# Patient Record
Sex: FEMALE | Race: WHITE | NOT HISPANIC OR LATINO | Employment: UNEMPLOYED | URBAN - METROPOLITAN AREA
[De-identification: names, ages, dates, MRNs, and addresses within clinical notes are randomized per-mention and may not be internally consistent; named-entity substitution may affect disease eponyms.]

---

## 2017-01-09 ENCOUNTER — TELEPHONE (OUTPATIENT)
Dept: SMOKING CESSATION | Facility: CLINIC | Age: 62
End: 2017-01-09

## 2017-01-09 NOTE — TELEPHONE ENCOUNTER
Spoke with patient in regards to her benefits with the Smoking Cessation Program. She states that she wants to really quit smoking but she is having a hard time being able to quit by a certain time frame. She states that she needs additional counseling and that group settings cause her to get confused and want to smoke more. Her benefits  on 2017 and she is not eligible for renewal until 3-2017. She states that she has plenty of nicotine patches that she will use and follow the instructions from her counselor. Will renew her benefits when it becomes available.

## 2017-01-10 ENCOUNTER — TELEPHONE (OUTPATIENT)
Dept: SMOKING CESSATION | Facility: CLINIC | Age: 62
End: 2017-01-10

## 2017-01-11 ENCOUNTER — TELEPHONE (OUTPATIENT)
Dept: SMOKING CESSATION | Facility: CLINIC | Age: 62
End: 2017-01-11

## 2017-01-11 ENCOUNTER — TELEPHONE (OUTPATIENT)
Dept: PULMONOLOGY | Facility: CLINIC | Age: 62
End: 2017-01-11

## 2017-01-11 NOTE — TELEPHONE ENCOUNTER
----- Message from Kellen Sanon sent at 1/11/2017  9:12 AM CST -----  Contact: Pt   Pt called and stated she needed to the nurse. She stated she needed to talk and would not give further details of the reason for her call. She can be reached at [phone] (dksq).    Thanks,  Tf

## 2017-01-13 ENCOUNTER — TELEPHONE (OUTPATIENT)
Dept: PULMONOLOGY | Facility: CLINIC | Age: 62
End: 2017-01-13

## 2017-01-13 NOTE — TELEPHONE ENCOUNTER
----- Message from Kellen Sanon sent at 1/13/2017  1:20 PM CST -----  Contact: Pt   Pt called and stated she needed to speak to the nurse. She stated she a few questions and needs a call back. She can be reached at [phone].    Thanks,  TF

## 2017-01-17 ENCOUNTER — TELEPHONE (OUTPATIENT)
Dept: SMOKING CESSATION | Facility: CLINIC | Age: 62
End: 2017-01-17

## 2017-01-17 NOTE — TELEPHONE ENCOUNTER
Third attempt left message regarding smoking cessation quit 2 episode, will call back at a later time.

## 2017-01-19 ENCOUNTER — TELEPHONE (OUTPATIENT)
Dept: PULMONOLOGY | Facility: HOSPITAL | Age: 62
End: 2017-01-19

## 2017-02-01 ENCOUNTER — TELEPHONE (OUTPATIENT)
Dept: INTERNAL MEDICINE | Facility: CLINIC | Age: 62
End: 2017-02-01

## 2017-02-01 NOTE — TELEPHONE ENCOUNTER
----- Message from Joanna Pendleton sent at 2/1/2017  8:03 AM CST -----  Contact: pt   Pt wants to speak with nurse,,, please call pt back at [phone]

## 2017-02-02 ENCOUNTER — TELEPHONE (OUTPATIENT)
Dept: INTERNAL MEDICINE | Facility: CLINIC | Age: 62
End: 2017-02-02

## 2017-02-02 NOTE — TELEPHONE ENCOUNTER
Called pt several times earlier today. Also left a message stating that she needed to be seen. Pt with coughing and vomiting.     Called pt again, no answer.

## 2017-02-02 NOTE — TELEPHONE ENCOUNTER
----- Message from Mal Lofton sent at 2/2/2017 12:10 PM CST -----  Contact: [phone]   Returning call,please call back @ [phone].  Thanks-[facility]

## 2017-02-06 ENCOUNTER — LAB VISIT (OUTPATIENT)
Dept: LAB | Facility: HOSPITAL | Age: 62
End: 2017-02-06
Attending: FAMILY MEDICINE
Payer: COMMERCIAL

## 2017-02-06 ENCOUNTER — OFFICE VISIT (OUTPATIENT)
Dept: INTERNAL MEDICINE | Facility: CLINIC | Age: 62
End: 2017-02-06
Payer: COMMERCIAL

## 2017-02-06 VITALS
DIASTOLIC BLOOD PRESSURE: 84 MMHG | WEIGHT: 203.5 LBS | TEMPERATURE: 97 F | SYSTOLIC BLOOD PRESSURE: 132 MMHG | OXYGEN SATURATION: 98 % | BODY MASS INDEX: 37.22 KG/M2 | HEART RATE: 103 BPM

## 2017-02-06 DIAGNOSIS — I10 ESSENTIAL HYPERTENSION: Chronic | ICD-10-CM

## 2017-02-06 DIAGNOSIS — J44.9 CHRONIC OBSTRUCTIVE PULMONARY DISEASE, UNSPECIFIED COPD TYPE: ICD-10-CM

## 2017-02-06 DIAGNOSIS — E55.9 VITAMIN D INSUFFICIENCY: ICD-10-CM

## 2017-02-06 DIAGNOSIS — Z79.899 HIGH RISK MEDICATION USE: ICD-10-CM

## 2017-02-06 DIAGNOSIS — H66.001 ACUTE SUPPURATIVE OTITIS MEDIA OF RIGHT EAR WITHOUT SPONTANEOUS RUPTURE OF TYMPANIC MEMBRANE, RECURRENCE NOT SPECIFIED: ICD-10-CM

## 2017-02-06 DIAGNOSIS — J01.10 ACUTE NON-RECURRENT FRONTAL SINUSITIS: Primary | ICD-10-CM

## 2017-02-06 LAB
25(OH)D3+25(OH)D2 SERPL-MCNC: 20 NG/ML
ALBUMIN SERPL BCP-MCNC: 3.6 G/DL
ALP SERPL-CCNC: 88 U/L
ALT SERPL W/O P-5'-P-CCNC: 13 U/L
ANION GAP SERPL CALC-SCNC: 10 MMOL/L
AST SERPL-CCNC: 14 U/L
BASOPHILS # BLD AUTO: 0.04 K/UL
BASOPHILS NFR BLD: 0.3 %
BILIRUB SERPL-MCNC: 0.5 MG/DL
BUN SERPL-MCNC: 7 MG/DL
CALCIUM SERPL-MCNC: 9.1 MG/DL
CHLORIDE SERPL-SCNC: 92 MMOL/L
CO2 SERPL-SCNC: 26 MMOL/L
CREAT SERPL-MCNC: 0.7 MG/DL
DIFFERENTIAL METHOD: ABNORMAL
EOSINOPHIL # BLD AUTO: 0.3 K/UL
EOSINOPHIL NFR BLD: 2.5 %
ERYTHROCYTE [DISTWIDTH] IN BLOOD BY AUTOMATED COUNT: 14.1 %
EST. GFR  (AFRICAN AMERICAN): >60 ML/MIN/1.73 M^2
EST. GFR  (NON AFRICAN AMERICAN): >60 ML/MIN/1.73 M^2
GLUCOSE SERPL-MCNC: 62 MG/DL
HCT VFR BLD AUTO: 44 %
HGB BLD-MCNC: 15.4 G/DL
LYMPHOCYTES # BLD AUTO: 2.5 K/UL
LYMPHOCYTES NFR BLD: 20.9 %
MCH RBC QN AUTO: 31.4 PG
MCHC RBC AUTO-ENTMCNC: 35 %
MCV RBC AUTO: 90 FL
MONOCYTES # BLD AUTO: 1.1 K/UL
MONOCYTES NFR BLD: 8.9 %
NEUTROPHILS # BLD AUTO: 7.9 K/UL
NEUTROPHILS NFR BLD: 67.1 %
PLATELET # BLD AUTO: 283 K/UL
PMV BLD AUTO: 10.5 FL
POTASSIUM SERPL-SCNC: 4.3 MMOL/L
PROT SERPL-MCNC: 6.9 G/DL
RBC # BLD AUTO: 4.9 M/UL
SODIUM SERPL-SCNC: 128 MMOL/L
TSH SERPL DL<=0.005 MIU/L-ACNC: 0.79 UIU/ML
WBC # BLD AUTO: 11.76 K/UL

## 2017-02-06 PROCEDURE — 84443 ASSAY THYROID STIM HORMONE: CPT

## 2017-02-06 PROCEDURE — 82306 VITAMIN D 25 HYDROXY: CPT

## 2017-02-06 PROCEDURE — 99214 OFFICE O/P EST MOD 30 MIN: CPT | Mod: S$GLB,,, | Performed by: FAMILY MEDICINE

## 2017-02-06 PROCEDURE — 80053 COMPREHEN METABOLIC PANEL: CPT

## 2017-02-06 PROCEDURE — 85025 COMPLETE CBC W/AUTO DIFF WBC: CPT

## 2017-02-06 PROCEDURE — 3075F SYST BP GE 130 - 139MM HG: CPT | Mod: S$GLB,,, | Performed by: FAMILY MEDICINE

## 2017-02-06 PROCEDURE — 3079F DIAST BP 80-89 MM HG: CPT | Mod: S$GLB,,, | Performed by: FAMILY MEDICINE

## 2017-02-06 PROCEDURE — 36415 COLL VENOUS BLD VENIPUNCTURE: CPT | Mod: PO

## 2017-02-06 PROCEDURE — 99999 PR PBB SHADOW E&M-EST. PATIENT-LVL III: CPT | Mod: PBBFAC,,, | Performed by: FAMILY MEDICINE

## 2017-02-06 RX ORDER — AMOXICILLIN AND CLAVULANATE POTASSIUM 875; 125 MG/1; MG/1
1 TABLET, FILM COATED ORAL 2 TIMES DAILY
Qty: 10 TABLET | Refills: 0 | Status: SHIPPED | OUTPATIENT
Start: 2017-02-06 | End: 2017-02-11

## 2017-02-06 NOTE — MR AVS SNAPSHOT
[facility]  [address]  [place] [address]  Phone: [phone]  Fax: [phone]                  Merle Caro   2017 2:40 PM   Office Visit    Description:  Female : 1955   Provider:  Marck Major MD   Department:  [facility] - [facility]           Reason for Visit     Cough     Generalized Body Aches     Neck Pain           Diagnoses this Visit        Comments    Acute non-recurrent frontal sinusitis    -  Primary     Acute suppurative otitis media of right ear without spontaneous rupture of tympanic membrane, recurrence not specified         Vitamin D insufficiency         Essential hypertension         Chronic obstructive pulmonary disease, unspecified COPD type         High risk medication use                To Do List           Future Appointments        Provider Department Dept Phone    2017 4:45 PM LAB, SAME DAY [facility] [phone]    2017 9:40 AM PULMONARY LAB, [facility] [phone]    2017 10:20 AM Tab Tracey MD [facility] [phone]    2017 2:00 PM Marck Major MD [facility] [phone]      Goals (5 Years of Data)     None      Follow-Up and Disposition     Return in about 6 months (around 2017).       These Medications        Disp Refills Start End    amoxicillin-clavulanate 875-125mg (AUGMENTIN) 875-125 mg per tablet 10 tablet 0 2017    Take 1 tablet by mouth 2 (two) times daily. - Oral    Pharmacy: [id number] [place], [place] - [address] AT [facility] Ph #: [phone]         [facility]     [facility] -  Assistance  Registered nurses in the [facility] provide clinical advisement, health education, appointment booking, and other advisory services.  Call for this free service at [phone].             Medications           Message  regarding Medications     Verify the changes and/or additions to your medication regime listed below are the same as discussed with your clinician today.  If any of these changes or additions are incorrect, please notify your healthcare provider.        START taking these NEW medications        Refills    amoxicillin-clavulanate 875-125mg (AUGMENTIN) 875-125 mg per tablet 0    Sig: Take 1 tablet by mouth 2 (two) times daily.    Class: Normal    Route: Oral           Verify that the below list of medications is an accurate representation of the medications you are currently taking.  If none reported, the list may be blank. If incorrect, please contact your healthcare provider. Carry this list with you in case of emergency.           Current Medications     aclidinium bromide (TUDORZA PRESSAIR) 400 mcg/actuation AePB Inhale 1 puff into the lungs 2 (two) times daily.    ADVAIR DISKUS 500-50 mcg/dose DsDv diskus inhaler USE 1 PUFF BY MOUTH TWICE DAILY    albuterol (ACCUNEB) 1.25 mg/3 mL Nebu Take 3 mLs (1.25 mg total) by nebulization every 6 (six) hours as needed.    albuterol 90 mcg/actuation inhaler Inhale 2 puffs into the lungs every 4 (four) hours as needed for Wheezing.    haloperidol (HALDOL) 5 MG tablet Take 5 mg by mouth once daily.     paroxetine (PAXIL) 30 MG tablet Take 30 mg by mouth once daily.     amoxicillin-clavulanate 875-125mg (AUGMENTIN) 875-125 mg per tablet Take 1 tablet by mouth 2 (two) times daily.    benztropine (COGENTIN) 1 MG tablet Take 1 mg by mouth 2 (two) times daily.    clonazepam (KLONOPIN) 0.5 MG tablet Take 0.5 mg by mouth 2 (two) times daily as needed.    dextromethorphan-guaifenesin  mg (MUCINEX DM)  mg per 12 hr tablet Take 1 tablet by mouth every 12 (twelve) hours.    ergocalciferol (ERGOCALCIFEROL) 50,000 unit Cap TAKE 1 CAPSULE BY MOUTH WEEKLY    nicotine (NICODERM CQ) 14 mg/24 hr Place 1 patch onto the skin once daily.    nicotine polacrilex 2 MG Lozg Take 1 lozenge  (2 mg total) by mouth as needed.           Clinical Reference Information           Your Vitals Were     BP Pulse Temp Weight SpO2 BMI    132/84 (BP Location: Right arm, Patient Position: Sitting, BP Method: Manual) 103 97 °F (36.1 °C) (Tympanic) 92.3 kg (203 lb 7.8 oz) 98% 37.22 kg/m2      Blood Pressure          Most Recent Value    BP  132/84      Allergies as of 2/6/2017     Codeine    Keflex [Cephalexin]    Shellfish Containing Products    Sucralfate      Immunizations Administered on Date of Encounter - 2/6/2017     None      Orders Placed During Today's Visit     Future Labs/Procedures Expected by Expires    CBC auto differential  2/6/2017 4/7/2018    Comprehensive metabolic panel  2/6/2017 5/7/2017    TSH  2/6/2017 4/7/2018    VITAMIN D  2/6/2017 4/7/2018      Smoking Cessation     If you would like to quit smoking:   You may be eligible for free services if you are a [place] resident and started smoking cigarettes before September 1, 1988.  Call the Smoking Cessation Trust ([facility]) toll free at [phone] or [phone].   Call 1-717-QUIT-NOW if you do not meet the above criteria.            Language Assistance Services     ATTENTION: Language assistance services are available, free of charge. Please call [phone].      ATENCIÓN: Si habla español, tiene a prado disposición servicios gratuitos de asistencia lingüística. Llame al [phone].     CHÚ Ý: N?u b?n nói Ti?ng Vi?t, có các d?ch v? h? tr? ngôn ng? mi?n phí dành cho b?n. G?i s? [phone].         [facility] complies with applicable Federal civil rights laws and does not discriminate on the basis of race, color, national origin, age, disability, or sex.

## 2017-02-06 NOTE — PROGRESS NOTES
Subjective:   Patient ID: Merle Caro is a 61 y.o. female.  Chief Complaint:  Cough; Generalized Body Aches; and Neck Pain    HPI Comments: Patient complains of overall not feeling well for last few weeks.  Headache and sinus pressure.  Cough.  Different than COPD exacerbations.  Last antibiotic and steroid treatment in September.  Up-to-date with pulmonology.  Reports compliance medications.  Last week with diarrhea.  Imodium effective.  Now with constipated of symptoms.  Not taking vitamin D high-dose weekly.  Doing over-the-counter daily supplement.  Needs level rechecked.  Seen by psychiatry last month.  No change in medical regimen.  Labs in June stable except for low sodium, question will significance.  Do repeat.  Lipids April controlled.    Review of Systems   Constitutional: Positive for fatigue. Negative for chills and fever.   HENT: Positive for ear pain and sinus pressure. Negative for congestion, dental problem, postnasal drip and sore throat.    Eyes: Negative for visual disturbance.   Respiratory: Positive for cough. Negative for chest tightness, shortness of breath and wheezing.    Cardiovascular: Negative for chest pain, palpitations and leg swelling.   Gastrointestinal: Positive for constipation. Negative for abdominal pain, blood in stool, diarrhea, nausea and vomiting.   Endocrine: Negative for polydipsia, polyphagia and polyuria.   Genitourinary: Negative for difficulty urinating, dysuria, flank pain, hematuria and pelvic pain.   Musculoskeletal: Negative for myalgias.   Skin: Negative for rash.   Neurological: Negative for dizziness, syncope, weakness, light-headedness and headaches.   Hematological: Negative for adenopathy.   Psychiatric/Behavioral: Negative.      Objective:     Visit Vitals    /84 (BP Location: Right arm, Patient Position: Sitting, BP Method: Manual)    Pulse 103    Temp 97 °F (36.1 °C) (Tympanic)    Wt 92.3 kg (203 lb 7.8 oz)    SpO2 98%    BMI 37.22 kg/m2      Physical Exam   Constitutional: Vital signs are normal. She appears well-developed and well-nourished. She does not appear ill.   HENT:   Right Ear: Hearing, external ear and ear canal normal. Tympanic membrane is injected, erythematous and bulging. Tympanic membrane is not perforated and not retracted.   Left Ear: Hearing, tympanic membrane, external ear and ear canal normal.   Nose: Mucosal edema and rhinorrhea present. Right sinus exhibits frontal sinus tenderness. Right sinus exhibits no maxillary sinus tenderness. Left sinus exhibits frontal sinus tenderness. Left sinus exhibits no maxillary sinus tenderness.   Mouth/Throat: Uvula is midline and mucous membranes are normal. Posterior oropharyngeal erythema present. No oropharyngeal exudate, posterior oropharyngeal edema or tonsillar abscesses. No tonsillar exudate.   Eyes: Conjunctivae are normal. Right conjunctiva is not injected. Left conjunctiva is not injected.   Neck: Normal range of motion. Neck supple.   Cardiovascular: Normal rate, regular rhythm and normal heart sounds.  Exam reveals no gallop and no friction rub.    No murmur heard.  Pulmonary/Chest: Effort normal. She has wheezes. She has no rhonchi. She has no rales.   Abdominal: Soft. She exhibits no distension. There is no tenderness.   Lymphadenopathy:     She has no cervical adenopathy.   Skin: Skin is warm, dry and intact. No rash noted.   Psychiatric: She has a normal mood and affect.   Nursing note and vitals reviewed.    Assessment:     1. Acute non-recurrent frontal sinusitis    2. Acute suppurative otitis media of right ear without spontaneous rupture of tympanic membrane, recurrence not specified    3. Vitamin D insufficiency    4. Essential hypertension    5. Chronic obstructive pulmonary disease, unspecified COPD type    6. High risk medication use      Plan:   Acute non-recurrent frontal sinusitis/Acute suppurative otitis media of right ear without spontaneous rupture of tympanic  membrane, recurrence not specified  -     amoxicillin-clavulanate 875-125mg (AUGMENTIN) 875-125 mg per tablet; Take 1 tablet by mouth 2 (two) times daily.  Dispense: 10 tablet; Refill: 0    Vitamin D insufficiency  -     VITAMIN D; Future; Expected date: 2/6/17  Restart weekly high-dose supplement as indicated    Essential hypertension  BP at goal.  Controlled.  Continue present medications.    Chronic obstructive pulmonary disease, unspecified COPD type  Stable.  Continue present medications.  Follow-up pulmonary as planned.    High risk medication use  -     CBC auto differential; Future; Expected date: 2/6/17  -     Comprehensive metabolic panel; Future; Expected date: 2/6/17  -     TSH; Future; Expected date: 2/6/17  Check labs.  Treat as indicated.    Advised if all labs normal and no improvement with treatment for sinus/ear infection, will need to follow-up with psychiatry regarding her overall mood.  Otherwise return to clinic 6 months or sooner if needed.

## 2017-02-07 ENCOUNTER — TELEPHONE (OUTPATIENT)
Dept: INTERNAL MEDICINE | Facility: CLINIC | Age: 62
End: 2017-02-07

## 2017-02-07 DIAGNOSIS — E87.1 HYPONATREMIA: Primary | ICD-10-CM

## 2017-02-07 NOTE — TELEPHONE ENCOUNTER
----- Message from Marck Major MD sent at 2/7/2017  7:34 AM CST -----  Vit D low, needs to be on the high dose weekly supplement  CBC and TSH normal  CMP OK except Low Sodium  Needs referral and evaluation by Renal for causes other than too much water intake.

## 2017-02-07 NOTE — TELEPHONE ENCOUNTER
Notified pt. Pt verbalized understanding. appt scheduled with Nephro on 2/22/17. appt reminder mailed.

## 2017-02-15 ENCOUNTER — TELEPHONE (OUTPATIENT)
Dept: INTERNAL MEDICINE | Facility: CLINIC | Age: 62
End: 2017-02-15

## 2017-02-15 NOTE — TELEPHONE ENCOUNTER
----- Message from Lamar Dominguez sent at 2/15/2017  7:36 AM CST -----  Contact: Pt  Pt has some questions for the nurse regarding her test results. Pls call pt back at [phone].

## 2017-02-17 ENCOUNTER — TELEPHONE (OUTPATIENT)
Dept: INTERNAL MEDICINE | Facility: CLINIC | Age: 62
End: 2017-02-17

## 2017-02-17 NOTE — TELEPHONE ENCOUNTER
----- Message from Melissa Tadeo sent at 2/17/2017  9:32 AM CST -----  Contact: pt  Pt requests nurse to call her regarding results. Pt can be reached at [phone] (pepd)

## 2017-02-22 ENCOUNTER — OFFICE VISIT (OUTPATIENT)
Dept: NEPHROLOGY | Facility: CLINIC | Age: 62
End: 2017-02-22
Payer: COMMERCIAL

## 2017-02-22 VITALS
HEIGHT: 62 IN | HEART RATE: 70 BPM | BODY MASS INDEX: 36.79 KG/M2 | DIASTOLIC BLOOD PRESSURE: 80 MMHG | WEIGHT: 199.94 LBS | SYSTOLIC BLOOD PRESSURE: 120 MMHG

## 2017-02-22 DIAGNOSIS — E87.1 CHRONIC HYPONATREMIA: ICD-10-CM

## 2017-02-22 PROCEDURE — 99205 OFFICE O/P NEW HI 60 MIN: CPT | Mod: S$GLB,,, | Performed by: INTERNAL MEDICINE

## 2017-02-22 PROCEDURE — 99999 PR PBB SHADOW E&M-EST. PATIENT-LVL III: CPT | Mod: PBBFAC,,, | Performed by: INTERNAL MEDICINE

## 2017-02-22 PROCEDURE — 3074F SYST BP LT 130 MM HG: CPT | Mod: S$GLB,,, | Performed by: INTERNAL MEDICINE

## 2017-02-22 PROCEDURE — 3079F DIAST BP 80-89 MM HG: CPT | Mod: S$GLB,,, | Performed by: INTERNAL MEDICINE
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
## 2017-02-22 NOTE — MR AVS SNAPSHOT
[facility]  [address] [facility] [address]  Phone: [phone]  Fax: [phone]                  Merle Caro   2017 9:00 AM   Office Visit    Description:  Female : 1955   Provider:  Haseeb Reilly MD   Department:  [facility]           Diagnoses this Visit        Comments    Chronic hyponatremia                To Do List           Future Appointments        Provider Department Dept Phone    3/7/2017 2:00 PM SPECIMEN, [facility] [phone]    3/7/2017 2:15 PM LABORATORY, [facility] [phone]    3/14/2017 2:00 PM Haseeb Reilly MD [facility] Nephrology [phone]    2017 9:40 AM [facility] [phone]    2017 10:20 AM Tab Tracey MD [facility] Pulmonary Services [phone]      Goals (5 Years of Data)     None      Follow-Up and Disposition     Return in about 3 weeks (around 3/15/2017).      [facility]     [facility] Nurse [facility] -  Assistance  Registered nurses in the [facility] provide clinical advisement, health education, appointment booking, and other advisory services.  Call for this free service at [phone].             Medications           Message regarding Medications     Verify the changes and/or additions to your medication regime listed below are the same as discussed with your clinician today.  If any of these changes or additions are incorrect, please notify your healthcare provider.             Verify that the below list of medications is an accurate representation of the medications you are currently taking.  If none reported, the list may be blank. If incorrect, please contact your healthcare provider. Carry this list with you in case of emergency.           Current Medications     aclidinium bromide (TUDORZA PRESSAIR) 400 mcg/actuation AePB Inhale 1 puff into the lungs 2 (two) times daily.     "ADVAIR DISKUS 500-50 mcg/dose DsDv diskus inhaler USE 1 PUFF BY MOUTH TWICE DAILY    albuterol (ACCUNEB) 1.25 mg/3 mL Nebu Take 3 mLs (1.25 mg total) by nebulization every 6 (six) hours as needed.    albuterol 90 mcg/actuation inhaler Inhale 2 puffs into the lungs every 4 (four) hours as needed for Wheezing.    benztropine (COGENTIN) 1 MG tablet Take 1 mg by mouth 2 (two) times daily.    clonazepam (KLONOPIN) 0.5 MG tablet Take 0.5 mg by mouth 2 (two) times daily as needed.    dextromethorphan-guaifenesin  mg (MUCINEX DM)  mg per 12 hr tablet Take 1 tablet by mouth every 12 (twelve) hours.    ergocalciferol (ERGOCALCIFEROL) 50,000 unit Cap TAKE 1 CAPSULE BY MOUTH WEEKLY    haloperidol (HALDOL) 5 MG tablet Take 5 mg by mouth once daily.     nicotine (NICODERM CQ) 14 mg/24 hr Place 1 patch onto the skin once daily.    nicotine polacrilex 2 MG Lozg Take 1 lozenge (2 mg total) by mouth as needed.    paroxetine (PAXIL) 30 MG tablet Take 30 mg by mouth once daily.            Clinical Reference Information           Your Vitals Were     BP Pulse Height Weight BMI    120/80 70 5' 2" (1.575 m) 90.7 kg (199 lb 15.3 oz) 36.57 kg/m2      Blood Pressure          Most Recent Value    BP  120/80      Allergies as of 2/22/2017     Codeine    Keflex [Cephalexin]    Shellfish Containing Products    Sucralfate      Immunizations Administered on Date of Encounter - 2/22/2017     None      Orders Placed During Today's Visit      Normal Orders This Visit    Osmolality, urine     Future Labs/Procedures Expected by Expires    Basic metabolic panel  2/22/2017 4/23/2018    CORTISOL, 8AM  2/22/2017 4/23/2018    OSMOLALITY  2/22/2017 4/23/2018      Instructions    Decrease total fluid intake to 70 to 80 ounces per day (drink mostly water).    Try to eliminate intake of fountain drinks. Do this over the next 2-3 weeks.        Smoking Cessation     If you would like to quit smoking:   You may be eligible for free services if you are " a [place] resident and started smoking cigarettes before September 1, 1988.  Call the Smoking Cessation Trust (SCT) toll free at (461) 999-1947 or (060) 655-6306.   Call 1-800-QUIT-NOW if you do not meet the above criteria.            Language Assistance Services     ATTENTION: Language assistance services are available, free of charge. Please call [phone].      ATENCIÓN: Si habla español, tiene a prado disposición servicios gratuitos de asistencia lingüística. Llame al [phone].     CHÚ Ý: N?u b?n nói Ti?ng Vi?t, có các d?ch v? h? tr? ngôn ng? mi?n phí dành cho b?n. G?i s? [phone].         [facility] complies with applicable Federal civil rights laws and does not discriminate on the basis of race, color, national origin, age, disability, or sex.

## 2017-02-22 NOTE — PATIENT INSTRUCTIONS
Decrease total fluid intake to 70 to 80 ounces per day (drink mostly water).    Try to eliminate intake of fountain drinks. Do this over the next 2-3 weeks.

## 2017-02-22 NOTE — LETTER
February 22, 2017      Marck Major MD  [address] [facility] [place]  [facility] [address]           [facility] Nephrology  [address] [place] [address]  Phone: [phone]  Fax: [phone]          Patient: Merle Caro   MR Number: [id number]   YOB: 1955   Date of Visit: 2/22/2017       Dear Dr. Marck Major:    Thank you for referring Merle Caro to me for evaluation. Attached you will find relevant portions of my assessment and plan of care.    If you have questions, please do not hesitate to call me. I look forward to following Merle Caro along with you.    Sincerely,    Haseeb Reilly MD    Enclosure  CC:  No Recipients    If you would like to receive this communication electronically, please contact [email or web address] or [phone] to request more information on [employer] Link access.    For providers and/or their staff who would like to refer a patient to [facility], please contact us through our one-stop-shop provider referral line, [facility], at [phone].    If you feel you have received this communication in error or would no longer like to receive these types of communications, please e-mail [email or web address]

## 2017-02-24 ENCOUNTER — NURSE TRIAGE (OUTPATIENT)
Dept: ADMINISTRATIVE | Facility: CLINIC | Age: 62
End: 2017-02-24

## 2017-02-25 NOTE — TELEPHONE ENCOUNTER
"    Reason for Disposition   [1] Can't control passage of urine (i.e., urinary incontinence, wetting self) AND [2] present > 2 weeks    Answer Assessment - Initial Assessment Questions  1. SYMPTOM: "What's the main symptom you're concerned about?" (e.g., frequency, incontinence)      incontinence  2. ONSET: "When did the  ________  start?"      Today  3. PAIN: "Is there any pain?" If so, ask: "How bad is it?" (Scale: 1-10; mild, moderate, severe)      denies  4. CAUSE: "What do you think is causing the symptoms?"      Low sodium  5. OTHER SYMPTOMS: "Do you have any other symptoms?" (e.g., fever, flank pain, blood in urine, pain with urination)      denies  6. PREGNANCY: "Is there any chance you are pregnant?" "When was your last menstrual period?"      na    Protocols used: ST URINARY SYMPTOMS-A-      "

## 2017-03-01 DIAGNOSIS — E55.9 VITAMIN D INSUFFICIENCY: ICD-10-CM

## 2017-03-02 ENCOUNTER — TELEPHONE (OUTPATIENT)
Dept: PULMONOLOGY | Facility: HOSPITAL | Age: 62
End: 2017-03-02

## 2017-03-02 RX ORDER — ERGOCALCIFEROL 1.25 MG/1
CAPSULE ORAL
Qty: 12 CAPSULE | Refills: 0 | Status: SHIPPED | OUTPATIENT
Start: 2017-03-02 | End: 2017-06-26 | Stop reason: SDUPTHER

## 2017-03-07 ENCOUNTER — LAB VISIT (OUTPATIENT)
Dept: LAB | Facility: HOSPITAL | Age: 62
End: 2017-03-07
Attending: INTERNAL MEDICINE
Payer: COMMERCIAL

## 2017-03-07 DIAGNOSIS — E87.1 CHRONIC HYPONATREMIA: ICD-10-CM

## 2017-03-07 LAB
ANION GAP SERPL CALC-SCNC: 9 MMOL/L
BUN SERPL-MCNC: 5 MG/DL
CALCIUM SERPL-MCNC: 8.7 MG/DL
CHLORIDE SERPL-SCNC: 91 MMOL/L
CO2 SERPL-SCNC: 26 MMOL/L
CORTIS SERPL-MCNC: 6.4 UG/DL
CREAT SERPL-MCNC: 0.6 MG/DL
EST. GFR  (AFRICAN AMERICAN): >60 ML/MIN/1.73 M^2
EST. GFR  (NON AFRICAN AMERICAN): >60 ML/MIN/1.73 M^2
GLUCOSE SERPL-MCNC: 76 MG/DL
OSMOLALITY SERPL: 261 MOSM/KG
POTASSIUM SERPL-SCNC: 4.7 MMOL/L
SODIUM SERPL-SCNC: 126 MMOL/L

## 2017-03-07 PROCEDURE — 80048 BASIC METABOLIC PNL TOTAL CA: CPT

## 2017-03-07 PROCEDURE — 82533 TOTAL CORTISOL: CPT

## 2017-03-07 PROCEDURE — 36415 COLL VENOUS BLD VENIPUNCTURE: CPT | Mod: PO

## 2017-03-07 PROCEDURE — 83930 ASSAY OF BLOOD OSMOLALITY: CPT

## 2017-03-09 ENCOUNTER — TELEPHONE (OUTPATIENT)
Dept: INTERNAL MEDICINE | Facility: CLINIC | Age: 62
End: 2017-03-09

## 2017-03-09 NOTE — TELEPHONE ENCOUNTER
MD Cele Sy LPN        Caller: Unspecified (Today, 10:34 AM)                     Robitussin-DM or equivalent for cough.   Zyrtec or equivalent during the day for congestion.   Benadryl at night for congestion.   Just needs to avoid any meds with decongestants in them.

## 2017-03-09 NOTE — TELEPHONE ENCOUNTER
S/w pt. Pt stated that she is sneezing and has a cough, no fever or wheezing. Pt would like to know if she should be seen or what she can take OTC for her symptoms since she has HTN. Told pt that I would discuss with Dr. Major and return her call. Pt verbalized understanding.

## 2017-03-14 ENCOUNTER — OFFICE VISIT (OUTPATIENT)
Dept: NEPHROLOGY | Facility: CLINIC | Age: 62
End: 2017-03-14
Payer: COMMERCIAL

## 2017-03-14 VITALS
WEIGHT: 195.56 LBS | HEIGHT: 62 IN | DIASTOLIC BLOOD PRESSURE: 70 MMHG | HEART RATE: 84 BPM | SYSTOLIC BLOOD PRESSURE: 130 MMHG | BODY MASS INDEX: 35.99 KG/M2

## 2017-03-14 DIAGNOSIS — E87.1 CHRONIC HYPONATREMIA: Primary | ICD-10-CM

## 2017-03-14 PROCEDURE — 99999 PR PBB SHADOW E&M-EST. PATIENT-LVL III: CPT | Mod: PBBFAC,,, | Performed by: INTERNAL MEDICINE

## 2017-03-14 PROCEDURE — 3078F DIAST BP <80 MM HG: CPT | Mod: S$GLB,,, | Performed by: INTERNAL MEDICINE

## 2017-03-14 PROCEDURE — 3075F SYST BP GE 130 - 139MM HG: CPT | Mod: S$GLB,,, | Performed by: INTERNAL MEDICINE

## 2017-03-14 PROCEDURE — 1160F RVW MEDS BY RX/DR IN RCRD: CPT | Mod: S$GLB,,, | Performed by: INTERNAL MEDICINE

## 2017-03-14 PROCEDURE — 99214 OFFICE O/P EST MOD 30 MIN: CPT | Mod: S$GLB,,, | Performed by: INTERNAL MEDICINE

## 2017-03-14 NOTE — MR AVS SNAPSHOT
[facility]  [address] [facility] [place] [address]  Phone: [phone]  Fax: [phone]                  Merle Caro   3/14/2017 2:00 PM   Office Visit    Description:  Female : 1955   Provider:  Haseeb Reilly MD   Department:  [facility] Nephrology           Reason for Visit     chronic hyponatremia     Follow-up           Diagnoses this Visit        Comments    Chronic hyponatremia    -  Primary            To Do List           Future Appointments        Provider Department Dept Phone    2017 10:35 AM LABORATORY, [facility] [phone]    4/10/2017 2:00 PM MD [facility] [phone]    2017 9:40 AM PULMONARY LAB, [facility] - Pulm Function [facility] [phone]    2017 10:20 AM [facility] [phone]    2017 2:00 PM Marck Major MD [facility] Internal Medicine [phone]      Goals (5 Years of Data)     None      Follow-Up and Disposition     Return in about 3 weeks (around 2017).      [facility]     [facility] -  Assistance  Registered nurses in the [facility] provide clinical advisement, health education, appointment booking, and other advisory services.  Call for this free service at [phone].             Medications           Message regarding Medications     Verify the changes and/or additions to your medication regime listed below are the same as discussed with your clinician today.  If any of these changes or additions are incorrect, please notify your healthcare provider.             Verify that the below list of medications is an accurate representation of the medications you are currently taking.  If none reported, the list may be blank. If incorrect, please contact your healthcare provider. Carry this list with you in case of emergency.           Current Medications     aclidinium bromide (TUDORZA PRESSAIR) 400  "mcg/actuation AePB Inhale 1 puff into the lungs 2 (two) times daily.    ADVAIR DISKUS 500-50 mcg/dose DsDv diskus inhaler USE 1 PUFF BY MOUTH TWICE DAILY    albuterol (ACCUNEB) 1.25 mg/3 mL Nebu Take 3 mLs (1.25 mg total) by nebulization every 6 (six) hours as needed.    albuterol 90 mcg/actuation inhaler Inhale 2 puffs into the lungs every 4 (four) hours as needed for Wheezing.    benztropine (COGENTIN) 1 MG tablet Take 1 mg by mouth 2 (two) times daily.    clonazepam (KLONOPIN) 0.5 MG tablet Take 0.5 mg by mouth 2 (two) times daily as needed.    dextromethorphan-guaifenesin  mg (MUCINEX DM)  mg per 12 hr tablet Take 1 tablet by mouth every 12 (twelve) hours.    ergocalciferol (ERGOCALCIFEROL) 50,000 unit Cap TAKE 1 CAPSULE BY MOUTH WEEKLY    haloperidol (HALDOL) 5 MG tablet Take 5 mg by mouth once daily.     nicotine (NICODERM CQ) 14 mg/24 hr Place 1 patch onto the skin once daily.    nicotine polacrilex 2 MG Lozg Take 1 lozenge (2 mg total) by mouth as needed.    paroxetine (PAXIL) 30 MG tablet Take 30 mg by mouth once daily.            Clinical Reference Information           Your Vitals Were     BP Pulse Height Weight BMI    130/70 84 5' 2" (1.575 m) 88.7 kg (195 lb 8.8 oz) 35.77 kg/m2      Blood Pressure          Most Recent Value    BP  130/70      Allergies as of 3/14/2017     Codeine    Keflex [Cephalexin]    Shellfish Containing Products    Sucralfate      Immunizations Administered on Date of Encounter - 3/14/2017     None      Orders Placed During Today's Visit      Normal Orders This Visit    Osmolality, urine     Recurring Lab Work Interval Expires    Renal function panel  Every Weekday until 3/14/2018 3/14/2018      Smoking Cessation     If you would like to quit smoking:   You may be eligible for free services if you are a [place] resident and started smoking cigarettes before September 1, 1988.  Call the Smoking Cessation Trust (SCT) toll free at (522) 889-9708 or (234) " 129-0229.   Call 1-800-QUIT-NOW if you do not meet the above criteria.            Language Assistance Services     ATTENTION: Language assistance services are available, free of charge. Please call [phone].      ATENCIÓN: Si daviela prisca, tiene a prado disposición servicios gratuitos de asistencia lingüística. Llame al [phone].     CHÚ Ý: N?u b?n nói Ti?ng Vi?t, có các d?ch v? h? tr? ngôn ng? mi?n phí dành cho b?n. G?i s? [phone].         [facility] complies with applicable Federal civil rights laws and does not discriminate on the basis of race, color, national origin, age, disability, or sex.
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
## 2017-03-15 ENCOUNTER — CLINICAL SUPPORT (OUTPATIENT)
Dept: SMOKING CESSATION | Facility: CLINIC | Age: 62
End: 2017-03-15
Payer: COMMERCIAL

## 2017-03-15 DIAGNOSIS — F17.200 NICOTINE DEPENDENCE: Primary | ICD-10-CM

## 2017-03-15 PROCEDURE — 99404 PREV MED CNSL INDIV APPRX 60: CPT | Mod: S$GLB,,, | Performed by: GENERAL PRACTICE

## 2017-03-15 RX ORDER — IBUPROFEN 200 MG
1 TABLET ORAL DAILY
Qty: 28 PATCH | Refills: 0 | Status: SHIPPED | OUTPATIENT
Start: 2017-03-15 | End: 2017-05-02 | Stop reason: ALTCHOICE

## 2017-03-15 RX ORDER — MICONAZOLE NITRATE 2 %
2 CREAM (GRAM) TOPICAL
Qty: 120 EACH | Refills: 0 | Status: SHIPPED | OUTPATIENT
Start: 2017-03-15 | End: 2017-04-13

## 2017-03-28 ENCOUNTER — TELEPHONE (OUTPATIENT)
Dept: INTERNAL MEDICINE | Facility: CLINIC | Age: 62
End: 2017-03-28

## 2017-03-28 NOTE — TELEPHONE ENCOUNTER
Pt stated that she is still coughing stuff up, she has sore throat and sneezing, occasionally has fever. Pt reports slight wheezing. Advised that she can take OTC Allegra or Zyrtec but advised that she be seen due to her wheezing. Pt declined appt tomorrow morning with Dr. Major because she doesn't have transportation. Tried scheduling with another provider, pt declined and only wants to see Dr. Major. Appt scheduled with Dr. Major for 3/30/17 @ 3:00pm. Advised pt that if wheezing or symptoms got worse she would need to be seen sooner and also informed of Urgent care hours. Pt verbalized understanding.

## 2017-03-28 NOTE — TELEPHONE ENCOUNTER
----- Message from Kellen Sanon sent at 3/28/2017  4:34 PM CDT -----  Contact: Pt   Pt called and stated she needed to speak to the nurse. She stated she is still not feeling well and did not give further details. She can be reached at [phone].    Thanks,  TF

## 2017-03-29 ENCOUNTER — CLINICAL SUPPORT (OUTPATIENT)
Dept: SMOKING CESSATION | Facility: CLINIC | Age: 62
End: 2017-03-29
Payer: COMMERCIAL

## 2017-03-29 DIAGNOSIS — F17.200 NICOTINE DEPENDENCE: Primary | ICD-10-CM

## 2017-03-29 PROCEDURE — 99402 PREV MED CNSL INDIV APPRX 30: CPT | Mod: S$GLB,,, | Performed by: GENERAL PRACTICE

## 2017-03-29 NOTE — PROGRESS NOTES
Individual Follow-Up Form    3/29/2017    Clinical Status of Patient: Outpatient    Length of Service: 30 minutes    Continuing Medication: yes  Patches    Other Medications: Nicotine gum     Target Symptoms: Withdrawal and medication side effects. The following were  rated moderate (3) to severe (4) on TCRS:  · Moderate (3): irritable, sad, desire tobacco  · Severe (4): none    Comments: Patient states that she has been able to reduce her smoking to 1 cigarette daily this week. She smoked 5 cigarettes last week each day while wearing the nicotine patch. She states that she tried the nicotine gum but it was strong so she only used it the 1 time. We discussed how to properly use the nicotine gum and coping strategies to eliminate the remaining cigarette. She states that her  continues to buy her cigarettes even when she has told him not to. She states that he gets mad if she doesn't smoke the cigarettes that he buys. I encouraged her to talk to him again or invite him to attend a counseling session with her to see the importance of quitting smoking and her health. She states that her desire to smoke is only when she forgets to change her patch or if the patch falls off. She denies any negative thoughts or behavior at this time. Will continue to encourage and monitor progress.    Diagnosis: F17.200    Next Visit: 1 week

## 2017-04-03 ENCOUNTER — NURSE TRIAGE (OUTPATIENT)
Dept: ADMINISTRATIVE | Facility: CLINIC | Age: 62
End: 2017-04-03

## 2017-04-04 ENCOUNTER — OFFICE VISIT (OUTPATIENT)
Dept: INTERNAL MEDICINE | Facility: CLINIC | Age: 62
End: 2017-04-04
Payer: COMMERCIAL

## 2017-04-04 ENCOUNTER — LAB VISIT (OUTPATIENT)
Dept: LAB | Facility: HOSPITAL | Age: 62
End: 2017-04-04
Attending: INTERNAL MEDICINE
Payer: COMMERCIAL

## 2017-04-04 VITALS
DIASTOLIC BLOOD PRESSURE: 90 MMHG | OXYGEN SATURATION: 94 % | TEMPERATURE: 97 F | BODY MASS INDEX: 35.38 KG/M2 | HEIGHT: 62 IN | SYSTOLIC BLOOD PRESSURE: 132 MMHG | WEIGHT: 192.25 LBS | HEART RATE: 87 BPM

## 2017-04-04 DIAGNOSIS — R23.3 PETECHIAL RASH: ICD-10-CM

## 2017-04-04 DIAGNOSIS — E87.1 CHRONIC HYPONATREMIA: ICD-10-CM

## 2017-04-04 DIAGNOSIS — L02.224 BOIL, GROIN: Primary | ICD-10-CM

## 2017-04-04 PROCEDURE — 99213 OFFICE O/P EST LOW 20 MIN: CPT | Mod: S$GLB,,, | Performed by: PHYSICIAN ASSISTANT

## 2017-04-04 PROCEDURE — 1160F RVW MEDS BY RX/DR IN RCRD: CPT | Mod: S$GLB,,, | Performed by: PHYSICIAN ASSISTANT

## 2017-04-04 PROCEDURE — 80069 RENAL FUNCTION PANEL: CPT

## 2017-04-04 PROCEDURE — 3075F SYST BP GE 130 - 139MM HG: CPT | Mod: S$GLB,,, | Performed by: PHYSICIAN ASSISTANT

## 2017-04-04 PROCEDURE — 99999 PR PBB SHADOW E&M-EST. PATIENT-LVL IV: CPT | Mod: PBBFAC,,, | Performed by: PHYSICIAN ASSISTANT

## 2017-04-04 PROCEDURE — 36415 COLL VENOUS BLD VENIPUNCTURE: CPT | Mod: PO

## 2017-04-04 PROCEDURE — 3080F DIAST BP >= 90 MM HG: CPT | Mod: S$GLB,,, | Performed by: PHYSICIAN ASSISTANT

## 2017-04-04 RX ORDER — MUPIROCIN 20 MG/G
OINTMENT TOPICAL 3 TIMES DAILY
Qty: 15 G | Refills: 2 | Status: SHIPPED | OUTPATIENT
Start: 2017-04-04 | End: 2017-06-12

## 2017-04-04 NOTE — PATIENT INSTRUCTIONS
Petechiae (Child)  Petechiae are very small red spots on the skin. They are flat on the skin, not raised. They often show up very suddenly. Petechiae usually occur on the arms, legs, stomach, and buttocks. They dont itch. The spots may be caused by a viral or bacterial infection. They may also be caused by a reaction to a medicine. Petechiae that continue to grow and merge together may mean that your child has a bleeding disorder.  Petechiae caused by an infection or medicine go away on their own without treatment. They dont leave scars. If a bleeding disorder is causing the spots, the disorder will need to be treated. Your child may need more testing for a diagnosis.  Home care  · Follow any instructions your childs healthcare provider gives you. This may include changing a medicine that your child takes. Dont start or stop any medicines without talking with your childs provider.  · Check your childs spots regularly for changes. The spots may turn purple as they fade and go away.  · Contact the healthcare provider if you have any questions or concerns about your childs health.  Follow-up care  Follow up with your childs health care provider, or as advised.  When to seek medical advice  Unless your child's health care provider advises otherwise, call the provider right away if:  · The child is younger than 2 years of age and a fever of 100.4°F (38°C) continues for more than 1 day.  · The child is 2 years old or older and a fever of 100.4°F (38°C) continues for more than 3 days.  · The child is of any age and has repeated fevers above 104°F (40°C).  · The childs condition gets worse in any way.  · The spots increase or get bigger.  · The spots merge together.  · Long streaks appear under your childs nails.  · The child has bruising that is unexplained or gets worse.  · The child shows irritability, such as crying that cant be soothed.  · The child becomes lethargic or unusually sleepy, or does not acting  like him- or herself.  · The child has breathing problems.   Date Last Reviewed: 5/14/2015  © 1766-2523 [employer]. [address] [place] [address], [place] [address]. All rights reserved. This information is not intended as a substitute for professional medical care. Always follow your healthcare professional's instructions.        Understanding Carbuncles    A carbuncle is a painful boil under the skin. It happens when a group of hair follicles become infected. Follicles are the tiny holes from which hair grows out of your skin.  How to say it  [id number]   What causes carbuncles?  A carbuncle is caused by an infection with the bacteria Staphylococcus aureus. They are common on areas of the body where friction and sweat occur. They usually appear on the back of the neck, back, and thighs. This type of infection can also happen when the skin is injured, such as by a cut or bug bite.  The bacteria that causes carbuncles can spread easily from person to person. People at higher risk for boils are those with diabetes or a weak immune system. Drug users who use needles are also more likely to get them.  Symptoms of carbuncles  A carbuncle starts as a small painful bump. But it can grow quickly. It may become:  · Red  · Swollen  · Tender  Carbuncles may ooze pus. They may also cause fever and a general feeling of illness.  Treatment for carbuncles  A carbuncle may heal on its own in a few weeks. But the pus within it needs to come out first. Treatment options include:  · Warm compress. Putting a warm, wet washcloth on the boil will help the pus drain out. You should never try to pop a boil. That can cause the infection to spread.  · Surgical drainage. If the boil doesnt drain on its own, your healthcare provider may need to cut into it.  · Antibiotics. In some cases, oral antibiotics may be prescribed to fight the infection, especially if the carbuncle returns. You will likely have to take the medicine for 5  to 7 days. You may need to take it longer for a severe case.  · Good hygiene. Proper handwashing can prevent boils from spreading and coming back. Also wash things that have been in contact with the carbuncle in hot water. This includes items such as clothing and towels.  Complications of carbuncles  The main complication of a carbuncle is the spreading of the infection. The bacteria can infect the heart and bone. It can also lead to septic shock, an infection of the entire body.  When to call your healthcare provider  Call your healthcare provider right away if you have any of these:  · Fever of 100.4°F (38°C) or higher, or as directed  · Redness, swelling, or fluid leaking from a carbuncle that gets worse  · Pain that gets worse  · Symptoms that dont get better, or get worse  · New symptoms   Date Last Reviewed: 5/1/2016  © 8214-6153 [employer]. [address] [place] [address], [place] [address]. All rights reserved. This information is not intended as a substitute for professional medical care. Always follow your healthcare professional's instructions.

## 2017-04-04 NOTE — MR AVS SNAPSHOT
[facility]  [address] [facility] [place] [address]  Phone: [phone]  Fax: [phone]                  Merle Caro   2017 4:00 PM   Office Visit    Description:  Female : 1955   Provider:  Juan Laureano PA-C   Department:  [facility]           Reason for Visit     Skin Discoloration     Abscess           Diagnoses this Visit        Comments    Boil, groin    -  Primary     Petechial rash                To Do List           Future Appointments        Provider Department Dept Phone    4/10/2017 2:00 PM MD Lee Conley - Nephrology [phone]    2017 9:40 AM PULMONARY LAB, [facility] Howard - Pulm Function Svcs [phone]    2017 10:20 AM MD Lee Egan - Pulmonary Services [phone]    2017 2:00 PM Marck Major MD [facility] [phone]      Goals (5 Years of Data)     None       These Medications        Disp Refills Start End    mupirocin (BACTROBAN) 2 % ointment 15 g 2 2017     Apply topically 3 (three) times daily. - Topical (Top)    Pharmacy: [facility] [address] [place] [address] [address] AT [facility] #: [phone]         [facility]     [facility] Nurse Care Line -  Assistance  Unless otherwise directed by your provider, please contact [facility], our nurse care line that is available for  assistance.     Registered nurses in the [facility] provide: appointment scheduling, clinical advisement, health education, and other advisory services.  Call: [phone] (toll free)               Medications           Message regarding Medications     Verify the changes and/or additions to your medication regime listed below are the same as discussed with your clinician today.  If any of these changes or additions are incorrect, please notify your healthcare provider.        START taking these NEW medications        Refills     mupirocin (BACTROBAN) 2 % ointment 2    Sig: Apply topically 3 (three) times daily.    Class: Normal    Route: Topical (Top)      STOP taking these medications     albuterol (ACCUNEB) 1.25 mg/3 mL Nebu Take 3 mLs (1.25 mg total) by nebulization every 6 (six) hours as needed.    benztropine (COGENTIN) 1 MG tablet Take 1 mg by mouth 2 (two) times daily.    haloperidol (HALDOL) 5 MG tablet Take 5 mg by mouth once daily.            Verify that the below list of medications is an accurate representation of the medications you are currently taking.  If none reported, the list may be blank. If incorrect, please contact your healthcare provider. Carry this list with you in case of emergency.           Current Medications     aclidinium bromide (TUDORZA PRESSAIR) 400 mcg/actuation AePB Inhale 1 puff into the lungs 2 (two) times daily.    ADVAIR DISKUS 500-50 mcg/dose DsDv diskus inhaler USE 1 PUFF BY MOUTH TWICE DAILY    albuterol 90 mcg/actuation inhaler Inhale 2 puffs into the lungs every 4 (four) hours as needed for Wheezing.    clonazepam (KLONOPIN) 0.5 MG tablet Take 0.5 mg by mouth 2 (two) times daily as needed.    dextromethorphan-guaifenesin  mg (MUCINEX DM)  mg per 12 hr tablet Take 1 tablet by mouth every 12 (twelve) hours.    ergocalciferol (ERGOCALCIFEROL) 50,000 unit Cap TAKE 1 CAPSULE BY MOUTH WEEKLY    mupirocin (BACTROBAN) 2 % ointment Apply topically 3 (three) times daily.    nicotine (NICODERM CQ) 21 mg/24 hr Place 1 patch onto the skin once daily.    nicotine, polacrilex, (NICORETTE) 2 mg Gum Take 1 each (2 mg total) by mouth as needed (use no more than 10 pieces in 24 hours).    OXYGEN-AIR DELIVERY SYSTEMS MISC Inhale 2 L into the lungs as needed (O2 via Nasal Canula.).    paroxetine (PAXIL) 30 MG tablet Take 30 mg by mouth daily as needed.            Clinical Reference Information           Your Vitals Were     BP Pulse Temp Height    132/90 (BP Location: Right arm, Patient Position: Sitting,  "BP Method: Manual) 87 96.5 °F (35.8 °C) (Tympanic) 5' 2" (1.575 m)    Weight SpO2 BMI    87.2 kg (192 lb 3.9 oz) 94% 35.16 kg/m2      Blood Pressure          Most Recent Value    BP  (!)  132/90      Allergies as of 4/4/2017     Codeine    Keflex [Cephalexin]    Shellfish Containing Products    Sucralfate      Immunizations Administered on Date of Encounter - 4/4/2017     None      Instructions      Petechiae (Child)  Petechiae are very small red spots on the skin. They are flat on the skin, not raised. They often show up very suddenly. Petechiae usually occur on the arms, legs, stomach, and buttocks. They dont itch. The spots may be caused by a viral or bacterial infection. They may also be caused by a reaction to a medicine. Petechiae that continue to grow and merge together may mean that your child has a bleeding disorder.  Petechiae caused by an infection or medicine go away on their own without treatment. They dont leave scars. If a bleeding disorder is causing the spots, the disorder will need to be treated. Your child may need more testing for a diagnosis.  Home care  · Follow any instructions your childs healthcare provider gives you. This may include changing a medicine that your child takes. Dont start or stop any medicines without talking with your childs provider.  · Check your childs spots regularly for changes. The spots may turn purple as they fade and go away.  · Contact the healthcare provider if you have any questions or concerns about your childs health.  Follow-up care  Follow up with your childs health care provider, or as advised.  When to seek medical advice  Unless your child's health care provider advises otherwise, call the provider right away if:  · The child is younger than 2 years of age and a fever of 100.4°F (38°C) continues for more than 1 day.  · The child is 2 years old or older and a fever of 100.4°F (38°C) continues for more than 3 days.  · The child is of any age and has " repeated fevers above 104°F (40°C).  · The childs condition gets worse in any way.  · The spots increase or get bigger.  · The spots merge together.  · Long streaks appear under your childs nails.  · The child has bruising that is unexplained or gets worse.  · The child shows irritability, such as crying that cant be soothed.  · The child becomes lethargic or unusually sleepy, or does not acting like him- or herself.  · The child has breathing problems.   Date Last Reviewed: 5/14/2015 © 2000-2016 [employer]. [address] [place] [address]. All rights reserved. This information is not intended as a substitute for professional medical care. Always follow your healthcare professional's instructions.        Understanding Carbuncles    A carbuncle is a painful boil under the skin. It happens when a group of hair follicles become infected. Follicles are the tiny holes from which hair grows out of your skin.  How to say it  Surekha   What causes carbuncles?  A carbuncle is caused by an infection with the bacteria Staphylococcus aureus. They are common on areas of the body where friction and sweat occur. They usually appear on the back of the neck, back, and thighs. This type of infection can also happen when the skin is injured, such as by a cut or bug bite.  The bacteria that causes carbuncles can spread easily from person to person. People at higher risk for boils are those with diabetes or a weak immune system. Drug users who use needles are also more likely to get them.  Symptoms of carbuncles  A carbuncle starts as a small painful bump. But it can grow quickly. It may become:  · Red  · Swollen  · Tender  Carbuncles may ooze pus. They may also cause fever and a general feeling of illness.  Treatment for carbuncles  A carbuncle may heal on its own in a few weeks. But the pus within it needs to come out first. Treatment options include:  · Warm compress. Putting a warm, wet washcloth  on the boil will help the pus drain out. You should never try to pop a boil. That can cause the infection to spread.  · Surgical drainage. If the boil doesnt drain on its own, your healthcare provider may need to cut into it.  · Antibiotics. In some cases, oral antibiotics may be prescribed to fight the infection, especially if the carbuncle returns. You will likely have to take the medicine for 5 to 7 days. You may need to take it longer for a severe case.  · Good hygiene. Proper handwashing can prevent boils from spreading and coming back. Also wash things that have been in contact with the carbuncle in hot water. This includes items such as clothing and towels.  Complications of carbuncles  The main complication of a carbuncle is the spreading of the infection. The bacteria can infect the heart and bone. It can also lead to septic shock, an infection of the entire body.  When to call your healthcare provider  Call your healthcare provider right away if you have any of these:  · Fever of 100.4°F (38°C) or higher, or as directed  · Redness, swelling, or fluid leaking from a carbuncle that gets worse  · Pain that gets worse  · Symptoms that dont get better, or get worse  · New symptoms   Date Last Reviewed: 5/1/2016 © 2000-2016 [employer]. [address] [place] [address], [place] [address]. All rights reserved. This information is not intended as a substitute for professional medical care. Always follow your healthcare professional's instructions.             Smoking Cessation     If you would like to quit smoking:   You may be eligible for free services if you are a [place] resident and started smoking cigarettes before September 1, 1988.  Call the Smoking Cessation Trust (SCT) toll free at (768) 689-0552 or (061) 271-0267.   Call 1-800-QUIT-NOW if you do not meet the above criteria.   Contact us via email: [email or web address]   View our website for more information: [email or web address]         Language Assistance Services     ATTENTION: Language assistance services are available, free of charge. Please call [phone].      ATENCIÓN: Si habla prisca, tiene a prado disposición servicios gratuitos de asistencia lingüística. Llame al [phone].     CHÚ Ý: N?u b?n nói Ti?ng Vi?t, có các d?ch v? h? tr? ngôn ng? mi?n phí dành cho b?n. G?i s? [phone].         [facility] complies with applicable Federal civil rights laws and does not discriminate on the basis of race, color, national origin, age, disability, or sex.

## 2017-04-04 NOTE — PROGRESS NOTES
Subjective:       Patient ID: Merle Caro is a 61 y.o.W/ female.    Chief Complaint: Skin Discoloration and Abscess (Left groin/inner thigh)    HPI         She comes in today by herself and has the above problem.  She noticed a little bump on the medial portion of her left thigh near the groin a few days ago.  She scratched it open and it drained a little bit of blood water.  She said it stunk very badly but there was no pus that came out of it.  She is also developed some purplish spots on the dorsum of her left forearm wrist and hand.  She also has another spot on the ventral aspect of her right forearm.  There are no other lesions like that anywhere on her body.    Review of Systems    Otherwise negative concerning the INTEGUMENT, INFECTIOUS DISEASE, IMMUNE system review.    Objective:      Physical Exam    She has a little cluster of petechiae present on the dorsum of her left wrist and forearm.  There is also a couple small spots on the dorsum of her left hand and also her right forearm.  They do not donya at all with a tissue is compressed with a microscope slide.  They're not scaling at this time and they don't appear to be itching.  GROIN: She has a small pimple present on the medial thigh that has been excoriated open.  It is not inflamed at this time but is slightly is raised about 2 mm in diameter.  There is no lymphangitis flaring from it.  Assessment:       1. Boil, groin    2. Petechial rash        Plan:     1.  Reassured and advised about the rash that it will probably go away on its own.  If it does not, then we need to probably do some bleeding studies on her.  2.  Will put her on Bactrim 400 mg twice a day for the next 7-14 days.  She also needs to soak in hot water with either Epsom salts or an antibacterial soap once a day.  3.  Info sheet concerning both of her problems were given to the patient to take home.  4.  Recheck if no improvement with her boil or her rash in about 14 days.

## 2017-04-05 LAB
ALBUMIN SERPL BCP-MCNC: 3.7 G/DL
ANION GAP SERPL CALC-SCNC: 10 MMOL/L
BUN SERPL-MCNC: 9 MG/DL
CALCIUM SERPL-MCNC: 9 MG/DL
CHLORIDE SERPL-SCNC: 95 MMOL/L
CO2 SERPL-SCNC: 26 MMOL/L
CREAT SERPL-MCNC: 0.6 MG/DL
EST. GFR  (AFRICAN AMERICAN): >60 ML/MIN/1.73 M^2
EST. GFR  (NON AFRICAN AMERICAN): >60 ML/MIN/1.73 M^2
GLUCOSE SERPL-MCNC: 78 MG/DL
PHOSPHATE SERPL-MCNC: 4.2 MG/DL
POTASSIUM SERPL-SCNC: 4.4 MMOL/L
SODIUM SERPL-SCNC: 131 MMOL/L

## 2017-04-10 ENCOUNTER — OFFICE VISIT (OUTPATIENT)
Dept: NEPHROLOGY | Facility: CLINIC | Age: 62
End: 2017-04-10
Payer: COMMERCIAL

## 2017-04-10 VITALS
DIASTOLIC BLOOD PRESSURE: 70 MMHG | SYSTOLIC BLOOD PRESSURE: 110 MMHG | WEIGHT: 190.94 LBS | BODY MASS INDEX: 35.14 KG/M2 | HEART RATE: 94 BPM | HEIGHT: 62 IN

## 2017-04-10 DIAGNOSIS — E87.1 CHRONIC HYPONATREMIA: Primary | ICD-10-CM

## 2017-04-10 PROCEDURE — 99213 OFFICE O/P EST LOW 20 MIN: CPT | Mod: S$GLB,,, | Performed by: INTERNAL MEDICINE

## 2017-04-10 PROCEDURE — 3074F SYST BP LT 130 MM HG: CPT | Mod: S$GLB,,, | Performed by: INTERNAL MEDICINE

## 2017-04-10 PROCEDURE — 99999 PR PBB SHADOW E&M-EST. PATIENT-LVL III: CPT | Mod: PBBFAC,,, | Performed by: INTERNAL MEDICINE

## 2017-04-10 PROCEDURE — 1160F RVW MEDS BY RX/DR IN RCRD: CPT | Mod: S$GLB,,, | Performed by: INTERNAL MEDICINE

## 2017-04-10 PROCEDURE — 3078F DIAST BP <80 MM HG: CPT | Mod: S$GLB,,, | Performed by: INTERNAL MEDICINE

## 2017-04-10 NOTE — PROGRESS NOTES
"Subjective:       Patient ID: Merle Caro is a 61 y.o. White female who presents for follow-up evaluation of hyponatremia    HPI     Patient is a 61-year-old white female with chronic mild hyponatremia.  Patient is usually followed by Dr. Reilly.  Comes back as a new patient to me    Review of Systems   Constitutional: Negative for activity change, appetite change, chills, diaphoresis, fatigue, fever and unexpected weight change.   HENT: Negative for congestion, dental problem, drooling, postnasal drip, rhinorrhea and voice change.    Eyes: Negative for discharge.   Respiratory: Negative for apnea, cough, choking, chest tightness, shortness of breath, wheezing and stridor.    Cardiovascular: Negative for chest pain, palpitations and leg swelling.   Gastrointestinal: Negative for abdominal distention, blood in stool, constipation, diarrhea, nausea, rectal pain and vomiting.   Endocrine: Negative for cold intolerance, heat intolerance, polydipsia and polyuria.   Genitourinary: Negative for decreased urine volume, difficulty urinating, dysuria, enuresis, flank pain, frequency, hematuria and urgency.   Musculoskeletal: Negative for arthralgias, back pain, gait problem and joint swelling.   Skin: Negative for rash.   Allergic/Immunologic: Negative for food allergies and immunocompromised state.   Neurological: Negative for dizziness, tremors, syncope, numbness and headaches.   Hematological: Does not bruise/bleed easily.   Psychiatric/Behavioral: Negative for agitation, behavioral problems and self-injury. The patient is not nervous/anxious and is not hyperactive.    All other systems reviewed and are negative.      Objective:   /70  Pulse 94  Ht 5' 2" (1.575 m)  Wt 86.6 kg (190 lb 14.7 oz)  BMI 34.92 kg/m2     Physical Exam   Constitutional: She is oriented to person, place, and time. No distress.   HENT:   Head: Normocephalic and atraumatic.   Nose: Nose normal.   Eyes: Conjunctivae and EOM are normal. " Pupils are equal, round, and reactive to light.   Neck: Normal range of motion. No JVD present. No tracheal deviation present. No thyromegaly present.   Cardiovascular: Normal rate, regular rhythm, normal heart sounds and intact distal pulses.  Exam reveals no gallop and no friction rub.    No murmur heard.  Pulmonary/Chest: Effort normal and breath sounds normal. No respiratory distress. She has no wheezes. She has no rales. She exhibits no tenderness.   Abdominal: Soft. Bowel sounds are normal. She exhibits no distension and no mass. There is no tenderness. No hernia.   Musculoskeletal: Normal range of motion. She exhibits no edema, tenderness or deformity.   Neurological: She is alert and oriented to person, place, and time. She has normal reflexes. She displays normal reflexes. No cranial nerve deficit. She exhibits normal muscle tone. Coordination normal.   Skin: Skin is warm. She is not diaphoretic. No erythema. No pallor.   Psychiatric: She has a normal mood and affect. Her behavior is normal. Judgment and thought content normal.   Nursing note and vitals reviewed.        Lab Results   Component Value Date    CREATININE 0.6 04/04/2017    BUN 9 04/04/2017     (L) 04/04/2017    K 4.4 04/04/2017    CL 95 04/04/2017    CO2 26 04/04/2017     Lab Results   Component Value Date    WBC 11.76 02/06/2017    HGB 15.4 02/06/2017    HCT 44.0 02/06/2017    MCV 90 02/06/2017     02/06/2017     Lab Results   Component Value Date    CALCIUM 9.0 04/04/2017    PHOS 4.2 04/04/2017     @RESUFAST(URICACID)    Assessment:    )    1. Chronic hyponatremia        Plan:         patient's sodium is 131 which is stable now.  Discussed with the patient in detail to add salt to the food.  Follow-up with Dr. Reilly in 3 months

## 2017-04-10 NOTE — MR AVS SNAPSHOT
[facility] - Nephrology  [address] [address]  Phone: [phone]  Fax: [phone]                  Merle Caro   4/10/2017 2:00 PM   Office Visit    Description:  Female : 1955   Provider:  Eugene Crespo MD   Department:  [facility]rology           Reason for Visit     hyponatremia           Diagnoses this Visit        Comments    Chronic hyponatremia    -  Primary            To Do List           Future Appointments        Provider Department Dept Phone    2017 9:40 AM PULMONARY LAB, [facility] - [facility] [phone]    2017 10:20 AM Tab Tracey MD [facility] - Pulmonary Services [phone]    2017 11:00 AM LABORATORY, [address] [facility] [phone]    7/10/2017 2:00 PM Haseeb Reilly MD [facility]ph[facility] [phone]    2017 2:00 PM Marck Major MD [facility] - Internal Medicine [phone]      Goals (5 Years of Data)     None      Follow-Up and Disposition     Return in about 3 months (around 7/10/2017).      [facility]     [facility] Care [facility] -  Assistance  Unless otherwise directed by your provider, please contact [facility], our nurse care line that is available for  assistance.     Registered nurses in the [facility] provide: appointment scheduling, clinical advisement, health education, and other advisory services.  Call: [phone] (toll free)               Medications           Message regarding Medications     Verify the changes and/or additions to your medication regime listed below are the same as discussed with your clinician today.  If any of these changes or additions are incorrect, please notify your healthcare provider.             Verify that the below list of medications is an accurate representation of the medications you are currently taking.  If none reported, the list may be blank. If incorrect, please contact your healthcare  "provider. Carry this list with you in case of emergency.           Current Medications     aclidinium bromide (TUDORZA PRESSAIR) 400 mcg/actuation AePB Inhale 1 puff into the lungs 2 (two) times daily.    ADVAIR DISKUS 500-50 mcg/dose DsDv diskus inhaler USE 1 PUFF BY MOUTH TWICE DAILY    albuterol 90 mcg/actuation inhaler Inhale 2 puffs into the lungs every 4 (four) hours as needed for Wheezing.    clonazepam (KLONOPIN) 0.5 MG tablet Take 0.5 mg by mouth 2 (two) times daily as needed.    dextromethorphan-guaifenesin  mg (MUCINEX DM)  mg per 12 hr tablet Take 1 tablet by mouth every 12 (twelve) hours.    ergocalciferol (ERGOCALCIFEROL) 50,000 unit Cap TAKE 1 CAPSULE BY MOUTH WEEKLY    mupirocin (BACTROBAN) 2 % ointment Apply topically 3 (three) times daily.    nicotine (NICODERM CQ) 21 mg/24 hr Place 1 patch onto the skin once daily.    nicotine, polacrilex, (NICORETTE) 2 mg Gum Take 1 each (2 mg total) by mouth as needed (use no more than 10 pieces in 24 hours).    OXYGEN-AIR DELIVERY SYSTEMS MISC Inhale 2 L into the lungs as needed (O2 via Nasal Canula.).    paroxetine (PAXIL) 30 MG tablet Take 30 mg by mouth daily as needed.            Clinical Reference Information           Your Vitals Were     BP Pulse Height Weight BMI    110/70 94 5' 2" (1.575 m) 86.6 kg (190 lb 14.7 oz) 34.92 kg/m2      Blood Pressure          Most Recent Value    BP  110/70      Allergies as of 4/10/2017     Codeine    Keflex [Cephalexin]    Shellfish Containing Products    Sucralfate      Immunizations Administered on Date of Encounter - 4/10/2017     None      Smoking Cessation     If you would like to quit smoking:   You may be eligible for free services if you are a [place] resident and started smoking cigarettes before September 1, 1988.  Call the Smoking Cessation Trust (SCT) toll free at (145) 928-7565 or (698) 927-0162.   Call 1-800-QUIT-NOW if you do not meet the above criteria.   Contact us via email: " [email or web address]   View our website for more information: [email or web address]        Language Assistance Services     ATTENTION: Language assistance services are available, free of charge. Please call [phone].      ATENCIÓN: Si efren cope, tiene a prado disposición servicios gratuitos de asistencia lingüística. Llame al [phone].     CHÚ Ý: N?u b?n nói Ti?ng Vi?t, có các d?ch v? h? tr? ngôn ng? mi?n phí dành cho b?n. G?i s? [phone].         [facility] complies with applicable Federal civil rights laws and does not discriminate on the basis of race, color, national origin, age, disability, or sex.

## 2017-04-11 ENCOUNTER — CLINICAL SUPPORT (OUTPATIENT)
Dept: SMOKING CESSATION | Facility: CLINIC | Age: 62
End: 2017-04-11
Payer: COMMERCIAL

## 2017-04-11 DIAGNOSIS — F17.200 NICOTINE DEPENDENCE: Primary | ICD-10-CM

## 2017-04-11 PROCEDURE — 99402 PREV MED CNSL INDIV APPRX 30: CPT | Mod: S$GLB,,, | Performed by: GENERAL PRACTICE

## 2017-04-11 NOTE — PROGRESS NOTES
Individual Follow-Up Form    4/11/2017    Clinical Status of Patient: Outpatient    Length of Service: 30 minutes    Continuing Medication: yes  Patches    Other Medications: nicotine gum     Target Symptoms: Withdrawal and medication side effects. The following were  rated moderate (3) to severe (4) on TCRS:  · Moderate (3): desire tobacco  · Severe (4): none    Comments: Patient states that she has not been wearing her nicotine patch every day. She will put the patch on after her bath but take the patch off several times during the day to smoke then put the patch back on. We reviewed proper use of the patch and its intentions. We discussed coping strategies and using the nicotine gum. We discussed setting a daily goal. She states that her goal for tomorrow is to keep the nicotine patch on for 24 hours before taking it off and putting a fresh patch on. We discussed setting a 24 hour quit challenge. She states that this weekend would not be good because of Easter holiday. She denies any negative thoughts or behavior at this time. Will continue to monitor.    Diagnosis: F17.200    Next Visit: 2 weeks

## 2017-04-13 ENCOUNTER — OFFICE VISIT (OUTPATIENT)
Dept: PULMONOLOGY | Facility: CLINIC | Age: 62
End: 2017-04-13
Payer: COMMERCIAL

## 2017-04-13 ENCOUNTER — PROCEDURE VISIT (OUTPATIENT)
Dept: PULMONOLOGY | Facility: CLINIC | Age: 62
End: 2017-04-13
Payer: COMMERCIAL

## 2017-04-13 VITALS
RESPIRATION RATE: 18 BRPM | HEART RATE: 89 BPM | DIASTOLIC BLOOD PRESSURE: 62 MMHG | OXYGEN SATURATION: 97 % | HEIGHT: 62 IN | WEIGHT: 189 LBS | SYSTOLIC BLOOD PRESSURE: 120 MMHG | BODY MASS INDEX: 34.78 KG/M2

## 2017-04-13 DIAGNOSIS — J44.89 COPD WITH ASTHMA: Chronic | ICD-10-CM

## 2017-04-13 DIAGNOSIS — G47.33 OSA ON CPAP: Chronic | ICD-10-CM

## 2017-04-13 DIAGNOSIS — J44.89 ASTHMA WITH COPD: Primary | Chronic | ICD-10-CM

## 2017-04-13 DIAGNOSIS — F17.210 CIGARETTE SMOKER: Chronic | ICD-10-CM

## 2017-04-13 PROCEDURE — 3074F SYST BP LT 130 MM HG: CPT | Mod: S$GLB,,, | Performed by: INTERNAL MEDICINE

## 2017-04-13 PROCEDURE — 94726 PLETHYSMOGRAPHY LUNG VOLUMES: CPT | Mod: 51,S$GLB,, | Performed by: INTERNAL MEDICINE

## 2017-04-13 PROCEDURE — 99999 PR PBB SHADOW E&M-EST. PATIENT-LVL III: CPT | Mod: PBBFAC,,, | Performed by: INTERNAL MEDICINE

## 2017-04-13 PROCEDURE — 99215 OFFICE O/P EST HI 40 MIN: CPT | Mod: 25,S$GLB,, | Performed by: INTERNAL MEDICINE

## 2017-04-13 PROCEDURE — 94060 EVALUATION OF WHEEZING: CPT | Mod: S$GLB,,, | Performed by: INTERNAL MEDICINE

## 2017-04-13 PROCEDURE — 3078F DIAST BP <80 MM HG: CPT | Mod: S$GLB,,, | Performed by: INTERNAL MEDICINE

## 2017-04-13 PROCEDURE — 1160F RVW MEDS BY RX/DR IN RCRD: CPT | Mod: S$GLB,,, | Performed by: INTERNAL MEDICINE

## 2017-04-13 PROCEDURE — 94729 DIFFUSING CAPACITY: CPT | Mod: S$GLB,,, | Performed by: INTERNAL MEDICINE

## 2017-04-13 RX ORDER — HALOPERIDOL 5 MG/1
5 TABLET ORAL NIGHTLY
COMMUNITY

## 2017-04-13 RX ORDER — FLUTICASONE PROPIONATE AND SALMETEROL 500; 50 UG/1; UG/1
1 POWDER RESPIRATORY (INHALATION) 2 TIMES DAILY
Qty: 60 EACH | Refills: 3 | Status: SHIPPED | OUTPATIENT
Start: 2017-04-13 | End: 2018-12-05 | Stop reason: SDUPTHER

## 2017-04-13 RX ORDER — ALBUTEROL SULFATE 90 UG/1
2 AEROSOL, METERED RESPIRATORY (INHALATION) EVERY 4 HOURS PRN
Qty: 18 G | Refills: 11 | Status: SHIPPED | OUTPATIENT
Start: 2017-04-13 | End: 2018-04-16 | Stop reason: SDUPTHER

## 2017-04-13 NOTE — MR AVS SNAPSHOT
[facility]  [address]  [place] [address]  Phone: [phone]  Fax: [phone]                  Merle Caro   2017 10:20 AM   Office Visit    Description:  Female : 1955   Provider:  Tab Tracey MD   Department:  [facility]           Reason for Visit     Sleep Apnea     COPD           Diagnoses this Visit        Comments    Asthma with COPD    -  Primary     Cigarette smoker         LUCILA on CPAP                To Do List           Future Appointments        Provider Department Dept Phone    2017 11:00 AM LABORATORY, [address] [facility] [phone]    7/10/2017 2:00 PM Haseeb Reilly MD [facility] [phone]    2017 2:00 PM Marck Major MD [facility] - Internal Medicine [phone]    10/12/2017 2:00 PM [facility] XR1-DR [facility] [phone]    10/12/2017 2:20 PM Tab Tracey MD On license of [facility] - Pulmonary Services [phone]      Goals (5 Years of Data)     None      Follow-Up and Disposition     Return in about 6 months (around 10/13/2017) for Asthma with COPD, LUCILA, Tobacco use disorder.    Follow-up and Disposition History       These Medications        Disp Refills Start End    fluticasone-salmeterol 500-50 mcg/dose (ADVAIR DISKUS) 500-50 mcg/dose DsDv diskus inhaler 60 each 3 2017     Inhale 1 puff into the lungs 2 (two) times daily. Controller - Inhalation    Pharmacy: [facility] [id number] [place] [address] [address] [facility] [employer] Ph #: [phone]       albuterol 90 mcg/actuation inhaler 18 g 11 2017    Inhale 2 puffs into the lungs every 4 (four) hours as needed for Wheezing. - Inhalation    Pharmacy: [facility] [id number] [place] [address], [place] - [address] [employer] Ph #: [phone]         [facility]     [facility] -  Assistance  Unless otherwise directed by your provider,  please contact [facility], our nurse care line that is available for 24/7 assistance.     Registered nurses in the [facility] provide: appointment scheduling, clinical advisement, health education, and other advisory services.  Call: [phone] (toll free)               Medications           Message regarding Medications     Verify the changes and/or additions to your medication regime listed below are the same as discussed with your clinician today.  If any of these changes or additions are incorrect, please notify your healthcare provider.        CHANGE how you are taking these medications     Start Taking Instead of    fluticasone-salmeterol 500-50 mcg/dose (ADVAIR DISKUS) 500-50 mcg/dose DsDv diskus inhaler ADVAIR DISKUS 500-50 mcg/dose DsDv diskus inhaler    Dosage:  Inhale 1 puff into the lungs 2 (two) times daily. Controller Dosage:  USE 1 PUFF BY MOUTH TWICE DAILY    Reason for Change:  Reorder       STOP taking these medications     dextromethorphan-guaifenesin  mg (MUCINEX DM)  mg per 12 hr tablet Take 1 tablet by mouth every 12 (twelve) hours.    nicotine, polacrilex, (NICORETTE) 2 mg Gum Take 1 each (2 mg total) by mouth as needed (use no more than 10 pieces in 24 hours).           Verify that the below list of medications is an accurate representation of the medications you are currently taking.  If none reported, the list may be blank. If incorrect, please contact your healthcare provider. Carry this list with you in case of emergency.           Current Medications     aclidinium bromide (TUDORZA PRESSAIR) 400 mcg/actuation AePB Inhale 1 puff into the lungs 2 (two) times daily.    albuterol 90 mcg/actuation inhaler Inhale 2 puffs into the lungs every 4 (four) hours as needed for Wheezing.    clonazepam (KLONOPIN) 0.5 MG tablet Take 0.5 mg by mouth 2 (two) times daily as needed.    ergocalciferol (ERGOCALCIFEROL) 50,000 unit Cap TAKE 1 CAPSULE BY MOUTH WEEKLY     "fluticasone-salmeterol 500-50 mcg/dose (ADVAIR DISKUS) 500-50 mcg/dose DsDv diskus inhaler Inhale 1 puff into the lungs 2 (two) times daily. Controller    haloperidol (HALDOL) 5 MG tablet Take 5 mg by mouth once daily.    mupirocin (BACTROBAN) 2 % ointment Apply topically 3 (three) times daily.    nicotine (NICODERM CQ) 21 mg/24 hr Place 1 patch onto the skin once daily.    OXYGEN-AIR DELIVERY SYSTEMS MISC Inhale 2 L into the lungs as needed (O2 via Nasal Canula.).    paroxetine (PAXIL) 30 MG tablet Take 30 mg by mouth daily as needed.            Clinical Reference Information           Your Vitals Were     BP Pulse Resp Height Weight SpO2    120/62 89 18 5' 2" (1.575 m) 85.7 kg (189 lb) 97%    BMI                34.57 kg/m2          Blood Pressure          Most Recent Value    BP  120/62      Allergies as of 4/13/2017     Codeine    Keflex [Cephalexin]    Shellfish Containing Products    Sucralfate      Immunizations Administered on Date of Encounter - 4/13/2017     None      Orders Placed During Today's Visit     Future Labs/Procedures Expected by Expires    X-Ray Chest PA And Lateral  10/13/2017 4/13/2018      Instructions      Lung Anatomy  Your lungs take air in to give your body oxygen, which the body needs to work. Your lungs, like all the tissues in your body, are made up of billions of tiny specialized cells. Old lung cells die and are replaced by new, identical lung cells. This natural process helps ensure healthy lungs.    Date Last Reviewed: 11/1/2016  © 8648-3527 [employer] [address] [place] [address], [place] [address]. All rights reserved. This information is not intended as a substitute for professional medical care. Always follow your healthcare professional's instructions.             Smoking Cessation     If you would like to quit smoking:   You may be eligible for free services if you are a [place] resident and started smoking cigarettes before September 1, 1988.  Call the Smoking " Cessation Trust ([facility]) toll free at (902) [phone] or (860) 913-4646.   Call 1-800-QUIT-NOW if you do not meet the above criteria.   Contact us via email: [email or web address]   View our website for more information: [email or web address]        Language Assistance Services     ATTENTION: Language assistance services are available, free of charge. Please call [phone].      ATENCIÓN: Si habla español, tiene a prado disposición servicios gratuitos de asistencia lingüística. Llame al [phone].     CHÚ Ý: N?u b?n nói Ti?ng Vi?t, có các d?ch v? h? tr? ngôn ng? mi?n phí dành cho b?n. G?i s? [phone].         [facility] complies with applicable Federal civil rights laws and does not discriminate on the basis of race, color, national origin, age, disability, or sex.

## 2017-04-13 NOTE — ASSESSMENT & PLAN NOTE
She is on CPAP AUTO. She has not used her CPAP in 1 year. She is requesting a new control knob for her CPAP.     Complications of untreated sleep apnea discussed with pateint in detail including but not limited to  high blood pressure, heart attack, stroke, obesity,  diabetes and worsening heart failure. Patient voiced understanding.

## 2017-04-13 NOTE — ASSESSMENT & PLAN NOTE
Assistance with smoking cessation was offered, including:  []  Medications  [x]  Counseling  []  Printed Information on Smoking Cessation  []  Referral to a Smoking Cessation Program    Patient was counseled regarding smoking for 3-10 minutes.

## 2017-04-13 NOTE — PATIENT INSTRUCTIONS
Lung Anatomy  Your lungs take air in to give your body oxygen, which the body needs to work. Your lungs, like all the tissues in your body, are made up of billions of tiny specialized cells. Old lung cells die and are replaced by new, identical lung cells. This natural process helps ensure healthy lungs.    Date Last Reviewed: 11/1/2016  © 8433-8538 [employer]. [address] [place] [address]. All rights reserved. This information is not intended as a substitute for professional medical care. Always follow your healthcare professional's instructions.

## 2017-04-13 NOTE — PROGRESS NOTES
Subjective:      Patient ID: Merle Caro is a 61 y.o. female.    Patient Active Problem List   Diagnosis    LUCILA on CPAP    Hyperlipidemia    Affective bipolar disorder    Asthma with COPD    Depression    Obesity, Class II, BMI 35-39.9, with comorbidity    Diarrhea    Hypertension    Slow transit constipation    Cigarette smoker    Vitamin D insufficiency    Chronic hyponatremia     Problem list has been reviewed.    Chief Complaint: Sleep Apnea and COPD    HPI  She is here today for follow up for COPD. She reports that she is at her baseline. She is at her respiratory baseline . She denies fever, chill or rigors. She reports an intermittent productive cough. She is actively trying to quit smoking. Her current respiratory therapy regimen is TUDORZA, ADVAIR, ACCUNEB  which provides  relief. She is not adherent with her regimen. She is on oxygen 2 L /m in with activity.     Answers for HPI/ROS submitted by the patient on 4/13/2017   In the past 4 weeks, how much of the time did your asthma keep you from getting as much done at work, school, or at home?: none of the time  During the past 4 weeks, how often have you had shortness of breath?: more than once a day  During the past 4 weeks, how often did your asthma symptoms (Wheezing, coughing, shortness of breath, chest tightness or pain) wake you up at night or earlier that usual in the morning?: not at all  During the past 4 weeks, how often have you used your rescue inhaler or nebulizer medication (such as albuterol)?: not at all  How would you rate your asthma control during the past 4 weeks?: well controlled   : 20    She has LUCILA and is on Auto CPAP. She has not used her CPAP for 1 year.      Previous Report Reviewed: office notes     The following portions of the patient's history were reviewed and updated as appropriate: She  has a past medical history of Asthma; COPD (chronic obstructive pulmonary disease); Depression; and Tobacco abuse.  She  has a  "past surgical history that includes Hysterectomy; Knee surgery; Tonsillectomy; and multiple fracture repairs.  Her family history includes COPD in her mother; Cancer in her mother; Hypertension in her mother; Lung cancer in her mother; Ovarian cancer in her sister; Stroke in her mother. There is no history of Breast cancer.  She  reports that she has been smoking Cigarettes.  She has a 33.00 pack-year smoking history. She has never used smokeless tobacco. She reports that she does not drink alcohol or use illicit drugs.  She has a current medication list which includes the following prescription(s): aclidinium bromide, albuterol, clonazepam, ergocalciferol, fluticasone-salmeterol 500-50 mcg/dose, haloperidol, mupirocin, nicotine, oxygen-air delivery systems, and paroxetine.  She is allergic to codeine; keflex [cephalexin]; shellfish containing products; and sucralfate..    Review of Systems   Constitutional: Negative for night sweats.   HENT: Positive for postnasal drip, sinus pressure and voice change.    Eyes: Positive for redness.   Respiratory: Positive for sputum production and wheezing. Negative for cough, hemoptysis and dyspnea on extertion.    Cardiovascular: Negative for chest pain, palpitations and leg swelling.   Genitourinary: Negative for difficulty urinating and hematuria.   Endocrine: Negative for cold intolerance and heat intolerance.    Musculoskeletal: Positive for arthralgias. Negative for back pain and gait problem.   Gastrointestinal: Positive for acid reflux. Negative for nausea, vomiting, abdominal pain and abdominal distention.        Diarrhea   Neurological: Positive for headaches. Negative for syncope and light-headedness.   Psychiatric/Behavioral: The patient is nervous/anxious.       Objective:     /62  Pulse 89  Resp 18  Ht 5' 2" (1.575 m)  Wt 85.7 kg (189 lb)  SpO2 97%  BMI 34.57 kg/m2  Body mass index is 34.57 kg/(m^2).    Physical Exam   Constitutional: She is oriented to " person, place, and time. She appears well-developed and well-nourished.   HENT:   Head: Normocephalic and atraumatic.   Neck: Normal range of motion. Neck supple.   Cardiovascular: Normal rate and regular rhythm.  Exam reveals no gallop.    No murmur heard.  Pulmonary/Chest: Effort normal. She has wheezes.   Abdominal: Soft. Bowel sounds are normal.   Musculoskeletal: Normal range of motion. She exhibits no edema.   Neurological: She is alert and oriented to person, place, and time.   Skin: Skin is warm and dry.   Psychiatric: She has a normal mood and affect.       Personal Diagnostic Review      Pulmonary function tests: FEV1: 1.77  (75 % predicted), FVC:  2.31 (75 % predicted), FEV1/FVC:  77, T.87 (108 % predicted), RV/TLVC: 53 (141 % predicted), DLCO: 14.4 (64 % predicted) Normal airflow. Normal lung volumes. Diffusion capacity is mildly reduced but corrects for alveolar volume.        Assessment:     1. Asthma with COPD Stable   2. Cigarette smoker Stable   3. LUCILA on CPAP Stable     Outpatient Encounter Prescriptions as of 2017   Medication Sig Dispense Refill    aclidinium bromide (TUDORZA PRESSAIR) 400 mcg/actuation AePB Inhale 1 puff into the lungs 2 (two) times daily. 60 each 11    albuterol 90 mcg/actuation inhaler Inhale 2 puffs into the lungs every 4 (four) hours as needed for Wheezing. 18 g 11    clonazepam (KLONOPIN) 0.5 MG tablet Take 0.5 mg by mouth 2 (two) times daily as needed.      ergocalciferol (ERGOCALCIFEROL) 50,000 unit Cap TAKE 1 CAPSULE BY MOUTH WEEKLY 12 capsule 0    fluticasone-salmeterol 500-50 mcg/dose (ADVAIR DISKUS) 500-50 mcg/dose DsDv diskus inhaler Inhale 1 puff into the lungs 2 (two) times daily. Controller 60 each 3    haloperidol (HALDOL) 5 MG tablet Take 5 mg by mouth once daily.      mupirocin (BACTROBAN) 2 % ointment Apply topically 3 (three) times daily. 15 g 2    nicotine (NICODERM CQ) 21 mg/24 hr Place 1 patch onto the skin once daily. 28 patch 0     OXYGEN-AIR DELIVERY SYSTEMS MISC Inhale 2 L into the lungs as needed (O2 via Nasal Canula.).      paroxetine (PAXIL) 30 MG tablet Take 30 mg by mouth daily as needed.   4    [DISCONTINUED] ADVAIR DISKUS 500-50 mcg/dose DsDv diskus inhaler USE 1 PUFF BY MOUTH TWICE DAILY 60 each 3    [DISCONTINUED] albuterol 90 mcg/actuation inhaler Inhale 2 puffs into the lungs every 4 (four) hours as needed for Wheezing. 18 g 11    [DISCONTINUED] dextromethorphan-guaifenesin  mg (MUCINEX DM)  mg per 12 hr tablet Take 1 tablet by mouth every 12 (twelve) hours.      [DISCONTINUED] nicotine, polacrilex, (NICORETTE) 2 mg Gum Take 1 each (2 mg total) by mouth as needed (use no more than 10 pieces in 24 hours). 120 each 0     No facility-administered encounter medications on file as of 4/13/2017.      Orders Placed This Encounter   Procedures    X-Ray Chest PA And Lateral     Standing Status:   Future     Standing Expiration Date:   4/13/2018     Order Specific Question:   May the Radiologist modify the order per protocol to meet the clinical needs of the patient?     Answer:   Yes     Plan:          Discussed diagnosis, its evaluation, treatment and usual course. All questions answered.    Cigarette smoker  Assistance with smoking cessation was offered, including:  []  Medications  [x]  Counseling  []  Printed Information on Smoking Cessation  []  Referral to a Smoking Cessation Program    Patient was counseled regarding smoking for 3-10 minutes.      LUCILA on CPAP  She is on CPAP AUTO. She has not used her CPAP in 1 year. She is requesting a new control knob for her CPAP.     Complications of untreated sleep apnea discussed with pateint in detail including but not limited to  high blood pressure, heart attack, stroke, obesity,  diabetes and worsening heart failure. Patient voiced understanding.    Asthma with COPD  Asthma ROS: taking medications as instructed, no medication side effects noted, no significant ongoing  wheezing or shortness of breath, using bronchodilator MDI less than twice a week.   New concerns: None.   Exam: appears well, vitals normal, no respiratory distress, acyanotic, normal RR, chest clear, no wheezing, crepitations, rhonchi, normal symmetric air entry.   Assessment:  Asthma well controlled.   Plan: ADVAIR, ALBUTEROL. Current treatment plan is effective, no change in therapy, orders as documented in EMR. ADVAIR and ALBUTEROL refilled. CXR in 6 months.    TIME SPENT WITH PATIENT: Time spent: 40 minutes in face to face  discussion concerning diagnosis, prognosis, review of lab and test results, benefits of treatment as well as management of disease, counseling of patient and coordination of care between various health  care providers . Greater than half the time spent was used for coordination of care and counseling of patient.        Return in about 6 months (around 10/13/2017) for Asthma with COPD, LUCILA, Tobacco use disorder.

## 2017-04-13 NOTE — ASSESSMENT & PLAN NOTE
Asthma ROS: taking medications as instructed, no medication side effects noted, no significant ongoing wheezing or shortness of breath, using bronchodilator MDI less than twice a week.   New concerns: None.   Exam: appears well, vitals normal, no respiratory distress, acyanotic, normal RR, chest clear, no wheezing, crepitations, rhonchi, normal symmetric air entry.   Assessment:  Asthma well controlled.   Plan: ADVAIR, ALBUTEROL. Current treatment plan is effective, no change in therapy, orders as documented in EMR. ADVAIR and ALBUTEROL refilled. CXR in 6 months.

## 2017-04-15 LAB
POST FEF 25 75: 1.73 L/S (ref 1.69–2.85)
POST FET 100: 7.35 S
POST FEV1 FVC: 82 %
POST FEV1: 1.94 L (ref 2.11–2.65)
POST FIF 50: 2.36 L/S
POST FVC: 2.36 L (ref 2.75–3.39)
POST PEF: 4.78 L/S (ref 5.22–6.81)
PRE DLCO: 14.36 ML/MMHG/MIN (ref 18.3–26.59)
PRE ERV: 0.87 L
PRE FEF 25 75: 1.54 L/S (ref 1.69–2.85)
PRE FET 100: 8.89 S
PRE FEV1 FVC: 77 %
PRE FEV1: 1.77 L (ref 2.11–2.65)
PRE FIF 50: 1.94 L/S
PRE FRC PL: 3.44 L (ref 1.7–2.65)
PRE FVC: 2.31 L (ref 2.75–3.39)
PRE KROGHS K: 4.05 1/MIN
PRE PEF: 4.31 L/S (ref 5.22–6.81)
PRE RV: 2.56 L (ref 1.32–2.02)
PRE SVC: 2.31 L
PRE TLC: 4.87 L (ref 4.12–4.88)
PREDICTED DLCO: 22.44 ML/MMHG/MIN (ref 18.3–26.59)
PREDICTED FEV1 FVC: 78.27 % (ref 73.37–83.17)
PREDICTED FEV1: 2.38 L (ref 2.11–2.65)
PREDICTED FRC N2: 2.17 L (ref 1.7–2.65)
PREDICTED FRC PL: 2.17 L (ref 1.7–2.65)
PREDICTED FVC: 3.07 L (ref 2.75–3.39)
PREDICTED RV: 1.67 L (ref 1.32–2.02)
PREDICTED SVC: 2.78 L
PREDICTED TLC: 4.5 L (ref 4.12–4.88)
PROVOCATION PROTOCOL: ABNORMAL

## 2017-04-18 ENCOUNTER — CLINICAL SUPPORT (OUTPATIENT)
Dept: SMOKING CESSATION | Facility: CLINIC | Age: 62
End: 2017-04-18
Payer: COMMERCIAL

## 2017-04-18 DIAGNOSIS — F17.200 NICOTINE DEPENDENCE: Primary | ICD-10-CM

## 2017-04-18 PROCEDURE — 99402 PREV MED CNSL INDIV APPRX 30: CPT | Mod: S$GLB,,, | Performed by: GENERAL PRACTICE

## 2017-04-18 NOTE — PROGRESS NOTES
Individual Follow-Up Form    4/18/2017    Clinical Status of Patient: Outpatient    Length of Service: 30 minutes    Continuing Medication: yes  Patches    Other Medications: nicotine lozenges PRN     Target Symptoms: Withdrawal and medication side effects. The following were  rated moderate (3) to severe (4) on TCRS:  · Moderate (3): depressed, angry, frustrated, desire tobacco  · Severe (4): none    Comments: Patient states that her  came home today with a carton of cigarettes and a coke for her. She has told him on several occasions that she is quitting smoking and her Dr has told her to not drink soft drinks. She states that she doesn't want to upset her  so she will drink and smoke. We discussed a quit plan and typed up a daily plan in which she states that she will be able to follow. She is on a Danii Andrade diet that she is having success. We discussed how she feels when she is successful and how she feels when she is smoking. We discussed coping strategies for when she feels that she needs to smoke.She denies any negative thoughts or behavior at this time. Will continue to encourage and monitor progress.    Diagnosis: F17.200    Next Visit: 2 weeks

## 2017-04-24 ENCOUNTER — TELEPHONE (OUTPATIENT)
Dept: SMOKING CESSATION | Facility: CLINIC | Age: 62
End: 2017-04-24

## 2017-04-25 ENCOUNTER — TELEPHONE (OUTPATIENT)
Dept: SMOKING CESSATION | Facility: CLINIC | Age: 62
End: 2017-04-25

## 2017-05-02 ENCOUNTER — CLINICAL SUPPORT (OUTPATIENT)
Dept: SMOKING CESSATION | Facility: CLINIC | Age: 62
End: 2017-05-02
Payer: COMMERCIAL

## 2017-05-02 DIAGNOSIS — F17.200 NICOTINE DEPENDENCE: Primary | ICD-10-CM

## 2017-05-02 PROCEDURE — 99402 PREV MED CNSL INDIV APPRX 30: CPT | Mod: S$GLB,,, | Performed by: GENERAL PRACTICE

## 2017-05-02 RX ORDER — IBUPROFEN 200 MG
1 TABLET ORAL DAILY
Qty: 28 PATCH | Refills: 0 | Status: SHIPPED | OUTPATIENT
Start: 2017-05-02 | End: 2019-02-28

## 2017-05-02 NOTE — PROGRESS NOTES
Individual Follow-Up Form    5/2/2017    Clinical Status of Patient: Outpatient    Length of Service: 30 minutes    Continuing Medication: yes  Patches     Target Symptoms: Withdrawal and medication side effects. The following were  rated moderate (3) to severe (4) on TCRS:  · Moderate (3): depressed, anxious, restless, difficulty concentrating, desire tobacco, frustrated, dry mouth  · Severe (4): none    Comments: Patient states that she has been able to reduce her smoking to 1/2 ppd. She continues to use the 21 mg nicotine patch daily. She states that her  bought her a carton of cigarettes last week and she threw 1/2 the box away. He got upset with her. He bought another carton of cigarettes yesterday for her and she states that she has not opened this new box. She still has remaining cigarettes left in the pack that she opened yesterday. She is smoking in her house. We discussed the dangers of 2nd hand and 3rd hand smoke and encouraged her to smoke outside. We discussed coping strategies and telling her  not to buy anymore cigarettes. We discussed setting daily and weekly goals. Her goals are to smoke outside until she runs out of cigarettes then refrain from smoking, do not open the new carton of cigarettes, continue to color in her adult coloring books and finish making the bows that she has already started to make. She denies any negative thoughts or behavior at this time. Will continue to encourage and monitor progress.    Diagnosis: F17.200    Next Visit: 3 weeks

## 2017-05-18 ENCOUNTER — TELEPHONE (OUTPATIENT)
Dept: SMOKING CESSATION | Facility: CLINIC | Age: 62
End: 2017-05-18

## 2017-05-23 ENCOUNTER — TELEPHONE (OUTPATIENT)
Dept: SMOKING CESSATION | Facility: CLINIC | Age: 62
End: 2017-05-23

## 2017-05-24 ENCOUNTER — TELEPHONE (OUTPATIENT)
Dept: SMOKING CESSATION | Facility: CLINIC | Age: 62
End: 2017-05-24

## 2017-05-24 NOTE — TELEPHONE ENCOUNTER
3rd attempt; telephone follow up regarding smoking cessation quit episode #3. Will resolve this episode, quit #4 in progress as of 3/15/17.

## 2017-05-25 ENCOUNTER — TELEPHONE (OUTPATIENT)
Dept: INTERNAL MEDICINE | Facility: CLINIC | Age: 62
End: 2017-05-25

## 2017-05-25 NOTE — TELEPHONE ENCOUNTER
----- Message from Daniel Andres sent at 5/25/2017  7:19 AM CDT -----  Contact: pt  She's calling in regard to worked into the drs schedule today, please advise, [phone] (cell)

## 2017-05-25 NOTE — TELEPHONE ENCOUNTER
S/w pt. Pt stated that she started with watery diarrhea and she has chapped lips, no other symptoms. Pt would like to know what she should do or if she needed to be seen. Told pt that I would discuss with Dr. Major and return her call. Pt verbalized understanding.

## 2017-05-25 NOTE — TELEPHONE ENCOUNTER
MD Cele Sy LPN   Caller: Unspecified (Today,  7:40 AM)             Immodium 2 initially, 1 each loose stool after that. Do not take more than 8 Imodium in 24 hour period.   Increase fluid intake to stay hydrated.   Moisturizer to lips   If worse tomorrow, get seen in clinic

## 2017-06-05 ENCOUNTER — TELEPHONE (OUTPATIENT)
Dept: NEPHROLOGY | Facility: CLINIC | Age: 62
End: 2017-06-05

## 2017-06-05 NOTE — TELEPHONE ENCOUNTER
Returned call to patient who states that she was told during her visit she would need to start taking hormone pills. Please advise  [facility] [address]

## 2017-06-05 NOTE — TELEPHONE ENCOUNTER
----- Message from Ladonna Berry sent at 6/5/2017  4:24 PM CDT -----  states that hormone pills are too expensive, pls try w/ insurance...[phone]

## 2017-06-06 NOTE — TELEPHONE ENCOUNTER
Called and spoke with patient and informed her that Dr. Crespo did not prescribe or talk to her about getting any hormone pills. She expressed understanding.

## 2017-06-12 ENCOUNTER — LAB VISIT (OUTPATIENT)
Dept: LAB | Facility: HOSPITAL | Age: 62
End: 2017-06-12
Attending: FAMILY MEDICINE
Payer: COMMERCIAL

## 2017-06-12 ENCOUNTER — OFFICE VISIT (OUTPATIENT)
Dept: INTERNAL MEDICINE | Facility: CLINIC | Age: 62
End: 2017-06-12
Payer: COMMERCIAL

## 2017-06-12 VITALS
SYSTOLIC BLOOD PRESSURE: 118 MMHG | HEIGHT: 62 IN | BODY MASS INDEX: 34.74 KG/M2 | OXYGEN SATURATION: 97 % | HEART RATE: 85 BPM | DIASTOLIC BLOOD PRESSURE: 70 MMHG | TEMPERATURE: 97 F | WEIGHT: 188.75 LBS

## 2017-06-12 DIAGNOSIS — E78.5 HYPERLIPIDEMIA, UNSPECIFIED HYPERLIPIDEMIA TYPE: Chronic | ICD-10-CM

## 2017-06-12 DIAGNOSIS — E55.9 VITAMIN D INSUFFICIENCY: Chronic | ICD-10-CM

## 2017-06-12 DIAGNOSIS — K13.70 ORAL LESION: ICD-10-CM

## 2017-06-12 DIAGNOSIS — J02.9 PHARYNGITIS, UNSPECIFIED ETIOLOGY: ICD-10-CM

## 2017-06-12 DIAGNOSIS — J01.10 ACUTE NON-RECURRENT FRONTAL SINUSITIS: Primary | ICD-10-CM

## 2017-06-12 PROCEDURE — 99999 PR PBB SHADOW E&M-EST. PATIENT-LVL III: CPT | Mod: PBBFAC,,, | Performed by: FAMILY MEDICINE

## 2017-06-12 PROCEDURE — 99214 OFFICE O/P EST MOD 30 MIN: CPT | Mod: S$GLB,,, | Performed by: FAMILY MEDICINE

## 2017-06-12 PROCEDURE — 82306 VITAMIN D 25 HYDROXY: CPT

## 2017-06-12 PROCEDURE — 80061 LIPID PANEL: CPT

## 2017-06-12 PROCEDURE — 36415 COLL VENOUS BLD VENIPUNCTURE: CPT | Mod: PO

## 2017-06-12 RX ORDER — AMOXICILLIN AND CLAVULANATE POTASSIUM 875; 125 MG/1; MG/1
1 TABLET, FILM COATED ORAL 2 TIMES DAILY
Qty: 20 TABLET | Refills: 0 | Status: SHIPPED | OUTPATIENT
Start: 2017-06-12 | End: 2017-06-22

## 2017-06-12 RX ORDER — PAROXETINE HYDROCHLORIDE 40 MG/1
1 TABLET, FILM COATED ORAL DAILY
Refills: 5 | COMMUNITY
Start: 2017-05-08 | End: 2018-01-18

## 2017-06-12 NOTE — PROGRESS NOTES
Subjective:   Patient ID: Merle Caro is a 61 y.o. female.  Chief Complaint:  Sore Throat; Cough; Otalgia; and Diarrhea      Presents for evaluation of sore throat, ear pain, sinus congestion symptoms.  Possible sinusitis.  History COPD chronic bronchitis.  Stable.  Cough at baseline.  Reports compliance with inhalers.  Previous gastritis fully resolved.  Some increased diarrhea with this illness.  Concerned about sore developed on top of upper mouth.  Has significantly decreased amount of smoking daily.  On vitamin D high-dose supplementation.  Due repeat vitamin D level.  Needs lipid panel checked.  No additional complaints concerns today.        Sore Throat    This is a new problem. The current episode started in the past 7 days. The problem has been gradually worsening. Neither side of throat is experiencing more pain than the other. There has been no fever. The pain is at a severity of 5/10. The pain is moderate. Associated symptoms include ear pain. Pertinent negatives include no abdominal pain, congestion, coughing, diarrhea, drooling, ear discharge, headaches, hoarse voice, plugged ear sensation, neck pain, shortness of breath, stridor, swollen glands, trouble swallowing or vomiting. She has had no exposure to strep or mono. She has tried nothing for the symptoms.   Cough   This is a chronic problem. The current episode started more than 1 year ago. The problem has been waxing and waning. The cough is non-productive. Associated symptoms include ear pain, myalgias and a sore throat. Pertinent negatives include no chest pain, chills, ear congestion, eye redness, fever, headaches, heartburn, hemoptysis, nasal congestion, postnasal drip, rash, rhinorrhea, shortness of breath or wheezing. Nothing aggravates the symptoms. Risk factors for lung disease include smoking/tobacco exposure. She has tried a beta-agonist inhaler and steroid inhaler for the symptoms. The treatment provided significant relief. Her past  medical history is significant for COPD and emphysema.   Otalgia    There is pain in both ears. This is a new problem. The current episode started in the past 7 days. The problem occurs constantly. The problem has been waxing and waning. There has been no fever. The pain is at a severity of 3/10. The pain is mild. Associated symptoms include a sore throat. Pertinent negatives include no abdominal pain, coughing, diarrhea, ear discharge, headaches, hearing loss, neck pain, rash, rhinorrhea or vomiting. She has tried nothing for the symptoms.     Review of Systems   Constitutional: Negative for chills, diaphoresis, fatigue and fever.   HENT: Positive for ear pain and sore throat. Negative for congestion, dental problem, drooling, ear discharge, hearing loss, hoarse voice, postnasal drip, rhinorrhea, sinus pressure, sneezing, tinnitus, trouble swallowing and voice change.    Eyes: Negative for pain, discharge, redness, itching and visual disturbance.   Respiratory: Negative for cough, hemoptysis, chest tightness, shortness of breath, wheezing and stridor.    Cardiovascular: Negative for chest pain, palpitations and leg swelling.   Gastrointestinal: Negative for abdominal pain, diarrhea, heartburn, nausea and vomiting.   Genitourinary: Negative for difficulty urinating.   Musculoskeletal: Positive for myalgias. Negative for neck pain and neck stiffness.   Skin: Negative for rash.   Neurological: Negative for dizziness, weakness, light-headedness and headaches.   Hematological: Positive for adenopathy.       Current Outpatient Prescriptions:     aclidinium bromide (TUDORZA PRESSAIR) 400 mcg/actuation AePB, Inhale 1 puff into the lungs 2 (two) times daily., Disp: 60 each, Rfl: 11    albuterol 90 mcg/actuation inhaler, Inhale 2 puffs into the lungs every 4 (four) hours as needed for Wheezing., Disp: 18 g, Rfl: 11    clonazepam (KLONOPIN) 0.5 MG tablet, Take 0.5 mg by mouth 2 (two) times daily as needed., Disp: , Rfl:  "    ergocalciferol (ERGOCALCIFEROL) 50,000 unit Cap, TAKE 1 CAPSULE BY MOUTH WEEKLY, Disp: 12 capsule, Rfl: 0    fluticasone-salmeterol 500-50 mcg/dose (ADVAIR DISKUS) 500-50 mcg/dose DsDv diskus inhaler, Inhale 1 puff into the lungs 2 (two) times daily. Controller, Disp: 60 each, Rfl: 3    haloperidol (HALDOL) 5 MG tablet, Take 5 mg by mouth once daily., Disp: , Rfl:     nicotine (NICODERM CQ) 14 mg/24 hr, Place 1 patch onto the skin once daily., Disp: 28 patch, Rfl: 0    OXYGEN-AIR DELIVERY SYSTEMS [facility] [place] Inhale 2 L into the lungs as needed (O2 via Nasal Canula.)., Disp: , Rfl:     (Magic mouthwash) 1:1:1 Benadryl 12.5mg/5ml liq, aluminum & magnesium hydroxide-simehticone (Maalox), lidocaine viscous 2%, Swish and spit 5 mLs every 6 (six) hours as needed., Disp: 90 mL, Rfl: 0    amoxicillin-clavulanate 875-125mg (AUGMENTIN) 875-125 mg per tablet, Take 1 tablet by mouth 2 (two) times daily., Disp: 20 tablet, Rfl: 0    paroxetine (PAXIL) 40 MG tablet, Take 1 tablet by mouth once daily at 6am., Disp: , Rfl: 5    Objective:   /70 (BP Location: Right arm, Patient Position: Sitting, BP Method: Manual)   Pulse 85   Temp 97 °F (36.1 °C) (Tympanic)   Ht 5' 2" (1.575 m)   Wt 85.6 kg (188 lb 12.5 oz)   SpO2 97%   BMI 34.53 kg/m²     Physical Exam   Constitutional: Vital signs are normal. She appears well-developed and well-nourished. She appears ill. No distress.   HENT:   Right Ear: Hearing, tympanic membrane, external ear and ear canal normal.   Left Ear: Hearing, tympanic membrane, external ear and ear canal normal.   Nose: Mucosal edema and rhinorrhea present. Right sinus exhibits frontal sinus tenderness. Right sinus exhibits no maxillary sinus tenderness. Left sinus exhibits frontal sinus tenderness. Left sinus exhibits no maxillary sinus tenderness.   Mouth/Throat: Uvula is midline and mucous membranes are normal. Oral lesions (upper palate with white/healing blister type lesion) present. Posterior " oropharyngeal erythema present. No oropharyngeal exudate, posterior oropharyngeal edema or tonsillar abscesses. Tonsillar exudate.   Eyes: Conjunctivae are normal. Right conjunctiva is not injected. Right conjunctiva has no hemorrhage. Left conjunctiva is not injected. Left conjunctiva has no hemorrhage.   Neck: Normal range of motion. Neck supple.   Cardiovascular: Normal rate, regular rhythm and normal heart sounds.    Pulmonary/Chest: Effort normal. She has rhonchi. She has no rales.   Abdominal: Soft. She exhibits no distension. There is no tenderness. There is no rebound, no guarding and no CVA tenderness.   Lymphadenopathy:     She has cervical adenopathy.   Skin: Skin is warm and dry. No rash noted.   Nursing note and vitals reviewed.    Assessment:     1. Acute non-recurrent frontal sinusitis    2. Pharyngitis, unspecified etiology    3. Vitamin D insufficiency    4. Oral lesion    5. Hyperlipidemia, unspecified hyperlipidemia type      Plan:   Acute non-recurrent frontal sinusitis/Pharyngitis, unspecified etiology  Oral lesion  -     amoxicillin-clavulanate 875-125mg (AUGMENTIN) 875-125 mg per tablet; Take 1 tablet by mouth 2 (two) times daily.  Dispense: 20 tablet; Refill: 0  -     (Magic mouthwash) 1:1:1 Benadryl 12.5mg/5ml liq, aluminum & magnesium hydroxide-simehticone (Maalox), lidocaine viscous 2%; Swish and spit 5 mLs every 6 (six) hours as needed.  Dispense: 90 mL; Refill: 0  Treat for sinusitis and possible strep pharyngitis.  Magic mouthwash for symptoms and to hopefully he'll lesion.  Recheck lesion in 2 weeks.  Referral for biopsy if indicated.    Vitamin D insufficiency  -     Vitamin D; Future; Expected date: 06/12/2017  Continue weekly high-dose supplement her change to daily low-dose supplement based on level.    Hyperlipidemia, unspecified hyperlipidemia type  -     Lipid panel; Future; Expected date: 06/12/2017  Treat as indicated.    Return to clinic 2 weeks.

## 2017-06-13 LAB
25(OH)D3+25(OH)D2 SERPL-MCNC: 26 NG/ML
CHOLEST/HDLC SERPL: 3.6 {RATIO}
HDL/CHOLESTEROL RATIO: 27.8 %
HDLC SERPL-MCNC: 205 MG/DL
HDLC SERPL-MCNC: 57 MG/DL
LDLC SERPL CALC-MCNC: 122.6 MG/DL
NONHDLC SERPL-MCNC: 148 MG/DL
TRIGL SERPL-MCNC: 127 MG/DL

## 2017-06-14 ENCOUNTER — TELEPHONE (OUTPATIENT)
Dept: INTERNAL MEDICINE | Facility: CLINIC | Age: 62
End: 2017-06-14

## 2017-06-14 NOTE — TELEPHONE ENCOUNTER
----- Message from Marck Major MD sent at 6/13/2017  8:18 AM CDT -----  Low vitamin D level.  Needs to continue weekly high-dose supplement for 3 months.  Recheck level in 3 months.  Cholesterol tests are normal. Your 10-year risk of a heart disease or stroke is low.  Aspirin daily is not recommended. A statin cholesterol medication is not recommended.

## 2017-06-25 ENCOUNTER — NURSE TRIAGE (OUTPATIENT)
Dept: ADMINISTRATIVE | Facility: CLINIC | Age: 62
End: 2017-06-25

## 2017-06-25 NOTE — TELEPHONE ENCOUNTER
Reason for Disposition   Question about upcoming scheduled test, no triage required and triager able to answer question    Protocols used: ST INFORMATION ONLY CALL-A-AH

## 2017-06-26 ENCOUNTER — OFFICE VISIT (OUTPATIENT)
Dept: INTERNAL MEDICINE | Facility: CLINIC | Age: 62
End: 2017-06-26
Payer: COMMERCIAL

## 2017-06-26 VITALS
TEMPERATURE: 97 F | WEIGHT: 186.06 LBS | DIASTOLIC BLOOD PRESSURE: 76 MMHG | BODY MASS INDEX: 34.24 KG/M2 | SYSTOLIC BLOOD PRESSURE: 142 MMHG | HEIGHT: 62 IN

## 2017-06-26 DIAGNOSIS — R22.1 MASS OF RIGHT SIDE OF NECK: Primary | ICD-10-CM

## 2017-06-26 DIAGNOSIS — E55.9 VITAMIN D INSUFFICIENCY: ICD-10-CM

## 2017-06-26 DIAGNOSIS — F17.210 CIGARETTE SMOKER: Chronic | ICD-10-CM

## 2017-06-26 PROCEDURE — 99999 PR PBB SHADOW E&M-EST. PATIENT-LVL III: CPT | Mod: PBBFAC,,, | Performed by: FAMILY MEDICINE

## 2017-06-26 PROCEDURE — 99213 OFFICE O/P EST LOW 20 MIN: CPT | Mod: S$GLB,,, | Performed by: FAMILY MEDICINE

## 2017-06-26 RX ORDER — ERGOCALCIFEROL 1.25 MG/1
50000 CAPSULE ORAL
Qty: 12 CAPSULE | Refills: 0 | Status: SHIPPED | OUTPATIENT
Start: 2017-06-26 | End: 2017-10-29 | Stop reason: SDUPTHER

## 2017-06-26 NOTE — PROGRESS NOTES
Subjective:   Patient ID: Merle Caro is a 61 y.o. female.  Chief Complaint:  Follow-up      Follow-up on sore throat and sinusitis and oral lesion.  Treated with Augmentin.  Patient only took 2 days, stopped due to diarrhea.  Previous visit noted ulceration and changes to up her midline and palate. Magic mouthwash helped pain significantly.  Working with smoking cessation.  On Danii Andrade lost 20 pounds.  Still with prominent tender mass last note right submandibular area.  No cough.  Breathing stable.  No additional complaints concerns today.        Review of Systems   Constitutional: Negative for fatigue and fever.   HENT: Negative for congestion, dental problem, drooling, ear discharge, ear pain, facial swelling, hearing loss, mouth sores, nosebleeds, postnasal drip, rhinorrhea, sinus pressure, sneezing, sore throat, tinnitus, trouble swallowing and voice change.    Respiratory: Negative for cough and shortness of breath.    Cardiovascular: Negative for chest pain, palpitations and leg swelling.   Gastrointestinal: Negative for abdominal pain, diarrhea, nausea and vomiting.   Musculoskeletal: Negative for neck pain and neck stiffness.   Skin: Negative for rash.   Hematological: Positive for adenopathy. Does not bruise/bleed easily.       Current Outpatient Prescriptions:     (Magic mouthwash) 1:1:1 Benadryl 12.5mg/5ml liq, aluminum & magnesium hydroxide-simehticone (Maalox), lidocaine viscous 2%, Swish and spit 5 mLs every 6 (six) hours as needed., Disp: 90 mL, Rfl: 0    aclidinium bromide (TUDORZA PRESSAIR) 400 mcg/actuation AePB, Inhale 1 puff into the lungs 2 (two) times daily., Disp: 60 each, Rfl: 11    albuterol 90 mcg/actuation inhaler, Inhale 2 puffs into the lungs every 4 (four) hours as needed for Wheezing., Disp: 18 g, Rfl: 11    clonazepam (KLONOPIN) 0.5 MG tablet, Take 0.5 mg by mouth once daily. , Disp: , Rfl:     ergocalciferol (ERGOCALCIFEROL) 50,000 unit Cap, Take 1 capsule (50,000 Units  "total) by mouth every 7 days., Disp: 12 capsule, Rfl: 0    fluticasone-salmeterol 500-50 mcg/dose (ADVAIR DISKUS) 500-50 mcg/dose DsDv diskus inhaler, Inhale 1 puff into the lungs 2 (two) times daily. Controller, Disp: 60 each, Rfl: 3    haloperidol (HALDOL) 5 MG tablet, Take 5 mg by mouth once daily., Disp: , Rfl:     nicotine (NICODERM CQ) 14 mg/24 hr, Place 1 patch onto the skin once daily., Disp: 28 patch, Rfl: 0    OXYGEN-AIR DELIVERY SYSTEMS [facility]ISC, Inhale 2 L into the lungs as needed (O2 via Nasal Canula.)., Disp: , Rfl:     paroxetine (PAXIL) 40 MG tablet, Take 1 tablet by mouth once daily at 6am., Disp: , Rfl: 5    Objective:   BP (!) 142/76 (BP Location: Right arm, Patient Position: Sitting, BP Method: Manual)   Temp 96.6 °F (35.9 °C) (Tympanic)   Ht 5' 2" (1.575 m)   Wt 84.4 kg (186 lb 1.1 oz)   BMI 34.03 kg/m²     Physical Exam   Constitutional: Vital signs are normal. She appears well-developed and well-nourished. No distress.   HENT:   Right Ear: Hearing, tympanic membrane, external ear and ear canal normal.   Left Ear: Hearing, tympanic membrane, external ear and ear canal normal.   Nose: Nose normal. Right sinus exhibits no maxillary sinus tenderness and no frontal sinus tenderness. Left sinus exhibits no maxillary sinus tenderness and no frontal sinus tenderness.   Neck: No JVD present. Carotid bruit is not present. No thyroid mass and no thyromegaly present.       Pulmonary/Chest: No stridor.   Skin: No rash noted.   Psychiatric: She has a normal mood and affect.     Assessment:     1. Mass of right side of neck    2. Cigarette smoker    3. Vitamin D insufficiency      Plan:   Mass of right side of neck/Cigarette smoker  -     US Soft Tissue Head Neck Thyroid; Future; Expected date: 06/26/2017  -     Ambulatory referral to Smoking Cessation Program  Return to clinic next week for results and additional recommendations for treatment.  Sinusitis resolved.  Oral lesion resolved.  No need to " continue Augmentin for Magic mouthwash.    Vitamin D insufficiency  -     ergocalciferol (ERGOCALCIFEROL) 50,000 unit Cap; Take 1 capsule (50,000 Units total) by mouth every 7 days.  Dispense: 12 capsule; Refill: 0    Return to clinic 1 week

## 2017-06-27 ENCOUNTER — CLINICAL SUPPORT (OUTPATIENT)
Dept: SMOKING CESSATION | Facility: CLINIC | Age: 62
End: 2017-06-27
Payer: COMMERCIAL

## 2017-06-27 ENCOUNTER — TELEPHONE (OUTPATIENT)
Dept: RADIOLOGY | Facility: HOSPITAL | Age: 62
End: 2017-06-27

## 2017-06-27 DIAGNOSIS — F17.200 NICOTINE DEPENDENCE: Primary | ICD-10-CM

## 2017-06-27 PROCEDURE — 99403 PREV MED CNSL INDIV APPRX 45: CPT | Mod: S$GLB,,, | Performed by: GENERAL PRACTICE

## 2017-06-27 NOTE — PROGRESS NOTES
Individual Follow-Up Form    6/27/2017    Clinical Status of Patient: Outpatient    Length of Service: 45 minutes    Continuing Medication: no    Other Medications: nicotine gum     Target Symptoms: Withdrawal and medication side effects. The following were  rated moderate (3) to severe (4) on TCRS:  · Moderate (3): frustrated, restless  · Severe (4): desire tobacco    Comments: Patient states that she has only smoked 10 cigarettes so far today which is decreased from 1 1/2 ppd. She is trying to wean herself without using the nicotine patches. She states that she is interested in using the nicotine gum again. She feels that part of her desire to smoke is for the oral fixation. We discussed using a straw of sucker to help. We discussed nicotine gum use. She states that she has been going to [employer] and has lost 20 pounds. She states that she feels better and is happier. She gets upset with her  because he brings home cigarettes for her. I discussed no more cigarettes with her . We discussed that her benefits for the program will run out on 7-. She denies any negative thoughts or behavior at this time. Will continue to encourage and monitor progress.    Diagnosis: F17.200    Next Visit: 2 weeks

## 2017-06-28 ENCOUNTER — HOSPITAL ENCOUNTER (OUTPATIENT)
Dept: RADIOLOGY | Facility: HOSPITAL | Age: 62
Discharge: HOME | End: 2017-06-28
Attending: FAMILY MEDICINE
Payer: COMMERCIAL

## 2017-06-28 DIAGNOSIS — F17.210 CIGARETTE SMOKER: Chronic | ICD-10-CM

## 2017-06-28 DIAGNOSIS — R22.1 MASS OF RIGHT SIDE OF NECK: ICD-10-CM

## 2017-06-28 PROCEDURE — 76536 US EXAM OF HEAD AND NECK: CPT | Mod: 26,,, | Performed by: RADIOLOGY

## 2017-06-28 PROCEDURE — 76536 US EXAM OF HEAD AND NECK: CPT | Mod: TC,PO

## 2017-06-28 RX ORDER — MICONAZOLE NITRATE 2 %
2 CREAM (GRAM) TOPICAL
Qty: 100 EACH | Refills: 1 | Status: SHIPPED | OUTPATIENT
Start: 2017-06-28 | End: 2019-02-28

## 2017-06-30 ENCOUNTER — OFFICE VISIT (OUTPATIENT)
Dept: INTERNAL MEDICINE | Facility: CLINIC | Age: 62
End: 2017-06-30
Payer: COMMERCIAL

## 2017-06-30 ENCOUNTER — TELEPHONE (OUTPATIENT)
Dept: RADIOLOGY | Facility: HOSPITAL | Age: 62
End: 2017-06-30

## 2017-06-30 VITALS
HEIGHT: 62 IN | DIASTOLIC BLOOD PRESSURE: 70 MMHG | BODY MASS INDEX: 34.12 KG/M2 | WEIGHT: 185.44 LBS | TEMPERATURE: 97 F | SYSTOLIC BLOOD PRESSURE: 132 MMHG

## 2017-06-30 DIAGNOSIS — R22.1 MASS OF RIGHT SIDE OF NECK: Primary | ICD-10-CM

## 2017-06-30 PROCEDURE — 99212 OFFICE O/P EST SF 10 MIN: CPT | Mod: S$GLB,,, | Performed by: FAMILY MEDICINE

## 2017-06-30 PROCEDURE — 99999 PR PBB SHADOW E&M-EST. PATIENT-LVL III: CPT | Mod: PBBFAC,,, | Performed by: FAMILY MEDICINE

## 2017-06-30 NOTE — PROGRESS NOTES
"Subjective:   Patient ID: Merle Caro is a 61 y.o. female.  Chief Complaint:  Follow-up      Presents for results of ultrasound neck for right neck mass.    There is a somewhat ill-defined and incompletely visualized area of slightly increased echogenicity underline the subcutaneous fat at the area palpable concern in the right neck measuring roughly 3.0 x 1.8 cm in cross-section and spanning roughly 4.7 cm in long axis.  Findings are potentially within the musculature.    While indeterminate, intramuscular lipoma could have a similar appearance.    CT neck with contrast advised.    No new issues, concerns, complaints today.  Some pain in area of mass, but thinks decreased in size.      Review of Systems   Constitutional: Negative for fatigue and fever.   HENT: Negative for congestion, dental problem, drooling, ear discharge, ear pain, facial swelling, hearing loss, mouth sores, nosebleeds, postnasal drip, rhinorrhea, sinus pressure, sneezing, sore throat, tinnitus, trouble swallowing and voice change.    Respiratory: Negative for cough and shortness of breath.    Cardiovascular: Negative for chest pain, palpitations and leg swelling.   Gastrointestinal: Negative for abdominal pain, diarrhea, nausea and vomiting.   Musculoskeletal: Negative for neck pain and neck stiffness.   Skin: Negative for rash.   Hematological: Positive for adenopathy. Does not bruise/bleed easily.       Objective:   /70 (BP Location: Right arm, Patient Position: Sitting, BP Method: Manual)   Temp 96.8 °F (36 °C) (Tympanic)   Ht 5' 2" (1.575 m)   Wt 84.1 kg (185 lb 6.5 oz)   BMI 33.91 kg/m²     Physical Exam   Constitutional: She appears well-developed and well-nourished. No distress.   Neck:   Stable and only tender neck mass off strength and mastoid border.  Solid more than cystic.  Lipoma certainly possible.   Skin: Bruising and ecchymosis noted. No rash noted.   Psychiatric: Her mood appears anxious.        Assessment:     1. " Mass of right side of neck      Plan:   Patient agrees to CT of neck with contrast to further elucidate lesion.  -     CT Soft Tissue Neck With Contrast; Future; Expected date: 06/30/2017  Discussed if intramuscular lipoma no other additional treatment indicated.  Return to clinic after CT for results and additional recommendations.

## 2017-07-03 ENCOUNTER — HOSPITAL ENCOUNTER (OUTPATIENT)
Dept: RADIOLOGY | Facility: HOSPITAL | Age: 62
Discharge: HOME | End: 2017-07-03
Attending: FAMILY MEDICINE
Payer: COMMERCIAL

## 2017-07-03 DIAGNOSIS — R22.1 MASS OF RIGHT SIDE OF NECK: ICD-10-CM

## 2017-07-03 PROCEDURE — 25500020 PHARM REV CODE 255: Mod: PO | Performed by: FAMILY MEDICINE

## 2017-07-03 PROCEDURE — 70491 CT SOFT TISSUE NECK W/DYE: CPT | Mod: TC,PO

## 2017-07-03 PROCEDURE — 70491 CT SOFT TISSUE NECK W/DYE: CPT | Mod: 26,,, | Performed by: RADIOLOGY

## 2017-07-03 RX ADMIN — IOHEXOL 75 ML: 350 INJECTION, SOLUTION INTRAVENOUS at 12:07

## 2017-07-04 ENCOUNTER — NURSE TRIAGE (OUTPATIENT)
Dept: ADMINISTRATIVE | Facility: CLINIC | Age: 62
End: 2017-07-04

## 2017-07-04 NOTE — TELEPHONE ENCOUNTER
"    Reason for Disposition   [1] Looks infected (spreading redness, pus) AND [2] no fever    Answer Assessment - Initial Assessment Questions  1. APPEARANCE of RASH: "Describe the rash."      MRI yest. Red spots on left arm since yest after took bandage off where IV was. Some raised - about 7 spots. About size of pencil eraser. ? Poss reaction to tape  2. LOCATION: "Where is the rash located?"      Left arm   3. NUMBER: "How many spots are there?"      As above   4. SIZE: "How big are the spots?" (Inches, centimeters or compare to size of a coin)     As above   5. ONSET: "When did the rash start?"      As above   6. ITCHING: "Does the rash itch?" If so, ask: "How bad is the itch?"  (Scale 1-10; or mild, moderate, severe)     No, itching on hand. 2 spots on hand    7. PAIN: "Does the rash hurt?" If so, ask: "How bad is the pain?"  (Scale 1-10; or mild, moderate, severe)     No   8. OTHER SYMPTOMS: "Do you have any other symptoms?" (e.g., fever)     No    Protocols used: ST RASH OR REDNESS - [id number]  coughed up stuff- hx COPD or bronchitis, no SOB. Phone disconnected during call.  Fast busy when I called back. Office visit made in am at pt request at 2pm. Offered  appt today- location and hours given. call back with questions.     "

## 2017-07-05 ENCOUNTER — LAB VISIT (OUTPATIENT)
Dept: LAB | Facility: HOSPITAL | Age: 62
End: 2017-07-05
Attending: INTERNAL MEDICINE
Payer: COMMERCIAL

## 2017-07-05 DIAGNOSIS — E87.1 CHRONIC HYPONATREMIA: ICD-10-CM

## 2017-07-05 LAB
ALBUMIN SERPL BCP-MCNC: 3.7 G/DL
ANION GAP SERPL CALC-SCNC: 8 MMOL/L
BUN SERPL-MCNC: 5 MG/DL
CALCIUM SERPL-MCNC: 9.3 MG/DL
CHLORIDE SERPL-SCNC: 94 MMOL/L
CO2 SERPL-SCNC: 28 MMOL/L
CREAT SERPL-MCNC: 0.7 MG/DL
EST. GFR  (AFRICAN AMERICAN): >60 ML/MIN/1.73 M^2
EST. GFR  (NON AFRICAN AMERICAN): >60 ML/MIN/1.73 M^2
GLUCOSE SERPL-MCNC: 82 MG/DL
PHOSPHATE SERPL-MCNC: 4.1 MG/DL
POTASSIUM SERPL-SCNC: 4.4 MMOL/L
SODIUM SERPL-SCNC: 130 MMOL/L

## 2017-07-05 PROCEDURE — 80069 RENAL FUNCTION PANEL: CPT

## 2017-07-05 PROCEDURE — 36415 COLL VENOUS BLD VENIPUNCTURE: CPT

## 2017-07-10 ENCOUNTER — OFFICE VISIT (OUTPATIENT)
Dept: NEPHROLOGY | Facility: CLINIC | Age: 62
End: 2017-07-10
Payer: COMMERCIAL

## 2017-07-10 VITALS
SYSTOLIC BLOOD PRESSURE: 116 MMHG | HEIGHT: 62 IN | DIASTOLIC BLOOD PRESSURE: 62 MMHG | BODY MASS INDEX: 33.39 KG/M2 | WEIGHT: 181.44 LBS | HEART RATE: 104 BPM

## 2017-07-10 DIAGNOSIS — E87.1 HYPONATREMIA: Primary | ICD-10-CM

## 2017-07-10 PROCEDURE — 99999 PR PBB SHADOW E&M-EST. PATIENT-LVL III: CPT | Mod: PBBFAC,,, | Performed by: INTERNAL MEDICINE

## 2017-07-10 PROCEDURE — 99213 OFFICE O/P EST LOW 20 MIN: CPT | Mod: S$GLB,,, | Performed by: INTERNAL MEDICINE
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
## 2017-07-11 ENCOUNTER — OFFICE VISIT (OUTPATIENT)
Dept: INTERNAL MEDICINE | Facility: CLINIC | Age: 62
End: 2017-07-11
Payer: COMMERCIAL

## 2017-07-11 VITALS
OXYGEN SATURATION: 96 % | HEART RATE: 103 BPM | DIASTOLIC BLOOD PRESSURE: 72 MMHG | HEIGHT: 62 IN | BODY MASS INDEX: 33.71 KG/M2 | WEIGHT: 183.19 LBS | SYSTOLIC BLOOD PRESSURE: 122 MMHG | TEMPERATURE: 97 F

## 2017-07-11 DIAGNOSIS — D22.9 NEVUS: ICD-10-CM

## 2017-07-11 DIAGNOSIS — L82.1 SEBORRHEIC KERATOSES: Primary | ICD-10-CM

## 2017-07-11 PROBLEM — R22.1 MASS OF RIGHT SIDE OF NECK: Status: RESOLVED | Noted: 2017-06-26 | Resolved: 2017-07-11

## 2017-07-11 PROCEDURE — 99999 PR PBB SHADOW E&M-EST. PATIENT-LVL III: CPT | Mod: PBBFAC,,, | Performed by: FAMILY MEDICINE

## 2017-07-11 PROCEDURE — 99213 OFFICE O/P EST LOW 20 MIN: CPT | Mod: S$GLB,,, | Performed by: FAMILY MEDICINE

## 2017-07-11 NOTE — PROGRESS NOTES
Subjective:   Patient ID: Merle Caro is a 62 y.o. female.  Chief Complaint:  Follow-up; Medication Refill (Vitamin D ); and Mole      Presents for results of recent CT.  Done for suspicious right neck mass.  Ultrasound questioned collection of fluid/hematoma within the muscle.  CT showed no mass.  Nonspecific changes submandibular glands.  Small shotty but nondominant bilateral cervical adenopathy.  Labs with low vitamin D.  Agreeable to starting weekly high-dose vitamin D supplement.  Finally saw a renal.  Has 3 lesions 1 left groin 1 left breast 1 right arm she would like checked.  They're not causing any pain, irritation or significant changes recently.        Review of Systems   HENT: Negative.    Respiratory: Negative for shortness of breath.    Cardiovascular: Negative for chest pain.   Gastrointestinal: Negative for abdominal pain.   Skin: Negative for color change, pallor, rash and wound.       Current Outpatient Prescriptions:     aclidinium bromide (TUDORZA PRESSAIR) 400 mcg/actuation AePB, Inhale 1 puff into the lungs 2 (two) times daily., Disp: 60 each, Rfl: 11    albuterol 90 mcg/actuation inhaler, Inhale 2 puffs into the lungs every 4 (four) hours as needed for Wheezing., Disp: 18 g, Rfl: 11    clonazepam (KLONOPIN) 0.5 MG tablet, Take 0.5 mg by mouth once daily. , Disp: , Rfl:     ergocalciferol (ERGOCALCIFEROL) 50,000 unit Cap, Take 1 capsule (50,000 Units total) by mouth every 7 days., Disp: 12 capsule, Rfl: 0    fluticasone-salmeterol 500-50 mcg/dose (ADVAIR DISKUS) 500-50 mcg/dose DsDv diskus inhaler, Inhale 1 puff into the lungs 2 (two) times daily. Controller, Disp: 60 each, Rfl: 3    haloperidol (HALDOL) 5 MG tablet, Take 5 mg by mouth once daily., Disp: , Rfl:     nicotine (NICODERM CQ) 14 mg/24 hr, Place 1 patch onto the skin once daily., Disp: 28 patch, Rfl: 0    nicotine, polacrilex, (NICORETTE) 2 mg Gum, Take 1 each (2 mg total) by mouth as needed (use no more than 8 pieces per  "day)., Disp: 100 each, Rfl: 1    OXYGEN-AIR DELIVERY SYSTEMS MISC, Inhale 2 L into the lungs as needed (O2 via Nasal Canula.)., Disp: , Rfl:     paroxetine (PAXIL) 40 MG tablet, Take 1 tablet by mouth once daily at 6am., Disp: , Rfl: 5    (Magic mouthwash) 1:1:1 Benadryl 12.5mg/5ml liq, aluminum & magnesium hydroxide-simehticone (Maalox), lidocaine viscous 2%, Swish and spit 5 mLs every 6 (six) hours as needed., Disp: 90 mL, Rfl: 0    Objective:   /72 (BP Location: Right arm, Patient Position: Sitting, BP Method: Manual)   Pulse 103   Temp 97 °F (36.1 °C) (Tympanic)   Ht 5' 2" (1.575 m)   Wt 83.1 kg (183 lb 3.2 oz)   SpO2 96%   BMI 33.51 kg/m²     Physical Exam   Constitutional: She appears well-developed and well-nourished. No distress.   Neck: Normal range of motion. Neck supple. No JVD present. No thyromegaly present.   Lymphadenopathy:     She has no cervical adenopathy.   Skin: Skin is warm and dry. No rash noted. No erythema. No pallor.   Lesion left inguinal area consistent with seborrheic keratosis.    Lesion left breast with increased brown pigment, flat, not palpable, some central depigmentation.  Overall circular but mildly irregular border.  Still consistent with benign nevus.    Lesion left arm.  Circular.  Small.  Consistent brown hyperpigmentation.  Smooth border.  Very consistent with benign nevus.   Nursing note and vitals reviewed.    Assessment:     1. Seborrheic keratoses    2. Nevus      Plan:   Discussed benign appearance to overall lesions.  No intervention at this time.  If any increase in size, pigment, or irritation call or follow-up for biopsy/removal.  Vitamin D weekly high-dose supplement ×3 months.  Recheck vitamin D level.  CT with resolution of previous suspicious neck mass.  No additional treatment needed.  Praise and encouraged to continue smoking cessation.  Return to clinic 3 months.    "

## 2017-07-21 ENCOUNTER — TELEPHONE (OUTPATIENT)
Dept: INTERNAL MEDICINE | Facility: CLINIC | Age: 62
End: 2017-07-21

## 2017-07-21 NOTE — TELEPHONE ENCOUNTER
----- Message from Alicia Mcgregor sent at 7/21/2017  7:48 AM CDT -----  Contact: self [phone]  States that she thinks her heart rate has slowed down and wants to speak to nurse. Please call back at [phone]//thank you acc

## 2017-07-21 NOTE — TELEPHONE ENCOUNTER
S/w pt. Pt stated that she lost her pulse ox but when she checked her pulse ox and pulse yesterday her pulse rate went down. Pt is having wheezing and is concerned. Pt is currently on oxygen 2L. Tried to explain to patient how to take pulse but pt was unable to. Pt will contact  to see if he can bring her in to the doctor.

## 2017-09-01 ENCOUNTER — TELEPHONE (OUTPATIENT)
Dept: SMOKING CESSATION | Facility: CLINIC | Age: 62
End: 2017-09-01

## 2017-09-01 NOTE — TELEPHONE ENCOUNTER
Attempt to contact patient for a smoking cessation progress update. Recorded message left with return contact information.

## 2017-09-25 ENCOUNTER — TELEPHONE (OUTPATIENT)
Dept: SMOKING CESSATION | Facility: CLINIC | Age: 62
End: 2017-09-25

## 2017-09-26 ENCOUNTER — TELEPHONE (OUTPATIENT)
Dept: SMOKING CESSATION | Facility: CLINIC | Age: 62
End: 2017-09-26

## 2017-09-27 ENCOUNTER — TELEPHONE (OUTPATIENT)
Dept: SMOKING CESSATION | Facility: CLINIC | Age: 62
End: 2017-09-27

## 2017-10-03 ENCOUNTER — PATIENT OUTREACH (OUTPATIENT)
Dept: ADMINISTRATIVE | Facility: HOSPITAL | Age: 62
End: 2017-10-03

## 2017-10-12 ENCOUNTER — OFFICE VISIT (OUTPATIENT)
Dept: PULMONOLOGY | Facility: CLINIC | Age: 62
End: 2017-10-12
Payer: COMMERCIAL

## 2017-10-12 ENCOUNTER — HOSPITAL ENCOUNTER (OUTPATIENT)
Dept: RADIOLOGY | Facility: HOSPITAL | Age: 62
Discharge: HOME | End: 2017-10-12
Attending: INTERNAL MEDICINE
Payer: COMMERCIAL

## 2017-10-12 VITALS
SYSTOLIC BLOOD PRESSURE: 123 MMHG | WEIGHT: 170.88 LBS | HEIGHT: 62 IN | OXYGEN SATURATION: 94 % | BODY MASS INDEX: 31.45 KG/M2 | RESPIRATION RATE: 18 BRPM | HEART RATE: 100 BPM | DIASTOLIC BLOOD PRESSURE: 60 MMHG

## 2017-10-12 DIAGNOSIS — J44.89 ASTHMA WITH COPD: Chronic | ICD-10-CM

## 2017-10-12 DIAGNOSIS — J44.89 ASTHMA WITH COPD: Primary | Chronic | ICD-10-CM

## 2017-10-12 DIAGNOSIS — G47.33 OSA ON CPAP: Chronic | ICD-10-CM

## 2017-10-12 DIAGNOSIS — F17.210 CIGARETTE SMOKER: Chronic | ICD-10-CM

## 2017-10-12 DIAGNOSIS — J96.11 CHRONIC RESPIRATORY FAILURE WITH HYPOXIA: Chronic | ICD-10-CM

## 2017-10-12 PROCEDURE — 99999 PR PBB SHADOW E&M-EST. PATIENT-LVL III: CPT | Mod: PBBFAC,,, | Performed by: INTERNAL MEDICINE

## 2017-10-12 PROCEDURE — 71020 XR CHEST PA AND LATERAL: CPT | Mod: TC

## 2017-10-12 PROCEDURE — 99215 OFFICE O/P EST HI 40 MIN: CPT | Mod: S$GLB,,, | Performed by: INTERNAL MEDICINE

## 2017-10-12 PROCEDURE — 71020 XR CHEST PA AND LATERAL: CPT | Mod: 26,,, | Performed by: RADIOLOGY

## 2017-10-12 NOTE — PATIENT INSTRUCTIONS
Lung Anatomy  Your lungs take air in to give your body oxygen, which the body needs to work. Your lungs, like all the tissues in your body, are made up of billions of tiny specialized cells. Old lung cells die and are replaced by new, identical lung cells. This natural process helps ensure healthy lungs.    Date Last Reviewed: 11/1/2016  © 0032-7117 [employer]. [address] [place] [address], [place] [address]. All rights reserved. This information is not intended as a substitute for professional medical care. Always follow your healthcare professional's instructions.

## 2017-10-12 NOTE — ASSESSMENT & PLAN NOTE
She is on CPAP AUTO. She has not used her CPAP in 1 year.    Complications of untreated sleep apnea discussed with pateint in detail including but not limited to  high blood pressure, heart attack, stroke, obesity,  diabetes and worsening heart failure. Patient voiced understanding.

## 2017-10-12 NOTE — ASSESSMENT & PLAN NOTE
Asthma ROS: taking medications as instructed, no medication side effects noted, no significant ongoing wheezing or shortness of breath, using bronchodilator MDI less than twice a week.   New concerns: None.   Exam: appears well, vitals normal, no respiratory distress, acyanotic, normal RR, chest clear, no wheezing, crepitations, rhonchi, normal symmetric air entry.   Assessment:  Asthma well controlled.   Plan: ADVAIR, ALBUTEROL. Current treatment plan is effective, no change in therapy, orders as documented in EMR. ADVAIR and ALBUTEROL refilled. 6MWT and PFT  in 6 months.

## 2017-10-12 NOTE — PROGRESS NOTES
Subjective:      Patient ID: Merle Caro is a 62 y.o. female.    Patient Active Problem List   Diagnosis    LUCILA on CPAP: Not compliant with CPAP    Hyperlipidemia    Affective bipolar disorder    Asthma with COPD    Depression    Obesity, Class II, BMI 35-39.9, with comorbidity    Hypertension    Slow transit constipation    Cigarette smoker    Vitamin D insufficiency    Chronic hyponatremia    Chronic respiratory failure with hypoxia     Problem list has been reviewed.    Chief Complaint: Asthma with COPD and cigarette smoker    HPI  She is here today for follow up for Asthma COPD. She reports that she is at her baseline. She is at her respiratory baseline . She denies fever, chill or rigors. She reports an intermittent productive cough. She reports earle she has not smoked in 24 hours.     Immunization status reviewed and up to date.    Asthma Control Test  In the past 4  weeks, how much of the time did your asthma keep you from getting as much done at work, school or at home?: None of the time  During the past 4 weeks, how often have you had shortness of breath?: Not at all  During the past 4 weeks, how often did your asthma symptoms (wheezing, couging, shortness of breath, chest tightness or pain) wake you up at night or earlier than usual in the morning?: Not at all  During the past 4 weeks, how often have you used your rescue inhaler or nebulizer medication (such as albuterol)?: 3 or more times per day  How would you rate your asthma control during the past 4 weeks?: Somewhat controlled  If your score is 19 or less, your asthma may not be under control: 19       Her current respiratory therapy regimen is TUDORZA, ADVAIR, ACCUNEB which provides  relief. She is not adherent with her regimen.    She is on oxygen 2 L /m in with activity.     She has LUCILA and is on Auto CPAP. She has not used her CPAP for 1 year.      Previous Report Reviewed: office notes     The following portions of the patient's  history were reviewed and updated as appropriate: She  has a past medical history of Asthma; COPD (chronic obstructive pulmonary disease); Depression; and Tobacco abuse.  She  has a past surgical history that includes Hysterectomy; Knee surgery; Tonsillectomy; multiple fracture repairs; and Colonoscopy w/ polypectomy (01/05/2011).  Her family history includes COPD in her mother; Cancer in her mother; Hypertension in her mother; Lung cancer in her mother; Ovarian cancer in her sister; Stroke in her mother.  She  reports that she has been smoking Cigarettes.  She has a 33.00 pack-year smoking history. She has never used smokeless tobacco. She reports that she does not drink alcohol or use drugs.  She has a current medication list which includes the following prescription(s): aclidinium bromide, albuterol, clonazepam, ergocalciferol, fluticasone-salmeterol 500-50 mcg/dose, haloperidol, ipratropium, nicotine, nicotine (polacrilex), oxygen-air delivery systems, and paroxetine.  She is allergic to codeine; keflex [cephalexin]; shellfish containing products; and sucralfate..    Review of Systems   Constitutional: Negative for night sweats.   HENT: Positive for postnasal drip, sinus pressure and voice change.    Eyes: Positive for redness.   Respiratory: Positive for sputum production and wheezing. Negative for cough, hemoptysis and dyspnea on extertion.    Cardiovascular: Negative for chest pain, palpitations and leg swelling.   Genitourinary: Negative for difficulty urinating and hematuria.   Endocrine: Negative for cold intolerance and heat intolerance.    Musculoskeletal: Positive for arthralgias. Negative for back pain and gait problem.   Gastrointestinal: Positive for acid reflux. Negative for nausea, vomiting, abdominal pain and abdominal distention.        Diarrhea   Neurological: Positive for headaches. Negative for syncope and light-headedness.   Psychiatric/Behavioral: The patient is nervous/anxious.      "  Objective:     /60   Pulse 100   Resp 18   Ht 5' 2" (1.575 m)   Wt 77.5 kg (170 lb 13.7 oz)   SpO2 (!) 94% Comment: 2 liters  BMI 31.25 kg/m²   Body mass index is 31.25 kg/m².    Physical Exam   Constitutional: She is oriented to person, place, and time. She appears well-developed and well-nourished.   HENT:   Head: Normocephalic and atraumatic.   Neck: Normal range of motion. Neck supple.   Cardiovascular: Normal rate and regular rhythm.  Exam reveals no gallop.    No murmur heard.  Pulmonary/Chest: Effort normal. She has wheezes.   Abdominal: Soft. Bowel sounds are normal.   Musculoskeletal: Normal range of motion. She exhibits no edema.   Neurological: She is alert and oriented to person, place, and time.   Skin: Skin is warm and dry.   Psychiatric: She has a normal mood and affect.       Personal Diagnostic Review    10/12/17: CXR: Heart size is within normal limits.  Interstitial fibrotic changes present in the lungs bilaterally similar to prior.  No confluent infiltrate or pleural effusion.  Mild thoracic spondylosis with stable compression deformity of a few thoracic vertebrae.  Couple of old left rib fractures.       Assessment:     1. Asthma with COPD Stable   2. LUCILA on CPAP Stable   3. Cigarette smoker Stable   4. Chronic respiratory failure with hypoxia Stable     Outpatient Encounter Prescriptions as of 10/12/2017   Medication Sig Dispense Refill    aclidinium bromide (TUDORZA PRESSAIR) 400 mcg/actuation AePB Inhale 1 puff into the lungs 2 (two) times daily. 60 each 11    albuterol 90 mcg/actuation inhaler Inhale 2 puffs into the lungs every 4 (four) hours as needed for Wheezing. 18 g 11    clonazepam (KLONOPIN) 0.5 MG tablet Take 0.5 mg by mouth once daily.       ergocalciferol (ERGOCALCIFEROL) 50,000 unit Cap Take 1 capsule (50,000 Units total) by mouth every 7 days. 12 capsule 0    fluticasone-salmeterol 500-50 mcg/dose (ADVAIR DISKUS) 500-50 mcg/dose DsDv diskus inhaler Inhale 1 " puff into the lungs 2 (two) times daily. Controller 60 each 3    haloperidol (HALDOL) 5 MG tablet Take 5 mg by mouth once daily.      ipratropium (ATROVENT HFA) 17 mcg/actuation inhaler Inhale 2 puffs into the lungs.      nicotine (NICODERM CQ) 14 mg/24 hr Place 1 patch onto the skin once daily. 28 patch 0    nicotine, polacrilex, (NICORETTE) 2 mg Gum Take 1 each (2 mg total) by mouth as needed (use no more than 8 pieces per day). 100 each 1    OXYGEN-AIR DELIVERY SYSTEMS MISC Inhale 2 L into the lungs as needed (O2 via Nasal Canula.).      paroxetine (PAXIL) 40 MG tablet Take 1 tablet by mouth once daily at 6am.  5    [DISCONTINUED] (Magic mouthwash) 1:1:1 Benadryl 12.5mg/5ml liq, aluminum & magnesium hydroxide-simehticone (Maalox), lidocaine viscous 2% Swish and spit 5 mLs every 6 (six) hours as needed. 90 mL 0     No facility-administered encounter medications on file as of 10/12/2017.      Orders Placed This Encounter   Procedures    Complete PFT with bronchodilator     Standing Status:   Future     Standing Expiration Date:   11/22/2018    Stress test, pulmonary     Standing Status:   Future     Standing Expiration Date:   11/22/2018     Plan:          Discussed diagnosis, its evaluation, treatment and usual course. All questions answered.    Asthma with COPD  Asthma ROS: taking medications as instructed, no medication side effects noted, no significant ongoing wheezing or shortness of breath, using bronchodilator MDI less than twice a week.   New concerns: None.   Exam: appears well, vitals normal, no respiratory distress, acyanotic, normal RR, chest clear, no wheezing, crepitations, rhonchi, normal symmetric air entry.   Assessment:  Asthma well controlled.   Plan: ADVAIR, ALBUTEROL. Current treatment plan is effective, no change in therapy, orders as documented in EMR. ADVAIR and ALBUTEROL refilled. 6MWT and PFT  in 6 months.    LUCILA on CPAP: Not compliant with CPAP  She is on CPAP AUTO. She has not  used her CPAP in 1 year.    Complications of untreated sleep apnea discussed with pateint in detail including but not limited to  high blood pressure, heart attack, stroke, obesity,  diabetes and worsening heart failure. Patient voiced understanding.    Cigarette smoker  Assistance with smoking cessation was offered, including:  []  Medications  [x]  Counseling  []  Printed Information on Smoking Cessation  []  Referral to a Smoking Cessation Program    Patient was counseled regarding smoking for 3-10 minutes.    Chronic respiratory failure with hypoxia  Continue oxygen supplementation at 2 L /min with activity and with sleep.      TIME SPENT WITH PATIENT: Time spent: 40 minutes in face to face  discussion concerning diagnosis, prognosis, review of lab and test results, benefits of treatment as well as management of disease, counseling of patient and coordination of care between various health  care providers . Greater than half the time spent was used for coordination of care and counseling of patient.        Return in about 6 months (around 4/12/2018) for Asthma with COPD, LUCILA, Chronic Respiratory Failure.

## 2017-10-24 ENCOUNTER — LAB VISIT (OUTPATIENT)
Dept: LAB | Facility: HOSPITAL | Age: 62
End: 2017-10-24
Attending: INTERNAL MEDICINE
Payer: COMMERCIAL

## 2017-10-24 DIAGNOSIS — E87.1 HYPONATREMIA: ICD-10-CM

## 2017-10-24 LAB
ANION GAP SERPL CALC-SCNC: 9 MMOL/L
BUN SERPL-MCNC: 4 MG/DL
CALCIUM SERPL-MCNC: 9.2 MG/DL
CHLORIDE SERPL-SCNC: 96 MMOL/L
CO2 SERPL-SCNC: 26 MMOL/L
CREAT SERPL-MCNC: 0.6 MG/DL
EST. GFR  (AFRICAN AMERICAN): >60 ML/MIN/1.73 M^2
EST. GFR  (NON AFRICAN AMERICAN): >60 ML/MIN/1.73 M^2
GLUCOSE SERPL-MCNC: 86 MG/DL
POTASSIUM SERPL-SCNC: 4.3 MMOL/L
SODIUM SERPL-SCNC: 131 MMOL/L

## 2017-10-24 PROCEDURE — 80048 BASIC METABOLIC PNL TOTAL CA: CPT

## 2017-10-24 PROCEDURE — 36415 COLL VENOUS BLD VENIPUNCTURE: CPT | Mod: PO

## 2017-10-29 DIAGNOSIS — E55.9 VITAMIN D INSUFFICIENCY: Primary | ICD-10-CM

## 2017-10-30 RX ORDER — ERGOCALCIFEROL 1.25 MG/1
CAPSULE ORAL
Qty: 12 CAPSULE | Refills: 0 | Status: SHIPPED | OUTPATIENT
Start: 2017-10-30 | End: 2017-11-09

## 2017-10-31 ENCOUNTER — OFFICE VISIT (OUTPATIENT)
Dept: NEPHROLOGY | Facility: CLINIC | Age: 62
End: 2017-10-31
Payer: COMMERCIAL

## 2017-10-31 VITALS
WEIGHT: 170.44 LBS | BODY MASS INDEX: 31.36 KG/M2 | DIASTOLIC BLOOD PRESSURE: 72 MMHG | HEIGHT: 62 IN | HEART RATE: 70 BPM | SYSTOLIC BLOOD PRESSURE: 124 MMHG

## 2017-10-31 DIAGNOSIS — E87.1 HYPONATREMIA: Primary | ICD-10-CM

## 2017-10-31 PROCEDURE — 99213 OFFICE O/P EST LOW 20 MIN: CPT | Mod: S$GLB,,, | Performed by: INTERNAL MEDICINE

## 2017-10-31 PROCEDURE — 99999 PR PBB SHADOW E&M-EST. PATIENT-LVL III: CPT | Mod: PBBFAC,,, | Performed by: INTERNAL MEDICINE
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
## 2017-11-08 ENCOUNTER — TELEPHONE (OUTPATIENT)
Dept: PULMONOLOGY | Facility: CLINIC | Age: 62
End: 2017-11-08

## 2017-11-08 NOTE — TELEPHONE ENCOUNTER
----- Message from Vanesa Saba sent at 11/8/2017 10:09 AM CST -----  Contact: Patient  Patient needs to speak to nurse about her test results, please call her back at [phone]. Thank you

## 2017-11-09 DIAGNOSIS — E55.9 VITAMIN D INSUFFICIENCY: ICD-10-CM

## 2017-11-09 RX ORDER — ERGOCALCIFEROL 1.25 MG/1
50000 CAPSULE ORAL
Qty: 12 CAPSULE | Refills: 0 | Status: SHIPPED | OUTPATIENT
Start: 2017-11-09 | End: 2018-01-18

## 2017-11-15 ENCOUNTER — TELEPHONE (OUTPATIENT)
Dept: PULMONOLOGY | Facility: CLINIC | Age: 62
End: 2017-11-15

## 2017-11-15 NOTE — TELEPHONE ENCOUNTER
----- Message from Brittani Aranda sent at 11/15/2017  7:15 AM CST -----  Contact: pt   Call pt regarding the testes that she had done.     [phone]

## 2017-11-16 ENCOUNTER — TELEPHONE (OUTPATIENT)
Dept: PULMONOLOGY | Facility: CLINIC | Age: 62
End: 2017-11-16

## 2017-11-30 ENCOUNTER — NURSE TRIAGE (OUTPATIENT)
Dept: ADMINISTRATIVE | Facility: CLINIC | Age: 62
End: 2017-11-30

## 2017-12-01 NOTE — TELEPHONE ENCOUNTER
"    Reason for Disposition   Difficulty breathing    Answer Assessment - Initial Assessment Questions  1. LOCATION: "Where does it hurt?"        Right side of chest, under breast  2. RADIATION: "Does the pain go anywhere else?" (e.g., into neck, jaw, arms, back)      back  3. ONSET: "When did the chest pain begin?" (Minutes, hours or days)       30 minutes ago  4. PATTERN "Does the pain come and go, or has it been constant since it started?"  "Does it get worse with exertion?"       Stop and go  5. DURATION: "How long does it last" (e.g., seconds, minutes, hours)      <5 minutes  6. SEVERITY: "How bad is the pain?"  (e.g., Scale 1-10; mild, moderate, or severe)     - MILD (1-3): doesn't interfere with normal activities      - MODERATE (4-7): interferes with normal activities or awakens from sleep     - SEVERE (8-10): excruciating pain, unable to do any normal activities        No pain right now  7. CARDIAC RISK FACTORS: "Do you have any history of heart problems or risk factors for heart disease?" (e.g., prior heart attack, angina; high blood pressure, diabetes, being overweight, high cholesterol, smoking, or strong family history of heart disease)      Obesity, smoking  8. PULMONARY RISK FACTORS: "Do you have any history of lung disease?"  (e.g., blood clots in lung, asthma, emphysema, birth control pills)      copd  9. CAUSE: "What do you think is causing the chest pain?"      unsure  10. OTHER SYMPTOMS: "Do you have any other symptoms?" (e.g., dizziness, nausea, vomiting, sweating, fever, difficulty breathing, cough)        "harder to get air in"  11. PREGNANCY: "Is there any chance you are pregnant?" "When was your last menstrual period?"        n/a    Protocols used: ST CHEST PAIN-A-      "

## 2017-12-13 ENCOUNTER — NURSE TRIAGE (OUTPATIENT)
Dept: ADMINISTRATIVE | Facility: CLINIC | Age: 62
End: 2017-12-13

## 2017-12-14 NOTE — TELEPHONE ENCOUNTER
Reason for Disposition   [1] Chest pain lasting <= 5 minutes AND [2] NO chest pain or cardiac symptoms now(Exceptions: pains lasting a few seconds)    Protocols used:  CHEST PAIN-A-JAVIER    Merle reporting sharp chest pain that started a few minutes before calling and lasted less than five minutes. She states the pain is to the right side of her chest and resolved once she belched. She denies any other symptoms as documented. Advised per protocol to make an appt to see her pcp within 24 hours. Advised if any new or worsening symptoms to call back. Please contact caller with any further care advice.   1115: Merle called back aain to report that she had a pain again on the right side of her chest that lasted a few seconds and stopped. She denies pain at present. She states that the pain was closer to her right shoulder but she is not having any shoulder pain. She again denies any other symptoms. Advised if this happens again tonight she should go to ED for evaluation. Otherwise, she should make appt to see pcp in the AM. She agrees to plan.

## 2018-01-02 ENCOUNTER — PATIENT MESSAGE (OUTPATIENT)
Dept: NEPHROLOGY | Facility: CLINIC | Age: 63
End: 2018-01-02

## 2018-01-11 ENCOUNTER — TELEPHONE (OUTPATIENT)
Dept: INTERNAL MEDICINE | Facility: CLINIC | Age: 63
End: 2018-01-11

## 2018-01-11 NOTE — TELEPHONE ENCOUNTER
----- Message from Vi Stahl sent at 1/11/2018  8:08 AM CST -----  Contact: Patient  Patient called to speak with the nurse; she wants to know if she should come in because she has red splotches on her body.    She can be contacted at [phone].    Thanks,  Vi

## 2018-01-11 NOTE — TELEPHONE ENCOUNTER
Patient called regarding a blue spot on leg similar to bruise but she thinks a vein and left arm has a rash (small red bumps) with no itching or burning.  Offered appointment with someone in clinic, patient refused says she don't have a ride today. Wants to know should she do. Please advise.

## 2018-01-15 ENCOUNTER — OFFICE VISIT (OUTPATIENT)
Dept: INTERNAL MEDICINE | Facility: CLINIC | Age: 63
End: 2018-01-15
Payer: COMMERCIAL

## 2018-01-15 ENCOUNTER — LAB VISIT (OUTPATIENT)
Dept: LAB | Facility: HOSPITAL | Age: 63
End: 2018-01-15
Attending: FAMILY MEDICINE
Payer: COMMERCIAL

## 2018-01-15 VITALS
HEART RATE: 93 BPM | WEIGHT: 159.81 LBS | BODY MASS INDEX: 29.41 KG/M2 | OXYGEN SATURATION: 98 % | DIASTOLIC BLOOD PRESSURE: 82 MMHG | TEMPERATURE: 98 F | SYSTOLIC BLOOD PRESSURE: 126 MMHG | HEIGHT: 62 IN

## 2018-01-15 DIAGNOSIS — I10 ESSENTIAL HYPERTENSION: ICD-10-CM

## 2018-01-15 DIAGNOSIS — E78.00 PURE HYPERCHOLESTEROLEMIA: ICD-10-CM

## 2018-01-15 DIAGNOSIS — E55.9 VITAMIN D INSUFFICIENCY: Primary | Chronic | ICD-10-CM

## 2018-01-15 DIAGNOSIS — E55.9 VITAMIN D INSUFFICIENCY: Chronic | ICD-10-CM

## 2018-01-15 DIAGNOSIS — Z12.31 ENCOUNTER FOR SCREENING MAMMOGRAM FOR BREAST CANCER: ICD-10-CM

## 2018-01-15 DIAGNOSIS — Z23 NEED FOR INFLUENZA VACCINATION: ICD-10-CM

## 2018-01-15 PROCEDURE — 90686 IIV4 VACC NO PRSV 0.5 ML IM: CPT | Mod: S$GLB,,, | Performed by: FAMILY MEDICINE

## 2018-01-15 PROCEDURE — 99214 OFFICE O/P EST MOD 30 MIN: CPT | Mod: 25,S$GLB,, | Performed by: FAMILY MEDICINE

## 2018-01-15 PROCEDURE — 90471 IMMUNIZATION ADMIN: CPT | Mod: S$GLB,,, | Performed by: FAMILY MEDICINE

## 2018-01-15 PROCEDURE — 82306 VITAMIN D 25 HYDROXY: CPT

## 2018-01-15 PROCEDURE — 36415 COLL VENOUS BLD VENIPUNCTURE: CPT | Mod: PO

## 2018-01-15 PROCEDURE — 99999 PR PBB SHADOW E&M-EST. PATIENT-LVL III: CPT | Mod: PBBFAC,,, | Performed by: FAMILY MEDICINE

## 2018-01-15 NOTE — PROGRESS NOTES
"Subjective:   Patient ID: Merle Caro is a 62 y.o. female.  Chief Complaint:  Annual Exam      Patient presents follow-up on vitamin D insufficiency.  Reports somewhat compliant with weekly high-dose supplement.  Forgets to take medication.  Needs level.  Hypertension well controlled.  Hyperlipidemia.  June 2017 stable lipid panel and LFTs.  Needs flu vaccine.  Needs shingles vaccine. Needs mammogram.  Sees Dr. Weiner for psychiatry.  Reports stable.  Unfortunately has started smoking again.  Breathing stable.  No symptoms.  Controlled by present inhalers.  Breathing  No new complaints concerns today.      Review of Systems   Constitutional: Negative for activity change, appetite change, chills, fatigue and fever.   Eyes: Negative for visual disturbance.   Respiratory: Negative for cough, chest tightness, shortness of breath and wheezing.    Cardiovascular: Negative for chest pain, palpitations and leg swelling.   Gastrointestinal: Negative for abdominal distention, abdominal pain, constipation, diarrhea, nausea and vomiting.   Genitourinary: Negative for difficulty urinating and pelvic pain.   Musculoskeletal: Negative for back pain, myalgias and neck pain.   Skin: Negative for rash.   Neurological: Negative for dizziness, tremors, syncope, weakness, light-headedness, numbness and headaches.   Psychiatric/Behavioral: Negative for agitation, behavioral problems, confusion, decreased concentration, dysphoric mood, hallucinations, self-injury, sleep disturbance and suicidal ideas. The patient is not nervous/anxious and is not hyperactive.      Objective:   /82   Pulse 93   Temp 97.5 °F (36.4 °C) (Tympanic)   Ht 5' 2" (1.575 m)   Wt 72.5 kg (159 lb 13.3 oz)   SpO2 98%   BMI 29.23 kg/m²     Physical Exam   Constitutional: She is oriented to person, place, and time. Vital signs are normal. She appears well-developed and well-nourished. She is cooperative. No distress.   Eyes: No scleral icterus.   Neck: No " JVD present. Carotid bruit is not present. No thyromegaly present.   Cardiovascular: Normal rate, regular rhythm and normal heart sounds.  Exam reveals no gallop and no friction rub.    No murmur heard.  Pulmonary/Chest: Effort normal and breath sounds normal. She has no wheezes. She has no rhonchi. She has no rales.   Abdominal: Soft. She exhibits no distension. There is no hepatosplenomegaly. There is no tenderness. There is no rebound, no guarding and no CVA tenderness.   Musculoskeletal: She exhibits no edema.   Neurological: She is alert and oriented to person, place, and time. She displays a negative Romberg sign. Coordination and gait normal.   Skin: Skin is warm and dry. No rash noted. No erythema.   Psychiatric: She has a normal mood and affect. Her speech is normal and behavior is normal. Judgment and thought content normal. Her mood appears not anxious. Her affect is not angry, not blunt, not labile and not inappropriate. She is not agitated, not aggressive, not hyperactive, not slowed, not withdrawn, not actively hallucinating and not combative. Thought content is not paranoid and not delusional. Cognition and memory are normal. She does not exhibit a depressed mood. She expresses no homicidal and no suicidal ideation. She is attentive.   Nursing note and vitals reviewed.    Assessment:     1. Vitamin D insufficiency    2. Essential hypertension    3. Pure hypercholesterolemia    4. Need for influenza vaccination    5. Encounter for screening mammogram for breast cancer      Plan:   Vitamin D insufficiency  -     Vitamin D; Future; Expected date: 01/15/2018  Check level.  Continue weekly high-dose or daily low dose supplement as indicated.    Essential hypertension  Controlled.  BP at goal.  No medication needed.    Pure hypercholesterolemia  Stable.  LDL at goal.  Diet controlled.    RHM  -     Influenza - Quadrivalent (3 years & older) (PF)  -     Mammo Digital Screening Bilat with CAD; Future;  Expected date: 01/15/2018  Defer shingles vaccine until new formulation available.    Follow-up pulmonary as scheduled.  Follow-up psychiatry as scheduled.  Return to clinic 6 months or sooner as needed.    Addendum:  Vitamin D level remains low.  Not compliant/forgetful on weekly high-dose regimen  Change to daily vitamin D 2000 international units regimen with repeat level 3 months.

## 2018-01-16 ENCOUNTER — TELEPHONE (OUTPATIENT)
Dept: INTERNAL MEDICINE | Facility: CLINIC | Age: 63
End: 2018-01-16

## 2018-01-16 LAB — 25(OH)D3+25(OH)D2 SERPL-MCNC: 18 NG/ML

## 2018-01-16 NOTE — TELEPHONE ENCOUNTER
----- Message from Milagro Alvarez sent at 1/16/2018 12:47 PM CST -----  Contact: self   Patient would like to consult wit nurse regarding results. Please call back at [phone].      Thanks,  Milagro Alvarez

## 2018-01-17 ENCOUNTER — TELEPHONE (OUTPATIENT)
Dept: INTERNAL MEDICINE | Facility: CLINIC | Age: 63
End: 2018-01-17

## 2018-01-17 NOTE — TELEPHONE ENCOUNTER
----- Message from Marck Major MD sent at 1/17/2018  3:34 PM CST -----  Level remains low. Continue weekly high dose supplement  If she forgets to take, would she prefer me to prescribe a daily pill?

## 2018-01-18 DIAGNOSIS — E55.9 VITAMIN D INSUFFICIENCY: Primary | Chronic | ICD-10-CM

## 2018-01-18 RX ORDER — PAROXETINE 30 MG/1
1 TABLET, FILM COATED ORAL DAILY
Refills: 2 | COMMUNITY
Start: 2018-01-14 | End: 2019-04-15

## 2018-01-18 RX ORDER — ACETAMINOPHEN 500 MG
1 TABLET ORAL DAILY
Qty: 90 CAPSULE | Refills: 1 | Status: SHIPPED | OUTPATIENT
Start: 2018-01-18 | End: 2018-10-17

## 2018-02-01 ENCOUNTER — TELEPHONE (OUTPATIENT)
Dept: INTERNAL MEDICINE | Facility: CLINIC | Age: 63
End: 2018-02-01

## 2018-02-01 ENCOUNTER — TELEPHONE (OUTPATIENT)
Dept: NEPHROLOGY | Facility: CLINIC | Age: 63
End: 2018-02-01

## 2018-02-01 DIAGNOSIS — E87.1 HYPONATREMIA: Primary | ICD-10-CM

## 2018-02-01 NOTE — TELEPHONE ENCOUNTER
----- Message from Patt Pendleton sent at 2/1/2018  3:29 PM CST -----  Contact: pt  Calling about lab work and please advise [phone]

## 2018-02-01 NOTE — TELEPHONE ENCOUNTER
----- Message from Kely Lawson sent at 2/1/2018  4:12 PM CST -----  Contact: Pt  Please give pt a call at [phone] regarding some complications she's having.

## 2018-02-01 NOTE — TELEPHONE ENCOUNTER
Patient called about vitamin script was not sent to pharmacy.  Patient informed script will be called in to [facility].

## 2018-02-02 ENCOUNTER — OFFICE VISIT (OUTPATIENT)
Dept: NEPHROLOGY | Facility: CLINIC | Age: 63
End: 2018-02-02
Payer: COMMERCIAL

## 2018-02-02 ENCOUNTER — HOSPITAL ENCOUNTER (OUTPATIENT)
Dept: RADIOLOGY | Facility: HOSPITAL | Age: 63
Discharge: HOME | End: 2018-02-02
Attending: FAMILY MEDICINE
Payer: COMMERCIAL

## 2018-02-02 VITALS
HEIGHT: 62 IN | DIASTOLIC BLOOD PRESSURE: 80 MMHG | WEIGHT: 163.56 LBS | SYSTOLIC BLOOD PRESSURE: 122 MMHG | BODY MASS INDEX: 30.1 KG/M2 | HEART RATE: 76 BPM

## 2018-02-02 VITALS — WEIGHT: 159 LBS | HEIGHT: 62 IN | BODY MASS INDEX: 29.26 KG/M2

## 2018-02-02 DIAGNOSIS — Z12.31 ENCOUNTER FOR SCREENING MAMMOGRAM FOR BREAST CANCER: ICD-10-CM

## 2018-02-02 DIAGNOSIS — E87.1 HYPONATREMIA: Primary | ICD-10-CM

## 2018-02-02 PROCEDURE — 77067 SCR MAMMO BI INCL CAD: CPT | Mod: 26,,, | Performed by: RADIOLOGY

## 2018-02-02 PROCEDURE — 77067 SCR MAMMO BI INCL CAD: CPT | Mod: TC,PO

## 2018-02-02 PROCEDURE — 99213 OFFICE O/P EST LOW 20 MIN: CPT | Mod: S$GLB,,, | Performed by: INTERNAL MEDICINE

## 2018-02-02 PROCEDURE — 3008F BODY MASS INDEX DOCD: CPT | Mod: S$GLB,,, | Performed by: INTERNAL MEDICINE

## 2018-02-02 PROCEDURE — 99999 PR PBB SHADOW E&M-EST. PATIENT-LVL III: CPT | Mod: PBBFAC,,, | Performed by: INTERNAL MEDICINE
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
## 2018-02-07 ENCOUNTER — TELEPHONE (OUTPATIENT)
Dept: INTERNAL MEDICINE | Facility: CLINIC | Age: 63
End: 2018-02-07

## 2018-02-07 NOTE — TELEPHONE ENCOUNTER
----- Message from Gunjan Jesus sent at 2/7/2018  7:51 AM CST -----  Please call pt back at [phone]. Pt would like mammo test results.

## 2018-03-15 ENCOUNTER — TELEPHONE (OUTPATIENT)
Dept: SMOKING CESSATION | Facility: CLINIC | Age: 63
End: 2018-03-15

## 2018-03-19 ENCOUNTER — TELEPHONE (OUTPATIENT)
Dept: SMOKING CESSATION | Facility: CLINIC | Age: 63
End: 2018-03-19

## 2018-04-03 ENCOUNTER — TELEPHONE (OUTPATIENT)
Dept: SMOKING CESSATION | Facility: CLINIC | Age: 63
End: 2018-04-03

## 2018-04-03 NOTE — TELEPHONE ENCOUNTER
3rd attempt; telephone follow up regarding smoking cessation quit episode #1, #2, #3,and #4. Will resolve quit #4.

## 2018-04-06 ENCOUNTER — TELEPHONE (OUTPATIENT)
Dept: PULMONOLOGY | Facility: CLINIC | Age: 63
End: 2018-04-06

## 2018-04-06 NOTE — TELEPHONE ENCOUNTER
----- Message from No Yanez sent at 4/6/2018  3:59 PM CDT -----  Patient returning the nurse call. Please adv/call [phone].//thanks. cw

## 2018-04-13 ENCOUNTER — PROCEDURE VISIT (OUTPATIENT)
Dept: PULMONOLOGY | Facility: CLINIC | Age: 63
End: 2018-04-13
Payer: COMMERCIAL

## 2018-04-13 VITALS — BODY MASS INDEX: 30.97 KG/M2 | HEIGHT: 62 IN | WEIGHT: 168.31 LBS

## 2018-04-13 DIAGNOSIS — J96.11 CHRONIC RESPIRATORY FAILURE WITH HYPOXIA: Chronic | ICD-10-CM

## 2018-04-13 DIAGNOSIS — J44.89 ASTHMA WITH COPD: Chronic | ICD-10-CM

## 2018-04-13 PROCEDURE — 94618 PULMONARY STRESS TESTING: CPT | Mod: S$GLB,,, | Performed by: INTERNAL MEDICINE

## 2018-04-13 PROCEDURE — 94726 PLETHYSMOGRAPHY LUNG VOLUMES: CPT | Mod: S$GLB,,, | Performed by: INTERNAL MEDICINE

## 2018-04-13 PROCEDURE — 94060 EVALUATION OF WHEEZING: CPT | Mod: 59,S$GLB,, | Performed by: INTERNAL MEDICINE

## 2018-04-13 PROCEDURE — 94729 DIFFUSING CAPACITY: CPT | Mod: S$GLB,,, | Performed by: INTERNAL MEDICINE

## 2018-04-13 NOTE — PROCEDURES
"[facility]  Six Minute Walk     SUMMARY     Ordering Provider: Dr. Tracey   Interpreting Provider: Dr. Tracey  Performing nurse/tech/RT: MEGAN Bernal RRT  Diagnosis:  (Chronic Respiratory Failure with Hypoxia)  Height: 5' 2" (157.5 cm)  Weight: 76.4 kg (168 lb 5.1 oz)  BMI (Calculated): 30.9   Patient Race:             Phase Oxygen Assessment Supplemental O2 Heart   Rate Blood Pressure Cheryl Dyspnea Scale Rating   Resting 97 % Room Air 83 bpm 165/84 3   Exercise        Minute        1 95 % Room Air 110 bpm     2 95 % Room Air 118 bpm     3 95 % Room Air 119 bpm     4 95 % Room Air 118 bpm     5 96 % Room Air 121 bpm     6  95 % Room Air 121 bpm 132/80 7-8   Recovery        Minute        1 96 % Room Air 107 bpm     2 96 % Room Air 93 bpm     3 96 % Room Air 90 bpm     4 97 % Room Air 85 bpm 124/77 3     Six Minute Walk Summary  6MWT Status: completed without stopping  Patient Reported: Dyspnea     Interpretation:  Did the patient stop or pause?: No              Total Time Walked (Calculated): 360 seconds  Final Partial Lap Distance (feet): 38 feet  Total Distance Meters (Calculated): 316.38 meters  Predicted Distance Meters (Calculated): 477.68 meters  Percentage of Predicted (Calculated): 66.23  Peak VO2 (Calculated): 13.47  Mets: 3.85  Has The Patient Had a Previous Six Minute Walk Test?: Yes       Previous 6MWT Results  Has The Patient Had a Previous Six Minute Walk Test?: Yes  Date of Previous Test: 11/21/16  Total Time Walked: 207.6 seconds  Total Distance (meters): 304.8  Predicted Distance (meters): 451.28 meters  Percentage of Predicted: 67.54  Percent Change (Calculated): -0.04          PHYSICIAN INTERPRETATION:      Six minute walk distance is 316.38 / 477.68 meters  (66.23 % predicted) with very heavy dyspnea. Peak VO2 during walking was 13.47  Ml/min/kg [ 3.85 METS]. During exercise, there was no significant desaturation while breathing room air. Blood pressure increased significantly " and Heart rate increased significantly with walking.Hypertension was present prior to exercise. This may represent an abnormal cardiovascular response to exercise. The patient did not report non-pulmonary symptoms during exercise. Significant exercise impairment is likely due to cardiovascular causes. The patient did complete the study, walking 360 seconds of the 360 second test. Since the previous study in 11/21/16, exercise capacity is unchanged. Based upon age and body mass index, exercise capacity is less than predicted.

## 2018-04-14 LAB
POST FEF 25 75: 1.96 L/S (ref 1.65–2.8)
POST FET 100: 7.9 S
POST FEV1 FVC: 80 %
POST FEV1: 2.04 L (ref 2.08–2.62)
POST FIF 50: 3.05 L/S
POST FVC: 2.56 L (ref 2.72–3.37)
POST PEF: 5.29 L/S (ref 5.16–6.76)
PRE DLCO: 14.96 ML/MMHG/MIN (ref 16.77–25.06)
PRE ERV: 1.14 L
PRE FEF 25 75: 1.55 L/S (ref 1.65–2.8)
PRE FET 100: 8.58 S
PRE FEV1 FVC: 76 %
PRE FEV1: 1.86 L (ref 2.08–2.62)
PRE FIF 50: 1.72 L/S
PRE FRC PL: 3.83 L (ref 1.86–2.81)
PRE FVC: 2.45 L (ref 2.72–3.37)
PRE KROGHS K: 4.28 1/MIN
PRE PEF: 4.94 L/S (ref 5.16–6.76)
PRE RV: 2.59 L (ref 1.33–2.03)
PRE SVC: 2.45 L
PRE TLC: 5.04 L (ref 4.11–4.88)
PREDICTED DLCO: 20.92 ML/MMHG/MIN (ref 16.77–25.06)
PREDICTED FEV1 FVC: 78.06 % (ref 73.16–82.96)
PREDICTED FEV1: 2.35 L (ref 2.08–2.62)
PREDICTED FRC N2: 2.34 L (ref 1.86–2.81)
PREDICTED FRC PL: 2.34 L (ref 1.86–2.81)
PREDICTED FVC: 3.04 L (ref 2.72–3.37)
PREDICTED RV: 1.68 L (ref 1.33–2.03)
PREDICTED SVC: 2.76 L
PREDICTED TLC: 4.49 L (ref 4.11–4.88)
PROVOCATION PROTOCOL: ABNORMAL

## 2018-04-16 ENCOUNTER — OFFICE VISIT (OUTPATIENT)
Dept: PULMONOLOGY | Facility: CLINIC | Age: 63
End: 2018-04-16
Payer: COMMERCIAL

## 2018-04-16 VITALS
DIASTOLIC BLOOD PRESSURE: 70 MMHG | RESPIRATION RATE: 18 BRPM | WEIGHT: 164.38 LBS | HEART RATE: 85 BPM | SYSTOLIC BLOOD PRESSURE: 120 MMHG | BODY MASS INDEX: 30.25 KG/M2 | OXYGEN SATURATION: 97 % | HEIGHT: 62 IN

## 2018-04-16 DIAGNOSIS — J96.11 CHRONIC RESPIRATORY FAILURE WITH HYPOXIA: Chronic | ICD-10-CM

## 2018-04-16 DIAGNOSIS — G47.33 OSA ON CPAP: Chronic | ICD-10-CM

## 2018-04-16 DIAGNOSIS — J44.89 ASTHMA WITH COPD: Primary | Chronic | ICD-10-CM

## 2018-04-16 DIAGNOSIS — F17.210 CIGARETTE SMOKER: Chronic | ICD-10-CM

## 2018-04-16 PROCEDURE — 3078F DIAST BP <80 MM HG: CPT | Mod: CPTII,S$GLB,, | Performed by: INTERNAL MEDICINE

## 2018-04-16 PROCEDURE — 99215 OFFICE O/P EST HI 40 MIN: CPT | Mod: S$GLB,,, | Performed by: INTERNAL MEDICINE

## 2018-04-16 PROCEDURE — 3074F SYST BP LT 130 MM HG: CPT | Mod: CPTII,S$GLB,, | Performed by: INTERNAL MEDICINE

## 2018-04-16 PROCEDURE — 99999 PR PBB SHADOW E&M-EST. PATIENT-LVL III: CPT | Mod: PBBFAC,,, | Performed by: INTERNAL MEDICINE

## 2018-04-16 RX ORDER — ALBUTEROL SULFATE 90 UG/1
2 AEROSOL, METERED RESPIRATORY (INHALATION) EVERY 4 HOURS PRN
Qty: 18 G | Refills: 11 | Status: SHIPPED | OUTPATIENT
Start: 2018-04-16 | End: 2019-04-15

## 2018-04-16 NOTE — PROGRESS NOTES
Subjective:      Patient ID: Merle Caro is a 62 y.o. female.    Patient Active Problem List   Diagnosis    LUCILA on CPAP: Not compliant with CPAP    Pure hypercholesterolemia    Affective bipolar disorder    Asthma with COPD    Depression    Obesity, Class II, BMI 35-39.9, with comorbidity    Essential hypertension    Slow transit constipation    Cigarette smoker    Vitamin D insufficiency    Chronic hyponatremia    Chronic respiratory failure with hypoxia     Problem list has been reviewed.    Chief Complaint: Asthma with copd and cigarette smoker    HPI    Six minute walk test, PFT reviewd with patient who voiced understanding. She reports that she is at her baseline. She is at her respiratory baseline . She denies fever, chill or rigors. She reports an intermittent productive cough.     She reports that she has been cutting back on smoking.      Immunization status reviewed and up to date.    COPD QUESTIONNAIRE  How often do you cough?: A little of the time  How often do you have phlegm (mucus) in your chest?: A little of the time  How often does your chest feel tight?: A little of the time  When you walk up a hill or one flight of stairs, how often are you breathless?: All of the time  How often are you limited doing any activities at home?: A little of the time  How often are you confident leaving the house despite your lung condition?: Some of the time  How often do you sleep soundly?: All of the time  How often do you have energy?: Some of the time  Total score: 17        Her current respiratory therapy regimen is PROVENTIL, TUDORZA, ADVAIR, ACCUNEB which provides  relief. She is not adherent with her regimen.    She is on oxygen 2 L /m in with activity.     She has LUCILA and is on Auto CPAP. She has not used her CPAP for 1 year.      Previous Report Reviewed: office notes     The following portions of the patient's history were reviewed and updated as appropriate: She  has a past medical history  "of Asthma; COPD (chronic obstructive pulmonary disease); Depression; and Tobacco abuse.  She  has a past surgical history that includes Hysterectomy; Knee surgery; Tonsillectomy; multiple fracture repairs; and Colonoscopy w/ polypectomy (01/05/2011).  Her family history includes COPD in her mother; Cancer in her mother; Hypertension in her mother; Lung cancer in her mother; Ovarian cancer in her sister; Stroke in her mother.  She  reports that she has been smoking Cigarettes.  She has a 33.00 pack-year smoking history. She has never used smokeless tobacco. She reports that she does not drink alcohol or use drugs.  She has a current medication list which includes the following prescription(s): aclidinium bromide, albuterol, cholecalciferol (vitamin d3), clonazepam, fluticasone-salmeterol 500-50 mcg/dose, haloperidol, ipratropium, nicotine, nicotine (polacrilex), oxygen-air delivery systems, and paroxetine.  She is allergic to codeine; keflex [cephalexin]; shellfish containing products; and sucralfate..    Review of Systems   Constitutional: Negative for night sweats.   HENT: Positive for postnasal drip, sinus pressure and voice change.    Eyes: Positive for redness.   Respiratory: Positive for sputum production and wheezing. Negative for cough, hemoptysis and dyspnea on extertion.    Cardiovascular: Negative for chest pain, palpitations and leg swelling.   Genitourinary: Negative for difficulty urinating and hematuria.   Endocrine: Negative for cold intolerance and heat intolerance.    Musculoskeletal: Positive for arthralgias. Negative for back pain and gait problem.   Gastrointestinal: Positive for acid reflux. Negative for nausea, vomiting, abdominal pain and abdominal distention.        Diarrhea   Neurological: Positive for headaches. Negative for syncope and light-headedness.   Psychiatric/Behavioral: The patient is nervous/anxious.       Objective:     /70   Pulse 85   Resp 18   Ht 5' 2" (1.575 m)   " Wt 74.6 kg (164 lb 5.7 oz)   SpO2 97% Comment: 2 liters  BMI 30.06 kg/m²   Body mass index is 30.06 kg/m².    Physical Exam   Constitutional: She is oriented to person, place, and time. She appears well-developed and well-nourished.   HENT:   Head: Normocephalic and atraumatic.   Neck: Normal range of motion. Neck supple.   Cardiovascular: Normal rate and regular rhythm.  Exam reveals no gallop.    No murmur heard.  Pulmonary/Chest: Effort normal. She has wheezes.   Abdominal: Soft. Bowel sounds are normal.   Musculoskeletal: Normal range of motion. She exhibits no edema.   Neurological: She is alert and oriented to person, place, and time.   Skin: Skin is warm and dry.   Psychiatric: She has a normal mood and affect.       Personal Diagnostic Review      Diagnostic Review    Pulmonary function tests: FEV1: 1.86  (79 % predicted), FVC:  2.45 (80 % predicted), FEV1/FVC:  76, T.04 (112 % predicted), RV/TLVC: 51 (137 % predicted), DLCO: 15.0 (72 % predicted)   Normal airflow. Lung volumes reveal air trapping but no hyperinflation. Diffusion capacity is mildly reduced      Six minute walk test: Six minute walk distance is 316.38 / 477.68 meters  (66.23 % predicted) with very heavy dyspnea. Peak VO2 during walking was 13.47  Ml/min/kg [ 3.85 METS]. During exercise, there was no significant desaturation while breathing room air. Blood pressure increased significantly and Heart rate increased significantly with walking.Hypertension was present prior to exercise. This may represent an abnormal cardiovascular response to exercise. The patient did not report non-pulmonary symptoms during exercise. Significant exercise impairment is likely due to cardiovascular causes. The patient did complete the study, walking 360 seconds of the 360 second test. Since the previous study in 16, exercise capacity is unchanged. Based upon age and body mass index, exercise capacity is less than predicted.      Assessment / plan:             Discussed diagnosis, its evaluation, treatment and usual course. All questions answered.      Problem List Items Addressed This Visit        Pulmonary    Asthma with COPD - Primary    Current Assessment & Plan     Asthma ROS: taking medications as instructed, no medication side effects noted, no significant ongoing wheezing or shortness of breath, using bronchodilator MDI less than twice a week.   New concerns: None.   Exam: appears well, vitals normal, no respiratory distress, acyanotic, normal RR, chest clear, no wheezing, crepitations, rhonchi, normal symmetric air entry.   Assessment:  Asthma well controlled.   Plan: ADVAIR, ALBUTEROL. Current treatment plan is effective, no change in therapy, orders as documented in EMR. ADVAIR and ALBUTEROL refilled. CXR in 3 months.         Relevant Medications    albuterol 90 mcg/actuation inhaler    Other Relevant Orders    Ambulatory Referral to Pulmonary Disease Management    Chronic respiratory failure with hypoxia    Current Assessment & Plan     Discontinue Oxygen.             Other    LUCILA on CPAP: Not compliant with CPAP (Chronic)    Current Assessment & Plan     She is on CPAP AUTO. She has not used her CPAP in 1 year.    Complications of untreated sleep apnea discussed with pateint in detail including but not limited to  high blood pressure, heart attack, stroke, obesity,  diabetes and worsening heart failure. Patient voiced understanding.         Cigarette smoker (Chronic)    Current Assessment & Plan     Assistance with smoking cessation was offered, including:  []  Medications  [x]  Counseling  []  Printed Information on Smoking Cessation  []  Referral to a Smoking Cessation Program    Patient was counseled regarding smoking for 3-10 minutes.             TIME SPENT WITH PATIENT: Time spent: 30 minutes in face to face  discussion concerning diagnosis, prognosis, review of lab and test results, benefits of treatment as well as management of disease,  counseling of patient and coordination of care between various health  care providers . Greater than half the time spent was used for coordination of care and counseling of patient.        Follow-up in about 3 months (around 7/16/2018) for LUCILA, Obesity, Asthma with COPD.

## 2018-04-16 NOTE — ASSESSMENT & PLAN NOTE
Asthma ROS: taking medications as instructed, no medication side effects noted, no significant ongoing wheezing or shortness of breath, using bronchodilator MDI less than twice a week.   New concerns: None.   Exam: appears well, vitals normal, no respiratory distress, acyanotic, normal RR, chest clear, no wheezing, crepitations, rhonchi, normal symmetric air entry.   Assessment:  Asthma well controlled.   Plan: ADVAIR, ALBUTEROL. Current treatment plan is effective, no change in therapy, orders as documented in EMR. ADVAIR and ALBUTEROL refilled. CXR in 3 months.

## 2018-04-16 NOTE — PATIENT INSTRUCTIONS
Lung Anatomy  Your lungs take air in to give your body oxygen, which the body needs to work. Your lungs, like all the tissues in your body, are made up of billions of tiny specialized cells. Old lung cells die and are replaced by new, identical lung cells. This natural process helps ensure healthy lungs.    Date Last Reviewed: 11/1/2016  © 0668-5093 [employer]. [address] [place] [address], [place] [address]. All rights reserved. This information is not intended as a substitute for professional medical care. Always follow your healthcare professional's instructions.

## 2018-04-20 ENCOUNTER — TELEPHONE (OUTPATIENT)
Dept: PULMONOLOGY | Facility: CLINIC | Age: 63
End: 2018-04-20

## 2018-04-23 ENCOUNTER — OFFICE VISIT (OUTPATIENT)
Dept: INTERNAL MEDICINE | Facility: CLINIC | Age: 63
End: 2018-04-23
Payer: COMMERCIAL

## 2018-04-23 ENCOUNTER — PROCEDURE VISIT (OUTPATIENT)
Dept: PULMONOLOGY | Facility: CLINIC | Age: 63
End: 2018-04-23
Payer: COMMERCIAL

## 2018-04-23 ENCOUNTER — LAB VISIT (OUTPATIENT)
Dept: LAB | Facility: HOSPITAL | Age: 63
End: 2018-04-23
Attending: FAMILY MEDICINE
Payer: COMMERCIAL

## 2018-04-23 VITALS
HEART RATE: 100 BPM | DIASTOLIC BLOOD PRESSURE: 78 MMHG | WEIGHT: 166 LBS | BODY MASS INDEX: 29.34 KG/M2 | HEART RATE: 85 BPM | TEMPERATURE: 97 F | HEIGHT: 63 IN | HEIGHT: 63 IN | OXYGEN SATURATION: 95 % | OXYGEN SATURATION: 97 % | SYSTOLIC BLOOD PRESSURE: 124 MMHG | WEIGHT: 165.56 LBS | RESPIRATION RATE: 16 BRPM | BODY MASS INDEX: 29.41 KG/M2

## 2018-04-23 DIAGNOSIS — E87.1 CHRONIC HYPONATREMIA: ICD-10-CM

## 2018-04-23 DIAGNOSIS — R07.89 ATYPICAL CHEST PAIN: ICD-10-CM

## 2018-04-23 DIAGNOSIS — I10 ESSENTIAL HYPERTENSION: ICD-10-CM

## 2018-04-23 DIAGNOSIS — E55.9 VITAMIN D INSUFFICIENCY: Chronic | ICD-10-CM

## 2018-04-23 DIAGNOSIS — R07.89 ATYPICAL CHEST PAIN: Primary | ICD-10-CM

## 2018-04-23 DIAGNOSIS — J44.89 ASTHMA WITH COPD: Primary | ICD-10-CM

## 2018-04-23 PROCEDURE — 3074F SYST BP LT 130 MM HG: CPT | Mod: CPTII,S$GLB,, | Performed by: FAMILY MEDICINE

## 2018-04-23 PROCEDURE — 99214 OFFICE O/P EST MOD 30 MIN: CPT | Mod: S$GLB,,, | Performed by: FAMILY MEDICINE

## 2018-04-23 PROCEDURE — 36415 COLL VENOUS BLD VENIPUNCTURE: CPT | Mod: PO

## 2018-04-23 PROCEDURE — 84443 ASSAY THYROID STIM HORMONE: CPT

## 2018-04-23 PROCEDURE — 82306 VITAMIN D 25 HYDROXY: CPT

## 2018-04-23 PROCEDURE — 99999 PR PBB SHADOW E&M-EST. PATIENT-LVL III: CPT | Mod: PBBFAC,,, | Performed by: FAMILY MEDICINE

## 2018-04-23 PROCEDURE — 80048 BASIC METABOLIC PNL TOTAL CA: CPT

## 2018-04-23 PROCEDURE — 85025 COMPLETE CBC W/AUTO DIFF WBC: CPT

## 2018-04-23 PROCEDURE — 3078F DIAST BP <80 MM HG: CPT | Mod: CPTII,S$GLB,, | Performed by: FAMILY MEDICINE

## 2018-04-23 NOTE — PATIENT INSTRUCTIONS
Using a Nebulizer (Adult)    A nebulizer turns medicine into a mist. You breathe the mist in through a mask or a mouthpiece. To use your nebulizer, follow the steps below.  With a mask  · Put the correct dose of medicine in the cup. Attach the top as directed.  · Connect one end of the tubing to the cup and the other end to the nebulizer.  · Attach the mask to the cup.  · Place the mask over your nose and mouth. Make sure it fits securely and comfortably.  · Turn on the nebulizer.  · Take slow, deep breaths, keeping the nebulizer upright, until all the medicine is gone. This takes 10-15 minutes.  Be sure to follow directions you are given for cleaning the nebulizer and the mask.   With a mouthpiece    · Put the correct dose of medicine in the cup. Attach the top as directed.  · Connect one end of the tubing to the cup and the other end to the nebulizer.  · Attach the mouthpiece to the cup.  · Put the mouthpiece between your teeth and close your lips around it. Keep your tongue below the mouthpiece.  · Turn on the nebulizer.  · Take slow, deep breaths through the mouthpiece, keeping the nebulizer upright, until all the medicine is gone. This takes 10-15 minutes.  Be sure to follow directions you are given for cleaning the nebulizer and the mouthpiece.   Date Last Reviewed: 10/1/2016  © 8852-1550 [employer]. [address] [place] [address], [place] [address]. All rights reserved. This information is not intended as a substitute for professional medical care. Always follow your healthcare professional's instructions.        Using Dry-Powder Inhalers (DPIs)  Some asthma medications are inhaled using inhalers. Dry-powder inhalers (DPIs) dispense measured doses of powdered medicine into your lungs. DPIs often have counters to track the number of doses used. Keep in mind that DPIs dont all work the same way. So be sure you know how to use yours properly.  Using a DPI  1. Load the prescribed dose of medicine by  following the instructions that came with the inhaler.  2. Breathe out normally, holding the inhaler away from your mouth. Hold your chin up.  3. Put the mouthpiece between your lips. Breathe in deeply through the inhaler--not through your nose. You may not feel or taste the medicine as you breathe in. This is normal.  4. Take the mouthpiece out of your mouth. Hold your breath for a count of 10, or as long as you can comfortably.  5. Breathe out slowly--but do not breathe out through the inhaler. Moisture from your breath can make the powder stick inside the inhaler.  6. Be sure to close the inhaler and store it in a dry place.  7. Rinse your mouth with water and spit it out, don't swallow it.  8. Do not wash your DPI with soap and water. To clean the mouthpiece, wipe with a dry cloth as needed.    Date Last Reviewed: 10/1/2016  © 9750-1840 [employer] [address] [place] [address], [place] [address]. All rights reserved. This information is not intended as a substitute for professional medical care. Always follow your healthcare professional's instructions.

## 2018-04-23 NOTE — PROGRESS NOTES
Subjective:   Patient ID: Merle Caro is a 62 y.o. female.  Chief Complaint:  Chest Pain    Chest Pain    This is a recurrent problem. The current episode started 1 to 4 weeks ago. The onset quality is gradual. The problem occurs intermittently. The problem has been waxing and waning. The pain is present in the epigastric region and lateral region. The pain is at a severity of 3/10. The pain is mild. The quality of the pain is described as tightness. The pain does not radiate. Associated symptoms include abdominal pain. Pertinent negatives include no back pain, claudication, cough, diaphoresis, dizziness, exertional chest pressure, fever, headaches, hemoptysis, irregular heartbeat, leg pain, lower extremity edema, malaise/fatigue, nausea, near-syncope, numbness, orthopnea, palpitations, PND, shortness of breath, sputum production, syncope, vomiting or weakness. The pain is aggravated by nothing. She has tried nothing for the symptoms. Risk factors include being elderly, lack of exercise, obesity, post-menopausal, sedentary lifestyle, smoking/tobacco exposure and stress.   Her past medical history is significant for anxiety/panic attacks, COPD, hyperlipidemia and hypertension.       Current Outpatient Prescriptions:     aclidinium bromide (TUDORZA PRESSAIR) 400 mcg/actuation AePB, Inhale 1 puff into the lungs 2 (two) times daily., Disp: 60 each, Rfl: 11    albuterol 90 mcg/actuation inhaler, Inhale 2 puffs into the lungs every 4 (four) hours as needed for Wheezing., Disp: 18 g, Rfl: 11    cholecalciferol, vitamin D3, (VITAMIN D3) 2,000 unit Cap, Take 1 capsule (2,000 Units total) by mouth once daily., Disp: 90 capsule, Rfl: 1    clonazepam (KLONOPIN) 0.5 MG tablet, Take 0.5 mg by mouth once daily. , Disp: , Rfl:     fluticasone-salmeterol 500-50 mcg/dose (ADVAIR DISKUS) 500-50 mcg/dose DsDv diskus inhaler, Inhale 1 puff into the lungs 2 (two) times daily. Controller, Disp: 60 each, Rfl: 3    haloperidol  "(HALDOL) 5 MG tablet, Take 5 mg by mouth once daily., Disp: , Rfl:     ipratropium (ATROVENT HFA) 17 mcg/actuation inhaler, Inhale 2 puffs into the lungs., Disp: , Rfl:     nicotine (NICODERM CQ) 14 mg/24 hr, Place 1 patch onto the skin once daily., Disp: 28 patch, Rfl: 0    nicotine, polacrilex, (NICORETTE) 2 mg Gum, Take 1 each (2 mg total) by mouth as needed (use no more than 8 pieces per day)., Disp: 100 each, Rfl: 1    OXYGEN-AIR DELIVERY SYSTEMS MISC, Inhale 2 L into the lungs as needed (O2 via Nasal Canula.)., Disp: , Rfl:     paroxetine (PAXIL) 30 MG tablet, Take 1 tablet by mouth once daily., Disp: , Rfl: 2     Review of Systems   Constitutional: Negative for activity change, appetite change, chills, diaphoresis, fatigue, fever and malaise/fatigue.   Eyes: Negative for visual disturbance.   Respiratory: Negative for cough, hemoptysis, sputum production, chest tightness, shortness of breath and wheezing.    Cardiovascular: Positive for chest pain. Negative for palpitations, orthopnea, claudication, leg swelling, syncope, PND and near-syncope.   Gastrointestinal: Positive for abdominal pain. Negative for abdominal distention, constipation, diarrhea, nausea and vomiting.   Genitourinary: Negative for difficulty urinating and pelvic pain.   Musculoskeletal: Negative for back pain, myalgias and neck pain.   Skin: Negative for rash.   Neurological: Negative for dizziness, tremors, syncope, weakness, light-headedness, numbness and headaches.   Psychiatric/Behavioral: Negative for agitation, behavioral problems, confusion, decreased concentration, dysphoric mood, hallucinations, self-injury, sleep disturbance and suicidal ideas. The patient is not nervous/anxious and is not hyperactive.      Objective:   /78 (BP Location: Left arm, Patient Position: Sitting, BP Method: Pediatric (Automatic))   Pulse 100   Temp 97.2 °F (36.2 °C) (Tympanic)   Ht 5' 2.5" (1.588 m)   Wt 75.1 kg (165 lb 9.1 oz)   SpO2 " 95%   BMI 29.80 kg/m²     Physical Exam   Constitutional: She is oriented to person, place, and time. Vital signs are normal. She appears well-developed and well-nourished. She is cooperative. No distress.   Eyes: No scleral icterus.   Neck: No JVD present. Carotid bruit is not present. No thyromegaly present.   Cardiovascular: Normal rate, regular rhythm and normal heart sounds.  Exam reveals no gallop and no friction rub.    No murmur heard.  Pulmonary/Chest: Effort normal and breath sounds normal. She has no wheezes. She has no rhonchi. She has no rales.   Abdominal: Soft. She exhibits no distension. There is no hepatosplenomegaly. There is no tenderness. There is no rebound, no guarding and no CVA tenderness.   Musculoskeletal: She exhibits no edema.   Neurological: She is alert and oriented to person, place, and time. She displays a negative Romberg sign. Coordination and gait normal.   Skin: Skin is warm and dry. No rash noted. No erythema.   Psychiatric: She has a normal mood and affect. Her speech is normal and behavior is normal. Judgment and thought content normal. Her mood appears not anxious. Her affect is not angry, not blunt, not labile and not inappropriate. She is not agitated, not aggressive, not hyperactive, not slowed, not withdrawn, not actively hallucinating and not combative. Thought content is not paranoid and not delusional. Cognition and memory are normal. She does not exhibit a depressed mood. She expresses no homicidal and no suicidal ideation. She is attentive.   Nursing note and vitals reviewed.    Assessment:     1. Atypical chest pain    2. Vitamin D insufficiency    3. Chronic hyponatremia    4. Essential hypertension      Plan:   Atypical chest pain  -     Ambulatory referral to Cardiology  -     CBC auto differential; Future; Expected date: 04/23/2018  -     Basic metabolic panel; Future; Expected date: 04/23/2018  -     TSH; Future; Expected date: 04/23/2018  Description  noncardiac sounding in nature, but multiple risk factors.  Referral to cardiology.  Check labs.  Treat as indicated.    Vitamin D insufficiency  -     Vitamin D; Future; Expected date: 04/23/2018  Adjust daily vitamin D 2000 international unit supplement as needed.    Chronic hyponatremia  -     Basic metabolic panel; Future; Expected date: 04/23/2018  Follow-up nephrology as scheduled.    Essential hypertension  -     Basic metabolic panel; Future; Expected date: 04/23/2018  -     TSH; Future; Expected date: 04/23/2018  Controlled.  BP at goal.  Stable off medication since weight loss.    Keep appointment with cardiology as scheduled.  Return to clinic 6 months or sooner as needed.

## 2018-04-23 NOTE — PROGRESS NOTES
Pulmonary Disease Management  [facility]  Initial Visit    Referring Provider: Dr Tracey  Diagnosis: Asthma with COPD  Last Hospital Admission:1/3/2015  Last seen by Provider:  04/16/2018    Current Respiratory Medications:   Current Outpatient Prescriptions:     aclidinium bromide (TUDORZA PRESSAIR) 400 mcg/actuation AePB, Inhale 1 puff into the lungs 2 (two) times daily., Disp: 60 each, Rfl: 11    albuterol 90 mcg/actuation inhaler, Inhale 2 puffs into the lungs every 4 (four) hours as needed for Wheezing., Disp: 18 g, Rfl: 11    fluticasone-salmeterol 500-50 mcg/dose (ADVAIR DISKUS) 500-50 mcg/dose DsDv diskus inhaler, Inhale 1 puff into the lungs 2 (two) times daily. Controller, Disp: 60 each, Rfl: 3    OXYGEN-AIR DELIVERY SYSTEMS MISC, Inhale 2 L into the lungs as needed (O2 via Nasal Canula.)., Disp: , Rfl:   Accuneb     Allergies:   Review of patient's allergies indicates:   Allergen Reactions    Codeine Shortness Of Breath    Keflex [cephalexin] Shortness Of Breath    Shellfish containing products Nausea And Vomiting    Sucralfate Other (See Comments) and Hives       Smoking history:   History   Smoking Status    Current Every Day Smoker    Packs/day: 0.75    Years: 44.00    Types: Cigarettes   Smokeless Tobacco    Never Used     Heart Rate: 85  Respiratory Rate: 16  SPO2: 97% on room air     BMI (kg/m2): 29.94     All Lung Fields Breath Sounds: equal bilaterally, wheezes, expiratory (faint wheeze Left lower lobe)  Cough Type: good  Cough Frequency: infrequent  Sputum Color: clear, white  Sputum Amount: small  Sputum Consistency: thin    Resting Cheryl Dyspnea Rating : 1     Current Oxygen Use: No (pt. stated that Dr Tracey said she does not need at this time)     [facility], [place]- oxygen      COPD Questionnaire:  How often do you cough?: Some of the time  How often do you have phlegm (mucus) in your chest?: A little of the time  How often does your  chest feel tight?: Some of the time  When you walk up a hill or one flight of stairs, how often are you breathless?: Most of the time  How often are you limited doing any activities at home?: A little of the time  How often are you confident leaving the house despite your lung condition?: Never  How often do you sleep soundly?: Most of the time  How often do you have energy?: Some of the time  Total score: 22    ACT Questionnaire: Total=20         PFT Results:  Pre FVC   Date/Time Value Ref Range Status   04/13/2018 02:24 PM 2.45 (L) 2.72 - 3.37 L Final     Pre FEV1   Date/Time Value Ref Range Status   04/13/2018 02:24 PM 1.86 (L) 2.08 - 2.62 L Final     Pre FEV1 FVC   Date/Time Value Ref Range Status   04/13/2018 02:24 PM 76 % Final     Pre TLC   Date/Time Value Ref Range Status   04/13/2018 02:24 PM 5.04 (H) 4.11 - 4.88 L Final     Pre DLCO   Date/Time Value Ref Range Status   04/13/2018 02:24 PM 14.96 (L) 16.77 - 25.06 ml/mmHg/min Final         Is this patient a candidate for pulmonary rehab?: No  Method Used for Education: Literature, Demonstration, Verbal  Did the patient demonstrate understanding?: Yes     Patient Concerns or Expectations: Knob on CPAP is broken    Therapist Comments:   Patient stated she is not compliant with home CPAP due to knob broken on unit. Contacted Keren, with [facility]. Keren stated patient should still be able to use unit without knob, but ask patient to bring CPAP unit on her next appointment so she can try to fix knob.  Patient will also be eligible for new CPAP unit May 17, 2018. Encouraged and discussed the importance of using home CPAP on a regular schedule.     Reviewed the respiratory medication purpose and proper technique of Advair diskus, Albuterol inhaler with spacer. Patient demonstrated with teach back method.   Patient stated she was only using controller medication when she felt SOB. Reviewed the difference between rescue and controller drugs and reinforced the  proper frequency of her controller medication. Patient instructed to rinse mouth following Advair use. Patient stated understanding.    Reviewed and gave instructions for cleaning spacer and nebulizer.     Patient states she is still smoking and unable to go to smoking cessation classes because she is temporarily living out of town. Patient requested smoking cessation aid to help her quit.     Plan  Reinforced education  DME: eligible for new CPAP in May 2018  Immunization: current  Meds: ICS/LABA and DAVIDA  Complete PFT and Six minute Walk: 4/13/18   Next provider visit: May 16, 2018  Smoking cessation aid, Requested no Chantix

## 2018-04-24 LAB
25(OH)D3+25(OH)D2 SERPL-MCNC: 24 NG/ML
ANION GAP SERPL CALC-SCNC: 11 MMOL/L
BASOPHILS # BLD AUTO: 0.04 K/UL
BASOPHILS NFR BLD: 0.5 %
BUN SERPL-MCNC: 9 MG/DL
CALCIUM SERPL-MCNC: 9.3 MG/DL
CHLORIDE SERPL-SCNC: 100 MMOL/L
CO2 SERPL-SCNC: 25 MMOL/L
CREAT SERPL-MCNC: 0.7 MG/DL
DIFFERENTIAL METHOD: NORMAL
EOSINOPHIL # BLD AUTO: 0.2 K/UL
EOSINOPHIL NFR BLD: 2 %
ERYTHROCYTE [DISTWIDTH] IN BLOOD BY AUTOMATED COUNT: 13.8 %
EST. GFR  (AFRICAN AMERICAN): >60 ML/MIN/1.73 M^2
EST. GFR  (NON AFRICAN AMERICAN): >60 ML/MIN/1.73 M^2
GLUCOSE SERPL-MCNC: 87 MG/DL
HCT VFR BLD AUTO: 43.8 %
HGB BLD-MCNC: 14.4 G/DL
IMM GRANULOCYTES # BLD AUTO: 0.02 K/UL
IMM GRANULOCYTES NFR BLD AUTO: 0.3 %
LYMPHOCYTES # BLD AUTO: 2.6 K/UL
LYMPHOCYTES NFR BLD: 32.4 %
MCH RBC QN AUTO: 30.2 PG
MCHC RBC AUTO-ENTMCNC: 32.9 G/DL
MCV RBC AUTO: 92 FL
MONOCYTES # BLD AUTO: 0.7 K/UL
MONOCYTES NFR BLD: 8.6 %
NEUTROPHILS # BLD AUTO: 4.5 K/UL
NEUTROPHILS NFR BLD: 56.2 %
NRBC BLD-RTO: 0 /100 WBC
PLATELET # BLD AUTO: 323 K/UL
PMV BLD AUTO: 10.8 FL
POTASSIUM SERPL-SCNC: 4.3 MMOL/L
RBC # BLD AUTO: 4.77 M/UL
SODIUM SERPL-SCNC: 136 MMOL/L
TSH SERPL DL<=0.005 MIU/L-ACNC: 0.67 UIU/ML
WBC # BLD AUTO: 7.93 K/UL

## 2018-05-07 ENCOUNTER — OFFICE VISIT (OUTPATIENT)
Dept: CARDIOLOGY | Facility: CLINIC | Age: 63
End: 2018-05-07
Payer: COMMERCIAL

## 2018-05-07 VITALS
SYSTOLIC BLOOD PRESSURE: 150 MMHG | HEART RATE: 73 BPM | BODY MASS INDEX: 29.46 KG/M2 | HEIGHT: 63 IN | DIASTOLIC BLOOD PRESSURE: 84 MMHG | WEIGHT: 166.25 LBS

## 2018-05-07 DIAGNOSIS — I10 ESSENTIAL HYPERTENSION: ICD-10-CM

## 2018-05-07 DIAGNOSIS — F17.210 CIGARETTE SMOKER: Chronic | ICD-10-CM

## 2018-05-07 DIAGNOSIS — J44.89 ASTHMA WITH COPD: ICD-10-CM

## 2018-05-07 DIAGNOSIS — R07.89 CHEST PAIN, ATYPICAL: Primary | ICD-10-CM

## 2018-05-07 DIAGNOSIS — I10 HYPERTENSION, UNSPECIFIED TYPE: ICD-10-CM

## 2018-05-07 PROCEDURE — 3077F SYST BP >= 140 MM HG: CPT | Mod: CPTII,S$GLB,, | Performed by: INTERNAL MEDICINE

## 2018-05-07 PROCEDURE — 3008F BODY MASS INDEX DOCD: CPT | Mod: CPTII,S$GLB,, | Performed by: INTERNAL MEDICINE

## 2018-05-07 PROCEDURE — 93000 ELECTROCARDIOGRAM COMPLETE: CPT | Mod: S$GLB,,, | Performed by: NUCLEAR MEDICINE

## 2018-05-07 PROCEDURE — 3079F DIAST BP 80-89 MM HG: CPT | Mod: CPTII,S$GLB,, | Performed by: INTERNAL MEDICINE

## 2018-05-07 PROCEDURE — 99204 OFFICE O/P NEW MOD 45 MIN: CPT | Mod: S$GLB,,, | Performed by: INTERNAL MEDICINE

## 2018-05-07 PROCEDURE — 99999 PR PBB SHADOW E&M-EST. PATIENT-LVL III: CPT | Mod: PBBFAC,,, | Performed by: INTERNAL MEDICINE

## 2018-05-07 RX ORDER — LISINOPRIL 5 MG/1
5 TABLET ORAL DAILY
Qty: 30 TABLET | Refills: 11 | Status: SHIPPED | OUTPATIENT
Start: 2018-05-07 | End: 2019-07-10 | Stop reason: SDUPTHER
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
## 2018-05-07 NOTE — LETTER
May 7, 2018      Marck Major MD  [address] [facility] [address]           [facility]  [id number] [place] [id number]  Phone: [phone]  Fax: [phone]          Patient: Merle Caro   MR Number: [id number]   YOB: 1955   Date of Visit: 5/7/2018       Dear Dr. Marck Major:    Thank you for referring Merle Caro to me for evaluation. Attached you will find relevant portions of my assessment and plan of care.    If you have questions, please do not hesitate to call me. I look forward to following Merle Caro along with you.    Sincerely,    Magdi Augustin MD    Enclosure  CC:  No Recipients    If you would like to receive this communication electronically, please contact [email or web address] or [phone] to request more information on [employer] access.    For providers and/or their staff who would like to refer a patient to [facility], please contact us through our one-stop-shop provider referral line, [facility] , at [phone].    If you feel you have received this communication in error or would no longer like to receive these types of communications, please e-mail [email or web address]

## 2018-05-24 ENCOUNTER — TELEPHONE (OUTPATIENT)
Dept: PULMONOLOGY | Facility: CLINIC | Age: 63
End: 2018-05-24

## 2018-06-05 ENCOUNTER — CLINICAL SUPPORT (OUTPATIENT)
Dept: CARDIOLOGY | Facility: CLINIC | Age: 63
End: 2018-06-05
Attending: INTERNAL MEDICINE
Payer: COMMERCIAL

## 2018-06-05 DIAGNOSIS — R07.89 CHEST PAIN, ATYPICAL: ICD-10-CM

## 2018-06-05 LAB
AORTIC VALVE REGURGITATION: NORMAL
DIASTOLIC DYSFUNCTION: NO
DIASTOLIC DYSFUNCTION: NO
ESTIMATED PA SYSTOLIC PRESSURE: 17.44
MITRAL VALVE MOBILITY: NORMAL
RETIRED EF AND QEF - SEE NOTES: 60 (ref 55–65)
TRICUSPID VALVE REGURGITATION: NORMAL

## 2018-06-05 PROCEDURE — 93015 CV STRESS TEST SUPVJ I&R: CPT | Mod: S$GLB,,, | Performed by: INTERNAL MEDICINE

## 2018-06-05 PROCEDURE — 93306 TTE W/DOPPLER COMPLETE: CPT | Mod: S$GLB,,, | Performed by: INTERNAL MEDICINE

## 2018-06-06 ENCOUNTER — TELEPHONE (OUTPATIENT)
Dept: CARDIOLOGY | Facility: CLINIC | Age: 63
End: 2018-06-06

## 2018-06-06 NOTE — TELEPHONE ENCOUNTER
Attempted without success to contact pt with stress test and echo results.  Left VM to return call.    ----- Message from Magdi Augustin MD sent at 6/5/2018  6:03 PM CDT -----  Stress test normal

## 2018-06-06 NOTE — TELEPHONE ENCOUNTER
Spoke with pt with echo and stress test results.    ----- Message from Magdi Augustin MD sent at 6/5/2018  6:03 PM CDT -----  Echo showed normal EF. Mild AI

## 2018-07-16 ENCOUNTER — OFFICE VISIT (OUTPATIENT)
Dept: PULMONOLOGY | Facility: CLINIC | Age: 63
End: 2018-07-16
Payer: COMMERCIAL

## 2018-07-16 ENCOUNTER — PROCEDURE VISIT (OUTPATIENT)
Dept: PULMONOLOGY | Facility: CLINIC | Age: 63
End: 2018-07-16
Payer: COMMERCIAL

## 2018-07-16 ENCOUNTER — HOSPITAL ENCOUNTER (OUTPATIENT)
Dept: RADIOLOGY | Facility: HOSPITAL | Age: 63
Discharge: HOME | End: 2018-07-16
Attending: INTERNAL MEDICINE
Payer: COMMERCIAL

## 2018-07-16 VITALS
HEIGHT: 63 IN | SYSTOLIC BLOOD PRESSURE: 126 MMHG | RESPIRATION RATE: 17 BRPM | HEART RATE: 115 BPM | DIASTOLIC BLOOD PRESSURE: 84 MMHG | WEIGHT: 158.5 LBS | BODY MASS INDEX: 28.08 KG/M2 | OXYGEN SATURATION: 96 %

## 2018-07-16 DIAGNOSIS — F17.210 CIGARETTE NICOTINE DEPENDENCE WITHOUT COMPLICATION: Chronic | ICD-10-CM

## 2018-07-16 DIAGNOSIS — J44.89 ASTHMA WITH COPD: Chronic | ICD-10-CM

## 2018-07-16 DIAGNOSIS — G47.33 OSA ON CPAP: Chronic | ICD-10-CM

## 2018-07-16 DIAGNOSIS — J44.89 ASTHMA WITH COPD: Primary | Chronic | ICD-10-CM

## 2018-07-16 PROBLEM — J96.11 CHRONIC RESPIRATORY FAILURE WITH HYPOXIA: Status: RESOLVED | Noted: 2017-10-12 | Resolved: 2018-07-16

## 2018-07-16 PROCEDURE — 3074F SYST BP LT 130 MM HG: CPT | Mod: CPTII,S$GLB,, | Performed by: INTERNAL MEDICINE

## 2018-07-16 PROCEDURE — 94762 N-INVAS EAR/PLS OXIMTRY CONT: CPT | Mod: S$GLB,,, | Performed by: INTERNAL MEDICINE

## 2018-07-16 PROCEDURE — 99999 PR PBB SHADOW E&M-EST. PATIENT-LVL III: CPT | Mod: PBBFAC,,, | Performed by: INTERNAL MEDICINE

## 2018-07-16 PROCEDURE — 99215 OFFICE O/P EST HI 40 MIN: CPT | Mod: S$GLB,,, | Performed by: INTERNAL MEDICINE

## 2018-07-16 PROCEDURE — 3008F BODY MASS INDEX DOCD: CPT | Mod: CPTII,S$GLB,, | Performed by: INTERNAL MEDICINE

## 2018-07-16 PROCEDURE — 71046 X-RAY EXAM CHEST 2 VIEWS: CPT | Mod: TC

## 2018-07-16 PROCEDURE — 3079F DIAST BP 80-89 MM HG: CPT | Mod: CPTII,S$GLB,, | Performed by: INTERNAL MEDICINE

## 2018-07-16 PROCEDURE — 71046 X-RAY EXAM CHEST 2 VIEWS: CPT | Mod: 26,,, | Performed by: RADIOLOGY

## 2018-07-16 RX ORDER — AZITHROMYCIN 500 MG/1
500 TABLET, FILM COATED ORAL DAILY
Qty: 5 TABLET | Refills: 0 | Status: SHIPPED | OUTPATIENT
Start: 2018-07-16 | End: 2018-07-21

## 2018-07-16 RX ORDER — PREDNISONE 10 MG/1
TABLET ORAL
Qty: 60 TABLET | Refills: 0 | Status: SHIPPED | OUTPATIENT
Start: 2018-07-16 | End: 2018-10-16

## 2018-07-16 NOTE — ASSESSMENT & PLAN NOTE
Asthma ROS: taking medications as instructed, no medication side effects noted, no significant ongoing wheezing or shortness of breath, using bronchodilator MDI less than twice a week.   New concerns: None.   Exam: appears well, vitals normal, no respiratory distress, acyanotic, normal RR, chest clear, no wheezing, crepitations, rhonchi, normal symmetric air entry.   Assessment:  Asthma well controlled.   Plan: TUDORZA, ADVAIR, ALBUTEROL. Current treatment plan is effective, no change in therapy. CXR today.  6MWT, PFTin 9  Months. Overnight oximetry on room air.

## 2018-07-16 NOTE — ASSESSMENT & PLAN NOTE
Assistance with smoking cessation was offered, including:  []  Medications  [x]  Counseling  []  Printed Information on Smoking Cessation  [x]  Referral to a Smoking Cessation Program    Patient was counseled regarding smoking for 3-10 minutes.

## 2018-07-16 NOTE — PROGRESS NOTES
Subjective:      Patient ID: Merle Caro is a 63 y.o. female.    Patient Active Problem List   Diagnosis    LUCILA on CPAP: Not compliant with CPAP    Pure hypercholesterolemia    Affective bipolar disorder    Asthma with COPD    Depression    Obesity, Class II, BMI 35-39.9, with comorbidity    Essential hypertension    Slow transit constipation    Cigarette nicotine dependence without complication    Vitamin D insufficiency    Chronic hyponatremia    Chest pain, atypical     Problem list has been reviewed.    Chief Complaint: asthma with copd and Sleep Apnea    HPI    She reports that she is at her baseline. She is at her respiratory baseline . She denies fever, chill or rigors. She reports an intermittent productive cough ( white phlegm).     Patients reports no dyspnea    The patient does not have currently have symptoms / an exacerbation.       No recent change in breathing.     She still smokes.       Immunization status reviewed and up to date.    COPD QUESTIONNAIRE  How often do you cough?: Some of the time  How often do you have phlegm (mucus) in your chest?: Most of the time  How often does your chest feel tight?: Some of the time  When you walk up a hill or one flight of stairs, how often are you breathless?: Most of the time  How often are you limited doing any activities at home?: Almost never  How often are you confident leaving the house despite your lung condition?: Some of the time  How often do you sleep soundly?: Most of the time  How often do you have energy?: Most of the time  Total score: 19      Asthma Control Test  In the past 4  weeks, how much of the time did your asthma keep you from getting as much done at work, school or at home?: None of the time  During the past 4 weeks, how often have you had shortness of breath?: Not at all  During the past 4 weeks, how often did your asthma symptoms (wheezing, couging, shortness of breath, chest tightness or pain) wake you up at night or  earlier than usual in the morning?: Once or twice  During the past 4 weeks, how often have you used your rescue inhaler or nebulizer medication (such as albuterol)?: Not at all  How would you rate your asthma control during the past 4 weeks?: Somewhat controlled  If your score is 19 or less, your asthma may not be under control: 22     Her current respiratory therapy regimen is PROVENTIL, TUDORZA, ADVAIR, ACCUNEB which provides relief. She is not adherent with her regimen.    She is on oxygen 2 L /m in with activity.     She has LUCILA and is on Auto CPAP. She is not complaint with her CPAP She did not bring her SD card for complaince evaluation.     Keyes Sleepiness Scale   EPWORTH SLEEPINESS SCALE 7/16/2018 10/12/2017 10/20/2016 9/19/2016 9/16/2016 3/16/2016 7/15/2015   Sitting and reading 0 1 0 0 1 0 0   Watching TV 0 0 0 0 0 0 0   Sitting, inactive in a public place (e.g. a theatre or a meeting) 0 0 0 0 0 0 2   As a passenger in a car for an hour without a break 2 0 0 0 0 0 0   Lying down to rest in the afternoon when circumstances permit 2 3 2 3 2 3 2   Sitting and talking to someone 0 0 0 1 0 0 0   Sitting quietly after a lunch without alcohol 0 0 0 0 0 2 1   In a car, while stopped for a few minutes in traffic 0 0 0 0 0 0 0   Total score 4 4 2 4 3 5 5      A full  review of systems, past , family  and social histories was performed except as mentioned in the note above, these are non contributory to the main issues discussed today.     Previous Report Reviewed: office notes     The following portions of the patient's history were reviewed and updated as appropriate: She  has a past medical history of Asthma; COPD (chronic obstructive pulmonary disease); Depression; and Tobacco abuse.  She  has a past surgical history that includes Hysterectomy; Knee surgery; Tonsillectomy; multiple fracture repairs; and Colonoscopy w/ polypectomy (01/05/2011).  Her family history includes COPD in her mother; Cancer in her  "mother; Hypertension in her mother; Lung cancer in her mother; Ovarian cancer in her sister; Stroke in her mother.  She  reports that she has been smoking Cigarettes.  She has a 33.00 pack-year smoking history. She has never used smokeless tobacco. She reports that she does not drink alcohol or use drugs.  She has a current medication list which includes the following prescription(s): aclidinium bromide, albuterol, cholecalciferol (vitamin d3), clonazepam, fluticasone-salmeterol 500-50 mcg/dose, haloperidol, ipratropium, lisinopril, oxygen-air delivery systems, paroxetine, azithromycin, nicotine, nicotine (polacrilex), and prednisone.  She is allergic to codeine; keflex [cephalexin]; shellfish containing products; and sucralfate..    Review of Systems   Constitutional: Negative for night sweats.   HENT: Positive for postnasal drip, sinus pressure and voice change.    Eyes: Positive for redness.   Respiratory: Positive for sputum production and wheezing. Negative for cough, hemoptysis and dyspnea on extertion.    Cardiovascular: Negative for chest pain, palpitations and leg swelling.   Genitourinary: Negative for difficulty urinating and hematuria.   Endocrine: Negative for cold intolerance and heat intolerance.    Musculoskeletal: Positive for arthralgias. Negative for back pain and gait problem.   Gastrointestinal: Positive for acid reflux. Negative for nausea, vomiting, abdominal pain and abdominal distention.        Diarrhea   Neurological: Positive for headaches. Negative for syncope and light-headedness.   Psychiatric/Behavioral: The patient is nervous/anxious.       Objective:     /84   Pulse (!) 115   Resp 17   Ht 5' 2.5" (1.588 m)   Wt 71.9 kg (158 lb 8.2 oz)   SpO2 96%   BMI 28.53 kg/m²   Body mass index is 28.53 kg/m².    Physical Exam   Constitutional: She is oriented to person, place, and time. She appears well-developed and well-nourished.   HENT:   Head: Normocephalic and atraumatic. "   Neck: Normal range of motion. Neck supple.   Cardiovascular: Normal rate and regular rhythm.  Exam reveals no gallop.    No murmur heard.  Pulmonary/Chest: Effort normal. She has wheezes.   Abdominal: Soft. Bowel sounds are normal.   Musculoskeletal: Normal range of motion. She exhibits no edema.   Neurological: She is alert and oriented to person, place, and time.   Skin: Skin is warm and dry.   Psychiatric: She has a normal mood and affect.       Personal Diagnostic Review      Assessment / plan:            Discussed diagnosis, its evaluation, treatment and usual course. All questions answered.      Problem List Items Addressed This Visit        Pulmonary    Asthma with COPD - Primary    Current Assessment & Plan     Asthma ROS: taking medications as instructed, no medication side effects noted, no significant ongoing wheezing or shortness of breath, using bronchodilator MDI less than twice a week.   New concerns: None.   Exam: appears well, vitals normal, no respiratory distress, acyanotic, normal RR, chest clear, no wheezing, crepitations, rhonchi, normal symmetric air entry.   Assessment:  Asthma well controlled.   Plan: TUDORZA, ADVAIR, ALBUTEROL. Current treatment plan is effective, no change in therapy. CXR today.  6MWT, PFTin 9  Months. Overnight oximetry on room air.          Relevant Medications    azithromycin (ZITHROMAX) 500 MG tablet    predniSONE (DELTASONE) 10 MG tablet    Other Relevant Orders    Complete PFT with bronchodilator    Stress test, pulmonary    X-Ray Chest PA And Lateral    Pulse oximetry overnight       Other    LUCILA on CPAP: Not compliant with CPAP (Chronic)    Current Assessment & Plan     She is on CPAP AUTO. She has not used her CPAP in 1 year.    Complications of untreated sleep apnea discussed with pateint in detail including but not limited to  high blood pressure, heart attack, stroke, obesity,  diabetes and worsening heart failure. Patient voiced understanding.          Relevant Orders    Pulse oximetry overnight    Cigarette nicotine dependence without complication    Current Assessment & Plan     Assistance with smoking cessation was offered, including:  []  Medications  [x]  Counseling  []  Printed Information on Smoking Cessation  [x]  Referral to a Smoking Cessation Program    Patient was counseled regarding smoking for 3-10 minutes.         Relevant Orders    Ambulatory referral to Smoking Cessation Program        TIME SPENT WITH PATIENT: Time spent: 40 minutes in face to face  discussion concerning diagnosis, prognosis, review of lab and test results, benefits of treatment as well as management of disease, counseling of patient and coordination of care between various health  care providers . Greater than half the time spent was used for coordination of care and counseling of patient.        Follow-up in about 9 months (around 4/16/2019).

## 2018-07-16 NOTE — PATIENT INSTRUCTIONS
Lung Anatomy  Your lungs take air in to give your body oxygen, which the body needs to work. Your lungs, like all the tissues in your body, are made up of billions of tiny specialized cells. Old lung cells die and are replaced by new, identical lung cells. This natural process helps ensure healthy lungs.    Date Last Reviewed: 11/1/2016  © 4370-5407 [employer]. [address] [place] [address], [place] [address]. All rights reserved. This information is not intended as a substitute for professional medical care. Always follow your healthcare professional's instructions.

## 2018-07-16 NOTE — PROGRESS NOTES
Your Chest X ray is STABLE.  Please keep your follow up appointment as scheduled. Do not hesitate to call us if you have any questions.    Thank you.    MEGAN Tracey MD  [facility]

## 2018-07-20 ENCOUNTER — TELEPHONE (OUTPATIENT)
Dept: PULMONOLOGY | Facility: CLINIC | Age: 63
End: 2018-07-20

## 2018-07-20 NOTE — TELEPHONE ENCOUNTER
Patient called to inquiry about overnight monitor that was picked up on Monday. Left message for patient to return monitor to clinic.

## 2018-08-08 ENCOUNTER — TELEPHONE (OUTPATIENT)
Dept: SMOKING CESSATION | Facility: CLINIC | Age: 63
End: 2018-08-08

## 2018-08-08 NOTE — TELEPHONE ENCOUNTER
Left message about missed intake for the tobacco cessation program and asked if the patient would like to reschedule. Left my name Jamie Painting with phone number [phone].

## 2018-10-16 ENCOUNTER — LAB VISIT (OUTPATIENT)
Dept: LAB | Facility: HOSPITAL | Age: 63
End: 2018-10-16
Attending: FAMILY MEDICINE
Payer: COMMERCIAL

## 2018-10-16 ENCOUNTER — OFFICE VISIT (OUTPATIENT)
Dept: INTERNAL MEDICINE | Facility: CLINIC | Age: 63
End: 2018-10-16
Payer: COMMERCIAL

## 2018-10-16 VITALS
HEART RATE: 81 BPM | SYSTOLIC BLOOD PRESSURE: 128 MMHG | WEIGHT: 159.63 LBS | HEIGHT: 63 IN | TEMPERATURE: 97 F | OXYGEN SATURATION: 95 % | BODY MASS INDEX: 28.29 KG/M2 | DIASTOLIC BLOOD PRESSURE: 82 MMHG

## 2018-10-16 DIAGNOSIS — K21.00 GASTROESOPHAGEAL REFLUX DISEASE WITH ESOPHAGITIS: ICD-10-CM

## 2018-10-16 DIAGNOSIS — E55.9 VITAMIN D INSUFFICIENCY: Chronic | ICD-10-CM

## 2018-10-16 DIAGNOSIS — E78.00 PURE HYPERCHOLESTEROLEMIA: Chronic | ICD-10-CM

## 2018-10-16 DIAGNOSIS — F32.A DEPRESSION, UNSPECIFIED DEPRESSION TYPE: Chronic | ICD-10-CM

## 2018-10-16 DIAGNOSIS — G47.33 OSA ON CPAP: Chronic | ICD-10-CM

## 2018-10-16 DIAGNOSIS — F31.9 BIPOLAR AFFECTIVE DISORDER, REMISSION STATUS UNSPECIFIED: Chronic | ICD-10-CM

## 2018-10-16 DIAGNOSIS — J44.89 ASTHMA WITH COPD: Chronic | ICD-10-CM

## 2018-10-16 DIAGNOSIS — Z23 NEED FOR INFLUENZA VACCINATION: ICD-10-CM

## 2018-10-16 DIAGNOSIS — Z23 NEED FOR SHINGLES VACCINE: ICD-10-CM

## 2018-10-16 DIAGNOSIS — I10 ESSENTIAL HYPERTENSION: Primary | Chronic | ICD-10-CM

## 2018-10-16 DIAGNOSIS — F17.210 CIGARETTE NICOTINE DEPENDENCE WITHOUT COMPLICATION: ICD-10-CM

## 2018-10-16 PROBLEM — E87.1 CHRONIC HYPONATREMIA: Status: RESOLVED | Noted: 2017-02-22 | Resolved: 2018-10-16

## 2018-10-16 PROBLEM — R07.89 CHEST PAIN, ATYPICAL: Status: RESOLVED | Noted: 2018-05-07 | Resolved: 2018-10-16

## 2018-10-16 LAB
25(OH)D3+25(OH)D2 SERPL-MCNC: 19 NG/ML
CHOLEST SERPL-MCNC: 266 MG/DL
CHOLEST/HDLC SERPL: 4.2 {RATIO}
HDLC SERPL-MCNC: 64 MG/DL
HDLC SERPL: 24.1 %
LDLC SERPL CALC-MCNC: 179.4 MG/DL
NONHDLC SERPL-MCNC: 202 MG/DL
TRIGL SERPL-MCNC: 113 MG/DL

## 2018-10-16 PROCEDURE — 80061 LIPID PANEL: CPT

## 2018-10-16 PROCEDURE — 99999 PR PBB SHADOW E&M-EST. PATIENT-LVL IV: CPT | Mod: PBBFAC,,, | Performed by: FAMILY MEDICINE

## 2018-10-16 PROCEDURE — 3074F SYST BP LT 130 MM HG: CPT | Mod: CPTII,S$GLB,, | Performed by: FAMILY MEDICINE

## 2018-10-16 PROCEDURE — 36415 COLL VENOUS BLD VENIPUNCTURE: CPT | Mod: PO

## 2018-10-16 PROCEDURE — 3008F BODY MASS INDEX DOCD: CPT | Mod: CPTII,S$GLB,, | Performed by: FAMILY MEDICINE

## 2018-10-16 PROCEDURE — 3079F DIAST BP 80-89 MM HG: CPT | Mod: CPTII,S$GLB,, | Performed by: FAMILY MEDICINE

## 2018-10-16 PROCEDURE — 90686 IIV4 VACC NO PRSV 0.5 ML IM: CPT | Mod: S$GLB,,, | Performed by: FAMILY MEDICINE

## 2018-10-16 PROCEDURE — 90471 IMMUNIZATION ADMIN: CPT | Mod: S$GLB,,, | Performed by: FAMILY MEDICINE

## 2018-10-16 PROCEDURE — 99214 OFFICE O/P EST MOD 30 MIN: CPT | Mod: 25,S$GLB,, | Performed by: FAMILY MEDICINE

## 2018-10-16 PROCEDURE — 82306 VITAMIN D 25 HYDROXY: CPT

## 2018-10-16 RX ORDER — PANTOPRAZOLE SODIUM 40 MG/1
40 TABLET, DELAYED RELEASE ORAL DAILY
Qty: 30 TABLET | Refills: 11 | Status: SHIPPED | OUTPATIENT
Start: 2018-10-16 | End: 2020-02-10 | Stop reason: SDUPTHER

## 2018-10-16 NOTE — PROGRESS NOTES
Subjective:   Patient ID: Merle Caro is a 63 y.o. female.  Chief Complaint:  Follow-up      Presents for follow-up on multiple chronic medical conditions.    Last visit April 2018.  Atypical chest pain.  Referred to Cardiology.  Echo with normal function and valves.  Exercise treadmill test negative.  Told like GERD related and to follow up with me for medication.    Still complains of heartburn and atypical chest pain type symptoms.  Denies recent change in diet.  Has started smoking again.   Trying to quit 3 smoking cessation.  Hypertension well controlled on lisinopril 5 mg daily.    Vitamin-D insufficiency.  Supplement 2000 units daily.  Last level 24.  Needs repeat testing.  Previous chronic hyponatremia.  April 2018 CMP with normal sodium and other testing.  CBC and TSH also stable at that time.    Asthma/COPD / LUCILA.  Overall clinically unchanged.  Sees pulmonology.    Routine health maintenance shows need for lipid panel, shingles vaccine, and flu vaccine.  Sees Psychiatry.  On multiple medications.  Mood stable.  No acute decompensation.  Other than heartburn, no additional complaints concerns today.      Review of Systems   Constitutional: Negative for activity change, appetite change, chills, diaphoresis, fatigue and fever.   HENT: Negative for congestion, ear pain, postnasal drip, rhinorrhea, sinus pressure, sneezing and sore throat.    Eyes: Negative for discharge, redness, itching and visual disturbance.   Respiratory: Negative for cough, chest tightness, shortness of breath and wheezing.    Cardiovascular: Positive for chest pain (esophageal area). Negative for palpitations and leg swelling.   Gastrointestinal: Positive for abdominal pain (epigastric). Negative for abdominal distention, constipation, diarrhea, nausea and vomiting.   Genitourinary: Negative for difficulty urinating and pelvic pain.   Musculoskeletal: Negative for back pain, myalgias and neck pain.   Skin: Negative for rash.  "  Neurological: Negative for dizziness, tremors, syncope, weakness, light-headedness, numbness and headaches.   Hematological: Negative for adenopathy.   Psychiatric/Behavioral: Negative for agitation, behavioral problems, confusion, decreased concentration, dysphoric mood, hallucinations, self-injury, sleep disturbance and suicidal ideas. The patient is not nervous/anxious and is not hyperactive.      Objective:   /82 (BP Location: Right arm, Patient Position: Sitting, BP Method: Medium (Manual))   Pulse 81   Temp 97.2 °F (36.2 °C) (Tympanic)   Ht 5' 2.5" (1.588 m)   Wt 72.4 kg (159 lb 9.8 oz)   SpO2 95%   BMI 28.73 kg/m²     Physical Exam   Constitutional: She is oriented to person, place, and time. Vital signs are normal. She appears well-developed and well-nourished. She is cooperative. No distress.   Eyes: No scleral icterus.   Neck: No JVD present. Carotid bruit is not present. No thyromegaly present.   Cardiovascular: Normal rate, regular rhythm and normal heart sounds. Exam reveals no gallop and no friction rub.   No murmur heard.  Pulmonary/Chest: Effort normal and breath sounds normal. She has no wheezes. She has no rhonchi. She has no rales.   Abdominal: Soft. She exhibits no distension. There is no hepatosplenomegaly. There is no tenderness. There is no rebound, no guarding and no CVA tenderness.   Musculoskeletal: She exhibits no edema.   Neurological: She is alert and oriented to person, place, and time. She displays a negative Romberg sign. Coordination and gait normal.   Skin: Skin is warm and dry. No rash noted. No erythema.   Psychiatric: She has a normal mood and affect. Her speech is normal and behavior is normal. Judgment and thought content normal. Her mood appears not anxious. Her affect is not angry, not blunt, not labile and not inappropriate. She is not agitated, not aggressive, not hyperactive, not slowed, not withdrawn, not actively hallucinating and not combative. Thought " content is not paranoid and not delusional. Cognition and memory are normal. She does not exhibit a depressed mood. She expresses no homicidal and no suicidal ideation. She is attentive.   Nursing note and vitals reviewed.    Assessment:     1. Essential hypertension    2. Vitamin D insufficiency    3. Pure hypercholesterolemia    4. Bipolar affective disorder, remission status unspecified    5. Depression, unspecified depression type    6. Asthma with COPD    7. LUCILA on CPAP: Not compliant with CPAP    8. Cigarette nicotine dependence without complication    9. Gastroesophageal reflux disease with esophagitis    10. Need for influenza vaccination    11. Need for shingles vaccine      Plan:   Essential hypertension  Controlled.  BP at goal.    Continue lisinopril 5 mg daily.      Vitamin D insufficiency  -     Vitamin D; Future; Expected date: 10/16/2018  Adjust vitamin-D 2000 international units daily if indicated.      Pure hypercholesterolemia  -     Lipid panel; Future; Expected date: 10/16/2018  Treat as indicated.      Bipolar affective disorder, remission status unspecified  Depression, unspecified depression type  Clinically stable.    Continue all present medications.    Follow-up Psychiatry as scheduled.      Asthma with COPD  LUCILA on CPAP: Not compliant with CPAP  Cigarette nicotine dependence without complication  Clinically stable.    Continue all present medications.    Follow-up pulmonology as scheduled.      Gastroesophageal reflux disease with esophagitis  -     pantoprazole (PROTONIX) 40 MG tablet; Take 1 tablet (40 mg total) by mouth once daily.  Dispense: 30 tablet; Refill: 11  Start trial of daily PPI.    If no improvement in symptoms, return to clinic for additional testing / evaluation.      RJM  -     Influenza - Quadrivalent (3 years & older) (PF)  -     varicella-zoster [id number], PF, (SHINGRIX, PF,) 50 mcg/0.5 mL injection; Inject 0.5 mLs into the muscle once. for 1 dose  Dispense: 0.5 mL;  Refill: 0    Return to clinic 6 months or sooner as needed.

## 2018-10-17 ENCOUNTER — TELEPHONE (OUTPATIENT)
Dept: INTERNAL MEDICINE | Facility: CLINIC | Age: 63
End: 2018-10-17

## 2018-10-17 DIAGNOSIS — E55.9 VITAMIN D INSUFFICIENCY: Primary | Chronic | ICD-10-CM

## 2018-10-17 RX ORDER — ACETAMINOPHEN 500 MG
5000 TABLET ORAL DAILY
Qty: 90 TABLET | Refills: 3 | Status: SHIPPED | OUTPATIENT
Start: 2018-10-17 | End: 2019-09-16

## 2018-10-17 NOTE — TELEPHONE ENCOUNTER
----- Message from Marck Major MD sent at 10/17/2018  8:44 AM CDT -----  Vitamin-D level remains low.  Increase to vitamin-D 5000 international units daily.  Prescription sent.    Lipid panel with increased cholesterol elevations than previous.  Ten year risk of heart attack or stroke now elevated.    Needs to follow better low-fat low-cholesterol diet.  Needs to stop smoking.    If unable to stop smoking, will need to start cholesterol medication.

## 2018-10-18 ENCOUNTER — TELEPHONE (OUTPATIENT)
Dept: INTERNAL MEDICINE | Facility: CLINIC | Age: 63
End: 2018-10-18

## 2018-10-18 NOTE — TELEPHONE ENCOUNTER
----- Message from Jasmin Leos sent at 10/18/2018  1:28 PM CDT -----  Contact: patient  Returning your call. Please call patient @ [phone]. Thanks, mary

## 2018-10-22 DIAGNOSIS — E87.1 HYPONATREMIA: Primary | ICD-10-CM

## 2018-10-23 ENCOUNTER — TELEPHONE (OUTPATIENT)
Dept: INTERNAL MEDICINE | Facility: CLINIC | Age: 63
End: 2018-10-23

## 2018-10-23 NOTE — TELEPHONE ENCOUNTER
----- Message from Mal Lofton sent at 10/23/2018  2:31 PM CDT -----  Contact: self [phone]  Pt would like to consult with a nurse, personal.  Please call back @ [phone]. [id number]

## 2018-10-26 ENCOUNTER — LAB VISIT (OUTPATIENT)
Dept: LAB | Facility: HOSPITAL | Age: 63
End: 2018-10-26
Attending: INTERNAL MEDICINE
Payer: COMMERCIAL

## 2018-10-26 ENCOUNTER — OFFICE VISIT (OUTPATIENT)
Dept: NEPHROLOGY | Facility: CLINIC | Age: 63
End: 2018-10-26
Payer: COMMERCIAL

## 2018-10-26 VITALS
HEIGHT: 63 IN | HEART RATE: 82 BPM | DIASTOLIC BLOOD PRESSURE: 74 MMHG | WEIGHT: 160.06 LBS | BODY MASS INDEX: 28.36 KG/M2 | SYSTOLIC BLOOD PRESSURE: 138 MMHG

## 2018-10-26 DIAGNOSIS — R10.31 GROIN PAIN, RIGHT: ICD-10-CM

## 2018-10-26 DIAGNOSIS — E87.1 HYPONATREMIA: ICD-10-CM

## 2018-10-26 DIAGNOSIS — E87.1 HYPONATREMIA: Primary | ICD-10-CM

## 2018-10-26 LAB
ALBUMIN SERPL BCP-MCNC: 4 G/DL
ANION GAP SERPL CALC-SCNC: 13 MMOL/L
BASOPHILS # BLD AUTO: 0.05 K/UL
BASOPHILS NFR BLD: 0.7 %
BUN SERPL-MCNC: 9 MG/DL
CALCIUM SERPL-MCNC: 9.4 MG/DL
CHLORIDE SERPL-SCNC: 96 MMOL/L
CO2 SERPL-SCNC: 23 MMOL/L
CREAT SERPL-MCNC: 0.6 MG/DL
DIFFERENTIAL METHOD: NORMAL
EOSINOPHIL # BLD AUTO: 0.2 K/UL
EOSINOPHIL NFR BLD: 2.1 %
ERYTHROCYTE [DISTWIDTH] IN BLOOD BY AUTOMATED COUNT: 14.5 %
EST. GFR  (AFRICAN AMERICAN): >60 ML/MIN/1.73 M^2
EST. GFR  (NON AFRICAN AMERICAN): >60 ML/MIN/1.73 M^2
GLUCOSE SERPL-MCNC: 97 MG/DL
HCT VFR BLD AUTO: 45.9 %
HGB BLD-MCNC: 15.3 G/DL
LYMPHOCYTES # BLD AUTO: 2.4 K/UL
LYMPHOCYTES NFR BLD: 34.1 %
MCH RBC QN AUTO: 29.8 PG
MCHC RBC AUTO-ENTMCNC: 33.3 G/DL
MCV RBC AUTO: 90 FL
MONOCYTES # BLD AUTO: 0.6 K/UL
MONOCYTES NFR BLD: 8.3 %
NEUTROPHILS # BLD AUTO: 3.9 K/UL
NEUTROPHILS NFR BLD: 54.8 %
PHOSPHATE SERPL-MCNC: 3.8 MG/DL
PLATELET # BLD AUTO: 300 K/UL
PMV BLD AUTO: 9.8 FL
POTASSIUM SERPL-SCNC: 4 MMOL/L
RBC # BLD AUTO: 5.13 M/UL
SODIUM SERPL-SCNC: 132 MMOL/L
WBC # BLD AUTO: 7.03 K/UL

## 2018-10-26 PROCEDURE — 36415 COLL VENOUS BLD VENIPUNCTURE: CPT | Mod: PO

## 2018-10-26 PROCEDURE — 3078F DIAST BP <80 MM HG: CPT | Mod: CPTII,S$GLB,, | Performed by: INTERNAL MEDICINE

## 2018-10-26 PROCEDURE — 80069 RENAL FUNCTION PANEL: CPT | Mod: PO

## 2018-10-26 PROCEDURE — 3075F SYST BP GE 130 - 139MM HG: CPT | Mod: CPTII,S$GLB,, | Performed by: INTERNAL MEDICINE

## 2018-10-26 PROCEDURE — 3008F BODY MASS INDEX DOCD: CPT | Mod: CPTII,S$GLB,, | Performed by: INTERNAL MEDICINE

## 2018-10-26 PROCEDURE — 85025 COMPLETE CBC W/AUTO DIFF WBC: CPT | Mod: PO

## 2018-10-26 PROCEDURE — 99214 OFFICE O/P EST MOD 30 MIN: CPT | Mod: S$GLB,,, | Performed by: INTERNAL MEDICINE

## 2018-10-26 PROCEDURE — 99999 PR PBB SHADOW E&M-EST. PATIENT-LVL III: CPT | Mod: PBBFAC,,, | Performed by: INTERNAL MEDICINE
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
## 2018-10-31 ENCOUNTER — TELEPHONE (OUTPATIENT)
Dept: PULMONOLOGY | Facility: CLINIC | Age: 63
End: 2018-10-31

## 2018-12-01 ENCOUNTER — HOSPITAL ENCOUNTER (OUTPATIENT)
Dept: RADIOLOGY | Facility: HOSPITAL | Age: 63
Discharge: HOME | End: 2018-12-01
Attending: NURSE PRACTITIONER
Payer: COMMERCIAL

## 2018-12-01 ENCOUNTER — OFFICE VISIT (OUTPATIENT)
Dept: URGENT CARE | Facility: CLINIC | Age: 63
End: 2018-12-01
Payer: COMMERCIAL

## 2018-12-01 VITALS
HEIGHT: 62 IN | BODY MASS INDEX: 29.82 KG/M2 | WEIGHT: 162.06 LBS | TEMPERATURE: 99 F | OXYGEN SATURATION: 95 % | SYSTOLIC BLOOD PRESSURE: 128 MMHG | HEART RATE: 85 BPM | RESPIRATION RATE: 16 BRPM | DIASTOLIC BLOOD PRESSURE: 82 MMHG

## 2018-12-01 DIAGNOSIS — R05.9 COUGH: ICD-10-CM

## 2018-12-01 DIAGNOSIS — R07.81 RIB PAIN ON RIGHT SIDE: ICD-10-CM
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
## 2018-12-01 PROCEDURE — 71046 X-RAY EXAM CHEST 2 VIEWS: CPT | Mod: TC,FY,PO

## 2018-12-01 PROCEDURE — 71100 X-RAY EXAM RIBS UNI 2 VIEWS: CPT | Mod: TC,FY,PO

## 2018-12-01 PROCEDURE — 71046 X-RAY EXAM CHEST 2 VIEWS: CPT | Mod: 26,,, | Performed by: RADIOLOGY

## 2018-12-01 PROCEDURE — 3079F DIAST BP 80-89 MM HG: CPT | Mod: CPTII,S$GLB,, | Performed by: NURSE PRACTITIONER

## 2018-12-01 PROCEDURE — 71100 X-RAY EXAM RIBS UNI 2 VIEWS: CPT | Mod: 26,,, | Performed by: RADIOLOGY

## 2018-12-01 PROCEDURE — 99999 PR PBB SHADOW E&M-EST. PATIENT-LVL IV: CPT | Mod: PBBFAC,,, | Performed by: NURSE PRACTITIONER

## 2018-12-01 PROCEDURE — 3008F BODY MASS INDEX DOCD: CPT | Mod: CPTII,S$GLB,, | Performed by: NURSE PRACTITIONER

## 2018-12-01 PROCEDURE — 3074F SYST BP LT 130 MM HG: CPT | Mod: CPTII,S$GLB,, | Performed by: NURSE PRACTITIONER

## 2018-12-01 PROCEDURE — 99214 OFFICE O/P EST MOD 30 MIN: CPT | Mod: S$GLB,,, | Performed by: NURSE PRACTITIONER

## 2018-12-01 RX ORDER — IBUPROFEN 800 MG/1
800 TABLET ORAL EVERY 6 HOURS PRN
Qty: 60 TABLET | Refills: 0 | Status: SHIPPED | OUTPATIENT
Start: 2018-12-01 | End: 2019-04-15

## 2018-12-01 RX ORDER — GUAIFENESIN 600 MG/1
600 TABLET, EXTENDED RELEASE ORAL 2 TIMES DAILY PRN
Qty: 40 TABLET | Refills: 0 | Status: SHIPPED | OUTPATIENT
Start: 2018-12-01 | End: 2018-12-08

## 2018-12-01 NOTE — PATIENT INSTRUCTIONS
"  Preventing Falls: Are You At Risk of Falling?     Ask for help to reduce risk of falling in your home.     As you get older, you're not as steady on your feet as you once were. And you may have health problems you didn't have when you were younger. So, it's not surprising that older people are more likely to trip and fall. Falling can be very serious. It can change your overall health and quality of life. That's why it's important to be aware of your own risk of falling.  The dangers of falling  Falls are one of the main causes of injury in people over age 65. An older person who falls may take longer to get better than a younger person. And, after a fall, an older person is more likely to have problems that don't go away. So, preventing falls can help you avoid serious health problems.  Are you at risk of falling?  Answer these questions to rate your level of risk.  · Are you a woman?  · Have you fallen or stumbled in the last year?  · Are you over age 65?  · Are you ever dizzy or lightheaded with standing?  · Do you have a hard time getting in and out of the bathtub or on and off the toilet?  · Do you lean on objects to help you get around? Or do you use a cane or walker?  · Do you have vision or hearing problems? For example, do you need new glasses or hearing aids?  · Do you have 2 or more long-lasting (chronic) medical conditions?  · Do you take 3 or more medicines?  · Have you felt depressed recently?  · Have you had more trouble with your memory in recent months?  · Are there hazards in your home that might cause you to fall, such as loose rugs or poor lighting?  · Do you have a pet that jumps on you or might trip you?  · Have you stopped getting regular exercise?  · Do you have diabetes?   · Do you have a neurologic disease, such as Parkinson or Alzheimer disease?   · Do you drink alcohol?  · Do you wear athletic shoes or slippers, or go barefoot at home?  You can help prevent falls  If you answered "yes" " to any of the above questions, you should take steps to reduce your risk of a fall. Monitoring health conditions and keeping walkways in your home free of clutter are just 2 ways. Changing is sometimes easier said than done. But keep in mind that even small changes can make you less likely to fall.  The fear of falling  It's normal to be scared of falling, especially if you've fallen before. But being afraid can actually make you more likely to fall. This is because:  · Fear might cause you to become less active. Being less active can lead to a loss of strength and balance.  · Fear can lead to isolation from others, depression, or the use of more medicines or alcohol. And all these things make falling even more likely.  To break the cycle, learn more about ways to avoid falling. As you take control, you may find yourself feeling less afraid.   Date Last Reviewed: 6/12/2015  © 1359-9200 [employer]. [address] [place] [address], [place] [address]. All rights reserved. This information is not intended as a substitute for professional medical care. Always follow your healthcare professional's instructions.

## 2018-12-02 ENCOUNTER — TELEPHONE (OUTPATIENT)
Dept: URGENT CARE | Facility: CLINIC | Age: 63
End: 2018-12-02
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
## 2018-12-03 ENCOUNTER — TELEPHONE (OUTPATIENT)
Dept: URGENT CARE | Facility: CLINIC | Age: 63
End: 2018-12-03

## 2018-12-04 ENCOUNTER — TELEPHONE (OUTPATIENT)
Dept: URGENT CARE | Facility: CLINIC | Age: 63
End: 2018-12-04

## 2018-12-05 ENCOUNTER — OFFICE VISIT (OUTPATIENT)
Dept: INTERNAL MEDICINE | Facility: CLINIC | Age: 63
End: 2018-12-05
Payer: COMMERCIAL

## 2018-12-05 VITALS
HEART RATE: 108 BPM | DIASTOLIC BLOOD PRESSURE: 60 MMHG | TEMPERATURE: 98 F | WEIGHT: 162.25 LBS | SYSTOLIC BLOOD PRESSURE: 128 MMHG | HEIGHT: 62 IN | BODY MASS INDEX: 29.86 KG/M2 | OXYGEN SATURATION: 96 %

## 2018-12-05 DIAGNOSIS — J44.89 ASTHMA WITH COPD: Chronic | ICD-10-CM

## 2018-12-05 DIAGNOSIS — S22.31XD CLOSED FRACTURE OF ONE RIB OF RIGHT SIDE WITH ROUTINE HEALING, SUBSEQUENT ENCOUNTER: Primary | ICD-10-CM

## 2018-12-05 PROCEDURE — 3078F DIAST BP <80 MM HG: CPT | Mod: CPTII,S$GLB,, | Performed by: FAMILY MEDICINE

## 2018-12-05 PROCEDURE — 99999 PR PBB SHADOW E&M-EST. PATIENT-LVL IV: CPT | Mod: PBBFAC,,, | Performed by: FAMILY MEDICINE

## 2018-12-05 PROCEDURE — 3008F BODY MASS INDEX DOCD: CPT | Mod: CPTII,S$GLB,, | Performed by: FAMILY MEDICINE

## 2018-12-05 PROCEDURE — 94640 AIRWAY INHALATION TREATMENT: CPT | Mod: S$GLB,,, | Performed by: FAMILY MEDICINE

## 2018-12-05 PROCEDURE — 3074F SYST BP LT 130 MM HG: CPT | Mod: CPTII,S$GLB,, | Performed by: FAMILY MEDICINE

## 2018-12-05 PROCEDURE — 99214 OFFICE O/P EST MOD 30 MIN: CPT | Mod: 25,S$GLB,, | Performed by: FAMILY MEDICINE

## 2018-12-05 RX ORDER — IPRATROPIUM BROMIDE AND ALBUTEROL SULFATE 2.5; .5 MG/3ML; MG/3ML
3 SOLUTION RESPIRATORY (INHALATION)
Status: COMPLETED | OUTPATIENT
Start: 2018-12-05 | End: 2018-12-05

## 2018-12-05 RX ORDER — FLUTICASONE PROPIONATE AND SALMETEROL 500; 50 UG/1; UG/1
1 POWDER RESPIRATORY (INHALATION) 2 TIMES DAILY
Qty: 60 EACH | Refills: 11 | Status: SHIPPED | OUTPATIENT
Start: 2018-12-05 | End: 2019-04-15 | Stop reason: SDUPTHER

## 2018-12-05 RX ORDER — LIDOCAINE 50 MG/G
1 PATCH TOPICAL DAILY
Qty: 30 PATCH | Refills: 0 | Status: SHIPPED | OUTPATIENT
Start: 2018-12-05 | End: 2019-01-04

## 2018-12-05 RX ADMIN — IPRATROPIUM BROMIDE AND ALBUTEROL SULFATE 3 ML: 2.5; .5 SOLUTION RESPIRATORY (INHALATION) at 11:12
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
## 2019-01-04 ENCOUNTER — TELEPHONE (OUTPATIENT)
Dept: INTERNAL MEDICINE | Facility: CLINIC | Age: 64
End: 2019-01-04

## 2019-01-04 ENCOUNTER — OFFICE VISIT (OUTPATIENT)
Dept: INTERNAL MEDICINE | Facility: CLINIC | Age: 64
End: 2019-01-04
Payer: COMMERCIAL

## 2019-01-04 VITALS
SYSTOLIC BLOOD PRESSURE: 138 MMHG | BODY MASS INDEX: 30.31 KG/M2 | WEIGHT: 164.69 LBS | DIASTOLIC BLOOD PRESSURE: 88 MMHG | HEIGHT: 62 IN | OXYGEN SATURATION: 94 % | TEMPERATURE: 98 F | HEART RATE: 89 BPM

## 2019-01-04 DIAGNOSIS — Z12.39 SCREENING FOR BREAST CANCER: ICD-10-CM

## 2019-01-04 DIAGNOSIS — E55.9 VITAMIN D INSUFFICIENCY: Chronic | ICD-10-CM

## 2019-01-04 DIAGNOSIS — S22.31XD CLOSED FRACTURE OF ONE RIB OF RIGHT SIDE WITH ROUTINE HEALING, SUBSEQUENT ENCOUNTER: Primary | ICD-10-CM

## 2019-01-04 DIAGNOSIS — J44.89 ASTHMA WITH COPD: Chronic | ICD-10-CM

## 2019-01-04 PROCEDURE — 3008F BODY MASS INDEX DOCD: CPT | Mod: CPTII,S$GLB,, | Performed by: FAMILY MEDICINE

## 2019-01-04 PROCEDURE — 99214 PR OFFICE/OUTPT VISIT, EST, LEVL IV, 30-39 MIN: ICD-10-PCS | Mod: S$GLB,,, | Performed by: FAMILY MEDICINE

## 2019-01-04 PROCEDURE — 3008F PR BODY MASS INDEX (BMI) DOCUMENTED: ICD-10-PCS | Mod: CPTII,S$GLB,, | Performed by: FAMILY MEDICINE

## 2019-01-04 PROCEDURE — 3075F SYST BP GE 130 - 139MM HG: CPT | Mod: CPTII,S$GLB,, | Performed by: FAMILY MEDICINE

## 2019-01-04 PROCEDURE — 3079F PR MOST RECENT DIASTOLIC BLOOD PRESSURE 80-89 MM HG: ICD-10-PCS | Mod: CPTII,S$GLB,, | Performed by: FAMILY MEDICINE

## 2019-01-04 PROCEDURE — 3079F DIAST BP 80-89 MM HG: CPT | Mod: CPTII,S$GLB,, | Performed by: FAMILY MEDICINE

## 2019-01-04 PROCEDURE — 99999 PR PBB SHADOW E&M-EST. PATIENT-LVL III: CPT | Mod: PBBFAC,,, | Performed by: FAMILY MEDICINE

## 2019-01-04 PROCEDURE — 99999 PR PBB SHADOW E&M-EST. PATIENT-LVL III: ICD-10-PCS | Mod: PBBFAC,,, | Performed by: FAMILY MEDICINE

## 2019-01-04 PROCEDURE — 99214 OFFICE O/P EST MOD 30 MIN: CPT | Mod: S$GLB,,, | Performed by: FAMILY MEDICINE

## 2019-01-04 PROCEDURE — 3075F PR MOST RECENT SYSTOLIC BLOOD PRESS GE 130-139MM HG: ICD-10-PCS | Mod: CPTII,S$GLB,, | Performed by: FAMILY MEDICINE

## 2019-01-04 NOTE — PROGRESS NOTES
"Subjective:   Patient ID: Merle Caro is a 63 y.o. female.  Chief Complaint:  Follow-up      Patient presents follow-up on right rib fracture.  Last visit pain with coughing, movement, and palpation.   Prescribed Lidoderm patch for pain.    No longer with pain on breathing, coughing, or movement.  Area still somewhat mildly tender to touch.  COPD with asthma.  On Advair and Tudorza maintenance inhalers.  Out of  Tudorza.  Off medication cough increased.  Needs refill.  Vitamin-D insufficiency.  On 5000 international units daily.  Needs repeat level.  Routine health with mammogram due in February.  No new complaints concerns today.      Review of Systems   Constitutional: Negative for chills, fatigue and fever.   HENT: Negative for congestion, ear pain, postnasal drip, rhinorrhea, sinus pressure, sneezing and sore throat.    Eyes: Negative for discharge, redness and itching.   Respiratory: Positive for cough. Negative for chest tightness, shortness of breath and wheezing.    Cardiovascular: Negative for chest pain, palpitations and leg swelling.   Gastrointestinal: Negative for abdominal pain, diarrhea, nausea and vomiting.   Musculoskeletal: Negative for back pain and neck pain.   Skin: Negative for rash.   Neurological: Negative for headaches.   Hematological: Negative for adenopathy.     Objective:   /88 (BP Location: Right arm, Patient Position: Sitting, BP Method: Medium (Manual))   Pulse 89   Temp 97.6 °F (36.4 °C) (Oral)   Ht 5' 2" (1.575 m)   Wt 74.7 kg (164 lb 10.9 oz)   SpO2 (!) 94%   BMI 30.12 kg/m²     Physical Exam   Constitutional: She is oriented to person, place, and time. Vital signs are normal. She appears well-developed and well-nourished.   Neck: No JVD present. No thyroid mass and no thyromegaly present.   Cardiovascular: Normal rate, regular rhythm and normal heart sounds. Exam reveals no gallop and no friction rub.   No murmur heard.  Pulses:       Radial pulses are 2+ on the " right side, and 2+ on the left side.   Pulmonary/Chest: Effort normal. She has wheezes in the right lower field and the left lower field. She has rhonchi in the right middle field, the right lower field, the left middle field and the left lower field. She has no rales.   Abdominal: Soft. She exhibits no distension. There is no tenderness. There is no rebound, no guarding and no CVA tenderness.   Musculoskeletal: Normal range of motion. She exhibits no edema.   Neurological: She is oriented to person, place, and time. She displays a negative Romberg sign. Coordination and gait normal.   Skin: Skin is warm and dry. No rash noted.   Psychiatric: She has a normal mood and affect. Her behavior is normal. Judgment and thought content normal.   Nursing note and vitals reviewed.    Assessment:       ICD-10-CM ICD-9-CM   1. Closed fracture of one rib of right side with routine healing, subsequent encounter S22.31XD V54.19   2. Asthma with COPD J44.9 493.20   3. Screening for breast cancer Z12.31 V76.10   4. Vitamin D insufficiency E55.9 268.9     Plan:   Closed fracture of one rib of right side with routine healing, subsequent encounter  Healing well.  No additional treatment or evaluation needed.      Asthma with COPD  -     aclidinium bromide (TUDORZA PRESSAIR) 400 mcg/actuation AePB; Inhale 1 puff into the lungs 2 (two) times daily.  Dispense: 60 each; Refill: 11  Continue Advair inhalation twice a day  Restart Tudorza inhalation twice a day  Albuterol as needed.    Follow-up pulmonology as scheduled.    Vitamin D insufficiency  -     Vitamin D; Future; Expected date: 02/04/2019  Recheck level with next scheduled blood draw.    Adjust 5000 international units daily supplement if needed.    RHM  -     Mammo Digital Screening Bilat; Future; Expected date: 01/04/2019   due for mammogram in February 2019.    Follow-up all specialist as scheduled.    Follow up with me in April as scheduled or sooner as needed.

## 2019-01-04 NOTE — TELEPHONE ENCOUNTER
Patient was in today, she said that you mntioned something to her about a change in her lungs.  Patient wants to know what did you mean.

## 2019-01-04 NOTE — TELEPHONE ENCOUNTER
----- Message from Giovanna Ortega sent at 1/4/2019  4:48 PM CST -----  Contact: self  Pt is calling to speak with nurse in reference to pt's lungs. Please call pt back at [phone].      Thanks,   Giovanna Ortega

## 2019-01-07 ENCOUNTER — TELEPHONE (OUTPATIENT)
Dept: INTERNAL MEDICINE | Facility: CLINIC | Age: 64
End: 2019-01-07

## 2019-01-07 NOTE — TELEPHONE ENCOUNTER
S/w associate at pharmacy, Krystal, she stated that patient received 10,000 iu Vitamin D but patient is on 5,000 and wanted to know what you wanted her to do.

## 2019-01-07 NOTE — TELEPHONE ENCOUNTER
The prescription was ordered for 5000 international units 1 a day.    I do not know why she received 10,000 international units.    If prescription can be changed then she can do 80241 units 3 times a week and get correct 5000 dose at next visit

## 2019-01-07 NOTE — TELEPHONE ENCOUNTER
----- Message from Mandi Tsang sent at 1/7/2019  3:23 PM CST -----  Krystal [place] at [phone]//states is calling regarding a script//Vitamin D 3//has question//please call//[email or web address]

## 2019-01-08 ENCOUNTER — TELEPHONE (OUTPATIENT)
Dept: INTERNAL MEDICINE | Facility: CLINIC | Age: 64
End: 2019-01-08

## 2019-01-08 NOTE — TELEPHONE ENCOUNTER
----- Message from Gracei Aranda sent at 1/8/2019 12:51 PM CST -----  Contact: Krystal/[facility]  Krystal is calling to do a follow up on the Vitamin D, Please call her at [phone] regards to Dr. Major decision.    Thanks  Td

## 2019-01-08 NOTE — TELEPHONE ENCOUNTER
S/w Krystal at [facility] advised her to close out old Rx and create new for VBit D 5000 daily.

## 2019-02-14 ENCOUNTER — OFFICE VISIT (OUTPATIENT)
Dept: UROLOGY | Facility: CLINIC | Age: 64
End: 2019-02-14
Payer: COMMERCIAL

## 2019-02-14 ENCOUNTER — HOSPITAL ENCOUNTER (OUTPATIENT)
Dept: RADIOLOGY | Facility: HOSPITAL | Age: 64
Discharge: HOME | End: 2019-02-14
Attending: FAMILY MEDICINE
Payer: COMMERCIAL

## 2019-02-14 VITALS — BODY MASS INDEX: 31.34 KG/M2 | HEIGHT: 62 IN | WEIGHT: 170.31 LBS

## 2019-02-14 VITALS — HEIGHT: 62 IN | BODY MASS INDEX: 30.18 KG/M2 | WEIGHT: 164 LBS

## 2019-02-14 DIAGNOSIS — N89.8 VAGINAL LESION: Primary | ICD-10-CM

## 2019-02-14 DIAGNOSIS — Z12.39 SCREENING FOR BREAST CANCER: ICD-10-CM

## 2019-02-14 DIAGNOSIS — N32.81 OAB (OVERACTIVE BLADDER): ICD-10-CM

## 2019-02-14 LAB
BILIRUB SERPL-MCNC: NORMAL MG/DL
BLOOD URINE, POC: NORMAL
COLOR, POC UA: YELLOW
GLUCOSE UR QL STRIP: NORMAL
KETONES UR QL STRIP: NORMAL
LEUKOCYTE ESTERASE URINE, POC: NORMAL
MICROSCOPIC COMMENT: NORMAL
NITRITE, POC UA: NORMAL
PH, POC UA: 6
POC RESIDUAL URINE VOLUME: 28 ML (ref 0–100)
PROTEIN, POC: NORMAL
SPECIFIC GRAVITY, POC UA: 1.01
SQUAMOUS #/AREA URNS AUTO: 1 /HPF
UROBILINOGEN, POC UA: NORMAL

## 2019-02-14 PROCEDURE — 77067 SCR MAMMO BI INCL CAD: CPT | Mod: TC

## 2019-02-14 PROCEDURE — 99244 OFF/OP CNSLTJ NEW/EST MOD 40: CPT | Mod: 25,S$GLB,, | Performed by: UROLOGY

## 2019-02-14 PROCEDURE — 77067 MAMMO DIGITAL SCREENING BILAT WITH TOMOSYNTHESIS_CAD: ICD-10-PCS | Mod: 26,,, | Performed by: RADIOLOGY

## 2019-02-14 PROCEDURE — 51798 US URINE CAPACITY MEASURE: CPT | Mod: S$GLB,,, | Performed by: UROLOGY

## 2019-02-14 PROCEDURE — 81002 POCT URINE DIPSTICK WITHOUT MICROSCOPE: ICD-10-PCS | Mod: S$GLB,,, | Performed by: UROLOGY

## 2019-02-14 PROCEDURE — 87086 URINE CULTURE/COLONY COUNT: CPT

## 2019-02-14 PROCEDURE — 51798 POCT BLADDER SCAN: ICD-10-PCS | Mod: S$GLB,,, | Performed by: UROLOGY

## 2019-02-14 PROCEDURE — 99999 PR PBB SHADOW E&M-EST. PATIENT-LVL III: CPT | Mod: PBBFAC,,, | Performed by: UROLOGY

## 2019-02-14 PROCEDURE — 81002 URINALYSIS NONAUTO W/O SCOPE: CPT | Mod: S$GLB,,, | Performed by: UROLOGY

## 2019-02-14 PROCEDURE — 99244 PR OFFICE CONSULTATION,LEVEL IV: ICD-10-PCS | Mod: 25,S$GLB,, | Performed by: UROLOGY

## 2019-02-14 PROCEDURE — 77067 SCR MAMMO BI INCL CAD: CPT | Mod: 26,,, | Performed by: RADIOLOGY

## 2019-02-14 PROCEDURE — 77063 MAMMO DIGITAL SCREENING BILAT WITH TOMOSYNTHESIS_CAD: ICD-10-PCS | Mod: 26,,, | Performed by: RADIOLOGY

## 2019-02-14 PROCEDURE — 99999 PR PBB SHADOW E&M-EST. PATIENT-LVL III: ICD-10-PCS | Mod: PBBFAC,,, | Performed by: UROLOGY

## 2019-02-14 PROCEDURE — 77063 BREAST TOMOSYNTHESIS BI: CPT | Mod: 26,,, | Performed by: RADIOLOGY

## 2019-02-14 PROCEDURE — 81001 URINALYSIS AUTO W/SCOPE: CPT

## 2019-02-14 NOTE — LETTER
February 14, 2019      Haseeb Reilly MD  [address]  [place] [address]           [facility]  [address] [place] [id number]  Phone: [phone]  Fax: [phone]          Patient: Merle Caro   MR Number: [id number]   YOB: 1955   Date of Visit: 2/14/2019       Dear Dr. Haeseb Reilly:    Thank you for referring Merle Caro to me for evaluation. Attached you will find relevant portions of my assessment and plan of care.    If you have questions, please do not hesitate to call me. I look forward to following Merle Caro along with you.    Sincerely,    Juan Feliz IV, MD    Enclosure  CC:  No Recipients    If you would like to receive this communication electronically, please contact [email or web address] or [phone] to request more information on [facility] access.    For providers and/or their staff who would like to refer a patient to [facility], please contact us through our one-stop-shop provider referral line, [facility], at [phone].    If you feel you have received this communication in error or would no longer like to receive these types of communications, please e-mail [email or web address]

## 2019-02-14 NOTE — PROGRESS NOTES
Chief Complaint: SP Pain    HPI:   2/14/19: 62 yo woman having SP pain and pressure with need to void.  Urine is very yellow.  Some left flank pain.  No abd/pelvic pain and no exac/rel factors.  No hematuria.  No urolithiasis.  No urinary bother.  No  history.  Asymptomatic today except for when she has to go to the bathroom.  Been a problem for years.  Doesn't feel she empties all the way.  Leaks some after just voided.  No recent UTI.  Drinks cokes about 4 a day.  Referred by Dr. Reilly.    Allergies:  Codeine; Keflex [cephalexin]; Shellfish containing products; and Sucralfate    Medications: has a current medication list which includes the following prescription(s): aclidinium bromide, albuterol, cholecalciferol (vitamin d3), clonazepam, fluticasone-salmeterol 500-50 mcg/dose, haloperidol, ibuprofen, lisinopril, nicotine, nicotine (polacrilex), oxygen-air delivery systems, pantoprazole, and paroxetine.    Review of Systems:  General: No fever, chills, fatigability, or weight loss.  Skin: No rashes, itching, or changes in color or texture of skin.  Chest: Denies KENT, cyanosis, wheezing, cough, and sputum production.  Abdomen: Appetite fine. No weight loss. Denies diarrhea, abdominal pain, hematemesis, or blood in stool.  Musculoskeletal: No joint stiffness or swelling. Denies back pain.  : As above.  All other review of systems negative.    PMH:   has a past medical history of Asthma, COPD (chronic obstructive pulmonary disease), Depression, and Tobacco abuse.    PSH:   has a past surgical history that includes Hysterectomy; Knee surgery; Tonsillectomy; multiple fracture repairs; and Colonoscopy w/ polypectomy (01/05/2011).    FamHx: family history includes COPD in her mother; Cancer in her mother; Hypertension in her mother; Lung cancer in her mother; Ovarian cancer in her sister; Stroke in her mother.    SocHx:  reports that she has been smoking cigarettes.  She has a 33.00 pack-year smoking history. she  has never used smokeless tobacco. She reports that she does not drink alcohol or use drugs.     Physical Exam:  Vitals: There were no vitals filed for this visit.  General: A&Ox3. No apparent distress. No deformities.  Neck: No masses. Normal thyroid.  Lungs: normal inspiration. No use of accessory muscles.  Heart: normal pulse. No arrhythmias.  Abdomen: Soft. NT. ND. No masses. No hernias. No hepatosplenomegaly.  Lymphatic: Neck and groin nodes negative.  Skin: The skin is warm and dry. No jaundice.  Ext: No c/c/e.  : External genitalia normal except a number of lesions possibly condyloma    Labs/Studies:   Urinalysis performed in clinic, summary: UA normal  Bladder Scan performed in office: PVR 28 ml.    Impression/Plan:   1. Cut out caffeine.  2. To OB for assessment of what looks like condyloma.

## 2019-02-16 LAB — BACTERIA UR CULT: NO GROWTH

## 2019-02-28 ENCOUNTER — OFFICE VISIT (OUTPATIENT)
Dept: OBSTETRICS AND GYNECOLOGY | Facility: CLINIC | Age: 64
End: 2019-02-28
Payer: COMMERCIAL

## 2019-02-28 VITALS
SYSTOLIC BLOOD PRESSURE: 130 MMHG | WEIGHT: 172.81 LBS | DIASTOLIC BLOOD PRESSURE: 76 MMHG | BODY MASS INDEX: 31.8 KG/M2 | HEIGHT: 62 IN

## 2019-02-28 DIAGNOSIS — N95.2 VAGINAL ATROPHY: ICD-10-CM

## 2019-02-28 DIAGNOSIS — N90.7 SEBACEOUS CYST OF LABIA: Primary | ICD-10-CM

## 2019-02-28 PROCEDURE — 3078F PR MOST RECENT DIASTOLIC BLOOD PRESSURE < 80 MM HG: ICD-10-PCS | Mod: CPTII,S$GLB,, | Performed by: OBSTETRICS & GYNECOLOGY

## 2019-02-28 PROCEDURE — 99999 PR PBB SHADOW E&M-EST. PATIENT-LVL III: CPT | Mod: PBBFAC,,, | Performed by: OBSTETRICS & GYNECOLOGY

## 2019-02-28 PROCEDURE — 99204 OFFICE O/P NEW MOD 45 MIN: CPT | Mod: S$GLB,,, | Performed by: OBSTETRICS & GYNECOLOGY

## 2019-02-28 PROCEDURE — 99999 PR PBB SHADOW E&M-EST. PATIENT-LVL III: ICD-10-PCS | Mod: PBBFAC,,, | Performed by: OBSTETRICS & GYNECOLOGY

## 2019-02-28 PROCEDURE — 3078F DIAST BP <80 MM HG: CPT | Mod: CPTII,S$GLB,, | Performed by: OBSTETRICS & GYNECOLOGY

## 2019-02-28 PROCEDURE — 3075F PR MOST RECENT SYSTOLIC BLOOD PRESS GE 130-139MM HG: ICD-10-PCS | Mod: CPTII,S$GLB,, | Performed by: OBSTETRICS & GYNECOLOGY

## 2019-02-28 PROCEDURE — 3075F SYST BP GE 130 - 139MM HG: CPT | Mod: CPTII,S$GLB,, | Performed by: OBSTETRICS & GYNECOLOGY

## 2019-02-28 PROCEDURE — 3008F PR BODY MASS INDEX (BMI) DOCUMENTED: ICD-10-PCS | Mod: CPTII,S$GLB,, | Performed by: OBSTETRICS & GYNECOLOGY

## 2019-02-28 PROCEDURE — 3008F BODY MASS INDEX DOCD: CPT | Mod: CPTII,S$GLB,, | Performed by: OBSTETRICS & GYNECOLOGY

## 2019-02-28 PROCEDURE — 99204 PR OFFICE/OUTPT VISIT, NEW, LEVL IV, 45-59 MIN: ICD-10-PCS | Mod: S$GLB,,, | Performed by: OBSTETRICS & GYNECOLOGY

## 2019-02-28 RX ORDER — ESTRADIOL 10 UG/1
1 INSERT VAGINAL
Qty: 8 TABLET | Refills: 11 | Status: SHIPPED | OUTPATIENT
Start: 2019-02-28 | End: 2021-06-20

## 2019-02-28 NOTE — PROGRESS NOTES
Subjective:       Patient ID: Merle Caro is a 63 y.o. female.    Chief Complaint:  Vaginal Itching      History of Present Illness  HPI  Presents for possible vulvar condyloma and vaginal dryness.  The patient has some bumps on the vulva that have been present x many years.  They tend to dry out and itch.  Seems like some might be getting larger.  She also reports significant vaginal dryness and pain during intercourse.  She is , and they are unable to have sex because of the vaginal pain.  Has a hx of hysterectomy for prolapse.  Still has both ovaries.   MM2019: normal    GYN & OB History  No LMP recorded. Patient has had a hysterectomy.   Date of Last Pap: No result found    OB History    Para Term  AB Living   3 2 2   1 2   SAB TAB Ectopic Multiple Live Births   1              # Outcome Date GA Lbr Thomas/2nd Weight Sex Delivery Anes PTL Lv   3 Term            2 Term            1 SAB                   Review of Systems  Review of Systems   Constitutional: Negative for fatigue, fever and unexpected weight change.   Gastrointestinal: Negative for abdominal pain, constipation, diarrhea, nausea and vomiting.   Genitourinary: Positive for dyspareunia, genital sores, vaginal pain and vaginal dryness. Negative for dysuria, frequency, urgency, vaginal bleeding, vaginal discharge, postcoital bleeding and vaginal odor.           Objective:    Physical Exam:   Constitutional: She is oriented to person, place, and time. She appears well-developed and well-nourished. No distress.             Abdominal: Soft. She exhibits no distension and no mass. There is no tenderness. There is no rebound and no guarding. Hernia confirmed negative in the right inguinal area and confirmed negative in the left inguinal area.     Genitourinary: Vagina normal.       Pelvic exam was performed with patient supine. There is no rash, tenderness, lesion or injury on the right labia. There is no rash, tenderness, lesion or  injury on the left labia. Uterus is absent. Right adnexum displays no mass, no tenderness and no fullness. Left adnexum displays no mass, no tenderness and no fullness. No erythema (mucosa pale, thin, and atrophic), tenderness, bleeding, rectocele, cystocele or unspecified prolapse of vaginal walls in the vagina. No foreign body in the vagina. No signs of injury around the vagina. No vaginal discharge found. Cervix exhibits absence.               Neurological: She is alert and oriented to person, place, and time.     Psychiatric: She has a normal mood and affect.          Assessment:        1. Sebaceous cyst of labia    2. Vaginal atrophy                Plan:      Merle was seen today for vaginal itching.    Diagnoses and all orders for this visit:    Sebaceous cyst of labia    Vaginal atrophy  -     estradiol (VAGIFEM) 10 mcg Tab; Place 1 tablet (10 mcg total) vaginally twice a week.    Discussed vulvar findings.  Reviewed proper vulvar hygiene.  Discussed warm compresses, and to exfoliate the area daily with a warm washcloth.  Pt wants to try vaginal estrogen for atrophy.  Rx sent.  RTC 1 year or sooner prn.

## 2019-02-28 NOTE — LETTER
February 28, 2019      Juan Feliz IV, MD  [address] [place] [address] [address] [place] [id number]           [facility]  [address] [place] [address] [address] [place] [address]  Phone: [phone]  Fax: [phone]          Patient: Merle Caro   MR Number: [id number]   YOB: 1955   Date of Visit: 2/28/2019       Dear Dr. Juan Feliz IV:    Thank you for referring Merle Caro to me for evaluation. Attached you will find relevant portions of my assessment and plan of care.    If you have questions, please do not hesitate to call me. I look forward to following Merle Caro along with you.    Sincerely,    Ly Garrett MD    Enclosure  CC:  No Recipients    If you would like to receive this communication electronically, please contact [email or web address] or [phone] to request more information on [facility] access.    For providers and/or their staff who would like to refer a patient to [facility], please contact us through our one-stop-shop provider referral line, [facility], at [phone].    If you feel you have received this communication in error or would no longer like to receive these types of communications, please e-mail [email or web address]

## 2019-03-07 ENCOUNTER — PATIENT MESSAGE (OUTPATIENT)
Dept: NEPHROLOGY | Facility: CLINIC | Age: 64
End: 2019-03-07

## 2019-03-22 ENCOUNTER — TELEPHONE (OUTPATIENT)
Dept: INTERNAL MEDICINE | Facility: CLINIC | Age: 64
End: 2019-03-22

## 2019-03-22 NOTE — TELEPHONE ENCOUNTER
Patient advised Dr. Major gone for today, no longer have availability.  Patient states she would come in to urgent care.

## 2019-03-22 NOTE — TELEPHONE ENCOUNTER
----- Message from Milagro Alvarez sent at 3/22/2019  1:01 PM CDT -----  Contact: self   Type:  Sooner Apoointment Request    Caller is requesting a sooner appointment.  Caller declined first available appointment listed below.  Caller will not accept being placed on the waitlist and is requesting a message be sent to doctor.  Name of Caller:-Patient   When is the first available appointment?3/25/19  Symptoms:yellow mucus-cough-ear pain-ear popping  Would the patient rather a call back or a response via [facility]? call  Best Call Back Number:[phone]  Additional Information: I offered an appointment Monday but she is requesting a sooner time        Thanks,  Milagro Alvarez

## 2019-03-23 ENCOUNTER — OFFICE VISIT (OUTPATIENT)
Dept: URGENT CARE | Facility: CLINIC | Age: 64
End: 2019-03-23
Payer: COMMERCIAL

## 2019-03-23 VITALS
RESPIRATION RATE: 20 BRPM | BODY MASS INDEX: 32.14 KG/M2 | TEMPERATURE: 98 F | OXYGEN SATURATION: 93 % | HEIGHT: 62 IN | HEART RATE: 78 BPM | WEIGHT: 174.63 LBS

## 2019-03-23 DIAGNOSIS — J98.01 BRONCHOSPASM: Primary | ICD-10-CM

## 2019-03-23 PROCEDURE — 96372 THER/PROPH/DIAG INJ SC/IM: CPT | Mod: S$GLB,,, | Performed by: FAMILY MEDICINE

## 2019-03-23 PROCEDURE — 99999 PR PBB SHADOW E&M-EST. PATIENT-LVL V: CPT | Mod: PBBFAC,,, | Performed by: FAMILY MEDICINE

## 2019-03-23 PROCEDURE — 96372 PR INJECTION,THERAP/PROPH/DIAG2ST, IM OR SUBCUT: ICD-10-PCS | Mod: S$GLB,,, | Performed by: FAMILY MEDICINE

## 2019-03-23 PROCEDURE — 99999 PR PBB SHADOW E&M-EST. PATIENT-LVL V: ICD-10-PCS | Mod: PBBFAC,,, | Performed by: FAMILY MEDICINE

## 2019-03-23 PROCEDURE — 94640 PR INHAL RX, AIRWAY OBST/DX SPUTUM INDUCT: ICD-10-PCS | Mod: 59,S$GLB,, | Performed by: FAMILY MEDICINE

## 2019-03-23 PROCEDURE — 3008F BODY MASS INDEX DOCD: CPT | Mod: CPTII,S$GLB,, | Performed by: FAMILY MEDICINE

## 2019-03-23 PROCEDURE — 94640 AIRWAY INHALATION TREATMENT: CPT | Mod: 59,S$GLB,, | Performed by: FAMILY MEDICINE

## 2019-03-23 PROCEDURE — 3008F PR BODY MASS INDEX (BMI) DOCUMENTED: ICD-10-PCS | Mod: CPTII,S$GLB,, | Performed by: FAMILY MEDICINE

## 2019-03-23 PROCEDURE — 99214 OFFICE O/P EST MOD 30 MIN: CPT | Mod: 25,S$GLB,, | Performed by: FAMILY MEDICINE

## 2019-03-23 PROCEDURE — 99214 PR OFFICE/OUTPT VISIT, EST, LEVL IV, 30-39 MIN: ICD-10-PCS | Mod: 25,S$GLB,, | Performed by: FAMILY MEDICINE

## 2019-03-23 RX ORDER — METHYLPREDNISOLONE ACETATE 40 MG/ML
40 INJECTION, SUSPENSION INTRA-ARTICULAR; INTRALESIONAL; INTRAMUSCULAR; SOFT TISSUE
Status: COMPLETED | OUTPATIENT
Start: 2019-03-23 | End: 2019-03-23

## 2019-03-23 RX ORDER — ALBUTEROL SULFATE 90 UG/1
AEROSOL, METERED RESPIRATORY (INHALATION)
Qty: 1 INHALER | Refills: 0 | Status: SHIPPED | OUTPATIENT
Start: 2019-03-23 | End: 2019-04-15

## 2019-03-23 RX ORDER — METHYLPREDNISOLONE 4 MG/1
TABLET ORAL
Qty: 1 PACKAGE | Refills: 0 | Status: SHIPPED | OUTPATIENT
Start: 2019-03-23 | End: 2019-04-15

## 2019-03-23 RX ORDER — ALBUTEROL SULFATE 0.83 MG/ML
2.5 SOLUTION RESPIRATORY (INHALATION)
Status: COMPLETED | OUTPATIENT
Start: 2019-03-23 | End: 2019-03-23

## 2019-03-23 RX ORDER — BENZONATATE 100 MG/1
100 CAPSULE ORAL 3 TIMES DAILY PRN
Qty: 30 CAPSULE | Refills: 0 | Status: SHIPPED | OUTPATIENT
Start: 2019-03-23 | End: 2019-04-02

## 2019-03-23 RX ADMIN — ALBUTEROL SULFATE 2.5 MG: 0.83 SOLUTION RESPIRATORY (INHALATION) at 11:03

## 2019-03-23 RX ADMIN — METHYLPREDNISOLONE ACETATE 40 MG: 40 INJECTION, SUSPENSION INTRA-ARTICULAR; INTRALESIONAL; INTRAMUSCULAR; SOFT TISSUE at 11:03

## 2019-03-23 NOTE — PROGRESS NOTES
"Subjective:       Patient ID: Merle Caro is a 63 y.o. female.    Chief Complaint: Cough    Pulse 78   Temp 97.6 °F (36.4 °C) (Tympanic)   Resp 20   Ht 5' 2" (1.575 m)   Wt 79.2 kg (174 lb 9.7 oz)   BMI 31.94 kg/m²     HPI  Coughing for a week. Bring up yellow sputum.  also coughing    Review of Systems   Respiratory: Positive for cough, shortness of breath and wheezing.        Objective:      Physical Exam   Constitutional: She is oriented to person, place, and time. She appears well-developed and well-nourished. No distress.   HENT:   Head: Normocephalic and atraumatic.   TM- no erythema.    Eyes: EOM are normal. Pupils are equal, round, and reactive to light.   Cardiovascular: Normal rate.   No murmur heard.  Pulmonary/Chest: No respiratory distress. She has wheezes (diffuse loud wheeze insp and exp).   Neurological: She is alert and oriented to person, place, and time. No cranial nerve deficit.   Skin: Skin is warm and dry. She is not diaphoretic.   Nursing note and vitals reviewed.      Assessment:       1. Bronchospasm        Plan:     Merle was seen today for cough.    Diagnoses and all orders for this visit:    Bronchospasm  -     albuterol nebulizer solution 2.5 mg - post tx - patient states feeling better feeling easier breathing  -     methylPREDNISolone acetate injection 40 mg  -     methylPREDNISolone (MEDROL DOSEPACK) 4 mg tablet; use as directed  -     albuterol (PROVENTIL/VENTOLIN HFA) 90 mcg/actuation inhaler; 2 puffs every 4-6 hours as needed for wheezing, shortness of breath or cough    Other orders  -     benzonatate (TESSALON) 100 MG capsule; Take 1 capsule (100 mg total) by mouth 3 (three) times daily as needed for Cough.      Fluids, rest  Start medrol dose pack tomorrow  Use inhaler every 406 hours for shortness of breath/wheezing/cough  Rx cough pills as needed  Follow up if not better in a few days    "

## 2019-03-23 NOTE — PATIENT INSTRUCTIONS
Fluids, rest  Start medrol dose pack tomorrow  Use inhaler every 406 hours for shortness of breath/wheezing/cough  Rx cough pills as needed  Follow up if not better in a few days

## 2019-04-01 ENCOUNTER — OFFICE VISIT (OUTPATIENT)
Dept: INTERNAL MEDICINE | Facility: CLINIC | Age: 64
End: 2019-04-01
Payer: COMMERCIAL

## 2019-04-01 ENCOUNTER — HOSPITAL ENCOUNTER (OUTPATIENT)
Dept: RADIOLOGY | Facility: HOSPITAL | Age: 64
Discharge: HOME | End: 2019-04-01
Attending: FAMILY MEDICINE
Payer: COMMERCIAL

## 2019-04-01 VITALS
HEIGHT: 62 IN | BODY MASS INDEX: 32.01 KG/M2 | SYSTOLIC BLOOD PRESSURE: 130 MMHG | HEART RATE: 98 BPM | WEIGHT: 173.94 LBS | OXYGEN SATURATION: 95 % | DIASTOLIC BLOOD PRESSURE: 70 MMHG | TEMPERATURE: 98 F

## 2019-04-01 DIAGNOSIS — M25.561 POSTERIOR KNEE PAIN, RIGHT: ICD-10-CM

## 2019-04-01 DIAGNOSIS — R05.9 COUGH: ICD-10-CM

## 2019-04-01 DIAGNOSIS — E55.9 VITAMIN D INSUFFICIENCY: Chronic | ICD-10-CM

## 2019-04-01 DIAGNOSIS — F17.210 CIGARETTE NICOTINE DEPENDENCE WITHOUT COMPLICATION: ICD-10-CM

## 2019-04-01 DIAGNOSIS — M25.561 POSTERIOR KNEE PAIN, RIGHT: Primary | ICD-10-CM

## 2019-04-01 PROCEDURE — 99999 PR PBB SHADOW E&M-EST. PATIENT-LVL IV: ICD-10-PCS | Mod: PBBFAC,,, | Performed by: FAMILY MEDICINE

## 2019-04-01 PROCEDURE — 3008F BODY MASS INDEX DOCD: CPT | Mod: CPTII,S$GLB,, | Performed by: FAMILY MEDICINE

## 2019-04-01 PROCEDURE — 3008F PR BODY MASS INDEX (BMI) DOCUMENTED: ICD-10-PCS | Mod: CPTII,S$GLB,, | Performed by: FAMILY MEDICINE

## 2019-04-01 PROCEDURE — 99999 PR PBB SHADOW E&M-EST. PATIENT-LVL IV: CPT | Mod: PBBFAC,,, | Performed by: FAMILY MEDICINE

## 2019-04-01 PROCEDURE — 3075F SYST BP GE 130 - 139MM HG: CPT | Mod: CPTII,S$GLB,, | Performed by: FAMILY MEDICINE

## 2019-04-01 PROCEDURE — 3078F PR MOST RECENT DIASTOLIC BLOOD PRESSURE < 80 MM HG: ICD-10-PCS | Mod: CPTII,S$GLB,, | Performed by: FAMILY MEDICINE

## 2019-04-01 PROCEDURE — 76882 US LMTD JT/FCL EVL NVASC XTR: CPT | Mod: 26,RT,, | Performed by: RADIOLOGY

## 2019-04-01 PROCEDURE — 99215 PR OFFICE/OUTPT VISIT, EST, LEVL V, 40-54 MIN: ICD-10-PCS | Mod: S$GLB,,, | Performed by: FAMILY MEDICINE

## 2019-04-01 PROCEDURE — 3078F DIAST BP <80 MM HG: CPT | Mod: CPTII,S$GLB,, | Performed by: FAMILY MEDICINE

## 2019-04-01 PROCEDURE — 99215 OFFICE O/P EST HI 40 MIN: CPT | Mod: S$GLB,,, | Performed by: FAMILY MEDICINE

## 2019-04-01 PROCEDURE — 76882 US LMTD JT/FCL EVL NVASC XTR: CPT | Mod: TC,RT

## 2019-04-01 PROCEDURE — 3075F PR MOST RECENT SYSTOLIC BLOOD PRESS GE 130-139MM HG: ICD-10-PCS | Mod: CPTII,S$GLB,, | Performed by: FAMILY MEDICINE

## 2019-04-01 PROCEDURE — 76882 US EXTREMITY NON VASCULAR LIMITED RIGHT: ICD-10-PCS | Mod: 26,RT,, | Performed by: RADIOLOGY

## 2019-04-01 RX ORDER — DICLOFENAC SODIUM 10 MG/G
2 GEL TOPICAL 4 TIMES DAILY
Qty: 100 G | Refills: 0 | Status: SHIPPED | OUTPATIENT
Start: 2019-04-01 | End: 2020-02-10

## 2019-04-01 NOTE — PROGRESS NOTES
Subjective:   Patient ID: Merle Caro is a 63 y.o. female.  Chief Complaint:  Knee Pain and Chest Pain      Presents for evaluation of acute right knee pain, persistent cough, and chest pain.    Knee pain without trauma.  Posterior location.  No redness, warmth, or swelling.  Denies known popliteal cyst.  Pain also radiates in to lower right calf.    Cough.  Recurrent.  Seen urgent care.  No improvement despite steroids and Tessalon.  Not presently taking Protonix.  Does report compliance with Tudorza and Advair.  Chest pain.  Worse with movement.  Only occurred after fall at home.  Cardiac evaluation April 2018 with normal echo and treadmill testing.  This pain feels different.  Vitamin-D insufficiency.  Increased doses supplement.  Needs repeat testing.    No additional complaints concerns today.    Knee Pain    There was no injury mechanism. The pain is present in the right knee. The pain is at a severity of 5/10. The pain is moderate. The pain has been improving since onset. Pertinent negatives include no inability to bear weight, loss of motion, loss of sensation, muscle weakness, numbness or tingling. The symptoms are aggravated by movement, weight bearing and palpation. She has tried NSAIDs for the symptoms. The treatment provided moderate relief.     Review of Systems   Constitutional: Negative for chills, diaphoresis, fatigue and fever.   HENT: Negative for congestion, dental problem, ear discharge, ear pain, postnasal drip, rhinorrhea, sinus pressure, sinus pain, sneezing, sore throat and trouble swallowing.    Eyes: Negative.  Negative for visual disturbance.   Respiratory: Positive for cough. Negative for apnea, choking, chest tightness, shortness of breath, wheezing and stridor.    Cardiovascular: Negative for chest pain, palpitations and leg swelling.   Gastrointestinal: Negative for abdominal pain, blood in stool, constipation, diarrhea, nausea and vomiting.   Endocrine: Negative for polydipsia,  "polyphagia and polyuria.   Genitourinary: Negative for difficulty urinating, dysuria, flank pain, hematuria and pelvic pain.   Musculoskeletal: Positive for arthralgias, gait problem and myalgias. Negative for back pain, joint swelling and neck pain.   Skin: Negative for rash.   Neurological: Negative for dizziness, tingling, syncope, weakness, light-headedness, numbness and headaches.   Hematological: Negative for adenopathy.   Psychiatric/Behavioral: Negative for sleep disturbance. The patient is not nervous/anxious.      Objective:   /70 (BP Location: Left arm, Patient Position: Sitting, BP Method: Medium (Manual))   Pulse 98   Temp 98 °F (36.7 °C) (Tympanic)   Ht 5' 2" (1.575 m)   Wt 78.9 kg (173 lb 15.1 oz)   SpO2 95%   BMI 31.81 kg/m²     Physical Exam   Constitutional: Vital signs are normal. She appears well-developed and well-nourished.  Non-toxic appearance. She does not have a sickly appearance. She does not appear ill. No distress.   HENT:   Right Ear: Hearing, tympanic membrane, external ear and ear canal normal.   Left Ear: Hearing, tympanic membrane, external ear and ear canal normal.   Nose: Nose normal. Right sinus exhibits no maxillary sinus tenderness and no frontal sinus tenderness. Left sinus exhibits no maxillary sinus tenderness and no frontal sinus tenderness.   Mouth/Throat: Uvula is midline, oropharynx is clear and moist and mucous membranes are normal.   Eyes: Conjunctivae are normal. Right conjunctiva is not injected. Left conjunctiva is not injected.   Neck: No JVD present.   Cardiovascular: Normal rate, regular rhythm and normal heart sounds.   Pulmonary/Chest: Effort normal. No accessory muscle usage. No tachypnea. No respiratory distress. She has no decreased breath sounds. She has no wheezes. She has no rhonchi. She has no rales. She exhibits no tenderness.   Abdominal: Soft. She exhibits no distension. There is no tenderness. There is no rebound and no guarding. "   Musculoskeletal: She exhibits no edema.        Right knee: She exhibits decreased range of motion. She exhibits no swelling, no effusion, no ecchymosis, no deformity, no laceration, no erythema, normal alignment, no LCL laxity, normal patellar mobility, no bony tenderness, normal meniscus and no MCL laxity. Tenderness found. No medial joint line, no lateral joint line, no MCL, no LCL and no patellar tendon tenderness noted.        Right lower leg: She exhibits tenderness. She exhibits no bony tenderness, no swelling, no edema, no deformity and no laceration.   Right knee with tenderness to palpation popliteal fossa.    No mass, no swelling, no defect.    No redness or warmth.    Pain extends into the gastroc muscle area.   Lymphadenopathy:     She has no cervical adenopathy.   Neurological: Gait abnormal. Coordination normal.   Skin: Skin is warm, dry and intact. Bruising and ecchymosis noted. No rash noted.   Psychiatric: She has a normal mood and affect.   Nursing note and vitals reviewed.    Assessment:       ICD-10-CM ICD-9-CM   1. Posterior knee pain, right M25.561 719.46   2. Cough R05 786.2   3. Vitamin D insufficiency E55.9 268.9   4. Cigarette nicotine dependence without complication F17.210 305.1     Plan:   Posterior knee pain, right  -     US Soft Tissue Misc; Future; Expected date: 04/01/2019  -     diclofenac sodium (VOLTAREN) 1 % Gel; Apply 2 g topically 4 (four) times daily.  Dispense: 100 g; Refill: 0  Probable popliteal cyst.    Check ultrasound.    Topical Voltaren.    Rest and elevation.    Reassess 1 week.      Cough  Questionable etiology.  Doubt infectious.    Reports compliance with asthma/ COPD medication.    Take Protonix 40 mg daily for potential silent reflux.      Vitamin D insufficiency  -     Vitamin D; Future; Expected date: 04/01/2019  On daily 5000 international supplement.  Adjust as indicated.      Cigarette nicotine dependence without complication  -     Ambulatory referral to  Smoking Cessation Program  Patient interested/ motivating in attempting quitting again.      Return to clinic 1 week.

## 2019-04-10 ENCOUNTER — OFFICE VISIT (OUTPATIENT)
Dept: INTERNAL MEDICINE | Facility: CLINIC | Age: 64
End: 2019-04-10
Payer: COMMERCIAL

## 2019-04-10 ENCOUNTER — HOSPITAL ENCOUNTER (OUTPATIENT)
Dept: RADIOLOGY | Facility: HOSPITAL | Age: 64
Discharge: HOME | End: 2019-04-10
Attending: FAMILY MEDICINE
Payer: COMMERCIAL

## 2019-04-10 VITALS
WEIGHT: 171.31 LBS | OXYGEN SATURATION: 94 % | TEMPERATURE: 98 F | BODY MASS INDEX: 31.33 KG/M2 | HEART RATE: 95 BPM | SYSTOLIC BLOOD PRESSURE: 130 MMHG | DIASTOLIC BLOOD PRESSURE: 80 MMHG

## 2019-04-10 DIAGNOSIS — Z87.81 HISTORY OF RIB FRACTURE: ICD-10-CM

## 2019-04-10 DIAGNOSIS — M25.561 POSTERIOR KNEE PAIN, RIGHT: ICD-10-CM

## 2019-04-10 DIAGNOSIS — R05.9 COUGH: Primary | ICD-10-CM

## 2019-04-10 DIAGNOSIS — R07.81 RIB PAIN ON RIGHT SIDE: ICD-10-CM

## 2019-04-10 DIAGNOSIS — J44.89 ASTHMA WITH COPD: Chronic | ICD-10-CM

## 2019-04-10 DIAGNOSIS — F17.210 CIGARETTE NICOTINE DEPENDENCE WITHOUT COMPLICATION: ICD-10-CM

## 2019-04-10 DIAGNOSIS — E55.9 VITAMIN D INSUFFICIENCY: Chronic | ICD-10-CM

## 2019-04-10 DIAGNOSIS — Z12.9 SCREENING FOR CANCER: ICD-10-CM

## 2019-04-10 DIAGNOSIS — R05.9 COUGH: ICD-10-CM

## 2019-04-10 DIAGNOSIS — E78.00 PURE HYPERCHOLESTEROLEMIA: Chronic | ICD-10-CM

## 2019-04-10 PROCEDURE — 3075F SYST BP GE 130 - 139MM HG: CPT | Mod: CPTII,S$GLB,, | Performed by: FAMILY MEDICINE

## 2019-04-10 PROCEDURE — 99214 PR OFFICE/OUTPT VISIT, EST, LEVL IV, 30-39 MIN: ICD-10-PCS | Mod: S$GLB,,, | Performed by: FAMILY MEDICINE

## 2019-04-10 PROCEDURE — 99999 PR PBB SHADOW E&M-EST. PATIENT-LVL III: CPT | Mod: PBBFAC,,, | Performed by: FAMILY MEDICINE

## 2019-04-10 PROCEDURE — 99214 OFFICE O/P EST MOD 30 MIN: CPT | Mod: S$GLB,,, | Performed by: FAMILY MEDICINE

## 2019-04-10 PROCEDURE — 71046 X-RAY EXAM CHEST 2 VIEWS: CPT | Mod: 26,,, | Performed by: RADIOLOGY

## 2019-04-10 PROCEDURE — 3008F BODY MASS INDEX DOCD: CPT | Mod: CPTII,S$GLB,, | Performed by: FAMILY MEDICINE

## 2019-04-10 PROCEDURE — 3075F PR MOST RECENT SYSTOLIC BLOOD PRESS GE 130-139MM HG: ICD-10-PCS | Mod: CPTII,S$GLB,, | Performed by: FAMILY MEDICINE

## 2019-04-10 PROCEDURE — 99999 PR PBB SHADOW E&M-EST. PATIENT-LVL III: ICD-10-PCS | Mod: PBBFAC,,, | Performed by: FAMILY MEDICINE

## 2019-04-10 PROCEDURE — 3079F PR MOST RECENT DIASTOLIC BLOOD PRESSURE 80-89 MM HG: ICD-10-PCS | Mod: CPTII,S$GLB,, | Performed by: FAMILY MEDICINE

## 2019-04-10 PROCEDURE — 71046 XR CHEST PA AND LATERAL: ICD-10-PCS | Mod: 26,,, | Performed by: RADIOLOGY

## 2019-04-10 PROCEDURE — 71046 X-RAY EXAM CHEST 2 VIEWS: CPT | Mod: TC

## 2019-04-10 PROCEDURE — 3079F DIAST BP 80-89 MM HG: CPT | Mod: CPTII,S$GLB,, | Performed by: FAMILY MEDICINE

## 2019-04-10 PROCEDURE — 3008F PR BODY MASS INDEX (BMI) DOCUMENTED: ICD-10-PCS | Mod: CPTII,S$GLB,, | Performed by: FAMILY MEDICINE

## 2019-04-10 RX ORDER — ATORVASTATIN CALCIUM 10 MG/1
10 TABLET, FILM COATED ORAL DAILY
Qty: 90 TABLET | Refills: 3 | Status: SHIPPED | OUTPATIENT
Start: 2019-04-10 | End: 2019-07-10 | Stop reason: SDUPTHER

## 2019-04-10 NOTE — PROGRESS NOTES
Subjective:   Patient ID: Merle Caro is a 63 y.o. female.  Chief Complaint:  Follow-up      Patient presents to week follow-up.    Knee pain significantly improved with topical Voltaren.  Ultrasound without popliteal cyst.    Still with significant cough and right-sided rib pain despite compliance with asthma/ COPD medications and restarting PPI.  X-ray on file without acute infiltrate more chronic interstitial changes from December.  Right rib series with fractured rib December.  Healed, but increased pain with recent fall/trauma 2 weeks ago.    Denies shortness of breath.  Has portable oxygen today.  Pulse oximetry lower than normal range.  However overall comfortable.    Vitamin-D level normal.  Continued on 5000 international units daily.    Hyperlipidemia.  Previously not prescribe statin since 10 year risk was decreased when not smoking.  Since started smoking, needs to start statin.    Last CT for lung cancer screening 2016, due repeat.  No new complaints concerns today.    Review of Systems   Constitutional: Negative for chills, diaphoresis, fatigue and fever.   HENT: Negative for congestion, dental problem, ear discharge, ear pain, postnasal drip, rhinorrhea, sinus pressure, sinus pain, sneezing, sore throat and trouble swallowing.    Eyes: Negative.  Negative for visual disturbance.   Respiratory: Positive for cough. Negative for apnea, choking, chest tightness, shortness of breath, wheezing and stridor.    Cardiovascular: Negative for chest pain (Right-sided rib pain), palpitations and leg swelling.   Gastrointestinal: Negative for abdominal pain, blood in stool, constipation, diarrhea, nausea and vomiting.   Endocrine: Negative for polydipsia, polyphagia and polyuria.   Genitourinary: Negative for difficulty urinating, dysuria, flank pain, hematuria and pelvic pain.   Musculoskeletal: Negative for arthralgias, back pain, gait problem, joint swelling, myalgias and neck pain.   Skin: Negative for rash.    Neurological: Negative for dizziness, syncope, weakness, light-headedness, numbness and headaches.   Hematological: Negative for adenopathy.   Psychiatric/Behavioral: Negative for sleep disturbance. The patient is not nervous/anxious.      Objective:   /80 (BP Location: Left arm, Patient Position: Sitting, BP Method: Medium (Manual))   Pulse 95   Temp 97.7 °F (36.5 °C) (Tympanic)   Wt 77.7 kg (171 lb 4.8 oz)   SpO2 (!) 91%   BMI 31.33 kg/m²     Physical Exam   Constitutional: Vital signs are normal. She appears well-developed and well-nourished.   Neck: No JVD present. No thyroid mass and no thyromegaly present.   Cardiovascular: Normal rate, regular rhythm and normal heart sounds. Exam reveals no gallop and no friction rub.   No murmur heard.  Pulses:       Radial pulses are 2+ on the right side, and 2+ on the left side.   Pulmonary/Chest: Effort normal. No accessory muscle usage. No tachypnea. No respiratory distress. She has decreased breath sounds in the right middle field and the right lower field. She has no wheezes. She has no rhonchi. She has no rales.   Abdominal: Soft. She exhibits no distension. There is no tenderness. There is no rebound, no guarding and no CVA tenderness.   Musculoskeletal: Normal range of motion. She exhibits no edema.   Skin: Skin is warm and dry. No rash noted.   Psychiatric: She has a normal mood and affect. Her behavior is normal. Judgment and thought content normal.   Nursing note and vitals reviewed.    Assessment:       ICD-10-CM ICD-9-CM   1. Cough R05 786.2   2. Asthma with COPD J44.9 493.20   3. Rib pain on right side R07.81 786.50   4. History of rib fracture Z87.81 V15.51   5. Vitamin D insufficiency E55.9 268.9   6. Posterior knee pain, right M25.561 719.46   7. Pure hypercholesterolemia E78.00 272.0   8. Cigarette nicotine dependence without complication F17.210 305.1   9. Screening for cancer Z12.9 V76.9     Plan:   Cough  Asthma with COPD  Rib pain on right  side  History of rib fracture  -     X-Ray Chest PA And Lateral; Future; Expected date: 04/10/2019  With recurrent/persistent pain and increased cough following recent trauma, check chest x-ray rule out underlying pneumonia, consolidation, or new rib fracture.    Otherwise continue all present medications.    Follow up pulmonology for testing and visit as scheduled next week.      Vitamin D insufficiency  Resolved.  Present level normal.    Continue vitamin-D 5000 international units supplement.      Posterior knee pain, right  Continue topical Voltaren as needed.    No additional treatment or evaluation at this time.      Pure hypercholesterolemia  -     atorvastatin (LIPITOR) 10 MG tablet; Take 1 tablet (10 mg total) by mouth once daily.  Dispense: 90 tablet; Refill: 3  Agrees to start statin.    Take Lipitor 10 mg daily.    Recheck lipids and CMP 3 months.      Cigarette nicotine dependence without complication  -     CT Chest Lung Screening Low Dose; Future; Expected date: 04/10/2019  Last testing 2 years ago.    Reorder CT chest lung screening.    Follow up pulmonology as scheduled   Follow up Nephrology as scheduled   Follow up with me 3 months or sooner as needed.

## 2019-04-15 ENCOUNTER — CLINICAL SUPPORT (OUTPATIENT)
Dept: PULMONOLOGY | Facility: CLINIC | Age: 64
End: 2019-04-15
Payer: COMMERCIAL

## 2019-04-15 ENCOUNTER — OFFICE VISIT (OUTPATIENT)
Dept: PULMONOLOGY | Facility: CLINIC | Age: 64
End: 2019-04-15
Payer: COMMERCIAL

## 2019-04-15 ENCOUNTER — LAB VISIT (OUTPATIENT)
Dept: LAB | Facility: HOSPITAL | Age: 64
End: 2019-04-15
Attending: INTERNAL MEDICINE
Payer: COMMERCIAL

## 2019-04-15 VITALS
SYSTOLIC BLOOD PRESSURE: 134 MMHG | HEIGHT: 61 IN | OXYGEN SATURATION: 94 % | WEIGHT: 170.5 LBS | DIASTOLIC BLOOD PRESSURE: 83 MMHG | WEIGHT: 170 LBS | HEART RATE: 89 BPM | BODY MASS INDEX: 32.19 KG/M2 | RESPIRATION RATE: 18 BRPM | HEIGHT: 61 IN | BODY MASS INDEX: 32.1 KG/M2

## 2019-04-15 DIAGNOSIS — E87.1 HYPONATREMIA: ICD-10-CM

## 2019-04-15 DIAGNOSIS — J44.89 ASTHMA WITH COPD: Chronic | ICD-10-CM

## 2019-04-15 DIAGNOSIS — J44.89 ASTHMA WITH COPD: Primary | Chronic | ICD-10-CM

## 2019-04-15 DIAGNOSIS — G47.33 OSA ON CPAP: Chronic | ICD-10-CM

## 2019-04-15 DIAGNOSIS — F17.210 CIGARETTE NICOTINE DEPENDENCE WITHOUT COMPLICATION: ICD-10-CM

## 2019-04-15 LAB
ANION GAP SERPL CALC-SCNC: 8 MMOL/L (ref 8–16)
BUN SERPL-MCNC: 9 MG/DL (ref 8–23)
CALCIUM SERPL-MCNC: 9.4 MG/DL (ref 8.7–10.5)
CHLORIDE SERPL-SCNC: 97 MMOL/L (ref 95–110)
CO2 SERPL-SCNC: 28 MMOL/L (ref 23–29)
CREAT SERPL-MCNC: 0.7 MG/DL (ref 0.5–1.4)
EST. GFR  (AFRICAN AMERICAN): >60 ML/MIN/1.73 M^2
EST. GFR  (NON AFRICAN AMERICAN): >60 ML/MIN/1.73 M^2
GLUCOSE SERPL-MCNC: 76 MG/DL (ref 70–110)
POTASSIUM SERPL-SCNC: 4.3 MMOL/L (ref 3.5–5.1)
SODIUM SERPL-SCNC: 133 MMOL/L (ref 136–145)

## 2019-04-15 PROCEDURE — 94618 PULMONARY STRESS TESTING: CPT | Mod: S$GLB,,, | Performed by: INTERNAL MEDICINE

## 2019-04-15 PROCEDURE — 94726 PULM FUNCT TST PLETHYSMOGRAP: ICD-10-PCS | Mod: S$GLB,,, | Performed by: INTERNAL MEDICINE

## 2019-04-15 PROCEDURE — 99999 PR PBB SHADOW E&M-EST. PATIENT-LVL III: ICD-10-PCS | Mod: PBBFAC,,, | Performed by: INTERNAL MEDICINE

## 2019-04-15 PROCEDURE — 99999 PR PBB SHADOW E&M-EST. PATIENT-LVL III: CPT | Mod: PBBFAC,,, | Performed by: INTERNAL MEDICINE

## 2019-04-15 PROCEDURE — 3008F PR BODY MASS INDEX (BMI) DOCUMENTED: ICD-10-PCS | Mod: CPTII,S$GLB,, | Performed by: INTERNAL MEDICINE

## 2019-04-15 PROCEDURE — 80048 BASIC METABOLIC PNL TOTAL CA: CPT

## 2019-04-15 PROCEDURE — 94729 PR C02/MEMBANE DIFFUSE CAPACITY: ICD-10-PCS | Mod: S$GLB,,, | Performed by: INTERNAL MEDICINE

## 2019-04-15 PROCEDURE — 99215 OFFICE O/P EST HI 40 MIN: CPT | Mod: 25,S$GLB,, | Performed by: INTERNAL MEDICINE

## 2019-04-15 PROCEDURE — 36415 COLL VENOUS BLD VENIPUNCTURE: CPT

## 2019-04-15 PROCEDURE — 94060 EVALUATION OF WHEEZING: CPT | Mod: 59,S$GLB,, | Performed by: INTERNAL MEDICINE

## 2019-04-15 PROCEDURE — 3008F BODY MASS INDEX DOCD: CPT | Mod: CPTII,S$GLB,, | Performed by: INTERNAL MEDICINE

## 2019-04-15 PROCEDURE — 94729 DIFFUSING CAPACITY: CPT | Mod: S$GLB,,, | Performed by: INTERNAL MEDICINE

## 2019-04-15 PROCEDURE — 94060 PR EVAL OF BRONCHOSPASM: ICD-10-PCS | Mod: 59,S$GLB,, | Performed by: INTERNAL MEDICINE

## 2019-04-15 PROCEDURE — 94618 PULMONARY STRESS TESTING: ICD-10-PCS | Mod: S$GLB,,, | Performed by: INTERNAL MEDICINE

## 2019-04-15 PROCEDURE — 94726 PLETHYSMOGRAPHY LUNG VOLUMES: CPT | Mod: S$GLB,,, | Performed by: INTERNAL MEDICINE

## 2019-04-15 PROCEDURE — 3079F PR MOST RECENT DIASTOLIC BLOOD PRESSURE 80-89 MM HG: ICD-10-PCS | Mod: CPTII,S$GLB,, | Performed by: INTERNAL MEDICINE

## 2019-04-15 PROCEDURE — 3075F SYST BP GE 130 - 139MM HG: CPT | Mod: CPTII,S$GLB,, | Performed by: INTERNAL MEDICINE

## 2019-04-15 PROCEDURE — 3075F PR MOST RECENT SYSTOLIC BLOOD PRESS GE 130-139MM HG: ICD-10-PCS | Mod: CPTII,S$GLB,, | Performed by: INTERNAL MEDICINE

## 2019-04-15 PROCEDURE — 3079F DIAST BP 80-89 MM HG: CPT | Mod: CPTII,S$GLB,, | Performed by: INTERNAL MEDICINE

## 2019-04-15 PROCEDURE — 99215 PR OFFICE/OUTPT VISIT, EST, LEVL V, 40-54 MIN: ICD-10-PCS | Mod: 25,S$GLB,, | Performed by: INTERNAL MEDICINE

## 2019-04-15 RX ORDER — ALBUTEROL SULFATE 90 UG/1
2 AEROSOL, METERED RESPIRATORY (INHALATION) EVERY 4 HOURS PRN
Qty: 18 G | Refills: 11 | Status: SHIPPED | OUTPATIENT
Start: 2019-04-15 | End: 2020-07-24 | Stop reason: SDUPTHER

## 2019-04-15 RX ORDER — FLUTICASONE PROPIONATE AND SALMETEROL 500; 50 UG/1; UG/1
1 POWDER RESPIRATORY (INHALATION) 2 TIMES DAILY
Qty: 60 EACH | Refills: 11 | Status: SHIPPED | OUTPATIENT
Start: 2019-04-15 | End: 2019-09-16 | Stop reason: SDUPTHER

## 2019-04-15 NOTE — PATIENT INSTRUCTIONS
Lung Anatomy  Your lungs take air in to give your body oxygen, which the body needs to work. Your lungs, like all the tissues in your body, are made up of billions of tiny specialized cells. Old lung cells die and are replaced by new, identical lung cells. This natural process helps ensure healthy lungs.    Date Last Reviewed: 11/1/2016  © 4087-0371 [employer]. [address] [place] [address], [place] [address]. All rights reserved. This information is not intended as a substitute for professional medical care. Always follow your healthcare professional's instructions.

## 2019-04-15 NOTE — ASSESSMENT & PLAN NOTE
Asthma with COPD ROS: taking medications as instructed, no medication side effects noted, no significant ongoing wheezing or shortness of breath, using bronchodilator MDI less than twice a week.   New concerns: None.   Exam: appears well, vitals normal, no respiratory distress, acyanotic, normal RR, chest clear, no wheezing, crepitations, rhonchi, normal symmetric air entry.   Assessment:  Asthma with COPD:  well controlled.   Plan: TUDORZA, ADVAIR, ALBUTEROL. Current treatment plan is effective, no change in therapy. CXR in 6 months.

## 2019-04-15 NOTE — PROCEDURES
"[facility]  Six Minute Walk     SUMMARY     Ordering Provider: Dr. Tracey   Interpreting Provider: Dr. Tracey  Performing nurse/tech/RT: NHUNG Dockery  Diagnosis: (Asthma with COPD)  Height: 5' 1" (154.9 cm)  Weight: 77.3 kg (170 lb 8.4 oz)  BMI (Calculated): 32.3   Patient Race:             Phase Oxygen Assessment Supplemental O2 Heart   Rate Blood Pressure Cheryl Dyspnea Scale Rating   Resting 96 % Room Air 95 bpm 142/73 3   Exercise        Minute        1 94 % Room Air 121 bpm     2 95 % Room Air 131 bpm     3 95 % Room Air 137 bpm     4 95 % Room Air 136 bpm     5 95 % Room Air 137 bpm     6  95 % Room Air 133 bpm 156/76 7-8   Recovery        Minute        1 97 % Room Air 116 bpm     2 97 % Room Air 113 bpm     3 97 % Room Air 102 bpm     4 96 % Room Air 96 bpm 134/83 3     Six Minute Walk Summary  6MWT Status: completed without stopping  Patient Reported: Dyspnea, Other (Comment)(Knee Pain )     Interpretation:  Did the patient stop or pause?: No                                         Total Time Walked (Calculated): 360 seconds  Final Partial Lap Distance (feet): 100 feet  Total Distance Meters (Calculated): 274.32 meters  Predicted Distance Meters (Calculated): 464.13 meters  Percentage of Predicted (Calculated): 59.1  Peak VO2 (Calculated): 12.21  Mets: 3.49  Has The Patient Had a Previous Six Minute Walk Test?: Yes       Previous 6MWT Results  Has The Patient Had a Previous Six Minute Walk Test?: Yes  Date of Previous Test: 11/21/16  Total Time Walked: 207.6 seconds  Total Distance (meters): 304.8  Predicted Distance (meters): 451.28 meters  Percentage of Predicted: 67.54  Percent Change (Calculated): 0.1        PHYSICIAN INTERPRETATION:    Six minute walk distance is 274.32 / 464.13 meters (59.1 % predicted) with very heavy dyspnea. Peak VO2 during walking was 12.21 Ml/min/kg [ 3.49 METS]. During exercise, there was no significant desaturation while breathing room air.  Blood " pressure remained stable and Heart rate increased significantly with walking.  Hypertension was present prior to exercise.  This may represent a normal cardiovascular response to exercise.  The patient reported non-pulmonary symptoms during exercise - knee pain.  Significant exercise impairment is likely due to subjective symptoms.  The patient did complete the study, walking 360 seconds of the 360 second test.  Since the previous study in 11/21/16, exercise capacity may be somewhat worse.  Based upon age and body mass index, exercise capacity is less than predicted.

## 2019-04-15 NOTE — PROGRESS NOTES
Subjective:      Patient ID: Merle Caro is a 63 y.o. female.    Patient Active Problem List   Diagnosis    LUCILA on CPAP: Not compliant with CPAP    Pure hypercholesterolemia    Affective bipolar disorder    Asthma with COPD    Depression    Essential hypertension    Slow transit constipation    Cigarette nicotine dependence without complication    Vitamin D insufficiency    Sebaceous cyst of labia    Screening for cancer     Problem list has been reviewed.    Chief Complaint: Asthma; COPD; and Sleep Apnea    HPI    PFT, 6MWT reviewed with patient who voiced understanding.      Patient reports no dyspnea    The patient does not have currently have symptoms / an exacerbation.       No recent change in breathing.     She still smokes approximately 1 PPD. .       Immunization status reviewed and up to date.    COPD QUESTIONNAIRE  How often do you cough?: A little of the time  How often do you have phlegm (mucus) in your chest?: A little of the time  How often does your chest feel tight?: A little of the time  When you walk up a hill or one flight of stairs, how often are you breathless?: All of the time  How often are you limited doing any activities at home?: Some of the time  How often are you confident leaving the house despite your lung condition?: All of the time  How often do you sleep soundly?: Most of the time  How often do you have energy?: A little of the time  Total score: 18      Asthma Control Test  In the past 4  weeks, how much of the time did your asthma keep you from getting as much done at work, school or at home?: None of the time  During the past 4 weeks, how often have you had shortness of breath?: More than once a day  During the past 4 weeks, how often did your asthma symptoms (wheezing, couging, shortness of breath, chest tightness or pain) wake you up at night or earlier than usual in the morning?: Once a week  During the past 4 weeks, how often have you used your rescue inhaler or  nebulizer medication (such as albuterol)?: 1 or 2 times per day  How would you rate your asthma control during the past 4 weeks?: Somewhat controlled  If your score is 19 or less, your asthma may not be under control: 14     Her current respiratory therapy regimen is PROVENTIL, TUDORZA, ADVAIR, ACCUNEB which provides relief. She is not adherent with her regimen.    She is on oxygen 2 L /m in with activity.     She has LUCILA and is on Auto CPAP. She reports that her CPAP machine is broken. She is requesting a new CPAP machine. She is not complaint with her CPAP She did not bring her SD card for complaince evaluation.     [place] Sleepiness Scale   EPWORTH SLEEPINESS SCALE 4/15/2019 7/16/2018 10/12/2017 10/20/2016 9/19/2016 9/16/2016 3/16/2016   Sitting and reading 1 0 1 0 0 1 0   Watching TV 0 0 0 0 0 0 0   Sitting, inactive in a public place (e.g. a theatre or a meeting) 0 0 0 0 0 0 0   As a passenger in a car for an hour without a break 1 2 0 0 0 0 0   Lying down to rest in the afternoon when circumstances permit 3 2 3 2 3 2 3   Sitting and talking to someone 0 0 0 0 1 0 0   Sitting quietly after a lunch without alcohol 1 0 0 0 0 0 2   In a car, while stopped for a few minutes in traffic 0 0 0 0 0 0 0   Total score 6 4 4 2 4 3 5      A full  review of systems, past , family  and social histories was performed except as mentioned in the note above, these are non contributory to the main issues discussed today.     Previous Report Reviewed: office notes     The following portions of the patient's history were reviewed and updated as appropriate: She  has a past medical history of Asthma, COPD (chronic obstructive pulmonary disease), Depression, and Tobacco abuse.  She  has a past surgical history that includes Hysterectomy; Knee surgery; Tonsillectomy; multiple fracture repairs; and Colonoscopy w/ polypectomy (01/05/2011).  Her family history includes COPD in her mother; Cancer in her mother; Hypertension in her  "mother; Lung cancer in her mother; Ovarian cancer in her sister; Stroke in her mother.  She  reports that she has been smoking.  She has a 33.00 pack-year smoking history. She has never used smokeless tobacco. She reports that she does not drink alcohol or use drugs.  She has a current medication list which includes the following prescription(s): aclidinium bromide, albuterol, atorvastatin, cholecalciferol (vitamin d3), clonazepam, diclofenac sodium, estradiol, fluticasone-salmeterol 500-50 mcg/dose, haloperidol, lisinopril, oxygen-air delivery systems, and pantoprazole.  She is allergic to codeine; keflex [cephalexin]; shellfish containing products; and sucralfate..    Review of Systems   Constitutional: Negative for night sweats.   HENT: Positive for postnasal drip, sinus pressure and voice change.    Eyes: Positive for redness.   Respiratory: Positive for sputum production and wheezing. Negative for cough, hemoptysis and dyspnea on extertion.    Cardiovascular: Negative for chest pain, palpitations and leg swelling.   Genitourinary: Negative for difficulty urinating and hematuria.   Endocrine: Negative for cold intolerance and heat intolerance.    Musculoskeletal: Positive for arthralgias. Negative for back pain and gait problem.   Gastrointestinal: Positive for acid reflux. Negative for nausea, vomiting, abdominal pain and abdominal distention.        Diarrhea   Neurological: Positive for headaches. Negative for syncope and light-headedness.   Psychiatric/Behavioral: The patient is nervous/anxious.       Objective:     /83   Pulse 89   Resp 18   Ht 5' 1" (1.549 m)   Wt 77.1 kg (170 lb)   SpO2 (!) 94% Comment: 2 liters  BMI 32.12 kg/m²   Body mass index is 32.12 kg/m².    Physical Exam   Constitutional: She is oriented to person, place, and time. She appears well-developed and well-nourished.   HENT:   Head: Normocephalic and atraumatic.   Neck: Normal range of motion. Neck supple.   Cardiovascular: " Normal rate and regular rhythm. Exam reveals no gallop.   No murmur heard.  Pulmonary/Chest: Effort normal. She has wheezes.   Abdominal: Soft. Bowel sounds are normal.   Musculoskeletal: Normal range of motion. She exhibits no edema.   Neurological: She is alert and oriented to person, place, and time.   Skin: Skin is warm and dry.   Psychiatric: She has a normal mood and affect.       Personal Diagnostic Review    Six minute walk distance is 274.32 / 464.13 meters (59.1 % predicted) with very heavy dyspnea. Peak VO2 during walking was 12.21 Ml/min/kg [ 3.49 METS]. During exercise, there was no significant desaturation while breathing room air.  Blood pressure remained stable and Heart rate increased significantly with walking.  Hypertension was present prior to exercise.  This may represent a normal cardiovascular response to exercise.  The patient reported non-pulmonary symptoms during exercise - knee pain.  Significant exercise impairment is likely due to subjective symptoms.  The patient did complete the study, walking 360 seconds of the 360 second test.  Since the previous study in 11/21/16, exercise capacity may be somewhat worse.  Based upon age and body mass index, exercise capacity is less than predicted.        Pulmonary function tests: FEV1: 1.89  (85.3 % predicted), FVC:  2.51 (89.9 % predicted), FEV1/FVC:  75.05, TLC: 3.83 (86.3 % predicted), RV/TLVC: 34.42 (86.7 % predicted), DLCO: 4.81 (69.2 % predicted)  Normla spirometry, normal lung volumes, Diffusion capacity is mildly reduced but corrects for alveolar volume.      Assessment / plan:            Discussed diagnosis, its evaluation, treatment and usual course. All questions answered.      Problem List Items Addressed This Visit        Pulmonary    Asthma with COPD - Primary (Chronic)    Current Assessment & Plan     Asthma with COPD ROS: taking medications as instructed, no medication side effects noted, no significant ongoing wheezing or shortness  of breath, using bronchodilator MDI less than twice a week.   New concerns: None.   Exam: appears well, vitals normal, no respiratory distress, acyanotic, normal RR, chest clear, no wheezing, crepitations, rhonchi, normal symmetric air entry.   Assessment:  Asthma with COPD:  well controlled.   Plan: TUDORZA, ADVAIR, ALBUTEROL. Current treatment plan is effective, no change in therapy. CXR in 6 months.         Relevant Medications    albuterol (PROVENTIL/VENTOLIN HFA) 90 mcg/actuation inhaler    fluticasone-salmeterol diskus inhaler 500-50 mcg    Other Relevant Orders    X-Ray Chest PA And Lateral       Other    LUCILA on CPAP: Not compliant with CPAP (Chronic)    Current Assessment & Plan     She is on CPAP AUTO. She has not used her CPAP in 1 year.    Complications of untreated sleep apnea discussed with pateint in detail including but not limited to  high blood pressure, heart attack, stroke, obesity,  diabetes and worsening heart failure. Patient voiced understanding.         Cigarette nicotine dependence without complication    Current Assessment & Plan     Assistance with smoking cessation was offered, including:  []  Medications  [x]  Counseling  []  Printed Information on Smoking Cessation  []  Referral to a Smoking Cessation Program    Patient was counseled regarding smoking for 3-10 minutes.             TIME SPENT WITH PATIENT: Time spent: 40 minutes in face to face  discussion concerning diagnosis, prognosis, review of lab and test results, benefits of treatment as well as management of disease, counseling of patient and coordination of care between various health  care providers . Greater than half the time spent was used for coordination of care and counseling of patient.        Follow up in about 6 months (around 10/15/2019) for Asthma with COPD, LUCILA, Tobacco use disorder.

## 2019-04-17 LAB
BRPFT: ABNORMAL
DLCO ADJ PRE: 14.36 ML/(MIN*MMHG) (ref 15.02–26.49)
DLCO SINGLE BREATH LLN: 15.02
DLCO SINGLE BREATH PRE REF: 69.2 %
DLCO SINGLE BREATH REF: 20.75
DLCOC SBVA LLN: 3.01
DLCOC SBVA PRE REF: 104.5 %
DLCOC SBVA REF: 4.67
DLCOC SINGLE BREATH LLN: 15.02
DLCOC SINGLE BREATH PRE REF: 69.2 %
DLCOC SINGLE BREATH REF: 20.75
DLCOVA LLN: 3.01
DLCOVA PRE REF: 104.5 %
DLCOVA PRE: 4.88 ML/(MIN*MMHG*L) (ref 3.01–6.33)
DLCOVA REF: 4.67
DLVAADJ PRE: 4.88 ML/(MIN*MMHG*L) (ref 3.01–6.33)
ERV LLN: 0.75
ERV PRE REF: 139.1 %
ERV REF: 0.75
FEF 25 75 CHG: 15.1 %
FEF 25 75 LLN: 1.03
FEF 25 75 POST REF: 83.5 %
FEF 25 75 PRE REF: 72.6 %
FEF 25 75 REF: 2.05
FET100 CHG: -9.9 %
FEV1 CHG: -0.7 %
FEV1 FVC CHG: 4.9 %
FEV1 FVC LLN: 67
FEV1 FVC POST REF: 99 %
FEV1 FVC PRE REF: 94.4 %
FEV1 FVC REF: 80
FEV1 LLN: 1.66
FEV1 POST REF: 84.7 %
FEV1 PRE REF: 85.3 %
FEV1 REF: 2.21
FEV6 CHG: -3.3 %
FEV6 LLN: 2.2
FEV6 POST REF: 84.1 %
FEV6 POST: 2.37 L (ref 2.2–3.43)
FEV6 PRE REF: 87 %
FEV6 PRE: 2.45 L (ref 2.2–3.43)
FEV6 REF: 2.81
FRCPLETH LLN: 1.71
FRCPLETH PREREF: 105.9 %
FRCPLETH REF: 2.53
FVC CHG: -5.4 %
FVC LLN: 2.1
FVC POST REF: 85.1 %
FVC PRE REF: 89.9 %
FVC REF: 2.79
IVC PRE: 1.92 L (ref 2.1–3.48)
IVC SINGLE BREATH LLN: 2.1
IVC SINGLE BREATH PRE REF: 68.8 %
IVC SINGLE BREATH REF: 2.79
MVV LLN: 68
MVV PRE REF: 46.3 %
MVV REF: 80
PEF CHG: -12.3 %
PEF LLN: 4.24
PEF POST REF: 85 %
PEF PRE REF: 96.8 %
PEF REF: 5.79
POST FEF 25 75: 1.72 L/S (ref 1.03–3.07)
POST FET 100: 7.66 SEC
POST FEV1 FVC: 78.76 % (ref 67.26–91.83)
POST FEV1: 1.87 L (ref 1.66–2.76)
POST FVC: 2.38 L (ref 2.1–3.48)
POST PEF: 4.92 L/S (ref 4.24–7.35)
PRE DLCO: 14.36 ML/(MIN*MMHG) (ref 15.02–26.49)
PRE ERV: 1.04 L (ref 0.75–0.75)
PRE FEF 25 75: 1.49 L/S (ref 1.03–3.07)
PRE FET 100: 8.5 SEC
PRE FEV1 FVC: 75.05 % (ref 67.26–91.83)
PRE FEV1: 1.89 L (ref 1.66–2.76)
PRE FRC PL: 2.68 L
PRE FVC: 2.51 L (ref 2.1–3.48)
PRE MVV: 37 L/MIN (ref 67.92–91.89)
PRE PEF: 5.61 L/S (ref 4.24–7.35)
PRE RV: 1.32 L (ref 1.21–2.36)
PRE TLC: 3.83 L (ref 3.45–5.43)
RAW LLN: 3.06
RAW PRE REF: 130.4 %
RAW PRE: 3.99 CMH2O*S/L (ref 3.06–3.06)
RAW REF: 3.06
RV LLN: 1.21
RV PRE REF: 74 %
RV REF: 1.78
RVTLC LLN: 30
RVTLC PRE REF: 86.7 %
RVTLC PRE: 34.42 % (ref 30.11–49.29)
RVTLC REF: 40
TLC LLN: 3.45
TLC PRE REF: 86.3 %
TLC REF: 4.44
VA PRE: 2.95 L (ref 4.29–4.29)
VA SINGLE BREATH LLN: 4.29
VA SINGLE BREATH PRE REF: 68.7 %
VA SINGLE BREATH REF: 4.29
VC LLN: 2.1
VC PRE REF: 89.9 %
VC PRE: 2.51 L (ref 2.1–3.48)
VC REF: 2.79
VTGRAWPRE: 2.99 L

## 2019-04-30 ENCOUNTER — HOSPITAL ENCOUNTER (OUTPATIENT)
Dept: RADIOLOGY | Facility: HOSPITAL | Age: 64
Discharge: HOME | End: 2019-04-30
Attending: FAMILY MEDICINE
Payer: COMMERCIAL

## 2019-04-30 ENCOUNTER — OFFICE VISIT (OUTPATIENT)
Dept: NEPHROLOGY | Facility: CLINIC | Age: 64
End: 2019-04-30
Payer: COMMERCIAL

## 2019-04-30 VITALS
WEIGHT: 169.06 LBS | HEART RATE: 84 BPM | BODY MASS INDEX: 31.92 KG/M2 | SYSTOLIC BLOOD PRESSURE: 160 MMHG | DIASTOLIC BLOOD PRESSURE: 100 MMHG | HEIGHT: 61 IN

## 2019-04-30 DIAGNOSIS — Z12.9 SCREENING FOR CANCER: ICD-10-CM

## 2019-04-30 DIAGNOSIS — F17.210 CIGARETTE NICOTINE DEPENDENCE WITHOUT COMPLICATION: ICD-10-CM

## 2019-04-30 DIAGNOSIS — E87.1 HYPONATREMIA: Primary | ICD-10-CM

## 2019-04-30 PROCEDURE — G0297 LDCT FOR LUNG CA SCREEN: HCPCS | Mod: TC

## 2019-04-30 PROCEDURE — 99999 PR PBB SHADOW E&M-EST. PATIENT-LVL III: ICD-10-PCS | Mod: PBBFAC,,, | Performed by: INTERNAL MEDICINE

## 2019-04-30 PROCEDURE — 3080F DIAST BP >= 90 MM HG: CPT | Mod: CPTII,S$GLB,, | Performed by: INTERNAL MEDICINE

## 2019-04-30 PROCEDURE — 99999 PR PBB SHADOW E&M-EST. PATIENT-LVL III: CPT | Mod: PBBFAC,,, | Performed by: INTERNAL MEDICINE

## 2019-04-30 PROCEDURE — 3080F PR MOST RECENT DIASTOLIC BLOOD PRESSURE >= 90 MM HG: ICD-10-PCS | Mod: CPTII,S$GLB,, | Performed by: INTERNAL MEDICINE

## 2019-04-30 PROCEDURE — G0297 LDCT FOR LUNG CA SCREEN: HCPCS | Mod: 26,,, | Performed by: RADIOLOGY

## 2019-04-30 PROCEDURE — 3008F BODY MASS INDEX DOCD: CPT | Mod: CPTII,S$GLB,, | Performed by: INTERNAL MEDICINE

## 2019-04-30 PROCEDURE — 3077F PR MOST RECENT SYSTOLIC BLOOD PRESSURE >= 140 MM HG: ICD-10-PCS | Mod: CPTII,S$GLB,, | Performed by: INTERNAL MEDICINE

## 2019-04-30 PROCEDURE — G0297 CT CHEST LUNG SCREENING LOW DOSE: ICD-10-PCS | Mod: 26,,, | Performed by: RADIOLOGY

## 2019-04-30 PROCEDURE — 99213 OFFICE O/P EST LOW 20 MIN: CPT | Mod: S$GLB,,, | Performed by: INTERNAL MEDICINE

## 2019-04-30 PROCEDURE — 3008F PR BODY MASS INDEX (BMI) DOCUMENTED: ICD-10-PCS | Mod: CPTII,S$GLB,, | Performed by: INTERNAL MEDICINE

## 2019-04-30 PROCEDURE — 3077F SYST BP >= 140 MM HG: CPT | Mod: CPTII,S$GLB,, | Performed by: INTERNAL MEDICINE

## 2019-04-30 PROCEDURE — 99213 PR OFFICE/OUTPT VISIT, EST, LEVL III, 20-29 MIN: ICD-10-PCS | Mod: S$GLB,,, | Performed by: INTERNAL MEDICINE

## 2019-04-30 RX ORDER — PAROXETINE HYDROCHLORIDE 40 MG/1
TABLET, FILM COATED ORAL
Refills: 2 | COMMUNITY
Start: 2019-04-11
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
## 2019-05-09 ENCOUNTER — TELEPHONE (OUTPATIENT)
Dept: PULMONOLOGY | Facility: CLINIC | Age: 64
End: 2019-05-09

## 2019-05-09 DIAGNOSIS — R91.1 LUNG NODULE: Primary | ICD-10-CM

## 2019-05-09 NOTE — TELEPHONE ENCOUNTER
----- Message from Tab Tracey MD sent at 5/9/2019  1:17 PM CDT -----  Your CT scan reveals: Lung-RADS Category:  4B - Suspicious - consultation advised - possible next steps  Chest CT, tissue sampling and-or PET/CT. We will monitor this finding with a follow up  PET CT  ASAP.   Do not hesitate to call us if you have any questions.    Thank you.    MEGAN Tracey MD  [facility]

## 2019-05-13 ENCOUNTER — TELEPHONE (OUTPATIENT)
Dept: INTERNAL MEDICINE | Facility: CLINIC | Age: 64
End: 2019-05-13

## 2019-05-13 ENCOUNTER — TELEPHONE (OUTPATIENT)
Dept: PULMONOLOGY | Facility: CLINIC | Age: 64
End: 2019-05-13

## 2019-05-13 NOTE — TELEPHONE ENCOUNTER
----- Message from Ruddy Martinez sent at 5/13/2019  3:35 PM CDT -----  ...Type:  Sooner Apoointment Request    Caller is requesting a sooner appointment.  Caller declined first available appointment listed below.  Caller will not accept being placed on the waitlist and is requesting a message be sent to doctor.  Name of Caller:pt   When is the first available appointment?6/20  Symptoms:none // f/u   Would the patient rather a call back or a response via [facility]? Call back   Best Call Back Number: [phone]  Additional Information: pt is requesting a call from nurse to discuss a sooner apt to f/u

## 2019-05-13 NOTE — TELEPHONE ENCOUNTER
----- Message from Arabella Vences sent at 5/13/2019  7:53 AM CDT -----  Contact: pt  States she had a test ran and she got a letter in the mail. She would like to speak to the nurse about it. Please call pt at [phone]. Thank you

## 2019-05-13 NOTE — TELEPHONE ENCOUNTER
"Notes recorded by Tab Tracey MD on 5/9/2019 at 1:17 PM CDT    "We will monitor this finding with a follow up  PET CT  ASAP"  Order in chart.  Has not been scheduled.    Okay to schedule and informed patient to undergo test.    Pulmonology will follow up on results."

## 2019-05-20 ENCOUNTER — TELEPHONE (OUTPATIENT)
Dept: RADIOLOGY | Facility: HOSPITAL | Age: 64
End: 2019-05-20

## 2019-05-21 ENCOUNTER — TELEPHONE (OUTPATIENT)
Dept: RADIOLOGY | Facility: HOSPITAL | Age: 64
End: 2019-05-21

## 2019-05-22 ENCOUNTER — HOSPITAL ENCOUNTER (OUTPATIENT)
Dept: RADIOLOGY | Facility: HOSPITAL | Age: 64
Discharge: HOME | End: 2019-05-22
Attending: INTERNAL MEDICINE
Payer: COMMERCIAL

## 2019-05-22 DIAGNOSIS — R91.1 LUNG NODULE: ICD-10-CM

## 2019-05-22 PROCEDURE — 78815 PET IMAGE W/CT SKULL-THIGH: CPT | Mod: TC

## 2019-05-22 PROCEDURE — 78815 NM PET CT ROUTINE: ICD-10-PCS | Mod: 26,PI,, | Performed by: RADIOLOGY

## 2019-05-22 PROCEDURE — A9552 F18 FDG: HCPCS

## 2019-05-22 PROCEDURE — 78815 PET IMAGE W/CT SKULL-THIGH: CPT | Mod: 26,PI,, | Performed by: RADIOLOGY

## 2019-05-24 ENCOUNTER — TELEPHONE (OUTPATIENT)
Dept: PULMONOLOGY | Facility: CLINIC | Age: 64
End: 2019-05-24

## 2019-05-24 DIAGNOSIS — R93.89 ABNORMAL CT OF THE CHEST: Primary | ICD-10-CM

## 2019-05-24 NOTE — TELEPHONE ENCOUNTER
PET scan results: within normal limits except for ss     1. A previously described 9 mm right lower lobe pulmonary nodule is not significantly changed since the recent CT allowing for differences in exam technique.  The nodule is not hypermetabolic.  Short-term follow-up chest CT is recommended to document stability.    2. Other small scattered pulmonary nodules are also stable and also not FDG avid.    3. There are hypermetabolic slightly enlarged mediastinal lymph nodes as described above.  These are indeterminate but possibly infectious or inflammatory in etiology.  However, malignant etiology cannot be completely excluded and would also recommend attention to this area on follow-up.    4. Anterior right 3rd rib fracture and other findings as above.    Plan:  1. Repeat CT chest with contrast in 6 months.

## 2019-05-27 ENCOUNTER — OFFICE VISIT (OUTPATIENT)
Dept: PULMONOLOGY | Facility: CLINIC | Age: 64
End: 2019-05-27
Payer: COMMERCIAL

## 2019-05-27 VITALS
HEIGHT: 61 IN | WEIGHT: 174.38 LBS | RESPIRATION RATE: 18 BRPM | DIASTOLIC BLOOD PRESSURE: 63 MMHG | BODY MASS INDEX: 32.92 KG/M2 | OXYGEN SATURATION: 95 % | SYSTOLIC BLOOD PRESSURE: 110 MMHG

## 2019-05-27 DIAGNOSIS — F17.210 CIGARETTE NICOTINE DEPENDENCE WITHOUT COMPLICATION: Chronic | ICD-10-CM

## 2019-05-27 DIAGNOSIS — E66.09 CLASS 1 OBESITY DUE TO EXCESS CALORIES WITH SERIOUS COMORBIDITY AND BODY MASS INDEX (BMI) OF 32.0 TO 32.9 IN ADULT: Chronic | ICD-10-CM

## 2019-05-27 DIAGNOSIS — J44.89 ASTHMA WITH COPD: Primary | Chronic | ICD-10-CM

## 2019-05-27 DIAGNOSIS — G47.33 OSA ON CPAP: Chronic | ICD-10-CM

## 2019-05-27 DIAGNOSIS — R94.2 ABNORMAL PET SCAN OF LUNG: Chronic | ICD-10-CM

## 2019-05-27 PROCEDURE — 99215 OFFICE O/P EST HI 40 MIN: CPT | Mod: S$GLB,,, | Performed by: INTERNAL MEDICINE

## 2019-05-27 PROCEDURE — 99215 PR OFFICE/OUTPT VISIT, EST, LEVL V, 40-54 MIN: ICD-10-PCS | Mod: S$GLB,,, | Performed by: INTERNAL MEDICINE

## 2019-05-27 PROCEDURE — 3008F BODY MASS INDEX DOCD: CPT | Mod: CPTII,S$GLB,, | Performed by: INTERNAL MEDICINE

## 2019-05-27 PROCEDURE — 3074F PR MOST RECENT SYSTOLIC BLOOD PRESSURE < 130 MM HG: ICD-10-PCS | Mod: CPTII,S$GLB,, | Performed by: INTERNAL MEDICINE

## 2019-05-27 PROCEDURE — 99999 PR PBB SHADOW E&M-EST. PATIENT-LVL III: CPT | Mod: PBBFAC,,, | Performed by: INTERNAL MEDICINE

## 2019-05-27 PROCEDURE — 3078F PR MOST RECENT DIASTOLIC BLOOD PRESSURE < 80 MM HG: ICD-10-PCS | Mod: CPTII,S$GLB,, | Performed by: INTERNAL MEDICINE

## 2019-05-27 PROCEDURE — 3008F PR BODY MASS INDEX (BMI) DOCUMENTED: ICD-10-PCS | Mod: CPTII,S$GLB,, | Performed by: INTERNAL MEDICINE

## 2019-05-27 PROCEDURE — 99999 PR PBB SHADOW E&M-EST. PATIENT-LVL III: ICD-10-PCS | Mod: PBBFAC,,, | Performed by: INTERNAL MEDICINE

## 2019-05-27 PROCEDURE — 3078F DIAST BP <80 MM HG: CPT | Mod: CPTII,S$GLB,, | Performed by: INTERNAL MEDICINE

## 2019-05-27 PROCEDURE — 3074F SYST BP LT 130 MM HG: CPT | Mod: CPTII,S$GLB,, | Performed by: INTERNAL MEDICINE

## 2019-05-27 NOTE — ASSESSMENT & PLAN NOTE
There are hypermetabolic slightly enlarged mediastinal lymph nodes on PET scan 05/22/19: Repeat PET scan in 6 monthshs.

## 2019-05-27 NOTE — PROGRESS NOTES
Subjective:      Patient ID: Merle Caro is a 63 y.o. female.    Patient Active Problem List   Diagnosis    LUCILA on CPAP: Not compliant with CPAP    Pure hypercholesterolemia    Affective bipolar disorder    Asthma with COPD    Depression    Class 1 obesity due to excess calories with serious comorbidity and body mass index (BMI) of 32.0 to 32.9 in adult    Essential hypertension    Slow transit constipation    Cigarette nicotine dependence without complication    Vitamin D insufficiency    Sebaceous cyst of labia    Screening for cancer    Abnormal PET scan of lung     Problem list has been reviewed.    Chief Complaint: Asthma with copd    HPI    PET reviewed with patient who voiced understanding.      Patient reports no dyspnea    The patient does not have currently have symptoms / an exacerbation.       No recent change in breathing.     She still smokes approximately 1 PPD.        Immunization status reviewed and up to date.    COPD QUESTIONNAIRE  How often do you cough?: A little of the time  How often do you have phlegm (mucus) in your chest?: A little of the time  How often does your chest feel tight?: Never  When you walk up a hill or one flight of stairs, how often are you breathless?: All of the time  How often are you limited doing any activities at home?: Some of the time  How often are you confident leaving the house despite your lung condition?: All of the time  How often do you sleep soundly?: All of the time  How often do you have energy?: Almost never  Total score: 16      Asthma Control Test  In the past 4  weeks, how much of the time did your asthma keep you from getting as much done at work, school or at home?: None of the time  During the past 4 weeks, how often have you had shortness of breath?: Once or twice a week  During the past 4 weeks, how often did your asthma symptoms (wheezing, couging, shortness of breath, chest tightness or pain) wake you up at night or earlier than  usual in the morning?: Not at all  During the past 4 weeks, how often have you used your rescue inhaler or nebulizer medication (such as albuterol)?: Once a week or less  How would you rate your asthma control during the past 4 weeks?: Well controlled  If your score is 19 or less, your asthma may not be under control: 22     Her current respiratory therapy regimen is PROVENTIL, TUDORZA, ADVAIR, ACCUNEB which provides relief. She is not adherent with her regimen.    She is on oxygen 2 L /m in with activity.     She has LUCILA and is on Auto CPAP. She reports that her CPAP machine is broken. She is requesting a new CPAP machine. She is not complaint with her CPAP She did not bring her SD card for complaince evaluation.     [place] Sleepiness Scale   EPWORTH SLEEPINESS SCALE 4/15/2019 7/16/2018 10/12/2017 10/20/2016 9/19/2016 9/16/2016 3/16/2016   Sitting and reading 1 0 1 0 0 1 0   Watching TV 0 0 0 0 0 0 0   Sitting, inactive in a public place (e.g. a theatre or a meeting) 0 0 0 0 0 0 0   As a passenger in a car for an hour without a break 1 2 0 0 0 0 0   Lying down to rest in the afternoon when circumstances permit 3 2 3 2 3 2 3   Sitting and talking to someone 0 0 0 0 1 0 0   Sitting quietly after a lunch without alcohol 1 0 0 0 0 0 2   In a car, while stopped for a few minutes in traffic 0 0 0 0 0 0 0   Total score 6 4 4 2 4 3 5      A full  review of systems, past , family  and social histories was performed except as mentioned in the note above, these are non contributory to the main issues discussed today.     Previous Report Reviewed: office notes     The following portions of the patient's history were reviewed and updated as appropriate: She  has a past medical history of Asthma, COPD (chronic obstructive pulmonary disease), Depression, and Tobacco abuse.  She  has a past surgical history that includes Hysterectomy; Knee surgery; Tonsillectomy; multiple fracture repairs; and Colonoscopy w/ polypectomy  "(01/05/2011).  Her family history includes COPD in her mother; Cancer in her mother; Hypertension in her mother; Lung cancer in her mother; Ovarian cancer in her sister; Stroke in her mother.  She  reports that she has been smoking.  She has a 33.00 pack-year smoking history. She has never used smokeless tobacco. She reports that she does not drink alcohol or use drugs.  She has a current medication list which includes the following prescription(s): aclidinium bromide, albuterol, atorvastatin, cholecalciferol (vitamin d3), clonazepam, diclofenac sodium, estradiol, fluticasone-salmeterol 500-50 mcg/dose, haloperidol, oxygen-air delivery systems, pantoprazole, paroxetine, and lisinopril.  She is allergic to codeine; keflex [cephalexin]; shellfish containing products; and sucralfate..    Review of Systems   Constitutional: Negative for night sweats.   HENT: Positive for postnasal drip, sinus pressure and voice change.    Eyes: Positive for redness.   Respiratory: Positive for sputum production and wheezing. Negative for cough, hemoptysis and dyspnea on extertion.    Cardiovascular: Negative for chest pain, palpitations and leg swelling.   Genitourinary: Negative for difficulty urinating and hematuria.   Endocrine: Negative for cold intolerance and heat intolerance.    Musculoskeletal: Positive for arthralgias. Negative for back pain and gait problem.   Gastrointestinal: Positive for acid reflux. Negative for nausea, vomiting, abdominal pain and abdominal distention.        Diarrhea   Neurological: Positive for headaches. Negative for syncope and light-headedness.   Psychiatric/Behavioral: The patient is nervous/anxious.       Objective:     /63   Resp 18   Ht 5' 1" (1.549 m)   Wt 79.1 kg (174 lb 6.1 oz)   SpO2 95% Comment: 2 liters  BMI 32.95 kg/m²   Body mass index is 32.95 kg/m².    Physical Exam   Constitutional: She is oriented to person, place, and time. She appears well-developed and well-nourished. "   HENT:   Head: Normocephalic and atraumatic.   Neck: Normal range of motion. Neck supple.   Cardiovascular: Normal rate and regular rhythm. Exam reveals no gallop.   No murmur heard.  Pulmonary/Chest: Effort normal. She has wheezes.   Abdominal: Soft. Bowel sounds are normal.   Musculoskeletal: Normal range of motion. She exhibits no edema.   Neurological: She is alert and oriented to person, place, and time.   Skin: Skin is warm and dry.   Psychiatric: She has a normal mood and affect.       Personal Diagnostic Review     NM PET CT Routine Skull to Mid Thigh: 05/22/19: A previously described 9 mm right lower lobe pulmonary nodule is not significantly changed since the recent CT allowing for differences in exam technique.  The nodule is not hypermetabolic.  Short-term follow-up chest CT is recommended to document stability.  Other small scattered pulmonary nodules are also stable and also not FDG avid.  There are hypermetabolic slightly enlarged mediastinal lymph nodes as described above.  These are indeterminate but possibly infectious or inflammatory in etiology.  However, malignant etiology cannot be completely excluded and would also recommend attention to this area on follow-up.  Anterior right 3rd rib fracture and other findings as above.    Assessment / plan:       Discussed diagnosis, its evaluation, treatment and usual course. All questions answered.      Problem List Items Addressed This Visit        Pulmonary    Asthma with COPD - Primary (Chronic)    Current Assessment & Plan     Asthma with COPD ROS: taking medications as instructed, no medication side effects noted, no significant ongoing wheezing or shortness of breath, using bronchodilator MDI less than twice a week.   New concerns: None.   Exam: appears well, vitals normal, no respiratory distress, acyanotic, normal RR, chest clear, no wheezing, crepitations, rhonchi, normal symmetric air entry.   Assessment:  Asthma with COPD:  well controlled.    Plan: TUDORZA, ADVAIR, ALBUTEROL. Current treatment plan is effective, no change in therapy.          Abnormal PET scan of lung (Chronic)    Current Assessment & Plan     There are hypermetabolic slightly enlarged mediastinal lymph nodes on PET scan 05/22/19: Repeat PET scan in 6 monthshs.          Relevant Orders    NM PET CT Routine Skull to Mid Thigh       Endocrine    Class 1 obesity due to excess calories with serious comorbidity and body mass index (BMI) of 32.0 to 32.9 in adult (Chronic)    Current Assessment & Plan     General weight loss/lifestyle modification strategies discussed (elicit support from others; identify saboteurs; non-food rewards, etc).  Behavioral treatment: Slim Fast.  Diet interventions: low calorie (1000 kCal/d) deficit diet.            Other    LUCILA on CPAP: Not compliant with CPAP (Chronic)    Current Assessment & Plan     She refuses to use CPAP.     Complications of untreated sleep apnea discussed with pateint in detail including but not limited to  high blood pressure, heart attack, stroke, obesity,  diabetes and worsening heart failure. Patient voiced understanding.         Cigarette nicotine dependence without complication    Current Assessment & Plan     Assistance with smoking cessation was offered, including:  []  Medications  [x]  Counseling  []  Printed Information on Smoking Cessation  []  Referral to a Smoking Cessation Program    Patient was counseled regarding smoking for 3-10 minutes.             TIME SPENT WITH PATIENT: Time spent: 40 minutes in face to face  discussion concerning diagnosis, prognosis, review of lab and test results, benefits of treatment as well as management of disease, counseling of patient and coordination of care between various health  care providers . Greater than half the time spent was used for coordination of care and counseling of patient.        Follow up in about 5 months (around 10/21/2019) for Asthma with COPD, LUCILA, Obesity, Abnormal  CXR.

## 2019-05-27 NOTE — ASSESSMENT & PLAN NOTE
She refuses to use CPAP.     Complications of untreated sleep apnea discussed with pateint in detail including but not limited to  high blood pressure, heart attack, stroke, obesity,  diabetes and worsening heart failure. Patient voiced understanding.

## 2019-05-27 NOTE — ASSESSMENT & PLAN NOTE
General weight loss/lifestyle modification strategies discussed (elicit support from others; identify saboteurs; non-food rewards, etc).  Behavioral treatment: Slim Fast.  Diet interventions: low calorie (1000 kCal/d) deficit diet.

## 2019-05-27 NOTE — ASSESSMENT & PLAN NOTE
Asthma with COPD ROS: taking medications as instructed, no medication side effects noted, no significant ongoing wheezing or shortness of breath, using bronchodilator MDI less than twice a week.   New concerns: None.   Exam: appears well, vitals normal, no respiratory distress, acyanotic, normal RR, chest clear, no wheezing, crepitations, rhonchi, normal symmetric air entry.   Assessment:  Asthma with COPD:  well controlled.   Plan: TUDORZA, ADVAIR, ALBUTEROL. Current treatment plan is effective, no change in therapy.

## 2019-05-30 ENCOUNTER — PATIENT OUTREACH (OUTPATIENT)
Dept: ADMINISTRATIVE | Facility: HOSPITAL | Age: 64
End: 2019-05-30

## 2019-06-13 ENCOUNTER — TELEPHONE (OUTPATIENT)
Dept: PULMONOLOGY | Facility: CLINIC | Age: 64
End: 2019-06-13

## 2019-06-13 DIAGNOSIS — F17.210 CIGARETTE NICOTINE DEPENDENCE WITHOUT COMPLICATION: Primary | ICD-10-CM

## 2019-06-13 NOTE — TELEPHONE ENCOUNTER
----- Message from Gina Okeefe sent at 6/13/2019  4:32 PM CDT -----  Contact: pt   Pt stated she has question to ask the doctor about her equipment, she can be reached at  [phone] Thanks

## 2019-06-25 ENCOUNTER — TELEPHONE (OUTPATIENT)
Dept: INTERNAL MEDICINE | Facility: CLINIC | Age: 64
End: 2019-06-25

## 2019-06-25 ENCOUNTER — TELEPHONE (OUTPATIENT)
Dept: PULMONOLOGY | Facility: CLINIC | Age: 64
End: 2019-06-25

## 2019-06-25 NOTE — TELEPHONE ENCOUNTER
----- Message from Sedrick Lucia sent at 6/25/2019  8:16 AM CDT -----  Contact: Pt   Type:  Needs Medical Advice    Who Called: pt   Symptoms (please be specific): vomiting yellow, sore throat/ back hurts   How long has patient had these symptoms:  Since last night   Pharm[facility] and phone #:  [facility]  Would the patient rather a call back or a response via [facility]? Phone   Best Call Back Number: [phone]  Additional Information: pls call to discuss       [id number] - [place] - [address]  [address] [address]  Phone: [phone] Fax: [phone]

## 2019-06-25 NOTE — TELEPHONE ENCOUNTER
----- Message from Ruddy Martinez sent at 6/25/2019  9:04 AM CDT -----  ..Type:  Patient Returning Call    Who Called: pt   Who Left Message for Patient:  Does the patient know what this is regarding?: health concern   Would the patient rather a call back or a response via [facility]? Rodo back   Best Call Back Number:[phone]  Additional Information: pt is requesting a call from nurse to discuss some health concerns

## 2019-07-10 ENCOUNTER — OFFICE VISIT (OUTPATIENT)
Dept: INTERNAL MEDICINE | Facility: CLINIC | Age: 64
End: 2019-07-10
Payer: COMMERCIAL

## 2019-07-10 ENCOUNTER — LAB VISIT (OUTPATIENT)
Dept: LAB | Facility: HOSPITAL | Age: 64
End: 2019-07-10
Attending: FAMILY MEDICINE
Payer: COMMERCIAL

## 2019-07-10 VITALS
WEIGHT: 173.94 LBS | TEMPERATURE: 97 F | BODY MASS INDEX: 32.84 KG/M2 | HEIGHT: 61 IN | HEART RATE: 98 BPM | DIASTOLIC BLOOD PRESSURE: 92 MMHG | SYSTOLIC BLOOD PRESSURE: 154 MMHG | OXYGEN SATURATION: 95 %

## 2019-07-10 DIAGNOSIS — F17.210 CIGARETTE NICOTINE DEPENDENCE WITHOUT COMPLICATION: ICD-10-CM

## 2019-07-10 DIAGNOSIS — E55.9 VITAMIN D INSUFFICIENCY: Chronic | ICD-10-CM

## 2019-07-10 DIAGNOSIS — I10 ESSENTIAL HYPERTENSION: ICD-10-CM

## 2019-07-10 DIAGNOSIS — E78.00 PURE HYPERCHOLESTEROLEMIA: Primary | Chronic | ICD-10-CM

## 2019-07-10 PROBLEM — Z12.9 SCREENING FOR CANCER: Status: RESOLVED | Noted: 2019-04-10 | Resolved: 2019-07-10

## 2019-07-10 LAB — 25(OH)D3+25(OH)D2 SERPL-MCNC: 69 NG/ML (ref 30–96)

## 2019-07-10 PROCEDURE — 3080F PR MOST RECENT DIASTOLIC BLOOD PRESSURE >= 90 MM HG: ICD-10-PCS | Mod: CPTII,S$GLB,, | Performed by: FAMILY MEDICINE

## 2019-07-10 PROCEDURE — 3008F PR BODY MASS INDEX (BMI) DOCUMENTED: ICD-10-PCS | Mod: CPTII,S$GLB,, | Performed by: FAMILY MEDICINE

## 2019-07-10 PROCEDURE — 99214 PR OFFICE/OUTPT VISIT, EST, LEVL IV, 30-39 MIN: ICD-10-PCS | Mod: S$GLB,,, | Performed by: FAMILY MEDICINE

## 2019-07-10 PROCEDURE — 99214 OFFICE O/P EST MOD 30 MIN: CPT | Mod: S$GLB,,, | Performed by: FAMILY MEDICINE

## 2019-07-10 PROCEDURE — 36415 COLL VENOUS BLD VENIPUNCTURE: CPT

## 2019-07-10 PROCEDURE — 99999 PR PBB SHADOW E&M-EST. PATIENT-LVL III: ICD-10-PCS | Mod: PBBFAC,,, | Performed by: FAMILY MEDICINE

## 2019-07-10 PROCEDURE — 3008F BODY MASS INDEX DOCD: CPT | Mod: CPTII,S$GLB,, | Performed by: FAMILY MEDICINE

## 2019-07-10 PROCEDURE — 3077F PR MOST RECENT SYSTOLIC BLOOD PRESSURE >= 140 MM HG: ICD-10-PCS | Mod: CPTII,S$GLB,, | Performed by: FAMILY MEDICINE

## 2019-07-10 PROCEDURE — 3080F DIAST BP >= 90 MM HG: CPT | Mod: CPTII,S$GLB,, | Performed by: FAMILY MEDICINE

## 2019-07-10 PROCEDURE — 3077F SYST BP >= 140 MM HG: CPT | Mod: CPTII,S$GLB,, | Performed by: FAMILY MEDICINE

## 2019-07-10 PROCEDURE — 99999 PR PBB SHADOW E&M-EST. PATIENT-LVL III: CPT | Mod: PBBFAC,,, | Performed by: FAMILY MEDICINE

## 2019-07-10 PROCEDURE — 82306 VITAMIN D 25 HYDROXY: CPT

## 2019-07-10 RX ORDER — LISINOPRIL 10 MG/1
10 TABLET ORAL DAILY
Qty: 90 TABLET | Refills: 3 | Status: SHIPPED | OUTPATIENT
Start: 2019-07-10 | End: 2020-07-10

## 2019-07-10 RX ORDER — ATORVASTATIN CALCIUM 10 MG/1
10 TABLET, FILM COATED ORAL DAILY
Qty: 90 TABLET | Refills: 3 | Status: SHIPPED | OUTPATIENT
Start: 2019-07-10 | End: 2019-09-16

## 2019-07-10 NOTE — PROGRESS NOTES
"Subjective:   Patient ID: Merle Caro is a 64 y.o. female.  Chief Complaint:  Follow-up      Patient presents follow-up on hyperlipidemia.    Last visit based on 10 year risk elevated at 13% compared to 5% for age advised to start atorvastatin 10 mg daily.    Patient did not  prescription.    Vitamin-D level done 60. Improved on daily 5000 international units supplement.  Needs repeat level to make sure not over supplemented.    Hypertension previously controlled lisinopril 5 mg daily.  Elevated today.  Some fluctuations last doctor's visits.  Reports compliance.  Denies side effects.  No symptoms.    Low-dose CT lung showed questionable more nodule.  Underwent PET scanning.  Not suspicious for malignancy.  Advised follow-up imaging.  Scheduled.    Still smoking, but reports down to 2-3 cigarettes per day.    No new complaints today.    Review of Systems   Constitutional: Negative for chills, fatigue and fever.   Eyes: Negative for visual disturbance.   Respiratory: Negative for cough, chest tightness, shortness of breath and wheezing.    Cardiovascular: Negative for chest pain, palpitations and leg swelling.   Gastrointestinal: Negative for abdominal distention, abdominal pain, constipation, diarrhea, nausea and vomiting.   Genitourinary: Negative for difficulty urinating.   Musculoskeletal: Negative for myalgias.   Skin: Negative for rash.   Neurological: Negative for dizziness, syncope, weakness, light-headedness and headaches.   Psychiatric/Behavioral: Negative for sleep disturbance. The patient is not nervous/anxious.      Objective:   BP (!) 154/92   Pulse 98   Temp 97 °F (36.1 °C) (Tympanic)   Ht 5' 1" (1.549 m)   Wt 78.9 kg (173 lb 15.1 oz)   SpO2 95%   BMI 32.87 kg/m²     Physical Exam   Constitutional: She appears well-developed and well-nourished. No distress.   Blood pressure elevated.  Obesity.   HENT:   Mouth/Throat: Oral lesions (Cold sore/fever blister upper right lip) present.   Eyes: " No scleral icterus.   Neck: No JVD present. Carotid bruit is not present. No thyroid mass and no thyromegaly present.   Cardiovascular: Normal rate, regular rhythm and normal heart sounds. Exam reveals no gallop and no friction rub.   No murmur heard.  Pulmonary/Chest: Effort normal and breath sounds normal. She has no wheezes. She has no rhonchi. She has no rales.   Abdominal: Soft. She exhibits no distension. There is no hepatosplenomegaly. There is no tenderness. There is no rebound and no guarding.   Musculoskeletal: She exhibits no edema.   Skin: Skin is warm, dry and intact. No rash noted.   Psychiatric: She has a normal mood and affect.   Nursing note and vitals reviewed.    Assessment:       ICD-10-CM ICD-9-CM   1. Pure hypercholesterolemia E78.00 272.0   2. Vitamin D insufficiency E55.9 268.9   3. Essential hypertension I10 401.9   4. Cigarette nicotine dependence without complication F17.210 305.1     Plan:   Pure hypercholesterolemia  -     atorvastatin (LIPITOR) 10 MG tablet; Take 1 tablet (10 mg total) by mouth once daily.  Dispense: 90 tablet; Refill: 3  Rediscussed overall increased to 10 year cardiovascular risk an indication for statin.    Patient agrees to start Lipitor 10 mg daily.    Recheck LFT 1 month.    If remains compliant, recheck lipid panel and CMP in 3 months.    Vitamin D insufficiency  -     Vitamin D; Future; Expected date: 07/10/2019  Adjust vitamin-D 5000 international units daily as needed.      Essential hypertension  -     lisinopril 10 MG tablet; Take 1 tablet (10 mg total) by mouth once daily.  Dispense: 90 tablet; Refill: 3  Uncontrolled.  BP not at goal.    Increase lisinopril 5 mg daily   Recheck blood pressure and BMP in 1 month.      Cigarette nicotine dependence without complication  Patient previously quit.  Appears motivated to quit again.  Has presently decreased to 2-3 cigarettes per day.    Discussed based on use more social/ behavior related and encouraged to quit  completely.    Will readdress at follow-up visit.      Return to clinic 1 month

## 2019-07-22 ENCOUNTER — TELEPHONE (OUTPATIENT)
Dept: INTERNAL MEDICINE | Facility: CLINIC | Age: 64
End: 2019-07-22

## 2019-07-22 NOTE — TELEPHONE ENCOUNTER
----- Message from Phani Pruitt sent at 7/22/2019  1:02 PM CDT -----  Contact: self [phone]  Pt would like return call from nurse regarding question about blood pressure medication.  Please call back at [phone].   Md Michelle

## 2019-08-05 ENCOUNTER — PATIENT OUTREACH (OUTPATIENT)
Dept: ADMINISTRATIVE | Facility: HOSPITAL | Age: 64
End: 2019-08-05

## 2019-08-24 ENCOUNTER — HOSPITAL ENCOUNTER (OUTPATIENT)
Dept: RADIOLOGY | Facility: HOSPITAL | Age: 64
Discharge: HOME | End: 2019-08-24
Attending: FAMILY MEDICINE
Payer: COMMERCIAL

## 2019-08-24 ENCOUNTER — OFFICE VISIT (OUTPATIENT)
Dept: INTERNAL MEDICINE | Facility: CLINIC | Age: 64
End: 2019-08-24
Payer: COMMERCIAL

## 2019-08-24 ENCOUNTER — LAB VISIT (OUTPATIENT)
Dept: LAB | Facility: HOSPITAL | Age: 64
End: 2019-08-24
Attending: FAMILY MEDICINE
Payer: COMMERCIAL

## 2019-08-24 VITALS
OXYGEN SATURATION: 94 % | DIASTOLIC BLOOD PRESSURE: 70 MMHG | BODY MASS INDEX: 33.66 KG/M2 | TEMPERATURE: 98 F | HEART RATE: 99 BPM | SYSTOLIC BLOOD PRESSURE: 130 MMHG | WEIGHT: 178.13 LBS

## 2019-08-24 DIAGNOSIS — J44.89 ASTHMA WITH COPD: ICD-10-CM

## 2019-08-24 DIAGNOSIS — M79.675 PAIN OF TOE OF LEFT FOOT: ICD-10-CM

## 2019-08-24 DIAGNOSIS — E78.00 PURE HYPERCHOLESTEROLEMIA: ICD-10-CM

## 2019-08-24 DIAGNOSIS — I10 ESSENTIAL HYPERTENSION: ICD-10-CM

## 2019-08-24 DIAGNOSIS — I10 ESSENTIAL HYPERTENSION: Primary | ICD-10-CM

## 2019-08-24 DIAGNOSIS — F17.210 CIGARETTE NICOTINE DEPENDENCE WITHOUT COMPLICATION: ICD-10-CM

## 2019-08-24 LAB
ALBUMIN SERPL BCP-MCNC: 3.5 G/DL (ref 3.5–5.2)
ALP SERPL-CCNC: 88 U/L (ref 55–135)
ALT SERPL W/O P-5'-P-CCNC: 9 U/L (ref 10–44)
ANION GAP SERPL CALC-SCNC: 8 MMOL/L (ref 8–16)
AST SERPL-CCNC: 12 U/L (ref 10–40)
BILIRUB SERPL-MCNC: 0.4 MG/DL (ref 0.1–1)
BUN SERPL-MCNC: 6 MG/DL (ref 8–23)
CALCIUM SERPL-MCNC: 8.9 MG/DL (ref 8.7–10.5)
CHLORIDE SERPL-SCNC: 97 MMOL/L (ref 95–110)
CHOLEST SERPL-MCNC: 238 MG/DL (ref 120–199)
CHOLEST/HDLC SERPL: 4 {RATIO} (ref 2–5)
CO2 SERPL-SCNC: 28 MMOL/L (ref 23–29)
CREAT SERPL-MCNC: 0.7 MG/DL (ref 0.5–1.4)
EST. GFR  (AFRICAN AMERICAN): >60 ML/MIN/1.73 M^2
EST. GFR  (NON AFRICAN AMERICAN): >60 ML/MIN/1.73 M^2
GLUCOSE SERPL-MCNC: 108 MG/DL (ref 70–110)
HDLC SERPL-MCNC: 59 MG/DL (ref 40–75)
HDLC SERPL: 24.8 % (ref 20–50)
LDLC SERPL CALC-MCNC: 142.4 MG/DL (ref 63–159)
NONHDLC SERPL-MCNC: 179 MG/DL
POTASSIUM SERPL-SCNC: 3.7 MMOL/L (ref 3.5–5.1)
PROT SERPL-MCNC: 6.8 G/DL (ref 6–8.4)
SODIUM SERPL-SCNC: 133 MMOL/L (ref 136–145)
TRIGL SERPL-MCNC: 183 MG/DL (ref 30–150)

## 2019-08-24 PROCEDURE — 73630 X-RAY EXAM OF FOOT: CPT | Mod: 26,LT,, | Performed by: RADIOLOGY

## 2019-08-24 PROCEDURE — 80061 LIPID PANEL: CPT

## 2019-08-24 PROCEDURE — 3075F SYST BP GE 130 - 139MM HG: CPT | Mod: CPTII,S$GLB,, | Performed by: FAMILY MEDICINE

## 2019-08-24 PROCEDURE — 36415 COLL VENOUS BLD VENIPUNCTURE: CPT

## 2019-08-24 PROCEDURE — 99214 PR OFFICE/OUTPT VISIT, EST, LEVL IV, 30-39 MIN: ICD-10-PCS | Mod: S$GLB,,, | Performed by: FAMILY MEDICINE

## 2019-08-24 PROCEDURE — 3008F PR BODY MASS INDEX (BMI) DOCUMENTED: ICD-10-PCS | Mod: CPTII,S$GLB,, | Performed by: FAMILY MEDICINE

## 2019-08-24 PROCEDURE — 3078F DIAST BP <80 MM HG: CPT | Mod: CPTII,S$GLB,, | Performed by: FAMILY MEDICINE

## 2019-08-24 PROCEDURE — 99214 OFFICE O/P EST MOD 30 MIN: CPT | Mod: S$GLB,,, | Performed by: FAMILY MEDICINE

## 2019-08-24 PROCEDURE — 3075F PR MOST RECENT SYSTOLIC BLOOD PRESS GE 130-139MM HG: ICD-10-PCS | Mod: CPTII,S$GLB,, | Performed by: FAMILY MEDICINE

## 2019-08-24 PROCEDURE — 73630 X-RAY EXAM OF FOOT: CPT | Mod: TC,LT

## 2019-08-24 PROCEDURE — 99999 PR PBB SHADOW E&M-EST. PATIENT-LVL III: CPT | Mod: PBBFAC,,, | Performed by: FAMILY MEDICINE

## 2019-08-24 PROCEDURE — 99999 PR PBB SHADOW E&M-EST. PATIENT-LVL III: ICD-10-PCS | Mod: PBBFAC,,, | Performed by: FAMILY MEDICINE

## 2019-08-24 PROCEDURE — 3078F PR MOST RECENT DIASTOLIC BLOOD PRESSURE < 80 MM HG: ICD-10-PCS | Mod: CPTII,S$GLB,, | Performed by: FAMILY MEDICINE

## 2019-08-24 PROCEDURE — 73630 XR FOOT COMPLETE 3 VIEW LEFT: ICD-10-PCS | Mod: 26,LT,, | Performed by: RADIOLOGY

## 2019-08-24 PROCEDURE — 3008F BODY MASS INDEX DOCD: CPT | Mod: CPTII,S$GLB,, | Performed by: FAMILY MEDICINE

## 2019-08-24 PROCEDURE — 80053 COMPREHEN METABOLIC PANEL: CPT

## 2019-08-24 PROCEDURE — 73660 X-RAY EXAM OF TOE(S): CPT | Mod: 26,59,LT, | Performed by: RADIOLOGY

## 2019-08-24 PROCEDURE — 73660 XR TOE 2 VIEW: ICD-10-PCS | Mod: 26,59,LT, | Performed by: RADIOLOGY

## 2019-08-24 PROCEDURE — 73660 X-RAY EXAM OF TOE(S): CPT | Mod: TC

## 2019-08-24 RX ORDER — PREDNISONE 20 MG/1
TABLET ORAL
Qty: 10 TABLET | Refills: 0 | Status: SHIPPED | OUTPATIENT
Start: 2019-08-24 | End: 2019-09-16

## 2019-08-24 RX ORDER — DOXYCYCLINE 100 MG/1
100 CAPSULE ORAL 2 TIMES DAILY
Qty: 20 CAPSULE | Refills: 0 | Status: SHIPPED | OUTPATIENT
Start: 2019-08-24 | End: 2019-09-16

## 2019-08-24 NOTE — PROGRESS NOTES
Subjective:      Patient ID: Merle Caro is a 64 y.o. female.    Chief Complaint: Follow-up (blood pressure, cholesterol, toe pain, coughing )    Disclaimer:  This note is prepared using voice recognition software and as such is likely to have errors and has not been proof read. Please contact me for questions.     Merle Caro is a 64 y.o. female who presents today for Sat primary care visit for blood pressure. The patient's PCP is Marck Major MD.     Patient presents follow-up on hyperlipidemia.  Still did not get started on the lipitor as advised by her PCP Dr Major before.  Was uncertain if her  ever picked it up.  Will check labs today for her.  Last visit based on 10 year risk elevated at 13% compared to 5% for age advised to start atorvastatin 10 mg daily.  Was sent in meds. Needing labs today.     Hypertension previously uncontrolled with lisinopril 5 mg daily.  Last visit with PCP was increased to 10mg of lisinopril. Needing repeat today.  It is better controlled 130/70 but at times feels tired.  Does not have a weight check her blood pressure at home.  Would be a candidate for the digital hypertension program.  Some fluctuations last doctor's visits.  Reports compliance.        Still smoking, upon further questioning she smokes a full pack per day.  We did discuss that she sees Pulmonary.  Lately having more of a cough [place]  More productive with change in coloration and mucus production.  Is on inhalers and uses oxygen at night.  Could use oxygen during the daytime if needed.  States she has to get more for inhalers at this time.  Has lung nodule that is being followed by Pulm and Dr Major. Has repeat testing for PET scan ordered by pulm/pcp Dr Major.     She also reports that 1 week ago she hit her left little toe on a bedpost.  Actually hurt the foot to is more swollen.  She was concerned about the potential that it might be fractured.   Wanted to get this x-ray today.  Still not healing.      Lab Results   Component Value Date    WBC 7.03 10/26/2018    HGB 15.3 10/26/2018    HCT 45.9 10/26/2018     10/26/2018    CHOL 266 (H) 10/16/2018    TRIG 113 10/16/2018    HDL 64 10/16/2018    ALT 13 02/06/2017    AST 14 02/06/2017     (L) 04/15/2019    K 4.3 04/15/2019    CL 97 04/15/2019    CREATININE 0.7 04/15/2019    BUN 9 04/15/2019    CO2 28 04/15/2019    TSH 0.668 04/23/2018    INR 1.0 06/11/2016       NM PET CT Routine Skull to Mid Thigh  Narrative: EXAMINATION:  NM PET CT ROUTINE    CLINICAL HISTORY:  lung nodule; Solitary pulmonary nodule    TECHNIQUE:  14.42 mCi of F18-FDG was administered intravenously in the left antecubital fossa.  After an approximately 60 min distribution time, PET/CT images were acquired from the skull base to the mid thigh. Transmission images were acquired to correct for attenuation using a whole body low-dose CT scan without contrast with the arms positioned above the head.    COMPARISON:  Recent chest CT performed on 04/30/2019 as well as a prior chest CT from February 2016.    FINDINGS:  Quality of the study: Adequate.    Patient's head is rotated.  Mild misregistration artifact is noted.  Physiologic uptake within the visualized brain parenchyma.  Streak artifact from dental hardware is noted. There are no hypermetabolic cervical lymph nodes.    There is a fracture involving the right anterior 3rd rib near the costochondral junction with max SUV of 5.7..  Nonspecific uptake about the lower thoracic paraspinal musculature on the left is seen which is favored physiologic in etiology.    Trachea is patent. Physiologic uptake in the myocardium. Coronary and aortic atherosclerosis. There are multiple slightly enlarged mediastinal lymph nodes similar to the previous exam. For example, there is a 1.3 cm short axis lymph node in the AP window region. This is hypermetabolic with max SUV of 4.3. Hypermetabolic right  lower paratracheal lymph node is seen with max SUV of 4.1.  There is uptake in the subcarinal station with max SUV of 3.5.    Again seen is a 9 mm nodule in the right lower lobe.  On the prior chest CT from 02/17/2016, a 3 mm nodule was seen in this region.  This is not sufficiently FDG avid with max SUV of 2.0.  There is a 6 mm nodule in the peripheral right upper lung.  This is not hypermetabolic.  Other previously described tiny right upper lobe nodules and small left pulmonary nodules are stable in appearance.  These fall below the resolution for PET-CT.  Chronic interstitial changes in the lungs are again seen.  There is uptake in the distal esophagus which is nonspecific but could be secondary to reflux and/or esophagitis among other possibilities.    Heterogeneous uptake in the liver.  No radiopaque gallstones.  The spleen and pancreas and adrenal glands are within normal limits.  Aortic atherosclerosis is seen.  There are no hypermetabolic lymph nodes in the abdomen or pelvis.  No ascites.  Uterus is absent.    Uptake in the bowel is presumed physiologic.  Sigmoid diverticulosis is seen without evidence to suggest diverticulitis.  Small fat containing umbilical hernia is noted.  Impression: A previously described 9 mm right lower lobe pulmonary nodule is not significantly changed since the recent CT allowing for differences in exam technique.  The nodule is not hypermetabolic.  Short-term follow-up chest CT is recommended to document stability.  Other small scattered pulmonary nodules are also stable and also not FDG avid.  There are hypermetabolic slightly enlarged mediastinal lymph nodes as described above.  These are indeterminate but possibly infectious or inflammatory in etiology.  However, malignant etiology cannot be completely excluded and would also recommend attention to this area on follow-up.  Anterior right 3rd rib fracture and other findings as above.    Electronically signed by: Kirt Dewey  MD  Date:    05/22/2019  Time:    21:44        Review of Systems   Constitutional: Positive for activity change and fatigue. Negative for appetite change and unexpected weight change.   Respiratory: Positive for cough, shortness of breath and wheezing. Negative for apnea and chest tightness.    Cardiovascular: Negative for chest pain and palpitations.   Musculoskeletal: Positive for arthralgias, gait problem and joint swelling.   Skin: Negative for color change, rash and wound.   Psychiatric/Behavioral: Negative for sleep disturbance.     Objective:     Vitals:    08/24/19 1224   BP: 130/70   BP Location: Right arm   Patient Position: Sitting   BP Method: Large (Manual)   Pulse: 99   Temp: 98.3 °F (36.8 °C)   TempSrc: Tympanic   SpO2: (!) 94%   Weight: 80.8 kg (178 lb 2.1 oz)     Physical Exam   Constitutional: She appears well-developed and well-nourished.   HENT:   Head: Normocephalic and atraumatic.   Right Ear: Tympanic membrane normal.   Left Ear: Tympanic membrane normal.   Mouth/Throat: Oropharynx is clear and moist.   Eyes: Conjunctivae and EOM are normal.   Neck: Normal range of motion. Neck supple.   Cardiovascular: Normal rate and regular rhythm.   Pulmonary/Chest: Effort normal. She has wheezes in the right upper field, the right middle field, the right lower field, the left upper field, the left middle field and the left lower field. She has rhonchi in the right middle field and the left middle field.   Musculoskeletal:        Left foot: There is tenderness, bony tenderness and swelling. There is normal range of motion.        Feet:    Skin: Skin is warm and dry.   Psychiatric: She has a normal mood and affect. Her behavior is normal.   Nursing note and vitals reviewed.    Assessment:     1. Essential hypertension    2. Pain of toe of left foot    3. Pure hypercholesterolemia    4. Asthma with COPD    5. Cigarette nicotine dependence without complication      Plan:   Merle was seen today for  follow-up.    Diagnoses and all orders for this visit:    Essential hypertension can continue with lisinopril 10.  Blood pressure better controlled but would encourage her doing the digital hypertension program.  Lab work today will forward results to her PCP.  -     Lipid panel; Future  -     Comprehensive metabolic panel; Future  -     Hypertension Digital Medicine ([facility]) Enrollment Order  -     Hypertension Digital Medicine ([facility]): Assign Onboarding Questionnaires    Pain of toe of left foot-new perform x-rays today advised patient that we would follow up with her in 12:48 p.m. based on x-ray results at that time she can continue in the interim to elevated I sit and rest the foot possibly do a hard base you or more supportive shoe she can follow up also with PCP in 2 weeks if not improving  -     X-Ray Toe 2 View; Future  -     X-Ray Foot Complete Left; Future    Pure hypercholesterolemia-patient is uncertain if she has yet to start her cholesterol medication.  Checking lab work today.  Will forward results to her PCP  -     Lipid panel; Future  -     Comprehensive metabolic panel; Future    Asthma with COPD-new with change in productive sputum advised to start doxycycline and prednisone no listed allergies.  Did advise also that she could use her oxygen during the daytime as well.  Follow up with PCP in 2 weeks for resolution encouraged compliance with her inhalers as well as smoking cessation  -     doxycycline (MONODOX) 100 MG capsule; Take 1 capsule (100 mg total) by mouth 2 (two) times daily.  -     predniSONE (DELTASONE) 20 MG tablet; 2 tablet po daily x 5 days    Cigarette nicotine dependence without complication encourage smoking sensations still smoking 1 pack per day.            Follow up in about 2 weeks (around 9/7/2019) for Dr Major- f/u labs, and copd exab, toe pain.    There are no Patient Instructions on file for this visit.

## 2019-08-26 ENCOUNTER — PATIENT MESSAGE (OUTPATIENT)
Dept: INTERNAL MEDICINE | Facility: CLINIC | Age: 64
End: 2019-08-26

## 2019-08-26 ENCOUNTER — TELEPHONE (OUTPATIENT)
Dept: INTERNAL MEDICINE | Facility: CLINIC | Age: 64
End: 2019-08-26

## 2019-08-26 DIAGNOSIS — S92.536A: Primary | ICD-10-CM

## 2019-08-26 NOTE — TELEPHONE ENCOUNTER
----- Message from Marck Major MD sent at 8/26/2019  3:21 PM CDT -----  Podiatry would be great. Thanks

## 2019-08-26 NOTE — PROGRESS NOTES
Marck,   Labs on your patient from Sat clinic. She had started the lisinopril 10mg. I suggested the digital HTN program for her. She wasn't certain if she started on the lipitor or not so I figured to do labs to see if any improvement. For your review and to notify patient of changes. She is seeing you in 2 weeks for followup of these, bronchitis, and toe pain  Monica Henriquez MD

## 2019-08-26 NOTE — PROGRESS NOTES
Sanjeev. Here is the xray report. If you want podiatry to manage then my staff can refer and let her know. Just wanted you to have the option since you are sports med too :)  Aleksandra

## 2019-08-26 NOTE — TELEPHONE ENCOUNTER
Pt was seen on sat clinic and had toe pain. nondisplaced transversely oriented fracture involving the head of the 5th proximal phalanx.   Marck do you want to manage it or have me refer her to ortho or podiatry? Thanks. We haven't contacted her yet. She wanted the results to be called to her personal cell number not the portal.

## 2019-08-26 NOTE — TELEPHONE ENCOUNTER
Dr Major wants her to see podiatry for the toe fracture. I'll place referral. Can you inform the patient and help to setup. Can be at the [facility] location. Thanks. Monica Henriquez MD

## 2019-08-27 NOTE — TELEPHONE ENCOUNTER
Tried calling pt, no answer. Sent [facility] message to her as well to see if we could reach her through there.

## 2019-08-27 NOTE — TELEPHONE ENCOUNTER
My staff reached out to her via phone and mychart. No answer. Referral in system. Can try again tomorrow. Just wanted you to be aware. See prior telephone notes.

## 2019-08-30 ENCOUNTER — TELEPHONE (OUTPATIENT)
Dept: INTERNAL MEDICINE | Facility: CLINIC | Age: 64
End: 2019-08-30

## 2019-08-30 NOTE — TELEPHONE ENCOUNTER
Called pt, she is stating that she feels as if the steroids are helping her and she would like a refill. She stated she had not finished the current script. I explained to her if she felt as if she needed additional medicines then she would need to follow up with her PCP.

## 2019-08-30 NOTE — TELEPHONE ENCOUNTER
----- Message from Brooklyn Bobby sent at 8/30/2019 10:14 AM CDT -----  Contact: Pt   Pt is calling regarding  requesting to have  nurse call pt back. Pt states that all is regarding pt requesting to speak with nurse concerning needing to speak with nurse regarding getting more steroids.  [phone] (work)        .Thank You  Brooklyn Bobby

## 2019-09-16 ENCOUNTER — OFFICE VISIT (OUTPATIENT)
Dept: PODIATRY | Facility: CLINIC | Age: 64
End: 2019-09-16
Payer: COMMERCIAL

## 2019-09-16 ENCOUNTER — OFFICE VISIT (OUTPATIENT)
Dept: INTERNAL MEDICINE | Facility: CLINIC | Age: 64
End: 2019-09-16
Payer: COMMERCIAL

## 2019-09-16 VITALS
DIASTOLIC BLOOD PRESSURE: 86 MMHG | SYSTOLIC BLOOD PRESSURE: 128 MMHG | BODY MASS INDEX: 34.5 KG/M2 | OXYGEN SATURATION: 97 % | TEMPERATURE: 99 F | HEIGHT: 61 IN | WEIGHT: 182.75 LBS | HEART RATE: 88 BPM

## 2019-09-16 VITALS
SYSTOLIC BLOOD PRESSURE: 163 MMHG | DIASTOLIC BLOOD PRESSURE: 88 MMHG | HEART RATE: 99 BPM | BODY MASS INDEX: 33.95 KG/M2 | WEIGHT: 179.81 LBS | HEIGHT: 61 IN

## 2019-09-16 DIAGNOSIS — E78.00 PURE HYPERCHOLESTEROLEMIA: ICD-10-CM

## 2019-09-16 DIAGNOSIS — E66.9 OBESITY (BMI 30.0-34.9): ICD-10-CM

## 2019-09-16 DIAGNOSIS — S92.515D CLOSED NONDISPLACED FRACTURE OF PROXIMAL PHALANX OF LESSER TOE OF LEFT FOOT WITH ROUTINE HEALING, SUBSEQUENT ENCOUNTER: Primary | ICD-10-CM

## 2019-09-16 DIAGNOSIS — E55.9 VITAMIN D INSUFFICIENCY: ICD-10-CM

## 2019-09-16 DIAGNOSIS — Z23 NEED FOR SHINGLES VACCINE: ICD-10-CM

## 2019-09-16 DIAGNOSIS — S92.536A: ICD-10-CM

## 2019-09-16 DIAGNOSIS — Z23 NEED FOR INFLUENZA VACCINATION: ICD-10-CM

## 2019-09-16 DIAGNOSIS — M79.675 PAIN IN LEFT TOE(S): ICD-10-CM

## 2019-09-16 DIAGNOSIS — F17.210 CIGARETTE NICOTINE DEPENDENCE WITHOUT COMPLICATION: ICD-10-CM

## 2019-09-16 DIAGNOSIS — J44.89 ASTHMA WITH COPD: Chronic | ICD-10-CM

## 2019-09-16 DIAGNOSIS — I10 ESSENTIAL HYPERTENSION: Primary | ICD-10-CM

## 2019-09-16 PROCEDURE — 90686 FLU VACCINE (QUAD) GREATER THAN OR EQUAL TO 3YO PRESERVATIVE FREE IM: ICD-10-PCS | Mod: S$GLB,,, | Performed by: FAMILY MEDICINE

## 2019-09-16 PROCEDURE — 3008F PR BODY MASS INDEX (BMI) DOCUMENTED: ICD-10-PCS | Mod: CPTII,S$GLB,, | Performed by: FAMILY MEDICINE

## 2019-09-16 PROCEDURE — 90471 IMMUNIZATION ADMIN: CPT | Mod: S$GLB,,, | Performed by: FAMILY MEDICINE

## 2019-09-16 PROCEDURE — 90686 IIV4 VACC NO PRSV 0.5 ML IM: CPT | Mod: S$GLB,,, | Performed by: FAMILY MEDICINE

## 2019-09-16 PROCEDURE — 99999 PR PBB SHADOW E&M-EST. PATIENT-LVL III: CPT | Mod: PBBFAC,,, | Performed by: PODIATRIST

## 2019-09-16 PROCEDURE — 99999 PR PBB SHADOW E&M-EST. PATIENT-LVL IV: CPT | Mod: PBBFAC,,, | Performed by: FAMILY MEDICINE

## 2019-09-16 PROCEDURE — 99999 PR PBB SHADOW E&M-EST. PATIENT-LVL IV: ICD-10-PCS | Mod: PBBFAC,,, | Performed by: FAMILY MEDICINE

## 2019-09-16 PROCEDURE — 99243 PR OFFICE CONSULTATION,LEVEL III: ICD-10-PCS | Mod: S$GLB,,, | Performed by: PODIATRIST

## 2019-09-16 PROCEDURE — 90471 FLU VACCINE (QUAD) GREATER THAN OR EQUAL TO 3YO PRESERVATIVE FREE IM: ICD-10-PCS | Mod: S$GLB,,, | Performed by: FAMILY MEDICINE

## 2019-09-16 PROCEDURE — 99215 PR OFFICE/OUTPT VISIT, EST, LEVL V, 40-54 MIN: ICD-10-PCS | Mod: 25,S$GLB,, | Performed by: FAMILY MEDICINE

## 2019-09-16 PROCEDURE — 99215 OFFICE O/P EST HI 40 MIN: CPT | Mod: 25,S$GLB,, | Performed by: FAMILY MEDICINE

## 2019-09-16 PROCEDURE — 99243 OFF/OP CNSLTJ NEW/EST LOW 30: CPT | Mod: S$GLB,,, | Performed by: PODIATRIST

## 2019-09-16 PROCEDURE — 3074F PR MOST RECENT SYSTOLIC BLOOD PRESSURE < 130 MM HG: ICD-10-PCS | Mod: CPTII,S$GLB,, | Performed by: FAMILY MEDICINE

## 2019-09-16 PROCEDURE — 99999 PR PBB SHADOW E&M-EST. PATIENT-LVL III: ICD-10-PCS | Mod: PBBFAC,,, | Performed by: PODIATRIST

## 2019-09-16 PROCEDURE — 3074F SYST BP LT 130 MM HG: CPT | Mod: CPTII,S$GLB,, | Performed by: FAMILY MEDICINE

## 2019-09-16 PROCEDURE — 3079F DIAST BP 80-89 MM HG: CPT | Mod: CPTII,S$GLB,, | Performed by: FAMILY MEDICINE

## 2019-09-16 PROCEDURE — 3079F PR MOST RECENT DIASTOLIC BLOOD PRESSURE 80-89 MM HG: ICD-10-PCS | Mod: CPTII,S$GLB,, | Performed by: FAMILY MEDICINE

## 2019-09-16 PROCEDURE — 3008F BODY MASS INDEX DOCD: CPT | Mod: CPTII,S$GLB,, | Performed by: FAMILY MEDICINE

## 2019-09-16 RX ORDER — SIMVASTATIN 40 MG/1
40 TABLET, FILM COATED ORAL NIGHTLY
Qty: 90 TABLET | Refills: 0 | Status: SHIPPED | OUTPATIENT
Start: 2019-09-16 | End: 2019-10-21 | Stop reason: SDUPTHER

## 2019-09-16 RX ORDER — ACETAMINOPHEN 500 MG
1 TABLET ORAL NIGHTLY
COMMUNITY

## 2019-09-16 RX ORDER — FLUTICASONE PROPIONATE AND SALMETEROL 500; 50 UG/1; UG/1
1 POWDER RESPIRATORY (INHALATION) 2 TIMES DAILY
Qty: 60 EACH | Refills: 11 | Status: SHIPPED | OUTPATIENT
Start: 2019-09-16 | End: 2019-10-21 | Stop reason: SDUPTHER

## 2019-09-16 NOTE — PROGRESS NOTES
Subjective:   Patient ID: Merle Caro is a 64 y.o. female.  Chief Complaint:  Follow-up      One-month follow-up.      Last visit blood pressure elevated.  Increase lisinopril 10 mg daily.  Reports compliance.  Denies side effects.  Blood pressure improved.  Repeat BMP done through Urgent Care visit normal.  Hyperlipidemia .  Advised to restart Lipitor 10 mg daily.  Reports caused cramps and discontinued medication.  Willing to try a different medication.  Vitamin-D level 69.  Recommended decrease daily supplement to 2000  IU daily.  Reports compliance.  Denies side effects.    Treated for acute bronchitis with doxycycline and prednisone.  Significantly improved.  Still with some shortness of breath.    Known asthma/ COPD.  Compliant with Tudorza.  Out of Advair.  Toe fracture.  Saw Podiatry.  Recommend manav taping but no other significant intervention.  Patient reports pain significantly improved.    Complains of intermittent right-sided tension type headache.  No additional complaints concerns today.    Doing well.      Routine health maintenance shows need for shingles and flu vaccine.    Review of Systems   Constitutional: Negative for activity change, appetite change, chills, fatigue, fever and unexpected weight change.   Eyes: Negative for visual disturbance.   Respiratory: Positive for shortness of breath. Negative for apnea, cough, chest tightness and wheezing.    Cardiovascular: Negative for chest pain, palpitations and leg swelling.   Gastrointestinal: Negative for abdominal distention, abdominal pain, constipation, diarrhea, nausea and vomiting.   Genitourinary: Negative for difficulty urinating.   Musculoskeletal: Positive for arthralgias, joint swelling and myalgias. Negative for gait problem.   Skin: Negative for color change, rash and wound.   Neurological: Positive for headaches. Negative for dizziness, syncope, weakness and light-headedness.   Psychiatric/Behavioral: Negative for sleep  "disturbance. The patient is not nervous/anxious.      Objective:   /86 (BP Location: Left arm, Patient Position: Sitting, BP Method: Large (Manual))   Pulse 88   Temp 99 °F (37.2 °C) (Tympanic)   Ht 5' 1" (1.549 m)   Wt 82.9 kg (182 lb 12.2 oz)   SpO2 97%   BMI 34.53 kg/m²     Physical Exam   Constitutional: Vital signs are normal. She appears well-developed and well-nourished. No distress.   Eyes: No scleral icterus.   Neck: No JVD present. Carotid bruit is not present. No thyroid mass and no thyromegaly present.   Cardiovascular: Normal rate, regular rhythm and normal heart sounds. Exam reveals no gallop and no friction rub.   No murmur heard.  Pulmonary/Chest: Effort normal. No accessory muscle usage. No tachypnea. No respiratory distress. She has no decreased breath sounds. She has wheezes in the right lower field. She has rhonchi in the right middle field, the right lower field, the left middle field and the left lower field. She has no rales.   Abdominal: Soft. She exhibits no distension. There is no hepatosplenomegaly. There is no tenderness. There is no rebound and no guarding.   Musculoskeletal: She exhibits no edema.   Skin: Skin is warm, dry and intact. No rash noted.   Psychiatric: She has a normal mood and affect.   Nursing note and vitals reviewed.    Assessment:       ICD-10-CM ICD-9-CM   1. Essential hypertension I10 401.9   2. Pure hypercholesterolemia E78.00 272.0   3. Closed nondisplaced fracture of distal phalanx of lesser toe, unspecified laterality, initial encounter S92.536A 826.0   4. Vitamin D insufficiency E55.9 268.9   5. Asthma with COPDStable J44.9 493.20   6. Need for shingles vaccine Z23 V04.89   7. Need for influenza vaccination Z23 V04.81     Plan:   Essential hypertension  Controlled/improved.    Continue lisinopril 10 mg daily.      Pure hypercholesterolemia  -     simvastatin (ZOCOR) 40 MG tablet; Take 1 tablet (40 mg total) by mouth every evening.  Dispense: 90 " tablet; Refill: 0  Discontinue Lipitor.    Simvastatin 40 mg daily.    LFTs next visit if patient compliant.      Closed nondisplaced fracture of distal phalanx of lesser toe, unspecified laterality, initial encounter  Continue per Podiatry.      Vitamin D insufficiency  Changed to vitamin-D 2000 IU supplement daily.      Asthma with COPD  -     fluticasone-salmeterol diskus inhaler 500-50 mcg; Inhale 1 puff into the lungs 2 (two) times daily. Controller  Dispense: 60 each; Refill: 11  Bronchitis resolved/ improved.    No additional oral antibiotics or steroids needed.  Continue Tudorza  Restart Advair  Continue albuterol as needed     RHM  -     varicella-zoster [id number], PF, (SHINGRIX, PF,) 50 mcg/0.5 mL injection; Inject 0.5 mLs into the muscle once. for 1 dose  Dispense: 0.5 mL; Refill: 0  -     Influenza - Quadrivalent (PF)    Return to clinic 1 month    Addendum   While at checkout, patient hit hand on wall with resultant small skin laceration to medal right hand 3rd digit MCP area.    Minimal bleeding.  More skin flap type.    Dried.  Cleanse. Steri-Strips x2 applied area.    Routine care.    Return if any signs of infection.
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
## 2019-10-07 ENCOUNTER — PATIENT OUTREACH (OUTPATIENT)
Dept: ADMINISTRATIVE | Facility: HOSPITAL | Age: 64
End: 2019-10-07

## 2019-10-21 ENCOUNTER — HOSPITAL ENCOUNTER (OUTPATIENT)
Dept: RADIOLOGY | Facility: HOSPITAL | Age: 64
Discharge: HOME | End: 2019-10-21
Attending: INTERNAL MEDICINE
Payer: COMMERCIAL

## 2019-10-21 ENCOUNTER — TELEPHONE (OUTPATIENT)
Dept: PULMONOLOGY | Facility: CLINIC | Age: 64
End: 2019-10-21

## 2019-10-21 ENCOUNTER — OFFICE VISIT (OUTPATIENT)
Dept: PULMONOLOGY | Facility: CLINIC | Age: 64
End: 2019-10-21
Payer: COMMERCIAL

## 2019-10-21 ENCOUNTER — OFFICE VISIT (OUTPATIENT)
Dept: INTERNAL MEDICINE | Facility: CLINIC | Age: 64
End: 2019-10-21
Payer: COMMERCIAL

## 2019-10-21 VITALS
WEIGHT: 184.31 LBS | TEMPERATURE: 98 F | OXYGEN SATURATION: 94 % | HEIGHT: 61 IN | BODY MASS INDEX: 34.8 KG/M2 | DIASTOLIC BLOOD PRESSURE: 82 MMHG | SYSTOLIC BLOOD PRESSURE: 136 MMHG | HEART RATE: 93 BPM

## 2019-10-21 VITALS
RESPIRATION RATE: 20 BRPM | DIASTOLIC BLOOD PRESSURE: 80 MMHG | SYSTOLIC BLOOD PRESSURE: 120 MMHG | HEART RATE: 103 BPM | HEIGHT: 61 IN | BODY MASS INDEX: 34.91 KG/M2 | OXYGEN SATURATION: 96 % | WEIGHT: 184.88 LBS

## 2019-10-21 DIAGNOSIS — J44.89 ASTHMA WITH COPD: Chronic | ICD-10-CM

## 2019-10-21 DIAGNOSIS — R07.81 RIB PAIN ON RIGHT SIDE: Chronic | ICD-10-CM

## 2019-10-21 DIAGNOSIS — G47.33 OSA ON CPAP: Chronic | ICD-10-CM

## 2019-10-21 DIAGNOSIS — E78.00 PURE HYPERCHOLESTEROLEMIA: Primary | ICD-10-CM
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
## 2019-10-21 DIAGNOSIS — J44.89 ASTHMA WITH COPD: Primary | Chronic | ICD-10-CM

## 2019-10-21 DIAGNOSIS — R94.2 ABNORMAL PET SCAN OF LUNG: Chronic | ICD-10-CM

## 2019-10-21 DIAGNOSIS — J44.89 ASTHMA WITH COPD: ICD-10-CM

## 2019-10-21 DIAGNOSIS — R07.81 RIB PAIN ON RIGHT SIDE: ICD-10-CM

## 2019-10-21 DIAGNOSIS — E66.9 OBESITY (BMI 30.0-34.9): Chronic | ICD-10-CM
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
## 2019-10-21 DIAGNOSIS — F17.210 CIGARETTE NICOTINE DEPENDENCE WITHOUT COMPLICATION: Chronic | ICD-10-CM

## 2019-10-21 PROCEDURE — 3008F BODY MASS INDEX DOCD: CPT | Mod: CPTII,S$GLB,, | Performed by: FAMILY MEDICINE

## 2019-10-21 PROCEDURE — 3075F PR MOST RECENT SYSTOLIC BLOOD PRESS GE 130-139MM HG: ICD-10-PCS | Mod: CPTII,S$GLB,, | Performed by: FAMILY MEDICINE

## 2019-10-21 PROCEDURE — 3074F SYST BP LT 130 MM HG: CPT | Mod: CPTII,S$GLB,, | Performed by: INTERNAL MEDICINE

## 2019-10-21 PROCEDURE — 71046 X-RAY EXAM CHEST 2 VIEWS: CPT | Mod: TC

## 2019-10-21 PROCEDURE — 99214 OFFICE O/P EST MOD 30 MIN: CPT | Mod: S$GLB,,, | Performed by: FAMILY MEDICINE

## 2019-10-21 PROCEDURE — 99999 PR PBB SHADOW E&M-EST. PATIENT-LVL III: CPT | Mod: PBBFAC,,, | Performed by: INTERNAL MEDICINE

## 2019-10-21 PROCEDURE — 3079F PR MOST RECENT DIASTOLIC BLOOD PRESSURE 80-89 MM HG: ICD-10-PCS | Mod: CPTII,S$GLB,, | Performed by: INTERNAL MEDICINE

## 2019-10-21 PROCEDURE — 99999 PR PBB SHADOW E&M-EST. PATIENT-LVL III: ICD-10-PCS | Mod: PBBFAC,,, | Performed by: INTERNAL MEDICINE

## 2019-10-21 PROCEDURE — 71110 XR RIBS 3 VIEWS BILATERAL: ICD-10-PCS | Mod: 26,,, | Performed by: RADIOLOGY

## 2019-10-21 PROCEDURE — 3079F PR MOST RECENT DIASTOLIC BLOOD PRESSURE 80-89 MM HG: ICD-10-PCS | Mod: CPTII,S$GLB,, | Performed by: FAMILY MEDICINE

## 2019-10-21 PROCEDURE — 3075F SYST BP GE 130 - 139MM HG: CPT | Mod: CPTII,S$GLB,, | Performed by: FAMILY MEDICINE

## 2019-10-21 PROCEDURE — 99999 PR PBB SHADOW E&M-EST. PATIENT-LVL III: ICD-10-PCS | Mod: PBBFAC,,, | Performed by: FAMILY MEDICINE

## 2019-10-21 PROCEDURE — 3074F PR MOST RECENT SYSTOLIC BLOOD PRESSURE < 130 MM HG: ICD-10-PCS | Mod: CPTII,S$GLB,, | Performed by: INTERNAL MEDICINE

## 2019-10-21 PROCEDURE — 3079F DIAST BP 80-89 MM HG: CPT | Mod: CPTII,S$GLB,, | Performed by: INTERNAL MEDICINE

## 2019-10-21 PROCEDURE — 3008F BODY MASS INDEX DOCD: CPT | Mod: CPTII,S$GLB,, | Performed by: INTERNAL MEDICINE

## 2019-10-21 PROCEDURE — 3008F PR BODY MASS INDEX (BMI) DOCUMENTED: ICD-10-PCS | Mod: CPTII,S$GLB,, | Performed by: FAMILY MEDICINE

## 2019-10-21 PROCEDURE — 71046 XR CHEST PA AND LATERAL: ICD-10-PCS | Mod: 26,,, | Performed by: RADIOLOGY

## 2019-10-21 PROCEDURE — 99214 PR OFFICE/OUTPT VISIT, EST, LEVL IV, 30-39 MIN: ICD-10-PCS | Mod: S$GLB,,, | Performed by: FAMILY MEDICINE

## 2019-10-21 PROCEDURE — 71110 X-RAY EXAM RIBS BIL 3 VIEWS: CPT | Mod: 26,,, | Performed by: RADIOLOGY

## 2019-10-21 PROCEDURE — 3008F PR BODY MASS INDEX (BMI) DOCUMENTED: ICD-10-PCS | Mod: CPTII,S$GLB,, | Performed by: INTERNAL MEDICINE

## 2019-10-21 PROCEDURE — 99999 PR PBB SHADOW E&M-EST. PATIENT-LVL III: CPT | Mod: PBBFAC,,, | Performed by: FAMILY MEDICINE

## 2019-10-21 PROCEDURE — 99215 PR OFFICE/OUTPT VISIT, EST, LEVL V, 40-54 MIN: ICD-10-PCS | Mod: S$GLB,,, | Performed by: INTERNAL MEDICINE

## 2019-10-21 PROCEDURE — 71110 X-RAY EXAM RIBS BIL 3 VIEWS: CPT | Mod: TC

## 2019-10-21 PROCEDURE — 71046 X-RAY EXAM CHEST 2 VIEWS: CPT | Mod: 26,,, | Performed by: RADIOLOGY

## 2019-10-21 PROCEDURE — 99215 OFFICE O/P EST HI 40 MIN: CPT | Mod: S$GLB,,, | Performed by: INTERNAL MEDICINE

## 2019-10-21 PROCEDURE — 3079F DIAST BP 80-89 MM HG: CPT | Mod: CPTII,S$GLB,, | Performed by: FAMILY MEDICINE

## 2019-10-21 RX ORDER — SIMVASTATIN 40 MG/1
40 TABLET, FILM COATED ORAL NIGHTLY
Qty: 90 TABLET | Refills: 0 | Status: SHIPPED | OUTPATIENT
Start: 2019-10-21 | End: 2020-02-11 | Stop reason: SDUPTHER

## 2019-10-21 RX ORDER — FLUTICASONE PROPIONATE AND SALMETEROL 500; 50 UG/1; UG/1
1 POWDER RESPIRATORY (INHALATION) 2 TIMES DAILY
Qty: 60 EACH | Refills: 11 | Status: SHIPPED | OUTPATIENT
Start: 2019-10-21 | End: 2020-08-10 | Stop reason: SDUPTHER

## 2019-10-21 NOTE — PATIENT INSTRUCTIONS
Chronic Lung Disease: If Oxygen Is Prescribed   Supplemental oxygen is prescribed if tests show that the level of oxygen in your blood is too low. If the level stays too low for too long, serious problems can develop in many parts of the body. Supplemental oxygen helps to relieve your symptoms and prevent future problems by getting more oxygen to the blood. Depending on your test results, you may need oxygen all the time. Or it may only be needed during certain activities, such as exercise or sleep. When oxygen is prescribed, youll be referred to a medical equipment company. They will set up the oxygen unit and teach you how to use it.  Nasal cannula     A nasal cannula should be placed in the nose with the prongs arching toward you.     Oxygen is most often inhaled through a nasal cannula (lightweight tube with two hollow prongs that fit just inside the nose).  Types of supplemental oxygen    Compressed oxygen   Oxygen concentrator   Liquid oxygen   Prescribed oxygen comes in several forms. You may use more than one type, depending on when you need oxygen:  · Compressed oxygen is oxygen gas stored in a tank. Because the oxygen is stored under pressure, these tanks must be handled carefully. Gauges on the tank can be used to adjust the oxygen flow rate. Your healthcare provider will determine what this should be. Small tanks can be carried. Larger tanks are on wheels and can be pulled around the house.  · An oxygen concentrator is a machine about the size of a large suitcase. It plugs into an electrical outlet. (A back-up oxygen supply is recommended in case of a power outage.) The machine takes oxygen from the air and concentrates it. Its then delivered to you through plastic tubing. The tubing is long enough so that you can move around the house. When youre using the concentrator, it must be kept somewhere that has a good supply of fresh air. (Dont keep it in a confined space, like a closet.) You may be set  up on a concentrator if you need oxygen all the time or while youre sleeping.  · Liquid oxygen results when oxygen gas is cooled to a very low temperature. Its kept in special containers that maintain this low temperature. When you use liquid oxygen, its warmed and becomes gas before reaching the cannula. Most tanks come with a portable unit that you can carry or pull on a cart. Some of these weigh only a few pounds. Liquid oxygen units are easy to carry around. If you need oxygen all the time or during activity, this kind of unit can help you stay active.  Oxygen is prescribed just for you  Your healthcare provider will prescribe oxygen based on your needs. Here are a few things you should know:  · A therapist from the medical equipment company will explain when to use oxygen and what type to use. Youll be taught how to use and maintain your oxygen equipment.  · You must use the exact rate of oxygen prescribed for each activity. Dont increase or decrease the amount without asking your healthcare provider first.  · Supplemental oxygen is a medicine. Its not addictive and causes no side effects when used as directed.   Date Last Reviewed: 5/1/2016  © 2824-8883 [employer] [address] [place] [address], [place] [address]. All rights reserved. This information is not intended as a substitute for professional medical care. Always follow your healthcare professional's instructions.

## 2019-10-21 NOTE — ASSESSMENT & PLAN NOTE
She reports a slip and fall a week ago. She reports pain on her right lower chest. She has not sought any medical attention. Will obtain CXR to r/o rib fractures.

## 2019-10-21 NOTE — PROGRESS NOTES
Subjective:      Patient ID: Merle Caro is a 64 y.o. female.    Patient Active Problem List   Diagnosis    LUCILA on CPAP: Not compliant with CPAP    Pure hypercholesterolemia    Affective bipolar disorder    Asthma with COPD    Depression    Obesity (BMI 30.0-34.9)    Essential hypertension    Slow transit constipation    Cigarette nicotine dependence without complication    Vitamin D insufficiency    Sebaceous cyst of labia    Abnormal PET scan of lung    History of colon polyps    Rib pain on right side     Problem list has been reviewed.    Chief Complaint: Asthma and COPD    HPI    PET not performed as requested. Will be rescheduled.    Patient reports no dyspnea. She reports a slip and fall a week ago. She reports pain on her right lower chest. She has not sought any medical attention. Will obtain CXR to r/o rib fractures.    The patient does not have currently have symptoms / an exacerbation.       No recent change in breathing.     She still smokes approximately 1 PPD.        Immunization status reviewed and up to date.    COPD Questionnaire  How often do you cough?: Most of the time  How often do you have phlegm (mucus) in your chest?: Some of the time  How often does your chest feel tight?: Never  When you walk up a hill or one flight of stairs, how often are you breathless?: All of the time  How often are you limited doing any activities at home?: A little of the time  How often are you confident leaving the house despite your lung condition?: Some of the time  How often do you sleep soundly?: Most of the time  How often do you have energy?: A little of the time  Total score: 20      Asthma is well controlled.     Answers for HPI/ROS submitted by the patient on 10/21/2019   Asthma  In the past 4 weeks, how much of the time did your asthma keep you from getting as much done at work, school, or at home?: some of the time  During the past 4 weeks, how often have you had shortness of breath?:  once a day  During the past 4 weeks, how often did your asthma symptoms (Wheezing, coughing, shortness of breath, chest tightness or pain) wake you up at night or earlier that usual in the morning?: once or twice  During the past 4 weeks, how often have you used your rescue inhaler or nebulizer medication (such as albuterol)?: not at all  How would you rate your asthma control during the past 4 weeks?: well controlled   : 18       Her current respiratory therapy regimen is PROVENTIL, TUDORZA, ADVAIR, ACCUNEB which provides relief. She is not adherent with her regimen.    She is on oxygen 2 L /m in with activity.       Current Disease Severity  frequent daytime asthma symptoms.   weekly nighttime asthma symptoms.   Frequency B-agonist use: several times per day.   Number of urgent/emergent visit in last year: 0  Current limitations in activity from asthma: none.   Number of days of school or work missed in the last month: not applicable.     Asthma Classification (General Symptom Frequency):  Mild Intermittent (< 2 x wk)  Mild Persistent (> 2 x wk, < daily)  Moderate Persistent (daily; almost daily inhaler)  Severe Persistent (continual; limited activities)  She has LUCILA and is on Auto CPAP. She reports that her CPAP machine is broken. She is requesting a new CPAP machine. She is not complaint with her CPAP She did not bring her SD card for complaince evaluation.     [place] Sleepiness Scale   EPWORTH SLEEPINESS SCALE 4/15/2019 7/16/2018 10/12/2017 10/20/2016 9/19/2016 9/16/2016 3/16/2016   Sitting and reading 1 0 1 0 0 1 0   Watching TV 0 0 0 0 0 0 0   Sitting, inactive in a public place (e.g. a theatre or a meeting) 0 0 0 0 0 0 0   As a passenger in a car for an hour without a break 1 2 0 0 0 0 0   Lying down to rest in the afternoon when circumstances permit 3 2 3 2 3 2 3   Sitting and talking to someone 0 0 0 0 1 0 0   Sitting quietly after a lunch without alcohol 1 0 0 0 0 0 2   In a car, while stopped for a few  minutes in traffic 0 0 0 0 0 0 0   Total score 6 4 4 2 4 3 5      A full  review of systems, past , family  and social histories was performed except as mentioned in the note above, these are non contributory to the main issues discussed today.     Previous Report Reviewed: office notes     The following portions of the patient's history were reviewed and updated as appropriate: She  has a past medical history of Asthma, COPD (chronic obstructive pulmonary disease), Depression, and Tobacco abuse.  She  has a past surgical history that includes Hysterectomy; Knee surgery; Tonsillectomy; multiple fracture repairs; and Colonoscopy w/ polypectomy (01/05/2011).  Her family history includes COPD in her mother; Cancer in her mother; Hypertension in her mother; Lung cancer in her mother; Ovarian cancer in her sister; Stroke in her mother.  She  reports that she has been smoking cigarettes. She has a 33.00 pack-year smoking history. She has never used smokeless tobacco. She reports that she does not drink alcohol or use drugs.  She has a current medication list which includes the following prescription(s): aclidinium bromide, albuterol, cholecalciferol (vitamin d3), clonazepam, estradiol, fluticasone-salmeterol 500-50 mcg/dose, haloperidol, lisinopril, oxygen-air delivery systems, pantoprazole, paroxetine, diclofenac sodium, and simvastatin.  She is allergic to codeine; keflex [cephalexin]; shellfish containing products; and sucralfate..    Review of Systems   Constitutional: Negative for night sweats.   HENT: Positive for postnasal drip, sinus pressure and voice change.    Eyes: Positive for redness.   Respiratory: Positive for sputum production and wheezing. Negative for cough, hemoptysis and dyspnea on extertion.    Cardiovascular: Negative for chest pain, palpitations and leg swelling.   Genitourinary: Negative for difficulty urinating and hematuria.   Endocrine: Negative for cold intolerance and heat intolerance.   "  Musculoskeletal: Positive for arthralgias. Negative for back pain and gait problem.   Gastrointestinal: Positive for acid reflux. Negative for nausea, vomiting, abdominal pain and abdominal distention.        Diarrhea   Neurological: Positive for headaches. Negative for syncope and light-headedness.   Psychiatric/Behavioral: The patient is nervous/anxious.       Objective:     /80   Pulse 103   Resp 20   Ht 5' 1" (1.549 m)   Wt 83.8 kg (184 lb 13.7 oz)   SpO2 96%   BMI 34.93 kg/m²   Body mass index is 34.93 kg/m².    Physical Exam   Constitutional: She is oriented to person, place, and time. She appears well-developed and well-nourished.   HENT:   Head: Normocephalic and atraumatic.   Neck: Normal range of motion. Neck supple.   Cardiovascular: Normal rate and regular rhythm. Exam reveals no gallop.   No murmur heard.  Pulmonary/Chest: Effort normal. She has wheezes.   Abdominal: Soft. Bowel sounds are normal.   Musculoskeletal: Normal range of motion. She exhibits no edema.   Neurological: She is alert and oriented to person, place, and time.   Skin: Skin is warm and dry.   Psychiatric: She has a normal mood and affect.       Personal Diagnostic Review     NM PET CT Routine Skull to Mid Thigh: 05/22/19: A previously described 9 mm right lower lobe pulmonary nodule is not significantly changed since the recent CT allowing for differences in exam technique.  The nodule is not hypermetabolic.  Short-term follow-up chest CT is recommended to document stability.  Other small scattered pulmonary nodules are also stable and also not FDG avid.  There are hypermetabolic slightly enlarged mediastinal lymph nodes as described above.  These are indeterminate but possibly infectious or inflammatory in etiology.  However, malignant etiology cannot be completely excluded and would also recommend attention to this area on follow-up.  Anterior right 3rd rib fracture and other findings as above.    Assessment / plan: "       Discussed diagnosis, its evaluation, treatment and usual course. All questions answered.      Problem List Items Addressed This Visit        Pulmonary    Asthma with COPD - Primary (Chronic)    Current Assessment & Plan     Asthma with COPD ROS: taking medications as instructed, no medication side effects noted, no significant ongoing wheezing or shortness of breath, using bronchodilator MDI less than twice a week.   New concerns: None.   Exam: appears well, vitals normal, no respiratory distress, acyanotic, normal RR, chest clear, no wheezing, crepitations, rhonchi, normal symmetric air entry.   Assessment:  Asthma with COPD:  well controlled.   Plan: TUDORZA, ADVAIR, ALBUTEROL. ADVAIR refilled. Current treatment plan is effective, no change in therapy. PFT in 6 months.          Relevant Medications    fluticasone-salmeterol diskus inhaler 500-50 mcg    Other Relevant Orders    Complete PFT without bronchodilator - Clinic    Abnormal PET scan of lung (Chronic)    Current Assessment & Plan     There are hypermetabolic slightly enlarged mediastinal lymph nodes on PET scan 05/22/19. PET scan not done today as requested.Surveillance with repeat PET scan in 6 monthshs.          Relevant Orders    NM PET CT Routine Skull to Mid Thigh    Rib pain on right side    Current Assessment & Plan     She reports a slip and fall a week ago. She reports pain on her right lower chest. She has not sought any medical attention. Will obtain CXR to r/o rib fractures.           Relevant Orders    X-Ray Ribs 3 Views Bilateral       Endocrine    Obesity (BMI 30.0-34.9)    Current Assessment & Plan     General weight loss/lifestyle modification strategies discussed (elicit support from others; identify saboteurs; non-food rewards, etc).  Behavioral treatment: Slim Fast.  Diet interventions: low calorie (1000 kCal/d) deficit diet.            Other    LUCILA on CPAP: Not compliant with CPAP (Chronic)    Current Assessment & Plan     She  refuses to use CPAP.     Complications of untreated sleep apnea discussed with pateint in detail including but not limited to  high blood pressure, heart attack, stroke, obesity,  diabetes and worsening heart failure. Patient voiced understanding.         Cigarette nicotine dependence without complication    Current Assessment & Plan     Assistance with smoking cessation was offered, including:  []  Medications  [x]  Counseling  []  Printed Information on Smoking Cessation  []  Referral to a Smoking Cessation Program    Patient was counseled regarding smoking for 3-10 minutes.             TIME SPENT WITH PATIENT: Time spent: 40 minutes in face to face  discussion concerning diagnosis, prognosis, review of lab and test results, benefits of treatment as well as management of disease, counseling of patient and coordination of care between various health  care providers . Greater than half the time spent was used for coordination of care and counseling of patient.        Follow up in about 6 months (around 4/21/2020) for Asthma with COPD, Tobacco use disorder, LUCILA, Obesity, Abnormal CXR.

## 2019-10-21 NOTE — ASSESSMENT & PLAN NOTE
There are hypermetabolic slightly enlarged mediastinal lymph nodes on PET scan 05/22/19. PET scan not done today as requested.Surveillance with repeat PET scan in 6 monthshs.

## 2019-10-21 NOTE — TELEPHONE ENCOUNTER
----- Message from Yane King sent at 10/21/2019 11:18 AM CDT -----  Patient returning call. [phone] (work)

## 2019-10-21 NOTE — ASSESSMENT & PLAN NOTE
Asthma with COPD ROS: taking medications as instructed, no medication side effects noted, no significant ongoing wheezing or shortness of breath, using bronchodilator MDI less than twice a week.   New concerns: None.   Exam: appears well, vitals normal, no respiratory distress, acyanotic, normal RR, chest clear, no wheezing, crepitations, rhonchi, normal symmetric air entry.   Assessment:  Asthma with COPD:  well controlled.   Plan: TUDORZA, ADVAIR, ALBUTEROL. ADVAIR refilled. Current treatment plan is effective, no change in therapy. PFT in 6 months.

## 2020-02-10 ENCOUNTER — OFFICE VISIT (OUTPATIENT)
Dept: INTERNAL MEDICINE | Facility: CLINIC | Age: 65
End: 2020-02-10
Payer: COMMERCIAL

## 2020-02-10 ENCOUNTER — LAB VISIT (OUTPATIENT)
Dept: LAB | Facility: HOSPITAL | Age: 65
End: 2020-02-10
Attending: FAMILY MEDICINE
Payer: COMMERCIAL

## 2020-02-10 VITALS
WEIGHT: 182.56 LBS | OXYGEN SATURATION: 92 % | RESPIRATION RATE: 20 BRPM | TEMPERATURE: 99 F | BODY MASS INDEX: 33.6 KG/M2 | HEART RATE: 87 BPM | DIASTOLIC BLOOD PRESSURE: 78 MMHG | SYSTOLIC BLOOD PRESSURE: 132 MMHG | HEIGHT: 62 IN

## 2020-02-10 DIAGNOSIS — Z12.31 ENCOUNTER FOR SCREENING MAMMOGRAM FOR MALIGNANT NEOPLASM OF BREAST: ICD-10-CM

## 2020-02-10 DIAGNOSIS — E78.00 PURE HYPERCHOLESTEROLEMIA: Primary | ICD-10-CM

## 2020-02-10 DIAGNOSIS — E66.9 OBESITY (BMI 30.0-34.9): ICD-10-CM

## 2020-02-10 DIAGNOSIS — K21.00 GASTROESOPHAGEAL REFLUX DISEASE WITH ESOPHAGITIS: ICD-10-CM

## 2020-02-10 DIAGNOSIS — F31.9 BIPOLAR AFFECTIVE DISORDER, REMISSION STATUS UNSPECIFIED: Chronic | ICD-10-CM

## 2020-02-10 DIAGNOSIS — G47.33 OSA ON CPAP: Chronic | ICD-10-CM

## 2020-02-10 DIAGNOSIS — I10 ESSENTIAL HYPERTENSION: ICD-10-CM

## 2020-02-10 DIAGNOSIS — J44.89 ASTHMA WITH COPD: ICD-10-CM

## 2020-02-10 DIAGNOSIS — E78.00 PURE HYPERCHOLESTEROLEMIA: ICD-10-CM

## 2020-02-10 DIAGNOSIS — F17.219 CIGARETTE NICOTINE DEPENDENCE WITH NICOTINE-INDUCED DISORDER: ICD-10-CM

## 2020-02-10 DIAGNOSIS — F32.A DEPRESSION, UNSPECIFIED DEPRESSION TYPE: Chronic | ICD-10-CM

## 2020-02-10 PROBLEM — E55.9 VITAMIN D INSUFFICIENCY: Chronic | Status: RESOLVED | Noted: 2017-02-06 | Resolved: 2020-02-10

## 2020-02-10 PROBLEM — R07.81 RIB PAIN ON RIGHT SIDE: Status: RESOLVED | Noted: 2019-10-21 | Resolved: 2020-02-10

## 2020-02-10 PROCEDURE — 3075F PR MOST RECENT SYSTOLIC BLOOD PRESS GE 130-139MM HG: ICD-10-PCS | Mod: CPTII,S$GLB,, | Performed by: FAMILY MEDICINE

## 2020-02-10 PROCEDURE — 3075F SYST BP GE 130 - 139MM HG: CPT | Mod: CPTII,S$GLB,, | Performed by: FAMILY MEDICINE

## 2020-02-10 PROCEDURE — 80061 LIPID PANEL: CPT

## 2020-02-10 PROCEDURE — 99999 PR PBB SHADOW E&M-EST. PATIENT-LVL IV: ICD-10-PCS | Mod: PBBFAC,,, | Performed by: FAMILY MEDICINE

## 2020-02-10 PROCEDURE — 3078F DIAST BP <80 MM HG: CPT | Mod: CPTII,S$GLB,, | Performed by: FAMILY MEDICINE

## 2020-02-10 PROCEDURE — 3008F BODY MASS INDEX DOCD: CPT | Mod: CPTII,S$GLB,, | Performed by: FAMILY MEDICINE

## 2020-02-10 PROCEDURE — 36415 COLL VENOUS BLD VENIPUNCTURE: CPT

## 2020-02-10 PROCEDURE — 99214 OFFICE O/P EST MOD 30 MIN: CPT | Mod: S$GLB,,, | Performed by: FAMILY MEDICINE

## 2020-02-10 PROCEDURE — 3008F PR BODY MASS INDEX (BMI) DOCUMENTED: ICD-10-PCS | Mod: CPTII,S$GLB,, | Performed by: FAMILY MEDICINE

## 2020-02-10 PROCEDURE — 80053 COMPREHEN METABOLIC PANEL: CPT

## 2020-02-10 PROCEDURE — 99214 PR OFFICE/OUTPT VISIT, EST, LEVL IV, 30-39 MIN: ICD-10-PCS | Mod: S$GLB,,, | Performed by: FAMILY MEDICINE

## 2020-02-10 PROCEDURE — 99999 PR PBB SHADOW E&M-EST. PATIENT-LVL IV: CPT | Mod: PBBFAC,,, | Performed by: FAMILY MEDICINE

## 2020-02-10 PROCEDURE — 3078F PR MOST RECENT DIASTOLIC BLOOD PRESSURE < 80 MM HG: ICD-10-PCS | Mod: CPTII,S$GLB,, | Performed by: FAMILY MEDICINE

## 2020-02-10 RX ORDER — PANTOPRAZOLE SODIUM 40 MG/1
40 TABLET, DELAYED RELEASE ORAL DAILY
Qty: 30 TABLET | Refills: 11 | Status: SHIPPED | OUTPATIENT
Start: 2020-02-10 | End: 2020-08-10 | Stop reason: SDUPTHER

## 2020-02-10 NOTE — PROGRESS NOTES
Dictation #1  MRN:[id number]  [facility]:[id number]  Subjective:   Patient ID: Merle Caro is a 64 y.o. female.  Chief Complaint:  Follow-up      Six month follow-up chronic medical conditions.    Last visit started simvastatin 40 mg daily for hyperlipidemia.  Reports compliance.  Denies side effects.  Needs repeat CMP and lipid panel.    Also with rib fracture following fall.  No significant fall since then.  Still with some intermittent rib pain, but significantly improved.    Additional medical history for:  Hypertension.  Controlled lisinopril 10 mg daily.  Reports compliance.  Denies side effects.    Asthma/ COPD.  Needs refill of Tudorza inhaler.  Reports compliance with Advair inhaler.  Albuterol as needed.  On oxygen as needed.  Has follow-up with pulmonology scheduled.  Unfortunately started smoking again.  Is interested in cessation.    GERD.  Stable on daily PPI.  Vitamin-D level normal on 2000 units supplement daily.  Needs refill.    Psych.  Sees outside provider.  State stable on regimen of Klonopin, Paxil, and Haldol.    Routine health maintenance needs include HIV testing and shingles vaccine.    No new complaints concerns today.        Current Outpatient Medications:     albuterol (PROVENTIL/VENTOLIN HFA) 90 mcg/actuation inhaler, Inhale 2 puffs into the lungs every 4 (four) hours as needed for Wheezing., Disp: 18 g, Rfl: 11    cholecalciferol, vitamin D3, (VITAMIN D3) 2,000 unit Cap, Take 1 capsule by mouth once daily., Disp: , Rfl:     clonazepam (KLONOPIN) 0.5 MG tablet, Take 0.5 mg by mouth once daily. , Disp: , Rfl:     estradiol (VAGIFEM) 10 mcg Tab, Place 1 tablet (10 mcg total) vaginally twice a week., Disp: 8 tablet, Rfl: 11    fluticasone-salmeterol diskus inhaler 500-50 mcg, Inhale 1 puff into the lungs 2 (two) times daily. Controller, Disp: 60 each, Rfl: 11    haloperidol (HALDOL) 5 MG tablet, Take 5 mg by mouth once daily., Disp: , Rfl:     lisinopril 10 MG tablet, Take 1 tablet (10 mg  "total) by mouth once daily., Disp: 90 tablet, Rfl: 3    OXYGEN-AIR DELIVERY SYSTEMS MISC, Inhale 2 L into the lungs as needed (O2 via Nasal Canula.)., Disp: , Rfl:     paroxetine (PAXIL) 40 MG tablet, TK 1 T PO QHS, Disp: , Rfl: 2    simvastatin (ZOCOR) 40 MG tablet, Take 1 tablet (40 mg total) by mouth every evening., Disp: 90 tablet, Rfl: 0    aclidinium bromide (TUDORZA PRESSAIR) 400 mcg/actuation AePB, Inhale 1 puff into the lungs 2 (two) times daily., Disp: 60 each, Rfl: 11    pantoprazole (PROTONIX) 40 MG tablet, Take 1 tablet (40 mg total) by mouth once daily., Disp: 30 tablet, Rfl: 11    Review of Systems   Constitutional: Negative for activity change, appetite change, chills, fatigue, fever and unexpected weight change.   Eyes: Negative for visual disturbance.   Respiratory: Positive for cough and shortness of breath. Negative for apnea, chest tightness and wheezing.    Cardiovascular: Negative for chest pain, palpitations and leg swelling.   Gastrointestinal: Negative for abdominal distention, abdominal pain, constipation, diarrhea, nausea and vomiting.   Genitourinary: Negative for difficulty urinating and pelvic pain.   Musculoskeletal: Positive for arthralgias, joint swelling and myalgias. Negative for back pain, gait problem and neck pain.   Skin: Negative for color change, rash and wound.   Neurological: Negative for dizziness, tremors, syncope, weakness, light-headedness, numbness and headaches.   Psychiatric/Behavioral: Negative for agitation, behavioral problems, confusion, decreased concentration, dysphoric mood, hallucinations, self-injury, sleep disturbance and suicidal ideas. The patient is not nervous/anxious and is not hyperactive.      Objective:   /78 (BP Location: Left arm, Patient Position: Sitting)   Pulse 87   Temp 98.9 °F (37.2 °C) (Oral)   Resp 20   Ht 5' 2" (1.575 m)   Wt 82.8 kg (182 lb 8.7 oz)   SpO2 (!) 92%   BMI 33.39 kg/m²     Physical Exam   Constitutional: " Vital signs are normal. She appears well-developed and well-nourished. No distress.   Eyes: No scleral icterus.   Neck: No JVD present. Carotid bruit is not present. No thyroid mass and no thyromegaly present.   Cardiovascular: Normal rate, regular rhythm and normal heart sounds. Exam reveals no gallop and no friction rub.   No murmur heard.  Pulmonary/Chest: Effort normal. No accessory muscle usage. No tachypnea. No respiratory distress. She has no decreased breath sounds. She has wheezes in the right lower field. She has rhonchi in the right middle field, the right lower field, the left middle field and the left lower field. She has no rales.   Abdominal: Soft. She exhibits no distension. There is no hepatosplenomegaly. There is no tenderness. There is no rebound and no guarding.   Musculoskeletal: She exhibits no edema.   Skin: Skin is warm, dry and intact. No rash noted.   Psychiatric: She has a normal mood and affect.   Nursing note and vitals reviewed.    Assessment:       ICD-10-CM ICD-9-CM   1. Pure hypercholesterolemia E78.00 272.0   2. Asthma with COPD J44.9 493.20   3. Cigarette nicotine dependence with nicotine-induced disorder F17.219 292.9   4. Essential hypertension I10 401.9   5. Gastroesophageal reflux disease with esophagitis K21.0 530.11   6. Bipolar affective disorder, remission status unspecified F31.9 296.80   7. Depression, unspecified depression type F32.9 311   8. LUCILA on CPAP: Not compliant with CPAP G47.33 327.23    Z99.89 V46.8   9. Obesity (BMI 30.0-34.9) E66.9 278.00   10. Encounter for screening mammogram for malignant neoplasm of breast Z12.31 V76.12     Plan:   Pure hypercholesterolemia  -     Lipid panel; Future; Expected date: 02/10/2020  -     Comprehensive metabolic panel; Future; Expected date: 02/10/2020  Check labs.    Adjust simvastatin 40 mg daily if needed.      Asthma with COPD  Cigarette nicotine dependence with nicotine-induced disorder  LUCILA on CPAP: Not compliant with  CPAP  -     Ambulatory referral/consult to Smoking Cessation Program; Future; Expected date: 02/17/2020  -     aclidinium bromide (TUDORZA PRESSAIR) 400 mcg/actuation AePB; Inhale 1 puff into the lungs 2 (two) times daily.  Dispense: 60 each; Refill: 11  Continue all maintenance inhalers and albuterol as needed.    Agrees to referral to smoking cessation  Follow-up pulmonology in April as scheduled.      Essential hypertension  Controlled.  BP goal.    Continue lisinopril 10 mg daily.      Gastroesophageal reflux disease with esophagitis  -     pantoprazole (PROTONIX) 40 MG tablet; Take 1 tablet (40 mg total) by mouth once daily.  Dispense: 30 tablet; Refill: 11  Stable.  Asymptomatic.    Vitamin-D level and kidney function previous stable.    Continue Protonix 40 mg daily.  Bipolar affective disorder, remission status unspecified  Depression, unspecified depression type  Continue per outside psychiatrist.    [facility]  -     Mammo Digital Screening Bilat; Future; Expected date: 02/15/2020  Reports previous shingles vaccine.    Declines HIV testing  Mammogram ordered    Follow-up all specialists as scheduled.  Return to clinic 6 months or sooner as needed.

## 2020-02-11 DIAGNOSIS — E78.00 PURE HYPERCHOLESTEROLEMIA: ICD-10-CM

## 2020-02-11 LAB
ALBUMIN SERPL BCP-MCNC: 3.6 G/DL (ref 3.5–5.2)
ALP SERPL-CCNC: 100 U/L (ref 55–135)
ALT SERPL W/O P-5'-P-CCNC: 8 U/L (ref 10–44)
ANION GAP SERPL CALC-SCNC: 13 MMOL/L (ref 8–16)
AST SERPL-CCNC: 13 U/L (ref 10–40)
BILIRUB SERPL-MCNC: 0.3 MG/DL (ref 0.1–1)
BUN SERPL-MCNC: 7 MG/DL (ref 8–23)
CALCIUM SERPL-MCNC: 9 MG/DL (ref 8.7–10.5)
CHLORIDE SERPL-SCNC: 93 MMOL/L (ref 95–110)
CHOLEST SERPL-MCNC: 138 MG/DL (ref 120–199)
CHOLEST/HDLC SERPL: 2.7 {RATIO} (ref 2–5)
CO2 SERPL-SCNC: 23 MMOL/L (ref 23–29)
CREAT SERPL-MCNC: 0.6 MG/DL (ref 0.5–1.4)
EST. GFR  (AFRICAN AMERICAN): >60 ML/MIN/1.73 M^2
EST. GFR  (NON AFRICAN AMERICAN): >60 ML/MIN/1.73 M^2
GLUCOSE SERPL-MCNC: 66 MG/DL (ref 70–110)
HDLC SERPL-MCNC: 52 MG/DL (ref 40–75)
HDLC SERPL: 37.7 % (ref 20–50)
LDLC SERPL CALC-MCNC: 69 MG/DL (ref 63–159)
NONHDLC SERPL-MCNC: 86 MG/DL
POTASSIUM SERPL-SCNC: 4.3 MMOL/L (ref 3.5–5.1)
PROT SERPL-MCNC: 7.3 G/DL (ref 6–8.4)
SODIUM SERPL-SCNC: 129 MMOL/L (ref 136–145)
TRIGL SERPL-MCNC: 85 MG/DL (ref 30–150)

## 2020-02-11 RX ORDER — SIMVASTATIN 40 MG/1
40 TABLET, FILM COATED ORAL NIGHTLY
Qty: 90 TABLET | Refills: 3 | Status: SHIPPED | OUTPATIENT
Start: 2020-02-11 | End: 2020-08-10 | Stop reason: SDUPTHER

## 2020-02-11 RX ORDER — SIMVASTATIN 40 MG/1
40 TABLET, FILM COATED ORAL NIGHTLY
Qty: 90 TABLET | Refills: 3 | Status: SHIPPED | OUTPATIENT
Start: 2020-02-11 | End: 2020-02-11 | Stop reason: SDUPTHER

## 2020-03-04 NOTE — TELEPHONE ENCOUNTER
"Patient called stating that she's been vomiting "yellow stuff" since yesterday now she feels as though her throat is swollen.  Patient advised to be seen for evaluation, patient states unable to come in until Thursday.  Patient scheduled appointment for Thursday with Dr. Major.  " Patient is requesting her refill on Paxil to [facility] on [address]. Patient states she would like a one month supply while waiting on her mail order. Patient states she has 4 tablets left.

## 2020-03-09 ENCOUNTER — HOSPITAL ENCOUNTER (OUTPATIENT)
Dept: RADIOLOGY | Facility: HOSPITAL | Age: 65
Discharge: HOME | End: 2020-03-09
Attending: FAMILY MEDICINE
Payer: COMMERCIAL

## 2020-03-09 VITALS — HEIGHT: 62 IN | BODY MASS INDEX: 33.6 KG/M2 | WEIGHT: 182.56 LBS

## 2020-03-09 DIAGNOSIS — Z12.31 ENCOUNTER FOR SCREENING MAMMOGRAM FOR MALIGNANT NEOPLASM OF BREAST: ICD-10-CM

## 2020-03-09 PROCEDURE — 77067 MAMMO DIGITAL SCREENING BILAT WITH TOMOSYNTHESIS_CAD: ICD-10-PCS | Mod: 26,,, | Performed by: RADIOLOGY

## 2020-03-09 PROCEDURE — 77067 SCR MAMMO BI INCL CAD: CPT | Mod: TC

## 2020-03-09 PROCEDURE — 77063 MAMMO DIGITAL SCREENING BILAT WITH TOMOSYNTHESIS_CAD: ICD-10-PCS | Mod: 26,,, | Performed by: RADIOLOGY

## 2020-03-09 PROCEDURE — 77067 SCR MAMMO BI INCL CAD: CPT | Mod: 26,,, | Performed by: RADIOLOGY

## 2020-03-09 PROCEDURE — 77063 BREAST TOMOSYNTHESIS BI: CPT | Mod: 26,,, | Performed by: RADIOLOGY

## 2020-03-24 ENCOUNTER — TELEPHONE (OUTPATIENT)
Dept: INTERNAL MEDICINE | Facility: CLINIC | Age: 65
End: 2020-03-24

## 2020-03-24 NOTE — TELEPHONE ENCOUNTER
Runny nose, cough and feels tired . She been taking OTC allergy medications . She still feels yucky . Advise !

## 2020-03-24 NOTE — TELEPHONE ENCOUNTER
"----- Message from Vanesa Swartz sent at 3/24/2020  2:09 PM CDT -----  Contact: Patient  patient states that she has a runny nose and " all kinds of stuff", would like to speak to nurse, please call her back at [phone]. Thank you  "

## 2020-04-22 ENCOUNTER — TELEPHONE (OUTPATIENT)
Dept: PULMONOLOGY | Facility: CLINIC | Age: 65
End: 2020-04-22

## 2020-07-10 DIAGNOSIS — J44.89 ASTHMA WITH COPD: Primary | ICD-10-CM

## 2020-07-15 ENCOUNTER — TELEPHONE (OUTPATIENT)
Dept: PULMONOLOGY | Facility: CLINIC | Age: 65
End: 2020-07-15

## 2020-07-24 ENCOUNTER — OFFICE VISIT (OUTPATIENT)
Dept: PULMONOLOGY | Facility: CLINIC | Age: 65
End: 2020-07-24
Payer: COMMERCIAL

## 2020-07-24 VITALS
BODY MASS INDEX: 34.85 KG/M2 | SYSTOLIC BLOOD PRESSURE: 132 MMHG | RESPIRATION RATE: 16 BRPM | HEIGHT: 62 IN | HEART RATE: 98 BPM | WEIGHT: 189.38 LBS | DIASTOLIC BLOOD PRESSURE: 70 MMHG | OXYGEN SATURATION: 90 %

## 2020-07-24 DIAGNOSIS — J45.901 ASTHMA WITH COPD WITH EXACERBATION: Primary | ICD-10-CM

## 2020-07-24 DIAGNOSIS — J44.89 ASTHMA WITH COPD: ICD-10-CM

## 2020-07-24 DIAGNOSIS — R91.1 LUNG NODULE: ICD-10-CM

## 2020-07-24 DIAGNOSIS — J44.1 ASTHMA WITH COPD WITH EXACERBATION: Primary | ICD-10-CM

## 2020-07-24 PROCEDURE — 3008F PR BODY MASS INDEX (BMI) DOCUMENTED: ICD-10-PCS | Mod: CPTII,S$GLB,, | Performed by: NURSE PRACTITIONER

## 2020-07-24 PROCEDURE — 3075F SYST BP GE 130 - 139MM HG: CPT | Mod: CPTII,S$GLB,, | Performed by: NURSE PRACTITIONER

## 2020-07-24 PROCEDURE — 99214 PR OFFICE/OUTPT VISIT, EST, LEVL IV, 30-39 MIN: ICD-10-PCS | Mod: 25,S$GLB,, | Performed by: NURSE PRACTITIONER

## 2020-07-24 PROCEDURE — 99999 PR PBB SHADOW E&M-EST. PATIENT-LVL IV: ICD-10-PCS | Mod: PBBFAC,,, | Performed by: NURSE PRACTITIONER

## 2020-07-24 PROCEDURE — 94640 AIRWAY INHALATION TREATMENT: CPT | Mod: S$GLB,,, | Performed by: NURSE PRACTITIONER

## 2020-07-24 PROCEDURE — 3075F PR MOST RECENT SYSTOLIC BLOOD PRESS GE 130-139MM HG: ICD-10-PCS | Mod: CPTII,S$GLB,, | Performed by: NURSE PRACTITIONER

## 2020-07-24 PROCEDURE — 3078F PR MOST RECENT DIASTOLIC BLOOD PRESSURE < 80 MM HG: ICD-10-PCS | Mod: CPTII,S$GLB,, | Performed by: NURSE PRACTITIONER

## 2020-07-24 PROCEDURE — 3008F BODY MASS INDEX DOCD: CPT | Mod: CPTII,S$GLB,, | Performed by: NURSE PRACTITIONER

## 2020-07-24 PROCEDURE — 99214 OFFICE O/P EST MOD 30 MIN: CPT | Mod: 25,S$GLB,, | Performed by: NURSE PRACTITIONER

## 2020-07-24 PROCEDURE — 99999 PR PBB SHADOW E&M-EST. PATIENT-LVL IV: CPT | Mod: PBBFAC,,, | Performed by: NURSE PRACTITIONER

## 2020-07-24 PROCEDURE — 3078F DIAST BP <80 MM HG: CPT | Mod: CPTII,S$GLB,, | Performed by: NURSE PRACTITIONER

## 2020-07-24 PROCEDURE — 94640 PR INHAL RX, AIRWAY OBST/DX SPUTUM INDUCT: ICD-10-PCS | Mod: S$GLB,,, | Performed by: NURSE PRACTITIONER

## 2020-07-24 RX ORDER — ALBUTEROL SULFATE 90 UG/1
2 AEROSOL, METERED RESPIRATORY (INHALATION) EVERY 4 HOURS PRN
Qty: 18 G | Refills: 11 | Status: SHIPPED | OUTPATIENT
Start: 2020-07-24 | End: 2021-07-24

## 2020-07-24 RX ORDER — ACLIDINIUM BROMIDE 400 UG/1
1 POWDER, METERED RESPIRATORY (INHALATION) 2 TIMES DAILY
Qty: 60 EACH | Refills: 11 | Status: SHIPPED | OUTPATIENT
Start: 2020-07-24 | End: 2020-07-27 | Stop reason: CLARIF

## 2020-07-24 RX ORDER — ALBUTEROL SULFATE 0.83 MG/ML
2.5 SOLUTION RESPIRATORY (INHALATION)
Status: COMPLETED | OUTPATIENT
Start: 2020-07-24 | End: 2020-07-24

## 2020-07-24 RX ORDER — AZITHROMYCIN 250 MG/1
TABLET, FILM COATED ORAL
Qty: 6 TABLET | Refills: 0 | Status: SHIPPED | OUTPATIENT
Start: 2020-07-24 | End: 2020-08-10

## 2020-07-24 RX ORDER — PREDNISONE 10 MG/1
TABLET ORAL
Qty: 15 TABLET | Refills: 0 | Status: SHIPPED | OUTPATIENT
Start: 2020-07-24 | End: 2020-08-10

## 2020-07-24 RX ADMIN — ALBUTEROL SULFATE 2.5 MG: 0.83 SOLUTION RESPIRATORY (INHALATION) at 02:07

## 2020-07-24 NOTE — PROGRESS NOTES
Subjective:      Patient ID: Merle Caro is a 65 y.o. female.    Patient Active Problem List   Diagnosis    LUCILA on CPAP: Not compliant with CPAP    Pure hypercholesterolemia    Affective bipolar disorder    Asthma with COPD    Depression    Obesity (BMI 30.0-34.9)    Essential hypertension    Slow transit constipation    Cigarette nicotine dependence with nicotine-induced disorder    Sebaceous cyst of labia    Abnormal PET scan of lung    History of colon polyps    Gastroesophageal reflux disease with esophagitis     she has been referred by No ref. provider found for evaluation and management for   Chief Complaint   Patient presents with    Asthma    COPD     Chief Complaint: Asthma and COPD    HPI:  Merle Caro is a 65 y.o. female presents to pulmonary clinic follow up evaluation related to asthma with COPD  Patient is followed by Dr. Tracey, last seen October 2019.     Patient reports she has been out of her all inhalers over the past 1 month so has developed productive cough thick white and yellow mucous with wheezing. She was residing in her camper in [place] while her  was working. She has recently returned to  for her doctors appointments.      Her current respiratory therapy regimen is PROVENTIL, TUDORZA, ADVAIR 500 mcg, ACCUNEB which provides relief. She is NOT adherent with her regimen.     5/15/2019 CPFT  Normal spirometry. Airflow is not clearly improved after bronchodilator. Normal lung volumes. Diffusion capacity is mildly reduced but corrects for alveolar volume.     Remains every day smoker cut back from 3/4 pack to 2-4 cigarettes a day. She is hoping to quit completely in future, has not had will power to stop completely. 33 pack year smoker.    On home oxygen NC 2lm exertion and sleep. Report typically adherent to oxygen use with exertion. Always adherent to  NC 2lm for sleep.  Presents off home oxygen for exertion, states she could not figure out how to wear both oxygen  and face mask to come in to clinic today, instructions provided.       Previous Report Reviewed: lab reports and office notes     Past Medical History: The following portions of the patient's history were reviewed and updated as appropriate:   She  has a past surgical history that includes Hysterectomy; Knee surgery; Tonsillectomy; multiple fracture repairs; and Colonoscopy w/ polypectomy (01/05/2011).  Her family history includes COPD in her mother; Cancer in her mother; Hypertension in her mother; Lung cancer in her mother; Ovarian cancer in her sister; Stroke in her mother.  She  reports that she has been smoking cigarettes. She has a 33.00 pack-year smoking history. She has never used smokeless tobacco. She reports that she does not drink alcohol or use drugs.  She has a current medication list which includes the following prescription(s): tudorza pressair, albuterol, azithromycin, cholecalciferol (vitamin d3), clonazepam, estradiol, fluticasone-salmeterol 500-50 mcg/dose, haloperidol, lisinopril, oxygen-air delivery systems, pantoprazole, paroxetine, prednisone, and simvastatin.  She is allergic to codeine; keflex [cephalexin]; shellfish containing products; and sucralfate..    Review of Systems   Constitutional: Negative for fever, chills, weight loss, weight gain, activity change, appetite change, fatigue and night sweats.   HENT: Negative for postnasal drip, rhinorrhea, sinus pressure, voice change and congestion.    Eyes: Negative for redness and itching.   Respiratory: Positive for cough, sputum production, chest tightness, wheezing and dyspnea on extertion (Improved with use of nasal cannula 2 L with exertion). Negative for snoring, shortness of breath, orthopnea, asthma nighttime symptoms, use of rescue inhaler ( did not have on hand out of town in [place]) and somnolence.    Cardiovascular: Negative.  Negative for chest pain, palpitations and leg swelling.   Genitourinary: Negative for difficulty  "urinating and hematuria.   Endocrine: Negative for cold intolerance and heat intolerance.    Musculoskeletal: Negative for arthralgias, gait problem, joint swelling and myalgias.   Skin: Negative.    Gastrointestinal: Negative for nausea, vomiting, abdominal pain and acid reflux.   Neurological: Negative for dizziness, weakness, light-headedness and headaches.   Hematological: Negative for adenopathy. No excessive bruising.   All other systems reviewed and are negative.       Objective:   /70   Pulse 98   Resp 16   Ht 5' 2" (1.575 m)   Wt 85.9 kg (189 lb 6 oz)   SpO2 (!) 90% Comment: 2liters  BMI 34.64 kg/m²   Physical Exam  Vitals signs reviewed.   Constitutional:       General: She is awake. She is not in acute distress.     Appearance: Normal appearance. She is well-developed and well-groomed. She is obese. She is not ill-appearing or toxic-appearing.   HENT:      Head: Normocephalic.      Right Ear: External ear normal.      Left Ear: External ear normal.      Nose: Nose normal.      Mouth/Throat:      Pharynx: No oropharyngeal exudate.   Eyes:      Conjunctiva/sclera: Conjunctivae normal.   Neck:      Musculoskeletal: Normal range of motion and neck supple.   Cardiovascular:      Rate and Rhythm: Normal rate and regular rhythm.      Heart sounds: Normal heart sounds.   Pulmonary:      Effort: Pulmonary effort is normal.      Breath sounds: No stridor. Wheezing (Resolved post albuterol neb in clinic) and rales (Resolved post albuterol neb in clinic) present.   Abdominal:      Palpations: Abdomen is soft.   Musculoskeletal: Normal range of motion.   Lymphadenopathy:      Cervical: No cervical adenopathy.   Skin:     General: Skin is warm and dry.   Neurological:      Mental Status: She is alert and oriented to person, place, and time.   Psychiatric:         Behavior: Behavior normal. Behavior is cooperative.         Thought Content: Thought content normal.         Judgment: Judgment normal. "       Personal Diagnostic Review  Results for orders placed during the hospital encounter of 10/21/19   X-Ray Chest PA And Lateral    Narrative EXAMINATION:  XR CHEST PA AND LATERAL    CLINICAL HISTORY:  Chronic obstructive pulmonary disease, unspecified    TECHNIQUE:  PA and lateral views of the chest were performed.    COMPARISON:  Chest x-ray from 04/10/2019    FINDINGS:  Chronic interstitial lung markings are again seen bilaterally similar to the previous study.  No focal pulmonary consolidation.  No effusion.  Cardiomediastinal silhouette and osseous structures are stable in appearance with minimal compression deformities of lower thoracic vertebral bodies again seen.      Impression Stable chest.  No active disease.      Electronically signed by: Kirt Dewey MD  Date:    10/21/2019  Time:    14:57       Results for orders placed during the hospital encounter of 05/22/19   NM PET CT Routine Skull to Mid Thigh    Narrative EXAMINATION:  NM PET CT ROUTINE    CLINICAL HISTORY:  lung nodule; Solitary pulmonary nodule    TECHNIQUE:  14.42 mCi of F18-FDG was administered intravenously in the left antecubital fossa.  After an approximately 60 min distribution time, PET/CT images were acquired from the skull base to the mid thigh. Transmission images were acquired to correct for attenuation using a whole body low-dose CT scan without contrast with the arms positioned above the head.    COMPARISON:  Recent chest CT performed on 04/30/2019 as well as a prior chest CT from February 2016.    FINDINGS:  Quality of the study: Adequate.    Patient's head is rotated.  Mild misregistration artifact is noted.  Physiologic uptake within the visualized brain parenchyma.  Streak artifact from dental hardware is noted. There are no hypermetabolic cervical lymph nodes.    There is a fracture involving the right anterior 3rd rib near the costochondral junction with max SUV of 5.7..  Nonspecific uptake about the lower thoracic  paraspinal musculature on the left is seen which is favored physiologic in etiology.    Trachea is patent. Physiologic uptake in the myocardium. Coronary and aortic atherosclerosis. There are multiple slightly enlarged mediastinal lymph nodes similar to the previous exam. For example, there is a 1.3 cm short axis lymph node in the AP window region. This is hypermetabolic with max SUV of 4.3. Hypermetabolic right lower paratracheal lymph node is seen with max SUV of 4.1.  There is uptake in the subcarinal station with max SUV of 3.5.    Again seen is a 9 mm nodule in the right lower lobe.  On the prior chest CT from 02/17/2016, a 3 mm nodule was seen in this region.  This is not sufficiently FDG avid with max SUV of 2.0.  There is a 6 mm nodule in the peripheral right upper lung.  This is not hypermetabolic.  Other previously described tiny right upper lobe nodules and small left pulmonary nodules are stable in appearance.  These fall below the resolution for PET-CT.  Chronic interstitial changes in the lungs are again seen.  There is uptake in the distal esophagus which is nonspecific but could be secondary to reflux and/or esophagitis among other possibilities.    Heterogeneous uptake in the liver.  No radiopaque gallstones.  The spleen and pancreas and adrenal glands are within normal limits.  Aortic atherosclerosis is seen.  There are no hypermetabolic lymph nodes in the abdomen or pelvis.  No ascites.  Uterus is absent.    Uptake in the bowel is presumed physiologic.  Sigmoid diverticulosis is seen without evidence to suggest diverticulitis.  Small fat containing umbilical hernia is noted.      Impression A previously described 9 mm right lower lobe pulmonary nodule is not significantly changed since the recent CT allowing for differences in exam technique.  The nodule is not hypermetabolic.  Short-term follow-up chest CT is recommended to document stability.  Other small scattered pulmonary nodules are also  stable and also not FDG avid.  There are hypermetabolic slightly enlarged mediastinal lymph nodes as described above.  These are indeterminate but possibly infectious or inflammatory in etiology.  However, malignant etiology cannot be completely excluded and would also recommend attention to this area on follow-up.  Anterior right 3rd rib fracture and other findings as above.      Electronically signed by: Kirt Dewey MD  Date:    05/22/2019  Time:    21:44     5/15/2019 cpft    Normal spirometry. Airflow is not clearly improved after bronchodilator. Clinical  improvement following bronchodilator therapy may occur in the absence of spirometric  improvement. Normal lung volumes. Diffusion capacity is mildly reduced but corrects  for alveolar volume. Compared to previous test of 04/13.18: There has been a  significant decline in lung volumes    Assessment:     1. Asthma with COPD with exacerbation    2. Asthma with COPD    3. Lung nodule      Orders Placed This Encounter   Procedures    CT Chest Without Contrast     Standing Status:   Future     Standing Expiration Date:   7/24/2021     Order Specific Question:   May the Radiologist modify the order per protocol to meet the clinical needs of the patient?     Answer:   Yes     Medication List with Changes/Refills   New Medications    AZITHROMYCIN (Z-OSMAR) 250 MG TABLET    Take 2 tablets by mouth on day 1; Take 1 tablet by mouth on days 2-5    PREDNISONE (DELTASONE) 10 MG TABLET    2 daily x 5 days, then 1 daily x 5 days   Current Medications    CHOLECALCIFEROL, VITAMIN D3, (VITAMIN D3) 2,000 UNIT CAP    Take 1 capsule by mouth once daily.    CLONAZEPAM (KLONOPIN) 0.5 MG TABLET    Take 0.5 mg by mouth once daily.     ESTRADIOL (VAGIFEM) 10 MCG TAB    Place 1 tablet (10 mcg total) vaginally twice a week.    FLUTICASONE-SALMETEROL DISKUS INHALER 500-50 MCG    Inhale 1 puff into the lungs 2 (two) times daily. Controller    HALOPERIDOL (HALDOL) 5 MG TABLET    Take 5 mg by  mouth once daily.    LISINOPRIL 10 MG TABLET    Take 1 tablet (10 mg total) by mouth once daily.    OXYGEN-AIR DELIVERY SYSTEMS MISC    Inhale 2 L into the lungs as needed (O2 via Nasal Canula.).    PANTOPRAZOLE (PROTONIX) 40 MG TABLET    Take 1 tablet (40 mg total) by mouth once daily.    PAROXETINE (PAXIL) 40 MG TABLET    TK 1 T PO QHS    SIMVASTATIN (ZOCOR) 40 MG TABLET    Take 1 tablet (40 mg total) by mouth every evening.   Changed and/or Refilled Medications    Modified Medication Previous Medication    ACLIDINIUM BROMIDE (TUDORZA PRESSAIR) 400 MCG/ACTUATION AEPB aclidinium bromide (TUDORZA PRESSAIR) 400 mcg/actuation AePB       Inhale 1 puff into the lungs 2 (two) times daily.    Inhale 1 puff into the lungs 2 (two) times daily.    ALBUTEROL (PROVENTIL/VENTOLIN HFA) 90 MCG/ACTUATION INHALER albuterol (PROVENTIL/VENTOLIN HFA) 90 mcg/actuation inhaler       Inhale 2 puffs into the lungs every 4 (four) hours as needed for Wheezing.    Inhale 2 puffs into the lungs every 4 (four) hours as needed for Wheezing.     Plan:   Discussed diagnosis, its evaluation, treatment and usual course. All questions answered.  Problem List Items Addressed This Visit     Asthma with COPD (Chronic)    Relevant Medications    aclidinium bromide (TUDORZA PRESSAIR) 400 mcg/actuation AePB    albuterol (PROVENTIL/VENTOLIN HFA) 90 mcg/actuation inhaler      Other Visit Diagnoses     Asthma with COPD with exacerbation    -  Primary    Relevant Medications    azithromycin (Z-OSMAR) 250 MG tablet    predniSONE (DELTASONE) 10 MG tablet    albuterol nebulizer solution 2.5 mg (Completed)    Lung nodule        Relevant Orders    CT Chest Without Contrast       Plan summary  Patient did not have her controller or rescue medicine with her in [place] over the past month  Has developed asthma COPD flare  Resume all controller medicine Advair 500 inhaler, Tudorza Pressair (aclidinium Bromide), albuterol nebs.  Albuterol inhaler  Z-Osmar, prednisone  taper  Patient reports her insurance did not approve the PET scan ordered by Dr. Tracey in October 2019.  Orders placed for CT chest follow-up on 9 mm pulmonary nodule right lower lobe.   Continues to be 2-4 cigarettes daily smoker.  Has weaned down from 3/4 pack daily.  Has some interest in complete cessation like to continue to work on her own on smoking cessation.  Continue nasal cannula 2 L with exertion and sleep nightly, patient has stated benefit from home oxygen.  Complete PFT planned for this visit was not done related to needed COVID testing that was not accomplished plan reschedule complete PFT at her follow-up in October 2020.  Keep follow-up planned with Dr. Tracey in October 2020    Total time spent in face to face counseling and coordination of care 25 in face to face  discussion concerning diagnosis, prognosis, review of lab and test results, benefits of treatment as well as management of disease, counseling of patient and coordination of care between various health  care providers . Greater than half the time spent was used for coordination of care and counseling of patient. Discussion with other physicians or health care providers occurred.    Follow up in about 3 months (around 10/24/2020) for COPD, Asthma, w/review cpft, CT chest review w/Dr. Tracey.

## 2020-07-27 ENCOUNTER — TELEPHONE (OUTPATIENT)
Dept: PULMONOLOGY | Facility: CLINIC | Age: 65
End: 2020-07-27

## 2020-07-27 DIAGNOSIS — J44.89 ASTHMA WITH COPD: Primary | Chronic | ICD-10-CM

## 2020-07-27 RX ORDER — TIOTROPIUM BROMIDE 18 UG/1
18 CAPSULE ORAL; RESPIRATORY (INHALATION) DAILY
Qty: 30 CAPSULE | Refills: 11 | Status: SHIPPED | OUTPATIENT
Start: 2020-07-27 | End: 2020-08-10 | Stop reason: SDUPTHER

## 2020-07-27 NOTE — TELEPHONE ENCOUNTER
Per pharmacist with payer, Tudorza is not covered. Spiriva is covered.    Notified prescriber, BONG Hogan NP, via [employer] message.

## 2020-08-10 ENCOUNTER — OFFICE VISIT (OUTPATIENT)
Dept: INTERNAL MEDICINE | Facility: CLINIC | Age: 65
End: 2020-08-10
Payer: COMMERCIAL

## 2020-08-10 VITALS
DIASTOLIC BLOOD PRESSURE: 68 MMHG | BODY MASS INDEX: 34.72 KG/M2 | WEIGHT: 188.69 LBS | SYSTOLIC BLOOD PRESSURE: 136 MMHG | OXYGEN SATURATION: 96 % | HEIGHT: 62 IN | HEART RATE: 108 BPM | TEMPERATURE: 99 F

## 2020-08-10 DIAGNOSIS — J44.89 ASTHMA WITH COPD: Chronic | ICD-10-CM

## 2020-08-10 DIAGNOSIS — I10 ESSENTIAL HYPERTENSION: ICD-10-CM

## 2020-08-10 DIAGNOSIS — E78.00 PURE HYPERCHOLESTEROLEMIA: ICD-10-CM

## 2020-08-10 DIAGNOSIS — J44.89 ASTHMA WITH COPD: Primary | ICD-10-CM

## 2020-08-10 DIAGNOSIS — F17.219 CIGARETTE NICOTINE DEPENDENCE WITH NICOTINE-INDUCED DISORDER: ICD-10-CM

## 2020-08-10 DIAGNOSIS — F32.A DEPRESSION, UNSPECIFIED DEPRESSION TYPE: ICD-10-CM

## 2020-08-10 DIAGNOSIS — K21.00 GASTROESOPHAGEAL REFLUX DISEASE WITH ESOPHAGITIS: ICD-10-CM

## 2020-08-10 DIAGNOSIS — F31.9 BIPOLAR AFFECTIVE DISORDER, REMISSION STATUS UNSPECIFIED: ICD-10-CM

## 2020-08-10 DIAGNOSIS — G47.33 OSA ON CPAP: ICD-10-CM

## 2020-08-10 PROCEDURE — 99999 PR PBB SHADOW E&M-EST. PATIENT-LVL III: ICD-10-PCS | Mod: PBBFAC,,, | Performed by: FAMILY MEDICINE

## 2020-08-10 PROCEDURE — 3078F DIAST BP <80 MM HG: CPT | Mod: CPTII,S$GLB,, | Performed by: FAMILY MEDICINE

## 2020-08-10 PROCEDURE — 3008F PR BODY MASS INDEX (BMI) DOCUMENTED: ICD-10-PCS | Mod: CPTII,S$GLB,, | Performed by: FAMILY MEDICINE

## 2020-08-10 PROCEDURE — 3008F BODY MASS INDEX DOCD: CPT | Mod: CPTII,S$GLB,, | Performed by: FAMILY MEDICINE

## 2020-08-10 PROCEDURE — 99215 PR OFFICE/OUTPT VISIT, EST, LEVL V, 40-54 MIN: ICD-10-PCS | Mod: S$GLB,,, | Performed by: FAMILY MEDICINE

## 2020-08-10 PROCEDURE — 99215 OFFICE O/P EST HI 40 MIN: CPT | Mod: S$GLB,,, | Performed by: FAMILY MEDICINE

## 2020-08-10 PROCEDURE — 3078F PR MOST RECENT DIASTOLIC BLOOD PRESSURE < 80 MM HG: ICD-10-PCS | Mod: CPTII,S$GLB,, | Performed by: FAMILY MEDICINE

## 2020-08-10 PROCEDURE — 3075F SYST BP GE 130 - 139MM HG: CPT | Mod: CPTII,S$GLB,, | Performed by: FAMILY MEDICINE

## 2020-08-10 PROCEDURE — 3075F PR MOST RECENT SYSTOLIC BLOOD PRESS GE 130-139MM HG: ICD-10-PCS | Mod: CPTII,S$GLB,, | Performed by: FAMILY MEDICINE

## 2020-08-10 PROCEDURE — 99999 PR PBB SHADOW E&M-EST. PATIENT-LVL III: CPT | Mod: PBBFAC,,, | Performed by: FAMILY MEDICINE

## 2020-08-10 RX ORDER — PANTOPRAZOLE SODIUM 40 MG/1
40 TABLET, DELAYED RELEASE ORAL DAILY
Qty: 90 TABLET | Refills: 3 | Status: SHIPPED | OUTPATIENT
Start: 2020-08-10 | End: 2021-11-03 | Stop reason: SDUPTHER

## 2020-08-10 RX ORDER — FLUTICASONE PROPIONATE AND SALMETEROL 500; 50 UG/1; UG/1
1 POWDER RESPIRATORY (INHALATION) 2 TIMES DAILY
Qty: 60 EACH | Refills: 11 | Status: SHIPPED | OUTPATIENT
Start: 2020-08-10 | End: 2021-03-18 | Stop reason: SDUPTHER

## 2020-08-10 RX ORDER — TIOTROPIUM BROMIDE 18 UG/1
18 CAPSULE ORAL; RESPIRATORY (INHALATION) DAILY
Qty: 30 CAPSULE | Refills: 11 | Status: SHIPPED | OUTPATIENT
Start: 2020-08-10 | End: 2021-03-18 | Stop reason: SDUPTHER

## 2020-08-10 RX ORDER — LISINOPRIL 10 MG/1
10 TABLET ORAL DAILY
Qty: 90 TABLET | Refills: 3 | Status: SHIPPED | OUTPATIENT
Start: 2020-08-10 | End: 2021-11-10

## 2020-08-10 RX ORDER — SIMVASTATIN 40 MG/1
40 TABLET, FILM COATED ORAL NIGHTLY
Qty: 90 TABLET | Refills: 3 | Status: SHIPPED | OUTPATIENT
Start: 2020-08-10 | End: 2021-03-18 | Stop reason: SDUPTHER

## 2020-08-10 NOTE — PROGRESS NOTES
Subjective:   Patient ID: Merle Caro is a 65 y.o. female.  Chief Complaint:  Follow-up (6 mon)      Six-month follow-up chronic medical conditions.    Last visit February 2020.      Chronic medical history for:   - Hypertension.  Well controlled lisinopril 10 mg daily.  Reports compliance.  Denies side effects.  No symptoms shortness of breath.  Does have some increased bilateral extremity edema but feels related to increased walking and/or prolonged sitting in a chair with legs not elevated.    - Hyperlipidemia.  Last lipid panel LDL 70.  At goal for treatment.  Simvastatin 40 mg daily.  Reports compliance.  Denies side effects.  No chest pain or claudication.    - GERD.  On Protonix 40 mg daily.  Symptoms controlled.  - Asthma with COPD.  Complains of increased cough for 3 months.  Review of chart shows visit last month with pulmonology period was out of/ not compliant with any of her inhalers.  They recommend restart Advair 500/50 inhalation twice a day, Tudorza, and albuterol as needed.  Tudorza was not on formulary.  Was changed to Spiriva, but patient has not picked up/started the medication to date.  Notice some initial improvement with the Zithromax and prednisone prescribed.  However once completed medication, cough has returned.  - Pulmonary nodules.  Smoker.  Has follow-up CT for annual screening arranged in October.  - Mental health.  Followed by psychiatry.  Medication list appears up-to-date.  Clinically stable.  -  - Chronic hyponatremia.  Probably related to psychiatric medications.  Stable and 130 range last BMP.    No new complaints concerns today      Current Outpatient Medications:     albuterol (PROVENTIL/VENTOLIN HFA) 90 mcg/actuation inhaler, Inhale 2 puffs into the lungs every 4 (four) hours as needed for Wheezing., Disp: 18 g, Rfl: 11    cholecalciferol, vitamin D3, (VITAMIN D3) 2,000 unit Cap, Take 1 capsule by mouth once daily., Disp: , Rfl:     clonazepam (KLONOPIN) 0.5 MG tablet,  Take 0.5 mg by mouth once daily. , Disp: , Rfl:     estradiol (VAGIFEM) 10 mcg Tab, Place 1 tablet (10 mcg total) vaginally twice a week., Disp: 8 tablet, Rfl: 11    fluticasone-salmeterol diskus inhaler 500-50 mcg, Inhale 1 puff into the lungs 2 (two) times daily. Controller, Disp: 60 each, Rfl: 11    haloperidol (HALDOL) 5 MG tablet, Take 5 mg by mouth once daily., Disp: , Rfl:     lisinopriL 10 MG tablet, Take 1 tablet (10 mg total) by mouth once daily., Disp: 90 tablet, Rfl: 3    OXYGEN-AIR DELIVERY SYSTEMS MISC, Inhale 2 L into the lungs as needed (O2 via Nasal Canula.)., Disp: , Rfl:     pantoprazole (PROTONIX) 40 MG tablet, Take 1 tablet (40 mg total) by mouth once daily., Disp: 90 tablet, Rfl: 3    paroxetine (PAXIL) 40 MG tablet, TK 1 T PO QHS, Disp: , Rfl: 2    simvastatin (ZOCOR) 40 MG tablet, Take 1 tablet (40 mg total) by mouth every evening., Disp: 90 tablet, Rfl: 3    tiotropium (SPIRIVA WITH HANDIHALER) 18 mcg inhalation capsule, Inhale 1 capsule (18 mcg total) into the lungs once daily. Controller, Disp: 30 capsule, Rfl: 11    Review of Systems   Constitutional: Negative for activity change, appetite change, chills, fatigue, fever and unexpected weight change.   Eyes: Negative for visual disturbance.   Respiratory: Positive for cough. Negative for apnea, chest tightness, shortness of breath and wheezing.    Cardiovascular: Negative for chest pain, palpitations and leg swelling.   Gastrointestinal: Negative for abdominal distention, abdominal pain, constipation, diarrhea, nausea and vomiting.   Genitourinary: Negative for difficulty urinating and pelvic pain.   Musculoskeletal: Positive for arthralgias, joint swelling and myalgias. Negative for back pain, gait problem and neck pain.   Skin: Negative for color change, rash and wound.   Neurological: Negative for dizziness, tremors, syncope, weakness, light-headedness, numbness and headaches.   Psychiatric/Behavioral: Negative for agitation,  "behavioral problems, confusion, decreased concentration, dysphoric mood, hallucinations, self-injury, sleep disturbance and suicidal ideas. The patient is not nervous/anxious and is not hyperactive.      Objective:   /68 (BP Location: Right arm, Patient Position: Sitting, BP Method: Large (Manual))   Pulse 108   Temp 98.6 °F (37 °C) (Tympanic)   Ht 5' 2" (1.575 m)   Wt 85.6 kg (188 lb 11.4 oz)   SpO2 96%   BMI 34.52 kg/m²     Physical Exam  Vitals signs and nursing note reviewed.   Constitutional:       General: She is not in acute distress.     Appearance: Normal appearance. She is well-developed. She is obese.   Eyes:      General: No scleral icterus.     Conjunctiva/sclera: Conjunctivae normal.   Neck:      Thyroid: No thyroid mass or thyromegaly.      Vascular: No carotid bruit or JVD.   Cardiovascular:      Rate and Rhythm: Normal rate and regular rhythm.      Pulses:           Radial pulses are 2+ on the right side and 2+ on the left side.      Heart sounds: Normal heart sounds. No murmur. No friction rub. No gallop.    Pulmonary:      Effort: Pulmonary effort is normal. No tachypnea, accessory muscle usage or respiratory distress.      Breath sounds: Examination of the right-middle field reveals rhonchi. Examination of the left-middle field reveals rhonchi. Examination of the right-lower field reveals wheezing and rhonchi. Examination of the left-lower field reveals rhonchi. Wheezing and rhonchi present. No decreased breath sounds or rales.   Abdominal:      General: There is no distension.      Palpations: Abdomen is soft. There is no hepatomegaly.      Tenderness: There is no abdominal tenderness. There is no guarding or rebound.   Musculoskeletal:      Right lower leg: No edema.      Left lower leg: No edema.   Skin:     General: Skin is warm and dry.      Capillary Refill: Capillary refill takes less than 2 seconds.      Findings: No rash.   Neurological:      Mental Status: She is alert.      " Coordination: Coordination is intact.      Gait: Gait is intact.   Psychiatric:         Attention and Perception: Attention normal.         Mood and Affect: Mood normal.         Speech: Speech normal.         Behavior: Behavior normal.         Thought Content: Thought content normal.         Cognition and Memory: Cognition normal.         Judgment: Judgment normal.       Assessment:       ICD-10-CM ICD-9-CM   1. Asthma with COPD  J44.9 493.20   2. LUCILA on CPAP: Not compliant with CPAP  G47.33 327.23    Z99.89 V46.8   3. Cigarette nicotine dependence with nicotine-induced disorder  F17.219 292.9   4. Pure hypercholesterolemia  E78.00 272.0   5. Essential hypertension  I10 401.9   6. Gastroesophageal reflux disease with esophagitis  K21.0 530.11   7. Bipolar affective disorder, remission status unspecified  F31.9 296.80   8. Depression, unspecified depression type  F32.9 311   9. Asthma with COPD Stable J44.9 493.20     Plan:   Asthma with COPD  -     tiotropium (SPIRIVA WITH HANDIHALER) 18 mcg inhalation capsule; Inhale 1 capsule (18 mcg total) into the lungs once daily. Controller  Dispense: 30 capsule; Refill: 11  -     fluticasone-salmeterol diskus inhaler 500-50 mcg; Inhale 1 puff into the lungs 2 (two) times daily. Controller  Dispense: 60 each; Refill: 11   persisting cough due to noncompliant with medication treatment.    Reviewed both inhalers with patient and need for controller medication.    Advair 50/501 puff twice a day  Spiriva Handy haler 1 inhalation daily  Albuterol to be used as needed     LUCILA on CPAP: Not compliant with CPAP  Continue home oxygen.    Stress need for long-term compliance with CPAP     Cigarette nicotine dependence with nicotine-induced disorder  Not interested in smoking cessation at this time.    Is scheduled for LD CT screening in October to follow-up on pulmonary nodules.      Pure hypercholesterolemia  -     simvastatin (ZOCOR) 40 MG tablet; Take 1 tablet (40 mg total) by mouth  every evening.  Dispense: 90 tablet; Refill: 3  Controlled.  LDL at goal.  Asymptomatic.    Continue simvastatin 40 mg daily.      Essential hypertension  -     lisinopriL 10 MG tablet; Take 1 tablet (10 mg total) by mouth once daily.  Dispense: 90 tablet; Refill: 3  Controlled.  BP at goal.    Continue lisinopril 10 mg daily.      Gastroesophageal reflux disease with esophagitis  -     pantoprazole (PROTONIX) 40 MG tablet; Take 1 tablet (40 mg total) by mouth once daily.  Dispense: 90 tablet; Refill: 3  Controlled.  Asymptomatic.    Continue Protonix 40 mg daily.      Bipolar affective disorder, remission status unspecified  Depression, unspecified depression type  Continue per Psychiatry.    Follow-up all specialist as scheduled.    Return to clinic 6 months sooner as needed.

## 2020-08-13 ENCOUNTER — TELEPHONE (OUTPATIENT)
Dept: NEPHROLOGY | Facility: CLINIC | Age: 65
End: 2020-08-13

## 2020-08-13 DIAGNOSIS — I10 ESSENTIAL HYPERTENSION: Primary | Chronic | ICD-10-CM

## 2020-08-13 DIAGNOSIS — N39.0 UTI (URINARY TRACT INFECTION), UNCOMPLICATED: Primary | ICD-10-CM

## 2020-08-13 NOTE — TELEPHONE ENCOUNTER
----- Message from Leslee Pittman sent at 8/13/2020 10:48 AM CDT -----  Regarding: Missed call  Contact: Patient  .Type:  Patient Returning Call    Who Called: Patient  Who Left Message for Patient:, CLAUDIA  Does the patient know what this is regarding?: missed call  Would the patient rather a call back or a response via [employer]? Call  Best Call Back Number:  [phone] (work)    Additional Information:

## 2020-08-13 NOTE — TELEPHONE ENCOUNTER
L/m that she sees dr vasquez and he wants labs before visit and to let us know when and whee she wants to do before appt.8/13/2020/1047/sf

## 2020-08-13 NOTE — TELEPHONE ENCOUNTER
----- Message from Vanesa Swartz sent at 8/13/2020  9:52 AM CDT -----  Regarding: meds  Contact: patient  Patient has blood in her urine, has an appt tomorrow but would still like to have something called in today, please taina her back at [phone]

## 2020-08-14 ENCOUNTER — OFFICE VISIT (OUTPATIENT)
Dept: NEPHROLOGY | Facility: CLINIC | Age: 65
End: 2020-08-14
Payer: COMMERCIAL

## 2020-08-14 ENCOUNTER — LAB VISIT (OUTPATIENT)
Dept: LAB | Facility: HOSPITAL | Age: 65
End: 2020-08-14
Attending: INTERNAL MEDICINE
Payer: COMMERCIAL

## 2020-08-14 VITALS
BODY MASS INDEX: 34.61 KG/M2 | HEART RATE: 98 BPM | HEIGHT: 62 IN | SYSTOLIC BLOOD PRESSURE: 118 MMHG | WEIGHT: 188.06 LBS | DIASTOLIC BLOOD PRESSURE: 78 MMHG | RESPIRATION RATE: 22 BRPM

## 2020-08-14 DIAGNOSIS — N39.0 UTI (URINARY TRACT INFECTION), UNCOMPLICATED: ICD-10-CM

## 2020-08-14 DIAGNOSIS — I10 ESSENTIAL HYPERTENSION: Chronic | ICD-10-CM

## 2020-08-14 DIAGNOSIS — E87.1 HYPONATREMIA: Primary | ICD-10-CM

## 2020-08-14 LAB
ALBUMIN SERPL BCP-MCNC: 3.7 G/DL (ref 3.5–5.2)
ANION GAP SERPL CALC-SCNC: 12 MMOL/L (ref 8–16)
BASOPHILS # BLD AUTO: 0.04 K/UL (ref 0–0.2)
BASOPHILS NFR BLD: 0.6 % (ref 0–1.9)
BILIRUB UR QL STRIP: NEGATIVE
BUN SERPL-MCNC: 5 MG/DL (ref 8–23)
CALCIUM SERPL-MCNC: 9.1 MG/DL (ref 8.7–10.5)
CHLORIDE SERPL-SCNC: 95 MMOL/L (ref 95–110)
CLARITY UR: CLEAR
CO2 SERPL-SCNC: 26 MMOL/L (ref 23–29)
COLOR UR: NORMAL
CREAT SERPL-MCNC: 0.7 MG/DL (ref 0.5–1.4)
DIFFERENTIAL METHOD: ABNORMAL
EOSINOPHIL # BLD AUTO: 0.1 K/UL (ref 0–0.5)
EOSINOPHIL NFR BLD: 1.6 % (ref 0–8)
ERYTHROCYTE [DISTWIDTH] IN BLOOD BY AUTOMATED COUNT: 14.8 % (ref 11.5–14.5)
EST. GFR  (AFRICAN AMERICAN): >60 ML/MIN/1.73 M^2
EST. GFR  (NON AFRICAN AMERICAN): >60 ML/MIN/1.73 M^2
GLUCOSE SERPL-MCNC: 100 MG/DL (ref 70–110)
GLUCOSE UR QL STRIP: NEGATIVE
HCT VFR BLD AUTO: 42.6 % (ref 37–48.5)
HGB BLD-MCNC: 14 G/DL (ref 12–16)
HGB UR QL STRIP: NEGATIVE
IMM GRANULOCYTES # BLD AUTO: 0.01 K/UL (ref 0–0.04)
IMM GRANULOCYTES NFR BLD AUTO: 0.1 % (ref 0–0.5)
KETONES UR QL STRIP: NEGATIVE
LEUKOCYTE ESTERASE UR QL STRIP: NEGATIVE
LYMPHOCYTES # BLD AUTO: 1.4 K/UL (ref 1–4.8)
LYMPHOCYTES NFR BLD: 20.4 % (ref 18–48)
MCH RBC QN AUTO: 28.1 PG (ref 27–31)
MCHC RBC AUTO-ENTMCNC: 32.9 G/DL (ref 32–36)
MCV RBC AUTO: 85 FL (ref 82–98)
MONOCYTES # BLD AUTO: 0.5 K/UL (ref 0.3–1)
MONOCYTES NFR BLD: 7.4 % (ref 4–15)
NEUTROPHILS # BLD AUTO: 4.7 K/UL (ref 1.8–7.7)
NEUTROPHILS NFR BLD: 70 % (ref 38–73)
NITRITE UR QL STRIP: NEGATIVE
NRBC BLD-RTO: 0 /100 WBC
PH UR STRIP: 7 [PH] (ref 5–8)
PHOSPHATE SERPL-MCNC: 3.5 MG/DL (ref 2.7–4.5)
PLATELET # BLD AUTO: 281 K/UL (ref 150–350)
PMV BLD AUTO: 9.1 FL (ref 9.2–12.9)
POTASSIUM SERPL-SCNC: 3.8 MMOL/L (ref 3.5–5.1)
PROT UR QL STRIP: NEGATIVE
PTH-INTACT SERPL-MCNC: 141 PG/ML (ref 9–77)
RBC # BLD AUTO: 4.99 M/UL (ref 4–5.4)
SODIUM SERPL-SCNC: 133 MMOL/L (ref 136–145)
SP GR UR STRIP: <=1.005 (ref 1–1.03)
URN SPEC COLLECT METH UR: NORMAL
WBC # BLD AUTO: 6.76 K/UL (ref 3.9–12.7)

## 2020-08-14 PROCEDURE — 3074F PR MOST RECENT SYSTOLIC BLOOD PRESSURE < 130 MM HG: ICD-10-PCS | Mod: CPTII,S$GLB,, | Performed by: INTERNAL MEDICINE

## 2020-08-14 PROCEDURE — 3008F BODY MASS INDEX DOCD: CPT | Mod: CPTII,S$GLB,, | Performed by: INTERNAL MEDICINE

## 2020-08-14 PROCEDURE — 99999 PR PBB SHADOW E&M-EST. PATIENT-LVL IV: ICD-10-PCS | Mod: PBBFAC,,, | Performed by: INTERNAL MEDICINE

## 2020-08-14 PROCEDURE — 81003 URINALYSIS AUTO W/O SCOPE: CPT

## 2020-08-14 PROCEDURE — 99999 PR PBB SHADOW E&M-EST. PATIENT-LVL IV: CPT | Mod: PBBFAC,,, | Performed by: INTERNAL MEDICINE

## 2020-08-14 PROCEDURE — 3008F PR BODY MASS INDEX (BMI) DOCUMENTED: ICD-10-PCS | Mod: CPTII,S$GLB,, | Performed by: INTERNAL MEDICINE

## 2020-08-14 PROCEDURE — 99214 PR OFFICE/OUTPT VISIT, EST, LEVL IV, 30-39 MIN: ICD-10-PCS | Mod: S$GLB,,, | Performed by: INTERNAL MEDICINE

## 2020-08-14 PROCEDURE — 99214 OFFICE O/P EST MOD 30 MIN: CPT | Mod: S$GLB,,, | Performed by: INTERNAL MEDICINE

## 2020-08-14 PROCEDURE — 87086 URINE CULTURE/COLONY COUNT: CPT

## 2020-08-14 PROCEDURE — 3078F DIAST BP <80 MM HG: CPT | Mod: CPTII,S$GLB,, | Performed by: INTERNAL MEDICINE

## 2020-08-14 PROCEDURE — 36415 COLL VENOUS BLD VENIPUNCTURE: CPT

## 2020-08-14 PROCEDURE — 83970 ASSAY OF PARATHORMONE: CPT

## 2020-08-14 PROCEDURE — 3078F PR MOST RECENT DIASTOLIC BLOOD PRESSURE < 80 MM HG: ICD-10-PCS | Mod: CPTII,S$GLB,, | Performed by: INTERNAL MEDICINE

## 2020-08-14 PROCEDURE — 3074F SYST BP LT 130 MM HG: CPT | Mod: CPTII,S$GLB,, | Performed by: INTERNAL MEDICINE

## 2020-08-14 PROCEDURE — 85025 COMPLETE CBC W/AUTO DIFF WBC: CPT

## 2020-08-14 PROCEDURE — 80069 RENAL FUNCTION PANEL: CPT
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
## 2020-08-14 NOTE — PATIENT INSTRUCTIONS
Avoid NSAID pain medications such as advil, aleve, motrin, ibuprofen, naprosyn, meloxicam, diclofenac, mobic.       Fluid restriction about 60-70 oz a day

## 2020-08-15 LAB — BACTERIA UR CULT: NORMAL

## 2020-10-20 ENCOUNTER — HOSPITAL ENCOUNTER (OUTPATIENT)
Dept: RADIOLOGY | Facility: HOSPITAL | Age: 65
Discharge: HOME | End: 2020-10-20
Attending: NURSE PRACTITIONER
Payer: COMMERCIAL

## 2020-10-20 ENCOUNTER — LAB VISIT (OUTPATIENT)
Dept: OTOLARYNGOLOGY | Facility: CLINIC | Age: 65
End: 2020-10-20
Payer: COMMERCIAL

## 2020-10-20 DIAGNOSIS — R91.1 LUNG NODULE: ICD-10-CM

## 2020-10-20 DIAGNOSIS — J44.89 ASTHMA WITH COPD: ICD-10-CM

## 2020-10-20 LAB — SARS-COV-2 RNA RESP QL NAA+PROBE: NOT DETECTED

## 2020-10-20 PROCEDURE — U0003 INFECTIOUS AGENT DETECTION BY NUCLEIC ACID (DNA OR RNA); SEVERE ACUTE RESPIRATORY SYNDROME CORONAVIRUS 2 (SARS-COV-2) (CORONAVIRUS DISEASE [COVID-19]), AMPLIFIED PROBE TECHNIQUE, MAKING USE OF HIGH THROUGHPUT TECHNOLOGIES AS DESCRIBED BY CMS-2020-01-R: HCPCS

## 2020-10-20 PROCEDURE — 71250 CT THORAX DX C-: CPT | Mod: TC

## 2020-10-21 NOTE — PROGRESS NOTES
I have reviewed your CT scan and it is stable. Please keep your scheduled follow up appointments. If you have any questions do not hesitate to call our office.    MEGAN Tracey MD  Pulmonary / Critical care.

## 2020-10-23 ENCOUNTER — CLINICAL SUPPORT (OUTPATIENT)
Dept: PULMONOLOGY | Facility: CLINIC | Age: 65
End: 2020-10-23
Payer: COMMERCIAL

## 2020-10-23 ENCOUNTER — OFFICE VISIT (OUTPATIENT)
Dept: PULMONOLOGY | Facility: CLINIC | Age: 65
End: 2020-10-23
Payer: COMMERCIAL

## 2020-10-23 VITALS
RESPIRATION RATE: 16 BRPM | HEART RATE: 98 BPM | WEIGHT: 185.19 LBS | BODY MASS INDEX: 34.08 KG/M2 | HEIGHT: 62 IN | DIASTOLIC BLOOD PRESSURE: 86 MMHG | SYSTOLIC BLOOD PRESSURE: 116 MMHG | OXYGEN SATURATION: 96 %

## 2020-10-23 DIAGNOSIS — J44.89 ASTHMA WITH COPD: Chronic | ICD-10-CM

## 2020-10-23 DIAGNOSIS — J44.89 ASTHMA WITH COPD: Primary | Chronic | ICD-10-CM

## 2020-10-23 DIAGNOSIS — E66.9 OBESITY (BMI 30.0-34.9): Chronic | ICD-10-CM

## 2020-10-23 DIAGNOSIS — R91.1 LUNG NODULE SEEN ON IMAGING STUDY: Chronic | ICD-10-CM

## 2020-10-23 DIAGNOSIS — J96.11 CHRONIC RESPIRATORY FAILURE WITH HYPOXIA: Chronic | ICD-10-CM

## 2020-10-23 DIAGNOSIS — F17.219 CIGARETTE NICOTINE DEPENDENCE WITH NICOTINE-INDUCED DISORDER: Chronic | ICD-10-CM

## 2020-10-23 DIAGNOSIS — G47.33 OSA ON CPAP: Chronic | ICD-10-CM

## 2020-10-23 PROCEDURE — 90471 IMMUNIZATION ADMIN: CPT | Mod: S$GLB,,, | Performed by: INTERNAL MEDICINE

## 2020-10-23 PROCEDURE — 90686 IIV4 VACC NO PRSV 0.5 ML IM: CPT | Mod: S$GLB,,, | Performed by: INTERNAL MEDICINE

## 2020-10-23 PROCEDURE — 3074F SYST BP LT 130 MM HG: CPT | Mod: CPTII,S$GLB,, | Performed by: INTERNAL MEDICINE

## 2020-10-23 PROCEDURE — 90686 FLU VACCINE (QUAD) GREATER THAN OR EQUAL TO 3YO PRESERVATIVE FREE IM: ICD-10-PCS | Mod: S$GLB,,, | Performed by: INTERNAL MEDICINE

## 2020-10-23 PROCEDURE — 99214 OFFICE O/P EST MOD 30 MIN: CPT | Mod: 25,S$GLB,, | Performed by: INTERNAL MEDICINE

## 2020-10-23 PROCEDURE — 3079F DIAST BP 80-89 MM HG: CPT | Mod: CPTII,S$GLB,, | Performed by: INTERNAL MEDICINE

## 2020-10-23 PROCEDURE — 90471 FLU VACCINE (QUAD) GREATER THAN OR EQUAL TO 3YO PRESERVATIVE FREE IM: ICD-10-PCS | Mod: S$GLB,,, | Performed by: INTERNAL MEDICINE

## 2020-10-23 PROCEDURE — 99999 PR PBB SHADOW E&M-EST. PATIENT-LVL III: CPT | Mod: PBBFAC,,, | Performed by: INTERNAL MEDICINE

## 2020-10-23 PROCEDURE — 99999 PR PBB SHADOW E&M-EST. PATIENT-LVL III: ICD-10-PCS | Mod: PBBFAC,,, | Performed by: INTERNAL MEDICINE

## 2020-10-23 PROCEDURE — 3008F PR BODY MASS INDEX (BMI) DOCUMENTED: ICD-10-PCS | Mod: CPTII,S$GLB,, | Performed by: INTERNAL MEDICINE

## 2020-10-23 PROCEDURE — 94010 BREATHING CAPACITY TEST: CPT | Mod: S$GLB,,, | Performed by: INTERNAL MEDICINE

## 2020-10-23 PROCEDURE — 94726 PLETHYSMOGRAPHY LUNG VOLUMES: CPT | Mod: S$GLB,,, | Performed by: INTERNAL MEDICINE

## 2020-10-23 PROCEDURE — 94729 PR C02/MEMBANE DIFFUSE CAPACITY: ICD-10-PCS | Mod: S$GLB,,, | Performed by: INTERNAL MEDICINE

## 2020-10-23 PROCEDURE — 94726 PULM FUNCT TST PLETHYSMOGRAP: ICD-10-PCS | Mod: S$GLB,,, | Performed by: INTERNAL MEDICINE

## 2020-10-23 PROCEDURE — 94729 DIFFUSING CAPACITY: CPT | Mod: S$GLB,,, | Performed by: INTERNAL MEDICINE

## 2020-10-23 PROCEDURE — 3079F PR MOST RECENT DIASTOLIC BLOOD PRESSURE 80-89 MM HG: ICD-10-PCS | Mod: CPTII,S$GLB,, | Performed by: INTERNAL MEDICINE

## 2020-10-23 PROCEDURE — 3008F BODY MASS INDEX DOCD: CPT | Mod: CPTII,S$GLB,, | Performed by: INTERNAL MEDICINE

## 2020-10-23 PROCEDURE — 3074F PR MOST RECENT SYSTOLIC BLOOD PRESSURE < 130 MM HG: ICD-10-PCS | Mod: CPTII,S$GLB,, | Performed by: INTERNAL MEDICINE

## 2020-10-23 PROCEDURE — 94010 BREATHING CAPACITY TEST: ICD-10-PCS | Mod: S$GLB,,, | Performed by: INTERNAL MEDICINE

## 2020-10-23 PROCEDURE — 99214 PR OFFICE/OUTPT VISIT, EST, LEVL IV, 30-39 MIN: ICD-10-PCS | Mod: 25,S$GLB,, | Performed by: INTERNAL MEDICINE

## 2020-10-23 NOTE — PROGRESS NOTES
Subjective:      Patient ID: Merle Caro is a 65 y.o. female.    Patient Active Problem List   Diagnosis    LUCILA on CPAP: Not compliant with CPAP    Pure hypercholesterolemia    Affective bipolar disorder    Asthma with COPD    Depression    Obesity (BMI 30.0-34.9)    Essential hypertension    Slow transit constipation    Cigarette nicotine dependence with nicotine-induced disorder    Chronic respiratory failure with hypoxia    Sebaceous cyst of labia    Lung nodule seen on imaging study    History of colon polyps    Gastroesophageal reflux disease with esophagitis     Problem list has been reviewed.    Chief Complaint: Sleep Apnea, Asthma, and COPD    HPI    PFT, CT chest reviewed with patient who voiced understanding. Lung nodule is unchanged. PFT is relatively stable.     The patient does not have currently have symptoms / an exacerbation.       No recent change in breathing.     She still smokes approximately 1 PPD.        Immunization status reviewed and up to date.    COPD Questionnaire  How often do you cough?: Some of the time  How often do you have phlegm (mucus) in your chest?: Some of the time  How often does your chest feel tight?: All of the time  When you walk up a hill or one flight of stairs, how often are you breathless?: All of the time  How often are you limited doing any activities at home?: Some of the time  How often are you confident leaving the house despite your lung condition?: Some of the time  How often do you sleep soundly?: All of the time  How often do you have energy?: Some of the time  Total score: 23      Asthma is well controlled.     Asthma Control Test  In the past 4  weeks, how much of the time did your asthma keep you from getting as much done at work, school or at home?: Some of the time  During the past 4 weeks, how often have you had shortness of breath?: Once or twice a week  During the past 4 weeks, how often did your asthma symptoms (wheezing, couging,  shortness of breath, chest tightness or pain) wake you up at night or earlier than usual in the morning?: Not at all  During the past 4 weeks, how often have you used your rescue inhaler or nebulizer medication (such as albuterol)?: 1 or 2 times per day  How would you rate your asthma control during the past 4 weeks?: Poorly controlled  If your score is 19 or less, your asthma may not be under control: 16      Her current respiratory therapy regimen is PROVENTIL, TUDORZA, ADVAIR, ACCUNEB which provides relief. She is not adherent with her regimen.    She is on oxygen 2 L /m in with activity and with sleep.       Current Disease Severity  frequent daytime asthma symptoms.   weekly nighttime asthma symptoms.   Frequency B-agonist use: several times per day.   Number of urgent/emergent visit in last year: 0  Current limitations in activity from asthma: none.   Number of days of school or work missed in the last month: not applicable.     Asthma Classification (General Symptom Frequency):  Mild Intermittent (< 2 x wk)  Mild Persistent (> 2 x wk, < daily)  Moderate Persistent (daily; almost daily inhaler)  Severe Persistent (continual; limited activities)      She has LUCILA and is on Auto CPAP. She is not complaint with her CPAP. She did not bring her SD card for complaince evaluation.  DME is [facility].     [place] Sleepiness Scale   EPWORTH SLEEPINESS SCALE 10/23/2020 7/24/2020 4/15/2019 7/16/2018 10/12/2017 10/20/2016 9/19/2016   Sitting and reading 0 0 1 0 1 0 0   Watching TV 0 0 0 0 0 0 0   Sitting, inactive in a public place (e.g. a theatre or a meeting) 0 0 0 0 0 0 0   As a passenger in a car for an hour without a break 0 0 1 2 0 0 0   Lying down to rest in the afternoon when circumstances permit 0 3 3 2 3 2 3   Sitting and talking to someone 0 0 0 0 0 0 1   Sitting quietly after a lunch without alcohol 0 0 1 0 0 0 0   In a car, while stopped for a few minutes in traffic 0 0 0 0 0 0 0   Total score 0 3 6 4 4 2 4       A full  review of systems, past , family  and social histories was performed except as mentioned in the note above, these are non contributory to the main issues discussed today.     Previous Report Reviewed: office notes     The following portions of the patient's history were reviewed and updated as appropriate: She  has a past medical history of Asthma, COPD (chronic obstructive pulmonary disease), Depression, and Tobacco abuse.  She  has a past surgical history that includes Hysterectomy; Knee surgery; Tonsillectomy; multiple fracture repairs; and Colonoscopy w/ polypectomy (01/05/2011).  Her family history includes COPD in her mother; Cancer in her mother; Hypertension in her mother; Lung cancer in her mother; Ovarian cancer in her sister; Stroke in her mother.  She  reports that she has been smoking cigarettes. She has a 33.00 pack-year smoking history. She has never used smokeless tobacco. She reports that she does not drink alcohol or use drugs.  She has a current medication list which includes the following prescription(s): albuterol, cholecalciferol (vitamin d3), clonazepam, fluticasone-salmeterol 500-50 mcg/dose, haloperidol, lisinopril, oxygen-air delivery systems, pantoprazole, paroxetine, simvastatin, spiriva with handihaler, and estradiol.  She is allergic to codeine; keflex [cephalexin]; shellfish containing products; and sucralfate..    Review of Systems   Constitutional: Negative for night sweats.   HENT: Positive for postnasal drip, sinus pressure and voice change.    Eyes: Positive for redness.   Respiratory: Positive for sputum production and wheezing. Negative for cough, hemoptysis and dyspnea on extertion.    Cardiovascular: Negative for chest pain, palpitations and leg swelling.   Genitourinary: Negative for difficulty urinating and hematuria.   Endocrine: Negative for cold intolerance and heat intolerance.    Musculoskeletal: Positive for arthralgias. Negative for back pain and gait problem.  "  Gastrointestinal: Positive for acid reflux. Negative for nausea, vomiting, abdominal pain and abdominal distention.        Diarrhea   Neurological: Positive for headaches. Negative for syncope and light-headedness.   Psychiatric/Behavioral: The patient is nervous/anxious.       Objective:     /86   Pulse 98   Resp 16   Ht 5' 2" (1.575 m)   Wt 84 kg (185 lb 3 oz)   SpO2 96% Comment: 2 liters  BMI 33.87 kg/m²   Body mass index is 33.87 kg/m².    Physical Exam  Constitutional:       Appearance: She is well-developed.   HENT:      Head: Normocephalic and atraumatic.   Neck:      Musculoskeletal: Normal range of motion and neck supple.   Cardiovascular:      Rate and Rhythm: Normal rate and regular rhythm.      Heart sounds: No murmur. No gallop.    Pulmonary:      Effort: Pulmonary effort is normal.      Breath sounds: Wheezing present.   Abdominal:      General: Bowel sounds are normal.      Palpations: Abdomen is soft.   Musculoskeletal: Normal range of motion.   Skin:     General: Skin is warm and dry.   Neurological:      Mental Status: She is alert and oriented to person, place, and time.         Personal Diagnostic Review    Pulmonary function tests:10/23/20: FEV1: 1.64  (73.3 % predicted), FVC:  2.16 (76.2 % predicted), FEV1/FVC:  76, TLC: 3.86 (84.4 % predicted), RV/TLVC: 44 (109.2 % predicted), DLCO: 12.79 (61.1 % predicted)   Mild restriction. Lung volumes are normal. Diffusion capacity is mildly reduced.      CT CHEST WITHOUT CONTRAST: 10/20/20:  Multiplanar imaging obtained through the chest without contrast.  Scattered small mediastinal nodes are unchanged.  Heart size remains normal.  No hilar mass or enlargement and no axillary adenopathy is seen.     Within the upper portion of the abdomen, no acute abnormality is noted.  Bony windows again show osteopenia with multilevel compression fractures similar to the prior study.  Difficult to exclude very slight worsening at T12.     The lung " fields again show chronic changes.  There is rather extensive peripheral honeycombing similar to the prior study.  The right lower lung field nodule is again seen posteriorly, previously measuring 1 cm in diameter, unchanged.  The smaller upper lobe regions of nodularity are unchanged as well.  Overall there are no new areas of abnormality.  No detrimental changes       NM PET CT Routine Skull to Mid Thigh: 05/22/19: A previously described 9 mm right lower lobe pulmonary nodule is not significantly changed since the recent CT allowing for differences in exam technique.  The nodule is not hypermetabolic.  Short-term follow-up chest CT is recommended to document stability.  Other small scattered pulmonary nodules are also stable and also not FDG avid.  There are hypermetabolic slightly enlarged mediastinal lymph nodes as described above.  These are indeterminate but possibly infectious or inflammatory in etiology.  However, malignant etiology cannot be completely excluded and would also recommend attention to this area on follow-up.  Anterior right 3rd rib fracture and other findings as above.    Assessment / plan:       Discussed diagnosis, its evaluation, treatment and usual course. All questions answered.      Problem List Items Addressed This Visit        Pulmonary    Asthma with COPD - Primary (Chronic)    Current Assessment & Plan     Asthma with COPD ROS: taking medications as instructed, no medication side effects noted, no significant ongoing wheezing or shortness of breath, using bronchodilator MDI less than twice a week.   New concerns: None.   Exam: appears well, vitals normal, no respiratory distress, acyanotic, normal RR, chest clear, no wheezing, crepitations, rhonchi, normal symmetric air entry.   Assessment:  Asthma with COPD:  well controlled.   Plan: TUDORZA, ADVAIR, ALBUTEROL. ADVAIR refilled. Current treatment plan is effective, no change in therapy.     Immunization:    []        Pneumococcal  Polysaccharide  Vaccine (23 Valent) (IM)  []        Pneumococcal Conjugate Vaccine (13 Valent) (IM)  [x]        Influenza - High Dose (65+) (PF) (IM)           Lung nodule seen on imaging study (Chronic)    Current Assessment & Plan     Stable on CT.  Imaging surveillance.     Fleischner Society Guidelines:    High risk patient:   Smoking history, history of malignancy or risk factors for malignancy.( non-solid ground glass opacities and partially solid nodules may require longer follow up to exclude indolent adenocarcinoma)      Nodule size Low risk patient High risk patient   4 mm  No follow up Follow up CT in 12 months. If unchanged, no further follow up.   4 - 6 mm Follow up CT in 12 months; if unchanged, no further follow up.  Initial follow up CT in 6 - 12 months, then at 18 - 24 months if no change.      6 - 8 mm  Initial follow up CT at 6 - 12 months and at 18 months if no change.  Initial follow up CT at 3 - 6 months. Then at 9-12 months and 24 months if no change.      > 8 mm Initial follow up CT at 3, 6, 9 and 24 months, dynamic contrast enhanced CT, PET-CT and/or biopsy. Initial follow up CT at 3, 6, 9 and 24 months, dynamic contrast enhanced CT, PET-CT and/or biopsy.            Chronic respiratory failure with hypoxia    Current Assessment & Plan     Continue oxygen supplementation at 2 L /min   6MWT in 2 months.         Relevant Orders    Pulmonary stress test       Endocrine    Obesity (BMI 30.0-34.9)    Current Assessment & Plan     General weight loss/lifestyle modification strategies discussed (elicit support from others; identify saboteurs; non-food rewards, etc).  Behavioral treatment: Slim Fast.  Diet interventions: low calorie (1000 kCal/d) deficit diet.            Other    LUCILA on CPAP: Not compliant with CPAP (Chronic)    Current Assessment & Plan     She refuses to use CPAP.     Complications of untreated sleep apnea discussed with pateint in detail including but not limited to  high blood  pressure, heart attack, stroke, obesity,  diabetes and worsening heart failure. Patient voiced understanding.         Cigarette nicotine dependence with nicotine-induced disorder    Current Assessment & Plan     Assistance with smoking cessation was offered, including:  []  Medications  [x]  Counseling  []  Printed Information on Smoking Cessation  []  Referral to a Smoking Cessation Program    Patient was counseled regarding smoking for 3-10 minutes.             TIME SPENT WITH PATIENT: Time spent: 40 minutes in face to face  discussion concerning diagnosis, prognosis, review of lab and test results, benefits of treatment as well as management of disease, counseling of patient and coordination of care between various health  care providers . Greater than half the time spent was used for coordination of care and counseling of patient.        Follow up in about 2 months (around 12/23/2020) for LUCILA, Obesity, Asthma with COPD, Lung Nodule, Chronic Respiratory Failure.

## 2020-10-23 NOTE — ASSESSMENT & PLAN NOTE
Stable on CT.  Imaging surveillance.     Fleischner Society Guidelines:    High risk patient:   Smoking history, history of malignancy or risk factors for malignancy.( non-solid ground glass opacities and partially solid nodules may require longer follow up to exclude indolent adenocarcinoma)      Nodule size Low risk patient High risk patient   4 mm  No follow up Follow up CT in 12 months. If unchanged, no further follow up.   4 - 6 mm Follow up CT in 12 months; if unchanged, no further follow up.  Initial follow up CT in 6 - 12 months, then at 18 - 24 months if no change.      6 - 8 mm  Initial follow up CT at 6 - 12 months and at 18 months if no change.  Initial follow up CT at 3 - 6 months. Then at 9-12 months and 24 months if no change.      > 8 mm Initial follow up CT at 3, 6, 9 and 24 months, dynamic contrast enhanced CT, PET-CT and/or biopsy. Initial follow up CT at 3, 6, 9 and 24 months, dynamic contrast enhanced CT, PET-CT and/or biopsy.

## 2020-10-23 NOTE — ASSESSMENT & PLAN NOTE
Asthma with COPD ROS: taking medications as instructed, no medication side effects noted, no significant ongoing wheezing or shortness of breath, using bronchodilator MDI less than twice a week.   New concerns: None.   Exam: appears well, vitals normal, no respiratory distress, acyanotic, normal RR, chest clear, no wheezing, crepitations, rhonchi, normal symmetric air entry.   Assessment:  Asthma with COPD:  well controlled.   Plan: TUDORZA, ADVAIR, ALBUTEROL. ADVAIR refilled. Current treatment plan is effective, no change in therapy.     Immunization:    []        Pneumococcal Polysaccharide  Vaccine (23 Valent) (IM)  []        Pneumococcal Conjugate Vaccine (13 Valent) (IM)  [x]        Influenza - High Dose (65+) (PF) (IM)

## 2020-10-23 NOTE — PATIENT INSTRUCTIONS
Chronic Lung Disease: If Oxygen Is Prescribed   Supplemental oxygen is prescribed if tests show that the level of oxygen in your blood is too low. If the level stays too low for too long, serious problems can develop in many parts of the body. Supplemental oxygen helps to relieve your symptoms and prevent future problems by getting more oxygen to the blood. Depending on your test results, you may need oxygen all the time. Or it may only be needed during certain activities, such as exercise or sleep. When oxygen is prescribed, youll be referred to a medical equipment company. They will set up the oxygen unit and teach you how to use it.  Nasal cannula     A nasal cannula should be placed in the nose with the prongs arching toward you.     Oxygen is most often inhaled through a nasal cannula (lightweight tube with two hollow prongs that fit just inside the nose).  Types of supplemental oxygen    Compressed oxygen   Oxygen concentrator   Liquid oxygen   Prescribed oxygen comes in several forms. You may use more than one type, depending on when you need oxygen:  · Compressed oxygen is oxygen gas stored in a tank. Because the oxygen is stored under pressure, these tanks must be handled carefully. Gauges on the tank can be used to adjust the oxygen flow rate. Your healthcare provider will determine what this should be. Small tanks can be carried. Larger tanks are on wheels and can be pulled around the house.  · An oxygen concentrator is a machine about the size of a large suitcase. It plugs into an electrical outlet. (A back-up oxygen supply is recommended in case of a power outage.) The machine takes oxygen from the air and concentrates it. Its then delivered to you through plastic tubing. The tubing is long enough so that you can move around the house. When youre using the concentrator, it must be kept somewhere that has a good supply of fresh air. (Dont keep it in a confined space, like a closet.) You may be set  up on a concentrator if you need oxygen all the time or while youre sleeping.  · Liquid oxygen results when oxygen gas is cooled to a very low temperature. Its kept in special containers that maintain this low temperature. When you use liquid oxygen, its warmed and becomes gas before reaching the cannula. Most tanks come with a portable unit that you can carry or pull on a cart. Some of these weigh only a few pounds. Liquid oxygen units are easy to carry around. If you need oxygen all the time or during activity, this kind of unit can help you stay active.  Oxygen is prescribed just for you  Your healthcare provider will prescribe oxygen based on your needs. Here are a few things you should know:  · A therapist from the medical equipment company will explain when to use oxygen and what type to use. Youll be taught how to use and maintain your oxygen equipment.  · You must use the exact rate of oxygen prescribed for each activity. Dont increase or decrease the amount without asking your healthcare provider first.  · Supplemental oxygen is a medicine. Its not addictive and causes no side effects when used as directed.   Date Last Reviewed: 5/1/2016  © 0508-9566 [employer] [place]. [address] [place] [address], [place] [address]. All rights reserved. This information is not intended as a substitute for professional medical care. Always follow your healthcare professional's instructions.

## 2020-11-03 LAB
BRPFT: NORMAL
DLCO ADJ PRE: 12.79 ML/(MIN*MMHG)
DLCO SINGLE BREATH LLN: 15.22
DLCO SINGLE BREATH PRE REF: 61.1 %
DLCO SINGLE BREATH REF: 20.95
DLCOC SBVA LLN: 2.98
DLCOC SBVA PRE REF: 93.3 %
DLCOC SBVA REF: 4.58
DLCOC SINGLE BREATH LLN: 15.22
DLCOC SINGLE BREATH PRE REF: 61.1 %
DLCOC SINGLE BREATH REF: 20.95
DLCOVA LLN: 2.98
DLCOVA PRE REF: 93.3 %
DLCOVA PRE: 4.27 ML/(MIN*MMHG*L)
DLCOVA REF: 4.58
DLVAADJ PRE: 4.27 ML/(MIN*MMHG*L)
ERV LLN: -16449.27
ERV PRE REF: 94.1 %
ERV REF: 0.73
FEF 25 75 LLN: 1.01
FEF 25 75 PRE REF: 64.5 %
FEF 25 75 REF: 2.03
FEV1 FVC LLN: 67
FEV1 FVC PRE REF: 95.7 %
FEV1 FVC REF: 79
FEV1 LLN: 1.67
FEV1 PRE REF: 73.3 %
FEV1 REF: 2.23
FRCPLETH LLN: 1.76
FRCPLETH PREREF: 96.8 %
FRCPLETH REF: 2.58
FVC LLN: 2.12
FVC PRE REF: 76.2 %
FVC REF: 2.83
IVC PRE: 1.89 L
IVC SINGLE BREATH LLN: 2.12
IVC SINGLE BREATH PRE REF: 66.6 %
IVC SINGLE BREATH REF: 2.83
MVV LLN: 69
MVV PRE REF: 37 %
MVV REF: 81
PEF LLN: 4.19
PEF PRE REF: 81.5 %
PEF REF: 5.79
PRE DLCO: 12.79 ML/(MIN*MMHG)
PRE ERV: 0.69 L
PRE FEF 25 75: 1.31 L/S
PRE FET 100: 7.24 SEC
PRE FEV1 FVC: 75.85 %
PRE FEV1: 1.64 L
PRE FRC PL: 2.5 L
PRE FVC: 2.16 L
PRE MVV: 30 L/MIN
PRE PEF: 4.72 L/S
PRE RV: 1.7 L
PRE TLC: 3.86 L
RAW LLN: 3.06
RAW PRE REF: 189.6 %
RAW PRE: 5.8 CMH2O*S/L
RAW REF: 3.06
RV LLN: 1.27
RV PRE REF: 92 %
RV REF: 1.85
RVTLC LLN: 31
RVTLC PRE REF: 109.2 %
RVTLC PRE: 44.1 %
RVTLC REF: 40
TLC LLN: 3.58
TLC PRE REF: 84.4 %
TLC REF: 4.57
VA PRE: 3 L
VA SINGLE BREATH LLN: 4.42
VA SINGLE BREATH PRE REF: 67.8 %
VA SINGLE BREATH REF: 4.42
VC LLN: 2.12
VC PRE REF: 76.2 %
VC PRE: 2.16 L
VC REF: 2.83
VTGRAWPRE: 2.62 L

## 2020-11-18 ENCOUNTER — OFFICE VISIT (OUTPATIENT)
Dept: INTERNAL MEDICINE | Facility: CLINIC | Age: 65
End: 2020-11-18
Payer: COMMERCIAL

## 2020-11-18 VITALS
HEART RATE: 91 BPM | WEIGHT: 185.63 LBS | DIASTOLIC BLOOD PRESSURE: 82 MMHG | TEMPERATURE: 98 F | OXYGEN SATURATION: 97 % | BODY MASS INDEX: 34.16 KG/M2 | SYSTOLIC BLOOD PRESSURE: 130 MMHG | HEIGHT: 62 IN

## 2020-11-18 DIAGNOSIS — R42 VERTIGO: Primary | ICD-10-CM

## 2020-11-18 PROCEDURE — 99214 OFFICE O/P EST MOD 30 MIN: CPT | Mod: S$GLB,,, | Performed by: FAMILY MEDICINE

## 2020-11-18 PROCEDURE — 1126F PR PAIN SEVERITY QUANTIFIED, NO PAIN PRESENT: ICD-10-PCS | Mod: S$GLB,,, | Performed by: FAMILY MEDICINE

## 2020-11-18 PROCEDURE — 99214 PR OFFICE/OUTPT VISIT, EST, LEVL IV, 30-39 MIN: ICD-10-PCS | Mod: S$GLB,,, | Performed by: FAMILY MEDICINE

## 2020-11-18 PROCEDURE — 3075F PR MOST RECENT SYSTOLIC BLOOD PRESS GE 130-139MM HG: ICD-10-PCS | Mod: CPTII,S$GLB,, | Performed by: FAMILY MEDICINE

## 2020-11-18 PROCEDURE — 3079F PR MOST RECENT DIASTOLIC BLOOD PRESSURE 80-89 MM HG: ICD-10-PCS | Mod: CPTII,S$GLB,, | Performed by: FAMILY MEDICINE

## 2020-11-18 PROCEDURE — 99999 PR PBB SHADOW E&M-EST. PATIENT-LVL IV: CPT | Mod: PBBFAC,,, | Performed by: FAMILY MEDICINE

## 2020-11-18 PROCEDURE — 99999 PR PBB SHADOW E&M-EST. PATIENT-LVL IV: ICD-10-PCS | Mod: PBBFAC,,, | Performed by: FAMILY MEDICINE

## 2020-11-18 PROCEDURE — 3079F DIAST BP 80-89 MM HG: CPT | Mod: CPTII,S$GLB,, | Performed by: FAMILY MEDICINE

## 2020-11-18 PROCEDURE — 3008F BODY MASS INDEX DOCD: CPT | Mod: CPTII,S$GLB,, | Performed by: FAMILY MEDICINE

## 2020-11-18 PROCEDURE — 1126F AMNT PAIN NOTED NONE PRSNT: CPT | Mod: S$GLB,,, | Performed by: FAMILY MEDICINE

## 2020-11-18 PROCEDURE — 3075F SYST BP GE 130 - 139MM HG: CPT | Mod: CPTII,S$GLB,, | Performed by: FAMILY MEDICINE

## 2020-11-18 PROCEDURE — 3008F PR BODY MASS INDEX (BMI) DOCUMENTED: ICD-10-PCS | Mod: CPTII,S$GLB,, | Performed by: FAMILY MEDICINE

## 2020-11-18 RX ORDER — MECLIZINE HYDROCHLORIDE 25 MG/1
25 TABLET ORAL EVERY 6 HOURS PRN
Qty: 60 TABLET | Refills: 0 | Status: SHIPPED | OUTPATIENT
Start: 2020-11-18 | End: 2021-03-18

## 2020-11-18 NOTE — PROGRESS NOTES
Subjective:   Patient ID: Merle Caro is a 65 y.o. female.  Chief Complaint:  Follow-up    Patient presents evaluation of dizziness  1-2 week duration   Gradually getting better  True vertigo sensation, not lightheadedness or syncopal in nature.    Denies previous episode.    Denies significant change in her hearing.    May always have some chronic ringing in her ears.  Dizziness:   Chronicity:  New  Onset:  1 to 4 weeks ago  Progression since onset:  Gradually improving  Frequency:  Every few hours  Pain Scale:  0/10  Severity:  Mild  Duration:  Off/on all day  Dizziness characteristics:  Sensation of movement and motion-sickness   Associated symptoms: tinnitus.no hearing loss, no ear congestion, no ear pain, no fever, no headaches, no nausea, no vomiting, no diaphoresis, no aural fullness, no weakness, no visual disturbances, no light-headedness, no syncope, no palpitations, no panic, no facial weakness, no slurred speech, no numbness in extremities and no chest pain.  Aggravated by:  Position changes  Treatments tried:  Nothing    Review of Systems   Constitutional: Negative for diaphoresis and fever.   HENT: Positive for tinnitus. Negative for ear discharge, ear pain, hearing loss, postnasal drip, rhinorrhea, sinus pressure and sore throat.    Eyes: Negative for photophobia, pain, discharge, redness, itching and visual disturbance.   Respiratory: Negative for shortness of breath.    Cardiovascular: Negative for chest pain, palpitations and syncope.   Gastrointestinal: Negative for nausea and vomiting.   Musculoskeletal: Negative for myalgias, neck pain and neck stiffness.   Skin: Negative for rash.   Neurological: Positive for dizziness. Negative for syncope, weakness, light-headedness, numbness and headaches.   Psychiatric/Behavioral: The patient is not nervous/anxious.      Objective:   /82 (BP Location: Left arm, Patient Position: Sitting, BP Method: Large (Manual))   Pulse 91   Temp 97.8 °F (36.6  "°C) (Tympanic)   Ht 5' 2" (1.575 m)   Wt 84.2 kg (185 lb 10 oz)   SpO2 97%   BMI 33.95 kg/m²     Physical Exam  Vitals signs and nursing note reviewed.   Constitutional:       General: She is not in acute distress.     Appearance: Normal appearance. She is well-developed. She is obese. She is not ill-appearing, toxic-appearing or diaphoretic.   HENT:      Head: Normocephalic and atraumatic.      Right Ear: Hearing, tympanic membrane, ear canal and external ear normal.      Left Ear: Hearing, tympanic membrane, ear canal and external ear normal.      Nose: Nose normal. No mucosal edema, congestion or rhinorrhea.      Right Turbinates: Not enlarged, swollen or pale.      Left Turbinates: Not enlarged, swollen or pale.      Right Sinus: No maxillary sinus tenderness or frontal sinus tenderness.      Left Sinus: No maxillary sinus tenderness or frontal sinus tenderness.      Mouth/Throat:      Pharynx: Oropharynx is clear. Uvula midline. No pharyngeal swelling, oropharyngeal exudate or posterior oropharyngeal erythema.   Eyes:      Extraocular Movements: Extraocular movements intact.      Right eye: Normal extraocular motion and no nystagmus.      Left eye: Normal extraocular motion and no nystagmus.      Pupils: Pupils are equal, round, and reactive to light.   Neck:      Thyroid: No thyroid mass, thyromegaly or thyroid tenderness.      Vascular: Normal carotid pulses. No carotid bruit or JVD.   Cardiovascular:      Rate and Rhythm: Normal rate and regular rhythm.      Pulses:           Radial pulses are 2+ on the right side and 2+ on the left side.      Heart sounds: Normal heart sounds. No murmur. No friction rub. No gallop.    Pulmonary:      Effort: Pulmonary effort is normal.      Breath sounds: Normal breath sounds. No decreased breath sounds, wheezing, rhonchi or rales.   Musculoskeletal: Normal range of motion.      Right lower leg: No edema.      Left lower leg: No edema.   Lymphadenopathy:      Cervical: " No cervical adenopathy.   Skin:     General: Skin is warm and dry.      Findings: No rash.   Neurological:      General: No focal deficit present.      Mental Status: She is alert and oriented to person, place, and time.      Coordination: Coordination is intact. Romberg sign negative.      Comments: Dizziness is reproducible/ increased with rapid side to side head movements and flexion /extension of head neck.   Psychiatric:         Attention and Perception: Attention and perception normal.         Mood and Affect: Mood is anxious.         Speech: Speech normal.         Behavior: Behavior normal. Behavior is cooperative.         Thought Content: Thought content normal.         Judgment: Judgment normal.       Assessment:       ICD-10-CM ICD-9-CM   1. Vertigo  R42 780.4     Plan:   Vertigo  -     meclizine (ANTIVERT) 25 mg tablet; Take 1 tablet (25 mg total) by mouth every 6 (six) hours as needed for Dizziness.  Dispense: 60 tablet; Refill: 0    Patient education/handout on vertigo.    Take Antivert breakfast and supper with as needed dose for lunch or dinner for 2 weeks  After 2 weeks, try as needed doses only  Discussed potential sedative side effects from medication.  Patient education/information on BPV  If no improvement with medication, any worsening despite medication, or persistence more than 4 weeks please return to clinic.  Otherwise return to clinic 3 months for regular follow-up as scheduled.

## 2020-11-18 NOTE — PATIENT INSTRUCTIONS
Benign Paroxysmal Positional Vertigo    Benign paroxysmal positional vertigo is a common condition. You feel as if the room is spinning after changing position, moving your head quickly, or even just rolling over in bed.  Vertigo is a false feeling of motion plus disorientation that makes it seem as though the room is spinning. A vertigo attack may cause sudden nausea, vomiting, and heavy sweating. Severe vertigo causes a loss of balance. You may even fall down.  Vertigo is caused by a problem with the inner ear. The inner ear is located behind the middle ear. It is a part of the balance center of the body. It contains small calcium particles within fluid-filled canals (semi-circular canals). These particles can move out of position as a result of aging, head trauma, or disease of the inner ear. Once that happens, moving your head in certain ways may cause the particles to stimulate the inner ear. This creates the feeling of vertigo.  An episode of vertigo may last seconds, minutes, or hours. Once you are over the first episode of vertigo, it may never return. Sometimes symptoms return off and on over several weeks or longer.  Home care  Follow these guidelines when caring for yourself at home:  · Rest quietly in bed if your symptoms are severe. Change position slowly. There is usually one position that will feel best. This might be lying on one side or lying on your back with your head slightly raised on pillows.  · Do not drive or work with dangerous machinery for one week after symptoms disappear. This is in case symptoms return suddenly.  · Take medicine as prescribed to relieve your symptoms. Unless another medicine was prescribed for nausea, vomiting, and vertigo, you may use over-the-counter motion sickness medicine, such as meclizine or dimenhydrinate.  Follow-up care  Follow up with your healthcare provider, or as directed. Tell your provider about any ringing in the ear or hearing loss.  If you had a CT  or MRI scan, a specialist will review it. You will be told of any new findings that may affect your care.  When to seek medical advice  Call your healthcare provider right away if any of these occur:  · Vertigo gets worse even after taking prescribed medicine  · Repeated vomiting even after taking prescribed medicine  · Increased weakness or fainting  · Severe headache or unusual drowsiness or confusion  · Weakness of an arm or leg or one side of the face  · Difficulty walking  · Difficulty with speech or vision  · Seizure  · Trouble hearing  · Fever of 100.4ºF (38ºC) or higher, or as directed by your healthcare provider  · Fast heart rate  · Chest pain   Date Last Reviewed: 7/10/2015  © 6787-5175 [employer]. [address] [place] [address], [place] [address]. All rights reserved. This information is not intended as a substitute for professional medical care. Always follow your healthcare professional's instructions.    Take Antivert breakfast and supper with as needed dose for lunch or dinner for 2 weeks  After 2 weeks, try as needed doses only  If no improvement with medication, any worsening despite medication, or persistence more than 4 weeks please return to clinic.

## 2020-12-14 ENCOUNTER — TELEPHONE (OUTPATIENT)
Dept: INTERNAL MEDICINE | Facility: CLINIC | Age: 65
End: 2020-12-14

## 2020-12-14 NOTE — TELEPHONE ENCOUNTER
----- Message from Lydia Menchaca sent at 12/14/2020 12:29 PM CST -----  Contact: SHANNON OLIVO [[id number]]  .Type:  Needs Medical Advice    Who Called:   SHANNON OLIVO [[id number]]  Symptoms (please be specific):   pt has whelps on her chest legs and arms   How long has patient had these symptoms:   1 week   Pharmacy name and phone #:      [id number] - [place], [place] - [address]  [address]  [address] [facility] [address]  Phone: [phone] Fax: [phone]     Would the patient rather a call back or a response via [facility]?   Call    Best Call Back Number:  [phone]  Additional Information:  pt is requesting a call back from the nurse in regards to the pt braking out in whelps please

## 2020-12-14 NOTE — TELEPHONE ENCOUNTER
Needs to take Benadryl over-the-counter or she has any Antivert left from her dizzy spells she can take that to help with the itching.    At this time too far out to be related to flu vaccine.    If it persist, she needs to be seen.

## 2020-12-14 NOTE — TELEPHONE ENCOUNTER
Patient c/o itchy welts on body for a couple of weeks.  Patient been taking benadryl which she says it help for a couple of hours and return.  Patient wants to know what else could she do.

## 2020-12-14 NOTE — TELEPHONE ENCOUNTER
"----- Message from Precious Tucker sent at 12/14/2020  1:53 PM CST -----  Regarding: pt advice  Contact: pt  Pt states that she's been "breaking out in webs" ever since she had the flu vaccine. They itch. She can be reached at [phone]    [id number] [address] [facility] AT [address] [place] [address] [address]  Telephone Fax  [phone]      "

## 2020-12-15 ENCOUNTER — TELEPHONE (OUTPATIENT)
Dept: INTERNAL MEDICINE | Facility: CLINIC | Age: 65
End: 2020-12-15

## 2020-12-15 NOTE — TELEPHONE ENCOUNTER
----- Message from Ruddy Martinez sent at 12/15/2020 10:01 AM CST -----  ..Type:  Patient Returning Call    Who Called: pt   Who Left Message for Patient   Does the patient know what this is regarding?:  unknown   Would the patient rather a call back or a response via [facility]?  Call back   Best Call Back Number: [phone]  Additional Information: Pt is requesting a call form nurse .

## 2020-12-15 NOTE — TELEPHONE ENCOUNTER
----- Message from Josefa Ortiz sent at 12/15/2020 10:14 AM CST -----  Type:  Patient Returning Call    Who Called: SHANNON OLIVO [[id number]]    Who Left Message for Patient: Julia     Does the patient know what this is regarding?:    Best Call Back Number: [phone] (work)     Additional Information:

## 2020-12-18 ENCOUNTER — OFFICE VISIT (OUTPATIENT)
Dept: PULMONOLOGY | Facility: CLINIC | Age: 65
End: 2020-12-18
Payer: COMMERCIAL

## 2020-12-18 ENCOUNTER — CLINICAL SUPPORT (OUTPATIENT)
Dept: PULMONOLOGY | Facility: CLINIC | Age: 65
End: 2020-12-18
Payer: COMMERCIAL

## 2020-12-18 VITALS
BODY MASS INDEX: 34.41 KG/M2 | HEART RATE: 99 BPM | WEIGHT: 187.81 LBS | OXYGEN SATURATION: 96 % | DIASTOLIC BLOOD PRESSURE: 80 MMHG | WEIGHT: 187 LBS | HEIGHT: 62 IN | SYSTOLIC BLOOD PRESSURE: 132 MMHG | BODY MASS INDEX: 34.56 KG/M2 | HEIGHT: 62 IN | RESPIRATION RATE: 16 BRPM

## 2020-12-18 DIAGNOSIS — E66.9 OBESITY (BMI 30.0-34.9): Chronic | ICD-10-CM

## 2020-12-18 DIAGNOSIS — R91.1 LUNG NODULE SEEN ON IMAGING STUDY: Chronic | ICD-10-CM

## 2020-12-18 DIAGNOSIS — J44.89 ASTHMA WITH COPD: Chronic | ICD-10-CM

## 2020-12-18 DIAGNOSIS — J96.11 CHRONIC RESPIRATORY FAILURE WITH HYPOXIA: Chronic | ICD-10-CM

## 2020-12-18 DIAGNOSIS — F17.219 CIGARETTE NICOTINE DEPENDENCE WITH NICOTINE-INDUCED DISORDER: Chronic | ICD-10-CM

## 2020-12-18 DIAGNOSIS — G47.33 OSA ON CPAP: Chronic | ICD-10-CM

## 2020-12-18 PROCEDURE — 99999 PR PBB SHADOW E&M-EST. PATIENT-LVL IV: CPT | Mod: PBBFAC,,, | Performed by: INTERNAL MEDICINE

## 2020-12-18 PROCEDURE — 3079F DIAST BP 80-89 MM HG: CPT | Mod: CPTII,S$GLB,, | Performed by: INTERNAL MEDICINE

## 2020-12-18 PROCEDURE — 94618 PULMONARY STRESS TESTING: ICD-10-PCS | Mod: S$GLB,,, | Performed by: INTERNAL MEDICINE

## 2020-12-18 PROCEDURE — 3008F PR BODY MASS INDEX (BMI) DOCUMENTED: ICD-10-PCS | Mod: CPTII,S$GLB,, | Performed by: INTERNAL MEDICINE

## 2020-12-18 PROCEDURE — 3079F PR MOST RECENT DIASTOLIC BLOOD PRESSURE 80-89 MM HG: ICD-10-PCS | Mod: CPTII,S$GLB,, | Performed by: INTERNAL MEDICINE

## 2020-12-18 PROCEDURE — 99213 OFFICE O/P EST LOW 20 MIN: CPT | Mod: 25,S$GLB,, | Performed by: INTERNAL MEDICINE

## 2020-12-18 PROCEDURE — 3075F PR MOST RECENT SYSTOLIC BLOOD PRESS GE 130-139MM HG: ICD-10-PCS | Mod: CPTII,S$GLB,, | Performed by: INTERNAL MEDICINE

## 2020-12-18 PROCEDURE — 94618 PULMONARY STRESS TESTING: CPT | Mod: S$GLB,,, | Performed by: INTERNAL MEDICINE

## 2020-12-18 PROCEDURE — 3075F SYST BP GE 130 - 139MM HG: CPT | Mod: CPTII,S$GLB,, | Performed by: INTERNAL MEDICINE

## 2020-12-18 PROCEDURE — 3008F BODY MASS INDEX DOCD: CPT | Mod: CPTII,S$GLB,, | Performed by: INTERNAL MEDICINE

## 2020-12-18 PROCEDURE — 99213 PR OFFICE/OUTPT VISIT, EST, LEVL III, 20-29 MIN: ICD-10-PCS | Mod: 25,S$GLB,, | Performed by: INTERNAL MEDICINE

## 2020-12-18 PROCEDURE — 99999 PR PBB SHADOW E&M-EST. PATIENT-LVL IV: ICD-10-PCS | Mod: PBBFAC,,, | Performed by: INTERNAL MEDICINE

## 2020-12-18 NOTE — ASSESSMENT & PLAN NOTE
Asthma with COPD ROS: taking medications as instructed, no medication side effects noted, no significant ongoing wheezing or shortness of breath, using bronchodilator MDI less than twice a week.   New concerns: None.   Exam: appears well, vitals normal, no respiratory distress, acyanotic, normal RR, chest clear, no wheezing, crepitations, rhonchi, normal symmetric air entry.   Assessment:  Asthma with COPD:  well controlled.   Plan: SPIRIVA, ADVAIR, ALBUTEROL. ADVAIR refilled. Current treatment plan is effective, no change in therapy. PFT in  6 months.

## 2020-12-18 NOTE — PROCEDURES
"[facility]  Six Minute Walk     SUMMARY     Ordering Provider: Dr. Tracey   Interpreting Provider: Dr. Tracey  Performing nurse/tech/RT: MEGAN Vasquez crt  Diagnosis: (chronic respiratory failure with hypoxia)  Height: 5' 2" (157.5 cm)  Weight: 85.2 kg (187 lb 13.3 oz)  BMI (Calculated): 34.3   Patient Race:             Phase Oxygen Assessment Supplemental O2 Heart   Rate Blood Pressure Cheryl Dyspnea Scale Rating   Resting 95 % Room Air 100 bpm 170/81 4   Exercise        Minute        1 92 % Room Air 117 bpm     2 92 % Room Air 132 bpm     3 94 % Room Air 119 bpm     4 96 % Room Air 107 bpm     5 94 % Room Air 117 bpm     6  94 % Room Air 125 bpm 148/75 5-6   Recovery        Minute        1 96 % Room Air 105 bpm     2 96 % Room Air 103 bpm     3 95 % Room Air 98 bpm     4 96 % Room Air 99 bpm 132/80 2     Six Minute Walk Summary  6MWT Status: completed without stopping  Patient Reported: Dyspnea, Lightheadedness(back pain)     Interpretation:  Did the patient stop or pause?: Yes  How many times did the patient stop or pause?: 1  Stop Time 1: 121  Restart Time 1: 257  Pause Time 1: 136 seconds                             Total Time Walked (Calculated): 224 seconds  Final Partial Lap Distance (feet): 125 feet  Total Distance Meters (Calculated): 220.98 meters  Predicted Distance Meters (Calculated): 440.08 meters  Percentage of Predicted (Calculated): 50.21  Peak VO2 (Calculated): 10.61  Mets: 3.03  Has The Patient Had a Previous Six Minute Walk Test?: Yes       Previous 6MWT Results  Has The Patient Had a Previous Six Minute Walk Test?: Yes  Date of Previous Test: 04/15/19  Total Time Walked: 360 seconds  Total Distance (meters): 274.32  Predicted Distance (meters): 464.13 meters  Percentage of Predicted: 59.1  Percent Change (Calculated): 0.19      PHYSICIAN INTERPRETATION AND COMMENTS:    Six minute walk distance is 220.98 / 440.08 meters (50.21 % predicted) with somewhat heavy dyspnea. "      Peak VO2 during walking was 10.61  Ml/min/kg [ 3.03 METS].      Baseline oxygen saturation (at rest) was 95 %.  Lowest oxygen saturation during walking was 92 %.      During exercise, there was no significant desaturation while breathing room air.     A desaturation event was defined as a decrease of saturation by 4 or more.     Blood pressure decreased significantly and Heart rate increased significantly with walking.  Hypertension was present prior to exercise.  This may represent  an abnormal cardiovascular response to exercise  The patient reported non-pulmonary symptoms during exercise  - lightheadedness   There was Significant exercise impairment during walking.  Significant exercise impairment is likely due to cardiovascular causes and subjective symptoms.  The patient did complete the study, walking 224 seconds of the 360 second test.  The patient did not require oxygen supplementation during walking.  The patient may not benefit from using supplemental oxygen during exertion.  Since the previous study in 04/15/19, exercise capacity is significantly worse.  Based upon age and body mass index, exercise capacity is less than predicted.

## 2020-12-18 NOTE — PROGRESS NOTES
Subjective:      Patient ID: Merle Caro is a 65 y.o. female.    Patient Active Problem List   Diagnosis    LUCILA on CPAP: Not compliant with CPAP    Pure hypercholesterolemia    Affective bipolar disorder    Asthma with COPD    Depression    Obesity (BMI 30.0-34.9)    Essential hypertension    Slow transit constipation    Cigarette nicotine dependence with nicotine-induced disorder    Chronic respiratory failure with hypoxia    Sebaceous cyst of labia    Lung nodule seen on imaging study    History of colon polyps    Gastroesophageal reflux disease with esophagitis     Problem list has been reviewed.    Chief Complaint: Sleep Apnea, Asthma, and COPD    HPI    6MWT chest reviewed with patient who voiced understanding. Lung nodule is unchanged. PFT is relatively stable.     The patient does not have currently have symptoms / an exacerbation.       No recent change in breathing.     She still smokes approximately 1 PPD.        Immunization status reviewed and up to date.    COPD Questionnaire  How often do you cough?: Some of the time  How often do you have phlegm (mucus) in your chest?: Never  How often does your chest feel tight?: All of the time  When you walk up a hill or one flight of stairs, how often are you breathless?: All of the time  How often are you limited doing any activities at home?: Never  How often are you confident leaving the house despite your lung condition?: All of the time  How often do you sleep soundly?: All of the time  How often do you have energy?: A little of the time  Total score: 16      Asthma is well controlled.     Asthma Control Test  In the past 4  weeks, how much of the time did your asthma keep you from getting as much done at work, school or at home?: A little of the time  During the past 4 weeks, how often have you had shortness of breath?: Not at all  During the past 4 weeks, how often did your asthma symptoms (wheezing, couging, shortness of breath, chest  tightness or pain) wake you up at night or earlier than usual in the morning?: Not at all  During the past 4 weeks, how often have you used your rescue inhaler or nebulizer medication (such as albuterol)?: Not at all  How would you rate your asthma control during the past 4 weeks?: Poorly controlled  If your score is 19 or less, your asthma may not be under control: 21      Her current respiratory therapy regimen is PROVENTIL, SPIRIVA, ADVAIR, ACCUNEB which provides relief. She is not adherent with her regimen.    She is on oxygen 2 L /m in with activity and with sleep.       Current Disease Severity  frequent daytime asthma symptoms.   weekly nighttime asthma symptoms.   Frequency B-agonist use: several times per day.   Number of urgent/emergent visit in last year: 0  Current limitations in activity from asthma: none.   Number of days of school or work missed in the last month: not applicable.     Asthma Classification (General Symptom Frequency):  Mild Intermittent (< 2 x wk)  Mild Persistent (> 2 x wk, < daily)  Moderate Persistent (daily; almost daily inhaler)  Severe Persistent (continual; limited activities)      She has LUCILA and is on Auto CPAP. She is not complaint with her CPAP. She did not bring her SD card for complaince evaluation.  DME is [facility].     [place] Sleepiness Scale   EPWORTH SLEEPINESS SCALE 12/18/2020 10/23/2020 7/24/2020 4/15/2019 7/16/2018 10/12/2017 10/20/2016   Sitting and reading 0 0 0 1 0 1 0   Watching TV 0 0 0 0 0 0 0   Sitting, inactive in a public place (e.g. a theatre or a meeting) 0 0 0 0 0 0 0   As a passenger in a car for an hour without a break 0 0 0 1 2 0 0   Lying down to rest in the afternoon when circumstances permit 3 0 3 3 2 3 2   Sitting and talking to someone 0 0 0 0 0 0 0   Sitting quietly after a lunch without alcohol 0 0 0 1 0 0 0   In a car, while stopped for a few minutes in traffic 0 0 0 0 0 0 0   Total score 3 0 3 6 4 4 2       She smokes approximately 0.5 PPD  .    Immunization status has been reviewed and is up to date.      A full  review of systems, past , family  and social histories was performed except as mentioned in the note above, these are non contributory to the main issues discussed today.     Previous Report Reviewed: office notes     The following portions of the patient's history were reviewed and updated as appropriate: She  has a past medical history of Asthma, COPD (chronic obstructive pulmonary disease), Depression, and Tobacco abuse.  She  has a past surgical history that includes Hysterectomy; Knee surgery; Tonsillectomy; multiple fracture repairs; and Colonoscopy w/ polypectomy (01/05/2011).  Her family history includes COPD in her mother; Cancer in her mother; Hypertension in her mother; Lung cancer in her mother; Ovarian cancer in her sister; Stroke in her mother.  She  reports that she has been smoking cigarettes. She has a 33.00 pack-year smoking history. She has never used smokeless tobacco. She reports that she does not drink alcohol or use drugs.  She has a current medication list which includes the following prescription(s): albuterol, cholecalciferol (vitamin d3), clonazepam, fluticasone-salmeterol 500-50 mcg/dose, haloperidol, lisinopril, meclizine, oxygen-air delivery systems, pantoprazole, paroxetine, simvastatin, spiriva with handihaler, and estradiol.  She is allergic to codeine; keflex [cephalexin]; shellfish containing products; flu vaccine ts 2011-12(18 yr+); and sucralfate..    Review of Systems   Constitutional: Negative for night sweats.   HENT: Positive for postnasal drip, sinus pressure and voice change.    Eyes: Positive for redness.   Respiratory: Positive for sputum production and wheezing. Negative for cough, hemoptysis and dyspnea on extertion.    Cardiovascular: Negative for chest pain, palpitations and leg swelling.   Genitourinary: Negative for difficulty urinating and hematuria.   Endocrine: Negative for cold  "intolerance and heat intolerance.    Musculoskeletal: Positive for arthralgias. Negative for back pain and gait problem.   Gastrointestinal: Positive for acid reflux. Negative for nausea, vomiting, abdominal pain and abdominal distention.        Diarrhea   Neurological: Positive for headaches. Negative for syncope and light-headedness.   Psychiatric/Behavioral: The patient is nervous/anxious.       Objective:     /80   Pulse 99   Resp 16   Ht 5' 2" (1.575 m)   Wt 84.8 kg (187 lb)   SpO2 96%   BMI 34.20 kg/m²   Body mass index is 34.2 kg/m².    Physical Exam  Constitutional:       Appearance: She is well-developed.   HENT:      Head: Normocephalic and atraumatic.   Neck:      Musculoskeletal: Normal range of motion and neck supple.   Cardiovascular:      Rate and Rhythm: Normal rate and regular rhythm.      Heart sounds: No murmur. No gallop.    Pulmonary:      Effort: Pulmonary effort is normal.      Breath sounds: Rhonchi and rales present. No wheezing.   Abdominal:      General: Bowel sounds are normal.      Palpations: Abdomen is soft.   Musculoskeletal: Normal range of motion.   Skin:     General: Skin is warm and dry.   Neurological:      Mental Status: She is alert and oriented to person, place, and time.         Personal Diagnostic Review    Six minute walk 12/18/20:   Six minute walk distance is 220.98 / 440.08 meters (50.21 % predicted) with somewhat heavy dyspnea.     Peak VO2 during walking was 10.61  Ml/min/kg [ 3.03 METS].     Baseline oxygen saturation (at rest) was 95 %.  Lowest oxygen saturation during walking was 92 %.     During exercise, there was no significant desaturation while breathing room air.    A desaturation event was defined as a decrease of saturation by 4 or more.    Blood pressure decreased significantly and Heart rate increased significantly with walking.  Hypertension was present prior to exercise.  This may represent  an abnormal cardiovascular response to " exercise  The patient reported non-pulmonary symptoms during exercise  - lightheadedness   There was Significant exercise impairment during walking.  Significant exercise impairment is likely due to cardiovascular causes and subjective symptoms.  The patient did complete the study, walking 224 seconds of the 360 second test.  The patient did not require oxygen supplementation during walking.  The patient may not benefit from using supplemental oxygen during exertion.  Since the previous study in 04/15/19, exercise capacity is significantly worse.  Based upon age and body mass index, exercise capacity is less than predicted.        Pulmonary function tests:10/23/20: FEV1: 1.64  (73.3 % predicted), FVC:  2.16 (76.2 % predicted), FEV1/FVC:  76, TLC: 3.86 (84.4 % predicted), RV/TLVC: 44 (109.2 % predicted), DLCO: 12.79 (61.1 % predicted)   Mild restriction. Lung volumes are normal. Diffusion capacity is mildly reduced.      CT CHEST WITHOUT CONTRAST: 10/20/20:  Multiplanar imaging obtained through the chest without contrast.  Scattered small mediastinal nodes are unchanged.  Heart size remains normal.  No hilar mass or enlargement and no axillary adenopathy is seen.     Within the upper portion of the abdomen, no acute abnormality is noted.  Bony windows again show osteopenia with multilevel compression fractures similar to the prior study.  Difficult to exclude very slight worsening at T12.     The lung fields again show chronic changes.  There is rather extensive peripheral honeycombing similar to the prior study.  The right lower lung field nodule is again seen posteriorly, previously measuring 1 cm in diameter, unchanged.  The smaller upper lobe regions of nodularity are unchanged as well.  Overall there are no new areas of abnormality.  No detrimental changes       NM PET CT Routine Skull to Mid Thigh: 05/22/19: A previously described 9 mm right lower lobe pulmonary nodule is not significantly changed since the  recent CT allowing for differences in exam technique.  The nodule is not hypermetabolic.  Short-term follow-up chest CT is recommended to document stability.  Other small scattered pulmonary nodules are also stable and also not FDG avid.  There are hypermetabolic slightly enlarged mediastinal lymph nodes as described above.  These are indeterminate but possibly infectious or inflammatory in etiology.  However, malignant etiology cannot be completely excluded and would also recommend attention to this area on follow-up.  Anterior right 3rd rib fracture and other findings as above.    2D echo with color flow doppler: 06/05/18:    1 - Normal left ventricular systolic function (EF 60-65%).     2 - Normal left ventricular diastolic function.     3 - Normal right ventricular systolic function .     4 - No wall motion abnormalities.     5 - The estimated PA systolic pressure is 17 mmHg.     6 - Mild aortic regurgitation.     7 - Trivial tricuspid regurgitation.     8 - Mild pulmonic regurgitation.     Assessment / plan:       Discussed diagnosis, its evaluation, treatment and usual course. All questions answered.      Problem List Items Addressed This Visit        Pulmonary    Asthma with COPD (Chronic)    Current Assessment & Plan     Asthma with COPD ROS: taking medications as instructed, no medication side effects noted, no significant ongoing wheezing or shortness of breath, using bronchodilator MDI less than twice a week.   New concerns: None.   Exam: appears well, vitals normal, no respiratory distress, acyanotic, normal RR, chest clear, no wheezing, crepitations, rhonchi, normal symmetric air entry.   Assessment:  Asthma with COPD:  well controlled.   Plan: SPIRIVA, ADVAIR, ALBUTEROL. ADVAIR refilled. Current treatment plan is effective, no change in therapy. PFT in  6 months.          Relevant Orders    Complete PFT without bronchodilator    Lung nodule seen on imaging study (Chronic)    Current Assessment & Plan      Stable on CT.  CT chest in 6 months.     Fleischner Society Guidelines:    High risk patient:   Smoking history, history of malignancy or risk factors for malignancy.( non-solid ground glass opacities and partially solid nodules may require longer follow up to exclude indolent adenocarcinoma)      Nodule size Low risk patient High risk patient   4 mm  No follow up Follow up CT in 12 months. If unchanged, no further follow up.   4 - 6 mm Follow up CT in 12 months; if unchanged, no further follow up.  Initial follow up CT in 6 - 12 months, then at 18 - 24 months if no change.      6 - 8 mm  Initial follow up CT at 6 - 12 months and at 18 months if no change.  Initial follow up CT at 3 - 6 months. Then at 9-12 months and 24 months if no change.      > 8 mm Initial follow up CT at 3, 6, 9 and 24 months, dynamic contrast enhanced CT, PET-CT and/or biopsy. Initial follow up CT at 3, 6, 9 and 24 months, dynamic contrast enhanced CT, PET-CT and/or biopsy.            Relevant Orders    CT Chest Without Contrast    Chronic respiratory failure with hypoxia    Current Assessment & Plan     Continue oxygen supplementation at 2 L /min ( with sleep)                Endocrine    Obesity (BMI 30.0-34.9)    Current Assessment & Plan     General weight loss/lifestyle modification strategies discussed (elicit support from others; identify saboteurs; non-food rewards, etc).  Behavioral treatment: Slim Fast.  Diet interventions: low calorie (1000 kCal/d) deficit diet.            Other    LUCILA on CPAP: Not compliant with CPAP (Chronic)    Current Assessment & Plan     She refuses to use CPAP.     Complications of untreated sleep apnea discussed with pateint in detail including but not limited to  high blood pressure, heart attack, stroke, obesity,  diabetes and worsening heart failure. Patient voiced understanding.         Cigarette nicotine dependence with nicotine-induced disorder    Current Assessment & Plan     Assistance with  smoking cessation was offered, including:  []  Medications  [x]  Counseling  []  Printed Information on Smoking Cessation  []  Referral to a Smoking Cessation Program    Patient was counseled regarding smoking for 3-10 minutes.             TIME SPENT WITH PATIENT: Time spent: 40 minutes in face to face  discussion concerning diagnosis, prognosis, review of lab and test results, benefits of treatment as well as management of disease, counseling of patient and coordination of care between various health  care providers . Greater than half the time spent was used for coordination of care and counseling of patient.        Follow up in about 6 months (around 6/18/2021) for LUCILA, Obesity, Tobacco use disorder, Asthma with COPD, Chronic Respiratory Failure.

## 2020-12-18 NOTE — PATIENT INSTRUCTIONS
Chronic Lung Disease: If Oxygen Is Prescribed   Supplemental oxygen is prescribed if tests show that the level of oxygen in your blood is too low. If the level stays too low for too long, serious problems can develop in many parts of the body. Supplemental oxygen helps to relieve your symptoms and prevent future problems by getting more oxygen to the blood. Depending on your test results, you may need oxygen all the time. Or it may only be needed during certain activities, such as exercise or sleep. When oxygen is prescribed, youll be referred to a medical equipment company. They will set up the oxygen unit and teach you how to use it.  Nasal cannula     A nasal cannula should be placed in the nose with the prongs arching toward you.     Oxygen is most often inhaled through a nasal cannula (lightweight tube with two hollow prongs that fit just inside the nose).  Types of supplemental oxygen    Compressed oxygen   Oxygen concentrator   Liquid oxygen   Prescribed oxygen comes in several forms. You may use more than one type, depending on when you need oxygen:  · Compressed oxygen is oxygen gas stored in a tank. Because the oxygen is stored under pressure, these tanks must be handled carefully. Gauges on the tank can be used to adjust the oxygen flow rate. Your healthcare provider will determine what this should be. Small tanks can be carried. Larger tanks are on wheels and can be pulled around the house.  · An oxygen concentrator is a machine about the size of a large suitcase. It plugs into an electrical outlet. (A back-up oxygen supply is recommended in case of a power outage.) The machine takes oxygen from the air and concentrates it. Its then delivered to you through plastic tubing. The tubing is long enough so that you can move around the house. When youre using the concentrator, it must be kept somewhere that has a good supply of fresh air. (Dont keep it in a confined space, like a closet.) You may be set  up on a concentrator if you need oxygen all the time or while youre sleeping.  · Liquid oxygen results when oxygen gas is cooled to a very low temperature. Its kept in special containers that maintain this low temperature. When you use liquid oxygen, its warmed and becomes gas before reaching the cannula. Most tanks come with a portable unit that you can carry or pull on a cart. Some of these weigh only a few pounds. Liquid oxygen units are easy to carry around. If you need oxygen all the time or during activity, this kind of unit can help you stay active.  Oxygen is prescribed just for you  Your healthcare provider will prescribe oxygen based on your needs. Here are a few things you should know:  · A therapist from the medical equipment company will explain when to use oxygen and what type to use. Youll be taught how to use and maintain your oxygen equipment.  · You must use the exact rate of oxygen prescribed for each activity. Dont increase or decrease the amount without asking your healthcare provider first.  · Supplemental oxygen is a medicine. Its not addictive and causes no side effects when used as directed.   Date Last Reviewed: 5/1/2016  © 1562-3107 [employer]. [address] [place] [address], [place] [address]. All rights reserved. This information is not intended as a substitute for professional medical care. Always follow your healthcare professional's instructions.

## 2020-12-18 NOTE — ASSESSMENT & PLAN NOTE
Stable on CT.  CT chest in 6 months.     Fleischner Society Guidelines:    High risk patient:   Smoking history, history of malignancy or risk factors for malignancy.( non-solid ground glass opacities and partially solid nodules may require longer follow up to exclude indolent adenocarcinoma)      Nodule size Low risk patient High risk patient   4 mm  No follow up Follow up CT in 12 months. If unchanged, no further follow up.   4 - 6 mm Follow up CT in 12 months; if unchanged, no further follow up.  Initial follow up CT in 6 - 12 months, then at 18 - 24 months if no change.      6 - 8 mm  Initial follow up CT at 6 - 12 months and at 18 months if no change.  Initial follow up CT at 3 - 6 months. Then at 9-12 months and 24 months if no change.      > 8 mm Initial follow up CT at 3, 6, 9 and 24 months, dynamic contrast enhanced CT, PET-CT and/or biopsy. Initial follow up CT at 3, 6, 9 and 24 months, dynamic contrast enhanced CT, PET-CT and/or biopsy.

## 2021-01-04 ENCOUNTER — TELEPHONE (OUTPATIENT)
Dept: INTERNAL MEDICINE | Facility: CLINIC | Age: 66
End: 2021-01-04

## 2021-01-04 RX ORDER — DICLOFENAC SODIUM 10 MG/G
2 GEL TOPICAL 4 TIMES DAILY
Qty: 100 G | Refills: 11 | Status: SHIPPED | OUTPATIENT
Start: 2021-01-04 | End: 2024-01-30

## 2021-01-06 ENCOUNTER — TELEPHONE (OUTPATIENT)
Dept: INTERNAL MEDICINE | Facility: CLINIC | Age: 66
End: 2021-01-06

## 2021-01-13 ENCOUNTER — TELEPHONE (OUTPATIENT)
Dept: INTERNAL MEDICINE | Facility: CLINIC | Age: 66
End: 2021-01-13

## 2021-01-19 ENCOUNTER — TELEPHONE (OUTPATIENT)
Dept: INTERNAL MEDICINE | Facility: CLINIC | Age: 66
End: 2021-01-19

## 2021-02-09 ENCOUNTER — HOSPITAL ENCOUNTER (OUTPATIENT)
Dept: RADIOLOGY | Facility: HOSPITAL | Age: 66
Discharge: HOME | End: 2021-02-09
Attending: FAMILY MEDICINE
Payer: COMMERCIAL

## 2021-02-09 ENCOUNTER — OFFICE VISIT (OUTPATIENT)
Dept: INTERNAL MEDICINE | Facility: CLINIC | Age: 66
End: 2021-02-09
Payer: COMMERCIAL

## 2021-02-09 VITALS
WEIGHT: 177.69 LBS | HEIGHT: 62 IN | BODY MASS INDEX: 32.7 KG/M2 | HEART RATE: 102 BPM | SYSTOLIC BLOOD PRESSURE: 124 MMHG | TEMPERATURE: 98 F | DIASTOLIC BLOOD PRESSURE: 80 MMHG | OXYGEN SATURATION: 96 %

## 2021-02-09 DIAGNOSIS — G89.29 CHRONIC PAIN OF RIGHT KNEE: ICD-10-CM

## 2021-02-09 DIAGNOSIS — M25.561 CHRONIC PAIN OF RIGHT KNEE: ICD-10-CM

## 2021-02-09 DIAGNOSIS — K13.70 ORAL LESION: Primary | ICD-10-CM

## 2021-02-09 PROCEDURE — 99214 OFFICE O/P EST MOD 30 MIN: CPT | Mod: S$GLB,,, | Performed by: FAMILY MEDICINE

## 2021-02-09 PROCEDURE — 3008F BODY MASS INDEX DOCD: CPT | Mod: CPTII,S$GLB,, | Performed by: FAMILY MEDICINE

## 2021-02-09 PROCEDURE — 1125F PR PAIN SEVERITY QUANTIFIED, PAIN PRESENT: ICD-10-PCS | Mod: S$GLB,,, | Performed by: FAMILY MEDICINE

## 2021-02-09 PROCEDURE — 99999 PR PBB SHADOW E&M-EST. PATIENT-LVL V: CPT | Mod: PBBFAC,,, | Performed by: FAMILY MEDICINE

## 2021-02-09 PROCEDURE — 3079F DIAST BP 80-89 MM HG: CPT | Mod: CPTII,S$GLB,, | Performed by: FAMILY MEDICINE

## 2021-02-09 PROCEDURE — 99999 PR PBB SHADOW E&M-EST. PATIENT-LVL V: ICD-10-PCS | Mod: PBBFAC,,, | Performed by: FAMILY MEDICINE

## 2021-02-09 PROCEDURE — 3074F PR MOST RECENT SYSTOLIC BLOOD PRESSURE < 130 MM HG: ICD-10-PCS | Mod: CPTII,S$GLB,, | Performed by: FAMILY MEDICINE

## 2021-02-09 PROCEDURE — 3079F PR MOST RECENT DIASTOLIC BLOOD PRESSURE 80-89 MM HG: ICD-10-PCS | Mod: CPTII,S$GLB,, | Performed by: FAMILY MEDICINE

## 2021-02-09 PROCEDURE — 73562 X-RAY EXAM OF KNEE 3: CPT | Mod: TC,50

## 2021-02-09 PROCEDURE — 73562 X-RAY EXAM OF KNEE 3: CPT | Mod: 26,,, | Performed by: RADIOLOGY

## 2021-02-09 PROCEDURE — 99214 PR OFFICE/OUTPT VISIT, EST, LEVL IV, 30-39 MIN: ICD-10-PCS | Mod: S$GLB,,, | Performed by: FAMILY MEDICINE

## 2021-02-09 PROCEDURE — 73562 XR KNEE ORTHO BILAT: ICD-10-PCS | Mod: 26,,, | Performed by: RADIOLOGY

## 2021-02-09 PROCEDURE — 1125F AMNT PAIN NOTED PAIN PRSNT: CPT | Mod: S$GLB,,, | Performed by: FAMILY MEDICINE

## 2021-02-09 PROCEDURE — 3074F SYST BP LT 130 MM HG: CPT | Mod: CPTII,S$GLB,, | Performed by: FAMILY MEDICINE

## 2021-02-09 PROCEDURE — 3008F PR BODY MASS INDEX (BMI) DOCUMENTED: ICD-10-PCS | Mod: CPTII,S$GLB,, | Performed by: FAMILY MEDICINE

## 2021-02-09 RX ORDER — CELECOXIB 200 MG/1
200 CAPSULE ORAL DAILY
Qty: 30 CAPSULE | Refills: 0 | Status: SHIPPED | OUTPATIENT
Start: 2021-02-09 | End: 2021-02-09

## 2021-02-26 ENCOUNTER — OFFICE VISIT (OUTPATIENT)
Dept: INTERNAL MEDICINE | Facility: CLINIC | Age: 66
End: 2021-02-26
Payer: COMMERCIAL

## 2021-02-26 VITALS
DIASTOLIC BLOOD PRESSURE: 88 MMHG | WEIGHT: 175.25 LBS | HEIGHT: 62 IN | BODY MASS INDEX: 32.25 KG/M2 | SYSTOLIC BLOOD PRESSURE: 130 MMHG | TEMPERATURE: 98 F

## 2021-02-26 DIAGNOSIS — M17.0 PRIMARY OSTEOARTHRITIS OF BOTH KNEES: Primary | ICD-10-CM

## 2021-02-26 DIAGNOSIS — K13.70 ORAL LESION: ICD-10-CM

## 2021-02-26 PROCEDURE — 3079F PR MOST RECENT DIASTOLIC BLOOD PRESSURE 80-89 MM HG: ICD-10-PCS | Mod: CPTII,S$GLB,, | Performed by: FAMILY MEDICINE

## 2021-02-26 PROCEDURE — 99214 PR OFFICE/OUTPT VISIT, EST, LEVL IV, 30-39 MIN: ICD-10-PCS | Mod: S$GLB,,, | Performed by: FAMILY MEDICINE

## 2021-02-26 PROCEDURE — 1125F PR PAIN SEVERITY QUANTIFIED, PAIN PRESENT: ICD-10-PCS | Mod: S$GLB,,, | Performed by: FAMILY MEDICINE

## 2021-02-26 PROCEDURE — 3075F PR MOST RECENT SYSTOLIC BLOOD PRESS GE 130-139MM HG: ICD-10-PCS | Mod: CPTII,S$GLB,, | Performed by: FAMILY MEDICINE

## 2021-02-26 PROCEDURE — 99999 PR PBB SHADOW E&M-EST. PATIENT-LVL IV: CPT | Mod: PBBFAC,,, | Performed by: FAMILY MEDICINE

## 2021-02-26 PROCEDURE — 3079F DIAST BP 80-89 MM HG: CPT | Mod: CPTII,S$GLB,, | Performed by: FAMILY MEDICINE

## 2021-02-26 PROCEDURE — 3075F SYST BP GE 130 - 139MM HG: CPT | Mod: CPTII,S$GLB,, | Performed by: FAMILY MEDICINE

## 2021-02-26 PROCEDURE — 99999 PR PBB SHADOW E&M-EST. PATIENT-LVL IV: ICD-10-PCS | Mod: PBBFAC,,, | Performed by: FAMILY MEDICINE

## 2021-02-26 PROCEDURE — 1125F AMNT PAIN NOTED PAIN PRSNT: CPT | Mod: S$GLB,,, | Performed by: FAMILY MEDICINE

## 2021-02-26 PROCEDURE — 99214 OFFICE O/P EST MOD 30 MIN: CPT | Mod: S$GLB,,, | Performed by: FAMILY MEDICINE

## 2021-02-26 PROCEDURE — 3008F BODY MASS INDEX DOCD: CPT | Mod: CPTII,S$GLB,, | Performed by: FAMILY MEDICINE

## 2021-02-26 PROCEDURE — 3008F PR BODY MASS INDEX (BMI) DOCUMENTED: ICD-10-PCS | Mod: CPTII,S$GLB,, | Performed by: FAMILY MEDICINE

## 2021-03-15 ENCOUNTER — TELEPHONE (OUTPATIENT)
Dept: INTERNAL MEDICINE | Facility: CLINIC | Age: 66
End: 2021-03-15

## 2021-03-18 ENCOUNTER — OFFICE VISIT (OUTPATIENT)
Dept: INTERNAL MEDICINE | Facility: CLINIC | Age: 66
End: 2021-03-18
Payer: COMMERCIAL

## 2021-03-18 VITALS
TEMPERATURE: 97 F | OXYGEN SATURATION: 89 % | WEIGHT: 175.06 LBS | DIASTOLIC BLOOD PRESSURE: 74 MMHG | BODY MASS INDEX: 32.22 KG/M2 | HEIGHT: 62 IN | SYSTOLIC BLOOD PRESSURE: 124 MMHG | HEART RATE: 96 BPM

## 2021-03-18 DIAGNOSIS — J44.89 ASTHMA WITH COPD: ICD-10-CM

## 2021-03-18 DIAGNOSIS — M17.0 PRIMARY OSTEOARTHRITIS OF BOTH KNEES: Primary | ICD-10-CM

## 2021-03-18 DIAGNOSIS — E78.00 PURE HYPERCHOLESTEROLEMIA: ICD-10-CM

## 2021-03-18 PROCEDURE — 99213 OFFICE O/P EST LOW 20 MIN: CPT | Mod: 25,S$GLB,, | Performed by: FAMILY MEDICINE

## 2021-03-18 PROCEDURE — 3008F PR BODY MASS INDEX (BMI) DOCUMENTED: ICD-10-PCS | Mod: CPTII,S$GLB,, | Performed by: FAMILY MEDICINE

## 2021-03-18 PROCEDURE — 99999 PR PBB SHADOW E&M-EST. PATIENT-LVL III: ICD-10-PCS | Mod: PBBFAC,,, | Performed by: FAMILY MEDICINE

## 2021-03-18 PROCEDURE — 20610 DRAIN/INJ JOINT/BURSA W/O US: CPT | Mod: S$GLB,,, | Performed by: FAMILY MEDICINE

## 2021-03-18 PROCEDURE — 99213 PR OFFICE/OUTPT VISIT, EST, LEVL III, 20-29 MIN: ICD-10-PCS | Mod: 25,S$GLB,, | Performed by: FAMILY MEDICINE

## 2021-03-18 PROCEDURE — 1126F AMNT PAIN NOTED NONE PRSNT: CPT | Mod: S$GLB,,, | Performed by: FAMILY MEDICINE

## 2021-03-18 PROCEDURE — 99999 PR PBB SHADOW E&M-EST. PATIENT-LVL III: CPT | Mod: PBBFAC,,, | Performed by: FAMILY MEDICINE

## 2021-03-18 PROCEDURE — 1126F PR PAIN SEVERITY QUANTIFIED, NO PAIN PRESENT: ICD-10-PCS | Mod: S$GLB,,, | Performed by: FAMILY MEDICINE

## 2021-03-18 PROCEDURE — 3008F BODY MASS INDEX DOCD: CPT | Mod: CPTII,S$GLB,, | Performed by: FAMILY MEDICINE

## 2021-03-18 PROCEDURE — 20610 PR DRAIN/INJECT LARGE JOINT/BURSA: ICD-10-PCS | Mod: S$GLB,,, | Performed by: FAMILY MEDICINE

## 2021-03-18 RX ORDER — SIMVASTATIN 40 MG/1
40 TABLET, FILM COATED ORAL NIGHTLY
Qty: 30 TABLET | Refills: 11 | Status: SHIPPED | OUTPATIENT
Start: 2021-03-18 | End: 2022-03-23

## 2021-03-18 RX ORDER — TIOTROPIUM BROMIDE 18 UG/1
18 CAPSULE ORAL; RESPIRATORY (INHALATION) DAILY
Qty: 30 CAPSULE | Refills: 11 | Status: SHIPPED | OUTPATIENT
Start: 2021-03-18 | End: 2021-11-03 | Stop reason: SDUPTHER

## 2021-03-18 RX ORDER — FLUTICASONE PROPIONATE AND SALMETEROL 500; 50 UG/1; UG/1
1 POWDER RESPIRATORY (INHALATION) 2 TIMES DAILY
Qty: 60 EACH | Refills: 11 | Status: SHIPPED | OUTPATIENT
Start: 2021-03-18 | End: 2021-11-03 | Stop reason: SDUPTHER

## 2021-03-19 ENCOUNTER — IMMUNIZATION (OUTPATIENT)
Dept: INTERNAL MEDICINE | Facility: CLINIC | Age: 66
End: 2021-03-19
Payer: COMMERCIAL

## 2021-03-19 DIAGNOSIS — Z23 NEED FOR VACCINATION: Primary | ICD-10-CM

## 2021-03-19 PROCEDURE — 91300 COVID-19, MRNA, LNP-S, PF, 30 MCG/0.3 ML DOSE VACCINE: CPT | Mod: PBBFAC | Performed by: FAMILY MEDICINE

## 2021-03-29 ENCOUNTER — TELEPHONE (OUTPATIENT)
Dept: INTERNAL MEDICINE | Facility: CLINIC | Age: 66
End: 2021-03-29

## 2021-04-09 ENCOUNTER — IMMUNIZATION (OUTPATIENT)
Dept: INTERNAL MEDICINE | Facility: CLINIC | Age: 66
End: 2021-04-09

## 2021-04-09 DIAGNOSIS — Z23 NEED FOR VACCINATION: Primary | ICD-10-CM

## 2021-04-09 PROCEDURE — 91300 COVID-19, MRNA, LNP-S, PF, 30 MCG/0.3 ML DOSE VACCINE: ICD-10-PCS | Mod: S$GLB,,, | Performed by: FAMILY MEDICINE

## 2021-04-09 PROCEDURE — 0002A COVID-19, MRNA, LNP-S, PF, 30 MCG/0.3 ML DOSE VACCINE: CPT | Mod: CV19,S$GLB,, | Performed by: FAMILY MEDICINE

## 2021-04-09 PROCEDURE — 0002A COVID-19, MRNA, LNP-S, PF, 30 MCG/0.3 ML DOSE VACCINE: ICD-10-PCS | Mod: CV19,S$GLB,, | Performed by: FAMILY MEDICINE

## 2021-04-09 PROCEDURE — 91300 COVID-19, MRNA, LNP-S, PF, 30 MCG/0.3 ML DOSE VACCINE: CPT | Mod: S$GLB,,, | Performed by: FAMILY MEDICINE

## 2021-05-03 ENCOUNTER — TELEPHONE (OUTPATIENT)
Dept: ORTHOPEDICS | Facility: CLINIC | Age: 66
End: 2021-05-03

## 2021-05-03 ENCOUNTER — OFFICE VISIT (OUTPATIENT)
Dept: INTERNAL MEDICINE | Facility: CLINIC | Age: 66
End: 2021-05-03
Payer: COMMERCIAL

## 2021-05-03 ENCOUNTER — LAB VISIT (OUTPATIENT)
Dept: LAB | Facility: HOSPITAL | Age: 66
End: 2021-05-03
Attending: FAMILY MEDICINE
Payer: COMMERCIAL

## 2021-05-03 VITALS
BODY MASS INDEX: 31.6 KG/M2 | TEMPERATURE: 99 F | WEIGHT: 171.75 LBS | OXYGEN SATURATION: 95 % | HEART RATE: 103 BPM | HEIGHT: 62 IN | SYSTOLIC BLOOD PRESSURE: 122 MMHG | DIASTOLIC BLOOD PRESSURE: 70 MMHG

## 2021-05-03 DIAGNOSIS — Z11.4 SCREENING FOR HIV (HUMAN IMMUNODEFICIENCY VIRUS): ICD-10-CM

## 2021-05-03 DIAGNOSIS — Z12.11 SCREENING FOR COLON CANCER: ICD-10-CM

## 2021-05-03 DIAGNOSIS — M17.0 PRIMARY OSTEOARTHRITIS OF BOTH KNEES: ICD-10-CM

## 2021-05-03 DIAGNOSIS — R79.89 INCREASED PTH LEVEL: ICD-10-CM

## 2021-05-03 DIAGNOSIS — Z12.31 ENCOUNTER FOR SCREENING MAMMOGRAM FOR MALIGNANT NEOPLASM OF BREAST: ICD-10-CM

## 2021-05-03 DIAGNOSIS — J96.11 CHRONIC RESPIRATORY FAILURE WITH HYPOXIA: ICD-10-CM

## 2021-05-03 DIAGNOSIS — F31.9 BIPOLAR AFFECTIVE DISORDER, REMISSION STATUS UNSPECIFIED: ICD-10-CM

## 2021-05-03 DIAGNOSIS — Z00.00 ANNUAL PHYSICAL EXAM: ICD-10-CM

## 2021-05-03 DIAGNOSIS — F17.219 CIGARETTE NICOTINE DEPENDENCE WITH NICOTINE-INDUCED DISORDER: ICD-10-CM

## 2021-05-03 DIAGNOSIS — I10 ESSENTIAL HYPERTENSION: ICD-10-CM

## 2021-05-03 DIAGNOSIS — K21.00 GASTROESOPHAGEAL REFLUX DISEASE WITH ESOPHAGITIS WITHOUT HEMORRHAGE: ICD-10-CM

## 2021-05-03 DIAGNOSIS — E78.00 PURE HYPERCHOLESTEROLEMIA: ICD-10-CM

## 2021-05-03 DIAGNOSIS — J44.89 ASTHMA WITH COPD: ICD-10-CM

## 2021-05-03 DIAGNOSIS — Z00.00 ANNUAL PHYSICAL EXAM: Primary | ICD-10-CM

## 2021-05-03 LAB
ALBUMIN SERPL BCP-MCNC: 3.7 G/DL (ref 3.5–5.2)
ALP SERPL-CCNC: 84 U/L (ref 55–135)
ALT SERPL W/O P-5'-P-CCNC: 7 U/L (ref 10–44)
ANION GAP SERPL CALC-SCNC: 10 MMOL/L (ref 8–16)
AST SERPL-CCNC: 15 U/L (ref 10–40)
BILIRUB SERPL-MCNC: 0.7 MG/DL (ref 0.1–1)
BUN SERPL-MCNC: 9 MG/DL (ref 8–23)
CALCIUM SERPL-MCNC: 9.4 MG/DL (ref 8.7–10.5)
CHLORIDE SERPL-SCNC: 94 MMOL/L (ref 95–110)
CHOLEST SERPL-MCNC: 154 MG/DL (ref 120–199)
CHOLEST/HDLC SERPL: 2.7 {RATIO} (ref 2–5)
CO2 SERPL-SCNC: 24 MMOL/L (ref 23–29)
CREAT SERPL-MCNC: 0.7 MG/DL (ref 0.5–1.4)
EST. GFR  (AFRICAN AMERICAN): >60 ML/MIN/1.73 M^2
EST. GFR  (NON AFRICAN AMERICAN): >60 ML/MIN/1.73 M^2
ESTIMATED AVG GLUCOSE: 97 MG/DL (ref 68–131)
GLUCOSE SERPL-MCNC: 54 MG/DL (ref 70–110)
HBA1C MFR BLD: 5 % (ref 4–5.6)
HDLC SERPL-MCNC: 57 MG/DL (ref 40–75)
HDLC SERPL: 37 % (ref 20–50)
LDLC SERPL CALC-MCNC: 79.4 MG/DL (ref 63–159)
NONHDLC SERPL-MCNC: 97 MG/DL
POTASSIUM SERPL-SCNC: 4.3 MMOL/L (ref 3.5–5.1)
PROT SERPL-MCNC: 7.3 G/DL (ref 6–8.4)
SODIUM SERPL-SCNC: 128 MMOL/L (ref 136–145)
TRIGL SERPL-MCNC: 88 MG/DL (ref 30–150)
TSH SERPL DL<=0.005 MIU/L-ACNC: 0.83 UIU/ML (ref 0.4–4)

## 2021-05-03 PROCEDURE — 3078F DIAST BP <80 MM HG: CPT | Mod: CPTII,S$GLB,, | Performed by: FAMILY MEDICINE

## 2021-05-03 PROCEDURE — 99214 OFFICE O/P EST MOD 30 MIN: CPT | Mod: 25,S$GLB,, | Performed by: FAMILY MEDICINE

## 2021-05-03 PROCEDURE — 3008F BODY MASS INDEX DOCD: CPT | Mod: CPTII,S$GLB,, | Performed by: FAMILY MEDICINE

## 2021-05-03 PROCEDURE — 83036 HEMOGLOBIN GLYCOSYLATED A1C: CPT | Performed by: FAMILY MEDICINE

## 2021-05-03 PROCEDURE — 3008F PR BODY MASS INDEX (BMI) DOCUMENTED: ICD-10-PCS | Mod: CPTII,S$GLB,, | Performed by: FAMILY MEDICINE

## 2021-05-03 PROCEDURE — 80061 LIPID PANEL: CPT | Performed by: FAMILY MEDICINE

## 2021-05-03 PROCEDURE — 99999 PR PBB SHADOW E&M-EST. PATIENT-LVL V: CPT | Mod: PBBFAC,,, | Performed by: FAMILY MEDICINE

## 2021-05-03 PROCEDURE — 86703 HIV-1/HIV-2 1 RESULT ANTBDY: CPT | Performed by: FAMILY MEDICINE

## 2021-05-03 PROCEDURE — 3074F SYST BP LT 130 MM HG: CPT | Mod: CPTII,S$GLB,, | Performed by: FAMILY MEDICINE

## 2021-05-03 PROCEDURE — 80053 COMPREHEN METABOLIC PANEL: CPT | Performed by: FAMILY MEDICINE

## 2021-05-03 PROCEDURE — 3074F PR MOST RECENT SYSTOLIC BLOOD PRESSURE < 130 MM HG: ICD-10-PCS | Mod: CPTII,S$GLB,, | Performed by: FAMILY MEDICINE

## 2021-05-03 PROCEDURE — 83970 ASSAY OF PARATHORMONE: CPT | Performed by: FAMILY MEDICINE

## 2021-05-03 PROCEDURE — 1126F PR PAIN SEVERITY QUANTIFIED, NO PAIN PRESENT: ICD-10-PCS | Mod: S$GLB,,, | Performed by: FAMILY MEDICINE

## 2021-05-03 PROCEDURE — 84443 ASSAY THYROID STIM HORMONE: CPT | Performed by: FAMILY MEDICINE

## 2021-05-03 PROCEDURE — 99396 PREV VISIT EST AGE 40-64: CPT | Mod: 25,S$GLB,, | Performed by: FAMILY MEDICINE

## 2021-05-03 PROCEDURE — 36415 COLL VENOUS BLD VENIPUNCTURE: CPT | Performed by: FAMILY MEDICINE

## 2021-05-03 PROCEDURE — 1126F AMNT PAIN NOTED NONE PRSNT: CPT | Mod: S$GLB,,, | Performed by: FAMILY MEDICINE

## 2021-05-03 PROCEDURE — 3078F PR MOST RECENT DIASTOLIC BLOOD PRESSURE < 80 MM HG: ICD-10-PCS | Mod: CPTII,S$GLB,, | Performed by: FAMILY MEDICINE

## 2021-05-03 PROCEDURE — 99214 PR OFFICE/OUTPT VISIT, EST, LEVL IV, 30-39 MIN: ICD-10-PCS | Mod: 25,S$GLB,, | Performed by: FAMILY MEDICINE

## 2021-05-03 PROCEDURE — 99396 PR PREVENTIVE VISIT,EST,40-64: ICD-10-PCS | Mod: 25,S$GLB,, | Performed by: FAMILY MEDICINE

## 2021-05-03 PROCEDURE — 99999 PR PBB SHADOW E&M-EST. PATIENT-LVL V: ICD-10-PCS | Mod: PBBFAC,,, | Performed by: FAMILY MEDICINE

## 2021-05-04 LAB
HIV 1+2 AB+HIV1 P24 AG SERPL QL IA: NEGATIVE
PTH-INTACT SERPL-MCNC: 99 PG/ML (ref 9–77)

## 2021-05-05 ENCOUNTER — OFFICE VISIT (OUTPATIENT)
Dept: SPORTS MEDICINE | Facility: CLINIC | Age: 66
End: 2021-05-05
Payer: COMMERCIAL

## 2021-05-05 VITALS
SYSTOLIC BLOOD PRESSURE: 138 MMHG | DIASTOLIC BLOOD PRESSURE: 80 MMHG | BODY MASS INDEX: 31.6 KG/M2 | HEIGHT: 62 IN | HEART RATE: 94 BPM | WEIGHT: 171.75 LBS

## 2021-05-05 DIAGNOSIS — M17.0 PRIMARY OSTEOARTHRITIS OF BOTH KNEES: ICD-10-CM

## 2021-05-05 DIAGNOSIS — M17.0 PRIMARY OSTEOARTHRITIS OF BOTH KNEES: Primary | ICD-10-CM

## 2021-05-05 DIAGNOSIS — M25.561 RIGHT KNEE PAIN, UNSPECIFIED CHRONICITY: Primary | ICD-10-CM

## 2021-05-05 PROCEDURE — 99999 PR PBB SHADOW E&M-EST. PATIENT-LVL IV: ICD-10-PCS | Mod: PBBFAC,,, | Performed by: FAMILY MEDICINE

## 2021-05-05 PROCEDURE — 3008F PR BODY MASS INDEX (BMI) DOCUMENTED: ICD-10-PCS | Mod: CPTII,S$GLB,, | Performed by: FAMILY MEDICINE

## 2021-05-05 PROCEDURE — 99203 PR OFFICE/OUTPT VISIT, NEW, LEVL III, 30-44 MIN: ICD-10-PCS | Mod: S$GLB,,, | Performed by: FAMILY MEDICINE

## 2021-05-05 PROCEDURE — 1125F PR PAIN SEVERITY QUANTIFIED, PAIN PRESENT: ICD-10-PCS | Mod: S$GLB,,, | Performed by: FAMILY MEDICINE

## 2021-05-05 PROCEDURE — 1125F AMNT PAIN NOTED PAIN PRSNT: CPT | Mod: S$GLB,,, | Performed by: FAMILY MEDICINE

## 2021-05-05 PROCEDURE — 99203 OFFICE O/P NEW LOW 30 MIN: CPT | Mod: S$GLB,,, | Performed by: FAMILY MEDICINE

## 2021-05-05 PROCEDURE — 99999 PR PBB SHADOW E&M-EST. PATIENT-LVL IV: CPT | Mod: PBBFAC,,, | Performed by: FAMILY MEDICINE

## 2021-05-05 PROCEDURE — 3008F BODY MASS INDEX DOCD: CPT | Mod: CPTII,S$GLB,, | Performed by: FAMILY MEDICINE

## 2021-05-10 ENCOUNTER — HOSPITAL ENCOUNTER (OUTPATIENT)
Dept: RADIOLOGY | Facility: HOSPITAL | Age: 66
Discharge: HOME | End: 2021-05-10
Attending: FAMILY MEDICINE
Payer: COMMERCIAL

## 2021-05-10 VITALS — BODY MASS INDEX: 31.24 KG/M2 | WEIGHT: 169.75 LBS | HEIGHT: 62 IN

## 2021-05-10 DIAGNOSIS — Z12.31 ENCOUNTER FOR SCREENING MAMMOGRAM FOR MALIGNANT NEOPLASM OF BREAST: ICD-10-CM

## 2021-05-10 DIAGNOSIS — Z00.00 ANNUAL PHYSICAL EXAM: ICD-10-CM

## 2021-05-10 PROCEDURE — 77067 SCR MAMMO BI INCL CAD: CPT | Mod: TC

## 2021-05-10 PROCEDURE — 77063 BREAST TOMOSYNTHESIS BI: CPT | Mod: 26,,, | Performed by: RADIOLOGY

## 2021-05-10 PROCEDURE — 77067 SCR MAMMO BI INCL CAD: CPT | Mod: 26,,, | Performed by: RADIOLOGY

## 2021-05-10 PROCEDURE — 77063 MAMMO DIGITAL SCREENING BILAT WITH TOMO: ICD-10-PCS | Mod: 26,,, | Performed by: RADIOLOGY

## 2021-05-10 PROCEDURE — 77067 MAMMO DIGITAL SCREENING BILAT WITH TOMO: ICD-10-PCS | Mod: 26,,, | Performed by: RADIOLOGY

## 2021-05-11 ENCOUNTER — CLINICAL SUPPORT (OUTPATIENT)
Dept: REHABILITATION | Facility: HOSPITAL | Age: 66
End: 2021-05-11
Payer: COMMERCIAL

## 2021-05-11 DIAGNOSIS — M25.561 CHRONIC PAIN OF BOTH KNEES: ICD-10-CM

## 2021-05-11 DIAGNOSIS — G89.29 CHRONIC PAIN OF BOTH KNEES: ICD-10-CM

## 2021-05-11 DIAGNOSIS — M17.0 PRIMARY OSTEOARTHRITIS OF BOTH KNEES: ICD-10-CM

## 2021-05-11 DIAGNOSIS — M25.562 CHRONIC PAIN OF BOTH KNEES: ICD-10-CM

## 2021-05-11 PROCEDURE — 97110 THERAPEUTIC EXERCISES: CPT

## 2021-05-11 PROCEDURE — 97161 PT EVAL LOW COMPLEX 20 MIN: CPT

## 2021-05-11 PROCEDURE — 97014 ELECTRIC STIMULATION THERAPY: CPT

## 2021-05-12 PROBLEM — M25.561 CHRONIC PAIN OF BOTH KNEES: Status: ACTIVE | Noted: 2021-05-12

## 2021-05-12 PROBLEM — G89.29 CHRONIC PAIN OF BOTH KNEES: Status: ACTIVE | Noted: 2021-05-12

## 2021-05-12 PROBLEM — M25.562 CHRONIC PAIN OF BOTH KNEES: Status: ACTIVE | Noted: 2021-05-12

## 2021-05-14 ENCOUNTER — CLINICAL SUPPORT (OUTPATIENT)
Dept: REHABILITATION | Facility: HOSPITAL | Age: 66
End: 2021-05-14
Payer: COMMERCIAL

## 2021-05-14 DIAGNOSIS — M25.561 CHRONIC PAIN OF BOTH KNEES: ICD-10-CM

## 2021-05-14 DIAGNOSIS — G89.29 CHRONIC PAIN OF BOTH KNEES: ICD-10-CM

## 2021-05-14 DIAGNOSIS — M25.562 CHRONIC PAIN OF BOTH KNEES: ICD-10-CM

## 2021-05-14 PROCEDURE — 97110 THERAPEUTIC EXERCISES: CPT | Mod: CQ

## 2021-05-14 PROCEDURE — 97014 ELECTRIC STIMULATION THERAPY: CPT | Mod: CQ

## 2021-05-18 ENCOUNTER — CLINICAL SUPPORT (OUTPATIENT)
Dept: REHABILITATION | Facility: HOSPITAL | Age: 66
End: 2021-05-18
Payer: COMMERCIAL

## 2021-05-18 DIAGNOSIS — M17.0 PRIMARY OSTEOARTHRITIS OF BOTH KNEES: ICD-10-CM

## 2021-05-18 DIAGNOSIS — G89.29 CHRONIC PAIN OF BOTH KNEES: Primary | ICD-10-CM

## 2021-05-18 DIAGNOSIS — M25.562 CHRONIC PAIN OF BOTH KNEES: Primary | ICD-10-CM

## 2021-05-18 DIAGNOSIS — M25.561 CHRONIC PAIN OF BOTH KNEES: Primary | ICD-10-CM

## 2021-05-18 PROCEDURE — 97110 THERAPEUTIC EXERCISES: CPT

## 2021-05-18 PROCEDURE — 97140 MANUAL THERAPY 1/> REGIONS: CPT

## 2021-05-20 ENCOUNTER — DOCUMENTATION ONLY (OUTPATIENT)
Dept: REHABILITATION | Facility: HOSPITAL | Age: 66
End: 2021-05-20

## 2021-05-20 ENCOUNTER — CLINICAL SUPPORT (OUTPATIENT)
Dept: REHABILITATION | Facility: HOSPITAL | Age: 66
End: 2021-05-20
Payer: COMMERCIAL

## 2021-05-20 DIAGNOSIS — G89.29 CHRONIC PAIN OF BOTH KNEES: ICD-10-CM

## 2021-05-20 DIAGNOSIS — M25.561 CHRONIC PAIN OF BOTH KNEES: ICD-10-CM

## 2021-05-20 DIAGNOSIS — M25.562 CHRONIC PAIN OF BOTH KNEES: ICD-10-CM

## 2021-05-20 PROCEDURE — 97110 THERAPEUTIC EXERCISES: CPT | Mod: CQ

## 2021-05-20 PROCEDURE — 97014 ELECTRIC STIMULATION THERAPY: CPT | Mod: CQ

## 2021-05-25 ENCOUNTER — CLINICAL SUPPORT (OUTPATIENT)
Dept: REHABILITATION | Facility: HOSPITAL | Age: 66
End: 2021-05-25
Payer: COMMERCIAL

## 2021-05-25 DIAGNOSIS — G89.29 CHRONIC PAIN OF BOTH KNEES: ICD-10-CM

## 2021-05-25 DIAGNOSIS — M25.562 CHRONIC PAIN OF BOTH KNEES: ICD-10-CM

## 2021-05-25 DIAGNOSIS — M25.561 CHRONIC PAIN OF BOTH KNEES: ICD-10-CM

## 2021-05-25 PROCEDURE — 97110 THERAPEUTIC EXERCISES: CPT | Mod: CQ

## 2021-05-28 ENCOUNTER — CLINICAL SUPPORT (OUTPATIENT)
Dept: REHABILITATION | Facility: HOSPITAL | Age: 66
End: 2021-05-28
Payer: COMMERCIAL

## 2021-05-28 DIAGNOSIS — M25.562 CHRONIC PAIN OF BOTH KNEES: Primary | ICD-10-CM

## 2021-05-28 DIAGNOSIS — R53.1 WEAKNESS: ICD-10-CM

## 2021-05-28 DIAGNOSIS — M25.561 CHRONIC PAIN OF BOTH KNEES: Primary | ICD-10-CM

## 2021-05-28 DIAGNOSIS — G89.29 CHRONIC PAIN OF BOTH KNEES: Primary | ICD-10-CM

## 2021-05-28 PROCEDURE — 97110 THERAPEUTIC EXERCISES: CPT | Mod: CQ

## 2021-05-28 PROCEDURE — 97014 ELECTRIC STIMULATION THERAPY: CPT | Mod: CQ

## 2021-06-01 ENCOUNTER — CLINICAL SUPPORT (OUTPATIENT)
Dept: REHABILITATION | Facility: HOSPITAL | Age: 66
End: 2021-06-01
Payer: COMMERCIAL

## 2021-06-01 DIAGNOSIS — M25.561 CHRONIC PAIN OF BOTH KNEES: ICD-10-CM

## 2021-06-01 DIAGNOSIS — G89.29 CHRONIC PAIN OF BOTH KNEES: ICD-10-CM

## 2021-06-01 DIAGNOSIS — M25.562 CHRONIC PAIN OF BOTH KNEES: ICD-10-CM

## 2021-06-01 PROCEDURE — 97014 ELECTRIC STIMULATION THERAPY: CPT

## 2021-06-01 PROCEDURE — 97110 THERAPEUTIC EXERCISES: CPT

## 2021-06-01 PROCEDURE — 97140 MANUAL THERAPY 1/> REGIONS: CPT

## 2021-06-03 ENCOUNTER — CLINICAL SUPPORT (OUTPATIENT)
Dept: REHABILITATION | Facility: HOSPITAL | Age: 66
End: 2021-06-03
Payer: COMMERCIAL

## 2021-06-03 DIAGNOSIS — G89.29 CHRONIC PAIN OF BOTH KNEES: ICD-10-CM

## 2021-06-03 DIAGNOSIS — M25.562 CHRONIC PAIN OF BOTH KNEES: ICD-10-CM

## 2021-06-03 DIAGNOSIS — M25.561 CHRONIC PAIN OF BOTH KNEES: ICD-10-CM

## 2021-06-03 PROCEDURE — 97110 THERAPEUTIC EXERCISES: CPT

## 2021-06-03 PROCEDURE — 97140 MANUAL THERAPY 1/> REGIONS: CPT

## 2021-06-08 ENCOUNTER — CLINICAL SUPPORT (OUTPATIENT)
Dept: REHABILITATION | Facility: HOSPITAL | Age: 66
End: 2021-06-08
Payer: COMMERCIAL

## 2021-06-08 DIAGNOSIS — M25.562 CHRONIC PAIN OF BOTH KNEES: ICD-10-CM

## 2021-06-08 DIAGNOSIS — M25.561 CHRONIC PAIN OF BOTH KNEES: ICD-10-CM

## 2021-06-08 DIAGNOSIS — G89.29 CHRONIC PAIN OF BOTH KNEES: ICD-10-CM

## 2021-06-08 PROCEDURE — 97014 ELECTRIC STIMULATION THERAPY: CPT | Mod: CQ

## 2021-06-08 PROCEDURE — 97110 THERAPEUTIC EXERCISES: CPT | Mod: CQ

## 2021-06-10 ENCOUNTER — CLINICAL SUPPORT (OUTPATIENT)
Dept: REHABILITATION | Facility: HOSPITAL | Age: 66
End: 2021-06-10
Payer: COMMERCIAL

## 2021-06-10 DIAGNOSIS — M17.0 PRIMARY OSTEOARTHRITIS OF BOTH KNEES: ICD-10-CM

## 2021-06-10 DIAGNOSIS — M25.561 CHRONIC PAIN OF BOTH KNEES: ICD-10-CM

## 2021-06-10 DIAGNOSIS — G89.29 CHRONIC PAIN OF BOTH KNEES: ICD-10-CM

## 2021-06-10 DIAGNOSIS — M25.562 CHRONIC PAIN OF BOTH KNEES: ICD-10-CM

## 2021-06-10 DIAGNOSIS — R53.1 WEAKNESS: Primary | ICD-10-CM

## 2021-06-10 PROCEDURE — 97110 THERAPEUTIC EXERCISES: CPT

## 2021-06-14 ENCOUNTER — PATIENT OUTREACH (OUTPATIENT)
Dept: ADMINISTRATIVE | Facility: OTHER | Age: 66
End: 2021-06-14

## 2021-06-15 ENCOUNTER — OFFICE VISIT (OUTPATIENT)
Dept: ORTHOPEDICS | Facility: CLINIC | Age: 66
End: 2021-06-15
Payer: COMMERCIAL

## 2021-06-15 ENCOUNTER — TELEPHONE (OUTPATIENT)
Dept: PAIN MEDICINE | Facility: CLINIC | Age: 66
End: 2021-06-15

## 2021-06-15 VITALS
SYSTOLIC BLOOD PRESSURE: 150 MMHG | HEART RATE: 103 BPM | DIASTOLIC BLOOD PRESSURE: 88 MMHG | BODY MASS INDEX: 31.24 KG/M2 | WEIGHT: 169.75 LBS | HEIGHT: 62 IN

## 2021-06-15 DIAGNOSIS — M17.0 PRIMARY OSTEOARTHRITIS OF BOTH KNEES: Primary | ICD-10-CM

## 2021-06-15 DIAGNOSIS — E66.9 OBESITY (BMI 30.0-34.9): ICD-10-CM

## 2021-06-15 DIAGNOSIS — M54.41 LOW BACK PAIN WITH BILATERAL SCIATICA, UNSPECIFIED BACK PAIN LATERALITY, UNSPECIFIED CHRONICITY: Primary | ICD-10-CM

## 2021-06-15 DIAGNOSIS — M54.42 LOW BACK PAIN WITH BILATERAL SCIATICA, UNSPECIFIED BACK PAIN LATERALITY, UNSPECIFIED CHRONICITY: Primary | ICD-10-CM

## 2021-06-15 PROCEDURE — 99999 PR PBB SHADOW E&M-EST. PATIENT-LVL III: CPT | Mod: PBBFAC,,, | Performed by: FAMILY MEDICINE

## 2021-06-15 PROCEDURE — 99999 PR PBB SHADOW E&M-EST. PATIENT-LVL III: ICD-10-PCS | Mod: PBBFAC,,, | Performed by: FAMILY MEDICINE

## 2021-06-15 PROCEDURE — 99214 PR OFFICE/OUTPT VISIT, EST, LEVL IV, 30-39 MIN: ICD-10-PCS | Mod: S$GLB,,, | Performed by: FAMILY MEDICINE

## 2021-06-15 PROCEDURE — 1125F AMNT PAIN NOTED PAIN PRSNT: CPT | Mod: S$GLB,,, | Performed by: FAMILY MEDICINE

## 2021-06-15 PROCEDURE — 1125F PR PAIN SEVERITY QUANTIFIED, PAIN PRESENT: ICD-10-PCS | Mod: S$GLB,,, | Performed by: FAMILY MEDICINE

## 2021-06-15 PROCEDURE — 3008F BODY MASS INDEX DOCD: CPT | Mod: CPTII,S$GLB,, | Performed by: FAMILY MEDICINE

## 2021-06-15 PROCEDURE — 99214 OFFICE O/P EST MOD 30 MIN: CPT | Mod: S$GLB,,, | Performed by: FAMILY MEDICINE

## 2021-06-15 PROCEDURE — 3008F PR BODY MASS INDEX (BMI) DOCUMENTED: ICD-10-PCS | Mod: CPTII,S$GLB,, | Performed by: FAMILY MEDICINE

## 2021-06-17 ENCOUNTER — CLINICAL SUPPORT (OUTPATIENT)
Dept: REHABILITATION | Facility: HOSPITAL | Age: 66
End: 2021-06-17
Payer: COMMERCIAL

## 2021-06-17 DIAGNOSIS — R53.1 WEAKNESS: ICD-10-CM

## 2021-06-17 DIAGNOSIS — G89.29 CHRONIC PAIN OF BOTH KNEES: ICD-10-CM

## 2021-06-17 DIAGNOSIS — M25.561 CHRONIC PAIN OF BOTH KNEES: ICD-10-CM

## 2021-06-17 DIAGNOSIS — M17.0 PRIMARY OSTEOARTHRITIS OF BOTH KNEES: Primary | ICD-10-CM

## 2021-06-17 DIAGNOSIS — M25.562 CHRONIC PAIN OF BOTH KNEES: ICD-10-CM

## 2021-06-17 PROCEDURE — 97110 THERAPEUTIC EXERCISES: CPT

## 2021-06-17 NOTE — TELEPHONE ENCOUNTER
----- Message from Lanny Ashford sent at 3/9/2017  9:14 AM CST -----  Contact: pt  Call pt at [phone]//pt is all stop up this morning and want to know if the dr can call something for her allergy call to [facility] [address]/please call pt/virgilio ht   Pounds Preamble Statement (Weight Entered In Details Tab): Reported Weight in pounds:

## 2021-06-20 ENCOUNTER — HOSPITAL ENCOUNTER (EMERGENCY)
Facility: HOSPITAL | Age: 66
Discharge: HOME | End: 2021-06-20
Attending: EMERGENCY MEDICINE
Payer: COMMERCIAL

## 2021-06-20 VITALS
DIASTOLIC BLOOD PRESSURE: 87 MMHG | OXYGEN SATURATION: 98 % | HEART RATE: 87 BPM | HEIGHT: 62 IN | WEIGHT: 180 LBS | TEMPERATURE: 98 F | BODY MASS INDEX: 33.13 KG/M2 | SYSTOLIC BLOOD PRESSURE: 136 MMHG | RESPIRATION RATE: 20 BRPM

## 2021-06-20 DIAGNOSIS — J18.9 PNEUMONIA OF RIGHT UPPER LOBE DUE TO INFECTIOUS ORGANISM: Primary | ICD-10-CM

## 2021-06-20 DIAGNOSIS — R07.9 CHEST PAIN: ICD-10-CM

## 2021-06-20 LAB
ALBUMIN SERPL BCP-MCNC: 3.3 G/DL (ref 3.5–5.2)
ALP SERPL-CCNC: 90 U/L (ref 55–135)
ALT SERPL W/O P-5'-P-CCNC: 12 U/L (ref 10–44)
ANION GAP SERPL CALC-SCNC: 11 MMOL/L (ref 8–16)
AST SERPL-CCNC: 11 U/L (ref 10–40)
BASOPHILS # BLD AUTO: 0.07 K/UL (ref 0–0.2)
BASOPHILS NFR BLD: 0.6 % (ref 0–1.9)
BILIRUB SERPL-MCNC: 0.5 MG/DL (ref 0.1–1)
BUN SERPL-MCNC: 8 MG/DL (ref 8–23)
CALCIUM SERPL-MCNC: 8.9 MG/DL (ref 8.7–10.5)
CHLORIDE SERPL-SCNC: 98 MMOL/L (ref 95–110)
CO2 SERPL-SCNC: 23 MMOL/L (ref 23–29)
CREAT SERPL-MCNC: 0.6 MG/DL (ref 0.5–1.4)
CTP QC/QA: YES
DIFFERENTIAL METHOD: ABNORMAL
EOSINOPHIL # BLD AUTO: 0.3 K/UL (ref 0–0.5)
EOSINOPHIL NFR BLD: 2.6 % (ref 0–8)
ERYTHROCYTE [DISTWIDTH] IN BLOOD BY AUTOMATED COUNT: 14.7 % (ref 11.5–14.5)
EST. GFR  (AFRICAN AMERICAN): >60 ML/MIN/1.73 M^2
EST. GFR  (NON AFRICAN AMERICAN): >60 ML/MIN/1.73 M^2
GLUCOSE SERPL-MCNC: 98 MG/DL (ref 70–110)
HCT VFR BLD AUTO: 40 % (ref 37–48.5)
HGB BLD-MCNC: 12.8 G/DL (ref 12–16)
IMM GRANULOCYTES # BLD AUTO: 0.05 K/UL (ref 0–0.04)
IMM GRANULOCYTES NFR BLD AUTO: 0.4 % (ref 0–0.5)
LIPASE SERPL-CCNC: 14 U/L (ref 4–60)
LYMPHOCYTES # BLD AUTO: 1.7 K/UL (ref 1–4.8)
LYMPHOCYTES NFR BLD: 14.2 % (ref 18–48)
MCH RBC QN AUTO: 26.7 PG (ref 27–31)
MCHC RBC AUTO-ENTMCNC: 32 G/DL (ref 32–36)
MCV RBC AUTO: 83 FL (ref 82–98)
MONOCYTES # BLD AUTO: 1 K/UL (ref 0.3–1)
MONOCYTES NFR BLD: 8 % (ref 4–15)
NEUTROPHILS # BLD AUTO: 8.8 K/UL (ref 1.8–7.7)
NEUTROPHILS NFR BLD: 74.2 % (ref 38–73)
NRBC BLD-RTO: 0 /100 WBC
PLATELET # BLD AUTO: 401 K/UL (ref 150–450)
PMV BLD AUTO: 8.8 FL (ref 9.2–12.9)
POTASSIUM SERPL-SCNC: 4 MMOL/L (ref 3.5–5.1)
PROT SERPL-MCNC: 6.9 G/DL (ref 6–8.4)
RBC # BLD AUTO: 4.8 M/UL (ref 4–5.4)
SARS-COV-2 RDRP RESP QL NAA+PROBE: NEGATIVE
SODIUM SERPL-SCNC: 132 MMOL/L (ref 136–145)
TROPONIN I SERPL DL<=0.01 NG/ML-MCNC: 0.01 NG/ML (ref 0–0.03)
WBC # BLD AUTO: 11.89 K/UL (ref 3.9–12.7)

## 2021-06-20 PROCEDURE — 93010 EKG 12-LEAD: ICD-10-PCS | Mod: ,,, | Performed by: INTERNAL MEDICINE

## 2021-06-20 PROCEDURE — U0002 COVID-19 LAB TEST NON-CDC: HCPCS | Performed by: EMERGENCY MEDICINE

## 2021-06-20 PROCEDURE — 93010 ELECTROCARDIOGRAM REPORT: CPT | Mod: ,,, | Performed by: INTERNAL MEDICINE

## 2021-06-20 PROCEDURE — 93005 ELECTROCARDIOGRAM TRACING: CPT

## 2021-06-20 PROCEDURE — 25000242 PHARM REV CODE 250 ALT 637 W/ HCPCS: Performed by: EMERGENCY MEDICINE

## 2021-06-20 PROCEDURE — 99285 EMERGENCY DEPT VISIT HI MDM: CPT | Mod: 25

## 2021-06-20 PROCEDURE — 83690 ASSAY OF LIPASE: CPT | Performed by: EMERGENCY MEDICINE

## 2021-06-20 PROCEDURE — 80053 COMPREHEN METABOLIC PANEL: CPT | Performed by: EMERGENCY MEDICINE

## 2021-06-20 PROCEDURE — 85025 COMPLETE CBC W/AUTO DIFF WBC: CPT | Performed by: EMERGENCY MEDICINE

## 2021-06-20 PROCEDURE — 84484 ASSAY OF TROPONIN QUANT: CPT | Performed by: EMERGENCY MEDICINE

## 2021-06-20 PROCEDURE — 94640 AIRWAY INHALATION TREATMENT: CPT

## 2021-06-20 PROCEDURE — 27000221 HC OXYGEN, UP TO 24 HOURS

## 2021-06-20 RX ORDER — IPRATROPIUM BROMIDE AND ALBUTEROL SULFATE 2.5; .5 MG/3ML; MG/3ML
3 SOLUTION RESPIRATORY (INHALATION)
Status: COMPLETED | OUTPATIENT
Start: 2021-06-20 | End: 2021-06-20

## 2021-06-20 RX ORDER — ALBUTEROL SULFATE 90 UG/1
1-2 AEROSOL, METERED RESPIRATORY (INHALATION) EVERY 6 HOURS PRN
Qty: 8 G | Refills: 0 | Status: SHIPPED | OUTPATIENT
Start: 2021-06-20 | End: 2021-12-14 | Stop reason: SDUPTHER

## 2021-06-20 RX ORDER — AZITHROMYCIN 250 MG/1
250 TABLET, FILM COATED ORAL DAILY
Qty: 6 TABLET | Refills: 0 | Status: SHIPPED | OUTPATIENT
Start: 2021-06-20 | End: 2021-11-03

## 2021-06-20 RX ORDER — ALBUTEROL SULFATE 1.25 MG/3ML
1.25 SOLUTION RESPIRATORY (INHALATION) EVERY 6 HOURS PRN
Qty: 1 BOX | Refills: 0 | Status: SHIPPED | OUTPATIENT
Start: 2021-06-20 | End: 2021-12-14 | Stop reason: SDUPTHER

## 2021-06-20 RX ORDER — METHYLPREDNISOLONE 4 MG/1
TABLET ORAL
Qty: 1 PACKAGE | Refills: 0 | Status: SHIPPED | OUTPATIENT
Start: 2021-06-20 | End: 2021-11-03

## 2021-06-20 RX ADMIN — IPRATROPIUM BROMIDE AND ALBUTEROL SULFATE 3 ML: .5; 3 SOLUTION RESPIRATORY (INHALATION) at 06:06

## 2021-06-20 RX ADMIN — IPRATROPIUM BROMIDE AND ALBUTEROL SULFATE 3 ML: .5; 3 SOLUTION RESPIRATORY (INHALATION) at 07:06

## 2021-06-21 ENCOUNTER — OFFICE VISIT (OUTPATIENT)
Dept: PHYSICAL MEDICINE AND REHAB | Facility: CLINIC | Age: 66
End: 2021-06-21
Payer: COMMERCIAL

## 2021-06-21 ENCOUNTER — TELEPHONE (OUTPATIENT)
Dept: PAIN MEDICINE | Facility: CLINIC | Age: 66
End: 2021-06-21

## 2021-06-21 VITALS
SYSTOLIC BLOOD PRESSURE: 136 MMHG | WEIGHT: 180 LBS | RESPIRATION RATE: 16 BRPM | BODY MASS INDEX: 33.13 KG/M2 | HEIGHT: 62 IN | HEART RATE: 117 BPM | DIASTOLIC BLOOD PRESSURE: 80 MMHG

## 2021-06-21 DIAGNOSIS — M51.36 DDD (DEGENERATIVE DISC DISEASE), LUMBAR: Primary | ICD-10-CM

## 2021-06-21 DIAGNOSIS — M47.816 LUMBAR FACET ARTHROPATHY: ICD-10-CM

## 2021-06-21 PROCEDURE — 1125F AMNT PAIN NOTED PAIN PRSNT: CPT | Mod: S$GLB,,, | Performed by: PHYSICAL MEDICINE & REHABILITATION

## 2021-06-21 PROCEDURE — 99999 PR PBB SHADOW E&M-EST. PATIENT-LVL V: ICD-10-PCS | Mod: PBBFAC,,, | Performed by: PHYSICAL MEDICINE & REHABILITATION

## 2021-06-21 PROCEDURE — 3008F BODY MASS INDEX DOCD: CPT | Mod: CPTII,S$GLB,, | Performed by: PHYSICAL MEDICINE & REHABILITATION

## 2021-06-21 PROCEDURE — 1125F PR PAIN SEVERITY QUANTIFIED, PAIN PRESENT: ICD-10-PCS | Mod: S$GLB,,, | Performed by: PHYSICAL MEDICINE & REHABILITATION

## 2021-06-21 PROCEDURE — 3008F PR BODY MASS INDEX (BMI) DOCUMENTED: ICD-10-PCS | Mod: CPTII,S$GLB,, | Performed by: PHYSICAL MEDICINE & REHABILITATION

## 2021-06-21 PROCEDURE — 99203 OFFICE O/P NEW LOW 30 MIN: CPT | Mod: S$GLB,,, | Performed by: PHYSICAL MEDICINE & REHABILITATION

## 2021-06-21 PROCEDURE — 99203 PR OFFICE/OUTPT VISIT, NEW, LEVL III, 30-44 MIN: ICD-10-PCS | Mod: S$GLB,,, | Performed by: PHYSICAL MEDICINE & REHABILITATION

## 2021-06-21 PROCEDURE — 99999 PR PBB SHADOW E&M-EST. PATIENT-LVL V: CPT | Mod: PBBFAC,,, | Performed by: PHYSICAL MEDICINE & REHABILITATION

## 2021-06-22 ENCOUNTER — TELEPHONE (OUTPATIENT)
Dept: REHABILITATION | Facility: HOSPITAL | Age: 66
End: 2021-06-22

## 2021-06-22 ENCOUNTER — TELEPHONE (OUTPATIENT)
Dept: PAIN MEDICINE | Facility: CLINIC | Age: 66
End: 2021-06-22

## 2021-06-22 ENCOUNTER — HOSPITAL ENCOUNTER (OUTPATIENT)
Dept: RADIOLOGY | Facility: HOSPITAL | Age: 66
Discharge: HOME | End: 2021-06-22
Attending: PHYSICAL MEDICINE & REHABILITATION
Payer: COMMERCIAL

## 2021-06-22 DIAGNOSIS — M47.816 LUMBAR FACET ARTHROPATHY: ICD-10-CM

## 2021-06-22 DIAGNOSIS — M51.36 DDD (DEGENERATIVE DISC DISEASE), LUMBAR: ICD-10-CM

## 2021-06-22 PROCEDURE — 72110 XR LUMBAR SPINE 5 VIEW WITH FLEX AND EXT: ICD-10-PCS | Mod: 26,,, | Performed by: RADIOLOGY

## 2021-06-22 PROCEDURE — 72110 X-RAY EXAM L-2 SPINE 4/>VWS: CPT | Mod: 26,,, | Performed by: RADIOLOGY

## 2021-06-22 PROCEDURE — 72110 X-RAY EXAM L-2 SPINE 4/>VWS: CPT | Mod: TC

## 2021-06-23 ENCOUNTER — PATIENT MESSAGE (OUTPATIENT)
Dept: PHYSICAL MEDICINE AND REHAB | Facility: CLINIC | Age: 66
End: 2021-06-23

## 2021-06-24 ENCOUNTER — CLINICAL SUPPORT (OUTPATIENT)
Dept: REHABILITATION | Facility: HOSPITAL | Age: 66
End: 2021-06-24
Payer: COMMERCIAL

## 2021-06-24 DIAGNOSIS — M25.562 CHRONIC PAIN OF BOTH KNEES: ICD-10-CM

## 2021-06-24 DIAGNOSIS — G89.29 CHRONIC PAIN OF BOTH KNEES: ICD-10-CM

## 2021-06-24 DIAGNOSIS — M25.561 CHRONIC PAIN OF BOTH KNEES: ICD-10-CM

## 2021-06-24 PROCEDURE — 97110 THERAPEUTIC EXERCISES: CPT | Mod: CQ

## 2021-06-25 ENCOUNTER — TELEPHONE (OUTPATIENT)
Dept: PAIN MEDICINE | Facility: CLINIC | Age: 66
End: 2021-06-25

## 2021-06-29 ENCOUNTER — CLINICAL SUPPORT (OUTPATIENT)
Dept: REHABILITATION | Facility: HOSPITAL | Age: 66
End: 2021-06-29
Payer: COMMERCIAL

## 2021-06-29 DIAGNOSIS — G89.29 CHRONIC PAIN OF BOTH KNEES: ICD-10-CM

## 2021-06-29 DIAGNOSIS — M25.562 CHRONIC PAIN OF BOTH KNEES: ICD-10-CM

## 2021-06-29 DIAGNOSIS — M25.561 CHRONIC PAIN OF BOTH KNEES: ICD-10-CM

## 2021-06-29 PROCEDURE — 97110 THERAPEUTIC EXERCISES: CPT

## 2021-07-01 ENCOUNTER — CLINICAL SUPPORT (OUTPATIENT)
Dept: REHABILITATION | Facility: HOSPITAL | Age: 66
End: 2021-07-01
Payer: COMMERCIAL

## 2021-07-01 DIAGNOSIS — G89.29 CHRONIC PAIN OF BOTH KNEES: ICD-10-CM

## 2021-07-01 DIAGNOSIS — M25.561 CHRONIC PAIN OF BOTH KNEES: ICD-10-CM

## 2021-07-01 DIAGNOSIS — M25.562 CHRONIC PAIN OF BOTH KNEES: ICD-10-CM

## 2021-07-01 PROCEDURE — 97110 THERAPEUTIC EXERCISES: CPT | Mod: CQ

## 2021-07-06 ENCOUNTER — CLINICAL SUPPORT (OUTPATIENT)
Dept: REHABILITATION | Facility: HOSPITAL | Age: 66
End: 2021-07-06
Payer: COMMERCIAL

## 2021-07-06 DIAGNOSIS — G89.29 CHRONIC PAIN OF BOTH KNEES: ICD-10-CM

## 2021-07-06 DIAGNOSIS — M25.562 CHRONIC PAIN OF BOTH KNEES: ICD-10-CM

## 2021-07-06 DIAGNOSIS — M25.561 CHRONIC PAIN OF BOTH KNEES: ICD-10-CM

## 2021-07-06 PROCEDURE — 97110 THERAPEUTIC EXERCISES: CPT | Mod: CQ

## 2021-07-08 ENCOUNTER — TELEPHONE (OUTPATIENT)
Dept: REHABILITATION | Facility: HOSPITAL | Age: 66
End: 2021-07-08

## 2021-07-13 ENCOUNTER — CLINICAL SUPPORT (OUTPATIENT)
Dept: REHABILITATION | Facility: HOSPITAL | Age: 66
End: 2021-07-13
Payer: COMMERCIAL

## 2021-07-13 ENCOUNTER — DOCUMENTATION ONLY (OUTPATIENT)
Dept: REHABILITATION | Facility: HOSPITAL | Age: 66
End: 2021-07-13

## 2021-07-13 DIAGNOSIS — M25.561 CHRONIC PAIN OF BOTH KNEES: ICD-10-CM

## 2021-07-13 DIAGNOSIS — G89.29 CHRONIC PAIN OF BOTH KNEES: ICD-10-CM

## 2021-07-13 DIAGNOSIS — M25.562 CHRONIC PAIN OF BOTH KNEES: ICD-10-CM

## 2021-07-13 PROCEDURE — 97110 THERAPEUTIC EXERCISES: CPT | Mod: CQ

## 2021-07-15 ENCOUNTER — CLINICAL SUPPORT (OUTPATIENT)
Dept: REHABILITATION | Facility: HOSPITAL | Age: 66
End: 2021-07-15
Payer: COMMERCIAL

## 2021-07-15 DIAGNOSIS — M25.561 CHRONIC PAIN OF BOTH KNEES: ICD-10-CM

## 2021-07-15 DIAGNOSIS — G89.29 CHRONIC PAIN OF BOTH KNEES: ICD-10-CM

## 2021-07-15 DIAGNOSIS — M25.562 CHRONIC PAIN OF BOTH KNEES: ICD-10-CM

## 2021-07-15 PROCEDURE — 97110 THERAPEUTIC EXERCISES: CPT

## 2021-07-19 ENCOUNTER — PATIENT OUTREACH (OUTPATIENT)
Dept: ADMINISTRATIVE | Facility: OTHER | Age: 66
End: 2021-07-19

## 2021-07-20 ENCOUNTER — CLINICAL SUPPORT (OUTPATIENT)
Dept: REHABILITATION | Facility: HOSPITAL | Age: 66
End: 2021-07-20
Payer: COMMERCIAL

## 2021-07-20 ENCOUNTER — OFFICE VISIT (OUTPATIENT)
Dept: PAIN MEDICINE | Facility: CLINIC | Age: 66
End: 2021-07-20
Payer: COMMERCIAL

## 2021-07-20 VITALS
BODY MASS INDEX: 30.93 KG/M2 | WEIGHT: 169.06 LBS | SYSTOLIC BLOOD PRESSURE: 149 MMHG | HEART RATE: 111 BPM | DIASTOLIC BLOOD PRESSURE: 86 MMHG

## 2021-07-20 DIAGNOSIS — M25.562 CHRONIC PAIN OF BOTH KNEES: ICD-10-CM

## 2021-07-20 DIAGNOSIS — M51.36 DDD (DEGENERATIVE DISC DISEASE), LUMBAR: ICD-10-CM

## 2021-07-20 DIAGNOSIS — M25.561 CHRONIC PAIN OF BOTH KNEES: ICD-10-CM

## 2021-07-20 DIAGNOSIS — M25.561 CHRONIC PAIN OF RIGHT KNEE: ICD-10-CM

## 2021-07-20 DIAGNOSIS — G89.29 CHRONIC PAIN OF RIGHT KNEE: ICD-10-CM

## 2021-07-20 DIAGNOSIS — G89.29 CHRONIC PAIN OF BOTH KNEES: ICD-10-CM

## 2021-07-20 DIAGNOSIS — M47.816 LUMBAR FACET ARTHROPATHY: Primary | ICD-10-CM

## 2021-07-20 PROCEDURE — 3077F SYST BP >= 140 MM HG: CPT | Mod: CPTII,S$GLB,, | Performed by: ANESTHESIOLOGY

## 2021-07-20 PROCEDURE — 3079F DIAST BP 80-89 MM HG: CPT | Mod: CPTII,S$GLB,, | Performed by: ANESTHESIOLOGY

## 2021-07-20 PROCEDURE — 3077F PR MOST RECENT SYSTOLIC BLOOD PRESSURE >= 140 MM HG: ICD-10-PCS | Mod: CPTII,S$GLB,, | Performed by: ANESTHESIOLOGY

## 2021-07-20 PROCEDURE — 99204 OFFICE O/P NEW MOD 45 MIN: CPT | Mod: S$GLB,,, | Performed by: ANESTHESIOLOGY

## 2021-07-20 PROCEDURE — 3079F PR MOST RECENT DIASTOLIC BLOOD PRESSURE 80-89 MM HG: ICD-10-PCS | Mod: CPTII,S$GLB,, | Performed by: ANESTHESIOLOGY

## 2021-07-20 PROCEDURE — 97110 THERAPEUTIC EXERCISES: CPT

## 2021-07-20 PROCEDURE — 3008F PR BODY MASS INDEX (BMI) DOCUMENTED: ICD-10-PCS | Mod: CPTII,S$GLB,, | Performed by: ANESTHESIOLOGY

## 2021-07-20 PROCEDURE — 3008F BODY MASS INDEX DOCD: CPT | Mod: CPTII,S$GLB,, | Performed by: ANESTHESIOLOGY

## 2021-07-20 PROCEDURE — 99999 PR PBB SHADOW E&M-EST. PATIENT-LVL V: ICD-10-PCS | Mod: PBBFAC,,, | Performed by: ANESTHESIOLOGY

## 2021-07-20 PROCEDURE — 99204 PR OFFICE/OUTPT VISIT, NEW, LEVL IV, 45-59 MIN: ICD-10-PCS | Mod: S$GLB,,, | Performed by: ANESTHESIOLOGY

## 2021-07-20 PROCEDURE — 99999 PR PBB SHADOW E&M-EST. PATIENT-LVL V: CPT | Mod: PBBFAC,,, | Performed by: ANESTHESIOLOGY

## 2021-07-20 PROCEDURE — 1126F PR PAIN SEVERITY QUANTIFIED, NO PAIN PRESENT: ICD-10-PCS | Mod: CPTII,S$GLB,, | Performed by: ANESTHESIOLOGY

## 2021-07-20 PROCEDURE — 1126F AMNT PAIN NOTED NONE PRSNT: CPT | Mod: CPTII,S$GLB,, | Performed by: ANESTHESIOLOGY

## 2021-07-20 RX ORDER — CELECOXIB 200 MG/1
200 CAPSULE ORAL DAILY
Qty: 90 CAPSULE | Refills: 0 | Status: SHIPPED | OUTPATIENT
Start: 2021-07-20 | End: 2021-11-03 | Stop reason: SDUPTHER

## 2021-07-22 ENCOUNTER — CLINICAL SUPPORT (OUTPATIENT)
Dept: REHABILITATION | Facility: HOSPITAL | Age: 66
End: 2021-07-22
Payer: COMMERCIAL

## 2021-07-22 DIAGNOSIS — G89.29 CHRONIC PAIN OF BOTH KNEES: ICD-10-CM

## 2021-07-22 DIAGNOSIS — M25.561 CHRONIC PAIN OF BOTH KNEES: ICD-10-CM

## 2021-07-22 DIAGNOSIS — M25.562 CHRONIC PAIN OF BOTH KNEES: ICD-10-CM

## 2021-07-22 PROCEDURE — 97110 THERAPEUTIC EXERCISES: CPT

## 2021-08-06 ENCOUNTER — TELEPHONE (OUTPATIENT)
Dept: ORTHOPEDICS | Facility: CLINIC | Age: 66
End: 2021-08-06

## 2021-08-24 ENCOUNTER — TELEPHONE (OUTPATIENT)
Dept: PAIN MEDICINE | Facility: CLINIC | Age: 66
End: 2021-08-24

## 2021-09-20 ENCOUNTER — TELEPHONE (OUTPATIENT)
Dept: INTERNAL MEDICINE | Facility: CLINIC | Age: 66
End: 2021-09-20

## 2021-09-21 ENCOUNTER — TELEPHONE (OUTPATIENT)
Dept: INTERNAL MEDICINE | Facility: CLINIC | Age: 66
End: 2021-09-21

## 2021-09-21 ENCOUNTER — HOSPITAL ENCOUNTER (OUTPATIENT)
Dept: RADIOLOGY | Facility: HOSPITAL | Age: 66
Discharge: HOME | End: 2021-09-21
Attending: INTERNAL MEDICINE
Payer: COMMERCIAL

## 2021-09-21 ENCOUNTER — OFFICE VISIT (OUTPATIENT)
Dept: INTERNAL MEDICINE | Facility: CLINIC | Age: 66
End: 2021-09-21
Payer: COMMERCIAL

## 2021-09-21 VITALS
BODY MASS INDEX: 30.31 KG/M2 | SYSTOLIC BLOOD PRESSURE: 132 MMHG | WEIGHT: 164.69 LBS | DIASTOLIC BLOOD PRESSURE: 76 MMHG | HEART RATE: 115 BPM | TEMPERATURE: 98 F | OXYGEN SATURATION: 95 % | HEIGHT: 62 IN

## 2021-09-21 DIAGNOSIS — M54.50 ACUTE RIGHT-SIDED LOW BACK PAIN WITHOUT SCIATICA: Primary | ICD-10-CM

## 2021-09-21 DIAGNOSIS — M54.50 ACUTE RIGHT-SIDED LOW BACK PAIN WITHOUT SCIATICA: ICD-10-CM

## 2021-09-21 LAB
BILIRUB UR QL STRIP: NEGATIVE
CLARITY UR: ABNORMAL
COLOR UR: YELLOW
GLUCOSE UR QL STRIP: NEGATIVE
HGB UR QL STRIP: ABNORMAL
KETONES UR QL STRIP: NEGATIVE
LEUKOCYTE ESTERASE UR QL STRIP: NEGATIVE
NITRITE UR QL STRIP: NEGATIVE
PH UR STRIP: 6.5 [PH] (ref 5–8)
PROT UR QL STRIP: NEGATIVE
SP GR UR STRIP: <1.005 (ref 1–1.03)
URN SPEC COLLECT METH UR: ABNORMAL
UROBILINOGEN UR STRIP-ACNC: NEGATIVE EU/DL

## 2021-09-21 PROCEDURE — 3075F PR MOST RECENT SYSTOLIC BLOOD PRESS GE 130-139MM HG: ICD-10-PCS | Mod: CPTII,S$GLB,, | Performed by: INTERNAL MEDICINE

## 2021-09-21 PROCEDURE — 99214 PR OFFICE/OUTPT VISIT, EST, LEVL IV, 30-39 MIN: ICD-10-PCS | Mod: S$GLB,,, | Performed by: INTERNAL MEDICINE

## 2021-09-21 PROCEDURE — 1159F MED LIST DOCD IN RCRD: CPT | Mod: CPTII,S$GLB,, | Performed by: INTERNAL MEDICINE

## 2021-09-21 PROCEDURE — 3078F PR MOST RECENT DIASTOLIC BLOOD PRESSURE < 80 MM HG: ICD-10-PCS | Mod: CPTII,S$GLB,, | Performed by: INTERNAL MEDICINE

## 2021-09-21 PROCEDURE — 3044F HG A1C LEVEL LT 7.0%: CPT | Mod: CPTII,S$GLB,, | Performed by: INTERNAL MEDICINE

## 2021-09-21 PROCEDURE — 1159F PR MEDICATION LIST DOCUMENTED IN MEDICAL RECORD: ICD-10-PCS | Mod: CPTII,S$GLB,, | Performed by: INTERNAL MEDICINE

## 2021-09-21 PROCEDURE — 3008F BODY MASS INDEX DOCD: CPT | Mod: CPTII,S$GLB,, | Performed by: INTERNAL MEDICINE

## 2021-09-21 PROCEDURE — 74018 RADEX ABDOMEN 1 VIEW: CPT | Mod: 26,,, | Performed by: RADIOLOGY

## 2021-09-21 PROCEDURE — 74018 RADEX ABDOMEN 1 VIEW: CPT | Mod: TC

## 2021-09-21 PROCEDURE — 1160F PR REVIEW ALL MEDS BY PRESCRIBER/CLIN PHARMACIST DOCUMENTED: ICD-10-PCS | Mod: CPTII,S$GLB,, | Performed by: INTERNAL MEDICINE

## 2021-09-21 PROCEDURE — 1160F RVW MEDS BY RX/DR IN RCRD: CPT | Mod: CPTII,S$GLB,, | Performed by: INTERNAL MEDICINE

## 2021-09-21 PROCEDURE — 4010F ACE/ARB THERAPY RXD/TAKEN: CPT | Mod: CPTII,S$GLB,, | Performed by: INTERNAL MEDICINE

## 2021-09-21 PROCEDURE — 3078F DIAST BP <80 MM HG: CPT | Mod: CPTII,S$GLB,, | Performed by: INTERNAL MEDICINE

## 2021-09-21 PROCEDURE — 99999 PR PBB SHADOW E&M-EST. PATIENT-LVL V: ICD-10-PCS | Mod: PBBFAC,,, | Performed by: INTERNAL MEDICINE

## 2021-09-21 PROCEDURE — 3075F SYST BP GE 130 - 139MM HG: CPT | Mod: CPTII,S$GLB,, | Performed by: INTERNAL MEDICINE

## 2021-09-21 PROCEDURE — 99999 PR PBB SHADOW E&M-EST. PATIENT-LVL V: CPT | Mod: PBBFAC,,, | Performed by: INTERNAL MEDICINE

## 2021-09-21 PROCEDURE — 3008F PR BODY MASS INDEX (BMI) DOCUMENTED: ICD-10-PCS | Mod: CPTII,S$GLB,, | Performed by: INTERNAL MEDICINE

## 2021-09-21 PROCEDURE — 74018 XR ABDOMEN AP 1 VIEW: ICD-10-PCS | Mod: 26,,, | Performed by: RADIOLOGY

## 2021-09-21 PROCEDURE — 4010F PR ACE/ARB THEARPY RXD/TAKEN: ICD-10-PCS | Mod: CPTII,S$GLB,, | Performed by: INTERNAL MEDICINE

## 2021-09-21 PROCEDURE — 3044F PR MOST RECENT HEMOGLOBIN A1C LEVEL <7.0%: ICD-10-PCS | Mod: CPTII,S$GLB,, | Performed by: INTERNAL MEDICINE

## 2021-09-21 PROCEDURE — 99214 OFFICE O/P EST MOD 30 MIN: CPT | Mod: S$GLB,,, | Performed by: INTERNAL MEDICINE

## 2021-09-21 PROCEDURE — 81003 URINALYSIS AUTO W/O SCOPE: CPT | Performed by: INTERNAL MEDICINE

## 2021-09-22 ENCOUNTER — TELEPHONE (OUTPATIENT)
Dept: INTERNAL MEDICINE | Facility: CLINIC | Age: 66
End: 2021-09-22

## 2021-11-03 ENCOUNTER — OFFICE VISIT (OUTPATIENT)
Dept: INTERNAL MEDICINE | Facility: CLINIC | Age: 66
End: 2021-11-03
Payer: COMMERCIAL

## 2021-11-03 DIAGNOSIS — M25.561 CHRONIC PAIN OF RIGHT KNEE: ICD-10-CM

## 2021-11-03 DIAGNOSIS — Z12.2 SCREENING FOR LUNG CANCER: ICD-10-CM

## 2021-11-03 DIAGNOSIS — G89.29 CHRONIC PAIN OF RIGHT KNEE: ICD-10-CM

## 2021-11-03 DIAGNOSIS — J44.89 ASTHMA WITH COPD: Primary | ICD-10-CM

## 2021-11-03 DIAGNOSIS — K21.00 GASTROESOPHAGEAL REFLUX DISEASE WITH ESOPHAGITIS WITHOUT HEMORRHAGE: ICD-10-CM

## 2021-11-03 DIAGNOSIS — Z12.11 SCREENING FOR COLON CANCER: ICD-10-CM

## 2021-11-03 DIAGNOSIS — Z23 NEED FOR INFLUENZA VACCINATION: ICD-10-CM

## 2021-11-03 DIAGNOSIS — F17.210 NICOTINE DEPENDENCE, CIGARETTES, UNCOMPLICATED: ICD-10-CM

## 2021-11-03 PROCEDURE — 4010F PR ACE/ARB THEARPY RXD/TAKEN: ICD-10-PCS | Mod: CPTII,S$GLB,, | Performed by: FAMILY MEDICINE

## 2021-11-03 PROCEDURE — 90686 FLU VACCINE (QUAD) GREATER THAN OR EQUAL TO 3YO PRESERVATIVE FREE IM: ICD-10-PCS | Mod: S$GLB,,, | Performed by: FAMILY MEDICINE

## 2021-11-03 PROCEDURE — 90686 IIV4 VACC NO PRSV 0.5 ML IM: CPT | Mod: S$GLB,,, | Performed by: FAMILY MEDICINE

## 2021-11-03 PROCEDURE — 3008F BODY MASS INDEX DOCD: CPT | Mod: CPTII,S$GLB,, | Performed by: FAMILY MEDICINE

## 2021-11-03 PROCEDURE — 3075F SYST BP GE 130 - 139MM HG: CPT | Mod: CPTII,S$GLB,, | Performed by: FAMILY MEDICINE

## 2021-11-03 PROCEDURE — 3008F PR BODY MASS INDEX (BMI) DOCUMENTED: ICD-10-PCS | Mod: CPTII,S$GLB,, | Performed by: FAMILY MEDICINE

## 2021-11-03 PROCEDURE — 3044F HG A1C LEVEL LT 7.0%: CPT | Mod: CPTII,S$GLB,, | Performed by: FAMILY MEDICINE

## 2021-11-03 PROCEDURE — 99215 OFFICE O/P EST HI 40 MIN: CPT | Mod: 25,S$GLB,, | Performed by: FAMILY MEDICINE

## 2021-11-03 PROCEDURE — 99215 PR OFFICE/OUTPT VISIT, EST, LEVL V, 40-54 MIN: ICD-10-PCS | Mod: 25,S$GLB,, | Performed by: FAMILY MEDICINE

## 2021-11-03 PROCEDURE — 3078F DIAST BP <80 MM HG: CPT | Mod: CPTII,S$GLB,, | Performed by: FAMILY MEDICINE

## 2021-11-03 PROCEDURE — 3044F PR MOST RECENT HEMOGLOBIN A1C LEVEL <7.0%: ICD-10-PCS | Mod: CPTII,S$GLB,, | Performed by: FAMILY MEDICINE

## 2021-11-03 PROCEDURE — 4010F ACE/ARB THERAPY RXD/TAKEN: CPT | Mod: CPTII,S$GLB,, | Performed by: FAMILY MEDICINE

## 2021-11-03 PROCEDURE — 3075F PR MOST RECENT SYSTOLIC BLOOD PRESS GE 130-139MM HG: ICD-10-PCS | Mod: CPTII,S$GLB,, | Performed by: FAMILY MEDICINE

## 2021-11-03 PROCEDURE — 3078F PR MOST RECENT DIASTOLIC BLOOD PRESSURE < 80 MM HG: ICD-10-PCS | Mod: CPTII,S$GLB,, | Performed by: FAMILY MEDICINE

## 2021-11-03 PROCEDURE — 90471 IMMUNIZATION ADMIN: CPT | Mod: S$GLB,,, | Performed by: FAMILY MEDICINE

## 2021-11-03 PROCEDURE — 90471 FLU VACCINE (QUAD) GREATER THAN OR EQUAL TO 3YO PRESERVATIVE FREE IM: ICD-10-PCS | Mod: S$GLB,,, | Performed by: FAMILY MEDICINE

## 2021-11-03 PROCEDURE — 99999 PR PBB SHADOW E&M-EST. PATIENT-LVL V: ICD-10-PCS | Mod: PBBFAC,,, | Performed by: FAMILY MEDICINE

## 2021-11-03 PROCEDURE — 99999 PR PBB SHADOW E&M-EST. PATIENT-LVL V: CPT | Mod: PBBFAC,,, | Performed by: FAMILY MEDICINE

## 2021-11-03 RX ORDER — FLUTICASONE PROPIONATE AND SALMETEROL 500; 50 UG/1; UG/1
1 POWDER RESPIRATORY (INHALATION) 2 TIMES DAILY
Qty: 60 EACH | Refills: 11 | Status: SHIPPED | OUTPATIENT
Start: 2021-11-03 | End: 2021-12-14 | Stop reason: SDUPTHER

## 2021-11-03 RX ORDER — PANTOPRAZOLE SODIUM 40 MG/1
40 TABLET, DELAYED RELEASE ORAL DAILY
Qty: 90 TABLET | Refills: 3 | Status: SHIPPED | OUTPATIENT
Start: 2021-11-03 | End: 2022-11-07

## 2021-11-03 RX ORDER — CELECOXIB 200 MG/1
200 CAPSULE ORAL DAILY
Qty: 90 CAPSULE | Refills: 0 | Status: SHIPPED | OUTPATIENT
Start: 2021-11-03 | End: 2022-02-03 | Stop reason: SDUPTHER

## 2021-11-03 RX ORDER — TIOTROPIUM BROMIDE 18 UG/1
18 CAPSULE ORAL; RESPIRATORY (INHALATION) DAILY
Qty: 30 CAPSULE | Refills: 11 | Status: SHIPPED | OUTPATIENT
Start: 2021-11-03 | End: 2021-12-14 | Stop reason: SDUPTHER

## 2021-11-05 ENCOUNTER — TELEPHONE (OUTPATIENT)
Dept: PAIN MEDICINE | Facility: CLINIC | Age: 66
End: 2021-11-05
Payer: COMMERCIAL

## 2021-11-06 ENCOUNTER — PATIENT OUTREACH (OUTPATIENT)
Dept: ADMINISTRATIVE | Facility: OTHER | Age: 66
End: 2021-11-06
Payer: COMMERCIAL

## 2021-11-08 ENCOUNTER — OFFICE VISIT (OUTPATIENT)
Dept: PAIN MEDICINE | Facility: CLINIC | Age: 66
End: 2021-11-08
Payer: COMMERCIAL

## 2021-11-08 ENCOUNTER — HOSPITAL ENCOUNTER (OUTPATIENT)
Dept: RADIOLOGY | Facility: HOSPITAL | Age: 66
Discharge: HOME | End: 2021-11-08
Attending: PHYSICIAN ASSISTANT
Payer: COMMERCIAL

## 2021-11-08 ENCOUNTER — HOSPITAL ENCOUNTER (OUTPATIENT)
Dept: RADIOLOGY | Facility: HOSPITAL | Age: 66
Discharge: HOME | End: 2021-11-08
Attending: FAMILY MEDICINE
Payer: COMMERCIAL

## 2021-11-08 ENCOUNTER — TELEPHONE (OUTPATIENT)
Dept: SPORTS MEDICINE | Facility: CLINIC | Age: 66
End: 2021-11-08
Payer: COMMERCIAL

## 2021-11-08 VITALS
HEIGHT: 62 IN | WEIGHT: 163.38 LBS | SYSTOLIC BLOOD PRESSURE: 134 MMHG | DIASTOLIC BLOOD PRESSURE: 88 MMHG | BODY MASS INDEX: 30.07 KG/M2 | HEART RATE: 111 BPM

## 2021-11-08 DIAGNOSIS — F17.210 NICOTINE DEPENDENCE, CIGARETTES, UNCOMPLICATED: ICD-10-CM

## 2021-11-08 DIAGNOSIS — M25.561 CHRONIC PAIN OF BOTH KNEES: Primary | ICD-10-CM

## 2021-11-08 DIAGNOSIS — M25.561 CHRONIC PAIN OF BOTH KNEES: ICD-10-CM

## 2021-11-08 DIAGNOSIS — G89.29 CHRONIC PAIN OF BOTH KNEES: Primary | ICD-10-CM

## 2021-11-08 DIAGNOSIS — G89.29 CHRONIC PAIN OF BOTH KNEES: ICD-10-CM

## 2021-11-08 DIAGNOSIS — M25.562 CHRONIC PAIN OF BOTH KNEES: ICD-10-CM

## 2021-11-08 DIAGNOSIS — M25.562 CHRONIC PAIN OF BOTH KNEES: Primary | ICD-10-CM

## 2021-11-08 PROCEDURE — 71271 CT THORAX LUNG CANCER SCR C-: CPT | Mod: TC

## 2021-11-08 PROCEDURE — 73564 X-RAY EXAM KNEE 4 OR MORE: CPT | Mod: 26,,, | Performed by: RADIOLOGY

## 2021-11-08 PROCEDURE — 3044F HG A1C LEVEL LT 7.0%: CPT | Mod: CPTII,S$GLB,, | Performed by: PHYSICIAN ASSISTANT

## 2021-11-08 PROCEDURE — 4010F PR ACE/ARB THEARPY RXD/TAKEN: ICD-10-PCS | Mod: CPTII,S$GLB,, | Performed by: PHYSICIAN ASSISTANT

## 2021-11-08 PROCEDURE — 4010F ACE/ARB THERAPY RXD/TAKEN: CPT | Mod: CPTII,S$GLB,, | Performed by: PHYSICIAN ASSISTANT

## 2021-11-08 PROCEDURE — 3075F PR MOST RECENT SYSTOLIC BLOOD PRESS GE 130-139MM HG: ICD-10-PCS | Mod: CPTII,S$GLB,, | Performed by: PHYSICIAN ASSISTANT

## 2021-11-08 PROCEDURE — 73564 XR KNEE ORTHO BILAT WITH FLEXION: ICD-10-PCS | Mod: 26,,, | Performed by: RADIOLOGY

## 2021-11-08 PROCEDURE — 3008F PR BODY MASS INDEX (BMI) DOCUMENTED: ICD-10-PCS | Mod: CPTII,S$GLB,, | Performed by: PHYSICIAN ASSISTANT

## 2021-11-08 PROCEDURE — 1160F RVW MEDS BY RX/DR IN RCRD: CPT | Mod: CPTII,S$GLB,, | Performed by: PHYSICIAN ASSISTANT

## 2021-11-08 PROCEDURE — 99214 PR OFFICE/OUTPT VISIT, EST, LEVL IV, 30-39 MIN: ICD-10-PCS | Mod: S$GLB,,, | Performed by: PHYSICIAN ASSISTANT

## 2021-11-08 PROCEDURE — 1159F MED LIST DOCD IN RCRD: CPT | Mod: CPTII,S$GLB,, | Performed by: PHYSICIAN ASSISTANT

## 2021-11-08 PROCEDURE — 1159F PR MEDICATION LIST DOCUMENTED IN MEDICAL RECORD: ICD-10-PCS | Mod: CPTII,S$GLB,, | Performed by: PHYSICIAN ASSISTANT

## 2021-11-08 PROCEDURE — 3008F BODY MASS INDEX DOCD: CPT | Mod: CPTII,S$GLB,, | Performed by: PHYSICIAN ASSISTANT

## 2021-11-08 PROCEDURE — 3079F PR MOST RECENT DIASTOLIC BLOOD PRESSURE 80-89 MM HG: ICD-10-PCS | Mod: CPTII,S$GLB,, | Performed by: PHYSICIAN ASSISTANT

## 2021-11-08 PROCEDURE — 3075F SYST BP GE 130 - 139MM HG: CPT | Mod: CPTII,S$GLB,, | Performed by: PHYSICIAN ASSISTANT

## 2021-11-08 PROCEDURE — 71271 CT CHEST LUNG SCREENING LOW DOSE: ICD-10-PCS | Mod: 26,,, | Performed by: RADIOLOGY

## 2021-11-08 PROCEDURE — 99214 OFFICE O/P EST MOD 30 MIN: CPT | Mod: S$GLB,,, | Performed by: PHYSICIAN ASSISTANT

## 2021-11-08 PROCEDURE — 99999 PR PBB SHADOW E&M-EST. PATIENT-LVL IV: CPT | Mod: PBBFAC,,, | Performed by: PHYSICIAN ASSISTANT

## 2021-11-08 PROCEDURE — 73564 X-RAY EXAM KNEE 4 OR MORE: CPT | Mod: TC,50

## 2021-11-08 PROCEDURE — 3079F DIAST BP 80-89 MM HG: CPT | Mod: CPTII,S$GLB,, | Performed by: PHYSICIAN ASSISTANT

## 2021-11-08 PROCEDURE — 99999 PR PBB SHADOW E&M-EST. PATIENT-LVL IV: ICD-10-PCS | Mod: PBBFAC,,, | Performed by: PHYSICIAN ASSISTANT

## 2021-11-08 PROCEDURE — 71271 CT THORAX LUNG CANCER SCR C-: CPT | Mod: 26,,, | Performed by: RADIOLOGY

## 2021-11-08 PROCEDURE — 3044F PR MOST RECENT HEMOGLOBIN A1C LEVEL <7.0%: ICD-10-PCS | Mod: CPTII,S$GLB,, | Performed by: PHYSICIAN ASSISTANT

## 2021-11-08 PROCEDURE — 1160F PR REVIEW ALL MEDS BY PRESCRIBER/CLIN PHARMACIST DOCUMENTED: ICD-10-PCS | Mod: CPTII,S$GLB,, | Performed by: PHYSICIAN ASSISTANT

## 2021-11-09 ENCOUNTER — TELEPHONE (OUTPATIENT)
Dept: INTERNAL MEDICINE | Facility: CLINIC | Age: 66
End: 2021-11-09
Payer: COMMERCIAL

## 2021-11-16 VITALS
DIASTOLIC BLOOD PRESSURE: 78 MMHG | HEIGHT: 62 IN | BODY MASS INDEX: 30.31 KG/M2 | TEMPERATURE: 98 F | SYSTOLIC BLOOD PRESSURE: 130 MMHG | OXYGEN SATURATION: 98 % | WEIGHT: 164.69 LBS | HEART RATE: 116 BPM

## 2021-11-17 ENCOUNTER — OFFICE VISIT (OUTPATIENT)
Dept: SPORTS MEDICINE | Facility: CLINIC | Age: 66
End: 2021-11-17
Payer: COMMERCIAL

## 2021-11-17 VITALS — WEIGHT: 163 LBS | BODY MASS INDEX: 30 KG/M2 | HEIGHT: 62 IN

## 2021-11-17 DIAGNOSIS — M17.0 PRIMARY OSTEOARTHRITIS OF BOTH KNEES: Primary | ICD-10-CM

## 2021-11-17 DIAGNOSIS — M17.0 PRIMARY OSTEOARTHRITIS OF BOTH KNEES: ICD-10-CM

## 2021-11-17 DIAGNOSIS — M25.562 LEFT KNEE PAIN, UNSPECIFIED CHRONICITY: Primary | ICD-10-CM

## 2021-11-17 DIAGNOSIS — J96.11 CHRONIC RESPIRATORY FAILURE WITH HYPOXIA: ICD-10-CM

## 2021-11-17 PROCEDURE — 3044F HG A1C LEVEL LT 7.0%: CPT | Mod: CPTII,S$GLB,, | Performed by: FAMILY MEDICINE

## 2021-11-17 PROCEDURE — 3008F PR BODY MASS INDEX (BMI) DOCUMENTED: ICD-10-PCS | Mod: CPTII,S$GLB,, | Performed by: FAMILY MEDICINE

## 2021-11-17 PROCEDURE — 20610 LARGE JOINT ASPIRATION/INJECTION: L KNEE: ICD-10-PCS | Mod: LT,S$GLB,, | Performed by: FAMILY MEDICINE

## 2021-11-17 PROCEDURE — 99214 OFFICE O/P EST MOD 30 MIN: CPT | Mod: 25,S$GLB,, | Performed by: FAMILY MEDICINE

## 2021-11-17 PROCEDURE — 20610 DRAIN/INJ JOINT/BURSA W/O US: CPT | Mod: LT,S$GLB,, | Performed by: FAMILY MEDICINE

## 2021-11-17 PROCEDURE — 4010F PR ACE/ARB THEARPY RXD/TAKEN: ICD-10-PCS | Mod: CPTII,S$GLB,, | Performed by: FAMILY MEDICINE

## 2021-11-17 PROCEDURE — 1159F MED LIST DOCD IN RCRD: CPT | Mod: CPTII,S$GLB,, | Performed by: FAMILY MEDICINE

## 2021-11-17 PROCEDURE — 3008F BODY MASS INDEX DOCD: CPT | Mod: CPTII,S$GLB,, | Performed by: FAMILY MEDICINE

## 2021-11-17 PROCEDURE — 99999 PR PBB SHADOW E&M-EST. PATIENT-LVL III: ICD-10-PCS | Mod: PBBFAC,,, | Performed by: FAMILY MEDICINE

## 2021-11-17 PROCEDURE — 1159F PR MEDICATION LIST DOCUMENTED IN MEDICAL RECORD: ICD-10-PCS | Mod: CPTII,S$GLB,, | Performed by: FAMILY MEDICINE

## 2021-11-17 PROCEDURE — 4010F ACE/ARB THERAPY RXD/TAKEN: CPT | Mod: CPTII,S$GLB,, | Performed by: FAMILY MEDICINE

## 2021-11-17 PROCEDURE — 3044F PR MOST RECENT HEMOGLOBIN A1C LEVEL <7.0%: ICD-10-PCS | Mod: CPTII,S$GLB,, | Performed by: FAMILY MEDICINE

## 2021-11-17 PROCEDURE — 99999 PR PBB SHADOW E&M-EST. PATIENT-LVL III: CPT | Mod: PBBFAC,,, | Performed by: FAMILY MEDICINE

## 2021-11-17 PROCEDURE — 99214 PR OFFICE/OUTPT VISIT, EST, LEVL IV, 30-39 MIN: ICD-10-PCS | Mod: 25,S$GLB,, | Performed by: FAMILY MEDICINE

## 2021-11-17 RX ORDER — BETAMETHASONE SODIUM PHOSPHATE AND BETAMETHASONE ACETATE 3; 3 MG/ML; MG/ML
6 INJECTION, SUSPENSION INTRA-ARTICULAR; INTRALESIONAL; INTRAMUSCULAR; SOFT TISSUE
Status: DISCONTINUED | OUTPATIENT
Start: 2021-11-17 | End: 2021-11-17 | Stop reason: HOSPADM

## 2021-11-17 RX ADMIN — BETAMETHASONE SODIUM PHOSPHATE AND BETAMETHASONE ACETATE 6 MG: 3; 3 INJECTION, SUSPENSION INTRA-ARTICULAR; INTRALESIONAL; INTRAMUSCULAR; SOFT TISSUE at 09:11

## 2021-11-23 LAB — NONINV COLON CA DNA+OCC BLD SCRN STL QL: NORMAL

## 2021-12-08 ENCOUNTER — TELEPHONE (OUTPATIENT)
Dept: ORTHOPEDICS | Facility: CLINIC | Age: 66
End: 2021-12-08
Payer: COMMERCIAL

## 2021-12-14 ENCOUNTER — OFFICE VISIT (OUTPATIENT)
Dept: PULMONOLOGY | Facility: CLINIC | Age: 66
End: 2021-12-14
Payer: COMMERCIAL

## 2021-12-14 ENCOUNTER — LAB VISIT (OUTPATIENT)
Dept: LAB | Facility: HOSPITAL | Age: 66
End: 2021-12-14
Attending: INTERNAL MEDICINE
Payer: COMMERCIAL

## 2021-12-14 VITALS
HEIGHT: 62 IN | HEART RATE: 113 BPM | DIASTOLIC BLOOD PRESSURE: 72 MMHG | SYSTOLIC BLOOD PRESSURE: 122 MMHG | RESPIRATION RATE: 20 BRPM | WEIGHT: 158.88 LBS | OXYGEN SATURATION: 97 % | BODY MASS INDEX: 29.24 KG/M2

## 2021-12-14 DIAGNOSIS — J44.89 ASTHMA WITH COPD: ICD-10-CM

## 2021-12-14 DIAGNOSIS — G47.33 OSA ON CPAP: ICD-10-CM

## 2021-12-14 DIAGNOSIS — R91.8 MULTIPLE LUNG NODULES ON CT: ICD-10-CM

## 2021-12-14 DIAGNOSIS — I70.0 AORTIC ATHEROSCLEROSIS: ICD-10-CM

## 2021-12-14 DIAGNOSIS — J44.89 ASTHMA WITH COPD: Primary | ICD-10-CM

## 2021-12-14 DIAGNOSIS — F17.210 SMOKES WITH GREATER THAN 30 PACK YEAR HISTORY: ICD-10-CM

## 2021-12-14 LAB — IGE SERPL-ACNC: 83 IU/ML (ref 0–100)

## 2021-12-14 PROCEDURE — 4010F ACE/ARB THERAPY RXD/TAKEN: CPT | Mod: CPTII,S$GLB,, | Performed by: INTERNAL MEDICINE

## 2021-12-14 PROCEDURE — 99215 OFFICE O/P EST HI 40 MIN: CPT | Mod: S$GLB,,, | Performed by: INTERNAL MEDICINE

## 2021-12-14 PROCEDURE — 99999 PR PBB SHADOW E&M-EST. PATIENT-LVL IV: CPT | Mod: PBBFAC,,, | Performed by: INTERNAL MEDICINE

## 2021-12-14 PROCEDURE — 4010F PR ACE/ARB THEARPY RXD/TAKEN: ICD-10-PCS | Mod: CPTII,S$GLB,, | Performed by: INTERNAL MEDICINE

## 2021-12-14 PROCEDURE — 99215 PR OFFICE/OUTPT VISIT, EST, LEVL V, 40-54 MIN: ICD-10-PCS | Mod: S$GLB,,, | Performed by: INTERNAL MEDICINE

## 2021-12-14 PROCEDURE — 99999 PR PBB SHADOW E&M-EST. PATIENT-LVL IV: ICD-10-PCS | Mod: PBBFAC,,, | Performed by: INTERNAL MEDICINE

## 2021-12-14 PROCEDURE — 36415 COLL VENOUS BLD VENIPUNCTURE: CPT | Performed by: INTERNAL MEDICINE

## 2021-12-14 PROCEDURE — 82785 ASSAY OF IGE: CPT | Performed by: INTERNAL MEDICINE

## 2021-12-14 RX ORDER — TIOTROPIUM BROMIDE 18 UG/1
18 CAPSULE ORAL; RESPIRATORY (INHALATION) DAILY
Qty: 30 CAPSULE | Refills: 11 | Status: SHIPPED | OUTPATIENT
Start: 2021-12-14 | End: 2023-01-17

## 2021-12-14 RX ORDER — ALBUTEROL SULFATE 1.25 MG/3ML
1.25 SOLUTION RESPIRATORY (INHALATION) EVERY 6 HOURS PRN
Qty: 1 EACH | Refills: 0 | Status: SHIPPED | OUTPATIENT
Start: 2021-12-14 | End: 2022-05-03

## 2021-12-14 RX ORDER — ALBUTEROL SULFATE 90 UG/1
1-2 AEROSOL, METERED RESPIRATORY (INHALATION) EVERY 6 HOURS PRN
Qty: 8 G | Refills: 11 | Status: SHIPPED | OUTPATIENT
Start: 2021-12-14 | End: 2022-05-03

## 2021-12-14 RX ORDER — FLUTICASONE PROPIONATE AND SALMETEROL 500; 50 UG/1; UG/1
1 POWDER RESPIRATORY (INHALATION) 2 TIMES DAILY
Qty: 60 EACH | Refills: 11 | Status: SHIPPED | OUTPATIENT
Start: 2021-12-14 | End: 2023-01-17

## 2022-01-03 ENCOUNTER — TELEPHONE (OUTPATIENT)
Dept: PULMONOLOGY | Facility: CLINIC | Age: 67
End: 2022-01-03
Payer: COMMERCIAL

## 2022-01-05 ENCOUNTER — OFFICE VISIT (OUTPATIENT)
Dept: SPORTS MEDICINE | Facility: CLINIC | Age: 67
End: 2022-01-05
Payer: COMMERCIAL

## 2022-01-05 VITALS — BODY MASS INDEX: 29.08 KG/M2 | WEIGHT: 158 LBS | HEIGHT: 62 IN

## 2022-01-05 DIAGNOSIS — M17.0 PRIMARY OSTEOARTHRITIS OF BOTH KNEES: Primary | ICD-10-CM

## 2022-01-05 PROCEDURE — 1159F MED LIST DOCD IN RCRD: CPT | Mod: CPTII,S$GLB,, | Performed by: FAMILY MEDICINE

## 2022-01-05 PROCEDURE — 3008F BODY MASS INDEX DOCD: CPT | Mod: CPTII,S$GLB,, | Performed by: FAMILY MEDICINE

## 2022-01-05 PROCEDURE — 3008F PR BODY MASS INDEX (BMI) DOCUMENTED: ICD-10-PCS | Mod: CPTII,S$GLB,, | Performed by: FAMILY MEDICINE

## 2022-01-05 PROCEDURE — 99499 NO LOS: ICD-10-PCS | Mod: S$GLB,,, | Performed by: FAMILY MEDICINE

## 2022-01-05 PROCEDURE — 20610 DRAIN/INJ JOINT/BURSA W/O US: CPT | Mod: 50,S$GLB,, | Performed by: FAMILY MEDICINE

## 2022-01-05 PROCEDURE — 99999 PR PBB SHADOW E&M-EST. PATIENT-LVL III: ICD-10-PCS | Mod: PBBFAC,,, | Performed by: FAMILY MEDICINE

## 2022-01-05 PROCEDURE — 1159F PR MEDICATION LIST DOCUMENTED IN MEDICAL RECORD: ICD-10-PCS | Mod: CPTII,S$GLB,, | Performed by: FAMILY MEDICINE

## 2022-01-05 PROCEDURE — 99999 PR PBB SHADOW E&M-EST. PATIENT-LVL III: CPT | Mod: PBBFAC,,, | Performed by: FAMILY MEDICINE

## 2022-01-05 PROCEDURE — 99499 UNLISTED E&M SERVICE: CPT | Mod: S$GLB,,, | Performed by: FAMILY MEDICINE

## 2022-01-05 PROCEDURE — 20610 LARGE JOINT ASPIRATION/INJECTION: BILATERAL KNEE: ICD-10-PCS | Mod: 50,S$GLB,, | Performed by: FAMILY MEDICINE

## 2022-01-05 NOTE — PROCEDURES
Large Joint Aspiration/Injection: bilateral knee    Date/Time: 1/5/2022 1:00 PM  Performed by: Luis Galeano MD  Authorized by: Luis Galeano MD     Consent Done?:  Yes (Verbal)  Indications:  Arthritis and pain  Site marked: the procedure site was marked    Timeout: prior to procedure the correct patient, procedure, and site was verified    Prep: patient was prepped and draped in usual sterile fashion    Approach:  Anterolateral  Location:  Knee  Laterality:  Bilateral  Site:  Bilateral knee  Medications (Right):  20 mg sodium hyaluronate (EUFLEXXA) 10 mg/mL(mw 2.4 -3.6 million)  Medications (Left):  20 mg sodium hyaluronate (EUFLEXXA) 10 mg/mL(mw 2.4 -3.6 million)  Patient tolerance:  Patient tolerated the procedure well with no immediate complications     One of 3 given today

## 2022-01-07 ENCOUNTER — CLINICAL SUPPORT (OUTPATIENT)
Dept: PULMONOLOGY | Facility: CLINIC | Age: 67
End: 2022-01-07
Payer: COMMERCIAL

## 2022-01-07 VITALS — BODY MASS INDEX: 30.91 KG/M2 | HEIGHT: 60 IN | WEIGHT: 157.44 LBS

## 2022-01-07 DIAGNOSIS — Z01.818 PRE-PROCEDURAL EXAMINATION: Primary | ICD-10-CM

## 2022-01-07 DIAGNOSIS — J44.89 ASTHMA WITH COPD: ICD-10-CM

## 2022-01-07 LAB
CTP QC/QA: YES
SARS-COV-2 AG RESP QL IA.RAPID: NEGATIVE

## 2022-01-07 PROCEDURE — 94729 DIFFUSING CAPACITY: CPT | Mod: S$GLB,,, | Performed by: INTERNAL MEDICINE

## 2022-01-07 PROCEDURE — 94729 PR C02/MEMBANE DIFFUSE CAPACITY: ICD-10-PCS | Mod: S$GLB,,, | Performed by: INTERNAL MEDICINE

## 2022-01-07 PROCEDURE — 94010 BREATHING CAPACITY TEST: ICD-10-PCS | Mod: S$GLB,,, | Performed by: INTERNAL MEDICINE

## 2022-01-07 PROCEDURE — 94726 PULM FUNCT TST PLETHYSMOGRAP: ICD-10-PCS | Mod: S$GLB,,, | Performed by: INTERNAL MEDICINE

## 2022-01-07 PROCEDURE — 87811 SARS CORONAVIRUS 2 ANTIGEN POCT, MANUAL READ: ICD-10-PCS | Mod: S$GLB,,, | Performed by: INTERNAL MEDICINE

## 2022-01-07 PROCEDURE — 94618 PULMONARY STRESS TESTING: ICD-10-PCS | Mod: S$GLB,,, | Performed by: INTERNAL MEDICINE

## 2022-01-07 PROCEDURE — 94010 BREATHING CAPACITY TEST: CPT | Mod: S$GLB,,, | Performed by: INTERNAL MEDICINE

## 2022-01-07 PROCEDURE — 94618 PULMONARY STRESS TESTING: CPT | Mod: S$GLB,,, | Performed by: INTERNAL MEDICINE

## 2022-01-07 PROCEDURE — 94726 PLETHYSMOGRAPHY LUNG VOLUMES: CPT | Mod: S$GLB,,, | Performed by: INTERNAL MEDICINE

## 2022-01-07 PROCEDURE — 87811 SARS-COV-2 COVID19 W/OPTIC: CPT | Mod: S$GLB,,, | Performed by: INTERNAL MEDICINE

## 2022-01-07 NOTE — PROCEDURES
[facility]rd[facility]  Six Minute Walk   SUMMARY   Ordering Provider: Adam Mclean MD   Interpreting Provider: Adam Mclean MD  Performing nurse/tech/RT: RT ANA  Diagnosis:  (Asthma with COPD)  Height: 5' (152.4 cm)  Weight: 71.4 kg (157 lb 6.5 oz)  BMI (Calculated): 30.7   Patient Race:     Phase Oxygen Assessment Supplemental O2 Heart   Rate Blood Pressure Cheryl Dyspnea Scale Rating   Resting 94 % Room Air 114 bpm 100/62 1   Exercise        Minute        1 93 % Room Air 131 bpm     2 91 % Room Air 135 bpm     3 94 % Room Air 130 bpm     4 93 % Room Air 132 bpm     5 94 % Room Air 132 bpm     6  94 % Room Air 120 bpm 127/77 4   Recovery        Minute        1 95 % Room Air 118 bpm     2 94 % Room Air 112 bpm     3 94 % Room Air 112 bpm     4 93 % Room Air 111 bpm 110/67 1     Six Minute Walk Summary  6MWT Status: completed with stops  Patient Reported: Dyspnea     Interpretation:  Did the patient stop or pause?: Yes  How many times did the patient stop or pause?: 2  Stop Time 1: 120  Restart Time 1: 165  Pause Time 1: 45 seconds  Stop Time 2: 280  Restart Time 2: 360  Pause Time 2: 80 seconds         Total Time Walked (Calculated): 235 seconds  Final Partial Lap Distance (feet): 0 feet  Total Distance Meters (Calculated): 121.92 meters  Predicted Distance Meters (Calculated): 455.14 meters  Percentage of Predicted (Calculated): 26.79  Peak VO2 (Calculated): 7.64  Mets: 2.18  Has The Patient Had a Previous Six Minute Walk Test?: Yes       Previous 6MWT Results  Has The Patient Had a Previous Six Minute Walk Test?: Yes  Date of Previous Test: 12/18/20  Total Time Walked: 224 seconds  Total Distance (meters): 220.98  Predicted Distance (meters): 440.08 meters  Percentage of Predicted: 50.21  Percent Change (Calculated): 0.45

## 2022-01-08 LAB
BRPFT: NORMAL
DLCO ADJ PRE: 12.67 ML/(MIN*MMHG)
DLCO SINGLE BREATH LLN: 13.85
DLCO SINGLE BREATH PRE REF: 64.7 %
DLCO SINGLE BREATH REF: 19.58
DLCOC SBVA LLN: 2.89
DLCOC SBVA PRE REF: 90.7 %
DLCOC SBVA REF: 4.62
DLCOC SINGLE BREATH LLN: 13.85
DLCOC SINGLE BREATH PRE REF: 64.7 %
DLCOC SINGLE BREATH REF: 19.58
DLCOVA LLN: 2.89
DLCOVA PRE REF: 90.7 %
DLCOVA PRE: 4.19 ML/(MIN*MMHG*L)
DLCOVA REF: 4.62
DLVAADJ PRE: 4.19 ML/(MIN*MMHG*L)
ERV LLN: -16449.31
ERV PRE REF: 94 %
ERV REF: 0.69
FEF 25 75 LLN: 0.93
FEF 25 75 PRE REF: 77.2 %
FEF 25 75 REF: 1.89
FEV1 FVC LLN: 67
FEV1 FVC PRE REF: 97.7 %
FEV1 FVC REF: 79
FEV1 LLN: 1.53
FEV1 PRE REF: 79.8 %
FEV1 REF: 2.05
FRCPLETH LLN: 1.65
FRCPLETH PREREF: 111.1 %
FRCPLETH REF: 2.47
FVC LLN: 1.94
FVC PRE REF: 81.3 %
FVC REF: 2.6
IVC PRE: 1.88 L
IVC SINGLE BREATH LLN: 1.94
IVC SINGLE BREATH PRE REF: 72.3 %
IVC SINGLE BREATH REF: 2.6
MVV LLN: 61
MVV PRE REF: 62.4 %
MVV REF: 72
PEF LLN: 3.94
PEF PRE REF: 85.7 %
PEF REF: 5.44
PRE DLCO: 12.67 ML/(MIN*MMHG)
PRE ERV: 0.65 L
PRE FEF 25 75: 1.46 L/S
PRE FET 100: 6.5 SEC
PRE FEV1 FVC: 77.58 %
PRE FEV1: 1.64 L
PRE FRC PL: 2.74 L
PRE FVC: 2.11 L
PRE MVV: 45 L/MIN
PRE PEF: 4.66 L/S
PRE RV: 1.93 L
PRE TLC: 4.04 L
RAW LLN: 3.06
RAW PRE REF: 204.3 %
RAW PRE: 6.25 CMH2O*S/L
RAW REF: 3.06
RV LLN: 1.2
RV PRE REF: 108.6 %
RV REF: 1.78
RVTLC LLN: 31
RVTLC PRE REF: 117.1 %
RVTLC PRE: 47.7 %
RVTLC REF: 41
TLC LLN: 3.25
TLC PRE REF: 95.3 %
TLC REF: 4.24
VA PRE: 3.03 L
VA SINGLE BREATH LLN: 4.09
VA SINGLE BREATH PRE REF: 74 %
VA SINGLE BREATH REF: 4.09
VC LLN: 1.94
VC PRE REF: 81.3 %
VC PRE: 2.11 L
VC REF: 2.6
VTGRAWPRE: 2.67 L

## 2022-01-12 ENCOUNTER — OFFICE VISIT (OUTPATIENT)
Dept: SPORTS MEDICINE | Facility: CLINIC | Age: 67
End: 2022-01-12
Payer: COMMERCIAL

## 2022-01-12 VITALS — HEIGHT: 60 IN | BODY MASS INDEX: 30.82 KG/M2 | WEIGHT: 157 LBS

## 2022-01-12 DIAGNOSIS — M17.0 PRIMARY OSTEOARTHRITIS OF BOTH KNEES: Primary | ICD-10-CM

## 2022-01-12 PROCEDURE — 1159F PR MEDICATION LIST DOCUMENTED IN MEDICAL RECORD: ICD-10-PCS | Mod: CPTII,S$GLB,, | Performed by: FAMILY MEDICINE

## 2022-01-12 PROCEDURE — 1159F MED LIST DOCD IN RCRD: CPT | Mod: CPTII,S$GLB,, | Performed by: FAMILY MEDICINE

## 2022-01-12 PROCEDURE — 99999 PR PBB SHADOW E&M-EST. PATIENT-LVL III: ICD-10-PCS | Mod: PBBFAC,,, | Performed by: FAMILY MEDICINE

## 2022-01-12 PROCEDURE — 20610 DRAIN/INJ JOINT/BURSA W/O US: CPT | Mod: 50,S$GLB,, | Performed by: FAMILY MEDICINE

## 2022-01-12 PROCEDURE — 99499 UNLISTED E&M SERVICE: CPT | Mod: S$GLB,,, | Performed by: FAMILY MEDICINE

## 2022-01-12 PROCEDURE — 99499 NO LOS: ICD-10-PCS | Mod: S$GLB,,, | Performed by: FAMILY MEDICINE

## 2022-01-12 PROCEDURE — 3008F PR BODY MASS INDEX (BMI) DOCUMENTED: ICD-10-PCS | Mod: CPTII,S$GLB,, | Performed by: FAMILY MEDICINE

## 2022-01-12 PROCEDURE — 99999 PR PBB SHADOW E&M-EST. PATIENT-LVL III: CPT | Mod: PBBFAC,,, | Performed by: FAMILY MEDICINE

## 2022-01-12 PROCEDURE — 20610 LARGE JOINT ASPIRATION/INJECTION: BILATERAL KNEE: ICD-10-PCS | Mod: 50,S$GLB,, | Performed by: FAMILY MEDICINE

## 2022-01-12 PROCEDURE — 3008F BODY MASS INDEX DOCD: CPT | Mod: CPTII,S$GLB,, | Performed by: FAMILY MEDICINE

## 2022-01-12 NOTE — PROCEDURES
Large Joint Aspiration/Injection: bilateral knee    Date/Time: 1/12/2022 3:30 PM  Performed by: Luis Galeano MD  Authorized by: Luis Galeano MD     Consent Done?:  Yes (Verbal)  Indications:  Arthritis and pain  Site marked: the procedure site was marked    Timeout: prior to procedure the correct patient, procedure, and site was verified    Prep: patient was prepped and draped in usual sterile fashion    Approach:  Anterolateral  Location:  Knee  Laterality:  Bilateral  Site:  Bilateral knee  Medications (Right):  20 mg sodium hyaluronate (EUFLEXXA) 10 mg/mL(mw 2.4 -3.6 million)  Medications (Left):  20 mg sodium hyaluronate (EUFLEXXA) 10 mg/mL(mw 2.4 -3.6 million)  Patient tolerance:  Patient tolerated the procedure well with no immediate complications     Injection 2 or 3 given bilaterally today with mild shortness of the joints.

## 2022-01-19 ENCOUNTER — OFFICE VISIT (OUTPATIENT)
Dept: SPORTS MEDICINE | Facility: CLINIC | Age: 67
End: 2022-01-19
Payer: COMMERCIAL

## 2022-01-19 DIAGNOSIS — M25.521 RIGHT ELBOW PAIN: Primary | ICD-10-CM

## 2022-01-19 DIAGNOSIS — M17.0 PRIMARY OSTEOARTHRITIS OF BOTH KNEES: Primary | ICD-10-CM

## 2022-01-19 PROCEDURE — 20610 LARGE JOINT ASPIRATION/INJECTION: BILATERAL KNEE: ICD-10-PCS | Mod: 50,S$GLB,, | Performed by: FAMILY MEDICINE

## 2022-01-19 PROCEDURE — 20610 DRAIN/INJ JOINT/BURSA W/O US: CPT | Mod: 50,S$GLB,, | Performed by: FAMILY MEDICINE

## 2022-01-19 PROCEDURE — 99999 PR PBB SHADOW E&M-EST. PATIENT-LVL III: CPT | Mod: PBBFAC,,, | Performed by: FAMILY MEDICINE

## 2022-01-19 PROCEDURE — 1159F PR MEDICATION LIST DOCUMENTED IN MEDICAL RECORD: ICD-10-PCS | Mod: CPTII,S$GLB,, | Performed by: FAMILY MEDICINE

## 2022-01-19 PROCEDURE — 99499 UNLISTED E&M SERVICE: CPT | Mod: S$GLB,,, | Performed by: FAMILY MEDICINE

## 2022-01-19 PROCEDURE — 1159F MED LIST DOCD IN RCRD: CPT | Mod: CPTII,S$GLB,, | Performed by: FAMILY MEDICINE

## 2022-01-19 PROCEDURE — 99999 PR PBB SHADOW E&M-EST. PATIENT-LVL III: ICD-10-PCS | Mod: PBBFAC,,, | Performed by: FAMILY MEDICINE

## 2022-01-19 PROCEDURE — 99499 NO LOS: ICD-10-PCS | Mod: S$GLB,,, | Performed by: FAMILY MEDICINE

## 2022-01-19 NOTE — PROCEDURES
Large Joint Aspiration/Injection: bilateral knee    Date/Time: 1/19/2022 1:00 PM  Performed by: Luis Galeano MD  Authorized by: Luis Galeano MD     Consent Done?:  Yes (Verbal)  Indications:  Arthritis and pain  Site marked: the procedure site was marked    Timeout: prior to procedure the correct patient, procedure, and site was verified    Prep: patient was prepped and draped in usual sterile fashion    Approach:  Anterolateral  Location:  Knee  Laterality:  Bilateral  Site:  Bilateral knee  Medications (Right):  20 mg sodium hyaluronate (EUFLEXXA) 10 mg/mL(mw 2.4 -3.6 million)  Medications (Left):  20 mg sodium hyaluronate (EUFLEXXA) 10 mg/mL(mw 2.4 -3.6 million)  Patient tolerance:  Patient tolerated the procedure well with no immediate complications

## 2022-01-19 NOTE — PATIENT INSTRUCTIONS
Ice to 3 times a day and tomorrow for 15 minutes after Euflexxa injections today bilateral    Recheck here in 6 weeks

## 2022-02-03 ENCOUNTER — OFFICE VISIT (OUTPATIENT)
Dept: INTERNAL MEDICINE | Facility: CLINIC | Age: 67
End: 2022-02-03
Payer: COMMERCIAL

## 2022-02-03 ENCOUNTER — LAB VISIT (OUTPATIENT)
Dept: LAB | Facility: HOSPITAL | Age: 67
End: 2022-02-03
Attending: FAMILY MEDICINE
Payer: COMMERCIAL

## 2022-02-03 VITALS
SYSTOLIC BLOOD PRESSURE: 132 MMHG | OXYGEN SATURATION: 94 % | HEART RATE: 104 BPM | DIASTOLIC BLOOD PRESSURE: 72 MMHG | HEIGHT: 60 IN | TEMPERATURE: 97 F | WEIGHT: 153 LBS | BODY MASS INDEX: 30.04 KG/M2

## 2022-02-03 DIAGNOSIS — R63.4 WEIGHT LOSS: ICD-10-CM

## 2022-02-03 DIAGNOSIS — M25.561 CHRONIC PAIN OF BOTH KNEES: ICD-10-CM

## 2022-02-03 DIAGNOSIS — R79.89 INCREASED PTH LEVEL: ICD-10-CM

## 2022-02-03 DIAGNOSIS — J44.89 ASTHMA WITH COPD: Primary | ICD-10-CM

## 2022-02-03 DIAGNOSIS — G89.29 CHRONIC PAIN OF BOTH KNEES: ICD-10-CM

## 2022-02-03 DIAGNOSIS — F33.42 RECURRENT MAJOR DEPRESSIVE DISORDER, IN FULL REMISSION: ICD-10-CM

## 2022-02-03 DIAGNOSIS — F31.76 BIPOLAR DISORDER, IN FULL REMISSION, MOST RECENT EPISODE DEPRESSED: Chronic | ICD-10-CM

## 2022-02-03 DIAGNOSIS — Z12.11 SCREENING FOR COLON CANCER: ICD-10-CM

## 2022-02-03 DIAGNOSIS — M25.562 CHRONIC PAIN OF BOTH KNEES: ICD-10-CM

## 2022-02-03 DIAGNOSIS — I10 ESSENTIAL HYPERTENSION: ICD-10-CM

## 2022-02-03 DIAGNOSIS — I70.0 AORTIC ATHEROSCLEROSIS: ICD-10-CM

## 2022-02-03 DIAGNOSIS — E78.00 PURE HYPERCHOLESTEROLEMIA: ICD-10-CM

## 2022-02-03 LAB
BASOPHILS # BLD AUTO: 0.06 K/UL (ref 0–0.2)
BASOPHILS NFR BLD: 0.8 % (ref 0–1.9)
DIFFERENTIAL METHOD: ABNORMAL
EOSINOPHIL # BLD AUTO: 0.1 K/UL (ref 0–0.5)
EOSINOPHIL NFR BLD: 1.5 % (ref 0–8)
ERYTHROCYTE [DISTWIDTH] IN BLOOD BY AUTOMATED COUNT: 14.2 % (ref 11.5–14.5)
HCT VFR BLD AUTO: 39.4 % (ref 37–48.5)
HGB BLD-MCNC: 12.4 G/DL (ref 12–16)
IMM GRANULOCYTES # BLD AUTO: 0.02 K/UL (ref 0–0.04)
IMM GRANULOCYTES NFR BLD AUTO: 0.3 % (ref 0–0.5)
LYMPHOCYTES # BLD AUTO: 1.2 K/UL (ref 1–4.8)
LYMPHOCYTES NFR BLD: 15.3 % (ref 18–48)
MCH RBC QN AUTO: 27.3 PG (ref 27–31)
MCHC RBC AUTO-ENTMCNC: 31.5 G/DL (ref 32–36)
MCV RBC AUTO: 87 FL (ref 82–98)
MONOCYTES # BLD AUTO: 0.7 K/UL (ref 0.3–1)
MONOCYTES NFR BLD: 8.9 % (ref 4–15)
NEUTROPHILS # BLD AUTO: 5.6 K/UL (ref 1.8–7.7)
NEUTROPHILS NFR BLD: 73.2 % (ref 38–73)
NRBC BLD-RTO: 0 /100 WBC
PLATELET # BLD AUTO: 528 K/UL (ref 150–450)
PMV BLD AUTO: 9.3 FL (ref 9.2–12.9)
RBC # BLD AUTO: 4.55 M/UL (ref 4–5.4)
WBC # BLD AUTO: 7.57 K/UL (ref 3.9–12.7)

## 2022-02-03 PROCEDURE — 3008F BODY MASS INDEX DOCD: CPT | Mod: CPTII,S$GLB,, | Performed by: FAMILY MEDICINE

## 2022-02-03 PROCEDURE — 83970 ASSAY OF PARATHORMONE: CPT | Performed by: FAMILY MEDICINE

## 2022-02-03 PROCEDURE — 3008F PR BODY MASS INDEX (BMI) DOCUMENTED: ICD-10-PCS | Mod: CPTII,S$GLB,, | Performed by: FAMILY MEDICINE

## 2022-02-03 PROCEDURE — 36415 COLL VENOUS BLD VENIPUNCTURE: CPT | Performed by: FAMILY MEDICINE

## 2022-02-03 PROCEDURE — 1160F RVW MEDS BY RX/DR IN RCRD: CPT | Mod: CPTII,S$GLB,, | Performed by: FAMILY MEDICINE

## 2022-02-03 PROCEDURE — 1159F MED LIST DOCD IN RCRD: CPT | Mod: CPTII,S$GLB,, | Performed by: FAMILY MEDICINE

## 2022-02-03 PROCEDURE — 84443 ASSAY THYROID STIM HORMONE: CPT | Performed by: FAMILY MEDICINE

## 2022-02-03 PROCEDURE — 3078F PR MOST RECENT DIASTOLIC BLOOD PRESSURE < 80 MM HG: ICD-10-PCS | Mod: CPTII,S$GLB,, | Performed by: FAMILY MEDICINE

## 2022-02-03 PROCEDURE — 99215 PR OFFICE/OUTPT VISIT, EST, LEVL V, 40-54 MIN: ICD-10-PCS | Mod: S$GLB,,, | Performed by: FAMILY MEDICINE

## 2022-02-03 PROCEDURE — 3075F SYST BP GE 130 - 139MM HG: CPT | Mod: CPTII,S$GLB,, | Performed by: FAMILY MEDICINE

## 2022-02-03 PROCEDURE — 99999 PR PBB SHADOW E&M-EST. PATIENT-LVL IV: ICD-10-PCS | Mod: PBBFAC,,, | Performed by: FAMILY MEDICINE

## 2022-02-03 PROCEDURE — 83036 HEMOGLOBIN GLYCOSYLATED A1C: CPT | Performed by: FAMILY MEDICINE

## 2022-02-03 PROCEDURE — 99215 OFFICE O/P EST HI 40 MIN: CPT | Mod: S$GLB,,, | Performed by: FAMILY MEDICINE

## 2022-02-03 PROCEDURE — 3075F PR MOST RECENT SYSTOLIC BLOOD PRESS GE 130-139MM HG: ICD-10-PCS | Mod: CPTII,S$GLB,, | Performed by: FAMILY MEDICINE

## 2022-02-03 PROCEDURE — 85025 COMPLETE CBC W/AUTO DIFF WBC: CPT | Performed by: FAMILY MEDICINE

## 2022-02-03 PROCEDURE — 1159F PR MEDICATION LIST DOCUMENTED IN MEDICAL RECORD: ICD-10-PCS | Mod: CPTII,S$GLB,, | Performed by: FAMILY MEDICINE

## 2022-02-03 PROCEDURE — 1160F PR REVIEW ALL MEDS BY PRESCRIBER/CLIN PHARMACIST DOCUMENTED: ICD-10-PCS | Mod: CPTII,S$GLB,, | Performed by: FAMILY MEDICINE

## 2022-02-03 PROCEDURE — 99999 PR PBB SHADOW E&M-EST. PATIENT-LVL IV: CPT | Mod: PBBFAC,,, | Performed by: FAMILY MEDICINE

## 2022-02-03 PROCEDURE — 3078F DIAST BP <80 MM HG: CPT | Mod: CPTII,S$GLB,, | Performed by: FAMILY MEDICINE

## 2022-02-03 RX ORDER — LISINOPRIL 10 MG/1
10 TABLET ORAL DAILY
Qty: 90 TABLET | Refills: 3 | Status: CANCELLED | OUTPATIENT
Start: 2022-02-03

## 2022-02-03 RX ORDER — CELECOXIB 200 MG/1
200 CAPSULE ORAL DAILY
Qty: 90 CAPSULE | Refills: 0 | Status: SHIPPED | OUTPATIENT
Start: 2022-02-03 | End: 2023-02-27 | Stop reason: SDUPTHER

## 2022-02-03 NOTE — PROGRESS NOTES
Subjective:   Patient ID: Merle Caro is a 66 y.o. female.  Chief Complaint:  3 month follow up    Presents for follow-up on chronic medical conditions  Last visit November 2021    Chronic medical history for:   - Hypertension.  Well controlled lisinopril 10 mg daily.  Reports compliance.  Denies side effects. Stable shortness of breath but primarily related to her asthma/COPD.  Also has stable mild chronic bilateral lower extremity edema.  - Hyperlipidemia.  Last lipid panel LDL 70.  At goal for treatment.  Simvastatin 40 mg daily.  Reports compliance.  Denies side effects.  No chest pain or claudication.  Needs repeat lipid panel   - GERD.  On Protonix 40 mg daily.  Symptoms controlled.  - Asthma with COPD.  Stable.  Last visit with increased cough due to noncompliance with medications.  Now reports compliant with Advair 500/50 inhalation twice a day, Spiriva inhalation daily, and resulted infrequent need for albuterol rescue inhaler use.  Also currently is not smoking.  - Pulmonary nodules.  Smoker.  Annual CT done since last visit and stable with no new changes.  His also been reviewed by pulmonology did not recommend any additional evaluation.  - Mental health.  Followed by psychiatry.  Medication list appears up-to-date.  Clinically she is stable and at her baseline today.  - Chronic hyponatremia.  Probably related to psychiatric medications.  Stable and 130 range last BMP.  - Elevated PTH.  Questionable significance.  Saw renal and no specific treatment recommended.  Repeat testing done last visit showed some persistent elevations.  Still needs to follow-up with Nephrology.     Health maintenance needs include:  Colon cancer screening  Shingles vaccine  COVID booster    Main concern today is weight loss  Flow sheet shows 10 lb weight loss over last 3 months  Patient denies any significant change in overall daily caloric intake to explain weight loss  Also denies any significant increase in her overall  physical activity level to explain weight loss  May 2021 CBC, TSH, A1c, all normal      Current Outpatient Medications:     albuterol (ACCUNEB) 1.25 mg/3 mL Nebu, Take 3 mLs (1.25 mg total) by nebulization every 6 (six) hours as needed (Wheezing). Rescue, Disp: 1 each, Rfl: 0    albuterol (PROVENTIL/VENTOLIN HFA) 90 mcg/actuation inhaler, Inhale 1-2 puffs into the lungs every 6 (six) hours as needed for Wheezing. Rescue, Disp: 8 g, Rfl: 11    cholecalciferol, vitamin D3, (VITAMIN D3) 2,000 unit Cap, Take 1 capsule by mouth once daily., Disp: , Rfl:     clonazepam (KLONOPIN) 0.5 MG tablet, Take 0.5 mg by mouth once daily. , Disp: , Rfl:     diclofenac sodium (VOLTAREN ARTHRITIS PAIN) 1 % Gel, Apply 2 g topically 4 (four) times daily., Disp: 100 g, Rfl: 11    fluticasone-salmeterol diskus inhaler 500-50 mcg, Inhale 1 puff into the lungs 2 (two) times daily. Controller, Disp: 60 each, Rfl: 11    haloperidol (HALDOL) 5 MG tablet, Take 5 mg by mouth once daily., Disp: , Rfl:     lisinopriL 10 MG tablet, TAKE 1 TABLET(10 MG) BY MOUTH EVERY DAY, Disp: 90 tablet, Rfl: 3    OXYGEN-AIR DELIVERY SYSTEMS MISC, Inhale 2 L into the lungs as needed (O2 via Nasal Canula.)., Disp: , Rfl:     pantoprazole (PROTONIX) 40 MG tablet, Take 1 tablet (40 mg total) by mouth once daily., Disp: 90 tablet, Rfl: 3    paroxetine (PAXIL) 40 MG tablet, TK 1 T PO QHS, Disp: , Rfl: 2    simvastatin (ZOCOR) 40 MG tablet, Take 1 tablet (40 mg total) by mouth every evening., Disp: 30 tablet, Rfl: 11    tiotropium (SPIRIVA WITH HANDIHALER) 18 mcg inhalation capsule, Inhale 1 capsule (18 mcg total) into the lungs once daily. Controller, Disp: 30 capsule, Rfl: 11    celecoxib (CELEBREX) 200 MG capsule, Take 1 capsule (200 mg total) by mouth once daily., Disp: 90 capsule, Rfl: 0    Review of Systems   Constitutional: Positive for unexpected weight change. Negative for activity change, appetite change, chills, fatigue and fever.   HENT:  Negative for congestion, dental problem, ear discharge, ear pain, postnasal drip, rhinorrhea, sinus pressure, sneezing, sore throat and trouble swallowing.    Eyes: Negative for visual disturbance.   Respiratory: Positive for shortness of breath. Negative for apnea, cough, choking, chest tightness, wheezing and stridor.    Cardiovascular: Positive for leg swelling. Negative for chest pain and palpitations.   Gastrointestinal: Negative for abdominal distention, abdominal pain, constipation, diarrhea, nausea and vomiting.   Genitourinary: Negative for difficulty urinating and pelvic pain.   Musculoskeletal: Negative for back pain, myalgias and neck pain.   Skin: Negative for rash.   Neurological: Negative for dizziness, tremors, syncope, weakness, light-headedness, numbness and headaches.   Psychiatric/Behavioral: Negative for agitation, behavioral problems, confusion, decreased concentration, dysphoric mood, hallucinations, self-injury, sleep disturbance and suicidal ideas. The patient is not nervous/anxious and is not hyperactive.      Objective:   /72 (BP Location: Left arm, Patient Position: Sitting, BP Method: Large (Manual))   Pulse 104   Temp 97.3 °F (36.3 °C) (Temporal)   Ht 5' (1.524 m)   Wt 69.4 kg (153 lb)   SpO2 (!) 94%   BMI 29.88 kg/m²     Physical Exam  Vitals and nursing note reviewed.   Constitutional:       Appearance: Normal appearance. She is well-developed and overweight.   Eyes:      General: No scleral icterus.     Conjunctiva/sclera: Conjunctivae normal.   Neck:      Thyroid: No thyroid mass, thyromegaly or thyroid tenderness.      Vascular: Normal carotid pulses. No carotid bruit or JVD.   Cardiovascular:      Rate and Rhythm: Regular rhythm. Tachycardia present.      Pulses:           Radial pulses are 2+ on the right side and 2+ on the left side.      Heart sounds: Normal heart sounds. No murmur heard.  No friction rub. No gallop.    Pulmonary:      Effort: Pulmonary effort is  normal. No tachypnea, accessory muscle usage or respiratory distress.      Breath sounds: Rhonchi present. No wheezing or rales.   Abdominal:      General: There is no distension.      Palpations: Abdomen is soft. There is no hepatomegaly.      Tenderness: There is no abdominal tenderness. There is no guarding or rebound.   Musculoskeletal:      Right lower leg: No edema.      Left lower leg: No edema.   Skin:     General: Skin is warm and dry.      Capillary Refill: Capillary refill takes less than 2 seconds.      Findings: No rash.   Neurological:      Mental Status: She is alert.      Coordination: Coordination is intact. Coordination normal.      Gait: Gait is intact. Gait normal.   Psychiatric:         Attention and Perception: Attention normal.         Mood and Affect: Mood and affect normal.         Speech: Speech normal.         Behavior: Behavior normal.         Thought Content: Thought content normal.         Cognition and Memory: Cognition normal.         Judgment: Judgment normal.       Assessment:       ICD-10-CM ICD-9-CM   1. Asthma with COPD  J44.9 493.20   2. Weight loss  R63.4 783.21   3. Essential hypertension  I10 401.9   4. Pure hypercholesterolemia  E78.00 272.0   5. Aortic atherosclerosis  I70.0 440.0   6. Increased PTH level  E34.9 259.9   7. Bipolar disorder, in full remission, most recent episode depressed  F31.76 296.56   8. Recurrent major depressive disorder, in full remission  F33.42 296.36   9. Chronic pain of both knees  M25.561 719.46    M25.562 338.29    G89.29    10. Screening for colon cancer  Z12.11 V76.51     Plan:   Asthma with COPD  Improved with medication compliance and smoking cessation  Continue Advair 500/50 inhalation twice a day  Continue Spiriva inhalation daily  Continue albuterol as needed    Weight loss  -     Hemoglobin A1C; Future; Expected date: 02/03/2022  -     TSH; Future; Expected date: 02/03/2022  -     CBC Auto Differential; Future; Expected date:  02/03/2022  Documented 10 lb weight loss three-month.  Patient denies any change in diet or lifestyle  No increased GI symptoms the explain weight loss  Check labs to rule out any hyperthyroidism or diabetes    Essential hypertension  Controlled.  Stable.  Asymptomatic.  BP at goal.  Continue lisinopril 10 mg daily    Pure hypercholesterolemia  Aortic atherosclerosis  Controlled.  Stable.  Asymptomatic.  LDL at goal.  Continue simvastatin 40 mg daily    Increased PTH level  -     PTH, intact; Future; Expected date: 02/03/2022    Bipolar disorder, in full remission, most recent episode depressed  Recurrent major depressive disorder, in full remission  Continue medications per Psychiatry  Follow-up Psychiatry as scheduled    Chronic pain of both knees  -     celecoxib (CELEBREX) 200 MG capsule; Take 1 capsule (200 mg total) by mouth once daily.  Dispense: 90 capsule; Refill: 0  Continue supper 10 mg daily  Follow-up orthopedist as scheduled    Screening for colon cancer  -     Cologuard Screening (Multitarget Stool DNA); Future; Expected date: 02/03/2022    Return to clinic 3 months or sooner if needed     40+ minutes of total time spent on the encounter, which includes face to face time and non-face to face time preparing to see the patient (eg, review of tests), Obtaining and/or reviewing separately obtained history, documenting clinical information in the electronic or other health record, independently interpreting results (not separately reported) and communicating results to the patient/family/caregiver, or Care coordination (not separately reported).

## 2022-02-04 LAB
ESTIMATED AVG GLUCOSE: 103 MG/DL (ref 68–131)
HBA1C MFR BLD: 5.2 % (ref 4–5.6)
PTH-INTACT SERPL-MCNC: 62.7 PG/ML (ref 9–77)
TSH SERPL DL<=0.005 MIU/L-ACNC: 1.07 UIU/ML (ref 0.4–4)

## 2022-03-02 ENCOUNTER — OFFICE VISIT (OUTPATIENT)
Dept: SPORTS MEDICINE | Facility: CLINIC | Age: 67
End: 2022-03-02
Payer: COMMERCIAL

## 2022-03-02 VITALS — WEIGHT: 153 LBS | BODY MASS INDEX: 30.04 KG/M2 | HEIGHT: 60 IN

## 2022-03-02 DIAGNOSIS — M17.0 BILATERAL PRIMARY OSTEOARTHRITIS OF KNEE: Primary | ICD-10-CM

## 2022-03-02 DIAGNOSIS — M17.0 PRIMARY OSTEOARTHRITIS OF BOTH KNEES: Primary | ICD-10-CM

## 2022-03-02 PROCEDURE — 3008F PR BODY MASS INDEX (BMI) DOCUMENTED: ICD-10-PCS | Mod: CPTII,S$GLB,, | Performed by: FAMILY MEDICINE

## 2022-03-02 PROCEDURE — 99214 PR OFFICE/OUTPT VISIT, EST, LEVL IV, 30-39 MIN: ICD-10-PCS | Mod: S$GLB,,, | Performed by: FAMILY MEDICINE

## 2022-03-02 PROCEDURE — 99999 PR PBB SHADOW E&M-EST. PATIENT-LVL III: ICD-10-PCS | Mod: PBBFAC,,, | Performed by: FAMILY MEDICINE

## 2022-03-02 PROCEDURE — 4010F ACE/ARB THERAPY RXD/TAKEN: CPT | Mod: CPTII,S$GLB,, | Performed by: FAMILY MEDICINE

## 2022-03-02 PROCEDURE — 1159F PR MEDICATION LIST DOCUMENTED IN MEDICAL RECORD: ICD-10-PCS | Mod: CPTII,S$GLB,, | Performed by: FAMILY MEDICINE

## 2022-03-02 PROCEDURE — 3008F BODY MASS INDEX DOCD: CPT | Mod: CPTII,S$GLB,, | Performed by: FAMILY MEDICINE

## 2022-03-02 PROCEDURE — 99999 PR PBB SHADOW E&M-EST. PATIENT-LVL III: CPT | Mod: PBBFAC,,, | Performed by: FAMILY MEDICINE

## 2022-03-02 PROCEDURE — 99214 OFFICE O/P EST MOD 30 MIN: CPT | Mod: S$GLB,,, | Performed by: FAMILY MEDICINE

## 2022-03-02 PROCEDURE — 1159F MED LIST DOCD IN RCRD: CPT | Mod: CPTII,S$GLB,, | Performed by: FAMILY MEDICINE

## 2022-03-02 PROCEDURE — 3044F PR MOST RECENT HEMOGLOBIN A1C LEVEL <7.0%: ICD-10-PCS | Mod: CPTII,S$GLB,, | Performed by: FAMILY MEDICINE

## 2022-03-02 PROCEDURE — 4010F PR ACE/ARB THEARPY RXD/TAKEN: ICD-10-PCS | Mod: CPTII,S$GLB,, | Performed by: FAMILY MEDICINE

## 2022-03-02 PROCEDURE — 3044F HG A1C LEVEL LT 7.0%: CPT | Mod: CPTII,S$GLB,, | Performed by: FAMILY MEDICINE
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
## 2022-03-02 NOTE — PATIENT INSTRUCTIONS
Order Visco for both knees probably in about 4 months  Start PT at [place] soon-fall precautions

## 2022-03-07 ENCOUNTER — CLINICAL SUPPORT (OUTPATIENT)
Dept: REHABILITATION | Facility: HOSPITAL | Age: 67
End: 2022-03-07
Payer: COMMERCIAL

## 2022-03-07 DIAGNOSIS — R29.898 WEAKNESS OF BOTH HIPS: ICD-10-CM

## 2022-03-07 DIAGNOSIS — G89.29 CHRONIC PAIN OF BOTH KNEES: Primary | ICD-10-CM

## 2022-03-07 DIAGNOSIS — M25.562 CHRONIC PAIN OF BOTH KNEES: Primary | ICD-10-CM

## 2022-03-07 DIAGNOSIS — M25.561 CHRONIC PAIN OF BOTH KNEES: Primary | ICD-10-CM

## 2022-03-07 DIAGNOSIS — M17.0 BILATERAL PRIMARY OSTEOARTHRITIS OF KNEE: ICD-10-CM

## 2022-03-07 DIAGNOSIS — R26.9 GAIT DIFFICULTY: ICD-10-CM

## 2022-03-07 PROCEDURE — 97163 PT EVAL HIGH COMPLEX 45 MIN: CPT

## 2022-03-07 PROCEDURE — 97110 THERAPEUTIC EXERCISES: CPT

## 2022-03-07 NOTE — PLAN OF CARE
[facility]  Physical Therapy Initial Evaluation    Name: Merle BUSH [facility] Number: [id number]    Therapy Diagnosis:   Encounter Diagnoses   Name Primary?    Bilateral primary osteoarthritis of knee     Chronic pain of both knees Yes    Gait difficulty     Weakness of both hips      Physician: Luis Galeano MD    Physician Orders: PT Eval and Treat   Medical Diagnosis from Referral: M17.0 (ICD-10-CM) - Bilateral primary osteoarthritis of knee  Evaluation Date: 3/7/2022  Authorization Period Expiration: 3/2/23  Plan of Care Expiration: 5/16/22  Visit # / Visits authorized: 1/ 1  Foto: 1/3    Time In: 11:15 am  Time Out: 12:00 pm  Total Billable Time: 45 minutes    Precautions: standard, O2 at 2 L, HTN, hx bipolar, slow processing    Subjective   Date of onset: pt seems unsure but states she has had B knee pain for along time and attended PT in the past.  History of current condition - Merle reports: she has had PT in the past that seemed to help but when she went for her check up with the Dr he found she was having trouble walking and would benefit from PT again.     Pain:  Current 2/10, worst 9/10, best 2/10   Location: B knees   Description: throbbing and occasions of sharp pain  Aggravating Factors: walking, getting out of a chair, getting in the bed our in and out of the car, standing prolonged  Easing Factors: sitting or lay down    Prior Therapy: yes  Social History: lives home with her spouse  Occupation: was a   Prior Level of Function: pt states she has been using a RW an O2 for some time  Current Level of Function: walking home distances at best with a RW    Imaging, on chart showing some changes but no fractures noted    Medical History:   Past Medical History:   Diagnosis Date    Arthritis     Asthma     COPD (chronic obstructive pulmonary disease)     Depression     GERD (gastroesophageal reflux disease)     Hypertension     Tobacco abuse         Surgical History:   Merle Caro  has a past surgical history that includes Hysterectomy; Knee surgery; Tonsillectomy; multiple fracture repairs; and Colonoscopy w/ polypectomy (01/05/2011).    Medications:   Merle has a current medication list which includes the following prescription(s): albuterol, albuterol, celecoxib, cholecalciferol (vitamin d3), clonazepam, diclofenac sodium, fluticasone-salmeterol 500-50 mcg/dose, haloperidol, lisinopril, oxygen-air delivery systems, pantoprazole, paroxetine, simvastatin, and spiriva with handihaler.    Allergies:   Review of patient's allergies indicates:   Allergen Reactions    Codeine Shortness Of Breath    Keflex [cephalexin] Shortness Of Breath    Shellfish containing products Nausea And Vomiting    Flu vaccine ts 2011-12(18 yr+) Hives    Sucralfate Other (See Comments) and Hives        Pts goals: wants to walk better and have stronger UE strength    Objective       CMS Impairment/Limitation/Restriction for FOTO knees Survey    Therapist reviewed FOTO scores for Merle Caro on 3/7/2022.   FOTO documents entered into [employer] - see Media section.    Limitation Score: 96%       Gait: PT noted pt ambulating with a flexed posture at the trunk with both knees flexed. Pt uses a short step length and loads her weight through the back and arms into her RW. Pt fatigued after simply walking 110 ft. Gait was improved by the end of the session as she had more upright posture when cues were given by PT and stride length was less shuffled.    Balance: R LE single leg stand= unable, L LE single leg stand = unable; sit to stand only tolerated with B UE support   30 second sit to stand test: 2 reps (well below norms for her age. Pt is O2 dependent)     Sensation: Sensation intact to light touch.    Girth measurements: both knees have popliteal edema    Knee AROM:     (R) (L)     Flexion   110 115     Extension  30 degrees lagging from straight 10     Extension (sitting) 20  degrees lagging  5  Pt tends to lay with her hips Externally rotated limiting knee extension passively when at rest. PT used a towel to help use passive ROM to slowly stretch her towards knee extension but she often moved away from neutral and returned to external rotation.     Hip AROM:  Flexion   90 90     ER   40 40     IR   20 20     ABD   30 30      Ankle AROM:  Ankle DF(tosin) WFL seated but tight in standing due to gastroc tension from poor knee extension B for what appears to be some time     1/2 kneel Df(sole) Lags neutral by 5 degrees B2    Strength:     R L  Hip flexors L2   3/5 3/5  Quadriceps L3   3/5 3/5     Hamstrings S1  3/5 3/5    Dorsiflexion L4  5/5 2/5      Plantar flexion S1  2/5 2/5    Hip Internal Rotation  2/5 2/5    Gluteus Medius L45S1 2+/5 2+/5    Gluteus Vj L5S12 2/5 2/5      Transfers were with CGA A due to weakness in hips and core at 2/5 at best with rectus and 1+ with internal/external obliques    Joint Mobility: very limited patellar mobility on the R knee as well as stiff in the tib/fem joint on the R and L mildly.     Muscle Length: Pt presents with limited calf and quad and hip flexors    Palpation: very tight soleus and     TREATMENT   Treatment Time In: 11:35 am  Treatment Time Out: 12:00 pm  Total Treatment time separate from Evaluation: 25 minutes    Merle received therapeutic exercises to develop strength, endurance, ROM, flexibility and posture for 23 minutes including:    Seated LAQ 2x 10 per leg  Supine heel slides 1x 10 AAROM B  Bridges 2x 10  Supine single leg march 1x10 per leg  Sit to stand with cues to encourage reaching back to sit to reduce plopping into the chair to reduce injury.  Seated tail gators 3 min with 2# ankle weight     Pt tends to lay with her hips Externally rotated limiting knee extension passively when at rest. PT used a towel to help use passive ROM to slowly stretch her towards knee extension but she often moved away from neutral and returned  to external rotation.       Merle received the following manual therapy techniques: Joint mobilizations, Myofacial release and Soft tissue Mobilization were applied to the: R patella as well as grade II mobs to increase flexion/extension ROM on the R knee for 2 minutes    Home Exercises and Patient Education Provided    Education provided:   -Education on condition, HEP, and PT educated pt in the need to increase knee ROM and strength in her hips and legs to help her return to walking and moving with less difficulty. Consistent work on HEP will be needed as well as good attendance as her joints need help moving better.    Written Home Exercises Provided: Patient instructed to cont prior HEP.  Exercises were reviewed and Merle was able to demonstrate them prior to the end of the session.  Merle demonstrated fair  understanding of the education provided.     See EMR under Patient Instructions for exercises provided 3/7/2022.    Assessment   Merle is a 66 y.o. female referred to outpatient Physical Therapy with a medical diagnosis of M17.0 (ICD-10-CM) - Bilateral primary osteoarthritis of knee. The patient presents with signs and symptoms consistent with diagnosis along with hip weakness, knee pain, gait difficulty and impairments which include decreased ROM, decreased strength, decreased joint mobility, decreased muscle length, impaired coordination, impaired balance, postural abnormalities, gait abnormalities and decreased overall function.  These impairments are limiting patient's ability to walk, stand, perform simple home tasks such as cooking or cleaning or transferring in and out of bed.     Pt prognosis is Good.   Pt will benefit from skilled outpatient Physical Therapy to address the deficits stated above and in the chart below, provide pt/family education, and to maximize pt's level of independence.     Plan of care discussed with patient: Yes  Pt's spiritual, cultural and educational needs considered and  patient is agreeable to the plan of care and goals as stated below:     Anticipated Barriers for therapy: pt on O2, joint mobility is very limited, pt has limited support    Medical Necessity is demonstrated by the following  History  Co-morbidities and personal factors that may impact the plan of care Co-morbidities:   See medical history CHF, COPD, Depression, HTN, depends on others for transportation, prior surgery    Personal Factors:   coping style  social background  lifestyle   slowl processing   high   Examination  Body Structures and Functions, activity limitations and participation restrictions that may impact the plan of care Body Regions:   neck  back  lower extremities  upper extremities  trunk    Body Systems:    ROM  strength  balance  gait  transfers  transitions  motor control  edema    Participation Restrictions:   See current restrictions    Activity limitations:   Learning and applying knowledge  watching  listening    General Tasks and Commands  undertaking a single task  undertaking multiple tasks    Communication  communicating with/receiving spoken language  communicating with/receiving non-verbal language    Mobility  lifting and carrying objects  walking  using transportation (bus, train, plane, car)    Self care  washing oneself (bathing, drying, washing hands)  caring for body parts (brushing teeth, shaving, grooming)  toileting  dressing  eating  drinking  looking after one's health    Domestic Life  shopping  cooking  doing house work (cleaning house, washing dishes, laundry)  assisting others    Interactions/Relationships  basic interpersonal interactions  complex interpersonal interactions  relating with strangers  family relationships    Life Areas  employment  basic economic transactions    Community and Social Life  community life  recreation and leisure         high   Clinical Presentation unstable clinical presentation with unpredictable characteristics high   Decision Making/  Complexity Score: high     Goals:  Short Term Goals: In 4 weeks:  1.Pt to be educated on HEP.  2.Patient to demo increased knee extension to 10 degrees shy of neutral and L to 0  3.Patient to increase strength 3+/5 hip strength with flexion and 3/5 with abduction.  4.Patient to have decreased pain to 7/10 or better.  5.Patient to increase LE balance to tolerate standing without RW support for 30 seconds as at eval requiried RW support to stand.  6.Patient to improve score on the FOTO by 10%.      Long Term Goals: In 10 weeks 5/16/22  1. Patient to perform daily activities including walking up to 200 ft without stopping using a RW without limitation.  2. Patient to demonstrate increased knee AROM to 5 degrees or better with L knee extension.  3. Patient to demonstrate increased LE strength with knee extension to 3/5 or better  4. Patient to have demonstrate increased strength functionally as noted by a score of 5 reps or better on the 30 sec sit to stand test.  5. Patient to improve score on the FOTO to 80% limitation or better.      Plan   Plan of care Certification: 3/7/2022 to 5/16/22    Outpatient Physical Therapy 3 times weekly for 3 weeks then 2x 7 weeks to include the following interventions: Electrical Stimulation IFC, NMES, Gait Training, Manual Therapy, Moist Heat/ Ice, Neuromuscular Re-ed, Patient Education, Self Care, Therapeutic Activities, Therapeutic Exercise and DN.     Shanna Corbin, PT, CIDN, SFMA    Thank you for this referral.    These services are reasonable and necessary for the conditions set forth above while under my care.

## 2022-03-07 NOTE — PATIENT INSTRUCTIONS
HEP program was down so PT provided her with a handout on LAQ for now. Trained her in quad sets and heel slides as well.

## 2022-03-11 NOTE — PROGRESS NOTES
Physical Therapy Daily Treatment Note     Name: Merle Caro  Clinic Number: [id number]    Therapy Diagnosis:   Encounter Diagnoses   Name Primary?    Gait difficulty Yes    Weakness of both hips      Physician: Luis Galeano MD    Visit Date: 3/15/2022    Physician Orders: PT Eval and Treat   Medical Diagnosis from Referral: M17.0 (ICD-10-CM) - Bilateral primary osteoarthritis of knee  Evaluation Date: 3/7/2022  Authorization Period Expiration: 3/2/23  Plan of Care Expiration: 5/16/22  Visit # / Visits authorized: 1/ 1  Foto: 1/3   Progress Note Due Date: 10 visit or 30 days from the eval    Time In: 12:03 pm  Time Out: 12:45 pm  Total Billable Time: 42 minutes     Precautions: standard, O2 at 2 L, HTN, hx bipolar, slow processing        SUBJECTIVE     Today, pt reports: she is going to be out of town to visit family but believes she will be back in time for her next appt. Pt states she is not working on her HEP but walking more.  She was not compliant with home exercise program.  Response to previous treatment: no complaints  Functional change: none yet    Pre-Treatment Pain: 4/10  Post-Treatment Pain: 2/10  Location:knees  TREATMENT     Merle received therapeutic exercises to develop strength, endurance, ROM, flexibility and posture for 42 minutes including:    Shuttle 2 banded squats 2x 2 minutes  nustep 3 min x 2  Seated LAQ 2x 10 per leg  Standing heel raises 2x 10  Seated heel raises 1x 10  Prone hamstring curls 3x 10 with PT having pt press into my hand to increase knee extension with manual feed back on last set of 1x10  Quad sets 1x10  Seated tail gators 3 min with 2# per leg        Merle received the following manual therapy techniques: Joint mobilizations and Soft tissue Mobilization were applied to the: - for - minutes      Merle participated in gait training to improve functional mobility and safety for -  minutes, including:      Merle received the following direct contact modalities after  being cleared for contraindications:     Merle received the following supervised modalities after being cleared for contradictions:     Merle received hot pack for - minutes to -.    Merle received cold pack for - minutes to -.  Merle received electrical stimulation for - minutes to -      Home Exercises Provided and Patient Education Provided     Education/Self-Care provided: (during minutes)   PT educated pt on the need to work on her HEP more than walking for exercise as her home program works on getting her mobility back as her knee ROM and hip strength are weak and need isolated work.    Patient educated on the importance of improved core and upper and lower extremity strength in order to improve alignment of the spine and upper and lower extremities with static positions and dynamic movement.    Patient educated on the importance of strong core and lower extremity musculature in order to improve both static and dynamic balance, improve gait mechanics, reduce fall risk and improve household and community mobility.       Written Home Exercises Provided: Patient instructed to cont prior HEP.  Exercises were reviewed and Merle was able to demonstrate them prior to the end of the session.  Merle demonstrated fair  understanding of the education provided.     See EMR under Patient Instructions for exercises provided prior visit.    ASSESSMENT   Pt tolerated therex fairly well with mild increase in R knee extension by 5 degrees by the end of the session. PT worked with pt in prone at times to have gravity assist in aiding knee extension. Pt push off on the shuttle although B LE performed was very weak at only 2 banded tolerance. Pt needed a lot of cues to encourage knee extension. Pt reports pain in the patella when trying to extend so PT to add more mobs at tib fib and patella as tolerated. Pt tolerated exercise well with reports of increased fatigue but no increased pain. Pt demonstrated good understanding  of exercises and required moderate cueing to maintain proper form.      Merle Is progressing well towards her goals.   Pt prognosis is Good.     Pt will continue to benefit from skilled outpatient physical therapy to address the deficits listed in the problem list box on initial evaluation, provide pt/family education and to maximize pt's level of independence in the home and community environment.     Pt's spiritual, cultural and educational needs considered and pt agreeable to plan of care and goals.     Anticipated barriers to physical therapy: relies on O2 and transportation; very deconditioned    Goals:   Short Term Goals: In 4 weeks:  1.Pt to be educated on HEP.  2.Patient to demo increased knee extension to 10 degrees shy of neutral and L to 0  3.Patient to increase strength 3+/5 hip strength with flexion and 3/5 with abduction.  4.Patient to have decreased pain to 7/10 or better.  5.Patient to increase LE balance to tolerate standing without RW support for 30 seconds as at eval requiried RW support to stand.  6.Patient to improve score on the FOTO by 10%.      Long Term Goals: In 10 weeks 5/16/22  1. Patient to perform daily activities including walking up to 200 ft without stopping using a RW without limitation.  2. Patient to demonstrate increased knee AROM to 5 degrees or better with L knee extension.  3. Patient to demonstrate increased LE strength with knee extension to 3/5 or better  4. Patient to have demonstrate increased strength functionally as noted by a score of 5 reps or better on the 30 sec sit to stand test.  5. Patient to improve score on the FOTO to 80% limitation or better.       PLAN   Continue Plan of Care (POC) and progress per patient tolerance.    Shanna Corbin, PT, CIDN, SFMA

## 2022-03-15 ENCOUNTER — CLINICAL SUPPORT (OUTPATIENT)
Dept: REHABILITATION | Facility: HOSPITAL | Age: 67
End: 2022-03-15
Payer: COMMERCIAL

## 2022-03-15 DIAGNOSIS — R26.9 GAIT DIFFICULTY: Primary | ICD-10-CM

## 2022-03-15 DIAGNOSIS — R29.898 WEAKNESS OF BOTH HIPS: ICD-10-CM

## 2022-03-15 PROCEDURE — 97110 THERAPEUTIC EXERCISES: CPT

## 2022-03-17 ENCOUNTER — CLINICAL SUPPORT (OUTPATIENT)
Dept: REHABILITATION | Facility: HOSPITAL | Age: 67
End: 2022-03-17
Payer: COMMERCIAL

## 2022-03-17 DIAGNOSIS — R29.898 WEAKNESS OF BOTH HIPS: ICD-10-CM

## 2022-03-17 DIAGNOSIS — R26.9 GAIT DIFFICULTY: Primary | ICD-10-CM

## 2022-03-17 PROCEDURE — 97140 MANUAL THERAPY 1/> REGIONS: CPT

## 2022-03-17 PROCEDURE — 97110 THERAPEUTIC EXERCISES: CPT

## 2022-03-17 NOTE — PROGRESS NOTES
Physical Therapy Daily Treatment Note     Name: Merle Caro  Clinic Number: [id number]    Therapy Diagnosis:   Encounter Diagnoses   Name Primary?    Gait difficulty Yes    Weakness of both hips      Physician: Luis Galeano MD    Visit Date: 3/17/2022    Physician Orders: PT Eval and Treat   Medical Diagnosis from Referral: M17.0 (ICD-10-CM) - Bilateral primary osteoarthritis of knee  Evaluation Date: 3/7/2022  Authorization Period Expiration: 12/31/23  Plan of Care Expiration: 5/16/22  Visit # / Visits authorized: 3/20  Foto: 1/3   Progress Note Due Date: 10 visit or 30 days from the eval    Time In: 1:45 pm  Time Out: 2:30 pm  Total Billable Time: 45 minutes     Precautions: standard, O2 at 2 L, HTN, hx bipolar, slow processing    SUBJECTIVE     Today, pt reports: she worked on her HEP  She was compliant with home exercise program.  Response to previous treatment: no complaints  Functional change: none yet    Pre-Treatment Pain: 4/10  Post-Treatment Pain: 2/10  Location: knees  TREATMENT   R knee extension 25 degree lag improved from 30 degrees    Merle received therapeutic exercises to develop strength, endurance, ROM, flexibility and posture for 30 minutes including:    nustep 5 min   Manual therapy followed by:  Prone hamstring curls 3x 10  Hip internal external rotation in prone 2x 10  Quad sets 2x 10 R  LAQ 2x 10 2# 2x 10 per leg  Posterior pelvic tilts 2x 10  Bridges 3x10  Seated hip flexion 2# per leg 1x 15  Seated tail gators 4 min with 2# per leg      Deferred:    Prone hamstring curls 3x 10 with PT having pt press into my hand to increase knee extension with manual feed back on last set of 1x10          Merle received the following manual therapy techniques: patellar Joint mobilizations, tibial/femoral glides in extension near her most extended tolerance and mobs for internal/external rotation and fibular a/p mobs. PT continued with soft tissue mobs to the medial and lateral quad near the  patella and lateral hamstrings for 15 minutes      Merle participated in gait training to improve functional mobility and safety for -  minutes, including:      Merle received the following direct contact modalities after being cleared for contraindications:     Merle received the following supervised modalities after being cleared for contradictions:     Merle received hot pack for - minutes to -.    Merle received cold pack for - minutes to -.  Merle received electrical stimulation for - minutes to -      Home Exercises Provided and Patient Education Provided     Education/Self-Care provided: (during minutes)   PT educated pt on the need to work on her HEP more than walking for exercise as her home program works on getting her mobility back as her knee ROM and hip strength are weak and need isolated work.    Patient educated on the importance of improved core and upper and lower extremity strength in order to improve alignment of the spine and upper and lower extremities with static positions and dynamic movement.    Patient educated on the importance of strong core and lower extremity musculature in order to improve both static and dynamic balance, improve gait mechanics, reduce fall risk and improve household and community mobility.       Written Home Exercises Provided: Patient instructed to cont prior HEP.  Exercises were reviewed and Merle was able to demonstrate them prior to the end of the session.  Merle demonstrated fair  understanding of the education provided.     See EMR under Patient Instructions for exercises provided prior visit.    ASSESSMENT   Pt tolerated therex fairly well with mild increase in R knee extension by 5 degrees by the end of the session. Pt tolerated manual PT better as she allowed PT to mob the patella much longer. PT attempted long leg axial distraction but she had a poor tolerance so PT stayed closer to the joint. Pt tolerated exercise well with reports of increased  fatigue but no increased pain. Pt demonstrated good understanding of exercises and required moderate cueing to maintain proper form.      Merle Is progressing well towards her goals.   Pt prognosis is Good.     Pt will continue to benefit from skilled outpatient physical therapy to address the deficits listed in the problem list box on initial evaluation, provide pt/family education and to maximize pt's level of independence in the home and community environment.     Pt's spiritual, cultural and educational needs considered and pt agreeable to plan of care and goals.     Anticipated barriers to physical therapy: relies on O2 and transportation; very deconditioned    Goals:   Short Term Goals: In 4 weeks:  1.Pt to be educated on HEP. met  2.Patient to demo increased knee extension to 10 degrees shy of neutral and L to 0. progressing  3.Patient to increase strength 3+/5 hip strength with flexion and 3/5 with abduction.  4.Patient to have decreased pain to 7/10 or better.  5.Patient to increase LE balance to tolerate standing without RW support for 30 seconds as at eval requiried RW support to stand.  6.Patient to improve score on the FOTO by 10%.      Long Term Goals: In 10 weeks 5/16/22  1. Patient to perform daily activities including walking up to 200 ft without stopping using a RW without limitation.  2. Patient to demonstrate increased knee AROM to 5 degrees or better with L knee extension.  3. Patient to demonstrate increased LE strength with knee extension to 3/5 or better  4. Patient to have demonstrate increased strength functionally as noted by a score of 5 reps or better on the 30 sec sit to stand test.  5. Patient to improve score on the FOTO to 80% limitation or better.       PLAN   Continue Plan of Care (POC) and progress per patient tolerance.    Shanna Corbin, PT, CIDN, SFMA

## 2022-03-18 ENCOUNTER — CLINICAL SUPPORT (OUTPATIENT)
Dept: REHABILITATION | Facility: HOSPITAL | Age: 67
End: 2022-03-18
Payer: COMMERCIAL

## 2022-03-18 DIAGNOSIS — R29.898 WEAKNESS OF BOTH HIPS: ICD-10-CM

## 2022-03-18 DIAGNOSIS — R26.9 GAIT DIFFICULTY: Primary | ICD-10-CM

## 2022-03-18 PROCEDURE — 97110 THERAPEUTIC EXERCISES: CPT

## 2022-03-18 NOTE — PROGRESS NOTES
Physical Therapy Daily Treatment Note     Name: Merle [facility]  Clinic Number: [id number]    Therapy Diagnosis:   Encounter Diagnoses   Name Primary?    Gait difficulty Yes    Weakness of both hips      Physician: Luis Galeano MD    Visit Date: 3/18/2022    Physician Orders: PT Eval and Treat   Medical Diagnosis from Referral: M17.0 (ICD-10-CM) - Bilateral primary osteoarthritis of knee  Evaluation Date: 3/7/2022  Authorization Period Expiration: 12/31/23  Plan of Care Expiration: 5/16/22  Visit # / Visits authorized: 4/20  Foto: 1/3   Progress Note Due Date: 10 visit or 30 days from the eval    Time In: 1:05 pm  Time Out: 1:45 pm  Total Billable Time: 40 minutes     Precautions: standard, O2 at 2 L, HTN, hx bipolar, slow processing    SUBJECTIVE     Today, pt reports: she slept better. Pt states she isn't feeling her best as she forgot to take her respriratory meds. PT noted rattling at the beginning of the session but it improved by the end. PT encouraged medication compliance to increase endurance.   She was compliant with home exercise program.  Response to previous treatment: no complaints  Functional change: none yet    Pre-Treatment Pain: 4/10  Post-Treatment Pain: 2/10  Location: knees  TREATMENT       Merle received therapeutic exercises to develop strength, endurance, ROM, flexibility and posture for 42 minutes including:    nustep 5 min   Manual therapy followed by:  Tail gator 3 # 3 min  3x 5 AAROM SLR per leg  Bridges 2x 10  Supine march 3x 10  Standing hip flexion 2x 10 with cues on heel strike to increase knee extension  Standing heel raises 2x 10    Deferred:  Prone hamstring curls 3x 10  Hip internal external rotation in prone 2x 10  Quad sets 2x 10 R  LAQ 2x 10 2# 2x 10 per leg  Seated hip flexion 2# per leg 1x 15  Prone hamstring curls 3x 10 with PT having pt press into my hand to increase knee extension with manual feed back on last set of 1x10          Merle received the following  manual therapy techniques: patellar Joint mobilizations lateral to medial and moving superior to increase knee extension, tibial/femoral glides in extension near her most extended tolerance and mobs for internal/external rotation and fibular A/P mobs. PT continued with soft tissue mobs to the medial and lateral quad near the patella for 12 minutes      Merle participated in gait training to improve functional mobility and safety for -  minutes, including:      Merle received the following direct contact modalities after being cleared for contraindications:     Merle received the following supervised modalities after being cleared for contradictions:     Merle received hot pack for - minutes to -.    Merle received cold pack for - minutes to -.  Merle received electrical stimulation for - minutes to -      Home Exercises Provided and Patient Education Provided     Education/Self-Care provided: (during minutes)   PT educated pt on the need to work on her HEP more than walking for exercise as her home program works on getting her mobility back as her knee ROM and hip strength are weak and need isolated work.    Patient educated on the importance of improved core and upper and lower extremity strength in order to improve alignment of the spine and upper and lower extremities with static positions and dynamic movement.    Patient educated on the importance of strong core and lower extremity musculature in order to improve both static and dynamic balance, improve gait mechanics, reduce fall risk and improve household and community mobility.       Written Home Exercises Provided: Patient instructed to cont prior HEP.  Exercises were reviewed and Merle was able to demonstrate them prior to the end of the session.  Merle demonstrated fair  understanding of the education provided.     See EMR under Patient Instructions for exercises provided prior visit.    ASSESSMENT   Pt tolerated therex fairly well but asked to  end the session a little early as she was fatigued and not feeling her best respiratory wise, today. PT noted rattling at the beginning of the session but it improved by the end. PT encouraged medication compliance to increase endurance. PT assisted pt to the restroom at the end of the session and she had mildly cloudy and mildly concentrated urine color. PT encourage pt to watch for signs of a UTI and drink lots of water to reduce opportunity for dehydration. Pt ensured she will drink and take her medication when she returns home. Pt still has a limited tolerance to R knee mobs. Pt has a hx of patellar fx by pt report (old hx). Pt tolerated exercise well with reports of increased fatigue but no increased pain. Pt demonstrated good understanding of exercises and required moderate cueing to maintain proper form.       Merle Is progressing well towards her goals.   Pt prognosis is Good.     Pt will continue to benefit from skilled outpatient physical therapy to address the deficits listed in the problem list box on initial evaluation, provide pt/family education and to maximize pt's level of independence in the home and community environment.     Pt's spiritual, cultural and educational needs considered and pt agreeable to plan of care and goals.     Anticipated barriers to physical therapy: relies on O2 and transportation; very deconditioned    Goals:   Short Term Goals: In 4 weeks:  1.Pt to be educated on HEP. met  2.Patient to demo increased knee extension to 10 degrees shy of neutral and L to 0. progressing  3.Patient to increase strength 3+/5 hip strength with flexion and 3/5 with abduction.  4.Patient to have decreased pain to 7/10 or better. progressing  5.Patient to increase LE balance to tolerate standing without RW support for 30 seconds as at eval requiried RW support to stand.  6.Patient to improve score on the FOTO by 10%.      Long Term Goals: In 10 weeks 5/16/22  1. Patient to perform daily activities  including walking up to 200 ft without stopping using a RW without limitation.  2. Patient to demonstrate increased knee AROM to 5 degrees or better with L knee extension.  3. Patient to demonstrate increased LE strength with knee extension to 3/5 or better  4. Patient to have demonstrate increased strength functionally as noted by a score of 5 reps or better on the 30 sec sit to stand test.  5. Patient to improve score on the FOTO to 80% limitation or better.       PLAN   Continue Plan of Care (POC) and progress per patient tolerance.    Shanna Corbin, PT, RENEEN, SFMA

## 2022-03-22 ENCOUNTER — CLINICAL SUPPORT (OUTPATIENT)
Dept: REHABILITATION | Facility: HOSPITAL | Age: 67
End: 2022-03-22
Payer: COMMERCIAL

## 2022-03-22 DIAGNOSIS — R29.898 WEAKNESS OF BOTH HIPS: ICD-10-CM

## 2022-03-22 DIAGNOSIS — R26.9 GAIT DIFFICULTY: Primary | ICD-10-CM

## 2022-03-22 PROCEDURE — 97140 MANUAL THERAPY 1/> REGIONS: CPT

## 2022-03-22 PROCEDURE — 97110 THERAPEUTIC EXERCISES: CPT

## 2022-03-22 NOTE — PROGRESS NOTES
Physical Therapy Daily Treatment Note     Name: Merle BUSH [facility]  Clinic Number: [id number]    Therapy Diagnosis:   Encounter Diagnoses   Name Primary?    Gait difficulty Yes    Weakness of both hips      Physician: Luis Galeano MD    Visit Date: 3/22/2022    Physician Orders: PT Eval and Treat   Medical Diagnosis from Referral: M17.0 (ICD-10-CM) - Bilateral primary osteoarthritis of knee  Evaluation Date: 3/7/2022  Authorization Period Expiration: 12/31/23  Plan of Care Expiration: 5/16/22  Visit # / Visits authorized: 5/20  Foto: 1/3   Progress Note Due Date: 10 visit or 30 days from the eval    Time In: 10:35 am  Time Out: 11:22 am  Total Billable Time: 47 minutes     Precautions: standard, O2 at 2 L, HTN, hx bipolar, slow processing    SUBJECTIVE     Today, pt reports: her R ankle and knee are sore posteriorly from the stretching the other day.   She was compliant with home exercise program.  Response to previous treatment: no complaints  Functional change: none yet    Pre-Treatment Pain: 6/10  Post-Treatment Pain:4/10  Location: knees  TREATMENT   B knee flexion 130 degrees today  R knee extension 25 degrees lagging full extension  Trigger points in the quad and posterior lateral calf noted.      Merle received therapeutic exercises to develop strength, endurance, ROM, flexibility and posture for 33 minutes including:    nustep 5 min   Manual therapy followed by:  Ankle plantarflexion with green theraband 2x 15  Quad sets with green theraband at ankle for her to push into 2x 10  Seated Tail gator 2 # 3 min  Standing hip abduction on saucer med/lateral glides 1x 8 then forward/back 1x 10 each side  Standing heel raises 2x 10  PT assisted pt with gait to the car with VC on upright posture to reduce shuffling with gait and reduce UE strain to increase LE endurance/strength demands (125 ft x 1 SBA with VC; one curb x CGA for safety)    Deferred:  3x 5 AAROM SLR per leg  Bridges 2x 10  Supine march 3x  10  Standing hip flexion 2x 10 with cues on heel strike to increase knee extension  Prone hamstring curls 3x 10  Hip internal external rotation in prone 2x 10  LAQ 2x 10 2# 2x 10 per leg  Seated hip flexion 2# per leg 1x 15  Prone hamstring curls 3x 10 with PT having pt press into my hand to increase knee extension with manual feed back on last set of 1x10    Merle received the following manual therapy techniques: R patellar Joint mobilizations, R quad muscle soft tissue mobilization, lateral/posterior calf soft tissue mobilization and calcaneal mobs for ankle dorsiflexion/PF grade 2 and pumping. Manual therapy for 15 minutes in total.      Merle participated in gait training to improve functional mobility and safety for -  minutes, including:      Merle received the following direct contact modalities after being cleared for contraindications:     Merle received the following supervised modalities after being cleared for contradictions:     Merle received hot pack for - minutes to -.    Merle received cold pack for - minutes to -.  Merle received electrical stimulation for - minutes to -      Home Exercises Provided and Patient Education Provided     Education/Self-Care provided: (during minutes)   PT educated pt on the need to work on her HEP more than walking for exercise as her home program works on getting her mobility back as her knee ROM and hip strength are weak and need isolated work.    Patient educated on the importance of improved core and upper and lower extremity strength in order to improve alignment of the spine and upper and lower extremities with static positions and dynamic movement.    Patient educated on the importance of strong core and lower extremity musculature in order to improve both static and dynamic balance, improve gait mechanics, reduce fall risk and improve household and community mobility.       Written Home Exercises Provided: Patient instructed to cont prior  HEP.  Exercises were reviewed and Merle was able to demonstrate them prior to the end of the session.  Merle demonstrated fair  understanding of the education provided.     See EMR under Patient Instructions for exercises provided prior visit.    ASSESSMENT   Pt tolerated therex well as pain reduced from her reporting limited tolerance to standing on the R ankle (although standing as her usual objectively) to her reporting a considerable reduction and pain and reporting that the ankle was not hurting to put weight into the foot. Pt reported some back and shoulder pain but she tends to hunch over her walker so PT cued her when walking to and from the car to stand upright to challenge trunk and leg strength and reduce weight through the arms. Pt has a hx of patellar fx and ankle fx repair (old hx). Pt knee ROM is more and less painful even with flexed postures. PT began advancing into a little more weight bearing tasks. PT to add some trunk strength work to help her stand more upright for gait tasks. Although pt doesn't like the manual therapy while receiving it, she reports it provided good relief and she wants to try it more often including addressing the L knee with similar techniques. Pt tolerated exercise well with reports of increased fatigue but no increased pain. Pt demonstrated good understanding of exercises and required moderate cueing to maintain proper form.       Merle Is progressing well towards her goals.   Pt prognosis is Good.     Pt will continue to benefit from skilled outpatient physical therapy to address the deficits listed in the problem list box on initial evaluation, provide pt/family education and to maximize pt's level of independence in the home and community environment.     Pt's spiritual, cultural and educational needs considered and pt agreeable to plan of care and goals.     Anticipated barriers to physical therapy: relies on O2 and transportation; very deconditioned    Goals:    Short Term Goals: In 4 weeks:  1.Pt to be educated on HEP. met  2.Patient to demo increased knee extension to 10 degrees shy of neutral and L to 0. progressing  3.Patient to increase strength 3+/5 hip strength with flexion and 3/5 with abduction.  4.Patient to have decreased pain to 7/10 or better. progressing  5.Patient to increase LE balance to tolerate standing without RW support for 30 seconds as at eval requiried RW support to stand. progressing  6.Patient to improve score on the FOTO by 10%.      Long Term Goals: In 10 weeks 5/16/22  1. Patient to perform daily activities including walking up to 200 ft without stopping using a RW without limitation.  2. Patient to demonstrate increased knee AROM to 5 degrees or better with L knee extension.  3. Patient to demonstrate increased LE strength with knee extension to 3/5 or better  4. Patient to have demonstrate increased strength functionally as noted by a score of 5 reps or better on the 30 sec sit to stand test.  5. Patient to improve score on the FOTO to 80% limitation or better.       PLAN   Continue Plan of Care (POC) and progress per patient tolerance.    Shanna Corbin, PT, CIDN, SFMA

## 2022-03-24 ENCOUNTER — CLINICAL SUPPORT (OUTPATIENT)
Dept: REHABILITATION | Facility: HOSPITAL | Age: 67
End: 2022-03-24
Payer: COMMERCIAL

## 2022-03-24 DIAGNOSIS — R29.898 WEAKNESS OF BOTH HIPS: ICD-10-CM

## 2022-03-24 DIAGNOSIS — R26.9 GAIT DIFFICULTY: Primary | ICD-10-CM

## 2022-03-24 PROCEDURE — 97110 THERAPEUTIC EXERCISES: CPT

## 2022-03-24 NOTE — PROGRESS NOTES
Physical Therapy Daily Treatment Note     Name: Merle BUSH [facility]  Clinic Number: [id number]    Therapy Diagnosis:   Encounter Diagnoses   Name Primary?    Gait difficulty Yes    Weakness of both hips      Physician: Luis Galeano MD    Visit Date: 3/24/2022    Physician Orders: PT Eval and Treat   Medical Diagnosis from Referral: M17.0 (ICD-10-CM) - Bilateral primary osteoarthritis of knee  Evaluation Date: 3/7/2022  Authorization Period Expiration: 12/31/23  Plan of Care Expiration: 5/16/22  Visit # / Visits authorized: 6/20  Foto: 2/3   Progress Note Due Date: 10 visit or 30 days from the eval    Time In: 10:00 am  Time Out: 10:50 am  Total Billable Time: 50 minutes 5 min supervised with tech for      Precautions: standard, O2 at 2 L, HTN, hx bipolar, slow processing    SUBJECTIVE     Today, pt reports: her R and L knee are better at 3/10 but the ankle is  but better than overall compared to the last visit.   She was compliant with home exercise program.  Response to previous treatment: no complaints  Functional change: none yet    Pre-Treatment Pain: 6/10 R ankle knees at 3/10  Post-Treatment Pain:4/10  Location: knees  TREATMENT   Hip flexion R 3+/5    Merle received therapeutic exercises to develop strength, endurance, ROM, flexibility and posture for 40 minutes including:    nustep 6 min for endurance and joint nutrition  Manual therapy followed by:  Ankle plantarflexion with green theraband 2x 30   Quad sets with green theraband at knee for her to push into 1x 10  Hook lying clams green theraband 3x15  Bridges with green theraband with external rotation 2x 15  SLR 2x 10 with AAROM on the left on set one (10%)  Side lying clam 1x 10  Side lying reverse clams with ball squeeze 2x 10    Deferred:  Seated Tail gator 2 # 3 min  Standing hip abduction on saucer med/lateral glides 1x 8 then forward/back 1x 10 each side  Standing heel raises 2x 10  PT assisted pt with gait to the car with VC on  upright posture to reduce shuffling with gait and reduce UE strain to increase LE endurance/strength demands (125 ft x 1 SBA with VC; one curb x CGA for safety)  Supine march 3x 10  Standing hip flexion 2x 10 with cues on heel strike to increase knee extension  Prone hamstring curls 3x 10  Hip internal external rotation in prone 2x 10  LAQ 2x 10 2# 2x 10 per leg  Seated hip flexion 2# per leg 1x 15  Prone hamstring curls 3x 10 with PT having pt press into my hand to increase knee extension with manual feed back on last set of 1x10    Merle received the following manual therapy techniques: calcaneal mobs for ankle dorsiflexion/PF and eversion/inversion. Manual therapy for 5 minutes in total.      Merle participated in gait training to improve functional mobility and safety for -  minutes, including:      Merle received the following direct contact modalities after being cleared for contraindications:     Merle received the following supervised modalities after being cleared for contradictions:     Merle received hot pack for knees minutes to 5 min.    Merle received cold pack for - minutes to -.  Merle received electrical stimulation for - minutes to -      Home Exercises Provided and Patient Education Provided     Education/Self-Care provided: (during minutes)   PT educated pt on the need to work on her HEP more than walking for exercise as her home program works on getting her mobility back as her knee ROM and hip strength are weak and need isolated work.    Patient educated on the importance of improved core and upper and lower extremity strength in order to improve alignment of the spine and upper and lower extremities with static positions and dynamic movement.    Patient educated on the importance of strong core and lower extremity musculature in order to improve both static and dynamic balance, improve gait mechanics, reduce fall risk and improve household and community mobility.       Written Home  Exercises Provided: Patient instructed to cont prior HEP.  Exercises were reviewed and Merle was able to demonstrate them prior to the end of the session.  Merle demonstrated fair  understanding of the education provided.     See EMR under Patient Instructions for exercises provided prior visit.    ASSESSMENT   Pt tolerated therex with reports of good heel pain relief on the last session that she carried over to today. Pt states pain is a lot better but still present in the R heel, R knee and L knee. Pt still rates pain in the ankle high but states it is so much better than the last session. PT continued with therex to increase hip strength and LE knee ROM/ankle strength for gait but she needs more trunk strength and increased knee ROM on the R to advance better. PT to progress to more stnaidng tasks. Pt tolerated exercise well with reports of increased fatigue but no increased pain. Pt demonstrated good understanding of exercises and required moderate cueing to maintain proper form.       Merle Is progressing well towards her goals.   Pt prognosis is Good.     Pt will continue to benefit from skilled outpatient physical therapy to address the deficits listed in the problem list box on initial evaluation, provide pt/family education and to maximize pt's level of independence in the home and community environment.     Pt's spiritual, cultural and educational needs considered and pt agreeable to plan of care and goals.     Anticipated barriers to physical therapy: relies on O2 and transportation; very deconditioned    Goals:   Short Term Goals: In 4 weeks:  1.Pt to be educated on HEP. met  2.Patient to demo increased knee extension to 10 degrees shy of neutral and L to 0. progressing  3.Patient to increase strength 3+/5 hip strength with flexion and 3/5 with abduction. Met partially with hip flexion R  4.Patient to have decreased pain to 7/10 or better. progressing  5.Patient to increase LE balance to tolerate  standing without RW support for 30 seconds as at eval requiried RW support to stand. progressing  6.Patient to improve score on the FOTO by 10%.      Long Term Goals: In 10 weeks 5/16/22  1. Patient to perform daily activities including walking up to 200 ft without stopping using a RW without limitation.  2. Patient to demonstrate increased knee AROM to 5 degrees or better with L knee extension.  3. Patient to demonstrate increased LE strength with knee extension to 3/5 or better  4. Patient to have demonstrate increased strength functionally as noted by a score of 5 reps or better on the 30 sec sit to stand test.  5. Patient to improve score on the FOTO to 80% limitation or better.       PLAN   Continue Plan of Care (POC) and progress per patient tolerance.    Shanna Corbin, PT, CIDN, SFMA

## 2022-03-25 ENCOUNTER — CLINICAL SUPPORT (OUTPATIENT)
Dept: REHABILITATION | Facility: HOSPITAL | Age: 67
End: 2022-03-25
Payer: COMMERCIAL

## 2022-03-25 DIAGNOSIS — R26.9 GAIT DIFFICULTY: Primary | ICD-10-CM

## 2022-03-25 DIAGNOSIS — R29.898 WEAKNESS OF BOTH HIPS: ICD-10-CM

## 2022-03-25 PROCEDURE — 97110 THERAPEUTIC EXERCISES: CPT

## 2022-03-25 NOTE — PROGRESS NOTES
Physical Therapy Daily Treatment Note     [facility]  Clinic Number: [id number]    Therapy Diagnosis:   Encounter Diagnoses   Name Primary?    Gait difficulty Yes    Weakness of both hips      Physician: Luis Galeano MD    Visit Date: 3/25/2022    Physician Orders: PT Eval and Treat   Medical Diagnosis from Referral: M17.0 (ICD-10-CM) - Bilateral primary osteoarthritis of knee  Evaluation Date: 3/7/2022  Authorization Period Expiration: 12/31/23  Plan of Care Expiration: 5/16/22  Visit # / Visits authorized: 6/20  Foto: 2/3   Progress Note Due Date: 10 visit or 30 days from the eval    Time In: 10:00 am  Time Out: 10:50 am  Total Billable Time: 50 minutes 5 min supervised with tech for      Precautions: standard, O2 at 2 L, HTN, hx bipolar, slow processing    SUBJECTIVE     Today, pt reports: continued pain in bilateral lower extremities . States she wishes she could walk without a walker.   She was compliant with home exercise program.  Response to previous treatment: no complaints  Functional change: none yet    Pre-Treatment Pain: 6/10 R ankle knees at 3/10  Post-Treatment Pain:4/10  Location: knees  TREATMENT   Hip flexion R 3+/5    Merle received therapeutic exercises to develop strength, endurance, ROM, flexibility and posture for 40 minutes including:    nustep 6 min for endurance and joint nutrition  Manual therapy followed by:  Ankle plantarflexion with green theraband 2x 30 bilaterally   LAQ 3 x 10  SAQ 3 x 10   Knee flex to green band 3 x 10  Hook lying clams green theraband 3x15  Bridges with green theraband with external rotation 2x 15  SLR 2x 10 with AAROM   Side lying clam 1x 10  Side lying reverse clams with ball squeeze 2x 10    Deferred:  Seated Tail gator 2 # 3 min  Standing hip abduction on saucer med/lateral glides 1x 8 then forward/back 1x 10 each side  Standing heel raises 2x 10  PT assisted pt with gait to the car with VC on upright posture to reduce shuffling with gait  and reduce UE strain to increase LE endurance/strength demands (125 ft x 1 SBA with VC; one curb x CGA for safety)  Supine march 3x 10  Standing hip flexion 2x 10 with cues on heel strike to increase knee extension  Prone hamstring curls 3x 10  Hip internal external rotation in prone 2x 10  LAQ 2x 10 2# 2x 10 per leg  Seated hip flexion 2# per leg 1x 15  Prone hamstring curls 3x 10 with PT having pt press into my hand to increase knee extension with manual feed back on last set of 1x10    Merle received the following manual therapy techniques: calcaneal mobs for ankle dorsiflexion/PF and eversion/inversion. Manual therapy for 5 minutes in total.      Merle participated in gait training to improve functional mobility and safety for -  minutes, including:      Merle received the following direct contact modalities after being cleared for contraindications:     Merle received the following supervised modalities after being cleared for contradictions:     Merle received hot pack for knees minutes to 5 min.    Merle received cold pack for - minutes to -.  Merle received electrical stimulation for - minutes to -      Home Exercises Provided and Patient Education Provided     Education/Self-Care provided: (during minutes)   PT educated pt on the need to work on her HEP more than walking for exercise as her home program works on getting her mobility back as her knee ROM and hip strength are weak and need isolated work.    Patient educated on the importance of improved core and upper and lower extremity strength in order to improve alignment of the spine and upper and lower extremities with static positions and dynamic movement.    Patient educated on the importance of strong core and lower extremity musculature in order to improve both static and dynamic balance, improve gait mechanics, reduce fall risk and improve household and community mobility.       Written Home Exercises Provided: Patient instructed to cont  prior HEP.  Exercises were reviewed and Merle was able to demonstrate them prior to the end of the session.  Merle demonstrated fair  understanding of the education provided.     See EMR under Patient Instructions for exercises provided prior visit.    ASSESSMENT   Pt required moderate cueing to maintain proper form with most exercises . Tolerated all well with some reports of increased pain - particularly in the anterior hip with movements involving hip flexion. Overall god tolerance to all exercise today. Will benefit from continued strengthening .       Merle Is progressing well towards her goals.   Pt prognosis is Good.     Pt will continue to benefit from skilled outpatient physical therapy to address the deficits listed in the problem list box on initial evaluation, provide pt/family education and to maximize pt's level of independence in the home and community environment.     Pt's spiritual, cultural and educational needs considered and pt agreeable to plan of care and goals.     Anticipated barriers to physical therapy: relies on O2 and transportation; very deconditioned    Goals:   Short Term Goals: In 4 weeks:  1.Pt to be educated on HEP. met  2.Patient to demo increased knee extension to 10 degrees shy of neutral and L to 0. progressing  3.Patient to increase strength 3+/5 hip strength with flexion and 3/5 with abduction. Met partially with hip flexion R  4.Patient to have decreased pain to 7/10 or better. progressing  5.Patient to increase LE balance to tolerate standing without RW support for 30 seconds as at eval requiried RW support to stand. progressing  6.Patient to improve score on the FOTO by 10%.      Long Term Goals: In 10 weeks 5/16/22  1. Patient to perform daily activities including walking up to 200 ft without stopping using a RW without limitation.  2. Patient to demonstrate increased knee AROM to 5 degrees or better with L knee extension.  3. Patient to demonstrate increased LE  strength with knee extension to 3/5 or better  4. Patient to have demonstrate increased strength functionally as noted by a score of 5 reps or better on the 30 sec sit to stand test.  5. Patient to improve score on the FOTO to 80% limitation or better.       PLAN   Continue Plan of Care (POC) and progress per patient tolerance.    Tab Ames, PT, CIDN, SFMA

## 2022-03-29 ENCOUNTER — CLINICAL SUPPORT (OUTPATIENT)
Dept: REHABILITATION | Facility: HOSPITAL | Age: 67
End: 2022-03-29
Payer: COMMERCIAL

## 2022-03-29 DIAGNOSIS — R26.9 GAIT DIFFICULTY: Primary | ICD-10-CM

## 2022-03-29 DIAGNOSIS — R29.898 WEAKNESS OF BOTH HIPS: ICD-10-CM

## 2022-03-29 PROCEDURE — 97110 THERAPEUTIC EXERCISES: CPT

## 2022-03-31 ENCOUNTER — CLINICAL SUPPORT (OUTPATIENT)
Dept: REHABILITATION | Facility: HOSPITAL | Age: 67
End: 2022-03-31
Payer: COMMERCIAL

## 2022-03-31 DIAGNOSIS — R29.898 WEAKNESS OF BOTH HIPS: ICD-10-CM

## 2022-03-31 DIAGNOSIS — R26.9 GAIT DIFFICULTY: Primary | ICD-10-CM

## 2022-03-31 PROCEDURE — 97110 THERAPEUTIC EXERCISES: CPT

## 2022-03-31 NOTE — PROGRESS NOTES
Physical Therapy Daily Treatment Note and Progress Note     Name: Merle Caro  Clinic Number: [id number]    Therapy Diagnosis:   Encounter Diagnoses   Name Primary?    Gait difficulty Yes    Weakness of both hips      Physician: Luis Galeano MD    Visit Date: 3/31/2022    Physician Orders: PT Eval and Treat   Medical Diagnosis from Referral: M17.0 (ICD-10-CM) - Bilateral primary osteoarthritis of knee  Evaluation Date: 3/7/2022  Authorization Period Expiration: 12/31/23  Plan of Care Expiration: 5/16/22  Visit # / Visits authorized: 9/20  Foto: 2/3   Progress Note Due Date: 19 visit or 4/6/2022    Time In: 10:45 am  Time Out: 11:38 am  Total Billable Time: 53 minutes     Precautions: standard, O2 at 2 L, HTN, hx bipolar, slow processing    SUBJECTIVE     Today, pt reports: weaks still feel weak at times.   She was compliant with home exercise program.  Response to previous treatment: no complaints  Functional change: none yet    Pre-Treatment Pain: 5/10 R ankle knees at 3/10  Post-Treatment Pain: 2/10  Location: knees      OBJECTIVE     30 second sit to stand test: 2 reps (well below norms for her age. Pt is O2 dependent)                Sensation: Sensation intact to light touch.     Girth measurements: both knees have popliteal edema           Today  Knee AROM:                                                  (R)       (L) R L                                      Flexion                         110      115 130 125                           Extension                    30 degrees lagging from straight        10; today          22 lags from neutra     5 L                                       Pt tends to lay with her hips Externally rotated still limiting knee extension stretching at rest         At eval  Today R L  Hip AROM:                 Flexion                         90   90  110 110                                      ER                               40        40                                         IR                                 20        20                                      ABD                             30        30        Strength:                                                        R          L R L  Hip flexors L2                          3/5       3/5 4 4  Quadriceps L3                        3/5       3/5       3+ in ROM available         3+                           Hamstrings S1                        3/5       3/5 3+ 3+                          Dorsiflexion L4                        5/5       2/5    5 4                                          Plantar flexion S1                    2/5       2/5 2 2                          Hip Internal Rotation               2/5       2/5 2+ 2+                          Gluteus Medius [id number]           2+/5     2+/5 3 3                          Gluteus Vj [id number]        2/5       2/5 2+ 2+                          rectus abdominus 1+ at eval 2/5 today     TREATMENT   R knee extension 22 degree lag from neutral    Merle received therapeutic exercises to develop strength, endurance, ROM, flexibility and posture for 40 minutes including:    Shuttle 3 bands 3 min squats  Tail gators 2# 4 min  LAQ 3 x 10 2# B 3 sec hold at EROM  Seated hip flexion 2# per leg 2x 12  R knee SAQ 2x 10 then 3 x10 L (mildly A  SLR 2x10 B  hooklying clams 1x 10 then add red theraband 2x 10 per side    Deferred:  Side lying hip abduction   R ankle plantarflexion with knee extension in long sitting with blue theraband 3x 8  Bridges 2x 10  Knee flex to green band 2x 10  Sit to stand from 20 inch seat 2x 7  Standing hip abduction on saucer med/lateral glides 1x 8 then forward/back 1x 10 each side  Standing hip flexion 2x 10 with cues on heel strike to increase knee extension  Hip internal external rotation in prone 2x 10  Prone hamstring curls 3x 10 with PT having pt press into my hand to increase knee extension with manual feed back on last set of 1x10    Merle received the  following manual therapy techniques: patellar mobs for upward glide and proximal fibular mobs A/P. Manual therapy for 0 minutes in total.      Merle participated in gait training to improve functional mobility and safety for -  minutes, including:      Merle received the following direct contact modalities after being cleared for contraindications:     Merle received the following supervised modalities after being cleared for contradictions:     Merle received hot pack for knees minutes to 5 min B at knees    Merle received cold pack for - minutes to -.  Merle received electrical stimulation for - minutes to -      Home Exercises Provided and Patient Education Provided     Education/Self-Care provided: (during minutes)   PT educated pt on the need to work on her HEP more than walking for exercise as her home program works on getting her mobility back as her knee ROM and hip strength are weak and need isolated work.    Patient educated on the importance of improved core and upper and lower extremity strength in order to improve alignment of the spine and upper and lower extremities with static positions and dynamic movement.    Patient educated on the importance of strong core and lower extremity musculature in order to improve both static and dynamic balance, improve gait mechanics, reduce fall risk and improve household and community mobility.       Written Home Exercises Provided: Patient instructed to cont prior HEP.  Exercises were reviewed and Merle was able to demonstrate them prior to the end of the session.  Merle demonstrated fair  understanding of the education provided.     See EMR under Patient Instructions for exercises provided prior visit.    ASSESSMENT   Pt required minimal cueing as she is getting stronger and better ROM. PT noted changes above in strength section. Pt was able to stand without UE support on her walker for 2 minutes. Will benefit from continued strengthening to begin  advancing to picking up things from low surfaces to begin working on balance more as she is still struggling with this balance. Pt was able to walk up to 250 ft without sitting to rest as her endurance is improving as well as standing balance. PT to continue working on hip and knee strength to increase safety managing steps as she is still SBA to CGA for safety on unlevel saira challenges.      Merle Is progressing well towards her goals.   Pt prognosis is Good.     Pt will continue to benefit from skilled outpatient physical therapy to address the deficits listed in the problem list box on initial evaluation, provide pt/family education and to maximize pt's level of independence in the home and community environment.     Pt's spiritual, cultural and educational needs considered and pt agreeable to plan of care and goals.     Anticipated barriers to physical therapy: relies on O2 and transportation; very deconditioned    Goals:   Short Term Goals: In 4 weeks:  1.Pt to be educated on HEP. met  2.Patient to demo increased knee extension to 10 degrees shy of neutral and L to 0. progressing  3.Patient to increase strength 3+/5 hip strength with flexion and 3/5 with abduction. Met partially with hip flexion R  4.Patient to have decreased pain to 7/10 or better. met  5.Patient to increase LE balance to tolerate standing without RW support for 30 seconds as at eval requiried RW support to stand. progressing  6.Patient to improve score on the FOTO by 10%. met      Long Term Goals: In 10 weeks 5/16/22  1. Patient to perform daily activities including walking up to 200 ft without stopping using a RW without limitation. met  2. Patient to demonstrate increased knee AROM to 5 degrees or better with L knee extension. met  3. Patient to demonstrate increased LE strength with knee extension to 3/5 or better. progressing  4. Patient to have demonstrate increased strength functionally as noted by a score of 5 reps or better on  the 30 sec sit to stand test.  5. Patient to improve score on the FOTO to 80% limitation or better. met       PLAN   Continue Plan of Care (POC) and progress per patient tolerance.    Shanna Corbin PT, RENEEN, SFMA

## 2022-04-05 ENCOUNTER — CLINICAL SUPPORT (OUTPATIENT)
Dept: REHABILITATION | Facility: HOSPITAL | Age: 67
End: 2022-04-05
Payer: COMMERCIAL

## 2022-04-05 DIAGNOSIS — R29.898 WEAKNESS OF BOTH HIPS: ICD-10-CM

## 2022-04-05 DIAGNOSIS — R26.9 GAIT DIFFICULTY: Primary | ICD-10-CM

## 2022-04-05 PROCEDURE — 97110 THERAPEUTIC EXERCISES: CPT

## 2022-04-05 NOTE — PROGRESS NOTES
Physical Therapy Daily Treatment Note     Name: Merle BUSH [facility]  Clinic Number: [id number]    Therapy Diagnosis:   Encounter Diagnoses   Name Primary?    Gait difficulty Yes    Weakness of both hips      Physician: Luis Galeano MD    Visit Date: 4/5/2022    Physician Orders: PT Eval and Treat   Medical Diagnosis from Referral: M17.0 (ICD-10-CM) - Bilateral primary osteoarthritis of knee  Evaluation Date: 3/7/2022  Authorization Period Expiration: 12/31/23  Plan of Care Expiration: 5/16/22  Visit # / Visits authorized: 10/20  Foto: 2/3   Progress Note Due Date: 19 visit or 4/6/2022    Time In: 10:40 am  Time Out: 11: 25 am  Total Billable Time: 45 minutes     Precautions: standard, O2 at 2 L, HTN, hx bipolar, slow processing    SUBJECTIVE     Today, pt reports: L knee pain is irritated today. Pt states it began last night with no known cause.    She was compliant with home exercise program.  Response to previous treatment: no complaints  Functional change: R knee has less pain walking    Pre-Treatment Pain: 5/10 R ankle knees at 3/10  Post-Treatment Pain: 2/10  Location: knees    TREATMENT   PT noted some edema in the L posterior pop fossa. Both knees felt the same temperature. No redness noted on L LEBaldemar Bloom received therapeutic exercises to develop strength, endurance, ROM, flexibility and posture for 45 minutes including:    Shuttle 3 bands 3 min squats  Seated trunk flexion 1x 10  Standing trunk flexion 2x10  Standing rows with red tehraband for resistance 2x 10  Standing chest press with red theraband for resistance 2x 8  Standing paloff press with red theraband 2x10 per side  Standing aline curl 10 # 3x 5      Deferred:  Tail gators 2# 4 min  LAQ 3 x 10 2# B 3 sec hold at EROM  Seated hip flexion 2# per leg 2x 12  R knee SAQ 2x 10 then 3 x10 L (mildly A  SLR 2x10 B  hooklying clams 1x 10 then add red theraband 2x 10 per side  Side lying hip abduction   R ankle plantarflexion with knee extension in  long sitting with blue theraband 3x 8  Bridges 2x 10  Knee flex to green band 2x 10  Sit to stand from 20 inch seat 2x 7  Standing hip abduction on saucer med/lateral glides 1x 8 then forward/back 1x 10 each side  Standing hip flexion 2x 10 with cues on heel strike to increase knee extension  Hip internal external rotation in prone 2x 10  Prone hamstring curls 3x 10 with PT having pt press into my hand to increase knee extension with manual feed back on last set of 1x10    Merle received the following manual therapy techniques: patellar mobs for upward glide and proximal fibular mobs A/P. Manual therapy for 0 minutes in total.      Merle participated in gait training to improve functional mobility and safety for -  minutes, including:      Merle received the following direct contact modalities after being cleared for contraindications:     Merle received the following supervised modalities after being cleared for contradictions:     Merle received hot pack for knees minutes to 0 min B at knees    Merle received cold pack for - minutes to -.  Merle received electrical stimulation for - minutes to -      Home Exercises Provided and Patient Education Provided     Education/Self-Care provided: (during minutes)   PT educated pt on the need to work on her HEP more than walking for exercise as her home program works on getting her mobility back as her knee ROM and hip strength are weak and need isolated work.    Patient educated on the importance of improved core and upper and lower extremity strength in order to improve alignment of the spine and upper and lower extremities with static positions and dynamic movement.    Patient educated on the importance of strong core and lower extremity musculature in order to improve both static and dynamic balance, improve gait mechanics, reduce fall risk and improve household and community mobility.       Written Home Exercises Provided: Patient instructed to cont prior  HEP.  Exercises were reviewed and Merle was able to demonstrate them prior to the end of the session.  Merle demonstrated fair  understanding of the education provided.     See EMR under Patient Instructions for exercises provided prior visit.    ASSESSMENT   PT worked more on standing endurance and balance. Pt was much more challenged with today's work out and fearful of falling as she is not use to balance tasks but she moved well and slow with careful attention. Pt had no signs of falling but shuffles a lot still but at the end of the tasks today, her stride length had increased as well as step height and posture was more upright. PT explained to pt that working those muscles in the legs standing really increased her ability to stand stronger upright. Pt agreed to try more of this with us next time.     PT to continue working on hip and knee strength to increase safety managing steps as she is still SBA to CGA for safety on unlevel saira challenges.      Merle Is progressing well towards her goals.   Pt prognosis is Good.     Pt will continue to benefit from skilled outpatient physical therapy to address the deficits listed in the problem list box on initial evaluation, provide pt/family education and to maximize pt's level of independence in the home and community environment.     Pt's spiritual, cultural and educational needs considered and pt agreeable to plan of care and goals.     Anticipated barriers to physical therapy: relies on O2 and transportation; very deconditioned    Goals:   Short Term Goals: In 4 weeks:  1.Pt to be educated on HEP. met  2.Patient to demo increased knee extension to 10 degrees shy of neutral and L to 0. progressing  3.Patient to increase strength 3+/5 hip strength with flexion and 3/5 with abduction. Met partially with hip flexion R  4.Patient to have decreased pain to 7/10 or better. met  5.Patient to increase LE balance to tolerate standing without RW support for 30 seconds  as at eval requiried RW support to stand. progressing  6.Patient to improve score on the FOTO by 10%. met      Long Term Goals: In 10 weeks 5/16/22  1. Patient to perform daily activities including walking up to 200 ft without stopping using a RW without limitation. met  2. Patient to demonstrate increased knee AROM to 5 degrees or better with L knee extension. met  3. Patient to demonstrate increased LE strength with knee extension to 3/5 or better. progressing  4. Patient to have demonstrate increased strength functionally as noted by a score of 5 reps or better on the 30 sec sit to stand test.  5. Patient to improve score on the FOTO to 80% limitation or better. met       PLAN   Continue Plan of Care (POC) and progress per patient tolerance.    Shanna Corbin, PT, CIDN, SFMA

## 2022-04-07 ENCOUNTER — CLINICAL SUPPORT (OUTPATIENT)
Dept: REHABILITATION | Facility: HOSPITAL | Age: 67
End: 2022-04-07
Payer: COMMERCIAL

## 2022-04-07 DIAGNOSIS — R29.898 WEAKNESS OF BOTH HIPS: ICD-10-CM

## 2022-04-07 DIAGNOSIS — R26.9 GAIT DIFFICULTY: Primary | ICD-10-CM

## 2022-04-07 PROCEDURE — 97110 THERAPEUTIC EXERCISES: CPT

## 2022-04-07 NOTE — PROGRESS NOTES
Physical Therapy Daily Treatment Note     Name: Merle Caro  Clinic Number: [id number]    Therapy Diagnosis:   Encounter Diagnoses   Name Primary?    Gait difficulty Yes    Weakness of both hips      Physician: Luis Galeano MD    Visit Date: 4/7/2022    Physician Orders: PT Eval and Treat   Medical Diagnosis from Referral: M17.0 (ICD-10-CM) - Bilateral primary osteoarthritis of knee  Evaluation Date: 3/7/2022  Authorization Period Expiration: 12/31/23  Plan of Care Expiration: 5/16/22  Visit # / Visits authorized: 11/20  Foto: 2/3   Progress Note Due Date: 19 visit or 4/6/2022    Time In: 10:45 am  Time Out: 11: 25 am  Total Billable Time: 40 minutes     Precautions: standard, O2 at 2 L, HTN, hx bipolar, slow processing    SUBJECTIVE     Today, pt reports: mildly sore after her last work out but better today.  She was compliant with home exercise program.  Response to previous treatment: some soreness in the buttock and thighs  Functional change: R knee has less pain walking    Pre-Treatment Pain: 3/10 R ankle knees at 3/10  Post-Treatment Pain: 2/10  Location: knees    TREATMENT   L knee extension lag 3 degrees    Merle received therapeutic exercises to develop strength, endurance, ROM, flexibility and posture for 40 minutes including:    Bike 5 min   Standing rows with red theraband for resistance 1x10 then 2x 15  Standing shoulder extension with red theraband 3x 10  Standing trunk flexion with 10 # KB 3x 5  Standing paloff press with red theraband 2x10 per side  Tail gators 2 # per leg 3 min  LAQ 1 x 10 2# B 3 sec hold at EROM  SLR 2x 10 per leg  Standing chest press with red theraband for resistance 2x 8      Deferred:    Shuttle 3 bands 3 min squats  hooklying clams 1x 10 then add red theraband 2x 10 per side  Side lying hip abduction   R ankle plantarflexion with knee extension in long sitting with blue theraband 3x 8  Bridges 2x 10  Knee flex to green band 2x 10  Sit to stand from 20 inch box  2x7  Standing hip abduction on saucer med/lateral glides 1x 8 then forward/back 1x 10 each side  Standing hip flexion 2x 10 with cues on heel strike to increase knee extension  Hip internal external rotation in prone 2x 10      Merle received the following manual therapy techniques: patellar mobs for upward glide and proximal fibular mobs A/P. Manual therapy for 0 minutes in total.      Merle participated in gait training to improve functional mobility and safety for -  minutes, including:      Merle received the following direct contact modalities after being cleared for contraindications:     Merle received the following supervised modalities after being cleared for contradictions:     Merle received hot pack for knees minutes to 0 min B at knees    Merle received cold pack for - minutes to -.  Merle received electrical stimulation for - minutes to -      Home Exercises Provided and Patient Education Provided     Education/Self-Care provided: (during minutes)   PT educated pt on the need to work on her HEP more than walking for exercise as her home program works on getting her mobility back as her knee ROM and hip strength are weak and need isolated work.    Patient educated on the importance of improved core and upper and lower extremity strength in order to improve alignment of the spine and upper and lower extremities with static positions and dynamic movement.    Patient educated on the importance of strong core and lower extremity musculature in order to improve both static and dynamic balance, improve gait mechanics, reduce fall risk and improve household and community mobility.       Written Home Exercises Provided: Patient instructed to cont prior HEP.  Exercises were reviewed and Merle was able to demonstrate them prior to the end of the session.  Merle demonstrated fair  understanding of the education provided.     See EMR under Patient Instructions for exercises provided prior  visit.    ASSESSMENT   PT worked more on standing endurance and balance again. PT continues to use a gait belt for safety and on one occasion had to use MIN A otherwise we use CGA to SBA for safety. Pt is fearful of falling but was excited to know we have ways to help support her and reduce those fears. PT continued with therex to increase posterior chain and lateral work to increase balance challenges. Sit to stand rep tolerance was 5 reps in 30 seconds.  PT to continue working on hip and knee strength to increase safety managing steps as she is still SBA to CGA for safety on unlevel saira challenges.      Merle Is progressing well towards her goals.   Pt prognosis is Good.     Pt will continue to benefit from skilled outpatient physical therapy to address the deficits listed in the problem list box on initial evaluation, provide pt/family education and to maximize pt's level of independence in the home and community environment.     Pt's spiritual, cultural and educational needs considered and pt agreeable to plan of care and goals.     Anticipated barriers to physical therapy: relies on O2 and transportation; very deconditioned    Goals:   Short Term Goals: In 4 weeks:  1.Pt to be educated on HEP. met  2.Patient to demo increased knee extension to 10 degrees shy of neutral and L to 0. progressing  3.Patient to increase strength 3+/5 hip strength with flexion and 3/5 with abduction. Met partially with hip flexion R  4.Patient to have decreased pain to 7/10 or better. met  5.Patient to increase LE balance to tolerate standing without RW support for 30 seconds as at eval requiried RW support to stand. progressing  6.Patient to improve score on the FOTO by 10%. met      Long Term Goals: In 10 weeks 5/16/22  1. Patient to perform daily activities including walking up to 200 ft without stopping using a RW without limitation. met  2. Patient to demonstrate increased knee AROM to 5 degrees or better with L knee  extension. met  3. Patient to demonstrate increased LE strength with knee extension to 3/5 or better. progressing  4. Patient to have demonstrate increased strength functionally as noted by a score of 5 reps or better on the 30 sec sit to stand test.  5. Patient to improve score on the FOTO to 80% limitation or better. met       PLAN   Continue Plan of Care (POC) and progress per patient tolerance.    Shanna Corbin, PT, CIDN, SFMA

## 2022-04-12 ENCOUNTER — CLINICAL SUPPORT (OUTPATIENT)
Dept: REHABILITATION | Facility: HOSPITAL | Age: 67
End: 2022-04-12
Payer: COMMERCIAL

## 2022-04-12 DIAGNOSIS — R26.9 GAIT DIFFICULTY: Primary | ICD-10-CM

## 2022-04-12 DIAGNOSIS — R29.898 WEAKNESS OF BOTH HIPS: ICD-10-CM

## 2022-04-12 PROCEDURE — 97110 THERAPEUTIC EXERCISES: CPT

## 2022-04-12 NOTE — PROGRESS NOTES
Physical Therapy Daily Treatment Note     Name: Merle Caro  Clinic Number: [id number]    Therapy Diagnosis:   Encounter Diagnoses   Name Primary?    Gait difficulty Yes    Weakness of both hips      Physician: Luis Galeano MD    Visit Date: 4/12/2022    Physician Orders: PT Eval and Treat   Medical Diagnosis from Referral: M17.0 (ICD-10-CM) - Bilateral primary osteoarthritis of knee  Evaluation Date: 3/7/2022  Authorization Period Expiration: 12/31/23  Plan of Care Expiration: 5/16/22  Visit # / Visits authorized: 12/20  Foto: 2/3   Progress Note Due Date: 19 visit or 4/29/2022    Time In: 10:45 am  Time Out: 11: 30 am  Total Billable Time: 45 minutes     Precautions: standard, O2 at 2 L, HTN, hx bipolar, slow processing    SUBJECTIVE     Today, pt reports: her Left knee is hurting to bend it. She is going out of town for ten days  She was compliant with home exercise program. Pt states her breathing was more challenged with her cold today so she was more fatigued than usual.  Response to previous treatment: some soreness in the buttock and thighs  Functional change: R knee has less pain walking    Pre-Treatment Pain: 2/10 R 4/10 L knee; ankle at 0/10  Post-Treatment Pain: 2/10  Location: knees    TREATMENT   L knee extension lag 3 degrees  Standing tolerance without UE support 60 sec eyes open; eyes closed 20 sec    Merle received therapeutic exercises to develop strength, endurance, ROM, flexibility and posture for 45 minutes including:    Shuttle 4 bands squats 3 min  Shuttle heel raises 2 min  Standing aline curls 15# 4x 5  Bridges 2x 10  Tail gators 2 # per leg 3 min  LAQ 1 x 10 2# B 3 sec hold at EROM  SLR 2x 10 per leg    Deferred:  Bike 5 min   Standing rows with red theraband for resistance 1x10 then 2x 15  Standing shoulder extension with red theraband 3x 10  Standing paloff press with red theraband 2x10 per side  Standing chest press with red theraband for resistance 2x 8  Shuttle 3 bands  3 min squats  hooklying clams 1x 10 then add red theraband 2x 10 per side  Side lying hip abduction   R ankle plantarflexion with knee extension in long sitting with blue theraband 3x 8  Bridges 2x 10  Knee flex to green band 2x 10  Sit to stand from 20 inch box 2x7  Standing hip abduction on saucer med/lateral glides 1x 8 then forward/back 1x 10 each side  Standing hip flexion 2x 10 with cues on heel strike to increase knee extension  Hip internal external rotation in prone 2x 10      Merle received the following manual therapy techniques: patellar mobs for upward glide and proximal fibular mobs A/P. Manual therapy for 0 minutes in total.      Merle participated in gait training to improve functional mobility and safety for -  minutes, including:      Merle received the following direct contact modalities after being cleared for contraindications:     Merle received the following supervised modalities after being cleared for contradictions:     Merle received hot pack for knees minutes to 0 min B at knees    Merle received cold pack for - minutes to -.  Merle received electrical stimulation for - minutes to -      Home Exercises Provided and Patient Education Provided     Education/Self-Care provided: (during minutes)   PT educated pt on the need to work on her HEP more than walking for exercise as her home program works on getting her mobility back as her knee ROM and hip strength are weak and need isolated work.    Patient educated on the importance of improved core and upper and lower extremity strength in order to improve alignment of the spine and upper and lower extremities with static positions and dynamic movement.    Patient educated on the importance of strong core and lower extremity musculature in order to improve both static and dynamic balance, improve gait mechanics, reduce fall risk and improve household and community mobility.       Written Home Exercises Provided: Patient instructed to  cont prior HEP.  Exercises were reviewed and Merle was able to demonstrate them prior to the end of the session.  Merle demonstrated fair  understanding of the education provided.     See EMR under Patient Instructions for exercises provided prior visit.    ASSESSMENT   PT worked with pt on more semi reclined work as she said on arrival she was feeling more fatigued as if she needed a nap but states she is getting good 97% O2 readings today. PT noted an intermittent productive cough. PT continued with therex as pt tolerated as she was fatigues a bit more but after increasing her weight on the Christopher curls, she immediately expressed that walking was less painful. PT reminded pt that she always dreads the strength work but alsway has less pain after as it helps stretch the tight tissues and strengthens her postural muscles to improve her ability to stand upright and sustain standing.  PT to continue working on hip and knee strength to increase safety managing steps as she is still SBA to CGA for safety on unlevel saira challenges.      Merle Is progressing well towards her goals.   Pt prognosis is Good.     Pt will continue to benefit from skilled outpatient physical therapy to address the deficits listed in the problem list box on initial evaluation, provide pt/family education and to maximize pt's level of independence in the home and community environment.     Pt's spiritual, cultural and educational needs considered and pt agreeable to plan of care and goals.     Anticipated barriers to physical therapy: relies on O2 and transportation; very deconditioned    Goals:   Short Term Goals: In 4 weeks:  1.Pt to be educated on HEP. met  2.Patient to demo increased knee extension to 10 degrees shy of neutral and L to 0. progressing  3.Patient to increase strength 3+/5 hip strength with flexion and 3/5 with abduction. Met with flexion  4.Patient to have decreased pain to 7/10 or better. met  5.Patient to increase  LE balance to tolerate standing without RW support for 30 seconds as at eval requiried RW support to stand. met  6.Patient to improve score on the FOTO by 10%. met      Long Term Goals: In 10 weeks 5/16/22  1. Patient to perform daily activities including walking up to 200 ft without stopping using a RW without limitation. met  2. Patient to demonstrate increased knee AROM to 5 degrees or better with L knee extension. met  3. Patient to demonstrate increased LE strength with knee extension to 3/5 or better. progressing  4. Patient to have demonstrate increased strength functionally as noted by a score of 5 reps or better on the 30 sec sit to stand test.  5. Patient to improve score on the FOTO to 80% limitation or better. met       PLAN   Continue Plan of Care (POC) and progress per patient tolerance.    Shanna Corbin, PT, CIDN, SFMA

## 2022-04-21 ENCOUNTER — PATIENT OUTREACH (OUTPATIENT)
Dept: ADMINISTRATIVE | Facility: OTHER | Age: 67
End: 2022-04-21
Payer: COMMERCIAL

## 2022-04-21 DIAGNOSIS — Z12.31 ENCOUNTER FOR SCREENING MAMMOGRAM FOR BREAST CANCER: Primary | ICD-10-CM

## 2022-04-22 NOTE — PROGRESS NOTES
Health Maintenance Due   Topic Date Due    Shingles Vaccine (1 of 2) Never done    COVID-19 Vaccine (3 - Booster for Pfizer series) 09/09/2021    Colorectal Cancer Screening  11/18/2021    Mammogram  05/10/2022     Updates were requested from care everywhere.  Chart was reviewed for overdue Proactive [facility] Encounters (IRON) topics (CRS, Breast Cancer Screening, Eye exam)  Health Maintenance has been updated.  LINKS immunization registry triggered.  Immunizations were reconciled.

## 2022-04-25 ENCOUNTER — OFFICE VISIT (OUTPATIENT)
Dept: ORTHOPEDICS | Facility: CLINIC | Age: 67
End: 2022-04-25
Payer: COMMERCIAL

## 2022-04-25 VITALS — WEIGHT: 153 LBS | BODY MASS INDEX: 30.04 KG/M2 | HEIGHT: 60 IN

## 2022-04-25 DIAGNOSIS — M25.561 CHRONIC PAIN OF BOTH KNEES: ICD-10-CM

## 2022-04-25 DIAGNOSIS — G89.29 CHRONIC PAIN OF BOTH KNEES: ICD-10-CM

## 2022-04-25 DIAGNOSIS — M25.562 CHRONIC PAIN OF BOTH KNEES: ICD-10-CM

## 2022-04-25 DIAGNOSIS — M17.0 PRIMARY OSTEOARTHRITIS OF BOTH KNEES: Primary | ICD-10-CM

## 2022-04-25 PROCEDURE — 1159F PR MEDICATION LIST DOCUMENTED IN MEDICAL RECORD: ICD-10-PCS | Mod: CPTII,S$GLB,, | Performed by: FAMILY MEDICINE

## 2022-04-25 PROCEDURE — 3008F BODY MASS INDEX DOCD: CPT | Mod: CPTII,S$GLB,, | Performed by: FAMILY MEDICINE

## 2022-04-25 PROCEDURE — 3008F PR BODY MASS INDEX (BMI) DOCUMENTED: ICD-10-PCS | Mod: CPTII,S$GLB,, | Performed by: FAMILY MEDICINE

## 2022-04-25 PROCEDURE — 99213 PR OFFICE/OUTPT VISIT, EST, LEVL III, 20-29 MIN: ICD-10-PCS | Mod: S$GLB,,, | Performed by: FAMILY MEDICINE

## 2022-04-25 PROCEDURE — 1159F MED LIST DOCD IN RCRD: CPT | Mod: CPTII,S$GLB,, | Performed by: FAMILY MEDICINE

## 2022-04-25 PROCEDURE — 99213 OFFICE O/P EST LOW 20 MIN: CPT | Mod: S$GLB,,, | Performed by: FAMILY MEDICINE

## 2022-04-25 PROCEDURE — 4010F ACE/ARB THERAPY RXD/TAKEN: CPT | Mod: CPTII,S$GLB,, | Performed by: FAMILY MEDICINE

## 2022-04-25 PROCEDURE — 3044F HG A1C LEVEL LT 7.0%: CPT | Mod: CPTII,S$GLB,, | Performed by: FAMILY MEDICINE

## 2022-04-25 PROCEDURE — 3044F PR MOST RECENT HEMOGLOBIN A1C LEVEL <7.0%: ICD-10-PCS | Mod: CPTII,S$GLB,, | Performed by: FAMILY MEDICINE

## 2022-04-25 PROCEDURE — 99999 PR PBB SHADOW E&M-EST. PATIENT-LVL III: CPT | Mod: PBBFAC,,, | Performed by: FAMILY MEDICINE

## 2022-04-25 PROCEDURE — 99999 PR PBB SHADOW E&M-EST. PATIENT-LVL III: ICD-10-PCS | Mod: PBBFAC,,, | Performed by: FAMILY MEDICINE

## 2022-04-25 PROCEDURE — 4010F PR ACE/ARB THEARPY RXD/TAKEN: ICD-10-PCS | Mod: CPTII,S$GLB,, | Performed by: FAMILY MEDICINE
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
## 2022-04-26 ENCOUNTER — CLINICAL SUPPORT (OUTPATIENT)
Dept: REHABILITATION | Facility: HOSPITAL | Age: 67
End: 2022-04-26
Payer: COMMERCIAL

## 2022-04-26 DIAGNOSIS — R29.898 WEAKNESS OF BOTH HIPS: ICD-10-CM

## 2022-04-26 DIAGNOSIS — R26.9 GAIT DIFFICULTY: Primary | ICD-10-CM

## 2022-04-26 PROCEDURE — 97140 MANUAL THERAPY 1/> REGIONS: CPT

## 2022-04-26 PROCEDURE — 97110 THERAPEUTIC EXERCISES: CPT

## 2022-04-26 NOTE — PROGRESS NOTES
Physical Therapy Daily Treatment Note and Progress Note     Name: Merle Caro  Clinic Number: [id number]    Therapy Diagnosis:   Encounter Diagnoses   Name Primary?    Gait difficulty Yes    Weakness of both hips      Physician: Luis Galeano MD    Visit Date: 4/26/2022    Physician Orders: PT Eval and Treat   Medical Diagnosis from Referral: M17.0 (ICD-10-CM) - Bilateral primary osteoarthritis of knee  Evaluation Date: 3/7/2022  Authorization Period Expiration: 12/31/23  Plan of Care Expiration: 5/16/22  Visit # / Visits authorized: 14/20  Foto: 2/3   Progress Note Due Date: 19 visit or 4/29/2022    Time In: 10:42 am  Time Out: 11: 30 am  Total Billable Time: 45 minutes     Precautions: standard, O2 at 2 L, HTN, hx bipolar, slow processing    SUBJECTIVE     Today, pt reports: she was unable to exercise on her trip so her L lateral calf is hurting. Pt states she saw the Dr and he felt it was muscle related.   She was not compliant with home exercise program. Pt states her breathing was more challenged with her cold today so she was more fatigued than usual.  Response to previous treatment: some soreness in the buttock and thighs  Functional change: R knee has less pain walking    Pre-Treatment Pain: 2/10 R 4/10 L knee; ankle at 0/10  Post-Treatment Pain: 2/10  Location: knees    TREATMENT     Standing tolerance without UE support 60 sec eyes open; eyes closed 20 sec    Balance: R LE single leg stand= unable, L LE single leg stand = unable; sit to stand only tolerated with B UE support   30 second sit to stand test: 2 reps (well below norms for her age. Pt is O2 dependent) improved sit to stand 4 reps                Sensation: Sensation intact to light touch.     Girth measurements: both knees have popliteal edema         At evak   Today  Knee AROM:                                                  (R)       (L)  R L                                      Flexion                         110      115  130 120                                       Extension                    30 degrees lagging from straight        10; 20 5                                      Pt tends to lay with her hips Externally rotated limiting knee extension passively when at rest. PT used a towel to help use passive ROM to slowly stretch her towards knee extension but she often moved away from neutral and returned to external rotation.          At eval  Today R L  Hip AROM:                 Flexion                         90        90  100 100                                      ER                               40        40                                      IR                                 20        20                                      ABD                             30        30        Ankle AROM:             Ankle DF(tosin)            WFL seated but tight in standing due to gastroc tension from poor knee extension B for what appears to be some time                                      1/2 kneel Df(sole)        Lags neutral by 5 degrees B2    At eval       Today  Strength:                                                        R          L  R L  Hip flexors L2                          3/5       3/5  4 4  Quadriceps L3                        3/5       3/5      4 4                             Hamstrings S1                        3/5       3/5  4 4                          Dorsiflexion L4                        5/5       2/5                   2+ 2+                          Plantar flexion S1                    2/5       2/5  2 2                          Hip Internal Rotation               2/5       2/5  2+ 2+                          Gluteus Medius L45S1           2+/5     2+/5  3 3                          Gluteus Vj L5S12        2/5       2/5  2+ 2+                                [place] received therapeutic exercises to develop strength, endurance, ROM, flexibility and posture for 30 minutes including:    Shuttle 4 bands squats 3  min  Standing heel raises 2x 10  Tail gators 2 # per leg 3 min  Standing trunk flexion 2x 5 mid ROM then her best ROM 75% 2x 8  Seated hip flexion 2# 2x 10  SLR 2# 3x 8 each  LAQ 2 x 10 2# B 3 sec hold at EROM  Ankle PF with green theraband and with Quad set 3x 10    Deferred:  Standing aline curls 15# 4x 5  Bridges 2x 10  Standing rows with red theraband for resistance 1x10 then 2x 15  Standing shoulder extension with red theraband 3x 10  Standing paloff press with red theraband 2x10 per side  Standing chest press with red theraband for resistance 2x 8  hooklying clams 1x 10 then add red theraband 2x 10 per side  Side lying hip abduction   R ankle plantarflexion with knee extension in long sitting with blue theraband 3x 8  Bridges 2x 10  Knee flex to green band 2x 10  Sit to stand from 20 inch box 2x7  Standing hip abduction on saucer med/lateral glides 1x 8 then forward/back 1x 10 each side  Standing hip flexion 2x 10 with cues on heel strike to increase knee extension  Hip internal external rotation in prone 2x 10      Merle received the following manual therapy techniques: patellar mobs for upward glide and latearl to medial glides on the R and L knee as well as soft tissue to the quad and calves B to reduce trigger points and tension. PT used AROM to stretch calf and hamstring above in therex as limited tolerance to manual PT. Manual therapy for 15 minutes in total.      Merle participated in gait training to improve functional mobility and safety for -  minutes, including:      Merle received the following direct contact modalities after being cleared for contraindications:     Merle received the following supervised modalities after being cleared for contradictions:     Merle received hot pack for knees minutes to 0 min B at knees    Merle received cold pack for - minutes to -.  Merle received electrical stimulation for - minutes to -      Home Exercises Provided and Patient Education Provided      Education/Self-Care provided: (during minutes)   PT educated pt on the need to work on her HEP more than walking for exercise as her home program works on getting her mobility back as her knee ROM and hip strength are weak and need isolated work.    Patient educated on the importance of improved core and upper and lower extremity strength in order to improve alignment of the spine and upper and lower extremities with static positions and dynamic movement.    Patient educated on the importance of strong core and lower extremity musculature in order to improve both static and dynamic balance, improve gait mechanics, reduce fall risk and improve household and community mobility.       Written Home Exercises Provided: Patient instructed to cont prior HEP.  Exercises were reviewed and Merle was able to demonstrate them prior to the end of the session.  Merle demonstrated fair  understanding of the education provided.     See EMR under Patient Instructions for exercises provided prior visit.    ASSESSMENT   Pt has been out of town for a few weeks so PT held her weights some on a few tasks today as she was a bit unsteady but she made gains in ROM since there last session and after manual therapy mobs to the patella. Pt has poor tolerance to soft tissue work on the L calf but less pain after AROM therex. Pt responds best to therex as low tolerance for manual PT in general. Pt continues to have fall hazards and ROM loss but she is making gains so PT to continue to advance.  PT to continue working on hip and knee strength to increase safety managing steps as she is still SBA to [facility] for safety on unlevel saira challenges.      Merle Is progressing well towards her goals.   Pt prognosis is Good.     Pt will continue to benefit from skilled outpatient physical therapy to address the deficits listed in the problem list box on initial evaluation, provide pt/family education and to maximize pt's level of independence  in the home and community environment.     Pt's spiritual, cultural and educational needs considered and pt agreeable to plan of care and goals.     Anticipated barriers to physical therapy: relies on O2 and transportation; very deconditioned    Goals:   Short Term Goals: In 4 weeks:  1.Pt to be educated on HEP. met  2.Patient to demo increased knee extension to 10 degrees shy of neutral and L to 0. progressing  3.Patient to increase strength 3+/5 hip strength with flexion and 3/5 with abduction. Met with flexion  4.Patient to have decreased pain to 7/10 or better. met  5.Patient to increase LE balance to tolerate standing without RW support for 30 seconds as at eval requiried RW support to stand. met  6.Patient to improve score on the FOTO by 10%. met      Long Term Goals: In 10 weeks 5/16/22  1. Patient to perform daily activities including walking up to 200 ft without stopping using a RW without limitation. met  2. Patient to demonstrate increased knee AROM to 5 degrees or better with L knee extension. met  3. Patient to demonstrate increased LE strength with knee extension to 3/5 or better.met  4. Patient to have demonstrate increased strength functionally as noted by a score of 5 reps or better on the 30 sec sit to stand test. progressing  5. Patient to improve score on the FOTO to 80% limitation or better. met       PLAN   Continue Plan of Care (POC) and progress per patient tolerance.    Shanna Corbin, PT, CIDN, MA

## 2022-04-26 NOTE — PLAN OF CARE
Physical Therapy Daily Treatment Note and Progress Note     Name: Merle Caro  Clinic Number: [id number]    Therapy Diagnosis:   Encounter Diagnoses   Name Primary?    Gait difficulty Yes    Weakness of both hips      Physician: Luis Galeano MD    Visit Date: 4/26/2022    Physician Orders: PT Eval and Treat   Medical Diagnosis from Referral: M17.0 (ICD-10-CM) - Bilateral primary osteoarthritis of knee  Evaluation Date: 3/7/2022  Authorization Period Expiration: 12/31/23  Plan of Care Expiration: 5/16/22  Visit # / Visits authorized: 14/20  Foto: 2/3   Progress Note Due Date: 19 visit or 4/29/2022    Time In: 10:42 am  Time Out: 11: 30 am  Total Billable Time: 45 minutes     Precautions: standard, O2 at 2 L, HTN, hx bipolar, slow processing    SUBJECTIVE     Today, pt reports: she was unable to exercise on her trip so her L lateral calf is hurting. Pt states she saw the Dr and he felt it was muscle related.   She was not compliant with home exercise program. Pt states her breathing was more challenged with her cold today so she was more fatigued than usual.  Response to previous treatment: some soreness in the buttock and thighs  Functional change: R knee has less pain walking    Pre-Treatment Pain: 2/10 R 4/10 L knee; ankle at 0/10  Post-Treatment Pain: 2/10  Location: knees    TREATMENT     Standing tolerance without UE support 60 sec eyes open; eyes closed 20 sec    Balance: R LE single leg stand= unable, L LE single leg stand = unable; sit to stand only tolerated with B UE support   30 second sit to stand test: 2 reps (well below norms for her age. Pt is O2 dependent) improved sit to stand 4 reps                Sensation: Sensation intact to light touch.     Girth measurements: both knees have popliteal edema         At evak   Today  Knee AROM:                                                  (R)       (L)  R L                                      Flexion                         110      115  130 120                                       Extension                    30 degrees lagging from straight        10; 20 5                                      Pt tends to lay with her hips Externally rotated limiting knee extension passively when at rest. PT used a towel to help use passive ROM to slowly stretch her towards knee extension but she often moved away from neutral and returned to external rotation.          At eval  Today R L  Hip AROM:                 Flexion                         90        90  100 100                                      ER                               40        40                                      IR                                 20        20                                      ABD                             30        30        Ankle AROM:             Ankle DF(tosin)            WFL seated but tight in standing due to gastroc tension from poor knee extension B for what appears to be some time                                      1/2 kneel Df(sole)        Lags neutral by 5 degrees B2    At eval       Today  Strength:                                                        R          L  R L  Hip flexors L2                          3/5       3/5  4 4  Quadriceps L3                        3/5       3/5      4 4                             Hamstrings S1                        3/5       3/5  4 4                          Dorsiflexion L4                        5/5       2/5                   2+ 2+                          Plantar flexion S1                    2/5       2/5  2 2                          Hip Internal Rotation               2/5       2/5  2+ 2+                          Gluteus Medius L45S1           2+/5     2+/5  3 3                          Gluteus Vj L5S12        2/5       2/5  2+ 2+                                [place] received therapeutic exercises to develop strength, endurance, ROM, flexibility and posture for 30 minutes including:    Shuttle 4 bands squats 3  min  Standing heel raises 2x 10  Tail gators 2 # per leg 3 min  Standing trunk flexion 2x 5 mid ROM then her best ROM 75% 2x 8  Seated hip flexion 2# 2x 10  SLR 2# 3x 8 each  LAQ 2 x 10 2# B 3 sec hold at EROM  Ankle PF with green theraband and with Quad set 3x 10    Deferred:  Standing aline curls 15# 4x 5  Bridges 2x 10  Standing rows with red theraband for resistance 1x10 then 2x 15  Standing shoulder extension with red theraband 3x 10  Standing paloff press with red theraband 2x10 per side  Standing chest press with red theraband for resistance 2x 8  hooklying clams 1x 10 then add red theraband 2x 10 per side  Side lying hip abduction   R ankle plantarflexion with knee extension in long sitting with blue theraband 3x 8  Bridges 2x 10  Knee flex to green band 2x 10  Sit to stand from 20 inch box 2x7  Standing hip abduction on saucer med/lateral glides 1x 8 then forward/back 1x 10 each side  Standing hip flexion 2x 10 with cues on heel strike to increase knee extension  Hip internal external rotation in prone 2x 10      Merle received the following manual therapy techniques: patellar mobs for upward glide and latearl to medial glides on the R and L knee as well as soft tissue to the quad and calves B to reduce trigger points and tension. PT used AROM to stretch calf and hamstring above in therex as limited tolerance to manual PT. Manual therapy for 15 minutes in total.      Merle participated in gait training to improve functional mobility and safety for -  minutes, including:      Merle received the following direct contact modalities after being cleared for contraindications:     Merle received the following supervised modalities after being cleared for contradictions:     Merle received hot pack for knees minutes to 0 min B at knees    Merle received cold pack for - minutes to -.  Merle received electrical stimulation for - minutes to -      Home Exercises Provided and Patient Education Provided      Education/Self-Care provided: (during minutes)   PT educated pt on the need to work on her HEP more than walking for exercise as her home program works on getting her mobility back as her knee ROM and hip strength are weak and need isolated work.    Patient educated on the importance of improved core and upper and lower extremity strength in order to improve alignment of the spine and upper and lower extremities with static positions and dynamic movement.    Patient educated on the importance of strong core and lower extremity musculature in order to improve both static and dynamic balance, improve gait mechanics, reduce fall risk and improve household and community mobility.       Written Home Exercises Provided: Patient instructed to cont prior HEP.  Exercises were reviewed and Merle was able to demonstrate them prior to the end of the session.  Merle demonstrated fair  understanding of the education provided.     See EMR under Patient Instructions for exercises provided prior visit.    ASSESSMENT   Pt has been out of town for a few weeks so PT held her weights some on a few tasks today as she was a bit unsteady but she made gains in ROM since there last session and after manual therapy mobs to the patella. Pt has poor tolerance to soft tissue work on the L calf but less pain after AROM therex. Pt responds best to therex as low tolerance for manual PT in general. Pt continues to have fall hazards and ROM loss but she is making gains so PT to continue to advance.  PT to continue working on hip and knee strength to increase safety managing steps as she is still SBA to [facility] for safety on unlevel saira challenges.      Merle Is progressing well towards her goals.   Pt prognosis is Good.     Pt will continue to benefit from skilled outpatient physical therapy to address the deficits listed in the problem list box on initial evaluation, provide pt/family education and to maximize pt's level of independence  in the home and community environment.     Pt's spiritual, cultural and educational needs considered and pt agreeable to plan of care and goals.     Anticipated barriers to physical therapy: relies on O2 and transportation; very deconditioned    Goals:   Short Term Goals: In 4 weeks:  1.Pt to be educated on HEP. met  2.Patient to demo increased knee extension to 10 degrees shy of neutral and L to 0. progressing  3.Patient to increase strength 3+/5 hip strength with flexion and 3/5 with abduction. Met with flexion  4.Patient to have decreased pain to 7/10 or better. met  5.Patient to increase LE balance to tolerate standing without RW support for 30 seconds as at eval requiried RW support to stand. met  6.Patient to improve score on the FOTO by 10%. met      Long Term Goals: In 10 weeks 5/16/22  1. Patient to perform daily activities including walking up to 200 ft without stopping using a RW without limitation. met  2. Patient to demonstrate increased knee AROM to 5 degrees or better with L knee extension. met  3. Patient to demonstrate increased LE strength with knee extension to 3/5 or better.met  4. Patient to have demonstrate increased strength functionally as noted by a score of 5 reps or better on the 30 sec sit to stand test. progressing  5. Patient to improve score on the FOTO to 80% limitation or better. met       PLAN   Continue Plan of Care (POC) and progress per patient tolerance.    Shanna Corbin, PT, CIDN, MA

## 2022-04-27 ENCOUNTER — PATIENT MESSAGE (OUTPATIENT)
Dept: SMOKING CESSATION | Facility: CLINIC | Age: 67
End: 2022-04-27
Payer: COMMERCIAL

## 2022-04-28 ENCOUNTER — CLINICAL SUPPORT (OUTPATIENT)
Dept: REHABILITATION | Facility: HOSPITAL | Age: 67
End: 2022-04-28
Payer: COMMERCIAL

## 2022-04-28 DIAGNOSIS — R29.898 WEAKNESS OF BOTH HIPS: ICD-10-CM

## 2022-04-28 DIAGNOSIS — R26.9 GAIT DIFFICULTY: Primary | ICD-10-CM

## 2022-04-28 PROCEDURE — 97110 THERAPEUTIC EXERCISES: CPT

## 2022-04-28 NOTE — PROGRESS NOTES
Physical Therapy Daily Treatment Note     Name: Merle Caro  Clinic Number: [id number]    Therapy Diagnosis:   Encounter Diagnoses   Name Primary?    Gait difficulty Yes    Weakness of both hips      Physician: Luis Galeano MD    Visit Date: 4/28/2022    Physician Orders: PT Eval and Treat   Medical Diagnosis from Referral: M17.0 (ICD-10-CM) - Bilateral primary osteoarthritis of knee  Evaluation Date: 3/7/2022  Authorization Period Expiration: 12/31/23  Plan of Care Expiration: 5/16/22  Visit # / Visits authorized: 15/20  Foto: 2/3   Progress Note Due Date: 19 visit or 4/29/2022    Time In: 10:45 am  Time Out: 11: 30 am  Total Billable Time: 45 minutes     Precautions: standard, O2 at 2 L, HTN, hx bipolar, slow processing    SUBJECTIVE     Today, pt reports: she is a little sore today on the L more than the R.  She was compliant with home exercise program.   Response to previous treatment: some soreness in the buttock and thighs  Functional change: R knee has less pain walking    Pre-Treatment Pain: 2/10 R 4/10 L knee; ankle at 0/10  Post-Treatment Pain: 2/10  Location: knees    TREATMENT       Merle received therapeutic exercises to develop strength, endurance, ROM, flexibility and posture for 45 minutes including:    Shuttle 3 bands squats 3 min  Standing heel raises 2x 10  Tail gators 2 # per leg 3 min  Standing aline curls 15# 4x 5  Sit to stand from elevated seat  3x 7  Seated hip flexion 2# 2x 10  Ankle PF with green theraband and with Quad set 3x 15  Standing rows with green theraband for resistance 2x 15  Standing chest press single arm green theraband 2x 15      Deferred:  SLR 2# 3x 8 each  LAQ 2 x 10 2# B 3 sec hold at EROM  Bridges 2x 10  Standing shoulder extension with red theraband 3x 10  Standing paloff press with red theraband 2x10 per side  Standing chest press with red theraband for resistance 2x 8  hooklying clams 1x 10 then add red theraband 2x 10 per side  Side lying hip abduction    R ankle plantarflexion with knee extension in long sitting with blue theraband 3x 8  Bridges 2x 10  Knee flex to green band 2x 10  Sit to stand from 20 inch box 2x7  Standing hip abduction on saucer med/lateral glides 1x 8 then forward/back 1x 10 each side  Standing hip flexion 2x 10 with cues on heel strike to increase knee extension  Hip internal external rotation in prone 2x 10      Merle received the following manual therapy techniques: patellar mobs for upward glide and latearl to medial glides on the R and L knee as well as soft tissue to the quad and calves B to reduce trigger points and tension. PT used AROM to stretch calf and hamstring above in therex as limited tolerance to manual PT. Manual therapy for 0 minutes in total.      Merle participated in gait training to improve functional mobility and safety for -  minutes, including:      Merle received the following direct contact modalities after being cleared for contraindications:     Merle received the following supervised modalities after being cleared for contradictions:     Merle received hot pack for knees minutes to 0 min B at knees    Merle received cold pack for - minutes to -.  Merle received electrical stimulation for - minutes to -      Home Exercises Provided and Patient Education Provided     Education/Self-Care provided: (during minutes)   PT educated pt on the need to work on her HEP more than walking for exercise as her home program works on getting her mobility back as her knee ROM and hip strength are weak and need isolated work.    Patient educated on the importance of improved core and upper and lower extremity strength in order to improve alignment of the spine and upper and lower extremities with static positions and dynamic movement.    Patient educated on the importance of strong core and lower extremity musculature in order to improve both static and dynamic balance, improve gait mechanics, reduce fall risk and  improve household and community mobility.       Written Home Exercises Provided: Patient instructed to cont prior HEP.  Exercises were reviewed and Merle was able to demonstrate them prior to the end of the session.  Merle demonstrated fair  understanding of the education provided.     See EMR under Patient Instructions for exercises provided prior visit.    ASSESSMENT   Pt has been out for a bit so we slowly began progressing her back to more standing and lifting tasks today to advance upright posture and balance. Pt is fearful of loss of balance but even when she expressed she felt she was losing her balance she had full balance and good protective strategies. PT encourage pt to keep working with us on balance tasks as on eval, just simply standing was a challenge, now she can stand with perturbations from the theraband.  PT to continue working on hip and knee strength to increase safety managing steps as she is still SBA on most tasks but PT still uses CGA for curb management and on unlevel saira challenges.      Merle Is progressing well towards her goals.   Pt prognosis is Good.     Pt will continue to benefit from skilled outpatient physical therapy to address the deficits listed in the problem list box on initial evaluation, provide pt/family education and to maximize pt's level of independence in the home and community environment.     Pt's spiritual, cultural and educational needs considered and pt agreeable to plan of care and goals.     Anticipated barriers to physical therapy: relies on O2 and transportation; very deconditioned    Goals:   Short Term Goals: In 4 weeks:  1.Pt to be educated on HEP. met  2.Patient to demo increased knee extension to 10 degrees shy of neutral and L to 0. progressing  3.Patient to increase strength 3+/5 hip strength with flexion and 3/5 with abduction. Met with flexion  4.Patient to have decreased pain to 7/10 or better. met  5.Patient to increase LE balance to  tolerate standing without RW support for 30 seconds as at eval requiried RW support to stand. met  6.Patient to improve score on the FOTO by 10%. met      Long Term Goals: In 10 weeks 5/16/22  1. Patient to perform daily activities including walking up to 200 ft without stopping using a RW without limitation. met  2. Patient to demonstrate increased knee AROM to 5 degrees or better with L knee extension. met  3. Patient to demonstrate increased LE strength with knee extension to 3/5 or better.met  4. Patient to have demonstrate increased strength functionally as noted by a score of 5 reps or better on the 30 sec sit to stand test. Progressing (today performed from an elevated seat at 7 reps so modified)  5. Patient to improve score on the FOTO to 80% limitation or better. met       PLAN   Continue Plan of Care (POC) and progress per patient tolerance.    Shanna Corbin, PT, CIDN, SFMA

## 2022-05-02 ENCOUNTER — CLINICAL SUPPORT (OUTPATIENT)
Dept: REHABILITATION | Facility: HOSPITAL | Age: 67
End: 2022-05-02
Payer: COMMERCIAL

## 2022-05-02 DIAGNOSIS — R29.898 WEAKNESS OF BOTH HIPS: ICD-10-CM

## 2022-05-02 DIAGNOSIS — R26.9 GAIT DIFFICULTY: Primary | ICD-10-CM

## 2022-05-02 PROCEDURE — 97110 THERAPEUTIC EXERCISES: CPT

## 2022-05-02 NOTE — PROGRESS NOTES
Physical Therapy Daily Treatment Note     Name: Merle [facility]  Clinic Number: [id number]    Therapy Diagnosis:   Encounter Diagnoses   Name Primary?    Gait difficulty Yes    Weakness of both hips      Physician: Luis Galeano MD    Visit Date: 5/2/2022    Physician Orders: PT Eval and Treat   Medical Diagnosis from Referral: M17.0 (ICD-10-CM) - Bilateral primary osteoarthritis of knee  Evaluation Date: 3/7/2022  Authorization Period Expiration: 12/31/23  Plan of Care Expiration: 5/16/22  Visit # / Visits authorized: 16/20  Foto: 2/3   Progress Note Due Date: 19 visit or 4/29/2022    Time In: 9:04 am  Time Out: 9:45 am  Total Billable Time: 41 minutes     Precautions: standard, O2 at 2 L, HTN, hx bipolar, slow processing    SUBJECTIVE     Today, pt reports: she had no significant pain over the weekend until this am.  She was compliant with home exercise program.   Response to previous treatment: some soreness in the buttock and thighs  Functional change: R knee has less pain walking    Pre-Treatment Pain: 2/10 R 4/10 L knee; ankle at 0/10  Post-Treatment Pain: 2/10  Location: knees    TREATMENT       Merle received therapeutic exercises to develop strength, endurance, ROM, flexibility and posture for 45 minutes including:    Shuttle 3 bands squats 3 min  SLR 2x 8 2# per leg  Bridges 2x 10  Seated hip flexion 2# 2x 10  LAQ in supine 2# 3x8  Ankle PF with green theraband and with Quad set 3x 15  Standing aline curls 15# 2x8  Seated LAQ 2# 3 sec hold at EROM 2x12    Deferred:  Standing heel raises 2x 10  Tail gators 2 # per leg 3 min  Sit to stand from elevated seat  3x 7  Standing rows with green theraband for resistance 2x 15  Standing chest press single arm green theraband 2x 15  Standing shoulder extension with red theraband 3x 10  Standing paloff press with red theraband 2x10 per side  Standing chest press with red theraband for resistance 2x 8  hooklying clams 1x 10 then add red theraband 2x 10 per  side  Side lying hip abduction   Sit to stand from 20 inch box 2x7  Standing hip abduction on saucer med/lateral glides 1x 8 then forward/back 1x 10 each side  Standing hip flexion 2x 10 with cues on heel strike to increase knee extension  Hip internal external rotation in prone 2x 10      Merle received the following manual therapy techniques: patellar mobs for upward glide and latearl to medial glides on the R and L knee as well as soft tissue to the quad and calves B to reduce trigger points and tension. PT used AROM to stretch calf and hamstring above in therex as limited tolerance to manual PT. Manual therapy for 0 minutes in total.      Merle participated in gait training to improve functional mobility and safety for -  minutes, including:      Merle received the following direct contact modalities after being cleared for contraindications:     Merle received the following supervised modalities after being cleared for contradictions:     Merle received hot pack for knees minutes to 0 min B at knees    Merle received cold pack for - minutes to -.  Merle received electrical stimulation for - minutes to -      Home Exercises Provided and Patient Education Provided     Education/Self-Care provided: (during minutes)   PT educated pt on the need to work on her HEP more than walking for exercise as her home program works on getting her mobility back as her knee ROM and hip strength are weak and need isolated work.    Patient educated on the importance of improved core and upper and lower extremity strength in order to improve alignment of the spine and upper and lower extremities with static positions and dynamic movement.    Patient educated on the importance of strong core and lower extremity musculature in order to improve both static and dynamic balance, improve gait mechanics, reduce fall risk and improve household and community mobility.       Written Home Exercises Provided: Patient instructed to  cont prior HEP.  Exercises were reviewed and Merle was able to demonstrate them prior to the end of the session.  Merle demonstrated fair  understanding of the education provided.     See EMR under Patient Instructions for exercises provided prior visit.    ASSESSMENT   PT continued with therex to work on posterior chain strength today. Pt was more short of breath at times needing intermittent rest breaks. Pt was able to increase reps on the Christopher curls but needs more endurance to improve standing and walking tolerances. Pt gait at the end of PT was more upright, longer stride and less weight bearing into her arms. PT to continue working on hip and knee strength to increase safety managing steps as she is still SBA on most tasks but PT still uses CGA for curb management and on unlevel saira challenges.      Merle Is progressing well towards her goals.   Pt prognosis is Good.     Pt will continue to benefit from skilled outpatient physical therapy to address the deficits listed in the problem list box on initial evaluation, provide pt/family education and to maximize pt's level of independence in the home and community environment.     Pt's spiritual, cultural and educational needs considered and pt agreeable to plan of care and goals.     Anticipated barriers to physical therapy: relies on O2 and transportation; very deconditioned    Goals:   Short Term Goals: In 4 weeks:  1.Pt to be educated on HEP. met  2.Patient to demo increased knee extension to 10 degrees shy of neutral and L to 0. progressing  3.Patient to increase strength 3+/5 hip strength with flexion and 3/5 with abduction. Met with flexion  4.Patient to have decreased pain to 7/10 or better. met  5.Patient to increase LE balance to tolerate standing without RW support for 30 seconds as at eval requiried RW support to stand. met  6.Patient to improve score on the FOTO by 10%. met      Long Term Goals: In 10 weeks 5/16/22  1. Patient to perform  daily activities including walking up to 200 ft without stopping using a RW without limitation. met  2. Patient to demonstrate increased knee AROM to 5 degrees or better with L knee extension. met  3. Patient to demonstrate increased LE strength with knee extension to 3/5 or better.met  4. Patient to have demonstrate increased strength functionally as noted by a score of 5 reps or better on the 30 sec sit to stand test. Progressing (today performed from an elevated seat at 7 reps so modified)  5. Patient to improve score on the FOTO to 80% limitation or better. met       PLAN   Continue Plan of Care (POC) and progress per patient tolerance.    Shanna Corbin, PT, CIDN, SFMA

## 2022-05-03 ENCOUNTER — OFFICE VISIT (OUTPATIENT)
Dept: INTERNAL MEDICINE | Facility: CLINIC | Age: 67
End: 2022-05-03
Payer: COMMERCIAL

## 2022-05-03 VITALS
TEMPERATURE: 99 F | SYSTOLIC BLOOD PRESSURE: 118 MMHG | OXYGEN SATURATION: 97 % | DIASTOLIC BLOOD PRESSURE: 72 MMHG | WEIGHT: 144.38 LBS | HEART RATE: 114 BPM | HEIGHT: 60 IN | BODY MASS INDEX: 28.35 KG/M2

## 2022-05-03 DIAGNOSIS — F33.42 RECURRENT MAJOR DEPRESSIVE DISORDER, IN FULL REMISSION: ICD-10-CM

## 2022-05-03 DIAGNOSIS — K21.00 GASTROESOPHAGEAL REFLUX DISEASE WITH ESOPHAGITIS WITHOUT HEMORRHAGE: ICD-10-CM

## 2022-05-03 DIAGNOSIS — M17.0 PRIMARY OSTEOARTHRITIS OF BOTH KNEES: ICD-10-CM

## 2022-05-03 DIAGNOSIS — F31.76 BIPOLAR DISORDER, IN FULL REMISSION, MOST RECENT EPISODE DEPRESSED: ICD-10-CM

## 2022-05-03 DIAGNOSIS — G47.33 OSA ON CPAP: Chronic | ICD-10-CM

## 2022-05-03 DIAGNOSIS — Z12.31 ENCOUNTER FOR SCREENING MAMMOGRAM FOR MALIGNANT NEOPLASM OF BREAST: ICD-10-CM

## 2022-05-03 DIAGNOSIS — R63.4 WEIGHT LOSS: ICD-10-CM

## 2022-05-03 DIAGNOSIS — E78.00 PURE HYPERCHOLESTEROLEMIA: Chronic | ICD-10-CM

## 2022-05-03 DIAGNOSIS — R91.1 LUNG NODULE SEEN ON IMAGING STUDY: ICD-10-CM

## 2022-05-03 DIAGNOSIS — J44.89 ASTHMA WITH COPD: ICD-10-CM

## 2022-05-03 DIAGNOSIS — I70.0 AORTIC ATHEROSCLEROSIS: Chronic | ICD-10-CM

## 2022-05-03 DIAGNOSIS — F17.219 CIGARETTE NICOTINE DEPENDENCE WITH NICOTINE-INDUCED DISORDER: ICD-10-CM

## 2022-05-03 DIAGNOSIS — J96.11 CHRONIC RESPIRATORY FAILURE WITH HYPOXIA: ICD-10-CM

## 2022-05-03 DIAGNOSIS — I10 ESSENTIAL HYPERTENSION: Chronic | ICD-10-CM

## 2022-05-03 DIAGNOSIS — Z00.00 ANNUAL PHYSICAL EXAM: Primary | ICD-10-CM

## 2022-05-03 DIAGNOSIS — Z86.010 HISTORY OF COLON POLYPS: ICD-10-CM

## 2022-05-03 PROCEDURE — 99396 PREV VISIT EST AGE 40-64: CPT | Mod: S$GLB,,, | Performed by: FAMILY MEDICINE

## 2022-05-03 PROCEDURE — 4010F ACE/ARB THERAPY RXD/TAKEN: CPT | Mod: CPTII,S$GLB,, | Performed by: FAMILY MEDICINE

## 2022-05-03 PROCEDURE — 3008F PR BODY MASS INDEX (BMI) DOCUMENTED: ICD-10-PCS | Mod: CPTII,S$GLB,, | Performed by: FAMILY MEDICINE

## 2022-05-03 PROCEDURE — 3074F SYST BP LT 130 MM HG: CPT | Mod: CPTII,S$GLB,, | Performed by: FAMILY MEDICINE

## 2022-05-03 PROCEDURE — 99396 PR PREVENTIVE VISIT,EST,40-64: ICD-10-PCS | Mod: S$GLB,,, | Performed by: FAMILY MEDICINE

## 2022-05-03 PROCEDURE — 3008F BODY MASS INDEX DOCD: CPT | Mod: CPTII,S$GLB,, | Performed by: FAMILY MEDICINE

## 2022-05-03 PROCEDURE — 1159F PR MEDICATION LIST DOCUMENTED IN MEDICAL RECORD: ICD-10-PCS | Mod: CPTII,S$GLB,, | Performed by: FAMILY MEDICINE

## 2022-05-03 PROCEDURE — 3078F PR MOST RECENT DIASTOLIC BLOOD PRESSURE < 80 MM HG: ICD-10-PCS | Mod: CPTII,S$GLB,, | Performed by: FAMILY MEDICINE

## 2022-05-03 PROCEDURE — 1159F MED LIST DOCD IN RCRD: CPT | Mod: CPTII,S$GLB,, | Performed by: FAMILY MEDICINE

## 2022-05-03 PROCEDURE — 99999 PR PBB SHADOW E&M-EST. PATIENT-LVL IV: CPT | Mod: PBBFAC,,, | Performed by: FAMILY MEDICINE

## 2022-05-03 PROCEDURE — 3044F PR MOST RECENT HEMOGLOBIN A1C LEVEL <7.0%: ICD-10-PCS | Mod: CPTII,S$GLB,, | Performed by: FAMILY MEDICINE

## 2022-05-03 PROCEDURE — 3074F PR MOST RECENT SYSTOLIC BLOOD PRESSURE < 130 MM HG: ICD-10-PCS | Mod: CPTII,S$GLB,, | Performed by: FAMILY MEDICINE

## 2022-05-03 PROCEDURE — 3078F DIAST BP <80 MM HG: CPT | Mod: CPTII,S$GLB,, | Performed by: FAMILY MEDICINE

## 2022-05-03 PROCEDURE — 99999 PR PBB SHADOW E&M-EST. PATIENT-LVL IV: ICD-10-PCS | Mod: PBBFAC,,, | Performed by: FAMILY MEDICINE

## 2022-05-03 PROCEDURE — 4010F PR ACE/ARB THEARPY RXD/TAKEN: ICD-10-PCS | Mod: CPTII,S$GLB,, | Performed by: FAMILY MEDICINE

## 2022-05-03 PROCEDURE — 3044F HG A1C LEVEL LT 7.0%: CPT | Mod: CPTII,S$GLB,, | Performed by: FAMILY MEDICINE

## 2022-05-03 NOTE — PROGRESS NOTES
Subjective:   Patient ID: Merle Caro is a 66 y.o. female.  Chief Complaint:  No chief complaint on file.    Presents for annual exam    Last visit in February 2022 for follow up on chronic conditions  10 pound weight loss over a period of 3 months noted at the time  A1c, TSH, PTH normal  CBC without anemia  Recommended colonoscopy due to no identifiable cause for weight loss   Has continued with additional H8-10 lb weight loss since that visit.    Medical History  - Asthma with COPD: Stable. On Spiriva 18 mcg daily and Advair 500/50 inhaler twice day.   - Hypertension: Stable. Controlled. Asymptomatic. BP at goal. On Lisinopril 10 mg daily. Reports compliance. Denies side effects.  - Hyperlipidemia: Stable. Controlled. Asymptomatic. Previous LDL at goal. On Zocor 40 mg daily. Reports compliance. Denies side effects.  - Bipolar disorder, Major Depressive Disorder: Following up with psychiatry. Stable. At Baseline.   - Pulmonary nodules. No new changes noted on previous CT lung screening.  - Chronic hyponatremia: Stable. May be related to psychiatric medications.  - GERD: Stable. Symptoms controlled. On Protonix 40 mg daily.    Concern today is ongoing weight loss.  Reports associated fatigue  Denies recent dietary changes    No additional concerns noted today.    Current Outpatient Medications:     celecoxib (CELEBREX) 200 MG capsule, Take 1 capsule (200 mg total) by mouth once daily., Disp: 90 capsule, Rfl: 0    cholecalciferol, vitamin D3, (VITAMIN D3) 2,000 unit Cap, Take 1 capsule by mouth once daily., Disp: , Rfl:     clonazepam (KLONOPIN) 0.5 MG tablet, Take 0.5 mg by mouth once daily. , Disp: , Rfl:     diclofenac sodium (VOLTAREN ARTHRITIS PAIN) 1 % Gel, Apply 2 g topically 4 (four) times daily., Disp: 100 g, Rfl: 11    fluticasone-salmeterol diskus inhaler 500-50 mcg, Inhale 1 puff into the lungs 2 (two) times daily. Controller, Disp: 60 each, Rfl: 11    haloperidol (HALDOL) 5 MG tablet, Take 5 mg  by mouth once daily., Disp: , Rfl:     lisinopriL 10 MG tablet, TAKE 1 TABLET(10 MG) BY MOUTH EVERY DAY, Disp: 90 tablet, Rfl: 3    pantoprazole (PROTONIX) 40 MG tablet, Take 1 tablet (40 mg total) by mouth once daily., Disp: 90 tablet, Rfl: 3    paroxetine (PAXIL) 40 MG tablet, TK 1 T PO QHS, Disp: , Rfl: 2    simvastatin (ZOCOR) 40 MG tablet, TAKE 1 TABLET(40 MG) BY MOUTH EVERY EVENING, Disp: 90 tablet, Rfl: 0    tiotropium (SPIRIVA WITH HANDIHALER) 18 mcg inhalation capsule, Inhale 1 capsule (18 mcg total) into the lungs once daily. Controller, Disp: 30 capsule, Rfl: 11    Review of Systems   Constitutional: Positive for fatigue and unexpected weight change. Negative for activity change, appetite change, chills and fever.   HENT: Negative for congestion, ear discharge, ear pain, sinus pressure, sinus pain and sore throat.    Respiratory: Positive for shortness of breath (Chronic and stable). Negative for cough and chest tightness.    Cardiovascular: Negative for chest pain.   Gastrointestinal: Negative for abdominal pain, blood in stool, diarrhea, nausea and vomiting.   Endocrine: Negative for polydipsia, polyphagia and polyuria.   Genitourinary: Negative for dysuria, frequency, hematuria and urgency.   Musculoskeletal: Negative for arthralgias, back pain, gait problem and myalgias.   Skin: Negative for color change and rash.   Neurological: Negative for dizziness, tremors, light-headedness, numbness and headaches.   Psychiatric/Behavioral: Negative for dysphoric mood. The patient is not nervous/anxious.      Objective:   /72   Pulse (!) 114   Temp 98.7 °F (37.1 °C)   Ht 5' (1.524 m)   Wt 65.5 kg (144 lb 6.4 oz)   SpO2 97%   BMI 28.20 kg/m²     Physical Exam  Vitals and nursing note reviewed.   Constitutional:       Appearance: Normal appearance. She is well-developed and overweight.   Eyes:      General: No scleral icterus.     Conjunctiva/sclera: Conjunctivae normal.   Neck:      Thyroid: No  thyroid mass, thyromegaly or thyroid tenderness.      Vascular: Normal carotid pulses. No carotid bruit or JVD.   Cardiovascular:      Rate and Rhythm: Regular rhythm. Tachycardia present.      Pulses:           Radial pulses are 2+ on the right side and 2+ on the left side.      Heart sounds: Normal heart sounds. No murmur heard.    No friction rub. No gallop.   Pulmonary:      Effort: Pulmonary effort is normal. No tachypnea, accessory muscle usage or respiratory distress.      Breath sounds: Rhonchi present. No wheezing or rales.   Abdominal:      General: There is no distension.      Palpations: Abdomen is soft. There is no hepatomegaly.      Tenderness: There is no abdominal tenderness. There is no guarding or rebound.   Musculoskeletal:      Right lower leg: No edema.      Left lower leg: No edema.   Skin:     General: Skin is warm and dry.      Capillary Refill: Capillary refill takes less than 2 seconds.      Findings: No rash.   Neurological:      Mental Status: She is alert.      Coordination: Coordination is intact. Coordination normal.      Gait: Gait is intact. Gait normal.   Psychiatric:         Attention and Perception: Attention normal.         Mood and Affect: Mood and affect normal.         Speech: Speech normal.         Behavior: Behavior normal.         Thought Content: Thought content normal.         Cognition and Memory: Cognition normal.         Judgment: Judgment normal.       Assessment:       ICD-10-CM ICD-9-CM   1. Annual physical exam  Z00.00 V70.0   2. Encounter for screening mammogram for malignant neoplasm of breast  Z12.31 V76.12   3. Weight loss  R63.4 783.21   4. Gastroesophageal reflux disease with esophagitis without hemorrhage  K21.00 530.81     530.10   5. History of colon polyps  Z86.010 V12.72   6. Essential hypertension  I10 401.9   7. Pure hypercholesterolemia  E78.00 272.0   8. Aortic atherosclerosis  I70.0 440.0   9. Asthma with COPD  J44.9 493.20   10. Chronic respiratory  failure with hypoxia  J96.11 518.83     799.02   11. LUCILA on CPAP: Not compliant with CPAP  G47.33 327.23    Z99.89 V46.8   12. Lung nodule seen on imaging study  R91.1 793.11   13. Cigarette nicotine dependence with nicotine-induced disorder  F17.219 292.9   14. Recurrent major depressive disorder, in full remission  F33.42 296.36   15. Bipolar disorder, in full remission, most recent episode depressed  F31.76 296.56   16. Primary osteoarthritis of both knees  M17.0 715.16     Plan:   Annual physical exam  Encounter for screening mammogram for malignant neoplasm of breast  -     Cancel: Lipid Panel; Future; Expected date: 05/03/2022  -     Mammo Digital Screening Bilat w/ [facility]; Future; Expected date: 05/03/2022  Blood pressure controlled. BMI 28. Other than weight loss, exam unremarkable and at baseline  Recommend lipid panel today, patient decline  Scheduled mammogram  Recommend shingles vaccine COVID booster through pharmacy    Weight loss  Gastroesophageal reflux disease with esophagitis without hemorrhage  History of colon polyps  -     Ambulatory referral/consult to Gastroenterology; Future; Expected date: 05/10/2022  Refer to GI for EGD / colonoscopy  Continue Protonix 40 mg daily    Essential hypertension  Controlled. Stable. Asymptomatic. BP at goal.  Continue Lisinopril 10 mg daily.    Pure hypercholesterolemia  Aortic atherosclerosis  Controlled. Stable. Asymptomatic. Previous LDL at goal.  Continue Simvastatin 40 mg daily.    Asthma with COPD  Chronic respiratory failure with hypoxia  LUCILA on CPAP: Not compliant with CPAP  Lung nodule seen on imaging study  Cigarette nicotine dependence with nicotine-induced disorder  Currently not smoking  Continue Spiriva 18 mcg daily  Continue Advair 500/50 mcg twice daily.  Follow-up pulmonology as scheduled  Repeat low-dose CT scheduled for November 2022    Recurrent major depressive disorder, in full remission  Bipolar disorder, in full remission, most recent episode  depressed  Stable. At baseline.  Follow up with psychiatry as scheduled.  Continue current medications.    Primary osteoarthritis of both knees  Follow up with orthopedics as scheduled.    Return to clinic in 6 weeks    Angelito Rutherford, MS4    I hereby acknowledge that I am relying upon documentation authored by a medical student working under my supervision and further I hereby attest that I have verified the student documentation or findings by personally re-performing the physical exam and medical decision making activities of the Evaluation and Management service to be billed.  Marck Major

## 2022-05-04 ENCOUNTER — CLINICAL SUPPORT (OUTPATIENT)
Dept: REHABILITATION | Facility: HOSPITAL | Age: 67
End: 2022-05-04
Payer: COMMERCIAL

## 2022-05-04 DIAGNOSIS — R29.898 WEAKNESS OF BOTH HIPS: ICD-10-CM

## 2022-05-04 DIAGNOSIS — R26.9 GAIT DIFFICULTY: Primary | ICD-10-CM

## 2022-05-04 PROCEDURE — 97110 THERAPEUTIC EXERCISES: CPT | Mod: CQ

## 2022-05-04 NOTE — PROGRESS NOTES
Physical Therapy Assistant Daily Treatment Note     Name: Merle BUSH [facility]  Clinic Number: [id number]    Therapy Diagnosis:   Encounter Diagnoses   Name Primary?    Gait difficulty Yes    Weakness of both hips      Physician: Luis Galeano MD    Visit Date: 5/4/2022    Physician Orders: PT Eval and Treat   Medical Diagnosis from Referral: M17.0 (ICD-10-CM) - Bilateral primary osteoarthritis of knee  Evaluation Date: 3/7/2022  Authorization Period Expiration: 12/31/23  Plan of Care Expiration: 5/16/22  Visit # / Visits authorized: 17/20  Foto: 2/3   Progress Note Due Date: 19 visit or 4/29/2022    Time In: 12:00 pm  Time Out: 12:30 pm  Total Billable Time: 30 minutes     Precautions: standard, O2 at 2 L, HTN, hx bipolar, slow processing    SUBJECTIVE     Today, pt reports: her left knee hurts worse. She reports she has been having diarrhea and she just wants to rest.  She was compliant with home exercise program.   Response to previous treatment: some soreness in the buttock and thighs  Functional change: R knee has less pain walking    Pre-Treatment Pain: 0/10 R 5/10 L knee; ankle at 0/10  Post-Treatment Pain: 2/10  Location: knees    TREATMENT     Blood pressure 95/64 mmHg    bpm  O2 94-96%    Merle received therapeutic exercises to develop strength, endurance, ROM, flexibility and posture for 30 minutes including:    Shuttle 3 bands squats 2 min  Tail gators 2 # per leg 3 min  SLR 2x 8 2# per leg  Bridges 2x 10  Seated LAQ 2# 3 sec hold at EROM 2x12    Deferred:  Seated hip flexion 2# 2x 10  LAQ in supine 2# 3x8  Ankle PF with green theraband and with Quad set 3x 15  Standing aline curls 15# 2x8  Standing heel raises 2x 10  Sit to stand from elevated seat  3x 7  Standing rows with green theraband for resistance 2x 15  Standing chest press single arm green theraband 2x 15  Standing shoulder extension with red theraband 3x 10  Standing paloff press with red theraband 2x10 per side  Standing chest  press with red theraband for resistance 2x 8  hooklying clams 1x 10 then add red theraband 2x 10 per side  Side lying hip abduction   Sit to stand from 20 inch box 2x7  Standing hip abduction on saucer med/lateral glides 1x 8 then forward/back 1x 10 each side  Standing hip flexion 2x 10 with cues on heel strike to increase knee extension  Hip internal external rotation in prone 2x 10      Merle received the following manual therapy techniques: patellar mobs for upward glide and latearl to medial glides on the R and L knee as well as soft tissue to the quad and calves B to reduce trigger points and tension. PT used AROM to stretch calf and hamstring above in therex as limited tolerance to manual PT. Manual therapy for 0 minutes in total.      Merle participated in gait training to improve functional mobility and safety for -  minutes, including:      Merle received the following direct contact modalities after being cleared for contraindications:     Merle received the following supervised modalities after being cleared for contradictions:     Merle received hot pack for knees minutes to 0 min B at knees    Merle received cold pack for - minutes to -.  Merle received electrical stimulation for - minutes to -      Home Exercises Provided and Patient Education Provided     Education/Self-Care provided: (during minutes)   PT educated pt on the need to work on her HEP more than walking for exercise as her home program works on getting her mobility back as her knee ROM and hip strength are weak and need isolated work.    Patient educated on the importance of improved core and upper and lower extremity strength in order to improve alignment of the spine and upper and lower extremities with static positions and dynamic movement.    Patient educated on the importance of strong core and lower extremity musculature in order to improve both static and dynamic balance, improve gait mechanics, reduce fall risk and  improve household and community mobility.       Written Home Exercises Provided: Patient instructed to cont prior HEP.  Exercises were reviewed and Merle was able to demonstrate them prior to the end of the session.  Merle demonstrated fair  understanding of the education provided.     See EMR under Patient Instructions for exercises provided prior visit.    ASSESSMENT   Patient presents to therapy ambulating at slow pace demonstrating increased muscle fatigue due to increased upper extremity weight bearing on rollator. Patient reports increased left knee pain and began knee distraction and quad strengthening exercises to improve extension range of motion. Patient reports increased fatigue and she wants to rest and discontinue therapy today. Checked vitals although patient wasn't symptomatic other than fatigue. Her blood pressure was lower than average and she was educated to contact doctor about blood pressure and symptoms including diarrhea and fatigue. Discussed patient's decision with  to discontinue treatment today.    Merle Is progressing well towards her goals.   Pt prognosis is Good.     Pt will continue to benefit from skilled outpatient physical therapy to address the deficits listed in the problem list box on initial evaluation, provide pt/family education and to maximize pt's level of independence in the home and community environment.     Pt's spiritual, cultural and educational needs considered and pt agreeable to plan of care and goals.     Anticipated barriers to physical therapy: relies on O2 and transportation; very deconditioned    Goals:   Short Term Goals: In 4 weeks:  1.Pt to be educated on HEP. met  2.Patient to demo increased knee extension to 10 degrees shy of neutral and L to 0. progressing  3.Patient to increase strength 3+/5 hip strength with flexion and 3/5 with abduction. Met with flexion  4.Patient to have decreased pain to 7/10 or better. met  5.Patient to increase LE  balance to tolerate standing without RW support for 30 seconds as at eval requiried RW support to stand. met  6.Patient to improve score on the FOTO by 10%. met      Long Term Goals: In 10 weeks 5/16/22  1. Patient to perform daily activities including walking up to 200 ft without stopping using a RW without limitation. met  2. Patient to demonstrate increased knee AROM to 5 degrees or better with L knee extension. met  3. Patient to demonstrate increased LE strength with knee extension to 3/5 or better.met  4. Patient to have demonstrate increased strength functionally as noted by a score of 5 reps or better on the 30 sec sit to stand test. Progressing (today performed from an elevated seat at 7 reps so modified)  5. Patient to improve score on the FOTO to 80% limitation or better. met       PLAN   Continue Plan of Care (POC) and progress per patient tolerance.    Britni Easton PTA,

## 2022-05-09 ENCOUNTER — PATIENT MESSAGE (OUTPATIENT)
Dept: SMOKING CESSATION | Facility: CLINIC | Age: 67
End: 2022-05-09
Payer: COMMERCIAL

## 2022-05-11 ENCOUNTER — CLINICAL SUPPORT (OUTPATIENT)
Dept: REHABILITATION | Facility: HOSPITAL | Age: 67
End: 2022-05-11
Payer: COMMERCIAL

## 2022-05-11 DIAGNOSIS — R29.898 WEAKNESS OF BOTH HIPS: ICD-10-CM

## 2022-05-11 DIAGNOSIS — R26.9 GAIT DIFFICULTY: Primary | ICD-10-CM

## 2022-05-11 PROCEDURE — 97110 THERAPEUTIC EXERCISES: CPT

## 2022-05-11 NOTE — PROGRESS NOTES
Physical Therapy Daily Treatment Note     Name: Merle Caro  Clinic Number: [id number]    Therapy Diagnosis:   Encounter Diagnoses   Name Primary?    Gait difficulty Yes    Weakness of both hips      Physician: Luis Galeano MD    Visit Date: 5/11/2022    Physician Orders: PT Eval and Treat   Medical Diagnosis from Referral: M17.0 (ICD-10-CM) - Bilateral primary osteoarthritis of knee  Evaluation Date: 3/7/2022  Authorization Period Expiration: 12/31/23  Plan of Care Expiration: 5/16/22  Visit # / Visits authorized: 17/20  Foto: 2/3   Progress Note Due Date: 19 visit or 4/29/2022    Time In: 11:00 am  Time Out: 11:45 am  Total Billable Time: 45 minutes     Precautions: standard, O2 at 2 L, HTN, hx bipolar, slow processing    SUBJECTIVE     Today, pt reports: pt states diarrhea is doing better and she is feeling a little stronger.   She was compliant with home exercise program.   Response to previous treatment: some soreness in the buttock and thighs  Functional change: R knee has less pain walking    Pre-Treatment Pain: R 5/10 L knee; ankle at 0/10  Post-Treatment Pain: 5/10  Location: knees    TREATMENT     Blood pressure 128/68 mmHg   HR 75 bpm    Merle received therapeutic exercises to develop strength, endurance, ROM, flexibility and posture for 40 minutes including:    Christopher curls 15 # 2x 10 ; 20 # 2x5  Standing shoulder extension with green theraband (GTB) 3x 10  Standing shoulder rows with green theraband (GTB) 3x 10  Standing waist to overhead 5# lift 2x6  Standing 5# kettlebell pass around the waist 2x10  Stepping forward without RW for support 2 steps with SBA     Deferred:  Shuttle 3 bands squats 2 min  Tail gators 2 # per leg 3 min  SLR 2x 8 2# per leg  Bridges 2x 10  Seated LAQ 2# 3 sec hold at EROM 2x12  Seated hip flexion 2# 2x 10  LAQ in supine 2# 3x8  Ankle PF with green theraband and with Quad set 3x 15  Standing christopher curls 15# 2x8  Standing heel raises 2x 10  Sit to stand from  elevated seat  3x 7  Standing rows with green theraband for resistance 2x 15  Standing chest press single arm green theraband 2x 15  Standing shoulder extension with red theraband 3x 10  Standing paloff press with red theraband 2x10 per side  Standing chest press with red theraband for resistance 2x 8  hooklying clams 1x 10 then add red theraband 2x 10 per side  Side lying hip abduction   Sit to stand from 20 inch box 2x7  Standing hip abduction on saucer med/lateral glides 1x 8 then forward/back 1x 10 each side  Standing hip flexion 2x 10 with cues on heel strike to increase knee extension  Hip internal external rotation in prone 2x 10      Merle received the following manual therapy techniques: patellar mobs for upward glide and latearl to medial glides on the R and L knee as well as soft tissue to the quad and calves B to reduce trigger points and tension. PT used AROM to stretch calf and hamstring above in therex as limited tolerance to manual PT. Manual therapy for 0 minutes in total.      Merle participated in gait training to improve functional mobility and safety for -  minutes, including:      Merle received the following direct contact modalities after being cleared for contraindications:     Merle received the following supervised modalities after being cleared for contradictions:     Merle received hot pack for knees minutes to 0 min B at knees    Merle received cold pack for - minutes to -.  Merle received electrical stimulation for - minutes to -      Home Exercises Provided and Patient Education Provided     Education/Self-Care provided: (during minutes)   PT educated pt on the need to work on her HEP more than walking for exercise as her home program works on getting her mobility back as her knee ROM and hip strength are weak and need isolated work.    Patient educated on the importance of improved core and upper and lower extremity strength in order to improve alignment of the spine and  upper and lower extremities with static positions and dynamic movement.    Patient educated on the importance of strong core and lower extremity musculature in order to improve both static and dynamic balance, improve gait mechanics, reduce fall risk and improve household and community mobility.       Written Home Exercises Provided: Patient instructed to cont prior HEP.  Exercises were reviewed and Merle was able to demonstrate them prior to the end of the session.  Merle demonstrated fair  understanding of the education provided.     See EMR under Patient Instructions for exercises provided prior visit.    ASSESSMENT   Patient presents to therapy focusing on standing tolerances to actively work the eccentric hamstrings to increase upright posture and to activate the knee extensors. Pt knee extension continues to lag but she was able to take 2 steps without UE support and was able to sustain standing without UE for up to 3 min with mild perturbations showing balance gains.  Discussed patient's decision with  to discontinue treatment today.    Merle Is progressing well towards her goals.   Pt prognosis is Good.     Pt will continue to benefit from skilled outpatient physical therapy to address the deficits listed in the problem list box on initial evaluation, provide pt/family education and to maximize pt's level of independence in the home and community environment.     Pt's spiritual, cultural and educational needs considered and pt agreeable to plan of care and goals.     Anticipated barriers to physical therapy: relies on O2 and transportation; very deconditioned    Goals:   Short Term Goals: In 4 weeks:  1.Pt to be educated on HEP. met  2.Patient to demo increased knee extension to 10 degrees shy of neutral and L to 0. progressing  3.Patient to increase strength 3+/5 hip strength with flexion and 3/5 with abduction. Met with flexion  4.Patient to have decreased pain to 7/10 or better. met  5.Patient  to increase LE balance to tolerate standing without RW support for 30 seconds as at eval requiried RW support to stand. met  6.Patient to improve score on the FOTO by 10%. met      Long Term Goals: In 10 weeks 5/16/22  1. Patient to perform daily activities including walking up to 200 ft without stopping using a RW without limitation. met  2. Patient to demonstrate increased knee AROM to 5 degrees or better with L knee extension. met  3. Patient to demonstrate increased LE strength with knee extension to 3/5 or better.met  4. Patient to have demonstrate increased strength functionally as noted by a score of 5 reps or better on the 30 sec sit to stand test. Progressing (today performed from an elevated seat at 7 reps so modified)  5. Patient to improve score on the FOTO to 80% limitation or better. met       PLAN   Continue Plan of Care (POC) and progress per patient tolerance.    Shanna Corbin, PT,

## 2022-05-13 ENCOUNTER — CLINICAL SUPPORT (OUTPATIENT)
Dept: REHABILITATION | Facility: HOSPITAL | Age: 67
End: 2022-05-13
Payer: COMMERCIAL

## 2022-05-13 DIAGNOSIS — R29.898 WEAKNESS OF BOTH HIPS: ICD-10-CM

## 2022-05-13 DIAGNOSIS — R26.9 GAIT DIFFICULTY: Primary | ICD-10-CM

## 2022-05-13 PROCEDURE — 97140 MANUAL THERAPY 1/> REGIONS: CPT

## 2022-05-13 PROCEDURE — 97110 THERAPEUTIC EXERCISES: CPT

## 2022-05-13 NOTE — PROGRESS NOTES
Physical Therapy Daily Treatment Note     Name: Merle BUSH [facility]  Clinic Number: [id number]    Therapy Diagnosis:   Encounter Diagnoses   Name Primary?    Gait difficulty Yes    Weakness of both hips      Physician: Luis Galeano MD    Visit Date: 5/13/2022    Physician Orders: PT Eval and Treat   Medical Diagnosis from Referral: M17.0 (ICD-10-CM) - Bilateral primary osteoarthritis of knee  Evaluation Date: 3/7/2022  Authorization Period Expiration: 12/31/23  Plan of Care Expiration: 5/16/22  Visit # / Visits authorized: 18/20  Foto: 2/3   Progress Note Due Date: 19 visit or 5/22/2022    Time In: 11:00 am  Time Out: 11:45 am  Total Billable Time: 45 minutes     Precautions: standard, O2 at 2 L, HTN, hx bipolar, slow processing    SUBJECTIVE     Today, pt reports: she is having some L knee pain still but walking more upright     She was compliant with home exercise program.   Response to previous treatment: some soreness in the buttock and thighs  Functional change: R knee has less pain walking    Pain: R 3/10 4/10 L knee; ankle at 0/10    Location: knees    TREATMENT     Blood pressure 130/72 mmHg    bpm  Knee ROM R L  Flexion 22 125   Extension 11 130      Merle received therapeutic exercises to develop strength, endurance, ROM, flexibility and posture for 30 minutes including:    Shuttle 3 bands squats 2 min  Tail gators 2 # per leg 3 min  After manual therapy:  Quad sets 1x 10  Heel slides 1x 10  SLR 2x 10 then 2x 6 2# per leg      Deferred:  Christopher curls 15 # 2x 10 ; 20 # 2x5  Standing shoulder extension with green theraband (GTB) 3x 10  Standing shoulder rows with green theraband (GTB) 3x 10  Standing waist to overhead 5# lift 2x6  Standing 5# kettlebell pass around the waist 2x10  Stepping forward without RW for support 2 steps with SBA  Bridges 2x 10  Seated LAQ 2# 3 sec hold at EROM 2x12  LAQ in supine 2# 3x8  Ankle PF with green theraband and with Quad set 3x 15  Standing christopher curls 15#  2x8  Standing heel raises 2x 10  Sit to stand from elevated seat  3x 7  Standing rows with green theraband for resistance 2x 15  Standing chest press single arm green theraband 2x 15  Standing shoulder extension with red theraband 3x 10  Standing paloff press with red theraband 2x10 per side  Standing chest press with red theraband for resistance 2x 8  hooklying clams 1x 10 then add red theraband 2x 10 per side  Side lying hip abduction   Sit to stand from 20 inch box 2x7  Standing hip abduction on saucer med/lateral glides 1x 8 then forward/back 1x 10 each side  Standing hip flexion 2x 10 with cues on heel strike to increase knee extension  Hip internal external rotation in prone 2x 10      Merle received the following manual therapy techniques: PT performed patellar mobilizations to Bilateral knees to increase knee ROM and promote patellar mobility.   Manual therapy for 15 minutes in total.      Merle participated in gait training to improve functional mobility and safety for -  minutes, including:      Merle received the following direct contact modalities after being cleared for contraindications:     Merle received the following supervised modalities after being cleared for contradictions:     Merle received hot pack for knees minutes to 0 min B at knees    Merel received cold pack for - minutes to -.  Merle received electrical stimulation for - minutes to -      Home Exercises Provided and Patient Education Provided     Education/Self-Care provided: (during minutes)   PT educated pt on the need to work on her HEP more than walking for exercise as her home program works on getting her mobility back as her knee ROM and hip strength are weak and need isolated work.    Patient educated on the importance of improved core and upper and lower extremity strength in order to improve alignment of the spine and upper and lower extremities with static positions and dynamic movement.    Patient educated on the  importance of strong core and lower extremity musculature in order to improve both static and dynamic balance, improve gait mechanics, reduce fall risk and improve household and community mobility.       Written Home Exercises Provided: Patient instructed to cont prior HEP.  Exercises were reviewed and Merle was able to demonstrate them prior to the end of the session.  Merle demonstrated fair  understanding of the education provided.     See EMR under Patient Instructions for exercises provided prior visit.    ASSESSMENT   Patient presents to therapy focusing on improving knee ROM via improving patellar motion. PT noted ROM continues to increase in ROM each session but is still lagging in ROM and strength. Pt surprisingly continues to increase in knee ROM each session with PT slowly adding more load and continuing with knee ROM and is tolerating it well so PT to advance as tolerated.      Merle Is progressing well towards her goals.   Pt prognosis is Good.     Pt will continue to benefit from skilled outpatient physical therapy to address the deficits listed in the problem list box on initial evaluation, provide pt/family education and to maximize pt's level of independence in the home and community environment.     Pt's spiritual, cultural and educational needs considered and pt agreeable to plan of care and goals.     Anticipated barriers to physical therapy: relies on O2 and transportation; very deconditioned    Goals:   Short Term Goals: In 4 weeks:  1.Pt to be educated on HEP. met  2.Patient to demo increased knee extension to 10 degrees shy of neutral and L to 0. progressing  3.Patient to increase strength 3+/5 hip strength with flexion and 3/5 with abduction. Met with flexion  4.Patient to have decreased pain to 7/10 or better. met  5.Patient to increase LE balance to tolerate standing without RW support for 30 seconds as at eval requiried RW support to stand. met  6.Patient to improve score on the FOTO  by 10%. met      Long Term Goals: In 10 weeks 5/16/22  1. Patient to perform daily activities including walking up to 200 ft without stopping using a RW without limitation. met  2. Patient to demonstrate increased knee AROM to 5 degrees or better with L knee extension. met  3. Patient to demonstrate increased LE strength with knee extension to 3/5 or better.met  4. Patient to have demonstrate increased strength functionally as noted by a score of 5 reps or better on the 30 sec sit to stand test. Progressing (today performed from an elevated seat at 7 reps so modified)  5. Patient to improve score on the FOTO to 80% limitation or better. met       PLAN   Continue Plan of Care (POC) and progress per patient tolerance.    Shanna Corbin, PT

## 2022-05-17 ENCOUNTER — CLINICAL SUPPORT (OUTPATIENT)
Dept: REHABILITATION | Facility: HOSPITAL | Age: 67
End: 2022-05-17
Payer: COMMERCIAL

## 2022-05-17 DIAGNOSIS — R26.9 GAIT DIFFICULTY: Primary | ICD-10-CM

## 2022-05-17 DIAGNOSIS — R29.898 WEAKNESS OF BOTH HIPS: ICD-10-CM

## 2022-05-17 PROCEDURE — 97110 THERAPEUTIC EXERCISES: CPT

## 2022-05-17 NOTE — PROGRESS NOTES
Physical Therapy Daily Treatment Note / Progress note/ Updated POC     Name: Merle Caro  Clinic Number: [id number]    Therapy Diagnosis:   Encounter Diagnoses   Name Primary?    Gait difficulty Yes    Weakness of both hips      Physician: Luis Galeano MD    Visit Date: 5/17/2022    Physician Orders: PT Eval and Treat   Medical Diagnosis from Referral: M17.0 (ICD-10-CM) - Bilateral primary osteoarthritis of knee  Evaluation Date: 3/7/2022  Authorization Period Expiration: 12/31/23  Plan of Care Expiration: 6/17/22  Visit # / Visits authorized: 18/20 + eval  Foto: 2/3       Time In: 10:42  Time Out: 11:35  Total Billable Time: 53 minutes     Precautions: standard, O2 at 2 L, HTN, hx bipolar, slow processing    SUBJECTIVE     Today, pt reports: she is having a rough day. She reports she is feeling better than the last time she was in PT.      She was compliant with home exercise program.   Response to previous treatment: some soreness in the buttock and thighs  Functional change: R knee has less pain walking    Pain: 3/10 legs, buttocks, and arms per patient    Location: knees    TREATMENT   5/17/22  Blood pressure 117/ 82 mmHg   O2 SAT 97%    5/13/22   Knee ROM      R          L  Flexion           22        125        Extension       11        130       Merle received therapeutic exercises to develop strength, endurance, ROM, flexibility and posture for 53 minutes including:  Nustep for reciprocal motion, strength, and endurance x 5 minutes  Shuttle 3 bands squats 3 min  Sit to stand from high low mat 1 x 10 reps with cuing for upright posture and to reach back   Sitting knee extension 2 x 10 reps B popping in L knee with no pain complaints  Tail gators 2 # per leg 3 min  Calf/ hamstring stretch with strap over the stool 3 x 20 secs B  Bridges with head elevated 2 x 10 reps  Ball in and outs 1 x 10 reps with assistance  Bed mobility  Gait to each activity with cuing for upright  posture      Deferred:  Christopher curls 15 # 2x 10 ; 20 # 2x5  Standing shoulder extension with green theraband (GTB) 3x 10  Standing shoulder rows with green theraband (GTB) 3x 10  Standing waist to overhead 5# lift 2x6  Standing 5# kettlebell pass around the waist 2x10  Stepping forward without RW for support 2 steps with SBA  Bridges 2x 10  Seated LAQ 2# 3 sec hold at EROM 2x12  LAQ in supine 2# 3x8  Ankle PF with green theraband and with Quad set 3x 15  Standing christopher curls 15# 2x8  Standing heel raises 2x 10  Sit to stand from elevated seat  3x 7  Standing rows with green theraband for resistance 2x 15  Standing chest press single arm green theraband 2x 15  Standing shoulder extension with red theraband 3x 10  Standing paloff press with red theraband 2x10 per side  Standing chest press with red theraband for resistance 2x 8  hooklying clams 1x 10 then add red theraband 2x 10 per side  Side lying hip abduction   Sit to stand from 20 inch box 2x7  Standing hip abduction on saucer med/lateral glides 1x 8 then forward/back 1x 10 each side  Standing hip flexion 2x 10 with cues on heel strike to increase knee extension  Hip internal external rotation in prone 2x 10      [place] received the following manual therapy techniques: PT performed patellar mobilizations to Bilateral knees to increase knee ROM and promote patellar mobility.   Manual therapy for 0 minutes in total.      [place]enn[place] participated in gait training to improve functional mobility and safety for -  minutes, including:      Merle received the following direct contact modalities after being cleared for contraindications:     [place] received the following supervised modalities after being cleared for contradictions:     Merle received hot pack for knees minutes to 0 min B at knees    Merle received cold pack for - minutes to -.  [place]enn[place] received electrical stimulation for - minutes to -      Home Exercises Provided and Patient Education Provided      Education/Self-Care provided: (during minutes)   PT educated pt on the need to work on her HEP more than walking for exercise as her home program works on getting her mobility back as her knee ROM and hip strength are weak and need isolated work.    Patient educated on the importance of improved core and upper and lower extremity strength in order to improve alignment of the spine and upper and lower extremities with static positions and dynamic movement.    Patient educated on the importance of strong core and lower extremity musculature in order to improve both static and dynamic balance, improve gait mechanics, reduce fall risk and improve household and community mobility.       Written Home Exercises Provided: Patient instructed to cont prior HEP.  Exercises were reviewed and Merle was able to demonstrate them prior to the end of the session.  Merle demonstrated fair  understanding of the education provided.     See EMR under Patient Instructions for exercises provided prior visit.    ASSESSMENT   Patient tolerated treatment well. She was fatigued after the session. Patient was not able to progress to 4 bands of resistance on shuttle due to weakness. She complained of pain in knees with stretching. O2 SAT stayed at 97% during treatment session. Patient required constant cuing for upright posture with sit to stands and walking.  Patient is progressing towards her goals.  Patient would continue to benefit from skilled PT intervention for strengthening, ROM, and flexibility to improve functional mobility.       Merle Is progressing well towards her goals.   Pt prognosis is Good.     Pt will continue to benefit from skilled outpatient physical therapy to address the deficits listed in the problem list box on initial evaluation, provide pt/family education and to maximize pt's level of independence in the home and community environment.     Pt's spiritual, cultural and educational needs considered and pt  agreeable to plan of care and goals.     Anticipated barriers to physical therapy: relies on O2 and transportation; very deconditioned    Goals:   Short Term Goals: In 4 weeks:  1.Pt to be educated on HEP. met  2.Patient to demo increased knee extension to 10 degrees shy of neutral and L to 0. progressing  3.Patient to increase strength 3+/5 hip strength with flexion and 3/5 with abduction. Met with flexion  4.Patient to have decreased pain to 7/10 or better. met  5.Patient to increase LE balance to tolerate standing without RW support for 30 seconds as at eval requiried RW support to stand. met  6.Patient to improve score on the FOTO by 10%. met      Long Term Goals: In 10 weeks 6/17/22  1. Patient to perform daily activities including walking up to 200 ft without stopping using a RW without limitation. met  2. Patient to demonstrate increased knee AROM to 5 degrees or better with L knee extension. met  3. Patient to demonstrate increased LE strength with knee extension to 3/5 or better.met  4. Patient to have demonstrate increased strength functionally as noted by a score of 5 reps or better on the 30 sec sit to stand test. Progressing (today performed from an elevated seat at 7 reps so modified)  5. Patient to improve score on the FOTO to 80% limitation or better. met  6. Patient will be independent with HEP and pain managment     PLAN   Continue Plan of Care (POC) and progress per patient tolerance.    Janae Jimenez, PT

## 2022-05-19 ENCOUNTER — OFFICE VISIT (OUTPATIENT)
Dept: GASTROENTEROLOGY | Facility: CLINIC | Age: 67
End: 2022-05-19
Payer: COMMERCIAL

## 2022-05-19 ENCOUNTER — HOSPITAL ENCOUNTER (OUTPATIENT)
Dept: RADIOLOGY | Facility: HOSPITAL | Age: 67
Discharge: HOME | End: 2022-05-19
Attending: FAMILY MEDICINE
Payer: COMMERCIAL

## 2022-05-19 VITALS
BODY MASS INDEX: 28.05 KG/M2 | HEIGHT: 60 IN | HEART RATE: 119 BPM | SYSTOLIC BLOOD PRESSURE: 118 MMHG | WEIGHT: 142.88 LBS | DIASTOLIC BLOOD PRESSURE: 60 MMHG | OXYGEN SATURATION: 97 %

## 2022-05-19 VITALS — HEIGHT: 60 IN | WEIGHT: 141.13 LBS | BODY MASS INDEX: 27.71 KG/M2

## 2022-05-19 DIAGNOSIS — R63.4 WEIGHT LOSS: ICD-10-CM

## 2022-05-19 DIAGNOSIS — Z00.00 ANNUAL PHYSICAL EXAM: ICD-10-CM

## 2022-05-19 DIAGNOSIS — R63.0 DECREASED APPETITE: Primary | ICD-10-CM

## 2022-05-19 DIAGNOSIS — Z12.31 ENCOUNTER FOR SCREENING MAMMOGRAM FOR MALIGNANT NEOPLASM OF BREAST: ICD-10-CM

## 2022-05-19 DIAGNOSIS — J44.9 CHRONIC OBSTRUCTIVE PULMONARY DISEASE, UNSPECIFIED COPD TYPE: ICD-10-CM

## 2022-05-19 DIAGNOSIS — Z86.010 HISTORY OF COLON POLYPS: ICD-10-CM

## 2022-05-19 DIAGNOSIS — Z99.81 DEPENDENCE ON CONTINUOUS SUPPLEMENTAL OXYGEN: ICD-10-CM

## 2022-05-19 DIAGNOSIS — K21.00 GASTROESOPHAGEAL REFLUX DISEASE WITH ESOPHAGITIS WITHOUT HEMORRHAGE: ICD-10-CM

## 2022-05-19 PROCEDURE — 3044F HG A1C LEVEL LT 7.0%: CPT | Mod: CPTII,S$GLB,, | Performed by: PHYSICIAN ASSISTANT

## 2022-05-19 PROCEDURE — 77063 BREAST TOMOSYNTHESIS BI: CPT | Mod: TC

## 2022-05-19 PROCEDURE — 3078F DIAST BP <80 MM HG: CPT | Mod: CPTII,S$GLB,, | Performed by: PHYSICIAN ASSISTANT

## 2022-05-19 PROCEDURE — 3074F PR MOST RECENT SYSTOLIC BLOOD PRESSURE < 130 MM HG: ICD-10-PCS | Mod: CPTII,S$GLB,, | Performed by: PHYSICIAN ASSISTANT

## 2022-05-19 PROCEDURE — 4010F PR ACE/ARB THEARPY RXD/TAKEN: ICD-10-PCS | Mod: CPTII,S$GLB,, | Performed by: PHYSICIAN ASSISTANT

## 2022-05-19 PROCEDURE — 77063 MAMMO DIGITAL SCREENING BILAT WITH TOMO: ICD-10-PCS | Mod: 26,,, | Performed by: RADIOLOGY

## 2022-05-19 PROCEDURE — 3008F PR BODY MASS INDEX (BMI) DOCUMENTED: ICD-10-PCS | Mod: CPTII,S$GLB,, | Performed by: PHYSICIAN ASSISTANT

## 2022-05-19 PROCEDURE — 77067 SCR MAMMO BI INCL CAD: CPT | Mod: 26,,, | Performed by: RADIOLOGY

## 2022-05-19 PROCEDURE — 4010F ACE/ARB THERAPY RXD/TAKEN: CPT | Mod: CPTII,S$GLB,, | Performed by: PHYSICIAN ASSISTANT

## 2022-05-19 PROCEDURE — 99204 PR OFFICE/OUTPT VISIT, NEW, LEVL IV, 45-59 MIN: ICD-10-PCS | Mod: S$GLB,,, | Performed by: PHYSICIAN ASSISTANT

## 2022-05-19 PROCEDURE — 99204 OFFICE O/P NEW MOD 45 MIN: CPT | Mod: S$GLB,,, | Performed by: PHYSICIAN ASSISTANT

## 2022-05-19 PROCEDURE — 99999 PR PBB SHADOW E&M-EST. PATIENT-LVL IV: ICD-10-PCS | Mod: PBBFAC,,, | Performed by: PHYSICIAN ASSISTANT

## 2022-05-19 PROCEDURE — 1159F MED LIST DOCD IN RCRD: CPT | Mod: CPTII,S$GLB,, | Performed by: PHYSICIAN ASSISTANT

## 2022-05-19 PROCEDURE — 3074F SYST BP LT 130 MM HG: CPT | Mod: CPTII,S$GLB,, | Performed by: PHYSICIAN ASSISTANT

## 2022-05-19 PROCEDURE — 77063 BREAST TOMOSYNTHESIS BI: CPT | Mod: 26,,, | Performed by: RADIOLOGY

## 2022-05-19 PROCEDURE — 3044F PR MOST RECENT HEMOGLOBIN A1C LEVEL <7.0%: ICD-10-PCS | Mod: CPTII,S$GLB,, | Performed by: PHYSICIAN ASSISTANT

## 2022-05-19 PROCEDURE — 1159F PR MEDICATION LIST DOCUMENTED IN MEDICAL RECORD: ICD-10-PCS | Mod: CPTII,S$GLB,, | Performed by: PHYSICIAN ASSISTANT

## 2022-05-19 PROCEDURE — 3078F PR MOST RECENT DIASTOLIC BLOOD PRESSURE < 80 MM HG: ICD-10-PCS | Mod: CPTII,S$GLB,, | Performed by: PHYSICIAN ASSISTANT

## 2022-05-19 PROCEDURE — 3008F BODY MASS INDEX DOCD: CPT | Mod: CPTII,S$GLB,, | Performed by: PHYSICIAN ASSISTANT

## 2022-05-19 PROCEDURE — 99999 PR PBB SHADOW E&M-EST. PATIENT-LVL IV: CPT | Mod: PBBFAC,,, | Performed by: PHYSICIAN ASSISTANT

## 2022-05-19 PROCEDURE — 77067 MAMMO DIGITAL SCREENING BILAT WITH TOMO: ICD-10-PCS | Mod: 26,,, | Performed by: RADIOLOGY
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
## 2022-05-20 ENCOUNTER — TELEPHONE (OUTPATIENT)
Dept: GASTROENTEROLOGY | Facility: CLINIC | Age: 67
End: 2022-05-20
Payer: COMMERCIAL

## 2022-05-20 ENCOUNTER — CLINICAL SUPPORT (OUTPATIENT)
Dept: REHABILITATION | Facility: HOSPITAL | Age: 67
End: 2022-05-20
Payer: COMMERCIAL

## 2022-05-20 DIAGNOSIS — R26.9 GAIT DIFFICULTY: Primary | ICD-10-CM

## 2022-05-20 DIAGNOSIS — R29.898 WEAKNESS OF BOTH HIPS: ICD-10-CM

## 2022-05-20 PROCEDURE — 97110 THERAPEUTIC EXERCISES: CPT

## 2022-05-20 PROCEDURE — 97116 GAIT TRAINING THERAPY: CPT

## 2022-05-20 NOTE — TELEPHONE ENCOUNTER
Requesting Pulmonary clearance due to needed EGD/Colonoscopy due to patient history of COPD with continuous O2 use.    From a pulmonary standpoint is patient okay with moving forward with scopes?
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
## 2022-05-23 ENCOUNTER — TELEPHONE (OUTPATIENT)
Dept: PULMONOLOGY | Facility: CLINIC | Age: 67
End: 2022-05-23
Payer: COMMERCIAL

## 2022-05-23 DIAGNOSIS — J44.89 ASTHMA WITH COPD: Primary | ICD-10-CM

## 2022-05-24 ENCOUNTER — CLINICAL SUPPORT (OUTPATIENT)
Dept: REHABILITATION | Facility: HOSPITAL | Age: 67
End: 2022-05-24
Payer: COMMERCIAL

## 2022-05-24 DIAGNOSIS — R26.9 GAIT DIFFICULTY: Primary | ICD-10-CM

## 2022-05-24 DIAGNOSIS — R29.898 WEAKNESS OF BOTH HIPS: ICD-10-CM

## 2022-05-24 PROCEDURE — 97140 MANUAL THERAPY 1/> REGIONS: CPT

## 2022-05-24 PROCEDURE — 97110 THERAPEUTIC EXERCISES: CPT

## 2022-05-24 NOTE — PROGRESS NOTES
Physical Therapy Daily Treatment Note      Name: Merle BUSH [facility]  Clinic Number: [id number]    Therapy Diagnosis:   Encounter Diagnoses   Name Primary?    Gait difficulty Yes    Weakness of both hips      Physician: Luis Galeano MD    Visit Date: 5/24/2022    Physician Orders: PT Eval and Treat   Medical Diagnosis from Referral: M17.0 (ICD-10-CM) - Bilateral primary osteoarthritis of knee  Evaluation Date: 3/7/2022  Authorization Period Expiration: 12/31/23  Plan of Care Expiration: 6/17/22  Visit # / Visits authorized: 21/20  Foto: 2/3       Time In: 10:40 am  Time Out: 11:28 am  Total Billable Time: 48 minutes     Precautions: standard, O2 at 2 L, HTN, hx bipolar, slow processing    SUBJECTIVE     Today, pt reports:she attempted to work on her HEP but L SLR were hard so she states she was not able to perform them. PT educated her in performing it in hook lying then she was able to advance to SLR without difficulty after priming the hip flexors.     She was compliant with home exercise program.   Response to previous treatment: some soreness in the buttock and thighs  Functional change: R knee has less pain walking    Pain: 3/10 legs, buttocks, and arms per patient    Location: knees    TREATMENT     Merle received therapeutic exercises to develop strength, endurance, ROM, flexibility and posture for 30 minutes including:    Shuttle 4 banded squats at 2 min  Hook lying march 1x 5  SLR 2x 10 B  Standing shoulder rows with green theraband (GTB) 3x 10  Standing shoulder extension with GTB 3x 10  Christopher curls 20 # 2x 8  Sit to stand 3x 3 with gait belt for safety    Deferred:    Standing shoulder extension with green theraband (GTB) 3x 10  Standing shoulder rows with green theraband (GTB) 3x 10  Standing waist to overhead 5# lift 2x6  Standing 5# kettlebell pass around the waist 2x10  Stepping forward without RW for support 2 steps with SBA  Bridges 2x 10  Seated LAQ 2# 3 sec hold at EROM 2x12  LAQ in supine  2# 3x8  Ankle PF with green theraband and with Quad set 3x 15  Standing aline curls 15# 2x8  Standing heel raises 2x 10Tail gators 2 # per leg 3 min  Calf/ hamstring stretch with strap over the stool 3 x 20 secs B  Bridges with head elevated 2 x 10 reps  Ball in and outs 1 x 10 reps with assistance  Bed mobility  Gait to each activity with cuing for upright posture    Merle received gait training education for 3 minutes including: cues on upright posture, step length and RW management to reduce leaning into the UE's.      Merle received the following manual therapy techniques: PT performed patellar mobilizations to the R knee to increase medial and superior glide grade I-III to increase knee extension ROM. PT used  Manual therapy for 15 minutes in total.      Merle participated in gait training to improve functional mobility and safety for -  minutes, including:      Merle received the following direct contact modalities after being cleared for contraindications:     Merle received the following supervised modalities after being cleared for contradictions:     Merle received hot pack for knees minutes to 0 min B at knees    Merle received cold pack for - minutes to -.  Merle received electrical stimulation for - minutes to -      Home Exercises Provided and Patient Education Provided     Education/Self-Care provided: (during minutes)   PT educated pt on the need to work on her HEP more than walking for exercise as her home program works on getting her mobility back as her knee ROM and hip strength are weak and need isolated work.    Patient educated on the importance of improved core and upper and lower extremity strength in order to improve alignment of the spine and upper and lower extremities with static positions and dynamic movement.    Patient educated on the importance of strong core and lower extremity musculature in order to improve both static and dynamic balance, improve gait mechanics,  reduce fall risk and improve household and community mobility.       Written Home Exercises Provided: Patient instructed to cont prior HEP.  Exercises were reviewed and Merle was able to demonstrate them prior to the end of the session.  Merle demonstrated fair  understanding of the education provided.     See EMR under Patient Instructions for exercises provided prior visit.    ASSESSMENT   Patient tolerated treatment fair but is very fearful of standing therex still as balance in standing is challenging so PT focused on working on balance in standing to advance pt with strength and to increase ankle strategy to help with recovery of balance. PT continued with cues to facilitate gait upright posture to reduce her tendency to lean anterior weight bearing into her hands and shoulders as she complaints of shoulder pain at time and PT wants her to keep using her glutes and hamstrings for gait support. Patient would continue to benefit from skilled PT intervention for strengthening, ROM, and flexibility to improve functional mobility but discussed with her spouse that we are moving towards discharge a few visits ago.    Merle Is progressing well towards her goals.   Pt prognosis is Good.     Pt will continue to benefit from skilled outpatient physical therapy to address the deficits listed in the problem list box on initial evaluation, provide pt/family education and to maximize pt's level of independence in the home and community environment.     Pt's spiritual, cultural and educational needs considered and pt agreeable to plan of care and goals.     Anticipated barriers to physical therapy: relies on O2 and transportation; very deconditioned    Goals:   Short Term Goals: In 4 weeks:  1.Pt to be educated on HEP. met  2.Patient to demo increased knee extension to 10 degrees shy of neutral and L to 0. progressing  3.Patient to increase strength 3+/5 hip strength with flexion and 3/5 with abduction. Met with  flexion  4.Patient to have decreased pain to 7/10 or better. met  5.Patient to increase LE balance to tolerate standing without RW support for 30 seconds as at eval requiried RW support to stand. met  6.Patient to improve score on the FOTO by 10%. met      Long Term Goals: In 10 weeks 6/17/22  1. Patient to perform daily activities including walking up to 200 ft without stopping using a RW without limitation. met  2. Patient to demonstrate increased knee AROM to 5 degrees or better with L knee extension. met  3. Patient to demonstrate increased LE strength with knee extension to 3/5 or better.met  4. Patient to have demonstrate increased strength functionally as noted by a score of 5 reps or better on the 30 sec sit to stand test. Progressing (today performed from an elevated seat at 7 reps so modified)  5. Patient to improve score on the FOTO to 80% limitation or better. met  6. Patient will be independent with HEP and pain managment     PLAN   Continue Plan of Care (POC) and progress per patient tolerance.    Shanna Corbin, PT

## 2022-05-26 ENCOUNTER — CLINICAL SUPPORT (OUTPATIENT)
Dept: REHABILITATION | Facility: HOSPITAL | Age: 67
End: 2022-05-26
Payer: COMMERCIAL

## 2022-05-26 DIAGNOSIS — R29.898 WEAKNESS OF BOTH HIPS: ICD-10-CM

## 2022-05-26 DIAGNOSIS — R26.9 GAIT DIFFICULTY: Primary | ICD-10-CM

## 2022-05-26 PROCEDURE — 97110 THERAPEUTIC EXERCISES: CPT

## 2022-05-26 NOTE — PROGRESS NOTES
Physical Therapy Daily Treatment Note     Name: Merle Caro  Clinic Number: [id number]    Therapy Diagnosis:   Encounter Diagnoses   Name Primary?    Gait difficulty Yes    Weakness of both hips      Physician: Luis Galeano MD    Visit Date: 5/26/2022    Physician Orders: PT Eval and Treat   Medical Diagnosis from Referral: M17.0 (ICD-10-CM) - Bilateral primary osteoarthritis of knee  Evaluation Date: 3/7/2022  Authorization Period Expiration: 12/31/23  Plan of Care Expiration: 6/17/22  Visit # / Visits authorized: 22/20  Foto: 2/3       Time In: 10:45 am  Time Out: 11:28 am  Total Billable Time: 43 minutes     Precautions: standard, O2 at 2 L, HTN, hx bipolar, slow processing    SUBJECTIVE     Today, pt reports:she is still working on her HEP but going out of town for a few weeks so won't be back until mid June.   She was compliant with home exercise program.   Response to previous treatment: some soreness in the buttock and thighs  Functional change: R knee has less pain walking    Pain: 4/10 legs, buttocks, and arms per patient    Location: knees    TREATMENT     Merle received therapeutic exercises to develop strength, endurance, ROM, flexibility and posture for 40 minutes including:    Standing shoulder rows with green theraband (GTB) 3x 10  Standing shoulder extension with GTB 3x 10  Christopher curls 20 # 3x 5  Step ups 1x 10 2 inch step with rails for support  Sit to stand 3x 5 with gait belt for safety from 20 inch seat without UE support  Standing waist to overhead 5# lift 2x6  Standing 5# kettlebell pass around the waist 2x10  Seated tail gators 2# per leg 3 min      Deferred:  Hook lying march 1x 5  SLR 2x 10 B  Stepping forward without RW for support 2 steps with SBA  Bridges 2x 10  Seated LAQ 2# 3 sec hold at EROM 2x12  LAQ in supine 2# 3x8  Ankle PF with green theraband and with Quad set 3x 15  Standing christopher curls 15# 2x8  Standing heel raises 2x 10Tail gators 2 # per leg 3 min  Calf/  hamstring stretch with strap over the stool 3 x 20 secs B  Bridges with head elevated 2 x 10 reps  Ball in and outs 1 x 10 reps with assistance  Bed mobility  Gait to each activity with cuing for upright posture    Merle received gait training education for 3 minutes including: cues on upright posture, step length and RW management to reduce leaning into the UE's.      Merle received the following manual therapy techniques: PT performed patellar mobilizations to the R knee to increase medial and superior glide grade I-III to increase knee extension ROM. PT used  Manual therapy for 0 minutes in total.      Merle participated in gait training to improve functional mobility and safety for -  minutes, including:      Merle received the following direct contact modalities after being cleared for contraindications:     Merle received the following supervised modalities after being cleared for contradictions:     Merle received hot pack for knees minutes to 0 min B at knees    Merle received cold pack for - minutes to -.  Merle received electrical stimulation for - minutes to -      Home Exercises Provided and Patient Education Provided     Education/Self-Care provided: (during minutes)   PT educated pt on the need to work on her HEP more than walking for exercise as her home program works on getting her mobility back as her knee ROM and hip strength are weak and need isolated work.    Patient educated on the importance of improved core and upper and lower extremity strength in order to improve alignment of the spine and upper and lower extremities with static positions and dynamic movement.    Patient educated on the importance of strong core and lower extremity musculature in order to improve both static and dynamic balance, improve gait mechanics, reduce fall risk and improve household and community mobility.       Written Home Exercises Provided: Patient instructed to cont prior HEP.  Exercises were reviewed  and Merle was able to demonstrate them prior to the end of the session.  Merle demonstrated fair  understanding of the education provided.     See EMR under Patient Instructions for exercises provided prior visit.    ASSESSMENT   Patient tolerated treatment with PT advancing her to more upright challenges with balance today. Pt tolerated increased reps and less UE support in standing today and felt her abdominals pulling when upright supporting the findings that she is starting to stand taller with gait. PT progressing her towards discharge and discussed with her family to advance her to her HEP if all goes well when she returns from her trip in June. PT encouraged HEP while out of town to help and keep advancing. Patient would continue to benefit from skilled PT intervention for strengthening, ROM, and flexibility to improve functional mobility but discussed with her spouse that we are moving towards discharge a few visits ago.    Merle Is progressing well towards her goals.   Pt prognosis is Good.     Pt will continue to benefit from skilled outpatient physical therapy to address the deficits listed in the problem list box on initial evaluation, provide pt/family education and to maximize pt's level of independence in the home and community environment.     Pt's spiritual, cultural and educational needs considered and pt agreeable to plan of care and goals.     Anticipated barriers to physical therapy: relies on O2 and transportation; very deconditioned    Goals:   Short Term Goals: In 4 weeks:  1.Pt to be educated on HEP. met  2.Patient to demo increased knee extension to 10 degrees shy of neutral and L to 0. progressing  3.Patient to increase strength 3+/5 hip strength with flexion and 3/5 with abduction. Met with flexion  4.Patient to have decreased pain to 7/10 or better. met  5.Patient to increase LE balance to tolerate standing without RW support for 30 seconds as at eval requiried RW support to stand.  met  6.Patient to improve score on the FOTO by 10%. met      Long Term Goals: In 10 weeks 6/17/22  1. Patient to perform daily activities including walking up to 200 ft without stopping using a RW without limitation. met  2. Patient to demonstrate increased knee AROM to 5 degrees or better with L knee extension. met  3. Patient to demonstrate increased LE strength with knee extension to 3/5 or better.met  4. Patient to have demonstrate increased strength functionally as noted by a score of 5 reps or better on the 30 sec sit to stand test. Progressing (today performed from an elevated seat at 7 reps so modified)  5. Patient to improve score on the FOTO to 80% limitation or better. met  6. Patient will be independent with HEP and pain managment     PLAN   Continue Plan of Care (POC) and progress per patient tolerance.    Shanna Corbin, PT

## 2022-05-30 DIAGNOSIS — I10 ESSENTIAL HYPERTENSION: ICD-10-CM

## 2022-05-30 DIAGNOSIS — J96.11 CHRONIC RESPIRATORY FAILURE WITH HYPOXIA: ICD-10-CM

## 2022-05-30 DIAGNOSIS — J44.9 CHRONIC OBSTRUCTIVE PULMONARY DISEASE, UNSPECIFIED COPD TYPE: ICD-10-CM

## 2022-05-30 DIAGNOSIS — R63.4 WEIGHT LOSS: Primary | ICD-10-CM

## 2022-05-30 NOTE — PROGRESS NOTES
Patient being evaluated for scopes due to abnormal weight loss and ongoing GERD symptoms. Spoke with anesthesia who recommends cardiology consultation due to numerous co morbidities prior to scheduling for scopes.

## 2022-06-21 ENCOUNTER — HOSPITAL ENCOUNTER (EMERGENCY)
Facility: HOSPITAL | Age: 67
Discharge: HOME | End: 2022-06-21
Attending: EMERGENCY MEDICINE
Payer: COMMERCIAL

## 2022-06-21 ENCOUNTER — OFFICE VISIT (OUTPATIENT)
Dept: INTERNAL MEDICINE | Facility: CLINIC | Age: 67
End: 2022-06-21
Payer: COMMERCIAL

## 2022-06-21 VITALS
TEMPERATURE: 98 F | DIASTOLIC BLOOD PRESSURE: 64 MMHG | RESPIRATION RATE: 17 BRPM | WEIGHT: 136 LBS | OXYGEN SATURATION: 100 % | HEART RATE: 109 BPM | BODY MASS INDEX: 26.57 KG/M2 | SYSTOLIC BLOOD PRESSURE: 126 MMHG

## 2022-06-21 VITALS — OXYGEN SATURATION: 98 % | HEART RATE: 153 BPM | TEMPERATURE: 96 F

## 2022-06-21 DIAGNOSIS — R52 PAIN: Primary | ICD-10-CM

## 2022-06-21 DIAGNOSIS — K64.5 EXTERNAL THROMBOSED HEMORRHOIDS: ICD-10-CM

## 2022-06-21 DIAGNOSIS — K62.89 ANAL PAIN: ICD-10-CM

## 2022-06-21 DIAGNOSIS — J44.1 COPD EXACERBATION: Primary | ICD-10-CM

## 2022-06-21 DIAGNOSIS — R06.02 SHORTNESS OF BREATH: ICD-10-CM

## 2022-06-21 LAB
ALBUMIN SERPL BCP-MCNC: 3.1 G/DL (ref 3.5–5.2)
ALP SERPL-CCNC: 393 U/L (ref 55–135)
ALT SERPL W/O P-5'-P-CCNC: 8 U/L (ref 10–44)
ANION GAP SERPL CALC-SCNC: 16 MMOL/L (ref 8–16)
AST SERPL-CCNC: 11 U/L (ref 10–40)
BASOPHILS # BLD AUTO: 0.06 K/UL (ref 0–0.2)
BASOPHILS NFR BLD: 0.4 % (ref 0–1.9)
BILIRUB SERPL-MCNC: 0.9 MG/DL (ref 0.1–1)
BNP SERPL-MCNC: 20 PG/ML (ref 0–99)
BUN SERPL-MCNC: 8 MG/DL (ref 8–23)
CALCIUM SERPL-MCNC: 9.1 MG/DL (ref 8.7–10.5)
CHLORIDE SERPL-SCNC: 100 MMOL/L (ref 95–110)
CO2 SERPL-SCNC: 23 MMOL/L (ref 23–29)
CREAT SERPL-MCNC: 0.6 MG/DL (ref 0.5–1.4)
CTP QC/QA: YES
DIFFERENTIAL METHOD: ABNORMAL
EOSINOPHIL # BLD AUTO: 0.1 K/UL (ref 0–0.5)
EOSINOPHIL NFR BLD: 0.4 % (ref 0–8)
ERYTHROCYTE [DISTWIDTH] IN BLOOD BY AUTOMATED COUNT: 17.2 % (ref 11.5–14.5)
EST. GFR  (AFRICAN AMERICAN): >60 ML/MIN/1.73 M^2
EST. GFR  (NON AFRICAN AMERICAN): >60 ML/MIN/1.73 M^2
GLUCOSE SERPL-MCNC: 125 MG/DL (ref 70–110)
HCT VFR BLD AUTO: 38.9 % (ref 37–48.5)
HGB BLD-MCNC: 12.8 G/DL (ref 12–16)
IMM GRANULOCYTES # BLD AUTO: 0.09 K/UL (ref 0–0.04)
IMM GRANULOCYTES NFR BLD AUTO: 0.5 % (ref 0–0.5)
LACTATE SERPL-SCNC: 1 MMOL/L (ref 0.5–2.2)
LACTATE SERPL-SCNC: 3 MMOL/L (ref 0.5–2.2)
LYMPHOCYTES # BLD AUTO: 1.4 K/UL (ref 1–4.8)
LYMPHOCYTES NFR BLD: 8.2 % (ref 18–48)
MCH RBC QN AUTO: 26.5 PG (ref 27–31)
MCHC RBC AUTO-ENTMCNC: 32.9 G/DL (ref 32–36)
MCV RBC AUTO: 81 FL (ref 82–98)
MONOCYTES # BLD AUTO: 1.1 K/UL (ref 0.3–1)
MONOCYTES NFR BLD: 6.3 % (ref 4–15)
NEUTROPHILS # BLD AUTO: 14.2 K/UL (ref 1.8–7.7)
NEUTROPHILS NFR BLD: 84.2 % (ref 38–73)
NRBC BLD-RTO: 0 /100 WBC
PLATELET # BLD AUTO: 675 K/UL (ref 150–450)
PMV BLD AUTO: 8.3 FL (ref 9.2–12.9)
POTASSIUM SERPL-SCNC: 3.7 MMOL/L (ref 3.5–5.1)
PROT SERPL-MCNC: 6.8 G/DL (ref 6–8.4)
RBC # BLD AUTO: 4.83 M/UL (ref 4–5.4)
SARS-COV-2 RDRP RESP QL NAA+PROBE: NEGATIVE
SODIUM SERPL-SCNC: 139 MMOL/L (ref 136–145)
TROPONIN I SERPL DL<=0.01 NG/ML-MCNC: <0.006 NG/ML (ref 0–0.03)
TROPONIN I SERPL DL<=0.01 NG/ML-MCNC: <0.006 NG/ML (ref 0–0.03)
WBC # BLD AUTO: 16.86 K/UL (ref 3.9–12.7)

## 2022-06-21 PROCEDURE — 80053 COMPREHEN METABOLIC PANEL: CPT | Performed by: REGISTERED NURSE

## 2022-06-21 PROCEDURE — 3044F PR MOST RECENT HEMOGLOBIN A1C LEVEL <7.0%: ICD-10-PCS | Mod: CPTII,S$GLB,, | Performed by: FAMILY MEDICINE

## 2022-06-21 PROCEDURE — 27000221 HC OXYGEN, UP TO 24 HOURS

## 2022-06-21 PROCEDURE — 99999 PR PBB SHADOW E&M-EST. PATIENT-LVL III: ICD-10-PCS | Mod: PBBFAC,,, | Performed by: FAMILY MEDICINE

## 2022-06-21 PROCEDURE — 25000003 PHARM REV CODE 250: Performed by: EMERGENCY MEDICINE

## 2022-06-21 PROCEDURE — 4010F ACE/ARB THERAPY RXD/TAKEN: CPT | Mod: CPTII,S$GLB,, | Performed by: FAMILY MEDICINE

## 2022-06-21 PROCEDURE — 99214 OFFICE O/P EST MOD 30 MIN: CPT | Mod: S$GLB,,, | Performed by: FAMILY MEDICINE

## 2022-06-21 PROCEDURE — 83605 ASSAY OF LACTIC ACID: CPT | Mod: 91 | Performed by: EMERGENCY MEDICINE

## 2022-06-21 PROCEDURE — 99999 PR PBB SHADOW E&M-EST. PATIENT-LVL III: CPT | Mod: PBBFAC,,, | Performed by: FAMILY MEDICINE

## 2022-06-21 PROCEDURE — 83880 ASSAY OF NATRIURETIC PEPTIDE: CPT | Performed by: REGISTERED NURSE

## 2022-06-21 PROCEDURE — 3044F HG A1C LEVEL LT 7.0%: CPT | Mod: CPTII,S$GLB,, | Performed by: FAMILY MEDICINE

## 2022-06-21 PROCEDURE — 84484 ASSAY OF TROPONIN QUANT: CPT | Mod: 91 | Performed by: EMERGENCY MEDICINE

## 2022-06-21 PROCEDURE — 4010F PR ACE/ARB THEARPY RXD/TAKEN: ICD-10-PCS | Mod: CPTII,S$GLB,, | Performed by: FAMILY MEDICINE

## 2022-06-21 PROCEDURE — 85025 COMPLETE CBC W/AUTO DIFF WBC: CPT | Performed by: REGISTERED NURSE

## 2022-06-21 PROCEDURE — 94640 AIRWAY INHALATION TREATMENT: CPT | Mod: XB

## 2022-06-21 PROCEDURE — U0002 COVID-19 LAB TEST NON-CDC: HCPCS | Performed by: EMERGENCY MEDICINE

## 2022-06-21 PROCEDURE — 96375 TX/PRO/DX INJ NEW DRUG ADDON: CPT | Mod: 59

## 2022-06-21 PROCEDURE — 93005 ELECTROCARDIOGRAM TRACING: CPT

## 2022-06-21 PROCEDURE — 25000242 PHARM REV CODE 250 ALT 637 W/ HCPCS: Performed by: EMERGENCY MEDICINE

## 2022-06-21 PROCEDURE — 1159F MED LIST DOCD IN RCRD: CPT | Mod: CPTII,S$GLB,, | Performed by: FAMILY MEDICINE

## 2022-06-21 PROCEDURE — 99291 CRITICAL CARE FIRST HOUR: CPT | Mod: 25

## 2022-06-21 PROCEDURE — 96365 THER/PROPH/DIAG IV INF INIT: CPT

## 2022-06-21 PROCEDURE — 1160F RVW MEDS BY RX/DR IN RCRD: CPT | Mod: CPTII,S$GLB,, | Performed by: FAMILY MEDICINE

## 2022-06-21 PROCEDURE — 84484 ASSAY OF TROPONIN QUANT: CPT | Performed by: REGISTERED NURSE

## 2022-06-21 PROCEDURE — 87040 BLOOD CULTURE FOR BACTERIA: CPT | Mod: 59 | Performed by: EMERGENCY MEDICINE

## 2022-06-21 PROCEDURE — 93010 EKG 12-LEAD: ICD-10-PCS | Mod: ,,, | Performed by: INTERNAL MEDICINE

## 2022-06-21 PROCEDURE — 63600175 PHARM REV CODE 636 W HCPCS: Performed by: EMERGENCY MEDICINE

## 2022-06-21 PROCEDURE — 1160F PR REVIEW ALL MEDS BY PRESCRIBER/CLIN PHARMACIST DOCUMENTED: ICD-10-PCS | Mod: CPTII,S$GLB,, | Performed by: FAMILY MEDICINE

## 2022-06-21 PROCEDURE — 1159F PR MEDICATION LIST DOCUMENTED IN MEDICAL RECORD: ICD-10-PCS | Mod: CPTII,S$GLB,, | Performed by: FAMILY MEDICINE

## 2022-06-21 PROCEDURE — 99214 PR OFFICE/OUTPT VISIT, EST, LEVL IV, 30-39 MIN: ICD-10-PCS | Mod: S$GLB,,, | Performed by: FAMILY MEDICINE

## 2022-06-21 PROCEDURE — 93010 ELECTROCARDIOGRAM REPORT: CPT | Mod: ,,, | Performed by: INTERNAL MEDICINE

## 2022-06-21 RX ORDER — HYDROCORTISONE 25 MG/G
CREAM TOPICAL 2 TIMES DAILY
Qty: 20 G | Refills: 0 | Status: SHIPPED | OUTPATIENT
Start: 2022-06-21 | End: 2022-09-21

## 2022-06-21 RX ORDER — MORPHINE SULFATE 4 MG/ML
4 INJECTION, SOLUTION INTRAMUSCULAR; INTRAVENOUS
Status: COMPLETED | OUTPATIENT
Start: 2022-06-21 | End: 2022-06-21

## 2022-06-21 RX ORDER — IPRATROPIUM BROMIDE AND ALBUTEROL SULFATE 2.5; .5 MG/3ML; MG/3ML
3 SOLUTION RESPIRATORY (INHALATION)
Status: COMPLETED | OUTPATIENT
Start: 2022-06-21 | End: 2022-06-21

## 2022-06-21 RX ORDER — ALBUTEROL SULFATE 90 UG/1
4 AEROSOL, METERED RESPIRATORY (INHALATION) EVERY 4 HOURS PRN
Qty: 18 G | Refills: 0 | Status: SHIPPED | OUTPATIENT
Start: 2022-06-21 | End: 2022-06-26

## 2022-06-21 RX ORDER — METHYLPREDNISOLONE SOD SUCC 125 MG
125 VIAL (EA) INJECTION
Status: COMPLETED | OUTPATIENT
Start: 2022-06-21 | End: 2022-06-21

## 2022-06-21 RX ORDER — PREDNISONE 10 MG/1
40 TABLET ORAL DAILY
Qty: 20 TABLET | Refills: 0 | Status: SHIPPED | OUTPATIENT
Start: 2022-06-21 | End: 2022-06-26

## 2022-06-21 RX ADMIN — DOXYCYCLINE 100 MG: 100 INJECTION, POWDER, LYOPHILIZED, FOR SOLUTION INTRAVENOUS at 04:06

## 2022-06-21 RX ADMIN — IPRATROPIUM BROMIDE AND ALBUTEROL SULFATE 3 ML: 2.5; .5 SOLUTION RESPIRATORY (INHALATION) at 03:06

## 2022-06-21 RX ADMIN — MORPHINE SULFATE 4 MG: 4 INJECTION INTRAVENOUS at 05:06

## 2022-06-21 RX ADMIN — METHYLPREDNISOLONE SODIUM SUCCINATE 125 MG: 125 INJECTION, POWDER, FOR SOLUTION INTRAMUSCULAR; INTRAVENOUS at 03:06
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
## 2022-06-21 NOTE — FIRST PROVIDER EVALUATION
Medical screening exam completed.  I have conducted a focused provider triage encounter, findings are as follows:    Brief history of present illness:  Patient sent by Dr. Major for further evaluation, patient is tachycardic and states she does not feel well    Vitals:    06/21/22 1439   BP: (!) 195/101   BP Location: Right arm   Patient Position: Sitting   Pulse: (!) 156   Resp: (!) 22   Temp: 98.2 °F (36.8 °C)   TempSrc: Oral   SpO2: (!) 94%   Weight: (S)   Comment: bed weight needed       Pertinent physical exam:  Patient tachypneic and tachycardic in triage    Brief workup plan:  Labs and further evaluation by physician    Preliminary workup initiated; this workup will be continued and followed by the physician or advanced practice provider that is assigned to the patient when roomed.

## 2022-06-22 NOTE — PROGRESS NOTES
Subjective:   Patient ID: Merle Caro is a 66 y.o. female.  Chief Complaint:  Leg Pain and Rectal Pain    Patient was scheduled for annual physical exam  Presented with son writhing in pain intermittently  Thought it was related to a hemorrhoid, since pain was primarily in her rectal area and denies any bleeding  But also more diaphoretic and reported fever at home  Chronic shortness of breath, but did not report any change in her breathing  Lower abdominal location for pain also present  Feels like it may start in her lower back and radiate down  Seems to be more colicky in nature  Received by standing up or lying flat and made worse with prolonged sitting  Also has had increased shakes  Son denies any increased confusion or side ptosis type symptoms      Current Outpatient Medications:     albuterol (PROVENTIL/VENTOLIN HFA) 90 mcg/actuation inhaler, Inhale 4 puffs into the lungs every 4 (four) hours as needed for Wheezing. Rescue, Disp: 18 g, Rfl: 0    celecoxib (CELEBREX) 200 MG capsule, Take 1 capsule (200 mg total) by mouth once daily., Disp: 90 capsule, Rfl: 0    cholecalciferol, vitamin D3, (VITAMIN D3) 2,000 unit Cap, Take 1 capsule by mouth once daily., Disp: , Rfl:     clonazepam (KLONOPIN) 0.5 MG tablet, Take 0.5 mg by mouth once daily. , Disp: , Rfl:     diclofenac sodium (VOLTAREN ARTHRITIS PAIN) 1 % Gel, Apply 2 g topically 4 (four) times daily. (Patient not taking: Reported on 5/19/2022), Disp: 100 g, Rfl: 11    fluticasone-salmeterol diskus inhaler 500-50 mcg, Inhale 1 puff into the lungs 2 (two) times daily. Controller (Patient not taking: Reported on 5/19/2022), Disp: 60 each, Rfl: 11    haloperidol (HALDOL) 5 MG tablet, Take 5 mg by mouth once daily., Disp: , Rfl:     hydrocortisone 2.5 % cream, Apply topically 2 (two) times daily. for 7 days, Disp: 20 g, Rfl: 0    lisinopriL 10 MG tablet, TAKE 1 TABLET(10 MG) BY MOUTH EVERY DAY, Disp: 90 tablet, Rfl: 3    OXYGEN-AIR DELIVERY SYSTEMS  MISC, Inhale 2 L into the lungs as needed (O2 via Nasal Canula.)., Disp: , Rfl:     pantoprazole (PROTONIX) 40 MG tablet, Take 1 tablet (40 mg total) by mouth once daily., Disp: 90 tablet, Rfl: 3    paroxetine (PAXIL) 40 MG tablet, TK 1 T PO QHS, Disp: , Rfl: 2    predniSONE (DELTASONE) 10 MG tablet, Take 4 tablets (40 mg total) by mouth once daily. for 5 days, Disp: 20 tablet, Rfl: 0    simvastatin (ZOCOR) 40 MG tablet, Take 1 tablet (40 mg total) by mouth every evening., Disp: 90 tablet, Rfl: 3    tiotropium (SPIRIVA WITH HANDIHALER) 18 mcg inhalation capsule, Inhale 1 capsule (18 mcg total) into the lungs once daily. Controller (Patient not taking: Reported on 5/19/2022), Disp: 30 capsule, Rfl: 11  No current facility-administered medications for this visit.    Review of Systems   Constitutional: Positive for activity change, appetite change, chills, diaphoresis, fatigue and fever.   HENT: Negative for congestion, dental problem, ear pain, postnasal drip, rhinorrhea, sinus pressure and sore throat.    Eyes: Negative for visual disturbance.   Respiratory: Negative for cough, chest tightness, shortness of breath and wheezing.    Cardiovascular: Negative for chest pain, palpitations and leg swelling.   Gastrointestinal: Positive for rectal pain. Negative for abdominal pain, blood in stool, constipation, diarrhea, nausea and vomiting.   Endocrine: Negative for polydipsia, polyphagia and polyuria.   Genitourinary: Negative for difficulty urinating, dysuria, flank pain, hematuria and pelvic pain.   Musculoskeletal: Positive for back pain. Negative for myalgias.   Skin: Negative for rash and wound.   Neurological: Positive for tremors and weakness. Negative for dizziness, syncope, light-headedness and headaches.   Hematological: Negative for adenopathy.   Psychiatric/Behavioral: Negative for decreased concentration, dysphoric mood and sleep disturbance. The patient is not nervous/anxious.      Objective:   Pulse  (!) 153   Temp 96.1 °F (35.6 °C) (Tympanic)   SpO2 98%     Physical Exam  Vitals and nursing note reviewed.   Constitutional:       General: She is not in acute distress.     Appearance: Normal appearance. She is well-developed and overweight. She is ill-appearing and diaphoretic.      Comments:   Patient with intermittent episodes of pain which causes significant discomfort   Cardiovascular:      Rate and Rhythm: Regular rhythm. Tachycardia present.   Pulmonary:      Effort: Pulmonary effort is normal.   Musculoskeletal:      Right lower leg: No edema.      Left lower leg: No edema.   Skin:     General: Skin is warm.      Findings: No rash.   Neurological:      Mental Status: She is alert.   Psychiatric:         Attention and Perception: Attention normal.         Mood and Affect: Mood and affect normal.       Assessment:       ICD-10-CM ICD-9-CM   1. Pain  R52 780.96     Plan:   Pain    Patient is significantly different from baseline  Unable to do adequate assessment in office  Recommend additional evaluation in the ER  Patient and son agreeable to going to the ER  Patient safe for escorted via family and does not need ambulance transportation  Will reschedule annual physical exam till next month

## 2022-06-23 ENCOUNTER — PATIENT MESSAGE (OUTPATIENT)
Dept: PULMONOLOGY | Facility: CLINIC | Age: 67
End: 2022-06-23
Payer: COMMERCIAL

## 2022-06-25 LAB
BACTERIA BLD CULT: ABNORMAL

## 2022-06-26 LAB — BACTERIA BLD CULT: NORMAL

## 2022-08-22 ENCOUNTER — OFFICE VISIT (OUTPATIENT)
Dept: SURGERY | Facility: CLINIC | Age: 67
End: 2022-08-22
Payer: MEDICARE

## 2022-08-22 VITALS
SYSTOLIC BLOOD PRESSURE: 166 MMHG | TEMPERATURE: 97 F | BODY MASS INDEX: 26.17 KG/M2 | WEIGHT: 134 LBS | HEART RATE: 116 BPM | DIASTOLIC BLOOD PRESSURE: 99 MMHG

## 2022-08-22 DIAGNOSIS — K62.89 RECTAL PAIN: Primary | ICD-10-CM

## 2022-08-22 DIAGNOSIS — K92.1 MELENA: ICD-10-CM

## 2022-08-22 DIAGNOSIS — R29.898 BILATERAL LEG WEAKNESS: ICD-10-CM

## 2022-08-22 PROCEDURE — 99999 PR PBB SHADOW E&M-EST. PATIENT-LVL III: ICD-10-PCS | Mod: PBBFAC,,, | Performed by: COLON & RECTAL SURGERY

## 2022-08-22 PROCEDURE — 99999 PR PBB SHADOW E&M-EST. PATIENT-LVL III: CPT | Mod: PBBFAC,,, | Performed by: COLON & RECTAL SURGERY

## 2022-08-22 PROCEDURE — 99204 PR OFFICE/OUTPT VISIT, NEW, LEVL IV, 45-59 MIN: ICD-10-PCS | Mod: S$PBB,,, | Performed by: COLON & RECTAL SURGERY

## 2022-08-22 PROCEDURE — 99213 OFFICE O/P EST LOW 20 MIN: CPT | Mod: PBBFAC | Performed by: COLON & RECTAL SURGERY

## 2022-08-22 PROCEDURE — 99204 OFFICE O/P NEW MOD 45 MIN: CPT | Mod: S$PBB,,, | Performed by: COLON & RECTAL SURGERY

## 2022-08-22 RX ORDER — SODIUM, POTASSIUM,MAG SULFATES 17.5-3.13G
1 SOLUTION, RECONSTITUTED, ORAL ORAL DAILY
Qty: 1 KIT | Refills: 0 | Status: SHIPPED | OUTPATIENT
Start: 2022-08-22 | End: 2022-08-24
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
## 2022-08-24 DIAGNOSIS — Z78.0 MENOPAUSE: ICD-10-CM

## 2022-09-01 ENCOUNTER — APPOINTMENT (OUTPATIENT)
Dept: RADIOLOGY | Facility: HOSPITAL | Age: 67
End: 2022-09-01
Attending: FAMILY MEDICINE
Payer: MEDICARE

## 2022-09-01 DIAGNOSIS — Z78.0 MENOPAUSE: ICD-10-CM

## 2022-09-01 PROCEDURE — 77080 DXA BONE DENSITY AXIAL: CPT | Mod: TC

## 2022-09-01 PROCEDURE — 77080 DEXA BONE DENSITY SPINE HIP: ICD-10-PCS | Mod: 26,,, | Performed by: RADIOLOGY

## 2022-09-01 PROCEDURE — 77080 DXA BONE DENSITY AXIAL: CPT | Mod: 26,,, | Performed by: RADIOLOGY

## 2022-09-21 ENCOUNTER — OFFICE VISIT (OUTPATIENT)
Dept: INTERNAL MEDICINE | Facility: CLINIC | Age: 67
End: 2022-09-21
Payer: MEDICARE

## 2022-09-21 ENCOUNTER — HOSPITAL ENCOUNTER (OUTPATIENT)
Dept: RADIOLOGY | Facility: HOSPITAL | Age: 67
Discharge: HOME | End: 2022-09-21
Attending: FAMILY MEDICINE
Payer: MEDICARE

## 2022-09-21 VITALS
BODY MASS INDEX: 26.17 KG/M2 | HEART RATE: 115 BPM | DIASTOLIC BLOOD PRESSURE: 68 MMHG | OXYGEN SATURATION: 91 % | TEMPERATURE: 98 F | SYSTOLIC BLOOD PRESSURE: 112 MMHG | HEIGHT: 60 IN

## 2022-09-21 DIAGNOSIS — Z23 NEED FOR PNEUMOCOCCAL VACCINE: ICD-10-CM

## 2022-09-21 DIAGNOSIS — K62.89 RECTAL PAIN: ICD-10-CM

## 2022-09-21 DIAGNOSIS — I10 ESSENTIAL HYPERTENSION: Primary | ICD-10-CM

## 2022-09-21 DIAGNOSIS — M81.0 AGE-RELATED OSTEOPOROSIS WITHOUT CURRENT PATHOLOGICAL FRACTURE: ICD-10-CM

## 2022-09-21 DIAGNOSIS — R29.898 BILATERAL LEG WEAKNESS: ICD-10-CM

## 2022-09-21 DIAGNOSIS — Z87.81 HISTORY OF COMPRESSION FRACTURE OF SPINE: ICD-10-CM

## 2022-09-21 DIAGNOSIS — M54.50 ACUTE MIDLINE LOW BACK PAIN WITHOUT SCIATICA: ICD-10-CM

## 2022-09-21 DIAGNOSIS — E78.00 PURE HYPERCHOLESTEROLEMIA: ICD-10-CM

## 2022-09-21 DIAGNOSIS — I70.0 AORTIC ATHEROSCLEROSIS: ICD-10-CM

## 2022-09-21 DIAGNOSIS — K64.5 THROMBOSED EXTERNAL HEMORRHOID: ICD-10-CM

## 2022-09-21 DIAGNOSIS — Z23 NEED FOR INFLUENZA VACCINATION: ICD-10-CM

## 2022-09-21 PROCEDURE — 72110 X-RAY EXAM L-2 SPINE 4/>VWS: CPT | Mod: TC

## 2022-09-21 PROCEDURE — 99215 OFFICE O/P EST HI 40 MIN: CPT | Mod: S$PBB,,, | Performed by: FAMILY MEDICINE

## 2022-09-21 PROCEDURE — 99999 PR PBB SHADOW E&M-EST. PATIENT-LVL V: ICD-10-PCS | Mod: PBBFAC,,, | Performed by: FAMILY MEDICINE

## 2022-09-21 PROCEDURE — 99215 PR OFFICE/OUTPT VISIT, EST, LEVL V, 40-54 MIN: ICD-10-PCS | Mod: S$PBB,,, | Performed by: FAMILY MEDICINE

## 2022-09-21 PROCEDURE — G0008 ADMIN INFLUENZA VIRUS VAC: HCPCS | Mod: PBBFAC

## 2022-09-21 PROCEDURE — 72110 X-RAY EXAM L-2 SPINE 4/>VWS: CPT | Mod: 26,,, | Performed by: RADIOLOGY

## 2022-09-21 PROCEDURE — 72110 XR LUMBAR SPINE COMPLETE 5 VIEW: ICD-10-PCS | Mod: 26,,, | Performed by: RADIOLOGY

## 2022-09-21 PROCEDURE — 99999 PR PBB SHADOW E&M-EST. PATIENT-LVL V: CPT | Mod: PBBFAC,,, | Performed by: FAMILY MEDICINE

## 2022-09-21 PROCEDURE — 99215 OFFICE O/P EST HI 40 MIN: CPT | Mod: PBBFAC,25 | Performed by: FAMILY MEDICINE

## 2022-09-21 PROCEDURE — 90677 PCV20 VACCINE IM: CPT | Mod: PBBFAC

## 2022-09-21 RX ORDER — HYDROCORTISONE 25 MG/G
CREAM TOPICAL 2 TIMES DAILY
Qty: 30 G | Refills: 3 | Status: SHIPPED | OUTPATIENT
Start: 2022-09-21 | End: 2024-01-30

## 2022-09-21 RX ORDER — LIDOCAINE AND PRILOCAINE 25; 25 MG/G; MG/G
CREAM TOPICAL DAILY
Qty: 30 G | Refills: 3 | Status: SHIPPED | OUTPATIENT
Start: 2022-09-21 | End: 2024-01-30

## 2022-09-21 NOTE — PROGRESS NOTES
Subjective:   Patient ID: Merle Caro is a 67 y.o. female.  Chief Complaint:  Follow-up    Presents with family member for follow-up     Last visit June 2022   Was sent to ER for significant acute rectal pain.    Evaluated diagnosed with thrombosed hemorrhoid   Referred to Colorectal surgery   Evaluated by Colorectal surgery who prescribed topical hydrocortisone, did not recommend any definitive surgical intervention, did recommend due to blood in stool additional colonoscopy with EGD but has not been scheduled a completed to date  Unfortunately pain persist and reported significant   Continues to remain uncomfortable with sitting or standing 6   Also has developed more increased lower extremity weakness     Additional medical history:  - H need ypertension.  Controlled lisinopril 10 mg daily   - Hyperlipidemia and aortic atherosclerosis.  Last LDL less than 100.  On simvastatin 40 mg daily.  Needs repeat lipid panel.    - Asthma with COPD, LUCILA, pulmonary nodule, and smoker.  Currently not compliant with Spiriva and Advair inhalers.  Denies any shortness of breath or cough.  Is scheduled for repeat CT scan in November 2022 with pulmonology follow-up.  - Mental health.  On Paxil 40 mg daily and Xanax 0.5 mg twice a day.  C stable.    Health maintenance needs include:  Shingles vaccine, COVID booster, Prevnar 20 vaccine, and flu vaccine  Colorectal screening      Current Outpatient Medications:     celecoxib (CELEBREX) 200 MG capsule, Take 1 capsule (200 mg total) by mouth once daily., Disp: 90 capsule, Rfl: 0    cholecalciferol, vitamin D3, (VITAMIN D3) 2,000 unit Cap, Take 1 capsule by mouth once daily., Disp: , Rfl:     clonazepam (KLONOPIN) 0.5 MG tablet, Take 0.5 mg by mouth once daily. , Disp: , Rfl:     diclofenac sodium (VOLTAREN ARTHRITIS PAIN) 1 % Gel, Apply 2 g topically 4 (four) times daily., Disp: 100 g, Rfl: 11    fluticasone-salmeterol diskus inhaler 500-50 mcg, Inhale 1 puff into the lungs 2 (two)  times daily. Controller, Disp: 60 each, Rfl: 11    haloperidol (HALDOL) 5 MG tablet, Take 5 mg by mouth once daily., Disp: , Rfl:     lisinopriL 10 MG tablet, TAKE 1 TABLET(10 MG) BY MOUTH EVERY DAY, Disp: 90 tablet, Rfl: 3    OXYGEN-AIR DELIVERY SYSTEMS MISC, Inhale 2 L into the lungs as needed (O2 via Nasal Canula.)., Disp: , Rfl:     pantoprazole (PROTONIX) 40 MG tablet, Take 1 tablet (40 mg total) by mouth once daily., Disp: 90 tablet, Rfl: 3    paroxetine (PAXIL) 40 MG tablet, TK 1 T PO QHS, Disp: , Rfl: 2    simvastatin (ZOCOR) 40 MG tablet, Take 1 tablet (40 mg total) by mouth every evening., Disp: 90 tablet, Rfl: 3    tiotropium (SPIRIVA WITH HANDIHALER) 18 mcg inhalation capsule, Inhale 1 capsule (18 mcg total) into the lungs once daily. Controller, Disp: 30 capsule, Rfl: 11    albuterol (PROVENTIL/VENTOLIN HFA) 90 mcg/actuation inhaler, Inhale 4 puffs into the lungs every 4 (four) hours as needed for Wheezing. Rescue, Disp: 18 g, Rfl: 0    hydrocortisone 2.5 % cream, Apply topically 2 (two) times daily for 7 days, Disp: 30 g, Rfl: 3    LIDOcaine-prilocaine (EMLA) cream, Apply topically once daily., Disp: 30 g, Rfl: 3    Review of Systems   Constitutional:  Positive for activity change and appetite change. Negative for chills, diaphoresis, fatigue and fever.   HENT:  Negative for congestion, dental problem, ear pain, postnasal drip, rhinorrhea, sinus pressure and sore throat.    Eyes:  Negative for visual disturbance.   Respiratory:  Negative for cough, chest tightness, shortness of breath and wheezing.    Cardiovascular:  Negative for chest pain, palpitations and leg swelling.   Gastrointestinal:  Positive for rectal pain. Negative for abdominal pain, blood in stool, constipation, diarrhea, nausea and vomiting.   Endocrine: Negative for polydipsia, polyphagia and polyuria.   Genitourinary:  Negative for difficulty urinating, dysuria, flank pain, hematuria and pelvic pain.   Musculoskeletal:  Positive for  back pain. Negative for myalgias.   Skin:  Negative for rash and wound.   Neurological:  Positive for tremors and weakness. Negative for dizziness, syncope, light-headedness and headaches.   Hematological:  Negative for adenopathy.   Psychiatric/Behavioral:  Negative for decreased concentration, dysphoric mood and sleep disturbance. The patient is not nervous/anxious.      Objective:   /68 (BP Location: Left arm, Patient Position: Sitting, BP Method: Small (Manual))   Pulse (!) 115   Temp 98.1 °F (36.7 °C) (Tympanic)   Ht 5' (1.524 m)   SpO2 (!) 91%   BMI 26.17 kg/m²     Physical Exam  Vitals and nursing note reviewed.   Constitutional:       General: She is not in acute distress.     Appearance: Normal appearance. She is well-developed and normal weight. She is not ill-appearing or toxic-appearing.   Cardiovascular:      Rate and Rhythm: Regular rhythm. Tachycardia present.   Pulmonary:      Effort: Pulmonary effort is normal.   Musculoskeletal:      Right lower leg: No edema.      Left lower leg: No edema.   Skin:     General: Skin is warm and dry.      Findings: No rash.   Neurological:      Mental Status: She is alert.      Motor: Weakness present. No tremor.   Psychiatric:         Attention and Perception: Attention normal.         Mood and Affect: Mood and affect normal.       Assessment:       ICD-10-CM ICD-9-CM   1. Essential hypertension  I10 401.9   2. Pure hypercholesterolemia  E78.00 272.0   3. Aortic atherosclerosis  I70.0 440.0   4. Acute midline low back pain without sciatica  M54.50 724.2   5. Bilateral leg weakness  R29.898 729.89   6. Age-related osteoporosis without current pathological fracture  M81.0 733.01   7. History of compression fracture of spine  Z87.81 V15.51   8. Need for influenza vaccination  Z23 V04.81   9. Need for pneumococcal vaccine  Z23 V03.82   10. Rectal pain  K62.89 569.42   11. Thrombosed external hemorrhoid  K64.5 455.4     Plan:   Essential hypertension  -      CBC Auto Differential; Future; Expected date: 09/21/2022  -     Comprehensive Metabolic Panel; Future; Expected date: 09/21/2022  Controlled.  Stable.  Asymptomatic.  BP at goal.    Continue lisinopril 10 mg daily     Pure hypercholesterolemia  Aortic atherosclerosis  -     Lipid Panel; Future; Expected date: 09/21/2022  Stable.  Asymptomatic.  Adjust simvastatin 40 mg daily if needed     Acute midline low back pain without sciatica  Bilateral leg weakness  Age-related osteoporosis without current pathological fracture  History of compression fracture of spine  -     X-Ray Lumbar Spine 5 View; Future; Expected date: 09/21/2022  -     Ambulatory referral/consult to Neurosurgery; Future; Expected date: 09/28/2022  Based on persistence of pain, increase leg weakness, and lack of response to previous treatment concern not related to the thrombosed hemorrhoid   With her history, needs to rule out new compression fracture   X-rays were done which showed:  - Osteopenia with stable mild compression deformities of T11 and T12.    There are, however, compression deformities of L3 and L4 which are age indeterminate but have occurred since the prior study.    Also, there are suspected sacral fractures where there is heterogeneous bone density.    These findings are new since the prior study.    Lumbar spine and pelvis could be further evaluated with CT and/or MRI.  Referral to neurosurgery ordered      RHM  -     Influenza - Quadrivalent (Adjuvanted)  -     (In Office Administered) Pneumococcal Conjugate Vaccine (20 Valent) (IM)    Rectal pain  Thrombosed external hemorrhoid        -     LIDOcaine-prilocaine (EMLA) cream; Apply topically once daily.         Dispense: 30 g; Refill: 3  -     hydrocortisone 2.5 % cream; Apply topically 2 (two) times daily for 7 days  Dispense: 30 g; Refill: 3    Return to clinic 1 month

## 2022-09-22 ENCOUNTER — TELEPHONE (OUTPATIENT)
Dept: HEMATOLOGY/ONCOLOGY | Facility: CLINIC | Age: 67
End: 2022-09-22
Payer: MEDICARE

## 2022-09-22 ENCOUNTER — PATIENT MESSAGE (OUTPATIENT)
Dept: HEMATOLOGY/ONCOLOGY | Facility: CLINIC | Age: 67
End: 2022-09-22
Payer: MEDICARE

## 2022-09-22 ENCOUNTER — PATIENT MESSAGE (OUTPATIENT)
Dept: PAIN MEDICINE | Facility: CLINIC | Age: 67
End: 2022-09-22
Payer: MEDICARE

## 2022-09-22 ENCOUNTER — PATIENT MESSAGE (OUTPATIENT)
Dept: NEUROSURGERY | Facility: CLINIC | Age: 67
End: 2022-09-22
Payer: MEDICARE

## 2022-09-22 ENCOUNTER — TELEPHONE (OUTPATIENT)
Dept: NEUROSURGERY | Facility: CLINIC | Age: 67
End: 2022-09-22
Payer: MEDICARE

## 2022-09-22 DIAGNOSIS — M48.062 SPINAL STENOSIS OF LUMBAR REGION WITH NEUROGENIC CLAUDICATION: ICD-10-CM

## 2022-09-22 DIAGNOSIS — R79.89 ELEVATED PLATELET COUNT: ICD-10-CM

## 2022-09-22 DIAGNOSIS — D72.829 LEUKOCYTOSIS, UNSPECIFIED TYPE: ICD-10-CM

## 2022-09-22 DIAGNOSIS — S32.030A COMPRESSION FRACTURE OF L3 VERTEBRA, INITIAL ENCOUNTER: ICD-10-CM

## 2022-09-22 DIAGNOSIS — M54.9 DORSALGIA, UNSPECIFIED: Primary | ICD-10-CM

## 2022-09-22 DIAGNOSIS — R79.89 ABNORMAL CBC: Primary | ICD-10-CM

## 2022-09-22 NOTE — TELEPHONE ENCOUNTER
Called patient from Neurosurgery [facility]. Scheduled appointment and patient v/u.    Elissa Ballesteros RN

## 2022-09-22 NOTE — TELEPHONE ENCOUNTER
Left message patient in reference to Hematology referral from Dr. Major.  [facility] message sent.

## 2022-09-23 ENCOUNTER — TELEPHONE (OUTPATIENT)
Dept: HEMATOLOGY/ONCOLOGY | Facility: CLINIC | Age: 67
End: 2022-09-23
Payer: MEDICARE

## 2022-09-23 NOTE — TELEPHONE ENCOUNTER
Spoke to pt's spouse Hematology appt scheduled per request next first available on [place].  Appt notice via [facility].

## 2022-09-26 DIAGNOSIS — K62.89 RECTAL PAIN: ICD-10-CM

## 2022-09-26 DIAGNOSIS — K92.1 MELENA: Primary | ICD-10-CM

## 2022-09-27 ENCOUNTER — OFFICE VISIT (OUTPATIENT)
Dept: NEUROSURGERY | Facility: CLINIC | Age: 67
End: 2022-09-27
Payer: MEDICARE

## 2022-09-27 ENCOUNTER — HOSPITAL ENCOUNTER (OUTPATIENT)
Dept: RADIOLOGY | Facility: HOSPITAL | Age: 67
Discharge: HOME | End: 2022-09-27
Attending: ANESTHESIOLOGY
Payer: MEDICARE

## 2022-09-27 VITALS
WEIGHT: 134 LBS | HEIGHT: 60 IN | HEART RATE: 101 BPM | DIASTOLIC BLOOD PRESSURE: 73 MMHG | SYSTOLIC BLOOD PRESSURE: 116 MMHG | BODY MASS INDEX: 26.31 KG/M2 | RESPIRATION RATE: 18 BRPM

## 2022-09-27 DIAGNOSIS — M48.062 SPINAL STENOSIS OF LUMBAR REGION WITH NEUROGENIC CLAUDICATION: ICD-10-CM

## 2022-09-27 DIAGNOSIS — S32.030A COMPRESSION FRACTURE OF L3 VERTEBRA, INITIAL ENCOUNTER: ICD-10-CM

## 2022-09-27 DIAGNOSIS — M54.9 DORSALGIA, UNSPECIFIED: ICD-10-CM

## 2022-09-27 DIAGNOSIS — Z87.81 HISTORY OF COMPRESSION FRACTURE OF SPINE: ICD-10-CM

## 2022-09-27 DIAGNOSIS — R29.898 BILATERAL LEG WEAKNESS: ICD-10-CM

## 2022-09-27 DIAGNOSIS — M99.14: Primary | ICD-10-CM

## 2022-09-27 PROCEDURE — 72131 CT LUMBAR SPINE W/O DYE: CPT | Mod: TC

## 2022-09-27 PROCEDURE — 99999 PR PBB SHADOW E&M-EST. PATIENT-LVL V: CPT | Mod: PBBFAC,,, | Performed by: PHYSICIAN ASSISTANT

## 2022-09-27 PROCEDURE — 99215 OFFICE O/P EST HI 40 MIN: CPT | Mod: PBBFAC,25 | Performed by: PHYSICIAN ASSISTANT

## 2022-09-27 PROCEDURE — 99999 PR PBB SHADOW E&M-EST. PATIENT-LVL V: ICD-10-PCS | Mod: PBBFAC,,, | Performed by: PHYSICIAN ASSISTANT

## 2022-09-27 PROCEDURE — 72131 CT LUMBAR SPINE WITHOUT CONTRAST: ICD-10-PCS | Mod: 26,,, | Performed by: RADIOLOGY

## 2022-09-27 PROCEDURE — 99204 OFFICE O/P NEW MOD 45 MIN: CPT | Mod: S$PBB,,, | Performed by: PHYSICIAN ASSISTANT

## 2022-09-27 PROCEDURE — 99204 PR OFFICE/OUTPT VISIT, NEW, LEVL IV, 45-59 MIN: ICD-10-PCS | Mod: S$PBB,,, | Performed by: PHYSICIAN ASSISTANT

## 2022-09-27 PROCEDURE — 72131 CT LUMBAR SPINE W/O DYE: CPT | Mod: 26,,, | Performed by: RADIOLOGY

## 2022-09-27 RX ORDER — TRAZODONE HYDROCHLORIDE 100 MG/1
100 TABLET ORAL NIGHTLY
COMMUNITY
Start: 2022-09-20

## 2022-09-27 NOTE — PROGRESS NOTES
Subjective:      Patient ID: Merle Caro is a 67 y.o. female.    HPI  The patient is here today for evaluation of back pain.  She is referred to our clinic by PCP Marck Major.  Patient is accompanied by her .    They report taking a recent road trip for two weeks. It was during this road trip that patient started having pain and could not get up.   It was around the first week of June.   Patient saw her pcp and went to the ER afterwards.   Unfortunately no imaging of her spine was ordered at that time.   She was diagnosed with shortness of breath, ex thrombosed hemorrhoids and recommended to follow-up with Colorectal Surgery.    She denies any falls or other injuries.  No known h/o cancer.    Patient localizes her pain to buttocks and tailbone.   If she sits for too long her back pain worsens.  She cannot walk w/out support. Has a Rolator which she uses to sit.   Her  also has to assist her at all times.    Reports weakness in both legs.   Also has pain if she crosses her legs.   RLE=LLE  Numbness/tingling in legs and buttocks.  Patient has an ongoing h/o bladder incontinence but has recently developed bowel incontinence (onset mid June).  Currently in adult diapers.    Prior to June patient was in PT for knee pain.   Reports a 60-70 lb (unintentional) weight loss over the past 9-12 months.       Objective:     Body mass index is 26.17 kg/m².  Vitals:    09/27/22 1102   BP: 116/73   Pulse: 101   Resp: 18        Please note: Physical exam was difficult and limited due to patient's pain and position in wheelchair.    Back: very painful to touch- juan carlos buttocks, sacrum  None  Paraspinal muscle spasms   None Not tested Pain with flexion and extention   WNL Not tested Range of motion    Neg  Straight leg raise     Motor   Right Right Left Left  Level Group   5 4 5 4 L2 Hip flexor (Psoas)   5 4 5 4 L3 Leg extension (Quads)   5 4 5 4 L4 Dorsiflexion & foot inversion (Tibialis Anterior)   5 4 5 4 L5 Great  toe extension ( EHL)   5 4 5 4 S1 Foot eversion (Gastroc, PL & PB)     Sensation  NL Decreased (R/L/BL) Level Sensation    X  L2 Anterio-medial thigh   X  L3 Medial thigh around knee   X  L4 Medial foot   X  L5 Dorsum foot   X  S1 Lateral foot        Results for orders placed during the hospital encounter of 06/22/21    X-Ray Lumbar Complete Including Flex And Ext    Narrative  EXAMINATION:  XR LUMBAR SPINE 5 VIEW WITH FLEX AND EXT    FINDINGS:  Five lumbar vertebrae.    Well-visualized on the lateral view are compression deformities involving T10 through T12.  Age indeterminate and correlation for point tenderness at these levels recommended.    Lumbar vertebrae demonstrate normal height and alignment.  Disc spaces appear adequate all levels.  Facet arthropathy from L3-4 through L5-S1 levels.  Vascular calcifications of the aorta and its branches without aneurysmal dilatation.  No pars defects.  Pedicles and SI joints intact.  No subluxation or instability noted in the lumbar spine on flexion and extension views.    Impression  Compression deformities of uncertain age noted at the T10 through T12 levels.  Correlate with exam and history.  Lumbar spondylosis without acute fracture or subluxation.  Additional findings as above.    This report was flagged in Epic as abnormal.       Lab Results   Component Value Date    WBC 12.72 (H) 09/21/2022    HCT 42.3 09/21/2022           INDEPENDENT INTERPRETATION OF TEST:  Lumbar 9/21/22  FINDINGS:  The bones appear osteopenic.  Since the prior plain films, there is mild-to-moderate superior endplate depression of L3 as well as mild loss of height of L4.  These are age indeterminate compression deformities but have occurred since the prior study.  There is a stable mild superior endplate compression deformity of T12 as well as stable mild anterior wedging of T11.  There is multilevel facet joint arthropathy.  There is stable mild anterolisthesis of L4 on L5.  There is moderate  to marked atherosclerosis.  There is apparent irregularity of the sacrum bilaterally as well which could represent (insufficiency) fractures.  The study is somewhat motion degraded     Impression:     1. Osteopenia with stable mild compression deformities of T11 and T12.  There are, however, compression deformities of L3 and L4 which are age indeterminate but have occurred since the prior study.  Also, there are suspected sacral fractures where there is heterogeneous bone density.  These findings are new since the prior study.  Lumbar spine and pelvis could be further evaluated with CT and/or MRI.  This report was flagged in Epic as abnormal.       Relevant imaging results reviewed and interpreted by me, discussed with the patient and / or family today.  Assessment:     1. Subluxation complex (vertebral) of sacral region    2. Bilateral leg weakness    3. History of compression fracture of spine    4. Dorsalgia, unspecified      Plan:     Subluxation complex (vertebral) of sacral region  -     MRI Sacrum/Coccyx (Bony) W W/O Contrast; Future; Expected date: 09/27/2022  -     Ambulatory referral/consult to Orthopedics; Future; Expected date: 09/27/2022    Bilateral leg weakness  -     Ambulatory referral/consult to Neurosurgery    History of compression fracture of spine  -     Ambulatory referral/consult to Neurosurgery  -     MRI Sacrum/Coccyx (Bony) W W/O Contrast; Future; Expected date: 09/27/2022    Dorsalgia, unspecified  -     Cancel: MRI Lumbar Spine W WO Contrast; Future; Expected date: 09/27/2022  -     MRI Sacrum/Coccyx (Bony) W W/O Contrast; Future; Expected date: 09/27/2022    Upon entering the room, patient and her  had been waiting awhile due to our overbooked schedule and wanted to leave.   I informed them that this visit was very important and urged them to wait.   Patient briefly provided me with information and was very hesitant for me to examine her due to pain.    I discussed recent imaging  "studies and stressed the importance of obtaining a MRI of sacrum, coccyx and lumbar spine for further evaluation of fractures and subluxation of sacrum.  I wanted them to get these studies ASAP but they wished to return home.   Dr. Encarnacion also viewed recent imaging and advised that patient complete MRI and see Dr. Meza afterwards for surgical evaluation.    Patient was scheduled the following day for the MRI but could not complete it. MRI was only completed for the "without contrast" study of LUmbar spine due to pain with lying on table. See note in Epic.  I called her  afterwards and he requested a more sedated study. My MA worked on this and was able to schedule her in [place].       Rocky Monroy PA-C  [facility]         "

## 2022-09-29 ENCOUNTER — TELEPHONE (OUTPATIENT)
Dept: NEUROSURGERY | Facility: CLINIC | Age: 67
End: 2022-09-29
Payer: MEDICARE

## 2022-09-29 ENCOUNTER — HOSPITAL ENCOUNTER (OUTPATIENT)
Dept: RADIOLOGY | Facility: HOSPITAL | Age: 67
Discharge: HOME | End: 2022-09-29
Attending: PHYSICIAN ASSISTANT
Payer: MEDICARE

## 2022-09-29 ENCOUNTER — TELEPHONE (OUTPATIENT)
Dept: NEUROSURGERY | Facility: HOSPITAL | Age: 67
End: 2022-09-29
Payer: MEDICARE

## 2022-09-29 DIAGNOSIS — M99.14: Primary | ICD-10-CM

## 2022-09-29 DIAGNOSIS — M99.14: ICD-10-CM

## 2022-09-29 DIAGNOSIS — Z87.81 HISTORY OF COMPRESSION FRACTURE OF SPINE: ICD-10-CM

## 2022-09-29 DIAGNOSIS — M54.9 DORSALGIA, UNSPECIFIED: ICD-10-CM

## 2022-09-29 DIAGNOSIS — M54.9 DORSALGIA, UNSPECIFIED: Primary | ICD-10-CM

## 2022-09-29 PROCEDURE — 72148 MRI LUMBAR SPINE WITHOUT CONTRAST: ICD-10-PCS | Mod: 26,,, | Performed by: RADIOLOGY

## 2022-09-29 PROCEDURE — 72148 MRI LUMBAR SPINE W/O DYE: CPT | Mod: TC,PO

## 2022-09-29 PROCEDURE — 72148 MRI LUMBAR SPINE W/O DYE: CPT | Mod: 26,,, | Performed by: RADIOLOGY

## 2022-09-29 RX ORDER — HYDROCODONE BITARTRATE AND ACETAMINOPHEN 7.5; 325 MG/1; MG/1
1 TABLET ORAL EVERY 8 HOURS PRN
Qty: 21 TABLET | Refills: 0 | Status: ON HOLD | OUTPATIENT
Start: 2022-09-29 | End: 2022-10-18 | Stop reason: HOSPADM

## 2022-09-29 NOTE — TELEPHONE ENCOUNTER
"----- Message from RT Cornelius sent at 9/29/2022 11:20 AM CDT -----  Regarding: Cancellation of Order # [id number]  Order number [id number] for the procedure MRI LUMBAR SPINE W WO   CONTRAST [[id number]] has been canceled by Hermelinda Rob RT   [[id number]]. This procedure was ordered by Rocky Monroy PA-C   [[id number]] on Sep 27, 2022 for the patient Merle Caro [[id number]].  The reason for cancellation was "Patient Refused".    This was a future order.  "

## 2022-09-29 NOTE — TELEPHONE ENCOUNTER
Spoke to the pt in regards to her MRI being moved to [place] with Anesthesia. I informed the pt that the soonest she can have It done was on 10/14 therefore her 10/7 was switched to help with the travel. I explained to the pt she will do her MRI starting at 12:30pm and have her appt with the ortho spine provider at 3pm. The pt verbalized understanding and stated she will inform her . I informed the pt that the changes can be viewed via her Jawbone genna.

## 2022-10-11 ENCOUNTER — LAB VISIT (OUTPATIENT)
Dept: LAB | Facility: HOSPITAL | Age: 67
End: 2022-10-11
Attending: INTERNAL MEDICINE
Payer: MEDICARE

## 2022-10-11 ENCOUNTER — OFFICE VISIT (OUTPATIENT)
Dept: HEMATOLOGY/ONCOLOGY | Facility: CLINIC | Age: 67
End: 2022-10-11
Payer: MEDICARE

## 2022-10-11 VITALS
OXYGEN SATURATION: 98 % | HEIGHT: 60 IN | BODY MASS INDEX: 26.17 KG/M2 | DIASTOLIC BLOOD PRESSURE: 77 MMHG | SYSTOLIC BLOOD PRESSURE: 121 MMHG | HEART RATE: 104 BPM | TEMPERATURE: 97 F

## 2022-10-11 DIAGNOSIS — F17.219 CIGARETTE NICOTINE DEPENDENCE WITH NICOTINE-INDUCED DISORDER: Primary | ICD-10-CM

## 2022-10-11 DIAGNOSIS — R79.89 ABNORMAL CBC: ICD-10-CM

## 2022-10-11 DIAGNOSIS — D72.829 LEUKOCYTOSIS, UNSPECIFIED TYPE: ICD-10-CM

## 2022-10-11 DIAGNOSIS — D47.1 CHRONIC MYELOPROLIFERATIVE DISEASE: ICD-10-CM

## 2022-10-11 DIAGNOSIS — D75.839 THROMBOCYTOSIS: ICD-10-CM

## 2022-10-11 DIAGNOSIS — I10 ESSENTIAL HYPERTENSION: Chronic | ICD-10-CM

## 2022-10-11 DIAGNOSIS — J96.11 CHRONIC RESPIRATORY FAILURE WITH HYPOXIA: ICD-10-CM

## 2022-10-11 DIAGNOSIS — R79.89 ELEVATED PLATELET COUNT: ICD-10-CM

## 2022-10-11 DIAGNOSIS — R91.1 LUNG NODULE SEEN ON IMAGING STUDY: Chronic | ICD-10-CM

## 2022-10-11 LAB
ALBUMIN SERPL BCP-MCNC: 2.8 G/DL (ref 3.5–5.2)
ALP SERPL-CCNC: 94 U/L (ref 55–135)
ALT SERPL W/O P-5'-P-CCNC: 8 U/L (ref 10–44)
ANION GAP SERPL CALC-SCNC: 12 MMOL/L (ref 8–16)
AST SERPL-CCNC: 9 U/L (ref 10–40)
BASOPHILS # BLD AUTO: 0.04 K/UL (ref 0–0.2)
BASOPHILS NFR BLD: 0.5 % (ref 0–1.9)
BILIRUB SERPL-MCNC: 0.4 MG/DL (ref 0.1–1)
BUN SERPL-MCNC: 8 MG/DL (ref 8–23)
CALCIUM SERPL-MCNC: 9.4 MG/DL (ref 8.7–10.5)
CHLORIDE SERPL-SCNC: 100 MMOL/L (ref 95–110)
CO2 SERPL-SCNC: 28 MMOL/L (ref 23–29)
CREAT SERPL-MCNC: 0.6 MG/DL (ref 0.5–1.4)
DIFFERENTIAL METHOD: ABNORMAL
EOSINOPHIL # BLD AUTO: 0.2 K/UL (ref 0–0.5)
EOSINOPHIL NFR BLD: 2.1 % (ref 0–8)
ERYTHROCYTE [DISTWIDTH] IN BLOOD BY AUTOMATED COUNT: 14.6 % (ref 11.5–14.5)
EST. GFR  (NO RACE VARIABLE): >60 ML/MIN/1.73 M^2
GLUCOSE SERPL-MCNC: 114 MG/DL (ref 70–110)
HCT VFR BLD AUTO: 37.4 % (ref 37–48.5)
HGB BLD-MCNC: 11.9 G/DL (ref 12–16)
IMM GRANULOCYTES # BLD AUTO: 0.03 K/UL (ref 0–0.04)
IMM GRANULOCYTES NFR BLD AUTO: 0.3 % (ref 0–0.5)
LYMPHOCYTES # BLD AUTO: 1.1 K/UL (ref 1–4.8)
LYMPHOCYTES NFR BLD: 12.6 % (ref 18–48)
MCH RBC QN AUTO: 28.8 PG (ref 27–31)
MCHC RBC AUTO-ENTMCNC: 31.8 G/DL (ref 32–36)
MCV RBC AUTO: 91 FL (ref 82–98)
MONOCYTES # BLD AUTO: 0.8 K/UL (ref 0.3–1)
MONOCYTES NFR BLD: 8.6 % (ref 4–15)
NEUTROPHILS # BLD AUTO: 6.6 K/UL (ref 1.8–7.7)
NEUTROPHILS NFR BLD: 75.9 % (ref 38–73)
NRBC BLD-RTO: 0 /100 WBC
PLATELET # BLD AUTO: 517 K/UL (ref 150–450)
PMV BLD AUTO: 9.1 FL (ref 9.2–12.9)
POTASSIUM SERPL-SCNC: 3.2 MMOL/L (ref 3.5–5.1)
PROT SERPL-MCNC: 6.7 G/DL (ref 6–8.4)
RBC # BLD AUTO: 4.13 M/UL (ref 4–5.4)
SODIUM SERPL-SCNC: 140 MMOL/L (ref 136–145)
WBC # BLD AUTO: 8.72 K/UL (ref 3.9–12.7)

## 2022-10-11 PROCEDURE — 80053 COMPREHEN METABOLIC PANEL: CPT | Performed by: INTERNAL MEDICINE

## 2022-10-11 PROCEDURE — 36415 COLL VENOUS BLD VENIPUNCTURE: CPT | Performed by: INTERNAL MEDICINE

## 2022-10-11 PROCEDURE — 99999 PR PBB SHADOW E&M-EST. PATIENT-LVL IV: CPT | Mod: PBBFAC,,, | Performed by: INTERNAL MEDICINE

## 2022-10-11 PROCEDURE — 99203 PR OFFICE/OUTPT VISIT, NEW, LEVL III, 30-44 MIN: ICD-10-PCS | Mod: S$PBB,,, | Performed by: INTERNAL MEDICINE

## 2022-10-11 PROCEDURE — 99999 PR PBB SHADOW E&M-EST. PATIENT-LVL IV: ICD-10-PCS | Mod: PBBFAC,,, | Performed by: INTERNAL MEDICINE

## 2022-10-11 PROCEDURE — 85025 COMPLETE CBC W/AUTO DIFF WBC: CPT | Performed by: INTERNAL MEDICINE

## 2022-10-11 PROCEDURE — 82728 ASSAY OF FERRITIN: CPT | Performed by: INTERNAL MEDICINE

## 2022-10-11 PROCEDURE — 99203 OFFICE O/P NEW LOW 30 MIN: CPT | Mod: S$PBB,,, | Performed by: INTERNAL MEDICINE

## 2022-10-11 PROCEDURE — 99214 OFFICE O/P EST MOD 30 MIN: CPT | Mod: PBBFAC | Performed by: INTERNAL MEDICINE

## 2022-10-11 NOTE — ASSESSMENT & PLAN NOTE
Most likely reactive. It is not high enough to warrant any treatment.   Will add CBC and MARK-2 to RO MPN.
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
## 2022-10-12 LAB — FERRITIN SERPL-MCNC: 212 NG/ML (ref 20–300)

## 2022-10-14 ENCOUNTER — HOSPITAL ENCOUNTER (INPATIENT)
Facility: HOSPITAL | Age: 67
LOS: 11 days | Discharge: HOME | DRG: 456 | End: 2022-10-25
Attending: ORTHOPAEDIC SURGERY | Admitting: ORTHOPAEDIC SURGERY
Payer: MEDICARE

## 2022-10-14 ENCOUNTER — ANESTHESIA EVENT (OUTPATIENT)
Dept: ENDOSCOPY | Facility: HOSPITAL | Age: 67
DRG: 456 | End: 2022-10-14
Payer: MEDICARE

## 2022-10-14 ENCOUNTER — HOSPITAL ENCOUNTER (OUTPATIENT)
Dept: RADIOLOGY | Facility: HOSPITAL | Age: 67
Discharge: HOME | DRG: 456 | End: 2022-10-14
Attending: PHYSICIAN ASSISTANT | Admitting: NEUROLOGICAL SURGERY
Payer: MEDICARE

## 2022-10-14 ENCOUNTER — HOSPITAL ENCOUNTER (OUTPATIENT)
Dept: RADIOLOGY | Facility: HOSPITAL | Age: 67
Discharge: HOME | DRG: 456 | End: 2022-10-14
Attending: PHYSICIAN ASSISTANT
Payer: MEDICARE

## 2022-10-14 ENCOUNTER — ANESTHESIA (OUTPATIENT)
Dept: ENDOSCOPY | Facility: HOSPITAL | Age: 67
DRG: 456 | End: 2022-10-14
Payer: MEDICARE

## 2022-10-14 DIAGNOSIS — M99.14: ICD-10-CM

## 2022-10-14 DIAGNOSIS — E43 SEVERE MALNUTRITION: ICD-10-CM

## 2022-10-14 DIAGNOSIS — S32.10XA CLOSED FRACTURE OF SACRUM, UNSPECIFIED PORTION OF SACRUM, INITIAL ENCOUNTER: Primary | ICD-10-CM

## 2022-10-14 DIAGNOSIS — Z87.81 HISTORY OF COMPRESSION FRACTURE OF SPINE: ICD-10-CM

## 2022-10-14 DIAGNOSIS — S32.10XA: Chronic | ICD-10-CM

## 2022-10-14 DIAGNOSIS — S32.10XA CLOSED FRACTURE OF SACRUM, UNSPECIFIED FRACTURE MORPHOLOGY, INITIAL ENCOUNTER: ICD-10-CM

## 2022-10-14 DIAGNOSIS — Z01.811 PRE-OP CHEST EXAM: ICD-10-CM

## 2022-10-14 DIAGNOSIS — M54.9 DORSALGIA, UNSPECIFIED: ICD-10-CM

## 2022-10-14 DIAGNOSIS — R94.31 QT PROLONGATION: ICD-10-CM

## 2022-10-14 DIAGNOSIS — Z82.49 FAMILY HISTORY OF CARDIAC ARREST: ICD-10-CM

## 2022-10-14 DIAGNOSIS — D64.9 ANEMIA, UNSPECIFIED TYPE: ICD-10-CM

## 2022-10-14 DIAGNOSIS — R68.81 EARLY SATIETY: ICD-10-CM

## 2022-10-14 DIAGNOSIS — Z74.09 IMPAIRED MOBILITY: ICD-10-CM

## 2022-10-14 DIAGNOSIS — R63.4 UNEXPLAINED WEIGHT LOSS: ICD-10-CM

## 2022-10-14 DIAGNOSIS — G83.4 CAUDA EQUINA SYNDROME: ICD-10-CM

## 2022-10-14 LAB
ABO + RH BLD: NORMAL
ALBUMIN SERPL BCP-MCNC: 2.8 G/DL (ref 3.5–5.2)
ALP SERPL-CCNC: 91 U/L (ref 55–135)
ALT SERPL W/O P-5'-P-CCNC: <5 U/L (ref 10–44)
ANION GAP SERPL CALC-SCNC: 14 MMOL/L (ref 8–16)
AST SERPL-CCNC: 7 U/L (ref 10–40)
BASOPHILS # BLD AUTO: 0.05 K/UL (ref 0–0.2)
BASOPHILS NFR BLD: 0.4 % (ref 0–1.9)
BILIRUB SERPL-MCNC: 0.5 MG/DL (ref 0.1–1)
BLD GP AB SCN CELLS X3 SERPL QL: NORMAL
BUN SERPL-MCNC: 5 MG/DL (ref 8–23)
CALCIUM SERPL-MCNC: 8.6 MG/DL (ref 8.7–10.5)
CHLORIDE SERPL-SCNC: 97 MMOL/L (ref 95–110)
CO2 SERPL-SCNC: 24 MMOL/L (ref 23–29)
CREAT SERPL-MCNC: 0.6 MG/DL (ref 0.5–1.4)
DIFFERENTIAL METHOD: ABNORMAL
EOSINOPHIL # BLD AUTO: 0 K/UL (ref 0–0.5)
EOSINOPHIL NFR BLD: 0.2 % (ref 0–8)
ERYTHROCYTE [DISTWIDTH] IN BLOOD BY AUTOMATED COUNT: 14.8 % (ref 11.5–14.5)
EST. GFR  (NO RACE VARIABLE): >60 ML/MIN/1.73 M^2
ESTIMATED AVG GLUCOSE: 94 MG/DL (ref 68–131)
GLUCOSE SERPL-MCNC: 189 MG/DL (ref 70–110)
HBA1C MFR BLD: 4.9 % (ref 4–5.6)
HCT VFR BLD AUTO: 36.1 % (ref 37–48.5)
HGB BLD-MCNC: 11.7 G/DL (ref 12–16)
IMM GRANULOCYTES # BLD AUTO: 0.04 K/UL (ref 0–0.04)
IMM GRANULOCYTES NFR BLD AUTO: 0.3 % (ref 0–0.5)
INR PPP: 1.1 (ref 0.8–1.2)
JAK2 P.V617F BLD/T QL: NORMAL
JAK2 V617F MUTATION DETECTION BLD RESULT: NORMAL
LYMPHOCYTES # BLD AUTO: 0.8 K/UL (ref 1–4.8)
LYMPHOCYTES NFR BLD: 5.9 % (ref 18–48)
MAGNESIUM SERPL-MCNC: 1.6 MG/DL (ref 1.6–2.6)
MCH RBC QN AUTO: 29 PG (ref 27–31)
MCHC RBC AUTO-ENTMCNC: 32.4 G/DL (ref 32–36)
MCV RBC AUTO: 89 FL (ref 82–98)
MONOCYTES # BLD AUTO: 0.7 K/UL (ref 0.3–1)
MONOCYTES NFR BLD: 5.4 % (ref 4–15)
NEUTROPHILS # BLD AUTO: 12 K/UL (ref 1.8–7.7)
NEUTROPHILS NFR BLD: 87.8 % (ref 38–73)
NRBC BLD-RTO: 0 /100 WBC
PHOSPHATE SERPL-MCNC: 3.2 MG/DL (ref 2.7–4.5)
PLATELET # BLD AUTO: 441 K/UL (ref 150–450)
PMV BLD AUTO: 9 FL (ref 9.2–12.9)
POTASSIUM SERPL-SCNC: 2.4 MMOL/L (ref 3.5–5.1)
PREALB SERPL-MCNC: 9 MG/DL (ref 20–43)
PROT SERPL-MCNC: 6.3 G/DL (ref 6–8.4)
PROTHROMBIN TIME: 11.7 SEC (ref 9–12.5)
RBC # BLD AUTO: 4.04 M/UL (ref 4–5.4)
SODIUM SERPL-SCNC: 135 MMOL/L (ref 136–145)
WBC # BLD AUTO: 13.63 K/UL (ref 3.9–12.7)

## 2022-10-14 PROCEDURE — 80053 COMPREHEN METABOLIC PANEL: CPT

## 2022-10-14 PROCEDURE — 72148 MRI LUMBAR SPINE W/O DYE: CPT | Mod: TC

## 2022-10-14 PROCEDURE — 63600175 PHARM REV CODE 636 W HCPCS: Performed by: NURSE ANESTHETIST, CERTIFIED REGISTERED

## 2022-10-14 PROCEDURE — 72195 MRI SACRUM/COCCYX (BONY) W/O CONTRAST: ICD-10-PCS | Mod: 26,,, | Performed by: INTERNAL MEDICINE

## 2022-10-14 PROCEDURE — 72195 MRI PELVIS W/O DYE: CPT | Mod: TC

## 2022-10-14 PROCEDURE — D9220A PRA ANESTHESIA: ICD-10-PCS | Mod: ANES,,, | Performed by: ANESTHESIOLOGY

## 2022-10-14 PROCEDURE — 93010 EKG 12-LEAD: ICD-10-PCS | Mod: ,,, | Performed by: INTERNAL MEDICINE

## 2022-10-14 PROCEDURE — 86850 RBC ANTIBODY SCREEN: CPT

## 2022-10-14 PROCEDURE — 84100 ASSAY OF PHOSPHORUS: CPT

## 2022-10-14 PROCEDURE — 63600175 PHARM REV CODE 636 W HCPCS

## 2022-10-14 PROCEDURE — 83735 ASSAY OF MAGNESIUM: CPT

## 2022-10-14 PROCEDURE — 72195 MRI PELVIS W/O DYE: CPT | Mod: 26,,, | Performed by: INTERNAL MEDICINE

## 2022-10-14 PROCEDURE — 94761 N-INVAS EAR/PLS OXIMETRY MLT: CPT

## 2022-10-14 PROCEDURE — 51798 US URINE CAPACITY MEASURE: CPT

## 2022-10-14 PROCEDURE — 93005 ELECTROCARDIOGRAM TRACING: CPT

## 2022-10-14 PROCEDURE — D9220A PRA ANESTHESIA: Mod: ANES,,, | Performed by: ANESTHESIOLOGY

## 2022-10-14 PROCEDURE — D9220A PRA ANESTHESIA: Mod: CRNA,,, | Performed by: NURSE ANESTHETIST, CERTIFIED REGISTERED

## 2022-10-14 PROCEDURE — 27000221 HC OXYGEN, UP TO 24 HOURS

## 2022-10-14 PROCEDURE — 72148 MRI LUMBAR SPINE W/O DYE: CPT | Mod: 26,,, | Performed by: INTERNAL MEDICINE

## 2022-10-14 PROCEDURE — 25000003 PHARM REV CODE 250: Performed by: NURSE ANESTHETIST, CERTIFIED REGISTERED

## 2022-10-14 PROCEDURE — 72148 MRI LUMBAR SPINE WITHOUT CONTRAST: ICD-10-PCS | Mod: 26,,, | Performed by: INTERNAL MEDICINE

## 2022-10-14 PROCEDURE — D9220A PRA ANESTHESIA: ICD-10-PCS | Mod: CRNA,,, | Performed by: NURSE ANESTHETIST, CERTIFIED REGISTERED

## 2022-10-14 PROCEDURE — 25000003 PHARM REV CODE 250

## 2022-10-14 PROCEDURE — 99900035 HC TECH TIME PER 15 MIN (STAT)

## 2022-10-14 PROCEDURE — 84134 ASSAY OF PREALBUMIN: CPT

## 2022-10-14 PROCEDURE — 93010 ELECTROCARDIOGRAM REPORT: CPT | Mod: ,,, | Performed by: INTERNAL MEDICINE

## 2022-10-14 PROCEDURE — 85025 COMPLETE CBC W/AUTO DIFF WBC: CPT

## 2022-10-14 PROCEDURE — 83036 HEMOGLOBIN GLYCOSYLATED A1C: CPT

## 2022-10-14 PROCEDURE — 11000001 HC ACUTE MED/SURG PRIVATE ROOM

## 2022-10-14 PROCEDURE — 86920 COMPATIBILITY TEST SPIN: CPT | Performed by: STUDENT IN AN ORGANIZED HEALTH CARE EDUCATION/TRAINING PROGRAM

## 2022-10-14 PROCEDURE — 85610 PROTHROMBIN TIME: CPT

## 2022-10-14 RX ORDER — TRAZODONE HYDROCHLORIDE 100 MG/1
100 TABLET ORAL NIGHTLY
Status: DISCONTINUED | OUTPATIENT
Start: 2022-10-14 | End: 2022-10-20

## 2022-10-14 RX ORDER — CALCIUM CARBONATE 200(500)MG
500 TABLET,CHEWABLE ORAL 3 TIMES DAILY PRN
Status: DISCONTINUED | OUTPATIENT
Start: 2022-10-14 | End: 2022-10-25 | Stop reason: HOSPADM

## 2022-10-14 RX ORDER — PANTOPRAZOLE SODIUM 40 MG/1
40 TABLET, DELAYED RELEASE ORAL DAILY
Status: DISCONTINUED | OUTPATIENT
Start: 2022-10-15 | End: 2022-10-25 | Stop reason: HOSPADM

## 2022-10-14 RX ORDER — ATORVASTATIN CALCIUM 20 MG/1
20 TABLET, FILM COATED ORAL DAILY
Status: DISCONTINUED | OUTPATIENT
Start: 2022-10-15 | End: 2022-10-25 | Stop reason: HOSPADM

## 2022-10-14 RX ORDER — POTASSIUM CHLORIDE 7.45 MG/ML
10 INJECTION INTRAVENOUS
Status: COMPLETED | OUTPATIENT
Start: 2022-10-14 | End: 2022-10-15

## 2022-10-14 RX ORDER — MIDAZOLAM HYDROCHLORIDE 1 MG/ML
INJECTION, SOLUTION INTRAMUSCULAR; INTRAVENOUS
Status: DISCONTINUED | OUTPATIENT
Start: 2022-10-14 | End: 2022-10-14

## 2022-10-14 RX ORDER — CELECOXIB 100 MG/1
100 CAPSULE ORAL 2 TIMES DAILY PRN
Status: DISCONTINUED | OUTPATIENT
Start: 2022-10-14 | End: 2022-10-18

## 2022-10-14 RX ORDER — CLONAZEPAM 0.5 MG/1
0.5 TABLET ORAL NIGHTLY
Status: DISCONTINUED | OUTPATIENT
Start: 2022-10-14 | End: 2022-10-25 | Stop reason: HOSPADM

## 2022-10-14 RX ORDER — ACETAMINOPHEN 325 MG/1
650 TABLET ORAL EVERY 8 HOURS
Status: DISCONTINUED | OUTPATIENT
Start: 2022-10-14 | End: 2022-10-18

## 2022-10-14 RX ORDER — PAROXETINE 10 MG/1
40 TABLET, FILM COATED ORAL NIGHTLY
Status: DISCONTINUED | OUTPATIENT
Start: 2022-10-14 | End: 2022-10-25 | Stop reason: HOSPADM

## 2022-10-14 RX ORDER — POTASSIUM CHLORIDE 20 MEQ/1
40 TABLET, EXTENDED RELEASE ORAL ONCE
Status: COMPLETED | OUTPATIENT
Start: 2022-10-14 | End: 2022-10-14

## 2022-10-14 RX ORDER — LISINOPRIL 10 MG/1
10 TABLET ORAL DAILY
Status: DISCONTINUED | OUTPATIENT
Start: 2022-10-15 | End: 2022-10-18

## 2022-10-14 RX ORDER — CHOLECALCIFEROL (VITAMIN D3) 25 MCG
2000 TABLET ORAL NIGHTLY
Status: DISCONTINUED | OUTPATIENT
Start: 2022-10-14 | End: 2022-10-25 | Stop reason: HOSPADM

## 2022-10-14 RX ORDER — FLUTICASONE FUROATE AND VILANTEROL 200; 25 UG/1; UG/1
1 POWDER RESPIRATORY (INHALATION) DAILY
Status: DISCONTINUED | OUTPATIENT
Start: 2022-10-15 | End: 2022-10-25 | Stop reason: HOSPADM

## 2022-10-14 RX ORDER — HALOPERIDOL 5 MG/1
5 TABLET ORAL NIGHTLY
Status: DISCONTINUED | OUTPATIENT
Start: 2022-10-14 | End: 2022-10-25 | Stop reason: HOSPADM

## 2022-10-14 RX ADMIN — SODIUM CHLORIDE: 9 INJECTION, SOLUTION INTRAVENOUS at 01:10

## 2022-10-14 RX ADMIN — CELECOXIB 100 MG: 100 CAPSULE ORAL at 05:10

## 2022-10-14 RX ADMIN — PAROXETINE HYDROCHLORIDE 40 MG: 10 TABLET, FILM COATED ORAL at 08:10

## 2022-10-14 RX ADMIN — POTASSIUM CHLORIDE 10 MEQ: 7.46 INJECTION, SOLUTION INTRAVENOUS at 10:10

## 2022-10-14 RX ADMIN — CHOLECALCIFEROL TAB 25 MCG (1000 UNIT) 2000 UNITS: 25 TAB at 08:10

## 2022-10-14 RX ADMIN — POTASSIUM CHLORIDE 40 MEQ: 1500 TABLET, EXTENDED RELEASE ORAL at 09:10

## 2022-10-14 RX ADMIN — MIDAZOLAM HYDROCHLORIDE 2 MG: 1 INJECTION, SOLUTION INTRAMUSCULAR; INTRAVENOUS at 01:10

## 2022-10-14 RX ADMIN — ACETAMINOPHEN 650 MG: 325 TABLET ORAL at 11:10

## 2022-10-14 RX ADMIN — HALOPERIDOL 5 MG: 5 TABLET ORAL at 08:10

## 2022-10-14 RX ADMIN — POTASSIUM CHLORIDE 10 MEQ: 7.46 INJECTION, SOLUTION INTRAVENOUS at 09:10

## 2022-10-14 RX ADMIN — CLONAZEPAM 0.5 MG: 0.5 TABLET ORAL at 08:10

## 2022-10-14 RX ADMIN — TRAZODONE HYDROCHLORIDE 100 MG: 100 TABLET ORAL at 08:10

## 2022-10-14 RX ADMIN — CALCIUM CARBONATE (ANTACID) CHEW TAB 500 MG 500 MG: 500 CHEW TAB at 08:10
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
## 2022-10-14 NOTE — ANESTHESIA POSTPROCEDURE EVALUATION
Anesthesia Post Evaluation    Patient: Merle Caro    Procedure(s) Performed: Procedure(s) (LRB):  MRI (Magnetic Resonance Imagine) (N/A)    Final Anesthesia Type: MAC      Patient location during evaluation: [facility]  Patient participation: Yes- Able to Participate  Level of consciousness: awake and alert and oriented  Post-procedure vital signs: reviewed and stable  Pain management: adequate  Airway patency: patent    PONV status at discharge: No PONV  Anesthetic complications: no      Cardiovascular status: stable  Respiratory status: unassisted and spontaneous ventilation  Hydration status: euvolemic  Follow-up not needed.          Vitals Value Taken Time   /69 10/14/22 1446   Temp 36.8 °C (98.2 °F) 10/14/22 1205   Pulse 113 10/14/22 1500   Resp 16 10/14/22 1445   SpO2 98 % 10/14/22 1500   Vitals shown include unvalidated device data.      No case tracking events are documented in the log.      Pain/Fawad Score: Fawad Score: 9 (10/14/2022  2:45 PM)
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
## 2022-10-14 NOTE — ASSESSMENT & PLAN NOTE
Merle Caro is a 67 y.o. female with past medical history of hypertension, bipolar disorder, and urinary incontinence (1 year duration) admitted to Orthopedics with what appears to be an approximally 4-month-old sacral fracture dislocation of S1 on S2 with severe canal stenosis at that level resulting in chronic cauda equina symptoms.  She has not been able ambulate since June due to pain.  Initial images reviewed by Dr. Meza who plans to take the patient for surgery on Monday October 17th.  Final operative plan pending additional imaging.    Plan:   Imaging: CT Pelvis ordered for further fracture characterization and surgical planning; DVT ultrasound ordered  Pain control: multimodal; will adjust after surgery  Diet: regular diet now; NPO at midnight on Monday in preparation for surgery  DVT PPx:  Pending ultrasound  PT/OT: KEREN BLE    Dispo:  Pending final imaging and discussion with staff
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
## 2022-10-14 NOTE — HPI
Merle Caro is a 67 y.o. female with past medical history of hypertension, bipolar disorder, and urinary incontinence (1 year duration) admitted after undergoing a sedated MRI of her lumbar spine and sacrum due to findings of a sacral fracture dislocation pro CT scan obtained at an outside facility.  Upon discussion with the patient, she reports no riana injury or trauma, but states that her buttocks began hurting in June of this year after a cross country road trip.  Since that time, she has had constipation problems and inability to ambulate due to pain.  She had this worked up by numerous physicians who attributed these problems to constipation and possible hemorrhoids.  After further evaluation, no significant hemorrhoids were found and different etiologies were explored which led to her diagnosis of a sacral fracture.    The patient reports pain in her buttocks, but states that the pain does not significantly radiate to her bilateral lower extremities.  She reports intermittent numbness and tingling of her lower extremities and is mostly limited in terms of movement due to pain.  She also endorses some bilateral upper extremity weakness as well.  She has been unable to ambulate since the road trip.  She is incontinent to urine.  She has been constipated since the road trip in June and states she has some relief in her pain after a bowel movement.  She is able to have a voluntary bowel movement.  She denies perineal paresthesias and loss of sensation.  She and her  both report she has lost approximately 60 lb over the last 8 months (weight loss began prior to the road trip in June).    From social standpoint, the patient smokes half a pack of cigarettes per day.  She lives with her .

## 2022-10-14 NOTE — ANESTHESIA RELEASE NOTE
Anesthesia Discharge Summary    Admit Date: 10/14/2022    Discharge Date and Time: 10/14/2022  3:17 PM    Attending Physician:  No att. providers found    Discharge Provider:  Ean Encarnacion MD    Active Problems:   Patient Active Problem List   Diagnosis    LUCILA on CPAP: Not compliant with CPAP    Pure hypercholesterolemia    Affective bipolar disorder    Asthma with COPD    Recurrent major depressive disorder, in full remission    Essential hypertension    Cigarette nicotine dependence with nicotine-induced disorder    Chronic respiratory failure with hypoxia    Sebaceous cyst of labia    Lung nodule seen on imaging study    History of colon polyps    Gastroesophageal reflux disease with esophagitis    Primary osteoarthritis of both knees    Chronic pain of both knees    Aortic atherosclerosis    Increased PTH level    Gait difficulty    Weakness of both hips    Thrombocytosis        Discharged Condition: good    Reason for Admission: <principal problem not specified>    Hospital Course: Patient tolerate procedure and anesthesia well. Test performed without complication.    Consults: none    Significant Diagnostic Studies: None    Treatments/Procedures: Procedure(s) (LRB): anesthesia for exam    Disposition: Home or Self Care    Patient Instructions:   Discharge Medication List as of 10/14/2022  2:53 PM      CONTINUE these medications which have NOT CHANGED    Details   albuterol (PROVENTIL/VENTOLIN HFA) 90 mcg/actuation inhaler Inhale 4 puffs into the lungs every 4 (four) hours as needed for Wheezing. Rescue, Starting Tue 6/21/2022, Until Sun 6/26/2022 at 2359, Print      celecoxib (CELEBREX) 200 MG capsule Take 1 capsule (200 mg total) by mouth once daily., Starting Thu 2/3/2022, Normal      cholecalciferol, vitamin D3, (VITAMIN D3) 2,000 unit Cap Take 1 capsule by mouth every evening., Historical Med      clonazepam (KLONOPIN) 0.5 MG tablet Take 0.5 mg by mouth every evening., Historical  "Med      diclofenac sodium (VOLTAREN ARTHRITIS PAIN) 1 % Gel Apply 2 g topically 4 (four) times daily., Starting Mon 1/4/2021, Normal      fluticasone-salmeterol diskus inhaler 500-50 mcg Inhale 1 puff into the lungs 2 (two) times daily. Controller, Starting Tue 12/14/2021, Normal      haloperidol (HALDOL) 5 MG tablet Take 5 mg by mouth every evening., Historical Med      HYDROcodone-acetaminophen (NORCO) 7.5-325 mg per tablet Take 1 tablet by mouth every 8 (eight) hours as needed for Pain., Starting Thu 9/29/2022, Normal      hydrocortisone 2.5 % cream Apply topically 2 (two) times daily for 7 days, Starting Wed 9/21/2022, Until Wed 10/5/2022, Normal      LIDOcaine-prilocaine (EMLA) cream Apply topically once daily., Starting Wed 9/21/2022, Normal      lisinopriL 10 MG tablet TAKE 1 TABLET(10 MG) BY MOUTH EVERY DAY, Normal      OXYGEN-AIR DELIVERY SYSTEMS MISC Inhale 2 L into the lungs as needed (O2 via Nasal Canula.)., Until Discontinued, Historical Med      pantoprazole (PROTONIX) 40 MG tablet Take 1 tablet (40 mg total) by mouth once daily., Starting Wed 11/3/2021, Until Thu 11/3/2022, Normal      paroxetine (PAXIL) 40 MG tablet TK 1 T PO QHS, Historical Med      simvastatin (ZOCOR) 40 MG tablet Take 1 tablet (40 mg total) by mouth every evening., Starting Wed 6/22/2022, Until Thu 6/22/2023, Normal      tiotropium (SPIRIVA WITH HANDIHALER) 18 mcg inhalation capsule Inhale 1 capsule (18 mcg total) into the lungs once daily. Controller, Starting Tue 12/14/2021, Until Wed 12/14/2022, Normal      traZODone (DESYREL) 100 MG tablet Take 100 mg by mouth every evening., Starting Tue 9/20/2022, Historical Med               Discharge Procedure Orders (must include Diet, Follow-up, Activity)  No discharge procedures on file.     Discharge instructions - Please return to clinic (contact pediatrician etc..) if:  1) Persistent cough.  2) Respiratory difficulty (including: noisy breathing, nasal flaring, "barky" cough or " wheezing).  3) Persistent pain not responsive to prescribed medications (if any).  4) Change in current mental status (age appropriate).  5) Repeating or recurrent episodes of vomiting.  6) Inability to tolerate oral fluids.

## 2022-10-14 NOTE — TRANSFER OF CARE
Anesthesia Transfer of Care Note    Patient: Merle Caro    Procedure(s) Performed: Procedure(s) (LRB):  MRI (Magnetic Resonance Imagine) (N/A)    Patient location: PACU    Anesthesia Type: MAC    Transport from OR: Transported from OR on 2-3 L/min O2 by NC with adequate spontaneous ventilation    Post pain: adequate analgesia    Post assessment: no apparent anesthetic complications and tolerated procedure well    Post vital signs: stable    Level of consciousness: awake, alert and oriented    Nausea/Vomiting: no nausea/vomiting    Complications: none    Transfer of care protocol was followed      Last vitals:   Visit Vitals  /65 (BP Location: Left arm, Patient Position: Lying)   Pulse 102   Temp 36.8 °C (98.2 °F) (Skin)   Resp 16   Ht 5' (1.524 m)   Wt 54.4 kg (120 lb)   SpO2 (!) 93%   Breastfeeding No   BMI 23.44 kg/m²

## 2022-10-14 NOTE — PLAN OF CARE
Problem: Adult Inpatient Plan of Care  Goal: Plan of Care Review  Outcome: Ongoing, Progressing  Goal: Patient-Specific Goal (Individualized)  Outcome: Ongoing, Progressing  Goal: Absence of Hospital-Acquired Illness or Injury  Outcome: Ongoing, Progressing  Goal: Optimal Comfort and Wellbeing  Outcome: Ongoing, Not Progressing     Problem: Skin Injury Risk Increased  Goal: Skin Health and Integrity  Outcome: Ongoing, Progressing     Problem: Pain Chronic (Persistent)  Goal: Acceptable Pain Control and Functional Ability  Outcome: Ongoing, Not Progressing

## 2022-10-14 NOTE — NURSING
Nurses Note -- 4 Eyes      10/14/2022   4:37 PM      Skin assessed during: Admit      [x] No Pressure Injuries Present    []Prevention Measures Documented      [] Yes- Altered Skin Integrity Present or Discovered   [] LDA Added if Not in Epic (Describe Wound)   [] New Altered Skin Integrity was Present on Admit and Documented in LDA   [] Wound Image Taken    Wound Care Consulted? No    Attending Nurse:  Rachel Cam LPN     Second RN/Staff Member:  pati conway rn/oc

## 2022-10-15 PROBLEM — M80.00XA OSTEOPOROSIS WITH CURRENT PATHOLOGICAL FRACTURE: Status: ACTIVE | Noted: 2022-10-15

## 2022-10-15 PROBLEM — R68.81 EARLY SATIETY: Status: ACTIVE | Noted: 2022-10-15

## 2022-10-15 PROBLEM — R63.4 UNEXPLAINED WEIGHT LOSS: Status: ACTIVE | Noted: 2022-10-15

## 2022-10-15 PROBLEM — D64.9 ANEMIA: Status: ACTIVE | Noted: 2022-10-15

## 2022-10-15 PROBLEM — F13.20 BENZODIAZEPINE DEPENDENCE: Status: ACTIVE | Noted: 2022-10-15

## 2022-10-15 LAB
ALBUMIN SERPL BCP-MCNC: 2.4 G/DL (ref 3.5–5.2)
ALP SERPL-CCNC: 81 U/L (ref 55–135)
ALT SERPL W/O P-5'-P-CCNC: <5 U/L (ref 10–44)
ANION GAP SERPL CALC-SCNC: 7 MMOL/L (ref 8–16)
AST SERPL-CCNC: 5 U/L (ref 10–40)
BACTERIA #/AREA URNS AUTO: ABNORMAL /HPF
BASOPHILS # BLD AUTO: 0.04 K/UL (ref 0–0.2)
BASOPHILS # BLD AUTO: 0.04 K/UL (ref 0–0.2)
BASOPHILS NFR BLD: 0.4 % (ref 0–1.9)
BASOPHILS NFR BLD: 0.4 % (ref 0–1.9)
BILIRUB SERPL-MCNC: 0.5 MG/DL (ref 0.1–1)
BILIRUB UR QL STRIP: NEGATIVE
BNP SERPL-MCNC: 15 PG/ML (ref 0–99)
BUN SERPL-MCNC: 5 MG/DL (ref 8–23)
CALCIUM SERPL-MCNC: 8.6 MG/DL (ref 8.7–10.5)
CANCER AG125 SERPL-ACNC: 30 U/ML (ref 0–30)
CANCER AG19-9 SERPL-ACNC: <2.1 U/ML (ref 0–40)
CEA SERPL-MCNC: <1.7 NG/ML (ref 0–5)
CHLORIDE SERPL-SCNC: 102 MMOL/L (ref 95–110)
CLARITY UR REFRACT.AUTO: ABNORMAL
CO2 SERPL-SCNC: 28 MMOL/L (ref 23–29)
COLOR UR AUTO: YELLOW
CREAT SERPL-MCNC: 0.5 MG/DL (ref 0.5–1.4)
CRP SERPL-MCNC: 107.1 MG/L (ref 0–8.2)
DIFFERENTIAL METHOD: ABNORMAL
DIFFERENTIAL METHOD: ABNORMAL
EOSINOPHIL # BLD AUTO: 0.1 K/UL (ref 0–0.5)
EOSINOPHIL # BLD AUTO: 0.1 K/UL (ref 0–0.5)
EOSINOPHIL NFR BLD: 1.5 % (ref 0–8)
EOSINOPHIL NFR BLD: 1.5 % (ref 0–8)
ERYTHROCYTE [DISTWIDTH] IN BLOOD BY AUTOMATED COUNT: 14.7 % (ref 11.5–14.5)
ERYTHROCYTE [DISTWIDTH] IN BLOOD BY AUTOMATED COUNT: 14.7 % (ref 11.5–14.5)
ERYTHROCYTE [SEDIMENTATION RATE] IN BLOOD BY PHOTOMETRIC METHOD: 69 MM/HR (ref 0–36)
EST. GFR  (NO RACE VARIABLE): >60 ML/MIN/1.73 M^2
GLUCOSE SERPL-MCNC: 90 MG/DL (ref 70–110)
GLUCOSE UR QL STRIP: NEGATIVE
HCT VFR BLD AUTO: 32.9 % (ref 37–48.5)
HCT VFR BLD AUTO: 34.1 % (ref 37–48.5)
HCV AB SERPL QL IA: NORMAL
HGB BLD-MCNC: 10.7 G/DL (ref 12–16)
HGB BLD-MCNC: 10.9 G/DL (ref 12–16)
HGB UR QL STRIP: NEGATIVE
HIV 1+2 AB+HIV1 P24 AG SERPL QL IA: NORMAL
IMM GRANULOCYTES # BLD AUTO: 0.02 K/UL (ref 0–0.04)
IMM GRANULOCYTES # BLD AUTO: 0.03 K/UL (ref 0–0.04)
IMM GRANULOCYTES NFR BLD AUTO: 0.2 % (ref 0–0.5)
IMM GRANULOCYTES NFR BLD AUTO: 0.3 % (ref 0–0.5)
KETONES UR QL STRIP: NEGATIVE
LEUKOCYTE ESTERASE UR QL STRIP: ABNORMAL
LYMPHOCYTES # BLD AUTO: 1.4 K/UL (ref 1–4.8)
LYMPHOCYTES # BLD AUTO: 1.5 K/UL (ref 1–4.8)
LYMPHOCYTES NFR BLD: 15.6 % (ref 18–48)
LYMPHOCYTES NFR BLD: 15.9 % (ref 18–48)
MCH RBC QN AUTO: 28.9 PG (ref 27–31)
MCH RBC QN AUTO: 29.2 PG (ref 27–31)
MCHC RBC AUTO-ENTMCNC: 32 G/DL (ref 32–36)
MCHC RBC AUTO-ENTMCNC: 32.5 G/DL (ref 32–36)
MCV RBC AUTO: 90 FL (ref 82–98)
MCV RBC AUTO: 91 FL (ref 82–98)
MICROSCOPIC COMMENT: ABNORMAL
MONOCYTES # BLD AUTO: 0.8 K/UL (ref 0.3–1)
MONOCYTES # BLD AUTO: 1 K/UL (ref 0.3–1)
MONOCYTES NFR BLD: 10 % (ref 4–15)
MONOCYTES NFR BLD: 9.4 % (ref 4–15)
NEUTROPHILS # BLD AUTO: 6.5 K/UL (ref 1.8–7.7)
NEUTROPHILS # BLD AUTO: 6.9 K/UL (ref 1.8–7.7)
NEUTROPHILS NFR BLD: 72.2 % (ref 38–73)
NEUTROPHILS NFR BLD: 72.6 % (ref 38–73)
NITRITE UR QL STRIP: NEGATIVE
NRBC BLD-RTO: 0 /100 WBC
NRBC BLD-RTO: 0 /100 WBC
PATH REV BLD -IMP: NORMAL
PH UR STRIP: 8 [PH] (ref 5–8)
PLATELET # BLD AUTO: 405 K/UL (ref 150–450)
PLATELET # BLD AUTO: 448 K/UL (ref 150–450)
PMV BLD AUTO: 9.7 FL (ref 9.2–12.9)
PMV BLD AUTO: 9.8 FL (ref 9.2–12.9)
POTASSIUM SERPL-SCNC: 3.2 MMOL/L (ref 3.5–5.1)
PROT SERPL-MCNC: 5.6 G/DL (ref 6–8.4)
PROT UR QL STRIP: NEGATIVE
RBC # BLD AUTO: 3.66 M/UL (ref 4–5.4)
RBC # BLD AUTO: 3.77 M/UL (ref 4–5.4)
RBC #/AREA URNS AUTO: 2 /HPF (ref 0–4)
SODIUM SERPL-SCNC: 137 MMOL/L (ref 136–145)
SP GR UR STRIP: 1 (ref 1–1.03)
TSH SERPL DL<=0.005 MIU/L-ACNC: 1.02 UIU/ML (ref 0.4–4)
URN SPEC COLLECT METH UR: ABNORMAL
WBC # BLD AUTO: 8.89 K/UL (ref 3.9–12.7)
WBC # BLD AUTO: 9.57 K/UL (ref 3.9–12.7)
WBC #/AREA URNS AUTO: 6 /HPF (ref 0–5)

## 2022-10-15 PROCEDURE — 82378 CARCINOEMBRYONIC ANTIGEN: CPT | Performed by: STUDENT IN AN ORGANIZED HEALTH CARE EDUCATION/TRAINING PROGRAM

## 2022-10-15 PROCEDURE — 11000001 HC ACUTE MED/SURG PRIVATE ROOM

## 2022-10-15 PROCEDURE — 84165 PROTEIN E-PHORESIS SERUM: CPT | Performed by: STUDENT IN AN ORGANIZED HEALTH CARE EDUCATION/TRAINING PROGRAM

## 2022-10-15 PROCEDURE — 86592 SYPHILIS TEST NON-TREP QUAL: CPT | Performed by: STUDENT IN AN ORGANIZED HEALTH CARE EDUCATION/TRAINING PROGRAM

## 2022-10-15 PROCEDURE — 84165 PATHOLOGIST INTERPRETATION SPE: ICD-10-PCS | Mod: 26,,, | Performed by: PATHOLOGY

## 2022-10-15 PROCEDURE — 63600175 PHARM REV CODE 636 W HCPCS: Performed by: STUDENT IN AN ORGANIZED HEALTH CARE EDUCATION/TRAINING PROGRAM

## 2022-10-15 PROCEDURE — 86334 IMMUNOFIX E-PHORESIS SERUM: CPT | Performed by: STUDENT IN AN ORGANIZED HEALTH CARE EDUCATION/TRAINING PROGRAM

## 2022-10-15 PROCEDURE — 83520 IMMUNOASSAY QUANT NOS NONAB: CPT | Mod: 59 | Performed by: STUDENT IN AN ORGANIZED HEALTH CARE EDUCATION/TRAINING PROGRAM

## 2022-10-15 PROCEDURE — 85060 BLOOD SMEAR INTERPRETATION: CPT | Mod: ,,, | Performed by: PATHOLOGY

## 2022-10-15 PROCEDURE — 87040 BLOOD CULTURE FOR BACTERIA: CPT | Mod: 59 | Performed by: STUDENT IN AN ORGANIZED HEALTH CARE EDUCATION/TRAINING PROGRAM

## 2022-10-15 PROCEDURE — 85025 COMPLETE CBC W/AUTO DIFF WBC: CPT | Mod: 91

## 2022-10-15 PROCEDURE — 85025 COMPLETE CBC W/AUTO DIFF WBC: CPT | Performed by: STUDENT IN AN ORGANIZED HEALTH CARE EDUCATION/TRAINING PROGRAM

## 2022-10-15 PROCEDURE — 86304 IMMUNOASSAY TUMOR CA 125: CPT | Performed by: STUDENT IN AN ORGANIZED HEALTH CARE EDUCATION/TRAINING PROGRAM

## 2022-10-15 PROCEDURE — 84165 PROTEIN E-PHORESIS SERUM: CPT | Mod: 26,,, | Performed by: PATHOLOGY

## 2022-10-15 PROCEDURE — 99223 PR INITIAL HOSPITAL CARE,LEVL III: ICD-10-PCS | Mod: ,,, | Performed by: HOSPITALIST

## 2022-10-15 PROCEDURE — 86803 HEPATITIS C AB TEST: CPT | Performed by: STUDENT IN AN ORGANIZED HEALTH CARE EDUCATION/TRAINING PROGRAM

## 2022-10-15 PROCEDURE — 36415 COLL VENOUS BLD VENIPUNCTURE: CPT

## 2022-10-15 PROCEDURE — 85060 PATHOLOGIST REVIEW: ICD-10-PCS | Mod: ,,, | Performed by: PATHOLOGY

## 2022-10-15 PROCEDURE — 36415 COLL VENOUS BLD VENIPUNCTURE: CPT | Performed by: STUDENT IN AN ORGANIZED HEALTH CARE EDUCATION/TRAINING PROGRAM

## 2022-10-15 PROCEDURE — 87389 HIV-1 AG W/HIV-1&-2 AB AG IA: CPT | Performed by: STUDENT IN AN ORGANIZED HEALTH CARE EDUCATION/TRAINING PROGRAM

## 2022-10-15 PROCEDURE — 25500020 PHARM REV CODE 255: Performed by: ORTHOPAEDIC SURGERY

## 2022-10-15 PROCEDURE — 85652 RBC SED RATE AUTOMATED: CPT | Performed by: STUDENT IN AN ORGANIZED HEALTH CARE EDUCATION/TRAINING PROGRAM

## 2022-10-15 PROCEDURE — 83880 ASSAY OF NATRIURETIC PEPTIDE: CPT

## 2022-10-15 PROCEDURE — 86140 C-REACTIVE PROTEIN: CPT | Performed by: STUDENT IN AN ORGANIZED HEALTH CARE EDUCATION/TRAINING PROGRAM

## 2022-10-15 PROCEDURE — 86334 IMMUNOFIX E-PHORESIS SERUM: CPT | Mod: 26,,, | Performed by: PATHOLOGY

## 2022-10-15 PROCEDURE — 99900035 HC TECH TIME PER 15 MIN (STAT)

## 2022-10-15 PROCEDURE — 86334 PATHOLOGIST INTERPRETATION IFE: ICD-10-PCS | Mod: 26,,, | Performed by: PATHOLOGY

## 2022-10-15 PROCEDURE — 86301 IMMUNOASSAY TUMOR CA 19-9: CPT | Performed by: STUDENT IN AN ORGANIZED HEALTH CARE EDUCATION/TRAINING PROGRAM

## 2022-10-15 PROCEDURE — 63600175 PHARM REV CODE 636 W HCPCS

## 2022-10-15 PROCEDURE — 99223 1ST HOSP IP/OBS HIGH 75: CPT | Mod: ,,, | Performed by: HOSPITALIST

## 2022-10-15 PROCEDURE — 80053 COMPREHEN METABOLIC PANEL: CPT

## 2022-10-15 PROCEDURE — 81001 URINALYSIS AUTO W/SCOPE: CPT

## 2022-10-15 PROCEDURE — 25000003 PHARM REV CODE 250

## 2022-10-15 PROCEDURE — 25000242 PHARM REV CODE 250 ALT 637 W/ HCPCS

## 2022-10-15 PROCEDURE — 84443 ASSAY THYROID STIM HORMONE: CPT | Performed by: STUDENT IN AN ORGANIZED HEALTH CARE EDUCATION/TRAINING PROGRAM

## 2022-10-15 RX ORDER — VANCOMYCIN HCL IN 5 % DEXTROSE 1G/250ML
1000 PLASTIC BAG, INJECTION (ML) INTRAVENOUS
Status: CANCELLED | OUTPATIENT
Start: 2022-10-15

## 2022-10-15 RX ORDER — SODIUM CHLORIDE 0.9 % (FLUSH) 0.9 %
10 SYRINGE (ML) INJECTION
Status: DISCONTINUED | OUTPATIENT
Start: 2022-10-15 | End: 2022-10-25 | Stop reason: HOSPADM

## 2022-10-15 RX ORDER — SULFAMETHOXAZOLE AND TRIMETHOPRIM 800; 160 MG/1; MG/1
1 TABLET ORAL 2 TIMES DAILY
Status: DISCONTINUED | OUTPATIENT
Start: 2022-10-15 | End: 2022-10-16

## 2022-10-15 RX ORDER — MUPIROCIN 20 MG/G
OINTMENT TOPICAL
Status: CANCELLED | OUTPATIENT
Start: 2022-10-15

## 2022-10-15 RX ORDER — HEPARIN SODIUM 5000 [USP'U]/ML
5000 INJECTION, SOLUTION INTRAVENOUS; SUBCUTANEOUS EVERY 8 HOURS
Status: DISPENSED | OUTPATIENT
Start: 2022-10-15 | End: 2022-10-17

## 2022-10-15 RX ORDER — ACETAMINOPHEN 325 MG/1
650 TABLET ORAL ONCE
Status: COMPLETED | OUTPATIENT
Start: 2022-10-15 | End: 2022-10-15

## 2022-10-15 RX ORDER — POTASSIUM CHLORIDE 20 MEQ/1
40 TABLET, EXTENDED RELEASE ORAL ONCE
Status: COMPLETED | OUTPATIENT
Start: 2022-10-15 | End: 2022-10-15

## 2022-10-15 RX ADMIN — HEPARIN SODIUM 5000 UNITS: 5000 INJECTION INTRAVENOUS; SUBCUTANEOUS at 02:10

## 2022-10-15 RX ADMIN — CLONAZEPAM 0.5 MG: 0.5 TABLET ORAL at 09:10

## 2022-10-15 RX ADMIN — PANTOPRAZOLE SODIUM 40 MG: 40 TABLET, DELAYED RELEASE ORAL at 09:10

## 2022-10-15 RX ADMIN — ATORVASTATIN CALCIUM 20 MG: 20 TABLET, FILM COATED ORAL at 09:10

## 2022-10-15 RX ADMIN — TRAZODONE HYDROCHLORIDE 100 MG: 100 TABLET ORAL at 09:10

## 2022-10-15 RX ADMIN — POTASSIUM CHLORIDE 10 MEQ: 7.46 INJECTION, SOLUTION INTRAVENOUS at 02:10

## 2022-10-15 RX ADMIN — ACETAMINOPHEN 650 MG: 325 TABLET ORAL at 03:10

## 2022-10-15 RX ADMIN — ACETAMINOPHEN 650 MG: 325 TABLET ORAL at 09:10

## 2022-10-15 RX ADMIN — CHOLECALCIFEROL TAB 25 MCG (1000 UNIT) 2000 UNITS: 25 TAB at 09:10

## 2022-10-15 RX ADMIN — SULFAMETHOXAZOLE AND TRIMETHOPRIM 1 TABLET: 800; 160 TABLET ORAL at 09:10

## 2022-10-15 RX ADMIN — FLUTICASONE FUROATE AND VILANTEROL TRIFENATATE 1 PUFF: 200; 25 POWDER RESPIRATORY (INHALATION) at 09:10

## 2022-10-15 RX ADMIN — POTASSIUM CHLORIDE 40 MEQ: 1500 TABLET, EXTENDED RELEASE ORAL at 05:10

## 2022-10-15 RX ADMIN — HALOPERIDOL 5 MG: 5 TABLET ORAL at 09:10

## 2022-10-15 RX ADMIN — HEPARIN SODIUM 5000 UNITS: 5000 INJECTION INTRAVENOUS; SUBCUTANEOUS at 10:10

## 2022-10-15 RX ADMIN — LISINOPRIL 10 MG: 10 TABLET ORAL at 09:10

## 2022-10-15 RX ADMIN — IOHEXOL 100 ML: 350 INJECTION, SOLUTION INTRAVENOUS at 04:10

## 2022-10-15 RX ADMIN — CALCIUM CARBONATE (ANTACID) CHEW TAB 500 MG 500 MG: 500 CHEW TAB at 10:10

## 2022-10-15 RX ADMIN — HEPARIN SODIUM 5000 UNITS: 5000 INJECTION INTRAVENOUS; SUBCUTANEOUS at 09:10

## 2022-10-15 RX ADMIN — POTASSIUM CHLORIDE 10 MEQ: 7.46 INJECTION, SOLUTION INTRAVENOUS at 12:10

## 2022-10-15 RX ADMIN — TIOTROPIUM BROMIDE INHALATION SPRAY 2 PUFF: 3.12 SPRAY, METERED RESPIRATORY (INHALATION) at 09:10

## 2022-10-15 RX ADMIN — PAROXETINE HYDROCHLORIDE 40 MG: 10 TABLET, FILM COATED ORAL at 09:10

## 2022-10-15 RX ADMIN — ACETAMINOPHEN 650 MG: 325 TABLET ORAL at 01:10

## 2022-10-15 NOTE — CARE UPDATE
RAPID RESPONSE NURSE CHART REVIEW        Chart Reviewed: 10/15/2022, 12:03 PM    MRN: [id number]  Bed: 501/501 A    Dx: Closed fracture of sacrum    Merle Caro has a past medical history of Arthritis, Asthma, COPD (chronic obstructive pulmonary disease), Depression, GERD (gastroesophageal reflux disease), Hypertension, and Tobacco abuse.    Last VS: /75 (BP Location: Left arm, Patient Position: Lying)   Pulse 108   Temp 96.8 °F (36 °C)   Resp 18   Wt 50.8 kg (111 lb 15.9 oz)   SpO2 (!) 93%   BMI 21.87 kg/m²     24H Vital Sign Range:  Temp:  [96.8 °F (36 °C)-98.8 °F (37.1 °C)]   Pulse:  []   Resp:  [16-18]   BP: ()/(55-78)   SpO2:  [89 %-100 %]     Level of Consciousness (AVPU): responds to voice (Asleep)    Recent Labs     10/14/22  1732 10/15/22  0609   WBC 13.63* 9.57   HGB 11.7* 10.9*   HCT 36.1* 34.1*    448       Recent Labs     10/14/22  1732 10/15/22  0609   * 137   K 2.4* 3.2*   CL 97 102   CO2 24 28   CREATININE 0.6 0.5   * 90   PHOS 3.2  --    MG 1.6  --          O2 Device (Oxygen Therapy): room air    MEWS score: 4    Bedside Rossy GRIFFIN  contacted for SpO2 93%. No additional concerns verbalized at this time. Patient has COPD so SpO2 goal 88-92% sufficient. Instructed to call [phone] for further concerns or assistance.    Sarah Kapoor RN

## 2022-10-15 NOTE — ASSESSMENT & PLAN NOTE
Recently diagnosed on DEXA with T -3.9 at L spine and T -4.1 at L femoral area.  Has not yet started treatment.  Could be contributing to extensive pelvic fractures, but location seems atypical and other causes need to be evaluated  Has history of increased PTH of unknown cause  - f/u with PCP for osteoporosis tx

## 2022-10-15 NOTE — ASSESSMENT & PLAN NOTE
Wt Readings from Last 25 Encounters:   10/15/22 50.8 kg (111 lb 15.9 oz)   10/14/22 54.4 kg (120 lb)   09/27/22 60.8 kg (134 lb)   08/22/22 60.8 kg (134 lb)   06/21/22 61.7 kg (136 lb 0.4 oz)   05/19/22 64 kg (141 lb 1.5 oz)   05/19/22 64.8 kg (142 lb 13.7 oz)   05/03/22 65.5 kg (144 lb 6.4 oz)   04/25/22 69.4 kg (153 lb)   03/02/22 69.4 kg (153 lb)   02/03/22 69.4 kg (153 lb)   01/12/22 71.2 kg (157 lb)   01/07/22 71.4 kg (157 lb 6.5 oz)   01/05/22 71.7 kg (158 lb)   12/14/21 72.1 kg (158 lb 13.8 oz)   11/17/21 73.9 kg (163 lb)   11/08/21 74.1 kg (163 lb 5.8 oz)   11/03/21 74.7 kg (164 lb 10.9 oz)   09/21/21 74.7 kg (164 lb 10.9 oz)   07/20/21 76.7 kg (169 lb 1.5 oz)   06/21/21 81.6 kg (180 lb)   06/20/21 81.6 kg (180 lb)   06/15/21 77 kg (169 lb 12.1 oz)   05/10/21 77 kg (169 lb 12.1 oz)   05/05/21 77.9 kg (171 lb 11.8 oz)

## 2022-10-15 NOTE — ASSESSMENT & PLAN NOTE
Concerning unexplained weightloss of >60lbs in last year, in combination with severe functional decline, pathologic fractures, and B symptoms (see HPI for full details).  PCP was working this up, but will pursue inpatient workup given severity of symptoms and upcoming inpatient surgery.  Has history of pulmonary nodule due for 12 month followup+mediastinal lymphadenopathy.  Due for colonoscopy (last 2011 with 10 year f/u recommended); having early satiety and bloating symptoms    - obtaining CT C/A/P with contrast and CT head without contrast given strong concern for systemic process  - SPEP, UPEP, YIN, FLC to evaluate for possible myeloma given fractures and B symptoms  - peripheral smear given recent history of thrombocytosis  - HIV, HCV, RPR, TSH, quant gold, ESR, CRP, blood cultures pending  - CA 19-9, CA-125, CEA given family history of ovarian cancer and current GI symptoms  - given abdominal bloating symptoms, will consider asking GI to evaluate for need for upper/lower endoscopy (inpatient vs outpatient)  - later will consider MICHAEL, RF, CCP given her hand and knee arthritis if other studies unrevealing  - will consider TTE down the line as well

## 2022-10-15 NOTE — ASSESSMENT & PLAN NOTE
MRI and CT spine showing extensive displaced fractures of the sacrum causing central canal stenosis, pubic rami fractures,   Has significant osteoporosis recently diagnosed, had not yet begun treatment.    - ortho spine team planning sacral surgery on Tuesday 10/18  - will continue to evaluate underlying causes of extensive fractures

## 2022-10-15 NOTE — PROGRESS NOTES
[facility]  Orthopedics  Progress Note    Patient Name: Merle Caro  MRN: [id number]  Admission Date: 10/14/2022  Hospital Length of Stay: 1 days  Attending Provider: Juni Meza MD  Primary Care Provider: Marck Major MD    Subjective:     Principal Problem:Closed fracture of sacrum    Principal Orthopedic Problem:  Same    Interval History:  No acute events overnight.  Patient tolerated imaging studies well.  Pain well controlled.  No acute complaints.    Review of patient's allergies indicates:   Allergen Reactions    Codeine Shortness Of Breath    Keflex [cephalexin] Shortness Of Breath    Shellfish containing products Nausea And Vomiting    Flu vaccine ts 2011-12(18 yr+) Hives    Sucralfate Other (See Comments) and Hives       Current Facility-Administered Medications   Medication    acetaminophen tablet 650 mg    atorvastatin tablet 20 mg    calcium carbonate 200 mg calcium (500 mg) chewable tablet 500 mg    celecoxib capsule 100 mg    clonazePAM tablet 0.5 mg    fluticasone furoate-vilanteroL 200-25 mcg/dose diskus inhaler 1 puff    haloperidoL tablet 5 mg    lisinopriL tablet 10 mg    pantoprazole EC tablet 40 mg    paroxetine tablet 40 mg    tiotropium bromide 2.5 mcg/actuation inhaler 2 puff    traZODone tablet 100 mg    vitamin D 1000 units tablet 2,000 Units     Objective:     Vital Signs (Most Recent):  Temp: 96.8 °F (36 °C) (10/15/22 0423)  Pulse: 101 (10/15/22 0423)  Resp: 16 (10/15/22 0423)  BP: 109/63 (10/15/22 0423)  SpO2: (!) 91 % (10/15/22 0423)   Vital Signs (24h Range):  Temp:  [96.8 °F (36 °C)-98.8 °F (37.1 °C)] 96.8 °F (36 °C)  Pulse:  [] 101  Resp:  [16] 16  SpO2:  [89 %-100 %] 91 %  BP: ()/(55-78) 109/63           There is no height or weight on file to calculate BMI.      Intake/Output Summary (Last 24 hours) at 10/15/2022 0626  Last data filed at 10/15/2022 0553  Gross per 24 hour   Intake 620 ml   Output 1150 ml   Net -530 ml       Ortho/SPM Exam  Gen: NAD,  frail, ill-appearing  CV: regular rate  Resp: non-labored respirations    Spine:   Motor intact bilateral upper extremities with 4/5 strength throughout the right upper extremity, otherwise normal  Motor intact of the bilateral lower extremities with 4/5 strength left EHL, otherwise normal  Sensation intact light touch throughout  Brisk capillary refill distally    Significant Labs: All pertinent labs within the past 24 hours have been reviewed.    Significant Imaging: I have reviewed and interpreted all pertinent imaging results/findings.  DVT ultrasound with no DVTs    Assessment/Plan:     * Closed fracture dislocation of sacrum  Merle Caro is a 67 y.o. female with past medical history of hypertension, bipolar disorder, and urinary incontinence (1 year duration) admitted to Orthopedics with what appears to be an approximally 4-month-old sacral fracture dislocation of S1 on S2 with severe canal stenosis at that level resulting in chronic cauda equina symptoms.  She has not been able ambulate since June due to pain.  Initial images reviewed by Dr. Meza who plans to take the patient for surgery on Monday October 17th.  CT pelvis completed.  DVT ultrasound with no DVTs.    Plan:   Pain control: multimodal; will adjust after surgery  Diet: regular diet now; NPO at midnight on Monday in preparation for surgery  PT/OT: NWB BLE  Abx:  Bactrim for UTI (cephalosporin allergy)  Labs:  Hypokalemic to 2.4 and given 80 mg of KCl for repletion; AM labs pending; EKG with no T-wave changes    Dispo:  Pending discussion with staff; likely to OR 10/17/2022      Essential hypertension  Home medications restarted    Asthma with COPD  Home medications restarted  SpO2 goal > 88%; do not titrate supplemental oxygen above this level    Affective bipolar disorder  Home medications restarted    Pure hypercholesterolemia  Home medications restarted    LUCILA on CPAP: Not compliant with CPAP  CPAP          Ankit New  MD  Orthopedics  [facility]

## 2022-10-15 NOTE — HPI
Ms. Caro is a 67F with hx of HTN, bipolar disorder, tobacco use, COPD on 2L home O2 who is admitted to the hospital to ortho service for upcoming operative repair of displaced sacral fracture. Hospital medicine is consulted for medical optimization.  She and  at bedside report a concerning history. Around a year ago, she began losing weight unexpectedly (has lost 60lbs in 1 year). Her PCP was working her up for this but no cause has yet been found. Then, around June of this year, she and her  were on a [place] trip when she began to experience severe buttocks pain. It became so severe that she could no longer walk, and has been non-ambulatory since June 2022. Initially the pain was thought 2/2 thrombosed hemorrhoids, but after CRS evaluation, this was NOT thought to be the cause of her pain. Eventually lower back imaging was ordered and showed extensive and pathologic fractures of the sacrum with grade 5 spondylolisthesis causing severe central canal stenosis on S1-S2, bilateral sacral ala fractures, displaced pubic rami fractures which closely abut the urethra/vaginal wall. She was then sent here for Ortho evaluation. She denies any known injuries, falls, or physical abuse that could have caused these fractures. She was recently found to have osteoporosis but had not yet started treatment.   In addition to the concerning weightloss and being nonambulatory, she reports are early satiety/bloating, decreased appetite, drenching night sweats several times/week, urinary incontinence, inability to sense having a bowel movement, chronic dry cough. She reports a family history of ovarian cancer (sister in her 20s), lung cancer in mother (in 40s, was a smoker). The patient herself has a history of pulmonary nodules and has not had a colonoscopy in 11 years (10y f/u recommended).   She is currently afebrile, hemodynamically stable.  Wt Readings from Last 20 Encounters:   10/15/22 50.8 kg (111 lb 15.9  oz)   10/14/22 54.4 kg (120 lb)   09/27/22 60.8 kg (134 lb)   08/22/22 60.8 kg (134 lb)   06/21/22 61.7 kg (136 lb 0.4 oz)   05/19/22 64 kg (141 lb 1.5 oz)   05/19/22 64.8 kg (142 lb 13.7 oz)   05/03/22 65.5 kg (144 lb 6.4 oz)   04/25/22 69.4 kg (153 lb)   03/02/22 69.4 kg (153 lb)   02/03/22 69.4 kg (153 lb)   01/12/22 71.2 kg (157 lb)   01/07/22 71.4 kg (157 lb 6.5 oz)   01/05/22 71.7 kg (158 lb)   12/14/21 72.1 kg (158 lb 13.8 oz)   11/17/21 73.9 kg (163 lb)   11/08/21 74.1 kg (163 lb 5.8 oz)   11/03/21 74.7 kg (164 lb 10.9 oz)   09/21/21 74.7 kg (164 lb 10.9 oz)   07/20/21 76.7 kg (169 lb 1.5 oz)
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
## 2022-10-15 NOTE — ASSESSMENT & PLAN NOTE
On 2L home oxygen  Currently at baseline O2 requirements; no dyspnea, not in acute exacerbation    - not compliant with home breo and spiriva  - loud ronchi and wheezing on exam  - WILL administer home inhalers in the hospital in perioperative period to avoid pulmonary complications of anesthesia

## 2022-10-15 NOTE — PLAN OF CARE
Problem: Adult Inpatient Plan of Care  Goal: Plan of Care Review  Outcome: Ongoing, Progressing     Problem: Skin Injury Risk Increased  Goal: Skin Health and Integrity  Outcome: Ongoing, Progressing     Problem: Pain Chronic (Persistent)  Goal: Acceptable Pain Control and Functional Ability  Outcome: Ongoing, Progressing     Problem: Fall Injury Risk  Goal: Absence of Fall and Fall-Related Injury  Outcome: Ongoing, Progressing

## 2022-10-15 NOTE — ASSESSMENT & PLAN NOTE
Sees psychiatrist for medications    - continue home klonopin, haldol, paroxetine, trazodone  - low threshold to temporarily hold these meds if overly sedated

## 2022-10-15 NOTE — ASSESSMENT & PLAN NOTE
- CPAP qhs ordered; patient may no longer need since has lost nearly 100 lbs; has not been using at home
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
## 2022-10-15 NOTE — SUBJECTIVE & OBJECTIVE
Principal Problem:Closed fracture of sacrum    Principal Orthopedic Problem:  Same    Interval History:  No acute events overnight.  Patient tolerated imaging studies well.  Pain well controlled.  No acute complaints.    Review of patient's allergies indicates:   Allergen Reactions    Codeine Shortness Of Breath    Keflex [cephalexin] Shortness Of Breath    Shellfish containing products Nausea And Vomiting    Flu vaccine ts 2011-12(18 yr+) Hives    Sucralfate Other (See Comments) and Hives       Current Facility-Administered Medications   Medication    acetaminophen tablet 650 mg    atorvastatin tablet 20 mg    calcium carbonate 200 mg calcium (500 mg) chewable tablet 500 mg    celecoxib capsule 100 mg    clonazePAM tablet 0.5 mg    fluticasone furoate-vilanteroL 200-25 mcg/dose diskus inhaler 1 puff    haloperidoL tablet 5 mg    lisinopriL tablet 10 mg    pantoprazole EC tablet 40 mg    paroxetine tablet 40 mg    tiotropium bromide 2.5 mcg/actuation inhaler 2 puff    traZODone tablet 100 mg    vitamin D 1000 units tablet 2,000 Units     Objective:     Vital Signs (Most Recent):  Temp: 96.8 °F (36 °C) (10/15/22 0423)  Pulse: 101 (10/15/22 0423)  Resp: 16 (10/15/22 0423)  BP: 109/63 (10/15/22 0423)  SpO2: (!) 91 % (10/15/22 0423)   Vital Signs (24h Range):  Temp:  [96.8 °F (36 °C)-98.8 °F (37.1 °C)] 96.8 °F (36 °C)  Pulse:  [] 101  Resp:  [16] 16  SpO2:  [89 %-100 %] 91 %  BP: ()/(55-78) 109/63           There is no height or weight on file to calculate BMI.      Intake/Output Summary (Last 24 hours) at 10/15/2022 0677  Last data filed at 10/15/2022 0553  Gross per 24 hour   Intake 620 ml   Output 1150 ml   Net -530 ml       Ortho/SPM Exam  Gen: NAD, frail, ill-appearing  CV: regular rate  Resp: non-labored respirations    Spine:   Motor intact bilateral upper extremities with 4/5 strength throughout the right upper extremity, otherwise normal  Motor intact of the bilateral lower extremities with 4/5  strength left EHL, otherwise normal  Sensation intact light touch throughout  Brisk capillary refill distally    Significant Labs: All pertinent labs within the past 24 hours have been reviewed.    Significant Imaging: I have reviewed and interpreted all pertinent imaging results/findings.  DVT ultrasound with no DVTs

## 2022-10-15 NOTE — ASSESSMENT & PLAN NOTE
Merle Caro is a 67 y.o. female with past medical history of hypertension, bipolar disorder, and urinary incontinence (1 year duration) admitted to Orthopedics with what appears to be an approximally 4-month-old sacral fracture dislocation of S1 on S2 with severe canal stenosis at that level resulting in chronic cauda equina symptoms.  She has not been able ambulate since June due to pain.  Initial images reviewed by Dr. Meza who plans to take the patient for surgery on Monday October 17th.  CT pelvis completed.  DVT ultrasound with no DVTs.    Plan:   Pain control: multimodal; will adjust after surgery  Diet: regular diet now; NPO at midnight on Monday in preparation for surgery  PT/OT: NWB BLE  Abx:  Ceftriaxone for UTI   Labs:  Hypokalemic to 2.4 and given 80 mg of KCl for repletion; AM labs pending; EKG with no T-wave changes    Dispo:  Pending discussion with staff; likely to OR 10/17/2022

## 2022-10-16 PROBLEM — Z01.818 PRE-OP EVALUATION: Status: ACTIVE | Noted: 2022-10-16

## 2022-10-16 PROBLEM — N39.0 UTI (URINARY TRACT INFECTION): Status: ACTIVE | Noted: 2022-10-16

## 2022-10-16 LAB
ALBUMIN SERPL BCP-MCNC: 2.4 G/DL (ref 3.5–5.2)
ALP SERPL-CCNC: 85 U/L (ref 55–135)
ALT SERPL W/O P-5'-P-CCNC: 6 U/L (ref 10–44)
ANION GAP SERPL CALC-SCNC: 9 MMOL/L (ref 8–16)
AST SERPL-CCNC: 9 U/L (ref 10–40)
BASOPHILS # BLD AUTO: 0.15 K/UL (ref 0–0.2)
BASOPHILS NFR BLD: 1.6 % (ref 0–1.9)
BILIRUB SERPL-MCNC: 0.3 MG/DL (ref 0.1–1)
BUN SERPL-MCNC: 4 MG/DL (ref 8–23)
CALCIUM SERPL-MCNC: 8.6 MG/DL (ref 8.7–10.5)
CHLORIDE SERPL-SCNC: 104 MMOL/L (ref 95–110)
CO2 SERPL-SCNC: 21 MMOL/L (ref 23–29)
CREAT SERPL-MCNC: 0.6 MG/DL (ref 0.5–1.4)
DIFFERENTIAL METHOD: ABNORMAL
EOSINOPHIL # BLD AUTO: 0.2 K/UL (ref 0–0.5)
EOSINOPHIL NFR BLD: 2.4 % (ref 0–8)
ERYTHROCYTE [DISTWIDTH] IN BLOOD BY AUTOMATED COUNT: 14.7 % (ref 11.5–14.5)
EST. GFR  (NO RACE VARIABLE): >60 ML/MIN/1.73 M^2
GLUCOSE SERPL-MCNC: 95 MG/DL (ref 70–110)
HCT VFR BLD AUTO: 37.8 % (ref 37–48.5)
HGB BLD-MCNC: 11.9 G/DL (ref 12–16)
IMM GRANULOCYTES # BLD AUTO: 0.35 K/UL (ref 0–0.04)
IMM GRANULOCYTES NFR BLD AUTO: 3.8 % (ref 0–0.5)
LYMPHOCYTES # BLD AUTO: 2.4 K/UL (ref 1–4.8)
LYMPHOCYTES NFR BLD: 25.9 % (ref 18–48)
MCH RBC QN AUTO: 28.6 PG (ref 27–31)
MCHC RBC AUTO-ENTMCNC: 31.5 G/DL (ref 32–36)
MCV RBC AUTO: 91 FL (ref 82–98)
MONOCYTES # BLD AUTO: 1 K/UL (ref 0.3–1)
MONOCYTES NFR BLD: 10.7 % (ref 4–15)
NEUTROPHILS # BLD AUTO: 5.1 K/UL (ref 1.8–7.7)
NEUTROPHILS NFR BLD: 55.6 % (ref 38–73)
NRBC BLD-RTO: 0 /100 WBC
PLATELET # BLD AUTO: 420 K/UL (ref 150–450)
PMV BLD AUTO: 10.2 FL (ref 9.2–12.9)
POTASSIUM SERPL-SCNC: 3.9 MMOL/L (ref 3.5–5.1)
PROT SERPL-MCNC: 5.8 G/DL (ref 6–8.4)
RBC # BLD AUTO: 4.16 M/UL (ref 4–5.4)
SODIUM SERPL-SCNC: 134 MMOL/L (ref 136–145)
WBC # BLD AUTO: 9.24 K/UL (ref 3.9–12.7)

## 2022-10-16 PROCEDURE — 99900035 HC TECH TIME PER 15 MIN (STAT)

## 2022-10-16 PROCEDURE — 36415 COLL VENOUS BLD VENIPUNCTURE: CPT | Performed by: ORTHOPAEDIC SURGERY

## 2022-10-16 PROCEDURE — 85025 COMPLETE CBC W/AUTO DIFF WBC: CPT

## 2022-10-16 PROCEDURE — 27000221 HC OXYGEN, UP TO 24 HOURS

## 2022-10-16 PROCEDURE — 94799 UNLISTED PULMONARY SVC/PX: CPT

## 2022-10-16 PROCEDURE — 99233 SBSQ HOSP IP/OBS HIGH 50: CPT | Mod: GC,,, | Performed by: HOSPITALIST

## 2022-10-16 PROCEDURE — 63600175 PHARM REV CODE 636 W HCPCS: Performed by: STUDENT IN AN ORGANIZED HEALTH CARE EDUCATION/TRAINING PROGRAM

## 2022-10-16 PROCEDURE — 80053 COMPREHEN METABOLIC PANEL: CPT | Performed by: ORTHOPAEDIC SURGERY

## 2022-10-16 PROCEDURE — 94761 N-INVAS EAR/PLS OXIMETRY MLT: CPT

## 2022-10-16 PROCEDURE — 94640 AIRWAY INHALATION TREATMENT: CPT

## 2022-10-16 PROCEDURE — 25000003 PHARM REV CODE 250

## 2022-10-16 PROCEDURE — 11000001 HC ACUTE MED/SURG PRIVATE ROOM

## 2022-10-16 PROCEDURE — 99233 PR SUBSEQUENT HOSPITAL CARE,LEVL III: ICD-10-PCS | Mod: GC,,, | Performed by: HOSPITALIST

## 2022-10-16 PROCEDURE — 36415 COLL VENOUS BLD VENIPUNCTURE: CPT

## 2022-10-16 RX ADMIN — ACETAMINOPHEN 650 MG: 325 TABLET ORAL at 02:10

## 2022-10-16 RX ADMIN — FLUTICASONE FUROATE AND VILANTEROL TRIFENATATE 1 PUFF: 200; 25 POWDER RESPIRATORY (INHALATION) at 11:10

## 2022-10-16 RX ADMIN — THERA TABS 1 TABLET: TAB at 02:10

## 2022-10-16 RX ADMIN — ACETAMINOPHEN 650 MG: 325 TABLET ORAL at 05:10

## 2022-10-16 RX ADMIN — CHOLECALCIFEROL TAB 25 MCG (1000 UNIT) 2000 UNITS: 25 TAB at 08:10

## 2022-10-16 RX ADMIN — SULFAMETHOXAZOLE AND TRIMETHOPRIM 1 TABLET: 800; 160 TABLET ORAL at 10:10

## 2022-10-16 RX ADMIN — HEPARIN SODIUM 5000 UNITS: 5000 INJECTION INTRAVENOUS; SUBCUTANEOUS at 11:10

## 2022-10-16 RX ADMIN — HALOPERIDOL 5 MG: 5 TABLET ORAL at 09:10

## 2022-10-16 RX ADMIN — PANTOPRAZOLE SODIUM 40 MG: 40 TABLET, DELAYED RELEASE ORAL at 10:10

## 2022-10-16 RX ADMIN — ATORVASTATIN CALCIUM 20 MG: 20 TABLET, FILM COATED ORAL at 10:10

## 2022-10-16 RX ADMIN — HEPARIN SODIUM 5000 UNITS: 5000 INJECTION INTRAVENOUS; SUBCUTANEOUS at 05:10

## 2022-10-16 RX ADMIN — PAROXETINE HYDROCHLORIDE 40 MG: 10 TABLET, FILM COATED ORAL at 08:10

## 2022-10-16 RX ADMIN — HEPARIN SODIUM 5000 UNITS: 5000 INJECTION INTRAVENOUS; SUBCUTANEOUS at 03:10

## 2022-10-16 RX ADMIN — TRAZODONE HYDROCHLORIDE 100 MG: 100 TABLET ORAL at 08:10

## 2022-10-16 RX ADMIN — CLONAZEPAM 0.5 MG: 0.5 TABLET ORAL at 08:10

## 2022-10-16 RX ADMIN — ACETAMINOPHEN 650 MG: 325 TABLET ORAL at 09:10

## 2022-10-16 RX ADMIN — LISINOPRIL 10 MG: 10 TABLET ORAL at 10:10

## 2022-10-16 RX ADMIN — TIOTROPIUM BROMIDE INHALATION SPRAY 2 PUFF: 3.12 SPRAY, METERED RESPIRATORY (INHALATION) at 11:10

## 2022-10-16 NOTE — PROGRESS NOTES
[facility]  Orthopedics  Progress Note    Patient Name: Merle Caro  MRN: [id number]  Admission Date: 10/14/2022  Hospital Length of Stay: 2 days  Attending Provider: Juni Meza MD  Primary Care Provider: Marck Major MD  Follow-up For: Procedure(s) (LRB):  FUSION,SPINE,POSTERIOR APPROACH, L3-Pelvis, Loop-X, Marilee, SNS: SSEP/EMG (N/A)    Post-Operative Day:    Subjective:     Principal Problem:Closed fracture of sacrum    Principal Orthopedic Problem:  Same    Interval History:  No acute events overnight.  Patient tolerated imaging studies well.  Pain well controlled.  No acute complaints.  Hospital Medicine consulted and began metastatic workup.    Review of patient's allergies indicates:   Allergen Reactions    Codeine Shortness Of Breath    Keflex [cephalexin] Shortness Of Breath    Shellfish containing products Nausea And Vomiting    Flu vaccine ts 2011-12(18 yr+) Hives    Sucralfate Other (See Comments) and Hives       Current Facility-Administered Medications   Medication    acetaminophen tablet 650 mg    atorvastatin tablet 20 mg    calcium carbonate 200 mg calcium (500 mg) chewable tablet 500 mg    celecoxib capsule 100 mg    clonazePAM tablet 0.5 mg    fluticasone furoate-vilanteroL 200-25 mcg/dose diskus inhaler 1 puff    haloperidoL tablet 5 mg    heparin (porcine) injection 5,000 Units    lisinopriL tablet 10 mg    pantoprazole EC tablet 40 mg    paroxetine tablet 40 mg    sodium chloride 0.9% flush 10 mL    sulfamethoxazole-trimethoprim 800-160mg per tablet 1 tablet    tiotropium bromide 2.5 mcg/actuation inhaler 2 puff    traZODone tablet 100 mg    vitamin D 1000 units tablet 2,000 Units     Objective:     Vital Signs (Most Recent):  Temp: 97.8 °F (36.6 °C) (10/16/22 0503)  Pulse: 99 (10/16/22 0503)  Resp: 16 (10/16/22 0503)  BP: 130/76 (10/16/22 0503)  SpO2: 98 % (10/16/22 0503)   Vital Signs (24h Range):  Temp:  [97.1 °F (36.2 °C)-98 °F (36.7 °C)] 97.8 °F (36.6  °C)  Pulse:  [] 99  Resp:  [16-20] 16  SpO2:  [92 %-98 %] 98 %  BP: (110-168)/(65-85) 130/76     Weight: 50.8 kg (111 lb 15.9 oz)     Body mass index is 21.87 kg/m².      Intake/Output Summary (Last 24 hours) at 10/16/2022 0647  Last data filed at 10/16/2022 0539  Gross per 24 hour   Intake 1460 ml   Output 625 ml   Net 835 ml         Ortho/SPM Exam  Gen: NAD, frail, ill-appearing  CV: regular rate  Resp: non-labored respirations    Spine:   Motor intact bilateral upper extremities with 4/5 strength throughout the right upper extremity, otherwise normal  Motor intact of the bilateral lower extremities with 4/5 strength left EHL, otherwise normal  Sensation intact light touch throughout  Brisk capillary refill distally  Pure Wick in place    Significant Labs: All pertinent labs within the past 24 hours have been reviewed.    Significant Imaging: I have reviewed and interpreted all pertinent imaging results/findings.  DVT ultrasound with no DVTs    Assessment/Plan:     * Closed fracture dislocation of sacrum  Merle Caro is a 67 y.o. female with past medical history of hypertension, bipolar disorder, and urinary incontinence (1 year duration) admitted to Orthopedics with what appears to be an approximally 4-month-old sacral fracture dislocation of S1 on S2 with severe canal stenosis at that level resulting in chronic cauda equina symptoms.  She has not been able ambulate since June due to pain.  Initial images reviewed by Dr. Meza who plans to take the patient for surgery on Tuesday October 18th.  CT pelvis completed.  DVT ultrasound with no DVTs.  Hospital Medicine consulted began a metastatic workup.  Studies so far unrevealing.  Appreciate hospital medicine recommendations.    Plan:   Pain control: multimodal; will adjust after surgery  Diet: regular diet now; NPO at midnight on Tuesday in preparation for surgery  PT/OT: NWB BLE  DVT prophylaxis:  SubQ heparin; no need for IVC filter at this time  Abx:   Bactrim for UTI  Labs:  Normal potassium, continue to monitor    Dispo:  To OR 10/18/2022      Essential hypertension  Home medications restarted    Asthma with COPD  Home medications restarted  SpO2 goal > 88%; do not titrate supplemental oxygen above this level    Affective bipolar disorder  Home medications restarted    Pure hypercholesterolemia  Home medications restarted    LUCILA on CPAP: Not compliant with CPAP  CPAP          Ankit New MD  Orthopedics  [facility] - Surgery

## 2022-10-16 NOTE — ASSESSMENT & PLAN NOTE
Concerning unexplained weightloss of >60lbs in last year, in combination with severe functional decline, pathologic fractures, and B symptoms (see HPI for full details).  Has history of pulmonary nodule due for 12 month followup+mediastinal lymphadenopathy.  Due for colonoscopy (last 2011 with 10 year f/u recommended); having early satiety and bloating symptoms  Discovered patient has history of two brothers dying early from cardiac issues that were said to be genetic, possibly arrhythmia va MI based on description    Workup:  - CT C/A/P with contrast: no lung changes or lymphadenopathy  - CT head given some concern for slowed mentation: no acute changes or intracranial lesions  - HIV, HCV,TSH, blood cultures wnl  - CA 19-9, CA-125, CEA wnl  - TTE pending (given reported FHx of early cardiac deaths)  - SPEP, UPEP, YIN, FLC: pending  - peripheral smear: pending  - RPR, quant gold pending    Plan:  - f/u remainder of studies as above  - given abdominal bloating symptoms, reported recent melena, GI consulted to evaluate for need for upper/lower endoscopy (inpatient vs outpatient).  - only minimal confusion and slight nystagmus, but will trial 1x dose high-dose thiamine given risk for nutritional deficiencies and Wernicke's

## 2022-10-16 NOTE — PLAN OF CARE
Problem: Adult Inpatient Plan of Care  Goal: Plan of Care Review  Outcome: Ongoing, Progressing  Goal: Patient-Specific Goal (Individualized)  Outcome: Ongoing, Progressing  Goal: Absence of Hospital-Acquired Illness or Injury  Outcome: Ongoing, Progressing  Intervention: Identify and Manage Fall Risk  Flowsheets (Taken 10/16/2022 0252)  Safety Promotion/Fall Prevention:   assistive device/personal item within reach   Fall Risk reviewed with patient/family   high risk medications identified   medications reviewed   side rails raised x 2  Intervention: Prevent Skin Injury  Flowsheets (Taken 10/16/2022 0252)  Body Position: position changed independently  Skin Protection: adhesive use limited  Intervention: Prevent and Manage VTE (Venous Thromboembolism) Risk  Flowsheets (Taken 10/16/2022 0252)  Activity Management: Rolling - L1  VTE Prevention/Management: remove, assess skin, and reapply sequential compression device  Range of Motion: active ROM (range of motion) encouraged  Intervention: Prevent Infection  Flowsheets (Taken 10/16/2022 0252)  Infection Prevention:   personal protective equipment utilized   environmental surveillance performed   rest/sleep promoted   equipment surfaces disinfected   single patient room provided   hand hygiene promoted  Goal: Optimal Comfort and Wellbeing  Outcome: Ongoing, Progressing  Intervention: Monitor Pain and Promote Comfort  Flowsheets (Taken 10/16/2022 0252)  Pain Management Interventions:   medication offered   quiet environment facilitated  Intervention: Provide Person-Centered Care  Flowsheets (Taken 10/16/2022 0252)  Trust Relationship/Rapport:   care explained   questions encouraged   choices provided   reassurance provided   emotional support provided   thoughts/feelings acknowledged   empathic listening provided   questions answered

## 2022-10-16 NOTE — PROGRESS NOTES
[facility] Medicine  Progress Note    Patient Name: Merle Caro  MRN: [id number]  Patient Class: IP- Inpatient   Admission Date: 10/14/2022  Length of Stay: 2 days  Attending Physician: Juni Meza MD  Primary Care Provider: Marck Major MD        Subjective:     Principal Problem:Closed fracture of sacrum        HPI:  Ms. Caro is a 67F with hx of HTN, bipolar disorder, tobacco use, COPD on 2L home O2 who is admitted to the hospital to ortho service for upcoming operative repair of displaced sacral fracture. Hospital medicine is consulted for medical optimization.  She and  at bedside report a concerning history. Around a year ago, she began losing weight unexpectedly (has lost 60lbs in 1 year). Her PCP was working her up for this but no cause has yet been found. Then, around June of this year, she and her  were on a [place] trip when she began to experience severe buttocks pain. It became so severe that she could no longer walk, and has been non-ambulatory since June 2022. Initially the pain was thought 2/2 thrombosed hemorrhoids, but after CRS evaluation, this was NOT thought to be the cause of her pain. Eventually lower back imaging was ordered and showed extensive and pathologic fractures of the sacrum with grade 5 spondylolisthesis causing severe central canal stenosis on S1-S2, bilateral sacral ala fractures, displaced pubic rami fractures which closely abut the urethra/vaginal wall. She was then sent here for Ortho evaluation. She denies any known injuries, falls, or physical abuse that could have caused these fractures. She was recently found to have osteoporosis but had not yet started treatment.   In addition to the concerning weightloss and being nonambulatory, she reports are early satiety/bloating, decreased appetite, drenching night sweats several times/week, urinary incontinence, inability to sense having a bowel movement, chronic dry cough. She  reports a family history of ovarian cancer (sister in her 20s), lung cancer in mother (in 40s, was a smoker). The patient herself has a history of pulmonary nodules and has not had a colonoscopy in 11 years (10y f/u recommended).   She is currently afebrile, hemodynamically stable.  Wt Readings from Last 20 Encounters:   10/15/22 50.8 kg (111 lb 15.9 oz)   10/14/22 54.4 kg (120 lb)   09/27/22 60.8 kg (134 lb)   08/22/22 60.8 kg (134 lb)   06/21/22 61.7 kg (136 lb 0.4 oz)   05/19/22 64 kg (141 lb 1.5 oz)   05/19/22 64.8 kg (142 lb 13.7 oz)   05/03/22 65.5 kg (144 lb 6.4 oz)   04/25/22 69.4 kg (153 lb)   03/02/22 69.4 kg (153 lb)   02/03/22 69.4 kg (153 lb)   01/12/22 71.2 kg (157 lb)   01/07/22 71.4 kg (157 lb 6.5 oz)   01/05/22 71.7 kg (158 lb)   12/14/21 72.1 kg (158 lb 13.8 oz)   11/17/21 73.9 kg (163 lb)   11/08/21 74.1 kg (163 lb 5.8 oz)   11/03/21 74.7 kg (164 lb 10.9 oz)   09/21/21 74.7 kg (164 lb 10.9 oz)   07/20/21 76.7 kg (169 lb 1.5 oz)           Overview/Hospital Course:  No notes on file    Interval History: Pt states she continues to have pain in her butt, but otherwise is feeling ok. Pt states she has not had a BM since Thursday, but she is unable to feel when she needs to go and has some incontinence. She is not having any nausea or vomiting. Continuing to wait on some cancer labs, but ones that have come back have been unremarkable. Pt's  states pt has a family history of multiple cardiac issues that have a genetic cause. He did not know specifically what it was, but description sounded like aortic aneurysm and MI/arrhythmia at early age.    Preoperative Cardiovascular Risk Assessment:  Non-emergent surgery  No active cardiac problems: none (such as unstable angina, decompensated heart failure, significant uncontrolled arrhythmias or severe valvular disease).  Functional Status: unable to climb a flight of stairs (<4 METS), wears 2L O2 at rest as well  Revised cardiac risk index (RCRI) score  is 0 (3.9%).     1 pt Each: Elevated Risk Surgery (Intraperitoneal, Intrathoracic, Suprainguinal Vascular), Hx of Ischemic Heart Disease, Hx of CHF, Hx of Cerebrovascular Disease,                      DM on insulin, Creatinine > 2     Revised cardiac risk index is low, but due to other co-morbidities still think pt is a high risk surgical candidate             Other Issues: COPD, GERD controlled with meds      Anticoagulation: None out of hospital      Coronary Stenting: None    Preoperative evaluation:  - Ok to proceed to surgery  - RCRI score of 0 thus 3.9% rate of major cardiac event.  Acceptable cardiac risk going for ** risk surgery.  No absolute contraindications to the proposed surgery at this time   - continue atorvastatin perioperatively  - hold lisinopril on day of surgery  - for high risk of post-op pulmonary complications, scheduled duonebs and incentive spirometry to start after surgery.Do calculator: ARISCAT ([employer]) preoperative pulmonary risk index in adults   - for high risk of post-op delirium, minimize CNS-active meds (opiates, benzos, anticholinergics) and sleep hygiene with windowshades open during day, no daytime naps, frequent re-orientation, private room if feasible.      Review of Systems   Constitutional:  Positive for appetite change and unexpected weight change. Negative for chills and fever. Diaphoresis: night sweats.  Respiratory:  Negative for shortness of breath. Cough: chronic dry.   Gastrointestinal:  Positive for constipation. Negative for abdominal distention, abdominal pain, nausea and vomiting.        Subjective bloating   Genitourinary:  Negative for dysuria.        Urinary incontinence, does not feel urge to defecate or urinate   Musculoskeletal:  Positive for back pain. Negative for arthralgias and myalgias.   Skin:  Negative for rash and wound.   Neurological:  Positive for weakness. Negative for dizziness, syncope and headaches.   Psychiatric/Behavioral:  Negative for  confusion and decreased concentration.    Objective:     Vital Signs (Most Recent):  Temp: 98.9 °F (37.2 °C) (10/16/22 1136)  Pulse: 93 (10/16/22 1136)  Resp: 18 (10/16/22 1136)  BP: 112/64 (10/16/22 1136)  SpO2: 97 % (10/16/22 1136)   Vital Signs (24h Range):  Temp:  [97.1 °F (36.2 °C)-98.9 °F (37.2 °C)] 98.9 °F (37.2 °C)  Pulse:  [] 93  Resp:  [16-20] 18  SpO2:  [86 %-98 %] 97 %  BP: (112-168)/(64-85) 112/64     Weight: 50.8 kg (111 lb 15.9 oz)  Body mass index is 21.87 kg/m².    Physical Exam  Vitals and nursing note reviewed.   Constitutional:       General: She is not in acute distress.     Appearance: She is ill-appearing (chronically). She is not toxic-appearing or diaphoretic.      Comments: Excess skin present diffusely consistent with recent rapid weight loss   HENT:      Head: Normocephalic and atraumatic.   Eyes:      General: No scleral icterus.  Cardiovascular:      Rate and Rhythm: Normal rate and regular rhythm.      Comments: Unable to auscultate heart sounds given ronchorous breath noises  Pulmonary:      Effort: Pulmonary effort is normal. No respiratory distress.      Breath sounds: Wheezing and rhonchi present.   Abdominal:      General: Bowel sounds are normal. There is no distension.      Palpations: Abdomen is soft.      Tenderness: There is no abdominal tenderness. There is no guarding.   Musculoskeletal:      Cervical back: Neck supple.      Right lower leg: No edema.      Left lower leg: No edema.   Lymphadenopathy:      Cervical: No cervical adenopathy.      Right cervical: No posterior cervical adenopathy.     Left cervical: No posterior cervical adenopathy.      Upper Body:      Right upper body: No supraclavicular or axillary adenopathy.      Left upper body: No supraclavicular or axillary adenopathy.   Skin:     General: Skin is warm and dry.   Neurological:      Mental Status: She is alert. Mental status is at baseline.      Cranial Nerves: No dysarthria.      Comments:  Sensation intact to BLE and BUE  Significant weakness of BLE, barely able to lift legs off bed.  Weak hand  bilaterally   Psychiatric:         Mood and Affect: Mood normal. Affect is blunt.         Speech: Speech is delayed.         Behavior: Behavior normal. Behavior is cooperative.       Significant Labs: All pertinent labs within the past 24 hours have been reviewed.    Significant Imaging: I have reviewed all pertinent imaging results/findings within the past 24 hours.      Assessment/Plan:      * Closed fracture dislocation of sacrum  MRI and CT spine showing extensive displaced fractures of the sacrum causing central canal stenosis, pubic rami fractures,   Has significant osteoporosis recently diagnosed, had not yet begun treatment.    - ortho spine team planning sacral surgery on Tuesday 10/18  - will continue to evaluate underlying causes of extensive fractures  -Plan to add scheduled duonebs and delirium precautions after surgery. Surgical risk assessment performed on 10/16 to determine surgical risk. Pt is high risk due to COPD, osteoporosis and functional status, but surgical procedure benefit is felt to outweigh her risk.    UTI (urinary tract infection)  It was thought when patient arrived she had a UTI on top of her other issues and with patient's incontinence could also have been a concern for UTI.     -Pt was started on bactrim for suspected UTI  -Pt's UA did not meet criteria for UTI and bactrim was D/C      Early satiety  Pt has not been eating very much recently. We are working up if a cancer is what is contributing. Pt's  states she has not been eating many fruits and vegetables and mainly eats carbs when she does eat.    -Added boost shakes to her meals and a daily multivitamin      Osteoporosis with current pathological fracture  Recently diagnosed on DEXA with T -3.9 at L spine and T -4.1 at L femoral area.  Has not yet started treatment.  Could be contributing to extensive pelvic  "fractures, but location seems atypical and other causes need to be evaluated  Has history of increased PTH of unknown cause  - f/u with PCP for osteoporosis tx      Benzodiazepine dependence  See bipolar disorder      Unexplained weight loss  Concerning unexplained weightloss of >60lbs in last year, in combination with severe functional decline, pathologic fractures, and B symptoms (see HPI for full details).  PCP was working this up, but will pursue inpatient workup given severity of symptoms and upcoming inpatient surgery.  Has history of pulmonary nodule due for 12 month followup+mediastinal lymphadenopathy.  Due for colonoscopy (last 2011 with 10 year f/u recommended); having early satiety and bloating symptoms  Discovered patient has history of two brothers dying early from cardiac issues that were said to be genetic, the family did not know what it specifically was. One sounded like aortic aneurysm and other like MI or arrhythmia.     - obtained CT C/A/P with contrast and CT head without contrast given strong concern for systemic process. Did not show any lung changes or lymphadenopathy   - SPEP, UPEP, YIN, FLC to evaluate for possible myeloma given fractures and B symptoms  - peripheral smear given recent history of thrombocytosis  - HIV, HCV, RPR, TSH, quant gold, ESR, CRP, blood cultures pending. HIV, HCV, TSH were negative, but ESR and CRP were both elevated  - CA 19-9, CA-125, CEA given family history of ovarian cancer and current GI symptoms (all 3 came back negative)  - given abdominal bloating symptoms, early satiety, and risk for lost to follow-up GI consulted to evaluate for need for upper/lower endoscopy (inpatient vs outpatient).  - later will consider MICHAEL, RF, CCP given her hand and knee arthritis if other studies unrevealing  -TTE ordered due to recently discovered family history        Chronic respiratory failure with hypoxia  2/2 asthma and COPD  See "asthma"    Essential hypertension  - " continue home lisinopril 10mg  -hold Lisinopril day of surgery (10/18)      Asthma with COPD  On 2L home oxygen  Currently at baseline O2 requirements; no dyspnea, not in acute exacerbation    - not compliant with home breo and spiriva  - loud ronchi and wheezing on exam  - WILL administer home inhalers in the hospital in perioperative period to avoid pulmonary complications of anesthesia  -Plan to add scheduled duonebs after surgery    Affective bipolar disorder  Sees psychiatrist for medications    - continue home klonopin, haldol, paroxetine, trazodone  - low threshold to temporarily hold these meds if overly sedated  -Will add delirium precautions after surgery due to sedation    Pure hypercholesterolemia  - continue atorvastatin      LUCILA on CPAP: Not compliant with CPAP  - CPAP qhs ordered; patient may no longer need since has lost nearly 100 lbs; has not been using at home        VTE Risk Mitigation (From admission, onward)           Ordered     heparin (porcine) injection 5,000 Units  Every 8 hours         10/15/22 0912     Place sequential compression device  Until discontinued         10/14/22 1637                    Discharge Planning   YELENA:      Code Status: Full Code   Is the patient medically ready for discharge?:     Reason for patient still in hospital (select all that apply): Patient trending condition                     Juan Galaviz DO  Department of Hospital Medicine   [facility]

## 2022-10-16 NOTE — ASSESSMENT & PLAN NOTE
Sees psychiatrist for medications    - continue home klonopin, haldol, paroxetine, trazodone  - has prolonged QT; minimize other QT prolonging meds such as zofran, etc.  - low threshold to temporarily hold these meds if overly sedated   - delirium precautions

## 2022-10-16 NOTE — ASSESSMENT & PLAN NOTE
Preoperative Cardiovascular Risk Assessment: Intermediate risk  Non-emergent surgery  No active cardiac problems: none (such as unstable angina, decompensated heart failure, significant uncontrolled arrhythmias or severe valvular disease).  Functional Status: unable to climb a flight of stairs (<4 METS), wears 2L O2 at rest as well  Revised cardiac risk index (RCRI) score is 0 (3.9%).     1 pt Each: Elevated Risk Surgery (Intraperitoneal, Intrathoracic, Suprainguinal Vascular), Hx of Ischemic Heart Disease, Hx of CHF, Hx of Cerebrovascular Disease,                      DM on insulin, Creatinine > 2     Revised cardiac risk index is low, but due to other co-morbidities still think pt is an intermediate risk surgical candidate             Other Issues: COPD, GERD controlled with meds      Anticoagulation: None out of hospital      Coronary Stenting: None    Preoperative evaluation:  - Ok to proceed to surgery  - RCRI score of 0 thus 3.9% rate of major cardiac event.  Acceptable cardiac risk going for intermediate risk surgery.  No absolute contraindications to the proposed surgery at this time   - continue atorvastatin perioperatively  - hold lisinopril on day of surgery  - for high risk of post-op pulmonary complications, scheduled duonebs and incentive spirometry to start after surgery.Do calculator: ARISCAT ([employer]) preoperative pulmonary risk index in adults   - for high risk of post-op delirium, minimize CNS-active meds (opiates, benzos, anticholinergics) and sleep hygiene with windowshades open during day, no daytime naps, frequent re-orientation, private room if feasible.

## 2022-10-16 NOTE — ASSESSMENT & PLAN NOTE
MRI and CT spine showing extensive displaced fractures of the sacrum causing central canal stenosis, pubic rami fractures,   Has significant osteoporosis recently diagnosed, had not yet begun treatment.    - ortho spine team planning sacral surgery on Tuesday 10/18  - will continue to evaluate underlying causes of extensive fractures  - surgical risk assessment performed on 10/16 to determine surgical risk. Pt is high risk due to COPD, osteoporosis and functional status, but surgical procedure benefit is felt to outweigh her risk.

## 2022-10-16 NOTE — SUBJECTIVE & OBJECTIVE
Principal Problem:Closed fracture of sacrum    Principal Orthopedic Problem:  Same    Interval History:  No acute events overnight.  Patient tolerated imaging studies well.  Pain well controlled.  No acute complaints.  Hospital Medicine consulted and began metastatic workup.    Review of patient's allergies indicates:   Allergen Reactions    Codeine Shortness Of Breath    Keflex [cephalexin] Shortness Of Breath    Shellfish containing products Nausea And Vomiting    Flu vaccine ts 2011-12(18 yr+) Hives    Sucralfate Other (See Comments) and Hives       Current Facility-Administered Medications   Medication    acetaminophen tablet 650 mg    atorvastatin tablet 20 mg    calcium carbonate 200 mg calcium (500 mg) chewable tablet 500 mg    celecoxib capsule 100 mg    clonazePAM tablet 0.5 mg    fluticasone furoate-vilanteroL 200-25 mcg/dose diskus inhaler 1 puff    haloperidoL tablet 5 mg    heparin (porcine) injection 5,000 Units    lisinopriL tablet 10 mg    pantoprazole EC tablet 40 mg    paroxetine tablet 40 mg    sodium chloride 0.9% flush 10 mL    sulfamethoxazole-trimethoprim 800-160mg per tablet 1 tablet    tiotropium bromide 2.5 mcg/actuation inhaler 2 puff    traZODone tablet 100 mg    vitamin D 1000 units tablet 2,000 Units     Objective:     Vital Signs (Most Recent):  Temp: 97.8 °F (36.6 °C) (10/16/22 0503)  Pulse: 99 (10/16/22 0503)  Resp: 16 (10/16/22 0503)  BP: 130/76 (10/16/22 0503)  SpO2: 98 % (10/16/22 0503)   Vital Signs (24h Range):  Temp:  [97.1 °F (36.2 °C)-98 °F (36.7 °C)] 97.8 °F (36.6 °C)  Pulse:  [] 99  Resp:  [16-20] 16  SpO2:  [92 %-98 %] 98 %  BP: (110-168)/(65-85) 130/76     Weight: 50.8 kg (111 lb 15.9 oz)     Body mass index is 21.87 kg/m².      Intake/Output Summary (Last 24 hours) at 10/16/2022 0675  Last data filed at 10/16/2022 0539  Gross per 24 hour   Intake 1460 ml   Output 625 ml   Net 835 ml         Ortho/SPM Exam  Gen: NAD, frail, ill-appearing  CV: regular rate  Resp:  non-labored respirations    Spine:   Motor intact bilateral upper extremities with 4/5 strength throughout the right upper extremity, otherwise normal  Motor intact of the bilateral lower extremities with 4/5 strength left EHL, otherwise normal  Sensation intact light touch throughout  Brisk capillary refill distally  Pure Wick in place    Significant Labs: All pertinent labs within the past 24 hours have been reviewed.    Significant Imaging: I have reviewed and interpreted all pertinent imaging results/findings.  DVT ultrasound with no DVTs

## 2022-10-16 NOTE — PROGRESS NOTES
[facility] Medicine  Progress Note    Patient Name: Merle Caro  MRN: [id number]  Patient Class: IP- Inpatient   Admission Date: 10/14/2022  Length of Stay: 2 days  Attending Physician: Juni Meza MD  Primary Care Provider: Marck Major MD        Subjective:     Principal Problem:Closed fracture of sacrum        HPI:  Ms. Caro is a 67F with hx of HTN, bipolar disorder, tobacco use, COPD on 2L home O2 who is admitted to the hospital to ortho service for upcoming operative repair of displaced sacral fracture. Hospital medicine is consulted for medical optimization.  She and  at bedside report a concerning history. Around a year ago, she began losing weight unexpectedly (has lost 60lbs in 1 year). Her PCP was working her up for this but no cause has yet been found. Then, around June of this year, she and her  were on a [place] road trip when she began to experience severe buttocks pain. It became so severe that she could no longer walk, and has been non-ambulatory since June 2022. Initially the pain was thought 2/2 thrombosed hemorrhoids, but after CRS evaluation, this was NOT thought to be the cause of her pain. Eventually lower back imaging was ordered and showed extensive and pathologic fractures of the sacrum with grade 5 spondylolisthesis causing severe central canal stenosis on S1-S2, bilateral sacral ala fractures, displaced pubic rami fractures which closely abut the urethra/vaginal wall. She was then sent here for Ortho evaluation. She denies any known injuries, falls, or physical abuse that could have caused these fractures. She was recently found to have osteoporosis but had not yet started treatment.   In addition to the concerning weightloss and being nonambulatory, she reports are early satiety/bloating, decreased appetite, drenching night sweats several times/week, urinary incontinence, inability to sense having a bowel movement, chronic dry cough. She  reports a family history of ovarian cancer (sister in her 20s), lung cancer in mother (in 40s, was a smoker). The patient herself has a history of pulmonary nodules and has not had a colonoscopy in 11 years (10y f/u recommended).   She is currently afebrile, hemodynamically stable.  Wt Readings from Last 20 Encounters:   10/15/22 50.8 kg (111 lb 15.9 oz)   10/14/22 54.4 kg (120 lb)   09/27/22 60.8 kg (134 lb)   08/22/22 60.8 kg (134 lb)   06/21/22 61.7 kg (136 lb 0.4 oz)   05/19/22 64 kg (141 lb 1.5 oz)   05/19/22 64.8 kg (142 lb 13.7 oz)   05/03/22 65.5 kg (144 lb 6.4 oz)   04/25/22 69.4 kg (153 lb)   03/02/22 69.4 kg (153 lb)   02/03/22 69.4 kg (153 lb)   01/12/22 71.2 kg (157 lb)   01/07/22 71.4 kg (157 lb 6.5 oz)   01/05/22 71.7 kg (158 lb)   12/14/21 72.1 kg (158 lb 13.8 oz)   11/17/21 73.9 kg (163 lb)   11/08/21 74.1 kg (163 lb 5.8 oz)   11/03/21 74.7 kg (164 lb 10.9 oz)   09/21/21 74.7 kg (164 lb 10.9 oz)   07/20/21 76.7 kg (169 lb 1.5 oz)           Overview/Hospital Course:  No notes on file    Interval History: Pt states she continues to have pain in her butt, but otherwise is feeling ok. Pt states she has not had a BM since Thursday, but she is unable to feel when she needs to go and has some incontinence. She is not having any nausea or vomiting. Continuing to wait on some cancer labs, but ones that have come back have been unremarkable. Pt's  states pt has a family history of multiple cardiac issues that have a genetic cause. He did not know specifically what it was, but description sounded like aortic aneurysm and MI/arrhythmia at early age.    Review of Systems   Constitutional:  Positive for appetite change and unexpected weight change. Negative for chills and fever. Diaphoresis: night sweats.  Respiratory:  Negative for shortness of breath. Cough: chronic dry.   Gastrointestinal:  Positive for constipation. Negative for abdominal distention, abdominal pain, nausea and vomiting.         Subjective bloating   Genitourinary:  Negative for dysuria.        Urinary incontinence, does not feel urge to defecate or urinate   Musculoskeletal:  Positive for back pain. Negative for arthralgias and myalgias.   Skin:  Negative for rash and wound.   Neurological:  Positive for weakness. Negative for dizziness, syncope and headaches.   Psychiatric/Behavioral:  Negative for confusion and decreased concentration.    Objective:     Vital Signs (Most Recent):  Temp: 98.9 °F (37.2 °C) (10/16/22 1136)  Pulse: 100 (10/16/22 1320)  Resp: 18 (10/16/22 1136)  BP: 112/64 (10/16/22 1136)  SpO2: 97 % (10/16/22 1431)   Vital Signs (24h Range):  Temp:  [97.1 °F (36.2 °C)-98.9 °F (37.2 °C)] 98.9 °F (37.2 °C)  Pulse:  [] 100  Resp:  [16-20] 18  SpO2:  [86 %-98 %] 97 %  BP: (112-168)/(64-85) 112/64     Weight: 50.8 kg (111 lb 15.9 oz)  Body mass index is 21.87 kg/m².    Physical Exam  Vitals and nursing note reviewed.   Constitutional:       General: She is not in acute distress.     Appearance: She is ill-appearing (chronically). She is not toxic-appearing or diaphoretic.      Comments: Excess skin present diffusely consistent with recent rapid weight loss   HENT:      Head: Normocephalic and atraumatic.   Eyes:      General: No scleral icterus.  Cardiovascular:      Rate and Rhythm: Normal rate and regular rhythm.      Comments: Unable to auscultate heart sounds given ronchorous breath noises  Pulmonary:      Effort: Pulmonary effort is normal. No respiratory distress.      Breath sounds: Wheezing and rhonchi present.   Abdominal:      General: Bowel sounds are normal. There is no distension.      Palpations: Abdomen is soft.      Tenderness: There is no abdominal tenderness. There is no guarding.   Musculoskeletal:      Cervical back: Neck supple.      Right lower leg: No edema.      Left lower leg: No edema.   Lymphadenopathy:      Cervical: No cervical adenopathy.      Right cervical: No posterior cervical adenopathy.      Left cervical: No posterior cervical adenopathy.      Upper Body:      Right upper body: No supraclavicular or axillary adenopathy.      Left upper body: No supraclavicular or axillary adenopathy.   Skin:     General: Skin is warm and dry.   Neurological:      Mental Status: She is alert. Mental status is at baseline.      Cranial Nerves: No dysarthria.      Comments: Sensation intact to BLE and BUE  Significant weakness of BLE, barely able to lift legs off bed.  Weak hand  bilaterally   Psychiatric:         Mood and Affect: Mood normal. Affect is blunt.         Speech: Speech is delayed.         Behavior: Behavior normal. Behavior is cooperative.       Significant Labs: All pertinent labs within the past 24 hours have been reviewed.    Significant Imaging: I have reviewed all pertinent imaging results/findings within the past 24 hours.      Assessment/Plan:      * Closed fracture dislocation of sacrum  MRI and CT spine showing extensive displaced fractures of the sacrum causing central canal stenosis, pubic rami fractures,   Has significant osteoporosis recently diagnosed, had not yet begun treatment.    - ortho spine team planning sacral surgery on Tuesday 10/18  - will continue to evaluate underlying causes of extensive fractures  -Plan to add scheduled duonebs and delirium precautions after surgery. Surgical risk assessment performed on 10/16 to determine surgical risk. Pt is high risk due to COPD, osteoporosis and functional status, but surgical procedure benefit is felt to outweigh her risk.    Pre-op evaluation  Preoperative Cardiovascular Risk Assessment: Intermediate risk  Non-emergent surgery  No active cardiac problems: none (such as unstable angina, decompensated heart failure, significant uncontrolled arrhythmias or severe valvular disease).  Functional Status: unable to climb a flight of stairs (<4 METS), wears 2L O2 at rest as well  Revised cardiac risk index (RCRI) score is 0 (3.9%).     1 pt  Each: Elevated Risk Surgery (Intraperitoneal, Intrathoracic, Suprainguinal Vascular), Hx of Ischemic Heart Disease, Hx of CHF, Hx of Cerebrovascular Disease,                      DM on insulin, Creatinine > 2     Revised cardiac risk index is low, but due to other co-morbidities still think pt is an intermediate risk surgical candidate             Other Issues: COPD, GERD controlled with meds      Anticoagulation: None out of hospital      Coronary Stenting: None    Preoperative evaluation:  - Ok to proceed to surgery  - RCRI score of 0 thus 3.9% rate of major cardiac event.  Acceptable cardiac risk going for intermediate risk surgery.  No absolute contraindications to the proposed surgery at this time   - continue atorvastatin perioperatively  - hold lisinopril on day of surgery  - for high risk of post-op pulmonary complications, scheduled duonebs and incentive spirometry to start after surgery.Do calculator: ARISCAT ([employer]) preoperative pulmonary risk index in adults   - for high risk of post-op delirium, minimize CNS-active meds (opiates, benzos, anticholinergics) and sleep hygiene with windowshades open during day, no daytime naps, frequent re-orientation, private room if feasible.      UTI (urinary tract infection)  It was thought when patient arrived she had a UTI on top of her other issues and with patient's incontinence could also have been a concern for UTI.     -Pt was started on bactrim for suspected UTI  -Pt's UA did not meet criteria for UTI and bactrim was D/C      Early satiety  Pt has not been eating very much recently. We are working up if a cancer is what is contributing. Pt's  states she has not been eating many fruits and vegetables and mainly eats carbs when she does eat.    -Added boost shakes to her meals and a daily multivitamin      Osteoporosis with current pathological fracture  Recently diagnosed on DEXA with T -3.9 at L spine and T -4.1 at L femoral area.  Has not yet started  treatment.  Could be contributing to extensive pelvic fractures, but location seems atypical and other causes need to be evaluated  Has history of increased PTH of unknown cause  - f/u with PCP for osteoporosis tx      Benzodiazepine dependence  See bipolar disorder      Unexplained weight loss  Concerning unexplained weightloss of >60lbs in last year, in combination with severe functional decline, pathologic fractures, and B symptoms (see HPI for full details).  PCP was working this up, but will pursue inpatient workup given severity of symptoms and upcoming inpatient surgery.  Has history of pulmonary nodule due for 12 month followup+mediastinal lymphadenopathy.  Due for colonoscopy (last 2011 with 10 year f/u recommended); having early satiety and bloating symptoms  Discovered patient has history of two brothers dying early from cardiac issues that were said to be genetic, the family did not know what it specifically was. One sounded like aortic aneurysm and other like MI or arrhythmia.     - obtained CT C/A/P with contrast and CT head without contrast given strong concern for systemic process. Did not show any lung changes or lymphadenopathy   - SPEP, UPEP, YIN, FLC to evaluate for possible myeloma given fractures and B symptoms  - peripheral smear given recent history of thrombocytosis  - HIV, HCV, RPR, TSH, quant gold, ESR, CRP, blood cultures pending. HIV, HCV, TSH were negative, but ESR and CRP were both elevated  - CA 19-9, CA-125, CEA given family history of ovarian cancer and current GI symptoms (all 3 came back negative)  - given abdominal bloating symptoms, early satiety, and risk for lost to follow-up GI consulted to evaluate for need for upper/lower endoscopy (inpatient vs outpatient).  - later will consider MICHAEL, RF, CCP given her hand and knee arthritis if other studies unrevealing  -TTE ordered due to recently discovered family history        Chronic respiratory failure with hypoxia  2/2 asthma and  "COPD  See "asthma"    Essential hypertension  - continue home lisinopril 10mg  -hold Lisinopril day of surgery (10/18)      Asthma with COPD  On 2L home oxygen  Currently at baseline O2 requirements; no dyspnea, not in acute exacerbation    - not compliant with home breo and spiriva  - loud ronchi and wheezing on exam  - WILL administer home inhalers in the hospital in perioperative period to avoid pulmonary complications of anesthesia  -Plan to add scheduled duonebs after surgery    Affective bipolar disorder  Sees psychiatrist for medications    - continue home klonopin, haldol, paroxetine, trazodone  - low threshold to temporarily hold these meds if overly sedated  -Will add delirium precautions after surgery due to sedation    Pure hypercholesterolemia  - continue atorvastatin      LUCILA on CPAP: Not compliant with CPAP  - CPAP qhs ordered; patient may no longer need since has lost nearly 100 lbs; has not been using at home        VTE Risk Mitigation (From admission, onward)         Ordered     heparin (porcine) injection 5,000 Units  Every 8 hours         10/15/22 0912     Place sequential compression device  Until discontinued         10/14/22 1637                Discharge Planning   YELENA:      Code Status: Full Code   Is the patient medically ready for discharge?:     Reason for patient still in hospital (select all that apply): Patient trending condition             Juan Galaviz,   Department of Hospital Medicine   [facility] - [facility]    "

## 2022-10-16 NOTE — SUBJECTIVE & OBJECTIVE
Interval History: Pt states she continues to have pain in her butt, but otherwise is feeling ok. Pt states she has not had a BM since Thursday, but she is unable to feel when she needs to go and has some incontinence. She is not having any nausea or vomiting. Continuing to wait on some cancer labs, but ones that have come back have been unremarkable. Pt's  states pt has a family history of multiple cardiac issues that have a genetic cause. He did not know specifically what it was, but description sounded like aortic aneurysm and MI/arrhythmia at early age.    Review of Systems   Constitutional:  Positive for appetite change and unexpected weight change. Negative for chills and fever. Diaphoresis: night sweats.  Respiratory:  Negative for shortness of breath. Cough: chronic dry.   Gastrointestinal:  Positive for constipation. Negative for abdominal distention, abdominal pain, nausea and vomiting.        Subjective bloating   Genitourinary:  Negative for dysuria.        Urinary incontinence, does not feel urge to defecate or urinate   Musculoskeletal:  Positive for back pain. Negative for arthralgias and myalgias.   Skin:  Negative for rash and wound.   Neurological:  Positive for weakness. Negative for dizziness, syncope and headaches.   Psychiatric/Behavioral:  Negative for confusion and decreased concentration.    Objective:     Vital Signs (Most Recent):  Temp: 98.9 °F (37.2 °C) (10/16/22 1136)  Pulse: 100 (10/16/22 1320)  Resp: 18 (10/16/22 1136)  BP: 112/64 (10/16/22 1136)  SpO2: 97 % (10/16/22 1431)   Vital Signs (24h Range):  Temp:  [97.1 °F (36.2 °C)-98.9 °F (37.2 °C)] 98.9 °F (37.2 °C)  Pulse:  [] 100  Resp:  [16-20] 18  SpO2:  [86 %-98 %] 97 %  BP: (112-168)/(64-85) 112/64     Weight: 50.8 kg (111 lb 15.9 oz)  Body mass index is 21.87 kg/m².    Physical Exam  Vitals and nursing note reviewed.   Constitutional:       General: She is not in acute distress.     Appearance: She is ill-appearing  (chronically). She is not toxic-appearing or diaphoretic.      Comments: Excess skin present diffusely consistent with recent rapid weight loss   HENT:      Head: Normocephalic and atraumatic.   Eyes:      General: No scleral icterus.  Cardiovascular:      Rate and Rhythm: Normal rate and regular rhythm.      Comments: Unable to auscultate heart sounds given ronchorous breath noises  Pulmonary:      Effort: Pulmonary effort is normal. No respiratory distress.      Breath sounds: Wheezing and rhonchi present.   Abdominal:      General: Bowel sounds are normal. There is no distension.      Palpations: Abdomen is soft.      Tenderness: There is no abdominal tenderness. There is no guarding.   Musculoskeletal:      Cervical back: Neck supple.      Right lower leg: No edema.      Left lower leg: No edema.   Lymphadenopathy:      Cervical: No cervical adenopathy.      Right cervical: No posterior cervical adenopathy.     Left cervical: No posterior cervical adenopathy.      Upper Body:      Right upper body: No supraclavicular or axillary adenopathy.      Left upper body: No supraclavicular or axillary adenopathy.   Skin:     General: Skin is warm and dry.   Neurological:      Mental Status: She is alert. Mental status is at baseline.      Cranial Nerves: No dysarthria.      Comments: Sensation intact to BLE and BUE  Significant weakness of BLE, barely able to lift legs off bed.  Weak hand  bilaterally   Psychiatric:         Mood and Affect: Mood normal. Affect is blunt.         Speech: Speech is delayed.         Behavior: Behavior normal. Behavior is cooperative.       Significant Labs: All pertinent labs within the past 24 hours have been reviewed.    Significant Imaging: I have reviewed all pertinent imaging results/findings within the past 24 hours.

## 2022-10-16 NOTE — PROGRESS NOTES
"Pt refusing 1400 heparin dose. Educated pt on the importance of taking heparin. Pt still refused stating "I will take it later." Will notify MD.  "

## 2022-10-16 NOTE — ASSESSMENT & PLAN NOTE
It was thought when patient arrived she had a UTI on top of her other issues and with patient's incontinence could also have been a concern for UTI.     -Pt was started on bactrim for suspected UTI  -Pt's UA did not meet criteria for UTI and bactrim was D/C

## 2022-10-16 NOTE — ASSESSMENT & PLAN NOTE
On 2L home oxygen  Currently at baseline O2 requirements; no dyspnea, not in acute exacerbation    - not compliant with home breo and spiriva  - loud ronchi and wheezing on exam  - ADMINISTER home inhalers in the hospital in perioperative period to avoid pulmonary complications of anesthesia  - plan to add scheduled duonebs after surgery

## 2022-10-16 NOTE — SUBJECTIVE & OBJECTIVE
Interval History: Pt states she continues to have pain in her butt, but otherwise is feeling ok. Pt states she has not had a BM since Thursday, but she is unable to feel when she needs to go and has some incontinence. She is not having any nausea or vomiting. Continuing to wait on some cancer labs, but ones that have come back have been unremarkable. Pt's  states pt has a family history of multiple cardiac issues that have a genetic cause. He did not know specifically what it was, but description sounded like aortic aneurysm and MI/arrhythmia at early age.    Preoperative Cardiovascular Risk Assessment:  Non-emergent surgery  No active cardiac problems: none (such as unstable angina, decompensated heart failure, significant uncontrolled arrhythmias or severe valvular disease).  Functional Status: unable to climb a flight of stairs (<4 METS), wears 2L O2 at rest as well  Revised cardiac risk index (RCRI) score is 0 (3.9%).     1 pt Each: Elevated Risk Surgery (Intraperitoneal, Intrathoracic, Suprainguinal Vascular), Hx of Ischemic Heart Disease, Hx of CHF, Hx of Cerebrovascular Disease,                      DM on insulin, Creatinine > 2     Revised cardiac risk index is low, but due to other co-morbidities still think pt is a high risk surgical candidate             Other Issues: COPD, GERD controlled with meds      Anticoagulation: None out of hospital      Coronary Stenting: None    Preoperative evaluation:  - Ok to proceed to surgery  - RCRI score of 0 thus 3.9% rate of major cardiac event.  Acceptable cardiac risk going for ** risk surgery.  No absolute contraindications to the proposed surgery at this time   - continue atorvastatin perioperatively  - hold lisinopril on day of surgery  - for high risk of post-op pulmonary complications, scheduled duonebs and incentive spirometry to start after surgery.Do calculator: ARISCAT ([employer]) preoperative pulmonary risk index in adults   - for high risk of post-op  delirium, minimize CNS-active meds (opiates, benzos, anticholinergics) and sleep hygiene with windowshades open during day, no daytime naps, frequent re-orientation, private room if feasible.      Review of Systems   Constitutional:  Positive for appetite change and unexpected weight change. Negative for chills and fever. Diaphoresis: night sweats.  Respiratory:  Negative for shortness of breath. Cough: chronic dry.   Gastrointestinal:  Positive for constipation. Negative for abdominal distention, abdominal pain, nausea and vomiting.        Subjective bloating   Genitourinary:  Negative for dysuria.        Urinary incontinence, does not feel urge to defecate or urinate   Musculoskeletal:  Positive for back pain. Negative for arthralgias and myalgias.   Skin:  Negative for rash and wound.   Neurological:  Positive for weakness. Negative for dizziness, syncope and headaches.   Psychiatric/Behavioral:  Negative for confusion and decreased concentration.    Objective:     Vital Signs (Most Recent):  Temp: 98.9 °F (37.2 °C) (10/16/22 1136)  Pulse: 93 (10/16/22 1136)  Resp: 18 (10/16/22 1136)  BP: 112/64 (10/16/22 1136)  SpO2: 97 % (10/16/22 1136)   Vital Signs (24h Range):  Temp:  [97.1 °F (36.2 °C)-98.9 °F (37.2 °C)] 98.9 °F (37.2 °C)  Pulse:  [] 93  Resp:  [16-20] 18  SpO2:  [86 %-98 %] 97 %  BP: (112-168)/(64-85) 112/64     Weight: 50.8 kg (111 lb 15.9 oz)  Body mass index is 21.87 kg/m².    Physical Exam  Vitals and nursing note reviewed.   Constitutional:       General: She is not in acute distress.     Appearance: She is ill-appearing (chronically). She is not toxic-appearing or diaphoretic.      Comments: Excess skin present diffusely consistent with recent rapid weight loss   HENT:      Head: Normocephalic and atraumatic.   Eyes:      General: No scleral icterus.  Cardiovascular:      Rate and Rhythm: Normal rate and regular rhythm.      Comments: Unable to auscultate heart sounds given ronchorous breath  noises  Pulmonary:      Effort: Pulmonary effort is normal. No respiratory distress.      Breath sounds: Wheezing and rhonchi present.   Abdominal:      General: Bowel sounds are normal. There is no distension.      Palpations: Abdomen is soft.      Tenderness: There is no abdominal tenderness. There is no guarding.   Musculoskeletal:      Cervical back: Neck supple.      Right lower leg: No edema.      Left lower leg: No edema.   Lymphadenopathy:      Cervical: No cervical adenopathy.      Right cervical: No posterior cervical adenopathy.     Left cervical: No posterior cervical adenopathy.      Upper Body:      Right upper body: No supraclavicular or axillary adenopathy.      Left upper body: No supraclavicular or axillary adenopathy.   Skin:     General: Skin is warm and dry.   Neurological:      Mental Status: She is alert. Mental status is at baseline.      Cranial Nerves: No dysarthria.      Comments: Sensation intact to BLE and BUE  Significant weakness of BLE, barely able to lift legs off bed.  Weak hand  bilaterally   Psychiatric:         Mood and Affect: Mood normal. Affect is blunt.         Speech: Speech is delayed.         Behavior: Behavior normal. Behavior is cooperative.       Significant Labs: All pertinent labs within the past 24 hours have been reviewed.    Significant Imaging: I have reviewed all pertinent imaging results/findings within the past 24 hours.

## 2022-10-17 ENCOUNTER — ANESTHESIA EVENT (OUTPATIENT)
Dept: SURGERY | Facility: HOSPITAL | Age: 67
DRG: 456 | End: 2022-10-17
Payer: MEDICARE

## 2022-10-17 PROBLEM — R94.31 PROLONGED Q-T INTERVAL ON ECG: Status: ACTIVE | Noted: 2022-10-17

## 2022-10-17 PROBLEM — I25.10 CORONARY ARTERY DISEASE: Status: ACTIVE | Noted: 2022-10-17

## 2022-10-17 LAB
ALBUMIN SERPL ELPH-MCNC: 2.49 G/DL (ref 3.35–5.55)
ALPHA1 GLOB SERPL ELPH-MCNC: 0.48 G/DL (ref 0.17–0.41)
ALPHA2 GLOB SERPL ELPH-MCNC: 1.01 G/DL (ref 0.43–0.99)
ASCENDING AORTA: 2.79 CM
AV INDEX (PROSTH): 1.17
AV MEAN GRADIENT: 3 MMHG
AV PEAK GRADIENT: 7 MMHG
AV VALVE AREA: 3.39 CM2
AV VELOCITY RATIO: 0.95
B-GLOBULIN SERPL ELPH-MCNC: 0.57 G/DL (ref 0.5–1.1)
BASOPHILS # BLD AUTO: 0.05 K/UL (ref 0–0.2)
BASOPHILS NFR BLD: 0.7 % (ref 0–1.9)
BSA FOR ECHO PROCEDURE: 1.47 M2
CV ECHO LV RWT: 0.46 CM
DIFFERENTIAL METHOD: ABNORMAL
DOP CALC AO PEAK VEL: 1.33 M/S
DOP CALC AO VTI: 19.38 CM
DOP CALC LVOT AREA: 2.9 CM2
DOP CALC LVOT DIAMETER: 1.92 CM
DOP CALC LVOT PEAK VEL: 1.27 M/S
DOP CALC LVOT STROKE VOLUME: 65.78 CM3
DOP CALCLVOT PEAK VEL VTI: 22.73 CM
E WAVE DECELERATION TIME: 131.02 MSEC
E/A RATIO: 0.81
E/E' RATIO: 6.9 M/S
ECHO LV POSTERIOR WALL: 0.97 CM (ref 0.6–1.1)
EJECTION FRACTION: 60 %
EOSINOPHIL # BLD AUTO: 0.2 K/UL (ref 0–0.5)
EOSINOPHIL NFR BLD: 2.7 % (ref 0–8)
ERYTHROCYTE [DISTWIDTH] IN BLOOD BY AUTOMATED COUNT: 14.6 % (ref 11.5–14.5)
FRACTIONAL SHORTENING: 32 % (ref 28–44)
GAMMA GLOB SERPL ELPH-MCNC: 0.75 G/DL (ref 0.67–1.58)
HCT VFR BLD AUTO: 36.4 % (ref 37–48.5)
HGB BLD-MCNC: 11.4 G/DL (ref 12–16)
IMM GRANULOCYTES # BLD AUTO: 0.02 K/UL (ref 0–0.04)
IMM GRANULOCYTES NFR BLD AUTO: 0.3 % (ref 0–0.5)
INTERPRETATION SERPL IFE-IMP: NORMAL
INTERVENTRICULAR SEPTUM: 0.91 CM (ref 0.6–1.1)
IVC DIAMETER: 15.6 CM
KAPPA LC SER QL IA: 2.99 MG/DL (ref 0.33–1.94)
KAPPA LC/LAMBDA SER IA: 1.38 (ref 0.26–1.65)
LA MAJOR: 4.46 CM
LA MINOR: 4.61 CM
LA WIDTH: 3.6 CM
LAMBDA LC SER QL IA: 2.16 MG/DL (ref 0.57–2.63)
LEFT ATRIUM SIZE: 3.19 CM
LEFT ATRIUM VOLUME INDEX MOD: 30.7 ML/M2
LEFT ATRIUM VOLUME INDEX: 30.3 ML/M2
LEFT ATRIUM VOLUME MOD: 44.84 CM3
LEFT ATRIUM VOLUME: 44.26 CM3
LEFT INTERNAL DIMENSION IN SYSTOLE: 2.89 CM (ref 2.1–4)
LEFT VENTRICLE DIASTOLIC VOLUME INDEX: 54.5 ML/M2
LEFT VENTRICLE DIASTOLIC VOLUME: 79.57 ML
LEFT VENTRICLE MASS INDEX: 87 G/M2
LEFT VENTRICLE SYSTOLIC VOLUME INDEX: 21.9 ML/M2
LEFT VENTRICLE SYSTOLIC VOLUME: 31.93 ML
LEFT VENTRICULAR INTERNAL DIMENSION IN DIASTOLE: 4.22 CM (ref 3.5–6)
LEFT VENTRICULAR MASS: 126.92 G
LV LATERAL E/E' RATIO: 6.9 M/S
LV SEPTAL E/E' RATIO: 6.9 M/S
LYMPHOCYTES # BLD AUTO: 1.2 K/UL (ref 1–4.8)
LYMPHOCYTES NFR BLD: 16.3 % (ref 18–48)
MCH RBC QN AUTO: 28.7 PG (ref 27–31)
MCHC RBC AUTO-ENTMCNC: 31.3 G/DL (ref 32–36)
MCV RBC AUTO: 92 FL (ref 82–98)
MONOCYTES # BLD AUTO: 0.5 K/UL (ref 0.3–1)
MONOCYTES NFR BLD: 6.8 % (ref 4–15)
MV PEAK A VEL: 0.85 M/S
MV PEAK E VEL: 0.69 M/S
MV STENOSIS PRESSURE HALF TIME: 38 MS
MV VALVE AREA P 1/2 METHOD: 5.79 CM2
NEUTROPHILS # BLD AUTO: 5.5 K/UL (ref 1.8–7.7)
NEUTROPHILS NFR BLD: 73.2 % (ref 38–73)
NRBC BLD-RTO: 0 /100 WBC
PATH REV BLD -IMP: NORMAL
PLATELET # BLD AUTO: 443 K/UL (ref 150–450)
PMV BLD AUTO: 9.6 FL (ref 9.2–12.9)
PROT SERPL-MCNC: 5.3 G/DL (ref 6–8.4)
RA MAJOR: 3.61 CM
RA PRESSURE: 3 MMHG
RA WIDTH: 3.4 CM
RBC # BLD AUTO: 3.97 M/UL (ref 4–5.4)
RIGHT VENTRICULAR END-DIASTOLIC DIMENSION: 3.98 CM
RPR SER QL: NORMAL
RV TISSUE DOPPLER FREE WALL SYSTOLIC VELOCITY 1 (APICAL 4 CHAMBER VIEW): 13.71 CM/S
SINUS: 2.86 CM
STJ: 2.7 CM
TDI LATERAL: 0.1 M/S
TDI SEPTAL: 0.1 M/S
TDI: 0.1 M/S
TRICUSPID ANNULAR PLANE SYSTOLIC EXCURSION: 1.85 CM
WBC # BLD AUTO: 7.49 K/UL (ref 3.9–12.7)

## 2022-10-17 PROCEDURE — 99222 1ST HOSP IP/OBS MODERATE 55: CPT | Mod: ,,, | Performed by: INTERNAL MEDICINE

## 2022-10-17 PROCEDURE — 25000003 PHARM REV CODE 250

## 2022-10-17 PROCEDURE — 94799 UNLISTED PULMONARY SVC/PX: CPT

## 2022-10-17 PROCEDURE — 36415 COLL VENOUS BLD VENIPUNCTURE: CPT

## 2022-10-17 PROCEDURE — 93010 ELECTROCARDIOGRAM REPORT: CPT | Mod: ,,, | Performed by: INTERNAL MEDICINE

## 2022-10-17 PROCEDURE — 93005 ELECTROCARDIOGRAM TRACING: CPT

## 2022-10-17 PROCEDURE — 94761 N-INVAS EAR/PLS OXIMETRY MLT: CPT

## 2022-10-17 PROCEDURE — 94640 AIRWAY INHALATION TREATMENT: CPT

## 2022-10-17 PROCEDURE — 63600175 PHARM REV CODE 636 W HCPCS: Performed by: STUDENT IN AN ORGANIZED HEALTH CARE EDUCATION/TRAINING PROGRAM

## 2022-10-17 PROCEDURE — 99222 PR INITIAL HOSPITAL CARE,LEVL II: ICD-10-PCS | Mod: ,,, | Performed by: INTERNAL MEDICINE

## 2022-10-17 PROCEDURE — 99233 PR SUBSEQUENT HOSPITAL CARE,LEVL III: ICD-10-PCS | Mod: GC,,, | Performed by: HOSPITALIST

## 2022-10-17 PROCEDURE — 86480 TB TEST CELL IMMUN MEASURE: CPT | Performed by: STUDENT IN AN ORGANIZED HEALTH CARE EDUCATION/TRAINING PROGRAM

## 2022-10-17 PROCEDURE — 25000003 PHARM REV CODE 250: Performed by: STUDENT IN AN ORGANIZED HEALTH CARE EDUCATION/TRAINING PROGRAM

## 2022-10-17 PROCEDURE — 11000001 HC ACUTE MED/SURG PRIVATE ROOM

## 2022-10-17 PROCEDURE — 36415 COLL VENOUS BLD VENIPUNCTURE: CPT | Performed by: ORTHOPAEDIC SURGERY

## 2022-10-17 PROCEDURE — 93010 EKG 12-LEAD: ICD-10-PCS | Mod: ,,, | Performed by: INTERNAL MEDICINE

## 2022-10-17 PROCEDURE — 99233 SBSQ HOSP IP/OBS HIGH 50: CPT | Mod: GC,,, | Performed by: HOSPITALIST

## 2022-10-17 PROCEDURE — 85025 COMPLETE CBC W/AUTO DIFF WBC: CPT

## 2022-10-17 RX ORDER — HYDROCODONE BITARTRATE AND ACETAMINOPHEN 500; 5 MG/1; MG/1
TABLET ORAL
Status: DISCONTINUED | OUTPATIENT
Start: 2022-10-17 | End: 2022-10-25 | Stop reason: HOSPADM

## 2022-10-17 RX ORDER — POLYETHYLENE GLYCOL 3350 17 G/17G
17 POWDER, FOR SOLUTION ORAL DAILY
Status: DISCONTINUED | OUTPATIENT
Start: 2022-10-17 | End: 2022-10-20

## 2022-10-17 RX ORDER — SENNOSIDES 8.6 MG/1
8.6 TABLET ORAL NIGHTLY
Status: DISCONTINUED | OUTPATIENT
Start: 2022-10-17 | End: 2022-10-18

## 2022-10-17 RX ADMIN — ACETAMINOPHEN 650 MG: 325 TABLET ORAL at 02:10

## 2022-10-17 RX ADMIN — PANTOPRAZOLE SODIUM 40 MG: 40 TABLET, DELAYED RELEASE ORAL at 08:10

## 2022-10-17 RX ADMIN — CLONAZEPAM 0.5 MG: 0.5 TABLET ORAL at 08:10

## 2022-10-17 RX ADMIN — ACETAMINOPHEN 650 MG: 325 TABLET ORAL at 06:10

## 2022-10-17 RX ADMIN — HALOPERIDOL 5 MG: 5 TABLET ORAL at 09:10

## 2022-10-17 RX ADMIN — PAROXETINE HYDROCHLORIDE 40 MG: 10 TABLET, FILM COATED ORAL at 08:10

## 2022-10-17 RX ADMIN — HEPARIN SODIUM 5000 UNITS: 5000 INJECTION INTRAVENOUS; SUBCUTANEOUS at 02:10

## 2022-10-17 RX ADMIN — FLUTICASONE FUROATE AND VILANTEROL TRIFENATATE 1 PUFF: 200; 25 POWDER RESPIRATORY (INHALATION) at 12:10

## 2022-10-17 RX ADMIN — TIOTROPIUM BROMIDE INHALATION SPRAY 2 PUFF: 3.12 SPRAY, METERED RESPIRATORY (INHALATION) at 12:10

## 2022-10-17 RX ADMIN — SENNOSIDES 8.6 MG: 8.6 TABLET, FILM COATED ORAL at 08:10

## 2022-10-17 RX ADMIN — CHOLECALCIFEROL TAB 25 MCG (1000 UNIT) 2000 UNITS: 25 TAB at 08:10

## 2022-10-17 RX ADMIN — ATORVASTATIN CALCIUM 20 MG: 20 TABLET, FILM COATED ORAL at 08:10

## 2022-10-17 RX ADMIN — POLYETHYLENE GLYCOL 3350 17 G: 17 POWDER, FOR SOLUTION ORAL at 03:10

## 2022-10-17 RX ADMIN — HEPARIN SODIUM 5000 UNITS: 5000 INJECTION INTRAVENOUS; SUBCUTANEOUS at 06:10

## 2022-10-17 RX ADMIN — ACETAMINOPHEN 650 MG: 325 TABLET ORAL at 09:10

## 2022-10-17 RX ADMIN — THIAMINE HYDROCHLORIDE 500 MG: 100 INJECTION, SOLUTION INTRAMUSCULAR; INTRAVENOUS at 02:10

## 2022-10-17 RX ADMIN — TRAZODONE HYDROCHLORIDE 100 MG: 100 TABLET ORAL at 08:10

## 2022-10-17 RX ADMIN — THERA TABS 1 TABLET: TAB at 08:10

## 2022-10-17 NOTE — PLAN OF CARE
SSC met with patient/family at bedside. Patient experience rounding completed and reviewed the following.     Do you know your discharge plan? No, patient waiting until after surgery.       If yes, what is the plan? (Home, Home Health, Rehab, SNF, LTAC, or Other)    If you are discharging home, do you have help at home?   Yes or No, (undecided until after surgery)    Do you think you will need help at home at discharge?   Yes or No (undecided until after surgery)    Have you discussed your needs and preferences with your SW/CM? Yes    Assigned SW/CM notified of any patient/family needs or concerns.

## 2022-10-17 NOTE — ASSESSMENT & PLAN NOTE
- CPAP qhs ordered  - patient may no longer need since has lost nearly 100 lbs  - does not use home

## 2022-10-17 NOTE — PROGRESS NOTES
[facility] Medicine  Progress Note    Patient Name: Merle Caro  MRN: [id number]  Patient Class: IP- Inpatient   Admission Date: 10/14/2022  Length of Stay: 3 days  Attending Physician: Juni Meza MD  Primary Care Provider: Marck Major MD        Subjective:     Principal Problem:Closed fracture of sacrum        HPI:  Ms. Caro is a 67F with hx of HTN, bipolar disorder, tobacco use, COPD on 2L home O2 who is admitted to the hospital to ortho service for upcoming operative repair of displaced sacral fracture. Hospital medicine is consulted for medical optimization.  She and  at bedside report a concerning history. Around a year ago, she began losing weight unexpectedly (has lost 60lbs in 1 year). Her PCP was working her up for this but no cause has yet been found. Then, around June of this year, she and her  were on a [place] trip when she began to experience severe buttocks pain. It became so severe that she could no longer walk, and has been non-ambulatory since June 2022. Initially the pain was thought 2/2 thrombosed hemorrhoids, but after CRS evaluation, this was NOT thought to be the cause of her pain. Eventually lower back imaging was ordered and showed extensive and pathologic fractures of the sacrum with grade 5 spondylolisthesis causing severe central canal stenosis on S1-S2, bilateral sacral ala fractures, displaced pubic rami fractures which closely abut the urethra/vaginal wall. She was then sent here for Ortho evaluation. She denies any known injuries, falls, or physical abuse that could have caused these fractures. She was recently found to have osteoporosis but had not yet started treatment.   In addition to the concerning weightloss and being nonambulatory, she reports are early satiety/bloating, decreased appetite, drenching night sweats several times/week, urinary incontinence, inability to sense having a bowel movement, chronic dry cough. She  reports a family history of ovarian cancer (sister in her 20s), lung cancer in mother (in 40s, was a smoker). The patient herself has a history of pulmonary nodules and has not had a colonoscopy in 11 years (10y f/u recommended).   She is currently afebrile, hemodynamically stable.  Wt Readings from Last 20 Encounters:   10/15/22 50.8 kg (111 lb 15.9 oz)   10/14/22 54.4 kg (120 lb)   09/27/22 60.8 kg (134 lb)   08/22/22 60.8 kg (134 lb)   06/21/22 61.7 kg (136 lb 0.4 oz)   05/19/22 64 kg (141 lb 1.5 oz)   05/19/22 64.8 kg (142 lb 13.7 oz)   05/03/22 65.5 kg (144 lb 6.4 oz)   04/25/22 69.4 kg (153 lb)   03/02/22 69.4 kg (153 lb)   02/03/22 69.4 kg (153 lb)   01/12/22 71.2 kg (157 lb)   01/07/22 71.4 kg (157 lb 6.5 oz)   01/05/22 71.7 kg (158 lb)   12/14/21 72.1 kg (158 lb 13.8 oz)   11/17/21 73.9 kg (163 lb)   11/08/21 74.1 kg (163 lb 5.8 oz)   11/03/21 74.7 kg (164 lb 10.9 oz)   09/21/21 74.7 kg (164 lb 10.9 oz)   07/20/21 76.7 kg (169 lb 1.5 oz)       Overview/Hospital Course:  No notes on file    Interval History: NAEON. GI consulted yesterday for reported melena recently and concerning weightloss. Monitoring EKG for QT prolongation    Review of Systems  Objective:     Vital Signs (Most Recent):  Temp: 98.2 °F (36.8 °C) (10/16/22 2056)  Pulse: 103 (10/17/22 0909)  Resp: 16 (10/17/22 0707)  BP: (!) 105/56 (10/17/22 0707)  SpO2: (!) 82 % (10/17/22 0909)   Vital Signs (24h Range):  Temp:  [98.2 °F (36.8 °C)-98.9 °F (37.2 °C)] 98.2 °F (36.8 °C)  Pulse:  [] 103  Resp:  [12-18] 16  SpO2:  [82 %-98 %] 82 %  BP: (105-126)/(55-71) 105/56     Weight: 50.8 kg (111 lb 15.9 oz)  Body mass index is 21.87 kg/m².    Intake/Output Summary (Last 24 hours) at 10/17/2022 1119  Last data filed at 10/16/2022 1741  Gross per 24 hour   Intake 720 ml   Output 650 ml   Net 70 ml      Physical Exam  Vitals and nursing note reviewed.   Constitutional:       General: She is not in acute distress.     Appearance: She is not  toxic-appearing or diaphoretic.   HENT:      Head: Normocephalic.   Cardiovascular:      Rate and Rhythm: Regular rhythm. Tachycardia present.   Pulmonary:      Effort: Pulmonary effort is normal. No respiratory distress.   Abdominal:      Palpations: Abdomen is soft.      Tenderness: There is no guarding.   Skin:     General: Skin is warm and dry.   Neurological:      Mental Status: She is alert. Mental status is at baseline.      Cranial Nerves: No dysarthria.      Coordination: Finger-Nose-Finger Test normal.      Comments: Slight nystagmus observed on rightward gaze   Psychiatric:         Mood and Affect: Mood normal.         Behavior: Behavior normal.       Significant Labs: All pertinent labs within the past 24 hours have been reviewed.    Significant Imaging: I have reviewed all pertinent imaging results/findings within the past 24 hours.    Assessment/Plan:      * Closed fracture dislocation of sacrum  MRI and CT spine showing extensive displaced fractures of the sacrum causing central canal stenosis, pubic rami fractures,   Has significant osteoporosis recently diagnosed, had not yet begun treatment.    - ortho spine team planning sacral surgery on Tuesday 10/18  - will continue to evaluate underlying causes of extensive fractures  - surgical risk assessment performed on 10/16 to determine surgical risk. Pt is high risk due to COPD, osteoporosis and functional status, but surgical procedure benefit is felt to outweigh her risk.    Unexplained weight loss  Concerning unexplained weightloss of >60lbs in last year, in combination with severe functional decline, pathologic fractures, and B symptoms (see HPI for full details).  Has history of pulmonary nodule due for 12 month followup+mediastinal lymphadenopathy.  Due for colonoscopy (last 2011 with 10 year f/u recommended); having early satiety and bloating symptoms  Discovered patient has history of two brothers dying early from cardiac issues that were said  to be genetic, possibly arrhythmia va MI based on description    Workup:  - CT C/A/P with contrast: no lung changes or lymphadenopathy  - CT head given some concern for slowed mentation: no acute changes or intracranial lesions  - HIV, HCV,TSH, blood cultures wnl  - CA 19-9, CA-125, CEA wnl  - TTE pending (given reported FHx of early cardiac deaths)  - SPEP, UPEP, YIN, FLC: pending  - peripheral smear: pending  - RPR, quant gold pending    Plan:  - f/u remainder of studies as above  - given abdominal bloating symptoms, reported recent melena, GI consulted to evaluate for need for upper/lower endoscopy (inpatient vs outpatient).  - only minimal confusion and slight nystagmus, but will trial 1x dose high-dose thiamine given risk for nutritional deficiencies and Wernicke's        Prolonged Q-T interval on ECG  QTc 534 on admission.   Reported family history of sudden cardiac deaths.  On multiple QT prolonging home psychiatric meds: klonopin, trazodone, haldol, paroxetine    - daily EKG to monitor  - continue home meds as these are long term  - keep K>4, Mg>2, Phos >3 - will add to daily labs  - AVOID any anti-emetics or other QT-prolonging agents in perioperative setting as she is high risk for arrhythmias      Pre-op evaluation  Preoperative Cardiovascular Risk Assessment: Intermediate risk  Non-emergent surgery  No active cardiac problems: none (such as unstable angina, decompensated heart failure, significant uncontrolled arrhythmias or severe valvular disease).  Functional Status: unable to climb a flight of stairs (<4 METS), wears 2L O2 at rest as well  Revised cardiac risk index (RCRI) score is 0 (3.9%).     1 pt Each: Elevated Risk Surgery (Intraperitoneal, Intrathoracic, Suprainguinal Vascular), Hx of Ischemic Heart Disease, Hx of CHF, Hx of Cerebrovascular Disease,                      DM on insulin, Creatinine > 2     Revised cardiac risk index is low, but due to other co-morbidities still think pt is an  "intermediate risk surgical candidate             Other Issues: COPD, GERD controlled with meds      Anticoagulation: None out of hospital      Coronary Stenting: None    Preoperative evaluation:  - Ok to proceed to surgery  - RCRI score of 0 thus 3.9% rate of major cardiac event.  Acceptable cardiac risk going for intermediate risk surgery.  No absolute contraindications to the proposed surgery at this time   - continue atorvastatin perioperatively  - hold lisinopril on day of surgery  - for high risk of post-op pulmonary complications, scheduled duonebs and incentive spirometry to start after surgery.Do calculator: ARISCAT ([employer]) preoperative pulmonary risk index in adults   - for high risk of post-op delirium, minimize CNS-active meds (opiates, benzos, anticholinergics) and sleep hygiene with windowshades open during day, no daytime naps, frequent re-orientation, private room if feasible.      Coronary artery disease  Triple-vessel CAD seen on recent CT C/A/P from 10/15.  No history of coronary events. No current chest pain or anginal symptoms.  ASCVD 10-year risk of cardiovascular event 13.1%, mod-high intensity statin indicated    - Low theshold for repeat EKG and troponins if becomes symptomatic  - continue atorvastatin 20mg daily; current dose is appropriate      Early satiety  See "unexplained weightloss"      Osteoporosis with current pathological fracture  Recently diagnosed on DEXA with T -3.9 at L spine and T -4.1 at L femoral area.  Has not yet started treatment.  Could be contributing to extensive pelvic fractures, but location seems atypical and other causes need to be evaluated  Has history of increased PTH of unknown cause; self-resolved    - f/u with PCP for osteoporosis tx      Benzodiazepine dependence  See bipolar disorder      History of colon polyps  See "unexplained weight loss"      Chronic respiratory failure with hypoxia  2/2 asthma and COPD  See "asthma"    Essential hypertension  - " continue home lisinopril 10mg  - hold Lisinopril day of surgery (10/18)      Asthma with COPD  On 2L home oxygen  Currently at baseline O2 requirements; no dyspnea, not in acute exacerbation    - not compliant with home breo and spiriva  - loud ronchi and wheezing on exam  - ADMINISTER home inhalers in the hospital in perioperative period to avoid pulmonary complications of anesthesia  - plan to add scheduled duonebs after surgery    Affective bipolar disorder  Sees psychiatrist for medications    - continue home klonopin, haldol, paroxetine, trazodone  - has prolonged QT; minimize other QT prolonging meds such as zofran, etc.  - low threshold to temporarily hold these meds if overly sedated   - delirium precautions    Pure hypercholesterolemia  - continue home statin; substituting atorvastatin 20mg daily due to hospital formulary  - ASCVD 10-year risk of cardiac event 13%, moderate-high intensity statin recommended; current dose appropriate      LUCILA on CPAP: Not compliant with CPAP  - CPAP qhs ordered  - patient may no longer need since has lost nearly 100 lbs  - does not use home      VTE Risk Mitigation (From admission, onward)           Ordered     heparin (porcine) injection 5,000 Units  Every 8 hours         10/15/22 0912     Place sequential compression device  Until discontinued         10/14/22 1637                    Discharge Planning   YELENA: 10/19/2022     Code Status: Full Code   Is the patient medically ready for discharge?:     Reason for patient still in hospital (select all that apply): Other (specify) procedure, test results                     Brittni Best MD  Department of Hospital Medicine   [facility] - Surgery
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
## 2022-10-17 NOTE — SUBJECTIVE & OBJECTIVE
Principal Problem:Closed fracture of sacrum    Principal Orthopedic Problem:  Same    Interval History:  No acute events overnight.  Patient tolerated imaging studies well.  Pain well controlled.  No acute complaints.  Hospital Medicine consulted and began metastatic workup. Delirium precautions to be added by medicine postop. Indicated as intermediate risk for surgery.     Review of patient's allergies indicates:   Allergen Reactions    Codeine Shortness Of Breath    Keflex [cephalexin] Shortness Of Breath    Shellfish containing products Nausea And Vomiting    Flu vaccine ts 2011-12(18 yr+) Hives    Sucralfate Other (See Comments) and Hives       Current Facility-Administered Medications   Medication    acetaminophen tablet 650 mg    atorvastatin tablet 20 mg    calcium carbonate 200 mg calcium (500 mg) chewable tablet 500 mg    celecoxib capsule 100 mg    clonazePAM tablet 0.5 mg    fluticasone furoate-vilanteroL 200-25 mcg/dose diskus inhaler 1 puff    haloperidoL tablet 5 mg    heparin (porcine) injection 5,000 Units    lisinopriL tablet 10 mg    multivitamin tablet    pantoprazole EC tablet 40 mg    paroxetine tablet 40 mg    sodium chloride 0.9% flush 10 mL    tiotropium bromide 2.5 mcg/actuation inhaler 2 puff    traZODone tablet 100 mg    vitamin D 1000 units tablet 2,000 Units     Objective:     Vital Signs (Most Recent):  Temp: 98.2 °F (36.8 °C) (10/16/22 2056)  Pulse: 82 (10/17/22 0304)  Resp: 12 (10/16/22 2056)  BP: 126/71 (10/16/22 2056)  SpO2: (!) 93 % (10/17/22 0304)   Vital Signs (24h Range):  Temp:  [98.2 °F (36.8 °C)-98.9 °F (37.2 °C)] 98.2 °F (36.8 °C)  Pulse:  [] 82  Resp:  [12-18] 12  SpO2:  [86 %-98 %] 93 %  BP: (112-126)/(55-74) 126/71     Weight: 50.8 kg (111 lb 15.9 oz)     Body mass index is 21.87 kg/m².      Intake/Output Summary (Last 24 hours) at 10/17/2022 0616  Last data filed at 10/16/2022 1741  Gross per 24 hour   Intake 1080 ml   Output 1300 ml   Net -220 ml         Ortho/SPM  Exam  Gen: NAD, frail, ill-appearing  CV: regular rate  Resp: non-labored respirations    Spine:   Motor intact bilateral upper extremities with 4/5 strength throughout the right upper extremity, otherwise normal  Motor intact of the bilateral lower extremities with 4/5 strength left EHL, otherwise normal  Sensation intact light touch throughout  Brisk capillary refill distally  Pure Wick in place    Significant Labs: All pertinent labs within the past 24 hours have been reviewed.    Significant Imaging: I have reviewed and interpreted all pertinent imaging results/findings.  DVT ultrasound with no DVTs

## 2022-10-17 NOTE — PROGRESS NOTES
[facility]  Orthopedics  Progress Note    Patient Name: Merle Caro  MRN: [id number]  Admission Date: 10/14/2022  Hospital Length of Stay: 3 days  Attending Provider: Juni Meza MD  Primary Care Provider: Marck Major MD  Follow-up For: Procedure(s) (LRB):  FUSION,SPINE,POSTERIOR APPROACH, L3-Pelvis, Loop-X, Marilee, SNS: SSEP/EMG (N/A)    Post-Operative Day:    Subjective:     Principal Problem:Closed fracture of sacrum    Principal Orthopedic Problem:  Same    Interval History:  No acute events overnight.  Patient tolerated imaging studies well.  Pain well controlled.  No acute complaints.  Hospital Medicine consulted and began metastatic workup. Delirium precautions to be added by medicine postop. Indicated as intermediate risk for surgery.     Review of patient's allergies indicates:   Allergen Reactions    Codeine Shortness Of Breath    Keflex [cephalexin] Shortness Of Breath    Shellfish containing products Nausea And Vomiting    Flu vaccine ts 2011-12(18 yr+) Hives    Sucralfate Other (See Comments) and Hives       Current Facility-Administered Medications   Medication    acetaminophen tablet 650 mg    atorvastatin tablet 20 mg    calcium carbonate 200 mg calcium (500 mg) chewable tablet 500 mg    celecoxib capsule 100 mg    clonazePAM tablet 0.5 mg    fluticasone furoate-vilanteroL 200-25 mcg/dose diskus inhaler 1 puff    haloperidoL tablet 5 mg    heparin (porcine) injection 5,000 Units    lisinopriL tablet 10 mg    multivitamin tablet    pantoprazole EC tablet 40 mg    paroxetine tablet 40 mg    sodium chloride 0.9% flush 10 mL    tiotropium bromide 2.5 mcg/actuation inhaler 2 puff    traZODone tablet 100 mg    vitamin D 1000 units tablet 2,000 Units     Objective:     Vital Signs (Most Recent):  Temp: 98.2 °F (36.8 °C) (10/16/22 2056)  Pulse: 82 (10/17/22 0304)  Resp: 12 (10/16/22 2056)  BP: 126/71 (10/16/22 2056)  SpO2: (!) 93 % (10/17/22 0304)   Vital Signs (24h  Range):  Temp:  [98.2 °F (36.8 °C)-98.9 °F (37.2 °C)] 98.2 °F (36.8 °C)  Pulse:  [] 82  Resp:  [12-18] 12  SpO2:  [86 %-98 %] 93 %  BP: (112-126)/(55-74) 126/71     Weight: 50.8 kg (111 lb 15.9 oz)     Body mass index is 21.87 kg/m².      Intake/Output Summary (Last 24 hours) at 10/17/2022 0616  Last data filed at 10/16/2022 1741  Gross per 24 hour   Intake 1080 ml   Output 1300 ml   Net -220 ml         Ortho/SPM Exam  Gen: NAD, frail, ill-appearing  CV: regular rate  Resp: non-labored respirations    Spine:   Motor intact bilateral upper extremities with 4/5 strength throughout the right upper extremity, otherwise normal  Motor intact of the bilateral lower extremities with 4/5 strength left EHL, otherwise normal  Sensation intact light touch throughout  Brisk capillary refill distally  Pure Wick in place    Significant Labs: All pertinent labs within the past 24 hours have been reviewed.    Significant Imaging: I have reviewed and interpreted all pertinent imaging results/findings.  DVT ultrasound with no DVTs    Assessment/Plan:     * Closed fracture dislocation of sacrum  Merle Caro is a 67 y.o. female with past medical history of hypertension, bipolar disorder, and urinary incontinence (1 year duration) admitted to Orthopedics with what appears to be an approximally 4-month-old sacral fracture dislocation of S1 on S2 with severe canal stenosis at that level resulting in chronic cauda equina symptoms.  She has not been able ambulate since June due to pain.  Initial images reviewed by Dr. Mzea who plans to take the patient for surgery on Tuesday October 18th.  CT pelvis completed.  DVT ultrasound with no DVTs.  Hospital Medicine consulted began a metastatic workup.  Studies so far unrevealing.  Appreciate hospital medicine recommendations.    Plan:   Pain control: multimodal; will adjust after surgery  Diet: regular diet now; NPO at midnight on Tuesday in preparation for surgery  PT/OT: NWB BLE  DVT  prophylaxis:  SubQ heparin; no need for IVC filter at this time  Abx:  preop ancef ordered  Labs:  Pending this AM, continue to monitor    Dispo:  To OR 10/18/2022      Essential hypertension  Home medications restarted    Asthma with COPD  Home medications restarted  SpO2 goal > 88%; do not titrate supplemental oxygen above this level    Affective bipolar disorder  Home medications restarted    Pure hypercholesterolemia  Home medications restarted    LUCILA on CPAP: Not compliant with CPAP  CPAP          Jerry Lemus MD  Orthopedics  [facility] - Surgery

## 2022-10-17 NOTE — PLAN OF CARE
[facility] - Surgery  Initial Discharge Assessment       Primary Care Provider: Marck Major MD    Admission Diagnosis: Subluxation complex (vertebral) of sacral region [M99.14]  Subluxation complex (vertebral) of sacral region [M99.14]    Admission Date: 10/14/2022  Expected Discharge Date: 10/19/2022    Discharge Barriers Identified: None    Payor: MEDICARE / Plan: MEDICARE PART A & B / Product Type: Government /     Extended Emergency Contact Information  Primary Emergency Contact: Irving Caro  Address: [address] [address] [facility]  Home Phone: [phone]  Relation: Spouse    Discharge Plan A: Home with family  Discharge Plan B: Home Health, Home with family      [id number] - [place], [place] - [address] [facility] [address] [facility] [address]  [address]  [place] [address]  Phone: [phone] Fax: [phone]      Initial Assessment (most recent)       Adult Discharge Assessment - 10/17/22 1044          Discharge Assessment    Assessment Type Discharge Planning Assessment     Confirmed/corrected address, phone number and insurance Yes     Confirmed Demographics Correct on Facesheet     Source of Information patient;family   spouse    Communicated YELENA with patient/caregiver Yes     Lives With spouse     Do you expect to return to your current living situation? Yes     Do you have help at home or someone to help you manage your care at home? Yes     Who are your caregiver(s) and their phone number(s)? spouse     Prior to hospitilization cognitive status: Alert/Oriented     Current cognitive status: Alert/Oriented     Home Layout Able to live on 1st floor     Equipment Currently Used at Home walker, rolling;oxygen   O2 at 2L per [place] continuous, she does not remember name of supplying DME co.    Do you currently have service(s) that help you manage your care at home? No     Do you take prescription medications? Yes     Do you have prescription  coverage? Yes     Do you have any problems affording any of your prescribed medications? No     Is the patient taking medications as prescribed? yes     How do you get to doctors appointments? family or friend will provide     Are you on dialysis? No     Do you take coumadin? No     Discharge Plan A Home with family     Discharge Plan B Home Health;Home with family     Discharge Plan discussed with: Patient;Spouse/sig other     Discharge Barriers Identified None                   Patient lives with spouse in a single story home with no entry steps. Patient states she  is amenable with therapy recommendations post planned surgery tomorrow. Patient spouse is primary CG and helps/assist with patient care at home. He will also provide transportation from hospital.

## 2022-10-17 NOTE — ASSESSMENT & PLAN NOTE
Recently diagnosed on DEXA with T -3.9 at L spine and T -4.1 at L femoral area.  Has not yet started treatment.  Could be contributing to extensive pelvic fractures, but location seems atypical and other causes need to be evaluated  Has history of increased PTH of unknown cause; self-resolved    - f/u with PCP for osteoporosis tx

## 2022-10-17 NOTE — ASSESSMENT & PLAN NOTE
QTc 534 on admission.   Reported family history of sudden cardiac deaths.  On multiple QT prolonging home psychiatric meds: klonopin, trazodone, haldol, paroxetine    - daily EKG to monitor  - continue home meds as these are long term  - keep K>4, Mg>2, Phos >3 - will add to daily labs  - AVOID any anti-emetics or other QT-prolonging agents in perioperative setting as she is high risk for arrhythmias

## 2022-10-17 NOTE — ASSESSMENT & PLAN NOTE
Merle Caro is a 67 y.o. female with past medical history of hypertension, bipolar disorder, and urinary incontinence (1 year duration) admitted to Orthopedics with what appears to be an approximally 4-month-old sacral fracture dislocation of S1 on S2 with severe canal stenosis at that level resulting in chronic cauda equina symptoms.  She has not been able ambulate since June due to pain.  Initial images reviewed by Dr. Meza who plans to take the patient for surgery on Tuesday October 18th.  CT pelvis completed.  DVT ultrasound with no DVTs.  Hospital Medicine consulted began a metastatic workup.  Studies so far unrevealing.  Appreciate hospital medicine recommendations.    Plan:   Pain control: multimodal; will adjust after surgery  Diet: regular diet now; NPO at midnight on Tuesday in preparation for surgery  PT/OT: NWB BLE  DVT prophylaxis:  SubQ heparin; no need for IVC filter at this time  Abx:  preop ancef ordered  Labs:  Pending this AM, continue to monitor    Dispo:  To OR 10/18/2022
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
## 2022-10-17 NOTE — ASSESSMENT & PLAN NOTE
- continue home statin; substituting atorvastatin 20mg daily due to hospital formulary  - ASCVD 10-year risk of cardiac event 13%, moderate-high intensity statin recommended; current dose appropriate

## 2022-10-17 NOTE — ASSESSMENT & PLAN NOTE
Triple-vessel CAD seen on recent CT C/A/P from 10/15.  No history of coronary events. No current chest pain or anginal symptoms.  ASCVD 10-year risk of cardiovascular event 13.1%, mod-high intensity statin indicated    - Low theshold for repeat EKG and troponins if becomes symptomatic  - continue atorvastatin 20mg daily; current dose is appropriate

## 2022-10-17 NOTE — SUBJECTIVE & OBJECTIVE
Interval History: NAEON. GI consulted yesterday for reported melena recently and concerning weightloss. Monitoring EKG for QT prolongation    Review of Systems  Objective:     Vital Signs (Most Recent):  Temp: 98.2 °F (36.8 °C) (10/16/22 2056)  Pulse: 103 (10/17/22 0909)  Resp: 16 (10/17/22 0707)  BP: (!) 105/56 (10/17/22 0707)  SpO2: (!) 82 % (10/17/22 0909)   Vital Signs (24h Range):  Temp:  [98.2 °F (36.8 °C)-98.9 °F (37.2 °C)] 98.2 °F (36.8 °C)  Pulse:  [] 103  Resp:  [12-18] 16  SpO2:  [82 %-98 %] 82 %  BP: (105-126)/(55-71) 105/56     Weight: 50.8 kg (111 lb 15.9 oz)  Body mass index is 21.87 kg/m².    Intake/Output Summary (Last 24 hours) at 10/17/2022 1119  Last data filed at 10/16/2022 1741  Gross per 24 hour   Intake 720 ml   Output 650 ml   Net 70 ml      Physical Exam  Vitals and nursing note reviewed.   Constitutional:       General: She is not in acute distress.     Appearance: She is not toxic-appearing or diaphoretic.   HENT:      Head: Normocephalic.   Cardiovascular:      Rate and Rhythm: Regular rhythm. Tachycardia present.   Pulmonary:      Effort: Pulmonary effort is normal. No respiratory distress.   Abdominal:      Palpations: Abdomen is soft.      Tenderness: There is no guarding.   Skin:     General: Skin is warm and dry.   Neurological:      Mental Status: She is alert. Mental status is at baseline.      Cranial Nerves: No dysarthria.      Coordination: Finger-Nose-Finger Test normal.      Comments: Slight nystagmus observed on rightward gaze   Psychiatric:         Mood and Affect: Mood normal.         Behavior: Behavior normal.       Significant Labs: All pertinent labs within the past 24 hours have been reviewed.    Significant Imaging: I have reviewed all pertinent imaging results/findings within the past 24 hours.

## 2022-10-17 NOTE — PLAN OF CARE
Pt is Aox4, VSS. Pain assessed and managed with PRN medication. Pt is progressing towards goals. SCD's in place with frequent checks for skin breakdown. Fall precautions in place, no reported falls. IV site CDI. Elimination schedule in place. Ambulates with 1x assist. Safety precautions in place bed in lowest position,wheels locked, call light within reach, bed alarm on, id band and allergy band on, and clutter free environment.

## 2022-10-17 NOTE — PROGRESS NOTES
I have reviewed the notes, assessments, and/or procedures performed this visit, and I concur with the documentation.    Agree that EGD is appropriate for her early satiety, weight loss etc. and that can be done after her surgery but possibly before transfer to any facility for full recovery.  Although there are reasons to also do a colonoscopy, I do not anticipate her being able to tolerate the prep any time soon.

## 2022-10-18 ENCOUNTER — ANESTHESIA (OUTPATIENT)
Dept: SURGERY | Facility: HOSPITAL | Age: 67
DRG: 456 | End: 2022-10-18
Payer: MEDICARE

## 2022-10-18 PROBLEM — G83.4 CAUDA EQUINA SYNDROME: Status: ACTIVE | Noted: 2022-10-18

## 2022-10-18 LAB
ABO + RH BLD: NORMAL
ALBUMIN SERPL BCP-MCNC: 2.5 G/DL (ref 3.5–5.2)
ALBUMIN SERPL BCP-MCNC: 2.5 G/DL (ref 3.5–5.2)
ALP SERPL-CCNC: 105 U/L (ref 55–135)
ALP SERPL-CCNC: 99 U/L (ref 55–135)
ALT SERPL W/O P-5'-P-CCNC: 16 U/L (ref 10–44)
ALT SERPL W/O P-5'-P-CCNC: 20 U/L (ref 10–44)
ANION GAP SERPL CALC-SCNC: 13 MMOL/L (ref 8–16)
ANION GAP SERPL CALC-SCNC: 7 MMOL/L (ref 8–16)
AST SERPL-CCNC: 25 U/L (ref 10–40)
AST SERPL-CCNC: 27 U/L (ref 10–40)
BASOPHILS # BLD AUTO: 0.02 K/UL (ref 0–0.2)
BASOPHILS # BLD AUTO: 0.05 K/UL (ref 0–0.2)
BASOPHILS NFR BLD: 0.1 % (ref 0–1.9)
BASOPHILS NFR BLD: 0.3 % (ref 0–1.9)
BILIRUB SERPL-MCNC: 0.2 MG/DL (ref 0.1–1)
BILIRUB SERPL-MCNC: 0.2 MG/DL (ref 0.1–1)
BLD GP AB SCN CELLS X3 SERPL QL: NORMAL
BLD PROD TYP BPU: NORMAL
BLD PROD TYP BPU: NORMAL
BLOOD UNIT EXPIRATION DATE: NORMAL
BLOOD UNIT EXPIRATION DATE: NORMAL
BLOOD UNIT TYPE CODE: 6200
BLOOD UNIT TYPE CODE: 6200
BLOOD UNIT TYPE: NORMAL
BLOOD UNIT TYPE: NORMAL
BUN SERPL-MCNC: 10 MG/DL (ref 8–23)
BUN SERPL-MCNC: 9 MG/DL (ref 8–23)
CALCIUM SERPL-MCNC: 8.4 MG/DL (ref 8.7–10.5)
CALCIUM SERPL-MCNC: 8.9 MG/DL (ref 8.7–10.5)
CHLORIDE SERPL-SCNC: 105 MMOL/L (ref 95–110)
CHLORIDE SERPL-SCNC: 106 MMOL/L (ref 95–110)
CO2 SERPL-SCNC: 21 MMOL/L (ref 23–29)
CO2 SERPL-SCNC: 27 MMOL/L (ref 23–29)
CODING SYSTEM: NORMAL
CODING SYSTEM: NORMAL
CREAT SERPL-MCNC: 0.5 MG/DL (ref 0.5–1.4)
CREAT SERPL-MCNC: 0.6 MG/DL (ref 0.5–1.4)
DIFFERENTIAL METHOD: ABNORMAL
DIFFERENTIAL METHOD: ABNORMAL
DISPENSE STATUS: NORMAL
DISPENSE STATUS: NORMAL
EOSINOPHIL # BLD AUTO: 0 K/UL (ref 0–0.5)
EOSINOPHIL # BLD AUTO: 0 K/UL (ref 0–0.5)
EOSINOPHIL NFR BLD: 0 % (ref 0–8)
EOSINOPHIL NFR BLD: 0.2 % (ref 0–8)
ERYTHROCYTE [DISTWIDTH] IN BLOOD BY AUTOMATED COUNT: 14.5 % (ref 11.5–14.5)
ERYTHROCYTE [DISTWIDTH] IN BLOOD BY AUTOMATED COUNT: 14.6 % (ref 11.5–14.5)
EST. GFR  (NO RACE VARIABLE): >60 ML/MIN/1.73 M^2
EST. GFR  (NO RACE VARIABLE): >60 ML/MIN/1.73 M^2
FOLATE SERPL-MCNC: 6.2 NG/ML (ref 4–24)
GAMMA INTERFERON BACKGROUND BLD IA-ACNC: 0.02 IU/ML
GLUCOSE SERPL-MCNC: 104 MG/DL (ref 70–110)
GLUCOSE SERPL-MCNC: 127 MG/DL (ref 70–110)
GLUCOSE SERPL-MCNC: 157 MG/DL (ref 70–110)
GLUCOSE SERPL-MCNC: 184 MG/DL (ref 70–110)
HCO3 UR-SCNC: 26.3 MMOL/L (ref 24–28)
HCO3 UR-SCNC: 30.6 MMOL/L (ref 24–28)
HCT VFR BLD AUTO: 32 % (ref 37–48.5)
HCT VFR BLD AUTO: 35.3 % (ref 37–48.5)
HCT VFR BLD CALC: 29 %PCV (ref 36–54)
HCT VFR BLD CALC: 31 %PCV (ref 36–54)
HGB BLD-MCNC: 10 G/DL (ref 12–16)
HGB BLD-MCNC: 11 G/DL (ref 12–16)
IMM GRANULOCYTES # BLD AUTO: 0.12 K/UL (ref 0–0.04)
IMM GRANULOCYTES # BLD AUTO: 0.31 K/UL (ref 0–0.04)
IMM GRANULOCYTES NFR BLD AUTO: 0.7 % (ref 0–0.5)
IMM GRANULOCYTES NFR BLD AUTO: 1.8 % (ref 0–0.5)
LYMPHOCYTES # BLD AUTO: 0.5 K/UL (ref 1–4.8)
LYMPHOCYTES # BLD AUTO: 0.7 K/UL (ref 1–4.8)
LYMPHOCYTES NFR BLD: 2.8 % (ref 18–48)
LYMPHOCYTES NFR BLD: 4.1 % (ref 18–48)
M TB IFN-G CD4+ BCKGRND COR BLD-ACNC: -0.01 IU/ML
MAGNESIUM SERPL-MCNC: 1.7 MG/DL (ref 1.6–2.6)
MCH RBC QN AUTO: 28.6 PG (ref 27–31)
MCH RBC QN AUTO: 28.8 PG (ref 27–31)
MCHC RBC AUTO-ENTMCNC: 31.2 G/DL (ref 32–36)
MCHC RBC AUTO-ENTMCNC: 31.3 G/DL (ref 32–36)
MCV RBC AUTO: 91 FL (ref 82–98)
MCV RBC AUTO: 92 FL (ref 82–98)
MITOGEN IGNF BCKGRD COR BLD-ACNC: 9.69 IU/ML
MONOCYTES # BLD AUTO: 0.4 K/UL (ref 0.3–1)
MONOCYTES # BLD AUTO: 0.9 K/UL (ref 0.3–1)
MONOCYTES NFR BLD: 2 % (ref 4–15)
MONOCYTES NFR BLD: 4.9 % (ref 4–15)
NEUTROPHILS # BLD AUTO: 15.8 K/UL (ref 1.8–7.7)
NEUTROPHILS # BLD AUTO: 16.1 K/UL (ref 1.8–7.7)
NEUTROPHILS NFR BLD: 91.5 % (ref 38–73)
NEUTROPHILS NFR BLD: 91.6 % (ref 38–73)
NRBC BLD-RTO: 0 /100 WBC
NRBC BLD-RTO: 0 /100 WBC
NUM UNITS TRANS PACKED RBC: NORMAL
NUM UNITS TRANS PACKED RBC: NORMAL
PCO2 BLDA: 37 MMHG (ref 35–45)
PCO2 BLDA: 40.8 MMHG (ref 35–45)
PH SMN: 7.46 [PH] (ref 7.35–7.45)
PH SMN: 7.48 [PH] (ref 7.35–7.45)
PHOSPHATE SERPL-MCNC: 3.8 MG/DL (ref 2.7–4.5)
PLATELET # BLD AUTO: 435 K/UL (ref 150–450)
PLATELET # BLD AUTO: 497 K/UL (ref 150–450)
PMV BLD AUTO: 9.2 FL (ref 9.2–12.9)
PMV BLD AUTO: 9.2 FL (ref 9.2–12.9)
PO2 BLDA: 172 MMHG (ref 80–100)
PO2 BLDA: 295 MMHG (ref 80–100)
POC BE: 2 MMOL/L
POC BE: 7 MMOL/L
POC IONIZED CALCIUM: 1.1 MMOL/L (ref 1.06–1.42)
POC IONIZED CALCIUM: 1.18 MMOL/L (ref 1.06–1.42)
POC SATURATED O2: 100 % (ref 95–100)
POC SATURATED O2: 100 % (ref 95–100)
POC TCO2: 27 MMOL/L (ref 23–27)
POC TCO2: 32 MMOL/L (ref 23–27)
POTASSIUM BLD-SCNC: 3.4 MMOL/L (ref 3.5–5.1)
POTASSIUM BLD-SCNC: 3.7 MMOL/L (ref 3.5–5.1)
POTASSIUM SERPL-SCNC: 4 MMOL/L (ref 3.5–5.1)
POTASSIUM SERPL-SCNC: 4.4 MMOL/L (ref 3.5–5.1)
PROT SERPL-MCNC: 6 G/DL (ref 6–8.4)
PROT SERPL-MCNC: 6 G/DL (ref 6–8.4)
RBC # BLD AUTO: 3.5 M/UL (ref 4–5.4)
RBC # BLD AUTO: 3.82 M/UL (ref 4–5.4)
SAMPLE: ABNORMAL
SAMPLE: ABNORMAL
SODIUM BLD-SCNC: 140 MMOL/L (ref 136–145)
SODIUM BLD-SCNC: 141 MMOL/L (ref 136–145)
SODIUM SERPL-SCNC: 139 MMOL/L (ref 136–145)
SODIUM SERPL-SCNC: 140 MMOL/L (ref 136–145)
TB GOLD PLUS: NEGATIVE
TB2 - NIL: 0.01 IU/ML
VIT B12 SERPL-MCNC: 313 PG/ML (ref 210–950)
WBC # BLD AUTO: 17.26 K/UL (ref 3.9–12.7)
WBC # BLD AUTO: 17.56 K/UL (ref 3.9–12.7)

## 2022-10-18 PROCEDURE — C1713 ANCHOR/SCREW BN/BN,TIS/BN: HCPCS | Performed by: ORTHOPAEDIC SURGERY

## 2022-10-18 PROCEDURE — 27800903 OPTIME MED/SURG SUP & DEVICES OTHER IMPLANTS: Performed by: ORTHOPAEDIC SURGERY

## 2022-10-18 PROCEDURE — 27201037 HC PRESSURE MONITORING SET UP

## 2022-10-18 PROCEDURE — 63600175 PHARM REV CODE 636 W HCPCS: Performed by: STUDENT IN AN ORGANIZED HEALTH CARE EDUCATION/TRAINING PROGRAM

## 2022-10-18 PROCEDURE — 22842 INSERT SPINE FIXATION DEVICE: CPT | Mod: 62,,, | Performed by: ORTHOPAEDIC SURGERY

## 2022-10-18 PROCEDURE — 99223 1ST HOSP IP/OBS HIGH 75: CPT | Mod: 57,AI,, | Performed by: ORTHOPAEDIC SURGERY

## 2022-10-18 PROCEDURE — 11000001 HC ACUTE MED/SURG PRIVATE ROOM

## 2022-10-18 PROCEDURE — 80053 COMPREHEN METABOLIC PANEL: CPT | Performed by: STUDENT IN AN ORGANIZED HEALTH CARE EDUCATION/TRAINING PROGRAM

## 2022-10-18 PROCEDURE — 63011 REMOVE SPINE LAMINA 1/2 SCRL: CPT | Mod: 62,51,, | Performed by: NEUROLOGICAL SURGERY

## 2022-10-18 PROCEDURE — 82607 VITAMIN B-12: CPT | Performed by: STUDENT IN AN ORGANIZED HEALTH CARE EDUCATION/TRAINING PROGRAM

## 2022-10-18 PROCEDURE — 94640 AIRWAY INHALATION TREATMENT: CPT

## 2022-10-18 PROCEDURE — D9220A PRA ANESTHESIA: Mod: ANES,,, | Performed by: ANESTHESIOLOGY

## 2022-10-18 PROCEDURE — 22614 PR ARTHRODESIS, POST/POSTLAT, SNGL INTERSPACE, EA ADDTL: ICD-10-PCS | Mod: 62,,, | Performed by: ORTHOPAEDIC SURGERY

## 2022-10-18 PROCEDURE — 36620 INSERTION CATHETER ARTERY: CPT | Mod: 59,,, | Performed by: ANESTHESIOLOGY

## 2022-10-18 PROCEDURE — 63011: ICD-10-PCS | Mod: 62,51,, | Performed by: NEUROLOGICAL SURGERY

## 2022-10-18 PROCEDURE — 61783 PR STEREOTACTIC COMP ASSIST PROC,SPINAL: ICD-10-PCS | Mod: 59,,, | Performed by: ORTHOPAEDIC SURGERY

## 2022-10-18 PROCEDURE — 27201423 OPTIME MED/SURG SUP & DEVICES STERILE SUPPLY: Performed by: ORTHOPAEDIC SURGERY

## 2022-10-18 PROCEDURE — 22614 ARTHRD PST TQ 1NTRSPC EA ADD: CPT | Mod: 62,,, | Performed by: NEUROLOGICAL SURGERY

## 2022-10-18 PROCEDURE — 25000003 PHARM REV CODE 250: Performed by: STUDENT IN AN ORGANIZED HEALTH CARE EDUCATION/TRAINING PROGRAM

## 2022-10-18 PROCEDURE — 94799 UNLISTED PULMONARY SVC/PX: CPT

## 2022-10-18 PROCEDURE — 63011: ICD-10-PCS | Mod: 62,51,, | Performed by: ORTHOPAEDIC SURGERY

## 2022-10-18 PROCEDURE — 83735 ASSAY OF MAGNESIUM: CPT | Performed by: STUDENT IN AN ORGANIZED HEALTH CARE EDUCATION/TRAINING PROGRAM

## 2022-10-18 PROCEDURE — 85025 COMPLETE CBC W/AUTO DIFF WBC: CPT | Performed by: STUDENT IN AN ORGANIZED HEALTH CARE EDUCATION/TRAINING PROGRAM

## 2022-10-18 PROCEDURE — 94761 N-INVAS EAR/PLS OXIMETRY MLT: CPT

## 2022-10-18 PROCEDURE — 22842 INSERT SPINE FIXATION DEVICE: CPT | Mod: 62,,, | Performed by: NEUROLOGICAL SURGERY

## 2022-10-18 PROCEDURE — 22614 PR ARTHRODESIS, POST/POSTLAT, SNGL INTERSPACE, EA ADDTL: ICD-10-PCS | Mod: 62,,, | Performed by: NEUROLOGICAL SURGERY

## 2022-10-18 PROCEDURE — 20936 PR AUTOGRAFT SPINE SURGERY LOCAL FROM SAME INCISION: ICD-10-PCS | Mod: ,,, | Performed by: ORTHOPAEDIC SURGERY

## 2022-10-18 PROCEDURE — 22612 ARTHRD PST TQ 1NTRSPC LUMBAR: CPT | Mod: 22,62,, | Performed by: ORTHOPAEDIC SURGERY

## 2022-10-18 PROCEDURE — 63011 REMOVE SPINE LAMINA 1/2 SCRL: CPT | Mod: 62,51,, | Performed by: ORTHOPAEDIC SURGERY

## 2022-10-18 PROCEDURE — 71000033 HC RECOVERY, INTIAL HOUR: Performed by: ORTHOPAEDIC SURGERY

## 2022-10-18 PROCEDURE — 36415 COLL VENOUS BLD VENIPUNCTURE: CPT | Performed by: STUDENT IN AN ORGANIZED HEALTH CARE EDUCATION/TRAINING PROGRAM

## 2022-10-18 PROCEDURE — 36000711: Performed by: ORTHOPAEDIC SURGERY

## 2022-10-18 PROCEDURE — 85025 COMPLETE CBC W/AUTO DIFF WBC: CPT | Mod: 91

## 2022-10-18 PROCEDURE — D9220A PRA ANESTHESIA: Mod: CRNA,,, | Performed by: NURSE ANESTHETIST, CERTIFIED REGISTERED

## 2022-10-18 PROCEDURE — 22612 PR ARTHRODESIS, POST/POSTLAT, SNGL INTERSPACE, LUMBAR: ICD-10-PCS | Mod: 22,62,, | Performed by: NEUROLOGICAL SURGERY

## 2022-10-18 PROCEDURE — 36000710: Performed by: ORTHOPAEDIC SURGERY

## 2022-10-18 PROCEDURE — 22842 PR POSTERIOR SEGMENTAL INSTRUMENTATION 3-6 VRT SEG: ICD-10-PCS | Mod: 62,,, | Performed by: ORTHOPAEDIC SURGERY

## 2022-10-18 PROCEDURE — 27000221 HC OXYGEN, UP TO 24 HOURS

## 2022-10-18 PROCEDURE — 20936 SP BONE AGRFT LOCAL ADD-ON: CPT | Mod: ,,, | Performed by: ORTHOPAEDIC SURGERY

## 2022-10-18 PROCEDURE — 82746 ASSAY OF FOLIC ACID SERUM: CPT | Performed by: STUDENT IN AN ORGANIZED HEALTH CARE EDUCATION/TRAINING PROGRAM

## 2022-10-18 PROCEDURE — 61783 SCAN PROC SPINAL: CPT | Mod: 59,,, | Performed by: ORTHOPAEDIC SURGERY

## 2022-10-18 PROCEDURE — 63600175 PHARM REV CODE 636 W HCPCS: Performed by: NURSE ANESTHETIST, CERTIFIED REGISTERED

## 2022-10-18 PROCEDURE — 93010 ELECTROCARDIOGRAM REPORT: CPT | Mod: ,,, | Performed by: INTERNAL MEDICINE

## 2022-10-18 PROCEDURE — 71000015 HC POSTOP RECOV 1ST HR: Performed by: ORTHOPAEDIC SURGERY

## 2022-10-18 PROCEDURE — 36620 PR INSERT CATH,ART,PERCUT,SHORTTERM: ICD-10-PCS | Mod: 59,,, | Performed by: ANESTHESIOLOGY

## 2022-10-18 PROCEDURE — 99223 PR INITIAL HOSPITAL CARE,LEVL III: ICD-10-PCS | Mod: 57,AI,, | Performed by: ORTHOPAEDIC SURGERY

## 2022-10-18 PROCEDURE — 86850 RBC ANTIBODY SCREEN: CPT | Performed by: ORTHOPAEDIC SURGERY

## 2022-10-18 PROCEDURE — 22612 PR ARTHRODESIS, POST/POSTLAT, SNGL INTERSPACE, LUMBAR: ICD-10-PCS | Mod: 22,62,, | Performed by: ORTHOPAEDIC SURGERY

## 2022-10-18 PROCEDURE — 25000003 PHARM REV CODE 250: Performed by: ORTHOPAEDIC SURGERY

## 2022-10-18 PROCEDURE — 25000003 PHARM REV CODE 250

## 2022-10-18 PROCEDURE — 25000242 PHARM REV CODE 250 ALT 637 W/ HCPCS: Performed by: STUDENT IN AN ORGANIZED HEALTH CARE EDUCATION/TRAINING PROGRAM

## 2022-10-18 PROCEDURE — 63600175 PHARM REV CODE 636 W HCPCS: Performed by: ORTHOPAEDIC SURGERY

## 2022-10-18 PROCEDURE — 37000008 HC ANESTHESIA 1ST 15 MINUTES: Performed by: ORTHOPAEDIC SURGERY

## 2022-10-18 PROCEDURE — 71000016 HC POSTOP RECOV ADDL HR: Performed by: ORTHOPAEDIC SURGERY

## 2022-10-18 PROCEDURE — 20930 SP BONE ALGRFT MORSEL ADD-ON: CPT | Mod: ,,, | Performed by: ORTHOPAEDIC SURGERY

## 2022-10-18 PROCEDURE — 84100 ASSAY OF PHOSPHORUS: CPT | Performed by: STUDENT IN AN ORGANIZED HEALTH CARE EDUCATION/TRAINING PROGRAM

## 2022-10-18 PROCEDURE — 22612 ARTHRD PST TQ 1NTRSPC LUMBAR: CPT | Mod: 22,62,, | Performed by: NEUROLOGICAL SURGERY

## 2022-10-18 PROCEDURE — 93010 EKG 12-LEAD: ICD-10-PCS | Mod: ,,, | Performed by: INTERNAL MEDICINE

## 2022-10-18 PROCEDURE — 80053 COMPREHEN METABOLIC PANEL: CPT | Mod: 91 | Performed by: STUDENT IN AN ORGANIZED HEALTH CARE EDUCATION/TRAINING PROGRAM

## 2022-10-18 PROCEDURE — 93005 ELECTROCARDIOGRAM TRACING: CPT

## 2022-10-18 PROCEDURE — D9220A PRA ANESTHESIA: ICD-10-PCS | Mod: CRNA,,, | Performed by: NURSE ANESTHETIST, CERTIFIED REGISTERED

## 2022-10-18 PROCEDURE — 25000003 PHARM REV CODE 250: Performed by: NURSE ANESTHETIST, CERTIFIED REGISTERED

## 2022-10-18 PROCEDURE — 86920 COMPATIBILITY TEST SPIN: CPT | Performed by: ORTHOPAEDIC SURGERY

## 2022-10-18 PROCEDURE — D9220A PRA ANESTHESIA: ICD-10-PCS | Mod: ANES,,, | Performed by: ANESTHESIOLOGY

## 2022-10-18 PROCEDURE — 22614 ARTHRD PST TQ 1NTRSPC EA ADD: CPT | Mod: 62,,, | Performed by: ORTHOPAEDIC SURGERY

## 2022-10-18 PROCEDURE — 37000009 HC ANESTHESIA EA ADD 15 MINS: Performed by: ORTHOPAEDIC SURGERY

## 2022-10-18 PROCEDURE — 20930 PR ALLOGRAFT FOR SPINE SURGERY ONLY MORSELIZED: ICD-10-PCS | Mod: ,,, | Performed by: ORTHOPAEDIC SURGERY

## 2022-10-18 PROCEDURE — 22842 PR POSTERIOR SEGMENTAL INSTRUMENTATION 3-6 VRT SEG: ICD-10-PCS | Mod: 62,,, | Performed by: NEUROLOGICAL SURGERY

## 2022-10-18 PROCEDURE — 99900035 HC TECH TIME PER 15 MIN (STAT)

## 2022-10-18 DEVICE — SCREW EXPEDIUM FEN STRL 5X45MM: Type: IMPLANTABLE DEVICE | Site: BACK | Status: FUNCTIONAL

## 2022-10-18 DEVICE — IMPLANTABLE DEVICE: Type: IMPLANTABLE DEVICE | Site: BACK | Status: FUNCTIONAL

## 2022-10-18 DEVICE — CHIPS CANCELLOUS 30CC: Type: IMPLANTABLE DEVICE | Site: BACK | Status: FUNCTIONAL

## 2022-10-18 DEVICE — CONNECTOR EXPEDIUM 5.5 30MM: Type: IMPLANTABLE DEVICE | Site: BACK | Status: FUNCTIONAL

## 2022-10-18 DEVICE — KIT MED BONE INFUSE: Type: IMPLANTABLE DEVICE | Site: BACK | Status: FUNCTIONAL

## 2022-10-18 DEVICE — SCREW INNER SINGLE SET TITANIU: Type: IMPLANTABLE DEVICE | Site: BACK | Status: FUNCTIONAL

## 2022-10-18 DEVICE — SCREW EXPEDIUM VERSE PA 8X80MM: Type: IMPLANTABLE DEVICE | Site: BACK | Status: FUNCTIONAL

## 2022-10-18 DEVICE — CONNECTOR SPINAL SFX A5 5.5MM: Type: IMPLANTABLE DEVICE | Site: BACK | Status: FUNCTIONAL

## 2022-10-18 DEVICE — SET SCREW EXPEDIUM VERSE UNITZ: Type: IMPLANTABLE DEVICE | Site: BACK | Status: FUNCTIONAL

## 2022-10-18 RX ORDER — HALOPERIDOL 5 MG/ML
0.5 INJECTION INTRAMUSCULAR EVERY 10 MIN PRN
Status: DISCONTINUED | OUTPATIENT
Start: 2022-10-18 | End: 2022-10-18 | Stop reason: HOSPADM

## 2022-10-18 RX ORDER — OXYCODONE HYDROCHLORIDE 5 MG/1
5 TABLET ORAL EVERY 4 HOURS PRN
Status: DISCONTINUED | OUTPATIENT
Start: 2022-10-18 | End: 2022-10-25 | Stop reason: HOSPADM

## 2022-10-18 RX ORDER — METHOCARBAMOL 500 MG/1
1000 TABLET, FILM COATED ORAL 3 TIMES DAILY
Status: DISCONTINUED | OUTPATIENT
Start: 2022-10-18 | End: 2022-10-25 | Stop reason: HOSPADM

## 2022-10-18 RX ORDER — DEXAMETHASONE SODIUM PHOSPHATE 4 MG/ML
INJECTION, SOLUTION INTRA-ARTICULAR; INTRALESIONAL; INTRAMUSCULAR; INTRAVENOUS; SOFT TISSUE
Status: DISCONTINUED | OUTPATIENT
Start: 2022-10-18 | End: 2022-10-18

## 2022-10-18 RX ORDER — OXYCODONE HYDROCHLORIDE 5 MG/1
5 TABLET ORAL EVERY 6 HOURS PRN
Qty: 42 TABLET | Refills: 0 | Status: SHIPPED | OUTPATIENT
Start: 2022-10-18 | End: 2024-01-30

## 2022-10-18 RX ORDER — FENTANYL CITRATE 50 UG/ML
25 INJECTION, SOLUTION INTRAMUSCULAR; INTRAVENOUS EVERY 5 MIN PRN
Status: DISCONTINUED | OUTPATIENT
Start: 2022-10-18 | End: 2022-10-18 | Stop reason: HOSPADM

## 2022-10-18 RX ORDER — PROPOFOL 10 MG/ML
VIAL (ML) INTRAVENOUS CONTINUOUS PRN
Status: DISCONTINUED | OUTPATIENT
Start: 2022-10-18 | End: 2022-10-18

## 2022-10-18 RX ORDER — ACETAMINOPHEN 500 MG
1000 TABLET ORAL EVERY 8 HOURS
Qty: 120 TABLET | Refills: 0 | Status: SHIPPED | OUTPATIENT
Start: 2022-10-18 | End: 2024-01-30

## 2022-10-18 RX ORDER — HEPARIN SODIUM 5000 [USP'U]/ML
5000 INJECTION, SOLUTION INTRAVENOUS; SUBCUTANEOUS EVERY 8 HOURS
Status: DISCONTINUED | OUTPATIENT
Start: 2022-10-19 | End: 2022-10-25 | Stop reason: HOSPADM

## 2022-10-18 RX ORDER — PREGABALIN 50 MG/1
50 CAPSULE ORAL 2 TIMES DAILY
Status: DISCONTINUED | OUTPATIENT
Start: 2022-10-18 | End: 2022-10-25 | Stop reason: HOSPADM

## 2022-10-18 RX ORDER — METOCLOPRAMIDE HYDROCHLORIDE 5 MG/ML
5 INJECTION INTRAMUSCULAR; INTRAVENOUS EVERY 6 HOURS PRN
Status: DISCONTINUED | OUTPATIENT
Start: 2022-10-18 | End: 2022-10-25 | Stop reason: HOSPADM

## 2022-10-18 RX ORDER — HYDROMORPHONE HYDROCHLORIDE 1 MG/ML
0.2 INJECTION, SOLUTION INTRAMUSCULAR; INTRAVENOUS; SUBCUTANEOUS EVERY 5 MIN PRN
Status: DISCONTINUED | OUTPATIENT
Start: 2022-10-18 | End: 2022-10-18

## 2022-10-18 RX ORDER — LEVOFLOXACIN 5 MG/ML
750 INJECTION, SOLUTION INTRAVENOUS ONCE
Status: DISCONTINUED | OUTPATIENT
Start: 2022-10-18 | End: 2022-10-19

## 2022-10-18 RX ORDER — ACETAMINOPHEN 500 MG
1000 TABLET ORAL EVERY 8 HOURS
Status: DISCONTINUED | OUTPATIENT
Start: 2022-10-18 | End: 2022-10-25 | Stop reason: HOSPADM

## 2022-10-18 RX ORDER — DOCUSATE SODIUM 100 MG/1
100 CAPSULE, LIQUID FILLED ORAL 2 TIMES DAILY
Status: DISCONTINUED | OUTPATIENT
Start: 2022-10-18 | End: 2022-10-25 | Stop reason: HOSPADM

## 2022-10-18 RX ORDER — DIPHENHYDRAMINE HYDROCHLORIDE 50 MG/ML
25 INJECTION INTRAMUSCULAR; INTRAVENOUS EVERY 6 HOURS PRN
Status: DISCONTINUED | OUTPATIENT
Start: 2022-10-18 | End: 2022-10-18 | Stop reason: HOSPADM

## 2022-10-18 RX ORDER — CELECOXIB 200 MG/1
200 CAPSULE ORAL DAILY
Status: DISCONTINUED | OUTPATIENT
Start: 2022-10-18 | End: 2022-10-21

## 2022-10-18 RX ORDER — PREGABALIN 50 MG/1
50 CAPSULE ORAL 2 TIMES DAILY
Qty: 28 CAPSULE | Refills: 0 | Status: SHIPPED | OUTPATIENT
Start: 2022-10-18 | End: 2022-11-17

## 2022-10-18 RX ORDER — ONDANSETRON 2 MG/ML
4 INJECTION INTRAMUSCULAR; INTRAVENOUS EVERY 12 HOURS PRN
Status: DISCONTINUED | OUTPATIENT
Start: 2022-10-18 | End: 2022-10-25 | Stop reason: HOSPADM

## 2022-10-18 RX ORDER — HYDROMORPHONE HYDROCHLORIDE 1 MG/ML
0.2 INJECTION, SOLUTION INTRAMUSCULAR; INTRAVENOUS; SUBCUTANEOUS EVERY 5 MIN PRN
Status: DISCONTINUED | OUTPATIENT
Start: 2022-10-18 | End: 2022-10-18 | Stop reason: HOSPADM

## 2022-10-18 RX ORDER — IPRATROPIUM BROMIDE AND ALBUTEROL SULFATE 2.5; .5 MG/3ML; MG/3ML
3 SOLUTION RESPIRATORY (INHALATION) EVERY 4 HOURS
Status: DISCONTINUED | OUTPATIENT
Start: 2022-10-18 | End: 2022-10-19

## 2022-10-18 RX ORDER — LABETALOL HYDROCHLORIDE 5 MG/ML
INJECTION, SOLUTION INTRAVENOUS
Status: DISCONTINUED | OUTPATIENT
Start: 2022-10-18 | End: 2022-10-18

## 2022-10-18 RX ORDER — BUPIVACAINE HYDROCHLORIDE AND EPINEPHRINE 5; 5 MG/ML; UG/ML
INJECTION, SOLUTION EPIDURAL; INTRACAUDAL; PERINEURAL
Status: DISCONTINUED | OUTPATIENT
Start: 2022-10-18 | End: 2022-10-18 | Stop reason: HOSPADM

## 2022-10-18 RX ORDER — OXYCODONE HYDROCHLORIDE 10 MG/1
10 TABLET ORAL EVERY 4 HOURS PRN
Status: DISCONTINUED | OUTPATIENT
Start: 2022-10-18 | End: 2022-10-25 | Stop reason: HOSPADM

## 2022-10-18 RX ORDER — ROCURONIUM BROMIDE 10 MG/ML
INJECTION, SOLUTION INTRAVENOUS
Status: DISCONTINUED | OUTPATIENT
Start: 2022-10-18 | End: 2022-10-18

## 2022-10-18 RX ORDER — PROCHLORPERAZINE EDISYLATE 5 MG/ML
5 INJECTION INTRAMUSCULAR; INTRAVENOUS EVERY 30 MIN PRN
Status: DISCONTINUED | OUTPATIENT
Start: 2022-10-18 | End: 2022-10-18 | Stop reason: HOSPADM

## 2022-10-18 RX ORDER — ONDANSETRON 2 MG/ML
INJECTION INTRAMUSCULAR; INTRAVENOUS
Status: DISCONTINUED | OUTPATIENT
Start: 2022-10-18 | End: 2022-10-18

## 2022-10-18 RX ORDER — LIDOCAINE HCL/PF 100 MG/5ML
SYRINGE (ML) INTRAVENOUS
Status: DISCONTINUED | OUTPATIENT
Start: 2022-10-18 | End: 2022-10-18

## 2022-10-18 RX ORDER — FENTANYL CITRATE 50 UG/ML
INJECTION, SOLUTION INTRAMUSCULAR; INTRAVENOUS
Status: DISCONTINUED | OUTPATIENT
Start: 2022-10-18 | End: 2022-10-18

## 2022-10-18 RX ORDER — VANCOMYCIN HYDROCHLORIDE 1 G/20ML
INJECTION, POWDER, LYOPHILIZED, FOR SOLUTION INTRAVENOUS
Status: DISCONTINUED | OUTPATIENT
Start: 2022-10-18 | End: 2022-10-18 | Stop reason: HOSPADM

## 2022-10-18 RX ORDER — KETAMINE HCL IN 0.9 % NACL 50 MG/5 ML
SYRINGE (ML) INTRAVENOUS
Status: DISCONTINUED | OUTPATIENT
Start: 2022-10-18 | End: 2022-10-18

## 2022-10-18 RX ORDER — LIDOCAINE HYDROCHLORIDE AND EPINEPHRINE 10; 10 MG/ML; UG/ML
INJECTION, SOLUTION INFILTRATION; PERINEURAL
Status: DISCONTINUED | OUTPATIENT
Start: 2022-10-18 | End: 2022-10-18 | Stop reason: HOSPADM

## 2022-10-18 RX ORDER — SODIUM CHLORIDE 9 MG/ML
INJECTION, SOLUTION INTRAVENOUS CONTINUOUS PRN
Status: DISCONTINUED | OUTPATIENT
Start: 2022-10-18 | End: 2022-10-18

## 2022-10-18 RX ORDER — METHOCARBAMOL 750 MG/1
750 TABLET, FILM COATED ORAL 3 TIMES DAILY PRN
Qty: 42 TABLET | Refills: 0 | Status: SHIPPED | OUTPATIENT
Start: 2022-10-18 | End: 2022-11-02

## 2022-10-18 RX ORDER — SODIUM CHLORIDE 0.9 % (FLUSH) 0.9 %
10 SYRINGE (ML) INJECTION
Status: DISCONTINUED | OUTPATIENT
Start: 2022-10-18 | End: 2022-10-25 | Stop reason: HOSPADM

## 2022-10-18 RX ORDER — CEFAZOLIN SODIUM/D5W 2 G/100 ML
2 PLASTIC BAG, INJECTION (ML) INTRAVENOUS
Status: COMPLETED | OUTPATIENT
Start: 2022-10-18 | End: 2022-10-18

## 2022-10-18 RX ORDER — PROPOFOL 10 MG/ML
VIAL (ML) INTRAVENOUS
Status: DISCONTINUED | OUTPATIENT
Start: 2022-10-18 | End: 2022-10-18

## 2022-10-18 RX ORDER — MORPHINE SULFATE 2 MG/ML
2 INJECTION, SOLUTION INTRAMUSCULAR; INTRAVENOUS
Status: DISCONTINUED | OUTPATIENT
Start: 2022-10-18 | End: 2022-10-25 | Stop reason: HOSPADM

## 2022-10-18 RX ORDER — CEFAZOLIN SODIUM 1 G/3ML
INJECTION, POWDER, FOR SOLUTION INTRAMUSCULAR; INTRAVENOUS
Status: DISCONTINUED | OUTPATIENT
Start: 2022-10-18 | End: 2022-10-18

## 2022-10-18 RX ORDER — PHENYLEPHRINE HYDROCHLORIDE 10 MG/ML
INJECTION INTRAVENOUS
Status: DISCONTINUED | OUTPATIENT
Start: 2022-10-18 | End: 2022-10-18

## 2022-10-18 RX ORDER — BISACODYL 10 MG
10 SUPPOSITORY, RECTAL RECTAL DAILY PRN
Status: DISCONTINUED | OUTPATIENT
Start: 2022-10-18 | End: 2022-10-23

## 2022-10-18 RX ORDER — SODIUM CHLORIDE 0.9 % (FLUSH) 0.9 %
3 SYRINGE (ML) INJECTION
Status: DISCONTINUED | OUTPATIENT
Start: 2022-10-18 | End: 2022-10-18 | Stop reason: HOSPADM

## 2022-10-18 RX ORDER — TALC
6 POWDER (GRAM) TOPICAL NIGHTLY PRN
Status: DISCONTINUED | OUTPATIENT
Start: 2022-10-18 | End: 2022-10-25 | Stop reason: HOSPADM

## 2022-10-18 RX ADMIN — PHENYLEPHRINE HYDROCHLORIDE 50 MCG: 10 INJECTION INTRAVENOUS at 09:10

## 2022-10-18 RX ADMIN — DOCUSATE SODIUM 100 MG: 100 CAPSULE ORAL at 12:10

## 2022-10-18 RX ADMIN — CELECOXIB 200 MG: 200 CAPSULE ORAL at 12:10

## 2022-10-18 RX ADMIN — ACETAMINOPHEN 650 MG: 325 TABLET ORAL at 05:10

## 2022-10-18 RX ADMIN — ACETAMINOPHEN 1000 MG: 325 TABLET ORAL at 02:10

## 2022-10-18 RX ADMIN — IPRATROPIUM BROMIDE AND ALBUTEROL SULFATE 3 ML: 2.5; .5 SOLUTION RESPIRATORY (INHALATION) at 07:10

## 2022-10-18 RX ADMIN — GLYCOPYRROLATE 0.2 MG: 0.2 INJECTION INTRAMUSCULAR; INTRAVENOUS at 08:10

## 2022-10-18 RX ADMIN — PHENYLEPHRINE HYDROCHLORIDE 20 MCG: 10 INJECTION INTRAVENOUS at 10:10

## 2022-10-18 RX ADMIN — IPRATROPIUM BROMIDE AND ALBUTEROL SULFATE 3 ML: 2.5; .5 SOLUTION RESPIRATORY (INHALATION) at 11:10

## 2022-10-18 RX ADMIN — PHENYLEPHRINE HYDROCHLORIDE 50 MCG: 10 INJECTION INTRAVENOUS at 10:10

## 2022-10-18 RX ADMIN — CEFAZOLIN 2 G: 330 INJECTION, POWDER, FOR SOLUTION INTRAMUSCULAR; INTRAVENOUS at 08:10

## 2022-10-18 RX ADMIN — LIDOCAINE HYDROCHLORIDE 80 MG: 20 INJECTION, SOLUTION INTRAVENOUS at 07:10

## 2022-10-18 RX ADMIN — CLONAZEPAM 0.5 MG: 0.5 TABLET ORAL at 09:10

## 2022-10-18 RX ADMIN — Medication 2 G: at 04:10

## 2022-10-18 RX ADMIN — FENTANYL CITRATE 50 MCG: 50 INJECTION, SOLUTION INTRAMUSCULAR; INTRAVENOUS at 08:10

## 2022-10-18 RX ADMIN — Medication 15 MG: at 08:10

## 2022-10-18 RX ADMIN — Medication 5 MG: at 08:10

## 2022-10-18 RX ADMIN — PANTOPRAZOLE SODIUM 40 MG: 40 TABLET, DELAYED RELEASE ORAL at 12:10

## 2022-10-18 RX ADMIN — PAROXETINE HYDROCHLORIDE 40 MG: 10 TABLET, FILM COATED ORAL at 09:10

## 2022-10-18 RX ADMIN — PHENYLEPHRINE HYDROCHLORIDE 50 MCG: 10 INJECTION INTRAVENOUS at 08:10

## 2022-10-18 RX ADMIN — PROPOFOL 25 MG: 10 INJECTION, EMULSION INTRAVENOUS at 11:10

## 2022-10-18 RX ADMIN — ONDANSETRON 4 MG: 2 INJECTION INTRAMUSCULAR; INTRAVENOUS at 10:10

## 2022-10-18 RX ADMIN — SODIUM CHLORIDE: 0.9 INJECTION, SOLUTION INTRAVENOUS at 10:10

## 2022-10-18 RX ADMIN — ROCURONIUM BROMIDE 20 MG: 10 INJECTION INTRAVENOUS at 08:10

## 2022-10-18 RX ADMIN — ONDANSETRON 4 MG: 2 INJECTION INTRAMUSCULAR; INTRAVENOUS at 09:10

## 2022-10-18 RX ADMIN — HALOPERIDOL 5 MG: 5 TABLET ORAL at 09:10

## 2022-10-18 RX ADMIN — ATORVASTATIN CALCIUM 20 MG: 20 TABLET, FILM COATED ORAL at 12:10

## 2022-10-18 RX ADMIN — PROPOFOL 20 MG: 10 INJECTION, EMULSION INTRAVENOUS at 11:10

## 2022-10-18 RX ADMIN — ACETAMINOPHEN 1000 MG: 325 TABLET ORAL at 09:10

## 2022-10-18 RX ADMIN — PROPOFOL 50 MG: 10 INJECTION, EMULSION INTRAVENOUS at 08:10

## 2022-10-18 RX ADMIN — SODIUM CHLORIDE: 0.9 INJECTION, SOLUTION INTRAVENOUS at 07:10

## 2022-10-18 RX ADMIN — PHENYLEPHRINE HYDROCHLORIDE 100 MCG: 10 INJECTION INTRAVENOUS at 07:10

## 2022-10-18 RX ADMIN — FENTANYL CITRATE 50 MCG: 50 INJECTION, SOLUTION INTRAMUSCULAR; INTRAVENOUS at 10:10

## 2022-10-18 RX ADMIN — LABETALOL HYDROCHLORIDE 5 MG: 5 INJECTION, SOLUTION INTRAVENOUS at 08:10

## 2022-10-18 RX ADMIN — CHOLECALCIFEROL TAB 25 MCG (1000 UNIT) 2000 UNITS: 25 TAB at 09:10

## 2022-10-18 RX ADMIN — SODIUM CHLORIDE, SODIUM GLUCONATE, SODIUM ACETATE, POTASSIUM CHLORIDE, MAGNESIUM CHLORIDE, SODIUM PHOSPHATE, DIBASIC, AND POTASSIUM PHOSPHATE: .53; .5; .37; .037; .03; .012; .00082 INJECTION, SOLUTION INTRAVENOUS at 08:10

## 2022-10-18 RX ADMIN — FENTANYL CITRATE 50 MCG: 50 INJECTION, SOLUTION INTRAMUSCULAR; INTRAVENOUS at 07:10

## 2022-10-18 RX ADMIN — DOCUSATE SODIUM 100 MG: 100 CAPSULE ORAL at 09:10

## 2022-10-18 RX ADMIN — PROPOFOL 100 MCG/KG/MIN: 10 INJECTION, EMULSION INTRAVENOUS at 07:10

## 2022-10-18 RX ADMIN — DEXAMETHASONE SODIUM PHOSPHATE 4 MG: 4 INJECTION, SOLUTION INTRAMUSCULAR; INTRAVENOUS at 08:10

## 2022-10-18 RX ADMIN — TRAZODONE HYDROCHLORIDE 100 MG: 100 TABLET ORAL at 09:10

## 2022-10-18 RX ADMIN — REMIFENTANIL HYDROCHLORIDE 0.1 MCG/KG/MIN: 1 INJECTION, POWDER, LYOPHILIZED, FOR SOLUTION INTRAVENOUS at 07:10

## 2022-10-18 RX ADMIN — SUGAMMADEX 200 MG: 100 INJECTION, SOLUTION INTRAVENOUS at 09:10

## 2022-10-18 RX ADMIN — PROPOFOL 40 MG: 10 INJECTION, EMULSION INTRAVENOUS at 08:10

## 2022-10-18 RX ADMIN — PREGABALIN 50 MG: 50 CAPSULE ORAL at 09:10

## 2022-10-18 RX ADMIN — ROCURONIUM BROMIDE 10 MG: 10 INJECTION INTRAVENOUS at 08:10

## 2022-10-18 RX ADMIN — PROPOFOL 50 MG: 10 INJECTION, EMULSION INTRAVENOUS at 10:10

## 2022-10-18 RX ADMIN — NICARDIPINE HYDROCHLORIDE 5 MCG/KG/MIN: 0.2 INJECTION, SOLUTION INTRAVENOUS at 08:10

## 2022-10-18 RX ADMIN — PROPOFOL 160 MG: 10 INJECTION, EMULSION INTRAVENOUS at 07:10

## 2022-10-18 RX ADMIN — OXYCODONE HYDROCHLORIDE 10 MG: 10 TABLET ORAL at 12:10

## 2022-10-18 RX ADMIN — SODIUM CHLORIDE 0.1 MCG/KG/MIN: 9 INJECTION, SOLUTION INTRAVENOUS at 07:10

## 2022-10-18 RX ADMIN — Medication 2 G: at 11:10

## 2022-10-18 RX ADMIN — ROCURONIUM BROMIDE 40 MG: 10 INJECTION INTRAVENOUS at 07:10

## 2022-10-18 NOTE — PLAN OF CARE
Pt is Aox4, VSS. Pain assessed and managed with PRN medication. Pt is progressing towards goals. SCD's in place with frequent checks for skin breakdown. Fall precautions in place, no reported falls. IV site CDI. Elimination schedule in place. Ambulates with 1x assist. Safety precautions in place bed in lowest position,wheels locked, call light within reach,  remains at bedside, id band and allergy band on, and clutter free environment.

## 2022-10-18 NOTE — SUBJECTIVE & OBJECTIVE
Principal Problem:Closed fracture of sacrum    Principal Orthopedic Problem:  Same    Interval History:  No acute events overnight.  Patient tolerated imaging studies well.  Pain well controlled.  No acute complaints.  Hospital Medicine consulted and began metastatic workup which has been uneventful. Delirium precautions to be added by medicine postop. Indicated as intermediate risk for surgery.     Review of patient's allergies indicates:   Allergen Reactions    Codeine Shortness Of Breath    Keflex [cephalexin] Shortness Of Breath    Shellfish containing products Nausea And Vomiting    Flu vaccine ts 2011-12(18 yr+) Hives    Sucralfate Other (See Comments) and Hives       Current Facility-Administered Medications   Medication    0.9%  NaCl infusion (for blood administration)    acetaminophen tablet 650 mg    atorvastatin tablet 20 mg    calcium carbonate 200 mg calcium (500 mg) chewable tablet 500 mg    celecoxib capsule 100 mg    clonazePAM tablet 0.5 mg    fluticasone furoate-vilanteroL 200-25 mcg/dose diskus inhaler 1 puff    haloperidoL tablet 5 mg    HYDROmorphone injection 0.2 mg    lisinopriL tablet 10 mg    multivitamin tablet    pantoprazole EC tablet 40 mg    paroxetine tablet 40 mg    polyethylene glycol packet 17 g    senna tablet 8.6 mg    sodium chloride 0.9% flush 10 mL    tiotropium bromide 2.5 mcg/actuation inhaler 2 puff    traZODone tablet 100 mg    vitamin D 1000 units tablet 2,000 Units     Objective:     Vital Signs (Most Recent):  Temp: 97.7 °F (36.5 °C) (10/18/22 0337)  Pulse: 92 (10/18/22 0430)  Resp: 18 (10/18/22 0337)  BP: 121/68 (10/18/22 0337)  SpO2: 98 % (10/18/22 0337)   Vital Signs (24h Range):  Temp:  [97.7 °F (36.5 °C)-98.7 °F (37.1 °C)] 97.7 °F (36.5 °C)  Pulse:  [] 92  Resp:  [16-18] 18  SpO2:  [82 %-99 %] 98 %  BP: (105-138)/(56-68) 121/68     Weight: 50.8 kg (111 lb 15.9 oz)  Height: 5' (152.4 cm)  Body mass index is 21.87 kg/m².      Intake/Output Summary (Last 24  hours) at 10/18/2022 0620  Last data filed at 10/18/2022 0400  Gross per 24 hour   Intake --   Output 2750 ml   Net -2750 ml         Ortho/SPM Exam  Gen: NAD, frail, ill-appearing  CV: regular rate  Resp: non-labored respirations    Spine:   Motor intact bilateral upper extremities with 4/5 strength throughout the right upper extremity, otherwise normal  Motor intact of the bilateral lower extremities with 4/5 strength left EHL, otherwise normal  Sensation intact light touch throughout  Brisk capillary refill distally  Pure Wick in place    Significant Labs: All pertinent labs within the past 24 hours have been reviewed.    Significant Imaging: I have reviewed and interpreted all pertinent imaging results/findings.  DVT ultrasound with no DVTs

## 2022-10-18 NOTE — PROGRESS NOTES
[facility] - [facility] ([facility])  Orthopedics  Progress Note    Patient Name: Merle Caro  MRN: [id number]  Admission Date: 10/14/2022  Hospital Length of Stay: 4 days  Attending Provider: Juni Meza MD  Primary Care Provider: Marck Major MD  Follow-up For: Procedure(s) (LRB):  FUSION,SPINE,POSTERIOR APPROACH, L3-Pelvis, Loop-X, Marilee, SNS: SSEP/EMG (N/A)    Post-Operative Day: Day of Surgery  Subjective:     Principal Problem:Closed fracture of sacrum    Principal Orthopedic Problem:  Same    Interval History:  No acute events overnight.  Patient tolerated imaging studies well.  Pain well controlled.  No acute complaints.  Hospital Medicine consulted and began metastatic workup which has been uneventful. Delirium precautions to be added by medicine postop. Indicated as intermediate risk for surgery.     Review of patient's allergies indicates:   Allergen Reactions    Codeine Shortness Of Breath    Keflex [cephalexin] Shortness Of Breath    Shellfish containing products Nausea And Vomiting    Flu vaccine ts 2011-12(18 yr+) Hives    Sucralfate Other (See Comments) and Hives       Current Facility-Administered Medications   Medication    0.9%  NaCl infusion (for blood administration)    acetaminophen tablet 650 mg    atorvastatin tablet 20 mg    calcium carbonate 200 mg calcium (500 mg) chewable tablet 500 mg    celecoxib capsule 100 mg    clonazePAM tablet 0.5 mg    fluticasone furoate-vilanteroL 200-25 mcg/dose diskus inhaler 1 puff    haloperidoL tablet 5 mg    HYDROmorphone injection 0.2 mg    lisinopriL tablet 10 mg    multivitamin tablet    pantoprazole EC tablet 40 mg    paroxetine tablet 40 mg    polyethylene glycol packet 17 g    senna tablet 8.6 mg    sodium chloride 0.9% flush 10 mL    tiotropium bromide 2.5 mcg/actuation inhaler 2 puff    traZODone tablet 100 mg    vitamin D 1000 units tablet 2,000 Units     Objective:     Vital Signs (Most Recent):  Temp: 97.7 °F (36.5 °C)  (10/18/22 0337)  Pulse: 92 (10/18/22 0430)  Resp: 18 (10/18/22 0337)  BP: 121/68 (10/18/22 0337)  SpO2: 98 % (10/18/22 0337)   Vital Signs (24h Range):  Temp:  [97.7 °F (36.5 °C)-98.7 °F (37.1 °C)] 97.7 °F (36.5 °C)  Pulse:  [] 92  Resp:  [16-18] 18  SpO2:  [82 %-99 %] 98 %  BP: (105-138)/(56-68) 121/68     Weight: 50.8 kg (111 lb 15.9 oz)  Height: 5' (152.4 cm)  Body mass index is 21.87 kg/m².      Intake/Output Summary (Last 24 hours) at 10/18/2022 0620  Last data filed at 10/18/2022 0400  Gross per 24 hour   Intake --   Output 2750 ml   Net -2750 ml         Ortho/SPM Exam  Gen: NAD, frail, ill-appearing  CV: regular rate  Resp: non-labored respirations    Spine:   Motor intact bilateral upper extremities with 4/5 strength throughout the right upper extremity, otherwise normal  Motor intact of the bilateral lower extremities with 4/5 strength left EHL, otherwise normal  Sensation intact light touch throughout  Brisk capillary refill distally  Pure Wick in place    Significant Labs: All pertinent labs within the past 24 hours have been reviewed.    Significant Imaging: I have reviewed and interpreted all pertinent imaging results/findings.  DVT ultrasound with no DVTs    Assessment/Plan:     * Closed fracture dislocation of sacrum  Merle Caro is a 67 y.o. female with past medical history of hypertension, bipolar disorder, and urinary incontinence (1 year duration) admitted to Orthopedics with what appears to be an approximally 4-month-old sacral fracture dislocation of S1 on S2 with severe canal stenosis at that level resulting in chronic cauda equina symptoms.  She has not been able ambulate since June due to pain.  Initial images reviewed by Dr. Meza who plans to take the patient for surgery on Tuesday October 18th.  CT pelvis completed.  DVT ultrasound with no DVTs.  Hospital Medicine consulted began a metastatic workup.  Studies so far unrevealing.  Appreciate hospital medicine  recommendations.    Plan:   Pain control: multimodal; will adjust after surgery  Diet: NPO  PT/OT: NWB BLE  DVT prophylaxis:  SubQ heparin; no need for IVC filter at this time  Abx:  preop ancef ordered  Labs:  Pending this AM, continue to monitor    Dispo:  To OR today      Essential hypertension  Home medications restarted    Asthma with COPD  Home medications restarted  SpO2 goal > 88%; do not titrate supplemental oxygen above this level    Affective bipolar disorder  Home medications restarted    Pure hypercholesterolemia  Home medications restarted    LUCILA on CPAP: Not compliant with CPAP  CPAP          Jerry Lemus MD  Orthopedics  [facility] - Surgery (2nd Fl)

## 2022-10-18 NOTE — OP NOTE
DATE OF PROCEDURE: 10/18/2022    SURGEON: Juni Meza M.D.     CO-SURGEON: Juan Tate M.D., of the neurosurgery service, please note that no qualified resident available to assist with hardware placement.    PREOPERATIVE DIAGNOSIS:  1.  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  Chronic cauda equina syndrome    POSTOPERATIVE DIAGNOSIS:   1.  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  Chronic cauda equina syndrome    PROCEDURES PERFORMED:   Posterior spinal fusion L2 to pelvis  2.   Open reduction internal fixation of transsacral fracture dislocation  3.   Posterior segmental instrumentation L2-L5  4.   Bilateral pelvic fixation with iliac bolts  5.   L5, S1, and S2 laminectomy for decompression of cauda equina  6.   Use of intraoperative neuro navigation  7.   Local bone graft, medium infuse bone graft.    ANESTHESIA:  General endotracheal anesthesia.     SPECIMEN:  None    FINDINGS:  None    BLOOD LOSS:  300 cc    BLOOD PRODUCTS TRANSFUSED:  None    COMPLICATIONS: None.     COUNTS: Correct x 2.     DISPOSITION: Recovery Room, stable.     IMPLANTS: Ringostat    NEUROLOGICAL MONITORING NOTES:  Somatosensory potentials, free run EMG, and conus medullaris monitoring.  Please note that there are changes to stable unreliable bilateral upper lower extremity somatosensory evoked potentials throughout the entire procedure, and no spontaneous EMG activity.  All screws stimulated at greater than 20 milliamp..    INDICATIONS FOR PROCEDURE:  The patient is a 67-year-old female with a 4 month history of atraumatic back pain, lower extremity weakness, and bowel and bladder dysfunction.  Imaging demonstrates a transsacral S1-2 fracture dislocation/spondyloptosis.  Given the patient's resultant immobility, and neurological dysfunction, she is here for surgery today.    DESCRIPTION OF INFORMED CONSENT: I had a sit down discussion with the patient and her  regarding planned  procedure. We specifically discussed the risks, benefits, and alternatives to surgery. We discussed the surgical procedure including the skin incision, possible spinal osteotomy, bone fusion, allograft, iliac crest bone graft, and surgical implants including pedicle screws and interbody devices as indicated: they understand the risks include but are not limited to death, paralysis, blindness, bleeding, infection, damage to arteries, veins and nerves, spinal fluid leak, continued or worsening pain, no improvement in symptoms, non-union, and the possible need for more surgery in the future, as well as the possibility other unforseen and unknown complications. We talked about expected hospital stay and recovery period. All questions were answered; they understand and wish to proceed.     PROCEDURE IN DETAIL: The patient was met in the preoperative area where all final questions were answered. Their lumbar spine was marked as the operative site. The patient was then brought to the operating room where anesthesia was induced. A nicholson catheter was inserted in a standard sterile fashion. The patient was then flipped prone onto the Scott spine table. All bony prominences were padded, paying special attention to the eyes, nose and mouth, axillae, ulnar nerve and hands, genitalia, knees and feet, this was confirmed to be adequate with the anesthesia team. Sequential compression devices were run throughout the case on the bilateral calves.  The surgical field was prepped and draped in normal sterile manner after using fluoroscopy to localize our incisoin.  A full time-out was then called, verifying patient's identity, surgery, site, and that they had been administered a weight appropriate dose of Ancef, no specific nursing, anesthetic or surgical concerns were identified and it was decided that it was safe to proceed with surgery. I informed all members of the operative team that they were empowered to speak up regarding  concerns at any time during the procedure.    We then made a midline incision and performed a preliminary dissection exposing from L2 to the sacrum including relevant facet joints transverse processes and sacral ala. At the conclusion of my preliminary exposure we exposed over to the right-sided PSIS and placed 2 freehand iliac bolt here.  We then Dr. Valeriano harkins into the right-sided PSIS through a small incision and performed our navigation spin.  After confirming navigation accuracy we used CT-guided navigation to place screws bilaterally from L2-L5.  We then placed a left-sided iliac bolt in a standard fashion using CT-guided navigation.  AP lateral as well as pelvic inlet and outlet radiographs were taken demonstrating correct level as well as adequate placement of all screws.  Then under intermittent fluoroscopy were performed cement augmentation of the screws from L2-L5 bilaterally.    Next began our neurological decompression.  We performed a L5 laminectomy in a standard fashion.  We then performed a trough style S1 and S2 laminectomy caring his decompression out each side to ensure no residual stenosis.  Once we were satisfied with our neurological decompression wound was copiously irrigated.  Rods were measured, and contoured, and reduced down into the pedicle screw/iliac bolts.  Gentle distraction on the screws at the L5 level provided acceptable reduction of the fracture as visualized with lateral fluoroscopy.  All implants were then final tightened.  All bony surfaces from L2 to the pelvis were decorticate with high-speed drill and a medium infuse bone graft as well as the patient's morselized local bone graft was laid here.    500mg of vancomycin powder was placed in the deep space.  The deep fascia was then closed with #1 Vicryl sutures in an interrupted, figure-of-eight fashion.  The superficial layer was then irrigated and closed over  an additional 500 mg of vancomycin powder with 2-0 Vicryl,  3-0 Monocryl, Dermabond and Steri-Strips.  A soft dressing was then placed.  The drains were secured in place with silk sutures.  The patient was then flipped supine, extubated, and transferred to the recovery room in stable condition.    Justification of co-surgeon and a 22 modifier:  This is a highly complex, unusual fracture.  All aspects of this patient's care were more difficult from preoperative decision-making to intraoperative dissection, instrumentation, decompression, fracture reduction, and fusion.  The combined efforts of 2 attending surgeons is indicated to expedite surgery, decreased blood loss, and improve outcomes.

## 2022-10-18 NOTE — TRANSFER OF CARE
Anesthesia Transfer of Care Note    Patient: Merle Caro    Procedure(s) Performed: Procedure(s) (LRB):  FUSION,SPINE,POSTERIOR APPROACH (N/A)    Patient location: PACU    Anesthesia Type: general    Transport from OR: Transported from OR on 6-10 L/min O2 by face mask with adequate spontaneous ventilation    Post pain: adequate analgesia    Post assessment: no apparent anesthetic complications and tolerated procedure well    Post vital signs: stable    Level of consciousness: sedated    Nausea/Vomiting: no nausea/vomiting    Complications: none    Transfer of care protocol was followed      Last vitals:   Visit Vitals  /65 (BP Location: Left arm, Patient Position: Lying)   Pulse 100   Temp 36.5 °C (97.7 °F) (Oral)   Resp 18   Ht 5' (1.524 m)   Wt 50.8 kg (111 lb 15.9 oz)   SpO2 96%   Breastfeeding No   BMI 21.87 kg/m²

## 2022-10-18 NOTE — PATIENT INSTRUCTIONS
DR. MARIAM FERNANDEZ'S POSTOPERATIVE INSTRUCTIONS -   LUMBAR FUSION       Antibiotics: You do not need additional antibiotics at home.    NSAIDs: Please refrain from taking ibuprofen (Advil), naproxen (Aleve), and other non steroidal anti-inflammatory medications other than the Celebrex that will be prescribed to you after surgery.    Wound Care: You may remove your dressing and shower 7 days after surgery. Until then please keep your wound clean and dry. Sponge baths are acceptable. Do not go in a pool or hot tub until seen in clinic. Please leave the small steri-strips covering your wound in place until they fall off naturally (2 weeks). You may notice clear suture ends hanging from the sides of your incision after the steri-strips are removed, it is ok to clip these with scissors.    Brace: You may be prescribed a brace, please wear this when up and walking, it is not necessary to wear at night when sleeping.    Pain: We will use a multimodal approach for pain management after your surgery.  You will be given a prescription for pain medicine when you are discharged from the hospital.  You will also be given prescriptions for Robaxin (a muscle relaxer), Gabapentin, Celebrex and Tylenol.  Please note: you will only be given ONE prescription for narcotics when you are discharged from the hospital.  This medication is for breakthrough pain only. This medication will not be refilled.  The other medications given to you may be refilled if needed.      Infection: Signs of infection include increasing wound drainage and redness around the wound, as well as a temperature over 101.5 degrees. It is unnecessary to take your temperature on a routine basis. Please call the below number if you are concerned about an infection.    Driving and Work: It is ok to return to driving and work as long as you are not taking narcotic pain medications and can walk greater than 100 feet. Please do not lift over 10 pounds or participate in  exercise or sports until cleared by Dr. Mzea.    Deep Venous Thrombosis (Blood Clots): Symptoms include swelling in the legs and shortness of breath. Please call the office or proceed to the nearest emergency room if you have any of these symptoms.    Physical Therapy: The best physical therapy immediately after surgery is walking. Please try to walk as much as possible.    Follow-up: You will be scheduled for a follow-up appointment in 4 weeks with either Dr. Meza or his physician assistant, Ivy Lorenz PA-C.    Questions: During business hours please call [phone] for routine questions. For after hours questions please call [phone] and ask to speak with the Orthopaedic resident on call.    Disability: If you submitted short term disability paperwork for us to complete and would like to check the status, please call the Disability Department at [phone].  You may fax any necessary paperwork to [phone].

## 2022-10-18 NOTE — NURSING TRANSFER
Nursing Transfer Note      10/18/2022         Transfer To: 501    Transfer via bed    Transfer with cardiac monitoring    Transported by pct    Medicines sent: n/a    Any special needs or follow-up needed: pt to remain flat for 24 hrs    Chart send with patient: Yes    Notified: spouse    Patient reassessed at: 10/18/2022 @ 3135

## 2022-10-18 NOTE — ANESTHESIA POSTPROCEDURE EVALUATION
Anesthesia Post Evaluation    Patient: Merle Caro    Procedure(s) Performed: Procedure(s) (LRB):  FUSION,SPINE,POSTERIOR APPROACH (N/A)    Final Anesthesia Type: general      Patient location during evaluation: PACU  Patient participation: Yes- Able to Participate  Level of consciousness: awake and alert  Post-procedure vital signs: reviewed and stable  Pain management: adequate  Airway patency: patent    PONV status at discharge: No PONV  Anesthetic complications: no      Cardiovascular status: blood pressure returned to baseline  Respiratory status: unassisted  Follow-up not needed.          Vitals Value Taken Time   /73 10/18/22 1402   Temp 36.3 °C (97.3 °F) 10/18/22 1230   Pulse 98 10/18/22 1410   Resp 13 10/18/22 1410   SpO2 95 % 10/18/22 1410   Vitals shown include unvalidated device data.      Event Time   Out of Recovery 10/18/2022 13:00:00         Pain/Fawad Score: Pain Rating Prior to Med Admin: 7 (10/18/2022 12:52 PM)  Pain Rating Post Med Admin: 5 (10/18/2022  1:00 PM)  Fawad Score: 10 (10/18/2022  1:00 PM)

## 2022-10-18 NOTE — CARE UPDATE
"Patient not seen today secondary to being in the OR for planned orthopedic and neuro surgery.    Medicine consults team remains available for concerns. Please secure chat "Medicine Consults Only" between 7A-5P with questions. After 5PM please page on-call hospitalist for urgent medicine-related questions.   "

## 2022-10-18 NOTE — ASSESSMENT & PLAN NOTE
Merle Caro is a 67 y.o. female with past medical history of hypertension, bipolar disorder, and urinary incontinence (1 year duration) admitted to Orthopedics with what appears to be an approximally 4-month-old sacral fracture dislocation of S1 on S2 with severe canal stenosis at that level resulting in chronic cauda equina symptoms.  She has not been able ambulate since June due to pain.  Initial images reviewed by Dr. Meza who plans to take the patient for surgery on Tuesday October 18th.  CT pelvis completed.  DVT ultrasound with no DVTs.  Hospital Medicine consulted began a metastatic workup.  Studies so far unrevealing.  Appreciate hospital medicine recommendations.    Plan:   Pain control: multimodal; will adjust after surgery  Diet: NPO  PT/OT: NWB BLE  DVT prophylaxis:  SubQ heparin; no need for IVC filter at this time  Abx:  preop ancef ordered  Labs:  Pending this AM, continue to monitor    Dispo:  To OR today     Msg to RP.  Marii Lucia, RN 04/30/19 10:49 AM

## 2022-10-19 ENCOUNTER — TELEPHONE (OUTPATIENT)
Dept: HEMATOLOGY/ONCOLOGY | Facility: CLINIC | Age: 67
End: 2022-10-19
Payer: MEDICARE

## 2022-10-19 DIAGNOSIS — M51.36 DDD (DEGENERATIVE DISC DISEASE), LUMBAR: Primary | ICD-10-CM

## 2022-10-19 PROBLEM — R32 URINARY INCONTINENCE: Status: ACTIVE | Noted: 2022-10-19

## 2022-10-19 PROBLEM — E43 SEVERE MALNUTRITION: Status: ACTIVE | Noted: 2022-10-19

## 2022-10-19 PROBLEM — D72.829 LEUKOCYTOSIS: Status: ACTIVE | Noted: 2022-10-19

## 2022-10-19 LAB
ALBUMIN SERPL BCP-MCNC: 2.2 G/DL (ref 3.5–5.2)
ALP SERPL-CCNC: 95 U/L (ref 55–135)
ALT SERPL W/O P-5'-P-CCNC: 11 U/L (ref 10–44)
ANION GAP SERPL CALC-SCNC: 7 MMOL/L (ref 8–16)
AST SERPL-CCNC: 23 U/L (ref 10–40)
BASOPHILS # BLD AUTO: 0.03 K/UL (ref 0–0.2)
BASOPHILS NFR BLD: 0.2 % (ref 0–1.9)
BILIRUB SERPL-MCNC: 0.3 MG/DL (ref 0.1–1)
BUN SERPL-MCNC: 8 MG/DL (ref 8–23)
CALCIUM SERPL-MCNC: 8.5 MG/DL (ref 8.7–10.5)
CHLORIDE SERPL-SCNC: 97 MMOL/L (ref 95–110)
CO2 SERPL-SCNC: 28 MMOL/L (ref 23–29)
CREAT SERPL-MCNC: 0.5 MG/DL (ref 0.5–1.4)
DIFFERENTIAL METHOD: ABNORMAL
EOSINOPHIL # BLD AUTO: 0 K/UL (ref 0–0.5)
EOSINOPHIL NFR BLD: 0.2 % (ref 0–8)
ERYTHROCYTE [DISTWIDTH] IN BLOOD BY AUTOMATED COUNT: 14.6 % (ref 11.5–14.5)
EST. GFR  (NO RACE VARIABLE): >60 ML/MIN/1.73 M^2
GLUCOSE SERPL-MCNC: 109 MG/DL (ref 70–110)
HCT VFR BLD AUTO: 28.5 % (ref 37–48.5)
HGB BLD-MCNC: 9 G/DL (ref 12–16)
IMM GRANULOCYTES # BLD AUTO: 0.09 K/UL (ref 0–0.04)
IMM GRANULOCYTES NFR BLD AUTO: 0.7 % (ref 0–0.5)
LYMPHOCYTES # BLD AUTO: 1.1 K/UL (ref 1–4.8)
LYMPHOCYTES NFR BLD: 8.1 % (ref 18–48)
MAGNESIUM SERPL-MCNC: 1.7 MG/DL (ref 1.6–2.6)
MCH RBC QN AUTO: 29.4 PG (ref 27–31)
MCHC RBC AUTO-ENTMCNC: 31.6 G/DL (ref 32–36)
MCV RBC AUTO: 93 FL (ref 82–98)
MONOCYTES # BLD AUTO: 1 K/UL (ref 0.3–1)
MONOCYTES NFR BLD: 7.3 % (ref 4–15)
NEUTROPHILS # BLD AUTO: 11.1 K/UL (ref 1.8–7.7)
NEUTROPHILS NFR BLD: 83.5 % (ref 38–73)
NRBC BLD-RTO: 0 /100 WBC
PATHOLOGIST INTERPRETATION IFE: NORMAL
PATHOLOGIST INTERPRETATION SPE: NORMAL
PHOSPHATE SERPL-MCNC: 3.5 MG/DL (ref 2.7–4.5)
PLATELET # BLD AUTO: 387 K/UL (ref 150–450)
PMV BLD AUTO: 9.6 FL (ref 9.2–12.9)
POTASSIUM SERPL-SCNC: 3.5 MMOL/L (ref 3.5–5.1)
PROT SERPL-MCNC: 5.3 G/DL (ref 6–8.4)
RBC # BLD AUTO: 3.06 M/UL (ref 4–5.4)
SODIUM SERPL-SCNC: 132 MMOL/L (ref 136–145)
WBC # BLD AUTO: 13.27 K/UL (ref 3.9–12.7)

## 2022-10-19 PROCEDURE — 25000003 PHARM REV CODE 250: Performed by: STUDENT IN AN ORGANIZED HEALTH CARE EDUCATION/TRAINING PROGRAM

## 2022-10-19 PROCEDURE — 11000001 HC ACUTE MED/SURG PRIVATE ROOM

## 2022-10-19 PROCEDURE — 94761 N-INVAS EAR/PLS OXIMETRY MLT: CPT

## 2022-10-19 PROCEDURE — 25000003 PHARM REV CODE 250

## 2022-10-19 PROCEDURE — 63600175 PHARM REV CODE 636 W HCPCS: Performed by: STUDENT IN AN ORGANIZED HEALTH CARE EDUCATION/TRAINING PROGRAM

## 2022-10-19 PROCEDURE — 85025 COMPLETE CBC W/AUTO DIFF WBC: CPT

## 2022-10-19 PROCEDURE — 84100 ASSAY OF PHOSPHORUS: CPT | Performed by: STUDENT IN AN ORGANIZED HEALTH CARE EDUCATION/TRAINING PROGRAM

## 2022-10-19 PROCEDURE — 93005 ELECTROCARDIOGRAM TRACING: CPT

## 2022-10-19 PROCEDURE — 80053 COMPREHEN METABOLIC PANEL: CPT | Performed by: STUDENT IN AN ORGANIZED HEALTH CARE EDUCATION/TRAINING PROGRAM

## 2022-10-19 PROCEDURE — 94640 AIRWAY INHALATION TREATMENT: CPT

## 2022-10-19 PROCEDURE — 99233 SBSQ HOSP IP/OBS HIGH 50: CPT | Mod: GC,,, | Performed by: HOSPITALIST

## 2022-10-19 PROCEDURE — 83735 ASSAY OF MAGNESIUM: CPT | Performed by: STUDENT IN AN ORGANIZED HEALTH CARE EDUCATION/TRAINING PROGRAM

## 2022-10-19 PROCEDURE — 25000242 PHARM REV CODE 250 ALT 637 W/ HCPCS: Performed by: STUDENT IN AN ORGANIZED HEALTH CARE EDUCATION/TRAINING PROGRAM

## 2022-10-19 PROCEDURE — 93010 EKG 12-LEAD: ICD-10-PCS | Mod: ,,, | Performed by: INTERNAL MEDICINE

## 2022-10-19 PROCEDURE — 27000221 HC OXYGEN, UP TO 24 HOURS

## 2022-10-19 PROCEDURE — 93010 ELECTROCARDIOGRAM REPORT: CPT | Mod: ,,, | Performed by: INTERNAL MEDICINE

## 2022-10-19 PROCEDURE — 99233 PR SUBSEQUENT HOSPITAL CARE,LEVL III: ICD-10-PCS | Mod: GC,,, | Performed by: HOSPITALIST

## 2022-10-19 PROCEDURE — 99900035 HC TECH TIME PER 15 MIN (STAT)

## 2022-10-19 PROCEDURE — 36415 COLL VENOUS BLD VENIPUNCTURE: CPT

## 2022-10-19 RX ORDER — MAGNESIUM SULFATE HEPTAHYDRATE 40 MG/ML
2 INJECTION, SOLUTION INTRAVENOUS ONCE
Status: COMPLETED | OUTPATIENT
Start: 2022-10-19 | End: 2022-10-19

## 2022-10-19 RX ORDER — LISINOPRIL 10 MG/1
10 TABLET ORAL DAILY
Status: DISCONTINUED | OUTPATIENT
Start: 2022-10-20 | End: 2022-10-20

## 2022-10-19 RX ADMIN — PANTOPRAZOLE SODIUM 40 MG: 40 TABLET, DELAYED RELEASE ORAL at 11:10

## 2022-10-19 RX ADMIN — HALOPERIDOL 5 MG: 5 TABLET ORAL at 10:10

## 2022-10-19 RX ADMIN — TRAZODONE HYDROCHLORIDE 100 MG: 100 TABLET ORAL at 10:10

## 2022-10-19 RX ADMIN — HEPARIN SODIUM 5000 UNITS: 5000 INJECTION INTRAVENOUS; SUBCUTANEOUS at 10:10

## 2022-10-19 RX ADMIN — OXYCODONE HYDROCHLORIDE 10 MG: 10 TABLET ORAL at 11:10

## 2022-10-19 RX ADMIN — METOCLOPRAMIDE 5 MG: 5 INJECTION, SOLUTION INTRAMUSCULAR; INTRAVENOUS at 01:10

## 2022-10-19 RX ADMIN — POTASSIUM BICARBONATE 40 MEQ: 391 TABLET, EFFERVESCENT ORAL at 11:10

## 2022-10-19 RX ADMIN — CELECOXIB 200 MG: 200 CAPSULE ORAL at 09:10

## 2022-10-19 RX ADMIN — CLONAZEPAM 0.5 MG: 0.5 TABLET ORAL at 10:10

## 2022-10-19 RX ADMIN — CHOLECALCIFEROL TAB 25 MCG (1000 UNIT) 2000 UNITS: 25 TAB at 10:10

## 2022-10-19 RX ADMIN — HEPARIN SODIUM 5000 UNITS: 5000 INJECTION INTRAVENOUS; SUBCUTANEOUS at 05:10

## 2022-10-19 RX ADMIN — DOCUSATE SODIUM 100 MG: 100 CAPSULE ORAL at 10:10

## 2022-10-19 RX ADMIN — TIOTROPIUM BROMIDE INHALATION SPRAY 2 PUFF: 3.12 SPRAY, METERED RESPIRATORY (INHALATION) at 10:10

## 2022-10-19 RX ADMIN — PREGABALIN 50 MG: 50 CAPSULE ORAL at 11:10

## 2022-10-19 RX ADMIN — PAROXETINE HYDROCHLORIDE 40 MG: 10 TABLET, FILM COATED ORAL at 10:10

## 2022-10-19 RX ADMIN — METHOCARBAMOL 1000 MG: 500 TABLET ORAL at 10:10

## 2022-10-19 RX ADMIN — DOCUSATE SODIUM 100 MG: 100 CAPSULE ORAL at 11:10

## 2022-10-19 RX ADMIN — POLYETHYLENE GLYCOL 3350 17 G: 17 POWDER, FOR SOLUTION ORAL at 11:10

## 2022-10-19 RX ADMIN — FLUTICASONE FUROATE AND VILANTEROL TRIFENATATE 1 PUFF: 200; 25 POWDER RESPIRATORY (INHALATION) at 10:10

## 2022-10-19 RX ADMIN — ACETAMINOPHEN 1000 MG: 325 TABLET ORAL at 10:10

## 2022-10-19 RX ADMIN — PREGABALIN 50 MG: 50 CAPSULE ORAL at 10:10

## 2022-10-19 RX ADMIN — THIAMINE HYDROCHLORIDE 500 MG: 100 INJECTION, SOLUTION INTRAMUSCULAR; INTRAVENOUS at 10:10

## 2022-10-19 RX ADMIN — ATORVASTATIN CALCIUM 20 MG: 20 TABLET, FILM COATED ORAL at 11:10

## 2022-10-19 RX ADMIN — THERA TABS 1 TABLET: TAB at 11:10

## 2022-10-19 RX ADMIN — THIAMINE HYDROCHLORIDE 500 MG: 100 INJECTION, SOLUTION INTRAMUSCULAR; INTRAVENOUS at 03:10

## 2022-10-19 RX ADMIN — ACETAMINOPHEN 1000 MG: 325 TABLET ORAL at 05:10

## 2022-10-19 RX ADMIN — OXYCODONE HYDROCHLORIDE 10 MG: 10 TABLET ORAL at 01:10

## 2022-10-19 RX ADMIN — HEPARIN SODIUM 5000 UNITS: 5000 INJECTION INTRAVENOUS; SUBCUTANEOUS at 03:10

## 2022-10-19 RX ADMIN — MAGNESIUM SULFATE 2 G: 2 INJECTION INTRAVENOUS at 12:10

## 2022-10-19 NOTE — ASSESSMENT & PLAN NOTE
Postoperative, downtrending, likely reactive to surgery  - continue to monitor for signs of infection

## 2022-10-19 NOTE — SUBJECTIVE & OBJECTIVE
Interval History: Underwent lumbar spinal/sacral surgery yesterday; tolerated well. Lying flat until noon today per ortho recs; then they will consider taking out nicholson. Had some nausea she attributed to duo-nebs, so switched back to her home inhalers. Complaining of some suprapubic pain after catheter placement. Mental status is stable from time of admission.     Review of Systems  Objective:     Vital Signs (Most Recent):  Temp: 97.5 °F (36.4 °C) (10/19/22 0742)  Pulse: (!) 111 (10/19/22 1009)  Resp: 20 (10/19/22 1009)  BP: 116/63 (10/19/22 0742)  SpO2: 98 % (10/19/22 1009)   Vital Signs (24h Range):  Temp:  [96.6 °F (35.9 °C)-97.9 °F (36.6 °C)] 97.5 °F (36.4 °C)  Pulse:  [] 111  Resp:  [15-23] 20  SpO2:  [93 %-100 %] 98 %  BP: (113-170)/() 116/63     Weight: 50.8 kg (111 lb 15.9 oz)  Body mass index is 21.87 kg/m².    Intake/Output Summary (Last 24 hours) at 10/19/2022 1021  Last data filed at 10/19/2022 0515  Gross per 24 hour   Intake 969.17 ml   Output 2252 ml   Net -1282.83 ml      Physical Exam  Vitals and nursing note reviewed.   Constitutional:       General: She is not in acute distress.     Appearance: She is ill-appearing (chronically). She is not toxic-appearing.   Cardiovascular:      Rate and Rhythm: Normal rate and regular rhythm.      Heart sounds: No murmur heard.  Pulmonary:      Effort: Pulmonary effort is normal. No respiratory distress.   Abdominal:      Palpations: Abdomen is soft.      Tenderness: There is no guarding.   Musculoskeletal:      Right lower leg: No edema.      Left lower leg: No edema.   Skin:     General: Skin is warm and dry.   Neurological:      Mental Status: She is alert. Mental status is at baseline.      Cranial Nerves: No dysarthria.      Comments: Mild-moderate horizontal nystagmus appreciated again today   Psychiatric:         Mood and Affect: Mood normal.         Behavior: Behavior normal.       Significant Labs: All pertinent labs within the past 24  hours have been reviewed.    Significant Imaging: I have reviewed all pertinent imaging results/findings within the past 24 hours.

## 2022-10-19 NOTE — TELEPHONE ENCOUNTER
SW intern called Pt regarding distress score 4. SW intern left voicemail. SW intern will remain available.
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
## 2022-10-19 NOTE — HPI
Merle Caro is a 67F with hx of HTN, bipolar disorder, tobacco use, COPD on 2L home O2 who is admitted to the hospital to ortho service for upcoming operative repair of displaced sacral fracture. Urology is consulted for hx of possible neurogenic bladder in the setting of chronic urinary incontinence over the past year with gradual onset. She denies prior urologic hx. She has also had issues with chronic bladder pain since 2019. She denies ever experiencing urinary retention, urgency or dysuria. At bedside she is AFVSS, Cr at baseline (0.5). CT scan without obvious urinary obstruction or hydronephrosis.
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
## 2022-10-19 NOTE — ASSESSMENT & PLAN NOTE
MRI and CT spine showing extensive displaced fractures of the sacrum causing central canal stenosis, pubic rami fractures,   Has significant osteoporosis recently diagnosed, had not yet begun treatment.    - now s/p L2-pelvis spinal fusion, lumbosacral laminectomies/cauda equina decompression, and pelvic fixation surgery on 10/18  - needs osteoporosis treatment outpatient

## 2022-10-19 NOTE — PROGRESS NOTES
[facility] Medicine  Progress Note    Patient Name: Merle Caro  MRN: [id number]  Patient Class: IP- Inpatient   Admission Date: 10/14/2022  Length of Stay: 5 days  Attending Physician: Juni Meza MD  Primary Care Provider: Marck Major MD        Subjective:     Principal Problem:Closed fracture of sacrum        HPI:  Ms. Caro is a 67F with hx of HTN, bipolar disorder, tobacco use, COPD on 2L home O2 who is admitted to the hospital to ortho service for upcoming operative repair of displaced sacral fracture. Hospital medicine is consulted for medical optimization.  She and  at bedside report a concerning history. Around a year ago, she began losing weight unexpectedly (has lost 60lbs in 1 year). Her PCP was working her up for this but no cause has yet been found. Then, around June of this year, she and her  were on a [place] road trip when she began to experience severe buttocks pain. It became so severe that she could no longer walk, and has been non-ambulatory since June 2022. Initially the pain was thought 2/2 thrombosed hemorrhoids, but after CRS evaluation, this was NOT thought to be the cause of her pain. Eventually lower back imaging was ordered and showed extensive and pathologic fractures of the sacrum with grade 5 spondylolisthesis causing severe central canal stenosis on S1-S2, bilateral sacral ala fractures, displaced pubic rami fractures which closely abut the urethra/vaginal wall. She was then sent here for Ortho evaluation. She denies any known injuries, falls, or physical abuse that could have caused these fractures. She was recently found to have osteoporosis but had not yet started treatment.   In addition to the concerning weightloss and being nonambulatory, she reports are early satiety/bloating, decreased appetite, drenching night sweats several times/week, urinary incontinence, inability to sense having a bowel movement, chronic dry cough. She  reports a family history of ovarian cancer (sister in her 20s), lung cancer in mother (in 40s, was a smoker). The patient herself has a history of pulmonary nodules and has not had a colonoscopy in 11 years (10y f/u recommended).   She is currently afebrile, hemodynamically stable.  Wt Readings from Last 20 Encounters:   10/15/22 50.8 kg (111 lb 15.9 oz)   10/14/22 54.4 kg (120 lb)   09/27/22 60.8 kg (134 lb)   08/22/22 60.8 kg (134 lb)   06/21/22 61.7 kg (136 lb 0.4 oz)   05/19/22 64 kg (141 lb 1.5 oz)   05/19/22 64.8 kg (142 lb 13.7 oz)   05/03/22 65.5 kg (144 lb 6.4 oz)   04/25/22 69.4 kg (153 lb)   03/02/22 69.4 kg (153 lb)   02/03/22 69.4 kg (153 lb)   01/12/22 71.2 kg (157 lb)   01/07/22 71.4 kg (157 lb 6.5 oz)   01/05/22 71.7 kg (158 lb)   12/14/21 72.1 kg (158 lb 13.8 oz)   11/17/21 73.9 kg (163 lb)   11/08/21 74.1 kg (163 lb 5.8 oz)   11/03/21 74.7 kg (164 lb 10.9 oz)   09/21/21 74.7 kg (164 lb 10.9 oz)   07/20/21 76.7 kg (169 lb 1.5 oz)           Overview/Hospital Course:  No notes on file    Interval History: Underwent lumbar spinal/sacral surgery yesterday; tolerated well. Lying flat until noon today per ortho recs; then they will consider taking out nicholson. Had some nausea she attributed to duo-nebs, so switched back to her home inhalers. Complaining of some suprapubic pain after catheter placement. Mental status is stable from time of admission.     Review of Systems  Objective:     Vital Signs (Most Recent):  Temp: 97.5 °F (36.4 °C) (10/19/22 0742)  Pulse: (!) 111 (10/19/22 1009)  Resp: 20 (10/19/22 1009)  BP: 116/63 (10/19/22 0742)  SpO2: 98 % (10/19/22 1009)   Vital Signs (24h Range):  Temp:  [96.6 °F (35.9 °C)-97.9 °F (36.6 °C)] 97.5 °F (36.4 °C)  Pulse:  [] 111  Resp:  [15-23] 20  SpO2:  [93 %-100 %] 98 %  BP: (113-170)/() 116/63     Weight: 50.8 kg (111 lb 15.9 oz)  Body mass index is 21.87 kg/m².    Intake/Output Summary (Last 24 hours) at 10/19/2022 1021  Last data filed at  10/19/2022 0515  Gross per 24 hour   Intake 969.17 ml   Output 2252 ml   Net -1282.83 ml      Physical Exam  Vitals and nursing note reviewed.   Constitutional:       General: She is not in acute distress.     Appearance: She is ill-appearing (chronically). She is not toxic-appearing.   Cardiovascular:      Rate and Rhythm: Normal rate and regular rhythm.      Heart sounds: No murmur heard.  Pulmonary:      Effort: Pulmonary effort is normal. No respiratory distress.   Abdominal:      Palpations: Abdomen is soft.      Tenderness: There is no guarding.   Musculoskeletal:      Right lower leg: No edema.      Left lower leg: No edema.   Skin:     General: Skin is warm and dry.   Neurological:      Mental Status: She is alert. Mental status is at baseline.      Cranial Nerves: No dysarthria.      Comments: Mild-moderate horizontal nystagmus appreciated again today   Psychiatric:         Mood and Affect: Mood normal.         Behavior: Behavior normal.       Significant Labs: All pertinent labs within the past 24 hours have been reviewed.    Significant Imaging: I have reviewed all pertinent imaging results/findings within the past 24 hours.      Assessment/Plan:      * Closed fracture dislocation of sacrum  MRI and CT spine showing extensive displaced fractures of the sacrum causing central canal stenosis, pubic rami fractures,   Has significant osteoporosis recently diagnosed, had not yet begun treatment.    - now s/p L2-pelvis spinal fusion, lumbosacral laminectomies/cauda equina decompression, and pelvic fixation surgery on 10/18  - needs osteoporosis treatment outpatient    Unexplained weight loss  Concerning unexplained weightloss of >60lbs in last year, in combination with severe functional decline, pathologic fractures, and B symptoms (see HPI for full details).  Has history of pulmonary nodule due for 12 month followup+mediastinal lymphadenopathy.  Due for colonoscopy (last 2011 with 10 year f/u recommended);  having early satiety and bloating symptoms  Discovered patient has history of two brothers dying early from cardiac issues that were said to be genetic, possibly arrhythmia va MI based on description    Workup:  - CT C/A/P with contrast: no lung changes or lymphadenopathy  - CT head given some concern for slowed mentation: no acute changes or intracranial lesions  - HIV, HCV,TSH, RPR, quant gold, blood cultures wnl  - CA 19-9, CA-125, CEA wnl  - TTE without concerning findings  - SPEP, YIN normal and without monoclonal peaks. K/L ratio normal  - Peripheral smear: possible early neutrophil hypersegmentation    Plan:  - given abdominal bloating symptoms, reported recent melena, GI planning for inpatient EGD this admission, and outpatient colonoscopy in the future (concerned she cannot tolerate bowel prep at this time)  - only minimal confusion and slight nystagmus; will trial empiric high-dose thiamine taper since she's at risk for nutritional deficiencies/Wernicke's  - discussed with oncology service via phone about early hypersegmented neutrophils: will also check copper levels. With normal CBC differential and negative rest of workup, stated there was low suspicion for myeloproliferative process at this time, but may benefit from an outpatient hematology f/u.         Prolonged Q-T interval on ECG  QTc 534 on admission.   Reported family history of sudden cardiac deaths.  On multiple QT prolonging home psychiatric meds: klonopin, trazodone, haldol, paroxetine    - several repeat EKGs with improved QT to 470; will discontinue daily EKGs  - continue home meds as these are long term  - keep K>4, Mg>2, Phos >3 - will add to daily labs  - caution with any anti-emetics or other QT-prolonging agents in perioperative setting as she is high risk for arrhythmias      Pre-op evaluation  Preoperative Cardiovascular Risk Assessment: Intermediate risk  Non-emergent surgery  No active cardiac problems: none (such as unstable  angina, decompensated heart failure, significant uncontrolled arrhythmias or severe valvular disease).  Functional Status: unable to climb a flight of stairs (<4 METS), wears 2L O2 at rest as well  Revised cardiac risk index (RCRI) score is 0 (3.9%).     1 pt Each: Elevated Risk Surgery (Intraperitoneal, Intrathoracic, Suprainguinal Vascular), Hx of Ischemic Heart Disease, Hx of CHF, Hx of Cerebrovascular Disease,                      DM on insulin, Creatinine > 2     Revised cardiac risk index is low, but due to other co-morbidities still think pt is an intermediate risk surgical candidate             Other Issues: COPD, GERD controlled with meds      Anticoagulation: None out of hospital      Coronary Stenting: None    Preoperative evaluation:  - Ok to proceed to surgery  - RCRI score of 0 thus 3.9% rate of major cardiac event.  Acceptable cardiac risk going for intermediate risk surgery.  No absolute contraindications to the proposed surgery at this time   - continue atorvastatin perioperatively  - hold lisinopril on day of surgery  - for high risk of post-op pulmonary complications, scheduled duonebs and incentive spirometry to start after surgery.Do calculator: ARISCAT ([employer]) preoperative pulmonary risk index in adults   - for high risk of post-op delirium, minimize CNS-active meds (opiates, benzos, anticholinergics) and sleep hygiene with windowshades open during day, no daytime naps, frequent re-orientation, private room if feasible.      Severe malnutrition  See unexplained weightloss    Leukocytosis  Postoperative, downtrending, likely reactive to surgery  - continue to monitor for signs of infection      Coronary artery disease  Triple-vessel CAD seen on recent CT C/A/P from 10/15.  No history of coronary events. No current chest pain or anginal symptoms.  ASCVD 10-year risk of cardiovascular event 13.1%, mod-high intensity statin indicated    - Low theshold for repeat EKG and troponins if becomes  "symptomatic  - continue atorvastatin 20mg daily; current dose is appropriate      Early satiety  See "unexplained weightloss"      Osteoporosis with current pathological fracture  Recently diagnosed on DEXA with T -3.9 at L spine and T -4.1 at L femoral area.  Has not yet started treatment.  Could be contributing to extensive pelvic fractures, but location seems atypical and other causes need to be evaluated  Has history of increased PTH of unknown cause; self-resolved    - f/u with PCP for osteoporosis tx      Benzodiazepine dependence  See bipolar disorder      History of colon polyps  See "unexplained weight loss"      Chronic respiratory failure with hypoxia  2/2 asthma and COPD  See "asthma"    Essential hypertension  - resume home lisinopril 10mg tomorrow 10/20      Asthma with COPD  On 2L home oxygen  Currently at baseline O2 requirements; no dyspnea, not in acute exacerbation    - not compliant with home breo and spiriva  - home inhalers ordered in perioperative period to avoid pulmonary complications of anesthesia    Affective bipolar disorder  Sees psychiatrist for medications    - continue home klonopin, haldol, paroxetine, trazodone  - initially prolonged QT has improved; minimize other QT prolonging meds such as zofran, etc.  - low threshold to temporarily hold these meds if overly sedated   - delirium precautions    Pure hypercholesterolemia  - continue home statin; substituting atorvastatin 20mg daily due to hospital formulary  - ASCVD 10-year risk of cardiac event 13%, moderate-high intensity statin recommended; current dose appropriate      LUCILA on CPAP: Not compliant with CPAP  - CPAP qhs ordered  - patient may no longer need since has lost nearly 100 lbs  - does not use home        VTE Risk Mitigation (From admission, onward)           Ordered     heparin (porcine) injection 5,000 Units  Every 8 hours         10/18/22 1233     heparin (porcine) injection 5,000 Units  Every 8 hours         10/15/22 " 0912     Place sequential compression device  Until discontinued         10/14/22 1637                    Discharge Planning   YELENA: 10/21/2022     Code Status: Full Code   Is the patient medically ready for discharge?:     Reason for patient still in hospital (select all that apply): Patient trending condition  Discharge Plan A: Home with family                  Brittni Best MD  Department of Hospital Medicine   [facility] - Surgery

## 2022-10-19 NOTE — PLAN OF CARE
Problem: Adult Inpatient Plan of Care  Goal: Plan of Care Review  Outcome: Ongoing, Progressing     Problem: Skin Injury Risk Increased  Goal: Skin Health and Integrity  Outcome: Ongoing, Progressing     Problem: Pain Chronic (Persistent)  Goal: Acceptable Pain Control and Functional Ability  Outcome: Ongoing, Progressing     Problem: Fall Injury Risk  Goal: Absence of Fall and Fall-Related Injury  Outcome: Ongoing, Progressing     Problem: Infection  Goal: Absence of Infection Signs and Symptoms  Outcome: Ongoing, Progressing

## 2022-10-19 NOTE — OP NOTE
DATE OF PROCEDURE: 10/18/2022     PREOPERATIVE DIAGNOSIS:  1.  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  Chronic cauda equina syndrome     POSTOPERATIVE DIAGNOSIS:   Same.     PROCEDURES PERFORMED:   Posterior spinal fusion L2 to pelvis  2.   Open reduction internal fixation of transsacral fracture dislocation  3.   Posterior segmental instrumentation L2-L5  4.   Bilateral pelvic fixation with iliac bolts  5.   L5, S1, and S2 laminectomy for decompression of cauda equina  6.   Use of intraoperative neuro navigation  7.   Local bone graft, medium infuse bone graft.    SURGEON: Juni Meza M.D.      CO-SURGEON: Juan Tate M.D., of the neurosurgery service, please note that no qualified resident available to assist with hardware placement.     ANESTHESIA:  General endotracheal anesthesia.      SPECIMEN:  None     FINDINGS:  None     BLOOD LOSS:  300 cc     BLOOD PRODUCTS TRANSFUSED:  None     COMPLICATIONS: None.     INDICATIONS FOR PROCEDURE:  See Dr. Meza's note.     PROCEDURE IN DETAIL:     For details about patient intubation, anesthesia induction, positioning, and localization please see Dr. Meza's separate operative note.  My involvement in this case began at the time of the incision.     We then made a midline incision and performed a preliminary dissection exposing from L2 to the sacrum including relevant facet joints transverse processes and sacral ala. At the conclusion of my preliminary exposure we exposed over to the right-sided PSIS and placed 2 freehand iliac bolt here.  We then Dr. Valeriano harkins into the right-sided PSIS through a small incision and performed our navigation spin.  After confirming navigation accuracy we used CT-guided navigation to place screws bilaterally from L2-L5.  We then placed a left-sided iliac bolt in a standard fashion using CT-guided navigation.  AP lateral as well as pelvic inlet and outlet radiographs were taken demonstrating  correct level as well as adequate placement of all screws.  Then under intermittent fluoroscopy were performed cement augmentation of the screws from L2-L5 bilaterally.    Next began our neurological decompression.  We performed a L5 laminectomy in a standard fashion.  We then performed a trough style S1 and S2 laminectomy caring his decompression out each side to ensure no residual stenosis.  Once we were satisfied with our neurological decompression wound was copiously irrigated.  Rods were measured, and contoured, and reduced down into the pedicle screw/iliac bolts.  Gentle distraction on the screws at the L5 level provided acceptable reduction of the fracture as visualized with lateral fluoroscopy.  All implants were then final tightened.  All bony surfaces from L2 to the pelvis were decorticate with high-speed drill and a medium infuse bone graft as well as the patient's morselized local bone graft was laid here.     This concludes my involvement in this case.  For details about wound closure, drain placement, patient extubation and OR disposition please see Dr. Meza's separate operative note.  During my involvement the patient appeared to tolerate the procedure well from a hemodynamic and neuro monitoring standpoint, and all counts were correct.      Justification of co-surgeon and a 22 modifier:  This is a highly complex, unusual fracture.  All aspects of this patient's care were more difficult from preoperative decision-making to intraoperative dissection, instrumentation, decompression, fracture reduction, and fusion.  The combined efforts of 2 attending surgeons is indicated to expedite surgery, decreased blood loss, and improve outcomes.

## 2022-10-19 NOTE — ASSESSMENT & PLAN NOTE
Merle Caro is a 67 y.o. female with past medical history of hypertension, bipolar disorder, and urinary incontinence (1 year duration) admitted to Orthopedics with what appears to be an approximally 4-month-old sacral fracture dislocation of S1 on S2 with severe canal stenosis at that level resulting in chronic cauda equina symptoms.  She has not been able ambulate since June due to pain.  Initial images reviewed by Dr. Meza who plans to take the patient for surgery on Tuesday October 18th.  CT pelvis completed.  DVT ultrasound with no DVTs.  Hospital Medicine consulted began a metastatic workup.  Studies so far unrevealing.  Appreciate hospital medicine recommendations. Now s/p L2-Pelvis PSF w L5-S1 Laminectomy and dural injury intraop repaired w dural glue on 10/18.    Plan:   Lay flat for 24hrs postop - will plan to elevate HOB at noon today and if no headache after 1 hr will plan to consult PT/OT (to be WBAT in LSO and spine precautions) and dc nicholson.  Pain control: multimodal  Diet: regular diet  PT/OT: on hold until ensuring no headache after laying flat for 24hrs  DVT prophylaxis:  SubQ heparin; no need for IVC filter at this time  Abx:  preop ancef ordered  Labs:  Stable  Nicholson  No drains    Dispo:  Pending elevating HOB at noon today.

## 2022-10-19 NOTE — ASSESSMENT & PLAN NOTE
QTc 534 on admission.   Reported family history of sudden cardiac deaths.  On multiple QT prolonging home psychiatric meds: klonopin, trazodone, haldol, paroxetine    - several repeat EKGs with improved QT to 470; will discontinue daily EKGs  - continue home meds as these are long term  - keep K>4, Mg>2, Phos >3 - will add to daily labs  - caution with any anti-emetics or other QT-prolonging agents in perioperative setting as she is high risk for arrhythmias

## 2022-10-19 NOTE — SUBJECTIVE & OBJECTIVE
Principal Problem:Closed fracture of sacrum    Principal Orthopedic Problem:  Same    Interval History:  No acute events overnight.  Tolerated OR well. Pain well controlled. Having episodes of N/V since surgery that she endorses is from albuterol treatments (has been getting them since admission). Endorses suprapubic tenderness and urine has become more clear from yesterday. Endorses she isn't burping or passing flatus. Tolerating lying flat w/out headache.     Review of patient's allergies indicates:   Allergen Reactions    Codeine Shortness Of Breath    Keflex [cephalexin] Shortness Of Breath    Shellfish containing products Nausea And Vomiting    Flu vaccine ts 2011-12(18 yr+) Hives    Sucralfate Other (See Comments) and Hives       Current Facility-Administered Medications   Medication    0.9%  NaCl infusion (for blood administration)    acetaminophen tablet 1,000 mg    atorvastatin tablet 20 mg    bisacodyL suppository 10 mg    calcium carbonate 200 mg calcium (500 mg) chewable tablet 500 mg    celecoxib capsule 200 mg    clonazePAM tablet 0.5 mg    docusate sodium capsule 100 mg    fluticasone furoate-vilanteroL 200-25 mcg/dose diskus inhaler 1 puff    haloperidoL tablet 5 mg    heparin (porcine) injection 5,000 Units    levoFLOXacin 750 mg/150 mL IVPB 750 mg    melatonin tablet 6 mg    methocarbamoL tablet 1,000 mg    metoclopramide HCl injection 5 mg    morphine injection 2 mg    multivitamin tablet    ondansetron injection 4 mg    oxyCODONE immediate release tablet 5 mg    oxyCODONE immediate release tablet Tab 10 mg    pantoprazole EC tablet 40 mg    paroxetine tablet 40 mg    polyethylene glycol packet 17 g    pregabalin capsule 50 mg    sodium chloride 0.9% flush 10 mL    sodium chloride 0.9% flush 10 mL    traZODone tablet 100 mg    vitamin D 1000 units tablet 2,000 Units     Objective:     Vital Signs (Most Recent):  Temp: 96.6 °F (35.9 °C) (10/19/22 0410)  Pulse: 103 (10/19/22 0714)  Resp: 16  (10/19/22 0410)  BP: 117/68 (10/19/22 0410)  SpO2: 95 % (10/19/22 0410)   Vital Signs (24h Range):  Temp:  [96.6 °F (35.9 °C)-97.9 °F (36.6 °C)] 96.6 °F (35.9 °C)  Pulse:  [] 103  Resp:  [15-23] 16  SpO2:  [93 %-100 %] 95 %  BP: (113-170)/() 117/68     Weight: 50.8 kg (111 lb 15.9 oz)  Height: 5' (152.4 cm)  Body mass index is 21.87 kg/m².      Intake/Output Summary (Last 24 hours) at 10/19/2022 0738  Last data filed at 10/19/2022 0515  Gross per 24 hour   Intake 2969.17 ml   Output 2687 ml   Net 282.17 ml         Ortho/SPM Exam  Gen: NAD, frail, ill-appearing  CV: regular rate  Resp: non-labored respirations    Spine:   NAD, A/O x 3.  Wound c/d/i with clean dressing.  4/5 strength RUE and 4/5 L EHL, otherwise NVI per baseline    Significant Labs: All pertinent labs within the past 24 hours have been reviewed.    Significant Imaging: I have reviewed and interpreted all pertinent imaging results/findings.  DVT ultrasound with no DVTs

## 2022-10-19 NOTE — PROGRESS NOTES
[facility] - [facility]  Orthopedics  Progress Note    Patient Name: Merle Caro  MRN: [id number]  Admission Date: 10/14/2022  Hospital Length of Stay: 5 days  Attending Provider: Juni Meza MD  Primary Care Provider: Marck Major MD  Follow-up For: Procedure(s) (LRB):  FUSION,SPINE,POSTERIOR APPROACH (N/A)    Post-Operative Day: 1 Day Post-Op  Subjective:     Principal Problem:Closed fracture of sacrum    Principal Orthopedic Problem:  Same    Interval History:  No acute events overnight.  Tolerated OR well. Pain well controlled. Having episodes of N/V since surgery that she endorses is from albuterol treatments (has been getting them since admission). Endorses suprapubic tenderness and urine has become more clear from yesterday. Endorses she isn't burping or passing flatus. Tolerating lying flat w/out headache.     Review of patient's allergies indicates:   Allergen Reactions    Codeine Shortness Of Breath    Keflex [cephalexin] Shortness Of Breath    Shellfish containing products Nausea And Vomiting    Flu vaccine ts 2011-12(18 yr+) Hives    Sucralfate Other (See Comments) and Hives       Current Facility-Administered Medications   Medication    0.9%  NaCl infusion (for blood administration)    acetaminophen tablet 1,000 mg    atorvastatin tablet 20 mg    bisacodyL suppository 10 mg    calcium carbonate 200 mg calcium (500 mg) chewable tablet 500 mg    celecoxib capsule 200 mg    clonazePAM tablet 0.5 mg    docusate sodium capsule 100 mg    fluticasone furoate-vilanteroL 200-25 mcg/dose diskus inhaler 1 puff    haloperidoL tablet 5 mg    heparin (porcine) injection 5,000 Units    levoFLOXacin 750 mg/150 mL IVPB 750 mg    melatonin tablet 6 mg    methocarbamoL tablet 1,000 mg    metoclopramide HCl injection 5 mg    morphine injection 2 mg    multivitamin tablet    ondansetron injection 4 mg    oxyCODONE immediate release tablet 5 mg    oxyCODONE immediate release tablet Tab 10 mg     pantoprazole EC tablet 40 mg    paroxetine tablet 40 mg    polyethylene glycol packet 17 g    pregabalin capsule 50 mg    sodium chloride 0.9% flush 10 mL    sodium chloride 0.9% flush 10 mL    traZODone tablet 100 mg    vitamin D 1000 units tablet 2,000 Units     Objective:     Vital Signs (Most Recent):  Temp: 96.6 °F (35.9 °C) (10/19/22 0410)  Pulse: 103 (10/19/22 0714)  Resp: 16 (10/19/22 0410)  BP: 117/68 (10/19/22 0410)  SpO2: 95 % (10/19/22 0410)   Vital Signs (24h Range):  Temp:  [96.6 °F (35.9 °C)-97.9 °F (36.6 °C)] 96.6 °F (35.9 °C)  Pulse:  [] 103  Resp:  [15-23] 16  SpO2:  [93 %-100 %] 95 %  BP: (113-170)/() 117/68     Weight: 50.8 kg (111 lb 15.9 oz)  Height: 5' (152.4 cm)  Body mass index is 21.87 kg/m².      Intake/Output Summary (Last 24 hours) at 10/19/2022 0738  Last data filed at 10/19/2022 0515  Gross per 24 hour   Intake 2969.17 ml   Output 2687 ml   Net 282.17 ml         Ortho/SPM Exam  Gen: NAD, frail, ill-appearing  CV: regular rate  Resp: non-labored respirations    Spine:   NAD, A/O x 3.  Wound c/d/i with clean dressing.  4/5 strength RUE and 4/5 L EHL, otherwise NVI per baseline    Significant Labs: All pertinent labs within the past 24 hours have been reviewed.    Significant Imaging: I have reviewed and interpreted all pertinent imaging results/findings.  DVT ultrasound with no DVTs    Assessment/Plan:     * Closed fracture dislocation of sacrum  Merle Caro is a 67 y.o. female with past medical history of hypertension, bipolar disorder, and urinary incontinence (1 year duration) admitted to Orthopedics with what appears to be an approximally 4-month-old sacral fracture dislocation of S1 on S2 with severe canal stenosis at that level resulting in chronic cauda equina symptoms.  She has not been able ambulate since June due to pain.  Initial images reviewed by Dr. Meza who plans to take the patient for surgery on Tuesday October 18th.  CT pelvis completed.  DVT  ultrasound with no DVTs.  Hospital Medicine consulted began a metastatic workup.  Studies so far unrevealing.  Appreciate hospital medicine recommendations. Now s/p L2-Pelvis PSF w L5-S1 Laminectomy and dural injury intraop repaired w dural glue on 10/18.    Plan:   Lay flat for 24hrs postop - will plan to elevate HOB at noon today and if no headache after 1 hr will plan to consult PT/OT (to be WBAT in LSO and spine precautions) and dc nicholson.  Pain control: multimodal  Diet: regular diet  PT/OT: on hold until ensuring no headache after laying flat for 24hrs  DVT prophylaxis:  SubQ heparin; no need for IVC filter at this time  Abx:  preop ancef ordered  Labs:  Stable  Nicholson  No drains    Dispo:  Pending elevating HOB at noon today.       Essential hypertension  Home medications restarted    Asthma with COPD  Home medications restarted  SpO2 goal > 88%; do not titrate supplemental oxygen above this level    Affective bipolar disorder  Home medications restarted    Pure hypercholesterolemia  Home medications restarted    LUCILA on CPAP: Not compliant with CPAP  CPAP          Jerry Lemus MD  Orthopedics  [facility] - Surgery

## 2022-10-19 NOTE — ASSESSMENT & PLAN NOTE
Sees psychiatrist for medications    - continue home klonopin, haldol, paroxetine, trazodone  - initially prolonged QT has improved; minimize other QT prolonging meds such as zofran, etc.  - low threshold to temporarily hold these meds if overly sedated   - delirium precautions

## 2022-10-19 NOTE — ASSESSMENT & PLAN NOTE
Merle Caro is a 67 y.o. female with a 1 year hx of worsening urinary incontinence in the presence of sacral fracture and recent ortho/neurosurgery procedures.     - Consult d/w Staff Urologist  - Strict I's/O's  - Trend Cr, avoid nephrotoxic agents, and dose medications to patient's current GFR  - Nicholson per primary, though we recommend to d/c nicholson as soon as possible due to no prior hx of urinary retention  - No indications for urologic intervention as her CT scan shows no hydro and Cr trend is at baseline  - For chronic bladder pain, recommend tylenol and NSAIDS prn  - Will send a message to [facility] (patients preferred f/u site) to get her scheduled for outpt urology w/u of possible neurogenic bladder and bladder pain syndrome   - All remaining care per primary team

## 2022-10-19 NOTE — ASSESSMENT & PLAN NOTE
On 2L home oxygen  Currently at baseline O2 requirements; no dyspnea, not in acute exacerbation    - not compliant with home breo and spiriva  - home inhalers ordered in perioperative period to avoid pulmonary complications of anesthesia

## 2022-10-20 PROBLEM — D72.829 LEUKOCYTOSIS: Status: RESOLVED | Noted: 2022-10-19 | Resolved: 2022-10-20

## 2022-10-20 PROBLEM — R65.10 SIRS (SYSTEMIC INFLAMMATORY RESPONSE SYNDROME): Status: ACTIVE | Noted: 2022-10-20

## 2022-10-20 PROBLEM — K59.00 CONSTIPATION: Status: ACTIVE | Noted: 2022-10-20

## 2022-10-20 PROBLEM — Z01.818 PRE-OP EVALUATION: Status: RESOLVED | Noted: 2022-10-16 | Resolved: 2022-10-20

## 2022-10-20 LAB
ALBUMIN SERPL BCP-MCNC: 2.1 G/DL (ref 3.5–5.2)
ALBUMIN SERPL BCP-MCNC: 2.1 G/DL (ref 3.5–5.2)
ALP SERPL-CCNC: 120 U/L (ref 55–135)
ALP SERPL-CCNC: 99 U/L (ref 55–135)
ALT SERPL W/O P-5'-P-CCNC: 12 U/L (ref 10–44)
ALT SERPL W/O P-5'-P-CCNC: 14 U/L (ref 10–44)
ANION GAP SERPL CALC-SCNC: 6 MMOL/L (ref 8–16)
ANION GAP SERPL CALC-SCNC: 9 MMOL/L (ref 8–16)
AST SERPL-CCNC: 32 U/L (ref 10–40)
AST SERPL-CCNC: 39 U/L (ref 10–40)
BACTERIA BLD CULT: NORMAL
BACTERIA BLD CULT: NORMAL
BASOPHILS # BLD AUTO: 0.04 K/UL (ref 0–0.2)
BASOPHILS # BLD AUTO: 0.05 K/UL (ref 0–0.2)
BASOPHILS NFR BLD: 0.4 % (ref 0–1.9)
BASOPHILS NFR BLD: 0.4 % (ref 0–1.9)
BILIRUB SERPL-MCNC: 0.4 MG/DL (ref 0.1–1)
BILIRUB SERPL-MCNC: 0.5 MG/DL (ref 0.1–1)
BILIRUB UR QL STRIP: NEGATIVE
BNP SERPL-MCNC: 11 PG/ML (ref 0–99)
BUN SERPL-MCNC: 12 MG/DL (ref 8–23)
BUN SERPL-MCNC: 9 MG/DL (ref 8–23)
CALCIUM SERPL-MCNC: 8.5 MG/DL (ref 8.7–10.5)
CALCIUM SERPL-MCNC: 8.7 MG/DL (ref 8.7–10.5)
CHLORIDE SERPL-SCNC: 94 MMOL/L (ref 95–110)
CHLORIDE SERPL-SCNC: 96 MMOL/L (ref 95–110)
CLARITY UR REFRACT.AUTO: CLEAR
CO2 SERPL-SCNC: 27 MMOL/L (ref 23–29)
CO2 SERPL-SCNC: 31 MMOL/L (ref 23–29)
COLOR UR AUTO: YELLOW
CREAT SERPL-MCNC: 0.5 MG/DL (ref 0.5–1.4)
CREAT SERPL-MCNC: 0.5 MG/DL (ref 0.5–1.4)
CREAT UR-MCNC: 20 MG/DL (ref 15–325)
DIFFERENTIAL METHOD: ABNORMAL
DIFFERENTIAL METHOD: ABNORMAL
EOSINOPHIL # BLD AUTO: 0.2 K/UL (ref 0–0.5)
EOSINOPHIL # BLD AUTO: 0.3 K/UL (ref 0–0.5)
EOSINOPHIL NFR BLD: 2.1 % (ref 0–8)
EOSINOPHIL NFR BLD: 2.2 % (ref 0–8)
ERYTHROCYTE [DISTWIDTH] IN BLOOD BY AUTOMATED COUNT: 14.6 % (ref 11.5–14.5)
ERYTHROCYTE [DISTWIDTH] IN BLOOD BY AUTOMATED COUNT: 14.7 % (ref 11.5–14.5)
EST. GFR  (NO RACE VARIABLE): >60 ML/MIN/1.73 M^2
EST. GFR  (NO RACE VARIABLE): >60 ML/MIN/1.73 M^2
GLUCOSE SERPL-MCNC: 110 MG/DL (ref 70–110)
GLUCOSE SERPL-MCNC: 121 MG/DL (ref 70–110)
GLUCOSE UR QL STRIP: NEGATIVE
HCT VFR BLD AUTO: 27.5 % (ref 37–48.5)
HCT VFR BLD AUTO: 27.7 % (ref 37–48.5)
HGB BLD-MCNC: 8.6 G/DL (ref 12–16)
HGB BLD-MCNC: 8.7 G/DL (ref 12–16)
HGB UR QL STRIP: NEGATIVE
IMM GRANULOCYTES # BLD AUTO: 0.05 K/UL (ref 0–0.04)
IMM GRANULOCYTES # BLD AUTO: 0.08 K/UL (ref 0–0.04)
IMM GRANULOCYTES NFR BLD AUTO: 0.5 % (ref 0–0.5)
IMM GRANULOCYTES NFR BLD AUTO: 0.7 % (ref 0–0.5)
KETONES UR QL STRIP: NEGATIVE
LACTATE SERPL-SCNC: 1.1 MMOL/L (ref 0.5–2.2)
LEUKOCYTE ESTERASE UR QL STRIP: NEGATIVE
LYMPHOCYTES # BLD AUTO: 1 K/UL (ref 1–4.8)
LYMPHOCYTES # BLD AUTO: 1.5 K/UL (ref 1–4.8)
LYMPHOCYTES NFR BLD: 14 % (ref 18–48)
LYMPHOCYTES NFR BLD: 8.1 % (ref 18–48)
MAGNESIUM SERPL-MCNC: 2 MG/DL (ref 1.6–2.6)
MCH RBC QN AUTO: 28.5 PG (ref 27–31)
MCH RBC QN AUTO: 28.6 PG (ref 27–31)
MCHC RBC AUTO-ENTMCNC: 31.3 G/DL (ref 32–36)
MCHC RBC AUTO-ENTMCNC: 31.4 G/DL (ref 32–36)
MCV RBC AUTO: 91 FL (ref 82–98)
MCV RBC AUTO: 91 FL (ref 82–98)
MONOCYTES # BLD AUTO: 0.7 K/UL (ref 0.3–1)
MONOCYTES # BLD AUTO: 0.9 K/UL (ref 0.3–1)
MONOCYTES NFR BLD: 5.9 % (ref 4–15)
MONOCYTES NFR BLD: 8.8 % (ref 4–15)
NEUTROPHILS # BLD AUTO: 7.9 K/UL (ref 1.8–7.7)
NEUTROPHILS # BLD AUTO: 9.9 K/UL (ref 1.8–7.7)
NEUTROPHILS NFR BLD: 74.1 % (ref 38–73)
NEUTROPHILS NFR BLD: 82.8 % (ref 38–73)
NITRITE UR QL STRIP: NEGATIVE
NRBC BLD-RTO: 0 /100 WBC
NRBC BLD-RTO: 0 /100 WBC
OSMOLALITY SERPL: 279 MOSM/KG (ref 275–295)
OSMOLALITY UR: 182 MOSM/KG (ref 50–1200)
PH UR STRIP: 6 [PH] (ref 5–8)
PHOSPHATE SERPL-MCNC: 3.4 MG/DL (ref 2.7–4.5)
PLATELET # BLD AUTO: 355 K/UL (ref 150–450)
PLATELET # BLD AUTO: 361 K/UL (ref 150–450)
PMV BLD AUTO: 10 FL (ref 9.2–12.9)
PMV BLD AUTO: 9.5 FL (ref 9.2–12.9)
POTASSIUM SERPL-SCNC: 3.4 MMOL/L (ref 3.5–5.1)
POTASSIUM SERPL-SCNC: 3.9 MMOL/L (ref 3.5–5.1)
PROT SERPL-MCNC: 5.2 G/DL (ref 6–8.4)
PROT SERPL-MCNC: 5.5 G/DL (ref 6–8.4)
PROT UR QL STRIP: NEGATIVE
RBC # BLD AUTO: 3.02 M/UL (ref 4–5.4)
RBC # BLD AUTO: 3.04 M/UL (ref 4–5.4)
SODIUM SERPL-SCNC: 131 MMOL/L (ref 136–145)
SODIUM SERPL-SCNC: 132 MMOL/L (ref 136–145)
SODIUM UR-SCNC: 11 MMOL/L (ref 20–250)
SP GR UR STRIP: 1.01 (ref 1–1.03)
TROPONIN I SERPL DL<=0.01 NG/ML-MCNC: 0.01 NG/ML (ref 0–0.03)
URN SPEC COLLECT METH UR: NORMAL
WBC # BLD AUTO: 10.69 K/UL (ref 3.9–12.7)
WBC # BLD AUTO: 11.96 K/UL (ref 3.9–12.7)

## 2022-10-20 PROCEDURE — 27000221 HC OXYGEN, UP TO 24 HOURS

## 2022-10-20 PROCEDURE — 83880 ASSAY OF NATRIURETIC PEPTIDE: CPT | Performed by: STUDENT IN AN ORGANIZED HEALTH CARE EDUCATION/TRAINING PROGRAM

## 2022-10-20 PROCEDURE — 80053 COMPREHEN METABOLIC PANEL: CPT | Mod: 91 | Performed by: STUDENT IN AN ORGANIZED HEALTH CARE EDUCATION/TRAINING PROGRAM

## 2022-10-20 PROCEDURE — 25000003 PHARM REV CODE 250

## 2022-10-20 PROCEDURE — 11000001 HC ACUTE MED/SURG PRIVATE ROOM

## 2022-10-20 PROCEDURE — 86580 TB INTRADERMAL TEST: CPT | Performed by: ORTHOPAEDIC SURGERY

## 2022-10-20 PROCEDURE — 83605 ASSAY OF LACTIC ACID: CPT | Performed by: STUDENT IN AN ORGANIZED HEALTH CARE EDUCATION/TRAINING PROGRAM

## 2022-10-20 PROCEDURE — 36415 COLL VENOUS BLD VENIPUNCTURE: CPT | Performed by: STUDENT IN AN ORGANIZED HEALTH CARE EDUCATION/TRAINING PROGRAM

## 2022-10-20 PROCEDURE — 83935 ASSAY OF URINE OSMOLALITY: CPT | Performed by: STUDENT IN AN ORGANIZED HEALTH CARE EDUCATION/TRAINING PROGRAM

## 2022-10-20 PROCEDURE — 30200315 PPD INTRADERMAL TEST REV CODE 302: Performed by: ORTHOPAEDIC SURGERY

## 2022-10-20 PROCEDURE — 84484 ASSAY OF TROPONIN QUANT: CPT | Performed by: STUDENT IN AN ORGANIZED HEALTH CARE EDUCATION/TRAINING PROGRAM

## 2022-10-20 PROCEDURE — 82525 ASSAY OF COPPER: CPT | Performed by: STUDENT IN AN ORGANIZED HEALTH CARE EDUCATION/TRAINING PROGRAM

## 2022-10-20 PROCEDURE — 84300 ASSAY OF URINE SODIUM: CPT | Performed by: STUDENT IN AN ORGANIZED HEALTH CARE EDUCATION/TRAINING PROGRAM

## 2022-10-20 PROCEDURE — 94640 AIRWAY INHALATION TREATMENT: CPT

## 2022-10-20 PROCEDURE — 83735 ASSAY OF MAGNESIUM: CPT | Performed by: STUDENT IN AN ORGANIZED HEALTH CARE EDUCATION/TRAINING PROGRAM

## 2022-10-20 PROCEDURE — 99233 PR SUBSEQUENT HOSPITAL CARE,LEVL III: ICD-10-PCS | Mod: GC,,, | Performed by: HOSPITALIST

## 2022-10-20 PROCEDURE — 83930 ASSAY OF BLOOD OSMOLALITY: CPT | Performed by: STUDENT IN AN ORGANIZED HEALTH CARE EDUCATION/TRAINING PROGRAM

## 2022-10-20 PROCEDURE — 99900031 HC PATIENT EDUCATION (STAT)

## 2022-10-20 PROCEDURE — 82570 ASSAY OF URINE CREATININE: CPT | Performed by: STUDENT IN AN ORGANIZED HEALTH CARE EDUCATION/TRAINING PROGRAM

## 2022-10-20 PROCEDURE — 84100 ASSAY OF PHOSPHORUS: CPT | Performed by: STUDENT IN AN ORGANIZED HEALTH CARE EDUCATION/TRAINING PROGRAM

## 2022-10-20 PROCEDURE — 51701 INSERT BLADDER CATHETER: CPT

## 2022-10-20 PROCEDURE — 97530 THERAPEUTIC ACTIVITIES: CPT

## 2022-10-20 PROCEDURE — 87040 BLOOD CULTURE FOR BACTERIA: CPT | Performed by: STUDENT IN AN ORGANIZED HEALTH CARE EDUCATION/TRAINING PROGRAM

## 2022-10-20 PROCEDURE — 25000003 PHARM REV CODE 250: Performed by: STUDENT IN AN ORGANIZED HEALTH CARE EDUCATION/TRAINING PROGRAM

## 2022-10-20 PROCEDURE — 97112 NEUROMUSCULAR REEDUCATION: CPT

## 2022-10-20 PROCEDURE — 81003 URINALYSIS AUTO W/O SCOPE: CPT | Performed by: STUDENT IN AN ORGANIZED HEALTH CARE EDUCATION/TRAINING PROGRAM

## 2022-10-20 PROCEDURE — 97161 PT EVAL LOW COMPLEX 20 MIN: CPT

## 2022-10-20 PROCEDURE — 85025 COMPLETE CBC W/AUTO DIFF WBC: CPT | Mod: 91 | Performed by: STUDENT IN AN ORGANIZED HEALTH CARE EDUCATION/TRAINING PROGRAM

## 2022-10-20 PROCEDURE — 97165 OT EVAL LOW COMPLEX 30 MIN: CPT

## 2022-10-20 PROCEDURE — 63600175 PHARM REV CODE 636 W HCPCS: Performed by: STUDENT IN AN ORGANIZED HEALTH CARE EDUCATION/TRAINING PROGRAM

## 2022-10-20 PROCEDURE — 94761 N-INVAS EAR/PLS OXIMETRY MLT: CPT

## 2022-10-20 PROCEDURE — 99233 SBSQ HOSP IP/OBS HIGH 50: CPT | Mod: GC,,, | Performed by: HOSPITALIST

## 2022-10-20 PROCEDURE — 97535 SELF CARE MNGMENT TRAINING: CPT

## 2022-10-20 PROCEDURE — 85025 COMPLETE CBC W/AUTO DIFF WBC: CPT

## 2022-10-20 PROCEDURE — 99900035 HC TECH TIME PER 15 MIN (STAT)

## 2022-10-20 PROCEDURE — 25000242 PHARM REV CODE 250 ALT 637 W/ HCPCS: Performed by: STUDENT IN AN ORGANIZED HEALTH CARE EDUCATION/TRAINING PROGRAM

## 2022-10-20 RX ORDER — POLYETHYLENE GLYCOL 3350 17 G/17G
17 POWDER, FOR SOLUTION ORAL 2 TIMES DAILY
Status: DISCONTINUED | OUTPATIENT
Start: 2022-10-20 | End: 2022-10-22

## 2022-10-20 RX ORDER — TRAZODONE HYDROCHLORIDE 50 MG/1
50 TABLET ORAL NIGHTLY
Status: DISCONTINUED | OUTPATIENT
Start: 2022-10-20 | End: 2022-10-23

## 2022-10-20 RX ORDER — VANCOMYCIN HCL IN 5 % DEXTROSE 1G/250ML
20 PLASTIC BAG, INJECTION (ML) INTRAVENOUS ONCE
Status: COMPLETED | OUTPATIENT
Start: 2022-10-20 | End: 2022-10-21

## 2022-10-20 RX ORDER — FLUTICASONE PROPIONATE 50 MCG
2 SPRAY, SUSPENSION (ML) NASAL DAILY
Status: DISCONTINUED | OUTPATIENT
Start: 2022-10-20 | End: 2022-10-22

## 2022-10-20 RX ADMIN — ACETAMINOPHEN 1000 MG: 325 TABLET ORAL at 02:10

## 2022-10-20 RX ADMIN — FLUTICASONE FUROATE AND VILANTEROL TRIFENATATE 1 PUFF: 200; 25 POWDER RESPIRATORY (INHALATION) at 10:10

## 2022-10-20 RX ADMIN — CLONAZEPAM 0.5 MG: 0.5 TABLET ORAL at 09:10

## 2022-10-20 RX ADMIN — ATORVASTATIN CALCIUM 20 MG: 20 TABLET, FILM COATED ORAL at 10:10

## 2022-10-20 RX ADMIN — PAROXETINE HYDROCHLORIDE 40 MG: 10 TABLET, FILM COATED ORAL at 09:10

## 2022-10-20 RX ADMIN — THIAMINE HYDROCHLORIDE 500 MG: 100 INJECTION, SOLUTION INTRAMUSCULAR; INTRAVENOUS at 10:10

## 2022-10-20 RX ADMIN — TUBERCULIN PURIFIED PROTEIN DERIVATIVE 5 UNITS: 5 INJECTION, SOLUTION INTRADERMAL at 01:10

## 2022-10-20 RX ADMIN — HEPARIN SODIUM 5000 UNITS: 5000 INJECTION INTRAVENOUS; SUBCUTANEOUS at 02:10

## 2022-10-20 RX ADMIN — ACETAMINOPHEN 1000 MG: 325 TABLET ORAL at 06:10

## 2022-10-20 RX ADMIN — HALOPERIDOL 5 MG: 5 TABLET ORAL at 09:10

## 2022-10-20 RX ADMIN — POTASSIUM BICARBONATE 40 MEQ: 391 TABLET, EFFERVESCENT ORAL at 10:10

## 2022-10-20 RX ADMIN — ENEMA 1 ENEMA: 19; 7 ENEMA RECTAL at 03:10

## 2022-10-20 RX ADMIN — SODIUM CHLORIDE, SODIUM LACTATE, POTASSIUM CHLORIDE, AND CALCIUM CHLORIDE 500 ML: .6; .31; .03; .02 INJECTION, SOLUTION INTRAVENOUS at 06:10

## 2022-10-20 RX ADMIN — THIAMINE HYDROCHLORIDE 500 MG: 100 INJECTION, SOLUTION INTRAMUSCULAR; INTRAVENOUS at 02:10

## 2022-10-20 RX ADMIN — ACETAMINOPHEN 1000 MG: 325 TABLET ORAL at 09:10

## 2022-10-20 RX ADMIN — CELECOXIB 200 MG: 200 CAPSULE ORAL at 10:10

## 2022-10-20 RX ADMIN — HEPARIN SODIUM 5000 UNITS: 5000 INJECTION INTRAVENOUS; SUBCUTANEOUS at 06:10

## 2022-10-20 RX ADMIN — TRAZODONE HYDROCHLORIDE 50 MG: 50 TABLET ORAL at 09:10

## 2022-10-20 RX ADMIN — DOCUSATE SODIUM 100 MG: 100 CAPSULE ORAL at 10:10

## 2022-10-20 RX ADMIN — HEPARIN SODIUM 5000 UNITS: 5000 INJECTION INTRAVENOUS; SUBCUTANEOUS at 09:10

## 2022-10-20 RX ADMIN — FLUTICASONE PROPIONATE 100 MCG: 50 SPRAY, METERED NASAL at 01:10

## 2022-10-20 RX ADMIN — METHOCARBAMOL 1000 MG: 500 TABLET ORAL at 02:10

## 2022-10-20 RX ADMIN — PANTOPRAZOLE SODIUM 40 MG: 40 TABLET, DELAYED RELEASE ORAL at 10:10

## 2022-10-20 RX ADMIN — PREGABALIN 50 MG: 50 CAPSULE ORAL at 10:10

## 2022-10-20 RX ADMIN — TIOTROPIUM BROMIDE INHALATION SPRAY 2 PUFF: 3.12 SPRAY, METERED RESPIRATORY (INHALATION) at 10:10

## 2022-10-20 RX ADMIN — METHOCARBAMOL 1000 MG: 500 TABLET ORAL at 09:10

## 2022-10-20 RX ADMIN — PREGABALIN 50 MG: 50 CAPSULE ORAL at 09:10

## 2022-10-20 RX ADMIN — SODIUM CHLORIDE, SODIUM LACTATE, POTASSIUM CHLORIDE, AND CALCIUM CHLORIDE 500 ML: .6; .31; .03; .02 INJECTION, SOLUTION INTRAVENOUS at 04:10

## 2022-10-20 RX ADMIN — THERA TABS 1 TABLET: TAB at 10:10

## 2022-10-20 RX ADMIN — CHOLECALCIFEROL TAB 25 MCG (1000 UNIT) 2000 UNITS: 25 TAB at 09:10

## 2022-10-20 RX ADMIN — METHOCARBAMOL 1000 MG: 500 TABLET ORAL at 10:10

## 2022-10-20 NOTE — PLAN OF CARE
SSC met with patient/family at bedside. Patient experience rounding completed and reviewed the following.     Do you know your discharge plan? Yes     If yes, what is the plan? Rehab      Have you discussed your needs and preferences with your SW/CM? Yes or No     Assigned SW/CM notified of any patient/family needs or concerns.

## 2022-10-20 NOTE — ASSESSMENT & PLAN NOTE
Concerning unexplained weightloss of >60lbs in last year, in combination with severe functional decline, pathologic fractures, and B symptoms (see HPI for full details).  Has history of pulmonary nodule due for 12 month followup+mediastinal lymphadenopathy.  Due for colonoscopy (last 2011 with 10 year f/u recommended); having early satiety and bloating symptoms  Discovered patient has history of two brothers dying early from cardiac issues that were said to be genetic, possibly arrhythmia va MI based on description    Workup:  - CT C/A/P with contrast: no lung changes or lymphadenopathy  - CT head given some concern for slowed mentation: no acute changes or intracranial lesions  - HIV, HCV,TSH, RPR, quant gold, blood cultures wnl  - CA 19-9, CA-125, CEA wnl  - TTE without concerning findings  - SPEP, YIN normal and without monoclonal peaks. K/L ratio normal  - Peripheral smear: possible early neutrophil hypersegmentation    Plan:  - given abdominal bloating symptoms, reported recent melena, GI planning for inpatient EGD 10/21, and outpatient colonoscopy in the future (concerned she cannot tolerate bowel prep at this time)  - only minimal confusion and slight nystagmus; continue empiric high-dose thiamine taper since she's at risk for nutritional deficiencies/Wernicke's  - discussed with oncology service via phone about early hypersegmented neutrophils: will also check copper levels. With normal CBC differential and negative rest of workup, stated there was low suspicion for myeloproliferative process at this time, but may benefit from an outpatient hematology f/u.

## 2022-10-20 NOTE — PLAN OF CARE
10/20/22 1239   Post-Acute Status   Post-Acute Authorization Placement   Post-Acute Placement Status Referrals Sent     SW faxed referral to [place], [place], [place], [place], [facility] [place] , [facility] via [facility] for review. SW will follow up as needed.    3:11 PM  SW completed LOCET and faxed PASRR to state. Waiting on 142.    Kemi Martinez LMSW  Case Management   [facility]   Ext. [phone]

## 2022-10-20 NOTE — PLAN OF CARE
Spoke with PT/OT following eval. Therapy recommending Skilled nursing care at discharge. Patient and spouse agreeable to placement. Discussed NH SNF in their area. Sw to place referrals to the following in order of preference. [facility], [facility], [facility], [facility], [facility], [facility], [facility] and [facility]. Will continue to follow for needs.

## 2022-10-20 NOTE — ASSESSMENT & PLAN NOTE
Reported by patient and . Urology evaluated inpatient and state her history is not consistent with retaining urine.  - continue to monitor  - nicholson removed

## 2022-10-20 NOTE — CARE UPDATE
Patient becoming more hypotensive, tachycardic, temp 96.3F today with mildly increased lethargy.   Septic workup performed this afternoon: CXR unchanged, UA without infection, blood cultures pending, lactate normal.  Considered PE given recent surgery, but BNP and troponin obtained for risk stratification are negative and she is at baseline O2 requirements.     - will give total of 1L bolus (appears dry and urine Na very low at 11)  - will initiate broad spectrum antibiotics while awaiting blood cultures to result (vanc/zosyn as she has reported dyspnea allergy to cephlex so will avoid cefepime)  - if patient decompensates overnight, may page overnight hospitalist or consider calling rapid response team

## 2022-10-20 NOTE — ASSESSMENT & PLAN NOTE
Patient with severe constipation since admission.  Trialed enema, but unable to stay in rectum due to lack of rectal tone; leaked out and not effective.    - trialed bisacodyl suppository today; f/u results  - KUB obtained to eval for developing obstruction given vomiting overnight and hyperactive bowel sounds; no clear obstruction seen but large volume stool in R colon  - continue daily miralax and senna  - will consider oral lactulose vs disimpaction tomorrow
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
## 2022-10-20 NOTE — PROGRESS NOTES
[facility] Medicine  Progress Note    Patient Name: Merle Caro  MRN: [id number]  Patient Class: IP- Inpatient   Admission Date: 10/14/2022  Length of Stay: 6 days  Attending Physician: Juni Meza MD  Primary Care Provider: Marck Major MD        Subjective:     Principal Problem:Closed fracture of sacrum        HPI:  Ms. Caro is a 67F with hx of HTN, bipolar disorder, tobacco use, COPD on 2L home O2 who is admitted to the hospital to ortho service for upcoming operative repair of displaced sacral fracture. Hospital medicine is consulted for medical optimization.  She and  at bedside report a concerning history. Around a year ago, she began losing weight unexpectedly (has lost 60lbs in 1 year). Her PCP was working her up for this but no cause has yet been found. Then, around June of this year, she and her  were on a [place] road trip when she began to experience severe buttocks pain. It became so severe that she could no longer walk, and has been non-ambulatory since June 2022. Initially the pain was thought 2/2 thrombosed hemorrhoids, but after CRS evaluation, this was NOT thought to be the cause of her pain. Eventually lower back imaging was ordered and showed extensive and pathologic fractures of the sacrum with grade 5 spondylolisthesis causing severe central canal stenosis on S1-S2, bilateral sacral ala fractures, displaced pubic rami fractures which closely abut the urethra/vaginal wall. She was then sent here for Ortho evaluation. She denies any known injuries, falls, or physical abuse that could have caused these fractures. She was recently found to have osteoporosis but had not yet started treatment.   In addition to the concerning weightloss and being nonambulatory, she reports are early satiety/bloating, decreased appetite, drenching night sweats several times/week, urinary incontinence, inability to sense having a bowel movement, chronic dry cough. She  reports a family history of ovarian cancer (sister in her 20s), lung cancer in mother (in 40s, was a smoker). The patient herself has a history of pulmonary nodules and has not had a colonoscopy in 11 years (10y f/u recommended).   She is currently afebrile, hemodynamically stable.  Wt Readings from Last 20 Encounters:   10/15/22 50.8 kg (111 lb 15.9 oz)   10/14/22 54.4 kg (120 lb)   09/27/22 60.8 kg (134 lb)   08/22/22 60.8 kg (134 lb)   06/21/22 61.7 kg (136 lb 0.4 oz)   05/19/22 64 kg (141 lb 1.5 oz)   05/19/22 64.8 kg (142 lb 13.7 oz)   05/03/22 65.5 kg (144 lb 6.4 oz)   04/25/22 69.4 kg (153 lb)   03/02/22 69.4 kg (153 lb)   02/03/22 69.4 kg (153 lb)   01/12/22 71.2 kg (157 lb)   01/07/22 71.4 kg (157 lb 6.5 oz)   01/05/22 71.7 kg (158 lb)   12/14/21 72.1 kg (158 lb 13.8 oz)   11/17/21 73.9 kg (163 lb)   11/08/21 74.1 kg (163 lb 5.8 oz)   11/03/21 74.7 kg (164 lb 10.9 oz)   09/21/21 74.7 kg (164 lb 10.9 oz)   07/20/21 76.7 kg (169 lb 1.5 oz)           Overview/Hospital Course:  No notes on file    Interval History: Patient with several episode of borderline hypotension and worsening tachycardia to 115 today, Temp 96F.  at bedside feels she is more lethargic despite not really taking the opiate pain meds, and only having robaxin 1x. She states she has a cough but this is somewhat chronic. Her nicholson was removed yesterday.     Review of Systems  Objective:     Vital Signs (Most Recent):  Temp: 96.6 °F (35.9 °C) (10/20/22 1220)  Pulse: (!) 115 (10/20/22 1220)  Resp: 18 (10/20/22 1040)  BP: (!) 97/55 (10/20/22 1220)  SpO2: (!) 93 % (10/20/22 1220)   Vital Signs (24h Range):  Temp:  [96.3 °F (35.7 °C)-99.3 °F (37.4 °C)] 96.6 °F (35.9 °C)  Pulse:  [101-115] 115  Resp:  [18-20] 18  SpO2:  [89 %-98 %] 93 %  BP: ()/(52-62) 97/55     Weight: 50.8 kg (111 lb 15.9 oz)  Body mass index is 21.87 kg/m².    Intake/Output Summary (Last 24 hours) at 10/20/2022 1302  Last data filed at 10/20/2022 0621  Gross per 24  hour   Intake 400 ml   Output 2500 ml   Net -2100 ml      Physical Exam  Vitals and nursing note reviewed.   Constitutional:       General: She is not in acute distress.     Appearance: She is ill-appearing (chronically). She is not toxic-appearing or diaphoretic.   Cardiovascular:      Rate and Rhythm: Regular rhythm. Tachycardia present.   Pulmonary:      Effort: Pulmonary effort is normal. No respiratory distress.      Breath sounds: Rhonchi present.   Abdominal:      Palpations: Abdomen is soft.      Tenderness: There is no guarding.   Skin:     Coloration: Skin is pale.   Neurological:      Mental Status: Mental status is at baseline. She is lethargic.      Cranial Nerves: No dysarthria.      Comments: Lethargic but arouses with repeated verbal stimuli and can answer questions appropriately.   Psychiatric:         Mood and Affect: Mood normal.         Behavior: Behavior normal.       Significant Labs: All pertinent labs within the past 24 hours have been reviewed.    Significant Imaging: I have reviewed all pertinent imaging results/findings within the past 24 hours.      Assessment/Plan:      * Closed fracture dislocation of sacrum  MRI and CT spine showing extensive displaced fractures of the sacrum causing central canal stenosis, pubic rami fractures,   Has significant osteoporosis recently diagnosed, had not yet begun treatment.    - s/p L2-pelvis spinal fusion, lumbosacral laminectomies/cauda equina decompression, and pelvic fixation surgery on 10/18  - needs osteoporosis treatment outpatient  - PT recommended SNF on discharge    SIRS (systemic inflammatory response syndrome)  On 10/19-10/20, patient developed worsening hypotension, tachycardia to 115, hypothermia to 96F.  Appears more lethargic today, though receiving minimal opioids or muscle relaxers.    - f/u septic workup with lactate, blood cultures, UA, CXR  - low threshold to start broad spectrum antibiotics  - will also include BNP/Troponin with  lab draw in case hypoxia worsens and PE is suspected (though suspicion for this is lower at this time)      Unexplained weight loss  Concerning unexplained weightloss of >60lbs in last year, in combination with severe functional decline, pathologic fractures, and B symptoms (see HPI for full details).  Has history of pulmonary nodule due for 12 month followup+mediastinal lymphadenopathy.  Due for colonoscopy (last 2011 with 10 year f/u recommended); having early satiety and bloating symptoms  Discovered patient has history of two brothers dying early from cardiac issues that were said to be genetic, possibly arrhythmia va MI based on description    Workup:  - CT C/A/P with contrast: no lung changes or lymphadenopathy  - CT head given some concern for slowed mentation: no acute changes or intracranial lesions  - HIV, HCV,TSH, RPR, quant gold, blood cultures wnl  - CA 19-9, CA-125, CEA wnl  - TTE without concerning findings  - SPEP, YIN normal and without monoclonal peaks. K/L ratio normal  - Peripheral smear: possible early neutrophil hypersegmentation    Plan:  - given abdominal bloating symptoms, reported recent melena, GI planning for inpatient EGD 10/21, and outpatient colonoscopy in the future (concerned she cannot tolerate bowel prep at this time)  - only minimal confusion and slight nystagmus; continue empiric high-dose thiamine taper since she's at risk for nutritional deficiencies/Wernicke's  - discussed with oncology service via phone about early hypersegmented neutrophils: will also check copper levels. With normal CBC differential and negative rest of workup, stated there was low suspicion for myeloproliferative process at this time, but may benefit from an outpatient hematology f/u.         Urinary incontinence  Reported by patient and . Urology evaluated inpatient and state her history is not consistent with retaining urine.  - continue to monitor  - nicholson removed      Severe malnutrition  See  "unexplained weightloss    Coronary artery disease  Triple-vessel CAD seen on recent CT C/A/P from 10/15.  No history of coronary events. No current chest pain or anginal symptoms.  ASCVD 10-year risk of cardiovascular event 13.1%, mod-high intensity statin indicated    - Low theshold for repeat EKG and troponins if becomes symptomatic  - continue atorvastatin 20mg daily; current dose is appropriate      Prolonged Q-T interval on ECG  QTc 534 on admission.   Reported family history of sudden cardiac deaths.  On multiple QT prolonging home psychiatric meds: klonopin, trazodone, haldol, paroxetine    - several repeat EKGs with improved QT to 470; will discontinue daily EKGs  - continue home meds as these are long term  - keep K>4, Mg>2, Phos >3 - will add to daily labs  - caution with any anti-emetics or other QT-prolonging agents in perioperative setting as she is high risk for arrhythmias      Early satiety  See "unexplained weightloss"      Osteoporosis with current pathological fracture  Recently diagnosed on DEXA with T -3.9 at L spine and T -4.1 at L femoral area.  Has not yet started treatment.  Could be contributing to extensive pelvic fractures, but location seems atypical and other causes need to be evaluated  Has history of increased PTH of unknown cause; self-resolved    - f/u with PCP for osteoporosis tx      Benzodiazepine dependence  See bipolar disorder      History of colon polyps  See "unexplained weight loss"      Chronic respiratory failure with hypoxia  2/2 asthma and COPD  See "asthma"    Essential hypertension  - hold home lisinopril 10mg while with borderline blood pressures      Asthma with COPD  On 2L home oxygen  Currently at baseline O2 requirements; no dyspnea, not in acute exacerbation    - not compliant with home breo and spiriva  - home inhalers ordered in perioperative period to avoid pulmonary complications of anesthesia    Affective bipolar disorder  Sees psychiatrist for " medications    - continue home klonopin, haldol, paroxetine, trazodone (half trazodone to 50mg as patient is lethargic this morning)  - initially prolonged QT has improved; minimize other QT prolonging meds such as zofran, etc.  - delirium precautions    Pure hypercholesterolemia  - continue home statin; substituting atorvastatin 20mg daily due to hospital formulary  - ASCVD 10-year risk of cardiac event 13%, moderate-high intensity statin recommended; current dose appropriate      LUCILA on CPAP: Not compliant with CPAP  - CPAP qhs ordered  - patient may no longer need since has lost nearly 100 lbs  - does not use home      VTE Risk Mitigation (From admission, onward)         Ordered     heparin (porcine) injection 5,000 Units  Every 8 hours         10/18/22 1233     heparin (porcine) injection 5,000 Units  Every 8 hours         10/15/22 0912     Place sequential compression device  Until discontinued         10/14/22 1637                Discharge Planning   YELENA: 10/24/2022     Code Status: Full Code   Is the patient medically ready for discharge?:     Reason for patient still in hospital (select all that apply): Patient trending condition  Discharge Plan A: Home with family                  Brittni Best MD  Department of Hospital Medicine   [facility]

## 2022-10-20 NOTE — SUBJECTIVE & OBJECTIVE
Interval History: Patient with several episode of borderline hypotension and worsening tachycardia to 115 today, Temp 96F.  at bedside feels she is more lethargic despite not really taking the opiate pain meds, and only having robaxin 1x. She states she has a cough but this is somewhat chronic. Her nicholson was removed yesterday.     Review of Systems  Objective:     Vital Signs (Most Recent):  Temp: 96.6 °F (35.9 °C) (10/20/22 1220)  Pulse: (!) 115 (10/20/22 1220)  Resp: 18 (10/20/22 1040)  BP: (!) 97/55 (10/20/22 1220)  SpO2: (!) 93 % (10/20/22 1220)   Vital Signs (24h Range):  Temp:  [96.3 °F (35.7 °C)-99.3 °F (37.4 °C)] 96.6 °F (35.9 °C)  Pulse:  [101-115] 115  Resp:  [18-20] 18  SpO2:  [89 %-98 %] 93 %  BP: ()/(52-62) 97/55     Weight: 50.8 kg (111 lb 15.9 oz)  Body mass index is 21.87 kg/m².    Intake/Output Summary (Last 24 hours) at 10/20/2022 1302  Last data filed at 10/20/2022 0621  Gross per 24 hour   Intake 400 ml   Output 2500 ml   Net -2100 ml      Physical Exam  Vitals and nursing note reviewed.   Constitutional:       General: She is not in acute distress.     Appearance: She is ill-appearing (chronically). She is not toxic-appearing or diaphoretic.   Cardiovascular:      Rate and Rhythm: Regular rhythm. Tachycardia present.   Pulmonary:      Effort: Pulmonary effort is normal. No respiratory distress.      Breath sounds: Rhonchi present.   Abdominal:      Palpations: Abdomen is soft.      Tenderness: There is no guarding.   Skin:     Coloration: Skin is pale.   Neurological:      Mental Status: Mental status is at baseline. She is lethargic.      Cranial Nerves: No dysarthria.      Comments: Lethargic but arouses with repeated verbal stimuli and can answer questions appropriately.   Psychiatric:         Mood and Affect: Mood normal.         Behavior: Behavior normal.       Significant Labs: All pertinent labs within the past 24 hours have been reviewed.    Significant Imaging: I have  reviewed all pertinent imaging results/findings within the past 24 hours.

## 2022-10-20 NOTE — PROGRESS NOTES
[facility]  Orthopedics  Progress Note    Patient Name: Merle Caro  MRN: [id number]  Admission Date: 10/14/2022  Hospital Length of Stay: 6 days  Attending Provider: Juni Meza MD  Primary Care Provider: Marck Major MD  Follow-up For: Procedure(s) (LRB):  FUSION,SPINE,POSTERIOR APPROACH (N/A)    Post-Operative Day: 2 Days Post-Op  Subjective:     Principal Problem:Closed fracture of sacrum    Principal Orthopedic Problem:  Same    Interval History:  No acute events overnight. Suprapubic tenderness and N/V has improved. Slept well overnight. No headaches and tolerated HOB elevated yesterday. Leija dc'd and urology just recc outpatient f/u (they will schedule). Hasn't worked w PT yet.     Review of patient's allergies indicates:   Allergen Reactions    Codeine Shortness Of Breath    Keflex [cephalexin] Shortness Of Breath    Shellfish containing products Nausea And Vomiting    Flu vaccine ts 2011-12(18 yr+) Hives    Sucralfate Other (See Comments) and Hives       Current Facility-Administered Medications   Medication    0.9%  NaCl infusion (for blood administration)    acetaminophen tablet 1,000 mg    atorvastatin tablet 20 mg    bisacodyL suppository 10 mg    calcium carbonate 200 mg calcium (500 mg) chewable tablet 500 mg    celecoxib capsule 200 mg    clonazePAM tablet 0.5 mg    docusate sodium capsule 100 mg    fluticasone furoate-vilanteroL 200-25 mcg/dose diskus inhaler 1 puff    haloperidoL tablet 5 mg    heparin (porcine) injection 5,000 Units    lisinopriL tablet 10 mg    melatonin tablet 6 mg    methocarbamoL tablet 1,000 mg    metoclopramide HCl injection 5 mg    morphine injection 2 mg    multivitamin tablet    ondansetron injection 4 mg    oxyCODONE immediate release tablet 5 mg    oxyCODONE immediate release tablet Tab 10 mg    pantoprazole EC tablet 40 mg    paroxetine tablet 40 mg    polyethylene glycol packet 17 g    potassium bicarbonate  disintegrating tablet 40 mEq    pregabalin capsule 50 mg    sodium chloride 0.9% flush 10 mL    sodium chloride 0.9% flush 10 mL    [START ON 10/22/2022] thiamine (B-1) 250 mg in dextrose 5 % 100 mL IVPB    thiamine (B-1) 500 mg in dextrose 5 % 100 mL IVPB    tiotropium bromide 2.5 mcg/actuation inhaler 2 puff    traZODone tablet 100 mg    vitamin D 1000 units tablet 2,000 Units     Objective:     Vital Signs (Most Recent):  Temp: 96.3 °F (35.7 °C) (10/20/22 0815)  Pulse: 104 (10/20/22 0815)  Resp: 20 (10/20/22 0533)  BP: (!) 94/56 (10/20/22 0815)  SpO2: (!) 89 % (10/20/22 0815)   Vital Signs (24h Range):  Temp:  [96.3 °F (35.7 °C)-99.3 °F (37.4 °C)] 96.3 °F (35.7 °C)  Pulse:  [101-111] 104  Resp:  [18-20] 20  SpO2:  [89 %-98 %] 89 %  BP: ()/(52-75) 94/56     Weight: 50.8 kg (111 lb 15.9 oz)  Height: 5' (152.4 cm)  Body mass index is 21.87 kg/m².      Intake/Output Summary (Last 24 hours) at 10/20/2022 0815  Last data filed at 10/20/2022 0621  Gross per 24 hour   Intake 400 ml   Output 2500 ml   Net -2100 ml         Ortho/SPM Exam  Gen: NAD, frail, ill-appearing  CV: regular rate  Resp: non-labored respirations    Spine:   NAD, A/O x 3.  Wound c/d/i with clean dressing.  4/5 strength RUE and 4/5 L EHL, otherwise NVI per baseline    Significant Labs: All pertinent labs within the past 24 hours have been reviewed.    Significant Imaging: I have reviewed and interpreted all pertinent imaging results/findings.  DVT ultrasound with no DVTs    Assessment/Plan:     * Closed fracture dislocation of sacrum  Merle Caro is a 67 y.o. female with past medical history of hypertension, bipolar disorder, and urinary incontinence (1 year duration) admitted to Orthopedics with what appears to be an approximally 4-month-old sacral fracture dislocation of S1 on S2 with severe canal stenosis at that level resulting in chronic cauda equina symptoms.  She has not been able ambulate since June due to pain.  Initial images  reviewed by Dr. Meza who plans to take the patient for surgery on Tuesday October 18th.  CT pelvis completed.  DVT ultrasound with no DVTs.  Hospital Medicine consulted began a metastatic workup.  Studies so far unrevealing.  Appreciate hospital medicine recommendations. Now s/p L2-Pelvis PSF w L5-S1 Laminectomy and dural injury intraop repaired w dural glue on 10/18.    Plan:   Tolerated elevated HOB w/out spinal headache  Pain control: multimodal  Diet: regular diet  PT/OT: WBAT in [facility]. Spine precautions.  DVT prophylaxis:  SubQ heparin; no need for IVC filter at this time  Abx: postop ancef and levofloxacin completed  Labs:  Stable  Leija dc'd 10/19  No drains    Dispo: pending PT/OT       Essential hypertension  Home medications restarted    Asthma with COPD  Home medications restarted  SpO2 goal > 88%; do not titrate supplemental oxygen above this level    Affective bipolar disorder  Home medications restarted    Pure hypercholesterolemia  Home medications restarted    LUCILA on CPAP: Not compliant with CPAP  CPAP          Jerry Lemus MD  Orthopedics  [facility] - Surgery
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
## 2022-10-20 NOTE — ASSESSMENT & PLAN NOTE
MRI and CT spine showing extensive displaced fractures of the sacrum causing central canal stenosis, pubic rami fractures,   Has significant osteoporosis recently diagnosed, had not yet begun treatment.    - s/p L2-pelvis spinal fusion, lumbosacral laminectomies/cauda equina decompression, and pelvic fixation surgery on 10/18  - needs osteoporosis treatment outpatient  - PT recommended SNF on discharge

## 2022-10-20 NOTE — PLAN OF CARE
Problem: Physical Therapy  Goal: Physical Therapy Goal  Description: Goals to be met by: 22     Patient will increase functional independence with mobility by performin. Supine to sit with Moderate Assistance  2. Sit to supine with Moderate Assistance  3. Sit to stand transfer with Maximum Assistance  4. Lower extremity exercise program x 10 reps per handout, with assistance as needed  5. Bed to chair transfer with Maximum Assistance using Rolling Walker    Outcome: Ongoing, Progressing   Pt progressing, appropriate goals established

## 2022-10-20 NOTE — ASSESSMENT & PLAN NOTE
Sees psychiatrist for medications    - continue home klonopin, haldol, paroxetine, trazodone (half trazodone to 50mg as patient is lethargic this morning)  - initially prolonged QT has improved; minimize other QT prolonging meds such as zofran, etc.  - delirium precautions

## 2022-10-20 NOTE — ASSESSMENT & PLAN NOTE
Merle Caro is a 67 y.o. female with past medical history of hypertension, bipolar disorder, and urinary incontinence (1 year duration) admitted to Orthopedics with what appears to be an approximally 4-month-old sacral fracture dislocation of S1 on S2 with severe canal stenosis at that level resulting in chronic cauda equina symptoms.  She has not been able ambulate since June due to pain.  Initial images reviewed by Dr. Meza who plans to take the patient for surgery on Tuesday October 18th.  CT pelvis completed.  DVT ultrasound with no DVTs.  Hospital Medicine consulted began a metastatic workup.  Studies so far unrevealing.  Appreciate hospital medicine recommendations. Now s/p L2-Pelvis PSF w L5-S1 Laminectomy and dural injury intraop repaired w dural glue on 10/18.    Plan:   Tolerated elevated HOB w/out spinal headache  Pain control: multimodal  Diet: regular diet  PT/OT: WBAT in LSO. Spine precautions.  DVT prophylaxis:  SubQ heparin; no need for IVC filter at this time  Abx: postop ancef and levofloxacin completed  Labs:  Stable  Leija dc'd 10/19  No drains    Dispo: pending PT/OT

## 2022-10-20 NOTE — NURSING
-assumed care of patient, patient resting comfortably in bed  -was given in report MD aware of hypotension  -will continue to monitor

## 2022-10-20 NOTE — PLAN OF CARE
[facility]    FACILITY TRANSFER ORDERS      Patient Name: Merle Caro  YOB: 1955    PCP: Marck Major MD   PCP Address: [address] [place] [address] / [place] [address]  PCP Phone Number: [phone]  PCP Fax: [phone]    Encounter Date: 10/25/2022    Admit to: Skilled Nursing Facility    Vital Signs:  Routine    Diagnoses:   Active Hospital Problems    Diagnosis  POA    *Closed fracture dislocation of sacrum [S32.10XA]  Yes     Chronic    SIRS (systemic inflammatory response syndrome) [R65.10]  Unknown    Constipation [K59.00]  Unknown    Severe malnutrition [E43]  Unknown    Urinary incontinence [R32]  Yes    Cauda equina syndrome [G83.4]  Yes    Prolonged Q-T interval on ECG [R94.31]  Yes    Coronary artery disease [I25.10]  Yes    Unexplained weight loss [R63.4]  Yes    Benzodiazepine dependence [F13.20]  Yes    Osteoporosis with current pathological fracture [M80.00XA]  Yes    Anemia [D64.9]  Yes    Early satiety [R68.81]  Yes    Chronic respiratory failure with hypoxia [J96.11]  Yes    Cigarette nicotine dependence with nicotine-induced disorder [F17.219]  Yes    Asthma with COPD [J44.9]  Yes     Chronic    LUCILA on CPAP: Not compliant with CPAP [G47.33, Z99.89]  Not Applicable     Chronic    Essential hypertension [I10]  Yes     Chronic    Affective bipolar disorder [F31.9]  Yes     Chronic    Pure hypercholesterolemia [E78.00]  Yes     Chronic    History of colon polyps [Z86.010]  Not Applicable      Resolved Hospital Problems    Diagnosis Date Resolved POA    Leukocytosis [D72.829] 10/20/2022 Unknown    Pre-op evaluation [Z01.818] 10/20/2022 Not Applicable       Allergies:  Review of patient's allergies indicates:   Allergen Reactions    Codeine Shortness Of Breath    Keflex [cephalexin] Shortness Of Breath    Shellfish containing products Nausea And Vomiting    Flu vaccine ts 2011-12(18 yr+) Hives    Sucralfate Other (See Comments) and Hives       Diet: regular  diet    Activities: WBAT in LSO. Spine precautions.    Goals of Care Treatment Preferences:  Code Status: Full Code      Nursing: None     Labs: none    CONSULTS:    Physical Therapy to evaluate and treat. , Occupational Therapy to evaluate and treat., and  to evaluate for community resources/long-range planning.    MISCELLANEOUS CARE:  Routine Skin for Bedridden Patients: Apply moisture barrier cream to all skin folds and wet areas in perineal area daily and after baths and all bowel movements.    WOUND CARE ORDERS  You may remove your dressing and shower 7 days after surgery. Until then please keep your wound clean and dry. Sponge baths are acceptable. Do not go in a pool or hot tub until seen in clinic. Please leave the small steri-strips covering your wound in place until they fall off naturally (2 weeks). You may notice clear suture ends hanging from the sides of your incision after the steri-strips are removed, it is ok to clip these with scissors.    Medications: Review discharge medications with patient and family and provide education.      Current Discharge Medication List        START taking these medications    Details   acetaminophen (TYLENOL) 500 MG tablet Take 2 tablets (1,000 mg total) by mouth every 8 (eight) hours. Bedside delivery  Qty: 120 tablet, Refills: 0    Comments: Bedside delivery      methocarbamoL (ROBAXIN) 750 MG Tab Take 1 tablet (750 mg total) by mouth 3 (three) times daily as needed.  Qty: 42 tablet, Refills: 0    Comments: Bedside delivery      oxyCODONE (ROXICODONE) 5 MG immediate release tablet Take 1 tablet (5 mg total) by mouth every 6 (six) hours as needed for Pain. May take 1-2 tablets every 6 hours as needed for pain  Qty: 42 tablet, Refills: 0    Comments: Bedside delivery.  Quantity prescribed more than 7 day supply? No      pregabalin (LYRICA) 50 MG capsule Take 1 capsule (50 mg total) by mouth 2 (two) times daily. Bedside delivery for 14 days  Qty: 28  capsule, Refills: 0    Comments: Bedside delivery           CONTINUE these medications which have NOT CHANGED    Details   celecoxib (CELEBREX) 200 MG capsule Take 1 capsule (200 mg total) by mouth once daily.  Qty: 90 capsule, Refills: 0    Associated Diagnoses: Chronic pain of both knees      cholecalciferol, vitamin D3, (VITAMIN D3) 2,000 unit Cap Take 1 capsule by mouth every evening.      clonazepam (KLONOPIN) 0.5 MG tablet Take 0.5 mg by mouth every evening.      haloperidol (HALDOL) 5 MG tablet Take 5 mg by mouth every evening.      LIDOcaine-prilocaine (EMLA) cream Apply topically once daily.  Qty: 30 g, Refills: 3    Associated Diagnoses: Rectal pain; Thrombosed external hemorrhoid      lisinopriL 10 MG tablet TAKE 1 TABLET(10 MG) BY MOUTH EVERY DAY  Qty: 90 tablet, Refills: 3    Associated Diagnoses: Essential hypertension      OXYGEN-AIR DELIVERY SYSTEMS MISC Inhale 2 L into the lungs as needed (O2 via Nasal Canula.).      pantoprazole (PROTONIX) 40 MG tablet Take 1 tablet (40 mg total) by mouth once daily.  Qty: 90 tablet, Refills: 3    Associated Diagnoses: Gastroesophageal reflux disease with esophagitis without hemorrhage      paroxetine (PAXIL) 40 MG tablet TK 1 T PO QHS  Refills: 2      simvastatin (ZOCOR) 40 MG tablet Take 1 tablet (40 mg total) by mouth every evening.  Qty: 90 tablet, Refills: 3    Associated Diagnoses: Pure hypercholesterolemia      traZODone (DESYREL) 100 MG tablet Take 100 mg by mouth every evening.      albuterol (PROVENTIL/VENTOLIN HFA) 90 mcg/actuation inhaler Inhale 4 puffs into the lungs every 4 (four) hours as needed for Wheezing. Rescue  Qty: 18 g, Refills: 0      diclofenac sodium (VOLTAREN ARTHRITIS PAIN) 1 % Gel Apply 2 g topically 4 (four) times daily.  Qty: 100 g, Refills: 11      fluticasone-salmeterol diskus inhaler 500-50 mcg Inhale 1 puff into the lungs 2 (two) times daily. Controller  Qty: 60 each, Refills: 11    Associated Diagnoses: Asthma with COPD       hydrocortisone 2.5 % cream Apply topically 2 (two) times daily for 7 days  Qty: 30 g, Refills: 3      tiotropium (SPIRIVA WITH HANDIHALER) 18 mcg inhalation capsule Inhale 1 capsule (18 mcg total) into the lungs once daily. Controller  Qty: 30 capsule, Refills: 11    Associated Diagnoses: Asthma with COPD           STOP taking these medications       HYDROcodone-acetaminophen (NORCO) 7.5-325 mg per tablet Comments:   Reason for Stopping:                  Immunizations Administered as of 10/20/2022       Name Date Dose VIS Date Route Exp Date    COVID-19, MRNA, LN-S, PF (Pfizer) (Purple Cap) 4/9/2021 12:45 PM 0.3 mL 12/12/2020 Intramuscular 7/31/2021    Site: Left deltoid     Given By: Yisel Powell LPN     : [employer]     Lot: [id number]     COVID-19, MRNA, LN-S, PF (Pfizer) (Purple Cap) 3/19/2021 12:53 PM 0.3 mL 12/12/2020 Intramuscular 7/31/2021    Site: Left deltoid     Given By: No Cochran LPN     : [employer]     Lot: [id number]             This patient has had both covid vaccinations    Some patients may experience side effects after vaccination.  These may include fever, headache, muscle or joint aches.  Most symptoms resolve with 24-48 hours and do not require urgent medical evaluation unless they persist for more than 72 hours or symptoms are concerning for an unrelated medical condition.          _________________________________  Jerry Lemus MD  10/20/2022

## 2022-10-20 NOTE — PLAN OF CARE
Problem: Adult Inpatient Plan of Care  Goal: Plan of Care Review  Outcome: Ongoing, Progressing  Goal: Patient-Specific Goal (Individualized)  Outcome: Ongoing, Progressing  Goal: Absence of Hospital-Acquired Illness or Injury  Outcome: Ongoing, Progressing  Goal: Optimal Comfort and Wellbeing  Outcome: Ongoing, Progressing     Problem: Skin Injury Risk Increased  Goal: Skin Health and Integrity  Outcome: Ongoing, Progressing     Problem: Pain Chronic (Persistent)  Goal: Acceptable Pain Control and Functional Ability  Outcome: Ongoing, Progressing     Problem: Fall Injury Risk  Goal: Absence of Fall and Fall-Related Injury  Outcome: Ongoing, Progressing     Problem: Infection  Goal: Absence of Infection Signs and Symptoms  Outcome: Ongoing, Progressing     Problem: Bowel Motility Impaired (Spinal Surgery)  Goal: Effective Bowel Elimination  Outcome: Ongoing, Not Progressing

## 2022-10-20 NOTE — SUBJECTIVE & OBJECTIVE
Principal Problem:Closed fracture of sacrum    Principal Orthopedic Problem:  Same    Interval History:  No acute events overnight. Suprapubic tenderness and N/V has improved. Slept well overnight. No headaches and tolerated HOB elevated yesterday. Leija dc'd and urology just rec outpatient f/u (they will schedule). Hasn't worked w PT yet.     Review of patient's allergies indicates:   Allergen Reactions    Codeine Shortness Of Breath    Keflex [cephalexin] Shortness Of Breath    Shellfish containing products Nausea And Vomiting    Flu vaccine ts 2011-12(18 yr+) Hives    Sucralfate Other (See Comments) and Hives       Current Facility-Administered Medications   Medication    0.9%  NaCl infusion (for blood administration)    acetaminophen tablet 1,000 mg    atorvastatin tablet 20 mg    bisacodyL suppository 10 mg    calcium carbonate 200 mg calcium (500 mg) chewable tablet 500 mg    celecoxib capsule 200 mg    clonazePAM tablet 0.5 mg    docusate sodium capsule 100 mg    fluticasone furoate-vilanteroL 200-25 mcg/dose diskus inhaler 1 puff    haloperidoL tablet 5 mg    heparin (porcine) injection 5,000 Units    lisinopriL tablet 10 mg    melatonin tablet 6 mg    methocarbamoL tablet 1,000 mg    metoclopramide HCl injection 5 mg    morphine injection 2 mg    multivitamin tablet    ondansetron injection 4 mg    oxyCODONE immediate release tablet 5 mg    oxyCODONE immediate release tablet Tab 10 mg    pantoprazole EC tablet 40 mg    paroxetine tablet 40 mg    polyethylene glycol packet 17 g    potassium bicarbonate disintegrating tablet 40 mEq    pregabalin capsule 50 mg    sodium chloride 0.9% flush 10 mL    sodium chloride 0.9% flush 10 mL    [START ON 10/22/2022] thiamine (B-1) 250 mg in dextrose 5 % 100 mL IVPB    thiamine (B-1) 500 mg in dextrose 5 % 100 mL IVPB    tiotropium bromide 2.5 mcg/actuation inhaler 2 puff    traZODone tablet 100 mg    vitamin D 1000 units tablet 2,000 Units     Objective:     Vital Signs  (Most Recent):  Temp: 96.3 °F (35.7 °C) (10/20/22 0815)  Pulse: 104 (10/20/22 0815)  Resp: 20 (10/20/22 0533)  BP: (!) 94/56 (10/20/22 0815)  SpO2: (!) 89 % (10/20/22 0815)   Vital Signs (24h Range):  Temp:  [96.3 °F (35.7 °C)-99.3 °F (37.4 °C)] 96.3 °F (35.7 °C)  Pulse:  [101-111] 104  Resp:  [18-20] 20  SpO2:  [89 %-98 %] 89 %  BP: ()/(52-75) 94/56     Weight: 50.8 kg (111 lb 15.9 oz)  Height: 5' (152.4 cm)  Body mass index is 21.87 kg/m².      Intake/Output Summary (Last 24 hours) at 10/20/2022 0815  Last data filed at 10/20/2022 0621  Gross per 24 hour   Intake 400 ml   Output 2500 ml   Net -2100 ml         Ortho/SPM Exam  Gen: NAD, frail, ill-appearing  CV: regular rate  Resp: non-labored respirations    Spine:   NAD, A/O x 3.  Wound c/d/i with clean dressing.  4/5 strength RUE and 4/5 L EHL, otherwise NVI per baseline    Significant Labs: All pertinent labs within the past 24 hours have been reviewed.    Significant Imaging: I have reviewed and interpreted all pertinent imaging results/findings.  DVT ultrasound with no DVTs

## 2022-10-20 NOTE — CARE UPDATE
RAPID RESPONSE NURSE ROUND       Rounding completed with charge RN, Jeana for SpO2 89% reports currently SpO2 100% on continuous pulse ox. No additional concerns verbalized at this time. Instructed to call [phone] for further concerns or assistance.

## 2022-10-20 NOTE — PLAN OF CARE
Problem: Occupational Therapy  Goal: Occupational Therapy Goal  Description: Goals to be met by: 11/20/22     Patient will increase functional independence with ADLs by performing:    UE Dressing with Minimal Assistance.  LE Dressing with Minimal Assistance.  Grooming while seated with Set-up Assistance.  Toileting from bedside commode with Minimal Assistance for hygiene and clothing management.   Bathing from  shower chair/bench with Minimal Assistance.  Toilet transfer to bedside commode with Minimal Assistance.    Outcome: Ongoing, Progressing

## 2022-10-21 DIAGNOSIS — R63.4 UNEXPLAINED WEIGHT LOSS: Primary | ICD-10-CM

## 2022-10-21 LAB
ALBUMIN SERPL BCP-MCNC: 2 G/DL (ref 3.5–5.2)
ALP SERPL-CCNC: 138 U/L (ref 55–135)
ALT SERPL W/O P-5'-P-CCNC: 14 U/L (ref 10–44)
ANION GAP SERPL CALC-SCNC: 8 MMOL/L (ref 8–16)
AST SERPL-CCNC: 38 U/L (ref 10–40)
BASOPHILS # BLD AUTO: 0.05 K/UL (ref 0–0.2)
BASOPHILS NFR BLD: 0.5 % (ref 0–1.9)
BILIRUB SERPL-MCNC: 0.5 MG/DL (ref 0.1–1)
BUN SERPL-MCNC: 11 MG/DL (ref 8–23)
CALCIUM SERPL-MCNC: 8.7 MG/DL (ref 8.7–10.5)
CHLORIDE SERPL-SCNC: 94 MMOL/L (ref 95–110)
CO2 SERPL-SCNC: 29 MMOL/L (ref 23–29)
CREAT SERPL-MCNC: 0.6 MG/DL (ref 0.5–1.4)
DIFFERENTIAL METHOD: ABNORMAL
EOSINOPHIL # BLD AUTO: 0.3 K/UL (ref 0–0.5)
EOSINOPHIL NFR BLD: 2.7 % (ref 0–8)
ERYTHROCYTE [DISTWIDTH] IN BLOOD BY AUTOMATED COUNT: 14.5 % (ref 11.5–14.5)
EST. GFR  (NO RACE VARIABLE): >60 ML/MIN/1.73 M^2
GLUCOSE SERPL-MCNC: 116 MG/DL (ref 70–110)
HCT VFR BLD AUTO: 28.1 % (ref 37–48.5)
HGB BLD-MCNC: 9 G/DL (ref 12–16)
IMM GRANULOCYTES # BLD AUTO: 0.06 K/UL (ref 0–0.04)
IMM GRANULOCYTES NFR BLD AUTO: 0.6 % (ref 0–0.5)
LYMPHOCYTES # BLD AUTO: 0.9 K/UL (ref 1–4.8)
LYMPHOCYTES NFR BLD: 8.7 % (ref 18–48)
MAGNESIUM SERPL-MCNC: 1.8 MG/DL (ref 1.6–2.6)
MCH RBC QN AUTO: 28.7 PG (ref 27–31)
MCHC RBC AUTO-ENTMCNC: 32 G/DL (ref 32–36)
MCV RBC AUTO: 90 FL (ref 82–98)
MONOCYTES # BLD AUTO: 0.9 K/UL (ref 0.3–1)
MONOCYTES NFR BLD: 8.2 % (ref 4–15)
NEUTROPHILS # BLD AUTO: 8.6 K/UL (ref 1.8–7.7)
NEUTROPHILS NFR BLD: 79.3 % (ref 38–73)
NRBC BLD-RTO: 0 /100 WBC
PHOSPHATE SERPL-MCNC: 4.3 MG/DL (ref 2.7–4.5)
PLATELET # BLD AUTO: 334 K/UL (ref 150–450)
PMV BLD AUTO: 9.7 FL (ref 9.2–12.9)
POTASSIUM SERPL-SCNC: 3.8 MMOL/L (ref 3.5–5.1)
PROT SERPL-MCNC: 5.7 G/DL (ref 6–8.4)
RBC # BLD AUTO: 3.14 M/UL (ref 4–5.4)
SODIUM SERPL-SCNC: 131 MMOL/L (ref 136–145)
WBC # BLD AUTO: 10.85 K/UL (ref 3.9–12.7)

## 2022-10-21 PROCEDURE — 97530 THERAPEUTIC ACTIVITIES: CPT

## 2022-10-21 PROCEDURE — 80053 COMPREHEN METABOLIC PANEL: CPT | Performed by: STUDENT IN AN ORGANIZED HEALTH CARE EDUCATION/TRAINING PROGRAM

## 2022-10-21 PROCEDURE — 25000003 PHARM REV CODE 250: Performed by: STUDENT IN AN ORGANIZED HEALTH CARE EDUCATION/TRAINING PROGRAM

## 2022-10-21 PROCEDURE — 99900035 HC TECH TIME PER 15 MIN (STAT)

## 2022-10-21 PROCEDURE — 84100 ASSAY OF PHOSPHORUS: CPT | Performed by: STUDENT IN AN ORGANIZED HEALTH CARE EDUCATION/TRAINING PROGRAM

## 2022-10-21 PROCEDURE — 99233 PR SUBSEQUENT HOSPITAL CARE,LEVL III: ICD-10-PCS | Mod: GC,,, | Performed by: HOSPITALIST

## 2022-10-21 PROCEDURE — 94761 N-INVAS EAR/PLS OXIMETRY MLT: CPT

## 2022-10-21 PROCEDURE — 25000003 PHARM REV CODE 250

## 2022-10-21 PROCEDURE — 63600175 PHARM REV CODE 636 W HCPCS: Performed by: STUDENT IN AN ORGANIZED HEALTH CARE EDUCATION/TRAINING PROGRAM

## 2022-10-21 PROCEDURE — 25000003 PHARM REV CODE 250: Performed by: ORTHOPAEDIC SURGERY

## 2022-10-21 PROCEDURE — 36410 VNPNXR 3YR/> PHY/QHP DX/THER: CPT

## 2022-10-21 PROCEDURE — C1751 CATH, INF, PER/CENT/MIDLINE: HCPCS

## 2022-10-21 PROCEDURE — 36415 COLL VENOUS BLD VENIPUNCTURE: CPT | Performed by: STUDENT IN AN ORGANIZED HEALTH CARE EDUCATION/TRAINING PROGRAM

## 2022-10-21 PROCEDURE — 99233 SBSQ HOSP IP/OBS HIGH 50: CPT | Mod: GC,,, | Performed by: HOSPITALIST

## 2022-10-21 PROCEDURE — 11000001 HC ACUTE MED/SURG PRIVATE ROOM

## 2022-10-21 PROCEDURE — 97112 NEUROMUSCULAR REEDUCATION: CPT

## 2022-10-21 PROCEDURE — 97535 SELF CARE MNGMENT TRAINING: CPT

## 2022-10-21 PROCEDURE — 83735 ASSAY OF MAGNESIUM: CPT | Performed by: STUDENT IN AN ORGANIZED HEALTH CARE EDUCATION/TRAINING PROGRAM

## 2022-10-21 PROCEDURE — 63600175 PHARM REV CODE 636 W HCPCS: Performed by: ORTHOPAEDIC SURGERY

## 2022-10-21 PROCEDURE — 85025 COMPLETE CBC W/AUTO DIFF WBC: CPT

## 2022-10-21 RX ORDER — SODIUM CHLORIDE 9 MG/ML
INJECTION, SOLUTION INTRAVENOUS CONTINUOUS
Status: ACTIVE | OUTPATIENT
Start: 2022-10-21 | End: 2022-10-22

## 2022-10-21 RX ORDER — BISACODYL 10 MG
10 SUPPOSITORY, RECTAL RECTAL ONCE
Status: COMPLETED | OUTPATIENT
Start: 2022-10-21 | End: 2022-10-21

## 2022-10-21 RX ORDER — SENNOSIDES 8.6 MG/1
8.6 TABLET ORAL NIGHTLY
Status: DISCONTINUED | OUTPATIENT
Start: 2022-10-21 | End: 2022-10-25 | Stop reason: HOSPADM

## 2022-10-21 RX ADMIN — THERA TABS 1 TABLET: TAB at 09:10

## 2022-10-21 RX ADMIN — TRAZODONE HYDROCHLORIDE 50 MG: 50 TABLET ORAL at 09:10

## 2022-10-21 RX ADMIN — ACETAMINOPHEN 1000 MG: 325 TABLET ORAL at 05:10

## 2022-10-21 RX ADMIN — THIAMINE HYDROCHLORIDE 500 MG: 100 INJECTION, SOLUTION INTRAMUSCULAR; INTRAVENOUS at 05:10

## 2022-10-21 RX ADMIN — PIPERACILLIN SODIUM AND TAZOBACTAM SODIUM 4.5 G: 4; .5 INJECTION, POWDER, LYOPHILIZED, FOR SOLUTION INTRAVENOUS at 09:10

## 2022-10-21 RX ADMIN — SODIUM CHLORIDE: 0.9 INJECTION, SOLUTION INTRAVENOUS at 11:10

## 2022-10-21 RX ADMIN — PREGABALIN 50 MG: 50 CAPSULE ORAL at 09:10

## 2022-10-21 RX ADMIN — VANCOMYCIN HYDROCHLORIDE 1000 MG: 1 INJECTION, POWDER, LYOPHILIZED, FOR SOLUTION INTRAVENOUS at 01:10

## 2022-10-21 RX ADMIN — CELECOXIB 200 MG: 200 CAPSULE ORAL at 09:10

## 2022-10-21 RX ADMIN — METOCLOPRAMIDE 5 MG: 5 INJECTION, SOLUTION INTRAMUSCULAR; INTRAVENOUS at 04:10

## 2022-10-21 RX ADMIN — PANTOPRAZOLE SODIUM 40 MG: 40 TABLET, DELAYED RELEASE ORAL at 09:10

## 2022-10-21 RX ADMIN — PAROXETINE HYDROCHLORIDE 40 MG: 10 TABLET, FILM COATED ORAL at 09:10

## 2022-10-21 RX ADMIN — METHOCARBAMOL 1000 MG: 500 TABLET ORAL at 09:10

## 2022-10-21 RX ADMIN — HEPARIN SODIUM 5000 UNITS: 5000 INJECTION INTRAVENOUS; SUBCUTANEOUS at 09:10

## 2022-10-21 RX ADMIN — VANCOMYCIN HYDROCHLORIDE 750 MG: 750 INJECTION, POWDER, LYOPHILIZED, FOR SOLUTION INTRAVENOUS at 06:10

## 2022-10-21 RX ADMIN — PIPERACILLIN SODIUM AND TAZOBACTAM SODIUM 4.5 G: 4; .5 INJECTION, POWDER, LYOPHILIZED, FOR SOLUTION INTRAVENOUS at 03:10

## 2022-10-21 RX ADMIN — ACETAMINOPHEN 1000 MG: 325 TABLET ORAL at 09:10

## 2022-10-21 RX ADMIN — CLONAZEPAM 0.5 MG: 0.5 TABLET ORAL at 09:10

## 2022-10-21 RX ADMIN — HEPARIN SODIUM 5000 UNITS: 5000 INJECTION INTRAVENOUS; SUBCUTANEOUS at 05:10

## 2022-10-21 RX ADMIN — SENNOSIDES 8.6 MG: 8.6 TABLET, FILM COATED ORAL at 09:10

## 2022-10-21 RX ADMIN — SODIUM CHLORIDE: 0.9 INJECTION, SOLUTION INTRAVENOUS at 09:10

## 2022-10-21 RX ADMIN — ONDANSETRON 4 MG: 2 INJECTION INTRAMUSCULAR; INTRAVENOUS at 03:10

## 2022-10-21 RX ADMIN — HALOPERIDOL 5 MG: 5 TABLET ORAL at 09:10

## 2022-10-21 RX ADMIN — THIAMINE HYDROCHLORIDE 500 MG: 100 INJECTION, SOLUTION INTRAMUSCULAR; INTRAVENOUS at 09:10

## 2022-10-21 RX ADMIN — DOCUSATE SODIUM 100 MG: 100 CAPSULE ORAL at 09:10

## 2022-10-21 RX ADMIN — CHOLECALCIFEROL TAB 25 MCG (1000 UNIT) 2000 UNITS: 25 TAB at 09:10

## 2022-10-21 RX ADMIN — BISACODYL 10 MG: 10 SUPPOSITORY RECTAL at 11:10

## 2022-10-21 RX ADMIN — SODIUM CHLORIDE 500 ML: 0.9 INJECTION, SOLUTION INTRAVENOUS at 03:10

## 2022-10-21 RX ADMIN — PIPERACILLIN SODIUM AND TAZOBACTAM SODIUM 4.5 G: 4; .5 INJECTION, POWDER, LYOPHILIZED, FOR SOLUTION INTRAVENOUS at 12:10

## 2022-10-21 RX ADMIN — OXYCODONE 5 MG: 5 TABLET ORAL at 05:10

## 2022-10-21 RX ADMIN — ATORVASTATIN CALCIUM 20 MG: 20 TABLET, FILM COATED ORAL at 09:10

## 2022-10-21 RX ADMIN — HEPARIN SODIUM 5000 UNITS: 5000 INJECTION INTRAVENOUS; SUBCUTANEOUS at 03:10

## 2022-10-21 RX ADMIN — ACETAMINOPHEN 1000 MG: 325 TABLET ORAL at 03:10

## 2022-10-21 NOTE — ASSESSMENT & PLAN NOTE
On 10/19-10/20, patient developed worsening hypotension, tachycardia to 115, hypothermia to 96F.  Appears more lethargic today, though receiving minimal opioids or muscle relaxers.    Septic workup largely negative.     - continue abx until BCx negative x48h  - low threshold to obtain CTA if worsening hypoxia to eval for PE   - continuous IV fluids for 2L to ensure adequate hydration

## 2022-10-21 NOTE — ASSESSMENT & PLAN NOTE
Concerning unexplained weightloss of >60lbs in last year, in combination with severe functional decline, pathologic fractures, and B symptoms (see HPI for full details).  Has history of pulmonary nodule due for 12 month followup+mediastinal lymphadenopathy.  Due for colonoscopy (last 2011 with 10 year f/u recommended); having early satiety and bloating symptoms  Discovered patient has history of two brothers dying early from cardiac issues that were said to be genetic, possibly arrhythmia va MI based on description    Workup:  - CT C/A/P with contrast: no lung changes or lymphadenopathy  - CT head given some concern for slowed mentation: no acute changes or intracranial lesions  - HIV, HCV,TSH, RPR, quant gold, blood cultures wnl  - CA 19-9, CA-125, CEA wnl  - TTE without concerning findings  - SPEP, YIN normal and without monoclonal peaks. K/L ratio normal  - Peripheral smear: possible early neutrophil hypersegmentation    Plan:  - given abdominal bloating symptoms, reported recent melena, GI planned for inpatient EGD 10/21, but patient refused EGD due to feeling she needed to further recover from surgery. Will further discuss if it will be rescheduled during admission vs happen outpatient along with colonoscopy  - only minimal confusion and slight nystagmus; continue empiric high-dose thiamine taper since she's at risk for nutritional deficiencies/Wernicke's  - discussed with oncology service via phone about early hypersegmented neutrophils: will also check copper levels. With normal CBC differential and negative rest of workup, stated there was low suspicion for myeloproliferative process at this time, but may benefit from an outpatient hematology f/u.

## 2022-10-21 NOTE — PROGRESS NOTES
10/21/22 0025   Provider Notification   Reason for Communication Review case  (elevated HR (120bpm))   Provider Name Ortho Resident   Provider Role Resident   Method of Communication Call   Response See orders   Notification Time 0025     On-call for ortho contacted regarded pts elevated HR (120bpm per tele monitor). Provider updated on remaining VSS and patient condition. See new orders. Will continue to monitor.

## 2022-10-21 NOTE — PHYSICIAN QUERY
PT Name: Merle Caro  MR #: [id number]    DOCUMENTATION CLARIFICATION      CDS/: AMISHA Awan, RN               Contact information:[email or web address]    This form is a permanent document in the medical record.      Query Date: October 21, 2022    By submitting this query, we are merely seeking further clarification of documentation. Please utilize your independent clinical judgment when addressing the question(s) below.    The Medical Record contains the following:   Indicators  Supporting Clinical Findings Location in Medical Record    x Surgical Procedure Performed  L5, S1, and S2 laminectomy for decompression of cauda equina   Orthopedic Surgery Op Note 10/18/2022    x Preoperative/Postoperative Diagnosis 1.  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  Chronic cauda equina syndrome   Orthopedic Surgery Op Note 10/18/2022    x Procedure Detail Next began our neurological decompression.  We performed a L5 laminectomy in a standard fashion.  We then performed a trough style S1 and S2 laminectomy caring his decompression out each side to ensure no residual stenosis.  Once we were satisfied with our neurological decompression wound was copiously irrigated.  Rods were measured, and contoured, and reduced down into the pedicle screw/iliac bolts.  Gentle distraction on the screws at the L5 level provided acceptable reduction of the fracture as visualized with lateral fluoroscopy.  All implants were then final tightened.  All bony surfaces from L2 to the pelvis were decorticate with high-speed drill and a medium infuse bone graft as well as the patient's morselized local bone graft was laid here. Orthopedic Surgery Op Note 10/18/2022    Other       Provider, please specify the neurological decompression portion of the procedure:  [   ] Decompression of the nerve root only   [  x ] Other decompression (please specify): __cauda equina_________   [   ] Clinically Undetermined      Please  document in your progress notes daily for the duration of treatment, until resolved, and include in your discharge summary.    Form No.

## 2022-10-21 NOTE — NURSING
10-20-22: Pt lost IV access at 1945. Two RN's attempted to start new IV without success. PICC team on-call contacted for assistance with midline placement. Unable to infuse evening antibiotics at this time.     10-21-22  0130: 3rd RN able to obtain peripheral IV, antibiotics started at this time.     0230: Midline IV placed.     Will continue to monitor.

## 2022-10-21 NOTE — PHYSICIAN QUERY
PT Name: Merle Caro  MR #: [id number]    DOCUMENTATION CLARIFICATION     CDS/: AMISHA Awan, RN               Contact information:[email or web address]     This form is a permanent document in the medical record.     Query Date: October 21, 2022    Dear Provider,  By submitting this query, we are merely seeking further clarification of documentation. Please utilize your independent clinical judgment when addressing the question(s) below.    The medical record contains the following:  Supporting Clinical Findings Location in Medical Record    Now s/p L2-Pelvis PSF w L5-S1 Laminectomy and dural injury intraop repaired w dural glue on 10/18.    Plan:   Lay flat for 24hrs postop - will plan to elevate HOB at noon today and if no headache after 1 hr will plan to consult PT/OT (to be WBAT in LSO and spine precautions) and mindy nicholson.    PT/OT: on hold until ensuring no headache after laying flat for 24hrs    Dispo:  Pending elevating HOB at noon today.  Orthopedic Surgery Progress Notes 10/19/2022       Please clarify if Intra op dural injury (as it relates to (L2-Pelvis PSF w L5-S1 Laminectomy) is:      [ x ] Inherent/Integral to the procedure   [  ] Complication of the procedure   [  ] Present, but not a complication of the procedure   [  ] Incidental finding, not clinically significant   [  ] Intended/required to complete procedure   [  ] Other (please specify): __________________   [  ] Clinically Undetermined       Please document in your progress notes daily for the duration of treatment until resolved and include in your discharge summary.

## 2022-10-21 NOTE — SUBJECTIVE & OBJECTIVE
Interval History: Started on broad spectrum abx yesterday evening for ongoing tachycardia and hypotension. Received 3x 500cc boluses through yesterday evening/night. BP improved well but tachycardia persists.   Had a night full of vomiting preventing her from resting. Never had true BM; per  at bedside it was mostly brown liquid leaking out because the enema did not stay in due to her lack of rectal tone.     Review of Systems  Objective:     Vital Signs (Most Recent):  Temp: 98.4 °F (36.9 °C) (10/21/22 1709)  Pulse: 103 (10/21/22 1709)  Resp: 18 (10/21/22 1709)  BP: 103/64 (10/21/22 1709)  SpO2: 97 % (10/21/22 1709)   Vital Signs (24h Range):  Temp:  [97.7 °F (36.5 °C)-98.4 °F (36.9 °C)] 98.4 °F (36.9 °C)  Pulse:  [] 103  Resp:  [18-22] 18  SpO2:  [90 %-100 %] 97 %  BP: (103-171)/(64-83) 103/64     Weight: 50.8 kg (111 lb 15.9 oz)  Body mass index is 21.87 kg/m².    Intake/Output Summary (Last 24 hours) at 10/21/2022 1744  Last data filed at 10/21/2022 0600  Gross per 24 hour   Intake 500 ml   Output 854 ml   Net -354 ml        Physical Exam  Vitals and nursing note reviewed.   Constitutional:       General: She is not in acute distress.     Appearance: She is ill-appearing (chronically). She is not toxic-appearing or diaphoretic.   Cardiovascular:      Rate and Rhythm: Regular rhythm. Tachycardia present.   Pulmonary:      Effort: Pulmonary effort is normal. No respiratory distress.   Abdominal:      General: Bowel sounds are increased. There is no distension.      Palpations: Abdomen is soft.      Tenderness: There is abdominal tenderness (reports TTP in all 4 quadrants). There is no guarding.   Skin:     Coloration: Skin is pale.   Neurological:      Mental Status: Mental status is at baseline. She is lethargic.      Cranial Nerves: No dysarthria.      Comments: Lethargic but arouses with repeated verbal stimuli and can answer questions appropriately.   Psychiatric:         Mood and Affect: Mood  normal.         Behavior: Behavior normal.       Significant Labs: All pertinent labs within the past 24 hours have been reviewed.    Significant Imaging: I have reviewed all pertinent imaging results/findings within the past 24 hours.

## 2022-10-21 NOTE — PROGRESS NOTES
[facility] - [facility]  Orthopedics  Progress Note    Patient Name: Merle Caro  MRN: [id number]  Admission Date: 10/14/2022  Hospital Length of Stay: 7 days  Attending Provider: Juni Meza MD  Primary Care Provider: Marck Major MD  Follow-up For: Procedure(s) (LRB):  FUSION,SPINE,POSTERIOR APPROACH (N/A)    Post-Operative Day: 3 Days Post-Op  Subjective:     Principal Problem:Closed fracture of sacrum    Principal Orthopedic Problem:  Same    Interval History:  No acute events overnight. Suprapubic tenderness and N/V has improved. Slept well overnight. No headaches and tolerated getting to EOB w PT yesterday and recc SNF. Patients vitals have become slightly more erratic from hypo to hypertension with stable tachycardia in 110s-120s (baseline 100s) not responsive to IVF per HM. HM concerned for infectious etiology that so far has been unrevealing and placed on ppx Vanc/zosyn til Bcxs are NGTD.     Review of patient's allergies indicates:   Allergen Reactions    Codeine Shortness Of Breath    Keflex [cephalexin] Shortness Of Breath    Shellfish containing products Nausea And Vomiting    Flu vaccine ts 2011-12(18 yr+) Hives    Sucralfate Other (See Comments) and Hives       Current Facility-Administered Medications   Medication    0.9%  NaCl infusion (for blood administration)    acetaminophen tablet 1,000 mg    atorvastatin tablet 20 mg    bisacodyL suppository 10 mg    calcium carbonate 200 mg calcium (500 mg) chewable tablet 500 mg    celecoxib capsule 200 mg    clonazePAM tablet 0.5 mg    docusate sodium capsule 100 mg    fluticasone furoate-vilanteroL 200-25 mcg/dose diskus inhaler 1 puff    fluticasone propionate 50 mcg/actuation nasal spray 100 mcg    haloperidoL tablet 5 mg    heparin (porcine) injection 5,000 Units    melatonin tablet 6 mg    methocarbamoL tablet 1,000 mg    metoclopramide HCl injection 5 mg    morphine injection 2 mg    multivitamin tablet    ondansetron  injection 4 mg    oxyCODONE immediate release tablet 5 mg    oxyCODONE immediate release tablet Tab 10 mg    pantoprazole EC tablet 40 mg    paroxetine tablet 40 mg    piperacillin-tazobactam 4.5 g in sodium chloride 0.9% 100 mL IVPB (ready to mix system)    polyethylene glycol packet 17 g    pregabalin capsule 50 mg    sodium chloride 0.9% flush 10 mL    sodium chloride 0.9% flush 10 mL    [START ON 10/22/2022] thiamine (B-1) 250 mg in dextrose 5 % 100 mL IVPB    thiamine (B-1) 500 mg in dextrose 5 % 100 mL IVPB    tiotropium bromide 2.5 mcg/actuation inhaler 2 puff    traZODone tablet 50 mg    vancomycin - pharmacy to dose    vancomycin 750 mg in dextrose 5 % 250 mL IVPB (ready to mix system)    vitamin D 1000 units tablet 2,000 Units     Objective:     Vital Signs (Most Recent):  Temp: 97.7 °F (36.5 °C) (10/21/22 0347)  Pulse: (!) 119 (10/21/22 0347)  Resp: 18 (10/21/22 0522)  BP: (!) 168/79 (10/21/22 0347)  SpO2: (!) 91 % (10/21/22 0347)   Vital Signs (24h Range):  Temp:  [96.3 °F (35.7 °C)-98.1 °F (36.7 °C)] 97.7 °F (36.5 °C)  Pulse:  [101-124] 119  Resp:  [16-22] 18  SpO2:  [89 %-99 %] 91 %  BP: ()/(55-79) 168/79     Weight: 50.8 kg (111 lb 15.9 oz)  Height: 5' (152.4 cm)  Body mass index is 21.87 kg/m².      Intake/Output Summary (Last 24 hours) at 10/21/2022 0707  Last data filed at 10/21/2022 0600  Gross per 24 hour   Intake 900 ml   Output 1754 ml   Net -854 ml         Ortho/SPM Exam  Gen: NAD, frail, ill-appearing  CV: regular rate  Resp: non-labored respirations    Spine:   NAD, A/O x 3.  Wound c/d/i with clean dressing.  4/5 strength RUE and 4/5 L EHL, otherwise NVI per baseline    Significant Labs: All pertinent labs within the past 24 hours have been reviewed.    Significant Imaging: I have reviewed and interpreted all pertinent imaging results/findings.  DVT ultrasound with no DVTs    Assessment/Plan:     * Closed fracture dislocation of sacrum  Merle Caro is a 67 y.o. female  with past medical history of hypertension, bipolar disorder, and urinary incontinence (1 year duration) admitted to Orthopedics with what appears to be an approximally 4-month-old sacral fracture dislocation of S1 on S2 with severe canal stenosis at that level resulting in chronic cauda equina symptoms.  She has not been able ambulate since June due to pain.  Initial images reviewed by Dr. Meza who plans to take the patient for surgery on Tuesday October 18th.  CT pelvis completed.  DVT ultrasound with no DVTs.  Hospital Medicine consulted began a metastatic workup.  Studies so far unrevealing.  Appreciate hospital medicine recommendations. Now s/p L2-Pelvis PSF w L5-S1 Laminectomy and dural injury intraop repaired w dural glue on 10/18.    Plan:   Tolerated elevated HOB w/out spinal headache  Pain control: multimodal  Diet: regular diet  PT/OT: WBAT in LSO. Spine precautions.  DVT prophylaxis:  SubQ heparin; no need for IVC filter at this time  Abx: Vanc/Zosyn per HM  Labs:  Stable  Leija dc'd 10/19  No drains    Dispo: pending PT/OT and HM reccs      Essential hypertension  Home medications restarted    Asthma with COPD  Home medications restarted  SpO2 goal > 88%; do not titrate supplemental oxygen above this level    Affective bipolar disorder  Home medications restarted    Pure hypercholesterolemia  Home medications restarted    LUCILA on CPAP: Not compliant with CPAP  CPAP          Jerry Lemus MD  Orthopedics  [facility] - Surgery

## 2022-10-21 NOTE — SUBJECTIVE & OBJECTIVE
Principal Problem:Closed fracture of sacrum    Principal Orthopedic Problem:  Same    Interval History:  No acute events overnight. Suprapubic tenderness and N/V has improved. Slept well overnight. No headaches and tolerated getting to EOB w PT yesterday and recc SNF. Patients vitals have become slightly more erratic from hypo to hypertension with stable tachycardia in 110s-120s (baseline 100s) not responsive to IVF per HM. HM concerned for infectious etiology that so far has been unrevealing and placed on ppx Vanc/zosyn til Bcxs are NGTD.     Review of patient's allergies indicates:   Allergen Reactions    Codeine Shortness Of Breath    Keflex [cephalexin] Shortness Of Breath    Shellfish containing products Nausea And Vomiting    Flu vaccine ts 2011-12(18 yr+) Hives    Sucralfate Other (See Comments) and Hives       Current Facility-Administered Medications   Medication    0.9%  NaCl infusion (for blood administration)    acetaminophen tablet 1,000 mg    atorvastatin tablet 20 mg    bisacodyL suppository 10 mg    calcium carbonate 200 mg calcium (500 mg) chewable tablet 500 mg    celecoxib capsule 200 mg    clonazePAM tablet 0.5 mg    docusate sodium capsule 100 mg    fluticasone furoate-vilanteroL 200-25 mcg/dose diskus inhaler 1 puff    fluticasone propionate 50 mcg/actuation nasal spray 100 mcg    haloperidoL tablet 5 mg    heparin (porcine) injection 5,000 Units    melatonin tablet 6 mg    methocarbamoL tablet 1,000 mg    metoclopramide HCl injection 5 mg    morphine injection 2 mg    multivitamin tablet    ondansetron injection 4 mg    oxyCODONE immediate release tablet 5 mg    oxyCODONE immediate release tablet Tab 10 mg    pantoprazole EC tablet 40 mg    paroxetine tablet 40 mg    piperacillin-tazobactam 4.5 g in sodium chloride 0.9% 100 mL IVPB (ready to mix system)    polyethylene glycol packet 17 g    pregabalin capsule 50 mg    sodium chloride 0.9% flush 10 mL    sodium chloride 0.9% flush 10 mL     [START ON 10/22/2022] thiamine (B-1) 250 mg in dextrose 5 % 100 mL IVPB    thiamine (B-1) 500 mg in dextrose 5 % 100 mL IVPB    tiotropium bromide 2.5 mcg/actuation inhaler 2 puff    traZODone tablet 50 mg    vancomycin - pharmacy to dose    vancomycin 750 mg in dextrose 5 % 250 mL IVPB (ready to mix system)    vitamin D 1000 units tablet 2,000 Units     Objective:     Vital Signs (Most Recent):  Temp: 97.7 °F (36.5 °C) (10/21/22 0347)  Pulse: (!) 119 (10/21/22 0347)  Resp: 18 (10/21/22 0522)  BP: (!) 168/79 (10/21/22 0347)  SpO2: (!) 91 % (10/21/22 0347)   Vital Signs (24h Range):  Temp:  [96.3 °F (35.7 °C)-98.1 °F (36.7 °C)] 97.7 °F (36.5 °C)  Pulse:  [101-124] 119  Resp:  [16-22] 18  SpO2:  [89 %-99 %] 91 %  BP: ()/(55-79) 168/79     Weight: 50.8 kg (111 lb 15.9 oz)  Height: 5' (152.4 cm)  Body mass index is 21.87 kg/m².      Intake/Output Summary (Last 24 hours) at 10/21/2022 0707  Last data filed at 10/21/2022 0600  Gross per 24 hour   Intake 900 ml   Output 1754 ml   Net -854 ml         Ortho/SPM Exam  Gen: NAD, frail, ill-appearing  CV: regular rate  Resp: non-labored respirations    Spine:   NAD, A/O x 3.  Wound c/d/i with clean dressing.  4/5 strength RUE and 4/5 L EHL, otherwise NVI per baseline    Significant Labs: All pertinent labs within the past 24 hours have been reviewed.    Significant Imaging: I have reviewed and interpreted all pertinent imaging results/findings.  DVT ultrasound with no DVTs

## 2022-10-21 NOTE — PT/OT/SLP PROGRESS
Physical Therapy Treatment  Co-Treatment with OT     Patient Name:  Merle Caro   MRN:  [id number]    Co-treatment performed due to patient's multiple deficits requiring two skilled therapists to appropriately and safely assess patient's strength and endurance while facilitating functional tasks in addition to accommodating for patient's activity tolerance.     Recommendations:   Discharge Recommendations:  rehabilitation facility   Discharge Equipment Recommendations:  (TBD pending progress)   Barriers to discharge: Increased skilled assistance needed; fall risk   Assessment:   Merle Caro is a 67 y.o. female admitted with a medical diagnosis of Closed fracture of sacrum. She presents with the following impairments/functional limitations:  impaired endurance, weakness, impaired self care skills, impaired functional mobility, gait instability, impaired balance, decreased coordination, decreased upper extremity function, decreased lower extremity function, pain, decreased ROM, abnormal tone, orthopedic precautions. Patient tolerated session fairly well. Patient needing some motivation to participate, but agreeable. Continues to demonstrate significant BLE weakness - RLE knee flexion contracture present. Patient is currently functioning below PLOF Patient would require increased assistance should they discharge home . Patient would continue to benefit from skilled PT services while in the hospital. At this time, upon d/c recommendation of inpatient rehab  in order for patient to progress towards an improved level of functional mobility independence.     Rehab Prognosis: Fair; patient would benefit from acute skilled PT services to address these deficits and reach maximum level of function.    Recent Surgery: Procedure(s) (LRB):  FUSION,SPINE,POSTERIOR APPROACH (N/A) 3 Days Post-Op    Plan:     During this hospitalization, patient to be seen 3 x/week to address the identified rehab impairments via gait training,  "therapeutic activities, therapeutic exercises and progress toward the following goals:    Plan of Care Expires:  11/17/22    Subjective     Chief Complaint: weakness   Patient/Family Comments/goals: "I am weak."  Pain/Comfort:  Pain Rating 1: 0/10  Pain Rating Post-Intervention 1: 0/10    Objective:   Communicated with RN prior to session.  Merle Caro found HOB elevated with SCD, telemetry, pulse ox (continuous), peripheral IV, oxygen, PureWick upon PT entry to room.     General Precautions: Standard, fall   Orthopedic Precautions:spinal precautions   Braces: LSO    Vitals:    10/21/22 1220   BP: (!) 171/83   Pulse: (!) 113   Resp:    Temp: 98 °F (36.7 °C)       Functional Mobility:  Bed Mobility:    Rolling Right: maximal assistance  Scooting: maximal assistance  Supine to Sit: total assistance of 2 persons for LE management, trunk management, and log rolling technique  Once sitting EOB - LSO donned  Sit to Supine: maximal assistance of 2 persons for LE management and trunk management  Transfers:     Sit to Stand:  total assistance and of 2 persons with hand-held assist  2 trials from EOB - hip clearance but not able to achieve full upright posture, FFP, narrow JAKE, BLE knee buckling - blocking needed   Gait: not able to take steps this date    Balance:    Level of assist Total Time    Static Sitting  SBA 10min   Dynamic Sitting  SBA-CGA    Static Standing  TotalAx2    Dynamic Standing  NT      AM-PAC 6 CLICK MOBILITY  Turning over in bed (including adjusting bedclothes, sheets and blankets)?: 2  Sitting down on and standing up from a chair with arms (e.g., wheelchair, bedside commode, etc.): 2  Moving from lying on back to sitting on the side of the bed?: 2  Moving to and from a bed to a chair (including a wheelchair)?: 2  Need to walk in hospital room?: 1  Climbing 3-5 steps with a railing?: 1  Basic Mobility Total Score: 10     Therapeutic Activities and Education:  -Patient educated on the continued role and " goal of PT  -Questions and concerns answered within the the PT scope of practice.        - Patient encouraged to continue to complete mobility in safe range to prevent adverse risks associated with prolonged bed rest   -White board updated in patient room to reflect level of assistance needed with nursing.   -Patient is not clear to transfer with RN/PCT for mobility with RN  -Educated patient and spouse on supine TE in order to improve muscle strength and endurance - glut sets, hip abd/add, ankle pumps     [place] left HOB elevated with all lines intact, call button in reach, RN notified, and spouse present..    GOALS:   Multidisciplinary Problems       Physical Therapy Goals          Problem: Physical Therapy    Goal Priority Disciplines Outcome Goal Variances Interventions   Physical Therapy Goal     PT, PT/OT Ongoing, Progressing     Description: Goals to be met by: 22     Patient will increase functional independence with mobility by performin. Supine to sit with Moderate Assistance  2. Sit to supine with Moderate Assistance  3. Sit to stand transfer with Maximum Assistance  4. Lower extremity exercise program x 10 reps per handout, with assistance as needed  5. Bed to chair transfer with Maximum Assistance using Rolling Walker                         Time Tracking:     PT Received On: 10/21/22  PT Start Time: 1355     PT Stop Time: 1419  PT Total Time (min): 24 min     Billable Minutes: Therapeutic Activity 12 and Neuromuscular Re-education 12    Treatment Type: Treatment  PT/PTA: PT     PTA Visit Number: 0     Vaishnavi Kathleen, PT  10/21/2022

## 2022-10-21 NOTE — PROGRESS NOTES
[facility] Medicine  Progress Note    Patient Name: Merle Caro  MRN: [id number]  Patient Class: IP- Inpatient   Admission Date: 10/14/2022  Length of Stay: 7 days  Attending Physician: Juni Meza MD  Primary Care Provider: Marck Major MD        Subjective:     Principal Problem:Closed fracture of sacrum        HPI:  Ms. Caro is a 67F with hx of HTN, bipolar disorder, tobacco use, COPD on 2L home O2 who is admitted to the hospital to ortho service for upcoming operative repair of displaced sacral fracture. Hospital medicine is consulted for medical optimization.  She and  at bedside report a concerning history. Around a year ago, she began losing weight unexpectedly (has lost 60lbs in 1 year). Her PCP was working her up for this but no cause has yet been found. Then, around June of this year, she and her  were on a cross [place] road trip when she began to experience severe buttocks pain. It became so severe that she could no longer walk, and has been non-ambulatory since June 2022. Initially the pain was thought 2/2 thrombosed hemorrhoids, but after CRS evaluation, this was NOT thought to be the cause of her pain. Eventually lower back imaging was ordered and showed extensive and pathologic fractures of the sacrum with grade 5 spondylolisthesis causing severe central canal stenosis on S1-S2, bilateral sacral ala fractures, displaced pubic rami fractures which closely abut the urethra/vaginal wall. She was then sent here for Ortho evaluation. She denies any known injuries, falls, or physical abuse that could have caused these fractures. She was recently found to have osteoporosis but had not yet started treatment.   In addition to the concerning weightloss and being nonambulatory, she reports are early satiety/bloating, decreased appetite, drenching night sweats several times/week, urinary incontinence, inability to sense having a bowel movement, chronic dry cough. She  reports a family history of ovarian cancer (sister in her 20s), lung cancer in mother (in 40s, was a smoker). The patient herself has a history of pulmonary nodules and has not had a colonoscopy in 11 years (10y f/u recommended).   She is currently afebrile, hemodynamically stable.  Wt Readings from Last 20 Encounters:   10/15/22 50.8 kg (111 lb 15.9 oz)   10/14/22 54.4 kg (120 lb)   09/27/22 60.8 kg (134 lb)   08/22/22 60.8 kg (134 lb)   06/21/22 61.7 kg (136 lb 0.4 oz)   05/19/22 64 kg (141 lb 1.5 oz)   05/19/22 64.8 kg (142 lb 13.7 oz)   05/03/22 65.5 kg (144 lb 6.4 oz)   04/25/22 69.4 kg (153 lb)   03/02/22 69.4 kg (153 lb)   02/03/22 69.4 kg (153 lb)   01/12/22 71.2 kg (157 lb)   01/07/22 71.4 kg (157 lb 6.5 oz)   01/05/22 71.7 kg (158 lb)   12/14/21 72.1 kg (158 lb 13.8 oz)   11/17/21 73.9 kg (163 lb)   11/08/21 74.1 kg (163 lb 5.8 oz)   11/03/21 74.7 kg (164 lb 10.9 oz)   09/21/21 74.7 kg (164 lb 10.9 oz)   07/20/21 76.7 kg (169 lb 1.5 oz)           Overview/Hospital Course:  No notes on file    Interval History: Started on broad spectrum abx yesterday evening for ongoing tachycardia and hypotension. Received 3x 500cc boluses through yesterday evening/night. BP improved well but tachycardia persists.   Had a night full of vomiting preventing her from resting. Never had true BM; per  at bedside it was mostly brown liquid leaking out because the enema did not stay in due to her lack of rectal tone.     Review of Systems  Objective:     Vital Signs (Most Recent):  Temp: 98.4 °F (36.9 °C) (10/21/22 1709)  Pulse: 103 (10/21/22 1709)  Resp: 18 (10/21/22 1709)  BP: 103/64 (10/21/22 1709)  SpO2: 97 % (10/21/22 1709)   Vital Signs (24h Range):  Temp:  [97.7 °F (36.5 °C)-98.4 °F (36.9 °C)] 98.4 °F (36.9 °C)  Pulse:  [] 103  Resp:  [18-22] 18  SpO2:  [90 %-100 %] 97 %  BP: (103-171)/(64-83) 103/64     Weight: 50.8 kg (111 lb 15.9 oz)  Body mass index is 21.87 kg/m².    Intake/Output Summary (Last 24 hours)  at 10/21/2022 1744  Last data filed at 10/21/2022 0600  Gross per 24 hour   Intake 500 ml   Output 854 ml   Net -354 ml        Physical Exam  Vitals and nursing note reviewed.   Constitutional:       General: She is not in acute distress.     Appearance: She is ill-appearing (chronically). She is not toxic-appearing or diaphoretic.   Cardiovascular:      Rate and Rhythm: Regular rhythm. Tachycardia present.   Pulmonary:      Effort: Pulmonary effort is normal. No respiratory distress.   Abdominal:      General: Bowel sounds are increased. There is no distension.      Palpations: Abdomen is soft.      Tenderness: There is abdominal tenderness (reports TTP in all 4 quadrants). There is no guarding.   Skin:     Coloration: Skin is pale.   Neurological:      Mental Status: Mental status is at baseline. She is lethargic.      Cranial Nerves: No dysarthria.      Comments: Lethargic but arouses with repeated verbal stimuli and can answer questions appropriately.   Psychiatric:         Mood and Affect: Mood normal.         Behavior: Behavior normal.       Significant Labs: All pertinent labs within the past 24 hours have been reviewed.    Significant Imaging: I have reviewed all pertinent imaging results/findings within the past 24 hours.      Assessment/Plan:      * Closed fracture dislocation of sacrum  MRI and CT spine showing extensive displaced fractures of the sacrum causing central canal stenosis, pubic rami fractures,   Has significant osteoporosis recently diagnosed, had not yet begun treatment.    - s/p L2-pelvis spinal fusion, lumbosacral laminectomies/cauda equina decompression, and pelvic fixation surgery on 10/18  - needs osteoporosis treatment outpatient  - PT recommended SNF on discharge    SIRS (systemic inflammatory response syndrome)  On 10/19-10/20, patient developed worsening hypotension, tachycardia to 115, hypothermia to 96F.  Appears more lethargic today, though receiving minimal opioids or muscle  relaxers.    Septic workup largely negative.     - continue abx until BCx negative x48h  - low threshold to obtain CTA if worsening hypoxia to eval for PE   - continuous IV fluids for 2L to ensure adequate hydration      Constipation  Patient with severe constipation since admission.  Trialed enema, but unable to stay in rectum due to lack of rectal tone; leaked out and not effective.    - trialed bisacodyl suppository today; f/u results  - KUB obtained to eval for developing obstruction given vomiting overnight and hyperactive bowel sounds; no clear obstruction seen but large volume stool in R colon  - continue daily miralax and senna  - will consider oral lactulose vs disimpaction tomorrow      Unexplained weight loss  Concerning unexplained weightloss of >60lbs in last year, in combination with severe functional decline, pathologic fractures, and B symptoms (see HPI for full details).  Has history of pulmonary nodule due for 12 month followup+mediastinal lymphadenopathy.  Due for colonoscopy (last 2011 with 10 year f/u recommended); having early satiety and bloating symptoms  Discovered patient has history of two brothers dying early from cardiac issues that were said to be genetic, possibly arrhythmia va MI based on description    Workup:  - CT C/A/P with contrast: no lung changes or lymphadenopathy  - CT head given some concern for slowed mentation: no acute changes or intracranial lesions  - HIV, HCV,TSH, RPR, quant gold, blood cultures wnl  - CA 19-9, CA-125, CEA wnl  - TTE without concerning findings  - SPEP, YIN normal and without monoclonal peaks. K/L ratio normal  - Peripheral smear: possible early neutrophil hypersegmentation    Plan:  - given abdominal bloating symptoms, reported recent melena, GI planned for inpatient EGD 10/21, but patient refused EGD due to feeling she needed to further recover from surgery. Will further discuss if it will be rescheduled during admission vs happen outpatient along  "with colonoscopy  - only minimal confusion and slight nystagmus; continue empiric high-dose thiamine taper since she's at risk for nutritional deficiencies/Wernicke's  - discussed with oncology service via phone about early hypersegmented neutrophils: will also check copper levels. With normal CBC differential and negative rest of workup, stated there was low suspicion for myeloproliferative process at this time, but may benefit from an outpatient hematology f/u.         Urinary incontinence  Reported by patient and . Urology evaluated inpatient and state her history is not consistent with retaining urine.  - continue to monitor  - nicholson removed      Severe malnutrition  See unexplained weightloss    Coronary artery disease  Triple-vessel CAD seen on recent CT C/A/P from 10/15.  No history of coronary events. No current chest pain or anginal symptoms.  ASCVD 10-year risk of cardiovascular event 13.1%, mod-high intensity statin indicated    - Low theshold for repeat EKG and troponins if becomes symptomatic  - continue atorvastatin 20mg daily; current dose is appropriate      Prolonged Q-T interval on ECG  QTc 534 on admission.   Reported family history of sudden cardiac deaths.  On multiple QT prolonging home psychiatric meds: klonopin, trazodone, haldol, paroxetine    - several repeat EKGs with improved QT to 470; will discontinue daily EKGs  - continue home meds as these are long term  - keep K>4, Mg>2, Phos >3 - will add to daily labs  - caution with any anti-emetics or other QT-prolonging agents in perioperative setting as she is high risk for arrhythmias      Early satiety  See "unexplained weightloss"      Osteoporosis with current pathological fracture  Recently diagnosed on DEXA with T -3.9 at L spine and T -4.1 at L femoral area.  Has not yet started treatment.  Could be contributing to extensive pelvic fractures, but location seems atypical and other causes need to be evaluated  Has history of " "increased PTH of unknown cause; self-resolved    - f/u with PCP for osteoporosis tx      Benzodiazepine dependence  See bipolar disorder      History of colon polyps  See "unexplained weight loss"      Chronic respiratory failure with hypoxia  2/2 asthma and COPD  See "asthma"    Essential hypertension  - hold home lisinopril 10mg while with borderline blood pressures      Asthma with COPD  On 2L home oxygen  Currently at baseline O2 requirements; no dyspnea, not in acute exacerbation    - not compliant with home breo and spiriva  - home inhalers ordered in perioperative period to avoid pulmonary complications of anesthesia    Affective bipolar disorder  Sees psychiatrist for medications    - continue home klonopin, haldol, paroxetine, trazodone (half trazodone to 50mg as patient is lethargic)  - initially prolonged QT has improved; minimize other QT prolonging meds such as zofran, etc.  - delirium precautions    Pure hypercholesterolemia  - continue home statin; substituting atorvastatin 20mg daily due to hospital formulary  - ASCVD 10-year risk of cardiac event 13%, moderate-high intensity statin recommended; current dose appropriate      LUCILA on CPAP: Not compliant with CPAP  - CPAP qhs ordered  - patient may no longer need since has lost nearly 100 lbs  - does not use home      VTE Risk Mitigation (From admission, onward)         Ordered     heparin (porcine) injection 5,000 Units  Every 8 hours         10/18/22 1233     heparin (porcine) injection 5,000 Units  Every 8 hours         10/15/22 0912     Place sequential compression device  Until discontinued         10/14/22 1637                Discharge Planning   YELENA: 10/24/2022     Code Status: Full Code   Is the patient medically ready for discharge?:     Reason for patient still in hospital (select all that apply): Patient trending condition  Discharge Plan A: Skilled Nursing Facility                  Brittni Best MD  Department of Hospital Medicine "   [facility]

## 2022-10-21 NOTE — PT/OT/SLP PROGRESS
Occupational Therapy   Co-Treatment    Name: Merle Caro  MRN: [id number]  Admitting Diagnosis:  Closed fracture of sacrum  3 Days Post-Op    Recommendations:     Discharge Recommendations: rehabilitation facility  Discharge Equipment Recommendations:   (TBD)  Barriers to discharge:  None    Assessment:     Merle Caro is a 67 y.o. female with a medical diagnosis of Closed fracture of sacrum.  She presents with limited mobility. Pt continues to remain hesitant to participate. Pt was able to complete supine to sit with total A x2 and sit to supine with max A x 2. Pt stood 2x with total A x 2 and knees blocked. Pt continued to buckle knees while standing. Pt attempted to take side steps towards HOB, however required total A x 2. Performance deficits affecting function are weakness, impaired endurance, impaired sensation, impaired self care skills, impaired functional mobility, gait instability, decreased lower extremity function, decreased upper extremity function, decreased coordination, impaired cognition, impaired balance, decreased safety awareness, pain, orthopedic precautions, impaired joint extensibility, decreased ROM.     Rehab Prognosis:  Good; patient would benefit from acute skilled OT services to address these deficits and reach maximum level of function.       Plan:     Patient to be seen 4 x/week to address the above listed problems via self-care/home management, therapeutic activities, therapeutic exercises  Plan of Care Expires: 11/20/22  Plan of Care Reviewed with: patient, spouse  Co-tx due to increased pt complexity.    Subjective     Pain/Comfort:  Pain Rating 1: 0/10  Location - Orientation 1: lower  Location 1: back  Pain Addressed 1: Reposition, Distraction, Cessation of Activity    Objective:     Communicated with: RN prior to session.  Patient found supine with SCD, telemetry, pulse ox (continuous), PureWick, oxygen upon OT entry to room.    General Precautions: Standard, fall   Orthopedic  Precautions:spinal precautions   Braces: LSO  Respiratory Status: Nasal cannula, flow 1.5 L/min     Occupational Performance:     Bed Mobility:    Patient completed Scooting/Bridging with total assistance  Patient completed Supine to Sit with total assistance and 2 persons  Patient completed Sit to Supine with maximal assistance and 2 persons     Functional Mobility/Transfers:  Patient completed Sit <> Stand Transfer with total assistance and of 2 persons  with  hand-held assist   Functional Mobility: Pt functional mobility was limited due to c/o pain and increased fatigue. Pt stood 2x with total x 2.    Activities of Daily Living:  Upper Body Dressing: maximal assistance to don hospital gown while sitting EOB.      [facility] 6 Click ADL: 12    Treatment & Education:  -Pt educated on role of OT and POC.   -Pt educated on importance of mobility.   -Pt educated on spinal precautions.   -Pt educated on positioning.     Patient left HOB elevated with all lines intact, call button in reach, RN notified, and  present    GOALS:   Multidisciplinary Problems       Occupational Therapy Goals          Problem: Occupational Therapy    Goal Priority Disciplines Outcome Interventions   Occupational Therapy Goal     OT, PT/OT Ongoing, Progressing    Description: Goals to be met by: 11/20/22     Patient will increase functional independence with ADLs by performing:    UE Dressing with Minimal Assistance.  LE Dressing with Minimal Assistance.  Grooming while seated with Set-up Assistance.  Toileting from bedside commode with Minimal Assistance for hygiene and clothing management.   Bathing from  shower chair/bench with Minimal Assistance.  Toilet transfer to bedside commode with Minimal Assistance.                         Time Tracking:     OT Date of Treatment: 10/21/22  OT Start Time: 1355  OT Stop Time: 1419  OT Total Time (min): 24 min    Billable Minutes:Self Care/Home Management 12  Therapeutic Activity 12    OT/TSERING: OT           10/21/2022

## 2022-10-21 NOTE — ASSESSMENT & PLAN NOTE
Sees psychiatrist for medications    - continue home klonopin, haldol, paroxetine, trazodone (half trazodone to 50mg as patient is lethargic)  - initially prolonged QT has improved; minimize other QT prolonging meds such as zofran, etc.  - delirium precautions

## 2022-10-21 NOTE — CARE UPDATE
RAPID RESPONSE NURSE ROUND       Rounding completed with charge RNJanae for tachycardia reports patient received fluid bolus, HR decreased. Otherwise hemodynamically stable. No additional concerns verbalized at this time. Instructed to call [phone] for further concerns or assistance.

## 2022-10-21 NOTE — PLAN OF CARE
[facility]  Discharge Reassessment    Primary Care Provider: Marck Major MD    Expected Discharge Date: 10/24/2022    Reassessment (most recent)       Discharge Reassessment - 10/21/22 1024          Discharge Reassessment    Assessment Type Discharge Planning Brief Assessment     Did the patient's condition or plan change since previous assessment? Yes     Discharge Plan discussed with: Patient;Spouse/sig other     Communicated YELENA with patient/caregiver Yes     Discharge Plan A Skilled Nursing Facility     Discharge Plan B Skilled Nursing Facility                   Patient to d/c to a skilled Nursing Facility pending medical stability. YELENA 10/24/22 @ this time. Will continue to follow.

## 2022-10-21 NOTE — TREATMENT PLAN
Brief GI Treatment Plan    Discussed possible EGD with patient. She declines and says she is not up for it as she is still recovering from her surgery. Discussed that we could potentially do EGD on Monday if she is still here and amenable. Otherwise, would defer both EGD and colonoscopy to outpatient setting. Will place scheduling requests today. Discussed with primary team. If still here early next week and amenable to EGD, please re-place GI consult order.    Fabian Best  Gastroenterology Fellow, PGY-IV

## 2022-10-21 NOTE — PLAN OF CARE
SW informed by Krystal with [facility], pt's Medicare isn't active and pt's BCBS is primary with no SNF benefits.   SAUD sent referral for Rehab placement,    Kemi Martinez LMSW  Case Management   [facility]   Ext. [phone]

## 2022-10-21 NOTE — PROGRESS NOTES
Pharmacokinetic Initial Assessment: IV Vancomycin    Assessment/Plan:    Initiate intravenous vancomycin with loading dose of 1000 mg once followed by a maintenance dose of vancomycin 750mg IV every 12 hours  Desired empiric serum trough concentration is 15 to 20 mcg/mL  Draw vancomycin trough level 60 min prior to fourth dose on 10/22 at approximately 1230  Pharmacy will continue to follow and monitor vancomycin.      Please contact pharmacy at extension [phone] with any questions regarding this assessment.     Thank you for the consult,   Nuvia Huang       Patient brief summary:  Merle Caor is a 67 y.o. female initiated on antimicrobial therapy with IV Vancomycin for treatment of suspected sepsis    Drug Allergies:   Review of patient's allergies indicates:   Allergen Reactions    Codeine Shortness Of Breath    Keflex [cephalexin] Shortness Of Breath    Shellfish containing products Nausea And Vomiting    Flu vaccine ts 2011-12(18 yr+) Hives    Sucralfate Other (See Comments) and Hives       Actual Body Weight:   50.8 kg    Renal Function:   Estimated Creatinine Clearance: 80.6 mL/min (based on SCr of 0.5 mg/dL).,     Dialysis Method (if applicable):  N/A    CBC (last 72 hours):  Recent Labs   Lab Result Units 10/18/22  1238 10/18/22  1851 10/19/22  0546 10/20/22  0345 10/20/22  1325   WBC K/uL 17.56* 17.26* 13.27* 10.69 11.96   Hemoglobin g/dL 11.0* 10.0* 9.0* 8.6* 8.7*   Hematocrit % 35.3* 32.0* 28.5* 27.5* 27.7*   Platelets K/uL 497* 435 387 355 361   Gran % % 91.6* 91.5* 83.5* 74.1* 82.8*   Lymph % % 4.1* 2.8* 8.1* 14.0* 8.1*   Mono % % 2.0* 4.9 7.3 8.8 5.9   Eosinophil % % 0.2 0.0 0.2 2.2 2.1   Basophil % % 0.3 0.1 0.2 0.4 0.4   Differential Method  Automated Automated Automated Automated Automated       Metabolic Panel (last 72 hours):  Recent Labs   Lab Result Units 10/18/22  1238 10/18/22  1851 10/19/22  0545 10/20/22  0345 10/20/22  1326 10/20/22  1435   Sodium mmol/L 140 139 132* 132* 131*  --     Sodium, Urine mmol/L  --   --   --   --   --  11*   Potassium mmol/L 4.0 4.4 3.5 3.4* 3.9  --    Chloride mmol/L 106 105 97 96 94*  --    CO2 mmol/L 21* 27 28 27 31*  --    Glucose mg/dL 184* 157* 109 110 121*  --    Glucose, UA   --   --   --   --   --  Negative   BUN mg/dL 9 10 8 9 12  --    Creatinine mg/dL 0.5 0.6 0.5 0.5 0.5  --    Creatinine, Urine mg/dL  --   --   --   --   --  20.0   Albumin g/dL 2.5* 2.5* 2.2* 2.1* 2.1*  --    Total Bilirubin mg/dL 0.2 0.2 0.3 0.5 0.4  --    Alkaline Phosphatase U/L 105 99 95 99 120  --    AST U/L 27 25 23 32 39  --    ALT U/L 20 16 11 12 14  --    Magnesium mg/dL  --  1.7 1.7 2.0  --   --    Phosphorus mg/dL  --  3.8 3.5 3.4  --   --        Drug levels (last 3 results):  No results for input(s): VANCOMYCINRA, VANCORANDOM, VANCOMYCINPE, VANCOPEAK, VANCOMYCINTR, VANCOTROUGH in the last 72 hours.    Microbiologic Results:  Microbiology Results (last 7 days)       Procedure Component Value Units Date/Time    Blood culture [[id number]] Collected: 10/20/22 1325    Order Status: Completed Specimen: Blood from Antecubital, Left Updated: 10/20/22 2115     Blood Culture, Routine No Growth to date    Blood culture [[id number]] Collected: 10/20/22 1339    Order Status: Completed Specimen: Blood from Antecubital, Right Updated: 10/20/22 2115     Blood Culture, Routine No Growth to date    Blood culture [[id number]] Collected: 10/15/22 1349    Order Status: Completed Specimen: Blood Updated: 10/20/22 1612     Blood Culture, Routine No growth after 5 days.    Blood culture [[id number]] Collected: 10/15/22 1350    Order Status: Completed Specimen: Blood Updated: 10/20/22 1612     Blood Culture, Routine No growth after 5 days.

## 2022-10-21 NOTE — ASSESSMENT & PLAN NOTE
Merle Caro is a 67 y.o. female with past medical history of hypertension, bipolar disorder, and urinary incontinence (1 year duration) admitted to Orthopedics with what appears to be an approximally 4-month-old sacral fracture dislocation of S1 on S2 with severe canal stenosis at that level resulting in chronic cauda equina symptoms.  She has not been able ambulate since June due to pain.  Initial images reviewed by Dr. Meza who plans to take the patient for surgery on Tuesday October 18th.  CT pelvis completed.  DVT ultrasound with no DVTs.  Hospital Medicine consulted began a metastatic workup.  Studies so far unrevealing.  Appreciate hospital medicine recommendations. Now s/p L2-Pelvis PSF w L5-S1 Laminectomy and dural injury intraop repaired w dural glue on 10/18.    Plan:   Tolerated elevated HOB w/out spinal headache  Pain control: multimodal  Diet: regular diet  PT/OT: WBAT in LSO. Spine precautions.  DVT prophylaxis:  SubQ heparin; no need for IVC filter at this time  Abx: Vanc/Zosyn per   Labs:  Stable  Leija dc'd 10/19  No drains    Dispo: pending PT/OT and  reccs

## 2022-10-21 NOTE — PLAN OF CARE
Problem: Adult Inpatient Plan of Care  Goal: Plan of Care Review  Outcome: Ongoing, Progressing     Problem: Skin Injury Risk Increased  Goal: Skin Health and Integrity  Outcome: Ongoing, Progressing     Problem: Fall Injury Risk  Goal: Absence of Fall and Fall-Related Injury  Outcome: Ongoing, Progressing     Problem: Infection  Goal: Absence of Infection Signs and Symptoms  Outcome: Ongoing, Progressing     Problem: Bowel Motility Impaired (Spinal Surgery)  Goal: Effective Bowel Elimination  Outcome: Ongoing, Progressing

## 2022-10-22 LAB
ALBUMIN SERPL BCP-MCNC: 1.8 G/DL (ref 3.5–5.2)
ALP SERPL-CCNC: 114 U/L (ref 55–135)
ALT SERPL W/O P-5'-P-CCNC: 9 U/L (ref 10–44)
ANION GAP SERPL CALC-SCNC: 8 MMOL/L (ref 8–16)
AST SERPL-CCNC: 26 U/L (ref 10–40)
BILIRUB SERPL-MCNC: 0.6 MG/DL (ref 0.1–1)
BLD PROD TYP BPU: NORMAL
BLD PROD TYP BPU: NORMAL
BLOOD UNIT EXPIRATION DATE: NORMAL
BLOOD UNIT EXPIRATION DATE: NORMAL
BLOOD UNIT TYPE CODE: 6200
BLOOD UNIT TYPE CODE: 6200
BLOOD UNIT TYPE: NORMAL
BLOOD UNIT TYPE: NORMAL
BUN SERPL-MCNC: 15 MG/DL (ref 8–23)
CALCIUM SERPL-MCNC: 8.4 MG/DL (ref 8.7–10.5)
CHLORIDE SERPL-SCNC: 95 MMOL/L (ref 95–110)
CO2 SERPL-SCNC: 28 MMOL/L (ref 23–29)
CODING SYSTEM: NORMAL
CODING SYSTEM: NORMAL
CREAT SERPL-MCNC: 0.7 MG/DL (ref 0.5–1.4)
DISPENSE STATUS: NORMAL
DISPENSE STATUS: NORMAL
EST. GFR  (NO RACE VARIABLE): >60 ML/MIN/1.73 M^2
GLUCOSE SERPL-MCNC: 97 MG/DL (ref 70–110)
MAGNESIUM SERPL-MCNC: 1.8 MG/DL (ref 1.6–2.6)
NUM UNITS TRANS PACKED RBC: NORMAL
NUM UNITS TRANS PACKED RBC: NORMAL
PHOSPHATE SERPL-MCNC: 5.4 MG/DL (ref 2.7–4.5)
POTASSIUM SERPL-SCNC: 3.4 MMOL/L (ref 3.5–5.1)
PROT SERPL-MCNC: 5 G/DL (ref 6–8.4)
SODIUM SERPL-SCNC: 131 MMOL/L (ref 136–145)
VANCOMYCIN TROUGH SERPL-MCNC: 18.1 UG/ML (ref 10–22)

## 2022-10-22 PROCEDURE — 63600175 PHARM REV CODE 636 W HCPCS: Performed by: ORTHOPAEDIC SURGERY

## 2022-10-22 PROCEDURE — 97110 THERAPEUTIC EXERCISES: CPT | Mod: CQ

## 2022-10-22 PROCEDURE — 63600175 PHARM REV CODE 636 W HCPCS: Performed by: STUDENT IN AN ORGANIZED HEALTH CARE EDUCATION/TRAINING PROGRAM

## 2022-10-22 PROCEDURE — 99233 PR SUBSEQUENT HOSPITAL CARE,LEVL III: ICD-10-PCS | Mod: GC,,, | Performed by: HOSPITALIST

## 2022-10-22 PROCEDURE — 84100 ASSAY OF PHOSPHORUS: CPT | Performed by: ORTHOPAEDIC SURGERY

## 2022-10-22 PROCEDURE — 25000003 PHARM REV CODE 250: Performed by: STUDENT IN AN ORGANIZED HEALTH CARE EDUCATION/TRAINING PROGRAM

## 2022-10-22 PROCEDURE — 80053 COMPREHEN METABOLIC PANEL: CPT | Performed by: ORTHOPAEDIC SURGERY

## 2022-10-22 PROCEDURE — 36415 COLL VENOUS BLD VENIPUNCTURE: CPT | Performed by: ORTHOPAEDIC SURGERY

## 2022-10-22 PROCEDURE — 80202 ASSAY OF VANCOMYCIN: CPT | Performed by: ORTHOPAEDIC SURGERY

## 2022-10-22 PROCEDURE — 97112 NEUROMUSCULAR REEDUCATION: CPT

## 2022-10-22 PROCEDURE — 25000003 PHARM REV CODE 250

## 2022-10-22 PROCEDURE — 11000001 HC ACUTE MED/SURG PRIVATE ROOM

## 2022-10-22 PROCEDURE — 94761 N-INVAS EAR/PLS OXIMETRY MLT: CPT

## 2022-10-22 PROCEDURE — 99900035 HC TECH TIME PER 15 MIN (STAT)

## 2022-10-22 PROCEDURE — 99233 SBSQ HOSP IP/OBS HIGH 50: CPT | Mod: GC,,, | Performed by: HOSPITALIST

## 2022-10-22 PROCEDURE — 27000221 HC OXYGEN, UP TO 24 HOURS

## 2022-10-22 PROCEDURE — 25000003 PHARM REV CODE 250: Performed by: ORTHOPAEDIC SURGERY

## 2022-10-22 PROCEDURE — 83735 ASSAY OF MAGNESIUM: CPT | Performed by: ORTHOPAEDIC SURGERY

## 2022-10-22 PROCEDURE — 97530 THERAPEUTIC ACTIVITIES: CPT | Mod: CQ

## 2022-10-22 PROCEDURE — 97535 SELF CARE MNGMENT TRAINING: CPT

## 2022-10-22 RX ORDER — SODIUM CHLORIDE 9 MG/ML
INJECTION, SOLUTION INTRAVENOUS CONTINUOUS
Status: ACTIVE | OUTPATIENT
Start: 2022-10-22 | End: 2022-10-23

## 2022-10-22 RX ORDER — POLYETHYLENE GLYCOL 3350 17 G/17G
17 POWDER, FOR SOLUTION ORAL DAILY
Status: DISCONTINUED | OUTPATIENT
Start: 2022-10-22 | End: 2022-10-25 | Stop reason: HOSPADM

## 2022-10-22 RX ADMIN — DOCUSATE SODIUM 100 MG: 100 CAPSULE ORAL at 08:10

## 2022-10-22 RX ADMIN — TRAZODONE HYDROCHLORIDE 50 MG: 50 TABLET ORAL at 10:10

## 2022-10-22 RX ADMIN — HEPARIN SODIUM 5000 UNITS: 5000 INJECTION INTRAVENOUS; SUBCUTANEOUS at 10:10

## 2022-10-22 RX ADMIN — METHOCARBAMOL 1000 MG: 500 TABLET ORAL at 08:10

## 2022-10-22 RX ADMIN — HEPARIN SODIUM 5000 UNITS: 5000 INJECTION INTRAVENOUS; SUBCUTANEOUS at 05:10

## 2022-10-22 RX ADMIN — PREGABALIN 50 MG: 50 CAPSULE ORAL at 08:10

## 2022-10-22 RX ADMIN — CHOLECALCIFEROL TAB 25 MCG (1000 UNIT) 2000 UNITS: 25 TAB at 10:10

## 2022-10-22 RX ADMIN — CLONAZEPAM 0.5 MG: 0.5 TABLET ORAL at 10:10

## 2022-10-22 RX ADMIN — FLUTICASONE FUROATE AND VILANTEROL TRIFENATATE 1 PUFF: 200; 25 POWDER RESPIRATORY (INHALATION) at 08:10

## 2022-10-22 RX ADMIN — THIAMINE HYDROCHLORIDE 250 MG: 100 INJECTION, SOLUTION INTRAMUSCULAR; INTRAVENOUS at 04:10

## 2022-10-22 RX ADMIN — ACETAMINOPHEN 1000 MG: 325 TABLET ORAL at 02:10

## 2022-10-22 RX ADMIN — SENNOSIDES 8.6 MG: 8.6 TABLET, FILM COATED ORAL at 10:10

## 2022-10-22 RX ADMIN — VANCOMYCIN HYDROCHLORIDE 750 MG: 750 INJECTION, POWDER, LYOPHILIZED, FOR SOLUTION INTRAVENOUS at 05:10

## 2022-10-22 RX ADMIN — METHOCARBAMOL 1000 MG: 500 TABLET ORAL at 02:10

## 2022-10-22 RX ADMIN — HEPARIN SODIUM 5000 UNITS: 5000 INJECTION INTRAVENOUS; SUBCUTANEOUS at 02:10

## 2022-10-22 RX ADMIN — TIOTROPIUM BROMIDE INHALATION SPRAY 2 PUFF: 3.12 SPRAY, METERED RESPIRATORY (INHALATION) at 08:10

## 2022-10-22 RX ADMIN — THERA TABS 1 TABLET: TAB at 08:10

## 2022-10-22 RX ADMIN — VANCOMYCIN HYDROCHLORIDE 750 MG: 750 INJECTION, POWDER, LYOPHILIZED, FOR SOLUTION INTRAVENOUS at 06:10

## 2022-10-22 RX ADMIN — ATORVASTATIN CALCIUM 20 MG: 20 TABLET, FILM COATED ORAL at 08:10

## 2022-10-22 RX ADMIN — SODIUM CHLORIDE: 0.9 INJECTION, SOLUTION INTRAVENOUS at 03:10

## 2022-10-22 RX ADMIN — PANTOPRAZOLE SODIUM 40 MG: 40 TABLET, DELAYED RELEASE ORAL at 08:10

## 2022-10-22 RX ADMIN — ACETAMINOPHEN 1000 MG: 325 TABLET ORAL at 05:10

## 2022-10-22 RX ADMIN — PIPERACILLIN SODIUM AND TAZOBACTAM SODIUM 4.5 G: 4; .5 INJECTION, POWDER, LYOPHILIZED, FOR SOLUTION INTRAVENOUS at 05:10

## 2022-10-22 RX ADMIN — SODIUM CHLORIDE: 0.9 INJECTION, SOLUTION INTRAVENOUS at 10:10

## 2022-10-22 RX ADMIN — HALOPERIDOL 5 MG: 5 TABLET ORAL at 10:10

## 2022-10-22 RX ADMIN — DOCUSATE SODIUM 100 MG: 100 CAPSULE ORAL at 10:10

## 2022-10-22 RX ADMIN — ACETAMINOPHEN 1000 MG: 325 TABLET ORAL at 10:10

## 2022-10-22 RX ADMIN — POTASSIUM BICARBONATE 40 MEQ: 391 TABLET, EFFERVESCENT ORAL at 10:10

## 2022-10-22 RX ADMIN — PIPERACILLIN SODIUM AND TAZOBACTAM SODIUM 4.5 G: 4; .5 INJECTION, POWDER, LYOPHILIZED, FOR SOLUTION INTRAVENOUS at 12:10

## 2022-10-22 RX ADMIN — PREGABALIN 50 MG: 50 CAPSULE ORAL at 10:10

## 2022-10-22 RX ADMIN — METHOCARBAMOL 1000 MG: 500 TABLET ORAL at 10:10

## 2022-10-22 RX ADMIN — PAROXETINE HYDROCHLORIDE 40 MG: 10 TABLET, FILM COATED ORAL at 10:10

## 2022-10-22 NOTE — PROGRESS NOTES
[facility] Medicine  Progress Note    Patient Name: Merle Caro  MRN: [id number]  Patient Class: IP- Inpatient   Admission Date: 10/14/2022  Length of Stay: 8 days  Attending Physician: Juni Meza MD  Primary Care Provider: Marck Major MD        Subjective:     Principal Problem:Closed fracture of sacrum        HPI:  Ms. Caro is a 67F with hx of HTN, bipolar disorder, tobacco use, COPD on 2L home O2 who is admitted to the hospital to ortho service for upcoming operative repair of displaced sacral fracture. Hospital medicine is consulted for medical optimization.  She and  at bedside report a concerning history. Around a year ago, she began losing weight unexpectedly (has lost 60lbs in 1 year). Her PCP was working her up for this but no cause has yet been found. Then, around June of this year, she and her  were on a [place] trip when she began to experience severe buttocks pain. It became so severe that she could no longer walk, and has been non-ambulatory since June 2022. Initially the pain was thought 2/2 thrombosed hemorrhoids, but after CRS evaluation, this was NOT thought to be the cause of her pain. Eventually lower back imaging was ordered and showed extensive and pathologic fractures of the sacrum with grade 5 spondylolisthesis causing severe central canal stenosis on S1-S2, bilateral sacral ala fractures, displaced pubic rami fractures which closely abut the urethra/vaginal wall. She was then sent here for Ortho evaluation. She denies any known injuries, falls, or physical abuse that could have caused these fractures. She was recently found to have osteoporosis but had not yet started treatment.   In addition to the concerning weightloss and being nonambulatory, she reports are early satiety/bloating, decreased appetite, drenching night sweats several times/week, urinary incontinence, inability to sense having a bowel movement, chronic dry cough. She  reports a family history of ovarian cancer (sister in her 20s), lung cancer in mother (in 40s, was a smoker). The patient herself has a history of pulmonary nodules and has not had a colonoscopy in 11 years (10y f/u recommended).   She is currently afebrile, hemodynamically stable.  Wt Readings from Last 20 Encounters:   10/15/22 50.8 kg (111 lb 15.9 oz)   10/14/22 54.4 kg (120 lb)   09/27/22 60.8 kg (134 lb)   08/22/22 60.8 kg (134 lb)   06/21/22 61.7 kg (136 lb 0.4 oz)   05/19/22 64 kg (141 lb 1.5 oz)   05/19/22 64.8 kg (142 lb 13.7 oz)   05/03/22 65.5 kg (144 lb 6.4 oz)   04/25/22 69.4 kg (153 lb)   03/02/22 69.4 kg (153 lb)   02/03/22 69.4 kg (153 lb)   01/12/22 71.2 kg (157 lb)   01/07/22 71.4 kg (157 lb 6.5 oz)   01/05/22 71.7 kg (158 lb)   12/14/21 72.1 kg (158 lb 13.8 oz)   11/17/21 73.9 kg (163 lb)   11/08/21 74.1 kg (163 lb 5.8 oz)   11/03/21 74.7 kg (164 lb 10.9 oz)   09/21/21 74.7 kg (164 lb 10.9 oz)   07/20/21 76.7 kg (169 lb 1.5 oz)           Overview/Hospital Course:  No notes on file    Interval History: NAEON. Vitals much improved from prior. Infectious w/u NGTD. Had 3x BM yesterday with suppository. Still sleepy this morning but  reports her baseline is not waking up till 10:30AM    Review of Systems  Objective:     Vital Signs (Most Recent):  Temp: 98.4 °F (36.9 °C) (10/22/22 0753)  Pulse: 95 (10/22/22 0836)  Resp: 18 (10/22/22 0836)  BP: (!) 143/82 (10/22/22 0753)  SpO2: 96 % (10/22/22 0919)   Vital Signs (24h Range):  Temp:  [96.5 °F (35.8 °C)-98.4 °F (36.9 °C)] 98.4 °F (36.9 °C)  Pulse:  [] 95  Resp:  [18-20] 18  SpO2:  [94 %-100 %] 96 %  BP: (102-171)/(58-83) 143/82     Weight: 50.8 kg (111 lb 15.9 oz)  Body mass index is 21.87 kg/m².    Intake/Output Summary (Last 24 hours) at 10/22/2022 1009  Last data filed at 10/21/2022 2152  Gross per 24 hour   Intake --   Output 400 ml   Net -400 ml        Physical Exam  Vitals and nursing note reviewed.   Constitutional:       General: She  is not in acute distress.     Appearance: She is ill-appearing (chronically). She is not toxic-appearing or diaphoretic.   Cardiovascular:      Rate and Rhythm: Regular rhythm.   Pulmonary:      Effort: Pulmonary effort is normal. No respiratory distress.   Abdominal:      General: Bowel sounds are increased. There is no distension.      Palpations: Abdomen is soft.      Tenderness: There is abdominal tenderness (diffusely mildly TTP). There is no guarding.   Skin:     Coloration: Skin is pale.   Neurological:      Mental Status: Mental status is at baseline. She is lethargic.      Cranial Nerves: No dysarthria.      Comments: Lethargic but arouses with repeated verbal stimuli and can answer questions appropriately.   Psychiatric:         Mood and Affect: Mood normal.         Behavior: Behavior normal.       Significant Labs: All pertinent labs within the past 24 hours have been reviewed.    Significant Imaging: I have reviewed all pertinent imaging results/findings within the past 24 hours.      Assessment/Plan:      * Closed fracture dislocation of sacrum  MRI and CT spine showing extensive displaced fractures of the sacrum causing central canal stenosis, pubic rami fractures,   Has significant osteoporosis recently diagnosed, had not yet begun treatment.    - s/p L2-pelvis spinal fusion, lumbosacral laminectomies/cauda equina decompression, and pelvic fixation surgery on 10/18  - needs osteoporosis treatment outpatient  - PT recommended inpatient rehab on discharge    SIRS (systemic inflammatory response syndrome)  On 10/19-10/20, patient developed worsening hypotension, tachycardia to 115, hypothermia to 96F.  Appears more lethargic today, though receiving minimal opioids or muscle relaxers.    Septic workup largely negative.     - will stop abx once BCx negative x48h (would be 10/22 evening)  - low threshold to obtain CTA if worsening hypoxia to eval for PE   - continuous IV fluids to ensure adequate  hydration      Constipation  Patient with severe constipation since admission.  Trialed enema, but unable to stay in rectum due to lack of rectal tone; leaked out and not effective.    KUB obtained to eval for developing obstruction given vomiting overnight and hyperactive bowel sounds; no clear obstruction seen but large volume stool in R colon    - trialed bisacodyl suppository yesterday; had 3 BM overnight  - continue daily miralax and senna      Unexplained weight loss  Concerning unexplained weightloss of >60lbs in last year, in combination with severe functional decline, pathologic fractures, and B symptoms (see HPI for full details).  Has history of pulmonary nodule due for 12 month followup+mediastinal lymphadenopathy.  Due for colonoscopy (last 2011 with 10 year f/u recommended); having early satiety and bloating symptoms  Discovered patient has history of two brothers dying early from cardiac issues that were said to be genetic, possibly arrhythmia va MI based on description    Workup:  - CT C/A/P with contrast: no lung changes or lymphadenopathy  - CT head given some concern for slowed mentation: no acute changes or intracranial lesions  - HIV, HCV,TSH, RPR, quant gold, blood cultures wnl  - CA 19-9, CA-125, CEA wnl  - TTE without concerning findings  - SPEP, YIN normal and without monoclonal peaks. K/L ratio normal  - Peripheral smear: possible early neutrophil hypersegmentation    Plan:  - given abdominal bloating symptoms, reported recent melena, GI planned for inpatient EGD 10/21, but patient refused EGD due to feeling she needed to further recover from surgery. Discussed further our rationale for this procedure today; she will think about it and let us know tomorrow morning.  - only minimal confusion and slight nystagmus; continue empiric high-dose thiamine taper since she's at risk for nutritional deficiencies/Wernicke's  - discussed with oncology service via phone about early hypersegmented  "neutrophils: will also check copper levels. With normal CBC differential and negative rest of workup, stated there was low suspicion for myeloproliferative process at this time, but may benefit from an outpatient hematology f/u.         Urinary incontinence  Reported by patient and . Urology evaluated inpatient and state her history is not consistent with retaining urine.  - continue to monitor  - nicholson removed      Severe malnutrition  See unexplained weightloss    Cauda equina syndrome        Coronary artery disease  Triple-vessel CAD seen on recent CT C/A/P from 10/15.  No history of coronary events. No current chest pain or anginal symptoms.  ASCVD 10-year risk of cardiovascular event 13.1%, mod-high intensity statin indicated    - Low theshold for repeat EKG and troponins if becomes symptomatic  - continue atorvastatin 20mg daily; current dose is appropriate      Prolonged Q-T interval on ECG  QTc 534 on admission.   Reported family history of sudden cardiac deaths.  On multiple QT prolonging home psychiatric meds: klonopin, trazodone, haldol, paroxetine    - several repeat EKGs with improved QT to 470; will discontinue daily EKGs  - continue home meds as these are long term  - keep K>4, Mg>2, Phos >3  - caution with any anti-emetics or other QT-prolonging agents in perioperative setting as she is high risk for arrhythmias      Early satiety  See "unexplained weightloss"      Anemia        Osteoporosis with current pathological fracture  Recently diagnosed on DEXA with T -3.9 at L spine and T -4.1 at L femoral area.  Has not yet started treatment.  Could be contributing to extensive pelvic fractures, but location seems atypical and other causes need to be evaluated  Has history of increased PTH of unknown cause; self-resolved    - f/u with PCP for osteoporosis tx      Benzodiazepine dependence  See bipolar disorder      History of colon polyps  See "unexplained weight loss"      Chronic respiratory " "failure with hypoxia  2/2 asthma and COPD  See "asthma"    Cigarette nicotine dependence with nicotine-induced disorder        Essential hypertension  - hold home lisinopril 10mg while with borderline blood pressures      Asthma with COPD  On 2L home oxygen  Currently at baseline O2 requirements; no dyspnea, not in acute exacerbation    - not compliant with home breo and spiriva  - home inhalers ordered in perioperative period to avoid pulmonary complications of anesthesia    Affective bipolar disorder  Sees psychiatrist for medications    - continue home klonopin, haldol, paroxetine, trazodone (half trazodone to 50mg as patient is lethargic)  - initially prolonged QT has improved; minimize other QT prolonging meds such as zofran, etc.  - delirium precautions    Pure hypercholesterolemia  - continue home statin; substituting atorvastatin 20mg daily due to hospital formulary  - ASCVD 10-year risk of cardiac event 13%, moderate-high intensity statin recommended; current dose appropriate      LUCILA on CPAP: Not compliant with CPAP  - CPAP qhs ordered  - patient may no longer need since has lost nearly 100 lbs  - does not use home      VTE Risk Mitigation (From admission, onward)         Ordered     heparin (porcine) injection 5,000 Units  Every 8 hours         10/18/22 1233     heparin (porcine) injection 5,000 Units  Every 8 hours         10/15/22 0912     Place sequential compression device  Until discontinued         10/14/22 1637                Discharge Planning   YELENA: 10/24/2022     Code Status: Full Code   Is the patient medically ready for discharge?:     Reason for patient still in hospital (select all that apply): Patient trending condition  Discharge Plan A: Skilled Nursing Facility                  Brittni Best MD  Department of Hospital Medicine   [facility] - Surgery    "

## 2022-10-22 NOTE — ASSESSMENT & PLAN NOTE
QTc 534 on admission.   Reported family history of sudden cardiac deaths.  On multiple QT prolonging home psychiatric meds: klonopin, trazodone, haldol, paroxetine    - several repeat EKGs with improved QT to 470; will discontinue daily EKGs  - continue home meds as these are long term  - keep K>4, Mg>2, Phos >3  - caution with any anti-emetics or other QT-prolonging agents in perioperative setting as she is high risk for arrhythmias

## 2022-10-22 NOTE — PLAN OF CARE
Problem: Adult Inpatient Plan of Care  Goal: Plan of Care Review  Outcome: Ongoing, Progressing     Problem: Skin Injury Risk Increased  Goal: Skin Health and Integrity  Outcome: Ongoing, Progressing     Problem: Pain Chronic (Persistent)  Goal: Acceptable Pain Control and Functional Ability  Outcome: Ongoing, Progressing     Problem: Fall Injury Risk  Goal: Absence of Fall and Fall-Related Injury  Outcome: Ongoing, Progressing     Problem: Infection  Goal: Absence of Infection Signs and Symptoms  Outcome: Ongoing, Progressing     Problem: Bowel Motility Impaired (Spinal Surgery)  Goal: Effective Bowel Elimination  Outcome: Ongoing, Progressing

## 2022-10-22 NOTE — PROGRESS NOTES
[facility]  Orthopedics  Progress Note    Patient Name: Merle Caro  MRN: [id number]  Admission Date: 10/14/2022  Hospital Length of Stay: 8 days  Attending Provider: Juni Meza MD  Primary Care Provider: Marck Major MD  Follow-up For: Procedure(s) (LRB):  FUSION,SPINE,POSTERIOR APPROACH (N/A)    Post-Operative Day: 4 Days Post-Op  Subjective:     Principal Problem:Closed fracture of sacrum    Principal Orthopedic Problem:  Same, s/p L2-pelvis PSF 10/18    Interval History:  No acute events overnight. Alternating between HTN and soft BP, tachycardia improving. Declined EGD yesterday. Lethargic this morning but able to oriented with repeated stimuli. Reports arm pain this morning.    Review of patient's allergies indicates:   Allergen Reactions    Codeine Shortness Of Breath    Keflex [cephalexin] Shortness Of Breath    Shellfish containing products Nausea And Vomiting    Flu vaccine ts 2011-12(18 yr+) Hives    Sucralfate Other (See Comments) and Hives       Current Facility-Administered Medications   Medication    0.9%  NaCl infusion (for blood administration)    acetaminophen tablet 1,000 mg    atorvastatin tablet 20 mg    bisacodyL suppository 10 mg    calcium carbonate 200 mg calcium (500 mg) chewable tablet 500 mg    clonazePAM tablet 0.5 mg    docusate sodium capsule 100 mg    fluticasone furoate-vilanteroL 200-25 mcg/dose diskus inhaler 1 puff    fluticasone propionate 50 mcg/actuation nasal spray 100 mcg    haloperidoL tablet 5 mg    heparin (porcine) injection 5,000 Units    melatonin tablet 6 mg    methocarbamoL tablet 1,000 mg    metoclopramide HCl injection 5 mg    morphine injection 2 mg    multivitamin tablet    ondansetron injection 4 mg    oxyCODONE immediate release tablet 5 mg    oxyCODONE immediate release tablet Tab 10 mg    pantoprazole EC tablet 40 mg    paroxetine tablet 40 mg    piperacillin-tazobactam 4.5 g in sodium chloride 0.9% 100 mL IVPB  (ready to mix system)    polyethylene glycol packet 17 g    pregabalin capsule 50 mg    senna tablet 8.6 mg    sodium chloride 0.9% flush 10 mL    sodium chloride 0.9% flush 10 mL    thiamine (B-1) 250 mg in dextrose 5 % 100 mL IVPB    thiamine (B-1) 500 mg in dextrose 5 % 100 mL IVPB    tiotropium bromide 2.5 mcg/actuation inhaler 2 puff    traZODone tablet 50 mg    vancomycin - pharmacy to dose    vancomycin 750 mg in dextrose 5 % 250 mL IVPB (ready to mix system)    vitamin D 1000 units tablet 2,000 Units     Objective:     Vital Signs (Most Recent):  Temp: 96.5 °F (35.8 °C) (10/22/22 0317)  Pulse: 85 (10/22/22 0320)  Resp: 20 (10/22/22 0317)  BP: (!) 102/59 (10/22/22 0317)  SpO2: 99 % (10/22/22 0320)   Vital Signs (24h Range):  Temp:  [96.5 °F (35.8 °C)-98.4 °F (36.9 °C)] 96.5 °F (35.8 °C)  Pulse:  [] 85  Resp:  [18-20] 20  SpO2:  [94 %-100 %] 99 %  BP: (102-171)/(58-83) 102/59     Weight: 50.8 kg (111 lb 15.9 oz)  Height: 5' (152.4 cm)  Body mass index is 21.87 kg/m².      Intake/Output Summary (Last 24 hours) at 10/22/2022 0739  Last data filed at 10/21/2022 2152  Gross per 24 hour   Intake --   Output 400 ml   Net -400 ml         Ortho/SPM Exam  Gen: NAD, frail, ill-appearing  CV: peripherally well perfused  Resp: non-labored respirations    Spine:   NAD  Wound c/d/i with clean dressing.  Moves BLE on command  2+ DP bilaterally    Significant Labs: All pertinent labs within the past 24 hours have been reviewed.    Significant Imaging: I have reviewed and interpreted all pertinent imaging results/findings.  DVT ultrasound with no DVTs    Assessment/Plan:     * Closed fracture dislocation of sacrum  Merle BUSH Gordo is a 67 y.o. female with past medical history of hypertension, bipolar disorder, and urinary incontinence (1 year duration) admitted to Orthopedics with what appears to be an approximally 4-month-old sacral fracture dislocation of S1 on S2 with severe canal stenosis at that level  resulting in chronic cauda equina symptoms.  She has not been able ambulate since June due to pain.  Initial images reviewed by Dr. Meza who plans to take the patient for surgery on Tuesday October 18th.  CT pelvis completed.  DVT ultrasound with no DVTs.  Hospital Medicine consulted began a metastatic workup.  Studies so far unrevealing.  Appreciate hospital medicine recommendations. Now s/p L2-Pelvis PSF w L5-S1 Laminectomy and dural injury intraop repaired w dural glue on 10/18.    Plan:   Tolerated elevated HOB w/out spinal headache  Pain control: multimodal  Diet: regular diet  PT/OT: WBAT in [facility]. Spine precautions.  DVT prophylaxis:  SubQ heparin; no need for IVC filter at this time  Abx: Vanc/Zosyn per   Labs:  Stable  Leija dc'd 10/19  No drains    Dispo: pending PT/OT and  reccs      Essential hypertension  Home medications restarted    Asthma with COPD  Home medications restarted  SpO2 goal > 88%; do not titrate supplemental oxygen above this level    Affective bipolar disorder  Home medications restarted    Pure hypercholesterolemia  Home medications restarted    LUCILA on CPAP: Not compliant with CPAP  CPAP          Gary Pedersen MD  Orthopedics  [facility] - Surgery

## 2022-10-22 NOTE — SUBJECTIVE & OBJECTIVE
Principal Problem:Closed fracture of sacrum    Principal Orthopedic Problem:  Same, s/p L2-pelvis PSF 10/18    Interval History:  No acute events overnight. Alternating between HTN and soft BP, tachycardia improving. Declined EGD yesterday. Lethargic this morning but able to oriented with repeated stimuli. Reports arm pain this morning.    Review of patient's allergies indicates:   Allergen Reactions    Codeine Shortness Of Breath    Keflex [cephalexin] Shortness Of Breath    Shellfish containing products Nausea And Vomiting    Flu vaccine ts 2011-12(18 yr+) Hives    Sucralfate Other (See Comments) and Hives       Current Facility-Administered Medications   Medication    0.9%  NaCl infusion (for blood administration)    acetaminophen tablet 1,000 mg    atorvastatin tablet 20 mg    bisacodyL suppository 10 mg    calcium carbonate 200 mg calcium (500 mg) chewable tablet 500 mg    clonazePAM tablet 0.5 mg    docusate sodium capsule 100 mg    fluticasone furoate-vilanteroL 200-25 mcg/dose diskus inhaler 1 puff    fluticasone propionate 50 mcg/actuation nasal spray 100 mcg    haloperidoL tablet 5 mg    heparin (porcine) injection 5,000 Units    melatonin tablet 6 mg    methocarbamoL tablet 1,000 mg    metoclopramide HCl injection 5 mg    morphine injection 2 mg    multivitamin tablet    ondansetron injection 4 mg    oxyCODONE immediate release tablet 5 mg    oxyCODONE immediate release tablet Tab 10 mg    pantoprazole EC tablet 40 mg    paroxetine tablet 40 mg    piperacillin-tazobactam 4.5 g in sodium chloride 0.9% 100 mL IVPB (ready to mix system)    polyethylene glycol packet 17 g    pregabalin capsule 50 mg    senna tablet 8.6 mg    sodium chloride 0.9% flush 10 mL    sodium chloride 0.9% flush 10 mL    thiamine (B-1) 250 mg in dextrose 5 % 100 mL IVPB    thiamine (B-1) 500 mg in dextrose 5 % 100 mL IVPB    tiotropium bromide 2.5 mcg/actuation inhaler 2 puff    traZODone tablet 50 mg    vancomycin - pharmacy to dose     vancomycin 750 mg in dextrose 5 % 250 mL IVPB (ready to mix system)    vitamin D 1000 units tablet 2,000 Units     Objective:     Vital Signs (Most Recent):  Temp: 96.5 °F (35.8 °C) (10/22/22 0317)  Pulse: 85 (10/22/22 0320)  Resp: 20 (10/22/22 0317)  BP: (!) 102/59 (10/22/22 0317)  SpO2: 99 % (10/22/22 0320)   Vital Signs (24h Range):  Temp:  [96.5 °F (35.8 °C)-98.4 °F (36.9 °C)] 96.5 °F (35.8 °C)  Pulse:  [] 85  Resp:  [18-20] 20  SpO2:  [94 %-100 %] 99 %  BP: (102-171)/(58-83) 102/59     Weight: 50.8 kg (111 lb 15.9 oz)  Height: 5' (152.4 cm)  Body mass index is 21.87 kg/m².      Intake/Output Summary (Last 24 hours) at 10/22/2022 0739  Last data filed at 10/21/2022 2152  Gross per 24 hour   Intake --   Output 400 ml   Net -400 ml         Ortho/SPM Exam  Gen: NAD, frail, ill-appearing  CV: peripherally well perfused  Resp: non-labored respirations    Spine:   NAD  Wound c/d/i with clean dressing.  Moves BLE on command  2+ DP bilaterally    Significant Labs: All pertinent labs within the past 24 hours have been reviewed.    Significant Imaging: I have reviewed and interpreted all pertinent imaging results/findings.  DVT ultrasound with no DVTs

## 2022-10-22 NOTE — ASSESSMENT & PLAN NOTE
Patient with severe constipation since admission.  Trialed enema, but unable to stay in rectum due to lack of rectal tone; leaked out and not effective.    KUB obtained to eval for developing obstruction given vomiting overnight and hyperactive bowel sounds; no clear obstruction seen but large volume stool in R colon    - trialed bisacodyl suppository yesterday; had 3 BM overnight  - continue daily miralax and senna

## 2022-10-22 NOTE — ASSESSMENT & PLAN NOTE
Concerning unexplained weightloss of >60lbs in last year, in combination with severe functional decline, pathologic fractures, and B symptoms (see HPI for full details).  Has history of pulmonary nodule due for 12 month followup+mediastinal lymphadenopathy.  Due for colonoscopy (last 2011 with 10 year f/u recommended); having early satiety and bloating symptoms  Discovered patient has history of two brothers dying early from cardiac issues that were said to be genetic, possibly arrhythmia va MI based on description    Workup:  - CT C/A/P with contrast: no lung changes or lymphadenopathy  - CT head given some concern for slowed mentation: no acute changes or intracranial lesions  - HIV, HCV,TSH, RPR, quant gold, blood cultures wnl  - CA 19-9, CA-125, CEA wnl  - TTE without concerning findings  - SPEP, YIN normal and without monoclonal peaks. K/L ratio normal  - Peripheral smear: possible early neutrophil hypersegmentation    Plan:  - given abdominal bloating symptoms, reported recent melena, GI planned for inpatient EGD 10/21, but patient refused EGD due to feeling she needed to further recover from surgery. Discussed further our rationale for this procedure today; she will think about it and let us know tomorrow morning.  - only minimal confusion and slight nystagmus; continue empiric high-dose thiamine taper since she's at risk for nutritional deficiencies/Wernicke's  - discussed with oncology service via phone about early hypersegmented neutrophils: will also check copper levels. With normal CBC differential and negative rest of workup, stated there was low suspicion for myeloproliferative process at this time, but may benefit from an outpatient hematology f/u.

## 2022-10-22 NOTE — ASSESSMENT & PLAN NOTE
MRI and CT spine showing extensive displaced fractures of the sacrum causing central canal stenosis, pubic rami fractures,   Has significant osteoporosis recently diagnosed, had not yet begun treatment.    - s/p L2-pelvis spinal fusion, lumbosacral laminectomies/cauda equina decompression, and pelvic fixation surgery on 10/18  - needs osteoporosis treatment outpatient  - PT recommended inpatient rehab on discharge

## 2022-10-22 NOTE — ASSESSMENT & PLAN NOTE
On 10/19-10/20, patient developed worsening hypotension, tachycardia to 115, hypothermia to 96F.  Appears more lethargic today, though receiving minimal opioids or muscle relaxers.    Septic workup largely negative.     - will stop abx once BCx negative x48h (would be 10/22 evening)  - low threshold to obtain CTA if worsening hypoxia to eval for PE   - continuous IV fluids to ensure adequate hydration

## 2022-10-22 NOTE — PT/OT/SLP PROGRESS
"Occupational Therapy   Co-Treatment    Name: eMrle Caro  MRN: [id number]  Admitting Diagnosis:  Closed fracture of sacrum  4 Days Post-Op    Recommendations:     Discharge Recommendations: rehabilitation facility  Discharge Equipment Recommendations:   (TBD)  Barriers to discharge:  None    Assessment:     Merle Caro is a 67 y.o. female with a medical diagnosis of Closed fracture of sacrum. Pt remains limited in self-care, functional mobility, and ADLs and is currently not performing tasks at [facility] [address] [facility]. She requires max-total (A) for all ADLs and functional transfers. She demonstrated improvements with tolerance to activity and sit<>stand transitions. Performance deficits affecting function are weakness, impaired endurance, impaired self care skills, impaired functional mobility, gait instability, impaired balance, decreased coordination, decreased upper extremity function, impaired coordination, decreased lower extremity function, decreased safety awareness, pain, decreased ROM, orthopedic precautions, impaired cardiopulmonary response to activity.     Rehab Prognosis:  Fair; patient would benefit from acute skilled OT services to address these deficits and reach maximum level of function.       Plan:     Patient to be seen 4 x/week to address the above listed problems via self-care/home management, therapeutic activities, therapeutic exercises, neuromuscular re-education  Plan of Care Expires: 11/20/22  Plan of Care Reviewed with: patient, spouse    Subjective   "Ouch"  Pain/Comfort:  Pain Rating 1:  (pt did not rate during fx mobility)  Location - Orientation 1: lower  Location 1: back  Pain Addressed 1: Pre-medicate for activity, Reposition, Distraction, Cessation of Activity  Pain Rating Post-Intervention 1: 0/10 (at rest)    Objective:   Co-treatment performed due to patient's multiple deficits requiring two skilled therapists to appropriately and safely assess patient's strength and endurance while facilitating " functional tasks in addition to accommodating for patient's activity tolerance.      Additional staff present:  CORNELIUS Gallegos    Communicated with: RN prior to session.  Patient found right sidelying with telemetry, pulse ox (continuous), peripheral IV, SCD upon OT entry to room.    General Precautions: Standard, fall   Orthopedic Precautions:spinal precautions   Braces: LSO  Respiratory Status: Nasal cannula, flow 1.5 L/min     Occupational Performance:     Bed Mobility:    Dependent drawsheet tf toward HOB in supine x2 people  Patient completed Rolling/Turning to Right/Left with  maximal assistance  Patient completed Scooting on EOB with total assistance x2 people to bring feet flat on ground  Patient completed Supine to Sit on the right side with maximal assistance x2 people  Patient completed Sit to Supine with maximal assistance x2 people  Pt tolerated sitting on EOB with CGA-min (A) for ~8 minutes total b/w sit<>stand transitions  Verbal cues to eliminate right lateral and anterior lean as well as maintain B UE on EOB for external support    Functional Mobility/Transfers:  Patient completed Sit <> Stand Transfer with maximal assistance x2 people with  no assistive device and hand-held assist B UE  X2 trials from EOB  1st trial: pt tolerated ~2 minutes  2nd trial: pt tolerated <1 minute  Max verbal/tactile cues for upright posture, body mechanics, and hand placement  Blocking B knees to prevent buckling    Activities of Daily Living:  Upper Body Dressing: maximal assistance to don/doff gown anteriorly; max (A) to don/doff LSO brace seated EOB  Toileting: total assistance required to doff soiled brief, cleanse anteriorly/posteriorly, and don new brief in prolonged standing from EOB    [facility] 6 Click ADL: 11    Treatment & Education:  -Education provided regarding fit and wear schedule of LSO, adjustments provided for comfort.  -Education provided regarding spinal precautions for use during functional  tasks/mobility/transfers   -Education on importance of OOB activity to improve overall activity tolerance and promote recovery  -Pt educated to call for assistance and to transfer with hospital staff only  -Provided education regarding role of OT, POC, & discharge recommendations with pt & spouse verbalizing understanding.  Pt had no further questions & when asked whether there were any concerns pt reported none.     Patient left left sidelying with all lines intact, call button in reach, RN notified, and  present    GOALS:   Multidisciplinary Problems       Occupational Therapy Goals          Problem: Occupational Therapy    Goal Priority Disciplines Outcome Interventions   Occupational Therapy Goal     OT, PT/OT Ongoing, Progressing    Description: Goals to be met by: 11/20/22     Patient will increase functional independence with ADLs by performing:    UE Dressing with Minimal Assistance.  LE Dressing with Minimal Assistance.  Grooming while seated with Set-up Assistance.  Toileting from bedside commode with Minimal Assistance for hygiene and clothing management.   Bathing from  shower chair/bench with Minimal Assistance.  Toilet transfer to bedside commode with Minimal Assistance.                         Time Tracking:     OT Date of Treatment: 10/22/22  OT Start Time: 1027  OT Stop Time: 1050  OT Total Time (min): 23 min    Billable Minutes:Self Care/Home Management 13  Neuromuscular Re-education 10    OT/TSERING: OT          10/22/2022

## 2022-10-22 NOTE — SUBJECTIVE & OBJECTIVE
Interval History: NAEON. Vitals much improved from prior. Infectious w/u NGTD. Had 3x BM yesterday with suppository. Still sleepy this morning but  reports her baseline is not waking up till 10:30AM    Review of Systems  Objective:     Vital Signs (Most Recent):  Temp: 98.4 °F (36.9 °C) (10/22/22 0753)  Pulse: 95 (10/22/22 0836)  Resp: 18 (10/22/22 0836)  BP: (!) 143/82 (10/22/22 0753)  SpO2: 96 % (10/22/22 0919)   Vital Signs (24h Range):  Temp:  [96.5 °F (35.8 °C)-98.4 °F (36.9 °C)] 98.4 °F (36.9 °C)  Pulse:  [] 95  Resp:  [18-20] 18  SpO2:  [94 %-100 %] 96 %  BP: (102-171)/(58-83) 143/82     Weight: 50.8 kg (111 lb 15.9 oz)  Body mass index is 21.87 kg/m².    Intake/Output Summary (Last 24 hours) at 10/22/2022 1009  Last data filed at 10/21/2022 2152  Gross per 24 hour   Intake --   Output 400 ml   Net -400 ml        Physical Exam  Vitals and nursing note reviewed.   Constitutional:       General: She is not in acute distress.     Appearance: She is ill-appearing (chronically). She is not toxic-appearing or diaphoretic.   Cardiovascular:      Rate and Rhythm: Regular rhythm.   Pulmonary:      Effort: Pulmonary effort is normal. No respiratory distress.   Abdominal:      General: Bowel sounds are increased. There is no distension.      Palpations: Abdomen is soft.      Tenderness: There is abdominal tenderness (diffusely mildly TTP). There is no guarding.   Skin:     Coloration: Skin is pale.   Neurological:      Mental Status: Mental status is at baseline. She is lethargic.      Cranial Nerves: No dysarthria.      Comments: Lethargic but arouses with repeated verbal stimuli and can answer questions appropriately.   Psychiatric:         Mood and Affect: Mood normal.         Behavior: Behavior normal.       Significant Labs: All pertinent labs within the past 24 hours have been reviewed.    Significant Imaging: I have reviewed all pertinent imaging results/findings within the past 24 hours.

## 2022-10-22 NOTE — PT/OT/SLP PROGRESS
Physical Therapy Treatment/Co-Treatment with O.T.    Patient Name:  Merle Caro   MRN:  [id number]    Recommendations:     Discharge Recommendations:  rehabilitation facility   Discharge Equipment Recommendations:  (TBD)   Barriers to discharge: Inaccessible home    Assessment:     Merle Caro is a 67 y.o. female admitted with a medical diagnosis of Closed fracture of sacrum.  She presents with the following impairments/functional limitations:  weakness, impaired endurance, impaired self care skills, impaired functional mobility, gait instability, impaired balance, decreased coordination, decreased upper extremity function, decreased lower extremity function, decreased safety awareness, pain, decreased ROM, impaired skin, impaired cardiopulmonary response to activity, orthopedic precautions . Patient with a tendency to lean to the R when in supine, sitting or in standing, Patient also developing B knee flexion contractures especially on the R knee, due to tendency to lie with B knees flexed.    Rehab Prognosis: Fair; patient would benefit from acute skilled PT services to address these deficits and reach maximum level of function.    Recent Surgery: Procedure(s) (LRB):  FUSION,SPINE,POSTERIOR APPROACH (N/A) 4 Days Post-Op    Plan:     During this hospitalization, patient to be seen 4 x/week to address the identified rehab impairments via gait training, therapeutic activities, therapeutic exercises, neuromuscular re-education and progress toward the following goals:    Plan of Care Expires:  11/17/22    Subjective     Chief Complaint: Pain with mobility only and fatigue  Patient/Family Comments/goals: to get stronger.  Pain/Comfort:  Pain Rating 1: 0/10 (Patient reported pain only when moving)  Location - Side 1: Bilateral  Location - Orientation 1: lower  Location 1: back  Pain Addressed 1: Pre-medicate for activity, Reposition, Distraction, Cessation of Activity  Pain Rating Post-Intervention 1:  0/10      Objective:     Communicated with NSG prior to session.  Patient found right sidelying with telemetry, pulse ox (continuous), SCD, peripheral IV upon PT entry to room.     General Precautions: Standard, fall   Orthopedic Precautions:spinal precautions   Braces: LSO  Respiratory Status: Nasal cannula, flow 2 L/min     Functional Mobility:  Bed Mobility:     Rolling Left:  maximal assistance  Rolling Right: maximal assistance  Scooting: total assistance and of 2 persons  Supine to Sit: maximal assistance  Sit to Supine: maximal assistance and of 2 persons  Transfers:     Sit to Stand:  maximal assistance and of 2 persons with hand-held assist  Balance: fair- in sitting and poor in standing      AM-PAC 6 CLICK MOBILITY  Turning over in bed (including adjusting bedclothes, sheets and blankets)?: 2  Sitting down on and standing up from a chair with arms (e.g., wheelchair, bedside commode, etc.): 2  Moving from lying on back to sitting on the side of the bed?: 2  Moving to and from a bed to a chair (including a wheelchair)?: 2  Need to walk in hospital room?: 1  Climbing 3-5 steps with a railing?: 1  Basic Mobility Total Score: 10       Treatment & Education:  Co-Treatment with O.T. Assisted cleaning patient due to an accident and donned an adult diaper. Patient sat at EOB~12 minutes with assistance ranging from min to CGA. Donned/Doffed LSO. Static standing balance  for 2 minutes with max assistance of 2 and then for 50 seconds with B HHA and blocking B knees to allowed linen in her bed to be changed. B LE AAROM/PROM x 20 reps on all available planes of motion. Educated patient and  on the importance of repostioning in bed and keeping B knees straight to prevent contractures. Placed a pillow on the R side to prevent patient from leaning to the R and another pillow on the heels to prevent Decubitus and to keep B knees extended.    Patient left with bed in chair position with all lines intact, call button  in reach, and spouse present..    GOALS:   Multidisciplinary Problems       Physical Therapy Goals          Problem: Physical Therapy    Goal Priority Disciplines Outcome Goal Variances Interventions   Physical Therapy Goal     PT, PT/OT Ongoing, Progressing     Description: Goals to be met by: 22     Patient will increase functional independence with mobility by performin. Supine to sit with Moderate Assistance  2. Sit to supine with Moderate Assistance  3. Sit to stand transfer with Maximum Assistance  4. Lower extremity exercise program x 10 reps per handout, with assistance as needed  5. Bed to chair transfer with Maximum Assistance using Rolling Walker                         Time Tracking:     PT Received On: 10/22/22  PT Start Time: 1023     PT Stop Time: 1050  PT Total Time (min): 27 min     Billable Minutes: Therapeutic Activity 17 and Therapeutic Exercise 10    Treatment Type: Treatment  PT/PTA: PTA     PTA Visit Number: 1     10/22/2022

## 2022-10-23 PROCEDURE — 25000003 PHARM REV CODE 250: Performed by: STUDENT IN AN ORGANIZED HEALTH CARE EDUCATION/TRAINING PROGRAM

## 2022-10-23 PROCEDURE — 27000221 HC OXYGEN, UP TO 24 HOURS

## 2022-10-23 PROCEDURE — 25000003 PHARM REV CODE 250

## 2022-10-23 PROCEDURE — 94640 AIRWAY INHALATION TREATMENT: CPT

## 2022-10-23 PROCEDURE — 94761 N-INVAS EAR/PLS OXIMETRY MLT: CPT

## 2022-10-23 PROCEDURE — 99900035 HC TECH TIME PER 15 MIN (STAT)

## 2022-10-23 PROCEDURE — 99233 SBSQ HOSP IP/OBS HIGH 50: CPT | Mod: GC,,, | Performed by: HOSPITALIST

## 2022-10-23 PROCEDURE — 63600175 PHARM REV CODE 636 W HCPCS: Performed by: STUDENT IN AN ORGANIZED HEALTH CARE EDUCATION/TRAINING PROGRAM

## 2022-10-23 PROCEDURE — 25000003 PHARM REV CODE 250: Performed by: HOSPITALIST

## 2022-10-23 PROCEDURE — 11000001 HC ACUTE MED/SURG PRIVATE ROOM

## 2022-10-23 PROCEDURE — 99233 PR SUBSEQUENT HOSPITAL CARE,LEVL III: ICD-10-PCS | Mod: GC,,, | Performed by: HOSPITALIST

## 2022-10-23 RX ORDER — LACTULOSE 10 G/15ML
10 SOLUTION ORAL 2 TIMES DAILY
Status: DISCONTINUED | OUTPATIENT
Start: 2022-10-23 | End: 2022-10-23

## 2022-10-23 RX ORDER — CLONIDINE HYDROCHLORIDE 0.1 MG/1
0.1 TABLET ORAL EVERY 6 HOURS PRN
Status: DISCONTINUED | OUTPATIENT
Start: 2022-10-23 | End: 2022-10-25 | Stop reason: HOSPADM

## 2022-10-23 RX ORDER — HYDRALAZINE HYDROCHLORIDE 20 MG/ML
10 INJECTION INTRAMUSCULAR; INTRAVENOUS EVERY 4 HOURS PRN
Status: DISCONTINUED | OUTPATIENT
Start: 2022-10-23 | End: 2022-10-25 | Stop reason: HOSPADM

## 2022-10-23 RX ORDER — POTASSIUM CHLORIDE 750 MG/1
30 CAPSULE, EXTENDED RELEASE ORAL ONCE
Status: COMPLETED | OUTPATIENT
Start: 2022-10-23 | End: 2022-10-23

## 2022-10-23 RX ORDER — LACTULOSE 10 G/15ML
20 SOLUTION ORAL 2 TIMES DAILY
Status: DISCONTINUED | OUTPATIENT
Start: 2022-10-23 | End: 2022-10-25 | Stop reason: HOSPADM

## 2022-10-23 RX ORDER — TRAZODONE HYDROCHLORIDE 100 MG/1
100 TABLET ORAL NIGHTLY
Status: DISCONTINUED | OUTPATIENT
Start: 2022-10-23 | End: 2022-10-25 | Stop reason: HOSPADM

## 2022-10-23 RX ORDER — BISACODYL 10 MG
10 SUPPOSITORY, RECTAL RECTAL DAILY PRN
Status: DISCONTINUED | OUTPATIENT
Start: 2022-10-23 | End: 2022-10-25 | Stop reason: HOSPADM

## 2022-10-23 RX ADMIN — ACETAMINOPHEN 1000 MG: 325 TABLET ORAL at 10:10

## 2022-10-23 RX ADMIN — FLUTICASONE FUROATE AND VILANTEROL TRIFENATATE 1 PUFF: 200; 25 POWDER RESPIRATORY (INHALATION) at 08:10

## 2022-10-23 RX ADMIN — METHOCARBAMOL 1000 MG: 500 TABLET ORAL at 08:10

## 2022-10-23 RX ADMIN — DOCUSATE SODIUM 100 MG: 100 CAPSULE ORAL at 08:10

## 2022-10-23 RX ADMIN — CHOLECALCIFEROL TAB 25 MCG (1000 UNIT) 2000 UNITS: 25 TAB at 08:10

## 2022-10-23 RX ADMIN — HEPARIN SODIUM 5000 UNITS: 5000 INJECTION INTRAVENOUS; SUBCUTANEOUS at 10:10

## 2022-10-23 RX ADMIN — ACETAMINOPHEN 1000 MG: 325 TABLET ORAL at 06:10

## 2022-10-23 RX ADMIN — THIAMINE HYDROCHLORIDE 250 MG: 100 INJECTION, SOLUTION INTRAMUSCULAR; INTRAVENOUS at 08:10

## 2022-10-23 RX ADMIN — LACTULOSE 10 G: 20 SOLUTION ORAL at 02:10

## 2022-10-23 RX ADMIN — HEPARIN SODIUM 5000 UNITS: 5000 INJECTION INTRAVENOUS; SUBCUTANEOUS at 02:10

## 2022-10-23 RX ADMIN — POTASSIUM CHLORIDE 30 MEQ: 10 CAPSULE, COATED, EXTENDED RELEASE ORAL at 08:10

## 2022-10-23 RX ADMIN — TRAZODONE HYDROCHLORIDE 100 MG: 100 TABLET ORAL at 08:10

## 2022-10-23 RX ADMIN — HEPARIN SODIUM 5000 UNITS: 5000 INJECTION INTRAVENOUS; SUBCUTANEOUS at 06:10

## 2022-10-23 RX ADMIN — OXYCODONE 5 MG: 5 TABLET ORAL at 02:10

## 2022-10-23 RX ADMIN — TIOTROPIUM BROMIDE INHALATION SPRAY 2 PUFF: 3.12 SPRAY, METERED RESPIRATORY (INHALATION) at 08:10

## 2022-10-23 RX ADMIN — ATORVASTATIN CALCIUM 20 MG: 20 TABLET, FILM COATED ORAL at 08:10

## 2022-10-23 RX ADMIN — PANTOPRAZOLE SODIUM 40 MG: 40 TABLET, DELAYED RELEASE ORAL at 08:10

## 2022-10-23 RX ADMIN — ACETAMINOPHEN 1000 MG: 325 TABLET ORAL at 02:10

## 2022-10-23 RX ADMIN — HALOPERIDOL 5 MG: 5 TABLET ORAL at 08:10

## 2022-10-23 RX ADMIN — PAROXETINE HYDROCHLORIDE 40 MG: 10 TABLET, FILM COATED ORAL at 08:10

## 2022-10-23 RX ADMIN — PREGABALIN 50 MG: 50 CAPSULE ORAL at 08:10

## 2022-10-23 RX ADMIN — CLONAZEPAM 0.5 MG: 0.5 TABLET ORAL at 08:10

## 2022-10-23 RX ADMIN — SENNOSIDES 8.6 MG: 8.6 TABLET, FILM COATED ORAL at 08:10

## 2022-10-23 RX ADMIN — METHOCARBAMOL 1000 MG: 500 TABLET ORAL at 02:10

## 2022-10-23 RX ADMIN — LACTULOSE 20 G: 20 SOLUTION ORAL at 08:10

## 2022-10-23 RX ADMIN — POTASSIUM BICARBONATE 25 MEQ: 978 TABLET, EFFERVESCENT ORAL at 11:10

## 2022-10-23 RX ADMIN — THERA TABS 1 TABLET: TAB at 08:10

## 2022-10-23 NOTE — ASSESSMENT & PLAN NOTE
Concerning unexplained weightloss of >60lbs in last year, in combination with severe functional decline, pathologic fractures, and B symptoms (see HPI for full details).  Has history of pulmonary nodule due for 12 month followup+mediastinal lymphadenopathy.  Due for colonoscopy (last 2011 with 10 year f/u recommended); having early satiety and bloating symptoms  Discovered patient has history of two brothers dying early from cardiac issues that were said to be genetic, possibly arrhythmia va MI based on description    Workup:  - CT C/A/P with contrast: no lung changes or lymphadenopathy  - CT head given some concern for slowed mentation: no acute changes or intracranial lesions  - HIV, HCV,TSH, RPR, quant gold, blood cultures wnl  - CA 19-9, CA-125, CEA wnl  - TTE without concerning findings  - SPEP, YIN normal and without monoclonal peaks. K/L ratio normal  - Peripheral smear: possible early neutrophil hypersegmentation    Plan:  - given abdominal bloating symptoms, reported recent melena, GI planned for inpatient EGD 10/21, but patient refused EGD due to feeling she needed to further recover from surgery. Discussed further our rationale for this procedure today, Pt reports willing to undergo procedure on Monday (10/23)  - only minimal confusion and slight nystagmus; continue empiric high-dose thiamine taper since she's at risk for nutritional deficiencies/Wernicke's  - discussed with oncology service via phone about early hypersegmented neutrophils: will also check copper levels. With normal CBC differential and negative rest of workup, stated there was low suspicion for myeloproliferative process at this time, but may benefit from an outpatient hematology f/u.

## 2022-10-23 NOTE — ASSESSMENT & PLAN NOTE
Patient with severe constipation since admission.  Trialed enema, but unable to stay in rectum due to lack of rectal tone; leaked out and not effective.    KUB obtained to eval for developing obstruction given vomiting overnight and hyperactive bowel sounds; no clear obstruction seen but large volume stool in R colon    - Pt responded well to the bisacodyl suppository; had 3 BM that night.  - continue daily miralax and senna, added lactulose BID to regiment  -Attempt another suppository for improvement

## 2022-10-23 NOTE — PROGRESS NOTES
[facility] of the [facility] Medicine  Progress Note    Patient Name: Merle Caro  MRN: [id number]  Patient Class: IP- Inpatient   Admission Date: 10/14/2022  Length of Stay: 9 days  Attending Physician: Juni Meza MD  Primary Care Provider: Marck Major MD        Subjective:     Principal Problem:Closed fracture of sacrum        HPI:  Ms. Caro is a 67F with hx of HTN, bipolar disorder, tobacco use, COPD on 2L home O2 who is admitted to the hospital to ortho service for upcoming operative repair of displaced sacral fracture. Hospital medicine is consulted for medical optimization.  She and  at bedside report a concerning history. Around a year ago, she began losing weight unexpectedly (has lost 60lbs in 1 year). Her PCP was working her up for this but no cause has yet been found. Then, around June of this year, she and her  were on a [place] trip when she began to experience severe buttocks pain. It became so severe that she could no longer walk, and has been non-ambulatory since June 2022. Initially the pain was thought 2/2 thrombosed hemorrhoids, but after CRS evaluation, this was NOT thought to be the cause of her pain. Eventually lower back imaging was ordered and showed extensive and pathologic fractures of the sacrum with grade 5 spondylolisthesis causing severe central canal stenosis on S1-S2, bilateral sacral ala fractures, displaced pubic rami fractures which closely abut the urethra/vaginal wall. She was then sent here for Ortho evaluation. She denies any known injuries, falls, or physical abuse that could have caused these fractures. She was recently found to have osteoporosis but had not yet started treatment.   In addition to the concerning weightloss and being nonambulatory, she reports are early satiety/bloating, decreased appetite, drenching night sweats several times/week, urinary incontinence, inability to sense having a bowel movement, chronic dry cough. She  reports a family history of ovarian cancer (sister in her 20s), lung cancer in mother (in 40s, was a smoker). The patient herself has a history of pulmonary nodules and has not had a colonoscopy in 11 years (10y f/u recommended).   She is currently afebrile, hemodynamically stable.  Wt Readings from Last 20 Encounters:   10/15/22 50.8 kg (111 lb 15.9 oz)   10/14/22 54.4 kg (120 lb)   09/27/22 60.8 kg (134 lb)   08/22/22 60.8 kg (134 lb)   06/21/22 61.7 kg (136 lb 0.4 oz)   05/19/22 64 kg (141 lb 1.5 oz)   05/19/22 64.8 kg (142 lb 13.7 oz)   05/03/22 65.5 kg (144 lb 6.4 oz)   04/25/22 69.4 kg (153 lb)   03/02/22 69.4 kg (153 lb)   02/03/22 69.4 kg (153 lb)   01/12/22 71.2 kg (157 lb)   01/07/22 71.4 kg (157 lb 6.5 oz)   01/05/22 71.7 kg (158 lb)   12/14/21 72.1 kg (158 lb 13.8 oz)   11/17/21 73.9 kg (163 lb)   11/08/21 74.1 kg (163 lb 5.8 oz)   11/03/21 74.7 kg (164 lb 10.9 oz)   09/21/21 74.7 kg (164 lb 10.9 oz)   07/20/21 76.7 kg (169 lb 1.5 oz)           Overview/Hospital Course:  No notes on file    Interval History: NAEON. Vitals much improved from prior. Infectious w/u NGTD. NO BM, feels constipated. Complaining of pain in her butt. Still sleepy this morning but  reports her baseline is not waking up till 10:30AM. Pt agreed to having the EGD on Monday.    Review of Systems  Objective:     Vital Signs (Most Recent):  Temp: 98.5 °F (36.9 °C) (10/23/22 1204)  Pulse: (!) 117 (10/23/22 1204)  Resp: 18 (10/23/22 1204)  BP: (!) 145/78 (10/23/22 1204)  SpO2: 98 % (10/23/22 1204)   Vital Signs (24h Range):  Temp:  [97.7 °F (36.5 °C)-98.7 °F (37.1 °C)] 98.5 °F (36.9 °C)  Pulse:  [108-117] 117  Resp:  [16-18] 18  SpO2:  [95 %-99 %] 98 %  BP: (129-184)/(64-86) 145/78     Weight: 50.8 kg (111 lb 15.9 oz)  Body mass index is 21.87 kg/m².    Intake/Output Summary (Last 24 hours) at 10/23/2022 1346  Last data filed at 10/23/2022 0820  Gross per 24 hour   Intake 400 ml   Output 350 ml   Net 50 ml        Physical  Exam  Vitals and nursing note reviewed.   Constitutional:       General: She is not in acute distress.     Appearance: She is ill-appearing (chronically). She is not toxic-appearing or diaphoretic.   Eyes:      General: No scleral icterus.     Extraocular Movements: Extraocular movements intact.   Cardiovascular:      Rate and Rhythm: Regular rhythm. Tachycardia present.   Pulmonary:      Effort: Pulmonary effort is normal. No respiratory distress.   Abdominal:      General: Bowel sounds are normal. There is no distension.      Palpations: Abdomen is soft.      Tenderness: There is no abdominal tenderness (diffusely mildly TTP). There is no guarding.   Skin:     Coloration: Skin is pale.   Neurological:      Mental Status: Mental status is at baseline. She is lethargic.      Cranial Nerves: No dysarthria.      Comments: Lethargic but arouses with repeated verbal stimuli and can answer questions appropriately.   Psychiatric:         Mood and Affect: Mood normal.         Behavior: Behavior normal.       Significant Labs: All pertinent labs within the past 24 hours have been reviewed.    Significant Imaging: I have reviewed all pertinent imaging results/findings within the past 24 hours.      Assessment/Plan:      * Closed fracture dislocation of sacrum  MRI and CT spine showing extensive displaced fractures of the sacrum causing central canal stenosis, pubic rami fractures,   Has significant osteoporosis recently diagnosed, had not yet begun treatment.    - s/p L2-pelvis spinal fusion, lumbosacral laminectomies/cauda equina decompression, and pelvic fixation surgery on 10/18  - needs osteoporosis treatment outpatient  - PT recommended inpatient rehab on discharge    Constipation  Patient with severe constipation since admission.  Trialed enema, but unable to stay in rectum due to lack of rectal tone; leaked out and not effective.    KUB obtained to eval for developing obstruction given vomiting overnight and  hyperactive bowel sounds; no clear obstruction seen but large volume stool in R colon    - Pt responded well to the bisacodyl suppository; had 3 BM that night.  - continue daily miralax and senna, added lactulose BID to regiment  -Attempt another suppository for improvement    SIRS (systemic inflammatory response syndrome)  On 10/19-10/20, patient developed worsening hypotension, tachycardia to 115, hypothermia to 96F.  Appears more lethargic today, though receiving minimal opioids or muscle relaxers.    Septic workup largely negative.     - will stop abx once BCx negative x48h (would be 10/22 evening)  - low threshold to obtain CTA if worsening hypoxia to eval for PE   - IV fluids continuously were stopped due to improvement in BP, low threshold to add back on.  -Pt has not fevered since off antibiotics.      Urinary incontinence  Reported by patient and . Urology evaluated inpatient and state her history is not consistent with retaining urine.  - continue to monitor  - nicholson removed      Severe malnutrition  See unexplained weightloss    Cauda equina syndrome        Coronary artery disease  Triple-vessel CAD seen on recent CT C/A/P from 10/15.  No history of coronary events. No current chest pain or anginal symptoms.  ASCVD 10-year risk of cardiovascular event 13.1%, mod-high intensity statin indicated    - Low theshold for repeat EKG and troponins if becomes symptomatic  - continue atorvastatin 20mg daily; current dose is appropriate      Prolonged Q-T interval on ECG  QTc 534 on admission.   Reported family history of sudden cardiac deaths.  On multiple QT prolonging home psychiatric meds: klonopin, trazodone, haldol, paroxetine    - several repeat EKGs with improved QT to 470; will discontinue daily EKGs  - continue home meds as these are long term  - keep K>4, Mg>2, Phos >3  - caution with any anti-emetics or other QT-prolonging agents in perioperative setting as she is high risk for  "arrhythmias      Early satiety  See "unexplained weightloss"      Anemia        Osteoporosis with current pathological fracture  Recently diagnosed on DEXA with T -3.9 at L spine and T -4.1 at L femoral area.  Has not yet started treatment.  Could be contributing to extensive pelvic fractures, but location seems atypical and other causes need to be evaluated  Has history of increased PTH of unknown cause; self-resolved    - f/u with PCP for osteoporosis tx      Benzodiazepine dependence  See bipolar disorder      Unexplained weight loss  Concerning unexplained weightloss of >60lbs in last year, in combination with severe functional decline, pathologic fractures, and B symptoms (see HPI for full details).  Has history of pulmonary nodule due for 12 month followup+mediastinal lymphadenopathy.  Due for colonoscopy (last 2011 with 10 year f/u recommended); having early satiety and bloating symptoms  Discovered patient has history of two brothers dying early from cardiac issues that were said to be genetic, possibly arrhythmia va MI based on description    Workup:  - CT C/A/P with contrast: no lung changes or lymphadenopathy  - CT head given some concern for slowed mentation: no acute changes or intracranial lesions  - HIV, HCV,TSH, RPR, quant gold, blood cultures wnl  - CA 19-9, CA-125, CEA wnl  - TTE without concerning findings  - SPEP, YIN normal and without monoclonal peaks. K/L ratio normal  - Peripheral smear: possible early neutrophil hypersegmentation    Plan:  - given abdominal bloating symptoms, reported recent melena, GI planned for inpatient EGD 10/21, but patient refused EGD due to feeling she needed to further recover from surgery. Discussed further our rationale for this procedure today, Pt reports willing to undergo procedure on Monday (10/23)  - only minimal confusion and slight nystagmus; continue empiric high-dose thiamine taper since she's at risk for nutritional deficiencies/Wernicke's  - discussed " "with oncology service via phone about early hypersegmented neutrophils: will also check copper levels. With normal CBC differential and negative rest of workup, stated there was low suspicion for myeloproliferative process at this time, but may benefit from an outpatient hematology f/u.         History of colon polyps  See "unexplained weight loss"      Chronic respiratory failure with hypoxia  2/2 asthma and COPD  See "asthma"    Cigarette nicotine dependence with nicotine-induced disorder        Essential hypertension  - hold home lisinopril 10mg while with borderline blood pressures. Pt's BP has started to improve can add back once we are sure it is stable if elevated      Asthma with COPD  On 2L home oxygen  Currently at baseline O2 requirements; no dyspnea, not in acute exacerbation    - not compliant with home breo and spiriva  - home inhalers ordered in perioperative period to avoid pulmonary complications of anesthesia    Affective bipolar disorder  Sees psychiatrist for medications    - continue home klonopin, haldol, paroxetine, trazodone (trazodone was halved initially due to concern for the cause of her lethargy, pt has continued to be lethargic and is not sleeping, so increased back to 100mg). Will keep an eye on her mentation.  - initially prolonged QT has improved; minimize other QT prolonging meds such as zofran, etc.  - delirium precautions    Pure hypercholesterolemia  - continue home statin; substituting atorvastatin 20mg daily due to hospital formulary  - ASCVD 10-year risk of cardiac event 13%, moderate-high intensity statin recommended; current dose appropriate      LUCILA on CPAP: Not compliant with CPAP  - CPAP qhs ordered  - patient may no longer need since has lost nearly 100 lbs  - does not use home      VTE Risk Mitigation (From admission, onward)         Ordered     heparin (porcine) injection 5,000 Units  Every 8 hours         10/18/22 1233     heparin (porcine) injection 5,000 Units  " Every 8 hours         10/15/22 0912     Place sequential compression device  Until discontinued         10/14/22 1637                Discharge Planning   YELENA: 10/24/2022     Code Status: Full Code   Is the patient medically ready for discharge?:     Reason for patient still in hospital (select all that apply): Patient trending condition  Discharge Plan A: Skilled Nursing Facility                  Juan Galaviz DO  Department of Hospital Medicine   [facility] - Surgery

## 2022-10-23 NOTE — PLAN OF CARE
Pt resting in bed comfortably. Midline intact and free of infection and irritation. Fall precautions maintained, no falls noted. Call light within reach, bed locked and in lowest position. Patient instructed to call for assistance. Weight shift assistance and incontinence care provided. C/o mild pain, managed with scheduled meds, no other complaints or concerns. Will continue to monitor and follow plan of care.

## 2022-10-23 NOTE — ASSESSMENT & PLAN NOTE
On 10/19-10/20, patient developed worsening hypotension, tachycardia to 115, hypothermia to 96F.  Appears more lethargic today, though receiving minimal opioids or muscle relaxers.    Septic workup largely negative.     - will stop abx once BCx negative x48h (would be 10/22 evening)  - low threshold to obtain CTA if worsening hypoxia to eval for PE   - IV fluids continuously were stopped due to improvement in BP, low threshold to add back on.  -Pt has not fevered since off antibiotics.

## 2022-10-23 NOTE — SUBJECTIVE & OBJECTIVE
Principal Problem:Closed fracture of sacrum    Principal Orthopedic Problem:  Same, s/p L2-pelvis PSF 10/18    Interval History:  No acute events overnight. Alternating between HTN and soft BP, tachycardia improving. Declined EGD, reinforced importance of the study this AM. More alert and responsive this AM. Reports some left thigh and calf pain. Family at bedside.    Review of patient's allergies indicates:   Allergen Reactions    Codeine Shortness Of Breath    Keflex [cephalexin] Shortness Of Breath    Shellfish containing products Nausea And Vomiting    Flu vaccine ts 2011-12(18 yr+) Hives    Sucralfate Other (See Comments) and Hives       Current Facility-Administered Medications   Medication    0.9%  NaCl infusion (for blood administration)    acetaminophen tablet 1,000 mg    atorvastatin tablet 20 mg    bisacodyL suppository 10 mg    calcium carbonate 200 mg calcium (500 mg) chewable tablet 500 mg    clonazePAM tablet 0.5 mg    docusate sodium capsule 100 mg    fluticasone furoate-vilanteroL 200-25 mcg/dose diskus inhaler 1 puff    haloperidoL tablet 5 mg    heparin (porcine) injection 5,000 Units    melatonin tablet 6 mg    methocarbamoL tablet 1,000 mg    metoclopramide HCl injection 5 mg    morphine injection 2 mg    multivitamin tablet    ondansetron injection 4 mg    oxyCODONE immediate release tablet 5 mg    oxyCODONE immediate release tablet Tab 10 mg    pantoprazole EC tablet 40 mg    paroxetine tablet 40 mg    polyethylene glycol packet 17 g    pregabalin capsule 50 mg    senna tablet 8.6 mg    sodium chloride 0.9% flush 10 mL    sodium chloride 0.9% flush 10 mL    thiamine (B-1) 250 mg in dextrose 5 % 100 mL IVPB    tiotropium bromide 2.5 mcg/actuation inhaler 2 puff    traZODone tablet 50 mg    vitamin D 1000 units tablet 2,000 Units     Objective:     Vital Signs (Most Recent):  Temp: 98.2 °F (36.8 °C) (10/23/22 0416)  Pulse: 110 (10/23/22 0416)  Resp: 16 (10/23/22 0416)  BP: (!) 152/82 (10/23/22  0416)  SpO2: 98 % (10/23/22 0416)   Vital Signs (24h Range):  Temp:  [96.9 °F (36.1 °C)-98.7 °F (37.1 °C)] 98.2 °F (36.8 °C)  Pulse:  [] 110  Resp:  [16-18] 16  SpO2:  [95 %-99 %] 98 %  BP: (114-156)/(58-82) 152/82     Weight: 50.8 kg (111 lb 15.9 oz)  Height: 5' (152.4 cm)  Body mass index is 21.87 kg/m².      Intake/Output Summary (Last 24 hours) at 10/23/2022 0647  Last data filed at 10/22/2022 1750  Gross per 24 hour   Intake 600 ml   Output --   Net 600 ml         Ortho/SPM Exam  Gen: NAD, frail, ill-appearing  CV: peripherally well perfused  Resp: non-labored respirations    Spine:   NAD  Wound c/d/i with clean dressing.  Moves BLE on command, unable/unwilling to flex hips this AM  2+ DP bilaterally    Significant Labs: All pertinent labs within the past 24 hours have been reviewed.    Significant Imaging: I have reviewed and interpreted all pertinent imaging results/findings.  DVT ultrasound with no DVTs

## 2022-10-23 NOTE — ASSESSMENT & PLAN NOTE
Sees psychiatrist for medications    - continue home klonopin, haldol, paroxetine, trazodone (trazodone was halved initially due to concern for the cause of her lethargy, pt has continued to be lethargic and is not sleeping, so increased back to 100mg). Will keep an eye on her mentation.  - initially prolonged QT has improved; minimize other QT prolonging meds such as zofran, etc.  - delirium precautions

## 2022-10-23 NOTE — TREATMENT PLAN
GI Treatment Plan    Initially consulted for unexplained weight loss, early satiety, bloating, poor PO intake in setting of night sweats. Had black tarry stools over past 8 months intermittently while taking pepto bismol. Has normocytic anemia. Admitted for displaced sacral fracture, now s/p repair. Inpatient EGD was deferred until after surgery which occurred on 10/18. Planned for outpatient colonoscopy to allow for further recovery from surgery and adequate bowel prep. Patient declined EGD last week after surgery but now is amenable. See initial GI consult note.    Plan  - NPO at midnight  - Possible EGD tomorrow 10/24  - Would plan for colonoscopy in outpatient setting    Fabian Best  Gastroenterology Fellow, PGY-IV

## 2022-10-23 NOTE — PROGRESS NOTES
VANCOMYCIN DOSING BY PHARMACY DISCONTINUATION NOTE    Merle Caro is a 67 y.o. female who had been consulted for vancomycin dosing.    The pharmacy consult for vancomycin dosing has been discontinued.     Vancomycin Dosing by Pharmacy Consult will sign-off. Please reconsult if necessary. Thank you for allowing us to participate in this patient's care.     Thank you for the consult,   Srikanth Quispe, Pharm.D.  Inpatient Pharmacist  EXT [phone]

## 2022-10-23 NOTE — SUBJECTIVE & OBJECTIVE
Interval History: NAEON. Vitals much improved from prior. Infectious w/u NGTD. NO BM, feels constipated. Complaining of pain in her butt. Still sleepy this morning but  reports her baseline is not waking up till 10:30AM. Pt agreed to having the EGD on Monday.    Review of Systems  Objective:     Vital Signs (Most Recent):  Temp: 98.5 °F (36.9 °C) (10/23/22 1204)  Pulse: (!) 117 (10/23/22 1204)  Resp: 18 (10/23/22 1204)  BP: (!) 145/78 (10/23/22 1204)  SpO2: 98 % (10/23/22 1204)   Vital Signs (24h Range):  Temp:  [97.7 °F (36.5 °C)-98.7 °F (37.1 °C)] 98.5 °F (36.9 °C)  Pulse:  [108-117] 117  Resp:  [16-18] 18  SpO2:  [95 %-99 %] 98 %  BP: (129-184)/(64-86) 145/78     Weight: 50.8 kg (111 lb 15.9 oz)  Body mass index is 21.87 kg/m².    Intake/Output Summary (Last 24 hours) at 10/23/2022 1346  Last data filed at 10/23/2022 0820  Gross per 24 hour   Intake 400 ml   Output 350 ml   Net 50 ml        Physical Exam  Vitals and nursing note reviewed.   Constitutional:       General: She is not in acute distress.     Appearance: She is ill-appearing (chronically). She is not toxic-appearing or diaphoretic.   Eyes:      General: No scleral icterus.     Extraocular Movements: Extraocular movements intact.   Cardiovascular:      Rate and Rhythm: Regular rhythm. Tachycardia present.   Pulmonary:      Effort: Pulmonary effort is normal. No respiratory distress.   Abdominal:      General: Bowel sounds are normal. There is no distension.      Palpations: Abdomen is soft.      Tenderness: There is no abdominal tenderness (diffusely mildly TTP). There is no guarding.   Skin:     Coloration: Skin is pale.   Neurological:      Mental Status: Mental status is at baseline. She is lethargic.      Cranial Nerves: No dysarthria.      Comments: Lethargic but arouses with repeated verbal stimuli and can answer questions appropriately.   Psychiatric:         Mood and Affect: Mood normal.         Behavior: Behavior normal.        Significant Labs: All pertinent labs within the past 24 hours have been reviewed.    Significant Imaging: I have reviewed all pertinent imaging results/findings within the past 24 hours.

## 2022-10-23 NOTE — ASSESSMENT & PLAN NOTE
- hold home lisinopril 10mg while with borderline blood pressures. Pt's BP has started to improve can add back once we are sure it is stable if elevated

## 2022-10-23 NOTE — PROGRESS NOTES
[facility]  Orthopedics  Progress Note    Patient Name: Merle Caro  MRN: [id number]  Admission Date: 10/14/2022  Hospital Length of Stay: 9 days  Attending Provider: Juni Meza MD  Primary Care Provider: Marck Major MD  Follow-up For: Procedure(s) (LRB):  FUSION,SPINE,POSTERIOR APPROACH (N/A)    Post-Operative Day: 5 Days Post-Op  Subjective:     Principal Problem:Closed fracture of sacrum    Principal Orthopedic Problem:  Same, s/p L2-pelvis PSF 10/18    Interval History:  No acute events overnight. Alternating between HTN and soft BP, tachycardia improving. Declined EGD, reinforced importance of the study this AM. More alert and responsive this AM. Reports some left thigh and calf pain. Family at bedside.    Review of patient's allergies indicates:   Allergen Reactions    Codeine Shortness Of Breath    Keflex [cephalexin] Shortness Of Breath    Shellfish containing products Nausea And Vomiting    Flu vaccine ts 2011-12(18 yr+) Hives    Sucralfate Other (See Comments) and Hives       Current Facility-Administered Medications   Medication    0.9%  NaCl infusion (for blood administration)    acetaminophen tablet 1,000 mg    atorvastatin tablet 20 mg    bisacodyL suppository 10 mg    calcium carbonate 200 mg calcium (500 mg) chewable tablet 500 mg    clonazePAM tablet 0.5 mg    docusate sodium capsule 100 mg    fluticasone furoate-vilanteroL 200-25 mcg/dose diskus inhaler 1 puff    haloperidoL tablet 5 mg    heparin (porcine) injection 5,000 Units    melatonin tablet 6 mg    methocarbamoL tablet 1,000 mg    metoclopramide HCl injection 5 mg    morphine injection 2 mg    multivitamin tablet    ondansetron injection 4 mg    oxyCODONE immediate release tablet 5 mg    oxyCODONE immediate release tablet Tab 10 mg    pantoprazole EC tablet 40 mg    paroxetine tablet 40 mg    polyethylene glycol packet 17 g    pregabalin capsule 50 mg    senna tablet 8.6 mg    sodium chloride 0.9% flush 10 mL    sodium  chloride 0.9% flush 10 mL    thiamine (B-1) 250 mg in dextrose 5 % 100 mL IVPB    tiotropium bromide 2.5 mcg/actuation inhaler 2 puff    traZODone tablet 50 mg    vitamin D 1000 units tablet 2,000 Units     Objective:     Vital Signs (Most Recent):  Temp: 98.2 °F (36.8 °C) (10/23/22 0416)  Pulse: 110 (10/23/22 0416)  Resp: 16 (10/23/22 0416)  BP: (!) 152/82 (10/23/22 0416)  SpO2: 98 % (10/23/22 0416)   Vital Signs (24h Range):  Temp:  [96.9 °F (36.1 °C)-98.7 °F (37.1 °C)] 98.2 °F (36.8 °C)  Pulse:  [] 110  Resp:  [16-18] 16  SpO2:  [95 %-99 %] 98 %  BP: (114-156)/(58-82) 152/82     Weight: 50.8 kg (111 lb 15.9 oz)  Height: 5' (152.4 cm)  Body mass index is 21.87 kg/m².      Intake/Output Summary (Last 24 hours) at 10/23/2022 0647  Last data filed at 10/22/2022 1750  Gross per 24 hour   Intake 600 ml   Output --   Net 600 ml         Ortho/SPM Exam  Gen: NAD, frail, ill-appearing  CV: peripherally well perfused  Resp: non-labored respirations    Spine:   NAD  Wound c/d/i with clean dressing.  Moves BLE on command, unable/unwilling to flex hips this AM  2+ DP bilaterally    Significant Labs: All pertinent labs within the past 24 hours have been reviewed.    Significant Imaging: I have reviewed and interpreted all pertinent imaging results/findings.  DVT ultrasound with no DVTs    Assessment/Plan:     * Closed fracture dislocation of sacrum  Merle Caro is a 67 y.o. female with past medical history of hypertension, bipolar disorder, and urinary incontinence (1 year duration) admitted to Orthopedics with what appears to be an approximally 4-month-old sacral fracture dislocation of S1 on S2 with severe canal stenosis at that level resulting in chronic cauda equina symptoms.  She has not been able ambulate since June due to pain.  Initial images reviewed by Dr. Meza who plans to take the patient for surgery on Tuesday October 18th.  CT pelvis completed.  DVT ultrasound with no DVTs.  Hospital Medicine consulted  began a metastatic workup.  Studies so far unrevealing.  Appreciate hospital medicine recommendations. Now s/p L2-Pelvis PSF w L5-S1 Laminectomy and dural injury intraop repaired w dural glue on 10/18.    Plan:   Tolerated elevated HOB w/out spinal headache  Pain control: multimodal  Diet: regular diet  PT/OT: WBAT in [facility]. Spine precautions.  DVT prophylaxis:  SubQ heparin; no need for IVC filter at this time  Abx: discontinued  Labs:  Stable  Leija dc'd 10/19  No drains    Dispo: pending PT/OT and HM reccs      Essential hypertension  Home medications restarted    Asthma with COPD  Home medications restarted  SpO2 goal > 88%; do not titrate supplemental oxygen above this level    Affective bipolar disorder  Home medications restarted    Pure hypercholesterolemia  Home medications restarted    LUCILA on CPAP: Not compliant with CPAP  CPAP          Gary Pedersen MD  Orthopedics  [facility] - Surgery

## 2022-10-24 ENCOUNTER — ANESTHESIA (OUTPATIENT)
Dept: ENDOSCOPY | Facility: HOSPITAL | Age: 67
DRG: 456 | End: 2022-10-24
Payer: MEDICARE

## 2022-10-24 ENCOUNTER — ANESTHESIA EVENT (OUTPATIENT)
Dept: ENDOSCOPY | Facility: HOSPITAL | Age: 67
DRG: 456 | End: 2022-10-24
Payer: MEDICARE

## 2022-10-24 LAB
ALBUMIN SERPL BCP-MCNC: 1.8 G/DL (ref 3.5–5.2)
ALP SERPL-CCNC: 164 U/L (ref 55–135)
ALT SERPL W/O P-5'-P-CCNC: 15 U/L (ref 10–44)
ANION GAP SERPL CALC-SCNC: 11 MMOL/L (ref 8–16)
AST SERPL-CCNC: 26 U/L (ref 10–40)
BASOPHILS # BLD AUTO: 0.04 K/UL (ref 0–0.2)
BASOPHILS NFR BLD: 0.5 % (ref 0–1.9)
BILIRUB SERPL-MCNC: 0.4 MG/DL (ref 0.1–1)
BUN SERPL-MCNC: 16 MG/DL (ref 8–23)
CALCIUM SERPL-MCNC: 8.7 MG/DL (ref 8.7–10.5)
CHLORIDE SERPL-SCNC: 103 MMOL/L (ref 95–110)
CO2 SERPL-SCNC: 24 MMOL/L (ref 23–29)
CREAT SERPL-MCNC: 0.7 MG/DL (ref 0.5–1.4)
DIFFERENTIAL METHOD: ABNORMAL
EOSINOPHIL # BLD AUTO: 0.3 K/UL (ref 0–0.5)
EOSINOPHIL NFR BLD: 2.9 % (ref 0–8)
ERYTHROCYTE [DISTWIDTH] IN BLOOD BY AUTOMATED COUNT: 14.6 % (ref 11.5–14.5)
EST. GFR  (NO RACE VARIABLE): >60 ML/MIN/1.73 M^2
GLUCOSE SERPL-MCNC: 100 MG/DL (ref 70–110)
HCT VFR BLD AUTO: 22.8 % (ref 37–48.5)
HGB BLD-MCNC: 7.4 G/DL (ref 12–16)
IMM GRANULOCYTES # BLD AUTO: 0.04 K/UL (ref 0–0.04)
IMM GRANULOCYTES NFR BLD AUTO: 0.5 % (ref 0–0.5)
LYMPHOCYTES # BLD AUTO: 1.4 K/UL (ref 1–4.8)
LYMPHOCYTES NFR BLD: 16.4 % (ref 18–48)
MAGNESIUM SERPL-MCNC: 1.8 MG/DL (ref 1.6–2.6)
MCH RBC QN AUTO: 29 PG (ref 27–31)
MCHC RBC AUTO-ENTMCNC: 32.5 G/DL (ref 32–36)
MCV RBC AUTO: 89 FL (ref 82–98)
MONOCYTES # BLD AUTO: 1 K/UL (ref 0.3–1)
MONOCYTES NFR BLD: 11.4 % (ref 4–15)
NEUTROPHILS # BLD AUTO: 5.9 K/UL (ref 1.8–7.7)
NEUTROPHILS NFR BLD: 68.3 % (ref 38–73)
NRBC BLD-RTO: 0 /100 WBC
PLATELET # BLD AUTO: 382 K/UL (ref 150–450)
PMV BLD AUTO: 9.2 FL (ref 9.2–12.9)
POTASSIUM SERPL-SCNC: 4.5 MMOL/L (ref 3.5–5.1)
PROT SERPL-MCNC: 4.9 G/DL (ref 6–8.4)
RBC # BLD AUTO: 2.55 M/UL (ref 4–5.4)
SARS-COV-2 RNA RESP QL NAA+PROBE: NOT DETECTED
SODIUM SERPL-SCNC: 138 MMOL/L (ref 136–145)
WBC # BLD AUTO: 8.61 K/UL (ref 3.9–12.7)

## 2022-10-24 PROCEDURE — 25000003 PHARM REV CODE 250: Performed by: REGISTERED NURSE

## 2022-10-24 PROCEDURE — 88305 TISSUE EXAM BY PATHOLOGIST: CPT | Mod: 26,,, | Performed by: PATHOLOGY

## 2022-10-24 PROCEDURE — 99232 PR SUBSEQUENT HOSPITAL CARE,LEVL II: ICD-10-PCS | Mod: GC,,, | Performed by: HOSPITALIST

## 2022-10-24 PROCEDURE — 43239 PR EGD, FLEX, W/BIOPSY, SGL/MULTI: ICD-10-PCS | Mod: ,,, | Performed by: STUDENT IN AN ORGANIZED HEALTH CARE EDUCATION/TRAINING PROGRAM

## 2022-10-24 PROCEDURE — 25000003 PHARM REV CODE 250: Performed by: STUDENT IN AN ORGANIZED HEALTH CARE EDUCATION/TRAINING PROGRAM

## 2022-10-24 PROCEDURE — D9220A PRA ANESTHESIA: Mod: ANES,,, | Performed by: ANESTHESIOLOGY

## 2022-10-24 PROCEDURE — 25000003 PHARM REV CODE 250

## 2022-10-24 PROCEDURE — 11000001 HC ACUTE MED/SURG PRIVATE ROOM

## 2022-10-24 PROCEDURE — 27201012 HC FORCEPS, HOT/COLD, DISP: Performed by: STUDENT IN AN ORGANIZED HEALTH CARE EDUCATION/TRAINING PROGRAM

## 2022-10-24 PROCEDURE — 99232 SBSQ HOSP IP/OBS MODERATE 35: CPT | Mod: GC,,, | Performed by: HOSPITALIST

## 2022-10-24 PROCEDURE — D9220A PRA ANESTHESIA: Mod: CRNA,,, | Performed by: REGISTERED NURSE

## 2022-10-24 PROCEDURE — U0005 INFEC AGEN DETEC AMPLI PROBE: HCPCS | Performed by: ORTHOPAEDIC SURGERY

## 2022-10-24 PROCEDURE — D9220A PRA ANESTHESIA: ICD-10-PCS | Mod: CRNA,,, | Performed by: REGISTERED NURSE

## 2022-10-24 PROCEDURE — 63600175 PHARM REV CODE 636 W HCPCS: Performed by: STUDENT IN AN ORGANIZED HEALTH CARE EDUCATION/TRAINING PROGRAM

## 2022-10-24 PROCEDURE — 94799 UNLISTED PULMONARY SVC/PX: CPT

## 2022-10-24 PROCEDURE — 63600175 PHARM REV CODE 636 W HCPCS: Performed by: REGISTERED NURSE

## 2022-10-24 PROCEDURE — 80053 COMPREHEN METABOLIC PANEL: CPT | Performed by: ORTHOPAEDIC SURGERY

## 2022-10-24 PROCEDURE — U0003 INFECTIOUS AGENT DETECTION BY NUCLEIC ACID (DNA OR RNA); SEVERE ACUTE RESPIRATORY SYNDROME CORONAVIRUS 2 (SARS-COV-2) (CORONAVIRUS DISEASE [COVID-19]), AMPLIFIED PROBE TECHNIQUE, MAKING USE OF HIGH THROUGHPUT TECHNOLOGIES AS DESCRIBED BY CMS-2020-01-R: HCPCS | Performed by: ORTHOPAEDIC SURGERY

## 2022-10-24 PROCEDURE — 25000003 PHARM REV CODE 250: Performed by: HOSPITALIST

## 2022-10-24 PROCEDURE — 43239 EGD BIOPSY SINGLE/MULTIPLE: CPT | Mod: ,,, | Performed by: STUDENT IN AN ORGANIZED HEALTH CARE EDUCATION/TRAINING PROGRAM

## 2022-10-24 PROCEDURE — 37000008 HC ANESTHESIA 1ST 15 MINUTES: Performed by: STUDENT IN AN ORGANIZED HEALTH CARE EDUCATION/TRAINING PROGRAM

## 2022-10-24 PROCEDURE — 36415 COLL VENOUS BLD VENIPUNCTURE: CPT | Performed by: ORTHOPAEDIC SURGERY

## 2022-10-24 PROCEDURE — 83735 ASSAY OF MAGNESIUM: CPT | Performed by: ORTHOPAEDIC SURGERY

## 2022-10-24 PROCEDURE — D9220A PRA ANESTHESIA: ICD-10-PCS | Mod: ANES,,, | Performed by: ANESTHESIOLOGY

## 2022-10-24 PROCEDURE — 37000009 HC ANESTHESIA EA ADD 15 MINS: Performed by: STUDENT IN AN ORGANIZED HEALTH CARE EDUCATION/TRAINING PROGRAM

## 2022-10-24 PROCEDURE — 99900035 HC TECH TIME PER 15 MIN (STAT)

## 2022-10-24 PROCEDURE — 85025 COMPLETE CBC W/AUTO DIFF WBC: CPT | Performed by: ORTHOPAEDIC SURGERY

## 2022-10-24 PROCEDURE — 88305 TISSUE EXAM BY PATHOLOGIST: ICD-10-PCS | Mod: 26,,, | Performed by: PATHOLOGY

## 2022-10-24 PROCEDURE — 88305 TISSUE EXAM BY PATHOLOGIST: CPT | Performed by: PATHOLOGY

## 2022-10-24 PROCEDURE — 94761 N-INVAS EAR/PLS OXIMETRY MLT: CPT

## 2022-10-24 PROCEDURE — 43239 EGD BIOPSY SINGLE/MULTIPLE: CPT | Performed by: STUDENT IN AN ORGANIZED HEALTH CARE EDUCATION/TRAINING PROGRAM

## 2022-10-24 RX ORDER — SODIUM CHLORIDE 0.9 % (FLUSH) 0.9 %
3 SYRINGE (ML) INJECTION
Status: DISCONTINUED | OUTPATIENT
Start: 2022-10-24 | End: 2022-10-24 | Stop reason: HOSPADM

## 2022-10-24 RX ORDER — BISACODYL 10 MG
10 SUPPOSITORY, RECTAL RECTAL DAILY
Status: DISCONTINUED | OUTPATIENT
Start: 2022-10-24 | End: 2022-10-25

## 2022-10-24 RX ORDER — SODIUM CHLORIDE 9 MG/ML
INJECTION, SOLUTION INTRAVENOUS CONTINUOUS PRN
Status: DISCONTINUED | OUTPATIENT
Start: 2022-10-24 | End: 2022-10-24

## 2022-10-24 RX ORDER — LIDOCAINE HYDROCHLORIDE 20 MG/ML
INJECTION INTRAVENOUS
Status: DISCONTINUED | OUTPATIENT
Start: 2022-10-24 | End: 2022-10-24

## 2022-10-24 RX ORDER — PROPOFOL 10 MG/ML
VIAL (ML) INTRAVENOUS CONTINUOUS PRN
Status: DISCONTINUED | OUTPATIENT
Start: 2022-10-24 | End: 2022-10-24

## 2022-10-24 RX ORDER — PROPOFOL 10 MG/ML
VIAL (ML) INTRAVENOUS
Status: DISCONTINUED | OUTPATIENT
Start: 2022-10-24 | End: 2022-10-24

## 2022-10-24 RX ORDER — FENTANYL CITRATE 50 UG/ML
INJECTION, SOLUTION INTRAMUSCULAR; INTRAVENOUS
Status: DISCONTINUED | OUTPATIENT
Start: 2022-10-24 | End: 2022-10-24

## 2022-10-24 RX ADMIN — CLONAZEPAM 0.5 MG: 0.5 TABLET ORAL at 08:10

## 2022-10-24 RX ADMIN — HEPARIN SODIUM 5000 UNITS: 5000 INJECTION INTRAVENOUS; SUBCUTANEOUS at 02:10

## 2022-10-24 RX ADMIN — PROPOFOL 75 MCG/KG/MIN: 10 INJECTION, EMULSION INTRAVENOUS at 09:10

## 2022-10-24 RX ADMIN — METHOCARBAMOL 1000 MG: 500 TABLET ORAL at 02:10

## 2022-10-24 RX ADMIN — LACTULOSE 20 G: 20 SOLUTION ORAL at 08:10

## 2022-10-24 RX ADMIN — POLYETHYLENE GLYCOL 3350 17 G: 17 POWDER, FOR SOLUTION ORAL at 11:10

## 2022-10-24 RX ADMIN — PREGABALIN 50 MG: 50 CAPSULE ORAL at 11:10

## 2022-10-24 RX ADMIN — DOCUSATE SODIUM 100 MG: 100 CAPSULE ORAL at 11:10

## 2022-10-24 RX ADMIN — PAROXETINE HYDROCHLORIDE 40 MG: 10 TABLET, FILM COATED ORAL at 08:10

## 2022-10-24 RX ADMIN — FENTANYL CITRATE 25 MCG: 50 INJECTION, SOLUTION INTRAMUSCULAR; INTRAVENOUS at 09:10

## 2022-10-24 RX ADMIN — ACETAMINOPHEN 1000 MG: 325 TABLET ORAL at 02:10

## 2022-10-24 RX ADMIN — ONDANSETRON 4 MG: 2 INJECTION INTRAMUSCULAR; INTRAVENOUS at 09:10

## 2022-10-24 RX ADMIN — OXYCODONE HYDROCHLORIDE 10 MG: 10 TABLET ORAL at 12:10

## 2022-10-24 RX ADMIN — HEPARIN SODIUM 5000 UNITS: 5000 INJECTION INTRAVENOUS; SUBCUTANEOUS at 10:10

## 2022-10-24 RX ADMIN — PROPOFOL 50 MG: 10 INJECTION, EMULSION INTRAVENOUS at 09:10

## 2022-10-24 RX ADMIN — FENTANYL CITRATE 25 MCG: 50 INJECTION, SOLUTION INTRAMUSCULAR; INTRAVENOUS at 10:10

## 2022-10-24 RX ADMIN — TRAZODONE HYDROCHLORIDE 100 MG: 100 TABLET ORAL at 08:10

## 2022-10-24 RX ADMIN — LIDOCAINE HYDROCHLORIDE 100 MG: 20 INJECTION INTRAVENOUS at 09:10

## 2022-10-24 RX ADMIN — THERA TABS 1 TABLET: TAB at 11:10

## 2022-10-24 RX ADMIN — CHOLECALCIFEROL TAB 25 MCG (1000 UNIT) 2000 UNITS: 25 TAB at 08:10

## 2022-10-24 RX ADMIN — PANTOPRAZOLE SODIUM 40 MG: 40 TABLET, DELAYED RELEASE ORAL at 11:10

## 2022-10-24 RX ADMIN — HEPARIN SODIUM 5000 UNITS: 5000 INJECTION INTRAVENOUS; SUBCUTANEOUS at 06:10

## 2022-10-24 RX ADMIN — ACETAMINOPHEN 1000 MG: 325 TABLET ORAL at 06:10

## 2022-10-24 RX ADMIN — METHOCARBAMOL 1000 MG: 500 TABLET ORAL at 08:10

## 2022-10-24 RX ADMIN — ACETAMINOPHEN 1000 MG: 325 TABLET ORAL at 10:10

## 2022-10-24 RX ADMIN — ATORVASTATIN CALCIUM 20 MG: 20 TABLET, FILM COATED ORAL at 11:10

## 2022-10-24 RX ADMIN — THIAMINE HYDROCHLORIDE 250 MG: 100 INJECTION, SOLUTION INTRAMUSCULAR; INTRAVENOUS at 11:10

## 2022-10-24 RX ADMIN — LACTULOSE 20 G: 20 SOLUTION ORAL at 11:10

## 2022-10-24 RX ADMIN — SODIUM CHLORIDE: 9 INJECTION, SOLUTION INTRAVENOUS at 09:10

## 2022-10-24 RX ADMIN — SENNOSIDES 8.6 MG: 8.6 TABLET, FILM COATED ORAL at 08:10

## 2022-10-24 RX ADMIN — CALCIUM CARBONATE (ANTACID) CHEW TAB 500 MG 500 MG: 500 CHEW TAB at 12:10

## 2022-10-24 RX ADMIN — PREGABALIN 50 MG: 50 CAPSULE ORAL at 08:10

## 2022-10-24 RX ADMIN — HALOPERIDOL 5 MG: 5 TABLET ORAL at 08:10

## 2022-10-24 RX ADMIN — BISACODYL 10 MG: 10 SUPPOSITORY RECTAL at 02:10

## 2022-10-24 RX ADMIN — DOCUSATE SODIUM 100 MG: 100 CAPSULE ORAL at 08:10

## 2022-10-24 NOTE — ASSESSMENT & PLAN NOTE
Concerning unexplained weightloss of >60lbs in last year, in combination with severe functional decline, pathologic fractures, and B symptoms (see HPI for full details).  Has history of pulmonary nodule due for 12 month followup+mediastinal lymphadenopathy.  Due for colonoscopy (last 2011 with 10 year f/u recommended); having early satiety and bloating symptoms  Discovered patient has history of two brothers dying early from cardiac issues that were said to be genetic, possibly arrhythmia va MI based on description    Workup:  - CT C/A/P with contrast: no lung changes or lymphadenopathy  - CT head given some concern for slowed mentation: no acute changes or intracranial lesions  - HIV, HCV,TSH, RPR, quant gold, blood cultures wnl  - CA 19-9, CA-125, CEA wnl  - TTE without concerning findings  - SPEP, YIN normal and without monoclonal peaks. K/L ratio normal  - Peripheral smear: possible early neutrophil hypersegmentation    Plan:  - given abdominal bloating symptoms, reported recent melena, GI planned for inpatient EGD 10/21, but patient refused EGD due to feeling she needed to further recover from surgery. Discussed further our rationale for this procedure today, Pt reports willing to undergo procedure on Monday (10/23)  - only minimal confusion and slight nystagmus; continue empiric high-dose thiamine taper since she's at risk for nutritional deficiencies/Wernicke's  - discussed with oncology service via phone about early hypersegmented neutrophils: will also check copper levels. With normal CBC differential and negative rest of workup, stated there was low suspicion for myeloproliferative process at this time, but may benefit from an outpatient hematology f/u.   - EGD was negative, plan for further c scope evaluation after hospital D/C  - Patient PO intake poor however she states due to not liking the food. Recommended increased intake from what she prefers/can tolerate   - Suppository today a she is still  constipated, but states that is what works for her

## 2022-10-24 NOTE — NURSING TRANSFER
Nursing Transfer Note      10/24/2022     Reason patient is being transferred: post procedure     Transfer To: 501, pt returning, report called to bedside RN at the time of this note    Transfer via stretcher    Transfer with cardiac monitoring, 1 L via nasal cannula    Transported by PCT    Medicines sent: na    Any special needs or follow-up needed: routine    Chart send with patient: Yes    Patient reassessed at: 1030 on 10/24

## 2022-10-24 NOTE — CONSULTS
Inpatient consult to Physical Medicine Rehab  Consult performed by: Manuela Mckinley NP  Consult ordered by: Juni Meza MD  Reason for consult: Assess rehab needs    Reviewed patient history and current admission.  Rehab team following.  Full consult to follow.    KATHI Reyez, FNP-C  Physical Medicine & Rehabilitation   10/24/2022

## 2022-10-24 NOTE — CARE UPDATE
RAPID RESPONSE NURSE CHART REVIEW        Chart Reviewed: 10/24/2022, 12:21 AM    MRN: [id number]  Bed: 501/501 A    Dx: Closed fracture of sacrum    Merle Caro has a past medical history of Arthritis, Asthma, COPD (chronic obstructive pulmonary disease), Depression, GERD (gastroesophageal reflux disease), Hypertension, and Tobacco abuse.    Last VS: BP (!) 165/82   Pulse (!) 130   Temp 97 °F (36.1 °C)   Resp 16   Ht 5' (1.524 m)   Wt 50.8 kg (111 lb 15.9 oz)   SpO2 96%   Breastfeeding No   BMI 21.87 kg/m²     24H Vital Sign Range:  Temp:  [97 °F (36.1 °C)-98.6 °F (37 °C)]   Pulse:  [108-130]   Resp:  [16-18]   BP: (115-184)/(72-97)   SpO2:  [95 %-99 %]     Level of Consciousness (AVPU): alert    Recent Labs     10/21/22  0401   WBC 10.85   HGB 9.0*   HCT 28.1*          Recent Labs     10/21/22  0401 10/22/22  0558   * 131*   K 3.8 3.4*   CL 94* 95   CO2 29 28   CREATININE 0.6 0.7   * 97   PHOS 4.3 5.4*   MG 1.8 1.8        No results for input(s): PH, PCO2, PO2, HCO3, POCSATURATED, BE in the last 72 hours.     OXYGEN:  Flow (L/min): 2  Oxygen Concentration (%): 24  O2 Device (Oxygen Therapy): nasal cannula    MEWS score: 4    bedside Rowan GRIFFIN  contacted for tachycardia, states patient was moving, HR now improved. No additional concerns verbalized at this time. Instructed to call [phone] for further concerns or assistance.    Mirna Woodard RN

## 2022-10-24 NOTE — SUBJECTIVE & OBJECTIVE
Interval History: Patient went for EGD today which was negative. Plan for colonoscopy after D/C from hospital while in rehab. Confirmed with rehab who is setting appointments up. Added suppository as patient is still constipated, says they work for her. Appetite has been poor recommended any oral intake that patient prefers.     Review of Systems   Constitutional:  Positive for activity change, appetite change, chills and fatigue. Negative for diaphoresis and fever.   Eyes: Negative.    Respiratory:  Negative for cough and shortness of breath.    Cardiovascular:  Negative for chest pain and leg swelling.   Gastrointestinal:  Positive for constipation. Negative for diarrhea, nausea and vomiting.        Decreased PO intake    Endocrine: Negative for cold intolerance and heat intolerance.   Allergic/Immunologic: Negative.    Neurological:  Positive for speech difficulty and weakness.        Somnolent    Psychiatric/Behavioral:  Positive for decreased concentration and dysphoric mood. The patient is not hyperactive.    Objective:     Vital Signs (Most Recent):  Temp: 97 °F (36.1 °C) (10/24/22 1119)  Pulse: 99 (10/24/22 1119)  Resp: 18 (10/24/22 1119)  BP: (!) 156/67 (10/24/22 1119)  SpO2: (!) 93 % (10/24/22 1119)   Vital Signs (24h Range):  Temp:  [96.8 °F (36 °C)-99.5 °F (37.5 °C)] 97 °F (36.1 °C)  Pulse:  [] 99  Resp:  [14-20] 18  SpO2:  [93 %-99 %] 93 %  BP: ()/(50-97) 156/67     Weight: 50.3 kg (111 lb)  Body mass index is 21.68 kg/m².    Intake/Output Summary (Last 24 hours) at 10/24/2022 1417  Last data filed at 10/24/2022 1025  Gross per 24 hour   Intake 400 ml   Output 1300 ml   Net -900 ml      Physical Exam  Vitals and nursing note reviewed.   Constitutional:       General: She is not in acute distress.     Appearance: She is ill-appearing (chronically). She is not toxic-appearing or diaphoretic.      Comments: Lack of appetite, states not liking the food    Eyes:      General: No scleral  icterus.     Extraocular Movements: Extraocular movements intact.   Cardiovascular:      Rate and Rhythm: Regular rhythm. Tachycardia present.   Pulmonary:      Effort: Pulmonary effort is normal. No respiratory distress.   Abdominal:      General: Bowel sounds are normal. There is no distension.      Palpations: Abdomen is soft.      Tenderness: There is no abdominal tenderness (diffusely mildly TTP). There is no guarding.   Skin:     Coloration: Skin is pale.   Neurological:      Mental Status: Mental status is at baseline. She is lethargic.      Cranial Nerves: No dysarthria.      Comments: Lethargic but arouses with repeated verbal stimuli and can answer questions appropriately.   Psychiatric:         Mood and Affect: Mood normal.         Behavior: Behavior normal.       Significant Labs: All pertinent labs within the past 24 hours have been reviewed.    Significant Imaging: I have reviewed all pertinent imaging results/findings within the past 24 hours.

## 2022-10-24 NOTE — PROGRESS NOTES
[facility]  Orthopedics  Progress Note    Patient Name: Merle Caro  MRN: [id number]  Admission Date: 10/14/2022  Hospital Length of Stay: 10 days  Attending Provider: Juni Meza MD  Primary Care Provider: Marck Major MD  Follow-up For: Procedure(s) (LRB):  FUSION,SPINE,POSTERIOR APPROACH (N/A)    Post-Operative Day: 6 Days Post-Op  Subjective:     Principal Problem:Closed fracture of sacrum    Principal Orthopedic Problem:  Same, s/p L2-pelvis PSF 10/18    Interval History:  No acute events overnight. Alternating between HTN and soft BP, tachycardia improving. Agreed to EGD this AM and is NPO. Plan for outpatient colonoscopy. Reports some left thigh and calf pain. Family at bedside.    Review of patient's allergies indicates:   Allergen Reactions    Codeine Shortness Of Breath    Keflex [cephalexin] Shortness Of Breath    Shellfish containing products Nausea And Vomiting    Flu vaccine ts 2011-12(18 yr+) Hives    Sucralfate Other (See Comments) and Hives       Current Facility-Administered Medications   Medication    0.9%  NaCl infusion (for blood administration)    acetaminophen tablet 1,000 mg    atorvastatin tablet 20 mg    bisacodyL suppository 10 mg    calcium carbonate 200 mg calcium (500 mg) chewable tablet 500 mg    clonazePAM tablet 0.5 mg    cloNIDine tablet 0.1 mg    docusate sodium capsule 100 mg    fluticasone furoate-vilanteroL 200-25 mcg/dose diskus inhaler 1 puff    haloperidoL tablet 5 mg    heparin (porcine) injection 5,000 Units    hydrALAZINE injection 10 mg    lactulose 20 gram/30 mL solution Soln 20 g    melatonin tablet 6 mg    methocarbamoL tablet 1,000 mg    metoclopramide HCl injection 5 mg    morphine injection 2 mg    multivitamin tablet    ondansetron injection 4 mg    oxyCODONE immediate release tablet 5 mg    oxyCODONE immediate release tablet Tab 10 mg    pantoprazole EC tablet 40 mg    paroxetine tablet 40 mg    polyethylene glycol  packet 17 g    pregabalin capsule 50 mg    senna tablet 8.6 mg    sodium chloride 0.9% flush 10 mL    sodium chloride 0.9% flush 10 mL    thiamine (B-1) 250 mg in dextrose 5 % 100 mL IVPB    tiotropium bromide 2.5 mcg/actuation inhaler 2 puff    traZODone tablet 100 mg    vitamin D 1000 units tablet 2,000 Units     Objective:     Vital Signs (Most Recent):  Temp: 99 °F (37.2 °C) (10/24/22 0420)  Pulse: (!) 111 (10/24/22 0420)  Resp: 16 (10/24/22 0420)  BP: 125/69 (10/24/22 0420)  SpO2: 95 % (10/24/22 0420)   Vital Signs (24h Range):  Temp:  [97 °F (36.1 °C)-99.5 °F (37.5 °C)] 99 °F (37.2 °C)  Pulse:  [111-130] 111  Resp:  [14-18] 16  SpO2:  [94 %-99 %] 95 %  BP: (115-184)/(69-97) 125/69     Weight: 50.8 kg (111 lb 15.9 oz)  Height: 5' (152.4 cm)  Body mass index is 21.87 kg/m².      Intake/Output Summary (Last 24 hours) at 10/24/2022 0505  Last data filed at 10/24/2022 0000  Gross per 24 hour   Intake 750 ml   Output 1950 ml   Net -1200 ml         Ortho/SPM Exam  Gen: NAD, frail, ill-appearing  CV: peripherally well perfused  Resp: non-labored respirations    Spine:   NAD  Wound c/d/i with clean dressing.  Moves BLE on command, unable/unwilling to flex hips this AM  2+ DP bilaterally    Significant Labs: All pertinent labs within the past 24 hours have been reviewed.    Significant Imaging: I have reviewed and interpreted all pertinent imaging results/findings.  DVT ultrasound with no DVTs    Assessment/Plan:     * Closed fracture dislocation of sacrum  Merle Caro is a 67 y.o. female with past medical history of hypertension, bipolar disorder, and urinary incontinence (1 year duration) admitted to Orthopedics with what appears to be an approximally 4-month-old sacral fracture dislocation of S1 on S2 with severe canal stenosis at that level resulting in chronic cauda equina symptoms.  She has not been able ambulate since June due to pain.  Initial images reviewed by Dr. Meza who plans to take the  patient for surgery on Tuesday October 18th.  CT pelvis completed.  DVT ultrasound with no DVTs.  Hospital Medicine consulted began a metastatic workup.  Studies so far unrevealing.  Appreciate hospital medicine recommendations. Now s/p L2-Pelvis PSF w L5-S1 Laminectomy and dural injury intraop repaired w dural glue on 10/18.    Plan:   Tolerated elevated HOB w/out spinal headache  Pain control: multimodal  Diet: regular diet  PT/OT: WBAT in [facility]. Spine precautions.  DVT prophylaxis:  SubQ heparin; no need for IVC filter at this time  Abx: none  Labs:  Stable  Leija dc'd 10/19  No drains  GI consulted and planning for EGD likely today w outpatient colonoscopy    Dispo: improving medically. Likely dc to SNF after completion of EGD pending findings.      Essential hypertension  Home medications restarted    Asthma with COPD  Home medications restarted  SpO2 goal > 88%; do not titrate supplemental oxygen above this level    Affective bipolar disorder  Home medications restarted    Pure hypercholesterolemia  Home medications restarted    LUCILA on CPAP: Not compliant with CPAP  CPAP          Jerry Lemus MD  Orthopedics  [facility] - Surgery

## 2022-10-24 NOTE — PT/OT/SLP PROGRESS
Physical Therapy      Patient Name:  Merle Caro   MRN:  [id number]    Patient not seen today secondary to being off floor for procedure. Will follow-up 10/25/22.

## 2022-10-24 NOTE — PROGRESS NOTES
[facility]  Progress Note    Patient Name: Merle Caro  MRN: [id number]  Patient Class: IP- Inpatient   Admission Date: 10/14/2022  Length of Stay: 10 days  Attending Physician: Juni Meza MD  Primary Care Provider: Marck Major MD        Subjective:     Principal Problem:Closed fracture of sacrum        HPI:  Ms. Caro is a 67F with hx of HTN, bipolar disorder, tobacco use, COPD on 2L home O2 who is admitted to the hospital to ortho service for upcoming operative repair of displaced sacral fracture. Hospital medicine is consulted for medical optimization.  She and  at bedside report a concerning history. Around a year ago, she began losing weight unexpectedly (has lost 60lbs in 1 year). Her PCP was working her up for this but no cause has yet been found. Then, around June of this year, she and her  were on a cross [place] road trip when she began to experience severe buttocks pain. It became so severe that she could no longer walk, and has been non-ambulatory since June 2022. Initially the pain was thought 2/2 thrombosed hemorrhoids, but after CRS evaluation, this was NOT thought to be the cause of her pain. Eventually lower back imaging was ordered and showed extensive and pathologic fractures of the sacrum with grade 5 spondylolisthesis causing severe central canal stenosis on S1-S2, bilateral sacral ala fractures, displaced pubic rami fractures which closely abut the urethra/vaginal wall. She was then sent here for Ortho evaluation. She denies any known injuries, falls, or physical abuse that could have caused these fractures. She was recently found to have osteoporosis but had not yet started treatment.   In addition to the concerning weightloss and being nonambulatory, she reports are early satiety/bloating, decreased appetite, drenching night sweats several times/week, urinary incontinence, inability to sense having a bowel movement, chronic dry cough. She  reports a family history of ovarian cancer (sister in her 20s), lung cancer in mother (in 40s, was a smoker). The patient herself has a history of pulmonary nodules and has not had a colonoscopy in 11 years (10y f/u recommended).   She is currently afebrile, hemodynamically stable.  Wt Readings from Last 20 Encounters:   10/15/22 50.8 kg (111 lb 15.9 oz)   10/14/22 54.4 kg (120 lb)   09/27/22 60.8 kg (134 lb)   08/22/22 60.8 kg (134 lb)   06/21/22 61.7 kg (136 lb 0.4 oz)   05/19/22 64 kg (141 lb 1.5 oz)   05/19/22 64.8 kg (142 lb 13.7 oz)   05/03/22 65.5 kg (144 lb 6.4 oz)   04/25/22 69.4 kg (153 lb)   03/02/22 69.4 kg (153 lb)   02/03/22 69.4 kg (153 lb)   01/12/22 71.2 kg (157 lb)   01/07/22 71.4 kg (157 lb 6.5 oz)   01/05/22 71.7 kg (158 lb)   12/14/21 72.1 kg (158 lb 13.8 oz)   11/17/21 73.9 kg (163 lb)   11/08/21 74.1 kg (163 lb 5.8 oz)   11/03/21 74.7 kg (164 lb 10.9 oz)   09/21/21 74.7 kg (164 lb 10.9 oz)   07/20/21 76.7 kg (169 lb 1.5 oz)       Overview/Hospital Course:  No notes on file    Interval History: Patient went for EGD today which was negative. Plan for colonoscopy after D/C from hospital while in rehab. Confirmed with rehab who is setting appointments up. Added suppository as patient is still constipated, says they work for her. Appetite has been poor recommended any oral intake that patient prefers.     Review of Systems   Constitutional:  Positive for activity change, appetite change, chills and fatigue. Negative for diaphoresis and fever.   Eyes: Negative.    Respiratory:  Negative for cough and shortness of breath.    Cardiovascular:  Negative for chest pain and leg swelling.   Gastrointestinal:  Positive for constipation. Negative for diarrhea, nausea and vomiting.        Decreased PO intake    Endocrine: Negative for cold intolerance and heat intolerance.   Allergic/Immunologic: Negative.    Neurological:  Positive for speech difficulty and weakness.        Somnolent     Psychiatric/Behavioral:  Positive for decreased concentration and dysphoric mood. The patient is not hyperactive.    Objective:     Vital Signs (Most Recent):  Temp: 97 °F (36.1 °C) (10/24/22 1119)  Pulse: 99 (10/24/22 1119)  Resp: 18 (10/24/22 1119)  BP: (!) 156/67 (10/24/22 1119)  SpO2: (!) 93 % (10/24/22 1119)   Vital Signs (24h Range):  Temp:  [96.8 °F (36 °C)-99.5 °F (37.5 °C)] 97 °F (36.1 °C)  Pulse:  [] 99  Resp:  [14-20] 18  SpO2:  [93 %-99 %] 93 %  BP: ()/(50-97) 156/67     Weight: 50.3 kg (111 lb)  Body mass index is 21.68 kg/m².    Intake/Output Summary (Last 24 hours) at 10/24/2022 1417  Last data filed at 10/24/2022 1025  Gross per 24 hour   Intake 400 ml   Output 1300 ml   Net -900 ml      Physical Exam  Vitals and nursing note reviewed.   Constitutional:       General: She is not in acute distress.     Appearance: She is ill-appearing (chronically). She is not toxic-appearing or diaphoretic.      Comments: Lack of appetite, states not liking the food    Eyes:      General: No scleral icterus.     Extraocular Movements: Extraocular movements intact.   Cardiovascular:      Rate and Rhythm: Regular rhythm. Tachycardia present.   Pulmonary:      Effort: Pulmonary effort is normal. No respiratory distress.   Abdominal:      General: Bowel sounds are normal. There is no distension.      Palpations: Abdomen is soft.      Tenderness: There is no abdominal tenderness (diffusely mildly TTP). There is no guarding.   Skin:     Coloration: Skin is pale.   Neurological:      Mental Status: Mental status is at baseline. She is lethargic.      Cranial Nerves: No dysarthria.      Comments: Lethargic but arouses with repeated verbal stimuli and can answer questions appropriately.   Psychiatric:         Mood and Affect: Mood normal.         Behavior: Behavior normal.       Significant Labs: All pertinent labs within the past 24 hours have been reviewed.    Significant Imaging: I have reviewed all pertinent  imaging results/findings within the past 24 hours.    Assessment/Plan:      * Closed fracture dislocation of sacrum  MRI and CT spine showing extensive displaced fractures of the sacrum causing central canal stenosis, pubic rami fractures,   Has significant osteoporosis recently diagnosed, had not yet begun treatment.    - s/p L2-pelvis spinal fusion, lumbosacral laminectomies/cauda equina decompression, and pelvic fixation surgery on 10/18  - needs osteoporosis treatment outpatient  - PT recommended inpatient rehab on discharge    Constipation  Patient with severe constipation since admission.  Trialed enema, but unable to stay in rectum due to lack of rectal tone; leaked out and not effective.    KUB obtained to eval for developing obstruction given vomiting overnight and hyperactive bowel sounds; no clear obstruction seen but large volume stool in R colon    - Pt responded well to the bisacodyl suppository; had 3 BM that night.  - continue daily miralax and senna, added lactulose BID to regiment  -Attempt another suppository for improvement    SIRS (systemic inflammatory response syndrome)  On 10/19-10/20, patient developed worsening hypotension, tachycardia to 115, hypothermia to 96F.  Appears more lethargic today, though receiving minimal opioids or muscle relaxers.    Septic workup largely negative.     - will stop abx once BCx negative x48h (would be 10/22 evening)  - low threshold to obtain CTA if worsening hypoxia to eval for PE   - IV fluids continuously were stopped due to improvement in BP, low threshold to add back on.  -Pt has not fevered since off antibiotics.      Urinary incontinence  Reported by patient and . Urology evaluated inpatient and state her history is not consistent with retaining urine.  - continue to monitor  - nicholson removed      Severe malnutrition  See unexplained weightloss    Cauda equina syndrome        Coronary artery disease  Triple-vessel CAD seen on recent CT C/A/P from  "10/15.  No history of coronary events. No current chest pain or anginal symptoms.  ASCVD 10-year risk of cardiovascular event 13.1%, mod-high intensity statin indicated    - Low theshold for repeat EKG and troponins if becomes symptomatic  - continue atorvastatin 20mg daily; current dose is appropriate      Prolonged Q-T interval on ECG  QTc 534 on admission.   Reported family history of sudden cardiac deaths.  On multiple QT prolonging home psychiatric meds: klonopin, trazodone, haldol, paroxetine    - several repeat EKGs with improved QT to 470; will discontinue daily EKGs  - continue home meds as these are long term  - keep K>4, Mg>2, Phos >3  - caution with any anti-emetics or other QT-prolonging agents in perioperative setting as she is high risk for arrhythmias      Early satiety  See "unexplained weightloss"      Anemia        Osteoporosis with current pathological fracture  Recently diagnosed on DEXA with T -3.9 at L spine and T -4.1 at L femoral area.  Has not yet started treatment.  Could be contributing to extensive pelvic fractures, but location seems atypical and other causes need to be evaluated  Has history of increased PTH of unknown cause; self-resolved    - f/u with PCP for osteoporosis tx      Benzodiazepine dependence  See bipolar disorder      Unexplained weight loss  Concerning unexplained weightloss of >60lbs in last year, in combination with severe functional decline, pathologic fractures, and B symptoms (see HPI for full details).  Has history of pulmonary nodule due for 12 month followup+mediastinal lymphadenopathy.  Due for colonoscopy (last 2011 with 10 year f/u recommended); having early satiety and bloating symptoms  Discovered patient has history of two brothers dying early from cardiac issues that were said to be genetic, possibly arrhythmia va MI based on description    Workup:  - CT C/A/P with contrast: no lung changes or lymphadenopathy  - CT head given some concern for slowed " "mentation: no acute changes or intracranial lesions  - HIV, HCV,TSH, RPR, quant gold, blood cultures wnl  - CA 19-9, CA-125, CEA wnl  - TTE without concerning findings  - SPEP, YIN normal and without monoclonal peaks. K/L ratio normal  - Peripheral smear: possible early neutrophil hypersegmentation    Plan:  - given abdominal bloating symptoms, reported recent melena, GI planned for inpatient EGD 10/21, but patient refused EGD due to feeling she needed to further recover from surgery. Discussed further our rationale for this procedure today, Pt reports willing to undergo procedure on Monday (10/23)  - only minimal confusion and slight nystagmus; continue empiric high-dose thiamine taper since she's at risk for nutritional deficiencies/Wernicke's  - discussed with oncology service via phone about early hypersegmented neutrophils: will also check copper levels. With normal CBC differential and negative rest of workup, stated there was low suspicion for myeloproliferative process at this time, but may benefit from an outpatient hematology f/u.   - EGD was negative, plan for further c scope evaluation after hospital D/C  - Patient PO intake poor however she states due to not liking the food. Recommended increased intake from what she prefers/can tolerate   - Suppository today a she is still constipated, but states that is what works for her          History of colon polyps  See "unexplained weight loss"      Chronic respiratory failure with hypoxia  2/2 asthma and COPD  See "asthma"    Cigarette nicotine dependence with nicotine-induced disorder        Essential hypertension  - hold home lisinopril 10mg while with borderline blood pressures. Pt's BP has started to improve can add back once we are sure it is stable if elevated      Asthma with COPD  On 2L home oxygen  Currently at baseline O2 requirements; no dyspnea, not in acute exacerbation    - not compliant with home breo and spiriva  - home inhalers ordered in " perioperative period to avoid pulmonary complications of anesthesia    Affective bipolar disorder  Sees psychiatrist for medications    - continue home klonopin, haldol, paroxetine, trazodone (trazodone was halved initially due to concern for the cause of her lethargy, pt has continued to be lethargic and is not sleeping, so increased back to 100mg). Will keep an eye on her mentation.  - initially prolonged QT has improved; minimize other QT prolonging meds such as zofran, etc.  - delirium precautions    Pure hypercholesterolemia  - continue home statin; substituting atorvastatin 20mg daily due to hospital formulary  - ASCVD 10-year risk of cardiac event 13%, moderate-high intensity statin recommended; current dose appropriate      LUCILA on CPAP: Not compliant with CPAP  - CPAP qhs ordered  - patient may no longer need since has lost nearly 100 lbs  - does not use home    VTE Risk Mitigation (From admission, onward)           Ordered     heparin (porcine) injection 5,000 Units  Every 8 hours         10/18/22 1233     heparin (porcine) injection 5,000 Units  Every 8 hours         10/15/22 0912     Place sequential compression device  Until discontinued         10/14/22 1637                    Discharge Planning   YELENA: 10/25/2022     Code Status: Full Code   Is the patient medically ready for discharge?:     Reason for patient still in hospital (select all that apply): Patient trending condition  Discharge Plan A: Rehab                  Mary Alice West, DO  Department of Hospital Medicine   [facility]

## 2022-10-24 NOTE — H&P
Short Stay Endoscopy History and Physical    PCP - Marck Major MD  Referring Physician - Juni Meza MD  [address] [place] [address]    Procedure - EGD  ASA - per anesthesia  Mallampati - per anesthesia  History of Anesthesia problems - no  Family history Anesthesia problems -  no   Plan of anesthesia - General    HPI  67 y.o. female  Reason for procedure:  Unexplained weight loss [R63.4]    ROS:  Constitutional: No fevers, chills, No weight loss  CV: No chest pain  Pulm: No cough, No shortness of breath  GI: see HPI    Medical History:  has a past medical history of Arthritis, Asthma, COPD (chronic obstructive pulmonary disease), Depression, GERD (gastroesophageal reflux disease), Hypertension, and Tobacco abuse.    Surgical History:  has a past surgical history that includes Hysterectomy; Knee surgery; Tonsillectomy; multiple fracture repairs; Colonoscopy w/ polypectomy (01/05/2011); Magnetic resonance imaging (N/A, 10/14/2022); and fusion, spine, posterior approach (N/A, 10/18/2022).    Family History: family history includes COPD in her mother; Cancer in her mother; Hypertension in her mother; Lung cancer in her mother; Ovarian cancer in her sister; Stroke in her mother..    Social History:  reports that she has been smoking cigarettes. She has a 33.00 pack-year smoking history. She has never used smokeless tobacco. She reports that she does not drink alcohol and does not use drugs.    Review of patient's allergies indicates:   Allergen Reactions    Codeine Shortness Of Breath    Keflex [cephalexin] Shortness Of Breath    Shellfish containing products Nausea And Vomiting    Flu vaccine ts 2011-12(18 yr+) Hives    Sucralfate Other (See Comments) and Hives       Medications:   Medications Prior to Admission   Medication Sig Dispense Refill Last Dose    celecoxib (CELEBREX) 200 MG capsule Take 1 capsule (200 mg total) by mouth once daily. 90 capsule 0 Past Week    cholecalciferol, vitamin  D3, (VITAMIN D3) 2,000 unit Cap Take 1 capsule by mouth every evening.   10/13/2022    clonazepam (KLONOPIN) 0.5 MG tablet Take 0.5 mg by mouth every evening.   10/13/2022    haloperidol (HALDOL) 5 MG tablet Take 5 mg by mouth every evening.   10/13/2022    LIDOcaine-prilocaine (EMLA) cream Apply topically once daily. 30 g 3 Past Week    lisinopriL 10 MG tablet TAKE 1 TABLET(10 MG) BY MOUTH EVERY DAY (Patient taking differently: Take 10 mg by mouth nightly.) 90 tablet 3 10/13/2022    OXYGEN-AIR DELIVERY SYSTEMS MISC Inhale 2 L into the lungs as needed (O2 via Nasal Canula.).   10/14/2022    pantoprazole (PROTONIX) 40 MG tablet Take 1 tablet (40 mg total) by mouth once daily. (Patient taking differently: Take 40 mg by mouth daily as needed.) 90 tablet 3 Past Week    paroxetine (PAXIL) 40 MG tablet TK 1 T PO QHS  2 10/13/2022    simvastatin (ZOCOR) 40 MG tablet Take 1 tablet (40 mg total) by mouth every evening. 90 tablet 3 10/13/2022    traZODone (DESYREL) 100 MG tablet Take 100 mg by mouth every evening.   10/13/2022    albuterol (PROVENTIL/VENTOLIN HFA) 90 mcg/actuation inhaler Inhale 4 puffs into the lungs every 4 (four) hours as needed for Wheezing. Rescue 18 g 0     diclofenac sodium (VOLTAREN ARTHRITIS PAIN) 1 % Gel Apply 2 g topically 4 (four) times daily. 100 g 11 More than a month    fluticasone-salmeterol diskus inhaler 500-50 mcg Inhale 1 puff into the lungs 2 (two) times daily. Controller (Patient not taking: No sig reported) 60 each 11 More than a month    hydrocortisone 2.5 % cream Apply topically 2 (two) times daily for 7 days 30 g 3     tiotropium (SPIRIVA WITH HANDIHALER) 18 mcg inhalation capsule Inhale 1 capsule (18 mcg total) into the lungs once daily. Controller (Patient not taking: No sig reported) 30 capsule 11 More than a month    [DISCONTINUED] HYDROcodone-acetaminophen (NORCO) 7.5-325 mg per tablet Take 1 tablet by mouth every 8 (eight) hours as needed for Pain. 21 tablet 0 More than a month        Physical Exam:    Vital Signs:   Vitals:    10/24/22 0935   BP: (!) 152/66   Pulse: 104   Resp: 20   Temp:        General Appearance: Well appearing in no acute distress  Abdomen: Soft, non tender, non distended with normal bowel sounds, no masses      Labs:  Lab Results   Component Value Date    WBC 8.61 10/24/2022    HGB 7.4 (L) 10/24/2022    HCT 22.8 (L) 10/24/2022     10/24/2022    CHOL 157 09/21/2022    TRIG 152 (H) 09/21/2022    HDL 47 09/21/2022    ALT 15 10/24/2022    AST 26 10/24/2022     10/24/2022    K 4.5 10/24/2022     10/24/2022    CREATININE 0.7 10/24/2022    BUN 16 10/24/2022    CO2 24 10/24/2022    TSH 1.018 10/15/2022    INR 1.1 10/14/2022    HGBA1C 4.9 10/14/2022       I have explained the risks and benefits of this endoscopic procedure to the patient including but not limited to bleeding, inflammation, infection, perforation, and death.    Assessment/Plan:     Normocytic Anemia   Melena   Weight Loss     - Proceed with EGD       Maribel Lucero MD  Gastroenterology   [facility]

## 2022-10-24 NOTE — PLAN OF CARE
[facility]  Discharge Reassessment    Primary Care Provider: Marck Major MD    Expected Discharge Date: 10/25/2022    Reassessment (most recent)       Discharge Reassessment - 10/24/22 0957          Discharge Reassessment    Assessment Type Discharge Planning Reassessment     Did the patient's condition or plan change since previous assessment? No     Discharge Plan discussed with: Patient     Communicated YELENA with patient/caregiver Yes     Discharge Plan A Rehab     Discharge Plan B Rehab                     Patient to discharge to [facility] when medically stable. Auth pending at this time. Yelena 10/25/22. Will continue to follow.

## 2022-10-24 NOTE — PLAN OF CARE
Called to room per spouse. He is unwilling for patient to d/c to [facility] as the facility is in an area he considers unsafe. He is willing to travel further if needed. Blast referrals sent to area facilities [facility] in [place] and [facility] [place] [facility] and [facility]. Spouse reports if no other facilities have accepted by the time patient is medically stable that he will take her home with  care. Will continue to follow for needs.

## 2022-10-24 NOTE — ASSESSMENT & PLAN NOTE
Merle Caro is a 67 y.o. female with past medical history of hypertension, bipolar disorder, and urinary incontinence (1 year duration) admitted to Orthopedics with what appears to be an approximally 4-month-old sacral fracture dislocation of S1 on S2 with severe canal stenosis at that level resulting in chronic cauda equina symptoms.  She has not been able ambulate since June due to pain.  Initial images reviewed by Dr. Meza who plans to take the patient for surgery on Tuesday October 18th.  CT pelvis completed.  DVT ultrasound with no DVTs.  Hospital Medicine consulted began a metastatic workup.  Studies so far unrevealing.  Appreciate hospital medicine recommendations. Now s/p L2-Pelvis PSF w L5-S1 Laminectomy and dural injury intraop repaired w dural glue on 10/18.    Plan:   Tolerated elevated HOB w/out spinal headache  Pain control: multimodal  Diet: regular diet  PT/OT: WBAT in LSO. Spine precautions.  DVT prophylaxis:  SubQ heparin; no need for IVC filter at this time  Abx: none  Labs:  Stable  Leija dc'd 10/19  No drains  GI consulted and planning for EGD likely today w outpatient colonoscopy    Dispo: improving medically. Likely dc to SNF after completion of EGD pending findings.

## 2022-10-24 NOTE — PT/OT/SLP PROGRESS
Occupational Therapy      Patient Name:  Merle Caro   MRN:  [id number]    Patient not seen today secondary to being off floor for procedure. Will follow-up 10/25/22.    10/24/2022

## 2022-10-24 NOTE — TREATMENT PLAN
BRIEF GI TREATMENT PLAN    EGD today showed:  - Normal esophagus.                          - Z-line regular, 36 cm from the incisors.                          - Normal stomach. Biopsied.                          - Normal duodenal bulb, first portion of the                          duodenum and second portion of the duodenum.   Recommendation:        - Discharge patient to home (ambulatory).                          - Advance diet as tolerated.                          - Continue present medications.                          - Await pathology results.                          - Plan for outpatient GI follow up and                          outpatient colonoscopy (ordered). We will sign off.     Vargas Spicer MD   PGY-4, GI

## 2022-10-24 NOTE — ANESTHESIA PREPROCEDURE EVALUATION
10/24/2022  Merle Caro is a 67 y.o., female.      Pre-op Assessment    I have reviewed the Patient Summary Reports.       I have reviewed the Medications.     Review of Systems  Anesthesia Hx:  No problems with previous Anesthesia  History of prior surgery of interest to airway management or planning: Previous anesthesia: General   Social:  Smoker 33 pack years   Hematology/Oncology:  Hematology Normal        EENT/Dental:EENT/Dental Normal   Cardiovascular:   Exercise tolerance: poor Hypertension, well controlled CAD asymptomatic     Pulmonary:   COPD, moderate Asthma mild Sleep Apnea    Renal/:  Renal/ Normal     Hepatic/GI:   GERD, well controlled    Neurological:   Neuromuscular Disease,    Endocrine:  Endocrine Normal    Psych:   Psychiatric History          Physical Exam  General: Well nourished, Cooperative, Alert and Oriented    Airway:  Mallampati: II   Mouth Opening: Normal  TM Distance: Normal  Tongue: Normal  Neck ROM: Normal ROM    Dental:  Intact        Anesthesia Plan  Type of Anesthesia, risks & benefits discussed:    Anesthesia Type: Gen Natural Airway  Intra-op Monitoring Plan: Standard ASA Monitors  Post Op Pain Control Plan: multimodal analgesia and IV/PO Opioids PRN  Induction:  IV  Informed Consent: Informed consent signed with the Patient and all parties understand the risks and agree with anesthesia plan.  All questions answered.   ASA Score: 3    Ready For Surgery From Anesthesia Perspective.     .

## 2022-10-24 NOTE — PLAN OF CARE
Patient Accepted to [facility]. SW to follow.    9:11 AM  SW met with the patient and pt's spouse (Irving) at bedside to discuss accepting facility, both agreeable. SAUD contacted (Licha) w/[facility] #[phone] to inform the above, pending Auth. Pt will require a COVID-19 test prior to admit.. CM team to follow. YELENA 10/25.    11:20 AM  SW informed By CM Pascale, pt's spouse prefers alternative Rehab facility. [facility] Accepted. SAUD spoke to pt's spouse at bedside, expressed he is agreeable. SAUD spoke w/ Mame #[phone] from [facility].    3:30 PM    Kemi Martinez LMSW  Case Management   [facility]   Ext. [phone]

## 2022-10-24 NOTE — TRANSFER OF CARE
Anesthesia Transfer of Care Note    Patient: Merle Caro    Procedure(s) Performed: Procedure(s) (LRB):  EGD (ESOPHAGOGASTRODUODENOSCOPY) (N/A)    Patient location: PACU    Anesthesia Type: general    Transport from OR: Transported from OR on 2-3 L/min O2 by NC with adequate spontaneous ventilation    Post pain: adequate analgesia    Post assessment: no apparent anesthetic complications and tolerated procedure well    Post vital signs: stable    Level of consciousness: sedated and responds to stimulation    Nausea/Vomiting: no nausea/vomiting    Complications: none    Transfer of care protocol was followed      Last vitals:   Visit Vitals  /61 (BP Location: Right arm, Patient Position: Lying)   Pulse 101   Temp 36 °C (96.8 °F)   Resp 16   Ht 5' (1.524 m)   Wt 50.3 kg (111 lb)   SpO2 98%   Breastfeeding No   BMI 21.68 kg/m²

## 2022-10-24 NOTE — SUBJECTIVE & OBJECTIVE
Principal Problem:Closed fracture of sacrum    Principal Orthopedic Problem:  Same, s/p L2-pelvis PSF 10/18    Interval History:  No acute events overnight. Alternating between HTN and soft BP, tachycardia improving. Agreed to EGD this AM and is NPO. Plan for outpatient colonoscopy. Reports some left thigh and calf pain. Family at bedside.    Review of patient's allergies indicates:   Allergen Reactions    Codeine Shortness Of Breath    Keflex [cephalexin] Shortness Of Breath    Shellfish containing products Nausea And Vomiting    Flu vaccine ts 2011-12(18 yr+) Hives    Sucralfate Other (See Comments) and Hives       Current Facility-Administered Medications   Medication    0.9%  NaCl infusion (for blood administration)    acetaminophen tablet 1,000 mg    atorvastatin tablet 20 mg    bisacodyL suppository 10 mg    calcium carbonate 200 mg calcium (500 mg) chewable tablet 500 mg    clonazePAM tablet 0.5 mg    cloNIDine tablet 0.1 mg    docusate sodium capsule 100 mg    fluticasone furoate-vilanteroL 200-25 mcg/dose diskus inhaler 1 puff    haloperidoL tablet 5 mg    heparin (porcine) injection 5,000 Units    hydrALAZINE injection 10 mg    lactulose 20 gram/30 mL solution Soln 20 g    melatonin tablet 6 mg    methocarbamoL tablet 1,000 mg    metoclopramide HCl injection 5 mg    morphine injection 2 mg    multivitamin tablet    ondansetron injection 4 mg    oxyCODONE immediate release tablet 5 mg    oxyCODONE immediate release tablet Tab 10 mg    pantoprazole EC tablet 40 mg    paroxetine tablet 40 mg    polyethylene glycol packet 17 g    pregabalin capsule 50 mg    senna tablet 8.6 mg    sodium chloride 0.9% flush 10 mL    sodium chloride 0.9% flush 10 mL    thiamine (B-1) 250 mg in dextrose 5 % 100 mL IVPB    tiotropium bromide 2.5 mcg/actuation inhaler 2 puff    traZODone tablet 100 mg    vitamin D 1000 units tablet 2,000 Units     Objective:     Vital Signs (Most Recent):  Temp: 99 °F (37.2 °C) (10/24/22  0420)  Pulse: (!) 111 (10/24/22 0420)  Resp: 16 (10/24/22 0420)  BP: 125/69 (10/24/22 0420)  SpO2: 95 % (10/24/22 0420)   Vital Signs (24h Range):  Temp:  [97 °F (36.1 °C)-99.5 °F (37.5 °C)] 99 °F (37.2 °C)  Pulse:  [111-130] 111  Resp:  [14-18] 16  SpO2:  [94 %-99 %] 95 %  BP: (115-184)/(69-97) 125/69     Weight: 50.8 kg (111 lb 15.9 oz)  Height: 5' (152.4 cm)  Body mass index is 21.87 kg/m².      Intake/Output Summary (Last 24 hours) at 10/24/2022 0505  Last data filed at 10/24/2022 0000  Gross per 24 hour   Intake 750 ml   Output 1950 ml   Net -1200 ml         Ortho/SPM Exam  Gen: NAD, frail, ill-appearing  CV: peripherally well perfused  Resp: non-labored respirations    Spine:   NAD  Wound c/d/i with clean dressing.  Moves BLE on command, unable/unwilling to flex hips this AM  2+ DP bilaterally    Significant Labs: All pertinent labs within the past 24 hours have been reviewed.    Significant Imaging: I have reviewed and interpreted all pertinent imaging results/findings.  DVT ultrasound with no DVTs

## 2022-10-24 NOTE — PROVATION PATIENT INSTRUCTIONS
Discharge Summary/Instructions after an Endoscopic Procedure  Patient Name: Merle Caro  Patient MRN: [id number]  Patient YOB: 1955 Monday, October 24, 2022  Maribel Lucero MD  Dear patient,  As a result of recent federal legislation (The Federal Cures Act), you may   receive lab or pathology results from your procedure in your [facility]   account before your physician is able to contact you. Your physician or   their representative will relay the results to you with their   recommendations at their soonest availability.  Thank you,  RESTRICTIONS:  During your procedure today, you received medications for sedation.  These   medications may affect your judgment, balance and coordination.  Therefore,   for 24 hours, you have the following restrictions:   - DO NOT drive a car, operate machinery, make legal/financial decisions,   sign important papers or drink alcohol.    ACTIVITY:  Today: no heavy lifting, straining or running due to procedural   sedation/anesthesia.  The following day: return to full activity including work.  DIET:  Eat and drink normally unless instructed otherwise.     TREATMENT FOR COMMON SIDE EFFECTS:  - Mild abdominal pain, nausea, belching, bloating or excessive gas:  rest,   eat lightly and use a heating pad.  - Sore Throat: treat with throat lozenges and/or gargle with warm salt   water.  - Because air was used during the procedure, expelling large amounts of air   from your rectum or belching is normal.  - If a bowel prep was taken, you may not have a bowel movement for 1-3 days.    This is normal.  SYMPTOMS TO WATCH FOR AND REPORT TO YOUR PHYSICIAN:  1. Abdominal pain or bloating, other than gas cramps.  2. Chest pain.  3. Back pain.  4. Signs of infection such as: chills or fever occurring within 24 hours   after the procedure.  5. Rectal bleeding, which would show as bright red, maroon, or black stools.   (A tablespoon of blood from the rectum is not serious, especially if    hemorrhoids are present.)  6. Vomiting.  7. Weakness or dizziness.  GO DIRECTLY TO THE NEAREST EMERGENCY ROOM IF YOU HAVE ANY OF THE FOLLOWING:      Difficulty breathing              Chills and/or fever over 101 F   Persistent vomiting and/or vomiting blood   Severe abdominal pain   Severe chest pain   Black, tarry stools   Bleeding- more than one tablespoon   Any other symptom or condition that you feel may need urgent attention  Your doctor recommends these additional instructions:  If any biopsies were taken, your doctors clinic will contact you in 1 to 2   weeks with any results.  - Discharge patient to home (ambulatory).   - Advance diet as tolerated.   - Continue present medications.   - Await pathology results.   - Plan for outpatient GI follow up and outpatientcolonoscopy for weight   loss  For questions, problems or results please call your physician - Maribel Lucero MD at Work:  [phone].  [facility] [place], EMERGENCY ROOM PHONE NUMBER: [phone]  IF A COMPLICATION OR EMERGENCY SITUATION ARISES AND YOU ARE UNABLE TO REACH   YOUR PHYSICIAN - GO DIRECTLY TO THE EMERGENCY ROOM.  Maribel Lucero MD  10/24/2022 10:17:14 AM  This report has been verified and signed electronically.  Dear patient,  As a result of recent federal legislation (The Federal Cures Act), you may   receive lab or pathology results from your procedure in your [facility]   account before your physician is able to contact you. Your physician or   their representative will relay the results to you with their   recommendations at their soonest availability.  Thank you,  PROVATION

## 2022-10-25 VITALS
TEMPERATURE: 96 F | HEART RATE: 103 BPM | RESPIRATION RATE: 18 BRPM | DIASTOLIC BLOOD PRESSURE: 61 MMHG | BODY MASS INDEX: 21.79 KG/M2 | OXYGEN SATURATION: 94 % | HEIGHT: 60 IN | WEIGHT: 111 LBS | SYSTOLIC BLOOD PRESSURE: 123 MMHG

## 2022-10-25 PROBLEM — Z74.09 IMPAIRED MOBILITY: Status: ACTIVE | Noted: 2022-10-25

## 2022-10-25 LAB
BACTERIA BLD CULT: NORMAL
BACTERIA BLD CULT: NORMAL
COPPER SERPL-MCNC: 963 UG/L (ref 810–1990)

## 2022-10-25 PROCEDURE — 99222 PR INITIAL HOSPITAL CARE,LEVL II: ICD-10-PCS | Mod: ,,, | Performed by: NURSE PRACTITIONER

## 2022-10-25 PROCEDURE — 25000003 PHARM REV CODE 250: Performed by: STUDENT IN AN ORGANIZED HEALTH CARE EDUCATION/TRAINING PROGRAM

## 2022-10-25 PROCEDURE — 97530 THERAPEUTIC ACTIVITIES: CPT

## 2022-10-25 PROCEDURE — 97535 SELF CARE MNGMENT TRAINING: CPT

## 2022-10-25 PROCEDURE — 63600175 PHARM REV CODE 636 W HCPCS: Performed by: STUDENT IN AN ORGANIZED HEALTH CARE EDUCATION/TRAINING PROGRAM

## 2022-10-25 PROCEDURE — 99222 1ST HOSP IP/OBS MODERATE 55: CPT | Mod: ,,, | Performed by: NURSE PRACTITIONER

## 2022-10-25 PROCEDURE — 27000221 HC OXYGEN, UP TO 24 HOURS

## 2022-10-25 PROCEDURE — 97112 NEUROMUSCULAR REEDUCATION: CPT

## 2022-10-25 PROCEDURE — 25000003 PHARM REV CODE 250: Performed by: HOSPITALIST

## 2022-10-25 PROCEDURE — 25000003 PHARM REV CODE 250

## 2022-10-25 PROCEDURE — 99900035 HC TECH TIME PER 15 MIN (STAT)

## 2022-10-25 PROCEDURE — 94761 N-INVAS EAR/PLS OXIMETRY MLT: CPT

## 2022-10-25 PROCEDURE — 94640 AIRWAY INHALATION TREATMENT: CPT

## 2022-10-25 RX ORDER — BISACODYL 10 MG
10 SUPPOSITORY, RECTAL RECTAL DAILY
Status: DISCONTINUED | OUTPATIENT
Start: 2022-10-25 | End: 2022-10-25 | Stop reason: HOSPADM

## 2022-10-25 RX ADMIN — HEPARIN SODIUM 5000 UNITS: 5000 INJECTION INTRAVENOUS; SUBCUTANEOUS at 06:10

## 2022-10-25 RX ADMIN — ATORVASTATIN CALCIUM 20 MG: 20 TABLET, FILM COATED ORAL at 08:10

## 2022-10-25 RX ADMIN — TIOTROPIUM BROMIDE INHALATION SPRAY 2 PUFF: 3.12 SPRAY, METERED RESPIRATORY (INHALATION) at 09:10

## 2022-10-25 RX ADMIN — ACETAMINOPHEN 1000 MG: 325 TABLET ORAL at 06:10

## 2022-10-25 RX ADMIN — PANTOPRAZOLE SODIUM 40 MG: 40 TABLET, DELAYED RELEASE ORAL at 08:10

## 2022-10-25 RX ADMIN — FLUTICASONE FUROATE AND VILANTEROL TRIFENATATE 1 PUFF: 200; 25 POWDER RESPIRATORY (INHALATION) at 09:10

## 2022-10-25 RX ADMIN — OXYCODONE HYDROCHLORIDE 10 MG: 10 TABLET ORAL at 05:10

## 2022-10-25 RX ADMIN — POLYETHYLENE GLYCOL 3350 17 G: 17 POWDER, FOR SOLUTION ORAL at 08:10

## 2022-10-25 RX ADMIN — ACETAMINOPHEN 1000 MG: 325 TABLET ORAL at 02:10

## 2022-10-25 RX ADMIN — OXYCODONE 5 MG: 5 TABLET ORAL at 01:10

## 2022-10-25 RX ADMIN — METHOCARBAMOL 1000 MG: 500 TABLET ORAL at 08:10

## 2022-10-25 RX ADMIN — THIAMINE HYDROCHLORIDE 250 MG: 100 INJECTION, SOLUTION INTRAMUSCULAR; INTRAVENOUS at 08:10

## 2022-10-25 RX ADMIN — LACTULOSE 20 G: 20 SOLUTION ORAL at 08:10

## 2022-10-25 RX ADMIN — PREGABALIN 50 MG: 50 CAPSULE ORAL at 08:10

## 2022-10-25 RX ADMIN — DOCUSATE SODIUM 100 MG: 100 CAPSULE ORAL at 08:10

## 2022-10-25 RX ADMIN — THERA TABS 1 TABLET: TAB at 08:10

## 2022-10-25 RX ADMIN — METHOCARBAMOL 1000 MG: 500 TABLET ORAL at 02:10
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
## 2022-10-25 NOTE — HOSPITAL COURSE
10/22/22: Participating w/ OT. Bed mob dependent drawsheet tf toward HOB in supine x2 people. Sit to Stand Transfer with maximal assistance x2 people with  no assistive device and hand-held assist BUE. UBD maxA. Toileting totalA.   [facility] 11 w/ OT and 10 w/ PT.

## 2022-10-25 NOTE — ANESTHESIA POSTPROCEDURE EVALUATION
Anesthesia Post Evaluation    Patient: Merle Caro    Procedure(s) Performed: Procedure(s) (LRB):  EGD (ESOPHAGOGASTRODUODENOSCOPY) (N/A)    Final Anesthesia Type: general      Patient location during evaluation: PACU  Patient participation: Yes- Able to Participate  Level of consciousness: awake and alert  Post-procedure vital signs: reviewed and stable  Pain management: adequate  Airway patency: patent    PONV status at discharge: No PONV  Anesthetic complications: no      Cardiovascular status: blood pressure returned to baseline  Respiratory status: unassisted  Hydration status: euvolemic  Follow-up not needed.          Vitals Value Taken Time   /58 10/24/22 1100   Temp 36.5 °C (97.7 °F) 10/24/22 1030   Pulse 96 10/24/22 1045   Resp 15 10/24/22 1045   SpO2 98 % 10/24/22 1051         Event Time   Out of Recovery 10:45:00         Pain/Fawad Score: Pain Rating Prior to Med Admin: 6 (10/25/2022  1:03 AM)  Fawad Score: 9 (10/24/2022 10:45 AM)

## 2022-10-25 NOTE — ASSESSMENT & PLAN NOTE
- EGD unremarkable, c-scope being arranged by GI service for outpatient setting.  Suppository for constipation refractory to oral laxatives.

## 2022-10-25 NOTE — ASSESSMENT & PLAN NOTE
Merle Caro is a 67 y.o. female with past medical history of hypertension, bipolar disorder, and urinary incontinence (1 year duration) admitted to Orthopedics with what appears to be an approximally 4-month-old sacral fracture dislocation of S1 on S2 with severe canal stenosis at that level resulting in chronic cauda equina symptoms.  She has not been able ambulate since June due to pain.  Initial images reviewed by Dr. Meza who plans to take the patient for surgery on Tuesday October 18th.  CT pelvis completed.  DVT ultrasound with no DVTs.  Hospital Medicine consulted began a metastatic workup.  Studies so far unrevealing.  Appreciate hospital medicine recommendations. Now s/p L2-Pelvis PSF w L5-S1 Laminectomy and dural injury intraop repaired w dural glue on 10/18.    Plan:   Tolerated elevated HOB w/out spinal headache  Pain control: multimodal  Diet: regular diet  PT/OT: WBAT in LSO. Spine precautions.  DVT prophylaxis:  SubQ heparin; no need for IVC filter at this time  Abx: none  Labs:  Stable  Leija dc'd 10/19  No drains  EGD w/out sig pathology - GI planning on colonoscopy outpatient    Dispo: stable for dc to SNF today pending insurance auth

## 2022-10-25 NOTE — ASSESSMENT & PLAN NOTE
- 4-month-old sacral fracture dislocation of S1 on S2 with severe canal stenosis at that level resulting in chronic cauda equina symptoms.   - Has not been able ambulate since June due to pain. Initial images reviewed by Dr. Meza who plans to take the patient for surgery on Tuesday October 18th.  - CT pelvis completed.  - DVT ultrasound with no DVTs.    - Now s/p L2-Pelvis PSF w L5-S1 Laminectomy and dural injury intraop repaired w dural glue on 10/18.

## 2022-10-25 NOTE — HPI
Merle Caro is a 67-year-old female with PMHx of HTN, Bipolar, LUCILA on CPAP, urinary incontinence. [place] [facility] [place] approximally 4-month-old sacral fracture dislocation of S1 on S2 with severe canal stenosis at that level resulting in chronic cauda equina symptoms. Has not been able ambulate since June due to pain. Initial images reviewed by Dr. Meza who plans to take the patient for surgery on Tuesday October 18th. CT pelvis completed. DVT ultrasound with no DVTs.  Hospital Medicine consulted began a metastatic workup.  Studies so far unrevealing. Now s/p L2-Pelvis PSF w L5-S1 Laminectomy and dural injury intraop repaired w dural glue on 10/18. Hospital course complicated by melena. EGD unremarkable, c-scope being arranged by GI service for outpatient setting.  Suppository for constipation refractory to oral laxatives.      Functional History: Patient lives in a single story home with her .  Prior to admission, [place]. DME: [facility].

## 2022-10-25 NOTE — PROGRESS NOTES
[facility] - Surgery  Orthopedics  Progress Note    Patient Name: Merle Caro  MRN: [id number]  Admission Date: 10/14/2022  Hospital Length of Stay: 11 days  Attending Provider: Juni Meza MD  Primary Care Provider: Marck Major MD  Follow-up For: Procedure(s) (LRB):  EGD (ESOPHAGOGASTRODUODENOSCOPY) (N/A)    Post-Operative Day: 1 Day Post-Op  Subjective:     Principal Problem:Closed fracture of sacrum    Principal Orthopedic Problem:  Same, s/p L2-pelvis PSF 10/18    Interval History:  No acute events overnight. Alternating between HTN and soft BP, tachycardia improving. EGD w/out any significant pathology. Plan for outpatient colonoscopy. Reports some left thigh and calf pain. Family at bedside. Pending insurance auth for SNF.    Review of patient's allergies indicates:   Allergen Reactions    Codeine Shortness Of Breath    Keflex [cephalexin] Shortness Of Breath    Shellfish containing products Nausea And Vomiting    Flu vaccine ts 2011-12(18 yr+) Hives    Sucralfate Other (See Comments) and Hives       Current Facility-Administered Medications   Medication    0.9%  NaCl infusion (for blood administration)    acetaminophen tablet 1,000 mg    atorvastatin tablet 20 mg    bisacodyL suppository 10 mg    bisacodyL suppository 10 mg    calcium carbonate 200 mg calcium (500 mg) chewable tablet 500 mg    clonazePAM tablet 0.5 mg    cloNIDine tablet 0.1 mg    docusate sodium capsule 100 mg    fluticasone furoate-vilanteroL 200-25 mcg/dose diskus inhaler 1 puff    haloperidoL tablet 5 mg    heparin (porcine) injection 5,000 Units    hydrALAZINE injection 10 mg    lactulose 20 gram/30 mL solution Soln 20 g    melatonin tablet 6 mg    methocarbamoL tablet 1,000 mg    metoclopramide HCl injection 5 mg    morphine injection 2 mg    multivitamin tablet    ondansetron injection 4 mg    oxyCODONE immediate release tablet 5 mg    oxyCODONE immediate release tablet Tab 10 mg    pantoprazole EC  tablet 40 mg    paroxetine tablet 40 mg    polyethylene glycol packet 17 g    pregabalin capsule 50 mg    senna tablet 8.6 mg    sodium chloride 0.9% flush 10 mL    sodium chloride 0.9% flush 10 mL    thiamine (B-1) 250 mg in dextrose 5 % 100 mL IVPB    tiotropium bromide 2.5 mcg/actuation inhaler 2 puff    traZODone tablet 100 mg    vitamin D 1000 units tablet 2,000 Units     Objective:     Vital Signs (Most Recent):  Temp: 98.2 °F (36.8 °C) (10/25/22 0118)  Pulse: 110 (10/25/22 0307)  Resp: 16 (10/25/22 0118)  BP: (!) 118/58 (10/25/22 0118)  SpO2: (!) 94 % (10/25/22 0118)   Vital Signs (24h Range):  Temp:  [96.8 °F (36 °C)-99 °F (37.2 °C)] 98.2 °F (36.8 °C)  Pulse:  [] 110  Resp:  [15-20] 16  SpO2:  [93 %-98 %] 94 %  BP: ()/(50-68) 118/58     Weight: 50.3 kg (111 lb)  Height: 5' (152.4 cm)  Body mass index is 21.68 kg/m².      Intake/Output Summary (Last 24 hours) at 10/25/2022 0613  Last data filed at 10/24/2022 1025  Gross per 24 hour   Intake 100 ml   Output --   Net 100 ml         Ortho/SPM Exam  Gen: NAD, frail, ill-appearing  CV: peripherally well perfused  Resp: non-labored respirations    Spine:   NAD  Wound c/d/i with clean dressing.  Moves BLE on command, unable/unwilling to flex hips this AM  2+ DP bilaterally    Significant Labs: All pertinent labs within the past 24 hours have been reviewed.    Significant Imaging: I have reviewed and interpreted all pertinent imaging results/findings.  DVT ultrasound with no DVTs    Assessment/Plan:     * Closed fracture dislocation of sacrum  Merle Caro is a 67 y.o. female with past medical history of hypertension, bipolar disorder, and urinary incontinence (1 year duration) admitted to Orthopedics with what appears to be an approximally 4-month-old sacral fracture dislocation of S1 on S2 with severe canal stenosis at that level resulting in chronic cauda equina symptoms.  She has not been able ambulate since June due to pain.  Initial  images reviewed by Dr. Meza who plans to take the patient for surgery on Tuesday October 18th.  CT pelvis completed.  DVT ultrasound with no DVTs.  Hospital Medicine consulted began a metastatic workup.  Studies so far unrevealing.  Appreciate hospital medicine recommendations. Now s/p L2-Pelvis PSF w L5-S1 Laminectomy and dural injury intraop repaired w dural glue on 10/18.    Plan:   Tolerated elevated HOB w/out spinal headache  Pain control: multimodal  Diet: regular diet  PT/OT: WBAT in LSO. Spine precautions.  DVT prophylaxis:  SubQ heparin; no need for IVC filter at this time  Abx: none  Labs:  Stable  Leija dc'd 10/19  No drains  EGD w/out sig pathology - GI planning on colonoscopy outpatient    Dispo: stable for dc to SNF today pending insurance auth    Essential hypertension  Home medications restarted    Asthma with COPD  Home medications restarted  SpO2 goal > 88%; do not titrate supplemental oxygen above this level    Affective bipolar disorder  Home medications restarted    Pure hypercholesterolemia  Home medications restarted    LUCILA on CPAP: Not compliant with CPAP  CPAP          Jerry Lemus MD  Orthopedics  [facility] - Surgery

## 2022-10-25 NOTE — PLAN OF CARE
10/25/22 1331   Post-Acute Status   Post-Acute Authorization Placement   Post-Acute Placement Status Set-up Complete/Auth obtained     Patient's set-up has been completed.SAUD scheduled d/c transportation [place]. \[facility]"" through [facility]. Patient is scheduled to be picked up at 2:30 pm . SAUD provided patient's nurse with report number #[phone]; ask for the charge nurse. Requested  time does not guarantee arrival time.      SAUD informed pt's  (Irving) via phone call, spouse agreeable. Pt currently in therapy.      Kemi Martinez LMSW  Case Management   [facility]   Ext. [phone]
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
## 2022-10-25 NOTE — SUBJECTIVE & OBJECTIVE
"Interval History: Patient still constipated refractory to bowel regimen. Suppository administered last night produced a small bowel movement. Patient denies bloating, abominable pain,      Review of Systems  Objective:     Vital Signs (Most Recent):  Temp: 96.4 °F (35.8 °C) (10/25/22 0843)  Pulse: 103 (10/25/22 0843)  Resp: 17 (10/25/22 0843)  BP: (!) 108/55 (10/25/22 0843)  SpO2: 96 % (10/25/22 0843)   Vital Signs (24h Range):  Temp:  [96.4 °F (35.8 °C)-99 °F (37.2 °C)] 96.4 °F (35.8 °C)  Pulse:  [] 103  Resp:  [15-20] 17  SpO2:  [93 %-98 %] 96 %  BP: (106-156)/(52-68) 108/55     Weight: 50.3 kg (111 lb)  Body mass index is 21.68 kg/m².    Intake/Output Summary (Last 24 hours) at 10/25/2022 0923  Last data filed at 10/24/2022 1025  Gross per 24 hour   Intake 100 ml   Output --   Net 100 ml      Physical Exam    Significant Labs: {Results:73076::"All pertinent labs within the past 24 hours have been reviewed."}    Significant Imaging: {Imaging Review:37893::"I have reviewed all pertinent imaging results/findings within the past 24 hours."}  "

## 2022-10-25 NOTE — PT/OT/SLP PROGRESS
"Occupational Therapy   Treatment    Co-treatment with PT due to pt's requiring 2 skilled therapists to safely perform mobility     Name: Merle Caro  MRN: [id number]  Admitting Diagnosis:  Closed fracture of sacrum  1 Day Post-Op    Recommendations:     Discharge Recommendations: rehabilitation facility  Discharge Equipment Recommendations:  other (see comments) (TBD)  Barriers to discharge:  Other (Comment) (increased skilled assistance required)    Assessment:     Merle Caro is a 67 y.o. female with a medical diagnosis of Closed fracture of sacrum.   Pt tolerated session well and without incident, performing 2 sit to stand transfers EOB.  However, she continues to require significant assistance (Max A of 2) to perform all mobility and significant assistance to perform ADLs.   She presents with the following.  Performance deficits affecting function are weakness, impaired endurance, impaired self care skills, impaired functional mobility, gait instability, impaired balance, decreased lower extremity function, decreased upper extremity function, pain, orthopedic precautions, decreased ROM, impaired cardiopulmonary response to activity, impaired coordination, decreased coordination, impaired sensation.     Rehab Prognosis:  Good; patient would benefit from acute skilled OT services to address these deficits and reach maximum level of function.       Plan:     Patient to be seen 4 x/week to address the above listed problems via therapeutic activities, self-care/home management, therapeutic exercises, neuromuscular re-education  Plan of Care Expires: 11/20/22  Plan of Care Reviewed with: patient, spouse    Subjective   "I had back surgery.  You're going to have to help me stand."  Pain/Comfort:  Pain Rating 1: 9/10  Location - Side 1: Bilateral  Location 1: sacral spine  Pain Addressed 1: Reposition, Distraction  Pain Rating Post-Intervention 1: other (see comments) (not rated)    Objective:     Communicated with: " nursing and PT prior to session.  Patient found left sidelying with HOB elevated with telemetry, pulse ox (continuous), oxygen, peripheral IV, SCD with her  present upon OT entry to room.    General Precautions: Standard, fall   Orthopedic Precautions:spinal precautions   Braces: LSO  Respiratory Status: Nasal cannula, flow 1 L/min     Occupational Performance:     Bed Mobility:    Patient completed Rolling/Turning to Left with  moderate assistance  Patient completed Rolling/Turning to Right with moderate assistance  Patient completed Scooting/Bridging to EOB to place her feet on the floor with maximal assistance and 2 persons and to HOB while supine with total assistance of 2 people via draw sheet  Patient completed Supine to Sit to the L with maximal assistance and 2 persons  Patient completed Sit to Supine with maximal assistance and 2 persons     Functional Mobility/Transfers:  Patient completed Sit <> Stand Transfer from EOB x 2 trials with maximal assistance and of 2 persons with hand-held assist and BLE blocked.   She was able to clear her hips from the bed, but her hips and knees remained flexed while standing.     Activities of Daily Living:  Upper Body Dressing: maximal assistance to min back gown as a robe while sitting EOB and to doff it at bed level; Max A doff damp gown and to min clean one while supine.  Max A to min/doff LSO while seated EOB - pt was able to help with removing the side straps while doffing.  Lower Body Dressing: total assistance to doff/min non-skid socks while supine  Toileting: maximal assistance to perform hygiene and to doff damp brief/min clean one at bed level.  Pt had urinated.  Rafael pad changed.     [facility] 6 Click ADL: 11    Treatment & Education:  - Pt sat EOB with SBA.    Pt edu on role of OT, POC, her spinal precautions, safety when performing self care tasks, benefit of performing EOB/OOB activity, and safety when performing functional transfers and  mobility.  - Self care tasks completed-- as noted above      Patient left HOB elevated with all lines intact, call button in reach, nursing notified, and her  present    GOALS:   Multidisciplinary Problems       Occupational Therapy Goals          Problem: Occupational Therapy    Goal Priority Disciplines Outcome Interventions   Occupational Therapy Goal     OT, PT/OT Ongoing, Progressing    Description: Goals to be met by: 11/20/22     Patient will increase functional independence with ADLs by performing:    UE Dressing with Minimal Assistance.  LE Dressing with Minimal Assistance.  Grooming while seated with Set-up Assistance.  Toileting from bedside commode with Minimal Assistance for hygiene and clothing management.   Bathing from  shower chair/bench with Minimal Assistance.  Toilet transfer to bedside commode with Minimal Assistance.                         Time Tracking:     OT Date of Treatment: 10/25/22  OT Start Time: 1303  OT Stop Time: 1341  OT Total Time (min): 38 min    Billable Minutes:Self Care/Home Management 23 min  Therapeutic Activity 15 min    OT/TSERING: OT          10/25/2022
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
## 2022-10-25 NOTE — SUBJECTIVE & OBJECTIVE
Principal Problem:Closed fracture of sacrum    Principal Orthopedic Problem:  Same, s/p L2-pelvis PSF 10/18    Interval History:  No acute events overnight. Alternating between HTN and soft BP, tachycardia improving. EGD w/out any significant pathology. Plan for outpatient colonoscopy. Reports some left thigh and calf pain. Family at bedside. Pending insurance auth for SNF.    Review of patient's allergies indicates:   Allergen Reactions    Codeine Shortness Of Breath    Keflex [cephalexin] Shortness Of Breath    Shellfish containing products Nausea And Vomiting    Flu vaccine ts 2011-12(18 yr+) Hives    Sucralfate Other (See Comments) and Hives       Current Facility-Administered Medications   Medication    0.9%  NaCl infusion (for blood administration)    acetaminophen tablet 1,000 mg    atorvastatin tablet 20 mg    bisacodyL suppository 10 mg    bisacodyL suppository 10 mg    calcium carbonate 200 mg calcium (500 mg) chewable tablet 500 mg    clonazePAM tablet 0.5 mg    cloNIDine tablet 0.1 mg    docusate sodium capsule 100 mg    fluticasone furoate-vilanteroL 200-25 mcg/dose diskus inhaler 1 puff    haloperidoL tablet 5 mg    heparin (porcine) injection 5,000 Units    hydrALAZINE injection 10 mg    lactulose 20 gram/30 mL solution Soln 20 g    melatonin tablet 6 mg    methocarbamoL tablet 1,000 mg    metoclopramide HCl injection 5 mg    morphine injection 2 mg    multivitamin tablet    ondansetron injection 4 mg    oxyCODONE immediate release tablet 5 mg    oxyCODONE immediate release tablet Tab 10 mg    pantoprazole EC tablet 40 mg    paroxetine tablet 40 mg    polyethylene glycol packet 17 g    pregabalin capsule 50 mg    senna tablet 8.6 mg    sodium chloride 0.9% flush 10 mL    sodium chloride 0.9% flush 10 mL    thiamine (B-1) 250 mg in dextrose 5 % 100 mL IVPB    tiotropium bromide 2.5 mcg/actuation inhaler 2 puff    traZODone tablet 100 mg    vitamin D 1000 units tablet 2,000 Units     Objective:      Vital Signs (Most Recent):  Temp: 98.2 °F (36.8 °C) (10/25/22 0118)  Pulse: 110 (10/25/22 0307)  Resp: 16 (10/25/22 0118)  BP: (!) 118/58 (10/25/22 0118)  SpO2: (!) 94 % (10/25/22 0118)   Vital Signs (24h Range):  Temp:  [96.8 °F (36 °C)-99 °F (37.2 °C)] 98.2 °F (36.8 °C)  Pulse:  [] 110  Resp:  [15-20] 16  SpO2:  [93 %-98 %] 94 %  BP: ()/(50-68) 118/58     Weight: 50.3 kg (111 lb)  Height: 5' (152.4 cm)  Body mass index is 21.68 kg/m².      Intake/Output Summary (Last 24 hours) at 10/25/2022 0613  Last data filed at 10/24/2022 1025  Gross per 24 hour   Intake 100 ml   Output --   Net 100 ml         Ortho/SPM Exam  Gen: NAD, frail, ill-appearing  CV: peripherally well perfused  Resp: non-labored respirations    Spine:   NAD  Wound c/d/i with clean dressing.  Moves BLE on command, unable/unwilling to flex hips this AM  2+ DP bilaterally    Significant Labs: All pertinent labs within the past 24 hours have been reviewed.    Significant Imaging: I have reviewed and interpreted all pertinent imaging results/findings.  DVT ultrasound with no DVTs

## 2022-10-25 NOTE — NURSING
Pt is wheezing, O2 sat is 94%. On call physician was notified. Physician stated to monitor and call again if O2 sat drop below 92%.

## 2022-10-25 NOTE — PLAN OF CARE
Patient Accepted to [facility], awaiting Rehab orders. SAUD spoke with Mame ([phone]) w/accepting facility, patient able to admit pending Rehab orders. SAUD requested.     12:37 PM  SW faxed Rehab orders. SAUD informed Mame w/accepting facility awaiting approval to arrange transport.     Kemi Martinez, TONY  Case Management   [facility]   Ext. [phone]

## 2022-10-26 ENCOUNTER — PATIENT OUTREACH (OUTPATIENT)
Dept: ADMINISTRATIVE | Facility: CLINIC | Age: 67
End: 2022-10-26
Payer: MEDICARE

## 2022-10-26 NOTE — PLAN OF CARE
[facility] - [facility]  Discharge Final Note    Primary Care Provider: Marck Major MD    Expected Discharge Date: 10/25/2022    Final Discharge Note (most recent)       Final Note - 10/26/22 1446          Final Note    Assessment Type Final Discharge Note     Anticipated Discharge Disposition Rehab Facility     What phone number can be called within the next 1-3 days to see how you are doing after discharge? --   [phone]    Hospital Resources/Appts/Education Provided Appointments scheduled and added to AVS        Post-Acute Status    Post-Acute Authorization Placement     Post-Acute Placement Status Set-up Complete/Auth obtained                     Important Message from Medicare  Important Message from Medicare regarding Discharge Appeal Rights: Given to patient/caregiver, Signed/date by patient/caregiver, Explained to patient/caregiver     Date IMM was signed: 10/20/22  Time IMM was signed: 1440      Future Appointments   Date Time Provider Department Center   11/8/2022  9:15 AM NOM [facility]   11/8/2022 10:00 AM Ivy Lorenz PA-C [facility]   11/14/2022  9:30 AM HGV CT1 LIMIT 500 LBS HGV CT SCAN [place]   1/11/2023  1:30 PM Carie Pedersen NP [facility] HEM ONC [facility]     Patient discharged to [facility] in [place] on 10/25/22.

## 2022-10-28 LAB
FINAL PATHOLOGIC DIAGNOSIS: NORMAL
GROSS: NORMAL
Lab: NORMAL

## 2022-11-04 ENCOUNTER — TELEPHONE (OUTPATIENT)
Dept: ORTHOPEDICS | Facility: CLINIC | Age: 67
End: 2022-11-04
Payer: MEDICARE

## 2022-11-04 NOTE — TELEPHONE ENCOUNTER
----- Message from Ivy Lorenz PA-C sent at 10/19/2022  7:22 AM CDT -----  Will you please schedule 4 week postop with me?    ----- Message -----  From: Jerry Lemus MD  Sent: 10/18/2022  12:34 PM CDT  To: Derrick Alfredo Staff    S/p L2-pelvis PSF on 10/18 w Derrick. Please schedule 3-4 week f/u for incision check and staple removal.

## 2022-11-08 ENCOUNTER — PATIENT MESSAGE (OUTPATIENT)
Dept: ORTHOPEDICS | Facility: CLINIC | Age: 67
End: 2022-11-08

## 2022-11-08 ENCOUNTER — HOSPITAL ENCOUNTER (OUTPATIENT)
Dept: RADIOLOGY | Facility: HOSPITAL | Age: 67
Discharge: HOME | End: 2022-11-08
Attending: PHYSICIAN ASSISTANT
Payer: MEDICARE

## 2022-11-08 ENCOUNTER — OFFICE VISIT (OUTPATIENT)
Dept: ORTHOPEDICS | Facility: CLINIC | Age: 67
End: 2022-11-08
Payer: MEDICARE

## 2022-11-08 VITALS — BODY MASS INDEX: 21.66 KG/M2 | WEIGHT: 110.88 LBS

## 2022-11-08 DIAGNOSIS — Z98.1 S/P LUMBAR FUSION: Primary | ICD-10-CM

## 2022-11-08 DIAGNOSIS — N39.498 OTHER URINARY INCONTINENCE: ICD-10-CM

## 2022-11-08 DIAGNOSIS — M51.36 DDD (DEGENERATIVE DISC DISEASE), LUMBAR: ICD-10-CM

## 2022-11-08 PROCEDURE — 99024 POSTOP FOLLOW-UP VISIT: CPT | Mod: POP,,, | Performed by: PHYSICIAN ASSISTANT

## 2022-11-08 PROCEDURE — 99999 PR PBB SHADOW E&M-EST. PATIENT-LVL IV: CPT | Mod: PBBFAC,,, | Performed by: PHYSICIAN ASSISTANT

## 2022-11-08 PROCEDURE — 72100 X-RAY EXAM L-S SPINE 2/3 VWS: CPT | Mod: 26,,, | Performed by: STUDENT IN AN ORGANIZED HEALTH CARE EDUCATION/TRAINING PROGRAM

## 2022-11-08 PROCEDURE — 99024 PR POST-OP FOLLOW-UP VISIT: ICD-10-PCS | Mod: POP,,, | Performed by: PHYSICIAN ASSISTANT

## 2022-11-08 PROCEDURE — 72100 XR LUMBAR SPINE AP AND LATERAL: ICD-10-PCS | Mod: 26,,, | Performed by: STUDENT IN AN ORGANIZED HEALTH CARE EDUCATION/TRAINING PROGRAM

## 2022-11-08 PROCEDURE — 72100 X-RAY EXAM L-S SPINE 2/3 VWS: CPT | Mod: TC

## 2022-11-08 PROCEDURE — 99214 OFFICE O/P EST MOD 30 MIN: CPT | Mod: PBBFAC | Performed by: PHYSICIAN ASSISTANT

## 2022-11-08 PROCEDURE — 99999 PR PBB SHADOW E&M-EST. PATIENT-LVL IV: ICD-10-PCS | Mod: PBBFAC,,, | Performed by: PHYSICIAN ASSISTANT

## 2022-11-08 RX ORDER — METHOCARBAMOL 750 MG/1
750 TABLET, FILM COATED ORAL 3 TIMES DAILY
Qty: 60 TABLET | Refills: 0 | Status: SHIPPED | OUTPATIENT
Start: 2022-11-08 | End: 2023-04-17 | Stop reason: SDUPTHER

## 2022-11-08 NOTE — PROGRESS NOTES
Date: 11/08/2022    Supervising Physician: Juni Meza M.D.    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 Overall the patient is doing well but today notes she still has a lot of pain.  She rates her pain 5/10 today.  She says it feels different than her pain before surgery.  She is taking tylenol for pain.  She does not like the oxycodone.   she denies fever, chills, and sweats since the time of the surgery.  She continues to have bowel and bladder incontinence.      Exam: Post op dressing taken down.  Incision is healing well, clean, dry and intact. There is mild slough.  Staples in place.   There is no sign of infection. Neuro exam is stable. No signs of DVT.    Radiographs: imaging today shows hardware in place, no evidence of failure.     Assessment/Plan: 4 weeks post op.    Doing well postoperatively. Refill of robaxin sent to the pharmacy.  She says this gives her better relief.  I will order home health wound care for the slough that is present.  We will get her in with urology as well.     I will plan to see the patient back for the next postop visit in 2 months with imaging.     Thank you for the opportunity to participate in this patient's care. Please give me a call if there are any concerns or questions.

## 2022-11-14 ENCOUNTER — HOSPITAL ENCOUNTER (OUTPATIENT)
Dept: RADIOLOGY | Facility: HOSPITAL | Age: 67
Discharge: HOME | End: 2022-11-14
Attending: INTERNAL MEDICINE
Payer: MEDICARE

## 2022-11-14 DIAGNOSIS — F17.210 SMOKES WITH GREATER THAN 30 PACK YEAR HISTORY: Primary | ICD-10-CM

## 2022-11-14 DIAGNOSIS — R91.8 MULTIPLE LUNG NODULES ON CT: ICD-10-CM

## 2022-11-14 DIAGNOSIS — F17.210 SMOKES WITH GREATER THAN 30 PACK YEAR HISTORY: ICD-10-CM

## 2022-11-14 PROCEDURE — 71271 CT CHEST LUNG SCREENING LOW DOSE: ICD-10-PCS | Mod: 26,,, | Performed by: RADIOLOGY

## 2022-11-14 PROCEDURE — 71271 CT THORAX LUNG CANCER SCR C-: CPT | Mod: 26,,, | Performed by: RADIOLOGY

## 2022-11-14 PROCEDURE — 71271 CT THORAX LUNG CANCER SCR C-: CPT | Mod: TC

## 2022-11-16 ENCOUNTER — OFFICE VISIT (OUTPATIENT)
Dept: UROLOGY | Facility: CLINIC | Age: 67
End: 2022-11-16
Payer: MEDICARE

## 2022-11-16 VITALS
HEIGHT: 60 IN | WEIGHT: 110 LBS | DIASTOLIC BLOOD PRESSURE: 72 MMHG | RESPIRATION RATE: 18 BRPM | SYSTOLIC BLOOD PRESSURE: 119 MMHG | BODY MASS INDEX: 21.6 KG/M2 | HEART RATE: 95 BPM

## 2022-11-16 DIAGNOSIS — N39.46 MIXED INCONTINENCE: Primary | ICD-10-CM

## 2022-11-16 PROCEDURE — 99204 PR OFFICE/OUTPT VISIT, NEW, LEVL IV, 45-59 MIN: ICD-10-PCS | Mod: S$PBB,,, | Performed by: NURSE PRACTITIONER

## 2022-11-16 PROCEDURE — 99999 PR PBB SHADOW E&M-EST. PATIENT-LVL IV: CPT | Mod: PBBFAC,,, | Performed by: NURSE PRACTITIONER

## 2022-11-16 PROCEDURE — 99214 OFFICE O/P EST MOD 30 MIN: CPT | Mod: PBBFAC | Performed by: NURSE PRACTITIONER

## 2022-11-16 PROCEDURE — 99999 PR PBB SHADOW E&M-EST. PATIENT-LVL IV: ICD-10-PCS | Mod: PBBFAC,,, | Performed by: NURSE PRACTITIONER

## 2022-11-16 PROCEDURE — 99204 OFFICE O/P NEW MOD 45 MIN: CPT | Mod: S$PBB,,, | Performed by: NURSE PRACTITIONER

## 2022-11-16 RX ORDER — MIRABEGRON 25 MG/1
25 TABLET, FILM COATED, EXTENDED RELEASE ORAL DAILY
Qty: 30 TABLET | Refills: 11 | Status: SHIPPED | OUTPATIENT
Start: 2022-11-16 | End: 2023-01-04

## 2022-11-16 NOTE — PROGRESS NOTES
Chief Complaint:   Urinary incontinence    HPI:   Patient is a 67-year-old female that is presenting with her .  HPI was obtained by .   states that patient is status post Spine fusion and has a h/o Cauda Equina syndrome.  States that patient is unaware when she needs to urinate and has had an increase in urinary incontinence.  Reports that she also has fecal incontinence.  PVR was 150 mL, repeat 132 ml.  No history of gross hematuria or renal stones.  Unable to give urine sample.    Allergies:  Codeine, Keflex [cephalexin], Shellfish containing products, Flu vaccine ts 2011-12(18 yr+), and Sucralfate    Medications:  has a current medication list which includes the following prescription(s): acetaminophen, celecoxib, cholecalciferol (vitamin d3), clonazepam, diclofenac sodium, fluticasone-salmeterol 500-50 mcg/dose, haloperidol, lidocaine-prilocaine, lisinopril, methocarbamol, oxycodone, oxygen-air delivery systems, pantoprazole, paroxetine, simvastatin, spiriva with handihaler, trazodone, albuterol, hydrocortisone, linaclotide, myrbetriq, and pregabalin.    Review of Systems:  General: No fever, chills, fatigability, or weight loss.  Skin: No rashes, itching, or changes in color or texture of skin.  Chest: Denies KENT, cyanosis, wheezing, cough, and sputum production.  Abdomen: Appetite fine. No weight loss. Denies diarrhea, abdominal pain, hematemesis, or blood in stool.  Musculoskeletal: No joint stiffness or swelling. Denies back pain.  : As above.  All other review of systems negative.    PMH:   has a past medical history of Arthritis, Asthma, COPD (chronic obstructive pulmonary disease), Depression, GERD (gastroesophageal reflux disease), Hypertension, and Tobacco abuse.    PSH:   has a past surgical history that includes Hysterectomy; Knee surgery; Tonsillectomy; multiple fracture repairs; Colonoscopy w/ polypectomy (01/05/2011); Magnetic resonance imaging (N/A, 10/14/2022); fusion,  spine, posterior approach (N/A, 10/18/2022); and Esophagogastroduodenoscopy (N/A, 10/24/2022).    FamHx: family history includes COPD in her mother; Cancer in her mother; Hypertension in her mother; Lung cancer in her mother; Ovarian cancer in her sister; Stroke in her mother.    SocHx:  reports that she has been smoking cigarettes. She has a 33.00 pack-year smoking history. She has never used smokeless tobacco. She reports that she does not drink alcohol and does not use drugs.      Physical Exam:  Vitals:    11/16/22 1414   BP: 119/72   Pulse: 95   Resp: 18     General:  Thin, frail female, ill appearing.  Strong tobacco odor  Neck: No masses, normal thyroid  Lungs: normal inspiration, no use of accessory muscles  Heart: normal pulse, no arrhythmias  Abdomen: Soft, NT, ND, no masses, no hernias, no hepatosplenomegaly    Labs/Studies:   See HPI    Impression/Plan:   Urinary incontinence  Secondary to elevated PVR, patient was presented to MD for recommendation.  Patient started on myrbetriq 25 mg once daily and return to the clinic in 2 weeks for a follow-up.

## 2022-11-17 ENCOUNTER — OFFICE VISIT (OUTPATIENT)
Dept: INTERNAL MEDICINE | Facility: CLINIC | Age: 67
End: 2022-11-17
Payer: MEDICARE

## 2022-11-17 VITALS
HEIGHT: 60 IN | TEMPERATURE: 97 F | DIASTOLIC BLOOD PRESSURE: 70 MMHG | OXYGEN SATURATION: 97 % | SYSTOLIC BLOOD PRESSURE: 116 MMHG | HEART RATE: 96 BPM | WEIGHT: 116.63 LBS | BODY MASS INDEX: 22.9 KG/M2 | RESPIRATION RATE: 16 BRPM

## 2022-11-17 DIAGNOSIS — G83.4 CAUDA EQUINA SYNDROME: ICD-10-CM

## 2022-11-17 DIAGNOSIS — K59.09 OTHER CONSTIPATION: ICD-10-CM

## 2022-11-17 DIAGNOSIS — Z09 HOSPITAL DISCHARGE FOLLOW-UP: Primary | ICD-10-CM

## 2022-11-17 PROCEDURE — 99215 OFFICE O/P EST HI 40 MIN: CPT | Mod: PBBFAC | Performed by: FAMILY MEDICINE

## 2022-11-17 PROCEDURE — 99215 PR OFFICE/OUTPT VISIT, EST, LEVL V, 40-54 MIN: ICD-10-PCS | Mod: S$PBB,,, | Performed by: FAMILY MEDICINE

## 2022-11-17 PROCEDURE — 99999 PR PBB SHADOW E&M-EST. PATIENT-LVL V: CPT | Mod: PBBFAC,,, | Performed by: FAMILY MEDICINE

## 2022-11-17 PROCEDURE — 99999 PR PBB SHADOW E&M-EST. PATIENT-LVL V: ICD-10-PCS | Mod: PBBFAC,,, | Performed by: FAMILY MEDICINE

## 2022-11-17 PROCEDURE — 99215 OFFICE O/P EST HI 40 MIN: CPT | Mod: S$PBB,,, | Performed by: FAMILY MEDICINE

## 2022-11-17 RX ORDER — PREGABALIN 50 MG/1
50 CAPSULE ORAL 2 TIMES DAILY
Qty: 180 CAPSULE | Refills: 3 | Status: SHIPPED | OUTPATIENT
Start: 2022-11-17 | End: 2023-04-17 | Stop reason: SDUPTHER

## 2022-11-30 ENCOUNTER — OFFICE VISIT (OUTPATIENT)
Dept: UROLOGY | Facility: CLINIC | Age: 67
End: 2022-11-30
Payer: MEDICARE

## 2022-11-30 VITALS — SYSTOLIC BLOOD PRESSURE: 115 MMHG | DIASTOLIC BLOOD PRESSURE: 74 MMHG | RESPIRATION RATE: 18 BRPM | HEART RATE: 97 BPM

## 2022-11-30 DIAGNOSIS — N39.46 MIXED INCONTINENCE: Primary | ICD-10-CM

## 2022-11-30 PROCEDURE — 99214 OFFICE O/P EST MOD 30 MIN: CPT | Mod: S$PBB,,, | Performed by: NURSE PRACTITIONER

## 2022-11-30 PROCEDURE — 99999 PR PBB SHADOW E&M-EST. PATIENT-LVL IV: CPT | Mod: PBBFAC,,, | Performed by: NURSE PRACTITIONER

## 2022-11-30 PROCEDURE — 99214 OFFICE O/P EST MOD 30 MIN: CPT | Mod: PBBFAC | Performed by: NURSE PRACTITIONER

## 2022-11-30 PROCEDURE — 99999 PR PBB SHADOW E&M-EST. PATIENT-LVL IV: ICD-10-PCS | Mod: PBBFAC,,, | Performed by: NURSE PRACTITIONER

## 2022-11-30 PROCEDURE — 99214 PR OFFICE/OUTPT VISIT, EST, LEVL IV, 30-39 MIN: ICD-10-PCS | Mod: S$PBB,,, | Performed by: NURSE PRACTITIONER

## 2022-11-30 NOTE — PROGRESS NOTES
Chief Complaint:   Urinary incontinence    HPI:   Patient is 67-year-old female that is presenting as a follow-up to urinary incontinence.  Patient was prescribed  myrbetriq.  Reports that urinary incontinence has decreased slightly, with like to consider alternative treatment options, for a complete resolution.  11/16/2022  Patient is a 67-year-old female that is presenting with her .  HPI was obtained by .   states that patient is status post Spine fusion and has a h/o Cauda Equina syndrome.  States that patient is unaware when she needs to urinate and has had an increase in urinary incontinence. Reports that she also has fecal incontinence.  PVR was 150 mL, repeat 132 ml.  No history of gross hematuria or renal stones.  Unable to give urine sample.    Allergies:  Codeine, Keflex [cephalexin], Shellfish containing products, Flu vaccine ts 2011-12(18 yr+), and Sucralfate    Medications:  has a current medication list which includes the following prescription(s): acetaminophen, celecoxib, cholecalciferol (vitamin d3), clonazepam, diclofenac sodium, fluticasone-salmeterol 500-50 mcg/dose, haloperidol, lidocaine-prilocaine, linaclotide, lisinopril, myrbetriq, oxycodone, oxygen-air delivery systems, pantoprazole, paroxetine, pregabalin, simvastatin, spiriva with handihaler, trazodone, albuterol, and hydrocortisone.    Review of Systems:  General: No fever, chills, fatigability, or weight loss.  Skin: No rashes, itching, or changes in color or texture of skin.  Chest: Denies KENT, cyanosis, wheezing, cough, and sputum production.  Abdomen: Appetite fine. No weight loss. Denies diarrhea, abdominal pain, hematemesis, or blood in stool.  Musculoskeletal: No joint stiffness or swelling. Denies back pain.  : As above.  All other review of systems negative.    PMH:   has a past medical history of Arthritis, Asthma, COPD (chronic obstructive pulmonary disease), Depression, GERD (gastroesophageal reflux  disease), Hypertension, and Tobacco abuse.    PSH:   has a past surgical history that includes Hysterectomy; Knee surgery; Tonsillectomy; multiple fracture repairs; Colonoscopy w/ polypectomy (01/05/2011); Magnetic resonance imaging (N/A, 10/14/2022); fusion, spine, posterior approach (N/A, 10/18/2022); and Esophagogastroduodenoscopy (N/A, 10/24/2022).    FamHx: family history includes COPD in her mother; Cancer in her mother; Hypertension in her mother; Lung cancer in her mother; Ovarian cancer in her sister; Stroke in her mother.    SocHx:  reports that she has been smoking cigarettes. She has a 33.00 pack-year smoking history. She has never used smokeless tobacco. She reports that she does not drink alcohol and does not use drugs.      Physical Exam:  Vitals:    11/30/22 1408   BP: 115/74   Pulse: 97   Resp: 18     General:  Very thin, frail female, A&Ox3, no apparent distress, no deformities  Neck: No masses, normal thyroid  Lungs: normal inspiration, no use of accessory muscles  Heart: normal pulse, no arrhythmias  Abdomen: Soft, NT, ND, no masses, no hernias, no hepatosplenomegaly  Lymphatic: Neck and groin nodes negative     Impression/Plan:   Urinary incontinence  Patient is only had a slight decrease in urinary incontinence symptoms.  Patient and  would like to consider alternative treatment options.  Patient was scheduled with MD to discuss Botox and InterStim.

## 2022-12-02 ENCOUNTER — PATIENT OUTREACH (OUTPATIENT)
Dept: ADMINISTRATIVE | Facility: OTHER | Age: 67
End: 2022-12-02
Payer: MEDICARE

## 2022-12-02 NOTE — PROGRESS NOTES
Subjective:   Patient ID: Merle Caro is a 67 y.o. female.  Chief Complaint:  Follow-up (Hospital )     Presents for hospital discharge follow-up   Admitted 10/14/2022 through 10/25/2022   Sacral fracture   Underwent L2 - pelvis spinal fusion    and patient report significant improvement in pain with procedure  Slow overall recovery since procedure, but currently receiving home health for wound care and physical therapy and occupational therapy    Medical History:  - Hypertension.   On lisinopril 10 mg daily.  Reports compliance.  Denies side effects.  Blood pressure controlled.   - Hyperlipidemia and aortic atherosclerosis.  Last LDL less than 100.  On simvastatin 40 mg daily.    Reports compliance.  Denies side effects.  Denies chest pain or claudication.  - Vitamin-D insufficiency.  On vitamin-D 2000 units daily supplement.   - Asthma with COPD, LUCILA, pulmonary nodule, and smoker. Compliant with Spiriva and Advair inhalers.  Denies any shortness of breath or cough.   Currently not smoking.    - GERD.  Stable on Protonix 40 mg daily.    - Mental health.  On Klonopin 0.5 mg nightly, Haldol 5 mg nightly, and trazodone 100 mg daily.  Followed by external Psychiatry.  Clinically appears stable.  - urinary incontinence.  Followed by Urology.  Currently trying Myrbetriq.    For postop pain currently on Tylenol or Percocet if needed for breakthrough, Robaxin 3 times a day as needed, and short-term Lyrica  For chronic arthritic joint pains on Celebrex 200 mg daily and Voltaren gel topically     Main complaint / concern today is chronic persistent constipation despite stool softeners, MiraLax, requiring Dulcolax or enemas which are painful        Current Outpatient Medications:     acetaminophen (TYLENOL) 500 MG tablet, Take 2 tablets (1,000 mg total) by mouth every 8 (eight) hours. Bedside delivery, Disp: 120 tablet, Rfl: 0    celecoxib (CELEBREX) 200 MG capsule, Take 1 capsule (200 mg total) by mouth once daily.,  Disp: 90 capsule, Rfl: 0    cholecalciferol, vitamin D3, (VITAMIN D3) 2,000 unit Cap, Take 1 capsule by mouth every evening., Disp: , Rfl:     clonazepam (KLONOPIN) 0.5 MG tablet, Take 0.5 mg by mouth every evening., Disp: , Rfl:     diclofenac sodium (VOLTAREN ARTHRITIS PAIN) 1 % Gel, Apply 2 g topically 4 (four) times daily., Disp: 100 g, Rfl: 11    fluticasone-salmeterol diskus inhaler 500-50 mcg, Inhale 1 puff into the lungs 2 (two) times daily. Controller, Disp: 60 each, Rfl: 11    haloperidol (HALDOL) 5 MG tablet, Take 5 mg by mouth every evening., Disp: , Rfl:     LIDOcaine-prilocaine (EMLA) cream, Apply topically once daily., Disp: 30 g, Rfl: 3    lisinopriL 10 MG tablet, Take 1 tablet (10 mg total) by mouth nightly., Disp: 90 tablet, Rfl: 3    mirabegron (MYRBETRIQ) 25 mg Tb24 ER tablet, Take 1 tablet (25 mg total) by mouth once daily., Disp: 30 tablet, Rfl: 11    oxyCODONE (ROXICODONE) 5 MG immediate release tablet, Take 1 tablet (5 mg total) by mouth every 6 (six) hours as needed for Pain. May take 1-2 tablets every 6 hours as needed for pain, Disp: 42 tablet, Rfl: 0    OXYGEN-AIR DELIVERY SYSTEMS MISC, Inhale 2 L into the lungs as needed (O2 via Nasal Canula.)., Disp: , Rfl:     pantoprazole (PROTONIX) 40 MG tablet, Take 1 tablet (40 mg total) by mouth once daily., Disp: 90 tablet, Rfl: 3    paroxetine (PAXIL) 40 MG tablet, TK 1 T PO QHS, Disp: , Rfl: 2    simvastatin (ZOCOR) 40 MG tablet, Take 1 tablet (40 mg total) by mouth every evening., Disp: 90 tablet, Rfl: 3    tiotropium (SPIRIVA WITH HANDIHALER) 18 mcg inhalation capsule, Inhale 1 capsule (18 mcg total) into the lungs once daily. Controller, Disp: 30 capsule, Rfl: 11    traZODone (DESYREL) 100 MG tablet, Take 100 mg by mouth every evening., Disp: , Rfl:     albuterol (PROVENTIL/VENTOLIN HFA) 90 mcg/actuation inhaler, Inhale 4 puffs into the lungs every 4 (four) hours as needed for Wheezing. Rescue, Disp: 18 g, Rfl: 0    hydrocortisone 2.5 %  cream, Apply topically 2 (two) times daily for 7 days, Disp: 30 g, Rfl: 3    linaCLOtide (LINZESS) 72 mcg Cap capsule, Take 1 capsule (72 mcg total) by mouth before breakfast., Disp: 30 capsule, Rfl: 0    pregabalin (LYRICA) 50 MG capsule, Take 1 capsule (50 mg total) by mouth 2 (two) times daily., Disp: 180 capsule, Rfl: 3    Review of Systems   Constitutional:  Positive for activity change. Negative for chills, fatigue and fever.   HENT:  Negative for congestion, dental problem, ear pain, postnasal drip, rhinorrhea, sinus pressure and sore throat.    Eyes:  Negative for visual disturbance.   Respiratory:  Negative for cough, chest tightness, shortness of breath and wheezing.    Cardiovascular:  Negative for chest pain, palpitations and leg swelling.   Gastrointestinal:  Positive for abdominal distention, abdominal pain and constipation. Negative for blood in stool, diarrhea, nausea and vomiting.   Endocrine: Negative for polydipsia, polyphagia and polyuria.   Genitourinary:  Negative for difficulty urinating, dysuria, flank pain, hematuria and pelvic pain.   Musculoskeletal:  Positive for arthralgias, back pain and gait problem. Negative for myalgias.   Skin:  Negative for rash.   Neurological:  Positive for weakness. Negative for dizziness, syncope, light-headedness and headaches.   Hematological:  Negative for adenopathy.   Psychiatric/Behavioral:  Negative for decreased concentration, dysphoric mood and sleep disturbance. The patient is not nervous/anxious.      Objective:   /70 (BP Location: Left arm, Patient Position: Sitting, BP Method: Medium (Manual))   Pulse 96   Temp 96.9 °F (36.1 °C) (Tympanic)   Resp 16   Ht 5' (1.524 m)   Wt 52.9 kg (116 lb 10 oz)   SpO2 97%   BMI 22.78 kg/m²     Physical Exam  Vitals and nursing note reviewed.   Constitutional:       Appearance: Normal appearance. She is well-developed and normal weight.      Comments:   Examined in wheelchair comfortable.  Using O2 via  nasal cannula.   Cardiovascular:      Rate and Rhythm: Normal rate and regular rhythm.   Pulmonary:      Effort: Pulmonary effort is normal.   Musculoskeletal:      Right lower leg: No edema.      Left lower leg: No edema.   Skin:     General: Skin is warm and dry.      Findings: No rash.   Neurological:      Mental Status: She is alert.   Psychiatric:         Attention and Perception: Attention normal.         Mood and Affect: Mood and affect normal.       Assessment:       ICD-10-CM ICD-9-CM   1. Hospital discharge follow-up  Z09 V67.59   2. Other constipation  K59.09 564.09   3. Cauda equina syndrome  G83.4 344.60     Plan:   Hospital discharge follow-up   Appears to be doing very well since discharge   No decompensation of any chronic medical conditions since discharge   Postoperatively appears to be recovering as expected   Continue any wound care management and physical therapy/ occupational therapy through home health   Follow-up with all specialists scheduled     Other constipation  -     linaCLOtide (LINZESS) 72 mcg Cap capsule; Take 1 capsule (72 mcg total) by mouth before breakfast.  Dispense: 30 capsule; Refill: 0  Recommend trial of Linzess 72 mcg daily   Can increase dose as tolerated/ as needed  Can also take MiraLax over-the-counter daily as needed     Cauda equina syndrome  -     pregabalin (LYRICA) 50 MG capsule; Take 1 capsule (50 mg total) by mouth 2 (two) times daily.  Dispense: 180 capsule; Refill: 3  Off Lyrica, reports increase in overall pain pattern  Tolerated Lyrica without any significant sedative side effects    No contraindications to restarting medication   Lyrica 50 mg twice a day     Return to clinic 6 weeks    40+ minutes of total time spent on the encounter, which includes face to face time and non-face to face time preparing to see the patient (eg, review of tests), Obtaining and/or reviewing separately obtained history, documenting clinical information in the electronic or other  health record, independently interpreting results (not separately reported) and communicating results to the patient/family/caregiver, or Care coordination (not separately reported).

## 2022-12-05 NOTE — PROGRESS NOTES
CHW - Initial Contact    This Community Health Worker completed OR updated the Social Determinant of Health questionnaire with patient via telephone today.    Pt identified barriers of most importance are: none at this time.   Referrals to community agencies completed with patient/caregiver consent outside of [facility] include:  no  Referrals were put through [facility] - no:   Support and Services: No support & services have been documented.  Other information discussed the patient needs / wants help with: n/a   Follow up required: no  No future outreach task assigned

## 2023-01-04 ENCOUNTER — OFFICE VISIT (OUTPATIENT)
Dept: UROLOGY | Facility: CLINIC | Age: 68
End: 2023-01-04
Payer: MEDICARE

## 2023-01-04 VITALS — SYSTOLIC BLOOD PRESSURE: 103 MMHG | HEART RATE: 92 BPM | DIASTOLIC BLOOD PRESSURE: 69 MMHG

## 2023-01-04 DIAGNOSIS — N39.41 URGE INCONTINENCE: Primary | ICD-10-CM

## 2023-01-04 PROCEDURE — 99999 PR PBB SHADOW E&M-EST. PATIENT-LVL IV: CPT | Mod: PBBFAC,,, | Performed by: UROLOGY

## 2023-01-04 PROCEDURE — 99214 OFFICE O/P EST MOD 30 MIN: CPT | Mod: PBBFAC | Performed by: UROLOGY

## 2023-01-04 PROCEDURE — 99214 OFFICE O/P EST MOD 30 MIN: CPT | Mod: S$PBB,,, | Performed by: UROLOGY

## 2023-01-04 PROCEDURE — 99214 PR OFFICE/OUTPT VISIT, EST, LEVL IV, 30-39 MIN: ICD-10-PCS | Mod: S$PBB,,, | Performed by: UROLOGY

## 2023-01-04 PROCEDURE — 99999 PR PBB SHADOW E&M-EST. PATIENT-LVL IV: ICD-10-PCS | Mod: PBBFAC,,, | Performed by: UROLOGY

## 2023-01-04 RX ORDER — MIRABEGRON 50 MG/1
50 TABLET, FILM COATED, EXTENDED RELEASE ORAL DAILY
Qty: 30 TABLET | Refills: 11 | Status: SHIPPED | OUTPATIENT
Start: 2023-01-04 | End: 2023-09-06 | Stop reason: SDUPTHER

## 2023-01-04 NOTE — PROGRESS NOTES
Chief Complaint:   Encounter Diagnosis   Name Primary?    Urge incontinence Yes         HPI:   1/4/23- patient reports as a new patient to me with urge and fecal incontinence.  Fecal incontinence is relatively new, this appears to have exacerbated recently was also recently found to have sacral fracture with cauda equina syndrome.  She is having some improvement on symptoms, Myrbetriq 25 mg does assist, but not perfectly.  No evidence of stress incontinence, dysuria or gross hematuria.  11/16/22- LR- Patient is a 67-year-old female that is presenting with her .  HPI was obtained by .   states that patient is status post Spine fusion and has a h/o Cauda Equina syndrome.  States that patient is unaware when she needs to urinate and has had an increase in urinary incontinence. Reports that she also has fecal incontinence.  PVR was 150 mL, repeat 132 ml.  No history of gross hematuria or renal stones.  Unable to give urine sample.    Allergies:  Codeine, Keflex [cephalexin], Shellfish containing products, Flu vaccine ts 2011-12(18 yr+), and Sucralfate    Medications:  has a current medication list which includes the following prescription(s): acetaminophen, celecoxib, cholecalciferol (vitamin d3), clonazepam, diclofenac sodium, fluticasone-salmeterol 500-50 mcg/dose, haloperidol, lidocaine-prilocaine, lisinopril, myrbetriq, oxycodone, oxygen-air delivery systems, pantoprazole, paroxetine, pregabalin, simvastatin, trazodone, albuterol, hydrocortisone, and spiriva with handihaler.    Review of Systems:  General: No fever, chills, fatigability, or weight loss.  Skin: No rashes, itching, or changes in color or texture of skin.  Chest: Denies KENT, cyanosis, wheezing, cough, and sputum production.  Abdomen: Appetite fine. No weight loss. Denies diarrhea, abdominal pain, hematemesis, or blood in stool.  Musculoskeletal: No joint stiffness or swelling. Denies back pain.  : As above.  All other review of  Operative/Inv Procedure Report
Surgery Date: 03/22/18
Name of Procedure:
Cataract extraction with intraocular lens implantation right eye
Pre-Operative Diagnosis:
Age-related cataract right eye
Post-Operative Diagnosis:
Same
Estimated Blood Loss: none
Surgeon/Assistant:
Ruddy DE LEÓN,Cipriano SHELTON
 
Anesthesia: local monitored anesthesi
Complications:
None
 
Operative/Procedure Note
Note:
Preoperatively the patient was noted to have 20/40 vision in the right eye with 
a decrease with glare testing down to 20/400. 
 
The risks, benefits, and alternatives to surgery were discussed at length with 
the patient.  Informed consent was obtained.  The patient was brought to the 
operating room where the right eye was prepped and draped in the normal sterile 
fashion. 
 
 A speculum was placed on the right eye with good exposure.  A stab incision was
made using a paracentesis blade.  Intracameral lidocaine was placed.  
Viscoelastic was used to form the anterior chamber.  A clear corneal incision 
was made using keratome blade.  A continuous curvilinear capsulorrhexis was made
using a cystotome needle followed by Utrata forceps.  There was no extension of 
the rhexis.  Hydrodissection was performed using balanced salt solution.  The 
cataract was removed using a stop and chop technique.  Residual cortex was 
removed using coaxial irrigation and aspiration.  The capsule was polished using
irrigation and aspiration and the posterior capsule was cleaned using a balanced
salt solution jet.  There was no residual lens material inside the eye.  The 
capsular bag was reformed using viscoelastic.  
 
An intraocular lens [id number] of power 20.0 was verified and confirmed.  It was 
loaded into an injector and injected into the eye.  The lens was placed entirely
within the capsular bag.  Viscoelastic was evacuated using irrigation and 
aspiration.  The wounds were stromally hydrated and the eye filled to 
physiologic pressure using balanced salt solution.  Intracameral cefuroxime was 
placed.  Speculum was removed and a shield was placed on the eye.  The patient 
was brought to the recovery area without incident.  Instructions were given to 
follow-up the next day for routine postoperative care. systems negative.    PMH:   has a past medical history of Arthritis, Asthma, COPD (chronic obstructive pulmonary disease), Depression, GERD (gastroesophageal reflux disease), Hypertension, and Tobacco abuse.    PSH:   has a past surgical history that includes Hysterectomy; Knee surgery; Tonsillectomy; multiple fracture repairs; Colonoscopy w/ polypectomy (01/05/2011); Magnetic resonance imaging (N/A, 10/14/2022); fusion, spine, posterior approach (N/A, 10/18/2022); and Esophagogastroduodenoscopy (N/A, 10/24/2022).    FamHx: family history includes COPD in her mother; Cancer in her mother; Hypertension in her mother; Lung cancer in her mother; Ovarian cancer in her sister; Stroke in her mother.    SocHx:  reports that she has been smoking cigarettes. She has a 33.00 pack-year smoking history. She has never used smokeless tobacco. She reports that she does not drink alcohol and does not use drugs.      Physical Exam:  Vitals:    01/04/23 1554   BP: 103/69   Pulse: 92     General: A&Ox3, no apparent distress, no deformities  Neck: No masses, normal ROM  Lungs: normal inspiration, no use of accessory muscles  Heart: normal pulse, no arrhythmias  Abdomen: Soft, NT, ND, no masses, no hernias, no hepatosplenomegaly  Skin: The skin is warm and dry. No jaundice.  Ext: No c/c/e.    Labs/Studies:    mL 11/22    Impression/Plan:      1. Urge incontinence- myrbetriq 25 mg is controlling symptoms but not perfectly and therefore will increase to 50 mg.  Monitor for hypertension and if she has any issues she is to stop the medication and contact our office.  Reassess in 2 months with a PVR, no evidence of stress incontinence or gross hematuria.  If this fails would attempt a secondary medical therapy prior to pursuing Botox, as I am concerned about retention.  Hopefully this will improve the further out she gets from her sacral repair.    2. Fecal incontinence- please see above.

## 2023-01-11 ENCOUNTER — OFFICE VISIT (OUTPATIENT)
Dept: ORTHOPEDICS | Facility: CLINIC | Age: 68
End: 2023-01-11
Payer: MEDICARE

## 2023-01-11 ENCOUNTER — HOSPITAL ENCOUNTER (OUTPATIENT)
Dept: RADIOLOGY | Facility: HOSPITAL | Age: 68
Discharge: HOME | End: 2023-01-11
Attending: ORTHOPAEDIC SURGERY
Payer: MEDICARE

## 2023-01-11 DIAGNOSIS — Z98.890 S/P SPINAL SURGERY: ICD-10-CM

## 2023-01-11 DIAGNOSIS — S32.122D CLOSED SEVERELY DISPLACED ZONE II FRACTURE OF SACRUM WITH ROUTINE HEALING, SUBSEQUENT ENCOUNTER: Primary | Chronic | ICD-10-CM

## 2023-01-11 PROCEDURE — 72100 X-RAY EXAM L-S SPINE 2/3 VWS: CPT | Mod: TC

## 2023-01-11 PROCEDURE — 99024 POSTOP FOLLOW-UP VISIT: CPT | Mod: S$PBB,,, | Performed by: ORTHOPAEDIC SURGERY

## 2023-01-11 PROCEDURE — 99024 PR POST-OP FOLLOW-UP VISIT: ICD-10-PCS | Mod: S$PBB,,, | Performed by: ORTHOPAEDIC SURGERY

## 2023-01-11 PROCEDURE — 99999 PR PBB SHADOW E&M-EST. PATIENT-LVL III: CPT | Mod: PBBFAC,,, | Performed by: ORTHOPAEDIC SURGERY

## 2023-01-11 PROCEDURE — 72100 XR LUMBAR SPINE AP AND LATERAL: ICD-10-PCS | Mod: 26,,, | Performed by: RADIOLOGY

## 2023-01-11 PROCEDURE — 99999 PR PBB SHADOW E&M-EST. PATIENT-LVL III: ICD-10-PCS | Mod: PBBFAC,,, | Performed by: ORTHOPAEDIC SURGERY

## 2023-01-11 PROCEDURE — 99213 OFFICE O/P EST LOW 20 MIN: CPT | Mod: PBBFAC | Performed by: ORTHOPAEDIC SURGERY

## 2023-01-11 PROCEDURE — 72100 X-RAY EXAM L-S SPINE 2/3 VWS: CPT | Mod: 26,,, | Performed by: RADIOLOGY

## 2023-01-11 NOTE — PROGRESS NOTES
Major change in her ambulatory status issues still largely confined to the wheelchair but does transferred to the recliner with help from her .  Date: 01/11/2023    Supervising Physician: Juni Meza M.D.    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  Patient reports that she has ongoing pain reported as a 5/10.  The pain is a pinpoint pain in her buttocks and is constant especially with sitting.  She is currently only taking Robaxin intermittently throughout the week as needed.  She was seen by urology and started on a new medication which has helped with her bladder control.   reports that she also has some mild sensation returning for when she needs to urinate however still is incontinent to bowel movements.  Patient was discharged from home health about 1 week ago as they thought she was plateaued ring with home therapy.   is interested in continuing with outpatient therapy at this time located [facility] location closer with a limp.  Patient reports that her sloughing that was treated by home health wound care is now completely healed and she wishes to do warm salt water baths for soaking.  He continues to have bowel incontinence but does report some mild improvement in her bladder sensation.      Exam: Incision is now well healed. There is no sign of infection. Neuro exam is stable. No signs of DVT.    Radiographs: imaging today shows hardware in place, no evidence of failure.     Assessment/Plan: 8 weeks post op.    Doing well postoperatively.  Outpatient therapy referral placed the patient can continue working with therapy closer to home in [place].    I will plan to see the patient back for the next postop visit in 2 months with imaging.     Thank you for the opportunity to  participate in this patient's care. Please give me a call if there are any concerns or questions.

## 2023-01-12 ENCOUNTER — CLINICAL SUPPORT (OUTPATIENT)
Dept: REHABILITATION | Facility: HOSPITAL | Age: 68
End: 2023-01-12
Payer: MEDICARE

## 2023-01-12 DIAGNOSIS — S32.122D CLOSED SEVERELY DISPLACED ZONE II FRACTURE OF SACRUM WITH ROUTINE HEALING, SUBSEQUENT ENCOUNTER: Chronic | ICD-10-CM

## 2023-01-12 PROCEDURE — 97162 PT EVAL MOD COMPLEX 30 MIN: CPT | Mod: PN

## 2023-01-12 NOTE — PLAN OF CARE
[facility]   Physical Therapy Initial Evaluation     Date: 1/12/2023   Name: Merle Caro  [facility] Number: [id number]    Therapy Diagnosis:   Encounter Diagnosis   Name Primary?    Closed severely displaced zone II fracture of sacrum with routine healing, subsequent encounter      Physician: Juni Meza MD    Physician Orders: PT Eval and Treat   Medical Diagnosis from Referral: Closed severely displaced zone II fracture of sacrum with routine healing, subsequent encounter  Evaluation Date: 1/12/2023  Authorization Period Expiration: 1/11/2023 - 1/11/2024  Plan of Care Expiration: 4/7/2023  Progress Note Due: 10th visit or 2/12/2023  Visit # / Visits authorized: 1/ 1 eval   FOTO: 1/3    Precautions: Standard and COPD , osteopenia, s/p L2-pelvis posterior spinal fusion for trans-sacral fracture dislocation with S1-2 spondyloptosis on 10/18/2022    Time In: 11:15  Time Out: 11:50  Total Appointment Time (timed & untimed codes): 35 minutes      SUBJECTIVE     Date of onset: s/p L2-pelvis posterior spinal fusion for trans-sacral fracture dislocation with S1-2 spondyloptosis on 10/18/2022    History of current condition - [place] and spouse reports: s/p spinal fusion from L2-pelvis due to sacrum fracture on 10/18/2022. Pt and spouse of patient denied any falls resulting in fracture. Spouse of patient stated pt has been attending therapy prior to surgery for bilateral knee and to improve knee extension May of last year. Spouse of patient stated pt has been unable to stand upright in the last year, however, was able to ambulate mod I with rollator inside home in June 2022. Pt with c/o pain to sacrum at this time.     Falls: none     Imaging, xray performed on 11/2022  FINDINGS:  Three views lumbar spine.     Allowing for patient in sitting position, posterior spinal fusion hardware appears stable.  Lower lumbar vertebroplasty change is stable.  The sacral segments are aligned.  There is  stable height loss involving T12.  AP spinal alignment is preserved.  There is osteopenia.  There is a large amount of stool in the colon.  There is calcification of the aorta.     Impression:  1. Stable appearing posterior spinal fusion hardware as described.    Prior Therapy: therapy in May 2022 for bilateral knee   Social History: lives with their spouse  Occupation: retired; Hobbies puzzles   Prior Level of Function: modified independent with rollator   Current Level of Function: wheelchair, requires assist with transfers    Pain:  Current 5/10, worst 10/10, best 5/10   Location: sacrum   Description: Burning and Numb  Aggravating Factors: Sitting and supine position  Easing Factors: pain medication and laying in fetal    Patients goals: improve mobility, and able to walk      Medical History:   Past Medical History:   Diagnosis Date    Arthritis     Asthma     COPD (chronic obstructive pulmonary disease)     Depression     GERD (gastroesophageal reflux disease)     Hypertension     Tobacco abuse        Surgical History:   Merle Caro  has a past surgical history that includes Hysterectomy; Knee surgery; Tonsillectomy; multiple fracture repairs; Colonoscopy w/ polypectomy (01/05/2011); Magnetic resonance imaging (N/A, 10/14/2022); fusion, spine, posterior approach (N/A, 10/18/2022); and Esophagogastroduodenoscopy (N/A, 10/24/2022).    Medications:   Merle has a current medication list which includes the following prescription(s): acetaminophen, albuterol, celecoxib, cholecalciferol (vitamin d3), clonazepam, diclofenac sodium, fluticasone-salmeterol 500-50 mcg/dose, haloperidol, hydrocortisone, lidocaine-prilocaine, lisinopril, myrbetriq, oxycodone, oxygen-air delivery systems, pantoprazole, paroxetine, pregabalin, simvastatin, spiriva with handihaler, and trazodone.    Allergies:   Review of patient's allergies indicates:   Allergen Reactions    Codeine Shortness Of Breath    Keflex [cephalexin] Shortness Of  Breath    Shellfish containing products Nausea And Vomiting    Flu vaccine ts 2011-12(18 yr+) Hives    Sucralfate Other (See Comments) and Hives        OBJECTIVE     OBSERVATION: manual wheelchair    POSTURE: moderate thoracic kyphosis, severe trunk flexion, hip flexion, and knee flexion    GAIT: 3 ft with rollator min A    TRANSFERS:    Sit to stand without AD = mod A   Sit to stand with RW = Min A    STRENGTH:  MMT Right  Left  Comments   Shoulder flexion  3-/5 3-/5    Elbow flexion  3+/5 3+/5    Elbow extension  4-/5 4-/5      MMT Right Left Comments    Hip flexion  3-/5 3-/5    Knee flexion  3+/5 3+/5    Knee extension  3+/5 3+/5    Ankle dorsiflexion  4/5 3+/5    Ankle plantarflexion NT NT         RANGE OF MOTION:   -left knee contracture (-35 deg knee extension)    -right knee contracture (-15 deg knee extension)     SENSATION: NT    PALPATION: NT    Limitation/Restriction for FOTO NOC-Neuromuscular disorder Survey    Therapist reviewed FOTO scores for Merle B[place] on 1/12/2023.   FOTO documents entered into [employer] - see Media section.    Limitation Score: 66%         TREATMENT     Total Treatment time (time-based codes) separate from Evaluation: 5 minutes      Merle received the treatments listed below:      therapeutic exercises to develop strength, endurance, and ROM for 5 minutes including:  -sit to stand   -seated marches   -LAQ with AAROM at end range for increased knee extension    PATIENT EDUCATION AND HOME EXERCISES     Education provided:   - continue home health HEP   - PT role in POC     Written Home Exercises Provided:  continue home health HEP . Exercises were reviewed and Merle was able to demonstrate them prior to the end of the session.  Merle demonstrated good  understanding of the education provided. See EMR under Patient Instructions for exercises provided during therapy sessions.    ASSESSMENT     Patient presents with signs and symptoms consistent with a diagnosis of s/p L2-pelvis  posterior spinal fusion for trans-sacral fracture dislocation with S1-2 spondyloptosis on 10/18/2022 including all above listed objective impairments. It appears that the patients most significant impairments contributing to their diagnosis are decreased endurance, abnormal gait pattern, decreased strength, decreased ROM, and pain to sacrum. This pt is a good candidate for skilled PT treatment and stands to benefit from a combination of manual therapy, therapeutic exercise/actvities, neuromuscular re-education, and modalities Prn. The pt has been educated on their dx/POC and consents to further PT treatment.    Patient prognosis is Fair.   Patient will benefit from skilled outpatient Physical Therapy to address the deficits stated above and in the chart below, provide patient /family education, and to maximize patientt's level of independence.     Plan of care discussed with patient: Yes  Patient's spiritual, cultural and educational needs considered and patient is agreeable to the plan of care and goals as stated below:     Anticipated Barriers for therapy: poor endurance    Medical Necessity is demonstrated by the following  History  Co-morbidities and personal factors that may impact the plan of care Co-morbidities:   anxiety, COPD/asthma, and incontinent , osteopenia    Personal Factors:   coping style     moderate   Examination  Body Structures and Functions, activity limitations and participation restrictions that may impact the plan of care Body Regions:   lower extremities  trunk    Body Systems:    gross symmetry  ROM  strength  balance  gait  transfers    Participation Restrictions:   See current level of function    Activity limitations:   Learning and applying knowledge  no deficits    General Tasks and Commands  no deficits    Communication  no deficits    Mobility  lifting and carrying objects  walking  moving around using equipment (WC)    Self care  washing oneself (bathing, drying, washing  hands)  caring for body parts (brushing teeth, shaving, grooming)  toileting  dressing    Domestic Life  shopping  cooking  doing house work (cleaning house, washing dishes, laundry)    Interactions/Relationships  no deficits    Life Areas  no deficits    Community and Social Life  community life  recreation and leisure         moderate   Clinical Presentation evolving clinical presentation with changing clinical characteristics moderate   Decision Making/ Complexity Score: moderate       Goals:   Short Term Goals:  6 weeks  HEP: Patient will demonstrate 50% independence with HEP   Pain: Pt will demonstrate improved pain less than or equal to 3/10   Function: Patient will decrease functional limitation score to less than or equal to 60 out of 100 on FOTO.  Strength: Patient will improve impaired strength to greater or equal to 4-/5.     Long Term Goals:  12 weeks  HEP: Pt will be independent with advanced HEP.  Pain: Pt will demonstrate improved pain less than or equal to 1/10 in order to progress toward maximal functional ability and improve QOL.  Function: Patient will decrease functional limitation score to less than or equal to 55 out of 100 on FOTO to improve functional independence and quality of life.   Mobility: Patient will demonstrate sit to stand mod I with least restricted AD to return to prior level of function.   Strength: Patient will improve impaired strength to greater or equal to 4/5 in order to walk longer distance.   Gait: Patient will ambulate 250 ft mod I with least restrictive AD in order to return to PLOF.       PLAN   Plan of care Certification: 1/12/2023 to 4/7/2023.    Outpatient Physical Therapy 2 times weekly for 12 weeks to include the following interventions: Gait Training, Manual Therapy, Moist Heat/ Ice, Neuromuscular Re-ed, Self Care, Therapeutic Activities, Therapeutic Exercise, and FDN .     Krystal Mccauley, PT      I CERTIFY THE NEED FOR THESE SERVICES FURNISHED UNDER THIS PLAN  OF TREATMENT AND WHILE UNDER MY CARE   Physician's comments:     Physician's Signature: ___________________________________________________

## 2023-01-13 ENCOUNTER — LAB VISIT (OUTPATIENT)
Dept: LAB | Facility: HOSPITAL | Age: 68
End: 2023-01-13
Attending: NURSE PRACTITIONER
Payer: MEDICARE

## 2023-01-13 ENCOUNTER — OFFICE VISIT (OUTPATIENT)
Dept: HEMATOLOGY/ONCOLOGY | Facility: CLINIC | Age: 68
End: 2023-01-13
Payer: MEDICARE

## 2023-01-13 VITALS
OXYGEN SATURATION: 98 % | DIASTOLIC BLOOD PRESSURE: 82 MMHG | BODY MASS INDEX: 21.33 KG/M2 | SYSTOLIC BLOOD PRESSURE: 134 MMHG | HEIGHT: 62 IN | HEART RATE: 86 BPM | TEMPERATURE: 98 F

## 2023-01-13 DIAGNOSIS — R79.89 ABNORMAL CBC: Primary | ICD-10-CM

## 2023-01-13 DIAGNOSIS — R79.89 ELEVATED PLATELET COUNT: ICD-10-CM

## 2023-01-13 DIAGNOSIS — D64.9 ANEMIA, UNSPECIFIED TYPE: ICD-10-CM

## 2023-01-13 DIAGNOSIS — D75.839 THROMBOCYTOSIS: ICD-10-CM

## 2023-01-13 DIAGNOSIS — D53.9 NUTRITIONAL ANEMIA, UNSPECIFIED: ICD-10-CM

## 2023-01-13 LAB
ALBUMIN SERPL BCP-MCNC: 3.1 G/DL (ref 3.5–5.2)
ALP SERPL-CCNC: 96 U/L (ref 55–135)
ALT SERPL W/O P-5'-P-CCNC: 6 U/L (ref 10–44)
ANION GAP SERPL CALC-SCNC: 13 MMOL/L (ref 8–16)
AST SERPL-CCNC: 10 U/L (ref 10–40)
BASOPHILS # BLD AUTO: 0.04 K/UL (ref 0–0.2)
BASOPHILS NFR BLD: 0.5 % (ref 0–1.9)
BILIRUB SERPL-MCNC: 0.3 MG/DL (ref 0.1–1)
BUN SERPL-MCNC: 11 MG/DL (ref 8–23)
CALCIUM SERPL-MCNC: 9.7 MG/DL (ref 8.7–10.5)
CHLORIDE SERPL-SCNC: 103 MMOL/L (ref 95–110)
CO2 SERPL-SCNC: 21 MMOL/L (ref 23–29)
CREAT SERPL-MCNC: 0.7 MG/DL (ref 0.5–1.4)
DIFFERENTIAL METHOD: ABNORMAL
EOSINOPHIL # BLD AUTO: 0.2 K/UL (ref 0–0.5)
EOSINOPHIL NFR BLD: 3.3 % (ref 0–8)
ERYTHROCYTE [DISTWIDTH] IN BLOOD BY AUTOMATED COUNT: 15.3 % (ref 11.5–14.5)
EST. GFR  (NO RACE VARIABLE): >60 ML/MIN/1.73 M^2
GLUCOSE SERPL-MCNC: 97 MG/DL (ref 70–110)
HCT VFR BLD AUTO: 31 % (ref 37–48.5)
HGB BLD-MCNC: 9.5 G/DL (ref 12–16)
IMM GRANULOCYTES # BLD AUTO: 0.03 K/UL (ref 0–0.04)
IMM GRANULOCYTES NFR BLD AUTO: 0.4 % (ref 0–0.5)
LYMPHOCYTES # BLD AUTO: 0.9 K/UL (ref 1–4.8)
LYMPHOCYTES NFR BLD: 12.9 % (ref 18–48)
MCH RBC QN AUTO: 25.9 PG (ref 27–31)
MCHC RBC AUTO-ENTMCNC: 30.6 G/DL (ref 32–36)
MCV RBC AUTO: 85 FL (ref 82–98)
MONOCYTES # BLD AUTO: 0.6 K/UL (ref 0.3–1)
MONOCYTES NFR BLD: 8.2 % (ref 4–15)
NEUTROPHILS # BLD AUTO: 5.5 K/UL (ref 1.8–7.7)
NEUTROPHILS NFR BLD: 74.7 % (ref 38–73)
NRBC BLD-RTO: 0 /100 WBC
PLATELET # BLD AUTO: 336 K/UL (ref 150–450)
PMV BLD AUTO: 8.6 FL (ref 9.2–12.9)
POTASSIUM SERPL-SCNC: 4.1 MMOL/L (ref 3.5–5.1)
PROT SERPL-MCNC: 7 G/DL (ref 6–8.4)
RBC # BLD AUTO: 3.67 M/UL (ref 4–5.4)
SODIUM SERPL-SCNC: 137 MMOL/L (ref 136–145)
WBC # BLD AUTO: 7.3 K/UL (ref 3.9–12.7)

## 2023-01-13 PROCEDURE — 99999 PR PBB SHADOW E&M-EST. PATIENT-LVL IV: CPT | Mod: PBBFAC,,, | Performed by: NURSE PRACTITIONER

## 2023-01-13 PROCEDURE — 99214 OFFICE O/P EST MOD 30 MIN: CPT | Mod: S$PBB,,, | Performed by: NURSE PRACTITIONER

## 2023-01-13 PROCEDURE — 99214 OFFICE O/P EST MOD 30 MIN: CPT | Mod: PBBFAC | Performed by: NURSE PRACTITIONER

## 2023-01-13 PROCEDURE — 82607 VITAMIN B-12: CPT | Performed by: NURSE PRACTITIONER

## 2023-01-13 PROCEDURE — 99999 PR PBB SHADOW E&M-EST. PATIENT-LVL IV: ICD-10-PCS | Mod: PBBFAC,,, | Performed by: NURSE PRACTITIONER

## 2023-01-13 PROCEDURE — 36415 COLL VENOUS BLD VENIPUNCTURE: CPT | Performed by: NURSE PRACTITIONER

## 2023-01-13 PROCEDURE — 84466 ASSAY OF TRANSFERRIN: CPT | Performed by: NURSE PRACTITIONER

## 2023-01-13 PROCEDURE — 99214 PR OFFICE/OUTPT VISIT, EST, LEVL IV, 30-39 MIN: ICD-10-PCS | Mod: S$PBB,,, | Performed by: NURSE PRACTITIONER

## 2023-01-13 PROCEDURE — 80053 COMPREHEN METABOLIC PANEL: CPT | Performed by: NURSE PRACTITIONER

## 2023-01-13 PROCEDURE — 85025 COMPLETE CBC W/AUTO DIFF WBC: CPT | Performed by: NURSE PRACTITIONER

## 2023-01-13 PROCEDURE — 82746 ASSAY OF FOLIC ACID SERUM: CPT | Performed by: NURSE PRACTITIONER

## 2023-01-13 PROCEDURE — 82728 ASSAY OF FERRITIN: CPT | Performed by: NURSE PRACTITIONER

## 2023-01-13 NOTE — ASSESSMENT & PLAN NOTE
Will obtain new baseline labs today    Cbc, cmp, iron, ferritin, b12, folate. F/u 1 week to discuss
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
## 2023-01-14 LAB
FERRITIN SERPL-MCNC: 144 NG/ML (ref 20–300)
FOLATE SERPL-MCNC: 5.4 NG/ML (ref 4–24)
IRON SERPL-MCNC: 18 UG/DL (ref 30–160)
SATURATED IRON: 7 % (ref 20–50)
TOTAL IRON BINDING CAPACITY: 258 UG/DL (ref 250–450)
TRANSFERRIN SERPL-MCNC: 174 MG/DL (ref 200–375)
VIT B12 SERPL-MCNC: 296 PG/ML (ref 210–950)

## 2023-01-16 PROBLEM — J96.11 CHRONIC RESPIRATORY FAILURE WITH HYPOXIA: Status: RESOLVED | Noted: 2017-10-12 | Resolved: 2023-01-16

## 2023-01-17 ENCOUNTER — OFFICE VISIT (OUTPATIENT)
Dept: INTERNAL MEDICINE | Facility: CLINIC | Age: 68
End: 2023-01-17
Payer: MEDICARE

## 2023-01-17 VITALS
BODY MASS INDEX: 21.02 KG/M2 | SYSTOLIC BLOOD PRESSURE: 128 MMHG | WEIGHT: 114.19 LBS | HEIGHT: 62 IN | DIASTOLIC BLOOD PRESSURE: 74 MMHG

## 2023-01-17 DIAGNOSIS — G83.4 CAUDA EQUINA SYNDROME: ICD-10-CM

## 2023-01-17 DIAGNOSIS — I70.0 AORTIC ATHEROSCLEROSIS: ICD-10-CM

## 2023-01-17 DIAGNOSIS — I10 ESSENTIAL HYPERTENSION: ICD-10-CM

## 2023-01-17 DIAGNOSIS — N76.0 VULVOVAGINITIS: ICD-10-CM

## 2023-01-17 DIAGNOSIS — K21.00 GASTROESOPHAGEAL REFLUX DISEASE WITH ESOPHAGITIS WITHOUT HEMORRHAGE: ICD-10-CM

## 2023-01-17 DIAGNOSIS — K59.04 CHRONIC IDIOPATHIC CONSTIPATION: Primary | ICD-10-CM

## 2023-01-17 DIAGNOSIS — I25.10 CORONARY ARTERY DISEASE INVOLVING NATIVE CORONARY ARTERY OF NATIVE HEART WITHOUT ANGINA PECTORIS: ICD-10-CM

## 2023-01-17 DIAGNOSIS — E78.00 PURE HYPERCHOLESTEROLEMIA: ICD-10-CM

## 2023-01-17 PROBLEM — R68.81 EARLY SATIETY: Status: RESOLVED | Noted: 2022-10-15 | Resolved: 2023-01-17

## 2023-01-17 PROCEDURE — 99215 PR OFFICE/OUTPT VISIT, EST, LEVL V, 40-54 MIN: ICD-10-PCS | Mod: S$PBB,,, | Performed by: FAMILY MEDICINE

## 2023-01-17 PROCEDURE — 99999 PR PBB SHADOW E&M-EST. PATIENT-LVL III: ICD-10-PCS | Mod: PBBFAC,,, | Performed by: FAMILY MEDICINE

## 2023-01-17 PROCEDURE — 99215 OFFICE O/P EST HI 40 MIN: CPT | Mod: S$PBB,,, | Performed by: FAMILY MEDICINE

## 2023-01-17 PROCEDURE — 99999 PR PBB SHADOW E&M-EST. PATIENT-LVL III: CPT | Mod: PBBFAC,,, | Performed by: FAMILY MEDICINE

## 2023-01-17 PROCEDURE — 99213 OFFICE O/P EST LOW 20 MIN: CPT | Mod: PBBFAC | Performed by: FAMILY MEDICINE

## 2023-01-17 RX ORDER — CLOTRIMAZOLE AND BETAMETHASONE DIPROPIONATE 10; .64 MG/G; MG/G
CREAM TOPICAL 2 TIMES DAILY
Qty: 45 G | Refills: 0 | Status: SHIPPED | OUTPATIENT
Start: 2023-01-17 | End: 2024-01-30

## 2023-01-17 NOTE — PROGRESS NOTES
Subjective:   Patient ID: Merle Caro is a 67 y.o. female.  Chief Complaint:  Follow-up    Presents for follow-up on chronic medical conditions  Last visit with me November 2022   Continues to do well and heal from surgical procedure for her fracture/dislocated sacrum    Hypertension remains well controlled on her lisinopril 10 mg daily   Hyperlipidemia with aortic atherosclerosis coronary artery disease remain stable and well controlled on simvastatin 40 mg daily   Asthma/COPD remains stable and well controlled off any controller medications since quitting smoking.  Infrequent if any regular albuterol use needed.  Still requires oxygen 2 L  Bipolar disorder/depression/anxiety.  Followed by Psychiatry.  Reports stable on current medical regimen.  Urinary difficulty.  Improved on Myrbetriq 50 mg daily.  Pain.  Reasonably controlled.  No longer taking narcotics.  Was taking Lyrica 50 mg twice a day.  Denies side effects.  Increased pain off medication.    Main complaints today include:  Indigestion increased reflux symptoms.  Only taking Protonix 40 mg as needed.    Cough with runny nose.    Chronic constipation not well relieved with MiraLax daily as needed  Mild vaginal discharge, itching, external irritation    Review of Systems   Constitutional:  Positive for activity change. Negative for chills, fatigue and fever.   HENT:  Positive for congestion, postnasal drip and rhinorrhea. Negative for dental problem, ear pain, sinus pressure and sore throat.    Eyes:  Negative for visual disturbance.   Respiratory:  Positive for cough. Negative for chest tightness, shortness of breath and wheezing.    Cardiovascular:  Negative for chest pain, palpitations and leg swelling.   Gastrointestinal:  Positive for abdominal distention, abdominal pain and constipation. Negative for blood in stool, diarrhea, nausea and vomiting.   Endocrine: Negative for polydipsia, polyphagia and polyuria.   Genitourinary:  Positive for vaginal  "discharge. Negative for difficulty urinating, dysuria, flank pain, hematuria and pelvic pain.   Musculoskeletal:  Positive for arthralgias, back pain and gait problem. Negative for myalgias.   Skin:  Negative for rash.   Neurological:  Positive for weakness. Negative for dizziness, syncope, light-headedness and headaches.   Hematological:  Negative for adenopathy.   Psychiatric/Behavioral:  Negative for decreased concentration, dysphoric mood and sleep disturbance. The patient is not nervous/anxious.      Objective:   /74 (BP Location: Right arm, Patient Position: Sitting, BP Method: Small (Manual))   Ht 5' 2" (1.575 m)   Wt 51.8 kg (114 lb 3.2 oz)   BMI 20.89 kg/m²     Physical Exam  Vitals and nursing note reviewed.   Constitutional:       General: She is not in acute distress.     Appearance: Normal appearance. She is well-developed and normal weight. She is not ill-appearing or toxic-appearing.      Comments:   Examined in wheelchair comfortable.  Using O2 via nasal cannula.   Neck:      Thyroid: No thyroid mass, thyromegaly or thyroid tenderness.   Cardiovascular:      Rate and Rhythm: Normal rate and regular rhythm.   Pulmonary:      Effort: Pulmonary effort is normal. No tachypnea, accessory muscle usage or respiratory distress.   Musculoskeletal:      Right lower leg: No edema.      Left lower leg: No edema.   Skin:     General: Skin is warm and dry.      Findings: No abrasion, bruising, ecchymosis, rash or wound.   Neurological:      General: No focal deficit present.      Mental Status: She is alert. Mental status is at baseline.      Coordination: Coordination normal.   Psychiatric:         Attention and Perception: Attention and perception normal. She is attentive.         Mood and Affect: Mood and affect normal. Mood is not anxious or depressed.         Speech: Speech normal.         Behavior: Behavior normal. Behavior is cooperative.         Thought Content: Thought content normal.         " Cognition and Memory: Cognition and memory normal.         Judgment: Judgment normal.     Assessment:       ICD-10-CM ICD-9-CM   1. Chronic idiopathic constipation  K59.04 564.00   2. Cauda equina syndrome  G83.4 344.60   3. Gastroesophageal reflux disease with esophagitis without hemorrhage  K21.00 530.81     530.10   4. Vulvovaginitis  N76.0 616.10   5. Essential hypertension  I10 401.9   6. Pure hypercholesterolemia  E78.00 272.0   7. Aortic atherosclerosis  I70.0 440.0   8. Coronary artery disease involving native coronary artery of native heart without angina pectoris  I25.10 414.01     Plan:   Chronic idiopathic constipation  -     linaCLOtide (LINZESS) 72 mcg Cap capsule; Take 1 capsule (72 mcg total) by mouth before breakfast.  Dispense: 90 capsule; Refill: 0  Try Linzess 72 mcg daily   Can increase in sexual refilled dose based on effectiveness     Cauda equina syndrome  Restart Lyrica 50 mg twice a day  Continue any current therapy   Follow-up Neurosurgery as scheduled     Gastroesophageal reflux disease with esophagitis without hemorrhage  Currently not adequately controlled   Advised to take Protonix 40 mg daily regular controlled.  Basis     Vulvovaginitis  -     clotrimazole-betamethasone 1-0.05% (LOTRISONE) cream; Apply topically 2 (two) times daily.  Dispense: 45 g; Refill: 0    Essential hypertension  Controlled.  Stable.  Asymptomatic.  BP at goal.    Continue lisinopril 10 mg daily     Pure hypercholesterolemia  Aortic atherosclerosis  Coronary artery disease involving native coronary artery of native heart without angina pectoris  Controlled.  Stable.  Asymptomatic.    Last LDL stable   Continue simvastatin 40 mg daily    Return to clinic 3 months or sooner if needed    40+ minutes of total time spent on the encounter, which includes face to face time and non-face to face time preparing to see the patient (eg, review of tests), Obtaining and/or reviewing separately obtained history, documenting  clinical information in the electronic or other health record, independently interpreting results (not separately reported) and communicating results to the patient/family/caregiver, or Care coordination (not separately reported).

## 2023-01-18 ENCOUNTER — CLINICAL SUPPORT (OUTPATIENT)
Dept: REHABILITATION | Facility: HOSPITAL | Age: 68
End: 2023-01-18
Payer: MEDICARE

## 2023-01-18 ENCOUNTER — OFFICE VISIT (OUTPATIENT)
Dept: HEMATOLOGY/ONCOLOGY | Facility: CLINIC | Age: 68
End: 2023-01-18
Payer: MEDICARE

## 2023-01-18 VITALS
SYSTOLIC BLOOD PRESSURE: 88 MMHG | HEIGHT: 62 IN | DIASTOLIC BLOOD PRESSURE: 46 MMHG | HEART RATE: 91 BPM | BODY MASS INDEX: 20.65 KG/M2 | TEMPERATURE: 97 F | OXYGEN SATURATION: 95 % | WEIGHT: 112.19 LBS

## 2023-01-18 DIAGNOSIS — S32.122D CLOSED SEVERELY DISPLACED ZONE II FRACTURE OF SACRUM WITH ROUTINE HEALING, SUBSEQUENT ENCOUNTER: Primary | Chronic | ICD-10-CM

## 2023-01-18 DIAGNOSIS — D64.9 ANEMIA, UNSPECIFIED TYPE: Primary | ICD-10-CM

## 2023-01-18 DIAGNOSIS — D50.8 OTHER IRON DEFICIENCY ANEMIA: ICD-10-CM

## 2023-01-18 DIAGNOSIS — R26.9 GAIT DIFFICULTY: ICD-10-CM

## 2023-01-18 DIAGNOSIS — M17.0 PRIMARY OSTEOARTHRITIS OF BOTH KNEES: ICD-10-CM

## 2023-01-18 PROBLEM — D50.9 IRON DEFICIENCY ANEMIA: Status: ACTIVE | Noted: 2023-01-18

## 2023-01-18 PROCEDURE — 97530 THERAPEUTIC ACTIVITIES: CPT | Mod: PN

## 2023-01-18 PROCEDURE — 99214 PR OFFICE/OUTPT VISIT, EST, LEVL IV, 30-39 MIN: ICD-10-PCS | Mod: S$PBB,,, | Performed by: NURSE PRACTITIONER

## 2023-01-18 PROCEDURE — 99999 PR PBB SHADOW E&M-EST. PATIENT-LVL IV: ICD-10-PCS | Mod: PBBFAC,,, | Performed by: NURSE PRACTITIONER

## 2023-01-18 PROCEDURE — 99214 OFFICE O/P EST MOD 30 MIN: CPT | Mod: PBBFAC | Performed by: NURSE PRACTITIONER

## 2023-01-18 PROCEDURE — 97110 THERAPEUTIC EXERCISES: CPT | Mod: PN

## 2023-01-18 PROCEDURE — 99214 OFFICE O/P EST MOD 30 MIN: CPT | Mod: S$PBB,,, | Performed by: NURSE PRACTITIONER

## 2023-01-18 PROCEDURE — 99999 PR PBB SHADOW E&M-EST. PATIENT-LVL IV: CPT | Mod: PBBFAC,,, | Performed by: NURSE PRACTITIONER

## 2023-01-18 RX ORDER — HEPARIN 100 UNIT/ML
500 SYRINGE INTRAVENOUS
Status: CANCELLED | OUTPATIENT
Start: 2023-02-14

## 2023-01-18 RX ORDER — FOLIC ACID 1 MG/1
1 TABLET ORAL DAILY
Qty: 90 TABLET | Refills: 3 | Status: SHIPPED | OUTPATIENT
Start: 2023-01-18 | End: 2024-01-30 | Stop reason: SDUPTHER

## 2023-01-18 RX ORDER — SODIUM CHLORIDE 0.9 % (FLUSH) 0.9 %
10 SYRINGE (ML) INJECTION
Status: CANCELLED | OUTPATIENT
Start: 2023-02-14

## 2023-01-18 RX ORDER — HEPARIN 100 UNIT/ML
500 SYRINGE INTRAVENOUS
Status: CANCELLED | OUTPATIENT
Start: 2023-02-07

## 2023-01-18 RX ORDER — SODIUM CHLORIDE 0.9 % (FLUSH) 0.9 %
10 SYRINGE (ML) INJECTION
Status: CANCELLED | OUTPATIENT
Start: 2023-02-07

## 2023-01-18 RX ORDER — HEPARIN 100 UNIT/ML
500 SYRINGE INTRAVENOUS
Status: CANCELLED | OUTPATIENT
Start: 2023-01-18

## 2023-01-18 RX ORDER — SODIUM CHLORIDE 0.9 % (FLUSH) 0.9 %
10 SYRINGE (ML) INJECTION
Status: CANCELLED | OUTPATIENT
Start: 2023-01-18

## 2023-01-18 RX ORDER — HEPARIN 100 UNIT/ML
500 SYRINGE INTRAVENOUS
Status: CANCELLED | OUTPATIENT
Start: 2023-01-31

## 2023-01-18 RX ORDER — MAGNESIUM 200 MG
1000 TABLET ORAL DAILY
Qty: 90 TABLET | Refills: 3 | Status: SHIPPED | OUTPATIENT
Start: 2023-01-18 | End: 2024-01-30 | Stop reason: SDUPTHER

## 2023-01-18 RX ORDER — SODIUM CHLORIDE 0.9 % (FLUSH) 0.9 %
10 SYRINGE (ML) INJECTION
Status: CANCELLED | OUTPATIENT
Start: 2023-01-31
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
## 2023-01-18 NOTE — ASSESSMENT & PLAN NOTE
Low normal B12 and folate level. Note iron deficiency. Recent EGD reviewed. Consider Colonoscopy       Trial sublingual B12 1000 mcg daily. Folic acid 1 mg PO daily. Given prior h/o constipation will defer oral iron supplementation. Arrange weekly Venofer. Patient's family reports prior h/o adverse reaction to steroids. Will trial w/o pre steroid.     F/u 8 weeks with cbc, iron ,ferritin    --note hypotension. Discussed recommendations for IVFs. Patient/family declines. States will increase PO hydration

## 2023-01-19 NOTE — PROGRESS NOTES
[facility]   Physical Therapy Treatment Note     Name: Merle BUSH [facility] Number: [id number]    Therapy Diagnosis:   Encounter Diagnoses   Name Primary?    Closed severely displaced zone II fracture of sacrum with routine healing, subsequent encounter Yes    Gait difficulty     Primary osteoarthritis of both knees      Physician: Juni Meza MD    Visit Date: 1/18/2023    Physician Orders: PT Eval and Treat   Medical Diagnosis from Referral: Closed severely displaced zone II fracture of sacrum with routine healing, subsequent encounter  Evaluation Date: 1/12/2023  Authorization Period Expiration: 1/1/2023 - 12/31/2023  Plan of Care Expiration: 4/7/2023  Progress Note Due: 10th visit or 2/12/2023  Visit # / Visits authorized: 2/20 + eval  FOTO: 1/3     Precautions: Standard and COPD , osteopenia, s/p L2-pelvis posterior spinal fusion for trans-sacral fracture dislocation with S1-2 spondyloptosis on 10/18/2022    PTA Visit #: -/5     Time In: 2:00  Time Out: 2:34  Total Billable Time: 34 minutes    SUBJECTIVE     Pt reports: continues to note pain to sacral region.  of pt stated they purchased a rolling walker and she has been able to stand more at home.     She was compliant with home exercise program.  Response to previous treatment: mild soreness   Functional change: improved standing and transfers     Pain: 8/10  Location: sacral region     OBJECTIVE     Objective Measures updated at progress report unless specified.     Treatment     Merle received the treatments listed below:      therapeutic exercises to develop strength, endurance, and ROM for 8 minutes including:  -seated marches 2x10   -LAQ with AAROM at end range for increased knee extension 2x10     therapeutic activities to improve functional performance for 25  minutes, including:  -sit to stand with RW and min A 2x5  -WC <> mat transfers x2 min A  -car transfer mod A x1  -8 ft amb with RW min A     Patient  Education and Home Exercises     Home Exercises Provided and Patient Education Provided     Education provided:   - continue HEP     Written Home Exercises Provided: Patient instructed to cont prior HEP. Exercises were reviewed and Merle was able to demonstrate them prior to the end of the session.  Merle demonstrated good  understanding of the education provided. See EMR under Patient Instructions for exercises provided during therapy sessions    ASSESSMENT   Pt with better endurance to exercises today. Pt able to tolerate transfers, sit to stands, and short distance ambulation. Pt unable to complete full treatment session due to bowel movement. Performed car transfer with patient with moderate assist. Verbal cues for buttock and hand placement. Instructed  and patient to avoid grabbing onto door while performing car transfer and to use above grab handles. Pt and  verbalized understanding.       Merle Is progressing well towards her goals.   Pt prognosis is Fair.     Pt will continue to benefit from skilled outpatient physical therapy to address the deficits listed in the problem list box on initial evaluation, provide pt/family education and to maximize pt's level of independence in the home and community environment.     Pt's spiritual, cultural and educational needs considered and pt agreeable to plan of care and goals.     Anticipated Barriers for therapy: poor endurance     Goals:   Short Term Goals:  6 weeks  HEP: Patient will demonstrate 50% independence with HEP   Pain: Pt will demonstrate improved pain less than or equal to 3/10   Function: Patient will decrease functional limitation score to less than or equal to 60 out of 100 on FOTO.  Strength: Patient will improve impaired strength to greater or equal to 4-/5.      Long Term Goals:  12 weeks  HEP: Pt will be independent with advanced HEP.  Pain: Pt will demonstrate improved pain less than or equal to 1/10 in order to progress toward  maximal functional ability and improve QOL.  Function: Patient will decrease functional limitation score to less than or equal to 55 out of 100 on FOTO to improve functional independence and quality of life.   Mobility: Patient will demonstrate sit to stand mod I with least restricted AD to return to prior level of function.   Strength: Patient will improve impaired strength to greater or equal to 4/5 in order to walk longer distance.   Gait: Patient will ambulate 250 ft mod I with least restrictive AD in order to return to PLOF.       PLAN     Plan of care Certification: 1/12/2023 to 4/7/2023.     Outpatient Physical Therapy 2 times weekly for 12 weeks to include the following interventions: Gait Training, Manual Therapy, Moist Heat/ Ice, Neuromuscular Re-ed, Self Care, Therapeutic Activities, Therapeutic Exercise, and FDN .      Krystal Mccauley, PT

## 2023-01-20 ENCOUNTER — TELEPHONE (OUTPATIENT)
Dept: REHABILITATION | Facility: HOSPITAL | Age: 68
End: 2023-01-20

## 2023-01-20 NOTE — TELEPHONE ENCOUNTER
Spouse VM not set up. Phoned [place] about today's missed visit. Offered later time slot and  number if that time slot work better for patient. Left reminder of next scheduled visit.

## 2023-01-24 ENCOUNTER — CLINICAL SUPPORT (OUTPATIENT)
Dept: REHABILITATION | Facility: HOSPITAL | Age: 68
End: 2023-01-24
Payer: MEDICARE

## 2023-01-24 DIAGNOSIS — S32.122D CLOSED SEVERELY DISPLACED ZONE II FRACTURE OF SACRUM WITH ROUTINE HEALING, SUBSEQUENT ENCOUNTER: Primary | Chronic | ICD-10-CM

## 2023-01-24 DIAGNOSIS — R26.9 GAIT DIFFICULTY: ICD-10-CM

## 2023-01-24 DIAGNOSIS — M17.0 PRIMARY OSTEOARTHRITIS OF BOTH KNEES: ICD-10-CM

## 2023-01-24 PROCEDURE — 97110 THERAPEUTIC EXERCISES: CPT | Mod: PN

## 2023-01-24 PROCEDURE — 97530 THERAPEUTIC ACTIVITIES: CPT | Mod: PN

## 2023-01-24 NOTE — PROGRESS NOTES
[facility]   Physical Therapy Treatment Note     Name: Merle BUSH [facility] Number: [id number]    Therapy Diagnosis:   Encounter Diagnoses   Name Primary?    Closed severely displaced zone II fracture of sacrum with routine healing, subsequent encounter Yes    Gait difficulty     Primary osteoarthritis of both knees      Physician: Juni Meza MD    Visit Date: 1/24/2023    Physician Orders: PT Eval and Treat   Medical Diagnosis from Referral: Closed severely displaced zone II fracture of sacrum with routine healing, subsequent encounter  Evaluation Date: 1/12/2023  Authorization Period Expiration: 1/1/2023 - 12/31/2023  Plan of Care Expiration: 4/7/2023  Progress Note Due: 10th visit or 2/12/2023  Visit # / Visits authorized: 3/20 + eval  FOTO: 1/3     Precautions: Standard and COPD , osteopenia, s/p L2-pelvis posterior spinal fusion for trans-sacral fracture dislocation with 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 Stated she has a hole near her sacrum region that has been there since her surgery     She was compliant with home exercise program.  Response to previous treatment: mild soreness   Functional change: improved standing and transfers     Pain: 5/10  Location: sacral region     OBJECTIVE     Objective Measures updated at progress report unless specified.     Treatment     Merle received the treatments listed below:      therapeutic exercises to develop strength, endurance, and ROM for 8 minutes including:  +ankle pumps 2x10   -LAQ with AAROM at end range for increased knee extension 2x10     therapeutic activities to improve functional performance for 28  minutes, including:  +standing marches 2x10  -sit to stand with RW and CGA 2x10  -WC <> mat transfers (not today)  -car transfer min A x1  -45 ft amb with RW min A      neuromuscular re-education activities to improve: Balance and Proprioception for 2 minutes. The following activities were included:   -wide base balance with hands hovering over RW 3x10s    Patient Education and Home Exercises     Home Exercises Provided and Patient Education Provided     Education provided:   - continue HEP     Written Home Exercises Provided: Patient instructed to cont prior HEP. Exercises were reviewed and Merle was able to demonstrate them prior to the end of the session.  Merle demonstrated good  understanding of the education provided. See EMR under Patient Instructions for exercises provided during therapy sessions    ASSESSMENT   Pt presented to clinic today with better appearance and color. Added standing marches to improve functional mobility by improving foot clearance. Added wide base support to improve proprioception and balance. Pt continue have bilateral knee flexion with weightbearing. Pt with significant improvement in ambulation today; able to ambulation 45 feet with CGA. Pt demonstrated lethargy at end of session; verbal cues for alertness. Pt required minimal assist for car transfer.       Merle Is progressing well towards her goals.   Pt prognosis is Fair.     Pt will continue to benefit from skilled outpatient physical therapy to address the deficits listed in the problem list box on initial evaluation, provide pt/family education and to maximize pt's level of independence in the home and community environment.     Pt's spiritual, cultural and educational needs considered and pt agreeable to plan of care and goals.     Anticipated Barriers for therapy: poor endurance     Goals:   Short Term Goals:  6 weeks  HEP: Patient will demonstrate 50% independence with HEP   Pain: Pt will demonstrate improved pain less than or equal to 3/10   Function: Patient will decrease functional limitation score to less than or equal to 60 out of 100 on FOTO.  Strength: Patient will improve  impaired strength to greater or equal to 4-/5.      Long Term Goals:  12 weeks  HEP: Pt will be independent with advanced HEP.  Pain: Pt will demonstrate improved pain less than or equal to 1/10 in order to progress toward maximal functional ability and improve QOL.  Function: Patient will decrease functional limitation score to less than or equal to 55 out of 100 on FOTO to improve functional independence and quality of life.   Mobility: Patient will demonstrate sit to stand mod I with least restricted AD to return to prior level of function.   Strength: Patient will improve impaired strength to greater or equal to 4/5 in order to walk longer distance.   Gait: Patient will ambulate 250 ft mod I with least restrictive AD in order to return to PLOF.       PLAN     Plan of care Certification: 1/12/2023 to 4/7/2023.     Outpatient Physical Therapy 2 times weekly for 12 weeks to include the following interventions: Gait Training, Manual Therapy, Moist Heat/ Ice, Neuromuscular Re-ed, Self Care, Therapeutic Activities, Therapeutic Exercise, and FDN .      Krystal Mccauley, PT

## 2023-01-25 ENCOUNTER — TELEPHONE (OUTPATIENT)
Dept: PHARMACY | Facility: CLINIC | Age: 68
End: 2023-01-25
Payer: MEDICARE

## 2023-01-26 ENCOUNTER — PATIENT MESSAGE (OUTPATIENT)
Dept: ORTHOPEDICS | Facility: CLINIC | Age: 68
End: 2023-01-26
Payer: MEDICARE

## 2023-01-26 ENCOUNTER — CLINICAL SUPPORT (OUTPATIENT)
Dept: REHABILITATION | Facility: HOSPITAL | Age: 68
End: 2023-01-26
Payer: MEDICARE

## 2023-01-26 DIAGNOSIS — M17.0 PRIMARY OSTEOARTHRITIS OF BOTH KNEES: ICD-10-CM

## 2023-01-26 DIAGNOSIS — S32.122D CLOSED SEVERELY DISPLACED ZONE II FRACTURE OF SACRUM WITH ROUTINE HEALING, SUBSEQUENT ENCOUNTER: Primary | Chronic | ICD-10-CM

## 2023-01-26 DIAGNOSIS — R26.9 GAIT DIFFICULTY: ICD-10-CM

## 2023-01-26 PROCEDURE — 97530 THERAPEUTIC ACTIVITIES: CPT | Mod: PN

## 2023-01-26 PROCEDURE — 97110 THERAPEUTIC EXERCISES: CPT | Mod: PN

## 2023-01-26 NOTE — PROGRESS NOTES
[facility]   Physical Therapy Treatment Note     Name: Merle BUSH [facility]  [facility] Number: [id number]    Therapy Diagnosis:   Encounter Diagnoses   Name Primary?    Closed severely displaced zone II fracture of sacrum with routine healing, subsequent encounter Yes    Gait difficulty     Primary osteoarthritis of both knees      Physician: Juni Meza MD    Visit Date: 1/26/2023    Physician Orders: PT Eval and Treat   Medical Diagnosis from Referral: Closed severely displaced zone II fracture of sacrum with routine healing, subsequent encounter  Evaluation Date: 1/12/2023  Authorization Period Expiration: 1/1/2023 - 12/31/2023  Plan of Care Expiration: 4/7/2023  Progress Note Due: 10th visit or 2/12/2023  Visit # / Visits authorized: 4/20 + eval  FOTO: 1/3     Precautions: Standard and COPD , osteopenia, s/p L2-pelvis posterior spinal fusion for trans-sacral fracture dislocation with 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  mentioned mass to hip which he plans to take a photo and send to MD.     She was compliant with home exercise program.  Response to previous treatment: mild soreness   Functional change: improved standing and transfers     Pain: 5/10  Location: sacral region     OBJECTIVE     Objective Measures updated at progress report unless specified.     Treatment     Merle received the treatments listed below:      therapeutic exercises to develop strength, endurance, and ROM for 8 minutes including:  -ankle pumps 3x10   -LAQ with AAROM at end range for increased knee extension 3x10     therapeutic activities to improve functional performance for 30  minutes, including:  -standing marches 3x10  -sit to stand with RW and CGA 2x10  -car transfer min A x1 (not today)   -2x20 ft amb with RW min A (seated break)     neuromuscular  re-education activities to improve: Balance and Proprioception for 2 minutes. The following activities were included:   -wide base balance with hands hovering over RW 3x10s    Patient Education and Home Exercises     Home Exercises Provided and Patient Education Provided     Education provided:   - continue HEP     Written Home Exercises Provided: Patient instructed to cont prior HEP. Exercises were reviewed and Merle was able to demonstrate them prior to the end of the session.  Merle demonstrated good  understanding of the education provided. See EMR under Patient Instructions for exercises provided during therapy sessions    ASSESSMENT   Pt was able to progress sets of seated exercises and standing marching. Pt demonstrated fatigue with standing marching, verbal cues for intensity. Pt with one LOB during standing marches, able to correct self. Provided increase break lengths today due to fatigue. Pt able to ambulate 20 feet twice with short seated break in between.        Merle Is progressing well towards her goals.   Pt prognosis is Fair.     Pt will continue to benefit from skilled outpatient physical therapy to address the deficits listed in the problem list box on initial evaluation, provide pt/family education and to maximize pt's level of independence in the home and community environment.     Pt's spiritual, cultural and educational needs considered and pt agreeable to plan of care and goals.     Anticipated Barriers for therapy: poor endurance     Goals:   Short Term Goals:  6 weeks  HEP: Patient will demonstrate 50% independence with HEP   Pain: Pt will demonstrate improved pain less than or equal to 3/10   Function: Patient will decrease functional limitation score to less than or equal to 60 out of 100 on FOTO.  Strength: Patient will improve impaired strength to greater or equal to 4-/5.      Long Term Goals:  12 weeks  HEP: Pt will be independent with advanced HEP.  Pain: Pt will demonstrate  improved pain less than or equal to 1/10 in order to progress toward maximal functional ability and improve QOL.  Function: Patient will decrease functional limitation score to less than or equal to 55 out of 100 on FOTO to improve functional independence and quality of life.   Mobility: Patient will demonstrate sit to stand mod I with least restricted AD to return to prior level of function.   Strength: Patient will improve impaired strength to greater or equal to 4/5 in order to walk longer distance.   Gait: Patient will ambulate 250 ft mod I with least restrictive AD in order to return to PLOF.       PLAN     Plan of care Certification: 1/12/2023 to 4/7/2023.     Outpatient Physical Therapy 2 times weekly for 12 weeks to include the following interventions: Gait Training, Manual Therapy, Moist Heat/ Ice, Neuromuscular Re-ed, Self Care, Therapeutic Activities, Therapeutic Exercise, and FDN .      Krystal Mccauley, PT

## 2023-01-30 ENCOUNTER — INFUSION (OUTPATIENT)
Dept: INFUSION THERAPY | Facility: HOSPITAL | Age: 68
End: 2023-01-30
Attending: NURSE PRACTITIONER
Payer: MEDICARE

## 2023-01-30 VITALS
RESPIRATION RATE: 18 BRPM | TEMPERATURE: 98 F | SYSTOLIC BLOOD PRESSURE: 131 MMHG | HEART RATE: 73 BPM | DIASTOLIC BLOOD PRESSURE: 65 MMHG | OXYGEN SATURATION: 96 %

## 2023-01-30 DIAGNOSIS — D50.8 OTHER IRON DEFICIENCY ANEMIA: Primary | ICD-10-CM

## 2023-01-30 PROCEDURE — 25000003 PHARM REV CODE 250: Performed by: NURSE PRACTITIONER

## 2023-01-30 PROCEDURE — 96374 THER/PROPH/DIAG INJ IV PUSH: CPT

## 2023-01-30 PROCEDURE — 63600175 PHARM REV CODE 636 W HCPCS: Performed by: NURSE PRACTITIONER

## 2023-01-30 RX ADMIN — SODIUM CHLORIDE: 9 INJECTION, SOLUTION INTRAVENOUS at 03:01

## 2023-01-30 RX ADMIN — IRON SUCROSE 200 MG: 20 INJECTION, SOLUTION INTRAVENOUS at 03:01

## 2023-01-30 NOTE — DISCHARGE INSTRUCTIONS
THANKS FOR ALLOWING ME TO CARE FOR YOU TODAY!!! ~Zhanna          THANKS FOR CHOOSING [facility]!!!      [facility]  [id number] [facility]  [id number] [place] [address] Drive  [phone] phone     [phone] fax  Hours of Operation: Monday- Friday 8:00am- 5:00pm  After hours phone  [phone]  Hematology / Oncology Physicians on call      JANIS Rdz Dr., Dr., NP Sydney Prescott, JACEY Pruitt, CHEO Casillas    Please call with any concerns regarding your appointment today.

## 2023-01-30 NOTE — PLAN OF CARE
Discussed plan of care with pt. Addressed any and ongoing concerns. Pt denies   Problem: Adult Inpatient Plan of Care  Goal: Plan of Care Review  Outcome: Ongoing, Progressing  Goal: Patient-Specific Goal (Individualized)  Outcome: Ongoing, Progressing  Goal: Absence of Hospital-Acquired Illness or Injury  Outcome: Ongoing, Progressing  Intervention: Identify and Manage Fall Risk  Flowsheets (Taken 1/30/2023 1615)  Safety Promotion/Fall Prevention: in recliner, wheels locked  Intervention: Prevent Infection  Flowsheets (Taken 1/30/2023 1615)  Infection Prevention:   equipment surfaces disinfected   hand hygiene promoted   personal protective equipment utilized  Goal: Optimal Comfort and Wellbeing  Outcome: Ongoing, Progressing  Intervention: Provide Person-Centered Care  Flowsheets (Taken 1/30/2023 1615)  Trust Relationship/Rapport:   care explained   emotional support provided   empathic listening provided   questions answered   questions encouraged   thoughts/feelings acknowledged

## 2023-01-31 ENCOUNTER — CLINICAL SUPPORT (OUTPATIENT)
Dept: REHABILITATION | Facility: HOSPITAL | Age: 68
End: 2023-01-31
Payer: MEDICARE

## 2023-01-31 DIAGNOSIS — M17.0 PRIMARY OSTEOARTHRITIS OF BOTH KNEES: ICD-10-CM

## 2023-01-31 DIAGNOSIS — S32.122D CLOSED SEVERELY DISPLACED ZONE II FRACTURE OF SACRUM WITH ROUTINE HEALING, SUBSEQUENT ENCOUNTER: Primary | Chronic | ICD-10-CM

## 2023-01-31 DIAGNOSIS — R26.9 GAIT DIFFICULTY: ICD-10-CM

## 2023-01-31 PROCEDURE — 97530 THERAPEUTIC ACTIVITIES: CPT | Mod: PN

## 2023-01-31 PROCEDURE — 97110 THERAPEUTIC EXERCISES: CPT | Mod: PN

## 2023-01-31 NOTE — PROGRESS NOTES
[facility]   Physical Therapy Treatment Note     Name: Merle BUSH [facility] Number: [id number]    Therapy Diagnosis:   Encounter Diagnoses   Name Primary?    Closed severely displaced zone II fracture of sacrum with routine healing, subsequent encounter Yes    Gait difficulty     Primary osteoarthritis of both knees      Physician: Juni Meza MD    Visit Date: 1/31/2023    Physician Orders: PT Eval and Treat   Medical Diagnosis from Referral: Closed severely displaced zone II fracture of sacrum with routine healing, subsequent encounter  Evaluation Date: 1/12/2023  Authorization Period Expiration: 1/1/2023 - 12/31/2023  Plan of Care Expiration: 4/7/2023  Progress Note Due: 10th visit or 2/12/2023  Visit # / Visits authorized: 5/20 + eval  FOTO: 1/3     Precautions: Standard and COPD , osteopenia, s/p L2-pelvis posterior spinal fusion for trans-sacral fracture dislocation with 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 Pt c/o pain to sacral region.     She was compliant with home exercise program.  Response to previous treatment: mild soreness   Functional change: improved standing and transfers     Pain: 5/10  Location: sacral region     OBJECTIVE     Objective Measures updated at progress report unless specified.     Treatment     Merle received the treatments listed below:      therapeutic exercises to develop strength, endurance, and ROM for 10 minutes including:  -ankle pumps 3x10   -LAQ with AAROM at end range for increased knee extension 3x10   +rows RTB 2x10   +elbow extension with RTB x10      therapeutic activities to improve functional performance for 30 minutes, including:  -standing marches with RW 2x10  -sit to stand with RW and CGA 2x10  -car transfer min A x1   -25 ft amb with RW SBA (seated  break)   +seated ball toss with small yellow ball 2x10     neuromuscular re-education activities to improve: Balance and Proprioception for 0 minutes. The following activities were included:   -wide base balance with hands hovering over RW 3x10s (not today)     Patient Education and Home Exercises     Home Exercises Provided and Patient Education Provided     Education provided:   - continue HEP     Written Home Exercises Provided: Patient instructed to cont prior HEP. Exercises were reviewed and Merle was able to demonstrate them prior to the end of the session.  Merle demonstrated good  understanding of the education provided. See EMR under Patient Instructions for exercises provided during therapy sessions    ASSESSMENT   Pt appears less lethargic today. Added rows and elbow extension to improve UE strength in order to push off wheelchair or RW. Add ball toss to improve functional mobility such as pushing objects away. Pt demonstrated improvement with ambulation; able to ambulate with RW short distance with SBA. Min A with WC to car transfers; able to take 2 steps with hand held assist to car. Continue to progress as tolerated.       Merle Is progressing well towards her goals.   Pt prognosis is Fair.     Pt will continue to benefit from skilled outpatient physical therapy to address the deficits listed in the problem list box on initial evaluation, provide pt/family education and to maximize pt's level of independence in the home and community environment.     Pt's spiritual, cultural and educational needs considered and pt agreeable to plan of care and goals.     Anticipated Barriers for therapy: poor endurance     Goals:   Short Term Goals:  6 weeks  HEP: Patient will demonstrate 50% independence with HEP   Pain: Pt will demonstrate improved pain less than or equal to 3/10   Function: Patient will decrease functional limitation score to less than or equal to 60 out of 100 on FOTO.  Strength: Patient will  improve impaired strength to greater or equal to 4-/5.      Long Term Goals:  12 weeks  HEP: Pt will be independent with advanced HEP.  Pain: Pt will demonstrate improved pain less than or equal to 1/10 in order to progress toward maximal functional ability and improve QOL.  Function: Patient will decrease functional limitation score to less than or equal to 55 out of 100 on FOTO to improve functional independence and quality of life.   Mobility: Patient will demonstrate sit to stand mod I with least restricted AD to return to prior level of function.   Strength: Patient will improve impaired strength to greater or equal to 4/5 in order to walk longer distance.   Gait: Patient will ambulate 250 ft mod I with least restrictive AD in order to return to PLOF.       PLAN     Plan of care Certification: 1/12/2023 to 4/7/2023.     Outpatient Physical Therapy 2 times weekly for 12 weeks to include the following interventions: Gait Training, Manual Therapy, Moist Heat/ Ice, Neuromuscular Re-ed, Self Care, Therapeutic Activities, Therapeutic Exercise, and FDN .      Krystal Mccauley, PT

## 2023-02-02 ENCOUNTER — CLINICAL SUPPORT (OUTPATIENT)
Dept: REHABILITATION | Facility: HOSPITAL | Age: 68
End: 2023-02-02
Payer: MEDICARE

## 2023-02-02 DIAGNOSIS — M17.0 PRIMARY OSTEOARTHRITIS OF BOTH KNEES: ICD-10-CM

## 2023-02-02 DIAGNOSIS — R26.9 GAIT DIFFICULTY: ICD-10-CM

## 2023-02-02 DIAGNOSIS — S32.122D CLOSED SEVERELY DISPLACED ZONE II FRACTURE OF SACRUM WITH ROUTINE HEALING, SUBSEQUENT ENCOUNTER: Primary | Chronic | ICD-10-CM

## 2023-02-02 PROCEDURE — 97110 THERAPEUTIC EXERCISES: CPT | Mod: PN

## 2023-02-02 PROCEDURE — 97530 THERAPEUTIC ACTIVITIES: CPT | Mod: PN

## 2023-02-02 NOTE — PROGRESS NOTES
[facility]   Physical Therapy Treatment Note     Name: Merle BUSH [facility] Number: [id number]    Therapy Diagnosis:   Encounter Diagnoses   Name Primary?    Closed severely displaced zone II fracture of sacrum with routine healing, subsequent encounter Yes    Gait difficulty     Primary osteoarthritis of both knees      Physician: Juni Meza MD    Visit Date: 2/2/2023    Physician Orders: PT Eval and Treat   Medical Diagnosis from Referral: Closed severely displaced zone II fracture of sacrum with routine healing, subsequent encounter  Evaluation Date: 1/12/2023  Authorization Period Expiration: 1/1/2023 - 12/31/2023  Plan of Care Expiration: 4/7/2023  Progress Note Due: 2/12/2023  Visit # / Visits authorized: 6/20 + eval  FOTO: 1/3     Precautions: Standard and COPD , osteopenia, s/p L2-pelvis posterior spinal fusion for trans-sacral fracture dislocation with S1-2 spondyloptosis on 10/18/2022    PTA Visit #: -/5     Time In: 2:00  Time Out: 2:38  Total Billable Time: 38 minutes    SUBJECTIVE     Pt reports: moderate to severe soreness from last visit. Denies soreness today.     She was compliant with home exercise program.  Response to previous treatment: mod to severe soreness   Functional change: improved standing and transfers     Pain: 5/10  Location: sacral region     OBJECTIVE     Objective Measures updated at progress report unless specified.     Treatment     Merle received the treatments listed below:      therapeutic exercises to develop strength, endurance, and ROM for 10 minutes including:  -ankle pumps 3x10   +seated marches 2# 2x10   -LAQ 2# 2x10   -rows RTB x10 (not today)   -elbow extension with RTB x10  (not today)     therapeutic activities to improve functional performance for 28 minutes, including:  -standing marches with RW with 2# 2x10  -sit to stand with RW and CGA 2x5  -car transfer min A x1   -25 ft amb with RW SBA (seated break) (not today)   +seated  ball toss with small yellow ball 2x10 (not today)     neuromuscular re-education activities to improve: Balance and Proprioception for 0 minutes. The following activities were included:   -wide base balance with hands hovering over RW 3x10s (not today)     Patient Education and Home Exercises     Home Exercises Provided and Patient Education Provided     Education provided:   - continue HEP     Written Home Exercises Provided: Patient instructed to cont prior HEP. Exercises were reviewed and Merle was able to demonstrate them prior to the end of the session.  Merle demonstrated good  understanding of the education provided. See EMR under Patient Instructions for exercises provided during therapy sessions    ASSESSMENT   Pt required longer breaks today's session secondary to fatigue. Pt able to tolerate progression of LE strengthening exercises with moderate challenge. Unable to complete UE exercises secondary to soreness/fatigue to UE. Terminated physical therapy session secondary to bowel leakage through dressing. Pt able to perform car transfer with minimal assist for anterior trunk lean during initiating LE into car. Pt demonstrated progress with car transfer and LE strength exercises.       Merle Is progressing well towards her goals.   Pt prognosis is Fair.     Pt will continue to benefit from skilled outpatient physical therapy to address the deficits listed in the problem list box on initial evaluation, provide pt/family education and to maximize pt's level of independence in the home and community environment.     Pt's spiritual, cultural and educational needs considered and pt agreeable to plan of care and goals.     Anticipated Barriers for therapy: poor endurance     Goals:   Short Term Goals:  6 weeks  HEP: Patient will demonstrate 50% independence with HEP   Pain: Pt will demonstrate improved pain less than or equal to 3/10   Function: Patient will decrease functional limitation score to less than or  equal to 60 out of 100 on FOTO.  Strength: Patient will improve impaired strength to greater or equal to 4-/5.      Long Term Goals:  12 weeks  HEP: Pt will be independent with advanced HEP.  Pain: Pt will demonstrate improved pain less than or equal to 1/10 in order to progress toward maximal functional ability and improve QOL.  Function: Patient will decrease functional limitation score to less than or equal to 55 out of 100 on FOTO to improve functional independence and quality of life.   Mobility: Patient will demonstrate sit to stand mod I with least restricted AD to return to prior level of function.   Strength: Patient will improve impaired strength to greater or equal to 4/5 in order to walk longer distance.   Gait: Patient will ambulate 250 ft mod I with least restrictive AD in order to return to PLOF.       PLAN     Plan of care Certification: 1/12/2023 to 4/7/2023.     Outpatient Physical Therapy 2 times weekly for 12 weeks to include the following interventions: Gait Training, Manual Therapy, Moist Heat/ Ice, Neuromuscular Re-ed, Self Care, Therapeutic Activities, Therapeutic Exercise, and FDN .      Krystal Mccauley, PT

## 2023-02-06 ENCOUNTER — INFUSION (OUTPATIENT)
Dept: INFUSION THERAPY | Facility: HOSPITAL | Age: 68
End: 2023-02-06
Attending: NURSE PRACTITIONER
Payer: MEDICARE

## 2023-02-06 VITALS
HEIGHT: 62 IN | RESPIRATION RATE: 18 BRPM | HEART RATE: 79 BPM | SYSTOLIC BLOOD PRESSURE: 140 MMHG | DIASTOLIC BLOOD PRESSURE: 60 MMHG | WEIGHT: 119.06 LBS | OXYGEN SATURATION: 98 % | BODY MASS INDEX: 21.91 KG/M2 | TEMPERATURE: 98 F

## 2023-02-06 DIAGNOSIS — D50.8 OTHER IRON DEFICIENCY ANEMIA: Primary | ICD-10-CM

## 2023-02-06 PROCEDURE — 63600175 PHARM REV CODE 636 W HCPCS: Performed by: NURSE PRACTITIONER

## 2023-02-06 PROCEDURE — 96374 THER/PROPH/DIAG INJ IV PUSH: CPT

## 2023-02-06 PROCEDURE — 25000003 PHARM REV CODE 250: Performed by: NURSE PRACTITIONER

## 2023-02-06 RX ADMIN — SODIUM CHLORIDE: 9 INJECTION, SOLUTION INTRAVENOUS at 09:02

## 2023-02-06 RX ADMIN — IRON SUCROSE 200 MG: 20 INJECTION, SOLUTION INTRAVENOUS at 09:02

## 2023-02-06 NOTE — DISCHARGE INSTRUCTIONS
THANKS FOR ALLOWING ME TO CARE FOR YOU TODAY!!!           THANKS FOR CHOOSING [facility]!!!      [facility]  [address] [address]  [phone] phone     [phone] fax  Hours of Operation: Monday- Friday 8:00am- 5:00pm  After hours phone  [phone]  Hematology / Oncology Physicians on call      JANIS Rdz Dr., Dr., NP Sydney Prescott, JACEY Pruitt, CHEO Casillas    Please call with any concerns regarding your appointment today.

## 2023-02-06 NOTE — PLAN OF CARE
Discussed plan of care with pt. Addressed any and ongoing concerns. Pt denies   Problem: Adult Inpatient Plan of Care  Goal: Plan of Care Review  Outcome: Ongoing, Progressing  Goal: Patient-Specific Goal (Individualized)  Outcome: Ongoing, Progressing  Goal: Absence of Hospital-Acquired Illness or Injury  Outcome: Ongoing, Progressing  Intervention: Identify and Manage Fall Risk  Flowsheets (Taken 2/6/2023 0902)  Safety Promotion/Fall Prevention: in recliner, wheels locked  Intervention: Prevent Infection  Flowsheets (Taken 2/6/2023 0902)  Infection Prevention:   hand hygiene promoted   personal protective equipment utilized   equipment surfaces disinfected  Goal: Optimal Comfort and Wellbeing  Outcome: Ongoing, Progressing  Intervention: Provide Person-Centered Care  Flowsheets (Taken 2/6/2023 0902)  Trust Relationship/Rapport:   empathic listening provided   questions answered   questions encouraged

## 2023-02-07 ENCOUNTER — CLINICAL SUPPORT (OUTPATIENT)
Dept: REHABILITATION | Facility: HOSPITAL | Age: 68
End: 2023-02-07
Payer: MEDICARE

## 2023-02-07 ENCOUNTER — DOCUMENTATION ONLY (OUTPATIENT)
Dept: REHABILITATION | Facility: HOSPITAL | Age: 68
End: 2023-02-07

## 2023-02-07 DIAGNOSIS — R26.9 GAIT DIFFICULTY: ICD-10-CM

## 2023-02-07 DIAGNOSIS — S32.122D CLOSED SEVERELY DISPLACED ZONE II FRACTURE OF SACRUM WITH ROUTINE HEALING, SUBSEQUENT ENCOUNTER: Primary | Chronic | ICD-10-CM

## 2023-02-07 DIAGNOSIS — M17.0 PRIMARY OSTEOARTHRITIS OF BOTH KNEES: ICD-10-CM

## 2023-02-07 PROCEDURE — 97110 THERAPEUTIC EXERCISES: CPT | Mod: PN,CQ

## 2023-02-07 PROCEDURE — 97530 THERAPEUTIC ACTIVITIES: CPT | Mod: PN,CQ

## 2023-02-07 NOTE — PROGRESS NOTES
"[facility]   Physical Therapy Treatment Note     Name: Merle BUSH [facility] [place]  Clinic Number: [id number]    Therapy Diagnosis:   Encounter Diagnoses   Name Primary?    Closed severely displaced zone II fracture of sacrum with routine healing, subsequent encounter Yes    Gait difficulty     Primary osteoarthritis of both knees        Physician: Juni Meza MD    Visit Date: 2/7/2023    Physician Orders: PT Eval and Treat   Medical Diagnosis from Referral: Closed severely displaced zone II fracture of sacrum with routine healing, subsequent encounter  Evaluation Date: 1/12/2023  Authorization Period Expiration: 1/1/2023 - 12/31/2023  Plan of Care Expiration: 4/7/2023  Progress Note Due: 2/12/2023  Visit # / Visits authorized: 7/20 + eval    FOTO: 1/3     Precautions: Standard and COPD , osteopenia, bowel incontinence,  s/p L2-pelvis posterior spinal fusion for trans-sacral fracture dislocation with 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  Response to previous treatment: mod soreness  Functional change: improved standing and transfers     Pain: 5/10  Location: sacral region     OBJECTIVE     Objective Measures updated at progress report unless specified.     Treatment     Merle received the treatments listed below:      therapeutic exercises to develop strength, endurance, and ROM for 15 minutes including:  -ankle pumps 3x10   -seated marches 2# 2x10   -LAQ 2# 2x10   -seated ball squeezes 2x15  -rows RTB x10   -elbow extension with RTB x10  (not today)     therapeutic activities to improve functional performance for 24 minutes, including:  -standing marches in //bars with 2# 2x10  -sit to stand from wheelchair SBA 2x5  -car transfer CGA x1   -25 ft  and 35 ft amb " with RW SBA (seated break)   -seated ball bounce/catch with small yellow ball 2x10    neuromuscular re-education activities to improve: Balance and Proprioception for 1 minutes. The following activities were included:   -wide base balance with hands hovering over RW 3x    Patient Education and Home Exercises     Home Exercises Provided and Patient Education Provided     Education provided:   - continue HEP     Written Home Exercises Provided: Patient instructed to cont prior HEP. Exercises were reviewed and Merle was able to demonstrate them prior to the end of the session.  Merle demonstrated good  understanding of the education provided. See EMR under Patient Instructions for exercises provided during therapy sessions    ASSESSMENT   Merle presented to therapy today in her wheelchair. She stated she worked on her exercises a lot yesterday and is sore and tired. Continued with lower extremity resistive strengthening exercises , trunk support , ambulation and standing balance. Merle self limits herself and activities are progressed slowly and with increased time. Merle was able to ambulate a longer distance today on the rolling walker and required close SBA. She is unsafe ambulating alone at this time as she is a fall risk. Continued skilled intervention indicated to increase her functional strength and mobility to decrease her pain and fall risk.   Merle Is progressing well towards her goals.   Pt prognosis is Fair.     Pt will continue to benefit from skilled outpatient physical therapy to address the deficits listed in the problem list box on initial evaluation, provide pt/family education and to maximize pt's level of independence in the home and community environment.     Pt's spiritual, cultural and educational needs considered and pt agreeable to plan of care and goals.     Anticipated Barriers for therapy: poor endurance     Goals:   Short Term Goals:  6 weeks  HEP: Patient will demonstrate 50%  independence with HEP   Pain: Pt will demonstrate improved pain less than or equal to 3/10   Function: Patient will decrease functional limitation score to less than or equal to 60 out of 100 on FOTO.  Strength: Patient will improve impaired strength to greater or equal to 4-/5.      Long Term Goals:  12 weeks  HEP: Pt will be independent with advanced HEP.  Pain: Pt will demonstrate improved pain less than or equal to 1/10 in order to progress toward maximal functional ability and improve QOL.  Function: Patient will decrease functional limitation score to less than or equal to 55 out of 100 on FOTO to improve functional independence and quality of life.   Mobility: Patient will demonstrate sit to stand mod I with least restricted AD to return to prior level of function.   Strength: Patient will improve impaired strength to greater or equal to 4/5 in order to walk longer distance.   Gait: Patient will ambulate 250 ft mod I with least restrictive AD in order to return to PLOF.       PLAN     Plan of care Certification: 1/12/2023 to 4/7/2023.     Outpatient Physical Therapy 2 times weekly for 12 weeks to include the following interventions: Gait Training, Manual Therapy, Moist Heat/ Ice, Neuromuscular Re-ed, Self Care, Therapeutic Activities, Therapeutic Exercise, and FDN .      Tressa Garg, PTA

## 2023-02-08 NOTE — PROGRESS NOTES
PT/PTA met face to face to discuss pt's treatment plan and progress towards established goals. Pt will be seen by a physical therapist minimally every 6th visit or every 30 days.      Tressa Garg PTA

## 2023-02-13 ENCOUNTER — INFUSION (OUTPATIENT)
Dept: INFUSION THERAPY | Facility: HOSPITAL | Age: 68
End: 2023-02-13
Attending: NURSE PRACTITIONER
Payer: MEDICARE

## 2023-02-13 DIAGNOSIS — D50.8 OTHER IRON DEFICIENCY ANEMIA: Primary | ICD-10-CM

## 2023-02-13 PROCEDURE — 63600175 PHARM REV CODE 636 W HCPCS: Performed by: NURSE PRACTITIONER

## 2023-02-13 PROCEDURE — 96375 TX/PRO/DX INJ NEW DRUG ADDON: CPT

## 2023-02-13 PROCEDURE — 96374 THER/PROPH/DIAG INJ IV PUSH: CPT

## 2023-02-13 RX ADMIN — IRON SUCROSE 200 MG: 20 INJECTION, SOLUTION INTRAVENOUS at 10:02

## 2023-02-13 NOTE — PLAN OF CARE
Problem: Adult Inpatient Plan of Care  Goal: Plan of Care Review  Outcome: Ongoing, Progressing  Goal: Patient-Specific Goal (Individualized)  Outcome: Ongoing, Progressing  Goal: Optimal Comfort and Wellbeing  Outcome: Ongoing, Progressing  Intervention: Provide Person-Centered Care  Flowsheets (Taken 2/13/2023 1034)  Trust Relationship/Rapport:   care explained   reassurance provided   choices provided   thoughts/feelings acknowledged   emotional support provided   empathic listening provided   questions answered   questions encouraged

## 2023-02-13 NOTE — PROGRESS NOTES
[facility]   Physical Therapy Assistant Treatment Note     Name: Merle BUSH [facility]  Clinic Number: [id number]    Therapy Diagnosis:   Encounter Diagnoses   Name Primary?    Closed severely displaced zone II fracture of sacrum with routine healing, subsequent encounter Yes    Gait difficulty     Primary osteoarthritis of both knees          Physician: Juni Meza MD    Visit Date: 2/14/2023    Physician Orders: PT Eval and Treat   Medical Diagnosis from Referral: Closed severely displaced zone II fracture of sacrum with routine healing, subsequent encounter  Evaluation Date: 1/12/2023  Authorization Period Expiration: 1/1/2023 - 12/31/2023  Plan of Care Expiration: 4/7/2023  Progress Note Due: 2/12/2023  Visit # / Visits authorized: 8/20 + eval    FOTO: 1/3     Precautions: Standard and COPD , osteopenia, bowel incontinence,  s/p L2-pelvis posterior spinal fusion for trans-sacral fracture dislocation with 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 She likes to walk because it helps with sacral pain    She was compliant with home exercise program.  Response to previous treatment: mod soreness  Functional change: improved standing and transfers     Pain: 5/10  Location: sacral region     OBJECTIVE     Objective Measures updated at progress report unless specified.     Treatment     Merle received the treatments listed below:      therapeutic exercises to develop strength, endurance, and ROM for 15 minutes including:  -ankle pumps 3x10   -seated marches 2# 2x10   -LAQ 2# 2x10   -seated ball squeezes 2x15 (not today)  -rows RTB x10   -elbow extension with RTB x10  (not today)     therapeutic activities to improve functional performance for 30 minutes, including:  -standing marches in //bars with 2# 2x10  -sit to stand from wheelchair SBA 2x5  -car  transfer CGA x1   -65 ft x 2 amb with RW SBA (seated break)   -seated ball bounce/catch with small yellow ball 2x10  -standing reaches for ball  -standing small ball toss with right hand while holding onto walker with left upper extremity    neuromuscular re-education activities to improve: Balance and Proprioception for 0 minutes. The following activities were included:   -wide base balance with hands hovering over RW 3x    Patient Education and Home Exercises     Home Exercises Provided and Patient Education Provided     Education provided:   - continue HEP     Written Home Exercises Provided: Patient instructed to cont prior HEP. Exercises were reviewed and Merle was able to demonstrate them prior to the end of the session.  Melre demonstrated good  understanding of the education provided. See EMR under Patient Instructions for exercises provided during therapy sessions    ASSESSMENT   Merle presented to therapy today in her wheelchair. She is agreeable to gait training with focus on improving base of support and upright posture. Patient tends to demonstrate narrow base of support with turning which increases fall risk. Will work on improving this next visit. She participated in more standing balance activities mimicking reaching for objects with one hand support. CGA provided with all standing activities for safety. She requires increased cues for motivation to ambulate longer distance. Continued skilled intervention indicated to increase her functional strength and mobility to decrease her pain and fall risk.     Merle Is progressing well towards her goals.   Pt prognosis is Fair.     Pt will continue to benefit from skilled outpatient physical therapy to address the deficits listed in the problem list box on initial evaluation, provide pt/family education and to maximize pt's level of independence in the home and community environment.     Pt's spiritual, cultural and educational needs considered and pt  agreeable to plan of care and goals.     Anticipated Barriers for therapy: poor endurance     Goals:   Short Term Goals:  6 weeks  HEP: Patient will demonstrate 50% independence with HEP   Pain: Pt will demonstrate improved pain less than or equal to 3/10   Function: Patient will decrease functional limitation score to less than or equal to 60 out of 100 on FOTO.  Strength: Patient will improve impaired strength to greater or equal to 4-/5.      Long Term Goals:  12 weeks  HEP: Pt will be independent with advanced HEP.  Pain: Pt will demonstrate improved pain less than or equal to 1/10 in order to progress toward maximal functional ability and improve QOL.  Function: Patient will decrease functional limitation score to less than or equal to 55 out of 100 on FOTO to improve functional independence and quality of life.   Mobility: Patient will demonstrate sit to stand mod I with least restricted AD to return to prior level of function.   Strength: Patient will improve impaired strength to greater or equal to 4/5 in order to walk longer distance.   Gait: Patient will ambulate 250 ft mod I with least restrictive AD in order to return to PLOF.       PLAN     Plan of care Certification: 1/12/2023 to 4/7/2023.     Outpatient Physical Therapy 2 times weekly for 12 weeks to include the following interventions: Gait Training, Manual Therapy, Moist Heat/ Ice, Neuromuscular Re-ed, Self Care, Therapeutic Activities, Therapeutic Exercise, and FDN .      Britni Easton, PTA

## 2023-02-14 ENCOUNTER — CLINICAL SUPPORT (OUTPATIENT)
Dept: REHABILITATION | Facility: HOSPITAL | Age: 68
End: 2023-02-14
Payer: MEDICARE

## 2023-02-14 ENCOUNTER — DOCUMENTATION ONLY (OUTPATIENT)
Dept: REHABILITATION | Facility: HOSPITAL | Age: 68
End: 2023-02-14

## 2023-02-14 DIAGNOSIS — S32.122D CLOSED SEVERELY DISPLACED ZONE II FRACTURE OF SACRUM WITH ROUTINE HEALING, SUBSEQUENT ENCOUNTER: Primary | Chronic | ICD-10-CM

## 2023-02-14 DIAGNOSIS — M17.0 PRIMARY OSTEOARTHRITIS OF BOTH KNEES: ICD-10-CM

## 2023-02-14 DIAGNOSIS — R26.9 GAIT DIFFICULTY: ICD-10-CM

## 2023-02-14 PROCEDURE — 97110 THERAPEUTIC EXERCISES: CPT | Mod: PN,CQ

## 2023-02-14 PROCEDURE — 97530 THERAPEUTIC ACTIVITIES: CPT | Mod: PN,CQ

## 2023-02-14 NOTE — PROGRESS NOTES
PT/PTA met face to face to discuss pt's treatment plan and progress towards established goals. Pt will be seen by a physical therapist minimally every 6th visit or every 30 days.    Britni Easton PTA

## 2023-02-16 ENCOUNTER — CLINICAL SUPPORT (OUTPATIENT)
Dept: REHABILITATION | Facility: HOSPITAL | Age: 68
End: 2023-02-16
Payer: MEDICARE

## 2023-02-16 DIAGNOSIS — S32.122D CLOSED SEVERELY DISPLACED ZONE II FRACTURE OF SACRUM WITH ROUTINE HEALING, SUBSEQUENT ENCOUNTER: Primary | Chronic | ICD-10-CM

## 2023-02-16 DIAGNOSIS — M17.0 PRIMARY OSTEOARTHRITIS OF BOTH KNEES: ICD-10-CM

## 2023-02-16 DIAGNOSIS — R26.9 GAIT DIFFICULTY: ICD-10-CM

## 2023-02-16 PROCEDURE — 97530 THERAPEUTIC ACTIVITIES: CPT | Mod: PN,CQ

## 2023-02-16 PROCEDURE — 97110 THERAPEUTIC EXERCISES: CPT | Mod: PN,CQ

## 2023-02-16 NOTE — PROGRESS NOTES
"[facility]   Physical Therapy Assistant Treatment Note     Name: Merle Caro  Clinic Number: [id number]    Therapy Diagnosis:   Encounter Diagnoses   Name Primary?    Closed severely displaced zone II fracture of sacrum with routine healing, subsequent encounter Yes    Gait difficulty     Primary osteoarthritis of both knees          Physician: Juni Meza MD    Visit Date: 2/16/2023    Physician Orders: PT Eval and Treat   Medical Diagnosis from Referral: Closed severely displaced zone II fracture of sacrum with routine healing, subsequent encounter  Evaluation Date: 1/12/2023  Authorization Period Expiration: 1/1/2023 - 12/31/2023  Plan of Care Expiration: 4/7/2023  Progress Note Due: 2/12/2023  Visit # / Visits authorized: 9/20 + eval  FOTO next visit    FOTO: 1/3     Precautions: Standard and COPD , osteopenia, bowel incontinence,  s/p L2-pelvis posterior spinal fusion for trans-sacral fracture dislocation with 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 I hurt most of the time"  She was compliant with home exercise program.  Response to previous treatment: no issues  Functional change: improved standing, walking and transfers however does best when pain is lower    Pain: 5-6/10  Location: sacral region     OBJECTIVE     Objective Measures updated at progress report unless specified.     Treatment     Merle received the treatments listed below:      therapeutic exercises to develop strength, endurance, and ROM for 10 minutes including:  -ankle pumps 3x10 (not today)  -seated marches 2# 2x10 (not today)  -LAQ 2# 2x10 (not today)  -seated ball squeezes 2x15   -rows RTB 2x10   -pulleys for shoulder flexion x15  -elbow extension with RTB x10  (not today)   -NuStep for strength, mobility 4 min      therapeutic activities to improve functional performance for " 35 minutes, including:  -standing marches in //bars with 2# 2x10   -sit to stand from w/c with 5# KB /put down on small black stool 3x5  -seated on edge of wheelchair holding 4# ball placing on chair in front and  then back to chest x10  -15 ft x 3 , 73 ft x 1 amb with RW SBA (seated break)   -seated ball bounce/catch with small yellow ball 2x10  -standing reaches for ball  -standing small ball toss with right hand while holding onto walker with left upper extremity (not today)  -standing while moving 1 and 2 # weights from one shelf to another alternating arms SBA ; some assist on the right due to shoulder weakness    neuromuscular re-education activities to improve: Balance and Proprioception for 0 minutes. The following activities were included:     Patient Education and Home Exercises     Home Exercises Provided and Patient Education Provided     Education provided:   - continue HEP     Written Home Exercises Provided: Patient instructed to cont prior HEP. Exercises were reviewed and Merle was able to demonstrate them prior to the end of the session.  Merle demonstrated good  understanding of the education provided. See EMR under Patient Instructions for exercises provided during therapy sessions    ASSESSMENT   Merle presented to therapy today in her wheelchair. To increase motivation, activities were changed often to address both her upper and lower quadrant impairments. Reaching with her right arm into flexion was challenging due to decreased mobility and strength. Following the pulleys and moving light weights from shelf to shelf, she demonstrated some improvement in both mobility and strength. She tolerated the NuStep with decreased endurance as she was unable to perform for greater than 4 min but overall demonstrated good mobility.  Continued skilled intervention indicated to increase her functional strength and mobility to decrease her pain and fall risk.     Mrele Is progressing well towards  her goals.   Pt prognosis is Fair.     Pt will continue to benefit from skilled outpatient physical therapy to address the deficits listed in the problem list box on initial evaluation, provide pt/family education and to maximize pt's level of independence in the home and community environment.     Pt's spiritual, cultural and educational needs considered and pt agreeable to plan of care and goals.     Anticipated Barriers for therapy: poor endurance     Goals:   Short Term Goals:  6 weeks  HEP: Patient will demonstrate 50% independence with HEP   Pain: Pt will demonstrate improved pain less than or equal to 3/10   Function: Patient will decrease functional limitation score to less than or equal to 60 out of 100 on FOTO.  Strength: Patient will improve impaired strength to greater or equal to 4-/5.      Long Term Goals:  12 weeks  HEP: Pt will be independent with advanced HEP.  Pain: Pt will demonstrate improved pain less than or equal to 1/10 in order to progress toward maximal functional ability and improve QOL.  Function: Patient will decrease functional limitation score to less than or equal to 55 out of 100 on FOTO to improve functional independence and quality of life.   Mobility: Patient will demonstrate sit to stand mod I with least restricted AD to return to prior level of function.   Strength: Patient will improve impaired strength to greater or equal to 4/5 in order to walk longer distance.   Gait: Patient will ambulate 250 ft mod I with least restrictive AD in order to return to PLOF.       PLAN     Plan of care Certification: 1/12/2023 to 4/7/2023.     Outpatient Physical Therapy 2 times weekly for 12 weeks to include the following interventions: Gait Training, Manual Therapy, Moist Heat/ Ice, Neuromuscular Re-ed, Self Care, Therapeutic Activities, Therapeutic Exercise, and FDN .      Tressa Garg, PTA

## 2023-02-20 ENCOUNTER — INFUSION (OUTPATIENT)
Dept: INFUSION THERAPY | Facility: HOSPITAL | Age: 68
End: 2023-02-20
Attending: INTERNAL MEDICINE
Payer: MEDICARE

## 2023-02-20 VITALS
DIASTOLIC BLOOD PRESSURE: 62 MMHG | RESPIRATION RATE: 16 BRPM | HEART RATE: 81 BPM | TEMPERATURE: 97 F | SYSTOLIC BLOOD PRESSURE: 100 MMHG | OXYGEN SATURATION: 98 %

## 2023-02-20 DIAGNOSIS — D50.8 OTHER IRON DEFICIENCY ANEMIA: Primary | ICD-10-CM

## 2023-02-20 PROCEDURE — 63600175 PHARM REV CODE 636 W HCPCS: Performed by: NURSE PRACTITIONER

## 2023-02-20 PROCEDURE — 96374 THER/PROPH/DIAG INJ IV PUSH: CPT

## 2023-02-20 RX ORDER — HEPARIN 100 UNIT/ML
500 SYRINGE INTRAVENOUS
Status: CANCELLED | OUTPATIENT
Start: 2023-02-21

## 2023-02-20 RX ORDER — SODIUM CHLORIDE 0.9 % (FLUSH) 0.9 %
10 SYRINGE (ML) INJECTION
Status: CANCELLED | OUTPATIENT
Start: 2023-02-28

## 2023-02-20 RX ORDER — HEPARIN 100 UNIT/ML
500 SYRINGE INTRAVENOUS
Status: CANCELLED | OUTPATIENT
Start: 2023-02-28

## 2023-02-20 RX ORDER — SODIUM CHLORIDE 0.9 % (FLUSH) 0.9 %
10 SYRINGE (ML) INJECTION
Status: CANCELLED | OUTPATIENT
Start: 2023-03-14

## 2023-02-20 RX ORDER — SODIUM CHLORIDE 0.9 % (FLUSH) 0.9 %
10 SYRINGE (ML) INJECTION
Status: CANCELLED | OUTPATIENT
Start: 2023-03-07

## 2023-02-20 RX ORDER — HEPARIN 100 UNIT/ML
500 SYRINGE INTRAVENOUS
Status: CANCELLED | OUTPATIENT
Start: 2023-03-14

## 2023-02-20 RX ORDER — HEPARIN 100 UNIT/ML
500 SYRINGE INTRAVENOUS
Status: CANCELLED | OUTPATIENT
Start: 2023-03-07

## 2023-02-20 RX ORDER — SODIUM CHLORIDE 0.9 % (FLUSH) 0.9 %
10 SYRINGE (ML) INJECTION
Status: CANCELLED | OUTPATIENT
Start: 2023-02-21

## 2023-02-20 RX ADMIN — IRON SUCROSE 200 MG: 20 INJECTION, SOLUTION INTRAVENOUS at 10:02

## 2023-02-20 NOTE — PLAN OF CARE
Problem: Adult Inpatient Plan of Care  Goal: Plan of Care Review  Outcome: Ongoing, Progressing  Flowsheets (Taken 2/20/2023 1028)  Plan of Care Reviewed With: patient  Goal: Optimal Comfort and Wellbeing  Outcome: Ongoing, Progressing  Intervention: Provide Person-Centered Care  Flowsheets (Taken 2/20/2023 1028)  Trust Relationship/Rapport:   care explained   choices provided   emotional support provided   empathic listening provided   questions answered   questions encouraged   thoughts/feelings acknowledged   reassurance provided

## 2023-02-20 NOTE — PROGRESS NOTES
"[facility]   Physical Therapy Assistant Treatment Note     Name: Merle Caro  Clinic Number: [id number]    Therapy Diagnosis:   No diagnosis found.        Physician: Juni Meza MD    Visit Date: 2/21/2023    Physician Orders: PT Eval and Treat   Medical Diagnosis from Referral: Closed severely displaced zone II fracture of sacrum with routine healing, subsequent encounter  Evaluation Date: 1/12/2023  Authorization Period Expiration: 1/1/2023 - 12/31/2023  Plan of Care Expiration: 4/7/2023  Progress Note Due: 2/12/2023  Visit # / Visits authorized: 10/20 + eval  FOTO next visit    FOTO: 1/3     Precautions: Standard and COPD , osteopenia, bowel incontinence,  s/p L2-pelvis posterior spinal fusion for trans-sacral fracture dislocation with 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 I hurt most of the time"  She was compliant with home exercise program.  Response to previous treatment: no issues  Functional change: improved standing, walking and transfers however does best when pain is lower    Pain: 5-6/10  Location: sacral region     OBJECTIVE     Objective Measures updated at progress report unless specified.     Treatment     Merle received the treatments listed below:      therapeutic exercises to develop strength, endurance, and ROM for 10 minutes including:  -ankle pumps 3x10 (not today)  -seated marches 2# 2x10 (not today)  -LAQ 2# 2x10 (not today)  -seated ball squeezes 2x15   -rows RTB 2x10   -pulleys for shoulder flexion x15  -elbow extension with RTB x10  (not today)   -NuStep for strength, mobility 4 min      therapeutic activities to improve functional performance for 35 minutes, including:  -standing marches in //bars with 2# 2x10   -sit to stand from w/c with 5# KB /put down on small black stool 3x5  -seated on edge of wheelchair holding 4# ball " placing on chair in front and  then back to chest x10  -15 ft x 3 , 73 ft x 1 amb with RW SBA (seated break)   -seated ball bounce/catch with small yellow ball 2x10  -standing reaches for ball  -standing small ball toss with right hand while holding onto walker with left upper extremity (not today)  -standing while moving 1 and 2 # weights from one shelf to another alternating arms SBA ; some assist on the right due to shoulder weakness    neuromuscular re-education activities to improve: Balance and Proprioception for 0 minutes. The following activities were included:     Patient Education and Home Exercises     Home Exercises Provided and Patient Education Provided     Education provided:   - continue HEP     Written Home Exercises Provided: Patient instructed to cont prior HEP. Exercises were reviewed and Merle was able to demonstrate them prior to the end of the session.  Merle demonstrated good  understanding of the education provided. See EMR under Patient Instructions for exercises provided during therapy sessions    ASSESSMENT   Merle presented to therapy today in her wheelchair. To increase motivation, activities were changed often to address both her upper and lower quadrant impairments. Reaching with her right arm into flexion was challenging due to decreased mobility and strength. Following the pulleys and moving light weights from shelf to shelf, she demonstrated some improvement in both mobility and strength. She tolerated the NuStep with decreased endurance as she was unable to perform for greater than 4 min but overall demonstrated good mobility.  Continued skilled intervention indicated to increase her functional strength and mobility to decrease her pain and fall risk.     Merle Is progressing well towards her goals.   Pt prognosis is Fair.     Pt will continue to benefit from skilled outpatient physical therapy to address the deficits listed in the problem list box on initial evaluation,  provide pt/family education and to maximize pt's level of independence in the home and community environment.     Pt's spiritual, cultural and educational needs considered and pt agreeable to plan of care and goals.     Anticipated Barriers for therapy: poor endurance     Goals:   Short Term Goals:  6 weeks  HEP: Patient will demonstrate 50% independence with HEP   Pain: Pt will demonstrate improved pain less than or equal to 3/10   Function: Patient will decrease functional limitation score to less than or equal to 60 out of 100 on FOTO.  Strength: Patient will improve impaired strength to greater or equal to 4-/5.      Long Term Goals:  12 weeks  HEP: Pt will be independent with advanced HEP.  Pain: Pt will demonstrate improved pain less than or equal to 1/10 in order to progress toward maximal functional ability and improve QOL.  Function: Patient will decrease functional limitation score to less than or equal to 55 out of 100 on FOTO to improve functional independence and quality of life.   Mobility: Patient will demonstrate sit to stand mod I with least restricted AD to return to prior level of function.   Strength: Patient will improve impaired strength to greater or equal to 4/5 in order to walk longer distance.   Gait: Patient will ambulate 250 ft mod I with least restrictive AD in order to return to PLOF.       PLAN     Plan of care Certification: 1/12/2023 to 4/7/2023.     Outpatient Physical Therapy 2 times weekly for 12 weeks to include the following interventions: Gait Training, Manual Therapy, Moist Heat/ Ice, Neuromuscular Re-ed, Self Care, Therapeutic Activities, Therapeutic Exercise, and FDN .      Tressa Garg, PTA

## 2023-02-21 ENCOUNTER — CLINICAL SUPPORT (OUTPATIENT)
Dept: REHABILITATION | Facility: HOSPITAL | Age: 68
End: 2023-02-21
Payer: MEDICARE

## 2023-02-21 DIAGNOSIS — R26.9 GAIT DIFFICULTY: ICD-10-CM

## 2023-02-21 DIAGNOSIS — M17.0 PRIMARY OSTEOARTHRITIS OF BOTH KNEES: ICD-10-CM

## 2023-02-21 DIAGNOSIS — S32.122D CLOSED SEVERELY DISPLACED ZONE II FRACTURE OF SACRUM WITH ROUTINE HEALING, SUBSEQUENT ENCOUNTER: Primary | Chronic | ICD-10-CM

## 2023-02-21 PROCEDURE — 97110 THERAPEUTIC EXERCISES: CPT | Mod: PN

## 2023-02-21 PROCEDURE — 97530 THERAPEUTIC ACTIVITIES: CPT | Mod: PN

## 2023-02-21 NOTE — PROGRESS NOTES
[facility]   Physical Therapy Treatment/Progress Note     Name: Merle Caro  Clinic Number: [id number]    Therapy Diagnosis:   Encounter Diagnoses   Name Primary?    Closed severely displaced zone II fracture of sacrum with routine healing, subsequent encounter Yes    Gait difficulty     Primary osteoarthritis of both knees          Physician: Juni Meza MD    Visit Date: 2/21/2023    Physician Orders: PT Eval and Treat   Medical Diagnosis from Referral: Closed severely displaced zone II fracture of sacrum with routine healing, subsequent encounter  Evaluation Date: 1/12/2023  Authorization Period Expiration: 1/1/2023 - 12/31/2023  Plan of Care Expiration: 4/7/2023  Progress Note Due: 2/12/2023  Visit # / Visits authorized: 10/20 + eval      FOTO: 1/3 (FOTO NEXT VISIT)     Precautions: Standard and COPD , osteopenia, bowel incontinence,  s/p L2-pelvis posterior spinal fusion for trans-sacral fracture dislocation with 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  Response to previous treatment: no issues  Functional change: improved standing, walking and transfers however does best when pain is lower    Pain: 5/10  Location: sacral region     OBJECTIVE     GAIT: Initial Evaluation: 3 ft with rollator min A   2/21/2023: 120 feet with rolling walking, CGA assist     TRANSFERS:               Sit to stand without AD = mod A              Sit to stand with RW = Min A   2/21/2023: Sit to stand without AD = CGA      STRENGTH:  MMT Right  Left  2/21/2023 Comments   Shoulder flexion  3-/5 3-/5  3/5 B      Elbow flexion  3+/5 3+/5 4-/5 B      Elbow extension  4-/5 4-/5 4-/5 B         MMT Right Left 2/21/2023 Comments    Hip flexion  3-/5 3-/5 4-/5 B      Knee flexion   3+/5 3+/5 4-/5 B      Knee extension  3+/5 3+/5 4/5 B     Ankle dorsiflexion  4/5 3+/5 4/5 B      Ankle plantarflexion NT NT NT           Treatment     Merle received the treatments listed below:      therapeutic exercises to develop strength, endurance, and ROM for 15 minutes including:  -ankle pumps 3x10   -seated marches 2# 2x10 (not today)  -LAQ 2# 2x10 (not today)  -seated ball squeezes 2x15   -rows RTB 2x10   -pulleys for shoulder flexion x15 (not today)  -elbow extension with RTB x10  (not today)   -NuStep for strength, mobility 4 min      therapeutic activities to improve functional performance for 30 minutes, including:  -standing marches in //bars with 2# 2x10   -sit to stand from w/c with 5# KB /put down on small black stool 3x5  -seated on edge of wheelchair holding 4# ball placing on chair in front and  then back to chest x10  -123 ft x 1 amb with RW SBA   -seated ball bounce/catch with small yellow ball 2x10 (not today)  -standing reaches for ball  -standing small ball toss with right hand while holding onto walker with left upper extremity (not today)  -standing while moving 1 and 2 # weights from one shelf to another alternating arms SBA ; some assist on the right due to shoulder weakness    neuromuscular re-education activities to improve: Balance and Proprioception for 0 minutes. The following activities were included:     Patient Education and Home Exercises     Home Exercises Provided and Patient Education Provided     Education provided:   - continue HEP     Written Home Exercises Provided: Patient instructed to cont prior HEP. Exercises were reviewed and Merle was able to demonstrate them prior to the end of the session.  Merle demonstrated good  understanding of the education provided. See EMR under Patient Instructions for exercises provided during therapy sessions    ASSESSMENT   Merle presented to therapy today in her wheelchair. Patient continued  to require much external  motivation to complete exercises that challenge her strength and mobility in standing. Pt reported bowl issues also caused her to self limit in session today. She was able to fully participate in progressed treatment to address tolerance for standing and ambulation. Patient walked a community distance with only stand by assist and very end of session today, showing functional and activity tolerance improvements. Updated objective measures display patient's improvements thus far with physical therapy treatment.  Continued skilled intervention indicated to increase her functional strength and mobility to decrease her pain and fall risk.     Merle Is progressing well towards her goals.   Pt prognosis is Fair.     Pt will continue to benefit from skilled outpatient physical therapy to address the deficits listed in the problem list box on initial evaluation, provide pt/family education and to maximize pt's level of independence in the home and community environment.     Pt's spiritual, cultural and educational needs considered and pt agreeable to plan of care and goals.     Anticipated Barriers for therapy: poor endurance     Goals:   Short Term Goals:  6 weeks  HEP: Patient will demonstrate 50% independence with HEP MET 2/21/ 2023  Pain: Pt will demonstrate improved pain less than or equal to 3/10 Progressing   Function: Patient will decrease functional limitation score to less than or equal to 60 out of 100 on FOTO. Progressing   Strength: Patient will improve impaired strength to greater or equal to 4-/5. Partially Met 2/21/2023     Long Term Goals:  12 weeks  HEP: Pt will be independent with advanced HEP.  Pain: Pt will demonstrate improved pain less than or equal to 1/10 in order to progress toward maximal functional ability and improve QOL.  Function: Patient will decrease functional limitation score to less than or equal to 55 out of 100 on FOTO to improve functional independence and quality of life.   Mobility:  Patient will demonstrate sit to stand mod I with least restricted AD to return to prior level of function.   Strength: Patient will improve impaired strength to greater or equal to 4/5 in order to walk longer distance.   Gait: Patient will ambulate 250 ft mod I with least restrictive AD in order to return to PLOF.       PLAN     Plan of care Certification: 1/12/2023 to 4/7/2023.     Outpatient Physical Therapy 2 times weekly for 12 weeks to include the following interventions: Gait Training, Manual Therapy, Moist Heat/ Ice, Neuromuscular Re-ed, Self Care, Therapeutic Activities, Therapeutic Exercise, and FDN .      Mayelin Gilliam, PT

## 2023-02-23 ENCOUNTER — CLINICAL SUPPORT (OUTPATIENT)
Dept: REHABILITATION | Facility: HOSPITAL | Age: 68
End: 2023-02-23
Payer: MEDICARE

## 2023-02-23 DIAGNOSIS — M17.0 PRIMARY OSTEOARTHRITIS OF BOTH KNEES: ICD-10-CM

## 2023-02-23 DIAGNOSIS — S32.122D CLOSED SEVERELY DISPLACED ZONE II FRACTURE OF SACRUM WITH ROUTINE HEALING, SUBSEQUENT ENCOUNTER: Primary | Chronic | ICD-10-CM

## 2023-02-23 DIAGNOSIS — R26.9 GAIT DIFFICULTY: ICD-10-CM

## 2023-02-23 PROCEDURE — 97530 THERAPEUTIC ACTIVITIES: CPT | Mod: PN

## 2023-02-23 PROCEDURE — 97110 THERAPEUTIC EXERCISES: CPT | Mod: PN

## 2023-02-23 NOTE — PROGRESS NOTES
[facility]   Physical Therapy Treatment     Name: Merle Caro  Clinic Number: [id number]    Therapy Diagnosis:   Encounter Diagnoses   Name Primary?    Closed severely displaced zone II fracture of sacrum with routine healing, subsequent encounter Yes    Gait difficulty     Primary osteoarthritis of both knees          Physician: Juni Meza MD    Visit Date: 2/23/2023    Physician Orders: PT Eval and Treat   Medical Diagnosis from Referral: Closed severely displaced zone II fracture of sacrum with routine healing, subsequent encounter  Evaluation Date: 1/12/2023  Authorization Period Expiration: 1/1/2023 - 12/31/2023  Plan of Care Expiration: 4/7/2023  Progress Note Due: 3/21/2023  Visit # / Visits authorized: 11/20 + eval      FOTO: 1/3 (FOTO NEXT VISIT)     Precautions: Standard and COPD , osteopenia, bowel incontinence,  s/p L2-pelvis posterior spinal fusion for trans-sacral fracture dislocation with 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     She was compliant with home exercise program.  Response to previous treatment: no issues  Functional change: improved standing, walking and transfers however does best when pain is lower    Pain: 5/10  Location: sacral region     OBJECTIVE     GAIT: Initial Evaluation: 3 ft with rollator min A   2/21/2023: 120 feet with rolling walking, CGA assist     TRANSFERS:               Sit to stand without AD = mod A              Sit to stand with RW = Min A   2/21/2023: Sit to stand without AD = CGA      STRENGTH:  MMT Right  Left  2/21/2023 Comments   Shoulder flexion  3-/5 3-/5  3/5 B      Elbow flexion  3+/5 3+/5 4-/5 B      Elbow extension  4-/5 4-/5 4-/5 B         MMT Right Left 2/21/2023 Comments    Hip flexion  3-/5 3-/5 4-/5 B      Knee  flexion  3+/5 3+/5 4-/5 B      Knee extension  3+/5 3+/5 4/5 B     Ankle dorsiflexion  4/5 3+/5 4/5 B      Ankle plantarflexion NT NT NT           Treatment     Merle received the treatments listed below:      therapeutic exercises to develop strength, endurance, and ROM for 15 minutes including:  -ankle pumps 2x10  -seated marches 2x15  -LAQ 2x15   -ball kicks 2x10   -ball toss 2x10     therapeutic activities to improve functional performance for 15  minutes, including:  -standing marches 3x10 (not today)   -sit to stand with RW and SBA 2x10  -car transfer CGA x1   -70 ft amb with RW SBA      neuromuscular re-education activities to improve: Balance and Proprioception for 0 minutes. The following activities were included:              -wide base balance with hands hovering over RW 3x10s (not today)      Patient Education and Home Exercises     Home Exercises Provided and Patient Education Provided     Education provided:   - continue HEP     Written Home Exercises Provided: Patient instructed to cont prior HEP. Exercises were reviewed and Merle was able to demonstrate them prior to the end of the session.  Merle demonstrated good  understanding of the education provided. See EMR under Patient Instructions for exercises provided during therapy sessions    ASSESSMENT   Pt presented to clinic today with increased fatigue. Added ball toss and ball kicks to improve coordination and strength of extremities. Pt demonstrated improvement with coordination after moderate verbal cues. Pt demonstrated improvement with sit to stand transfers with RW; improved speed. Pt able to ambulate 70 feet without breaks at the end of today's session. Session terminated early secondary to patients request due to incontinence.     Merle Is progressing well towards her goals.   Pt prognosis is Fair.     Pt will continue to benefit from skilled outpatient physical therapy to address the deficits listed in the problem list box on initial  evaluation, provide pt/family education and to maximize pt's level of independence in the home and community environment.     Pt's spiritual, cultural and educational needs considered and pt agreeable to plan of care and goals.     Anticipated Barriers for therapy: poor endurance     Goals:   Short Term Goals:  6 weeks  HEP: Patient will demonstrate 50% independence with HEP MET 2/21/ 2023  Pain: Pt will demonstrate improved pain less than or equal to 3/10 Progressing   Function: Patient will decrease functional limitation score to less than or equal to 60 out of 100 on FOTO. Progressing   Strength: Patient will improve impaired strength to greater or equal to 4-/5. Partially Met 2/21/2023     Long Term Goals:  12 weeks  HEP: Pt will be independent with advanced HEP.  Pain: Pt will demonstrate improved pain less than or equal to 1/10 in order to progress toward maximal functional ability and improve QOL.  Function: Patient will decrease functional limitation score to less than or equal to 55 out of 100 on FOTO to improve functional independence and quality of life.   Mobility: Patient will demonstrate sit to stand mod I with least restricted AD to return to prior level of function.   Strength: Patient will improve impaired strength to greater or equal to 4/5 in order to walk longer distance.   Gait: Patient will ambulate 250 ft mod I with least restrictive AD in order to return to PLOF.       PLAN     Plan of care Certification: 1/12/2023 to 4/7/2023.     Outpatient Physical Therapy 2 times weekly for 12 weeks to include the following interventions: Gait Training, Manual Therapy, Moist Heat/ Ice, Neuromuscular Re-ed, Self Care, Therapeutic Activities, Therapeutic Exercise, and FDN .      Krystal Mccauley, PT

## 2023-02-27 NOTE — PROGRESS NOTES
[facility]   Physical Therapy Assistant Treatment     Name: Merle Caro  Clinic Number: [id number]    Therapy Diagnosis:   Encounter Diagnoses   Name Primary?    Closed severely displaced zone II fracture of sacrum with routine healing, subsequent encounter Yes    Gait difficulty     Primary osteoarthritis of both knees        Physician: Juni Meza MD    Visit Date: 2/28/2023    Physician Orders: PT Eval and Treat   Medical Diagnosis from Referral: Closed severely displaced zone II fracture of sacrum with routine healing, subsequent encounter  Evaluation Date: 1/12/2023  Authorization Period Expiration: 1/1/2023 - 12/31/2023  Plan of Care Expiration: 4/7/2023  Progress Note Due: 3/21/2023  Visit # / Visits authorized: 12/20 + eval      FOTO: 2/3        Limitation/Restriction for FOTO NOC-Neuromuscular disorder Survey     Therapist reviewed FOTO scores for Merle Caro on 2/28/2023.   FOTO documents entered into [employer] - see Media section.     Limitation Score: 72%           Precautions: Standard and COPD , osteopenia, bowel incontinence,  s/p L2-pelvis posterior spinal fusion for trans-sacral fracture dislocation with S1-2 spondyloptosis on 10/18/2022    PTA Visit #: 4/5     Time In: 2:15 pm  Time Out: 2:55 pm  Total Billable Time: 40 minutes    SUBJECTIVE     Pt reports: her arms are sore from exercises at home.    She was compliant with home exercise program.  Response to previous treatment: no issues  Functional change: improved standing, walking and transfers however does best when pain is lower    Pain: 5/10  Location: sacral region     OBJECTIVE     GAIT: Initial Evaluation: 3 ft with rollator min A   2/21/2023: 120 feet with rolling walking, CGA assist     TRANSFERS:               Sit to stand without AD = mod A              Sit to stand with RW = Min A   2/21/2023: Sit to stand without AD = CGA      STRENGTH:  MMT Right  Left  2/21/2023 Comments   Shoulder flexion  3-/5  3-/5  3/5 B      Elbow flexion  3+/5 3+/5 4-/5 B      Elbow extension  4-/5 4-/5 4-/5 B         MMT Right Left 2/21/2023 Comments    Hip flexion  3-/5 3-/5 4-/5 B      Knee flexion  3+/5 3+/5 4-/5 B      Knee extension  3+/5 3+/5 4/5 B     Ankle dorsiflexion  4/5 3+/5 4/5 B      Ankle plantarflexion NT NT NT           Treatment     Merle received the treatments listed below:      therapeutic exercises to develop strength, endurance, and ROM for 20 minutes including:  -ankle pumps 2x10  -seated marches 2x15  -LAQ 2x15   -ball kicks 2x10   -ball toss 2x10     therapeutic activities to improve functional performance for 25 minutes, including:  -standing marches 2x10   -sit to stand with RW and SBA x10, x5  -car transfer CGA x1   - 42 ft, 62 ft amb with RW SBA      neuromuscular re-education activities to improve: Balance and Proprioception for 0 minutes. The following activities were included:              -wide base balance with hands hovering over RW 3x10s (not today)      Patient Education and Home Exercises     Home Exercises Provided and Patient Education Provided     Education provided:   - continue HEP   - importance of movement    Written Home Exercises Provided: Patient instructed to cont prior HEP. Exercises were reviewed and Merle was able to demonstrate them prior to the end of the session.  Merle demonstrated good  understanding of the education provided. See EMR under Patient Instructions for exercises provided during therapy sessions    ASSESSMENT   Pt presented to clinic today in wheelchair. She reports increased upper extremity soreness from exercises at home so deferred ball tosses due to reports of discomfort. She complained of pain in sacrum throughout session which did not decrease with decreasing load from sitting in wheelchair to standing. Patient apprehensive about performing some activities requiring increased encouragement to perform them.    Merle Is progressing well towards her goals.    Pt prognosis is Fair.     Pt will continue to benefit from skilled outpatient physical therapy to address the deficits listed in the problem list box on initial evaluation, provide pt/family education and to maximize pt's level of independence in the home and community environment.     Pt's spiritual, cultural and educational needs considered and pt agreeable to plan of care and goals.     Anticipated Barriers for therapy: poor endurance     Goals:   Short Term Goals:  6 weeks  HEP: Patient will demonstrate 50% independence with HEP MET 2/21/ 2023  Pain: Pt will demonstrate improved pain less than or equal to 3/10 Progressing   Function: Patient will decrease functional limitation score to less than or equal to 60 out of 100 on FOTO. Progressing   Strength: Patient will improve impaired strength to greater or equal to 4-/5. Partially Met 2/21/2023     Long Term Goals:  12 weeks  HEP: Pt will be independent with advanced HEP.  Pain: Pt will demonstrate improved pain less than or equal to 1/10 in order to progress toward maximal functional ability and improve QOL.  Function: Patient will decrease functional limitation score to less than or equal to 55 out of 100 on FOTO to improve functional independence and quality of life.   Mobility: Patient will demonstrate sit to stand mod I with least restricted AD to return to prior level of function.   Strength: Patient will improve impaired strength to greater or equal to 4/5 in order to walk longer distance.   Gait: Patient will ambulate 250 ft mod I with least restrictive AD in order to return to PLOF.       PLAN     Plan of care Certification: 1/12/2023 to 4/7/2023.     Outpatient Physical Therapy 2 times weekly for 12 weeks to include the following interventions: Gait Training, Manual Therapy, Moist Heat/ Ice, Neuromuscular Re-ed, Self Care, Therapeutic Activities, Therapeutic Exercise, and FDN .      Britni Easton, PTA

## 2023-02-28 ENCOUNTER — CLINICAL SUPPORT (OUTPATIENT)
Dept: REHABILITATION | Facility: HOSPITAL | Age: 68
End: 2023-02-28
Payer: MEDICARE

## 2023-02-28 DIAGNOSIS — R26.9 GAIT DIFFICULTY: ICD-10-CM

## 2023-02-28 DIAGNOSIS — M17.0 PRIMARY OSTEOARTHRITIS OF BOTH KNEES: ICD-10-CM

## 2023-02-28 DIAGNOSIS — S32.122D CLOSED SEVERELY DISPLACED ZONE II FRACTURE OF SACRUM WITH ROUTINE HEALING, SUBSEQUENT ENCOUNTER: Primary | Chronic | ICD-10-CM

## 2023-02-28 PROCEDURE — 97110 THERAPEUTIC EXERCISES: CPT | Mod: PN,CQ

## 2023-02-28 PROCEDURE — 97112 NEUROMUSCULAR REEDUCATION: CPT | Mod: PN,CQ

## 2023-03-02 ENCOUNTER — CLINICAL SUPPORT (OUTPATIENT)
Dept: REHABILITATION | Facility: HOSPITAL | Age: 68
End: 2023-03-02
Payer: MEDICARE

## 2023-03-02 DIAGNOSIS — R26.9 GAIT DIFFICULTY: ICD-10-CM

## 2023-03-02 DIAGNOSIS — S32.122D CLOSED SEVERELY DISPLACED ZONE II FRACTURE OF SACRUM WITH ROUTINE HEALING, SUBSEQUENT ENCOUNTER: Primary | Chronic | ICD-10-CM

## 2023-03-02 DIAGNOSIS — M17.0 PRIMARY OSTEOARTHRITIS OF BOTH KNEES: ICD-10-CM

## 2023-03-02 PROCEDURE — 97110 THERAPEUTIC EXERCISES: CPT | Mod: PN

## 2023-03-02 PROCEDURE — 97530 THERAPEUTIC ACTIVITIES: CPT | Mod: PN

## 2023-03-02 NOTE — PROGRESS NOTES
[facility]   Physical Therapy Treatment     Name: Merle Caro  Clinic Number: [id number]    Therapy Diagnosis:   Encounter Diagnoses   Name Primary?    Closed severely displaced zone II fracture of sacrum with routine healing, subsequent encounter Yes    Gait difficulty     Primary osteoarthritis of both knees        Physician: Juni Meza MD    Visit Date: 3/2/2023    Physician Orders: PT Eval and Treat   Medical Diagnosis from Referral: Closed severely displaced zone II fracture of sacrum with routine healing, subsequent encounter  Evaluation Date: 1/12/2023  Authorization Period Expiration: 1/1/2023 - 12/31/2023  Plan of Care Expiration: 4/7/2023  Progress Note Due: 3/21/2023  Visit # / Visits authorized: 13/20 + eval      FOTO: 2/3        Limitation/Restriction for FOTO NOC-Neuromuscular disorder Survey     Therapist reviewed FOTO scores for Merle Caro on 2/28/2023.   FOTO documents entered into [employer] - see Media section.     Limitation Score: 72%           Precautions: Standard and COPD , osteopenia, bowel incontinence,  s/p L2-pelvis posterior spinal fusion for trans-sacral fracture dislocation with S1-2 spondyloptosis on 10/18/2022    PTA Visit #: 4/5     Time In: 2:45 pm  Time Out: 3:25 pm  Total Billable Time: 40 minutes    SUBJECTIVE     Pt reports: her buttock hurts a bit today but she is still able to participate in therapy.     She was compliant with home exercise program.  Response to previous treatment: no issues  Functional change: improved standing, walking and transfers however does best when pain is lower    Pain: 5/10  Location: sacral region     OBJECTIVE     GAIT: Initial Evaluation: 3 ft with rollator min A   2/21/2023: 120 feet with rolling walking, CGA assist     TRANSFERS:               Sit to stand without AD = mod A              Sit to stand with RW = Min A   2/21/2023: Sit to stand without AD = CGA      STRENGTH:  MMT Right  Left  2/21/2023 Comments    Shoulder flexion  3-/5 3-/5  3/5 B      Elbow flexion  3+/5 3+/5 4-/5 B      Elbow extension  4-/5 4-/5 4-/5 B         MMT Right Left 2/21/2023 Comments    Hip flexion  3-/5 3-/5 4-/5 B      Knee flexion  3+/5 3+/5 4-/5 B      Knee extension  3+/5 3+/5 4/5 B     Ankle dorsiflexion  4/5 3+/5 4/5 B      Ankle plantarflexion NT NT NT           Treatment     Merle received the treatments listed below:      therapeutic exercises to develop strength, endurance, and ROM for 10 minutes including:  -ankle pumps 2x15  -seated marches with 2# ankle weights 2x15  -LAQ with 2# ankle weights 2x15   -ball kicks 2x10 (not today)   -ball toss 2x10     therapeutic activities to improve functional performance for 28 minutes, including:  -standing marches 2x10 (not today)   -sit to stand with RW and supervision x10, x5  -car transfer supervision x1   -54 ft, 55 ft, and 45 amb with RW SBA      neuromuscular re-education activities to improve: Balance and Proprioception for 2 minutes. The following activities were included:              -NBOS 3x30s      Patient Education and Home Exercises     Home Exercises Provided and Patient Education Provided     Education provided:   - continue HEP   - importance of movement    Written Home Exercises Provided: Patient instructed to cont prior HEP. Exercises were reviewed and Merle was able to demonstrate them prior to the end of the session.  Merle demonstrated good  understanding of the education provided. See EMR under Patient Instructions for exercises provided during therapy sessions    ASSESSMENT   Pt with better tolerance during exercises. Able to tolerate progression of repetition with seated LE exercises. Pt ambulate 1 lap around clinic with 3 short seated breaks. Pt able to walk through small threshold to exit building and performed car transfer with supervision and 1 cue for hand placement. Pt with moderate fatigue at end of today's session.     Merle Is progressing well towards her  goals.   Pt prognosis is Fair.     Pt will continue to benefit from skilled outpatient physical therapy to address the deficits listed in the problem list box on initial evaluation, provide pt/family education and to maximize pt's level of independence in the home and community environment.     Pt's spiritual, cultural and educational needs considered and pt agreeable to plan of care and goals.     Anticipated Barriers for therapy: poor endurance     Goals:   Short Term Goals:  6 weeks  HEP: Patient will demonstrate 50% independence with HEP MET 2/21/ 2023  Pain: Pt will demonstrate improved pain less than or equal to 3/10 Progressing   Function: Patient will decrease functional limitation score to less than or equal to 60 out of 100 on FOTO. Progressing   Strength: Patient will improve impaired strength to greater or equal to 4-/5. Partially Met 2/21/2023     Long Term Goals:  12 weeks  HEP: Pt will be independent with advanced HEP.  Pain: Pt will demonstrate improved pain less than or equal to 1/10 in order to progress toward maximal functional ability and improve QOL.  Function: Patient will decrease functional limitation score to less than or equal to 55 out of 100 on FOTO to improve functional independence and quality of life.   Mobility: Patient will demonstrate sit to stand mod I with least restricted AD to return to prior level of function.   Strength: Patient will improve impaired strength to greater or equal to 4/5 in order to walk longer distance.   Gait: Patient will ambulate 250 ft mod I with least restrictive AD in order to return to PLOF.       PLAN     Plan of care Certification: 1/12/2023 to 4/7/2023.     Outpatient Physical Therapy 2 times weekly for 12 weeks to include the following interventions: Gait Training, Manual Therapy, Moist Heat/ Ice, Neuromuscular Re-ed, Self Care, Therapeutic Activities, Therapeutic Exercise, and FDN .      Krystal Mccauley, PT

## 2023-03-03 ENCOUNTER — OFFICE VISIT (OUTPATIENT)
Dept: UROLOGY | Facility: CLINIC | Age: 68
End: 2023-03-03
Payer: MEDICARE

## 2023-03-03 VITALS
RESPIRATION RATE: 18 BRPM | SYSTOLIC BLOOD PRESSURE: 107 MMHG | DIASTOLIC BLOOD PRESSURE: 69 MMHG | BODY MASS INDEX: 21.77 KG/M2 | TEMPERATURE: 98 F | HEIGHT: 62 IN | HEART RATE: 87 BPM

## 2023-03-03 DIAGNOSIS — R15.9 INCONTINENCE OF FECES, UNSPECIFIED FECAL INCONTINENCE TYPE: ICD-10-CM

## 2023-03-03 DIAGNOSIS — N39.41 URGE INCONTINENCE: Primary | ICD-10-CM

## 2023-03-03 LAB — POC RESIDUAL URINE VOLUME: 352 ML (ref 0–100)

## 2023-03-03 PROCEDURE — 99215 OFFICE O/P EST HI 40 MIN: CPT | Mod: PBBFAC | Performed by: UROLOGY

## 2023-03-03 PROCEDURE — 99213 OFFICE O/P EST LOW 20 MIN: CPT | Mod: S$PBB,,, | Performed by: UROLOGY

## 2023-03-03 PROCEDURE — 99999 PR PBB SHADOW E&M-EST. PATIENT-LVL V: ICD-10-PCS | Mod: PBBFAC,,, | Performed by: UROLOGY

## 2023-03-03 PROCEDURE — 51798 US URINE CAPACITY MEASURE: CPT | Mod: PBBFAC | Performed by: UROLOGY

## 2023-03-03 PROCEDURE — 99213 PR OFFICE/OUTPT VISIT, EST, LEVL III, 20-29 MIN: ICD-10-PCS | Mod: S$PBB,,, | Performed by: UROLOGY

## 2023-03-03 PROCEDURE — 51798 US URINE CAPACITY MEASURE: CPT | Mod: PBBFAC

## 2023-03-03 PROCEDURE — 99999 PR PBB SHADOW E&M-EST. PATIENT-LVL V: CPT | Mod: PBBFAC,,, | Performed by: UROLOGY

## 2023-03-03 NOTE — PROGRESS NOTES
Chief Complaint:   Encounter Diagnoses   Name Primary?    Urge incontinence Yes    Incontinence of feces, unspecified fecal incontinence type          HPI:   3/3/23- by increasing the myrbetriq she has noted improvement in her urgency and incontinence.  In addition she is started to get some neurological return of function.  1/4/23- patient reports as a new patient to me with urge and fecal incontinence.  Fecal incontinence is relatively new, this appears to have exacerbated recently was also recently found to have sacral fracture with cauda equina syndrome.  She is having some improvement on symptoms, Myrbetriq 25 mg does assist, but not perfectly.  No evidence of stress incontinence, dysuria or gross hematuria.    11/16/22- LR- Patient is a 67-year-old female that is presenting with her .  HPI was obtained by .   states that patient is status post Spine fusion and has a h/o Cauda Equina syndrome.  States that patient is unaware when she needs to urinate and has had an increase in urinary incontinence. Reports that she also has fecal incontinence.  PVR was 150 mL, repeat 132 ml.  No history of gross hematuria or renal stones.  Unable to give urine sample.    Allergies:  Codeine, Keflex [cephalexin], Shellfish containing products, Flu vaccine ts 2011-12(18 yr+), and Sucralfate    Medications:  has a current medication list which includes the following prescription(s): acetaminophen, celecoxib, cholecalciferol (vitamin d3), clonazepam, clotrimazole-betamethasone 1-0.05%, cyanocobalamin (vitamin b-12), diclofenac sodium, folic acid, haloperidol, lidocaine-prilocaine, linaclotide, lisinopril, myrbetriq, oxycodone, oxygen-air delivery systems, pantoprazole, paroxetine, pregabalin, simvastatin, trazodone, albuterol, and hydrocortisone.    Review of Systems:  General: No fever, chills, fatigability, or weight loss.  Skin: No rashes, itching, or changes in color or texture of skin.  Chest: Denies KENT,  cyanosis, wheezing, cough, and sputum production.  Abdomen: Appetite fine. No weight loss. Denies diarrhea, abdominal pain, hematemesis, or blood in stool.  Musculoskeletal: No joint stiffness or swelling. Denies back pain.  : As above.  All other review of systems negative.    PMH:   has a past medical history of Arthritis, Asthma, COPD (chronic obstructive pulmonary disease), Depression, GERD (gastroesophageal reflux disease), Hypertension, and Tobacco abuse.    PSH:   has a past surgical history that includes Hysterectomy; Knee surgery; Tonsillectomy; multiple fracture repairs; Colonoscopy w/ polypectomy (01/05/2011); Magnetic resonance imaging (N/A, 10/14/2022); fusion, spine, posterior approach (N/A, 10/18/2022); and Esophagogastroduodenoscopy (N/A, 10/24/2022).    FamHx: family history includes COPD in her mother; Cancer in her mother; Hypertension in her mother; Lung cancer in her mother; Ovarian cancer in her sister; Stroke in her mother.    SocHx:  reports that she has quit smoking. Her smoking use included cigarettes. She has a 33.00 pack-year smoking history. She has never used smokeless tobacco. She reports that she does not drink alcohol and does not use drugs.      Physical Exam:  Vitals:    01/04/23 1554   BP: 103/69   Pulse: 92     General: A&Ox3, no apparent distress, no deformities  Neck: No masses, normal ROM  Lungs: normal inspiration, no use of accessory muscles  Heart: normal pulse, no arrhythmias  Abdomen: Soft, NT, ND, no masses, no hernias, no hepatosplenomegaly  Skin: The skin is warm and dry. No jaundice.  Ext: No c/c/e.    Labs/Studies:    mL 11/22    Impression/Plan:      1. Urge incontinence- by increasing the myrbetriq to 50 mg she has noted improvement in symptoms, she is also getting some improvement in her neurological function.  Continue for now, see me in 6 months with a PVR and if stable yearly from there.  Call with any complaints in the meantime.    2. Fecal  incontinence- relatively stable, no change.

## 2023-03-14 ENCOUNTER — CLINICAL SUPPORT (OUTPATIENT)
Dept: REHABILITATION | Facility: HOSPITAL | Age: 68
End: 2023-03-14
Payer: MEDICARE

## 2023-03-14 DIAGNOSIS — R26.9 GAIT DIFFICULTY: ICD-10-CM

## 2023-03-14 DIAGNOSIS — M17.0 PRIMARY OSTEOARTHRITIS OF BOTH KNEES: ICD-10-CM

## 2023-03-14 DIAGNOSIS — S32.122D CLOSED SEVERELY DISPLACED ZONE II FRACTURE OF SACRUM WITH ROUTINE HEALING, SUBSEQUENT ENCOUNTER: Primary | Chronic | ICD-10-CM

## 2023-03-14 PROCEDURE — 97112 NEUROMUSCULAR REEDUCATION: CPT | Mod: PN

## 2023-03-14 PROCEDURE — 97530 THERAPEUTIC ACTIVITIES: CPT | Mod: PN

## 2023-03-14 NOTE — PROGRESS NOTES
[facility]   Physical Therapy Treatment     Name: Merle Caro  Clinic Number: [id number]    Therapy Diagnosis:   Encounter Diagnoses   Name Primary?    Closed severely displaced zone II fracture of sacrum with routine healing, subsequent encounter Yes    Gait difficulty     Primary osteoarthritis of both knees        Physician: Juni Meza MD    Visit Date: 3/14/2023    Physician Orders: PT Eval and Treat   Medical Diagnosis from Referral: Closed severely displaced zone II fracture of sacrum with routine healing, subsequent encounter  Evaluation Date: 1/12/2023  Authorization Period Expiration: 1/1/2023 - 12/31/2023  Plan of Care Expiration: 4/7/2023  Progress Note Due: 3/21/2023  Visit # / Visits authorized: 14/20 + eval      FOTO: 2/3        Limitation/Restriction for FOTO NOC-Neuromuscular disorder Survey     Therapist reviewed FOTO scores for Merle Caro on 2/28/2023.   FOTO documents entered into [employer] - see Media section.     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 Stated she did not do much walking while she was over there.     She was compliant with home exercise program.  Response to previous treatment: no issues  Functional change: improved standing, walking and transfers however does best when pain is lower    Pain: 5/10  Location: sacral region     OBJECTIVE     GAIT: Initial Evaluation: 3 ft with rollator min A   2/21/2023: 120 feet with rolling walking, CGA assist     TRANSFERS:               Sit to stand without AD = mod A              Sit to stand with RW = Min A   2/21/2023: Sit to stand without  AD = CGA      STRENGTH:  MMT Right  Left  2/21/2023 Comments   Shoulder flexion  3-/5 3-/5  3/5 B      Elbow flexion  3+/5 3+/5 4-/5 B      Elbow extension  4-/5 4-/5 4-/5 B         MMT Right Left 2/21/2023 Comments    Hip flexion  3-/5 3-/5 4-/5 B      Knee flexion  3+/5 3+/5 4-/5 B      Knee extension  3+/5 3+/5 4/5 B     Ankle dorsiflexion  4/5 3+/5 4/5 B      Ankle plantarflexion NT NT NT           Treatment     Merle received the treatments listed below:      therapeutic activities to improve functional performance for 30 minutes, including:  -step ups onto foam pad with RW x10 ea   -sit to stand with RW and supervision x10, x5  -car transfer supervision x1  -57ft amb with RW SBA   -standing 2 min placing objects forward and across midline      neuromuscular re-education activities to improve: Balance and Proprioception for 15 minutes. The following activities were included:              -standing ball toss 2x10   -standing ball kicks x10ea   -NBOS EO and EC 2x30s      Patient Education and Home Exercises     Home Exercises Provided and Patient Education Provided     Education provided:   - continue HEP   - importance of movement    Written Home Exercises Provided: Patient instructed to cont prior HEP. Exercises were reviewed and Merle was able to demonstrate them prior to the end of the session.  Merle demonstrated good  understanding of the education provided. See EMR under Patient Instructions for exercises provided during therapy sessions    ASSESSMENT   Pt with good tolerance to today's session with moderate encouragement. Performed ball kicks and ball toss in standing position to improve coordination, balance on unilateral LE, and proprioception. Pt able to perform narrow base of support static balance exercise with moderate sway; intermittent opening of eyes. Added step ups on foam pad within RW; pt with one forward LOB, required external assist to prevent fall. Pt able to tolerate standing for 2  minutes with 1 hand on RW and moving objects across midline; added to improve functional mobility in kitchen. Pt with moderate fatigue at end of session. Ambulated short distance and performed 3 ft ambulation with HHA towards car.     Merle Is progressing well towards her goals.   Pt prognosis is Fair.     Pt will continue to benefit from skilled outpatient physical therapy to address the deficits listed in the problem list box on initial evaluation, provide pt/family education and to maximize pt's level of independence in the home and community environment.     Pt's spiritual, cultural and educational needs considered and pt agreeable to plan of care and goals.     Anticipated Barriers for therapy: poor endurance     Goals:   Short Term Goals:  6 weeks  HEP: Patient will demonstrate 50% independence with HEP MET 2/21/ 2023  Pain: Pt will demonstrate improved pain less than or equal to 3/10 Progressing   Function: Patient will decrease functional limitation score to less than or equal to 60 out of 100 on FOTO. Progressing   Strength: Patient will improve impaired strength to greater or equal to 4-/5. Partially Met 2/21/2023     Long Term Goals:  12 weeks  HEP: Pt will be independent with advanced HEP.  Pain: Pt will demonstrate improved pain less than or equal to 1/10 in order to progress toward maximal functional ability and improve QOL.  Function: Patient will decrease functional limitation score to less than or equal to 55 out of 100 on FOTO to improve functional independence and quality of life.   Mobility: Patient will demonstrate sit to stand mod I with least restricted AD to return to prior level of function.   Strength: Patient will improve impaired strength to greater or equal to 4/5 in order to walk longer distance.   Gait: Patient will ambulate 250 ft mod I with least restrictive AD in order to return to PLOF.       PLAN     Plan of care Certification: 1/12/2023 to 4/7/2023.     Outpatient Physical  Therapy 2 times weekly for 12 weeks to include the following interventions: Gait Training, Manual Therapy, Moist Heat/ Ice, Neuromuscular Re-ed, Self Care, Therapeutic Activities, Therapeutic Exercise, and FDN .      Krystal Mccauley, PT

## 2023-03-16 ENCOUNTER — CLINICAL SUPPORT (OUTPATIENT)
Dept: REHABILITATION | Facility: HOSPITAL | Age: 68
End: 2023-03-16
Payer: MEDICARE

## 2023-03-16 DIAGNOSIS — R26.9 GAIT DIFFICULTY: ICD-10-CM

## 2023-03-16 DIAGNOSIS — M17.0 PRIMARY OSTEOARTHRITIS OF BOTH KNEES: ICD-10-CM

## 2023-03-16 DIAGNOSIS — S32.122D CLOSED SEVERELY DISPLACED ZONE II FRACTURE OF SACRUM WITH ROUTINE HEALING, SUBSEQUENT ENCOUNTER: Primary | Chronic | ICD-10-CM

## 2023-03-16 PROCEDURE — 97112 NEUROMUSCULAR REEDUCATION: CPT | Mod: PN

## 2023-03-16 PROCEDURE — 97530 THERAPEUTIC ACTIVITIES: CPT | Mod: PN

## 2023-03-16 NOTE — PROGRESS NOTES
[facility]   Physical Therapy Treatment     Name: Merle Caro  Clinic Number: [id number]    Therapy Diagnosis:   Encounter Diagnoses   Name Primary?    Closed severely displaced zone II fracture of sacrum with routine healing, subsequent encounter Yes    Gait difficulty     Primary osteoarthritis of both knees        Physician: Juni Meza MD    Visit Date: 3/16/2023    Physician Orders: PT Eval and Treat   Medical Diagnosis from Referral: Closed severely displaced zone II fracture of sacrum with routine healing, subsequent encounter  Evaluation Date: 1/12/2023  Authorization Period Expiration: 1/1/2023 - 12/31/2023  Plan of Care Expiration: 4/7/2023  Progress Note Due: 3/21/2023  Visit # / Visits authorized: 15/20 + eval      FOTO: 2/3        Limitation/Restriction for FOTO NOC-Neuromuscular disorder Survey     Therapist reviewed FOTO scores for Merle Caro on 2/28/2023.   FOTO documents entered into [employer] - see Media section.     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 Pt c/o leg stiffness today.     She was compliant with home exercise program.  Response to previous treatment: no issues  Functional change: improved standing, walking and transfers however does best when pain is lower    Pain: 5/10  Location: sacral region     OBJECTIVE     GAIT: Initial Evaluation: 3 ft with rollator min A   2/21/2023: 120 feet with rolling walking, CGA assist     TRANSFERS:               Sit to stand without AD = mod A              Sit to stand with RW = Min A   2/21/2023: Sit to stand without AD = CGA      STRENGTH:  MMT Right  Left   2/21/2023 Comments   Shoulder flexion  3-/5 3-/5  3/5 B      Elbow flexion  3+/5 3+/5 4-/5 B      Elbow extension  4-/5 4-/5 4-/5 B         MMT Right Left 2/21/2023 Comments    Hip flexion  3-/5 3-/5 4-/5 B      Knee flexion  3+/5 3+/5 4-/5 B      Knee extension  3+/5 3+/5 4/5 B     Ankle dorsiflexion  4/5 3+/5 4/5 B      Ankle plantarflexion NT NT NT           Treatment     Merle received the treatments listed below:      therapeutic activities to improve functional performance for 30 minutes, including:  -step ups onto foam pad with RW x10 ea   -sit to stand with RW independently x10, x5  -amb 8 ft with HHA, car transfer supervision x1  -120 ft amb with RW SBA   -standing 2 min placing objects forward and across midline      neuromuscular re-education activities to improve: Balance and Proprioception for 15 minutes. The following activities were included:              -standing ball toss 2x10   -standing ball kicks x10ea   -NBOS EO and EC 2x30s      Patient Education and Home Exercises     Home Exercises Provided and Patient Education Provided     Education provided:   - continue HEP   - importance of movement    Written Home Exercises Provided: Patient instructed to cont prior HEP. Exercises were reviewed and Merle was able to demonstrate them prior to the end of the session.  Merle demonstrated good  understanding of the education provided. See EMR under Patient Instructions for exercises provided during therapy sessions    ASSESSMENT   Pt able to perform sit to stand with RW independently at this time. Required moderate encouragement to participate in today's session. Pt with improvement in static balance, no LOB noted. Pt able to stand for longer period of time today. Progress step up next visit.     Merle Is progressing well towards her goals.   Pt prognosis is Fair.     Pt will continue to benefit from skilled outpatient physical therapy to address the deficits listed in the problem list box on initial  evaluation, provide pt/family education and to maximize pt's level of independence in the home and community environment.     Pt's spiritual, cultural and educational needs considered and pt agreeable to plan of care and goals.     Anticipated Barriers for therapy: poor endurance     Goals:   Short Term Goals:  6 weeks  HEP: Patient will demonstrate 50% independence with HEP MET 2/21/ 2023  Pain: Pt will demonstrate improved pain less than or equal to 3/10 Progressing   Function: Patient will decrease functional limitation score to less than or equal to 60 out of 100 on FOTO. Progressing   Strength: Patient will improve impaired strength to greater or equal to 4-/5. Partially Met 2/21/2023     Long Term Goals:  12 weeks  HEP: Pt will be independent with advanced HEP.  Pain: Pt will demonstrate improved pain less than or equal to 1/10 in order to progress toward maximal functional ability and improve QOL.  Function: Patient will decrease functional limitation score to less than or equal to 55 out of 100 on FOTO to improve functional independence and quality of life.   Mobility: Patient will demonstrate sit to stand mod I with least restricted AD to return to prior level of function.   Strength: Patient will improve impaired strength to greater or equal to 4/5 in order to walk longer distance.   Gait: Patient will ambulate 250 ft mod I with least restrictive AD in order to return to PLOF.       PLAN     Plan of care Certification: 1/12/2023 to 4/7/2023.     Outpatient Physical Therapy 2 times weekly for 12 weeks to include the following interventions: Gait Training, Manual Therapy, Moist Heat/ Ice, Neuromuscular Re-ed, Self Care, Therapeutic Activities, Therapeutic Exercise, and FDN .      Krystal Mccauley, PT

## 2023-03-21 ENCOUNTER — CLINICAL SUPPORT (OUTPATIENT)
Dept: REHABILITATION | Facility: HOSPITAL | Age: 68
End: 2023-03-21
Payer: MEDICARE

## 2023-03-21 DIAGNOSIS — M17.0 PRIMARY OSTEOARTHRITIS OF BOTH KNEES: ICD-10-CM

## 2023-03-21 DIAGNOSIS — R26.9 GAIT DIFFICULTY: ICD-10-CM

## 2023-03-21 DIAGNOSIS — S32.122D CLOSED SEVERELY DISPLACED ZONE II FRACTURE OF SACRUM WITH ROUTINE HEALING, SUBSEQUENT ENCOUNTER: Primary | Chronic | ICD-10-CM

## 2023-03-21 PROCEDURE — 97530 THERAPEUTIC ACTIVITIES: CPT | Mod: PN

## 2023-03-21 NOTE — PROGRESS NOTES
[facility]   Physical Therapy Treatment     Name: Merle Caro  Clinic Number: [id number]    Therapy Diagnosis:   Encounter Diagnoses   Name Primary?    Closed severely displaced zone II fracture of sacrum with routine healing, subsequent encounter Yes    Gait difficulty     Primary osteoarthritis of both knees        Physician: Juni Meza MD    Visit Date: 3/21/2023    Physician Orders: PT Eval and Treat   Medical Diagnosis from Referral: Closed severely displaced zone II fracture of sacrum with routine healing, subsequent encounter  Evaluation Date: 1/12/2023  Authorization Period Expiration: 1/1/2023 - 12/31/2023  Plan of Care Expiration: 4/7/2023  Progress Note Due: 3/21/2023  Visit # / Visits authorized: 16/20 + eval      FOTO: 2/3        Limitation/Restriction for FOTO NOC-Neuromuscular disorder Survey     Therapist reviewed FOTO scores for Merle Caro on 2/28/2023.   FOTO documents entered into [employer] - see Media section.     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     She was compliant with home exercise program.  Response to previous treatment: no issues  Functional change: improved standing, walking and transfers however does best when pain is lower    Pain: 4/10  Location: sacral region     OBJECTIVE     GAIT: Initial Evaluation: 3 ft with rollator min A   3/21/2023: 120 feet with rolling walking, SBA assist     TRANSFERS:               Sit to stand without AD = mod A              Sit to stand with RW = Min A   3/21/2023: Sit to stand without AD = independent     STRENGTH:  MMT Right  Left  3/21/2023 Comments    Shoulder flexion  3-/5 3-/5  3/5 B      Elbow flexion  3+/5 3+/5 4-/5 B      Elbow extension  4-/5 4-/5 4-/5 B         MMT Right Left 3/21/2023 Comments    Hip flexion  3-/5 3-/5 4-/5 B      Knee flexion  3+/5 3+/5 4+/5 B      Knee extension  3+/5 3+/5 5/5 B     Ankle dorsiflexion  4/5 3+/5 4/5 B      Ankle plantarflexion NT NT NT           Treatment     [place] received the treatments listed below:      therapeutic activities to improve functional performance for 43 minutes, including:  -step ups onto foam pad with RW x10 ea   -sit to stand with UE assist x5, with hands on thigh x5    -amb 8 ft with HHA, car transfer supervision   -120 ft amb with RW SBA   -standing 2 min placing objects forward and across midline   +fwd walking in parallel bar x1   +lateral walking in parallel bar x1      neuromuscular re-education activities to improve: Balance and Proprioception for 2 minutes. The following activities were included:              -standing ball toss 2x10   -standing ball kicks x10ea   -NBOS EO and EC 2x30s      Patient Education and Home Exercises     Home Exercises Provided and Patient Education Provided     Education provided:   - continue HEP   - importance of movement    Written Home Exercises Provided: Patient instructed to cont prior HEP. Exercises were reviewed and Merle was able to demonstrate them prior to the end of the session.  Merle demonstrated good  understanding of the education provided. See EMR under Patient Instructions for exercises provided during therapy sessions    ASSESSMENT   Pt demonstrates improvement with BLE strength, transfer, balance, and ambulation. Pt able to tolerate standing longer, perform transfer independently, and car transfer with supervision at this time. Pt continues to demonstrate weak BUE strength and poor endurance. Added forward and lateral ambulation in parallel bars. Pt with no LOB during ball toss independently; able to perform item stacking across midline  without LOB. Progress as tolerated.     Merle Is progressing well towards her goals.   Pt prognosis is Fair.     Pt will continue to benefit from skilled outpatient physical therapy to address the deficits listed in the problem list box on initial evaluation, provide pt/family education and to maximize pt's level of independence in the home and community environment.     Pt's spiritual, cultural and educational needs considered and pt agreeable to plan of care and goals.     Anticipated Barriers for therapy: poor endurance     Goals:   Short Term Goals:  6 weeks  HEP: Patient will demonstrate 50% independence with HEP MET 2/21/ 2023  Pain: Pt will demonstrate improved pain less than or equal to 3/10 Progressing   Function: Patient will decrease functional limitation score to less than or equal to 60 out of 100 on FOTO. Progressing   Strength: Patient will improve impaired strength to greater or equal to 4-/5. Met      Long Term Goals:  12 weeks  HEP: Pt will be independent with advanced HEP.  Pain: Pt will demonstrate improved pain less than or equal to 1/10 in order to progress toward maximal functional ability and improve QOL.  Function: Patient will decrease functional limitation score to less than or equal to 55 out of 100 on FOTO to improve functional independence and quality of life.   Mobility: Patient will demonstrate sit to stand mod I with least restricted AD to return to prior level of function.   Strength: Patient will improve impaired strength to greater or equal to 4/5 in order to walk longer distance.   Gait: Patient will ambulate 250 ft mod I with least restrictive AD in order to return to PLOF.       PLAN     Plan of care Certification: 1/12/2023 to 4/7/2023.     Outpatient Physical Therapy 2 times weekly for 12 weeks to include the following interventions: Gait Training, Manual Therapy, Moist Heat/ Ice, Neuromuscular Re-ed, Self Care, Therapeutic Activities, Therapeutic Exercise, and FDN .       Krystal Mccauley, PT

## 2023-03-22 ENCOUNTER — LAB VISIT (OUTPATIENT)
Dept: LAB | Facility: HOSPITAL | Age: 68
End: 2023-03-22
Attending: INTERNAL MEDICINE
Payer: MEDICARE

## 2023-03-22 ENCOUNTER — OFFICE VISIT (OUTPATIENT)
Dept: HEMATOLOGY/ONCOLOGY | Facility: CLINIC | Age: 68
End: 2023-03-22
Payer: MEDICARE

## 2023-03-22 VITALS
HEIGHT: 60 IN | SYSTOLIC BLOOD PRESSURE: 122 MMHG | OXYGEN SATURATION: 95 % | DIASTOLIC BLOOD PRESSURE: 71 MMHG | TEMPERATURE: 98 F | BODY MASS INDEX: 24.8 KG/M2 | WEIGHT: 126.31 LBS | HEART RATE: 88 BPM

## 2023-03-22 DIAGNOSIS — D50.8 OTHER IRON DEFICIENCY ANEMIA: ICD-10-CM

## 2023-03-22 DIAGNOSIS — D64.9 ANEMIA, UNSPECIFIED TYPE: ICD-10-CM

## 2023-03-22 DIAGNOSIS — D64.9 ANEMIA, UNSPECIFIED TYPE: Primary | ICD-10-CM

## 2023-03-22 DIAGNOSIS — D53.9 NUTRITIONAL ANEMIA, UNSPECIFIED: ICD-10-CM

## 2023-03-22 LAB
BASOPHILS # BLD AUTO: 0.05 K/UL (ref 0–0.2)
BASOPHILS NFR BLD: 0.7 % (ref 0–1.9)
DIFFERENTIAL METHOD: ABNORMAL
EOSINOPHIL # BLD AUTO: 0.4 K/UL (ref 0–0.5)
EOSINOPHIL NFR BLD: 5 % (ref 0–8)
ERYTHROCYTE [DISTWIDTH] IN BLOOD BY AUTOMATED COUNT: 19.7 % (ref 11.5–14.5)
FERRITIN SERPL-MCNC: 217 NG/ML (ref 20–300)
HCT VFR BLD AUTO: 32.9 % (ref 37–48.5)
HGB BLD-MCNC: 10.2 G/DL (ref 12–16)
IMM GRANULOCYTES # BLD AUTO: 0.02 K/UL (ref 0–0.04)
IMM GRANULOCYTES NFR BLD AUTO: 0.3 % (ref 0–0.5)
IRON SERPL-MCNC: 38 UG/DL (ref 30–160)
LYMPHOCYTES # BLD AUTO: 1.4 K/UL (ref 1–4.8)
LYMPHOCYTES NFR BLD: 20.2 % (ref 18–48)
MCH RBC QN AUTO: 26.1 PG (ref 27–31)
MCHC RBC AUTO-ENTMCNC: 31 G/DL (ref 32–36)
MCV RBC AUTO: 84 FL (ref 82–98)
MONOCYTES # BLD AUTO: 0.6 K/UL (ref 0.3–1)
MONOCYTES NFR BLD: 8.5 % (ref 4–15)
NEUTROPHILS # BLD AUTO: 4.6 K/UL (ref 1.8–7.7)
NEUTROPHILS NFR BLD: 65.3 % (ref 38–73)
NRBC BLD-RTO: 0 /100 WBC
PLATELET # BLD AUTO: 297 K/UL (ref 150–450)
PMV BLD AUTO: 8.6 FL (ref 9.2–12.9)
RBC # BLD AUTO: 3.91 M/UL (ref 4–5.4)
SATURATED IRON: 13 % (ref 20–50)
TOTAL IRON BINDING CAPACITY: 292 UG/DL (ref 250–450)
TRANSFERRIN SERPL-MCNC: 197 MG/DL (ref 200–375)
WBC # BLD AUTO: 7.07 K/UL (ref 3.9–12.7)

## 2023-03-22 PROCEDURE — 99999 PR PBB SHADOW E&M-EST. PATIENT-LVL IV: ICD-10-PCS | Mod: PBBFAC,,, | Performed by: NURSE PRACTITIONER

## 2023-03-22 PROCEDURE — 85025 COMPLETE CBC W/AUTO DIFF WBC: CPT | Performed by: NURSE PRACTITIONER

## 2023-03-22 PROCEDURE — 99214 PR OFFICE/OUTPT VISIT, EST, LEVL IV, 30-39 MIN: ICD-10-PCS | Mod: S$PBB,,, | Performed by: NURSE PRACTITIONER

## 2023-03-22 PROCEDURE — 99999 PR PBB SHADOW E&M-EST. PATIENT-LVL IV: CPT | Mod: PBBFAC,,, | Performed by: NURSE PRACTITIONER

## 2023-03-22 PROCEDURE — 84466 ASSAY OF TRANSFERRIN: CPT | Performed by: NURSE PRACTITIONER

## 2023-03-22 PROCEDURE — 82728 ASSAY OF FERRITIN: CPT | Performed by: NURSE PRACTITIONER

## 2023-03-22 PROCEDURE — 99214 OFFICE O/P EST MOD 30 MIN: CPT | Mod: S$PBB,,, | Performed by: NURSE PRACTITIONER

## 2023-03-22 PROCEDURE — 36415 COLL VENOUS BLD VENIPUNCTURE: CPT | Performed by: NURSE PRACTITIONER

## 2023-03-22 PROCEDURE — 99214 OFFICE O/P EST MOD 30 MIN: CPT | Mod: PBBFAC | Performed by: NURSE PRACTITIONER

## 2023-03-22 NOTE — ASSESSMENT & PLAN NOTE
S/p Venofer weekly x 4. Iron levels drawn today to result in the next 1-2 days. F/u results and communicate via patient portal. If remains iron deficient will arrange additional venofer as patient intolerant to oral iron supplementation. We discussed proceeding with Colonoscopy. For now patient declines due to continued recovery from sacral surgery. Will defer for now. If recurrent iron deficiency will plan to proceed with Colonoscopy     Improvement in anemia. Continue b12 and folic acid supplement. F/u 2 months with repeat labs
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
## 2023-03-23 ENCOUNTER — CLINICAL SUPPORT (OUTPATIENT)
Dept: REHABILITATION | Facility: HOSPITAL | Age: 68
End: 2023-03-23
Payer: MEDICARE

## 2023-03-23 DIAGNOSIS — M17.0 PRIMARY OSTEOARTHRITIS OF BOTH KNEES: ICD-10-CM

## 2023-03-23 DIAGNOSIS — S32.122D CLOSED SEVERELY DISPLACED ZONE II FRACTURE OF SACRUM WITH ROUTINE HEALING, SUBSEQUENT ENCOUNTER: Primary | Chronic | ICD-10-CM

## 2023-03-23 DIAGNOSIS — R26.9 GAIT DIFFICULTY: ICD-10-CM

## 2023-03-23 PROCEDURE — 97112 NEUROMUSCULAR REEDUCATION: CPT | Mod: PN

## 2023-03-23 PROCEDURE — 97530 THERAPEUTIC ACTIVITIES: CPT | Mod: PN

## 2023-03-23 NOTE — PROGRESS NOTES
[facility]   Physical Therapy Treatment     Name: Merle Caro  Clinic Number: [id number]    Therapy Diagnosis:   Encounter Diagnoses   Name Primary?    Closed severely displaced zone II fracture of sacrum with routine healing, subsequent encounter Yes    Gait difficulty     Primary osteoarthritis of both knees        Physician: Juni Meza MD    Visit Date: 3/23/2023    Physician Orders: PT Eval and Treat   Medical Diagnosis from Referral: Closed severely displaced zone II fracture of sacrum with routine healing, subsequent encounter  Evaluation Date: 1/12/2023  Authorization Period Expiration: 1/1/2023 - 12/31/2023  Plan of Care Expiration: 4/7/2023  Progress Note Due: 3/21/2023  Visit # / Visits authorized: 16/20 + eval      FOTO: 2/3        Limitation/Restriction for FOTO NOC-Neuromuscular disorder Survey     Therapist reviewed FOTO scores for Merle Caro on 2/28/2023.   FOTO documents entered into [employer] - see Media section.     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     She was compliant with home exercise program.  Response to previous treatment: no issues  Functional change: improved standing, walking and transfers however does best when pain is lower    Pain: 4/10  Location: sacral region     OBJECTIVE     GAIT: Initial Evaluation: 3 ft with rollator min A   3/21/2023: 120 feet with rolling walking, SBA assist     TRANSFERS:               Sit to stand without AD = mod A              Sit to stand with RW = Min A   3/21/2023: Sit to stand without AD = independent     STRENGTH:  MMT  Right  Left  3/21/2023 Comments   Shoulder flexion  3-/5 3-/5  3/5 B      Elbow flexion  3+/5 3+/5 4-/5 B      Elbow extension  4-/5 4-/5 4-/5 B         MMT Right Left 3/21/2023 Comments    Hip flexion  3-/5 3-/5 4-/5 B      Knee flexion  3+/5 3+/5 4+/5 B      Knee extension  3+/5 3+/5 5/5 B     Ankle dorsiflexion  4/5 3+/5 4/5 B      Ankle plantarflexion NT NT NT           Treatment     Pillsbury received the treatments listed below:      therapeutic activities to improve functional performance for 30 minutes, including:  -step ups onto foam pad with RW x10 ea   -sit to stand with RW 2x10 progressed sets   -standing marching 2x10 for endurance   -amb 10 ft with HHA, car transfer supervision   -160 ft amb with RW SBA   -standing 2 min placing objects forward and across midline      neuromuscular re-education activities to improve: Balance and Proprioception for 10 minutes. The following activities were included:              -standing ball toss 2x10 Mod I with RW   -standing ball kicks 2x10ea (progressed sets)   -NBOS EO and EC 2x30s      Patient Education and Home Exercises     Home Exercises Provided and Patient Education Provided     Education provided:   - continue HEP   - importance of movement    Written Home Exercises Provided: Patient instructed to cont prior HEP. Exercises were reviewed and Pillsbury was able to demonstrate them prior to the end of the session.  Merle demonstrated good  understanding of the education provided. See EMR under Patient Instructions for exercises provided during therapy sessions    ASSESSMENT   Pt presents to clinic with good spirit today. Pt able to ambulate 70 ft with RW and no breaks today. Able to ambulate a total of 160 ft during today's session. Pt with good control of balance reaching outside of base of support during ball toss. Pt able to ambulate to water dispenser and refill cup of water on her own; required assist with holding cup during stand to sit transfer into  wheelchair. Progress distance of ambulation next visit.     Merle Is progressing well towards her goals.   Pt prognosis is Fair.     Pt will continue to benefit from skilled outpatient physical therapy to address the deficits listed in the problem list box on initial evaluation, provide pt/family education and to maximize pt's level of independence in the home and community environment.     Pt's spiritual, cultural and educational needs considered and pt agreeable to plan of care and goals.     Anticipated Barriers for therapy: poor endurance     Goals:   Short Term Goals:  6 weeks  HEP: Patient will demonstrate 50% independence with HEP MET 2/21/ 2023  Pain: Pt will demonstrate improved pain less than or equal to 3/10 Progressing   Function: Patient will decrease functional limitation score to less than or equal to 60 out of 100 on FOTO. Progressing   Strength: Patient will improve impaired strength to greater or equal to 4-/5. Met      Long Term Goals:  12 weeks  HEP: Pt will be independent with advanced HEP.  Pain: Pt will demonstrate improved pain less than or equal to 1/10 in order to progress toward maximal functional ability and improve QOL.  Function: Patient will decrease functional limitation score to less than or equal to 55 out of 100 on FOTO to improve functional independence and quality of life.   Mobility: Patient will demonstrate sit to stand mod I with least restricted AD to return to prior level of function.   Strength: Patient will improve impaired strength to greater or equal to 4/5 in order to walk longer distance.   Gait: Patient will ambulate 250 ft mod I with least restrictive AD in order to return to PLOF.       PLAN     Plan of care Certification: 1/12/2023 to 4/7/2023.     Outpatient Physical Therapy 2 times weekly for 12 weeks to include the following interventions: Gait Training, Manual Therapy, Moist Heat/ Ice, Neuromuscular Re-ed, Self Care, Therapeutic Activities, Therapeutic  Exercise, and FDN .      Krystal Mccauley, PT

## 2023-03-24 NOTE — NURSING
PATIENT: Shelby Thompson  MRN: [id number]  DATE: 10/14/2021  Diagnosis:   Multiple Myeloma  Chief Complaint: MM f/u  Oncologic History: Per  primary Oncologist   Multiple Myeloma- presented w CRAB criteria (Hgb 7.1, hypercalcemia, renal function - Cr of 2.7, T7 vertebral fracture) and was diagnosed with IgG Kappa, RISS Stage III (beta-2 micro globulin of 17.5 and 14:20 translocation) High Risk disease.  She achieved and tolerated well VRd x completing 7, 28 day cycles and achieving deep VGPR to induction. Then underwent Melphalan autologous stem cell transplant on 2/12/2020.      Extensive PMH as below.    2 prior CVAs (one in 2008, one in 2011)  L breast cancer DCIS stage 0 (s/p lumpectomy and radiation, no endocrine tx due to CVA)  HTN  DM II  Neuropathy, b/l hands  Prior smoker, quit in 2011  Hepatic lesions - vascular per recent MRI in 09 /2019 with no changes; will confirm no further rascon needed from help  Statin Intolerance - on praluent, PCSK9 inhibitor  HFpEF - EF 55%, diastolic dysfunction  Anxiety/Depression     ADMISSION SUMMARY: 2/10-2/26/2020  Admitted 2/10, tolerated chemo and transplant well. Engrafted on day +12. Remained afebrile throughout hospital stay and no mucositis. Experienced expected side effects of nausea and diarrhea controlled with antiemetics and Imodium. Discharged home with home PT/OT       Subjective:       Initial History: Ms. Thompson is a 75 y.o. female who presents for virtual follow up.  She is 3yrs 1 months post AutoSCT. Relapsed  at 1 year post sct and was on Sravanthi/zhanna/dex until summer 2022 when changed to sravanthi/pom/dex due to biochemical only progression. Remains on sravanthi/pom/dex salvage. Tolerating dose adjusted pomalyst. Presents with her friend. She have loose stools with pomalyst. She is not taking loperamide as instructed, she is not sure about what medication is imodium. She is fatigued, otherwise stable. Moderate dementia stable following with neuro.  No new AEs  Report given to the charge nurse Maryellen at [facility]. Facility requesting to leave midline in place. Awaiting transportation   from treatment to report.    Past Medical History:   Past Medical History:   Diagnosis Date    Acute respiratory failure with hypoxia 4/30/2019    FUENTES (acute kidney injury) 5/1/2019    Allergy     Anemia     Aortic aneurysm     Breast cancer 10/2011    left breast Stage 0 DCIS    Cataract     Chronic diastolic congestive heart failure 11/6/2015    Colon polyp     Community acquired pneumonia of right lower lobe of lung 8/3/2021    GERD (gastroesophageal reflux disease)     Hiatal hernia     History of colonic polyps     Hx of psychiatric care     Zoloft    HX: breast cancer     Hyperlipemia     Hypertension     ICH (intracerebral hemorrhage)     Immunocompromised 10/28/2022    Major depressive disorder, single episode, mild 6/23/2016    Nuclear sclerosis 7/21/2014    Open angle with borderline findings and low glaucoma risk in both eyes 7/21/2014    PAD (peripheral artery disease) 11/6/2015    Psychiatric problem     Statin-induced rhabdomyolysis     4/2015     Stroke 4/2011    Type 2 diabetes mellitus with diabetic peripheral angiopathy without gangrene, with long-term current use of insulin 3/31/2021    Type 2 diabetes mellitus without complication, without long-term current use of insulin 2/10/2020    Walker as ambulation aid        Past Surgical HIstory:   Past Surgical History:   Procedure Laterality Date    APPENDECTOMY      BONE MARROW BIOPSY Left 10/3/2019    Procedure: Biopsy-bone marrow;  Surgeon: Jere Tineo MD;  Location: [facility] OR [address] [place] [address];  Service: Oncology;  Laterality: Left;    BREAST BIOPSY Left 10/2011    BREAST LUMPECTOMY Left 2011    DCIS    COLONOSCOPY      COLONOSCOPY N/A 1/9/2020    Procedure: COLONOSCOPY;  Surgeon: Quang De La Cruz MD;  Location: [facility] ([facility]);  Service: Endoscopy;  Laterality: N/A;    CYST REMOVAL      back    ESOPHAGOGASTRODUODENOSCOPY  07/2016    duodenal angioectasia    ESOPHAGOGASTRODUODENOSCOPY N/A 1/9/2020    Procedure: EGD (ESOPHAGOGASTRODUODENOSCOPY);   Surgeon: Quang De La Cruz MD;  Location: [facility] ENDO ([address] [place] [address]);  Service: Endoscopy;  Laterality: N/A;    FOOT FRACTURE SURGERY  10/2012    right foot, with R hallux valgus repair    FRACTURE SURGERY Right     broken toe repiar    HEMORRHOID SURGERY      LIVER BIOPSY  04/2015    essentially normal, no fibrosis    TUBAL LIGATION      UPPER GASTROINTESTINAL ENDOSCOPY         Family History:   Family History   Problem Relation Age of Onset    Dementia Sister         x1 sister    Cancer Sister 63        Lung Cancer    No Known Problems Mother     Cancer Father     No Known Problems Brother     Hypertension Daughter     Fibroids Daughter         uterine    Hypertension Son     No Known Problems Brother     No Known Problems Maternal Aunt     No Known Problems Maternal Uncle     No Known Problems Paternal Aunt     No Known Problems Paternal Uncle     Hypertension Maternal Grandmother     No Known Problems Maternal Grandfather     No Known Problems Paternal Grandmother     No Known Problems Paternal Grandfather     Ovarian cancer Neg Hx     Colon cancer Neg Hx     Tremor Neg Hx     Amblyopia Neg Hx     Blindness Neg Hx     Cataracts Neg Hx     Diabetes Neg Hx     Glaucoma Neg Hx     Macular degeneration Neg Hx     Retinal detachment Neg Hx     Strabismus Neg Hx     Stroke Neg Hx     Thyroid disease Neg Hx     Esophageal cancer Neg Hx     Rectal cancer Neg Hx     Stomach cancer Neg Hx     Ulcerative colitis Neg Hx     Crohn's disease Neg Hx     Irritable bowel syndrome Neg Hx     Celiac disease Neg Hx        Social History:  reports that she quit smoking about 11 years ago. Her smoking use included cigarettes. She started smoking about 56 years ago. She has a 11.00 pack-year smoking history. She has never used smokeless tobacco. She reports that she does not currently use alcohol. She reports that she does not use drugs.    Allergies:  Review of patient's allergies indicates:   Allergen Reactions    Lipitor [atorvastatin]  Itching     Itching, elevated cpk, muscle aches, statin induced myositis       Medications:  Current Outpatient Medications   Medication Sig Dispense Refill    acyclovir (ZOVIRAX) 400 MG tablet Take 1 tablet (400 mg total) by mouth 2 (two) times daily. 60 tablet 2    albuterol (ACCUNEB) 1.25 mg/3 mL Nebu Take 3 mLs (1.25 mg total) by nebulization every 6 (six) hours as needed (wheeze/cough). Rescue 75 mL 2    amLODIPine (NORVASC) 5 MG tablet TAKE 1 TABLET BY MOUTH EVERY DAY 90 tablet 2    aspirin (ECOTRIN) 81 MG EC tablet TAKE 1 TABLET BY MOUTH EVERY DAY 90 tablet 3    cholecalciferol, vitamin D3, 125 mcg (5,000 unit) Tab 1 tablet DAILY (route: oral)      cyanocobalamin, vitamin B-12, 5,000 mcg Subl Place one under tongue once a day for 1 month, then continue to take under tongue per week 30 tablet 3    DARZALEX FASPRO 1,800 mg-30,000 unit/15 mL Soln       ENGERIX-B, PF, 20 mcg/mL Syrg       evolocumab (REPATHA SURECLICK) 140 mg/mL PnIj Inject 1 mL (140 mg total) into the skin every 14 (fourteen) days. 6 mL 3    ferrous gluconate 324 mg (37.5 mg iron) Tab tablet 1 tablet DAILY (route: oral)      gabapentin (NEURONTIN) 300 MG capsule Take 1 capsule (300 mg total) by mouth 2 (two) times daily. 180 capsule 2    GADAVIST 1 mmol/mL (604.72 mg/mL) Soln injection       metFORMIN (GLUCOPHAGE) 1000 MG tablet Take 1 tablet (1,000 mg total) by mouth 2 (two) times daily with meals. 180 tablet 0    omeprazole (PRILOSEC) 40 MG capsule TAKE 1 CAPSULE BY MOUTH EVERY DAY 90 capsule 3    OXYGEN-AIR DELIVERY SYSTEMS MISC 1-2 Liter O2 - CONTINUOUS (route: Oxygen)      pomalidomide 3 mg Cap TAKE 1 CAPSULE BY MOUTH ONCE DAILY ON DAYS 1-21 OF 28 DAY CYCLE.. 21 capsule 0    potassium chloride SA (K-DUR,KLOR-CON) 20 MEQ tablet       valsartan (DIOVAN) 160 MG tablet Take 1 tablet (160 mg total) by mouth once daily. 90 tablet 3    zoledronic 4 mg/100 mL PgBk infusion       zoledronic acid (ZOMETA) 4 mg/5 mL injection       loperamide  (IMODIUM) 2 mg capsule Take 1 capsule (2 mg total) by mouth 4 (four) times daily as needed for Diarrhea. 60 capsule 0     No current facility-administered medications for this visit.      See HPI     ECOG Performance Status: 2 (due to remote  CVA)   Objective:      Vitals:   Vitals:    03/24/23 0758   BP: 122/66   Pulse: (!) 54   Resp: 20   Temp: 98.4 °F (36.9 °C)     General - somewhat frail appearing, no apparent distress  HEENT - oropharynx clear  Chest and Lung - clear to auscultation bilaterally   Cardiovascular - RRR with no MGR, normal S1 and S2  Abdomen-  soft, nontender, no palpable hepatomegaly or splenomegaly  Lymph - no palpable lymphadenopathy  Heme - no bruising, petechiae, pallor  Skin - no rashes or lesions  Psych - appropriate mood and affect       Laboratory Data:  Lab Visit on 03/24/2023   Component Date Value Ref Range Status    WBC 03/24/2023 2.30 (L)  3.90 - 12.70 K/uL Final    RBC 03/24/2023 3.86 (L)  4.00 - 5.40 M/uL Final    Hemoglobin 03/24/2023 7.7 (L)  12.0 - 16.0 g/dL Final    Hematocrit 03/24/2023 28.7 (L)  37.0 - 48.5 % Final    MCV 03/24/2023 74 (L)  82 - 98 fL Final    MCH 03/24/2023 19.9 (L)  27.0 - 31.0 pg Final    MCHC 03/24/2023 26.8 (L)  32.0 - 36.0 g/dL Final    RDW 03/24/2023 24.8 (H)  11.5 - 14.5 % Final    Platelets 03/24/2023 183  150 - 450 K/uL Final    MPV 03/24/2023 SEE COMMENT  9.2 - 12.9 fL Final    Result not available.    Immature Granulocytes 03/24/2023 0.4  0.0 - 0.5 % Final    Gran # (ANC) 03/24/2023 1.2 (L)  1.8 - 7.7 K/uL Final    Immature Grans (Abs) 03/24/2023 0.01  0.00 - 0.04 K/uL Final    Comment: Mild elevation in immature granulocytes is non specific and   can be seen in a variety of conditions including stress response,   acute inflammation, trauma and pregnancy. Correlation with other   laboratory and clinical findings is essential.      Lymph # 03/24/2023 0.6 (L)  1.0 - 4.8 K/uL Final    Mono # 03/24/2023 0.3  0.3 - 1.0 K/uL Final    Eos # 03/24/2023  0.1  0.0 - 0.5 K/uL Final    Baso # 03/24/2023 0.07  0.00 - 0.20 K/uL Final    nRBC 03/24/2023 0  0 /100 WBC Final    Gran % 03/24/2023 52.2  38.0 - 73.0 % Final    Lymph % 03/24/2023 27.4  18.0 - 48.0 % Final    Mono % 03/24/2023 10.9  4.0 - 15.0 % Final    Eosinophil % 03/24/2023 6.1  0.0 - 8.0 % Final    Basophil % 03/24/2023 3.0 (H)  0.0 - 1.9 % Final    Differential Method 03/24/2023 Automated   Final    Sodium 03/24/2023 144  136 - 145 mmol/L Final    Potassium 03/24/2023 3.5  3.5 - 5.1 mmol/L Final    Chloride 03/24/2023 108  95 - 110 mmol/L Final    CO2 03/24/2023 27  23 - 29 mmol/L Final    Glucose 03/24/2023 113 (H)  70 - 110 mg/dL Final    BUN 03/24/2023 9  8 - 23 mg/dL Final    Creatinine 03/24/2023 0.8  0.5 - 1.4 mg/dL Final    Calcium 03/24/2023 9.2  8.7 - 10.5 mg/dL Final    Total Protein 03/24/2023 7.1  6.0 - 8.4 g/dL Final    Albumin 03/24/2023 3.8  3.5 - 5.2 g/dL Final    Total Bilirubin 03/24/2023 0.5  0.1 - 1.0 mg/dL Final    Comment: For infants and newborns, interpretation of results should be based  on gestational age, weight and in agreement with clinical  observations.    Premature Infant recommended reference ranges:  Up to 24 hours.............<8.0 mg/dL  Up to 48 hours............<12.0 mg/dL  3-5 days..................<15.0 mg/dL  6-29 days.................<15.0 mg/dL      Alkaline Phosphatase 03/24/2023 57  55 - 135 U/L Final    AST 03/24/2023 13  10 - 40 U/L Final    ALT 03/24/2023 9 (L)  10 - 44 U/L Final    Anion Gap 03/24/2023 9  8 - 16 mmol/L Final    eGFR 03/24/2023 >60.0  >60 mL/min/1.73 m^2 Final    IgG 03/24/2023 1008  650 - 1600 mg/dL Final    IgG Cord Blood Reference Range: 650-1600 mg/dL.    IgA 03/24/2023 160  40 - 350 mg/dL Final    IgA Cord Blood Reference Range: <5 mg/dL.    IgM 03/24/2023 22 (L)  50 - 300 mg/dL Final    IgM Cord Blood Reference Range: <25 mg/dL.    Protein, Serum 03/24/2023 6.9  6.0 - 8.4 g/dL Final    Comment: Serum protein electrophoresis and  immunofixation results should be   interpreted in clinical context in that some therapeutic agents can   result   in false positive results (example, daratumumab). Correlation with   the   patient s therapeutic regimen is required.      Ferritin 03/24/2023 22  20.0 - 300.0 ng/mL Final      Lab Results   Component Value Date    WBC 2.30 (L) 03/24/2023    HGB 7.7 (L) 03/24/2023    HCT 28.7 (L) 03/24/2023    MCV 74 (L) 03/24/2023     03/24/2023       Gran # (ANC)   Date Value Ref Range Status   03/24/2023 1.2 (L) 1.8 - 7.7 K/uL Final     Gran %   Date Value Ref Range Status   03/24/2023 52.2 38.0 - 73.0 % Final     Kappa Free Light Chains   Date Value Ref Range Status   02/24/2023 10.19 (H) 0.33 - 1.94 mg/dL Final   01/27/2023 10.96 (H) 0.33 - 1.94 mg/dL Final   12/22/2022 11.70 (H) 0.33 - 1.94 mg/dL Final     Lambda Free Light Chains   Date Value Ref Range Status   02/24/2023 2.33 0.57 - 2.63 mg/dL Final   01/27/2023 3.08 (H) 0.57 - 2.63 mg/dL Final   12/22/2022 1.43 0.57 - 2.63 mg/dL Final     Kappa/Lambda FLC Ratio   Date Value Ref Range Status   02/24/2023 4.37 (H) 0.26 - 1.65 Final     Comment:     Undetected antigen excess is a rare event but cannot   be excluded. If these free light chain results do not   agree with other clinical or laboratory findings or   if the sample is from a patient that has previously   demonstrated antigen excess, discuss with the testing   laboratory.   Results should always be interpreted in conjunction   with other laboratory tests and clinical evidence.     01/27/2023 3.56 (H) 0.26 - 1.65 Final     Comment:     Undetected antigen excess is a rare event but cannot   be excluded. If these free light chain results do not   agree with other clinical or laboratory findings or   if the sample is from a patient that has previously   demonstrated antigen excess, discuss with the testing   laboratory.   Results should always be interpreted in conjunction   with other laboratory tests  and clinical evidence.     12/22/2022 8.18 (H) 0.26 - 1.65 Final     Comment:     Undetected antigen excess is a rare event but cannot   be excluded. If these free light chain results do not   agree with other clinical or laboratory findings or   if the sample is from a patient that has previously   demonstrated antigen excess, discuss with the testing   laboratory.   Results should always be interpreted in conjunction   with other laboratory tests and clinical evidence.       IgG   Date Value Ref Range Status   03/24/2023 1008 650 - 1600 mg/dL Final     Comment:     IgG Cord Blood Reference Range: 650-1600 mg/dL.     IgA   Date Value Ref Range Status   03/24/2023 160 40 - 350 mg/dL Final     Comment:     IgA Cord Blood Reference Range: <5 mg/dL.     IgM   Date Value Ref Range Status   03/24/2023 22 (L) 50 - 300 mg/dL Final     Comment:     IgM Cord Blood Reference Range: <25 mg/dL.        Imaging:      Assessment:       Ms. Thompson is a  75 y.o. femal with relapsed multiple myeloma.     Plan:     MM s/p Auto SCT  - high risk MM with 17p deletion  - 5  year 1 months  s/p Auto SCT  - started maintenance q2w velcade 5/6/20   - serial biochemical relapse  Noted and  given this and high risk CG , transitioned to Sravanthi/Noble (1mg/m2) /dex  Salvage July 2021    - Due to uncontrolled hyperglycemia dex stopped after C4.    -supportive meds:   continue ppx acyclovir, asa; resume maintenance zometa q 3 months for 1 year; last dose on 08/04/22, next dose today.  -she  demonstrated mild biochemical progression over summer 2022 studies with no CRAB, in light of this and high risk CG   we transitioned her therapy from Sravanthi/Velcade to Sravanthi/pomalyst; intolerant to dex.   - biochemical studies from 2/24/23 cw with continued ID so plan to continue sravanthi/pom until intolerance or progression. Today's myeloma labs pending.   Pomalyst 4 mg dose adjusted to 3 mg due to worsening cytopenia(12/22/23).  Cytopenia improved, patient tolerating so  "continue current dose    -recommended she take prn imodium for occasional loose stool with pomalyst   -already on asa 81mg   -post transplant vaccines completed     Anemia due to chemotherapy/JESSE  -due to therapy  -also significant iron deficiency noted; Feraheme denied by insurance, receiving venofer now. Receiving 2nd dose today, will receive 2 more doses. discussed small <1% risk of potentially serious and fatal allergic reactions with iv iron preparations and patient expressed understanding and wishes to proceed     Neuropathy  -Stable   - continues gabapentin and prn tramadol     SOB/descending aortic aneurysm  - CTA and dopplers ordered urgently with high concern for DVT/PE; completed 8/3/20  - incidental descending thoracic aortic aneurysm noted; referred to thoracic surgery; seen 8/7/20; per note: "at current size would not recommend any intervention as risk of rupture remains low.  Recommend surveillance CT chest every 12 months to monitor growth of the aneurysm.  Would typically recommend aspirin 81 mg daily in patients with aortic aneurysm  - ASA started (9/14/20)     HTN  - continue norvasc  - Patient to monitor BP closely    Dementia/anxiety  - contniue fu with neuro/psych;  defer medication mgmt to them        DM2  - diet controlled    Hx of CVA  - s/p 2008, 2011    HLD with Statin Intolerance  -on praluent, PCSK9 inhibitor    L breast cancer DCIS stage 0  -s/p lumpectomy and radiation, no endocrine tx due to CVA       BMT Chart Routing      Follow up with physician    Follow up with CHRISTY . In 4 weeks   Provider visit type    Infusion scheduling note Sravanthi injection  in 4 weeks   Injection scheduling note please schedule weekly venofer infusions x 2 doses in next 1-2 weeks; [facility] or [facility] fine, just first available   Labs   Scheduling:  Preferred lab:  Lab interval:  Cbc, cmp, serum free light chains, quantitative immunoglobulins, serum electropheresis, serum immunofixation, ferritin lab , Sravanthi and np " appt on 4/21/23 at [facility] [place]   Imaging    Pharmacy appointment    Other referrals              Advance Care Planning     Date: 03/24/2023       We previously had discussions regarding her underlying diagnosis, natural history, prognosis, and treatment options.  She is continuing on maintenance medication. She remains interested in pursuing all treatment needed.  Will continue chemo therapy/ posttransplant monitoring.  Total time of this visit was 30 minutes, including time spent face to face with patient and also in preparing for today's visit for MDM and documentation. (Medical Decision Making, including consideration of possible diagnoses, management options, complex medical record review, review of diagnostic tests and information, consideration and discussion of significant complications based on comorbidities, and discussion with providers involved with the care of the patient). Greater than 50% was spent face to face with the patient counseling and coordinating care.    Adina Wright NP  Hematology/Oncology/BMT

## 2023-03-28 ENCOUNTER — CLINICAL SUPPORT (OUTPATIENT)
Dept: REHABILITATION | Facility: HOSPITAL | Age: 68
End: 2023-03-28
Payer: MEDICARE

## 2023-03-28 DIAGNOSIS — M17.0 PRIMARY OSTEOARTHRITIS OF BOTH KNEES: ICD-10-CM

## 2023-03-28 DIAGNOSIS — R26.9 GAIT DIFFICULTY: ICD-10-CM

## 2023-03-28 DIAGNOSIS — S32.122D CLOSED SEVERELY DISPLACED ZONE II FRACTURE OF SACRUM WITH ROUTINE HEALING, SUBSEQUENT ENCOUNTER: Primary | Chronic | ICD-10-CM

## 2023-03-28 PROCEDURE — 97530 THERAPEUTIC ACTIVITIES: CPT | Mod: PN

## 2023-03-28 PROCEDURE — 97112 NEUROMUSCULAR REEDUCATION: CPT | Mod: PN

## 2023-03-28 NOTE — PROGRESS NOTES
[facility]   Physical Therapy Treatment     Name: Merle Caro  Clinic Number: [id number]    Therapy Diagnosis:   Encounter Diagnoses   Name Primary?    Closed severely displaced zone II fracture of sacrum with routine healing, subsequent encounter Yes    Gait difficulty     Primary osteoarthritis of both knees        Physician: Juni Meza MD    Visit Date: 3/28/2023    Physician Orders: PT Eval and Treat   Medical Diagnosis from Referral: Closed severely displaced zone II fracture of sacrum with routine healing, subsequent encounter  Evaluation Date: 1/12/2023  Authorization Period Expiration: 1/1/2023 - 12/31/2023  Plan of Care Expiration: 4/7/2023  Progress Note Due: 4/21/2023  Visit # / Visits authorized: 18/20 + eval      FOTO: 2/3        Limitation/Restriction for FOTO NOC-Neuromuscular disorder Survey     Therapist reviewed FOTO scores for Merle Caro on 2/28/2023.   FOTO documents entered into [employer] - see Media section.     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 Reports no buttock pain at this time. Pt primarily reports pain to B shoulder and left elbow.     She was compliant with home exercise program.  Response to previous treatment: no issues  Functional change: improved standing, walking and transfers however does best when pain is lower    Pain: 0/10  Location: sacral region     OBJECTIVE     GAIT: Initial Evaluation: 3 ft with rollator min A   3/21/2023: 120 feet with rolling walking, SBA assist     TRANSFERS:               Sit to stand without AD = mod A              Sit to stand with RW = Min A   3/21/2023: Sit to  stand without AD = independent     STRENGTH:  MMT Right  Left  3/21/2023 Comments   Shoulder flexion  3-/5 3-/5  3/5 B      Elbow flexion  3+/5 3+/5 4-/5 B      Elbow extension  4-/5 4-/5 4-/5 B         MMT Right Left 3/21/2023 Comments    Hip flexion  3-/5 3-/5 4-/5 B      Knee flexion  3+/5 3+/5 4+/5 B      Knee extension  3+/5 3+/5 5/5 B     Ankle dorsiflexion  4/5 3+/5 4/5 B      Ankle plantarflexion NT NT NT           Treatment     Merle received the treatments listed below:      therapeutic activities to improve functional performance for 35 minutes, including:  -step ups onto foam pad with RW 2x5 ea   -sit to stand with RW x10   -standing marching x10 for endurance   -amb 10 ft with HHA, car transfer supervision   -95 ft, seated break, 153 ft amb with RW SBA   -standing 2 min placing objects forward and across midline      neuromuscular re-education activities to improve: Balance and Proprioception for 10 minutes. The following activities were included:              -standing ball toss 2x10 Mod I with RW   -standing ball kicks 2x10ea   -NBOS EO and EC 2x30s      Patient Education and Home Exercises     Home Exercises Provided and Patient Education Provided     Education provided:   - continue HEP   - importance of movement    Written Home Exercises Provided: Patient instructed to cont prior HEP. Exercises were reviewed and Merle was able to demonstrate them prior to the end of the session.  Merle demonstrated good  understanding of the education provided. See EMR under Patient Instructions for exercises provided during therapy sessions    ASSESSMENT   Performed long distance ambulation at beginning of session. Pt with good tolerance and able to ambulate a total of 248 ft with RW today; required 1 seated break. Pt with no noted episode of excessive incontinence during today's session. Pt able to to reach UE outside of JAKE while staying within RW and no unsteadiness noted; limited by pain to arm.    Merle  Is progressing well towards her goals.   Pt prognosis is Fair.     Pt will continue to benefit from skilled outpatient physical therapy to address the deficits listed in the problem list box on initial evaluation, provide pt/family education and to maximize pt's level of independence in the home and community environment.     Pt's spiritual, cultural and educational needs considered and pt agreeable to plan of care and goals.     Anticipated Barriers for therapy: poor endurance     Goals:   Short Term Goals:  6 weeks  HEP: Patient will demonstrate 50% independence with HEP MET 2/21/ 2023  Pain: Pt will demonstrate improved pain less than or equal to 3/10 Progressing   Function: Patient will decrease functional limitation score to less than or equal to 60 out of 100 on FOTO. Progressing   Strength: Patient will improve impaired strength to greater or equal to 4-/5. Met      Long Term Goals:  12 weeks  HEP: Pt will be independent with advanced HEP.  Pain: Pt will demonstrate improved pain less than or equal to 1/10 in order to progress toward maximal functional ability and improve QOL.  Function: Patient will decrease functional limitation score to less than or equal to 55 out of 100 on FOTO to improve functional independence and quality of life.   Mobility: Patient will demonstrate sit to stand mod I with least restricted AD to return to prior level of function.   Strength: Patient will improve impaired strength to greater or equal to 4/5 in order to walk longer distance.   Gait: Patient will ambulate 250 ft mod I with least restrictive AD in order to return to PLOF.       PLAN     Plan of care Certification: 1/12/2023 to 4/7/2023.     Outpatient Physical Therapy 2 times weekly for 12 weeks to include the following interventions: Gait Training, Manual Therapy, Moist Heat/ Ice, Neuromuscular Re-ed, Self Care, Therapeutic Activities, Therapeutic Exercise, and FDN .      Krystal Mccauley, PT

## 2023-03-30 ENCOUNTER — CLINICAL SUPPORT (OUTPATIENT)
Dept: REHABILITATION | Facility: HOSPITAL | Age: 68
End: 2023-03-30
Payer: MEDICARE

## 2023-03-30 DIAGNOSIS — M17.0 PRIMARY OSTEOARTHRITIS OF BOTH KNEES: ICD-10-CM

## 2023-03-30 DIAGNOSIS — R26.9 GAIT DIFFICULTY: ICD-10-CM

## 2023-03-30 DIAGNOSIS — S32.122D CLOSED SEVERELY DISPLACED ZONE II FRACTURE OF SACRUM WITH ROUTINE HEALING, SUBSEQUENT ENCOUNTER: Primary | Chronic | ICD-10-CM

## 2023-03-30 PROCEDURE — 97530 THERAPEUTIC ACTIVITIES: CPT | Mod: PN

## 2023-03-30 PROCEDURE — 97112 NEUROMUSCULAR REEDUCATION: CPT | Mod: PN

## 2023-03-30 NOTE — PROGRESS NOTES
[facility]   Physical Therapy Treatment     Name: Merle Caro  Clinic Number: [id number]    Therapy Diagnosis:   Encounter Diagnoses   Name Primary?    Closed severely displaced zone II fracture of sacrum with routine healing, subsequent encounter Yes    Gait difficulty     Primary osteoarthritis of both knees        Physician: Juni Meza MD    Visit Date: 3/30/2023    Physician Orders: PT Eval and Treat   Medical Diagnosis from Referral: Closed severely displaced zone II fracture of sacrum with routine healing, subsequent encounter  Evaluation Date: 1/12/2023  Authorization Period Expiration: 1/1/2023 - 12/31/2023  Plan of Care Expiration: 4/7/2023  Progress Note Due: 4/21/2023  Visit # / Visits authorized: 19/20 + eval      FOTO: 2/3        Limitation/Restriction for FOTO NOC-Neuromuscular disorder Survey     Therapist reviewed FOTO scores for Merle Caro on 2/28/2023.   FOTO documents entered into [employer] - see Media section.     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 C/o sacral pain today.     She was compliant with home exercise program.  Response to previous treatment: no issues  Functional change: improved standing, walking and transfers however does best when pain is lower    Pain: 0/10  Location: sacral region     OBJECTIVE     GAIT: Initial Evaluation: 3 ft with rollator min A   3/21/2023: 120 feet with rolling walking, SBA assist     TRANSFERS:               Sit to stand without AD = mod A              Sit to stand with RW = Min A   3/21/2023: Sit to stand without AD = independent     STRENGTH:  MMT Right   Left  3/21/2023 Comments   Shoulder flexion  3-/5 3-/5  3/5 B      Elbow flexion  3+/5 3+/5 4-/5 B      Elbow extension  4-/5 4-/5 4-/5 B         MMT Right Left 3/21/2023 Comments    Hip flexion  3-/5 3-/5 4-/5 B      Knee flexion  3+/5 3+/5 4+/5 B      Knee extension  3+/5 3+/5 5/5 B     Ankle dorsiflexion  4/5 3+/5 4/5 B      Ankle plantarflexion NT NT NT           Treatment     [place] received the treatments listed below:      therapeutic activities to improve functional performance for 47 minutes, including:  -step ups onto foam pad with RW 2x5 ea   -sit to stand with RW x10   -standing marching x10 for endurance   -amb 10 ft with RW, car transfer supervision   -amb 127 ft, 120 ft, and 196 ft with seated breaks in between  with RW SBA   -donning/doffing gait belt x5  +step up and down width of aerobic step 0 lvl x3     neuromuscular re-education activities to improve: Balance and Proprioception for 10 minutes. The following activities were included:              -standing ball toss 2x10 Mod I with RW   -standing ball kicks 2x10ea        Patient Education and Home Exercises     Home Exercises Provided and Patient Education Provided     Education provided:   - continue HEP   - importance of movement    Written Home Exercises Provided: Patient instructed to cont prior HEP. Exercises were reviewed and Merle was able to demonstrate them prior to the end of the session.  Merle demonstrated good  understanding of the education provided. See EMR under Patient Instructions for exercises provided during therapy sessions    ASSESSMENT   Pt with significant improvement in stamina today. Able to ambulate a total distance of 443 ft with 3 seated breaks in between. Pt able to safely ascend/descend aerobic step with RW. Added donning doffing gait belt to mimic donning depends at home. Pt with no LOB. Improving force of left LE with ball kicks today. Pt able to ambulate 12 ft on unsteady ground at end of session prior to car  transfer.     Merle Is progressing well towards her goals.   Pt prognosis is Fair.     Pt will continue to benefit from skilled outpatient physical therapy to address the deficits listed in the problem list box on initial evaluation, provide pt/family education and to maximize pt's level of independence in the home and community environment.     Pt's spiritual, cultural and educational needs considered and pt agreeable to plan of care and goals.     Anticipated Barriers for therapy: poor endurance     Goals:   Short Term Goals:  6 weeks  HEP: Patient will demonstrate 50% independence with HEP MET 2/21/ 2023  Pain: Pt will demonstrate improved pain less than or equal to 3/10 Progressing   Function: Patient will decrease functional limitation score to less than or equal to 60 out of 100 on FOTO. Progressing   Strength: Patient will improve impaired strength to greater or equal to 4-/5. Met      Long Term Goals:  12 weeks  HEP: Pt will be independent with advanced HEP.  Pain: Pt will demonstrate improved pain less than or equal to 1/10 in order to progress toward maximal functional ability and improve QOL.  Function: Patient will decrease functional limitation score to less than or equal to 55 out of 100 on FOTO to improve functional independence and quality of life.   Mobility: Patient will demonstrate sit to stand mod I with least restricted AD to return to prior level of function.   Strength: Patient will improve impaired strength to greater or equal to 4/5 in order to walk longer distance.   Gait: Patient will ambulate 250 ft mod I with least restrictive AD in order to return to PLOF.       PLAN     Plan of care Certification: 1/12/2023 to 4/7/2023.     Outpatient Physical Therapy 2 times weekly for 12 weeks to include the following interventions: Gait Training, Manual Therapy, Moist Heat/ Ice, Neuromuscular Re-ed, Self Care, Therapeutic Activities, Therapeutic Exercise, and FDN .      Krystal Mccauley, PT

## 2023-04-04 ENCOUNTER — CLINICAL SUPPORT (OUTPATIENT)
Dept: REHABILITATION | Facility: HOSPITAL | Age: 68
End: 2023-04-04
Payer: MEDICARE

## 2023-04-04 DIAGNOSIS — M17.0 PRIMARY OSTEOARTHRITIS OF BOTH KNEES: ICD-10-CM

## 2023-04-04 DIAGNOSIS — R26.9 GAIT DIFFICULTY: ICD-10-CM

## 2023-04-04 DIAGNOSIS — S32.122D CLOSED SEVERELY DISPLACED ZONE II FRACTURE OF SACRUM WITH ROUTINE HEALING, SUBSEQUENT ENCOUNTER: Primary | Chronic | ICD-10-CM

## 2023-04-04 PROCEDURE — 97110 THERAPEUTIC EXERCISES: CPT | Mod: PN

## 2023-04-04 PROCEDURE — 97530 THERAPEUTIC ACTIVITIES: CPT | Mod: PN

## 2023-04-04 NOTE — PROGRESS NOTES
[facility]   Physical Therapy Treatment     Name: Merle Caro  Clinic Number: [id number]    Therapy Diagnosis:   Encounter Diagnoses   Name Primary?    Closed severely displaced zone II fracture of sacrum with routine healing, subsequent encounter Yes    Gait difficulty     Primary osteoarthritis of both knees        Physician: Juni Meza MD    Visit Date: 4/4/2023    Physician Orders: PT Eval and Treat   Medical Diagnosis from Referral: Closed severely displaced zone II fracture of sacrum with routine healing, subsequent encounter  Evaluation Date: 1/12/2023  Authorization Period Expiration: 1/1/2023 - 12/31/2023  Plan of Care Expiration: 4/7/2023  Progress Note Due: 4/21/2023  Visit # / Visits authorized: 20/20 + eval      FOTO: 2/3        Limitation/Restriction for FOTO NOC-Neuromuscular disorder Survey     Therapist reviewed FOTO scores for Merle Caro on 2/28/2023.   FOTO documents entered into [employer] - see Media section.     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 Pt reports increase buttock pain today.     She was compliant with home exercise program.  Response to previous treatment: no issues  Functional change: improved standing, walking and transfers however does best when pain is lower    Pain: 6/10  Location: sacral region     OBJECTIVE     GAIT: Initial Evaluation: 3 ft with rollator min A   3/21/2023: 120 feet with rolling walking, SBA assist     TRANSFERS:               Sit to stand without AD = mod A              Sit to stand with RW = Min A   3/21/2023: Sit to stand without AD = independent     STRENGTH:  MMT Right  Left   3/21/2023 Comments   Shoulder flexion  3-/5 3-/5  3/5 B      Elbow flexion  3+/5 3+/5 4-/5 B      Elbow extension  4-/5 4-/5 4-/5 B         MMT Right Left 3/21/2023 Comments    Hip flexion  3-/5 3-/5 4-/5 B      Knee flexion  3+/5 3+/5 4+/5 B      Knee extension  3+/5 3+/5 5/5 B     Ankle dorsiflexion  4/5 3+/5 4/5 B      Ankle plantarflexion NT NT NT           Treatment     [place] received the treatments listed below:      therapeutic exercises to develop strength, endurance, and ROM for 10 minutes including:   +ankle pumps   +LAQ    +standing marches     therapeutic activities to improve functional performance for 25 minutes, including:  -step ups onto foam pad with RW 2x10 ea   -sit to stand 2x10   -standing marching 2x10 for endurance   +amb 10 ft outside with RW SBA  -Car transfer independently   -step up and down width of aerobic step 0 lvl x3     neuromuscular re-education activities to improve: Balance and Proprioception for 10 minutes. The following activities were included:              -standing ball toss 2x10 Mod I with RW   -standing ball kicks 2x10ea        Patient Education and Home Exercises     Home Exercises Provided and Patient Education Provided     Education provided:   - continue HEP   - importance of movement    Written Home Exercises Provided: Patient instructed to cont prior HEP. Exercises were reviewed and Merle was able to demonstrate them prior to the end of the session.  Merle demonstrated good  understanding of the education provided. See EMR under Patient Instructions for exercises provided during therapy sessions    ASSESSMENT   Pt presented to clinic with increase edema to bilateral LE, non-pitting, and increase pain to sacral. Educated pt on elevating BLE and prevention on pressure ulcer. Pt able to perform sit to stand without RW. Pt limited by sacral pain during today's session. Added ambulation outdoors to improve functional activities such as walking outdoors. Pt able to  perform car transfer with RW independently today. Pending no regression, discharge next visit secondary to maximum potential met at this time.     Merle Is progressing well towards her goals.   Pt prognosis is Fair.     Pt will continue to benefit from skilled outpatient physical therapy to address the deficits listed in the problem list box on initial evaluation, provide pt/family education and to maximize pt's level of independence in the home and community environment.     Pt's spiritual, cultural and educational needs considered and pt agreeable to plan of care and goals.     Anticipated Barriers for therapy: poor endurance     Goals:   Short Term Goals:  6 weeks  HEP: Patient will demonstrate 50% independence with HEP MET 2/21/ 2023  Pain: Pt will demonstrate improved pain less than or equal to 3/10 Progressing   Function: Patient will decrease functional limitation score to less than or equal to 60 out of 100 on FOTO. Progressing   Strength: Patient will improve impaired strength to greater or equal to 4-/5. Met      Long Term Goals:  12 weeks  HEP: Pt will be independent with advanced HEP.  Pain: Pt will demonstrate improved pain less than or equal to 1/10 in order to progress toward maximal functional ability and improve QOL.  Function: Patient will decrease functional limitation score to less than or equal to 55 out of 100 on FOTO to improve functional independence and quality of life.   Mobility: Patient will demonstrate sit to stand mod I with least restricted AD to return to prior level of function.   Strength: Patient will improve impaired strength to greater or equal to 4/5 in order to walk longer distance.   Gait: Patient will ambulate 250 ft mod I with least restrictive AD in order to return to PLOF.       PLAN     Plan of care Certification: 1/12/2023 to 4/7/2023.     Outpatient Physical Therapy 2 times weekly for 12 weeks to include the following interventions: Gait Training, Manual Therapy, Moist  Heat/ Ice, Neuromuscular Re-ed, Self Care, Therapeutic Activities, Therapeutic Exercise, and FDN .      Krystal Mccauley, PT

## 2023-04-05 ENCOUNTER — HOSPITAL ENCOUNTER (OUTPATIENT)
Dept: RADIOLOGY | Facility: HOSPITAL | Age: 68
Discharge: HOME | End: 2023-04-05
Attending: ORTHOPAEDIC SURGERY
Payer: MEDICARE

## 2023-04-05 ENCOUNTER — OFFICE VISIT (OUTPATIENT)
Dept: ORTHOPEDICS | Facility: CLINIC | Age: 68
End: 2023-04-05
Payer: MEDICARE

## 2023-04-05 VITALS — HEIGHT: 60 IN | WEIGHT: 126.31 LBS | BODY MASS INDEX: 24.8 KG/M2

## 2023-04-05 DIAGNOSIS — Z98.1 STATUS POST LUMBAR SPINAL FUSION: Primary | ICD-10-CM

## 2023-04-05 DIAGNOSIS — Z98.890 S/P SPINAL SURGERY: ICD-10-CM

## 2023-04-05 PROCEDURE — 99999 PR PBB SHADOW E&M-EST. PATIENT-LVL III: CPT | Mod: PBBFAC,,, | Performed by: ORTHOPAEDIC SURGERY

## 2023-04-05 PROCEDURE — 72100 X-RAY EXAM L-S SPINE 2/3 VWS: CPT | Mod: TC

## 2023-04-05 PROCEDURE — 99999 PR PBB SHADOW E&M-EST. PATIENT-LVL III: ICD-10-PCS | Mod: PBBFAC,,, | Performed by: ORTHOPAEDIC SURGERY

## 2023-04-05 PROCEDURE — 99213 OFFICE O/P EST LOW 20 MIN: CPT | Mod: PBBFAC | Performed by: ORTHOPAEDIC SURGERY

## 2023-04-05 PROCEDURE — 99213 OFFICE O/P EST LOW 20 MIN: CPT | Mod: S$PBB,,, | Performed by: ORTHOPAEDIC SURGERY

## 2023-04-05 PROCEDURE — 72100 X-RAY EXAM L-S SPINE 2/3 VWS: CPT | Mod: 26,,, | Performed by: RADIOLOGY

## 2023-04-05 PROCEDURE — 72100 XR LUMBAR SPINE AP AND LATERAL: ICD-10-PCS | Mod: 26,,, | Performed by: RADIOLOGY

## 2023-04-05 PROCEDURE — 99213 PR OFFICE/OUTPT VISIT, EST, LEVL III, 20-29 MIN: ICD-10-PCS | Mod: S$PBB,,, | Performed by: ORTHOPAEDIC SURGERY

## 2023-04-05 NOTE — PROGRESS NOTES
Major change in her ambulatory status issues still largely confined to the wheelchair but does transferred to the recliner with help from her .  Date: 04/05/2023    Date of Surgery: 10/18/2022    Procedure: L2-pelvis posterior spinal fusion for trans-sacral fracture dislocation with S1-2 spondyloptosis    History: Merle Caro is seen today for follow-up following the above listed procedure.  Patient reports that she has ongoing pain reported as a 5/10.  The pain is a pinpoint pain in her buttocks and is constant especially with sitting.  She is currently only taking Robaxin intermittently throughout the week as needed.  She was seen by urology and started on a new medication which has helped with her bladder control.   reports that she also has some mild sensation returning for when she needs to urinate however still is incontinent to bowel movements.  Patient was discharged from home health about 1 week ago as they thought she was plateaued ring with home therapy.   is interested in continuing with outpatient therapy at this time located [place] location closer with a limp.  Patient reports that her sloughing that was treated by home health wound care is now completely healed and she wishes to do warm salt water baths for soaking.  She continues to have bowel incontinence but does report some mild improvement in her bladder sensation.    Interval History 4/4/23:   Patient returns for follow up.  She states that she is doing physical therapy and this is going very well.  She still has pain in her back that goes down the left leg.  She is still wheelchair-bound for most activities.  She states that she feels as though she has a stabbing sensation in her perineal area.  She states that she does not have any wounds in this area.  She has had no recent issues with wound healing around her surgical site.  No signs of infection.  She has quit smoking since surgery.        Exam: Incision is now  well healed. There is no sign of infection. Neuro exam is stable. No signs of DVT.    Radiographs: imaging today shows hardware in place, no evidence of failure.     Assessment/Plan:     Doing well postoperatively.  Outpatient therapy going very well.  Return to clinic in 3 months

## 2023-04-06 ENCOUNTER — CLINICAL SUPPORT (OUTPATIENT)
Dept: REHABILITATION | Facility: HOSPITAL | Age: 68
End: 2023-04-06
Payer: MEDICARE

## 2023-04-06 DIAGNOSIS — S32.122D CLOSED SEVERELY DISPLACED ZONE II FRACTURE OF SACRUM WITH ROUTINE HEALING, SUBSEQUENT ENCOUNTER: Primary | Chronic | ICD-10-CM

## 2023-04-06 DIAGNOSIS — R26.9 GAIT DIFFICULTY: ICD-10-CM

## 2023-04-06 DIAGNOSIS — M17.0 PRIMARY OSTEOARTHRITIS OF BOTH KNEES: ICD-10-CM

## 2023-04-06 PROCEDURE — 97112 NEUROMUSCULAR REEDUCATION: CPT | Mod: PN

## 2023-04-06 PROCEDURE — 97110 THERAPEUTIC EXERCISES: CPT | Mod: PN

## 2023-04-06 PROCEDURE — 97530 THERAPEUTIC ACTIVITIES: CPT | Mod: PN

## 2023-04-06 NOTE — PROGRESS NOTES
[facility]   Physical Therapy Treatment     Name: Merle Caro  Clinic Number: [id number]    Therapy Diagnosis:   Encounter Diagnoses   Name Primary?    Closed severely displaced zone II fracture of sacrum with routine healing, subsequent encounter Yes    Gait difficulty     Primary osteoarthritis of both knees        Physician: Juni Meza MD    Visit Date: 4/6/2023    Physician Orders: PT Eval and Treat   Medical Diagnosis from Referral: Closed severely displaced zone II fracture of sacrum with routine healing, subsequent encounter  Evaluation Date: 1/12/2023  Authorization Period Expiration: 1/1/2023 - 12/31/2023  Plan of Care Expiration: 4/7/2023  Progress Note Due: 4/21/2023  Visit # / Visits authorized: 21/20 + eval      FOTO: 3/3      Precautions: Standard and COPD , osteopenia, bowel incontinence,  s/p L2-pelvis posterior spinal fusion for trans-sacral fracture dislocation with S1-2 spondyloptosis on 10/18/2022    PTA Visit #: -/5     Time In: 1:15 pm  Time Out: 2:10 pm  Total Billable Time: 55 minutes    SUBJECTIVE     Pt reports: continued swelling to LE. C/o leg and sacral pain today. Reports fatigue today.     She was compliant with home exercise program.  Response to previous treatment: no issues  Functional change: improved standing, walking and transfers however does best when pain is lower    Pain: 5/10  Location: sacral region     OBJECTIVE     GAIT: Initial Evaluation: 3 ft with rollator min A   4/6/2023: amb 50 ft on uneven surface with RW and supervision      TRANSFERS:               Sit to stand without AD = mod A              Sit to stand with RW = Min A  4/6/2023:   Sit to stand without AD = Modified Independent   Car transfer = supervision with RW   Descending curb with RW min A      STRENGTH:  MMT Right  Left  4/6/2023 Comments   Shoulder flexion  3-/5 3-/5 4/5 B      Elbow flexion  3+/5 3+/5 4/5 B      Elbow extension  4-/5 4-/5 4/5 B         MMT Right Left  4/6/2023 Comments    Hip flexion  3-/5 3-/5 (R) 4/5   (L) 4-/5     Knee flexion  3+/5 3+/5 4+/5 B      Knee extension  3+/5 3+/5 5/5 B     Ankle dorsiflexion  4/5 3+/5 4/5 B      Ankle plantarflexion NT NT NT        Limitation/Restriction for FOTO  NOC-Neuromuscular disorder Survey    Therapist reviewed FOTO scores for Merle Caro on 4/6/2023.   FOTO documents entered into EPIC - see Media section.    Limitation Score: 57%          Treatment     Merle received the treatments listed below:      therapeutic exercises to develop strength, endurance, and ROM for 15 minutes including:   +standing hip extension in RW x10 ea   +standing hip abduction in RW x10 ea   +standing heel raises in RW x10    therapeutic activities to improve functional performance for 30 minutes, including:  -step ups onto foam pad with RW 2x10 ea   -sit to stand 2x10 B with UE reaching across midline   -standing marching 2x10 for endurance   +amb 50 ft outdoors with RW SBA  -Car transfer modified independent  -descending curb with RW min A   -step up and down width of aerobic step 0 lvl x3     neuromuscular re-education activities to improve: Balance and Proprioception for 10 minutes. The following activities were included:              -standing ball toss 2x10 Mod I with RW   -standing ball kicks 2x10ea        Patient Education and Home Exercises     Home Exercises Provided and Patient Education Provided     Education provided:   - updated HEP provided   - importance of HEP compliance     Written Home Exercises Provided: yes. Exercises were reviewed and Merle was able to demonstrate them prior to the end of the session.  Melre demonstrated good  understanding of the education provided. See EMR under Patient Instructions for exercises provided during therapy sessions    ASSESSMENT   Pt has demonstrated significant improvement in endurance, gait, balance, and strength thus far. Pt is able to ambulate with rolling walking and supervision, able  to perform car transfers modified independent, and sit to stand transfers modified independent. Pt has reached maximum potential at this time and continues to be limited by pain, knee contractures, and stamina. Updated home exercise program provided and patient demonstrated HEP independence at the end of today's visit. Pt discharged from my care.     Merle Is progressing well towards her goals.   Pt prognosis is Fair.     Pt will continue to benefit from skilled outpatient physical therapy to address the deficits listed in the problem list box on initial evaluation, provide pt/family education and to maximize pt's level of independence in the home and community environment.     Pt's spiritual, cultural and educational needs considered and pt agreeable to plan of care and goals.     Anticipated Barriers for therapy: poor endurance     Goals:   Short Term Goals:  6 weeks  HEP: Patient will demonstrate 50% independence with HEP MET   Pain: Pt will demonstrate improved pain less than or equal to 3/10 Plateau   Function: Patient will decrease functional limitation score to less than or equal to 60 out of 100 on FOTO. MET   Strength: Patient will improve impaired strength to greater or equal to 4-/5. MET      Long Term Goals:  12 weeks  HEP: Pt will be independent with advanced HEP. MET   Pain: Pt will demonstrate improved pain less than or equal to 1/10 in order to progress toward maximal functional ability and improve QOL. Plateau   Function: Patient will decrease functional limitation score to less than or equal to 55 out of 100 on FOTO to improve functional independence and quality of life. Plateau   Mobility: Patient will demonstrate sit to stand mod I with least restricted AD to return to prior level of function. MET   Strength: Patient will improve impaired strength to greater or equal to 4/5 in order to walk longer distance. MET   Gait: Patient will ambulate 250 ft mod I with least restrictive AD in order to  return to PLOF. MET 3/28/2023       PLAN     Plan of care Certification: 1/12/2023 to 4/7/2023.     Outpatient Physical Therapy 2 times weekly for 12 weeks to include the following interventions: Gait Training, Manual Therapy, Moist Heat/ Ice, Neuromuscular Re-ed, Self Care, Therapeutic Activities, Therapeutic Exercise, and FDN .      Krystal Mccauley, PT

## 2023-04-07 NOTE — PLAN OF CARE
[facility]  Physical Therapy Discharge Note    Name: Merle [facility]  [facility] Number: [id number]    Therapy Diagnosis:   Encounter Diagnoses   Name Primary?    Closed severely displaced zone II fracture of sacrum with routine healing, subsequent encounter Yes    Gait difficulty     Primary osteoarthritis of both knees      Physician: Juni Meza MD    Physician Orders: PT Eval and Treat   Medical Diagnosis from Referral: Closed severely displaced zone II fracture of sacrum with routine healing, subsequent encounter  Evaluation Date: 1/12/2023      Date of Last visit: 4/6/2023  Total Visits Received: 21    ASSESSMENT      Pt has demonstrated significant improvement in endurance, gait, balance, and strength thus far. Pt is able to ambulate with rolling walking and supervision, able to perform car transfers modified independent, and sit to stand transfers modified independent. Pt has reached maximum potential at this time and continues to be limited by pain, knee contractures, and stamina. Updated home exercise program provided and patient demonstrated HEP independence at the end of today's visit. Pt discharged from my care.     Discharge reason: Patient has completed the physician's prescription and Patient has reached the maximum rehab potential for the present time    Discharge FOTO Score: 57%    Goals:   Short Term Goals:  6 weeks  HEP: Patient will demonstrate 50% independence with HEP MET   Pain: Pt will demonstrate improved pain less than or equal to 3/10 Plateau   Function: Patient will decrease functional limitation score to less than or equal to 60 out of 100 on FOTO. MET   Strength: Patient will improve impaired strength to greater or equal to 4-/5. MET      Long Term Goals:  12 weeks  HEP: Pt will be independent with advanced HEP. MET   Pain: Pt will demonstrate improved pain less than or equal to 1/10 in order to progress toward maximal functional ability and improve QOL.  Plateau   Function: Patient will decrease functional limitation score to less than or equal to 55 out of 100 on FOTO to improve functional independence and quality of life. Plateau   Mobility: Patient will demonstrate sit to stand mod I with least restricted AD to return to prior level of function. MET   Strength: Patient will improve impaired strength to greater or equal to 4/5 in order to walk longer distance. MET   Gait: Patient will ambulate 250 ft mod I with least restrictive AD in order to return to PLOF. MET 3/28/2023    PLAN   This patient is discharged from Physical Therapy      Krystal Mccauley, PT

## 2023-04-17 ENCOUNTER — OFFICE VISIT (OUTPATIENT)
Dept: INTERNAL MEDICINE | Facility: CLINIC | Age: 68
End: 2023-04-17
Payer: MEDICARE

## 2023-04-17 VITALS
SYSTOLIC BLOOD PRESSURE: 120 MMHG | OXYGEN SATURATION: 96 % | DIASTOLIC BLOOD PRESSURE: 70 MMHG | BODY MASS INDEX: 25.5 KG/M2 | TEMPERATURE: 98 F | HEART RATE: 86 BPM | HEIGHT: 60 IN | WEIGHT: 129.88 LBS

## 2023-04-17 DIAGNOSIS — F33.42 RECURRENT MAJOR DEPRESSIVE DISORDER, IN FULL REMISSION: Chronic | ICD-10-CM

## 2023-04-17 DIAGNOSIS — J44.89 ASTHMA WITH COPD: Chronic | ICD-10-CM

## 2023-04-17 DIAGNOSIS — F13.20 BENZODIAZEPINE DEPENDENCE: ICD-10-CM

## 2023-04-17 DIAGNOSIS — K59.04 CHRONIC IDIOPATHIC CONSTIPATION: ICD-10-CM

## 2023-04-17 DIAGNOSIS — F31.76 BIPOLAR DISORDER, IN FULL REMISSION, MOST RECENT EPISODE DEPRESSED: Chronic | ICD-10-CM

## 2023-04-17 DIAGNOSIS — D47.1 CHRONIC MYELOPROLIFERATIVE DISEASE: ICD-10-CM

## 2023-04-17 DIAGNOSIS — G83.4 CAUDA EQUINA SYNDROME: Primary | ICD-10-CM

## 2023-04-17 PROCEDURE — 99999 PR PBB SHADOW E&M-EST. PATIENT-LVL III: ICD-10-PCS | Mod: PBBFAC,,, | Performed by: FAMILY MEDICINE

## 2023-04-17 PROCEDURE — 99215 PR OFFICE/OUTPT VISIT, EST, LEVL V, 40-54 MIN: ICD-10-PCS | Mod: S$PBB,,, | Performed by: FAMILY MEDICINE

## 2023-04-17 PROCEDURE — 99999 PR PBB SHADOW E&M-EST. PATIENT-LVL III: CPT | Mod: PBBFAC,,, | Performed by: FAMILY MEDICINE

## 2023-04-17 PROCEDURE — 99215 OFFICE O/P EST HI 40 MIN: CPT | Mod: S$PBB,,, | Performed by: FAMILY MEDICINE

## 2023-04-17 PROCEDURE — 99213 OFFICE O/P EST LOW 20 MIN: CPT | Mod: PBBFAC | Performed by: FAMILY MEDICINE

## 2023-04-17 RX ORDER — METHOCARBAMOL 750 MG/1
750 TABLET, FILM COATED ORAL 3 TIMES DAILY PRN
Qty: 90 TABLET | Refills: 3 | Status: SHIPPED | OUTPATIENT
Start: 2023-04-17

## 2023-04-17 RX ORDER — PREGABALIN 100 MG/1
100 CAPSULE ORAL 2 TIMES DAILY
Qty: 180 CAPSULE | Refills: 3 | Status: SHIPPED | OUTPATIENT
Start: 2023-04-17 | End: 2023-11-27

## 2023-04-26 PROBLEM — E43 SEVERE MALNUTRITION: Status: RESOLVED | Noted: 2022-10-19 | Resolved: 2023-04-26

## 2023-04-26 PROBLEM — D47.1 CHRONIC MYELOPROLIFERATIVE DISEASE: Status: ACTIVE | Noted: 2023-04-26

## 2023-04-26 NOTE — PROGRESS NOTES
Subjective:   Patient ID: Merle Caro is a 67 y.o. female.  Chief Complaint:  No chief complaint on file.    Presents for 3 month follow-up visit   Continues to do well from surgery for her fractured/dislocated sacrum    Medical History:  - Hypertension.   On lisinopril 10 mg daily.  Reports compliance.  Denies side effects.  Blood pressure controlled.   - Hyperlipidemia and aortic atherosclerosis.  Last LDL less than 100.  On simvastatin 40 mg daily.    Reports compliance.  Denies side effects.  Denies chest pain or claudication.  - Vitamin-D insufficiency.  On vitamin-D 2000 units daily supplement.   - Asthma with COPD, LUCILA, pulmonary nodule, and smoker.  Currently not smoking.  Stable.  Using oxygen.  Currently not using Spiriva and Advair inhalers. Denies any increased shortness of breath or cough.  - GERD and chronic idiopathic constipation. Stable on Protonix 40 mg daily.  Linzess 72 mcg daily helps with constipation but almost causes to significant loose stools.  - Mental health.  On Klonopin 0.5 mg nightly, Paxil 40 mg daily, Haldol 5 mg nightly, and trazodone 100 mg daily.  Followed by external Psychiatry.  Stable without any decompensation since last visit.  - Urinary incontinence.  Followed by Urology.  On Myrbetriq with some improvement in symptoms.  - arthritis and neuropathic pain.  On Celebrex 200 mg daily Robaxin 750 mg 3 times a day as needed, Lyrica 50 mg twice a day and Voltaren topically.  Reports continued improvement in pain since last visit.  Still with some discomfort previous incision/tailbone site.  - Myeloproliferative disorder.  Stable.  Followed by Hematology/Oncology   - Nutrition.  On B12 and folic acid supplement.     Review of Systems   Constitutional:  Negative for activity change, chills, fatigue and fever.   HENT:  Negative for congestion, dental problem, ear pain, postnasal drip, rhinorrhea, sinus pressure and sore throat.    Eyes:  Negative for visual disturbance.    Respiratory:  Negative for cough, chest tightness, shortness of breath and wheezing.    Cardiovascular:  Negative for chest pain, palpitations and leg swelling.   Gastrointestinal:  Positive for constipation and diarrhea. Negative for abdominal distention, abdominal pain, blood in stool, nausea and vomiting.   Endocrine: Negative for polydipsia, polyphagia and polyuria.   Genitourinary:  Negative for difficulty urinating, dysuria, flank pain, hematuria, pelvic pain and vaginal discharge.   Musculoskeletal:  Positive for arthralgias, back pain and gait problem. Negative for myalgias.   Skin:  Negative for rash.   Neurological:  Positive for weakness. Negative for dizziness, syncope, light-headedness and headaches.   Hematological:  Negative for adenopathy.   Psychiatric/Behavioral:  Negative for decreased concentration, dysphoric mood and sleep disturbance. The patient is not nervous/anxious.      Objective:   /70 (BP Location: Left arm, Patient Position: Sitting, BP Method: Small (Manual))   Pulse 86   Temp 98.1 °F (36.7 °C) (Tympanic)   Ht 5' (1.524 m)   Wt 58.9 kg (129 lb 13.6 oz)   SpO2 96%   BMI 25.36 kg/m²     Physical Exam  Vitals and nursing note reviewed.   Constitutional:       General: She is not in acute distress.     Appearance: Normal appearance. She is well-developed and normal weight. She is not ill-appearing or toxic-appearing.      Comments:   Examined in wheelchair comfortable.  Using O2 via nasal cannula.   Neck:      Thyroid: No thyroid mass, thyromegaly or thyroid tenderness.   Cardiovascular:      Rate and Rhythm: Normal rate and regular rhythm.   Pulmonary:      Effort: Pulmonary effort is normal. No tachypnea, accessory muscle usage or respiratory distress.   Musculoskeletal:      Right lower leg: No edema.      Left lower leg: No edema.   Skin:     General: Skin is warm and dry.      Findings: No abrasion, bruising, ecchymosis, rash or wound.   Neurological:      General: No  focal deficit present.      Mental Status: She is alert. Mental status is at baseline.      Coordination: Coordination normal.   Psychiatric:         Attention and Perception: Attention and perception normal. She is attentive.         Mood and Affect: Mood and affect normal. Mood is not anxious or depressed.         Speech: Speech normal.         Behavior: Behavior normal. Behavior is cooperative.         Thought Content: Thought content normal.         Cognition and Memory: Cognition and memory normal.         Judgment: Judgment normal.       Assessment:       ICD-10-CM ICD-9-CM   1. Cauda equina syndrome  G83.4 344.60   2. Chronic idiopathic constipation  K59.04 564.00   3. Asthma with COPD  J44.9 493.20   4. Bipolar disorder, in full remission, most recent episode depressed  F31.76 296.56   5. Recurrent major depressive disorder, in full remission  F33.42 296.36   6. Benzodiazepine dependence  F13.20 304.10   7. Chronic myeloproliferative disease  D47.1 238.79     Plan:   Cauda equina syndrome  -     methocarbamoL (ROBAXIN) 750 MG Tab; Take 1 tablet (750 mg total) by mouth 3 (three) times daily as needed (fro Muscle Spasms).  Dispense: 90 tablet; Refill: 3  -     pregabalin (LYRICA) 100 MG capsule; Take 1 capsule (100 mg total) by mouth 2 (two) times daily.  Dispense: 180 capsule; Refill: 3  Continue Celebrex 200 mg daily  Continue Robaxin 750 mg 3 times a day as needed  Increase Lyrica 100 mg twice a day  Continue topical Voltaren as needed     Chronic idiopathic constipation  -     linaCLOtide (LINZESS) 72 mcg Cap capsule; Take 1 capsule (72 mcg total) by mouth before breakfast.  Dispense: 90 capsule; Refill: 3    Asthma with COPD  Stable   Currently not smoking   Okay to remain off preventative medications   Continue albuterol as needed   Continue oxygen  Follow-up neurology as scheduled     Bipolar disorder, in full remission, most recent episode depressed  Recurrent major depressive disorder, in full  remission  Benzodiazepine dependence   Stable in his appropriate   Continue all current medications   Follow-up Psychiatry as scheduled     Chronic myeloproliferative disease  Stable   Follow-up Hematology as scheduled     Return to clinic 3 months or sooner if needed     40+ minutes of total time spent on the encounter, which includes face to face time and non-face to face time preparing to see the patient (eg, review of tests), Obtaining and/or reviewing separately obtained history, documenting clinical information in the electronic or other health record, independently interpreting results (not separately reported) and communicating results to the patient/family/caregiver, or Care coordination (not separately reported).

## 2023-05-09 ENCOUNTER — PATIENT MESSAGE (OUTPATIENT)
Dept: ADMINISTRATIVE | Facility: HOSPITAL | Age: 68
End: 2023-05-09
Payer: MEDICARE

## 2023-05-09 ENCOUNTER — TELEPHONE (OUTPATIENT)
Dept: ADMINISTRATIVE | Facility: HOSPITAL | Age: 68
End: 2023-05-09
Payer: MEDICARE

## 2023-05-19 ENCOUNTER — LAB VISIT (OUTPATIENT)
Dept: LAB | Facility: HOSPITAL | Age: 68
End: 2023-05-19
Attending: NURSE PRACTITIONER
Payer: MEDICARE

## 2023-05-19 DIAGNOSIS — D64.9 ANEMIA, UNSPECIFIED TYPE: ICD-10-CM

## 2023-05-19 DIAGNOSIS — D53.9 NUTRITIONAL ANEMIA, UNSPECIFIED: ICD-10-CM

## 2023-05-19 LAB
BASOPHILS # BLD AUTO: 0.04 K/UL (ref 0–0.2)
BASOPHILS NFR BLD: 0.6 % (ref 0–1.9)
DIFFERENTIAL METHOD: ABNORMAL
EOSINOPHIL # BLD AUTO: 0.2 K/UL (ref 0–0.5)
EOSINOPHIL NFR BLD: 2.9 % (ref 0–8)
ERYTHROCYTE [DISTWIDTH] IN BLOOD BY AUTOMATED COUNT: 14.9 % (ref 11.5–14.5)
FERRITIN SERPL-MCNC: 206 NG/ML (ref 20–300)
FOLATE SERPL-MCNC: 15.6 NG/ML (ref 4–24)
HCT VFR BLD AUTO: 32.9 % (ref 37–48.5)
HGB BLD-MCNC: 10.6 G/DL (ref 12–16)
IMM GRANULOCYTES # BLD AUTO: 0.02 K/UL (ref 0–0.04)
IMM GRANULOCYTES NFR BLD AUTO: 0.3 % (ref 0–0.5)
IRON SERPL-MCNC: 23 UG/DL (ref 30–160)
LYMPHOCYTES # BLD AUTO: 1.1 K/UL (ref 1–4.8)
LYMPHOCYTES NFR BLD: 14.5 % (ref 18–48)
MCH RBC QN AUTO: 27.3 PG (ref 27–31)
MCHC RBC AUTO-ENTMCNC: 32.2 G/DL (ref 32–36)
MCV RBC AUTO: 85 FL (ref 82–98)
MONOCYTES # BLD AUTO: 0.6 K/UL (ref 0.3–1)
MONOCYTES NFR BLD: 8.7 % (ref 4–15)
NEUTROPHILS # BLD AUTO: 5.3 K/UL (ref 1.8–7.7)
NEUTROPHILS NFR BLD: 73 % (ref 38–73)
NRBC BLD-RTO: 0 /100 WBC
PLATELET # BLD AUTO: 311 K/UL (ref 150–450)
PMV BLD AUTO: 8.5 FL (ref 9.2–12.9)
RBC # BLD AUTO: 3.88 M/UL (ref 4–5.4)
SATURATED IRON: 9 % (ref 20–50)
TOTAL IRON BINDING CAPACITY: 256 UG/DL (ref 250–450)
TRANSFERRIN SERPL-MCNC: 173 MG/DL (ref 200–375)
VIT B12 SERPL-MCNC: 601 PG/ML (ref 210–950)
WBC # BLD AUTO: 7.22 K/UL (ref 3.9–12.7)

## 2023-05-19 PROCEDURE — 85025 COMPLETE CBC W/AUTO DIFF WBC: CPT | Performed by: NURSE PRACTITIONER

## 2023-05-19 PROCEDURE — 82607 VITAMIN B-12: CPT | Performed by: NURSE PRACTITIONER

## 2023-05-19 PROCEDURE — 36415 COLL VENOUS BLD VENIPUNCTURE: CPT | Performed by: NURSE PRACTITIONER

## 2023-05-19 PROCEDURE — 82746 ASSAY OF FOLIC ACID SERUM: CPT | Performed by: NURSE PRACTITIONER

## 2023-05-19 PROCEDURE — 82728 ASSAY OF FERRITIN: CPT | Performed by: NURSE PRACTITIONER

## 2023-05-19 PROCEDURE — 84466 ASSAY OF TRANSFERRIN: CPT | Performed by: NURSE PRACTITIONER

## 2023-05-23 ENCOUNTER — OFFICE VISIT (OUTPATIENT)
Dept: HEMATOLOGY/ONCOLOGY | Facility: CLINIC | Age: 68
End: 2023-05-23
Payer: MEDICARE

## 2023-05-23 VITALS
TEMPERATURE: 98 F | WEIGHT: 142.88 LBS | SYSTOLIC BLOOD PRESSURE: 107 MMHG | HEART RATE: 90 BPM | HEIGHT: 60 IN | OXYGEN SATURATION: 94 % | BODY MASS INDEX: 28.05 KG/M2 | DIASTOLIC BLOOD PRESSURE: 64 MMHG

## 2023-05-23 DIAGNOSIS — D50.8 OTHER IRON DEFICIENCY ANEMIA: ICD-10-CM

## 2023-05-23 PROCEDURE — 99999 PR PBB SHADOW E&M-EST. PATIENT-LVL IV: CPT | Mod: PBBFAC,,, | Performed by: NURSE PRACTITIONER

## 2023-05-23 PROCEDURE — 99214 OFFICE O/P EST MOD 30 MIN: CPT | Mod: PBBFAC | Performed by: NURSE PRACTITIONER

## 2023-05-23 PROCEDURE — 99214 PR OFFICE/OUTPT VISIT, EST, LEVL IV, 30-39 MIN: ICD-10-PCS | Mod: S$PBB,,, | Performed by: NURSE PRACTITIONER

## 2023-05-23 PROCEDURE — 99214 OFFICE O/P EST MOD 30 MIN: CPT | Mod: S$PBB,,, | Performed by: NURSE PRACTITIONER

## 2023-05-23 PROCEDURE — 99999 PR PBB SHADOW E&M-EST. PATIENT-LVL IV: ICD-10-PCS | Mod: PBBFAC,,, | Performed by: NURSE PRACTITIONER

## 2023-05-23 RX ORDER — HEPARIN 100 UNIT/ML
500 SYRINGE INTRAVENOUS
Status: CANCELLED | OUTPATIENT
Start: 2023-07-14

## 2023-05-23 RX ORDER — HEPARIN 100 UNIT/ML
500 SYRINGE INTRAVENOUS
Status: CANCELLED | OUTPATIENT
Start: 2023-06-22

## 2023-05-23 RX ORDER — SODIUM CHLORIDE 0.9 % (FLUSH) 0.9 %
10 SYRINGE (ML) INJECTION
Status: CANCELLED | OUTPATIENT
Start: 2023-08-04

## 2023-05-23 RX ORDER — SODIUM CHLORIDE 0.9 % (FLUSH) 0.9 %
10 SYRINGE (ML) INJECTION
Status: CANCELLED | OUTPATIENT
Start: 2023-07-07

## 2023-05-23 RX ORDER — HEPARIN 100 UNIT/ML
500 SYRINGE INTRAVENOUS
Status: CANCELLED | OUTPATIENT
Start: 2023-05-23

## 2023-05-23 RX ORDER — SODIUM CHLORIDE 0.9 % (FLUSH) 0.9 %
10 SYRINGE (ML) INJECTION
Status: CANCELLED | OUTPATIENT
Start: 2023-06-29

## 2023-05-23 RX ORDER — HEPARIN 100 UNIT/ML
500 SYRINGE INTRAVENOUS
Status: CANCELLED | OUTPATIENT
Start: 2023-08-04

## 2023-05-23 RX ORDER — HEPARIN 100 UNIT/ML
500 SYRINGE INTRAVENOUS
Status: CANCELLED | OUTPATIENT
Start: 2023-07-07

## 2023-05-23 RX ORDER — SODIUM CHLORIDE 0.9 % (FLUSH) 0.9 %
10 SYRINGE (ML) INJECTION
Status: CANCELLED | OUTPATIENT
Start: 2023-07-21

## 2023-05-23 RX ORDER — HEPARIN 100 UNIT/ML
500 SYRINGE INTRAVENOUS
Status: CANCELLED | OUTPATIENT
Start: 2023-07-28

## 2023-05-23 RX ORDER — SODIUM CHLORIDE 0.9 % (FLUSH) 0.9 %
10 SYRINGE (ML) INJECTION
Status: CANCELLED | OUTPATIENT
Start: 2023-07-14

## 2023-05-23 RX ORDER — SODIUM CHLORIDE 0.9 % (FLUSH) 0.9 %
10 SYRINGE (ML) INJECTION
Status: CANCELLED | OUTPATIENT
Start: 2023-06-22

## 2023-05-23 RX ORDER — SODIUM CHLORIDE 0.9 % (FLUSH) 0.9 %
10 SYRINGE (ML) INJECTION
Status: CANCELLED | OUTPATIENT
Start: 2023-05-23

## 2023-05-23 RX ORDER — HEPARIN 100 UNIT/ML
500 SYRINGE INTRAVENOUS
Status: CANCELLED | OUTPATIENT
Start: 2023-06-29

## 2023-05-23 RX ORDER — HEPARIN 100 UNIT/ML
500 SYRINGE INTRAVENOUS
Status: CANCELLED | OUTPATIENT
Start: 2023-07-21

## 2023-05-23 RX ORDER — SODIUM CHLORIDE 0.9 % (FLUSH) 0.9 %
10 SYRINGE (ML) INJECTION
Status: CANCELLED | OUTPATIENT
Start: 2023-07-28
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
## 2023-05-23 NOTE — ASSESSMENT & PLAN NOTE
Persistent iron deficiency and anemia. Patient notes ongoing fatigue    We previously discussed proceeding with Colonoscopy. For now patient declines due to continued recovery from sacral surgery. Will defer for now. If recurrent iron deficiency will plan to proceed with Colonoscopy     Arrange additional Venofer weekly x 4.     Continue b12 and folic acid supplement. F/u 6 weeks after last Venofer with cbc, iron, ferritin

## 2023-05-24 ENCOUNTER — OFFICE VISIT (OUTPATIENT)
Dept: INTERNAL MEDICINE | Facility: CLINIC | Age: 68
End: 2023-05-24
Payer: MEDICARE

## 2023-05-24 VITALS
BODY MASS INDEX: 27.92 KG/M2 | HEIGHT: 60 IN | TEMPERATURE: 96 F | HEART RATE: 94 BPM | OXYGEN SATURATION: 95 % | WEIGHT: 142.19 LBS | SYSTOLIC BLOOD PRESSURE: 128 MMHG | DIASTOLIC BLOOD PRESSURE: 80 MMHG

## 2023-05-24 DIAGNOSIS — R60.0 BILATERAL LOWER EXTREMITY EDEMA: Primary | ICD-10-CM

## 2023-05-24 PROCEDURE — 99213 OFFICE O/P EST LOW 20 MIN: CPT | Mod: PBBFAC | Performed by: FAMILY MEDICINE

## 2023-05-24 PROCEDURE — 99999 PR PBB SHADOW E&M-EST. PATIENT-LVL III: CPT | Mod: PBBFAC,,, | Performed by: FAMILY MEDICINE

## 2023-05-24 PROCEDURE — 99214 OFFICE O/P EST MOD 30 MIN: CPT | Mod: S$PBB,,, | Performed by: FAMILY MEDICINE

## 2023-05-24 PROCEDURE — 99999 PR PBB SHADOW E&M-EST. PATIENT-LVL III: ICD-10-PCS | Mod: PBBFAC,,, | Performed by: FAMILY MEDICINE

## 2023-05-24 PROCEDURE — 99214 PR OFFICE/OUTPT VISIT, EST, LEVL IV, 30-39 MIN: ICD-10-PCS | Mod: S$PBB,,, | Performed by: FAMILY MEDICINE

## 2023-05-24 RX ORDER — FUROSEMIDE 20 MG/1
20 TABLET ORAL DAILY PRN
Qty: 90 TABLET | Refills: 0 | Status: SHIPPED | OUTPATIENT
Start: 2023-05-24

## 2023-05-24 NOTE — PROGRESS NOTES
Subjective:   Patient ID: Merle Caro is a 67 y.o. female.  Chief Complaint:  Leg Swelling and Foot Swelling    Presents for evaluation of bilateral lower extremity edema   Reports significantly increased over past 4-6 weeks   Denies any increased shortness of breath   Denies any facial swelling or swelling in her hands  Swelling does seem to come and go  Maybe worse on days when she had physical therapy or sits for long period of time in her wheelchair  No significant pain, redness, warmth, or tenderness  Renal function panel normal January 2023   A1c and TSH normal October 2022    Review of Systems   Constitutional:  Negative for fatigue.   Eyes:  Negative for visual disturbance.   Respiratory:  Positive for cough (Chronic and stable from underlying lung disease) and shortness of breath (Chronic and stable from underlying lung disease). Negative for chest tightness and wheezing.    Cardiovascular:  Positive for leg swelling. Negative for chest pain and palpitations.   Gastrointestinal:  Negative for abdominal pain, constipation, diarrhea, nausea and vomiting.   Genitourinary:  Negative for difficulty urinating.   Musculoskeletal:  Positive for arthralgias, back pain and gait problem. Negative for myalgias.   Skin:  Negative for rash.   Neurological:  Positive for weakness. Negative for dizziness, syncope, light-headedness and headaches.   Psychiatric/Behavioral:  Negative for sleep disturbance. The patient is not nervous/anxious.      Objective:   /80 (BP Location: Right arm, Patient Position: Sitting, BP Method: Small (Manual))   Pulse 94   Temp 96.4 °F (35.8 °C) (Tympanic)   Ht 5' (1.524 m)   Wt 64.5 kg (142 lb 3.2 oz)   SpO2 95%   BMI 27.77 kg/m²     Physical Exam  Vitals and nursing note reviewed.   Constitutional:       Appearance: Normal appearance. She is well-developed and overweight.      Comments:   Examined in wheelchair   Weight increased 10+ lb over past 30 days   Cardiovascular:       Rate and Rhythm: Normal rate and regular rhythm.   Pulmonary:      Effort: Pulmonary effort is normal.   Musculoskeletal:      Right lower leg: Edema present.      Left lower leg: Edema present.   Skin:     Findings: No rash.   Neurological:      Mental Status: She is alert.   Psychiatric:         Mood and Affect: Mood and affect normal.     Assessment:       ICD-10-CM ICD-9-CM   1. Bilateral lower extremity edema  R60.0 782.3     Plan:   Bilateral lower extremity edema  -     furosemide (LASIX) 20 MG tablet; Take 1 tablet (20 mg total) by mouth daily as needed (for leg swelling).  Dispense: 90 tablet; Refill: 0    Trial of low-dose Lasix 20 mg daily as needed   Discussed likely to cause increase in her urinary issues for which she currently takes Myrbetriq  If not effective/minimally effective will need to consider graduated compression stockings   Return to clinic as scheduled/as needed    30+ minutes of total time spent on the encounter, which includes face to face time and non-face to face time preparing to see the patient (eg, review of tests), Obtaining and/or reviewing separately obtained history, documenting clinical information in the electronic or other health record, independently interpreting results (not separately reported) and communicating results to the patient/family/caregiver, or Care coordination (not separately reported).

## 2023-05-31 DIAGNOSIS — M25.561 CHRONIC PAIN OF BOTH KNEES: ICD-10-CM

## 2023-05-31 DIAGNOSIS — M25.562 CHRONIC PAIN OF BOTH KNEES: ICD-10-CM

## 2023-05-31 DIAGNOSIS — G89.29 CHRONIC PAIN OF BOTH KNEES: ICD-10-CM

## 2023-06-05 DIAGNOSIS — M25.562 CHRONIC PAIN OF BOTH KNEES: ICD-10-CM

## 2023-06-05 DIAGNOSIS — M25.561 CHRONIC PAIN OF BOTH KNEES: ICD-10-CM

## 2023-06-05 DIAGNOSIS — G89.29 CHRONIC PAIN OF BOTH KNEES: ICD-10-CM

## 2023-06-05 RX ORDER — CELECOXIB 200 MG/1
CAPSULE ORAL
Qty: 90 CAPSULE | Refills: 0 | Status: SHIPPED | OUTPATIENT
Start: 2023-06-05 | End: 2023-09-06 | Stop reason: SDUPTHER

## 2023-06-05 RX ORDER — CELECOXIB 200 MG/1
200 CAPSULE ORAL DAILY
Qty: 90 CAPSULE | Refills: 0 | Status: CANCELLED | OUTPATIENT
Start: 2023-06-05

## 2023-06-21 ENCOUNTER — INFUSION (OUTPATIENT)
Dept: INFUSION THERAPY | Facility: HOSPITAL | Age: 68
End: 2023-06-21
Attending: NURSE PRACTITIONER
Payer: MEDICARE

## 2023-06-21 VITALS
OXYGEN SATURATION: 96 % | SYSTOLIC BLOOD PRESSURE: 126 MMHG | TEMPERATURE: 98 F | HEART RATE: 76 BPM | RESPIRATION RATE: 16 BRPM | DIASTOLIC BLOOD PRESSURE: 72 MMHG

## 2023-06-21 DIAGNOSIS — D50.8 OTHER IRON DEFICIENCY ANEMIA: Primary | ICD-10-CM

## 2023-06-21 PROCEDURE — 63600175 PHARM REV CODE 636 W HCPCS: Performed by: NURSE PRACTITIONER

## 2023-06-21 PROCEDURE — 96374 THER/PROPH/DIAG INJ IV PUSH: CPT

## 2023-06-21 PROCEDURE — 25000003 PHARM REV CODE 250: Performed by: NURSE PRACTITIONER

## 2023-06-21 PROCEDURE — A4216 STERILE WATER/SALINE, 10 ML: HCPCS | Performed by: NURSE PRACTITIONER

## 2023-06-21 RX ORDER — SODIUM CHLORIDE 0.9 % (FLUSH) 0.9 %
10 SYRINGE (ML) INJECTION
Status: DISCONTINUED | OUTPATIENT
Start: 2023-06-21 | End: 2023-06-21 | Stop reason: HOSPADM

## 2023-06-21 RX ADMIN — Medication 10 ML: at 03:06

## 2023-06-21 RX ADMIN — IRON SUCROSE 200 MG: 20 INJECTION, SOLUTION INTRAVENOUS at 03:06

## 2023-06-21 NOTE — PLAN OF CARE
Patient tolerated well. RTC 1 week    Problem: Adult Inpatient Plan of Care  Goal: Plan of Care Review  Outcome: Ongoing, Progressing  Flowsheets (Taken 6/21/2023 1737)  Plan of Care Reviewed With: patient  Goal: Patient-Specific Goal (Individualized)  Outcome: Ongoing, Progressing  Flowsheets (Taken 6/21/2023 1737)  Anxieties, Fears or Concerns: none expressed  Individualized Care Needs:   requested IV to be put in her left arm   asked for orange juice & cheese-its  Goal: Optimal Comfort and Wellbeing  Outcome: Ongoing, Progressing  Intervention: Monitor Pain and Promote Comfort  Flowsheets (Taken 6/21/2023 1737)  Pain Management Interventions: pillow support provided  Intervention: Provide Person-Centered Care  Flowsheets (Taken 6/21/2023 1737)  Trust Relationship/Rapport:   care explained   reassurance provided   choices provided   thoughts/feelings acknowledged   emotional support provided   empathic listening provided   questions answered   questions encouraged     Problem: Fall Injury Risk  Goal: Absence of Fall and Fall-Related Injury  Outcome: Ongoing, Progressing  Intervention: Identify and Manage Contributors  Flowsheets (Taken 6/21/2023 1737)  Self-Care Promotion: BADL personal objects within reach  Medication Review/Management: medications reviewed  Intervention: Promote Injury-Free Environment  Flowsheets (Taken 6/21/2023 1737)  Safety Promotion/Fall Prevention:   instructed to call staff for mobility   in recliner, wheels locked   lighting adjusted   medications reviewed     Problem: Anemia  Goal: Anemia Symptom Improvement  Outcome: Ongoing, Progressing  Intervention: Monitor and Manage Anemia  Flowsheets (Taken 6/21/2023 1737)  Safety Promotion/Fall Prevention:   instructed to call staff for mobility   in recliner, wheels locked   lighting adjusted   medications reviewed

## 2023-06-21 NOTE — DISCHARGE INSTRUCTIONS
.[facility]  [address] [facility]  [address]  [phone] phone     [phone] fax  Hours of Operation: Monday- Friday 8:00am- 5:00pm  After hours phone  [phone]  Hematology / Oncology Physicians on call    Dr. Fritz Gentile      Nurse Practitioners:    Sherice Delgadillo, JACEY Tovar, JACEY Pedersen, JACEY Pruitt, JACEY Ferraro, JONAS Aguirre      Please don't hesitate to call if you have any concerns.      
2

## 2023-06-26 ENCOUNTER — TELEPHONE (OUTPATIENT)
Dept: HEMATOLOGY/ONCOLOGY | Facility: CLINIC | Age: 68
End: 2023-06-26
Payer: MEDICARE

## 2023-06-26 NOTE — TELEPHONE ENCOUNTER
----- Message from Princess Lynch sent at 6/26/2023  8:15 AM CDT -----  Kim Villatoro, request a call back to reschedule.[phone].

## 2023-06-28 ENCOUNTER — INFUSION (OUTPATIENT)
Dept: INFUSION THERAPY | Facility: HOSPITAL | Age: 68
End: 2023-06-28
Attending: NURSE PRACTITIONER
Payer: MEDICARE

## 2023-06-28 VITALS
HEART RATE: 102 BPM | OXYGEN SATURATION: 95 % | RESPIRATION RATE: 18 BRPM | DIASTOLIC BLOOD PRESSURE: 89 MMHG | SYSTOLIC BLOOD PRESSURE: 147 MMHG | TEMPERATURE: 97 F

## 2023-06-28 DIAGNOSIS — D50.8 OTHER IRON DEFICIENCY ANEMIA: Primary | ICD-10-CM

## 2023-06-28 PROCEDURE — 63600175 PHARM REV CODE 636 W HCPCS: Performed by: NURSE PRACTITIONER

## 2023-06-28 PROCEDURE — 96374 THER/PROPH/DIAG INJ IV PUSH: CPT

## 2023-06-28 RX ADMIN — IRON SUCROSE 200 MG: 20 INJECTION, SOLUTION INTRAVENOUS at 03:06

## 2023-06-28 NOTE — PLAN OF CARE
Problem: Adult Inpatient Plan of Care  Goal: Plan of Care Review  Outcome: Ongoing, Progressing  Flowsheets (Taken 6/28/2023 1534)  Plan of Care Reviewed With: patient  Goal: Patient-Specific Goal (Individualized)  Outcome: Ongoing, Progressing  Flowsheets (Taken 6/28/2023 1534)  Anxieties, Fears or Concerns: none  Individualized Care Needs: none  Goal: Optimal Comfort and Wellbeing  Outcome: Ongoing, Progressing  Intervention: Provide Person-Centered Care  Flowsheets (Taken 6/28/2023 1534)  Trust Relationship/Rapport:   care explained   questions encouraged   choices provided   reassurance provided   emotional support provided     Problem: Fall Injury Risk  Goal: Absence of Fall and Fall-Related Injury  Outcome: Ongoing, Progressing  Intervention: Identify and Manage Contributors  Flowsheets (Taken 6/28/2023 1534)  Self-Care Promotion: BADL personal routines maintained  Medication Review/Management: medications reviewed  Intervention: Promote Injury-Free Environment  Flowsheets (Taken 6/28/2023 1534)  Safety Promotion/Fall Prevention:   in recliner, wheels locked   nonskid shoes/socks when out of bed   medications reviewed

## 2023-06-28 NOTE — DISCHARGE INSTRUCTIONS
THANKS FOR ALLOWING ME TO CARE FOR YOU TODAY!!! ~Zhanna          THANKS FOR CHOOSING [facility]!!!      [facility]  [address] [facility]  [address] [place] [address] Drive  [phone] phone     [phone] fax  Hours of Operation: Monday- Friday 8:00am- 5:00pm  After hours phone  [phone]  Hematology / Oncology Physicians on call      JANIS Rdz Dr., Dr., NP Sydney Prescott, JACEY Pruitt, CHEO Casillas    Please call with any concerns regarding your appointment today.

## 2023-06-29 ENCOUNTER — PATIENT OUTREACH (OUTPATIENT)
Dept: ADMINISTRATIVE | Facility: HOSPITAL | Age: 68
End: 2023-06-29
Payer: MEDICARE

## 2023-06-29 NOTE — PROGRESS NOTES
Working Colon Report:     Pt overdue for colon cancer screening. Had [place] in 2021 that was not processed. Offered alternate screening options. Patient agreed to fitkit. Sent message to ARTEM LEON to mail fit kit to patient.

## 2023-07-05 ENCOUNTER — OFFICE VISIT (OUTPATIENT)
Dept: ORTHOPEDICS | Facility: CLINIC | Age: 68
End: 2023-07-05
Payer: MEDICARE

## 2023-07-05 VITALS — WEIGHT: 142 LBS | HEIGHT: 60 IN | BODY MASS INDEX: 27.88 KG/M2

## 2023-07-05 DIAGNOSIS — Z98.1 STATUS POST LUMBAR SPINAL FUSION: Primary | ICD-10-CM

## 2023-07-05 PROCEDURE — 99999 PR PBB SHADOW E&M-EST. PATIENT-LVL IV: ICD-10-PCS | Mod: PBBFAC,,, | Performed by: ORTHOPAEDIC SURGERY

## 2023-07-05 PROCEDURE — 99213 PR OFFICE/OUTPT VISIT, EST, LEVL III, 20-29 MIN: ICD-10-PCS | Mod: S$PBB,,, | Performed by: ORTHOPAEDIC SURGERY

## 2023-07-05 PROCEDURE — 99213 OFFICE O/P EST LOW 20 MIN: CPT | Mod: S$PBB,,, | Performed by: ORTHOPAEDIC SURGERY

## 2023-07-05 PROCEDURE — 99999 PR PBB SHADOW E&M-EST. PATIENT-LVL IV: CPT | Mod: PBBFAC,,, | Performed by: ORTHOPAEDIC SURGERY

## 2023-07-05 PROCEDURE — 99214 OFFICE O/P EST MOD 30 MIN: CPT | Mod: PBBFAC | Performed by: ORTHOPAEDIC SURGERY

## 2023-07-05 NOTE — PROGRESS NOTES
Major change in her ambulatory status issues still largely confined to the wheelchair but does transferred to the recliner with help from her .  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  Patient reports that she has ongoing pain reported as a 5/10.  The pain is a pinpoint pain in her buttocks and is constant especially with sitting.  She is currently only taking Robaxin intermittently throughout the week as needed.  She was seen by urology and started on a new medication which has helped with her bladder control.   reports that she also has some mild sensation returning for when she needs to urinate however still is incontinent to bowel movements.  Patient was discharged from home health about 1 week ago as they thought she was plateaued ring with home therapy.   is interested in continuing with outpatient therapy at this time located [facility] location closer with a limp.  Patient reports that her sloughing that was treated by home health wound care is now completely healed and she wishes to do warm salt water baths for soaking.  She continues to have bowel incontinence but does report some mild improvement in her bladder sensation.    Interval History 4/4/23:   Patient returns for follow up.  She states that she is doing physical therapy and this is going very well.  She still has pain in her back that goes down the left leg.  She is still wheelchair-bound for most activities.  She states that she feels as though she has a stabbing sensation in her perineal area.  She states that she does not have any wounds in this area.  She has had no recent issues with wound healing around her surgical site.  No signs of infection.  She has quit smoking since surgery.    Interval History  7/5/23:  She reports that her pain overall is improved.  She was getting significant benefit out of outpatient therapy and is now ambulating 20-25 feet to the restroom independently with a walker.  She does continue to have pain and intermittent neuropathy to bilateral lower extremities however this is somewhat improving.  She is taking gabapentin, Tylenol, and Celebrex to help with the pain.  She recently has had issues maintaining her hemoglobin and is currently being worked up by Oncology for anemia with plans for colonoscopy in the near future.      Exam: Incision is now well healed. There is no sign of infection. Neuro exam is stable. No signs of DVT.    Radiographs: imaging today shows hardware in place, no evidence of failure.     Assessment/Plan:     Doing well postoperatively.  Outpatient therapy going very well.  Return to clinic in 6 months

## 2023-07-06 ENCOUNTER — INFUSION (OUTPATIENT)
Dept: INFUSION THERAPY | Facility: HOSPITAL | Age: 68
End: 2023-07-06
Attending: NURSE PRACTITIONER
Payer: MEDICARE

## 2023-07-06 VITALS
DIASTOLIC BLOOD PRESSURE: 74 MMHG | SYSTOLIC BLOOD PRESSURE: 127 MMHG | TEMPERATURE: 98 F | OXYGEN SATURATION: 94 % | HEART RATE: 83 BPM | RESPIRATION RATE: 18 BRPM

## 2023-07-06 DIAGNOSIS — D50.8 OTHER IRON DEFICIENCY ANEMIA: Primary | ICD-10-CM

## 2023-07-06 PROCEDURE — 63600175 PHARM REV CODE 636 W HCPCS: Performed by: NURSE PRACTITIONER

## 2023-07-06 PROCEDURE — 96374 THER/PROPH/DIAG INJ IV PUSH: CPT

## 2023-07-06 RX ADMIN — IRON SUCROSE 200 MG: 20 INJECTION, SOLUTION INTRAVENOUS at 02:07

## 2023-07-06 NOTE — NURSING
Iv Injection given without difficulties.pressure dressing applied. Patient instructed to stay in the clinic for 15 minutes. Patient verbalized understanding and will notify nurse with any complaints.

## 2023-07-07 ENCOUNTER — PATIENT MESSAGE (OUTPATIENT)
Dept: INFECTIOUS DISEASES | Facility: CLINIC | Age: 68
End: 2023-07-07
Payer: MEDICARE

## 2023-07-13 ENCOUNTER — INFUSION (OUTPATIENT)
Dept: INFUSION THERAPY | Facility: HOSPITAL | Age: 68
End: 2023-07-13
Attending: NURSE PRACTITIONER
Payer: MEDICARE

## 2023-07-13 VITALS
RESPIRATION RATE: 18 BRPM | SYSTOLIC BLOOD PRESSURE: 110 MMHG | HEART RATE: 79 BPM | TEMPERATURE: 99 F | OXYGEN SATURATION: 96 % | DIASTOLIC BLOOD PRESSURE: 72 MMHG

## 2023-07-13 DIAGNOSIS — D50.8 OTHER IRON DEFICIENCY ANEMIA: Primary | ICD-10-CM

## 2023-07-13 PROCEDURE — 96374 THER/PROPH/DIAG INJ IV PUSH: CPT

## 2023-07-13 PROCEDURE — 63600175 PHARM REV CODE 636 W HCPCS: Performed by: NURSE PRACTITIONER

## 2023-07-13 RX ADMIN — IRON SUCROSE 200 MG: 20 INJECTION, SOLUTION INTRAVENOUS at 02:07

## 2023-07-13 NOTE — DISCHARGE INSTRUCTIONS
..[facility]  [address] [facility]  [address]  [phone] phone     [phone] fax  Hours of Operation: Monday- Friday 8:00am- 5:00pm  After hours phone  [phone]  Hematology / Oncology Physicians on call    Dr. Fritz Gentile      Nurse Practitioners:    Sherice Delgadillo, JACEY Tovar, JACEY Pedersen, JACEY Pruitt, JACEY Ferraro, JACEY Valencia, [place]      Please don't hesitate to call if you have any concerns.    .FALL PREVENTION   Falls often occur due to slipping, tripping or losing your balance. Here are ways to reduce your risk of falling again.   Was there anything that caused your fall that can be fixed, removed or replaced?   Make your home safe by keeping walkways clear of objects you may trip over.   Use non-slip pads under rugs.   Do not walk in poorly lit areas.   Do not stand on chairs or wobbly ladders.   Use caution when reaching overhead or looking upward. This position can cause a loss of balance.   Be sure your shoes fit properly, have non-slip bottoms and are in good condition.   Be cautious when going up and down stairs, curbs, and when walking on uneven sidewalks.   If your balance is poor, consider using a cane or walker.   If your fall was related to alcohol use, stop or limit alcohol intake.   If your fall was related to use of sleeping medicines, talk to your doctor about this. You may need to reduce your dosage at bedtime if you awaken during the night to go to the bathroom.   To reduce the need for nighttime bathroom trips:   Avoid drinking fluids for several hours before going to bed   Empty your bladder before going to bed   Men can keep a urinal at the bedside   © 6137-7094 [employer], [address] [place] [address], [place] [address]. All rights reserved. This information is not intended as a substitute for professional medical care. Always follow your healthcare  professional's instructions.  .WAYS TO HELP PREVENT INFECTION        WASH YOUR HANDS OFTEN DURING THE DAY, ESPECIALLY BEFORE YOU EAT, AFTER USING THE BATHROOM, AND AFTER TOUCHING ANIMALS    STAY AWAY FROM PEOPLE WHO HAVE ILLNESSES YOU CAN CATCH; SUCH AS COLDS, FLU, CHICKEN POX    TRY TO AVOID CROWDS    STAY AWAY FROM CHILDREN WHO RECENTLY HAVE RECEIVED LIVE VIRUS VACCINES    MAINTAIN GOOD MOUTH CARE    DO NOT SQUEEZE OR SCRATCH PIMPLES    CLEAN CUTS & SCRAPES RIGHT AWAY AND DAILY UNTIL HEALED WITH WARM WATER, SOAP & AN ANTISEPTIC    AVOID CONTACT WITH LITTER BOXES, BIRD CAGES, & FISH TANKS    AVOID STANDING WATER, IE., BIRD BATHS, FLOWER POTS/VASES, OR HUMIDIFIERS    WEAR GLOVES WHEN GARDENING OR CLEANING UP AFTER OTHERS, ESPECIALLY BABIES & SMALL CHILDREN    DO NOT EAT RAW FISH, SEAFOOD, MEAT, OR EGGS

## 2023-07-13 NOTE — PLAN OF CARE
Problem: Adult Inpatient Plan of Care  Goal: Plan of Care Review  Outcome: Ongoing, Progressing  Flowsheets (Taken 7/13/2023 1442)  Plan of Care Reviewed With:   patient   son  Goal: Patient-Specific Goal (Individualized)  Outcome: Ongoing, Progressing  Flowsheets (Taken 7/13/2023 1442)  Anxieties, Fears or Concerns: No concerns at this time  Individualized Care Needs: Pt wishes to remain in WC, pillow under IV arm and OJ given  Goal: Optimal Comfort and Wellbeing  Outcome: Ongoing, Progressing     Problem: Fall Injury Risk  Goal: Absence of Fall and Fall-Related Injury  Outcome: Ongoing, Progressing     Problem: Anemia  Goal: Anemia Symptom Improvement  Outcome: Ongoing, Progressing

## 2023-07-17 ENCOUNTER — TELEPHONE (OUTPATIENT)
Dept: INTERNAL MEDICINE | Facility: CLINIC | Age: 68
End: 2023-07-17
Payer: MEDICARE

## 2023-07-17 NOTE — TELEPHONE ENCOUNTER
Attempt to contact pt regarding rescheduling 7/19 appt due to PCP out on leave; no answer; lvm stating MA canceled appt & callback # to reschedule /LD

## 2023-08-08 NOTE — TELEPHONE ENCOUNTER
-Extensive diffuse psoriasis  -Will provide topical calcipotriene cream  -Referral to dermatology The patient was scheduled for STAT imaging today. See note from RT:  Pt refused to continue scan today .states was in too much pain and could not continue to do the sacrum w/wo and lumber w images .    I called patient's  and he thinks they should try for sedated MRI due to patient's pain. Will try to schedule this prior to Ortho-spine appt next week.     Rx sent today for Norco 7.5. Explained to  that if Mrs. Caro experiences any side effects or allergic reaction (ie. Difficulty breathing), to discontinue immediately and report to ED if needed.

## 2023-08-21 ENCOUNTER — PES CALL (OUTPATIENT)
Dept: ADMINISTRATIVE | Facility: CLINIC | Age: 68
End: 2023-08-21
Payer: MEDICARE

## 2023-08-22 ENCOUNTER — LAB VISIT (OUTPATIENT)
Dept: LAB | Facility: HOSPITAL | Age: 68
End: 2023-08-22
Attending: INTERNAL MEDICINE
Payer: MEDICARE

## 2023-08-22 DIAGNOSIS — D50.8 OTHER IRON DEFICIENCY ANEMIA: ICD-10-CM

## 2023-08-22 LAB
BASOPHILS # BLD AUTO: 0.06 K/UL (ref 0–0.2)
BASOPHILS NFR BLD: 0.8 % (ref 0–1.9)
DIFFERENTIAL METHOD: ABNORMAL
EOSINOPHIL # BLD AUTO: 0.2 K/UL (ref 0–0.5)
EOSINOPHIL NFR BLD: 3.1 % (ref 0–8)
ERYTHROCYTE [DISTWIDTH] IN BLOOD BY AUTOMATED COUNT: 17 % (ref 11.5–14.5)
FERRITIN SERPL-MCNC: 409 NG/ML (ref 20–300)
HCT VFR BLD AUTO: 32.5 % (ref 37–48.5)
HGB BLD-MCNC: 10.3 G/DL (ref 12–16)
IMM GRANULOCYTES # BLD AUTO: 0.03 K/UL (ref 0–0.04)
IMM GRANULOCYTES NFR BLD AUTO: 0.4 % (ref 0–0.5)
LYMPHOCYTES # BLD AUTO: 1 K/UL (ref 1–4.8)
LYMPHOCYTES NFR BLD: 12.2 % (ref 18–48)
MCH RBC QN AUTO: 28.1 PG (ref 27–31)
MCHC RBC AUTO-ENTMCNC: 31.7 G/DL (ref 32–36)
MCV RBC AUTO: 89 FL (ref 82–98)
MONOCYTES # BLD AUTO: 0.8 K/UL (ref 0.3–1)
MONOCYTES NFR BLD: 9.8 % (ref 4–15)
NEUTROPHILS # BLD AUTO: 5.7 K/UL (ref 1.8–7.7)
NEUTROPHILS NFR BLD: 73.7 % (ref 38–73)
NRBC BLD-RTO: 0 /100 WBC
PLATELET # BLD AUTO: 263 K/UL (ref 150–450)
PMV BLD AUTO: 8.6 FL (ref 9.2–12.9)
RBC # BLD AUTO: 3.67 M/UL (ref 4–5.4)
WBC # BLD AUTO: 7.77 K/UL (ref 3.9–12.7)

## 2023-08-22 PROCEDURE — 85025 COMPLETE CBC W/AUTO DIFF WBC: CPT | Performed by: NURSE PRACTITIONER

## 2023-08-22 PROCEDURE — 82728 ASSAY OF FERRITIN: CPT | Performed by: NURSE PRACTITIONER

## 2023-08-22 PROCEDURE — 84466 ASSAY OF TRANSFERRIN: CPT | Performed by: NURSE PRACTITIONER

## 2023-08-22 PROCEDURE — 36415 COLL VENOUS BLD VENIPUNCTURE: CPT | Performed by: NURSE PRACTITIONER

## 2023-08-22 PROCEDURE — 83540 ASSAY OF IRON: CPT | Performed by: NURSE PRACTITIONER

## 2023-08-23 LAB
IRON SERPL-MCNC: 31 UG/DL (ref 30–160)
SATURATED IRON: 12 % (ref 20–50)
TOTAL IRON BINDING CAPACITY: 253 UG/DL (ref 250–450)
TRANSFERRIN SERPL-MCNC: 171 MG/DL (ref 200–375)

## 2023-08-24 ENCOUNTER — OFFICE VISIT (OUTPATIENT)
Dept: HEMATOLOGY/ONCOLOGY | Facility: CLINIC | Age: 68
End: 2023-08-24
Payer: MEDICARE

## 2023-08-24 VITALS
HEIGHT: 60 IN | TEMPERATURE: 99 F | WEIGHT: 155 LBS | SYSTOLIC BLOOD PRESSURE: 90 MMHG | BODY MASS INDEX: 30.43 KG/M2 | HEART RATE: 103 BPM | OXYGEN SATURATION: 94 % | DIASTOLIC BLOOD PRESSURE: 57 MMHG

## 2023-08-24 DIAGNOSIS — D50.8 OTHER IRON DEFICIENCY ANEMIA: ICD-10-CM

## 2023-08-24 DIAGNOSIS — D64.9 ANEMIA, UNSPECIFIED TYPE: Primary | ICD-10-CM

## 2023-08-24 PROCEDURE — 99214 PR OFFICE/OUTPT VISIT, EST, LEVL IV, 30-39 MIN: ICD-10-PCS | Mod: S$PBB,,, | Performed by: NURSE PRACTITIONER

## 2023-08-24 PROCEDURE — 99999 PR PBB SHADOW E&M-EST. PATIENT-LVL IV: CPT | Mod: PBBFAC,,, | Performed by: NURSE PRACTITIONER

## 2023-08-24 PROCEDURE — 99999 PR PBB SHADOW E&M-EST. PATIENT-LVL IV: ICD-10-PCS | Mod: PBBFAC,,, | Performed by: NURSE PRACTITIONER

## 2023-08-24 PROCEDURE — 99214 OFFICE O/P EST MOD 30 MIN: CPT | Mod: PBBFAC | Performed by: NURSE PRACTITIONER

## 2023-08-24 PROCEDURE — 99214 OFFICE O/P EST MOD 30 MIN: CPT | Mod: S$PBB,,, | Performed by: NURSE PRACTITIONER

## 2023-08-24 RX ORDER — METHYLPREDNISOLONE SOD SUCC 125 MG
40 VIAL (EA) INJECTION
Status: CANCELLED
Start: 2023-08-24

## 2023-08-24 RX ORDER — HEPARIN 100 UNIT/ML
500 SYRINGE INTRAVENOUS
Status: CANCELLED | OUTPATIENT
Start: 2023-08-24

## 2023-08-24 RX ORDER — METHYLPREDNISOLONE SOD SUCC 125 MG
40 VIAL (EA) INJECTION
Status: CANCELLED
Start: 2023-09-12

## 2023-08-24 RX ORDER — HEPARIN 100 UNIT/ML
500 SYRINGE INTRAVENOUS
Status: CANCELLED | OUTPATIENT
Start: 2023-09-12

## 2023-08-24 RX ORDER — SODIUM CHLORIDE 0.9 % (FLUSH) 0.9 %
10 SYRINGE (ML) INJECTION
Status: CANCELLED | OUTPATIENT
Start: 2023-09-12

## 2023-08-24 RX ORDER — SODIUM CHLORIDE 0.9 % (FLUSH) 0.9 %
10 SYRINGE (ML) INJECTION
Status: CANCELLED | OUTPATIENT
Start: 2023-08-24

## 2023-08-24 NOTE — ASSESSMENT & PLAN NOTE
Persistent iron deficiency and anemia. Patient notes ongoing fatigue    S/p weekly Venofer x 4. Interval slight improvement in iron levels but remains iron deficient. Decline in hemoglobin. Given persistent iron deficiency will trial Feraheme pending insurance approval    We previously discussed proceeding with Colonoscopy. For now patient declines due to continued recovery from sacral surgery. Will defer for now. If recurrent iron deficiency will plan to proceed with Colonoscopy     Given failed Venofer will trial insurance approval for Feraheme weekly x 2. Given allergy history will add low dose steroid premedication    Anemia could be secondary to chronic illness. We discussed possible future bone marrow biopsy. Patient currently not interested in pursuing bone marrow biopsy but will think about it    Continue b12 and folic acid supplement. F/u 2-3 months cbc, iron, ferritin, b12, folate, SPEP, FLC, haptoglobin, retic, LDH, YIN
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
## 2023-08-25 ENCOUNTER — TELEPHONE (OUTPATIENT)
Dept: INFUSION THERAPY | Facility: HOSPITAL | Age: 68
End: 2023-08-25
Payer: MEDICARE

## 2023-08-25 NOTE — TELEPHONE ENCOUNTER
Unable to reach patient but left VM to check [facility] portal for dates and times of upcoming appointments and let me know if she needs to make changes.

## 2023-08-28 ENCOUNTER — CLINICAL SUPPORT (OUTPATIENT)
Dept: REHABILITATION | Facility: HOSPITAL | Age: 68
End: 2023-08-28
Payer: MEDICARE

## 2023-08-28 DIAGNOSIS — Z98.1 STATUS POST LUMBAR SPINAL FUSION: ICD-10-CM

## 2023-08-28 DIAGNOSIS — G89.29 CHRONIC RIGHT SHOULDER PAIN: ICD-10-CM

## 2023-08-28 DIAGNOSIS — M25.511 CHRONIC RIGHT SHOULDER PAIN: ICD-10-CM

## 2023-08-28 DIAGNOSIS — R26.9 GAIT DIFFICULTY: Primary | ICD-10-CM

## 2023-08-28 PROCEDURE — 97530 THERAPEUTIC ACTIVITIES: CPT | Mod: PN

## 2023-08-28 PROCEDURE — 97161 PT EVAL LOW COMPLEX 20 MIN: CPT | Mod: PN

## 2023-08-28 NOTE — PLAN OF CARE
[facility]   Physical Therapy Initial Evaluation     Date: 8/28/2023   Name: Merle Caro  Clinic Number: [id number]    Therapy Diagnosis:   Encounter Diagnoses   Name Primary?    Status post lumbar spinal fusion     Chronic right shoulder pain     Gait difficulty Yes     Physician: Maria De Jesus Spicer MD    Physician Orders: PT Eval and Treat   Medical Diagnosis from Referral: Status post lumbar spinal fusion [Z98.1]  Evaluation Date: 8/28/2023  Authorization Period Expiration: 7/4/2024  Plan of Care Expiration: 11/24/2023  Progress Note Due: 9/28/2023  Visit # / Visits authorized: 1/ 1 eval   FOTO: 1/3     Precautions: Standard, Fall, and anemic      Time In: 11:17  Time Out: 12:02  Total Appointment Time (timed & untimed codes): 45 minutes      SUBJECTIVE     Date of onset: s/p L2-pelvis posterior spinal fusion for trans-sacral fracture dislocation with 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 Uses RW to go to the restroom. Uses wheelchair for longer distance. Quit smoking a year ago     Falls: none    Imaging: [x] Xray to lumbar  [] MRI [] CT: Performed on: 4/5/2023    FINDINGS:  Posterior spinal fusion with associated pedicle screws extending from L2 to the sacrum identified.  The position alignment is satisfactory and unchanged as compared to the previous study.  Slight compression deformity of the T11 and T12 vertebral body noted as before.  Compression deformity of the L3 vertebral body also identified.    Prior Therapy:   [] N/A    [x] Yes: with me in January  Social History: lives with their spouse  Occupation: retired  Prior Level of Function: independent   Current Level of Function: independent     Pain:  Current 0/10, worst 4/10  Location: sacral   Description: Sharp  Aggravating Factors: when she has to use the restroom and isnt able to (constipation)  Easing Factors: lying  down and standing     Patients goals: walk longer distance and get back into regular exercises     Medical History:   Past Medical History:   Diagnosis Date    Arthritis     Asthma     COPD (chronic obstructive pulmonary disease)     Depression     GERD (gastroesophageal reflux disease)     Hypertension     Tobacco abuse        Surgical History:   Merle Caro  has a past surgical history that includes Hysterectomy; Knee surgery; Tonsillectomy; multiple fracture repairs; Colonoscopy w/ polypectomy (01/05/2011); Magnetic resonance imaging (N/A, 10/14/2022); fusion, spine, posterior approach (N/A, 10/18/2022); and Esophagogastroduodenoscopy (N/A, 10/24/2022).    Medications:   Merle has a current medication list which includes the following prescription(s): acetaminophen, albuterol, celecoxib, cholecalciferol (vitamin d3), clonazepam, clotrimazole-betamethasone 1-0.05%, cyanocobalamin (vitamin b-12), diclofenac sodium, folic acid, furosemide, haloperidol, hydrocortisone, lidocaine-prilocaine, linaclotide, lisinopril, methocarbamol, myrbetriq, oxycodone, oxygen-air delivery systems, pantoprazole, paroxetine, pregabalin, simvastatin, and trazodone.    Allergies:   Review of patient's allergies indicates:   Allergen Reactions    Codeine Shortness Of Breath    Keflex [cephalexin] Shortness Of Breath    Shellfish containing products Nausea And Vomiting    Flu vaccine ts 2011-12(18 yr+) Hives    Sucralfate Other (See Comments) and Hives        OBJECTIVE     POSTURE: fwd head, rounded shoulders    GAIT: amb with RW, severe anterior trunk lean onto RW, right knee moderately flexed, left knee slightly flexed, decreased tian, and decrease step length     STRENGTH:   Upper Extremity  Strength RIGHT   LEFT   Shoulder Flexion []1  [x]2  []3  []4  []5      []+ [x]- []1  []2  [x]3  []4  []5       [x]+ []-   Shoulder Abduction [x]1  []2  []3  []4  []5      []+ []- []1  []2  [x]3  []4  []5       [x]+ []-     Lower  Extremity  Strength RIGHT   LEFT   Hip Flexion  []1  []2  []3  [x]4  []5      [x]+ []- []1  []2  []3  [x]4  []5      [x]+ []-   Knee Flexion []1  []2  []3  [x]4  []5      [x]+ []- []1  []2  []3  [x]4  []5      [x]+ []-   Knee Extension []1  []2  []3  [x]4  []5      [x]+ []- []1  []2  []3  [x]4  []5      [x]+ []-   Ankle Dorsiflexion []1  []2  []3  [x]4  []5      []+ []- []1  []2  []3  [x]4  []5      [x]+ []-     RANGE OF MOTION: (*) pain and/or dysfunction  Shoulder AROM/PROM Right Left Comments   Shoulder Flexion (180) 5/100* NT      Knee AROM/PROM Right Left Notes   Knee Extension (0º) Moderate contracture  Mild contracture      SPECIAL TESTS:   30 second test without UE assist= 31.79 seconds   TUG with RW = 1 minute and 2 seconds   2 MWT with RW = 92 feet with no breaks    SENSATION:  [x] Intact to Light Touch     [] Impaired:    PALPATION: NT     JOINT MOBILITY: NT    Intake Outcome Measure for FOTO lumbar Survey    Therapist reviewed FOTO scores for Merle Caro on 8/28/2023.   FOTO documents entered into [employer] - see Media section.    Intake Score: 41%       TREATMENT     Total Treatment time (time-based codes) separate from Evaluation: 10 minutes      Merle received the treatments listed below:      therapeutic exercises to develop strength, endurance, ROM, and flexibility for (- minutes) including:    manual therapy techniques: were applied to - for (- minutes) , including:     neuromuscular re-education activities to improve: Balance, Coordination, Proprioception, Posture, and stability for (- minutes) . The following activities were included:    therapeutic activities to improve functional performance for (10 minutes) , including:   - amb around clinic with RW    - sit to stand     gait training to improve functional mobility and safety for (- minutes) , including:    PATIENT EDUCATION AND HOME EXERCISES     Education provided:   - HEP provided   - PT role in POC     Written Home Exercises Provided:   NA    ASSESSMENT     Patient presents with signs and symptoms consistent with a diagnosis of general weakness and right shoulder pain including all above listed objective impairments. It appears that the patients most significant impairments contributing to their diagnosis are decreased ROM, decreased strength, poor postural awareness, and pain with functional movement. Shoulder assessed and added into plan of care secondary to patients dependence on BUE to use assisted devices. This pt is a good candidate for skilled PT treatment and stands to benefit from a combination of manual therapy, therapeutic exercise/actvities, neuromuscular re-education, and modalities Prn. The pt has been educated on their dx/POC and consents to further PT treatment.    Patient prognosis is Fair.   Patient will benefit from skilled outpatient Physical Therapy to address the deficits stated above and in the chart below, provide patient /family education, and to maximize patientt's level of independence.     Plan of care discussed with patient: Yes  Patient's spiritual, cultural and educational needs considered and patient is agreeable to the plan of care and goals as stated below:     Anticipated Barriers for therapy: co-morbidities and chronicity of condition    Medical Necessity is demonstrated by the following  History  Co-morbidities and personal factors that may impact the plan of care [] LOW: no personal factors / co-morbidities  [x] MODERATE: 1-2 personal factors / co-morbidities  [] HIGH: 3+ personal factors / co-morbidities    Moderate / High Support Documentation:   Past Medical History:   Diagnosis Date    Arthritis     Asthma     COPD (chronic obstructive pulmonary disease)     Depression     GERD (gastroesophageal reflux disease)     Hypertension     Tobacco abuse         Examination  Body Structures and Functions, activity limitations and participation restrictions that may impact the plan of care [] LOW: addressing 1-2  elements  [x] MODERATE: 3+ elements  [] HIGH: 4+ elements (please support below)    Moderate / High Support Documentation: assess shoulder to improve functional transfers from wheelchair, assessed knee and hip, and endurance      Clinical Presentation [x] LOW: stable  [] MODERATE: Evolving  [] HIGH: Unstable     Decision Making/ Complexity Score: low       Goals:   Short Term Goals:  6 weeks  HEP: Patient will demonstrate 50% independence with HEP   Function: Patient will increase functional score to equal or greater than to 45 out of 100 on FOTO.  Mobility: Patient will improve 5 times sit to stand score by 5 seconds  Strength: Patient will improve impaired strength to greater or equal to 4/5.     Long Term Goals:  12 weeks  HEP: Pt will be independent with advanced HEP.  Pain: Pt will demonstrate improved pain less than or equal to 2/10 at worse in order to progress toward maximal functional ability and improve QOL.  Function: Patient will  increase functional score to equal or greater than to 50 out of 100 on FOTO to improve functional independence and quality of life.   Mobility: Patient will improve 5 times sit to stand by 10 seconds to decrease risk of falls  Mobility: patient will be able to improve TUG score by 15 seconds to improve functional independence  Mobility: patient will be able to improve 2 minute walk test by 50 feet to improve endurance in order to participate in community  Strength: Patient will improve impaired strength to greater or equal to 4+/5 in order to participate in community activities   Gait: Patient will demonstrate normalized gait mechanics with minimal compensation in order to return to PLOF.    PLAN   Plan of care Certification: 8/28/2023 to 11/24/2023.    Outpatient Physical Therapy 2 times weekly for 12 weeks to include the following interventions: Cervical/Lumbar Traction, Electrical Stimulation  , Gait Training, Manual Therapy, Moist Heat/ Ice, Neuromuscular Re-ed, Patient  Education, Self Care, Therapeutic Activities, Therapeutic Exercise, and FDN .     Krystal Mccauley, PT      I CERTIFY THE NEED FOR THESE SERVICES FURNISHED UNDER THIS PLAN OF TREATMENT AND WHILE UNDER MY CARE   Physician's comments:     Physician's Signature: ___________________________________________________

## 2023-09-06 ENCOUNTER — CLINICAL SUPPORT (OUTPATIENT)
Dept: REHABILITATION | Facility: HOSPITAL | Age: 68
End: 2023-09-06
Payer: MEDICARE

## 2023-09-06 ENCOUNTER — DOCUMENTATION ONLY (OUTPATIENT)
Dept: REHABILITATION | Facility: HOSPITAL | Age: 68
End: 2023-09-06

## 2023-09-06 ENCOUNTER — OFFICE VISIT (OUTPATIENT)
Dept: UROLOGY | Facility: CLINIC | Age: 68
End: 2023-09-06
Payer: MEDICARE

## 2023-09-06 VITALS
BODY MASS INDEX: 30.61 KG/M2 | SYSTOLIC BLOOD PRESSURE: 114 MMHG | WEIGHT: 155.88 LBS | HEIGHT: 60 IN | TEMPERATURE: 98 F | HEART RATE: 80 BPM | DIASTOLIC BLOOD PRESSURE: 78 MMHG

## 2023-09-06 DIAGNOSIS — N39.41 URGE INCONTINENCE: Primary | ICD-10-CM

## 2023-09-06 DIAGNOSIS — G89.29 CHRONIC RIGHT SHOULDER PAIN: Primary | ICD-10-CM

## 2023-09-06 DIAGNOSIS — R26.9 GAIT DIFFICULTY: ICD-10-CM

## 2023-09-06 DIAGNOSIS — M25.511 CHRONIC RIGHT SHOULDER PAIN: Primary | ICD-10-CM

## 2023-09-06 DIAGNOSIS — R15.9 INCONTINENCE OF FECES, UNSPECIFIED FECAL INCONTINENCE TYPE: ICD-10-CM

## 2023-09-06 PROCEDURE — 99214 OFFICE O/P EST MOD 30 MIN: CPT | Mod: PBBFAC | Performed by: UROLOGY

## 2023-09-06 PROCEDURE — 99213 OFFICE O/P EST LOW 20 MIN: CPT | Mod: S$PBB,,, | Performed by: UROLOGY

## 2023-09-06 PROCEDURE — 99213 PR OFFICE/OUTPT VISIT, EST, LEVL III, 20-29 MIN: ICD-10-PCS | Mod: S$PBB,,, | Performed by: UROLOGY

## 2023-09-06 PROCEDURE — 99999 PR PBB SHADOW E&M-EST. PATIENT-LVL IV: ICD-10-PCS | Mod: PBBFAC,,, | Performed by: UROLOGY

## 2023-09-06 PROCEDURE — 99999 PR PBB SHADOW E&M-EST. PATIENT-LVL IV: CPT | Mod: PBBFAC,,, | Performed by: UROLOGY

## 2023-09-06 PROCEDURE — 97530 THERAPEUTIC ACTIVITIES: CPT | Mod: PN,CQ

## 2023-09-06 PROCEDURE — 97112 NEUROMUSCULAR REEDUCATION: CPT | Mod: PN,CQ

## 2023-09-06 RX ORDER — MIRABEGRON 50 MG/1
50 TABLET, FILM COATED, EXTENDED RELEASE ORAL DAILY
Qty: 90 TABLET | Refills: 3 | Status: SHIPPED | OUTPATIENT
Start: 2023-09-06 | End: 2024-09-05

## 2023-09-06 NOTE — PROGRESS NOTES
"[facility]   Physical Therapy Treatment Note      Name: Merle Caro  Clinic Number: [id number]    Therapy Diagnosis:   Encounter Diagnoses   Name Primary?    Chronic right shoulder pain Yes    Gait difficulty      Physician: Maria De Jesus Spicer MD    Visit Date: 9/6/2023    Physician Orders: PT Eval and Treat   Medical Diagnosis from Referral: Status post lumbar spinal fusion [Z98.1]  Evaluation Date: 8/28/2023  Authorization Period Expiration: 7/5/2025  Plan of Care Expiration: 11/24/2023  Progress Note Due: 9/28/2023  Visit # / Visits authorized: 1/ 20 + eval   FOTO: 1/3     Precautions: Standard, Fall, and anemic       Time In: 2:30  Time Out: 3:15  Total Appointment Time (timed & untimed codes): 45 minutes    PTA Visit #: 1/5       Subjective     Patient reports: her butt is hurting  She was not compliant with home exercise program.  Response to previous treatment: no adverse symptoms  Functional change nothing significant    Pain: 2/10  Location: sacral region    Objective      Objective Measures updated at progress report unless specified.     Treatment     Merle received the treatments listed below:      therapeutic exercises to develop strength, endurance, ROM, and flexibility for (- minutes) including:     manual therapy techniques: were applied to - for (- minutes) , including:      neuromuscular re-education activities to improve: Balance, Coordination, Proprioception, Posture, and stability for (15 minutes) . The following activities were included:  LAQ 7x LLE, 2x10 RLE  Ball squeezes 2x10 5" holds  Glute squeezes     therapeutic activities to improve functional performance for (30 minutes) , including:              - amb around clinic with RW 14 feet then 7 feet with CGA and wheelchair following behind her              - sit to stand    - wc to toilet transfer cga   - standing trials with focus on glute engagement     gait training to improve functional mobility and safety for (- " minutes) , including:       PATIENT EDUCATION AND HOME EXERCISES      Education provided:   - HEP provided   - PT role in POC      Written Home Exercises Provided:  NA       Assessment     Patient presents to therapy in wheelchair. She worked on gait endurance to improve standing endurance for toilet transfers and home use. Worked on toilet transfer and standing endurance using pole in bathroom with cues for safety. Patient initiated exercises for quad engagement to prevent worsening of knee flexion contractures. Endurance of standing and gait was main focus to make dressing and ADLs easier at home.     Merle Is progressing well towards her goals.   Patient prognosis is Fair.     Patient will continue to benefit from skilled outpatient physical therapy to address the deficits listed in the problem list box on initial evaluation, provide pt/family education and to maximize pt's level of independence in the home and community environment.     Patient's spiritual, cultural and educational needs considered and pt agreeable to plan of care and goals.     Anticipated barriers to physical therapy: co-morbidities and chronicity of condition    Goals:   Short Term Goals:  6 weeks  HEP: Patient will demonstrate 50% independence with HEP   Function: Patient will increase functional score to equal or greater than to 45 out of 100 on FOTO.  Mobility: Patient will improve 5 times sit to stand score by 5 seconds  Strength: Patient will improve impaired strength to greater or equal to 4/5.      Long Term Goals:  12 weeks  HEP: Pt will be independent with advanced HEP.  Pain: Pt will demonstrate improved pain less than or equal to 2/10 at worse in order to progress toward maximal functional ability and improve QOL.  Function: Patient will  increase functional score to equal or greater than to 50 out of 100 on FOTO to improve functional independence and quality of life.   Mobility: Patient will improve 5 times sit to stand by 10  seconds to decrease risk of falls  Mobility: patient will be able to improve TUG score by 15 seconds to improve functional independence  Mobility: patient will be able to improve 2 minute walk test by 50 feet to improve endurance in order to participate in community  Strength: Patient will improve impaired strength to greater or equal to 4+/5 in order to participate in community activities   Gait: Patient will demonstrate normalized gait mechanics with minimal compensation in order to return to PLOF.     PLAN   Plan of care Certification: 8/28/2023 to 11/24/2023.     Outpatient Physical Therapy 2 times weekly for 12 weeks to include the following interventions: Cervical/Lumbar Traction, Electrical Stimulation  , Gait Training, Manual Therapy, Moist Heat/ Ice, Neuromuscular Re-ed, Patient Education, Self Care, Therapeutic Activities, Therapeutic Exercise, and FDN .     Britni Easton, PTA

## 2023-09-06 NOTE — PROGRESS NOTES
Chief Complaint:   Encounter Diagnoses   Name Primary?    Urge incontinence Yes    Incontinence of feces, unspecified fecal incontinence type          HPI:   9/6/23- currently still doing well on medical management, neurological function seems to be improving.  Fecal incontinence is almost completely controlled.    1/4/23- patient reports as a new patient to me with urge and fecal incontinence.  Fecal incontinence is relatively new, this appears to have exacerbated recently was also recently found to have sacral fracture with cauda equina syndrome.  She is having some improvement on symptoms, Myrbetriq 25 mg does assist, but not perfectly.  No evidence of stress incontinence, dysuria or gross hematuria.    11/16/22- LR- Patient is a 67-year-old female that is presenting with her .  HPI was obtained by .   states that patient is status post Spine fusion and has a h/o Cauda Equina syndrome.  States that patient is unaware when she needs to urinate and has had an increase in urinary incontinence. Reports that she also has fecal incontinence.  PVR was 150 mL, repeat 132 ml.  No history of gross hematuria or renal stones.  Unable to give urine sample.    Allergies:  Codeine, Keflex [cephalexin], Shellfish containing products, Flu vaccine ts 2011-12(18 yr+), and Sucralfate    Medications:  has a current medication list which includes the following prescription(s): acetaminophen, celecoxib, cholecalciferol (vitamin d3), clonazepam, clotrimazole-betamethasone 1-0.05%, cyanocobalamin (vitamin b-12), diclofenac sodium, folic acid, furosemide, haloperidol, lidocaine-prilocaine, linaclotide, lisinopril, methocarbamol, myrbetriq, oxycodone, oxygen-air delivery systems, pantoprazole, paroxetine, pregabalin, simvastatin, trazodone, albuterol, and hydrocortisone.    Review of Systems:  General: No fever, chills, fatigability, or weight loss.  Skin: No rashes, itching, or changes in color or texture of  skin.  Chest: Denies KENT, cyanosis, wheezing, cough, and sputum production.  Abdomen: Appetite fine. No weight loss. Denies diarrhea, abdominal pain, hematemesis, or blood in stool.  Musculoskeletal: No joint stiffness or swelling. Denies back pain.  : As above.  All other review of systems negative.    PMH:   has a past medical history of Arthritis, Asthma, COPD (chronic obstructive pulmonary disease), Depression, GERD (gastroesophageal reflux disease), Hypertension, and Tobacco abuse.    PSH:   has a past surgical history that includes Hysterectomy; Knee surgery; Tonsillectomy; multiple fracture repairs; Colonoscopy w/ polypectomy (01/05/2011); Magnetic resonance imaging (N/A, 10/14/2022); fusion, spine, posterior approach (N/A, 10/18/2022); and Esophagogastroduodenoscopy (N/A, 10/24/2022).    FamHx: family history includes COPD in her mother; Cancer in her mother; Hypertension in her mother; Lung cancer in her mother; Ovarian cancer in her sister; Stroke in her mother.    SocHx:  reports that she has quit smoking. Her smoking use included cigarettes. She has a 33.0 pack-year smoking history. She has never used smokeless tobacco. She reports that she does not drink alcohol and does not use drugs.      Physical Exam:  Vitals:    01/04/23 1554   BP: 103/69   Pulse: 92     General: A&Ox3, no apparent distress, no deformities  Neck: No masses, normal ROM  Lungs: normal inspiration, no use of accessory muscles  Heart: normal pulse, no arrhythmias  Abdomen: Soft, NT, ND, no masses, no hernias, no hepatosplenomegaly  Skin: The skin is warm and dry. No jaundice.  Ext: No c/c/e.    Labs/Studies:   PVR 15 mL 9/23    Impression/Plan:       1. Urge incontinence- myrbetriq 50 mg appears to have controlled her symptoms, neurological function also appears to be improving.  Will continue for now, call with any complaints, otherwise see me in 1 year with a PVR.    2. Fecal incontinence- relatively stable, and almost completely  gone.

## 2023-09-11 ENCOUNTER — INFUSION (OUTPATIENT)
Dept: INFUSION THERAPY | Facility: HOSPITAL | Age: 68
End: 2023-09-11
Attending: NURSE PRACTITIONER
Payer: MEDICARE

## 2023-09-11 ENCOUNTER — PATIENT MESSAGE (OUTPATIENT)
Dept: INTERNAL MEDICINE | Facility: CLINIC | Age: 68
End: 2023-09-11
Payer: MEDICARE

## 2023-09-11 ENCOUNTER — CLINICAL SUPPORT (OUTPATIENT)
Dept: REHABILITATION | Facility: HOSPITAL | Age: 68
End: 2023-09-11
Payer: MEDICARE

## 2023-09-11 VITALS
OXYGEN SATURATION: 98 % | TEMPERATURE: 97 F | HEART RATE: 87 BPM | RESPIRATION RATE: 16 BRPM | DIASTOLIC BLOOD PRESSURE: 63 MMHG | SYSTOLIC BLOOD PRESSURE: 107 MMHG

## 2023-09-11 DIAGNOSIS — M25.511 CHRONIC RIGHT SHOULDER PAIN: Primary | ICD-10-CM

## 2023-09-11 DIAGNOSIS — G89.29 CHRONIC RIGHT SHOULDER PAIN: Primary | ICD-10-CM

## 2023-09-11 DIAGNOSIS — R26.9 GAIT DIFFICULTY: ICD-10-CM

## 2023-09-11 DIAGNOSIS — D50.8 OTHER IRON DEFICIENCY ANEMIA: Primary | ICD-10-CM

## 2023-09-11 PROCEDURE — 97112 NEUROMUSCULAR REEDUCATION: CPT | Mod: PN

## 2023-09-11 PROCEDURE — 25000003 PHARM REV CODE 250: Performed by: NURSE PRACTITIONER

## 2023-09-11 PROCEDURE — 96374 THER/PROPH/DIAG INJ IV PUSH: CPT

## 2023-09-11 PROCEDURE — 63600175 PHARM REV CODE 636 W HCPCS: Performed by: NURSE PRACTITIONER

## 2023-09-11 PROCEDURE — 96375 TX/PRO/DX INJ NEW DRUG ADDON: CPT

## 2023-09-11 PROCEDURE — 97530 THERAPEUTIC ACTIVITIES: CPT | Mod: PN

## 2023-09-11 RX ORDER — METHYLPREDNISOLONE SOD SUCC 125 MG
40 VIAL (EA) INJECTION
Status: COMPLETED | OUTPATIENT
Start: 2023-09-11 | End: 2023-09-11

## 2023-09-11 RX ADMIN — FERUMOXYTOL 510 MG: 510 INJECTION INTRAVENOUS at 09:09

## 2023-09-11 RX ADMIN — METHYLPREDNISOLONE SODIUM SUCCINATE 40 MG: 125 INJECTION, POWDER, FOR SOLUTION INTRAMUSCULAR; INTRAVENOUS at 09:09

## 2023-09-11 NOTE — DISCHARGE INSTRUCTIONS
.[facility]  [address] [address]  [address]  [phone] phone     [phone] fax  Hours of Operation: Monday- Friday 8:00am- 5:00pm  After hours phone  [phone]  Hematology / Oncology Physicians on call    Dr. Fritz Forrest      Nurse Practitioners:    Desiree Tovar, JACEY Pedersen, JACEY Pruitt, JACEY Ferraro, JACEY Valencia, [place]      Please don't hesitate to call if you have any concerns.    .FALL PREVENTION   Falls often occur due to slipping, tripping or losing your balance. Here are ways to reduce your risk of falling again.   Was there anything that caused your fall that can be fixed, removed or replaced?   Make your home safe by keeping walkways clear of objects you may trip over.   Use non-slip pads under rugs.   Do not walk in poorly lit areas.   Do not stand on chairs or wobbly ladders.   Use caution when reaching overhead or looking upward. This position can cause a loss of balance.   Be sure your shoes fit properly, have non-slip bottoms and are in good condition.   Be cautious when going up and down stairs, curbs, and when walking on uneven sidewalks.   If your balance is poor, consider using a cane or walker.   If your fall was related to alcohol use, stop or limit alcohol intake.   If your fall was related to use of sleeping medicines, talk to your doctor about this. You may need to reduce your dosage at bedtime if you awaken during the night to go to the bathroom.   To reduce the need for nighttime bathroom trips:   Avoid drinking fluids for several hours before going to bed   Empty your bladder before going to bed   Men can keep a urinal at the bedside   © 4923-6552 [facility], [address] [place] [address], [place] [address]. All rights reserved. This information is not intended as a substitute for professional medical care. Always follow your healthcare professional's instructions.

## 2023-09-11 NOTE — PROGRESS NOTES
"[facility]   Physical Therapy Treatment Note      Name: Merle Caro  Clinic Number: [id number]    Therapy Diagnosis:   Encounter Diagnoses   Name Primary?    Chronic right shoulder pain Yes    Gait difficulty      Physician: Maria De Jesus Spicer MD    Visit Date: 9/11/2023    Physician Orders: PT Eval and Treat   Medical Diagnosis from Referral: Status post lumbar spinal fusion [Z98.1]  Evaluation Date: 8/28/2023  Authorization Period Expiration: 7/5/2025  Plan of Care Expiration: 11/24/2023  Progress Note Due: 9/28/2023  Visit # / Visits authorized: 2/20 + eval   FOTO: 1/3     Precautions: Standard, Fall, and anemic       Time In: 1:09  Time Out: 1:55  Total Appointment Time (timed & untimed codes): 46 minutes    PTA Visit #: -/5       Subjective     Patient reports: she is tired today secondary to another doctor's appointment.     She was not compliant with home exercise program.  Response to previous treatment: no adverse symptoms  Functional change: nothing significant    Pain: 0/10  Location: sacral region    Objective      Objective Measures updated at progress report unless specified.     Treatment     Merle received the treatments listed below:      therapeutic exercises to develop strength, endurance, ROM, and flexibility for (- minutes) including:     manual therapy techniques: were applied to - for (- minutes) , including:      neuromuscular re-education activities to improve: Balance, Coordination, Proprioception, Posture, and stability for (15 minutes) . The following activities were included:  LAQ 7x LLE, 2x10 RLE  Ball squeezes 2x10 5" holds  Glute squeezes     therapeutic activities to improve functional performance for (30 minutes) , including:              wheelchair propulsion x50 feet               sit to stand x5   wc to toilet transfer supervision    Double leg stance with dynamic movement x2      gait training to improve functional mobility and safety for (- minutes) , " including:       PATIENT EDUCATION AND HOME EXERCISES      Education provided:   - HEP provided   - PT role in POC      Written Home Exercises Provided:  NA       Assessment     Pt had to use the restroom twice during today's session. Focused on functional activities such as propelling wheelchair, wheelchair to toilet transfers, standing to bring draws up, and standing for hand washing. Pt fatigued at end of session.     Merle Is progressing well towards her goals.   Patient prognosis is Faira     Patient will continue to benefit from skilled outpatient physical therapy to address the deficits listed in the problem list box on initial evaluation, provide pt/family education and to maximize pt's level of independence in the home and community environment.     Patient's spiritual, cultural and educational needs considered and pt agreeable to plan of care and goals.     Anticipated barriers to physical therapy: co-morbidities and chronicity of condition    Goals:   Short Term Goals:  6 weeks  HEP: Patient will demonstrate 50% independence with HEP   Function: Patient will increase functional score to equal or greater than to 45 out of 100 on FOTO.  Mobility: Patient will improve 5 times sit to stand score by 5 seconds  Strength: Patient will improve impaired strength to greater or equal to 4/5.      Long Term Goals:  12 weeks  HEP: Pt will be independent with advanced HEP.  Pain: Pt will demonstrate improved pain less than or equal to 2/10 at worse in order to progress toward maximal functional ability and improve QOL.  Function: Patient will  increase functional score to equal or greater than to 50 out of 100 on FOTO to improve functional independence and quality of life.   Mobility: Patient will improve 5 times sit to stand by 10 seconds to decrease risk of falls  Mobility: patient will be able to improve TUG score by 15 seconds to improve functional independence  Mobility: patient will be able to improve 2 minute  walk test by 50 feet to improve endurance in order to participate in community  Strength: Patient will improve impaired strength to greater or equal to 4+/5 in order to participate in community activities   Gait: Patient will demonstrate normalized gait mechanics with minimal compensation in order to return to PLOF.     PLAN   Plan of care Certification: 8/28/2023 to 11/24/2023.     Outpatient Physical Therapy 2 times weekly for 12 weeks to include the following interventions: Cervical/Lumbar Traction, Electrical Stimulation  , Gait Training, Manual Therapy, Moist Heat/ Ice, Neuromuscular Re-ed, Patient Education, Self Care, Therapeutic Activities, Therapeutic Exercise, and FDN .     Krystal Mccauley, PT

## 2023-09-11 NOTE — PLAN OF CARE
Patient reports feeling tired today. Tolerated infusion well with no adverse reactions. Patient to return in one week for second dose.

## 2023-09-15 ENCOUNTER — CLINICAL SUPPORT (OUTPATIENT)
Dept: REHABILITATION | Facility: HOSPITAL | Age: 68
End: 2023-09-15
Payer: MEDICARE

## 2023-09-15 DIAGNOSIS — M25.511 CHRONIC RIGHT SHOULDER PAIN: Primary | ICD-10-CM

## 2023-09-15 DIAGNOSIS — G89.29 CHRONIC RIGHT SHOULDER PAIN: Primary | ICD-10-CM

## 2023-09-15 DIAGNOSIS — R26.9 GAIT DIFFICULTY: ICD-10-CM

## 2023-09-15 PROCEDURE — 97112 NEUROMUSCULAR REEDUCATION: CPT | Mod: KX,PN

## 2023-09-15 PROCEDURE — 97530 THERAPEUTIC ACTIVITIES: CPT | Mod: KX,PN

## 2023-09-15 NOTE — PROGRESS NOTES
"[facility]   Physical Therapy Treatment Note      Name: Merle Caro  Clinic Number: [id number]    Therapy Diagnosis:   Encounter Diagnoses   Name Primary?    Chronic right shoulder pain Yes    Gait difficulty      Physician: Maria De Jesus Spicer MD    Visit Date: 9/15/2023    Physician Orders: PT Eval and Treat   Medical Diagnosis from Referral: Status post lumbar spinal fusion [Z98.1]  Evaluation Date: 8/28/2023  Authorization Period Expiration: 7/5/2025  Plan of Care Expiration: 11/24/2023  Progress Note Due: 9/28/2023  Visit # / Visits authorized: 3/20 + eval   FOTO: 1/3     Precautions: Standard, Fall, and anemic       Time In: 9:07  Time Out: 9:47  Total Appointment Time (timed & untimed codes): 40 minutes    PTA Visit #: -/5       Subjective     Patient reports: she is able to ambulate short distance at home with hand held assist. Reports increased pain to buttock today.     She was not compliant with home exercise program.  Response to previous treatment: no adverse symptoms  Functional change: nothing significant    Pain: -/10  Location: sacral region    Objective      Objective Measures updated at progress report unless specified.     Treatment     Merle received the treatments listed below:      therapeutic exercises to develop strength, endurance, ROM, and flexibility for (- minutes) including:     manual therapy techniques: were applied to - for (- minutes) , including:      neuromuscular re-education activities to improve: Balance, Coordination, Proprioception, Posture, and stability for (15 minutes) . The following activities were included:  LAQ 2# 2x10 B   Seated marches 2# 2x10 B  Ball squeezes 2x10 5" holds  Glute squeezes 2x10     therapeutic activities to improve functional performance for (30 minutes) , including:              Prolong standing with 1UE assist for 2 minutes   wheelchair propulsion x50 feet               sit to stand x2   wc to toilet transfer supervision "    Double leg stance with dynamic movement x2    Amb with RW 70 feet supervision     gait training to improve functional mobility and safety for (- minutes) , including:       PATIENT EDUCATION AND HOME EXERCISES      Education provided:   - HEP provided   - PT role in POC      Written Home Exercises Provided:  NA       Assessment     Pt with better endurance during today's session. Focused on functional activities such as toilet transfers and donning/doffing pants; pt required minimal assist. Pt able to tolerate 2 minutes of prolong standing and ambulate short distance with rolling walker at end of session. Pt demonstrated improvement in wheelchair propulsion and maneuvering during today's session.      Merle Is progressing well towards her goals.   Patient prognosis is Faira     Patient will continue to benefit from skilled outpatient physical therapy to address the deficits listed in the problem list box on initial evaluation, provide pt/family education and to maximize pt's level of independence in the home and community environment.     Patient's spiritual, cultural and educational needs considered and pt agreeable to plan of care and goals.     Anticipated barriers to physical therapy: co-morbidities and chronicity of condition    Goals:   Short Term Goals:  6 weeks  HEP: Patient will demonstrate 50% independence with HEP   Function: Patient will increase functional score to equal or greater than to 45 out of 100 on FOTO.  Mobility: Patient will improve 5 times sit to stand score by 5 seconds  Strength: Patient will improve impaired strength to greater or equal to 4/5.      Long Term Goals:  12 weeks  HEP: Pt will be independent with advanced HEP.  Pain: Pt will demonstrate improved pain less than or equal to 2/10 at worse in order to progress toward maximal functional ability and improve QOL.  Function: Patient will  increase functional score to equal or greater than to 50 out of 100 on FOTO to improve  functional independence and quality of life.   Mobility: Patient will improve 5 times sit to stand by 10 seconds to decrease risk of falls  Mobility: patient will be able to improve TUG score by 15 seconds to improve functional independence  Mobility: patient will be able to improve 2 minute walk test by 50 feet to improve endurance in order to participate in community  Strength: Patient will improve impaired strength to greater or equal to 4+/5 in order to participate in community activities   Gait: Patient will demonstrate normalized gait mechanics with minimal compensation in order to return to PLOF.     PLAN   Plan of care Certification: 8/28/2023 to 11/24/2023.     Outpatient Physical Therapy 2 times weekly for 12 weeks to include the following interventions: Cervical/Lumbar Traction, Electrical Stimulation  , Gait Training, Manual Therapy, Moist Heat/ Ice, Neuromuscular Re-ed, Patient Education, Self Care, Therapeutic Activities, Therapeutic Exercise, and FDN .     Krystal Mccauley, PT

## 2023-09-18 ENCOUNTER — INFUSION (OUTPATIENT)
Dept: INFUSION THERAPY | Facility: HOSPITAL | Age: 68
End: 2023-09-18
Attending: NURSE PRACTITIONER
Payer: MEDICARE

## 2023-09-18 ENCOUNTER — CLINICAL SUPPORT (OUTPATIENT)
Dept: REHABILITATION | Facility: HOSPITAL | Age: 68
End: 2023-09-18
Payer: MEDICARE

## 2023-09-18 VITALS
HEART RATE: 75 BPM | DIASTOLIC BLOOD PRESSURE: 74 MMHG | BODY MASS INDEX: 30.44 KG/M2 | SYSTOLIC BLOOD PRESSURE: 128 MMHG | TEMPERATURE: 98 F | OXYGEN SATURATION: 97 % | RESPIRATION RATE: 18 BRPM | HEIGHT: 60 IN

## 2023-09-18 DIAGNOSIS — G89.29 CHRONIC RIGHT SHOULDER PAIN: Primary | ICD-10-CM

## 2023-09-18 DIAGNOSIS — R26.9 GAIT DIFFICULTY: ICD-10-CM

## 2023-09-18 DIAGNOSIS — D50.8 OTHER IRON DEFICIENCY ANEMIA: Primary | ICD-10-CM

## 2023-09-18 DIAGNOSIS — M25.511 CHRONIC RIGHT SHOULDER PAIN: Primary | ICD-10-CM

## 2023-09-18 PROCEDURE — 97530 THERAPEUTIC ACTIVITIES: CPT | Mod: PN,CQ

## 2023-09-18 PROCEDURE — 63600175 PHARM REV CODE 636 W HCPCS: Mod: JZ,JG | Performed by: NURSE PRACTITIONER

## 2023-09-18 PROCEDURE — 96365 THER/PROPH/DIAG IV INF INIT: CPT

## 2023-09-18 PROCEDURE — 96375 TX/PRO/DX INJ NEW DRUG ADDON: CPT

## 2023-09-18 PROCEDURE — 25000003 PHARM REV CODE 250: Performed by: NURSE PRACTITIONER

## 2023-09-18 PROCEDURE — 97110 THERAPEUTIC EXERCISES: CPT | Mod: PN,CQ

## 2023-09-18 RX ORDER — METHYLPREDNISOLONE SOD SUCC 125 MG
40 VIAL (EA) INJECTION
Status: COMPLETED | OUTPATIENT
Start: 2023-09-18 | End: 2023-09-18

## 2023-09-18 RX ADMIN — SODIUM CHLORIDE: 9 INJECTION, SOLUTION INTRAVENOUS at 08:09

## 2023-09-18 RX ADMIN — METHYLPREDNISOLONE SODIUM SUCCINATE 40 MG: 125 INJECTION, POWDER, FOR SOLUTION INTRAMUSCULAR; INTRAVENOUS at 08:09

## 2023-09-18 RX ADMIN — FERUMOXYTOL 510 MG: 510 INJECTION INTRAVENOUS at 08:09

## 2023-09-18 NOTE — DISCHARGE INSTRUCTIONS
..[facility]  [address] [facility]  [address]  [phone] phone     [phone] fax  Hours of Operation: Monday- Friday 8:00am- 5:00pm  After hours phone  [phone]  Hematology / Oncology Physicians on call    Dr. Fritz Gentile      Nurse Practitioners:    Sherice Delgadillo, JACEY Tovar, JACEY Pedersen, JACEY Pruitt, JACEY Ferraro, JACEY Valencia, [place]      Please don't hesitate to call if you have any concerns.    .FALL PREVENTION   Falls often occur due to slipping, tripping or losing your balance. Here are ways to reduce your risk of falling again.   Was there anything that caused your fall that can be fixed, removed or replaced?   Make your home safe by keeping walkways clear of objects you may trip over.   Use non-slip pads under rugs.   Do not walk in poorly lit areas.   Do not stand on chairs or wobbly ladders.   Use caution when reaching overhead or looking upward. This position can cause a loss of balance.   Be sure your shoes fit properly, have non-slip bottoms and are in good condition.   Be cautious when going up and down stairs, curbs, and when walking on uneven sidewalks.   If your balance is poor, consider using a cane or walker.   If your fall was related to alcohol use, stop or limit alcohol intake.   If your fall was related to use of sleeping medicines, talk to your doctor about this. You may need to reduce your dosage at bedtime if you awaken during the night to go to the bathroom.   To reduce the need for nighttime bathroom trips:   Avoid drinking fluids for several hours before going to bed   Empty your bladder before going to bed   Men can keep a urinal at the bedside   © 3696-1360 [employer], [address] [place] [address], [place] [address]. All rights reserved. This information is not intended as a substitute for professional medical care. Always follow your healthcare  professional's instructions.  .WAYS TO HELP PREVENT INFECTION        WASH YOUR HANDS OFTEN DURING THE DAY, ESPECIALLY BEFORE YOU EAT, AFTER USING THE BATHROOM, AND AFTER TOUCHING ANIMALS    STAY AWAY FROM PEOPLE WHO HAVE ILLNESSES YOU CAN CATCH; SUCH AS COLDS, FLU, CHICKEN POX    TRY TO AVOID CROWDS    STAY AWAY FROM CHILDREN WHO RECENTLY HAVE RECEIVED LIVE VIRUS VACCINES    MAINTAIN GOOD MOUTH CARE    DO NOT SQUEEZE OR SCRATCH PIMPLES    CLEAN CUTS & SCRAPES RIGHT AWAY AND DAILY UNTIL HEALED WITH WARM WATER, SOAP & AN ANTISEPTIC    AVOID CONTACT WITH LITTER BOXES, BIRD CAGES, & FISH TANKS    AVOID STANDING WATER, IE., BIRD BATHS, FLOWER POTS/VASES, OR HUMIDIFIERS    WEAR GLOVES WHEN GARDENING OR CLEANING UP AFTER OTHERS, ESPECIALLY BABIES & SMALL CHILDREN    DO NOT EAT RAW FISH, SEAFOOD, MEAT, OR EGGS

## 2023-09-18 NOTE — PROGRESS NOTES
"[facility]   Physical Therapy Treatment Note      Name: Merle Caro  Clinic Number: [id number]    Therapy Diagnosis:   Encounter Diagnoses   Name Primary?    Chronic right shoulder pain Yes    Gait difficulty        Physician: Maria De Jesus Spicer MD    Visit Date: 9/18/2023    Physician Orders: PT Eval and Treat   Medical Diagnosis from Referral: Status post lumbar spinal fusion [Z98.1]  Evaluation Date: 8/28/2023  Authorization Period Expiration: 7/5/2025  Plan of Care Expiration: 11/24/2023  Progress Note Due: 9/28/2023  Visit # / Visits authorized: 4/20 + eval   FOTO: 2/3       Intake Outcome Measure for FOTO lumbar Survey     Therapist reviewed FOTO scores for Merle Caro on 9/18/2023.   FOTO documents entered into [employer] - see Media section.     Intake Score: 53%            Precautions: Standard, Fall, and anemic       Time In: 12:30  Time Out: 1:15  Total Appointment Time (timed & untimed codes): 45 minutes    PTA Visit #: 1/5       Subjective     Patient reports: pain in her buttocks.     She was not compliant with home exercise program.  Response to previous treatment: no adverse symptoms  Functional change: nothing significant    Pain: 4/10  Location: sacral region    Objective      Objective Measures updated at progress report unless specified.     Treatment     Merle received the treatments listed below:      therapeutic exercises to develop strength, endurance, ROM, and flexibility for (- minutes) including:     manual therapy techniques: were applied to - for (- minutes) , including:      neuromuscular re-education activities to improve: Balance, Coordination, Proprioception, Posture, and stability for (15 minutes) . The following activities were included:  LAQ 2# 2x10 B   Seated marches 2# 2x10 B  Ball squeezes 2x10 5" holds     therapeutic activities to improve functional performance for (30 minutes) , including:   Standing hip hinges 2x10              Prolong standing with 1UE " assist for 2 minutes   wheelchair propulsion x50 feet               sit to stand x4   wc to toilet transfer supervision    Double leg stance with dynamic movement x2    Amb with RW 60 feetx2 supervision     gait training to improve functional mobility and safety for (- minutes) , including:       PATIENT EDUCATION AND HOME EXERCISES      Education provided:   - HEP provided   - PT role in POC      Written Home Exercises Provided:  NA       Assessment     Pt fatigued following wheelchair to toilet transfer demonstrating increased independence with dressing. Patient did require increased motivation to increase walking distance since she was fatigued today. She worked on additional hip hinges to assist in glute strength for dressing    Merle Is progressing well towards her goals.   Patient prognosis is Fair    Patient will continue to benefit from skilled outpatient physical therapy to address the deficits listed in the problem list box on initial evaluation, provide pt/family education and to maximize pt's level of independence in the home and community environment.     Patient's spiritual, cultural and educational needs considered and pt agreeable to plan of care and goals.     Anticipated barriers to physical therapy: co-morbidities and chronicity of condition    Goals:   Short Term Goals:  6 weeks  HEP: Patient will demonstrate 50% independence with HEP   Function: Patient will increase functional score to equal or greater than to 45 out of 100 on FOTO.  Mobility: Patient will improve 5 times sit to stand score by 5 seconds  Strength: Patient will improve impaired strength to greater or equal to 4/5.      Long Term Goals:  12 weeks  HEP: Pt will be independent with advanced HEP.  Pain: Pt will demonstrate improved pain less than or equal to 2/10 at worse in order to progress toward maximal functional ability and improve QOL.  Function: Patient will  increase functional score to equal or greater than to 50 out of  100 on FOTO to improve functional independence and quality of life.   Mobility: Patient will improve 5 times sit to stand by 10 seconds to decrease risk of falls  Mobility: patient will be able to improve TUG score by 15 seconds to improve functional independence  Mobility: patient will be able to improve 2 minute walk test by 50 feet to improve endurance in order to participate in community  Strength: Patient will improve impaired strength to greater or equal to 4+/5 in order to participate in community activities   Gait: Patient will demonstrate normalized gait mechanics with minimal compensation in order to return to PLOF.     PLAN   Plan of care Certification: 8/28/2023 to 11/24/2023.     Outpatient Physical Therapy 2 times weekly for 12 weeks to include the following interventions: Cervical/Lumbar Traction, Electrical Stimulation  , Gait Training, Manual Therapy, Moist Heat/ Ice, Neuromuscular Re-ed, Patient Education, Self Care, Therapeutic Activities, Therapeutic Exercise, and FDN .     Britni Easton, PTA

## 2023-09-18 NOTE — PLAN OF CARE
Problem: Anemia  Goal: Anemia Symptom Improvement  Outcome: Ongoing, Progressing     Problem: Adult Inpatient Plan of Care  Goal: Plan of Care Review  Description: Patient likes feet down, blanket, juice, and juice.   Outcome: Ongoing, Progressing  Flowsheets (Taken 9/18/2023 0830)  Plan of Care Reviewed With:   patient   son  Goal: Patient-Specific Goal (Individualized)  Description: Patient tolerated infusion well with no adverse reactions.   Outcome: Ongoing, Progressing  Flowsheets (Taken 9/18/2023 0830)  Anxieties, Fears or Concerns: Pt voices no concerns at this time  Individualized Care Needs: Pt in recliner, legs down, pillow under IV arm and OJ provided  Goal: Optimal Comfort and Wellbeing  Outcome: Ongoing, Progressing     Problem: Fall Injury Risk  Goal: Absence of Fall and Fall-Related Injury  Outcome: Ongoing, Progressing

## 2023-09-20 ENCOUNTER — TELEPHONE (OUTPATIENT)
Dept: INFUSION THERAPY | Facility: HOSPITAL | Age: 68
End: 2023-09-20
Payer: MEDICARE

## 2023-09-20 NOTE — TELEPHONE ENCOUNTER
Called pt to make aware that I will cancel infusion appt for Monday 9/25. Pt has already received 2 doses of feraheme one 9/11 and the final on 9/18. Left voicemail w/ contact information to return call if any questions.

## 2023-09-22 ENCOUNTER — CLINICAL SUPPORT (OUTPATIENT)
Dept: REHABILITATION | Facility: HOSPITAL | Age: 68
End: 2023-09-22
Payer: MEDICARE

## 2023-09-22 DIAGNOSIS — M25.511 CHRONIC RIGHT SHOULDER PAIN: Primary | ICD-10-CM

## 2023-09-22 DIAGNOSIS — R26.9 GAIT DIFFICULTY: ICD-10-CM

## 2023-09-22 DIAGNOSIS — G89.29 CHRONIC RIGHT SHOULDER PAIN: Primary | ICD-10-CM

## 2023-09-22 PROCEDURE — 97530 THERAPEUTIC ACTIVITIES: CPT | Mod: PN,CQ

## 2023-09-22 PROCEDURE — 97110 THERAPEUTIC EXERCISES: CPT | Mod: PN,CQ

## 2023-09-22 NOTE — PROGRESS NOTES
"[facility]   Physical Therapy Treatment Note      Name: [facility]  Clinic Number: [id number]    Therapy Diagnosis:   Encounter Diagnoses   Name Primary?    Chronic right shoulder pain Yes    Gait difficulty          Physician: Maria De Jesus Spicer MD    Visit Date: 9/22/2023    Physician Orders: PT Eval and Treat   Medical Diagnosis from Referral: Status post lumbar spinal fusion [Z98.1]  Evaluation Date: 8/28/2023  Authorization Period Expiration: 7/5/2025  Plan of Care Expiration: 11/24/2023  Progress Note Due: 9/28/2023  Visit # / Visits authorized: 5/20 + eval   FOTO: 2/3        Precautions: Standard, Fall, and anemic       Time In: 1:00  Time Out: 1:45  Total Appointment Time (timed & untimed codes): 45 minutes    PTA Visit #: 2/5       Subjective     Patient reports: pain in her buttocks continues. She didn't take medication this morning.    She was not compliant with home exercise program.  Response to previous treatment: no adverse symptoms  Functional change: nothing significant    Pain: 5/10  Location: sacral region    Objective      Objective Measures updated at progress report unless specified.     130/65    Treatment     Merle received the treatments listed below:      therapeutic exercises to develop strength, endurance, ROM, and flexibility for (- minutes) including:     manual therapy techniques: were applied to - for (- minutes) , including:      neuromuscular re-education activities to improve: Balance, Coordination, Proprioception, Posture, and stability for (20 minutes) . The following activities were included:  LAQ 2# 2x10 B   Seated marches 2# 2x10 B  Ball squeezes 3x10 5" holds  Seated heel raises 3x10      therapeutic activities to improve functional performance for (30 minutes) , including:   Standing hip hinges 2x10              Prolong standing with 1UE assist for 2 minutes   wheelchair propulsion x50 feet               sit to stand x4   wc to toilet transfer " supervision x2   Double leg stance with dynamic movement x2      gait training to improve functional mobility and safety for (- minutes) , including:       PATIENT EDUCATION AND HOME EXERCISES      Education provided:   - HEP provided   - PT role in POC      Written Home Exercises Provided:  NA       Assessment     Pt reports pain in buttocks throughout therapy session. Worked on bathroom transfer to improve independence with dressing and toilet ing with minimal assistance. Patient had to use restroom two times during therapy session demonstrating increased independence for second time but did require cues for wheel chair placement to become more independent. Patient will benefit from continued skilled therapy to improve functional mobility    Merle Is progressing well towards her goals.   Patient prognosis is Fair    Patient will continue to benefit from skilled outpatient physical therapy to address the deficits listed in the problem list box on initial evaluation, provide pt/family education and to maximize pt's level of independence in the home and community environment.     Patient's spiritual, cultural and educational needs considered and pt agreeable to plan of care and goals.     Anticipated barriers to physical therapy: co-morbidities and chronicity of condition    Goals:   Short Term Goals:  6 weeks  HEP: Patient will demonstrate 50% independence with HEP   Function: Patient will increase functional score to equal or greater than to 45 out of 100 on FOTO.  Mobility: Patient will improve 5 times sit to stand score by 5 seconds  Strength: Patient will improve impaired strength to greater or equal to 4/5.      Long Term Goals:  12 weeks  HEP: Pt will be independent with advanced HEP.  Pain: Pt will demonstrate improved pain less than or equal to 2/10 at worse in order to progress toward maximal functional ability and improve QOL.  Function: Patient will  increase functional score to equal or greater than to  50 out of 100 on FOTO to improve functional independence and quality of life.   Mobility: Patient will improve 5 times sit to stand by 10 seconds to decrease risk of falls  Mobility: patient will be able to improve TUG score by 15 seconds to improve functional independence  Mobility: patient will be able to improve 2 minute walk test by 50 feet to improve endurance in order to participate in community  Strength: Patient will improve impaired strength to greater or equal to 4+/5 in order to participate in community activities   Gait: Patient will demonstrate normalized gait mechanics with minimal compensation in order to return to PLOF.     PLAN   Plan of care Certification: 8/28/2023 to 11/24/2023.     Outpatient Physical Therapy 2 times weekly for 12 weeks to include the following interventions: Cervical/Lumbar Traction, Electrical Stimulation  , Gait Training, Manual Therapy, Moist Heat/ Ice, Neuromuscular Re-ed, Patient Education, Self Care, Therapeutic Activities, Therapeutic Exercise, and FDN .     Britni Easton, PTA

## 2023-09-25 ENCOUNTER — CLINICAL SUPPORT (OUTPATIENT)
Dept: REHABILITATION | Facility: HOSPITAL | Age: 68
End: 2023-09-25
Payer: MEDICARE

## 2023-09-25 DIAGNOSIS — R26.9 GAIT DIFFICULTY: ICD-10-CM

## 2023-09-25 DIAGNOSIS — G89.29 CHRONIC RIGHT SHOULDER PAIN: Primary | ICD-10-CM

## 2023-09-25 DIAGNOSIS — M25.511 CHRONIC RIGHT SHOULDER PAIN: Primary | ICD-10-CM

## 2023-09-25 PROCEDURE — 97530 THERAPEUTIC ACTIVITIES: CPT | Mod: KX,PN

## 2023-09-25 PROCEDURE — 97112 NEUROMUSCULAR REEDUCATION: CPT | Mod: KX,PN

## 2023-09-25 NOTE — PROGRESS NOTES
"[facility]   Physical Therapy Treatment Note      Name: Merle Caro  Clinic Number: [id number]    Therapy Diagnosis:   Encounter Diagnoses   Name Primary?    Chronic right shoulder pain Yes    Gait difficulty          Physician: Maria De Jesus Spicer MD    Visit Date: 9/25/2023    Physician Orders: PT Eval and Treat   Medical Diagnosis from Referral: Status post lumbar spinal fusion [Z98.1]  Evaluation Date: 8/28/2023  Authorization Period Expiration: 7/5/2025  Plan of Care Expiration: 11/24/2023  Progress Note Due: 9/28/2023  Visit # / Visits authorized: 6/20 + eval   FOTO: 2/3        Precautions: Standard, Fall, and anemic       Time In: 12:30  Time Out: 1:15  Total Appointment Time (timed & untimed codes): 45 minutes    PTA Visit #: -/5       Subjective     Patient reports: buttock pain today     She was not compliant with home exercise program.  Response to previous treatment: no adverse symptoms  Functional change: nothing significant    Pain: 5/10  Location: sacral region    Objective      Objective Measures updated at progress report unless specified.     9/25/23  5xSTS = 15.94 sec    Treatment     Merle received the treatments listed below:      therapeutic exercises to develop strength, endurance, ROM, and flexibility for (- minutes) including:     manual therapy techniques: were applied to - for (- minutes) , including:      neuromuscular re-education activities to improve: Balance, Coordination, Proprioception, Posture, and stability for (15 minutes) . The following activities were included:  LAQ 2# 2x15 B   Seated marches 2# 2x15 B  Ball squeezes 3x10 5" holds  Seated heel raises 2# 3x10      therapeutic activities to improve functional performance for (30 minutes) , including:   Standing hip hinges 2x10              Prolong standing with 1UE assist for 3 minutes 30 sec  wheelchair propulsion 2x50 feet               sit to stand 2x5   wc to toilet transfer supervision x2   Side " stepping in // bar x1   double leg stance with dynamic movement x2     gait training to improve functional mobility and safety for (- minutes) , including:       PATIENT EDUCATION AND HOME EXERCISES      Education provided:   - HEP provided   - PT role in POC      Written Home Exercises Provided:  NA       Assessment     Pt with good tolerance to progression of repetition to LE exercises and better tolerance to prolong standing today. Continue to require min to mod cues for wheelchair maneuvers. Pt improving with toiletry and dressing. One episode of increased right shoulder pain with wheelchair propulsion. Continue to progress as tolerated.     Merle Is progressing well towards her goals.   Patient prognosis is Fair    Patient will continue to benefit from skilled outpatient physical therapy to address the deficits listed in the problem list box on initial evaluation, provide pt/family education and to maximize pt's level of independence in the home and community environment.     Patient's spiritual, cultural and educational needs considered and pt agreeable to plan of care and goals.     Anticipated barriers to physical therapy: co-morbidities and chronicity of condition    Goals:   Short Term Goals:  6 weeks  HEP: Patient will demonstrate 50% independence with HEP   Function: Patient will increase functional score to equal or greater than to 45 out of 100 on FOTO.  Mobility: Patient will improve 5 times sit to stand score by 5 seconds. MET  Strength: Patient will improve impaired strength to greater or equal to 4/5.      Long Term Goals:  12 weeks  HEP: Pt will be independent with advanced HEP.  Pain: Pt will demonstrate improved pain less than or equal to 2/10 at worse in order to progress toward maximal functional ability and improve QOL.  Function: Patient will  increase functional score to equal or greater than to 50 out of 100 on FOTO to improve functional independence and quality of life.   Mobility:  Patient will improve 5 times sit to stand by 10 seconds to decrease risk of falls. MET  Mobility: patient will be able to improve TUG score by 15 seconds to improve functional independence  Mobility: patient will be able to improve 2 minute walk test by 50 feet to improve endurance in order to participate in community  Strength: Patient will improve impaired strength to greater or equal to 4+/5 in order to participate in community activities   Gait: Patient will demonstrate normalized gait mechanics with minimal compensation in order to return to PLOF.     PLAN   Plan of care Certification: 8/28/2023 to 11/24/2023.     Outpatient Physical Therapy 2 times weekly for 12 weeks to include the following interventions: Cervical/Lumbar Traction, Electrical Stimulation  , Gait Training, Manual Therapy, Moist Heat/ Ice, Neuromuscular Re-ed, Patient Education, Self Care, Therapeutic Activities, Therapeutic Exercise, and FDN .     Krystal Mccauley, PT

## 2023-09-28 ENCOUNTER — CLINICAL SUPPORT (OUTPATIENT)
Dept: REHABILITATION | Facility: HOSPITAL | Age: 68
End: 2023-09-28
Payer: MEDICARE

## 2023-09-28 DIAGNOSIS — R26.9 GAIT DIFFICULTY: ICD-10-CM

## 2023-09-28 DIAGNOSIS — G89.29 CHRONIC RIGHT SHOULDER PAIN: Primary | ICD-10-CM

## 2023-09-28 DIAGNOSIS — M25.511 CHRONIC RIGHT SHOULDER PAIN: Primary | ICD-10-CM

## 2023-09-28 PROCEDURE — 97530 THERAPEUTIC ACTIVITIES: CPT | Mod: PN

## 2023-09-29 NOTE — PROGRESS NOTES
"[facility]   Physical Therapy Treatment Note      Name: Merle Caro  Clinic Number: [id number]    Therapy Diagnosis:   Encounter Diagnoses   Name Primary?    Chronic right shoulder pain Yes    Gait difficulty      Physician: Maria De Jesus Spicer MD    Visit Date: 9/28/2023    Physician Orders: PT Eval and Treat   Medical Diagnosis from Referral: Status post lumbar spinal fusion [Z98.1]  Evaluation Date: 8/28/2023  Authorization Period Expiration: 7/5/2025  Plan of Care Expiration: 11/24/2023  Progress Note Due: 9/28/2023  Visit # / Visits authorized: 7/20 + eval   FOTO: 2/3     Precautions: Standard, Fall, and anemic       Time In: 1:15  Time Out: 2:00  Total Appointment Time (timed & untimed codes): 45 minutes    PTA Visit #: -/5       Subjective     Patient reports: buttock pain today     She was not compliant with home exercise program.  Response to previous treatment: no adverse symptoms  Functional change: nothing significant    Pain: 5/10  Location: sacral region    Objective      Objective Measures updated at progress report unless specified.     9/25/23  5xSTS = 15.94 sec    Treatment     Merle received the treatments listed below:      therapeutic exercises to develop strength, endurance, ROM, and flexibility for (-) minutesincluding:     manual therapy techniques: were applied to - for (-) minutes, including:      neuromuscular re-education activities to improve: Balance, Coordination, Proprioception, Posture, and stability for (-) minutes. The following activities were included:   Not Today: LAQ 2# 2x15 B; Seated marches 2# 2x15 B; Ball squeezes 3x10 5" holds; Seated heel raises 2# 3x10      therapeutic activities to improve functional performance for (45) minutes, including:              Prolong standing with 1UE assist for 2x 2 minutes    +Standing cone taps with UE assist on orange sports cord 2x10               sit to stand x5   wc to toilet transfer supervision    +amb with " rollator - with functional reach to grab cones - 150 feet     Not Today: wheelchair propulsion 2x50 feet    gait training to improve functional mobility and safety for ( ) minutes, including:       PATIENT EDUCATION AND HOME EXERCISES      Education provided:   - HEP provided   - PT role in POC      Written Home Exercises Provided:  NA       Assessment     Pt presents to clinic fatigued today. Focused on functional activity during today's session. Pt able to tolerate standing activities today. Supervision required for toe taps with UE assist on unsteady surface; no LOB noted. Pt near independence with toiletry; cues for wheelchair placement.     Merle Is progressing well towards her goals.   Patient prognosis is Fair    Patient will continue to benefit from skilled outpatient physical therapy to address the deficits listed in the problem list box on initial evaluation, provide pt/family education and to maximize pt's level of independence in the home and community environment.     Patient's spiritual, cultural and educational needs considered and pt agreeable to plan of care and goals.     Anticipated barriers to physical therapy: co-morbidities and chronicity of condition    Goals:   Short Term Goals:  6 weeks  HEP: Patient will demonstrate 50% independence with HEP   Function: Patient will increase functional score to equal or greater than to 45 out of 100 on FOTO.  Mobility: Patient will improve 5 times sit to stand score by 5 seconds. MET  Strength: Patient will improve impaired strength to greater or equal to 4/5.      Long Term Goals:  12 weeks  HEP: Pt will be independent with advanced HEP.  Pain: Pt will demonstrate improved pain less than or equal to 2/10 at worse in order to progress toward maximal functional ability and improve QOL.  Function: Patient will  increase functional score to equal or greater than to 50 out of 100 on FOTO to improve functional independence and quality of life.   Mobility:  Patient will improve 5 times sit to stand by 10 seconds to decrease risk of falls. MET  Mobility: patient will be able to improve TUG score by 15 seconds to improve functional independence  Mobility: patient will be able to improve 2 minute walk test by 50 feet to improve endurance in order to participate in community  Strength: Patient will improve impaired strength to greater or equal to 4+/5 in order to participate in community activities   Gait: Patient will demonstrate normalized gait mechanics with minimal compensation in order to return to PLOF.     PLAN   Plan of care Certification: 8/28/2023 to 11/24/2023.     Outpatient Physical Therapy 2 times weekly for 12 weeks to include the following interventions: Cervical/Lumbar Traction, Electrical Stimulation  , Gait Training, Manual Therapy, Moist Heat/ Ice, Neuromuscular Re-ed, Patient Education, Self Care, Therapeutic Activities, Therapeutic Exercise, and FDN .     Krystal Mccauley, PT

## 2023-10-02 ENCOUNTER — CLINICAL SUPPORT (OUTPATIENT)
Dept: REHABILITATION | Facility: HOSPITAL | Age: 68
End: 2023-10-02
Payer: MEDICARE

## 2023-10-02 DIAGNOSIS — R26.9 GAIT DIFFICULTY: ICD-10-CM

## 2023-10-02 DIAGNOSIS — G89.29 CHRONIC RIGHT SHOULDER PAIN: Primary | ICD-10-CM

## 2023-10-02 DIAGNOSIS — M25.511 CHRONIC RIGHT SHOULDER PAIN: Primary | ICD-10-CM

## 2023-10-02 PROCEDURE — 97110 THERAPEUTIC EXERCISES: CPT | Mod: KX,PN

## 2023-10-02 PROCEDURE — 97112 NEUROMUSCULAR REEDUCATION: CPT | Mod: KX,PN

## 2023-10-02 NOTE — PROGRESS NOTES
"[facility]   Physical Therapy Treatment Note      Name: Merle Caro  Clinic Number: [id number]    Therapy Diagnosis:   Encounter Diagnoses   Name Primary?    Chronic right shoulder pain Yes    Gait difficulty      Physician: Maria De Jesus Spicer MD    Visit Date: 10/2/2023    Physician Orders: PT Eval and Treat   Medical Diagnosis from Referral: Status post lumbar spinal fusion [Z98.1]  Evaluation Date: 8/28/2023  Authorization Period Expiration: 7/5/2025  Plan of Care Expiration: 11/24/2023  Progress Note Due: 11/2/23  Visit # / Visits authorized: 8/20 + eval   FOTO: 2/3     Precautions: Standard, Fall, and anemic       Time In: 12:30  Time Out: 1:00  Total Appointment Time (timed & untimed codes): 30 minutes    PTA Visit #: -/5       Subjective     Patient reports: she is doing well today     She was not compliant with home exercise program.  Response to previous treatment: no adverse symptoms  Functional change: nothing significant    Pain: 5/10  Location: sacral region    Objective      Objective Measures updated at progress report unless specified.     30 second test without UE assist= 31.79 seconds; 10/2/2023 17.60 nsec  TUG with RW = 1 minute and 2 seconds; 10/2/2023 - 38.49 seconds     Treatment     Merle received the treatments listed below:      therapeutic exercises to develop strength, endurance, ROM, and flexibility for (-) minutesincluding:     manual therapy techniques: were applied to - for (-) minutes, including:      neuromuscular re-education activities to improve: Balance, Coordination, Proprioception, Posture, and stability for (10) minutes. The following activities were included:  LAQ 2x15 B  Seated marches 2x15 B  Seated heel raises 3x10     Not Today:   Ball squeezes 3x10 5" holds       therapeutic activities to improve functional performance for (20) minutes, including:   amb with rollator - with functional reach to grab cones - 180 feet    Sit to stand 3x5    TUG test " x1    Not Today:   wheelchair propulsion 2x50 feet              Prolong standing with 1UE assist for 2x 2 minutes    Standing cone taps with UE assist on orange sports cord 2x10               sit to stand x5   wc to toilet transfer supervision        gait training to improve functional mobility and safety for ( ) minutes, including:       PATIENT EDUCATION AND HOME EXERCISES      Education provided:   - HEP provided   - PT role in POC      Written Home Exercises Provided:  NA       Assessment     Pt presented to clinic amb with rolling walker today. Focused on functional task such as walking, standing, and reaching during today's session. Verbal cues to take breaks in between each set and exercise. Pt unable to take long breaks secondary to feel sleeping when she doesn't do anything. Pt with noticeable fatigue after long distance ambulation. Clinical wheeled pt to car at end of session. Pt able to perform wheelchair to car transfers independently.     Merle Is progressing well towards her goals.   Patient prognosis is Fair    Patient will continue to benefit from skilled outpatient physical therapy to address the deficits listed in the problem list box on initial evaluation, provide pt/family education and to maximize pt's level of independence in the home and community environment.     Patient's spiritual, cultural and educational needs considered and pt agreeable to plan of care and goals.     Anticipated barriers to physical therapy: co-morbidities and chronicity of condition    Goals:   Short Term Goals:  6 weeks  HEP: Patient will demonstrate 50% independence with HEP   Function: Patient will increase functional score to equal or greater than to 45 out of 100 on FOTO.  Mobility: Patient will improve 5 times sit to stand score by 5 seconds. MET  Strength: Patient will improve impaired strength to greater or equal to 4/5.      Long Term Goals:  12 weeks  HEP: Pt will be independent with advanced HEP.  Pain: Pt  will demonstrate improved pain less than or equal to 2/10 at worse in order to progress toward maximal functional ability and improve QOL.  Function: Patient will  increase functional score to equal or greater than to 50 out of 100 on FOTO to improve functional independence and quality of life.   Mobility: Patient will improve 5 times sit to stand by 10 seconds to decrease risk of falls. MET  Mobility: patient will be able to improve TUG score by 15 seconds to improve functional independence  Mobility: patient will be able to improve 2 minute walk test by 50 feet to improve endurance in order to participate in community  Strength: Patient will improve impaired strength to greater or equal to 4+/5 in order to participate in community activities   Gait: Patient will demonstrate normalized gait mechanics with minimal compensation in order to return to PLOF.     PLAN   Plan of care Certification: 8/28/2023 to 11/24/2023.     Outpatient Physical Therapy 2 times weekly for 12 weeks to include the following interventions: Cervical/Lumbar Traction, Electrical Stimulation  , Gait Training, Manual Therapy, Moist Heat/ Ice, Neuromuscular Re-ed, Patient Education, Self Care, Therapeutic Activities, Therapeutic Exercise, and FDN .     Krystal Mccauley, PT

## 2023-10-04 ENCOUNTER — HOSPITAL ENCOUNTER (OUTPATIENT)
Dept: RADIOLOGY | Facility: HOSPITAL | Age: 68
Discharge: HOME | End: 2023-10-04
Attending: ORTHOPAEDIC SURGERY
Payer: MEDICARE

## 2023-10-04 ENCOUNTER — OFFICE VISIT (OUTPATIENT)
Dept: ORTHOPEDICS | Facility: CLINIC | Age: 68
End: 2023-10-04
Payer: MEDICARE

## 2023-10-04 DIAGNOSIS — Z98.1 STATUS POST LUMBAR SPINAL FUSION: Primary | ICD-10-CM

## 2023-10-04 DIAGNOSIS — Z98.890 S/P SPINAL SURGERY: ICD-10-CM

## 2023-10-04 PROCEDURE — 72100 X-RAY EXAM L-S SPINE 2/3 VWS: CPT | Mod: TC

## 2023-10-04 PROCEDURE — 99999 PR PBB SHADOW E&M-EST. PATIENT-LVL III: ICD-10-PCS | Mod: PBBFAC,,, | Performed by: ORTHOPAEDIC SURGERY

## 2023-10-04 PROCEDURE — 99213 PR OFFICE/OUTPT VISIT, EST, LEVL III, 20-29 MIN: ICD-10-PCS | Mod: S$PBB,,, | Performed by: ORTHOPAEDIC SURGERY

## 2023-10-04 PROCEDURE — 72100 X-RAY EXAM L-S SPINE 2/3 VWS: CPT | Mod: 26,,, | Performed by: RADIOLOGY

## 2023-10-04 PROCEDURE — 99213 OFFICE O/P EST LOW 20 MIN: CPT | Mod: PBBFAC | Performed by: ORTHOPAEDIC SURGERY

## 2023-10-04 PROCEDURE — 99999 PR PBB SHADOW E&M-EST. PATIENT-LVL III: CPT | Mod: PBBFAC,,, | Performed by: ORTHOPAEDIC SURGERY

## 2023-10-04 PROCEDURE — 99213 OFFICE O/P EST LOW 20 MIN: CPT | Mod: S$PBB,,, | Performed by: ORTHOPAEDIC SURGERY

## 2023-10-04 PROCEDURE — 72100 XR LUMBAR SPINE AP AND LATERAL: ICD-10-PCS | Mod: 26,,, | Performed by: RADIOLOGY

## 2023-10-05 ENCOUNTER — CLINICAL SUPPORT (OUTPATIENT)
Dept: REHABILITATION | Facility: HOSPITAL | Age: 68
End: 2023-10-05
Payer: MEDICARE

## 2023-10-05 DIAGNOSIS — R26.9 GAIT DIFFICULTY: ICD-10-CM

## 2023-10-05 DIAGNOSIS — G89.29 CHRONIC RIGHT SHOULDER PAIN: Primary | ICD-10-CM

## 2023-10-05 DIAGNOSIS — M25.511 CHRONIC RIGHT SHOULDER PAIN: Primary | ICD-10-CM

## 2023-10-05 PROCEDURE — 97112 NEUROMUSCULAR REEDUCATION: CPT | Mod: KX,PN

## 2023-10-05 PROCEDURE — 97530 THERAPEUTIC ACTIVITIES: CPT | Mod: KX,PN

## 2023-10-05 NOTE — PROGRESS NOTES
[facility]   Physical Therapy Treatment Note      Name: Merle Caro  Clinic Number: [id number]    Therapy Diagnosis:   Encounter Diagnoses   Name Primary?    Chronic right shoulder pain Yes    Gait difficulty      Physician: Maria De Jesus Spicer MD    Visit Date: 10/5/2023    Physician Orders: PT Eval and Treat   Medical Diagnosis from Referral: Status post lumbar spinal fusion [Z98.1]  Evaluation Date: 8/28/2023  Authorization Period Expiration: 7/5/2025  Plan of Care Expiration: 11/24/2023  Progress Note Due: 11/2/23  Visit # / Visits authorized: 9/20 + eval   FOTO: 2/3     Precautions: Standard, Fall, and anemic       Time In: 1:18  Time Out: 2:00  Total Appointment Time (timed & untimed codes): 42 minutes    PTA Visit #: -/5       Subjective     Patient reports: soreness to lower extremity secondary to last visit     She was not compliant with home exercise program.  Response to previous treatment: no adverse symptoms  Functional change: nothing significant    Pain: 5/10  Location: sacral region    Objective      Objective Measures updated at progress report unless specified.     30 second test without UE assist= 31.79 seconds; 10/2/2023 17.60 nsec  TUG with RW = 1 minute and 2 seconds; 10/2/2023 - 38.49 seconds     Treatment     Merle received the treatments listed below:      therapeutic exercises to develop strength, endurance, ROM, and flexibility for (-) minutesincluding:     manual therapy techniques: were applied to - for (-) minutes, including:      neuromuscular re-education activities to improve: Balance, Coordination, Proprioception, Posture, and stability for (28) minutes. The following activities were included:  SLR 2x10   Supine clams YTB 2x10   Bridges 2x10   SAQ 2x10   LAQ 2x10      therapeutic activities to improve functional performance for (15) minutes, including:    Sit to stand 2x10   wheelchair propulsion 2x50 feet             gait training to improve functional  mobility and safety for ( ) minutes, including:       PATIENT EDUCATION AND HOME EXERCISES      Education provided:   - HEP provided   - PT role in POC      Written Home Exercises Provided:  NA       Assessment     Pt presents to clinic in wheelchair today. Held walking and standing exercises secondary to soreness to bilateral LE. Focused on hip and quad strengthening on mat with wedge. Pt with right greater then left knee contraction. Pt with good tolerance to exercises.     Merle Is progressing well towards her goals.   Patient prognosis is Fair    Patient will continue to benefit from skilled outpatient physical therapy to address the deficits listed in the problem list box on initial evaluation, provide pt/family education and to maximize pt's level of independence in the home and community environment.     Patient's spiritual, cultural and educational needs considered and pt agreeable to plan of care and goals.     Anticipated barriers to physical therapy: co-morbidities and chronicity of condition    Goals:   Short Term Goals:  6 weeks  HEP: Patient will demonstrate 50% independence with HEP   Function: Patient will increase functional score to equal or greater than to 45 out of 100 on FOTO.  Mobility: Patient will improve 5 times sit to stand score by 5 seconds. MET  Strength: Patient will improve impaired strength to greater or equal to 4/5.      Long Term Goals:  12 weeks  HEP: Pt will be independent with advanced HEP.  Pain: Pt will demonstrate improved pain less than or equal to 2/10 at worse in order to progress toward maximal functional ability and improve QOL.  Function: Patient will  increase functional score to equal or greater than to 50 out of 100 on FOTO to improve functional independence and quality of life.   Mobility: Patient will improve 5 times sit to stand by 10 seconds to decrease risk of falls. MET  Mobility: patient will be able to improve TUG score by 15 seconds to improve functional  independence  Mobility: patient will be able to improve 2 minute walk test by 50 feet to improve endurance in order to participate in community  Strength: Patient will improve impaired strength to greater or equal to 4+/5 in order to participate in community activities   Gait: Patient will demonstrate normalized gait mechanics with minimal compensation in order to return to PLOF.     PLAN   Plan of care Certification: 8/28/2023 to 11/24/2023.     Outpatient Physical Therapy 2 times weekly for 12 weeks to include the following interventions: Cervical/Lumbar Traction, Electrical Stimulation  , Gait Training, Manual Therapy, Moist Heat/ Ice, Neuromuscular Re-ed, Patient Education, Self Care, Therapeutic Activities, Therapeutic Exercise, and FDN .     Krystal Mccauley, PT

## 2023-10-06 NOTE — PROGRESS NOTES
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  She is doing well.  She has fecal continence, and the sensation that she needs to urinate.     Exam: Incision is now well healed. There is no sign of infection. Neuro exam is stable. No signs of DVT.    Radiographs: imaging today shows hardware in place, no evidence of failure.     Assessment/Plan:     Doing well postoperatively.  Outpatient therapy going very well.  Return to clinic in 6 months

## 2023-10-09 ENCOUNTER — CLINICAL SUPPORT (OUTPATIENT)
Dept: REHABILITATION | Facility: HOSPITAL | Age: 68
End: 2023-10-09
Payer: MEDICARE

## 2023-10-09 DIAGNOSIS — R26.9 GAIT DIFFICULTY: ICD-10-CM

## 2023-10-09 DIAGNOSIS — G89.29 CHRONIC RIGHT SHOULDER PAIN: Primary | ICD-10-CM

## 2023-10-09 DIAGNOSIS — M25.511 CHRONIC RIGHT SHOULDER PAIN: Primary | ICD-10-CM

## 2023-10-09 PROCEDURE — 97530 THERAPEUTIC ACTIVITIES: CPT | Mod: PN

## 2023-10-09 NOTE — PROGRESS NOTES
[facility]   Physical Therapy Treatment Note      Name: Merle BUSH [facility]  Clinic Number: [id number]    Therapy Diagnosis:   Encounter Diagnoses   Name Primary?    Chronic right shoulder pain Yes    Gait difficulty      Physician: Maria De Jesus Spicer MD    Visit Date: 10/9/2023    Physician Orders: PT Eval and Treat   Medical Diagnosis from Referral: Status post lumbar spinal fusion [Z98.1]  Evaluation Date: 8/28/2023  Authorization Period Expiration: 7/5/2025  Plan of Care Expiration: 11/24/2023  Progress Note Due: 11/2/23  Visit # / Visits authorized: 10/20 + eval   FOTO: 2/3     Precautions: Standard, Fall, and anemic       Time In: 1:18  Time Out: 2:00  Total Appointment Time (timed & untimed codes): 42 minutes    PTA Visit #: -/5       Subjective     Patient reports: soreness to lower extremity secondary to last visit     She was not compliant with home exercise program.  Response to previous treatment: no adverse symptoms  Functional change: nothing significant    Pain: 5/10  Location: sacral region    Objective      Objective Measures updated at progress report unless specified.     30 second test without UE assist= 31.79 seconds; 10/2/2023 17.60 nsec  TUG with RW = 1 minute and 2 seconds; 10/2/2023 - 38.49 seconds     Treatment     Merle received the treatments listed below:      therapeutic exercises to develop strength, endurance, ROM, and flexibility for (-) minutesincluding:     manual therapy techniques: were applied to - for (-) minutes, including:      neuromuscular re-education activities to improve: Balance, Coordination, Proprioception, Posture, and stability for () minutes. The following activities were included:     therapeutic activities to improve functional performance for (42) minutes, including:    Sit to stand ball toss 2x5   wheelchair propulsion 2x50 feet  Obstacle (step over, around, and on top of foam) with HHA x2  Wheelchair maneuver in narrow space  Toiletry               gait training to improve functional mobility and safety for ( ) minutes, including:       PATIENT EDUCATION AND HOME EXERCISES      Education provided:   - HEP provided   - PT role in POC      Written Home Exercises Provided:  NA       Assessment     Pt able to perform step over, around, and step onto unstable surface with hand held assist well during today's session. Performed ball toss during sit to stand to improve functional mobility and decrease risk of fall. Performed toiletry at end of session. Pt with better independency with toiletry. Continue to focus on improving functional activities.     Merle Is progressing well towards her goals.   Patient prognosis is Fair    Patient will continue to benefit from skilled outpatient physical therapy to address the deficits listed in the problem list box on initial evaluation, provide pt/family education and to maximize pt's level of independence in the home and community environment.     Patient's spiritual, cultural and educational needs considered and pt agreeable to plan of care and goals.     Anticipated barriers to physical therapy: co-morbidities and chronicity of condition    Goals:   Short Term Goals:  6 weeks  HEP: Patient will demonstrate 50% independence with HEP   Function: Patient will increase functional score to equal or greater than to 45 out of 100 on FOTO.  Mobility: Patient will improve 5 times sit to stand score by 5 seconds. MET  Strength: Patient will improve impaired strength to greater or equal to 4/5.      Long Term Goals:  12 weeks  HEP: Pt will be independent with advanced HEP.  Pain: Pt will demonstrate improved pain less than or equal to 2/10 at worse in order to progress toward maximal functional ability and improve QOL.  Function: Patient will  increase functional score to equal or greater than to 50 out of 100 on FOTO to improve functional independence and quality of life.   Mobility: Patient will improve 5 times sit to stand by 10  seconds to decrease risk of falls. MET  Mobility: patient will be able to improve TUG score by 15 seconds to improve functional independence  Mobility: patient will be able to improve 2 minute walk test by 50 feet to improve endurance in order to participate in community  Strength: Patient will improve impaired strength to greater or equal to 4+/5 in order to participate in community activities   Gait: Patient will demonstrate normalized gait mechanics with minimal compensation in order to return to PLOF.     PLAN   Plan of care Certification: 8/28/2023 to 11/24/2023.     Outpatient Physical Therapy 2 times weekly for 12 weeks to include the following interventions: Cervical/Lumbar Traction, Electrical Stimulation  , Gait Training, Manual Therapy, Moist Heat/ Ice, Neuromuscular Re-ed, Patient Education, Self Care, Therapeutic Activities, Therapeutic Exercise, and FDN .     Krystal Mccauley, PT

## 2023-10-12 ENCOUNTER — CLINICAL SUPPORT (OUTPATIENT)
Dept: REHABILITATION | Facility: HOSPITAL | Age: 68
End: 2023-10-12
Payer: MEDICARE

## 2023-10-12 DIAGNOSIS — G89.29 CHRONIC RIGHT SHOULDER PAIN: Primary | ICD-10-CM

## 2023-10-12 DIAGNOSIS — M25.511 CHRONIC RIGHT SHOULDER PAIN: Primary | ICD-10-CM

## 2023-10-12 DIAGNOSIS — R26.9 GAIT DIFFICULTY: ICD-10-CM

## 2023-10-12 PROCEDURE — 97530 THERAPEUTIC ACTIVITIES: CPT | Mod: PN

## 2023-10-12 NOTE — PROGRESS NOTES
[facility]   Physical Therapy Treatment Note      Name: Merle BUSH [facility] Number: [id number]    Therapy Diagnosis:   Encounter Diagnoses   Name Primary?    Chronic right shoulder pain Yes    Gait difficulty        Physician: Maria De Jesus Spicer MD    Visit Date: 10/12/2023    Physician Orders: PT Eval and Treat   Medical Diagnosis from Referral: Status post lumbar spinal fusion [Z98.1]  Evaluation Date: 8/28/2023  Authorization Period Expiration: 7/5/2025  Plan of Care Expiration: 11/24/2023  Progress Note Due: 11/2/23  Visit # / Visits authorized: 11/20 + eval   FOTO: 2/3     Precautions: Standard, Fall, and anemic       Time In: 1:22  Time Out: 2:02  Total Appointment Time (timed & untimed codes): 40 minutes    PTA Visit #: -/5       Subjective     Patient reports: she is able to stand up and wash the dishes at this time. States she is planning to go to [place] at the end of this month.     She was not compliant with home exercise program.  Response to previous treatment: no adverse symptoms  Functional change: nothing significant    Pain: 5/10  Location: sacral region    Objective      Objective Measures updated at progress report unless specified.     30 second test without UE assist= 31.79 seconds; 10/2/2023 17.60 nsec  TUG with RW = 1 minute and 2 seconds; 10/2/2023 - 38.49 seconds     Treatment     Merle received the treatments listed below:      therapeutic exercises to develop strength, endurance, ROM, and flexibility for (-) minutesincluding:     manual therapy techniques: were applied to - for (-) minutes, including:      neuromuscular re-education activities to improve: Balance, Coordination, Proprioception, Posture, and stability for () minutes. The following activities were included:     therapeutic activities to improve functional performance for (38) minutes, including:   wheelchair propulsion 2x50 feet  Obstacle (step over, around, and on top of airex foam) with HHA  x2  Prolong standing 3 min   Standing dynamic lateral, forward, and directional turns in // bar (cubic shuffle) x1    NOT TODAY:   Sit to stand ball toss 2x5   Wheelchair maneuver in narrow space  Toiletry              gait training to improve functional mobility and safety for ( ) minutes, including:       PATIENT EDUCATION AND HOME EXERCISES      Education provided:   - HEP provided   - PT role in POC      Written Home Exercises Provided:  NA       Assessment     Pt able to perform step over, step around, and step onto unsteady surface with HHA better. Pt able to stand for 3 minutes prior to requesting seated breaks. Added dynamic movement to improve directional changes with hand held assist. Pt with lower extremity fatigue at end of session today. Overall pt progressing well.     Merle Is progressing well towards her goals.   Patient prognosis is Fair    Patient will continue to benefit from skilled outpatient physical therapy to address the deficits listed in the problem list box on initial evaluation, provide pt/family education and to maximize pt's level of independence in the home and community environment.     Patient's spiritual, cultural and educational needs considered and pt agreeable to plan of care and goals.     Anticipated barriers to physical therapy: co-morbidities and chronicity of condition    Goals:   Short Term Goals:  6 weeks  HEP: Patient will demonstrate 50% independence with HEP   Function: Patient will increase functional score to equal or greater than to 45 out of 100 on FOTO.  Mobility: Patient will improve 5 times sit to stand score by 5 seconds. MET  Strength: Patient will improve impaired strength to greater or equal to 4/5.      Long Term Goals:  12 weeks  HEP: Pt will be independent with advanced HEP.  Pain: Pt will demonstrate improved pain less than or equal to 2/10 at worse in order to progress toward maximal functional ability and improve QOL.  Function: Patient will  increase  functional score to equal or greater than to 50 out of 100 on FOTO to improve functional independence and quality of life.   Mobility: Patient will improve 5 times sit to stand by 10 seconds to decrease risk of falls. MET  Mobility: patient will be able to improve TUG score by 15 seconds to improve functional independence  Mobility: patient will be able to improve 2 minute walk test by 50 feet to improve endurance in order to participate in community  Strength: Patient will improve impaired strength to greater or equal to 4+/5 in order to participate in community activities   Gait: Patient will demonstrate normalized gait mechanics with minimal compensation in order to return to PLOF.     PLAN   Plan of care Certification: 8/28/2023 to 11/24/2023.     Outpatient Physical Therapy 2 times weekly for 12 weeks to include the following interventions: Cervical/Lumbar Traction, Electrical Stimulation  , Gait Training, Manual Therapy, Moist Heat/ Ice, Neuromuscular Re-ed, Patient Education, Self Care, Therapeutic Activities, Therapeutic Exercise, and FDN .     Krystal Mccauley, PT

## 2023-10-16 ENCOUNTER — CLINICAL SUPPORT (OUTPATIENT)
Dept: REHABILITATION | Facility: HOSPITAL | Age: 68
End: 2023-10-16
Payer: MEDICARE

## 2023-10-16 DIAGNOSIS — M25.511 CHRONIC RIGHT SHOULDER PAIN: Primary | ICD-10-CM

## 2023-10-16 DIAGNOSIS — G89.29 CHRONIC RIGHT SHOULDER PAIN: Primary | ICD-10-CM

## 2023-10-16 DIAGNOSIS — R26.9 GAIT DIFFICULTY: ICD-10-CM

## 2023-10-16 PROCEDURE — 97112 NEUROMUSCULAR REEDUCATION: CPT | Mod: KX,PN,CQ

## 2023-10-16 PROCEDURE — 97530 THERAPEUTIC ACTIVITIES: CPT | Mod: KX,PN,CQ

## 2023-10-16 NOTE — PROGRESS NOTES
[facility]   Physical Therapy Treatment Note      Name: Merle BUSH [facility]  [facility] Number: [id number]    Therapy Diagnosis:   Encounter Diagnoses   Name Primary?    Chronic right shoulder pain Yes    Gait difficulty          Physician: Maria De Jesus Spicer MD    Visit Date: 10/16/2023    Physician Orders: PT Eval and Treat   Medical Diagnosis from Referral: Status post lumbar spinal fusion [Z98.1]  Evaluation Date: 8/28/2023  Authorization Period Expiration: 7/5/2025  Plan of Care Expiration: 11/24/2023  Progress Note Due: 11/2/23  Visit # / Visits authorized: 12/20 + eval   FOTO: 2/3     Precautions: Standard, Fall, and anemic       Time In: 12:25  Time Out: 1:05  Total Appointment Time (timed & untimed codes): 40 minutes    PTA Visit #: 1/5       Subjective     Patient reports: she went eat out Friday and stood on a stool to get out of high car so she fell into a deep squat which hurt her knees. Knee caps are still hurting. She will be going to [place] for two weeks    She was not compliant with home exercise program.  Response to previous treatment: no adverse symptoms  Functional change: nothing significant    Pain: 5/10  Location: sacral region    Objective      Objective Measures updated at progress report unless specified.     30 second test without UE assist= 31.79 seconds; 10/2/2023 17.60 nsec  TUG with RW = 1 minute and 2 seconds; 10/2/2023 - 38.49 seconds     Treatment     Merle received the treatments listed below:      therapeutic exercises to develop strength, endurance, ROM, and flexibility for (-) minutesincluding:     manual therapy techniques: were applied to - for (-) minutes, including:      neuromuscular re-education activities to improve: Balance, Coordination, Proprioception, Posture, and stability for (15) minutes. The following activities were included:  Ball squeeze  Laq  Marches in sitting  Heel raises seated     therapeutic activities to improve functional performance  for (25) minutes, including:   Soccer ball kicks  Side stepping over hurdles  Obstacle (step over, aand on top of airex foam) with HHA x2 in parallel bars  Prolong standing 3 min    NOT TODAY   Standing dynamic lateral, forward, and directional turns in // bar (cubic shuffle) x1:   Sit to stand ball toss 2x5   Wheelchair maneuver in narrow space  Toiletry              gait training to improve functional mobility and safety for ( ) minutes, including:       PATIENT EDUCATION AND HOME EXERCISES      Education provided:   - HEP provided   - PT role in POC      Written Home Exercises Provided:  NA       Assessment     Pt able to perform obstacle course forwards and laterally with bilateral upper extremity assist. She worked on more functional standing activities to assist in safe gait and balance. Continued strengthening to decrease reported knee pain today.    Merle Is progressing well towards her goals.   Patient prognosis is Fair    Patient will continue to benefit from skilled outpatient physical therapy to address the deficits listed in the problem list box on initial evaluation, provide pt/family education and to maximize pt's level of independence in the home and community environment.     Patient's spiritual, cultural and educational needs considered and pt agreeable to plan of care and goals.     Anticipated barriers to physical therapy: co-morbidities and chronicity of condition    Goals:   Short Term Goals:  6 weeks  HEP: Patient will demonstrate 50% independence with HEP   Function: Patient will increase functional score to equal or greater than to 45 out of 100 on FOTO.  Mobility: Patient will improve 5 times sit to stand score by 5 seconds. MET  Strength: Patient will improve impaired strength to greater or equal to 4/5.      Long Term Goals:  12 weeks  HEP: Pt will be independent with advanced HEP.  Pain: Pt will demonstrate improved pain less than or equal to 2/10 at worse in order to progress toward  maximal functional ability and improve QOL.  Function: Patient will  increase functional score to equal or greater than to 50 out of 100 on FOTO to improve functional independence and quality of life.   Mobility: Patient will improve 5 times sit to stand by 10 seconds to decrease risk of falls. MET  Mobility: patient will be able to improve TUG score by 15 seconds to improve functional independence  Mobility: patient will be able to improve 2 minute walk test by 50 feet to improve endurance in order to participate in community  Strength: Patient will improve impaired strength to greater or equal to 4+/5 in order to participate in community activities   Gait: Patient will demonstrate normalized gait mechanics with minimal compensation in order to return to PLOF.     PLAN   Plan of care Certification: 8/28/2023 to 11/24/2023.     Outpatient Physical Therapy 2 times weekly for 12 weeks to include the following interventions: Cervical/Lumbar Traction, Electrical Stimulation  , Gait Training, Manual Therapy, Moist Heat/ Ice, Neuromuscular Re-ed, Patient Education, Self Care, Therapeutic Activities, Therapeutic Exercise, and FDN .     Britni Easton, PTA

## 2023-11-02 ENCOUNTER — CLINICAL SUPPORT (OUTPATIENT)
Dept: REHABILITATION | Facility: HOSPITAL | Age: 68
End: 2023-11-02
Payer: MEDICARE

## 2023-11-02 DIAGNOSIS — G89.29 CHRONIC RIGHT SHOULDER PAIN: Primary | ICD-10-CM

## 2023-11-02 DIAGNOSIS — R26.9 GAIT DIFFICULTY: ICD-10-CM

## 2023-11-02 DIAGNOSIS — M25.511 CHRONIC RIGHT SHOULDER PAIN: Primary | ICD-10-CM

## 2023-11-02 PROCEDURE — 97530 THERAPEUTIC ACTIVITIES: CPT | Mod: KX,PN

## 2023-11-02 PROCEDURE — 97112 NEUROMUSCULAR REEDUCATION: CPT | Mod: KX,PN

## 2023-11-02 PROCEDURE — 97110 THERAPEUTIC EXERCISES: CPT | Mod: KX,PN

## 2023-11-02 NOTE — PROGRESS NOTES
[facility]   Physical Therapy Treatment Note      Name: Merle BUSH [facility]  [facility] Number: [id number]    Therapy Diagnosis:   Encounter Diagnoses   Name Primary?    Chronic right shoulder pain Yes    Gait difficulty        Physician: Maria De Jesus Spicer MD    Visit Date: 11/2/2023    Physician Orders: PT Eval and Treat   Medical Diagnosis from Referral: Status post lumbar spinal fusion [Z98.1]  Evaluation Date: 8/28/2023  Authorization Period Expiration: 7/5/2025  Plan of Care Expiration: 11/24/2023  Progress Note Due: 11/2/23  Visit # / Visits authorized: 13/20 + eval   FOTO: 2/3     Precautions: Standard, Fall, and anemic       Time In: 1:15  Time Out: 1:54  Total Appointment Time (timed & untimed codes): 39 minutes    PTA Visit #: -/5       Subjective     Patient reports: per  of pt, Pt has not been walking with RW much in [place]. Pt states she has been keeping up with her seated exercises.    She was not compliant with home exercise program.  Response to previous treatment: no adverse symptoms  Functional change: nothing significant    Pain: 6/10  Location: sacral region    Objective      Objective Measures updated at progress report unless specified.     30 second test without UE assist= 31.79 seconds; 10/2/2023 17.60 nsec  TUG with RW = 1 minute and 2 seconds; 10/2/2023 - 38.49 seconds     Treatment     Merle received the treatments listed below:      therapeutic exercises to develop strength, endurance, ROM, and flexibility for (10) minutesincluding:  Ball squeeze 2x10   Laq 2x10   Standing Marches in sitting x10  Heel raises seated 2x10      manual therapy techniques: were applied to - for (-) minutes, including:      neuromuscular re-education activities to improve: Balance, Coordination, Proprioception, Posture, and stability for (10) minutes. The following activities were included:  Standing perturbations x10      therapeutic activities to improve functional performance for (19)  minutes, including:   Prolong standing 2 min  Sit to stand ball toss x5, bounce x5    Wheelchair maneuver in narrow space  Amb 75 feet with RW                gait training to improve functional mobility and safety for ( ) minutes, including:       PATIENT EDUCATION AND HOME EXERCISES      Education provided:   - HEP provided   - PT role in POC      Written Home Exercises Provided:  NA       Assessment     Pt with increased pain to buttock prior to today's session. Focused on prolong standing, balance, and functional activities. Pt fatigued at end of session. Provided multiple seated rest break. Pt with increased knee pain with prolong standing.     Merle Is progressing well towards her goals.   Patient prognosis is Fair    Patient will continue to benefit from skilled outpatient physical therapy to address the deficits listed in the problem list box on initial evaluation, provide pt/family education and to maximize pt's level of independence in the home and community environment.     Patient's spiritual, cultural and educational needs considered and pt agreeable to plan of care and goals.     Anticipated barriers to physical therapy: co-morbidities and chronicity of condition    Goals:   Short Term Goals:  6 weeks  HEP: Patient will demonstrate 50% independence with HEP   Function: Patient will increase functional score to equal or greater than to 45 out of 100 on FOTO.  Mobility: Patient will improve 5 times sit to stand score by 5 seconds. MET  Strength: Patient will improve impaired strength to greater or equal to 4/5.      Long Term Goals:  12 weeks  HEP: Pt will be independent with advanced HEP.  Pain: Pt will demonstrate improved pain less than or equal to 2/10 at worse in order to progress toward maximal functional ability and improve QOL.  Function: Patient will  increase functional score to equal or greater than to 50 out of 100 on FOTO to improve functional independence and quality of life.   Mobility:  Patient will improve 5 times sit to stand by 10 seconds to decrease risk of falls. MET  Mobility: patient will be able to improve TUG score by 15 seconds to improve functional independence  Mobility: patient will be able to improve 2 minute walk test by 50 feet to improve endurance in order to participate in community  Strength: Patient will improve impaired strength to greater or equal to 4+/5 in order to participate in community activities   Gait: Patient will demonstrate normalized gait mechanics with minimal compensation in order to return to PLOF.     PLAN   Plan of care Certification: 8/28/2023 to 11/24/2023.     Outpatient Physical Therapy 2 times weekly for 12 weeks to include the following interventions: Cervical/Lumbar Traction, Electrical Stimulation  , Gait Training, Manual Therapy, Moist Heat/ Ice, Neuromuscular Re-ed, Patient Education, Self Care, Therapeutic Activities, Therapeutic Exercise, and FDN .     Krystal Mccauley, PT

## 2023-11-06 ENCOUNTER — CLINICAL SUPPORT (OUTPATIENT)
Dept: REHABILITATION | Facility: HOSPITAL | Age: 68
End: 2023-11-06
Payer: MEDICARE

## 2023-11-06 DIAGNOSIS — M25.511 CHRONIC RIGHT SHOULDER PAIN: Primary | ICD-10-CM

## 2023-11-06 DIAGNOSIS — R26.9 GAIT DIFFICULTY: ICD-10-CM

## 2023-11-06 DIAGNOSIS — G89.29 CHRONIC RIGHT SHOULDER PAIN: Primary | ICD-10-CM

## 2023-11-06 PROCEDURE — 97530 THERAPEUTIC ACTIVITIES: CPT | Mod: PN

## 2023-11-06 PROCEDURE — 97112 NEUROMUSCULAR REEDUCATION: CPT | Mod: PN

## 2023-11-07 NOTE — PROGRESS NOTES
[facility]   Physical Therapy Treatment Note      Name: Merle BUSH [facility]  Clinic Number: [id number]    Therapy Diagnosis:   Encounter Diagnoses   Name Primary?    Chronic right shoulder pain Yes    Gait difficulty        Physician: Maria De Jesus Spicer MD    Visit Date: 11/6/2023    Physician Orders: PT Eval and Treat   Medical Diagnosis from Referral: Status post lumbar spinal fusion [Z98.1]  Evaluation Date: 8/28/2023  Authorization Period Expiration: 7/5/2025  Plan of Care Expiration: 11/24/2023  Progress Note Due: 11/2/23  Visit # / Visits authorized: 14/20 + eval   FOTO: 2/3     Precautions: Standard, Fall, and anemic       Time In: 1:15  Time Out: 1:55  Total Appointment Time (timed & untimed codes): 40 minutes    PTA Visit #: -/5       Subjective     Patient reports: her forearms and legs are sore secondary to performing HEP  She was not compliant with home exercise program.  Response to previous treatment: no adverse symptoms  Functional change: nothing significant    Pain: 6/10  Location: sacral region    Objective      Objective Measures updated at progress report unless specified.     30 second test without UE assist= 31.79 seconds; 10/2/2023 17.60 nsec  TUG with RW = 1 minute and 2 seconds; 10/2/2023 - 38.49 seconds     Treatment     Merle received the treatments listed below:      therapeutic exercises to develop strength, endurance, ROM, and flexibility for (-) minutesincluding:  None performed     manual therapy techniques: were applied to - for (-) minutes, including:      neuromuscular re-education activities to improve: Balance, Coordination, Proprioception, Posture, and stability for (10) minutes. The following activities were included:  Standing perturbations x5     therapeutic activities to improve functional performance for (30) minutes, including:   Prolong standing 3 min  Sit to stand 2x5  Amb 50 feet with RW   Fwd and lateral hurdles (flat) x2          gait training to  improve functional mobility and safety for ( ) minutes, including:       PATIENT EDUCATION AND HOME EXERCISES      Education provided:   - HEP provided   - PT role in POC      Written Home Exercises Provided:  NA       Assessment     Pt with better endurance during today's session. Focused on weightbearing functional activities such as walking over obstacles, sit to stand transfers, and walking to retrieve objects. Pt able to tolerate standing for short secondary to increased soreness to lower extremity. Continue to focus on functional activities.     Merle Is progressing well towards her goals.   Patient prognosis is Fair    Patient will continue to benefit from skilled outpatient physical therapy to address the deficits listed in the problem list box on initial evaluation, provide pt/family education and to maximize pt's level of independence in the home and community environment.     Patient's spiritual, cultural and educational needs considered and pt agreeable to plan of care and goals.     Anticipated barriers to physical therapy: co-morbidities and chronicity of condition    Goals:   Short Term Goals:  6 weeks  HEP: Patient will demonstrate 50% independence with HEP   Function: Patient will increase functional score to equal or greater than to 45 out of 100 on FOTO.  Mobility: Patient will improve 5 times sit to stand score by 5 seconds. MET  Strength: Patient will improve impaired strength to greater or equal to 4/5.      Long Term Goals:  12 weeks  HEP: Pt will be independent with advanced HEP.  Pain: Pt will demonstrate improved pain less than or equal to 2/10 at worse in order to progress toward maximal functional ability and improve QOL.  Function: Patient will  increase functional score to equal or greater than to 50 out of 100 on FOTO to improve functional independence and quality of life.   Mobility: Patient will improve 5 times sit to stand by 10 seconds to decrease risk of falls. MET  Mobility:  patient will be able to improve TUG score by 15 seconds to improve functional independence  Mobility: patient will be able to improve 2 minute walk test by 50 feet to improve endurance in order to participate in community  Strength: Patient will improve impaired strength to greater or equal to 4+/5 in order to participate in community activities   Gait: Patient will demonstrate normalized gait mechanics with minimal compensation in order to return to PLOF.     PLAN   Plan of care Certification: 8/28/2023 to 11/24/2023.     Outpatient Physical Therapy 2 times weekly for 12 weeks to include the following interventions: Cervical/Lumbar Traction, Electrical Stimulation  , Gait Training, Manual Therapy, Moist Heat/ Ice, Neuromuscular Re-ed, Patient Education, Self Care, Therapeutic Activities, Therapeutic Exercise, and FDN .     Krystal Mccauley, PT

## 2023-11-08 ENCOUNTER — HOSPITAL ENCOUNTER (OUTPATIENT)
Dept: RADIOLOGY | Facility: HOSPITAL | Age: 68
Discharge: HOME | End: 2023-11-08
Attending: INTERNAL MEDICINE
Payer: MEDICARE

## 2023-11-08 DIAGNOSIS — F17.210 SMOKES WITH GREATER THAN 30 PACK YEAR HISTORY: ICD-10-CM

## 2023-11-08 PROCEDURE — 71271 CT THORAX LUNG CANCER SCR C-: CPT | Mod: TC

## 2023-11-08 PROCEDURE — 71271 CT CHEST LUNG SCREENING LOW DOSE: ICD-10-PCS | Mod: 26,,, | Performed by: RADIOLOGY

## 2023-11-08 PROCEDURE — 71271 CT THORAX LUNG CANCER SCR C-: CPT | Mod: 26,,, | Performed by: RADIOLOGY

## 2023-11-09 ENCOUNTER — CLINICAL SUPPORT (OUTPATIENT)
Dept: REHABILITATION | Facility: HOSPITAL | Age: 68
End: 2023-11-09
Payer: MEDICARE

## 2023-11-09 DIAGNOSIS — M25.511 CHRONIC RIGHT SHOULDER PAIN: Primary | ICD-10-CM

## 2023-11-09 DIAGNOSIS — G89.29 CHRONIC RIGHT SHOULDER PAIN: Primary | ICD-10-CM

## 2023-11-09 DIAGNOSIS — R26.9 GAIT DIFFICULTY: ICD-10-CM

## 2023-11-09 PROCEDURE — 97530 THERAPEUTIC ACTIVITIES: CPT | Mod: KX,PN

## 2023-11-09 NOTE — PROGRESS NOTES
[facility]   Physical Therapy Treatment Note      Name: Merle BUSH [facility]  Clinic Number: [id number]    Therapy Diagnosis:   Encounter Diagnoses   Name Primary?    Chronic right shoulder pain Yes    Gait difficulty        Physician: Maria De Jesus Spicer MD    Visit Date: 11/9/2023    Physician Orders: PT Eval and Treat   Medical Diagnosis from Referral: Status post lumbar spinal fusion [Z98.1]  Evaluation Date: 8/28/2023  Authorization Period Expiration: 7/5/2025  Plan of Care Expiration: 11/24/2023  Progress Note Due: 11/24/23  Visit # / Visits authorized: 14/20 + eval   FOTO: 2/3     Precautions: Standard, Fall, and anemic       Time In: 1:15  Time Out: 2:00  Total Appointment Time (timed & untimed codes): 45 minutes    PTA Visit #: -/5       Subjective     Patient reports: soreness to bilateral lower extremity yesterday and today.   She was not compliant with home exercise program.  Response to previous treatment: no adverse symptoms  Functional change: nothing significant    Pain: 6/10  Location: sacral region    Objective      30 second test without UE assist= 31.79 seconds; 11/9/2023 19 sec  TUG with RW = 1 minute and 2 seconds; 11/9/2023 - 51 seconds   11/9/2023 30 sec sit to stand = 8 repetition     Treatment     Merle received the treatments listed below:      therapeutic exercises to develop strength, endurance, ROM, and flexibility for (-) minutesincluding:   None performed     manual therapy techniques: were applied to - for (-) minutes, including:   None performed      neuromuscular re-education activities to improve: Balance, Coordination, Proprioception, Posture, and stability for (10) minutes. The following activities were included:   None performed      therapeutic activities to improve functional performance for (45) minutes, including:    Toiletry    Weaving between 5 cones with RW x2   Sit to stand ball toss 2x6    TUG x1    gait training to improve functional mobility and safety  for ( ) minutes, including:       PATIENT EDUCATION AND HOME EXERCISES      Education provided:   - HEP provided   - PT role in POC      Written Home Exercises Provided:  NA       Assessment     Pt requested to use bathroom at beginning of session. Continue to require mild cues for wheelchair placement and assist with donning pants. Pt with increased feet pain ambulating between cones; required seated breaks. Pt able to maneuver between cones with supervision. Improving functional skills. Performed functional test at end of session; will retest at beginning of next session for accurate measurements. Continue to focus on endurance and functional mobility in order for patient to participate in community activities.     Merle Is progressing well towards her goals.   Patient prognosis is Fair    Patient will continue to benefit from skilled outpatient physical therapy to address the deficits listed in the problem list box on initial evaluation, provide pt/family education and to maximize pt's level of independence in the home and community environment.     Patient's spiritual, cultural and educational needs considered and pt agreeable to plan of care and goals.     Anticipated barriers to physical therapy: co-morbidities and chronicity of condition    Goals:   Short Term Goals:  6 weeks  HEP: Patient will demonstrate 50% independence with HEP  Function: Patient will increase functional score to equal or greater than to 45 out of 100 on FOTO.  Mobility: Patient will improve 5 times sit to stand score by 5 seconds. MET  Strength: Patient will improve impaired strength to greater or equal to 4/5.      Long Term Goals:  12 weeks  HEP: Pt will be independent with advanced HEP.  Pain: Pt will demonstrate improved pain less than or equal to 2/10 at worse in order to progress toward maximal functional ability and improve QOL.  Function: Patient will  increase functional score to equal or greater than to 50 out of 100 on FOTO  to improve functional independence and quality of life.   Mobility: Patient will improve 5 times sit to stand by 10 seconds to decrease risk of falls. MET  Mobility: patient will be able to improve TUG score by 15 seconds to improve functional independence  Mobility: patient will be able to improve 2 minute walk test by 50 feet to improve endurance in order to participate in community  Strength: Patient will improve impaired strength to greater or equal to 4+/5 in order to participate in community activities   Gait: Patient will demonstrate normalized gait mechanics with minimal compensation in order to return to PLOF.     PLAN   Plan of care Certification: 8/28/2023 to 11/24/2023.     Outpatient Physical Therapy 2 times weekly for 12 weeks to include the following interventions: Cervical/Lumbar Traction, Electrical Stimulation  , Gait Training, Manual Therapy, Moist Heat/ Ice, Neuromuscular Re-ed, Patient Education, Self Care, Therapeutic Activities, Therapeutic Exercise, and FDN .     Krystal Mccauley, PT

## 2023-11-13 ENCOUNTER — CLINICAL SUPPORT (OUTPATIENT)
Dept: REHABILITATION | Facility: HOSPITAL | Age: 68
End: 2023-11-13
Payer: MEDICARE

## 2023-11-13 DIAGNOSIS — R26.9 GAIT DIFFICULTY: ICD-10-CM

## 2023-11-13 DIAGNOSIS — M25.511 CHRONIC RIGHT SHOULDER PAIN: Primary | ICD-10-CM

## 2023-11-13 DIAGNOSIS — G89.29 CHRONIC RIGHT SHOULDER PAIN: Primary | ICD-10-CM

## 2023-11-13 PROCEDURE — 97530 THERAPEUTIC ACTIVITIES: CPT | Mod: PN

## 2023-11-13 NOTE — PROGRESS NOTES
[facility]   Physical Therapy Treatment Note      Name: Merle BUSH [facility]  Clinic Number: [id number]    Therapy Diagnosis:   Encounter Diagnoses   Name Primary?    Chronic right shoulder pain Yes    Gait difficulty        Physician: Maria De Jesus Spicer MD    Visit Date: 11/13/2023    Physician Orders: PT Eval and Treat   Medical Diagnosis from Referral: Status post lumbar spinal fusion [Z98.1]  Evaluation Date: 8/28/2023  Authorization Period Expiration: 7/5/2025  Plan of Care Expiration: 11/24/2023  Progress Note Due: 11/24/23  Visit # / Visits authorized: 16/20 + eval   FOTO: 2/3     Precautions: Standard, Fall, and anemic       Time In: 1:15  Time Out: 2:00  Total Appointment Time (timed & untimed codes): 45 minutes    PTA Visit #: -/5       Subjective     Patient reports: soreness to bilateral lower extremity yesterday and today.   She was not compliant with home exercise program.  Response to previous treatment: no adverse symptoms  Functional change: nothing significant    Pain: 6/10  Location: sacral region    Objective      30 second test without UE assist= 31.79 seconds; 11/9/2023 19 sec  TUG with RW = 1 minute and 2 seconds; 11/9/2023 - 51 seconds   11/9/2023 30 sec sit to stand = 8 repetition     Treatment     Merle received the treatments listed below:      therapeutic exercises to develop strength, endurance, ROM, and flexibility for (-) minutesincluding:   None performed     manual therapy techniques: were applied to - for (-) minutes, including:   None performed      neuromuscular re-education activities to improve: Balance, Coordination, Proprioception, Posture, and stability for (10) minutes. The following activities were included:   None performed      therapeutic activities to improve functional performance for (43) minutes, including:    Weaving between 5 cones with rollator x   Sit to stand ball toss 2x6    TUG with rollator x2   Amb 96 ft with rollator, 1 seated break     gait  training to improve functional mobility and safety for ( ) minutes, including:       PATIENT EDUCATION AND HOME EXERCISES      Education provided:   - HEP provided   - PT role in POC      Written Home Exercises Provided:  NA       Assessment   Progressed to ambulating with rollator today. Pt with increased fear ambulating with rollator; provided CGA for safety. Moderate cues for rollator maneuver. Pt with increased knee pain with prolong standing; potentially due to change in weather. Progress as tolerated.     Merle Is progressing well towards her goals.   Patient prognosis is Fair    Patient will continue to benefit from skilled outpatient physical therapy to address the deficits listed in the problem list box on initial evaluation, provide pt/family education and to maximize pt's level of independence in the home and community environment.     Patient's spiritual, cultural and educational needs considered and pt agreeable to plan of care and goals.     Anticipated barriers to physical therapy: co-morbidities and chronicity of condition    Goals:   Short Term Goals:  6 weeks  HEP: Patient will demonstrate 50% independence with HEP. Met   Function: Patient will increase functional score to equal or greater than to 45 out of 100 on FOTO.  Mobility: Patient will improve 5 times sit to stand score by 5 seconds. MET  Strength: Patient will improve impaired strength to greater or equal to 4/5.      Long Term Goals:  12 weeks  HEP: Pt will be independent with advanced HEP.  Pain: Pt will demonstrate improved pain less than or equal to 2/10 at worse in order to progress toward maximal functional ability and improve QOL.  Function: Patient will  increase functional score to equal or greater than to 50 out of 100 on FOTO to improve functional independence and quality of life.   Mobility: Patient will improve 5 times sit to stand by 10 seconds to decrease risk of falls. MET  Mobility: patient will be able to improve TUG  score by 15 seconds to improve functional independence  Mobility: patient will be able to improve 2 minute walk test by 50 feet to improve endurance in order to participate in community  Strength: Patient will improve impaired strength to greater or equal to 4+/5 in order to participate in community activities   Gait: Patient will demonstrate normalized gait mechanics with minimal compensation in order to return to PLOF.     PLAN   Plan of care Certification: 8/28/2023 to 11/24/2023.     Outpatient Physical Therapy 2 times weekly for 12 weeks to include the following interventions: Cervical/Lumbar Traction, Electrical Stimulation  , Gait Training, Manual Therapy, Moist Heat/ Ice, Neuromuscular Re-ed, Patient Education, Self Care, Therapeutic Activities, Therapeutic Exercise, and FDN .     Krystal Mccauley, PT

## 2023-11-16 ENCOUNTER — CLINICAL SUPPORT (OUTPATIENT)
Dept: REHABILITATION | Facility: HOSPITAL | Age: 68
End: 2023-11-16
Payer: MEDICARE

## 2023-11-16 DIAGNOSIS — R26.9 GAIT DIFFICULTY: ICD-10-CM

## 2023-11-16 DIAGNOSIS — G89.29 CHRONIC RIGHT SHOULDER PAIN: Primary | ICD-10-CM

## 2023-11-16 DIAGNOSIS — M25.511 CHRONIC RIGHT SHOULDER PAIN: Primary | ICD-10-CM

## 2023-11-16 PROCEDURE — 97530 THERAPEUTIC ACTIVITIES: CPT | Mod: PN

## 2023-11-16 NOTE — PROGRESS NOTES
[facility]   Physical Therapy Treatment Note      Name: Merle BUSH [facility]  Clinic Number: [id number]    Therapy Diagnosis:   Encounter Diagnoses   Name Primary?    Chronic right shoulder pain Yes    Gait difficulty        Physician: Maria De Jesus Spicer MD    Visit Date: 11/16/2023    Physician Orders: PT Eval and Treat   Medical Diagnosis from Referral: Status post lumbar spinal fusion [Z98.1]  Evaluation Date: 8/28/2023  Authorization Period Expiration: 7/5/2025  Plan of Care Expiration: 11/24/2023  Progress Note Due: 11/24/23  Visit # / Visits authorized: 17/20 + eval   FOTO: 2/3     Precautions: Standard, Fall, and anemic       Time In: 1:00  Time Out: 1:40  Total Appointment Time (timed & untimed codes): 40 minutes    PTA Visit #: -/5       Subjective     Patient reports: pain to feet, knee and buttock today.   She was not compliant with home exercise program.  Response to previous treatment: no adverse symptoms  Functional change: nothing significant    Pain: 6/10  Location: sacral region    Objective      30 second test without UE assist= 31.79 seconds; 11/9/2023 19 sec  TUG with RW = 1 minute and 2 seconds; 11/9/2023 - 51 seconds   11/9/2023 30 sec sit to stand = 8 repetition     Treatment     Merle received the treatments listed below:      therapeutic exercises to develop strength, endurance, ROM, and flexibility for (-) minutesincluding:   None performed     manual therapy techniques: were applied to - for (-) minutes, including:   None performed      neuromuscular re-education activities to improve: Balance, Coordination, Proprioception, Posture, and stability for (-) minutes. The following activities were included:   None performed      therapeutic activities to improve functional performance for (40) minutes, including:    Amb with rollator CGA    Rollator maneuver    Sit to stand with rollator       NOT TODAY:  Weaving between 5 cones with rollator x   Sit to stand ball toss 2x6     TUG with rollator x2   Amb 96 ft with rollator, 1 seated break     gait training to improve functional mobility and safety for ( ) minutes, including:       PATIENT EDUCATION AND HOME EXERCISES      Education provided:   - HEP provided   - PT role in POC      Written Home Exercises Provided:  NA       Assessment   Focused on ambulating and maneuvering rollator during today's session. Pt required less cues at end of session for safety. Severely fatigue at end of session. Progress as tolerated.    Merle Is progressing well towards her goals.   Patient prognosis is Fair    Patient will continue to benefit from skilled outpatient physical therapy to address the deficits listed in the problem list box on initial evaluation, provide pt/family education and to maximize pt's level of independence in the home and community environment.     Patient's spiritual, cultural and educational needs considered and pt agreeable to plan of care and goals.     Anticipated barriers to physical therapy: co-morbidities and chronicity of condition    Goals:   Short Term Goals:  6 weeks  HEP: Patient will demonstrate 50% independence with HEP. Met   Function: Patient will increase functional score to equal or greater than to 45 out of 100 on FOTO.  Mobility: Patient will improve 5 times sit to stand score by 5 seconds. MET  Strength: Patient will improve impaired strength to greater or equal to 4/5.      Long Term Goals:  12 weeks  HEP: Pt will be independent with advanced HEP.  Pain: Pt will demonstrate improved pain less than or equal to 2/10 at worse in order to progress toward maximal functional ability and improve QOL.  Function: Patient will  increase functional score to equal or greater than to 50 out of 100 on FOTO to improve functional independence and quality of life.   Mobility: Patient will improve 5 times sit to stand by 10 seconds to decrease risk of falls. MET  Mobility: patient will be able to improve TUG score by 15  seconds to improve functional independence  Mobility: patient will be able to improve 2 minute walk test by 50 feet to improve endurance in order to participate in community  Strength: Patient will improve impaired strength to greater or equal to 4+/5 in order to participate in community activities   Gait: Patient will demonstrate normalized gait mechanics with minimal compensation in order to return to PLOF.     PLAN   Plan of care Certification: 8/28/2023 to 11/24/2023.     Outpatient Physical Therapy 2 times weekly for 12 weeks to include the following interventions: Cervical/Lumbar Traction, Electrical Stimulation  , Gait Training, Manual Therapy, Moist Heat/ Ice, Neuromuscular Re-ed, Patient Education, Self Care, Therapeutic Activities, Therapeutic Exercise, and FDN .     Krystal Mccauley, PT

## 2023-11-17 ENCOUNTER — LAB VISIT (OUTPATIENT)
Dept: LAB | Facility: HOSPITAL | Age: 68
End: 2023-11-17
Attending: NURSE PRACTITIONER
Payer: MEDICARE

## 2023-11-17 DIAGNOSIS — D64.9 ANEMIA, UNSPECIFIED TYPE: ICD-10-CM

## 2023-11-17 DIAGNOSIS — D50.8 OTHER IRON DEFICIENCY ANEMIA: ICD-10-CM

## 2023-11-17 LAB
ALBUMIN SERPL BCP-MCNC: 3.5 G/DL (ref 3.5–5.2)
ALP SERPL-CCNC: 121 U/L (ref 55–135)
ALT SERPL W/O P-5'-P-CCNC: 12 U/L (ref 10–44)
ANION GAP SERPL CALC-SCNC: 11 MMOL/L (ref 8–16)
AST SERPL-CCNC: 12 U/L (ref 10–40)
BASOPHILS # BLD AUTO: 0.04 K/UL (ref 0–0.2)
BASOPHILS NFR BLD: 0.4 % (ref 0–1.9)
BILIRUB SERPL-MCNC: 0.3 MG/DL (ref 0.1–1)
BUN SERPL-MCNC: 28 MG/DL (ref 8–23)
CALCIUM SERPL-MCNC: 9.4 MG/DL (ref 8.7–10.5)
CHLORIDE SERPL-SCNC: 104 MMOL/L (ref 95–110)
CO2 SERPL-SCNC: 21 MMOL/L (ref 23–29)
CREAT SERPL-MCNC: 1 MG/DL (ref 0.5–1.4)
DIFFERENTIAL METHOD: ABNORMAL
EOSINOPHIL # BLD AUTO: 0.2 K/UL (ref 0–0.5)
EOSINOPHIL NFR BLD: 2 % (ref 0–8)
ERYTHROCYTE [DISTWIDTH] IN BLOOD BY AUTOMATED COUNT: 14.1 % (ref 11.5–14.5)
EST. GFR  (NO RACE VARIABLE): >60 ML/MIN/1.73 M^2
GLUCOSE SERPL-MCNC: 118 MG/DL (ref 70–110)
HAPTOGLOB SERPL-MCNC: 346 MG/DL (ref 30–250)
HCT VFR BLD AUTO: 39.1 % (ref 37–48.5)
HGB BLD-MCNC: 12.5 G/DL (ref 12–16)
IGA SERPL-MCNC: 183 MG/DL (ref 40–350)
IGG SERPL-MCNC: 1251 MG/DL (ref 650–1600)
IGM SERPL-MCNC: 87 MG/DL (ref 50–300)
IMM GRANULOCYTES # BLD AUTO: 0.04 K/UL (ref 0–0.04)
IMM GRANULOCYTES NFR BLD AUTO: 0.4 % (ref 0–0.5)
LDH SERPL L TO P-CCNC: 188 U/L (ref 110–260)
LYMPHOCYTES # BLD AUTO: 0.8 K/UL (ref 1–4.8)
LYMPHOCYTES NFR BLD: 9 % (ref 18–48)
MCH RBC QN AUTO: 30.1 PG (ref 27–31)
MCHC RBC AUTO-ENTMCNC: 32 G/DL (ref 32–36)
MCV RBC AUTO: 94 FL (ref 82–98)
MONOCYTES # BLD AUTO: 0.7 K/UL (ref 0.3–1)
MONOCYTES NFR BLD: 8.3 % (ref 4–15)
NEUTROPHILS # BLD AUTO: 7.1 K/UL (ref 1.8–7.7)
NEUTROPHILS NFR BLD: 79.9 % (ref 38–73)
NRBC BLD-RTO: 0 /100 WBC
PLATELET # BLD AUTO: 340 K/UL (ref 150–450)
PMV BLD AUTO: 9 FL (ref 9.2–12.9)
POTASSIUM SERPL-SCNC: 4.3 MMOL/L (ref 3.5–5.1)
PROT SERPL-MCNC: 7.6 G/DL (ref 6–8.4)
RBC # BLD AUTO: 4.15 M/UL (ref 4–5.4)
RETICS/RBC NFR AUTO: 1.7 % (ref 0.5–2.5)
SODIUM SERPL-SCNC: 136 MMOL/L (ref 136–145)
TSH SERPL DL<=0.005 MIU/L-ACNC: 0.52 UIU/ML (ref 0.4–4)
WBC # BLD AUTO: 8.93 K/UL (ref 3.9–12.7)

## 2023-11-17 PROCEDURE — 83615 LACTATE (LD) (LDH) ENZYME: CPT | Performed by: NURSE PRACTITIONER

## 2023-11-17 PROCEDURE — 84443 ASSAY THYROID STIM HORMONE: CPT | Performed by: NURSE PRACTITIONER

## 2023-11-17 PROCEDURE — 36415 COLL VENOUS BLD VENIPUNCTURE: CPT | Mod: PO | Performed by: NURSE PRACTITIONER

## 2023-11-17 PROCEDURE — 82784 ASSAY IGA/IGD/IGG/IGM EACH: CPT | Mod: 59 | Performed by: NURSE PRACTITIONER

## 2023-11-17 PROCEDURE — 86334 IMMUNOFIX E-PHORESIS SERUM: CPT | Mod: 26,,, | Performed by: PATHOLOGY

## 2023-11-17 PROCEDURE — 86334 IMMUNOFIX E-PHORESIS SERUM: CPT | Performed by: NURSE PRACTITIONER

## 2023-11-17 PROCEDURE — 80053 COMPREHEN METABOLIC PANEL: CPT | Performed by: NURSE PRACTITIONER

## 2023-11-17 PROCEDURE — 83010 ASSAY OF HAPTOGLOBIN QUANT: CPT | Performed by: NURSE PRACTITIONER

## 2023-11-17 PROCEDURE — 84165 PATHOLOGIST INTERPRETATION SPE: ICD-10-PCS | Mod: 26,,, | Performed by: PATHOLOGY

## 2023-11-17 PROCEDURE — 84165 PROTEIN E-PHORESIS SERUM: CPT | Performed by: NURSE PRACTITIONER

## 2023-11-17 PROCEDURE — 85045 AUTOMATED RETICULOCYTE COUNT: CPT | Performed by: NURSE PRACTITIONER

## 2023-11-17 PROCEDURE — 83521 IG LIGHT CHAINS FREE EACH: CPT | Mod: 59 | Performed by: NURSE PRACTITIONER

## 2023-11-17 PROCEDURE — 85025 COMPLETE CBC W/AUTO DIFF WBC: CPT | Performed by: NURSE PRACTITIONER

## 2023-11-17 PROCEDURE — 86334 PATHOLOGIST INTERPRETATION IFE: ICD-10-PCS | Mod: 26,,, | Performed by: PATHOLOGY

## 2023-11-17 PROCEDURE — 84165 PROTEIN E-PHORESIS SERUM: CPT | Mod: 26,,, | Performed by: PATHOLOGY

## 2023-11-20 ENCOUNTER — TELEPHONE (OUTPATIENT)
Dept: PULMONOLOGY | Facility: CLINIC | Age: 68
End: 2023-11-20
Payer: MEDICARE

## 2023-11-20 ENCOUNTER — CLINICAL SUPPORT (OUTPATIENT)
Dept: REHABILITATION | Facility: HOSPITAL | Age: 68
End: 2023-11-20
Payer: MEDICARE

## 2023-11-20 DIAGNOSIS — M25.511 CHRONIC RIGHT SHOULDER PAIN: Primary | ICD-10-CM

## 2023-11-20 DIAGNOSIS — R26.9 GAIT DIFFICULTY: ICD-10-CM

## 2023-11-20 DIAGNOSIS — G89.29 CHRONIC RIGHT SHOULDER PAIN: Primary | ICD-10-CM

## 2023-11-20 LAB
ALBUMIN SERPL ELPH-MCNC: 3.54 G/DL (ref 3.35–5.55)
ALPHA1 GLOB SERPL ELPH-MCNC: 0.45 G/DL (ref 0.17–0.41)
ALPHA2 GLOB SERPL ELPH-MCNC: 1.1 G/DL (ref 0.43–0.99)
B-GLOBULIN SERPL ELPH-MCNC: 0.82 G/DL (ref 0.5–1.1)
GAMMA GLOB SERPL ELPH-MCNC: 1.1 G/DL (ref 0.67–1.58)
INTERPRETATION SERPL IFE-IMP: NORMAL
KAPPA LC SER QL IA: 5.93 MG/DL (ref 0.33–1.94)
KAPPA LC/LAMBDA SER IA: 2.27 (ref 0.26–1.65)
LAMBDA LC SER QL IA: 2.61 MG/DL (ref 0.57–2.63)
PROT SERPL-MCNC: 7 G/DL (ref 6–8.4)

## 2023-11-20 PROCEDURE — 97530 THERAPEUTIC ACTIVITIES: CPT | Mod: KX,PN

## 2023-11-20 NOTE — TELEPHONE ENCOUNTER
----- Message from Martha Kim PA-C sent at 11/17/2023 11:15 AM CST -----  Mrs. Caro has not been seen in Pulmonary clinic since 2021. Please schedule her a routine follow up visit so her LDCT can be discussed as well as follow up steps.

## 2023-11-20 NOTE — PROGRESS NOTES
[facility]   Physical Therapy Treatment Note      Name: Merle [facility]  Clinic Number: [id number]    Therapy Diagnosis:   Encounter Diagnoses   Name Primary?    Chronic right shoulder pain Yes    Gait difficulty        Physician: Maria De Jesus Spicer MD    Visit Date: 11/20/2023    Physician Orders: PT Eval and Treat   Medical Diagnosis from Referral: Status post lumbar spinal fusion [Z98.1]  Evaluation Date: 8/28/2023  Authorization Period Expiration: 7/5/2025  Plan of Care Expiration: 11/24/2023  Progress Note Due: 11/24/23  Visit # / Visits authorized: 18/20 + eval   FOTO: 2/3     Precautions: Standard, Fall, and anemic       Time In: 12:15  Time Out: 1:00  Total Appointment Time (timed & untimed codes): 50 minutes    PTA Visit #: -/5       Subjective     Patient reports: she purchased new shoes since last visit. Notes improvement I foot pain, however, still have buttock pain.   She was not compliant with home exercise program.  Response to previous treatment: no adverse symptoms  Functional change: nothing significant    Pain: 6/10  Location: sacral region    Objective      30 second test without UE assist= 31.79 seconds; 11/9/2023 19 sec  TUG with RW = 1 minute and 2 seconds; 11/9/2023 - 51 seconds   11/9/2023 30 sec sit to stand = 8 repetition     Treatment     Merle received the treatments listed below:      therapeutic exercises to develop strength, endurance, ROM, and flexibility for (-) minutesincluding:   None performed     manual therapy techniques: were applied to - for (-) minutes, including:   None performed      neuromuscular re-education activities to improve: Balance, Coordination, Proprioception, Posture, and stability for (-) minutes. The following activities were included:   None performed      therapeutic activities to improve functional performance for (45) minutes, including:    Amb with rollator indoors with supervision    Rollator maneuver    Sit to stand with rollator     Amb with rollator outdoors with CGA       NOT TODAY:  Weaving between 5 cones with rollator x   Sit to stand ball toss 2x6    TUG with rollator x2   Amb 96 ft with rollator, 1 seated break     gait training to improve functional mobility and safety for ( ) minutes, including:       PATIENT EDUCATION AND HOME EXERCISES      Education provided:   - HEP provided   - PT role in POC      Written Home Exercises Provided:  NA       Assessment     Pt able to ambulate and maneuver with rollator with supervision indoors during today's session. Required CGA with ambulating with rollator outdoors. Pt required less cues for rollator maneuver. Pending no regression, pt is ready for discharge next visit.     Merle Is progressing well towards her goals.   Patient prognosis is Fair    Patient will continue to benefit from skilled outpatient physical therapy to address the deficits listed in the problem list box on initial evaluation, provide pt/family education and to maximize pt's level of independence in the home and community environment.     Patient's spiritual, cultural and educational needs considered and pt agreeable to plan of care and goals.     Anticipated barriers to physical therapy: co-morbidities and chronicity of condition    Goals:   Short Term Goals:  6 weeks  HEP: Patient will demonstrate 50% independence with HEP. Met   Function: Patient will increase functional score to equal or greater than to 45 out of 100 on FOTO. met  Mobility: Patient will improve 5 times sit to stand score by 5 seconds. MET  Strength: Patient will improve impaired strength to greater or equal to 4/5. Met      Long Term Goals:  12 weeks  HEP: Pt will be independent with advanced HEP.  Pain: Pt will demonstrate improved pain less than or equal to 2/10 at worse in order to progress toward maximal functional ability and improve QOL.  Function: Patient will  increase functional score to equal or greater than to 50 out of 100 on FOTO to  improve functional independence and quality of life.   Mobility: Patient will improve 5 times sit to stand by 10 seconds to decrease risk of falls. MET  Mobility: patient will be able to improve TUG score by 15 seconds to improve functional independence  Mobility: patient will be able to improve 2 minute walk test by 50 feet to improve endurance in order to participate in community  Strength: Patient will improve impaired strength to greater or equal to 4+/5 in order to participate in community activities   Gait: Patient will demonstrate normalized gait mechanics with minimal compensation in order to return to PLOF.     PLAN   Plan of care Certification: 8/28/2023 to 11/24/2023.     Outpatient Physical Therapy 2 times weekly for 12 weeks to include the following interventions: Cervical/Lumbar Traction, Electrical Stimulation  , Gait Training, Manual Therapy, Moist Heat/ Ice, Neuromuscular Re-ed, Patient Education, Self Care, Therapeutic Activities, Therapeutic Exercise, and FDN .     Krystal Mccauley, PT

## 2023-11-21 LAB
PATHOLOGIST INTERPRETATION IFE: NORMAL
PATHOLOGIST INTERPRETATION SPE: NORMAL

## 2023-11-22 ENCOUNTER — CLINICAL SUPPORT (OUTPATIENT)
Dept: REHABILITATION | Facility: HOSPITAL | Age: 68
End: 2023-11-22
Payer: MEDICARE

## 2023-11-22 DIAGNOSIS — G89.29 CHRONIC RIGHT SHOULDER PAIN: Primary | ICD-10-CM

## 2023-11-22 DIAGNOSIS — M25.511 CHRONIC RIGHT SHOULDER PAIN: Primary | ICD-10-CM

## 2023-11-22 DIAGNOSIS — R26.9 GAIT DIFFICULTY: ICD-10-CM

## 2023-11-22 PROCEDURE — 97530 THERAPEUTIC ACTIVITIES: CPT | Mod: PN,CQ

## 2023-11-22 PROCEDURE — 97110 THERAPEUTIC EXERCISES: CPT | Mod: PN,CQ

## 2023-11-22 NOTE — PROGRESS NOTES
[facility]   Physical Therapy Treatment Note      Name: Merle Caro  Clinic Number: [id number]    Therapy Diagnosis:   Encounter Diagnoses   Name Primary?    Chronic right shoulder pain Yes    Gait difficulty          Physician: Maria De Jesus Spicer MD    Visit Date: 11/22/2023    Physician Orders: PT Eval and Treat   Medical Diagnosis from Referral: Status post lumbar spinal fusion [Z98.1]  Evaluation Date: 8/28/2023  Authorization Period Expiration: 7/5/2025  Plan of Care Expiration: 11/24/2023  Progress Note Due: 11/24/23  Visit # / Visits authorized: 19/20 + eval   FOTO: 2/3     Precautions: Standard, Fall, and anemic       Time In: 1:45  Time Out: 2:40  Total Appointment Time (timed & untimed codes): 55 minutes    PTA Visit #: 1/5       Subjective     Patient reports: today is her last day  She was not compliant with home exercise program.  Response to previous treatment: no adverse symptoms  Functional change: nothing significant    Pain: 6/10  Location: sacral region    Objective        Treatment     Merle received the treatments listed below:      therapeutic exercises to develop strength, endurance, ROM, and flexibility for (10) minutesincluding:   Lateral stepping    manual therapy techniques: were applied to - for (-) minutes, including:   None performed      neuromuscular re-education activities to improve: Balance, Coordination, Proprioception, Posture, and stability for (-) minutes. The following activities were included:   None performed      therapeutic activities to improve functional performance for (45) minutes, including:    Amb with rollator indoors with supervision 100 feet, 75 feet   Rollator maneuver    Sit to stand with rollator   Weaving between 5 cones with rollator picking up cone    NOT TODAY:   Sit to stand ball toss 2x6    TUG with rollator x2   Amb 96 ft with rollator, 1 seated break     gait training to improve functional mobility and safety for ( ) minutes,  including:       PATIENT EDUCATION AND HOME EXERCISES      Education provided:   - HEP provided   - PT role in POC      Written Home Exercises Provided:  NA       Assessment     Pt demonstrates improvement in functional mobility through picking up objects, maneuvering in small areas and ambulating around obstacles.    Merle Is progressing well towards her goals.   Patient prognosis is Fair    Patient will continue to benefit from skilled outpatient physical therapy to address the deficits listed in the problem list box on initial evaluation, provide pt/family education and to maximize pt's level of independence in the home and community environment.     Patient's spiritual, cultural and educational needs considered and pt agreeable to plan of care and goals.     Anticipated barriers to physical therapy: co-morbidities and chronicity of condition    Goals:   Short Term Goals:  6 weeks  HEP: Patient will demonstrate 50% independence with HEP. Met   Function: Patient will increase functional score to equal or greater than to 45 out of 100 on FOTO. met  Mobility: Patient will improve 5 times sit to stand score by 5 seconds. MET  Strength: Patient will improve impaired strength to greater or equal to 4/5. Met      Long Term Goals:  12 weeks  HEP: Pt will be independent with advanced HEP.  Pain: Pt will demonstrate improved pain less than or equal to 2/10 at worse in order to progress toward maximal functional ability and improve QOL.  Function: Patient will  increase functional score to equal or greater than to 50 out of 100 on FOTO to improve functional independence and quality of life.   Mobility: Patient will improve 5 times sit to stand by 10 seconds to decrease risk of falls. MET  Mobility: patient will be able to improve TUG score by 15 seconds to improve functional independence  Mobility: patient will be able to improve 2 minute walk test by 50 feet to improve endurance in order to participate in  community  Strength: Patient will improve impaired strength to greater or equal to 4+/5 in order to participate in community activities   Gait: Patient will demonstrate normalized gait mechanics with minimal compensation in order to return to PLOF.     PLAN   Plan of care Certification: 8/28/2023 to 11/24/2023.     Outpatient Physical Therapy 2 times weekly for 12 weeks to include the following interventions: Cervical/Lumbar Traction, Electrical Stimulation  , Gait Training, Manual Therapy, Moist Heat/ Ice, Neuromuscular Re-ed, Patient Education, Self Care, Therapeutic Activities, Therapeutic Exercise, and FDN .     Britni Easton, PTA

## 2023-11-29 ENCOUNTER — OFFICE VISIT (OUTPATIENT)
Dept: HEMATOLOGY/ONCOLOGY | Facility: CLINIC | Age: 68
End: 2023-11-29
Payer: MEDICARE

## 2023-11-29 VITALS
DIASTOLIC BLOOD PRESSURE: 75 MMHG | WEIGHT: 168.19 LBS | SYSTOLIC BLOOD PRESSURE: 122 MMHG | HEART RATE: 96 BPM | HEIGHT: 61 IN | BODY MASS INDEX: 31.75 KG/M2 | TEMPERATURE: 97 F | OXYGEN SATURATION: 96 %

## 2023-11-29 DIAGNOSIS — D64.9 ANEMIA, UNSPECIFIED TYPE: Primary | ICD-10-CM

## 2023-11-29 PROCEDURE — 99999 PR PBB SHADOW E&M-EST. PATIENT-LVL IV: CPT | Mod: PBBFAC,,, | Performed by: NURSE PRACTITIONER

## 2023-11-29 PROCEDURE — 99214 OFFICE O/P EST MOD 30 MIN: CPT | Mod: PBBFAC | Performed by: NURSE PRACTITIONER

## 2023-11-29 PROCEDURE — 99214 PR OFFICE/OUTPT VISIT, EST, LEVL IV, 30-39 MIN: ICD-10-PCS | Mod: S$PBB,,, | Performed by: NURSE PRACTITIONER

## 2023-11-29 PROCEDURE — 99214 OFFICE O/P EST MOD 30 MIN: CPT | Mod: S$PBB,,, | Performed by: NURSE PRACTITIONER

## 2023-11-29 PROCEDURE — 99999 PR PBB SHADOW E&M-EST. PATIENT-LVL IV: ICD-10-PCS | Mod: PBBFAC,,, | Performed by: NURSE PRACTITIONER

## 2023-11-29 NOTE — ASSESSMENT & PLAN NOTE
EGD 10/2022 overall unremarkable. Biopsies obtained positive for acid reflux inflammation but otherwise unremarkable. Colonoscopy declined. Previously treated with IV iron completed 9/2023    Most recent CBC without evidence of anemia. Labs without evidence of paraproteinemia.     Continue sublingual B12 1000 mcg daily. Folic acid 1 mg PO daily. Given prior h/o constipation will defer oral iron supplementation.     F/u 3 months with cbc, cmp, iron ,ferritin
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
## 2024-01-30 ENCOUNTER — LAB VISIT (OUTPATIENT)
Dept: LAB | Facility: HOSPITAL | Age: 69
End: 2024-01-30
Attending: FAMILY MEDICINE
Payer: MEDICARE

## 2024-01-30 ENCOUNTER — OFFICE VISIT (OUTPATIENT)
Dept: INTERNAL MEDICINE | Facility: CLINIC | Age: 69
End: 2024-01-30
Payer: MEDICARE

## 2024-01-30 VITALS
BODY MASS INDEX: 34.17 KG/M2 | DIASTOLIC BLOOD PRESSURE: 80 MMHG | HEIGHT: 61 IN | OXYGEN SATURATION: 94 % | HEART RATE: 94 BPM | SYSTOLIC BLOOD PRESSURE: 124 MMHG | WEIGHT: 181 LBS

## 2024-01-30 DIAGNOSIS — F31.76 BIPOLAR DISORDER, IN FULL REMISSION, MOST RECENT EPISODE DEPRESSED: ICD-10-CM

## 2024-01-30 DIAGNOSIS — G83.4 CAUDA EQUINA SYNDROME: ICD-10-CM

## 2024-01-30 DIAGNOSIS — K59.04 CHRONIC IDIOPATHIC CONSTIPATION: ICD-10-CM

## 2024-01-30 DIAGNOSIS — Z00.00 ANNUAL PHYSICAL EXAM: Primary | ICD-10-CM

## 2024-01-30 DIAGNOSIS — Z00.00 ANNUAL PHYSICAL EXAM: ICD-10-CM

## 2024-01-30 DIAGNOSIS — F13.20 BENZODIAZEPINE DEPENDENCE: ICD-10-CM

## 2024-01-30 DIAGNOSIS — I70.0 AORTIC ATHEROSCLEROSIS: ICD-10-CM

## 2024-01-30 DIAGNOSIS — I10 PRIMARY HYPERTENSION: ICD-10-CM

## 2024-01-30 DIAGNOSIS — I25.10 CORONARY ARTERY DISEASE INVOLVING NATIVE CORONARY ARTERY OF NATIVE HEART WITHOUT ANGINA PECTORIS: ICD-10-CM

## 2024-01-30 DIAGNOSIS — R73.9 ELEVATED SERUM GLUCOSE: ICD-10-CM

## 2024-01-30 DIAGNOSIS — M25.561 CHRONIC PAIN OF BOTH KNEES: ICD-10-CM

## 2024-01-30 DIAGNOSIS — F33.42 RECURRENT MAJOR DEPRESSIVE DISORDER, IN FULL REMISSION: Chronic | ICD-10-CM

## 2024-01-30 DIAGNOSIS — J44.89 ASTHMA WITH COPD: Chronic | ICD-10-CM

## 2024-01-30 DIAGNOSIS — E78.00 PURE HYPERCHOLESTEROLEMIA: Chronic | ICD-10-CM

## 2024-01-30 DIAGNOSIS — K21.00 GASTROESOPHAGEAL REFLUX DISEASE WITH ESOPHAGITIS WITHOUT HEMORRHAGE: ICD-10-CM

## 2024-01-30 DIAGNOSIS — D47.1 CHRONIC MYELOPROLIFERATIVE DISEASE: ICD-10-CM

## 2024-01-30 DIAGNOSIS — D64.9 ANEMIA, UNSPECIFIED TYPE: ICD-10-CM

## 2024-01-30 DIAGNOSIS — M25.562 CHRONIC PAIN OF BOTH KNEES: ICD-10-CM

## 2024-01-30 DIAGNOSIS — G89.29 CHRONIC PAIN OF BOTH KNEES: ICD-10-CM

## 2024-01-30 PROBLEM — J41.0 SIMPLE CHRONIC BRONCHITIS: Status: ACTIVE | Noted: 2024-01-30

## 2024-01-30 PROBLEM — J41.0 SIMPLE CHRONIC BRONCHITIS: Status: RESOLVED | Noted: 2024-01-30 | Resolved: 2024-01-30

## 2024-01-30 PROBLEM — R15.9 INCONTINENCE OF FECES: Status: RESOLVED | Noted: 2023-09-06 | Resolved: 2024-01-30

## 2024-01-30 PROBLEM — R65.10 SIRS (SYSTEMIC INFLAMMATORY RESPONSE SYNDROME): Status: RESOLVED | Noted: 2022-10-20 | Resolved: 2024-01-30

## 2024-01-30 PROBLEM — R63.4 UNEXPLAINED WEIGHT LOSS: Status: RESOLVED | Noted: 2022-10-15 | Resolved: 2024-01-30

## 2024-01-30 PROBLEM — D75.839 THROMBOCYTOSIS: Status: RESOLVED | Noted: 2022-10-11 | Resolved: 2024-01-30

## 2024-01-30 PROCEDURE — 99213 OFFICE O/P EST LOW 20 MIN: CPT | Mod: PBBFAC | Performed by: FAMILY MEDICINE

## 2024-01-30 PROCEDURE — 83036 HEMOGLOBIN GLYCOSYLATED A1C: CPT | Performed by: FAMILY MEDICINE

## 2024-01-30 PROCEDURE — 80061 LIPID PANEL: CPT | Performed by: FAMILY MEDICINE

## 2024-01-30 PROCEDURE — 36415 COLL VENOUS BLD VENIPUNCTURE: CPT | Performed by: FAMILY MEDICINE

## 2024-01-30 PROCEDURE — 99999 PR PBB SHADOW E&M-EST. PATIENT-LVL III: CPT | Mod: PBBFAC,,, | Performed by: FAMILY MEDICINE

## 2024-01-30 PROCEDURE — 99215 OFFICE O/P EST HI 40 MIN: CPT | Mod: S$PBB,,, | Performed by: FAMILY MEDICINE

## 2024-01-30 RX ORDER — MAGNESIUM 200 MG
1000 TABLET ORAL DAILY
Qty: 90 TABLET | Refills: 3 | Status: CANCELLED | OUTPATIENT
Start: 2024-01-30

## 2024-01-30 RX ORDER — CELECOXIB 200 MG/1
200 CAPSULE ORAL DAILY
Qty: 90 CAPSULE | Refills: 3 | Status: CANCELLED | OUTPATIENT
Start: 2024-01-30

## 2024-01-30 RX ORDER — MAGNESIUM 200 MG
1000 TABLET ORAL DAILY
Qty: 90 TABLET | Refills: 3 | Status: ON HOLD | OUTPATIENT
Start: 2024-01-30

## 2024-01-30 RX ORDER — SIMVASTATIN 40 MG/1
40 TABLET, FILM COATED ORAL NIGHTLY
Qty: 90 TABLET | Refills: 3 | Status: CANCELLED | OUTPATIENT
Start: 2024-01-30

## 2024-01-30 RX ORDER — MIRABEGRON 50 MG/1
50 TABLET, FILM COATED, EXTENDED RELEASE ORAL DAILY
Qty: 90 TABLET | Refills: 3 | Status: CANCELLED | OUTPATIENT
Start: 2024-01-30 | End: 2025-01-29

## 2024-01-30 RX ORDER — METHOCARBAMOL 750 MG/1
750 TABLET, FILM COATED ORAL 3 TIMES DAILY
Qty: 270 TABLET | Refills: 3 | Status: CANCELLED | OUTPATIENT
Start: 2024-01-30 | End: 2025-01-29

## 2024-01-30 RX ORDER — PREGABALIN 150 MG/1
150 CAPSULE ORAL 2 TIMES DAILY
Qty: 180 CAPSULE | Refills: 0 | Status: CANCELLED | OUTPATIENT
Start: 2024-01-30 | End: 2024-04-29

## 2024-01-30 RX ORDER — ALBUTEROL SULFATE 90 UG/1
4 AEROSOL, METERED RESPIRATORY (INHALATION) EVERY 6 HOURS PRN
Qty: 54 G | Refills: 0 | Status: CANCELLED | OUTPATIENT
Start: 2024-01-30

## 2024-01-30 RX ORDER — LISINOPRIL 10 MG/1
10 TABLET ORAL NIGHTLY
Qty: 90 TABLET | Refills: 3 | Status: CANCELLED | OUTPATIENT
Start: 2024-01-30 | End: 2025-01-29

## 2024-01-30 RX ORDER — PANTOPRAZOLE SODIUM 40 MG/1
40 TABLET, DELAYED RELEASE ORAL DAILY
Qty: 90 TABLET | Refills: 3 | Status: CANCELLED | OUTPATIENT
Start: 2024-01-30 | End: 2025-01-29

## 2024-01-30 RX ORDER — PANTOPRAZOLE SODIUM 40 MG/1
40 TABLET, DELAYED RELEASE ORAL DAILY
Qty: 90 TABLET | Refills: 3 | Status: ON HOLD | OUTPATIENT
Start: 2024-01-30 | End: 2025-01-29

## 2024-01-30 RX ORDER — LISINOPRIL 10 MG/1
10 TABLET ORAL NIGHTLY
Qty: 90 TABLET | Refills: 3 | Status: ON HOLD | OUTPATIENT
Start: 2024-01-30 | End: 2025-01-29

## 2024-01-30 RX ORDER — FOLIC ACID 1 MG/1
1 TABLET ORAL DAILY
Qty: 90 TABLET | Refills: 3 | Status: ON HOLD | OUTPATIENT
Start: 2024-01-30 | End: 2025-01-29

## 2024-01-30 RX ORDER — FOLIC ACID 1 MG/1
1 TABLET ORAL DAILY
Qty: 90 TABLET | Refills: 3 | Status: CANCELLED | OUTPATIENT
Start: 2024-01-30 | End: 2025-01-29

## 2024-01-30 NOTE — PROGRESS NOTES
"Subjective:   Patient ID: Merle Caro is a 68 y.o. female.  Chief Complaint:  Leg Pain    Presents annual physical exam and follow-up chronic medical conditions    Main issue/complaint as persistent bilateral lower extremity pain  Reports persistent pain since last evaluation in May 2023  Denies any increased shortness of breath   Denies any facial swelling or swelling in her hands  Sits for long period of time in her wheelchair, no pain  No significant pain, redness, warmth, or tenderness  Needs repeat A1c and lipid panel    Leg Pain   The incident occurred more than 1 week ago. There was no injury mechanism. The pain is present in the right leg and left leg. The quality of the pain is described as aching (tingling). The pain is at a severity of 10/10. The pain is severe. The pain has been Constant since onset. Associated symptoms include tingling. She reports no foreign bodies present. The symptoms are aggravated by movement and weight bearing. She has tried acetaminophen for the symptoms. The treatment provided no relief.      Review of Systems   Constitutional: Negative.    HENT: Negative.     Eyes: Negative.    Respiratory: Negative.     Cardiovascular: Negative.    Gastrointestinal: Negative.    Endocrine: Positive for polyuria.   Genitourinary: Negative.    Musculoskeletal:  Positive for myalgias (bilateral lower extremities).   Skin: Negative.    Allergic/Immunologic: Negative.    Neurological:  Positive for tingling.   Hematological: Negative.    Psychiatric/Behavioral: Negative.       Objective:   /80 (BP Location: Left arm, Patient Position: Sitting, BP Method: Large (Manual))   Pulse 94   Ht 5' 1" (1.549 m)   Wt 82.1 kg (181 lb)   SpO2 (!) 94%   BMI 34.20 kg/m²     Physical Exam  Constitutional:       Appearance: She is obese. She is not ill-appearing.   Eyes:      Pupils: Pupils are equal, round, and reactive to light.   Musculoskeletal:         General: Swelling and tenderness present.     "  Right lower leg: Edema present.      Left lower leg: Edema present.      Comments: Limited R leg extension   Skin:     General: Skin is warm.      Capillary Refill: Capillary refill takes less than 2 seconds.   Neurological:      General: No focal deficit present.      Mental Status: She is alert.   Psychiatric:         Mood and Affect: Mood normal.         Behavior: Behavior normal.         Thought Content: Thought content normal.         Judgment: Judgment normal.       Assessment:       ICD-10-CM ICD-9-CM   1. Annual physical exam  Z00.00 V70.0   2. Primary hypertension  I10 401.9   3. Pure hypercholesterolemia  E78.00 272.0   4. Coronary artery disease involving native coronary artery of native heart without angina pectoris  I25.10 414.01   5. Aortic atherosclerosis  I70.0 440.0   6. Elevated serum glucose  R73.9 790.29   7. Chronic myeloproliferative disease  D47.1 238.79   8. Anemia, unspecified type  D64.9 285.9   9. Bipolar disorder, in full remission, most recent episode depressed  F31.76 296.56   10. Recurrent major depressive disorder, in full remission  F33.42 296.36   11. Benzodiazepine dependence  F13.20 304.10   12. Gastroesophageal reflux disease with esophagitis without hemorrhage  K21.00 530.81     530.10   13. Asthma with COPD  J44.89 493.20   14. Cauda equina syndrome  G83.4 344.60   15. Chronic pain of both knees  M25.561 719.46    M25.562 338.29    G89.29    16. Chronic idiopathic constipation  K59.04 564.00     Plan:   Annual physical exam  -     Hemoglobin A1C; Future; Expected date: 01/30/2024  -     LIPID PANEL; Future; Expected date: 01/30/2024  Blood pressure normal.  BMI now less than 35.  Overall exam without significant change   Check labs.  Treat as indicated.    Continue decline recommended Health maintenance     Primary hypertension  -     lisinopriL 10 MG tablet; Take 1 tablet (10 mg total) by mouth every evening.  Dispense: 90 tablet; Refill: 3  Controlled.  Stable.   Asymptomatic.  BP at goal.    Continue current medications     Pure hypercholesterolemia  Coronary artery disease involving native coronary artery of native heart without angina pectoris  Aortic atherosclerosis  -     LIPID PANEL; Future; Expected date: 01/30/2024  Stable.  Asymptomatic.    Adjust current medications as needed     Elevated serum glucose  -     Hemoglobin A1C; Future; Expected date: 01/30/2024  Treat for prediabetes diabetes if indicated     Chronic myeloproliferative disease  Anemia, unspecified type  -     folic acid (FOLVITE) 1 MG tablet; Take 1 tablet (1 mg total) by mouth once daily.  Dispense: 90 tablet; Refill: 3  -     cyanocobalamin, vitamin B-12, 1,000 mcg Subl; Place 1,000 mcg under the tongue once daily.  Dispense: 90 tablet; Refill: 3  Follow-up with Hematology/Oncology as scheduled     Bipolar disorder, in full remission, most recent episode depressed  Recurrent major depressive disorder, in full remission  Benzodiazepine dependence  Mood and symptoms remain stable and well controlled on current medications   Continue current medications   Follow-up Psychiatry as scheduled     Gastroesophageal reflux disease with esophagitis without hemorrhage  -     pantoprazole (PROTONIX) 40 MG tablet; Take 1 tablet (40 mg total) by mouth once daily.  Dispense: 90 tablet; Refill: 3  Symptoms controlled/stable   Continue current PPI     Asthma with COPD  Stable   No frequent rescue inhaler use   No controller medication needed     Cauda equina syndrome  Chronic pain of both knees  Continue Celebrex 100 mg daily  Continue Robaxin 750 mg 3 times a day   Increase Lyrica 1 150 mg twice a day     Chronic idiopathic constipation  Symptoms improved   Okay to continue Linzess 72 mcg daily    Follow up with any/all specialists scheduled    Return clinic 6 months or sooner as needed    50+ minutes of total time spent on the encounter, which includes face to face time and non-face to face time preparing to see  the patient (eg, review of tests), Obtaining and/or reviewing separately obtained history, documenting clinical information in the electronic or other health record, independently interpreting results (not separately reported) and communicating results to the patient/family/caregiver, or Care coordination (not separately reported).         Cj Mckinley, MS4

## 2024-01-31 LAB
CHOLEST SERPL-MCNC: 196 MG/DL (ref 120–199)
CHOLEST/HDLC SERPL: 3.4 {RATIO} (ref 2–5)
ESTIMATED AVG GLUCOSE: 97 MG/DL (ref 68–131)
HBA1C MFR BLD: 5 % (ref 4–5.6)
HDLC SERPL-MCNC: 58 MG/DL (ref 40–75)
HDLC SERPL: 29.6 % (ref 20–50)
LDLC SERPL CALC-MCNC: 114.2 MG/DL (ref 63–159)
NONHDLC SERPL-MCNC: 138 MG/DL
TRIGL SERPL-MCNC: 119 MG/DL (ref 30–150)

## 2024-02-28 ENCOUNTER — LAB VISIT (OUTPATIENT)
Dept: LAB | Facility: HOSPITAL | Age: 69
End: 2024-02-28
Attending: NURSE PRACTITIONER
Payer: MEDICARE

## 2024-02-28 DIAGNOSIS — D64.9 ANEMIA, UNSPECIFIED TYPE: ICD-10-CM

## 2024-02-28 LAB
ALBUMIN SERPL BCP-MCNC: 3.6 G/DL (ref 3.5–5.2)
ALP SERPL-CCNC: 116 U/L (ref 55–135)
ALT SERPL W/O P-5'-P-CCNC: 13 U/L (ref 10–44)
ANION GAP SERPL CALC-SCNC: 8 MMOL/L (ref 8–16)
AST SERPL-CCNC: 15 U/L (ref 10–40)
BASOPHILS # BLD AUTO: 0.06 K/UL (ref 0–0.2)
BASOPHILS NFR BLD: 0.7 % (ref 0–1.9)
BILIRUB SERPL-MCNC: 0.3 MG/DL (ref 0.1–1)
BUN SERPL-MCNC: 34 MG/DL (ref 8–23)
CALCIUM SERPL-MCNC: 9 MG/DL (ref 8.7–10.5)
CHLORIDE SERPL-SCNC: 101 MMOL/L (ref 95–110)
CO2 SERPL-SCNC: 23 MMOL/L (ref 23–29)
CREAT SERPL-MCNC: 1.1 MG/DL (ref 0.5–1.4)
DIFFERENTIAL METHOD BLD: ABNORMAL
EOSINOPHIL # BLD AUTO: 0.2 K/UL (ref 0–0.5)
EOSINOPHIL NFR BLD: 2 % (ref 0–8)
ERYTHROCYTE [DISTWIDTH] IN BLOOD BY AUTOMATED COUNT: 14.6 % (ref 11.5–14.5)
EST. GFR  (NO RACE VARIABLE): 55 ML/MIN/1.73 M^2
FERRITIN SERPL-MCNC: 624 NG/ML (ref 20–300)
GLUCOSE SERPL-MCNC: 86 MG/DL (ref 70–110)
HCT VFR BLD AUTO: 38.1 % (ref 37–48.5)
HGB BLD-MCNC: 12.1 G/DL (ref 12–16)
IMM GRANULOCYTES # BLD AUTO: 0.04 K/UL (ref 0–0.04)
IMM GRANULOCYTES NFR BLD AUTO: 0.5 % (ref 0–0.5)
IRON SERPL-MCNC: 29 UG/DL (ref 30–160)
LYMPHOCYTES # BLD AUTO: 1.1 K/UL (ref 1–4.8)
LYMPHOCYTES NFR BLD: 12.5 % (ref 18–48)
MCH RBC QN AUTO: 29.6 PG (ref 27–31)
MCHC RBC AUTO-ENTMCNC: 31.8 G/DL (ref 32–36)
MCV RBC AUTO: 93 FL (ref 82–98)
MONOCYTES # BLD AUTO: 0.7 K/UL (ref 0.3–1)
MONOCYTES NFR BLD: 8.5 % (ref 4–15)
NEUTROPHILS # BLD AUTO: 6.6 K/UL (ref 1.8–7.7)
NEUTROPHILS NFR BLD: 75.8 % (ref 38–73)
NRBC BLD-RTO: 0 /100 WBC
PLATELET # BLD AUTO: 390 K/UL (ref 150–450)
PMV BLD AUTO: 9.4 FL (ref 9.2–12.9)
POTASSIUM SERPL-SCNC: 5.2 MMOL/L (ref 3.5–5.1)
PROT SERPL-MCNC: 7.6 G/DL (ref 6–8.4)
RBC # BLD AUTO: 4.09 M/UL (ref 4–5.4)
SATURATED IRON: 10 % (ref 20–50)
SODIUM SERPL-SCNC: 132 MMOL/L (ref 136–145)
TOTAL IRON BINDING CAPACITY: 278 UG/DL (ref 250–450)
TRANSFERRIN SERPL-MCNC: 188 MG/DL (ref 200–375)
WBC # BLD AUTO: 8.69 K/UL (ref 3.9–12.7)

## 2024-02-28 PROCEDURE — 80053 COMPREHEN METABOLIC PANEL: CPT | Performed by: NURSE PRACTITIONER

## 2024-02-28 PROCEDURE — 85025 COMPLETE CBC W/AUTO DIFF WBC: CPT | Performed by: NURSE PRACTITIONER

## 2024-02-28 PROCEDURE — 36415 COLL VENOUS BLD VENIPUNCTURE: CPT | Mod: PO | Performed by: NURSE PRACTITIONER

## 2024-02-28 PROCEDURE — 83540 ASSAY OF IRON: CPT | Performed by: NURSE PRACTITIONER

## 2024-02-28 PROCEDURE — 82728 ASSAY OF FERRITIN: CPT | Performed by: NURSE PRACTITIONER

## 2024-03-01 ENCOUNTER — OFFICE VISIT (OUTPATIENT)
Dept: HEMATOLOGY/ONCOLOGY | Facility: CLINIC | Age: 69
End: 2024-03-01
Payer: MEDICARE

## 2024-03-01 VITALS
BODY MASS INDEX: 32.91 KG/M2 | DIASTOLIC BLOOD PRESSURE: 70 MMHG | TEMPERATURE: 98 F | WEIGHT: 178.81 LBS | OXYGEN SATURATION: 95 % | HEART RATE: 92 BPM | SYSTOLIC BLOOD PRESSURE: 105 MMHG | HEIGHT: 62 IN

## 2024-03-01 DIAGNOSIS — D64.9 ANEMIA, UNSPECIFIED TYPE: Primary | Chronic | ICD-10-CM

## 2024-03-01 DIAGNOSIS — D50.8 OTHER IRON DEFICIENCY ANEMIA: ICD-10-CM

## 2024-03-01 PROCEDURE — 99999 PR PBB SHADOW E&M-EST. PATIENT-LVL IV: CPT | Mod: PBBFAC,,, | Performed by: NURSE PRACTITIONER

## 2024-03-01 PROCEDURE — 99214 OFFICE O/P EST MOD 30 MIN: CPT | Mod: PBBFAC | Performed by: NURSE PRACTITIONER

## 2024-03-01 PROCEDURE — 99214 OFFICE O/P EST MOD 30 MIN: CPT | Mod: S$PBB,,, | Performed by: NURSE PRACTITIONER

## 2024-03-01 NOTE — ASSESSMENT & PLAN NOTE
EGD 10/2022 overall unremarkable. Biopsies obtained positive for acid reflux inflammation but otherwise unremarkable. Colonoscopy declined. Previously treated with IV iron completed 9/2023    Most recent CBC without evidence of anemia. Most recent [facility] without evidence of paraproteinemia. Mild iron deficiency with iron levels essentially unchanged from prior    Continue sublingual B12 1000 mcg daily. Folic acid 1 mg PO daily. Given prior h/o constipation will defer oral iron supplementation. For now will hold off on additional iron supplementation    F/u 3 months with cbc, cmp, iron ,ferritin. Discussed S&S to report sooner
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
## 2024-03-01 NOTE — ASSESSMENT & PLAN NOTE
Previously S/p weekly Venofer x 4 x 2 rounds most recently completed 7/2023. Remained iron deficient. Subsequently treated with Feraheme weekly x 2 completed 9/2023    Saturated iron remains mildly low essentially unchanged from prior. Hg 12.1>>12.5    We previously discussed proceeding with Colonoscopy. For now patient declines. Will defer for now.     For now will hold off on additional iron supplementation. Continue b12 and folic acid supplement. F/u 3 months cbc, iron, ferritin, FLC. Discussed S&S to report sooner

## 2024-04-08 ENCOUNTER — TELEPHONE (OUTPATIENT)
Dept: PULMONOLOGY | Facility: CLINIC | Age: 69
End: 2024-04-08
Payer: MEDICARE

## 2024-04-29 ENCOUNTER — TELEPHONE (OUTPATIENT)
Dept: PULMONOLOGY | Facility: CLINIC | Age: 69
End: 2024-04-29
Payer: MEDICARE

## 2024-06-03 ENCOUNTER — LAB VISIT (OUTPATIENT)
Dept: LAB | Facility: HOSPITAL | Age: 69
End: 2024-06-03
Attending: NURSE PRACTITIONER
Payer: MEDICARE

## 2024-06-03 DIAGNOSIS — D64.9 ANEMIA, UNSPECIFIED TYPE: Chronic | ICD-10-CM

## 2024-06-03 DIAGNOSIS — D50.8 OTHER IRON DEFICIENCY ANEMIA: ICD-10-CM

## 2024-06-03 LAB
ALBUMIN SERPL BCP-MCNC: 3.3 G/DL (ref 3.5–5.2)
ALP SERPL-CCNC: 125 U/L (ref 55–135)
ALT SERPL W/O P-5'-P-CCNC: 9 U/L (ref 10–44)
ANION GAP SERPL CALC-SCNC: 11 MMOL/L (ref 8–16)
AST SERPL-CCNC: 12 U/L (ref 10–40)
BASOPHILS # BLD AUTO: 0.04 K/UL (ref 0–0.2)
BASOPHILS NFR BLD: 0.5 % (ref 0–1.9)
BILIRUB SERPL-MCNC: 0.4 MG/DL (ref 0.1–1)
BUN SERPL-MCNC: 23 MG/DL (ref 8–23)
CALCIUM SERPL-MCNC: 8.9 MG/DL (ref 8.7–10.5)
CHLORIDE SERPL-SCNC: 92 MMOL/L (ref 95–110)
CO2 SERPL-SCNC: 24 MMOL/L (ref 23–29)
CREAT SERPL-MCNC: 0.9 MG/DL (ref 0.5–1.4)
DIFFERENTIAL METHOD BLD: ABNORMAL
EOSINOPHIL # BLD AUTO: 0.3 K/UL (ref 0–0.5)
EOSINOPHIL NFR BLD: 3.9 % (ref 0–8)
ERYTHROCYTE [DISTWIDTH] IN BLOOD BY AUTOMATED COUNT: 13.5 % (ref 11.5–14.5)
EST. GFR  (NO RACE VARIABLE): >60 ML/MIN/1.73 M^2
FERRITIN SERPL-MCNC: 705 NG/ML (ref 20–300)
GLUCOSE SERPL-MCNC: 104 MG/DL (ref 70–110)
HCT VFR BLD AUTO: 35.1 % (ref 37–48.5)
HGB BLD-MCNC: 11.4 G/DL (ref 12–16)
IMM GRANULOCYTES # BLD AUTO: 0.04 K/UL (ref 0–0.04)
IMM GRANULOCYTES NFR BLD AUTO: 0.5 % (ref 0–0.5)
IRON SERPL-MCNC: 27 UG/DL (ref 30–160)
LYMPHOCYTES # BLD AUTO: 1.1 K/UL (ref 1–4.8)
LYMPHOCYTES NFR BLD: 15.2 % (ref 18–48)
MCH RBC QN AUTO: 29 PG (ref 27–31)
MCHC RBC AUTO-ENTMCNC: 32.5 G/DL (ref 32–36)
MCV RBC AUTO: 89 FL (ref 82–98)
MONOCYTES # BLD AUTO: 0.7 K/UL (ref 0.3–1)
MONOCYTES NFR BLD: 9.7 % (ref 4–15)
NEUTROPHILS # BLD AUTO: 5.2 K/UL (ref 1.8–7.7)
NEUTROPHILS NFR BLD: 70.2 % (ref 38–73)
NRBC BLD-RTO: 0 /100 WBC
PLATELET # BLD AUTO: 401 K/UL (ref 150–450)
PMV BLD AUTO: 8.8 FL (ref 9.2–12.9)
POTASSIUM SERPL-SCNC: 4.9 MMOL/L (ref 3.5–5.1)
PROT SERPL-MCNC: 6.9 G/DL (ref 6–8.4)
RBC # BLD AUTO: 3.93 M/UL (ref 4–5.4)
SATURATED IRON: 10 % (ref 20–50)
SODIUM SERPL-SCNC: 127 MMOL/L (ref 136–145)
TOTAL IRON BINDING CAPACITY: 269 UG/DL (ref 250–450)
TRANSFERRIN SERPL-MCNC: 182 MG/DL (ref 200–375)
WBC # BLD AUTO: 7.45 K/UL (ref 3.9–12.7)

## 2024-06-03 PROCEDURE — 83540 ASSAY OF IRON: CPT | Performed by: NURSE PRACTITIONER

## 2024-06-03 PROCEDURE — 83521 IG LIGHT CHAINS FREE EACH: CPT | Performed by: NURSE PRACTITIONER

## 2024-06-03 PROCEDURE — 85025 COMPLETE CBC W/AUTO DIFF WBC: CPT | Performed by: NURSE PRACTITIONER

## 2024-06-03 PROCEDURE — 36415 COLL VENOUS BLD VENIPUNCTURE: CPT | Mod: PO | Performed by: NURSE PRACTITIONER

## 2024-06-03 PROCEDURE — 82728 ASSAY OF FERRITIN: CPT | Performed by: NURSE PRACTITIONER

## 2024-06-03 PROCEDURE — 80053 COMPREHEN METABOLIC PANEL: CPT | Performed by: NURSE PRACTITIONER

## 2024-06-04 LAB
KAPPA LC SER QL IA: 4.37 MG/DL (ref 0.33–1.94)
KAPPA LC/LAMBDA SER IA: 1.67 (ref 0.26–1.65)
LAMBDA LC SER QL IA: 2.61 MG/DL (ref 0.57–2.63)

## 2024-06-05 ENCOUNTER — PATIENT OUTREACH (OUTPATIENT)
Dept: ADMINISTRATIVE | Facility: HOSPITAL | Age: 69
End: 2024-06-05
Payer: MEDICARE

## 2024-06-05 NOTE — PROGRESS NOTES
VBHM Score: 2     Colon Cancer Screening  Mammogram    Shingles/Zoster Vaccine  RSV Vaccine                  Health Maintenance Topic(s) Outreach Outcomes & Actions Taken:    Colorectal Cancer Screening - Outreach Outcomes & Actions Taken  : Overdue    Breast Cancer Screening - Outreach Outcomes & Actions Taken  : Overdue         Additional Notes:  Attempted to contact pt no ans, lvm.  Sent portal message               Care Management, Digital Medicine, and/or Education Referrals    OPCM Risk Score: 25.3         Next Steps - Referral Actions: No ans, sent portal message

## 2024-06-06 ENCOUNTER — OFFICE VISIT (OUTPATIENT)
Dept: HEMATOLOGY/ONCOLOGY | Facility: CLINIC | Age: 69
End: 2024-06-06
Payer: MEDICARE

## 2024-06-06 VITALS
HEART RATE: 92 BPM | OXYGEN SATURATION: 95 % | DIASTOLIC BLOOD PRESSURE: 60 MMHG | TEMPERATURE: 97 F | HEIGHT: 60 IN | SYSTOLIC BLOOD PRESSURE: 95 MMHG | BODY MASS INDEX: 37.37 KG/M2 | WEIGHT: 190.38 LBS

## 2024-06-06 DIAGNOSIS — E66.01 SEVERE OBESITY (BMI 35.0-39.9) WITH COMORBIDITY: ICD-10-CM

## 2024-06-06 DIAGNOSIS — D50.8 OTHER IRON DEFICIENCY ANEMIA: ICD-10-CM

## 2024-06-06 DIAGNOSIS — E87.1 HYPONATREMIA: Primary | ICD-10-CM

## 2024-06-06 DIAGNOSIS — Z74.09 IMPAIRED FUNCTIONAL MOBILITY, BALANCE, GAIT, AND ENDURANCE: ICD-10-CM

## 2024-06-06 PROCEDURE — 99214 OFFICE O/P EST MOD 30 MIN: CPT | Mod: S$PBB,,, | Performed by: NURSE PRACTITIONER

## 2024-06-06 PROCEDURE — 99999 PR PBB SHADOW E&M-EST. PATIENT-LVL IV: CPT | Mod: PBBFAC,,, | Performed by: NURSE PRACTITIONER

## 2024-06-06 PROCEDURE — 99214 OFFICE O/P EST MOD 30 MIN: CPT | Mod: PBBFAC | Performed by: NURSE PRACTITIONER

## 2024-06-06 RX ORDER — METHYLPREDNISOLONE SOD SUCC 125 MG
40 VIAL (EA) INJECTION
Start: 2024-06-13

## 2024-06-06 RX ORDER — HEPARIN 100 UNIT/ML
500 SYRINGE INTRAVENOUS
OUTPATIENT
Start: 2024-06-13

## 2024-06-06 RX ORDER — METHYLPREDNISOLONE SOD SUCC 125 MG
40 VIAL (EA) INJECTION
Start: 2024-06-06

## 2024-06-06 RX ORDER — HEPARIN 100 UNIT/ML
500 SYRINGE INTRAVENOUS
OUTPATIENT
Start: 2024-06-06

## 2024-06-06 RX ORDER — SODIUM CHLORIDE 0.9 % (FLUSH) 0.9 %
10 SYRINGE (ML) INJECTION
OUTPATIENT
Start: 2024-06-06

## 2024-06-06 RX ORDER — SODIUM CHLORIDE 0.9 % (FLUSH) 0.9 %
10 SYRINGE (ML) INJECTION
OUTPATIENT
Start: 2024-06-13
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
## 2024-06-06 NOTE — ASSESSMENT & PLAN NOTE
Previously S/p weekly Venofer x 4 x 2 rounds most recently completed 7/2023. Remained iron deficient. Subsequently treated with Feraheme weekly x 2 completed 9/2023    Persistent mild iron deficiency with recurrent anemia. Elevated ferritin ? Secondary to chronic medical issues    We previously discussed proceeding with Colonoscopy. For now patient declines. Will defer for now.     Arrange additional weekly Feraheme x 2. Continue b12 and folic acid supplement. F/u 3 months cbc, iron, ferritin, FLC. Discussed S&S to report sooner

## 2024-06-08 DIAGNOSIS — G83.4 CAUDA EQUINA SYNDROME: ICD-10-CM

## 2024-06-08 NOTE — TELEPHONE ENCOUNTER
No care due was identified.  [facility] Embedded Care Due Messages. Reference number: [id number].   6/08/2024 9:00:28 AM CDT

## 2024-06-11 ENCOUNTER — TELEPHONE (OUTPATIENT)
Dept: INFUSION THERAPY | Facility: HOSPITAL | Age: 69
End: 2024-06-11
Payer: MEDICARE

## 2024-06-13 RX ORDER — METHOCARBAMOL 750 MG/1
TABLET, FILM COATED ORAL
Qty: 90 TABLET | Refills: 3 | Status: ON HOLD | OUTPATIENT
Start: 2024-06-13

## 2024-06-13 RX ORDER — PREGABALIN 100 MG/1
100 CAPSULE ORAL 2 TIMES DAILY
Qty: 180 CAPSULE | Refills: 3 | Status: ON HOLD | OUTPATIENT
Start: 2024-06-13

## 2024-06-17 ENCOUNTER — TELEPHONE (OUTPATIENT)
Dept: INTERNAL MEDICINE | Facility: CLINIC | Age: 69
End: 2024-06-17
Payer: MEDICARE

## 2024-06-17 NOTE — TELEPHONE ENCOUNTER
Attempt to contact pt on both #'s on file no answer; vm not setup; appt was canceled by MA due to PCP out sick /LD

## 2024-06-18 ENCOUNTER — TELEPHONE (OUTPATIENT)
Dept: NEPHROLOGY | Facility: CLINIC | Age: 69
End: 2024-06-18
Payer: MEDICARE

## 2024-06-18 DIAGNOSIS — E87.1 HYPONATREMIA: Primary | ICD-10-CM

## 2024-06-19 ENCOUNTER — HOSPITAL ENCOUNTER (INPATIENT)
Facility: HOSPITAL | Age: 69
LOS: 5 days | Discharge: HOME HEALTH | DRG: 871 | End: 2024-06-24
Attending: EMERGENCY MEDICINE | Admitting: STUDENT IN AN ORGANIZED HEALTH CARE EDUCATION/TRAINING PROGRAM
Payer: MEDICARE

## 2024-06-19 DIAGNOSIS — R78.81 BACTEREMIA: ICD-10-CM

## 2024-06-19 DIAGNOSIS — E83.42 HYPOMAGNESEMIA: ICD-10-CM

## 2024-06-19 DIAGNOSIS — N17.9 ACUTE RENAL FAILURE: ICD-10-CM

## 2024-06-19 DIAGNOSIS — E86.1 INTRAVASCULAR VOLUME DEPLETION: ICD-10-CM

## 2024-06-19 DIAGNOSIS — R26.9 GAIT DIFFICULTY: ICD-10-CM

## 2024-06-19 DIAGNOSIS — N13.39 OTHER HYDRONEPHROSIS: ICD-10-CM

## 2024-06-19 DIAGNOSIS — G93.40 ENCEPHALOPATHY ACUTE: ICD-10-CM

## 2024-06-19 DIAGNOSIS — N30.00 ACUTE CYSTITIS WITHOUT HEMATURIA: ICD-10-CM

## 2024-06-19 DIAGNOSIS — A41.9 SEPSIS WITHOUT SEPTIC SHOCK: Primary | ICD-10-CM

## 2024-06-19 DIAGNOSIS — R07.9 CHEST PAIN: ICD-10-CM

## 2024-06-19 DIAGNOSIS — R53.1 WEAKNESS: ICD-10-CM

## 2024-06-19 PROBLEM — G93.41 ENCEPHALOPATHY, METABOLIC: Status: ACTIVE | Noted: 2024-06-19

## 2024-06-19 LAB
ALBUMIN SERPL BCP-MCNC: 2.9 G/DL (ref 3.5–5.2)
ALP SERPL-CCNC: 130 U/L (ref 55–135)
ALT SERPL W/O P-5'-P-CCNC: 11 U/L (ref 10–44)
AMMONIA PLAS-SCNC: 24 UMOL/L (ref 10–50)
AMPHET+METHAMPHET UR QL: NEGATIVE
ANION GAP SERPL CALC-SCNC: 13 MMOL/L (ref 8–16)
AST SERPL-CCNC: 13 U/L (ref 10–40)
BACTERIA #/AREA URNS HPF: ABNORMAL /HPF
BARBITURATES UR QL SCN>200 NG/ML: NEGATIVE
BASOPHILS # BLD AUTO: 0.1 K/UL (ref 0–0.2)
BASOPHILS NFR BLD: 0.6 % (ref 0–1.9)
BENZODIAZ UR QL SCN>200 NG/ML: NEGATIVE
BILIRUB SERPL-MCNC: 0.6 MG/DL (ref 0.1–1)
BILIRUB UR QL STRIP: NEGATIVE
BNP SERPL-MCNC: 13 PG/ML (ref 0–99)
BUN SERPL-MCNC: 65 MG/DL (ref 8–23)
BZE UR QL SCN: NEGATIVE
CALCIUM SERPL-MCNC: 9.4 MG/DL (ref 8.7–10.5)
CANNABINOIDS UR QL SCN: NEGATIVE
CHLORIDE SERPL-SCNC: 98 MMOL/L (ref 95–110)
CK SERPL-CCNC: 161 U/L (ref 20–180)
CLARITY UR: ABNORMAL
CO2 SERPL-SCNC: 19 MMOL/L (ref 23–29)
COLOR UR: ABNORMAL
CREAT SERPL-MCNC: 1.9 MG/DL (ref 0.5–1.4)
CREAT UR-MCNC: 18.4 MG/DL (ref 15–325)
DIFFERENTIAL METHOD BLD: ABNORMAL
EOSINOPHIL # BLD AUTO: 0.2 K/UL (ref 0–0.5)
EOSINOPHIL NFR BLD: 1.3 % (ref 0–8)
ERYTHROCYTE [DISTWIDTH] IN BLOOD BY AUTOMATED COUNT: 13.4 % (ref 11.5–14.5)
EST. GFR  (NO RACE VARIABLE): 29 ML/MIN/1.73 M^2
GLUCOSE SERPL-MCNC: 131 MG/DL (ref 70–110)
GLUCOSE UR QL STRIP: NEGATIVE
GRAN CASTS #/AREA URNS LPF: 10 /LPF
HCT VFR BLD AUTO: 36.6 % (ref 37–48.5)
HGB BLD-MCNC: 12.2 G/DL (ref 12–16)
HGB UR QL STRIP: ABNORMAL
HYALINE CASTS #/AREA URNS LPF: 0 /LPF
IMM GRANULOCYTES # BLD AUTO: 0.31 K/UL (ref 0–0.04)
IMM GRANULOCYTES NFR BLD AUTO: 1.7 % (ref 0–0.5)
KETONES UR QL STRIP: NEGATIVE
LACTATE SERPL-SCNC: 1.4 MMOL/L (ref 0.5–2.2)
LEUKOCYTE ESTERASE UR QL STRIP: ABNORMAL
LYMPHOCYTES # BLD AUTO: 1.2 K/UL (ref 1–4.8)
LYMPHOCYTES NFR BLD: 6.8 % (ref 18–48)
MAGNESIUM SERPL-MCNC: 2.4 MG/DL (ref 1.6–2.6)
MCH RBC QN AUTO: 29.2 PG (ref 27–31)
MCHC RBC AUTO-ENTMCNC: 33.3 G/DL (ref 32–36)
MCV RBC AUTO: 88 FL (ref 82–98)
METHADONE UR QL SCN>300 NG/ML: NEGATIVE
MICROSCOPIC COMMENT: ABNORMAL
MONOCYTES # BLD AUTO: 1.9 K/UL (ref 0.3–1)
MONOCYTES NFR BLD: 10.3 % (ref 4–15)
NEUTROPHILS # BLD AUTO: 14.3 K/UL (ref 1.8–7.7)
NEUTROPHILS NFR BLD: 79.3 % (ref 38–73)
NITRITE UR QL STRIP: NEGATIVE
NRBC BLD-RTO: 0 /100 WBC
OHS QRS DURATION: 126 MS
OHS QTC CALCULATION: 482 MS
OPIATES UR QL SCN: NEGATIVE
OSMOLALITY SERPL: 295 MOSM/KG (ref 275–295)
PCP UR QL SCN>25 NG/ML: NEGATIVE
PH UR STRIP: 8 [PH] (ref 5–8)
PLATELET # BLD AUTO: 599 K/UL (ref 150–450)
PMV BLD AUTO: 8.7 FL (ref 9.2–12.9)
POCT GLUCOSE: 130 MG/DL (ref 70–110)
POTASSIUM SERPL-SCNC: 5.2 MMOL/L (ref 3.5–5.1)
PROCALCITONIN SERPL IA-MCNC: 0.48 NG/ML
PROT SERPL-MCNC: 7.6 G/DL (ref 6–8.4)
PROT UR QL STRIP: ABNORMAL
RBC # BLD AUTO: 4.18 M/UL (ref 4–5.4)
RBC #/AREA URNS HPF: 15 /HPF (ref 0–4)
SODIUM SERPL-SCNC: 130 MMOL/L (ref 136–145)
SODIUM UR-SCNC: 73 MMOL/L (ref 20–250)
SP GR UR STRIP: 1.01 (ref 1–1.03)
TOXICOLOGY INFORMATION: NORMAL
TRI-PHOS CRY URNS QL MICRO: ABNORMAL
TROPONIN I SERPL DL<=0.01 NG/ML-MCNC: 0.01 NG/ML (ref 0–0.03)
TSH SERPL DL<=0.005 MIU/L-ACNC: 0.71 UIU/ML (ref 0.4–4)
URN SPEC COLLECT METH UR: ABNORMAL
UROBILINOGEN UR STRIP-ACNC: NEGATIVE EU/DL
WBC # BLD AUTO: 18.05 K/UL (ref 3.9–12.7)
WBC #/AREA URNS HPF: 40 /HPF (ref 0–5)
WBC CASTS #/AREA URNS LPF: 1 /LPF

## 2024-06-19 PROCEDURE — 87040 BLOOD CULTURE FOR BACTERIA: CPT | Performed by: EMERGENCY MEDICINE

## 2024-06-19 PROCEDURE — 84145 PROCALCITONIN (PCT): CPT | Performed by: EMERGENCY MEDICINE

## 2024-06-19 PROCEDURE — 83735 ASSAY OF MAGNESIUM: CPT | Performed by: EMERGENCY MEDICINE

## 2024-06-19 PROCEDURE — 63600175 PHARM REV CODE 636 W HCPCS: Performed by: STUDENT IN AN ORGANIZED HEALTH CARE EDUCATION/TRAINING PROGRAM

## 2024-06-19 PROCEDURE — 83930 ASSAY OF BLOOD OSMOLALITY: CPT | Performed by: EMERGENCY MEDICINE

## 2024-06-19 PROCEDURE — 87154 CUL TYP ID BLD PTHGN 6+ TRGT: CPT | Performed by: EMERGENCY MEDICINE

## 2024-06-19 PROCEDURE — 83605 ASSAY OF LACTIC ACID: CPT | Performed by: EMERGENCY MEDICINE

## 2024-06-19 PROCEDURE — 25000242 PHARM REV CODE 250 ALT 637 W/ HCPCS: Performed by: STUDENT IN AN ORGANIZED HEALTH CARE EDUCATION/TRAINING PROGRAM

## 2024-06-19 PROCEDURE — 80053 COMPREHEN METABOLIC PANEL: CPT | Performed by: EMERGENCY MEDICINE

## 2024-06-19 PROCEDURE — 21400001 HC TELEMETRY ROOM

## 2024-06-19 PROCEDURE — 51703 INSERT BLADDER CATH COMPLEX: CPT | Mod: ,,, | Performed by: UROLOGY

## 2024-06-19 PROCEDURE — 63600175 PHARM REV CODE 636 W HCPCS: Performed by: EMERGENCY MEDICINE

## 2024-06-19 PROCEDURE — 87186 SC STD MICRODIL/AGAR DIL: CPT | Performed by: EMERGENCY MEDICINE

## 2024-06-19 PROCEDURE — 82550 ASSAY OF CK (CPK): CPT | Performed by: EMERGENCY MEDICINE

## 2024-06-19 PROCEDURE — 93010 ELECTROCARDIOGRAM REPORT: CPT | Mod: ,,, | Performed by: INTERNAL MEDICINE

## 2024-06-19 PROCEDURE — 99223 1ST HOSP IP/OBS HIGH 75: CPT | Mod: 25,,, | Performed by: UROLOGY

## 2024-06-19 PROCEDURE — 99285 EMERGENCY DEPT VISIT HI MDM: CPT | Mod: 25

## 2024-06-19 PROCEDURE — 81000 URINALYSIS NONAUTO W/SCOPE: CPT | Mod: 59 | Performed by: EMERGENCY MEDICINE

## 2024-06-19 PROCEDURE — 84300 ASSAY OF URINE SODIUM: CPT | Performed by: EMERGENCY MEDICINE

## 2024-06-19 PROCEDURE — 85025 COMPLETE CBC W/AUTO DIFF WBC: CPT | Performed by: EMERGENCY MEDICINE

## 2024-06-19 PROCEDURE — 80307 DRUG TEST PRSMV CHEM ANLYZR: CPT | Performed by: EMERGENCY MEDICINE

## 2024-06-19 PROCEDURE — 87086 URINE CULTURE/COLONY COUNT: CPT | Performed by: EMERGENCY MEDICINE

## 2024-06-19 PROCEDURE — 82962 GLUCOSE BLOOD TEST: CPT

## 2024-06-19 PROCEDURE — 96361 HYDRATE IV INFUSION ADD-ON: CPT

## 2024-06-19 PROCEDURE — 83880 ASSAY OF NATRIURETIC PEPTIDE: CPT | Performed by: EMERGENCY MEDICINE

## 2024-06-19 PROCEDURE — 93005 ELECTROCARDIOGRAM TRACING: CPT

## 2024-06-19 PROCEDURE — 84484 ASSAY OF TROPONIN QUANT: CPT | Performed by: EMERGENCY MEDICINE

## 2024-06-19 PROCEDURE — 84443 ASSAY THYROID STIM HORMONE: CPT | Performed by: EMERGENCY MEDICINE

## 2024-06-19 PROCEDURE — 25000003 PHARM REV CODE 250: Performed by: EMERGENCY MEDICINE

## 2024-06-19 PROCEDURE — 25000003 PHARM REV CODE 250: Performed by: STUDENT IN AN ORGANIZED HEALTH CARE EDUCATION/TRAINING PROGRAM

## 2024-06-19 PROCEDURE — 87076 CULTURE ANAEROBE IDENT EACH: CPT | Mod: 59 | Performed by: EMERGENCY MEDICINE

## 2024-06-19 PROCEDURE — 96374 THER/PROPH/DIAG INJ IV PUSH: CPT

## 2024-06-19 PROCEDURE — 82140 ASSAY OF AMMONIA: CPT | Performed by: EMERGENCY MEDICINE

## 2024-06-19 PROCEDURE — 87077 CULTURE AEROBIC IDENTIFY: CPT | Performed by: EMERGENCY MEDICINE

## 2024-06-19 PROCEDURE — 87088 URINE BACTERIA CULTURE: CPT | Performed by: EMERGENCY MEDICINE

## 2024-06-19 RX ORDER — SODIUM CHLORIDE 0.9 % (FLUSH) 0.9 %
10 SYRINGE (ML) INJECTION EVERY 12 HOURS PRN
Status: DISCONTINUED | OUTPATIENT
Start: 2024-06-19 | End: 2024-06-24 | Stop reason: HOSPADM

## 2024-06-19 RX ORDER — FLUTICASONE PROPIONATE 50 MCG
2 SPRAY, SUSPENSION (ML) NASAL DAILY
Status: DISCONTINUED | OUTPATIENT
Start: 2024-06-19 | End: 2024-06-24 | Stop reason: HOSPADM

## 2024-06-19 RX ORDER — LEVOFLOXACIN 5 MG/ML
750 INJECTION, SOLUTION INTRAVENOUS
Status: DISCONTINUED | OUTPATIENT
Start: 2024-06-21 | End: 2024-06-20

## 2024-06-19 RX ORDER — IBUPROFEN 200 MG
24 TABLET ORAL
Status: DISCONTINUED | OUTPATIENT
Start: 2024-06-19 | End: 2024-06-24 | Stop reason: HOSPADM

## 2024-06-19 RX ORDER — TALC
6 POWDER (GRAM) TOPICAL NIGHTLY PRN
Status: DISCONTINUED | OUTPATIENT
Start: 2024-06-19 | End: 2024-06-24 | Stop reason: HOSPADM

## 2024-06-19 RX ORDER — NALOXONE HCL 0.4 MG/ML
0.02 VIAL (ML) INJECTION
Status: DISCONTINUED | OUTPATIENT
Start: 2024-06-19 | End: 2024-06-24 | Stop reason: HOSPADM

## 2024-06-19 RX ORDER — ENOXAPARIN SODIUM 100 MG/ML
30 INJECTION SUBCUTANEOUS EVERY 24 HOURS
Status: DISCONTINUED | OUTPATIENT
Start: 2024-06-19 | End: 2024-06-20

## 2024-06-19 RX ORDER — GUAIFENESIN 600 MG/1
1200 TABLET, EXTENDED RELEASE ORAL 2 TIMES DAILY
Status: DISCONTINUED | OUTPATIENT
Start: 2024-06-19 | End: 2024-06-24 | Stop reason: HOSPADM

## 2024-06-19 RX ORDER — HYDRALAZINE HYDROCHLORIDE 20 MG/ML
10 INJECTION INTRAMUSCULAR; INTRAVENOUS EVERY 8 HOURS PRN
Status: DISCONTINUED | OUTPATIENT
Start: 2024-06-19 | End: 2024-06-24 | Stop reason: HOSPADM

## 2024-06-19 RX ORDER — PAROXETINE HYDROCHLORIDE 20 MG/1
40 TABLET, FILM COATED ORAL NIGHTLY
Status: DISCONTINUED | OUTPATIENT
Start: 2024-06-19 | End: 2024-06-24 | Stop reason: HOSPADM

## 2024-06-19 RX ORDER — GLUCAGON 1 MG
1 KIT INJECTION
Status: DISCONTINUED | OUTPATIENT
Start: 2024-06-19 | End: 2024-06-24 | Stop reason: HOSPADM

## 2024-06-19 RX ORDER — ENOXAPARIN SODIUM 100 MG/ML
40 INJECTION SUBCUTANEOUS EVERY 24 HOURS
Status: DISCONTINUED | OUTPATIENT
Start: 2024-06-19 | End: 2024-06-19

## 2024-06-19 RX ORDER — LEVOFLOXACIN 5 MG/ML
500 INJECTION, SOLUTION INTRAVENOUS
Status: DISCONTINUED | OUTPATIENT
Start: 2024-06-20 | End: 2024-06-19

## 2024-06-19 RX ORDER — ONDANSETRON 8 MG/1
8 TABLET, ORALLY DISINTEGRATING ORAL EVERY 8 HOURS PRN
Status: DISCONTINUED | OUTPATIENT
Start: 2024-06-19 | End: 2024-06-24 | Stop reason: HOSPADM

## 2024-06-19 RX ORDER — ACETAMINOPHEN 325 MG/1
650 TABLET ORAL EVERY 8 HOURS PRN
Status: DISCONTINUED | OUTPATIENT
Start: 2024-06-19 | End: 2024-06-24 | Stop reason: HOSPADM

## 2024-06-19 RX ORDER — HALOPERIDOL 5 MG/1
5 TABLET ORAL NIGHTLY PRN
Status: DISCONTINUED | OUTPATIENT
Start: 2024-06-19 | End: 2024-06-24 | Stop reason: HOSPADM

## 2024-06-19 RX ORDER — OXYBUTYNIN CHLORIDE 5 MG/1
10 TABLET, EXTENDED RELEASE ORAL DAILY
Status: DISCONTINUED | OUTPATIENT
Start: 2024-06-19 | End: 2024-06-24 | Stop reason: HOSPADM

## 2024-06-19 RX ORDER — IPRATROPIUM BROMIDE AND ALBUTEROL SULFATE 2.5; .5 MG/3ML; MG/3ML
3 SOLUTION RESPIRATORY (INHALATION) EVERY 6 HOURS PRN
Status: DISCONTINUED | OUTPATIENT
Start: 2024-06-19 | End: 2024-06-24 | Stop reason: HOSPADM

## 2024-06-19 RX ORDER — TRAZODONE HYDROCHLORIDE 150 MG/1
75 TABLET ORAL NIGHTLY
COMMUNITY
Start: 2024-04-12

## 2024-06-19 RX ORDER — PANTOPRAZOLE SODIUM 40 MG/1
40 TABLET, DELAYED RELEASE ORAL DAILY
Status: DISCONTINUED | OUTPATIENT
Start: 2024-06-19 | End: 2024-06-24 | Stop reason: HOSPADM

## 2024-06-19 RX ORDER — SODIUM CHLORIDE 9 MG/ML
INJECTION, SOLUTION INTRAVENOUS CONTINUOUS
Status: DISCONTINUED | OUTPATIENT
Start: 2024-06-19 | End: 2024-06-22

## 2024-06-19 RX ORDER — LEVOFLOXACIN 5 MG/ML
750 INJECTION, SOLUTION INTRAVENOUS
Status: COMPLETED | OUTPATIENT
Start: 2024-06-19 | End: 2024-06-19

## 2024-06-19 RX ORDER — IBUPROFEN 200 MG
16 TABLET ORAL
Status: DISCONTINUED | OUTPATIENT
Start: 2024-06-19 | End: 2024-06-24 | Stop reason: HOSPADM

## 2024-06-19 RX ADMIN — GUAIFENESIN 1200 MG: 600 TABLET, EXTENDED RELEASE ORAL at 09:06

## 2024-06-19 RX ADMIN — SODIUM CHLORIDE 500 ML: 9 INJECTION, SOLUTION INTRAVENOUS at 11:06

## 2024-06-19 RX ADMIN — PANTOPRAZOLE SODIUM 40 MG: 40 TABLET, DELAYED RELEASE ORAL at 02:06

## 2024-06-19 RX ADMIN — PAROXETINE HYDROCHLORIDE 40 MG: 20 TABLET, FILM COATED ORAL at 09:06

## 2024-06-19 RX ADMIN — FLUTICASONE PROPIONATE 100 MCG: 50 SPRAY, METERED NASAL at 02:06

## 2024-06-19 RX ADMIN — SODIUM CHLORIDE: 9 INJECTION, SOLUTION INTRAVENOUS at 02:06

## 2024-06-19 RX ADMIN — OXYBUTYNIN CHLORIDE 10 MG: 5 TABLET, EXTENDED RELEASE ORAL at 02:06

## 2024-06-19 RX ADMIN — LEVOFLOXACIN 750 MG: 750 INJECTION, SOLUTION INTRAVENOUS at 11:06

## 2024-06-19 RX ADMIN — SODIUM CHLORIDE 2000 ML: 0.9 INJECTION, SOLUTION INTRAVENOUS at 10:06

## 2024-06-19 RX ADMIN — ENOXAPARIN SODIUM 30 MG: 30 INJECTION SUBCUTANEOUS at 04:06

## 2024-06-19 NOTE — PLAN OF CARE
Pt resting in bed .   Pt awake and alert . Hob elevated .   Vitals stable .   POC discussed .   Significant other at bedside   IV intact   Safety Measures in place   Chart check complete .   Will  continue to monitor .

## 2024-06-19 NOTE — PROGRESS NOTES
Pharmacist Renal Dose Adjustment Note    Merle Caro is a 68 y.o. female being treated with the medication enoxaparin.    Patient Data:    Vital Signs (Most Recent):  Temp: 97.3 °F (36.3 °C) (06/19/24 0947)  Pulse: 101 (06/19/24 1147)  Resp: 18 (06/19/24 1147)  BP: 116/78 (06/19/24 1147)  SpO2: 98 % (06/19/24 1147) Vital Signs (72h Range):  Temp:  [97.3 °F (36.3 °C)]   Pulse:  []   Resp:  [17-26]   BP: ()/(51-78)   SpO2:  [92 %-98 %]      Recent Labs   Lab 06/19/24  1026   CREATININE 1.9*     Serum creatinine: 1.9 mg/dL (H) 06/19/24 1026  Estimated creatinine clearance: 27.2 mL/min (A)    Medication: enoxaparin dose: 40 mg frequency daily will be changed to medication: enoxaparin dose: 30 mg frequency: daily, for CrCl < 30 mL/min.    Pharmacist's Name: Yuliet Arzate

## 2024-06-19 NOTE — ASSESSMENT & PLAN NOTE
--stable on room air, not on home O2  --continue home maintenance therapy  --Monitor clinically for respiratory distress, Q4H O2 sats   --Encourage light physical activity to maintain lung function

## 2024-06-19 NOTE — ASSESSMENT & PLAN NOTE
--urinalysis results suggestive of infection  --no recent prior culture data available for review  --s/p IV Levaquin in the ER- continue upon admission  --follow cultures  --monitor fever curve- Tylenol PRN
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
## 2024-06-19 NOTE — ASSESSMENT & PLAN NOTE
--ddx: infectious process vs medication induced  --Head CT: unremarkable  --TSH/T4 unremarkable, liver function normal   --holding home antihypertensives and sedating medications  --PT/OT consulted for eval, post acute therapy recs pending  --fall precautions, neuro checks  --admit to telemetry for cardiac monitoring

## 2024-06-19 NOTE — PHARMACY MED REC
"Admission Medication History     The home medication history was taken by Tyler Yanez.    You may go to "Admission" then "Reconcile Home Medications" tabs to review and/or act upon these items.     The home medication list has been updated by the Pharmacy department.   Please read ALL comments highlighted in yellow.   Please address this information as you see fit.    Feel free to contact us if you have any questions or require assistance.      Medications listed below were obtained from: Patient/family and Analytic software- [employer]  (Not in a hospital admission)      Tyler Yanez  [id number]    Current Outpatient Medications on File Prior to Encounter   Medication Sig Dispense Refill Last Dose    celecoxib (CELEBREX) 200 MG capsule Take 1 capsule (200 mg total) by mouth once daily. 90 capsule 3 6/18/2024    cholecalciferol, vitamin D3, (VITAMIN D3) 2,000 unit Cap Take 1 capsule by mouth every evening.   6/18/2024    clonazepam (KLONOPIN) 0.5 MG tablet Take 0.5 mg by mouth every evening.   6/18/2024    folic acid (FOLVITE) 1 MG tablet Take 1 tablet (1 mg total) by mouth once daily. (Patient taking differently: Take 1 mg by mouth every evening.) 90 tablet 3 6/18/2024    haloperidol (HALDOL) 5 MG tablet Take 5 mg by mouth every evening.   6/18/2024    lisinopriL 10 MG tablet Take 1 tablet (10 mg total) by mouth every evening. 90 tablet 3 6/18/2024    methocarbamoL (ROBAXIN) 750 MG Tab TAKE 1 TABLET(750 MG) BY MOUTH THREE TIMES DAILY AS NEEDED FOR MUSCLE SPASMS 90 tablet 3 6/18/2024    mirabegron (MYRBETRIQ) 50 mg Tb24 Take 1 tablet (50 mg total) by mouth once daily. 90 tablet 3 6/18/2024    pantoprazole (PROTONIX) 40 MG tablet Take 1 tablet (40 mg total) by mouth once daily. 90 tablet 3 6/18/2024    paroxetine (PAXIL) 40 MG tablet Take 40 mg by mouth every evening.  2 6/18/2024    pregabalin (LYRICA) 100 MG capsule TAKE 1 CAPSULE BY MOUTH TWICE DAILY 180 capsule 3 6/18/2024    traZODone (DESYREL) 150 MG " tablet Take 75 mg by mouth nightly.   6/18/2024    [DISCONTINUED] traZODone (DESYREL) 100 MG tablet Take 100 mg by mouth every evening.   6/18/2024    albuterol (PROVENTIL/VENTOLIN HFA) 90 mcg/actuation inhaler Inhale 4 puffs into the lungs every 4 (four) hours as needed for Wheezing. Rescue 18 g 0     cyanocobalamin, vitamin B-12, 1,000 mcg Subl Place 1,000 mcg under the tongue once daily. 90 tablet 3     furosemide (LASIX) 20 MG tablet Take 1 tablet (20 mg total) by mouth daily as needed (for leg swelling). 90 tablet 0     OXYGEN-AIR DELIVERY SYSTEMS MISC Inhale 2 L into the lungs as needed (O2 via Nasal Canula.).       simvastatin (ZOCOR) 40 MG tablet Take 1 tablet (40 mg total) by mouth every evening. (Patient not taking: Reported on 6/19/2024) 90 tablet 3 Not Taking                         .

## 2024-06-19 NOTE — ASSESSMENT & PLAN NOTE
--Cr bumped from baseline, GFR moderately decreased from previous lab draws  --will hold home lisinopril, lasix for now  --avoid nephrotoxic agents while hospitalized  --renally dose meds  --daily BMP to monitor renal fxn   --US renal ordered to rule out obstructive uropathy

## 2024-06-19 NOTE — ASSESSMENT & PLAN NOTE
--BP marginal, likely due to infection  --home medications include: lisinopril, lasix  --holding due to BRIAN  --PRN IV hydralazine 10mg Q6H for sBP > 175 mmHg  --[id number]

## 2024-06-19 NOTE — ED PROVIDER NOTES
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    The cardiac silhouette is normal in size. The hilar and mediastinal contours are unremarkable.    Bones are intact.                                       CT Head Without Contrast (Final result)  Result time 06/19/24 10:45:31      Final result by Tab Hernandez MD (06/19/24 10:45:31)                   Impression:      No definite acute intracranial CT abnormality on this motion degraded exam.    All CT scans at this facility are performed  using dose modulation techniques as appropriate to performed exam including the following:  automated exposure control; adjustment of mA and/or kV according to the patients size (this includes techniques or standardized protocols for targeted exams where dose is matched to indication/reason for exam: i.e. extremities or head);  iterative reconstruction technique.      Electronically signed by: Tab Hernandez  Date:    06/19/2024  Time:    10:45               Narrative:    EXAMINATION:  CT HEAD WITHOUT CONTRAST    CLINICAL HISTORY:  Mental status change, unknown cause;    TECHNIQUE:  Low dose axial CT images obtained throughout the head without intravenous contrast. Sagittal and coronal reconstructions were performed.    COMPARISON:  Multiple priors    FINDINGS:  Intracranial compartment:    Ventricles and sulci are normal in size  for age without evidence of hydrocephalus. No extra-axial blood or fluid collections.    Moderate microvascular ischemic change.  No parenchymal mass, hemorrhage, edema or major vascular distribution infarct.    Skull/extracranial contents (limited evaluation): No fracture. Mastoid air cells and paranasal sinuses are essentially clear.  Motion significantly degrades the exam.                                       The EKG was ordered, reviewed, and independently interpreted by the ED provider.  Interpretation time: 9:56  Rate: 121 BPM  Rhythm: sinus tachycardia  Interpretation: RBBB. No STEMI.         The Emergency Provider reviewed the vital signs and test results, which are outlined above.     ED Discussion     12:00 PM: Discussed case with Dr. Hyatt ([facility]). Dr. Hyatt agrees with current care and management of pt and accepts admission. Dr. Hyatt also request an US of the kidneys to rule out obstructive uropathy.  Admitting Service: [facility]  Admitting Physician: Dr. Hyatt  Admit to: Tele     12:08 PM: Re-evaluated pt. I have discussed test results, shared treatment plan, and the need for admission with patient and family at bedside. Pt and family express understanding at this time and agree with all information. All questions answered. Pt and family have no further questions or concerns at this time. Pt is ready for admit.        Medical Decision Making  DDX: 1. Stroke 2. Infection 3. Dehydration 4. Electrolyte dysfunction    ECG reviewed and no ischemic changes noted, CT head negative, CXR shows possible infiltrate, WBC 18,000; potassium 5.2, mag 1.4 and replaced, BRIAN noted, iv fluids given, lactic acid normal, procal mildly elevated, urine grossly abnormal, iv antibiotics initiated, UDS and remainder of labs otherwise at baseline, hospital med consulted for admission.     Amount and/or Complexity of Data Reviewed  Independent Historian: spouse  Labs: ordered. Decision-making details  documented in ED Course.  Radiology: ordered. Decision-making details documented in ED Course.  ECG/medicine tests: ordered and independent interpretation performed. Decision-making details documented in ED Course.    Risk  Prescription drug management.  Decision regarding hospitalization.  Diagnosis or treatment significantly limited by social determinants of health.       Additional MDM:   Sepsis:   This patient does have evidence of infective focus  My overall impression is sepsis.  Source: Urinary Tract  Antibiotics given- Antibiotics     Patient Encounter Information Not Found      Latest lactate reviewed- 1.1  Organ dysfunction indicated by Encephalopathy    Fluid challenge Actual Body weight- Patient will receive 30ml/kg actual body weight to calculate fluid bolus for treatment of septic shock.     Post- resuscitation assessment No - Post resuscitation assessment not needed       Will Not start Pressors- Levophed for MAP of 65  Source control achieved by: iv rocephin                ED Medication(s):  Medications   sodium chloride 0.9% flush 10 mL (has no administration in time range)   naloxone 0.4 mg/mL injection 0.02 mg (has no administration in time range)   glucose chewable tablet 16 g (has no administration in time range)   glucose chewable tablet 24 g (has no administration in time range)   glucagon (human recombinant) injection 1 mg (has no administration in time range)   ondansetron disintegrating tablet 8 mg (has no administration in time range)   albuterol-ipratropium 2.5 mg-0.5 mg/3 mL nebulizer solution 3 mL (has no administration in time range)   acetaminophen tablet 650 mg (650 mg Oral Given 6/22/24 2036)   melatonin tablet 6 mg (6 mg Oral Given 6/23/24 0055)   dextrose 10% bolus 125 mL 125 mL (has no administration in time range)   dextrose 10% bolus 250 mL 250 mL (has no administration in time range)   haloperidoL tablet 5 mg (5 mg Oral Given 6/22/24 2148)   oxybutynin 24 hr tablet 10 mg (10  mg Oral Given 6/23/24 1052)   paroxetine tablet 40 mg (40 mg Oral Given 6/22/24 2148)   hydrALAZINE injection 10 mg (has no administration in time range)   pantoprazole EC tablet 40 mg (40 mg Oral Given 6/23/24 1053)   guaiFENesin 12 hr tablet 1,200 mg (1,200 mg Oral Given 6/23/24 1053)   fluticasone propionate 50 mcg/actuation nasal spray 100 mcg (100 mcg Each Nostril Given 6/23/24 1053)   enoxaparin injection 40 mg (40 mg Subcutaneous Given 6/22/24 1624)   levoFLOXacin 750 mg/150 mL IVPB 750 mg (0 mg Intravenous Stopped 6/22/24 1613)   clonazePAM tablet 0.5 mg (0.5 mg Oral Given 6/22/24 2148)   pregabalin capsule 100 mg (100 mg Oral Given 6/23/24 1053)   methocarbamoL tablet 750 mg (750 mg Oral Given 6/22/24 1441)   trazodone split tablet 75 mg (75 mg Oral Given 6/22/24 2149)   magnesium oxide tablet 400 mg (400 mg Oral Given 6/23/24 1053)   sodium chloride 0.9% bolus 2,000 mL 2,000 mL (0 mLs Intravenous Stopped 6/19/24 1154)   sodium chloride 0.9% bolus 500 mL 500 mL (0 mLs Intravenous Stopped 6/19/24 1255)   levoFLOXacin 750 mg/150 mL IVPB 750 mg (0 mg Intravenous Stopped 6/19/24 1323)   magnesium sulfate 2g in water 50mL IVPB (premix) (0 g Intravenous Stopped 6/22/24 1822)       Current Discharge Medication List                  Scribe Attestation:   Scribe #1: I performed the above scribed service and the documentation accurately describes the services I performed. I attest to the accuracy of the note.     Attending:   Physician Attestation Statement for Scribe #1: I, Tara Felix MD, personally performed the services described in this documentation, as scribed by Fabi Mast, in my presence, and it is both accurate and complete.           Clinical Impression       ICD-10-CM ICD-9-CM   1. Sepsis without septic shock  A41.9 038.9     995.91   2. Weakness  R53.1 780.79   3. Chest pain  R07.9 786.50   4. Acute renal failure  N17.9 584.9   5. Acute cystitis without hematuria  N30.00 595.0   6.  Intravascular volume depletion  E86.1 276.52   7. Encephalopathy acute  G93.40 348.30   8. Hypomagnesemia  E83.42 275.2       Disposition:   Disposition: Admitted  Condition: Serious        Tara Felix MD  06/23/24 1322

## 2024-06-19 NOTE — ED NOTES
Per  pt has been lethargic, generalized weakness, and has had slurred speech x 4 days. Pt arrives with left sided facial droop, slurred speech, and generalized weakness. Pt moving all extremities, but weak in all extremities. Pt altered, A+O x self and place only, disoriented to time and situation. Pt baseline is A+O x4 per
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
## 2024-06-19 NOTE — ASSESSMENT & PLAN NOTE
This patient does have evidence of infective focus  My overall impression is sepsis.  Source: Urinary Tract  Antibiotics given-   Antibiotics (72h ago, onward)      Start     Stop Route Frequency Ordered    06/21/24 1200  levoFLOXacin 750 mg/150 mL IVPB 750 mg         -- IV Every 48 hours (non-standard times) 06/19/24 1212    06/19/24 1130  levoFLOXacin 750 mg/150 mL IVPB 750 mg         06/19/24 2344 IV ED 1 Time 06/19/24 1130          Latest lactate reviewed-  Recent Labs   Lab 06/19/24  1026   LACTATE 1.4   Organ dysfunction indicated by Acute kidney injury and Encephalopathy  Fluid challenge Actual Body weight- Patient will receive 30ml/kg actual body weight to calculate fluid bolus for treatment of septic shock.   Post- resuscitation assessment No - Post resuscitation assessment not needed   Will Not start Pressors- Levophed for MAP of 65  Source control achieved by: IV Levaquin

## 2024-06-19 NOTE — HPI
Merle Caro is a 68-year-old woman with a past medical history of hypertension, chronic obstructive pulmonary disease, asthma, arthritis, and gastroesophageal reflux disease who presented to the Emergency Department for evaluation of altered mental status, which her  stated began four days prior to arrival. Her  reported that her speech had become increasingly slurred and she had developed a left-sided facial droop. Additionally, she experienced a decreased appetite and increasing weakness over the past month, culminating in her being bedridden for the past four days.    Upon arrival in the ER, the patient was found to be tachycardic. Abnormal lab values included a WBC of 18.05, platelets at 599, creatinine at 1.9, eGFR of 29, and procalcitonin at 0.48. A urinalysis was notable for brown color, cloudy appearance, 2+ protein, 1+ occult blood, 3+ leukocytes, 15 RBCs, 40 WBCs, and many bacteria. The chest X-ray indicated superimposed left-sided edema or infection on a background of interstitial lung disease. A CT head without contrast showed no definite acute intracranial abnormality. Sepsis protocol initiated. Received sepsis bolus fluids and received IV Levaquin x 1 dose. Hospital Medicine called for admission.

## 2024-06-19 NOTE — PROGRESS NOTES
Pharmacist Renal Dose Adjustment Note    Merle Caro is a 68 y.o. female being treated with the medication levofloxacin.    Patient Data:    Vital Signs (Most Recent):  Temp: 97.3 °F (36.3 °C) (06/19/24 0947)  Pulse: 101 (06/19/24 1203)  Resp: 18 (06/19/24 1147)  BP: (!) 102/55 (06/19/24 1203)  SpO2: 96 % (06/19/24 1203) Vital Signs (72h Range):  Temp:  [97.3 °F (36.3 °C)]   Pulse:  []   Resp:  [17-26]   BP: ()/(51-78)   SpO2:  [92 %-98 %]      Recent Labs   Lab 06/19/24  1026   CREATININE 1.9*     Serum creatinine: 1.9 mg/dL (H) 06/19/24 1026  Estimated creatinine clearance: 27.2 mL/min (A)    Medication: levofloxacin dose: 500 mg frequency daily will be changed to medication: levofloxacin dose: 750 mg frequency: every other day.    Pharmacist's Name: Yuliet Arzate

## 2024-06-20 PROBLEM — N13.30 HYDRONEPHROSIS: Status: ACTIVE | Noted: 2024-06-20

## 2024-06-20 LAB
ACINETOBACTER CALCOACETICUS/BAUMANNII COMPLEX: NOT DETECTED
ANION GAP SERPL CALC-SCNC: 8 MMOL/L (ref 8–16)
BACTEROIDES FRAGILIS: DETECTED
BASOPHILS # BLD AUTO: 0.06 K/UL (ref 0–0.2)
BASOPHILS NFR BLD: 0.5 % (ref 0–1.9)
BUN SERPL-MCNC: 33 MG/DL (ref 8–23)
CALCIUM SERPL-MCNC: 8.4 MG/DL (ref 8.7–10.5)
CANDIDA ALBICANS: NOT DETECTED
CANDIDA AURIS: NOT DETECTED
CANDIDA GLABRATA: NOT DETECTED
CANDIDA KRUSEI: NOT DETECTED
CANDIDA PARAPSILOSIS: NOT DETECTED
CANDIDA TROPICALIS: NOT DETECTED
CHLORIDE SERPL-SCNC: 108 MMOL/L (ref 95–110)
CO2 SERPL-SCNC: 17 MMOL/L (ref 23–29)
CREAT SERPL-MCNC: 0.8 MG/DL (ref 0.5–1.4)
CRYPTOCOCCUS NEOFORMANS/GATTII: NOT DETECTED
CTX-M GENE (ESBL PRODUCER): ABNORMAL
DIFFERENTIAL METHOD BLD: ABNORMAL
ENTEROBACTER CLOACAE COMPLEX: NOT DETECTED
ENTEROBACTERALES: NOT DETECTED
ENTEROCOCCUS FAECALIS: NOT DETECTED
ENTEROCOCCUS FAECIUM: NOT DETECTED
EOSINOPHIL # BLD AUTO: 0.5 K/UL (ref 0–0.5)
EOSINOPHIL NFR BLD: 4 % (ref 0–8)
ERYTHROCYTE [DISTWIDTH] IN BLOOD BY AUTOMATED COUNT: 13.4 % (ref 11.5–14.5)
ESCHERICHIA COLI: NOT DETECTED
EST. GFR  (NO RACE VARIABLE): >60 ML/MIN/1.73 M^2
GLUCOSE SERPL-MCNC: 110 MG/DL (ref 70–110)
HAEMOPHILUS INFLUENZAE: NOT DETECTED
HCT VFR BLD AUTO: 32.4 % (ref 37–48.5)
HGB BLD-MCNC: 10.4 G/DL (ref 12–16)
IMM GRANULOCYTES # BLD AUTO: 0.19 K/UL (ref 0–0.04)
IMM GRANULOCYTES NFR BLD AUTO: 1.6 % (ref 0–0.5)
IMP GENE (CARBAPENEM RESISTANT): ABNORMAL
KLEBSIELLA AEROGENES: NOT DETECTED
KLEBSIELLA OXYTOCA: NOT DETECTED
KLEBSIELLA PNEUMONIAE GROUP: NOT DETECTED
KPC RESISTANCE GENE (CARBAPENEM): ABNORMAL
LISTERIA MONOCYTOGENES: NOT DETECTED
LYMPHOCYTES # BLD AUTO: 0.8 K/UL (ref 1–4.8)
LYMPHOCYTES NFR BLD: 6.7 % (ref 18–48)
MAGNESIUM SERPL-MCNC: 1.8 MG/DL (ref 1.6–2.6)
MCH RBC QN AUTO: 28.9 PG (ref 27–31)
MCHC RBC AUTO-ENTMCNC: 32.1 G/DL (ref 32–36)
MCR-1: ABNORMAL
MCV RBC AUTO: 90 FL (ref 82–98)
MEC A/C AND MREJ (MRSA): ABNORMAL
MEC A/C: ABNORMAL
MONOCYTES # BLD AUTO: 1.2 K/UL (ref 0.3–1)
MONOCYTES NFR BLD: 10.5 % (ref 4–15)
NDM GENE (CARBAPENEM RESISTANT): ABNORMAL
NEISSERIA MENINGITIDIS: NOT DETECTED
NEUTROPHILS # BLD AUTO: 9.1 K/UL (ref 1.8–7.7)
NEUTROPHILS NFR BLD: 76.7 % (ref 38–73)
NRBC BLD-RTO: 0 /100 WBC
OXA-48-LIKE (CARBAPENEM RESISTANT): ABNORMAL
PLATELET # BLD AUTO: 442 K/UL (ref 150–450)
PMV BLD AUTO: 8.6 FL (ref 9.2–12.9)
POTASSIUM SERPL-SCNC: 4.8 MMOL/L (ref 3.5–5.1)
PROTEUS SPECIES: NOT DETECTED
PSEUDOMONAS AERUGINOSA: NOT DETECTED
RBC # BLD AUTO: 3.6 M/UL (ref 4–5.4)
SALMONELLA SP: NOT DETECTED
SERRATIA MARCESCENS: NOT DETECTED
SODIUM SERPL-SCNC: 133 MMOL/L (ref 136–145)
STAPHYLOCOCCUS AUREUS: NOT DETECTED
STAPHYLOCOCCUS EPIDERMIDIS: NOT DETECTED
STAPHYLOCOCCUS LUGDUNESIS: NOT DETECTED
STAPHYLOCOCCUS SPECIES: NOT DETECTED
STENOTROPHOMONAS MALTOPHILIA: NOT DETECTED
STREPTOCOCCUS AGALACTIAE: NOT DETECTED
STREPTOCOCCUS PNEUMONIAE: NOT DETECTED
STREPTOCOCCUS PYOGENES: NOT DETECTED
STREPTOCOCCUS SPECIES: NOT DETECTED
TROPONIN I SERPL DL<=0.01 NG/ML-MCNC: 0.01 NG/ML (ref 0–0.03)
VAN A/B (VRE GENE): ABNORMAL
VIM GENE (CARBAPENEM RESISTANT): ABNORMAL
WBC # BLD AUTO: 11.83 K/UL (ref 3.9–12.7)

## 2024-06-20 PROCEDURE — 99231 SBSQ HOSP IP/OBS SF/LOW 25: CPT | Mod: ,,, | Performed by: UROLOGY

## 2024-06-20 PROCEDURE — 97166 OT EVAL MOD COMPLEX 45 MIN: CPT

## 2024-06-20 PROCEDURE — 36415 COLL VENOUS BLD VENIPUNCTURE: CPT | Performed by: EMERGENCY MEDICINE

## 2024-06-20 PROCEDURE — 25000003 PHARM REV CODE 250: Performed by: NURSE PRACTITIONER

## 2024-06-20 PROCEDURE — 84484 ASSAY OF TROPONIN QUANT: CPT | Performed by: EMERGENCY MEDICINE

## 2024-06-20 PROCEDURE — 83735 ASSAY OF MAGNESIUM: CPT | Performed by: STUDENT IN AN ORGANIZED HEALTH CARE EDUCATION/TRAINING PROGRAM

## 2024-06-20 PROCEDURE — 97530 THERAPEUTIC ACTIVITIES: CPT

## 2024-06-20 PROCEDURE — 85025 COMPLETE CBC W/AUTO DIFF WBC: CPT | Performed by: STUDENT IN AN ORGANIZED HEALTH CARE EDUCATION/TRAINING PROGRAM

## 2024-06-20 PROCEDURE — 51702 INSERT TEMP BLADDER CATH: CPT

## 2024-06-20 PROCEDURE — 80048 BASIC METABOLIC PNL TOTAL CA: CPT | Performed by: STUDENT IN AN ORGANIZED HEALTH CARE EDUCATION/TRAINING PROGRAM

## 2024-06-20 PROCEDURE — 21400001 HC TELEMETRY ROOM

## 2024-06-20 PROCEDURE — 25000242 PHARM REV CODE 250 ALT 637 W/ HCPCS: Performed by: STUDENT IN AN ORGANIZED HEALTH CARE EDUCATION/TRAINING PROGRAM

## 2024-06-20 PROCEDURE — 97535 SELF CARE MNGMENT TRAINING: CPT

## 2024-06-20 PROCEDURE — 63600175 PHARM REV CODE 636 W HCPCS: Performed by: SPECIALIST

## 2024-06-20 PROCEDURE — 25000003 PHARM REV CODE 250: Performed by: STUDENT IN AN ORGANIZED HEALTH CARE EDUCATION/TRAINING PROGRAM

## 2024-06-20 PROCEDURE — 97162 PT EVAL MOD COMPLEX 30 MIN: CPT

## 2024-06-20 RX ORDER — LEVOFLOXACIN 5 MG/ML
750 INJECTION, SOLUTION INTRAVENOUS
Status: DISCONTINUED | OUTPATIENT
Start: 2024-06-20 | End: 2024-06-24

## 2024-06-20 RX ORDER — ENOXAPARIN SODIUM 100 MG/ML
40 INJECTION SUBCUTANEOUS EVERY 24 HOURS
Status: DISCONTINUED | OUTPATIENT
Start: 2024-06-20 | End: 2024-06-24 | Stop reason: HOSPADM

## 2024-06-20 RX ORDER — CLONAZEPAM 0.5 MG/1
0.5 TABLET ORAL NIGHTLY
Status: DISCONTINUED | OUTPATIENT
Start: 2024-06-20 | End: 2024-06-24 | Stop reason: HOSPADM

## 2024-06-20 RX ADMIN — ENOXAPARIN SODIUM 40 MG: 40 INJECTION SUBCUTANEOUS at 04:06

## 2024-06-20 RX ADMIN — OXYBUTYNIN CHLORIDE 10 MG: 5 TABLET, EXTENDED RELEASE ORAL at 08:06

## 2024-06-20 RX ADMIN — SODIUM CHLORIDE: 9 INJECTION, SOLUTION INTRAVENOUS at 02:06

## 2024-06-20 RX ADMIN — HALOPERIDOL 5 MG: 5 TABLET ORAL at 10:06

## 2024-06-20 RX ADMIN — PANTOPRAZOLE SODIUM 40 MG: 40 TABLET, DELAYED RELEASE ORAL at 08:06

## 2024-06-20 RX ADMIN — FLUTICASONE PROPIONATE 100 MCG: 50 SPRAY, METERED NASAL at 08:06

## 2024-06-20 RX ADMIN — PAROXETINE HYDROCHLORIDE 40 MG: 20 TABLET, FILM COATED ORAL at 09:06

## 2024-06-20 RX ADMIN — LEVOFLOXACIN 750 MG: 750 INJECTION, SOLUTION INTRAVENOUS at 01:06

## 2024-06-20 RX ADMIN — CLONAZEPAM 0.5 MG: 0.5 TABLET ORAL at 09:06

## 2024-06-20 RX ADMIN — GUAIFENESIN 1200 MG: 600 TABLET, EXTENDED RELEASE ORAL at 09:06

## 2024-06-20 RX ADMIN — SODIUM CHLORIDE: 9 INJECTION, SOLUTION INTRAVENOUS at 01:06

## 2024-06-20 RX ADMIN — ACETAMINOPHEN 650 MG: 325 TABLET ORAL at 09:06

## 2024-06-20 RX ADMIN — GUAIFENESIN 1200 MG: 600 TABLET, EXTENDED RELEASE ORAL at 08:06

## 2024-06-20 NOTE — PLAN OF CARE
PT evaluation complete. Zulma sup>sit; CGA scooting EOB; SBA scooting in bed; scooting HOB via drawsheet transfer totalA. PT d/c rec: moderate intensity therapy
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
## 2024-06-20 NOTE — PLAN OF CARE
Pt resting in bed .   Pt awake and alert . Hob elevated .   Vitals stable .   POC discussed .   ABT's infusing   IV intact   Safety Measures in place   Chart check complete .   Will  continue to monitor .

## 2024-06-20 NOTE — PROGRESS NOTES
Pharmacist Renal Dose Adjustment Note    Merle Caro is a 68 y.o. female being treated with the medication enoxaparin and levofloxacin.    Patient Data:    Vital Signs (Most Recent):  Temp: 98.3 °F (36.8 °C) (06/20/24 1251)  Pulse: 102 (06/20/24 1251)  Resp: 16 (06/20/24 1251)  BP: (!) 118/55 (06/20/24 1251)  SpO2: 96 % (06/20/24 1251) Vital Signs (72h Range):  Temp:  [97.3 °F (36.3 °C)-98.8 °F (37.1 °C)]   Pulse:  []   Resp:  [16-26]   BP: ()/(51-78)   SpO2:  [92 %-99 %]      Recent Labs   Lab 06/19/24  1026 06/20/24  0441   CREATININE 1.9* 0.8     Serum creatinine: 0.8 mg/dL 06/20/24 0441  Estimated creatinine clearance: 61.7 mL/min    Enoxaparin 30 mg every 24 hours was adjusted to 40 mg every 24 hours according to [facility] renal dose adjustment policy  for CrCl >30.    Levofloxacin 750 mg every 48 hours was adjusted to 750 mg every 24 hours according to [facility] renal dose adjustment policy for CrCl >49.      Pharmacist's Name: Ly Licona, PharmD 6/20/2024 1:13 PM  Pharmacist's Extension: [phone]
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
## 2024-06-20 NOTE — PLAN OF CARE
[facility] - Telemetry (Hospital)  Initial Discharge Assessment       Primary Care Provider: Marck Major MD    Admission Diagnosis: Weakness [R53.1]  Acute renal failure [N17.9]  Encephalopathy acute [G93.40]  Intravascular volume depletion [E86.1]  Acute cystitis without hematuria [N30.00]  Chest pain [R07.9]  Sepsis without septic shock [A41.9]    Admission Date: 6/19/2024  Expected Discharge Date:     Transition of Care Barriers: None    Payor: MEDICARE / Plan: MEDICARE PART A & B / Product Type: Government /     Extended Emergency Contact Information  Primary Emergency Contact: Irving Caro  Address: [address] [address] [facility]  Home Phone: [phone]  Mobile Phone: [phone]  Relation: Spouse    Discharge Plan A: Home, Community Services, Home with family         [id number] - [place], [place] - [address] AT SEC OF [facility] [address]  [address]  [place] [address]  Phone: [phone] Fax: [phone]      Initial Assessment (most recent)       Adult Discharge Assessment - 06/20/24 1123          Discharge Assessment    Assessment Type Discharge Planning Assessment     Confirmed/corrected address, phone number and insurance Yes     Confirmed Demographics Correct on Facesheet     Source of Information patient;family     Communicated YELENA with patient/caregiver Date not available/Unable to determine     Reason For Admission Sepsis w/o septic shock     People in Home spouse     Facility Arrived From: Home     Do you expect to return to your current living situation? Yes     Do you have help at home or someone to help you manage your care at home? Yes     Who are your caregiver(s) and their phone number(s)? Irving Cook     Prior to hospitilization cognitive status: Alert/Oriented     Current cognitive status: Alert/Oriented     Walking or Climbing Stairs Difficulty yes     Walking or Climbing Stairs ambulation difficulty, requires  equipment     Mobility Management walker, rollator and w/c     Dressing/Bathing Difficulty yes     Dressing/Bathing bathing difficulty, requires equipment     Dressing/Bathing Management shower chair, raised toliet seat     Equipment Currently Used at Home wheelchair;walker, rolling;rollator;raised toilet     Readmission within 30 days? No     Patient currently being followed by outpatient case management? No     Do you take prescription medications? Yes     Do you have prescription coverage? Yes     Do you have any problems affording any of your prescribed medications? No     Is the patient taking medications as prescribed? yes     Who is going to help you get home at discharge? Pt's spouse     How do you get to doctors appointments? family or friend will provide     Are you on dialysis? No     Do you take coumadin? No     Discharge Plan A Home;Community Services;Home with family     DME Needed Upon Discharge  none     Discharge Plan discussed with: Patient;Spouse/sig other     Transition of Care Barriers None                   SW met with patient and spouseIrving, at bedside to complete discharge assessment. Pt and spouse live together. Spouse is help at home and assists with transport needs. Pt has a walker, w/c and rollator for ambulation assistance. Pt has a shower chair and raised toilet seat. Pt not current with home health care but spouse voiced preference for outpatient therapy at [facility] pt has done therapy there before. SW to notify MD prior to DC.    Pt's whiteboard updated with CM contact and anticipated discharge disposition. SW to remain available as needed.
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
## 2024-06-20 NOTE — PLAN OF CARE
A243/A243 DIAMOND Bloom RACHELLE Caro is a 68 y.o.female admitted on 6/19/2024 for Sepsis without septic shock   Code Status: Full Code MRN: [id number]   Review of patient's allergies indicates:   Allergen Reactions    Codeine Shortness Of Breath    Keflex [cephalexin] Shortness Of Breath    Shellfish containing products Nausea And Vomiting    Flu vaccine ts 2011-12(18 yr+) Hives    Sucralfate Other (See Comments) and Hives     Past Medical History:   Diagnosis Date    Arthritis     Asthma     COPD (chronic obstructive pulmonary disease)     Depression     GERD (gastroesophageal reflux disease)     Hypertension     Tobacco abuse       PRN meds    acetaminophen, 650 mg, Q8H PRN  albuterol-ipratropium, 3 mL, Q6H PRN  dextrose 10%, 12.5 g, PRN  dextrose 10%, 25 g, PRN  glucagon (human recombinant), 1 mg, PRN  glucose, 16 g, PRN  glucose, 24 g, PRN  haloperidoL, 5 mg, Nightly PRN  hydrALAZINE, 10 mg, Q8H PRN  melatonin, 6 mg, Nightly PRN  naloxone, 0.02 mg, PRN  ondansetron, 8 mg, Q8H PRN  sodium chloride 0.9%, 10 mL, Q12H PRN      Chart check completed. Will continue plan of care.      Orientation: disoriented to, situation, time, place  [place] Coma Scale Score: 13     Lead Monitored: Lead II Rhythm: sinus tachycardia    Cardiac/Telemetry Box Number: [id number]       Diet Soft & Bite Sized (IDDSI Level 6)  Voiding Characteristics: ureteral catheter  Juan M Score: 17  Fall Risk Score: 14  Accucheck []   Freq?      Lines/Drains/Airways       Drain  Duration                  Urethral Catheter 06/19/24 1930 <1 day              Peripheral Intravenous Line  Duration                  Midline Catheter Insertion/Assessment  - Single Lumen 10/21/22 0245 Left basilic vein (medial side of arm) other (see comments) 608 days         Peripheral IV - Single Lumen 06/19/24 1014 20 G Left Antecubital <1 day         Peripheral IV - Single Lumen 06/19/24 1014 20 G Right Antecubital <1 day

## 2024-06-20 NOTE — PLAN OF CARE
OT evaluation complete. Pt completed sup>sit with Min A. Pt completed anterior scoot to EOB with CGA. Pt sat EOB for ~10 minutes with CGA. Pt completed scoot to HOB with total A. OT recommending moderate intensity intervention at d/c.

## 2024-06-20 NOTE — SUBJECTIVE & OBJECTIVE
Interval History: see HPI    Review of Systems   Constitutional:  Negative for chills, fatigue and fever.   HENT:  Negative for congestion, postnasal drip, rhinorrhea and sore throat.    Eyes:  Negative for pain and visual disturbance.   Respiratory:  Negative for cough, chest tightness, shortness of breath and wheezing.    Cardiovascular:  Negative for chest pain, palpitations and leg swelling.   Gastrointestinal:  Negative for abdominal distention, abdominal pain, constipation, diarrhea, nausea and vomiting.   Genitourinary:  Negative for difficulty urinating, flank pain and hematuria.   Musculoskeletal:  Negative for arthralgias, back pain, gait problem and joint swelling.   Skin:  Negative for pallor and rash.   Neurological:  Negative for dizziness, syncope and weakness.   Psychiatric/Behavioral:  Negative for confusion, decreased concentration and suicidal ideas.      Objective:     Vital Signs (Most Recent):  Temp: 98.5 °F (36.9 °C) (06/20/24 1701)  Pulse: 107 (06/20/24 1701)  Resp: 16 (06/20/24 1701)  BP: 126/72 (06/20/24 1701)  SpO2: 100 % (06/20/24 1701) Vital Signs (24h Range):  Temp:  [98.3 °F (36.8 °C)-98.8 °F (37.1 °C)] 98.5 °F (36.9 °C)  Pulse:  [] 107  Resp:  [16-20] 16  SpO2:  [93 %-100 %] 100 %  BP: (107-149)/(55-72) 126/72     Weight: 73.1 kg (161 lb 2.5 oz)  Body mass index is 31.47 kg/m².    Intake/Output Summary (Last 24 hours) at 6/20/2024 1858  Last data filed at 6/20/2024 1543  Gross per 24 hour   Intake 2665.09 ml   Output 1950 ml   Net 715.09 ml         Physical Exam  Vitals reviewed.   Constitutional:       General: She is not in acute distress.     Appearance: Normal appearance. She is normal weight. She is not ill-appearing or toxic-appearing.   HENT:      Head: Normocephalic and atraumatic.      Nose: Nose normal. No congestion or rhinorrhea.   Eyes:      Extraocular Movements: Extraocular movements intact.      Conjunctiva/sclera: Conjunctivae normal.      Pupils: Pupils are  equal, round, and reactive to light.   Cardiovascular:      Rate and Rhythm: Normal rate and regular rhythm.      Pulses: Normal pulses.      Heart sounds: No murmur heard.  Pulmonary:      Effort: Pulmonary effort is normal. No respiratory distress.      Breath sounds: Normal breath sounds. No wheezing.   Abdominal:      General: Abdomen is flat. Bowel sounds are normal. There is distension (mild).      Palpations: Abdomen is soft.      Tenderness: There is no abdominal tenderness. There is no guarding or rebound.   Musculoskeletal:         General: No swelling, tenderness or deformity. Normal range of motion.      Cervical back: Normal range of motion and neck supple. No rigidity.   Skin:     General: Skin is warm and dry.      Capillary Refill: Capillary refill takes less than 2 seconds.      Coloration: Skin is not pale.   Neurological:      General: No focal deficit present.      Mental Status: She is alert and oriented to person, place, and time. Mental status is at baseline.      Motor: No weakness.      Gait: Gait normal.   Psychiatric:         Mood and Affect: Mood normal.         Behavior: Behavior normal.         Thought Content: Thought content normal.             Significant Labs: All pertinent labs within the past 24 hours have been reviewed.  BMP:   Recent Labs   Lab 06/20/24  0441      *   K 4.8      CO2 17*   BUN 33*   CREATININE 0.8   CALCIUM 8.4*   MG 1.8     CBC:   Recent Labs   Lab 06/19/24  1026 06/20/24  0442   WBC 18.05* 11.83   HGB 12.2 10.4*   HCT 36.6* 32.4*   * 442     Magnesium:   Recent Labs   Lab 06/19/24  1026 06/20/24  0441   MG 2.4 1.8       Significant Imaging: I have reviewed all pertinent imaging results/findings within the past 24 hours.

## 2024-06-20 NOTE — HOSPITAL COURSE
Merle Caro is a 68 year old female admitted for Sepsis secondary to UTI. Mentation is back at basline per  at bedside. Blood cultures x 2 showing gram negative rods. Urine culture pending. Bilateral hydronephrosis left greater than right per imaging. Leija catheter inserted. Renal function much improved post placement. Urology following.     06/21/24  Blood cultures x 2 and urine cultures showing gram negative rods. Currently on IV Levaquin. Once final sensitivities for cultures have resulted, will consult ID. Leukocytosis and BRIAN have resolved. Hematuria yesterday. Urology following -- with development of hematuria, will need to continue monitoring need for continuous bladder irrigation. In the meantime recommend hand irrigation with 60 cc cath tip syringe. Continue antibiotics as hematuria is likely secondary to hemorrhagic cystitis. PT/OT following, recommending skilled facility once medically stable for discharge. Patient adamantly refuses to go to SNF upon discharge. She has opted for home health.   6/22 +bacteremia. Infectious disease consulted. Urology following. Continue intravenous levaquin. Urine culture growing ecoli. Blood culture growing bacteroides.  6/23 repeat blood culture negative growth to date x 1 day. Complains of abdominal pain with passing flatus.       6/24  Repeat blood culture negative growth to date x 2 days. After discussing with infectious disease, ok to discharge home on po antibiotic(s) for acute cystitis and bacteremia. Patient has bowel regimen at home consisting of miralax, stool softener and linzess.     No acute distress. No respiratory distress. On room air. Abnormal dentition. Alert. At baseline mentation. Leija.  at bedside    Patient seen and evaluated by me. Patient was determined to be suitable for discharge. Patient deemed stable for discharge to home with homehealth physical/occupational therapy and nurse practitioner to visit home program.
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
## 2024-06-21 LAB
ANION GAP SERPL CALC-SCNC: 8 MMOL/L (ref 8–16)
BASOPHILS # BLD AUTO: 0.05 K/UL (ref 0–0.2)
BASOPHILS NFR BLD: 0.6 % (ref 0–1.9)
BUN SERPL-MCNC: 13 MG/DL (ref 8–23)
CALCIUM SERPL-MCNC: 8 MG/DL (ref 8.7–10.5)
CHLORIDE SERPL-SCNC: 102 MMOL/L (ref 95–110)
CO2 SERPL-SCNC: 19 MMOL/L (ref 23–29)
CREAT SERPL-MCNC: 0.8 MG/DL (ref 0.5–1.4)
DIFFERENTIAL METHOD BLD: ABNORMAL
EOSINOPHIL # BLD AUTO: 0.6 K/UL (ref 0–0.5)
EOSINOPHIL NFR BLD: 6.4 % (ref 0–8)
ERYTHROCYTE [DISTWIDTH] IN BLOOD BY AUTOMATED COUNT: 13.5 % (ref 11.5–14.5)
EST. GFR  (NO RACE VARIABLE): >60 ML/MIN/1.73 M^2
GLUCOSE SERPL-MCNC: 110 MG/DL (ref 70–110)
HCT VFR BLD AUTO: 31.1 % (ref 37–48.5)
HGB BLD-MCNC: 9.9 G/DL (ref 12–16)
IMM GRANULOCYTES # BLD AUTO: 0.17 K/UL (ref 0–0.04)
IMM GRANULOCYTES NFR BLD AUTO: 2 % (ref 0–0.5)
LYMPHOCYTES # BLD AUTO: 1 K/UL (ref 1–4.8)
LYMPHOCYTES NFR BLD: 11.2 % (ref 18–48)
MAGNESIUM SERPL-MCNC: 1.4 MG/DL (ref 1.6–2.6)
MCH RBC QN AUTO: 29.3 PG (ref 27–31)
MCHC RBC AUTO-ENTMCNC: 31.8 G/DL (ref 32–36)
MCV RBC AUTO: 92 FL (ref 82–98)
MONOCYTES # BLD AUTO: 0.9 K/UL (ref 0.3–1)
MONOCYTES NFR BLD: 10.7 % (ref 4–15)
NEUTROPHILS # BLD AUTO: 6 K/UL (ref 1.8–7.7)
NEUTROPHILS NFR BLD: 69.1 % (ref 38–73)
NRBC BLD-RTO: 0 /100 WBC
PLATELET # BLD AUTO: 352 K/UL (ref 150–450)
PMV BLD AUTO: 8.6 FL (ref 9.2–12.9)
POTASSIUM SERPL-SCNC: 4.5 MMOL/L (ref 3.5–5.1)
RBC # BLD AUTO: 3.38 M/UL (ref 4–5.4)
SODIUM SERPL-SCNC: 129 MMOL/L (ref 136–145)
WBC # BLD AUTO: 8.69 K/UL (ref 3.9–12.7)

## 2024-06-21 PROCEDURE — 51700 IRRIGATION OF BLADDER: CPT | Mod: ,,, | Performed by: UROLOGY

## 2024-06-21 PROCEDURE — 36415 COLL VENOUS BLD VENIPUNCTURE: CPT | Performed by: STUDENT IN AN ORGANIZED HEALTH CARE EDUCATION/TRAINING PROGRAM

## 2024-06-21 PROCEDURE — 99232 SBSQ HOSP IP/OBS MODERATE 35: CPT | Mod: 25,,, | Performed by: UROLOGY

## 2024-06-21 PROCEDURE — 25000003 PHARM REV CODE 250: Performed by: NURSE PRACTITIONER

## 2024-06-21 PROCEDURE — 85025 COMPLETE CBC W/AUTO DIFF WBC: CPT | Performed by: STUDENT IN AN ORGANIZED HEALTH CARE EDUCATION/TRAINING PROGRAM

## 2024-06-21 PROCEDURE — 83735 ASSAY OF MAGNESIUM: CPT | Performed by: STUDENT IN AN ORGANIZED HEALTH CARE EDUCATION/TRAINING PROGRAM

## 2024-06-21 PROCEDURE — 80048 BASIC METABOLIC PNL TOTAL CA: CPT | Performed by: STUDENT IN AN ORGANIZED HEALTH CARE EDUCATION/TRAINING PROGRAM

## 2024-06-21 PROCEDURE — 97530 THERAPEUTIC ACTIVITIES: CPT

## 2024-06-21 PROCEDURE — 63600175 PHARM REV CODE 636 W HCPCS: Performed by: SPECIALIST

## 2024-06-21 PROCEDURE — 21400001 HC TELEMETRY ROOM

## 2024-06-21 PROCEDURE — 25000003 PHARM REV CODE 250: Performed by: STUDENT IN AN ORGANIZED HEALTH CARE EDUCATION/TRAINING PROGRAM

## 2024-06-21 RX ORDER — PREGABALIN 100 MG/1
100 CAPSULE ORAL 2 TIMES DAILY
Status: DISCONTINUED | OUTPATIENT
Start: 2024-06-21 | End: 2024-06-24 | Stop reason: HOSPADM

## 2024-06-21 RX ORDER — METHOCARBAMOL 750 MG/1
750 TABLET, FILM COATED ORAL EVERY 8 HOURS PRN
Status: DISCONTINUED | OUTPATIENT
Start: 2024-06-21 | End: 2024-06-24 | Stop reason: HOSPADM

## 2024-06-21 RX ORDER — LANOLIN ALCOHOL/MO/W.PET/CERES
400 CREAM (GRAM) TOPICAL 2 TIMES DAILY
Status: DISCONTINUED | OUTPATIENT
Start: 2024-06-21 | End: 2024-06-22

## 2024-06-21 RX ADMIN — TRAZODONE HYDROCHLORIDE 75 MG: 50 TABLET ORAL at 09:06

## 2024-06-21 RX ADMIN — PREGABALIN 100 MG: 100 CAPSULE ORAL at 09:06

## 2024-06-21 RX ADMIN — FLUTICASONE PROPIONATE 100 MCG: 50 SPRAY, METERED NASAL at 08:06

## 2024-06-21 RX ADMIN — LEVOFLOXACIN 750 MG: 750 INJECTION, SOLUTION INTRAVENOUS at 02:06

## 2024-06-21 RX ADMIN — SODIUM CHLORIDE: 9 INJECTION, SOLUTION INTRAVENOUS at 05:06

## 2024-06-21 RX ADMIN — HALOPERIDOL 5 MG: 5 TABLET ORAL at 10:06

## 2024-06-21 RX ADMIN — MAGNESIUM OXIDE TAB 400 MG (241.3 MG ELEMENTAL MG) 400 MG: 400 (241.3 MG) TAB at 08:06

## 2024-06-21 RX ADMIN — PAROXETINE HYDROCHLORIDE 40 MG: 20 TABLET, FILM COATED ORAL at 09:06

## 2024-06-21 RX ADMIN — CLONAZEPAM 0.5 MG: 0.5 TABLET ORAL at 09:06

## 2024-06-21 RX ADMIN — PANTOPRAZOLE SODIUM 40 MG: 40 TABLET, DELAYED RELEASE ORAL at 08:06

## 2024-06-21 RX ADMIN — ACETAMINOPHEN 650 MG: 325 TABLET ORAL at 05:06

## 2024-06-21 RX ADMIN — OXYBUTYNIN CHLORIDE 10 MG: 5 TABLET, EXTENDED RELEASE ORAL at 08:06

## 2024-06-21 RX ADMIN — GUAIFENESIN 1200 MG: 600 TABLET, EXTENDED RELEASE ORAL at 09:06

## 2024-06-21 RX ADMIN — ACETAMINOPHEN 650 MG: 325 TABLET ORAL at 08:06

## 2024-06-21 RX ADMIN — MAGNESIUM OXIDE TAB 400 MG (241.3 MG ELEMENTAL MG) 400 MG: 400 (241.3 MG) TAB at 09:06

## 2024-06-21 RX ADMIN — ENOXAPARIN SODIUM 40 MG: 40 INJECTION SUBCUTANEOUS at 05:06

## 2024-06-21 RX ADMIN — PREGABALIN 100 MG: 100 CAPSULE ORAL at 08:06

## 2024-06-21 RX ADMIN — GUAIFENESIN 1200 MG: 600 TABLET, EXTENDED RELEASE ORAL at 08:06

## 2024-06-21 NOTE — ASSESSMENT & PLAN NOTE
--urinalysis results suggestive of infection  --no recent prior culture data available for review  --s/p IV Levaquin in the ER- continue upon admission  --follow cultures  --monitor fever curve- Tylenol PRN    06/20/24  Urine culture pending

## 2024-06-21 NOTE — PLAN OF CARE
Pt is progressing. Anita sup>sit; CGA scooting EOB; modAx2 STS; maxA x2 bed>chair transfer. Pt is impulsive and resistant to safety cues. PT d/c rec: moderate intensity therapy

## 2024-06-21 NOTE — ASSESSMENT & PLAN NOTE
--urinalysis results suggestive of infection  --no recent prior culture data available for review  --s/p IV Levaquin in the ER- continue upon admission  --follow cultures  --monitor fever curve- Tylenol PRN    06/20/24  Urine culture pending    6/21/24  Urine culture showing gram negative christy. May need to change abx once final susceptibility results. Continue IV Levaquin for now. Urology following. Hematuria started yesterday. Urology will continue to monitor need for continuous bladder irrigation. In the meantime recommend hand irrigation with 60 cc cath tip syringe. Continue antibiotics as hematuria is likely secondary to hemorrhagic cystitis.

## 2024-06-21 NOTE — ASSESSMENT & PLAN NOTE
--Cr bumped from baseline, GFR moderately decreased from previous lab draws  --will hold home lisinopril, lasix for now  --avoid nephrotoxic agents while hospitalized  --renally dose meds  --daily BMP to monitor renal fxn   --US renal ordered to rule out obstructive uropathy    6/20/24  Resolved.  Creatinine now 0.8.

## 2024-06-21 NOTE — SUBJECTIVE & OBJECTIVE
Interval History: see HPI    Review of Systems   Constitutional:  Negative for chills, fatigue and fever.   HENT:  Negative for congestion, postnasal drip, rhinorrhea and sore throat.    Eyes:  Negative for pain and visual disturbance.   Respiratory:  Negative for cough, chest tightness, shortness of breath and wheezing.    Cardiovascular:  Negative for chest pain, palpitations and leg swelling.   Gastrointestinal:  Negative for abdominal distention, abdominal pain, constipation, diarrhea, nausea and vomiting.   Genitourinary:  Negative for difficulty urinating, flank pain and hematuria.   Musculoskeletal:  Negative for arthralgias, back pain, gait problem and joint swelling.   Skin:  Negative for pallor and rash.   Neurological:  Negative for dizziness, syncope and weakness.   Psychiatric/Behavioral:  Negative for confusion, decreased concentration and suicidal ideas.      Objective:     Vital Signs (Most Recent):  Temp: 97.5 °F (36.4 °C) (06/21/24 1340)  Pulse: 100 (06/21/24 1340)  Resp: 16 (06/21/24 1340)  BP: 136/66 (06/21/24 1340)  SpO2: 100 % (06/21/24 1340) Vital Signs (24h Range):  Temp:  [97 °F (36.1 °C)-99.8 °F (37.7 °C)] 97.5 °F (36.4 °C)  Pulse:  [] 100  Resp:  [16-18] 16  SpO2:  [93 %-100 %] 100 %  BP: (119-144)/(55-72) 136/66     Weight: 73.6 kg (162 lb 4.1 oz)  Body mass index is 31.69 kg/m².    Intake/Output Summary (Last 24 hours) at 6/21/2024 1424  Last data filed at 6/21/2024 0632  Gross per 24 hour   Intake 2085.09 ml   Output 1525 ml   Net 560.09 ml         Physical Exam  Vitals reviewed.   Constitutional:       General: She is not in acute distress.     Appearance: Normal appearance. She is normal weight. She is not ill-appearing or toxic-appearing.   HENT:      Head: Normocephalic and atraumatic.      Nose: Nose normal. No congestion or rhinorrhea.   Eyes:      Extraocular Movements: Extraocular movements intact.      Conjunctiva/sclera: Conjunctivae normal.      Pupils: Pupils are  equal, round, and reactive to light.   Cardiovascular:      Rate and Rhythm: Normal rate and regular rhythm.      Pulses: Normal pulses.      Heart sounds: No murmur heard.  Pulmonary:      Effort: Pulmonary effort is normal. No respiratory distress.      Breath sounds: Normal breath sounds. No wheezing.   Abdominal:      General: Abdomen is flat. Bowel sounds are normal. There is distension (mild).      Palpations: Abdomen is soft.      Tenderness: There is no abdominal tenderness. There is no guarding or rebound.   Musculoskeletal:         General: No swelling, tenderness or deformity. Normal range of motion.      Cervical back: Normal range of motion and neck supple. No rigidity.   Skin:     General: Skin is warm and dry.      Capillary Refill: Capillary refill takes less than 2 seconds.      Coloration: Skin is not pale.   Neurological:      General: No focal deficit present.      Mental Status: She is alert and oriented to person, place, and time. Mental status is at baseline.      Motor: No weakness.      Gait: Gait normal.   Psychiatric:         Mood and Affect: Mood normal.         Behavior: Behavior normal.         Thought Content: Thought content normal.             Significant Labs: All pertinent labs within the past 24 hours have been reviewed.  BMP:   Recent Labs   Lab 06/21/24  0512      *   K 4.5      CO2 19*   BUN 13   CREATININE 0.8   CALCIUM 8.0*   MG 1.4*     CBC:   Recent Labs   Lab 06/20/24  0442 06/21/24  0512   WBC 11.83 8.69   HGB 10.4* 9.9*   HCT 32.4* 31.1*    352     Magnesium:   Recent Labs   Lab 06/20/24 0441 06/21/24  0512   MG 1.8 1.4*       Significant Imaging: I have reviewed all pertinent imaging results/findings within the past 24 hours.

## 2024-06-21 NOTE — PLAN OF CARE
A243/A243 DIAMOND Bloom RACHELLE Caro is a 68 y.o.female admitted on 6/19/2024 for Sepsis without septic shock   Code Status: Full Code MRN: [id number]   Review of patient's allergies indicates:   Allergen Reactions    Codeine Shortness Of Breath    Keflex [cephalexin] Shortness Of Breath    Shellfish containing products Nausea And Vomiting    Flu vaccine ts 2011-12(18 yr+) Hives    Sucralfate Other (See Comments) and Hives     Past Medical History:   Diagnosis Date    Arthritis     Asthma     COPD (chronic obstructive pulmonary disease)     Depression     GERD (gastroesophageal reflux disease)     Hypertension     Tobacco abuse       PRN meds    acetaminophen, 650 mg, Q8H PRN  albuterol-ipratropium, 3 mL, Q6H PRN  dextrose 10%, 12.5 g, PRN  dextrose 10%, 25 g, PRN  glucagon (human recombinant), 1 mg, PRN  glucose, 16 g, PRN  glucose, 24 g, PRN  haloperidoL, 5 mg, Nightly PRN  hydrALAZINE, 10 mg, Q8H PRN  melatonin, 6 mg, Nightly PRN  naloxone, 0.02 mg, PRN  ondansetron, 8 mg, Q8H PRN  sodium chloride 0.9%, 10 mL, Q12H PRN      Chart check completed. Will continue plan of care.      Orientation: disoriented to, situation, time, place  [place]ber[place]on Coma Scale Score: 15     Lead Monitored: Lead II Rhythm: normal sinus rhythm    Cardiac/Telemetry Box Number: [id number]    Last Bowel Movement: 06/20/24  Diet Soft & Bite Sized (IDDSI Level 6)  Voiding Characteristics: ureteral catheter  Juan M Score: 17  Fall Risk Score: 12  Accucheck []   Freq?      Lines/Drains/Airways       Drain  Duration                  Urethral Catheter 06/19/24 1930 1 day              Peripheral Intravenous Line  Duration                  Midline Catheter Insertion/Assessment  - Single Lumen 10/21/22 0245 Left basilic vein (medial side of arm) other (see comments) 609 days         Peripheral IV - Single Lumen 06/20/24 2245 24 G Anterior;Left Forearm <1 day

## 2024-06-21 NOTE — ASSESSMENT & PLAN NOTE
--on klonopin nightly  --will transition to PRN given presentation of encephalopathy    06/20/24  -Resumed Klonopin as encephalopathy has resolved and patient/ request to restart.

## 2024-06-21 NOTE — ASSESSMENT & PLAN NOTE
--ddx: infectious process vs medication induced  --Head CT: unremarkable  --TSH/T4 unremarkable, liver function normal   --holding home antihypertensives and sedating medications  --PT/OT consulted for eval, post acute therapy recs pending  --fall precautions, neuro checks  --admit to telemetry for cardiac monitoring    06/20/24  Appears resolved.  AOO x 3, answers questions appropriately  Monitor

## 2024-06-21 NOTE — ASSESSMENT & PLAN NOTE
This patient does have evidence of infective focus  My overall impression is sepsis.  Source: Urinary Tract  Antibiotics given-   Antibiotics (72h ago, onward)      Start     Stop Route Frequency Ordered    06/20/24 1400  levoFLOXacin 750 mg/150 mL IVPB 750 mg         -- IV Every 24 hours (non-standard times) 06/20/24 1255          Latest lactate reviewed-  Recent Labs   Lab 06/19/24  1026   LACTATE 1.4     Organ dysfunction indicated by Acute kidney injury and Encephalopathy  Fluid challenge Actual Body weight- Patient will receive 30ml/kg actual body weight to calculate fluid bolus for treatment of septic shock.   Post- resuscitation assessment No - Post resuscitation assessment not needed   Will Not start Pressors- Levophed for MAP of 65  Source control achieved by: IV Levaquin  Blood and urine cultures positive for gram negative christy

## 2024-06-21 NOTE — PLAN OF CARE
Pt resting in bed .   Pt awake and alert .   Hob elevated .   Vitals stable .   POC discussed .   IV intact   Safety Measures in place   Chart check complete .   Will  continue to monitor .
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
## 2024-06-21 NOTE — PROGRESS NOTES
[facility] Medicine  Progress Note    Patient Name: Merle Caro  MRN: [id number]  Patient Class: IP- Inpatient   Admission Date: 6/19/2024  Length of Stay: 1 days  Attending Physician: Kadeem Roberts MD  Primary Care Provider: Marck Major MD        Subjective:     Principal Problem:Sepsis without septic shock        HPI:  Merle Caro is a 68-year-old woman with a past medical history of hypertension, chronic obstructive pulmonary disease, asthma, arthritis, and gastroesophageal reflux disease who presented to the Emergency Department for evaluation of altered mental status, which her  stated began four days prior to arrival. Her  reported that her speech had become increasingly slurred and she had developed a left-sided facial droop. Additionally, she experienced a decreased appetite and increasing weakness over the past month, culminating in her being bedridden for the past four days.    Upon arrival in the ER, the patient was found to be tachycardic. Abnormal lab values included a WBC of 18.05, platelets at 599, creatinine at 1.9, eGFR of 29, and procalcitonin at 0.48. A urinalysis was notable for brown color, cloudy appearance, 2+ protein, 1+ occult blood, 3+ leukocytes, 15 RBCs, 40 WBCs, and many bacteria. The chest X-ray indicated superimposed left-sided edema or infection on a background of interstitial lung disease. A CT head without contrast showed no definite acute intracranial abnormality. Sepsis protocol initiated. Received sepsis bolus fluids and received IV Levaquin x 1 dose. Hospital Medicine called for admission.    Overview/Hospital Course:  Merle Caro is a 68 year old female admitted for Sepsis secondary to UTI. Mentation is back at basline per  at bedside. Blood cultures x 2 showing gram negative rods. Urine culture pending. Bilateral hydronephrosis left greater than right per imaging. Leija catheter inserted. Renal function much  improved post placement. Urology following.     Interval History: see HPI    Review of Systems   Constitutional:  Negative for chills, fatigue and fever.   HENT:  Negative for congestion, postnasal drip, rhinorrhea and sore throat.    Eyes:  Negative for pain and visual disturbance.   Respiratory:  Negative for cough, chest tightness, shortness of breath and wheezing.    Cardiovascular:  Negative for chest pain, palpitations and leg swelling.   Gastrointestinal:  Negative for abdominal distention, abdominal pain, constipation, diarrhea, nausea and vomiting.   Genitourinary:  Negative for difficulty urinating, flank pain and hematuria.   Musculoskeletal:  Negative for arthralgias, back pain, gait problem and joint swelling.   Skin:  Negative for pallor and rash.   Neurological:  Negative for dizziness, syncope and weakness.   Psychiatric/Behavioral:  Negative for confusion, decreased concentration and suicidal ideas.      Objective:     Vital Signs (Most Recent):  Temp: 98.5 °F (36.9 °C) (06/20/24 1701)  Pulse: 107 (06/20/24 1701)  Resp: 16 (06/20/24 1701)  BP: 126/72 (06/20/24 1701)  SpO2: 100 % (06/20/24 1701) Vital Signs (24h Range):  Temp:  [98.3 °F (36.8 °C)-98.8 °F (37.1 °C)] 98.5 °F (36.9 °C)  Pulse:  [] 107  Resp:  [16-20] 16  SpO2:  [93 %-100 %] 100 %  BP: (107-149)/(55-72) 126/72     Weight: 73.1 kg (161 lb 2.5 oz)  Body mass index is 31.47 kg/m².    Intake/Output Summary (Last 24 hours) at 6/20/2024 1858  Last data filed at 6/20/2024 1543  Gross per 24 hour   Intake 2665.09 ml   Output 1950 ml   Net 715.09 ml         Physical Exam  Vitals reviewed.   Constitutional:       General: She is not in acute distress.     Appearance: Normal appearance. She is normal weight. She is not ill-appearing or toxic-appearing.   HENT:      Head: Normocephalic and atraumatic.      Nose: Nose normal. No congestion or rhinorrhea.   Eyes:      Extraocular Movements: Extraocular movements intact.      Conjunctiva/sclera:  Conjunctivae normal.      Pupils: Pupils are equal, round, and reactive to light.   Cardiovascular:      Rate and Rhythm: Normal rate and regular rhythm.      Pulses: Normal pulses.      Heart sounds: No murmur heard.  Pulmonary:      Effort: Pulmonary effort is normal. No respiratory distress.      Breath sounds: Normal breath sounds. No wheezing.   Abdominal:      General: Abdomen is flat. Bowel sounds are normal. There is distension (mild).      Palpations: Abdomen is soft.      Tenderness: There is no abdominal tenderness. There is no guarding or rebound.   Musculoskeletal:         General: No swelling, tenderness or deformity. Normal range of motion.      Cervical back: Normal range of motion and neck supple. No rigidity.   Skin:     General: Skin is warm and dry.      Capillary Refill: Capillary refill takes less than 2 seconds.      Coloration: Skin is not pale.   Neurological:      General: No focal deficit present.      Mental Status: She is alert and oriented to person, place, and time. Mental status is at baseline.      Motor: No weakness.      Gait: Gait normal.   Psychiatric:         Mood and Affect: Mood normal.         Behavior: Behavior normal.         Thought Content: Thought content normal.             Significant Labs: All pertinent labs within the past 24 hours have been reviewed.  BMP:   Recent Labs   Lab 06/20/24  0441      *   K 4.8      CO2 17*   BUN 33*   CREATININE 0.8   CALCIUM 8.4*   MG 1.8     CBC:   Recent Labs   Lab 06/19/24  1026 06/20/24  0442   WBC 18.05* 11.83   HGB 12.2 10.4*   HCT 36.6* 32.4*   * 442     Magnesium:   Recent Labs   Lab 06/19/24  1026 06/20/24  0441   MG 2.4 1.8       Significant Imaging: I have reviewed all pertinent imaging results/findings within the past 24 hours.    Assessment/Plan:      * Sepsis without septic shock d/t UTI  This patient does have evidence of infective focus  My overall impression is sepsis.  Source: Urinary  Tract  Antibiotics given-   Antibiotics (72h ago, onward)      Start     Stop Route Frequency Ordered    06/20/24 1400  levoFLOXacin 750 mg/150 mL IVPB 750 mg         -- IV Every 24 hours (non-standard times) 06/20/24 1255          Latest lactate reviewed-  Recent Labs   Lab 06/19/24  1026   LACTATE 1.4     Organ dysfunction indicated by Acute kidney injury and Encephalopathy  Fluid challenge Actual Body weight- Patient will receive 30ml/kg actual body weight to calculate fluid bolus for treatment of septic shock.   Post- resuscitation assessment No - Post resuscitation assessment not needed   Will Not start Pressors- Levophed for MAP of 65  Source control achieved by: IV Levaquin  Blood cultures positive for gram negative christy    Hydronephrosis  Mild moderate right-sided hydronephrosis with right hydroureter. Severe hydronephrosis of the left kidney with moderate mild left hydroureter. Severe bladder wall thickening. Question punctate nonobstructing left renal calculi with versus punctate vascular calcification. No obstructing renal calculi.   Urology following.  Leija catheter placed, BRIAN has improved.  May need to have chronic indwelling catheter.    Encephalopathy, metabolic  --ddx: infectious process vs medication induced  --Head CT: unremarkable  --TSH/T4 unremarkable, liver function normal   --holding home antihypertensives and sedating medications  --PT/OT consulted for eval, post acute therapy recs pending  --fall precautions, neuro checks  --admit to telemetry for cardiac monitoring    06/20/24  Appears resolved.  AOO x 3, answers questions appropriately  Monitor        BRIAN (acute kidney injury)  --Cr bumped from baseline, GFR moderately decreased from previous lab draws  --will hold home lisinopril, lasix for now  --avoid nephrotoxic agents while hospitalized  --renally dose meds  --daily BMP to monitor renal fxn   --US renal ordered to rule out obstructive uropathy    6/20/24  Resolved.  Creatinine now  0.8.     Acute cystitis without hematuria  --urinalysis results suggestive of infection  --no recent prior culture data available for review  --s/p IV Levaquin in the ER- continue upon admission  --follow cultures  --monitor fever curve- Tylenol PRN    06/20/24  Urine culture pending        Benzodiazepine dependence  --on klonopin nightly  --will transition to PRN given presentation of encephalopathy    06/20/24  -Resumed Klonopin as encephalopathy has resolved and patient/ request to restart.      Gait difficulty  PT/OT consulted to assess      Gastroesophageal reflux disease with esophagitis  --stable  --continue home PPI therapy      Cigarette nicotine dependence with nicotine-induced disorder  Patient denied smoking when asked,  at bedside didn't correct when patient was questioned    Primary hypertension  --BP marginal, likely due to infection  --home medications include: lisinopril, lasix  --holding due to BRIAN  --PRN IV hydralazine 10mg Q6H for sBP > 175 mmHg  --Q4HVS    Asthma with COPD  --stable on room air, not on home O2  --continue home maintenance therapy  --Monitor clinically for respiratory distress, Q4H O2 sats   --Encourage light physical activity to maintain lung function           VTE Risk Mitigation (From admission, onward)           Ordered     enoxaparin injection 40 mg  Daily         06/20/24 1255     IP VTE HIGH RISK PATIENT  Once         06/19/24 1205     Place sequential compression device  Until discontinued         06/19/24 1205                    Discharge Planning   YELENA:      Code Status: Full Code   Is the patient medically ready for discharge?:     Reason for patient still in hospital (select all that apply): Patient trending condition, Laboratory test, and PT / OT recommendations  Discharge Plan A: Home, Community Services, Home with family                  CHEO Zambrano  Department of Hospital Medicine   [facility] - Telemetry ([facility])

## 2024-06-21 NOTE — ASSESSMENT & PLAN NOTE
--BP marginal, likely due to infection  --home medications include: lisinopril, lasix  --holding due to BRIAN  --PRN IV hydralazine 10mg Q6H for sBP > 175 mmHg  --Q4HVS    06/21/24  BP and kidney function stable.  Will hold ACEI and Lasix for now.

## 2024-06-21 NOTE — PROGRESS NOTES
[facility] Medicine  Progress Note    Patient Name: Merle Caro  MRN: [id number]  Patient Class: IP- Inpatient   Admission Date: 6/19/2024  Length of Stay: 2 days  Attending Physician: Kadeem Roberts MD  Primary Care Provider: Marck Major MD        Subjective:     Principal Problem:Sepsis without septic shock        HPI:  Merle Caro is a 68-year-old woman with a past medical history of hypertension, chronic obstructive pulmonary disease, asthma, arthritis, and gastroesophageal reflux disease who presented to the Emergency Department for evaluation of altered mental status, which her  stated began four days prior to arrival. Her  reported that her speech had become increasingly slurred and she had developed a left-sided facial droop. Additionally, she experienced a decreased appetite and increasing weakness over the past month, culminating in her being bedridden for the past four days.    Upon arrival in the ER, the patient was found to be tachycardic. Abnormal lab values included a WBC of 18.05, platelets at 599, creatinine at 1.9, eGFR of 29, and procalcitonin at 0.48. A urinalysis was notable for brown color, cloudy appearance, 2+ protein, 1+ occult blood, 3+ leukocytes, 15 RBCs, 40 WBCs, and many bacteria. The chest X-ray indicated superimposed left-sided edema or infection on a background of interstitial lung disease. A CT head without contrast showed no definite acute intracranial abnormality. Sepsis protocol initiated. Received sepsis bolus fluids and received IV Levaquin x 1 dose. Hospital Medicine called for admission.    Overview/Hospital Course:  Merle Caro is a 68 year old female admitted for Sepsis secondary to UTI. Mentation is back at basline per  at bedside. Blood cultures x 2 showing gram negative rods. Urine culture pending. Bilateral hydronephrosis left greater than right per imaging. Leija catheter inserted. Renal function much  improved post placement. Urology following.     06/21/24  Blood cultures x 2 and urine cultures showing gram negative rods. Currently on IV Levaquin. Once final sensitivities for cultures have resulted, will consult ID. Leukocytosis and BRIAN have resolved. Hematuria yesterday. Urology following -- with development of hematuria, will need to continue monitoring need for continuous bladder irrigation. In the meantime recommend hand irrigation with 60 cc cath tip syringe. Continue antibiotics as hematuria is likely secondary to hemorrhagic cystitis. PT/OT following, recommending skilled facility once medically stable for discharge. Patient adamantly refuses to go to SNF upon discharge. She has opted for home health.     Interval History: see HPI    Review of Systems   Constitutional:  Negative for chills, fatigue and fever.   HENT:  Negative for congestion, postnasal drip, rhinorrhea and sore throat.    Eyes:  Negative for pain and visual disturbance.   Respiratory:  Negative for cough, chest tightness, shortness of breath and wheezing.    Cardiovascular:  Negative for chest pain, palpitations and leg swelling.   Gastrointestinal:  Negative for abdominal distention, abdominal pain, constipation, diarrhea, nausea and vomiting.   Genitourinary:  Negative for difficulty urinating, flank pain and hematuria.   Musculoskeletal:  Negative for arthralgias, back pain, gait problem and joint swelling.   Skin:  Negative for pallor and rash.   Neurological:  Negative for dizziness, syncope and weakness.   Psychiatric/Behavioral:  Negative for confusion, decreased concentration and suicidal ideas.      Objective:     Vital Signs (Most Recent):  Temp: 97.5 °F (36.4 °C) (06/21/24 1340)  Pulse: 100 (06/21/24 1340)  Resp: 16 (06/21/24 1340)  BP: 136/66 (06/21/24 1340)  SpO2: 100 % (06/21/24 1340) Vital Signs (24h Range):  Temp:  [97 °F (36.1 °C)-99.8 °F (37.7 °C)] 97.5 °F (36.4 °C)  Pulse:  [] 100  Resp:  [16-18] 16  SpO2:  [93  %-100 %] 100 %  BP: (119-144)/(55-72) 136/66     Weight: 73.6 kg (162 lb 4.1 oz)  Body mass index is 31.69 kg/m².    Intake/Output Summary (Last 24 hours) at 6/21/2024 1424  Last data filed at 6/21/2024 0632  Gross per 24 hour   Intake 2085.09 ml   Output 1525 ml   Net 560.09 ml         Physical Exam  Vitals reviewed.   Constitutional:       General: She is not in acute distress.     Appearance: Normal appearance. She is normal weight. She is not ill-appearing or toxic-appearing.   HENT:      Head: Normocephalic and atraumatic.      Nose: Nose normal. No congestion or rhinorrhea.   Eyes:      Extraocular Movements: Extraocular movements intact.      Conjunctiva/sclera: Conjunctivae normal.      Pupils: Pupils are equal, round, and reactive to light.   Cardiovascular:      Rate and Rhythm: Normal rate and regular rhythm.      Pulses: Normal pulses.      Heart sounds: No murmur heard.  Pulmonary:      Effort: Pulmonary effort is normal. No respiratory distress.      Breath sounds: Normal breath sounds. No wheezing.   Abdominal:      General: Abdomen is flat. Bowel sounds are normal. There is distension (mild).      Palpations: Abdomen is soft.      Tenderness: There is no abdominal tenderness. There is no guarding or rebound.   Musculoskeletal:         General: No swelling, tenderness or deformity. Normal range of motion.      Cervical back: Normal range of motion and neck supple. No rigidity.   Skin:     General: Skin is warm and dry.      Capillary Refill: Capillary refill takes less than 2 seconds.      Coloration: Skin is not pale.   Neurological:      General: No focal deficit present.      Mental Status: She is alert and oriented to person, place, and time. Mental status is at baseline.      Motor: No weakness.      Gait: Gait normal.   Psychiatric:         Mood and Affect: Mood normal.         Behavior: Behavior normal.         Thought Content: Thought content normal.             Significant Labs: All pertinent  labs within the past 24 hours have been reviewed.  BMP:   Recent Labs   Lab 06/21/24  0512      *   K 4.5      CO2 19*   BUN 13   CREATININE 0.8   CALCIUM 8.0*   MG 1.4*     CBC:   Recent Labs   Lab 06/20/24  0442 06/21/24  0512   WBC 11.83 8.69   HGB 10.4* 9.9*   HCT 32.4* 31.1*    352     Magnesium:   Recent Labs   Lab 06/20/24  0441 06/21/24  0512   MG 1.8 1.4*       Significant Imaging: I have reviewed all pertinent imaging results/findings within the past 24 hours.    Assessment/Plan:      * Sepsis without septic shock d/t UTI  This patient does have evidence of infective focus  My overall impression is sepsis.  Source: Urinary Tract  Antibiotics given-   Antibiotics (72h ago, onward)      Start     Stop Route Frequency Ordered    06/20/24 1400  levoFLOXacin 750 mg/150 mL IVPB 750 mg         -- IV Every 24 hours (non-standard times) 06/20/24 1255          Latest lactate reviewed-  Recent Labs   Lab 06/19/24  1026   LACTATE 1.4     Organ dysfunction indicated by Acute kidney injury and Encephalopathy  Fluid challenge Actual Body weight- Patient will receive 30ml/kg actual body weight to calculate fluid bolus for treatment of septic shock.   Post- resuscitation assessment No - Post resuscitation assessment not needed   Will Not start Pressors- Levophed for MAP of 65  Source control achieved by: IV Levaquin  Blood and urine cultures positive for gram negative christy    Hydronephrosis  Mild moderate right-sided hydronephrosis with right hydroureter. Severe hydronephrosis of the left kidney with moderate mild left hydroureter. Severe bladder wall thickening. Question punctate nonobstructing left renal calculi with versus punctate vascular calcification. No obstructing renal calculi.   Urology following.  Leija catheter placed, BRIAN has improved.  May need to have chronic indwelling catheter.    Encephalopathy, metabolic  --ddx: infectious process vs medication induced  --Head CT:  unremarkable  --TSH/T4 unremarkable, liver function normal   --holding home antihypertensives and sedating medications  --PT/OT consulted for eval, post acute therapy recs pending  --fall precautions, neuro checks  --admit to telemetry for cardiac monitoring    06/20/24  Appears resolved.  AOO x 3, answers questions appropriately  Monitor        BRIAN (acute kidney injury)  --Cr bumped from baseline, GFR moderately decreased from previous lab draws  --will hold home lisinopril, lasix for now  --avoid nephrotoxic agents while hospitalized  --renally dose meds  --daily BMP to monitor renal fxn   --US renal ordered to rule out obstructive uropathy    6/20/24  Resolved.  Creatinine now 0.8.     Acute cystitis without hematuria  --urinalysis results suggestive of infection  --no recent prior culture data available for review  --s/p IV Levaquin in the ER- continue upon admission  --follow cultures  --monitor fever curve- Tylenol PRN    06/20/24  Urine culture pending    6/21/24  Urine culture showing gram negative christy. May need to change abx once final susceptibility results. Continue IV Levaquin for now. Urology following. Hematuria started yesterday. Urology will continue to monitor need for continuous bladder irrigation. In the meantime recommend hand irrigation with 60 cc cath tip syringe. Continue antibiotics as hematuria is likely secondary to hemorrhagic cystitis.       Benzodiazepine dependence  --on klonopin nightly  --will transition to PRN given presentation of encephalopathy    06/20/24  -Resumed Klonopin as encephalopathy has resolved and patient/ request to restart.      Gait difficulty  PT/OT consulted to assess -- recommending SNF. She adamantly refuses as she had a bad experience at a previous SNF. She has opted for home health once medically stable for discharge.      Gastroesophageal reflux disease with esophagitis  --stable  --continue home PPI therapy      Cigarette nicotine dependence with  nicotine-induced disorder  Patient denied smoking when asked,  at bedside didn't correct when patient was questioned    Primary hypertension  --BP marginal, likely due to infection  --home medications include: lisinopril, lasix  --holding due to BRIAN  --PRN IV hydralazine 10mg Q6H for sBP > 175 mmHg  --Q4HVS    06/21/24  BP and kidney function stable.  Will hold ACEI and Lasix for now.     Asthma with COPD  --stable on room air, not on home O2  --continue home maintenance therapy  --Monitor clinically for respiratory distress, Q4H O2 sats   --Encourage light physical activity to maintain lung function           VTE Risk Mitigation (From admission, onward)           Ordered     enoxaparin injection 40 mg  Daily         06/20/24 1255     IP VTE HIGH RISK PATIENT  Once         06/19/24 1205     Place sequential compression device  Until discontinued         06/19/24 1205                    Discharge Planning   YELENA:      Code Status: Full Code   Is the patient medically ready for discharge?:     Reason for patient still in hospital (select all that apply): Patient trending condition, Laboratory test, and Treatment  Discharge Plan A: Home, Community Services, Home with family                  CHEO Zambrano  Department of Hospital Medicine   [facility] - Telemetry ([facility])

## 2024-06-21 NOTE — ASSESSMENT & PLAN NOTE
Mild moderate right-sided hydronephrosis with right hydroureter. Severe hydronephrosis of the left kidney with moderate mild left hydroureter. Severe bladder wall thickening. Question punctate nonobstructing left renal calculi with versus punctate vascular calcification. No obstructing renal calculi.   Urology following.  Leija catheter placed, BRIAN has improved.  May need to have chronic indwelling catheter.

## 2024-06-21 NOTE — PT/OT/SLP PROGRESS
Physical Therapy Treatment    Patient Name:  Merle Caro   MRN:  [id number]    Recommendations:     Discharge Recommendations: Moderate Intensity Therapy  Discharge Equipment Recommendations: to be determined by next level of care  Barriers to discharge: None    Assessment:     Merle Caro is a 68 y.o. female admitted with a medical diagnosis of Sepsis without septic shock.  She presents with the following impairments/functional limitations: weakness, impaired endurance, impaired sensation, impaired self care skills, impaired functional mobility, gait instability, impaired balance, decreased upper extremity function, decreased lower extremity function, decreased coordination, decreased safety awareness .    Rehab Prognosis: Good; patient would benefit from acute skilled PT services to address these deficits and reach maximum level of function.    Recent Surgery: * No surgery found *      Plan:     During this hospitalization, patient to be seen 3 x/week to address the identified rehab impairments via gait training, therapeutic activities, therapeutic exercises and progress toward the following goals:    Plan of Care Expires:  07/04/24    Subjective     Chief Complaint: pt reports feeling rough after just having a bowel movement  Patient/Family Comments/goals: none   Pain/Comfort:  Pain Rating 1: 0/10      Objective:     Communicated with nurse Beasley prior to session.  Patient found supine with telemetry, peripheral IV, nicholson catheter, bed alarm upon PT entry to room.     General Precautions: Standard, fall  Orthopedic Precautions: N/A  Braces: N/A  Respiratory Status: Room air     Functional Mobility:  Bed Mobility:     Scooting: contact guard assistance  Supine to Sit: minimum assistance and cueing to use arm rail to sit up   Transfers:     Sit to Stand:  moderate assistance and of 2 persons with no AD  Bed to Chair: maximal assistance and of 2 persons with  hand-held assist  using  Step Transfer  Pt very  "unsteady on feet and refused RW. Pt very impulsive and starting stepping to chair and demonstrated very unsteady steps with knee buckling. Pt resistant to all safety cues and has decreased safety awareness. Pt heavily dependent on hand held assist x2.  Balance: sitting: CGA; standing: modA      AM-PAC 6 CLICK MOBILITY  Turning over in bed (including adjusting bedclothes, sheets and blankets)?: 3  Sitting down on and standing up from a chair with arms (e.g., wheelchair, bedside commode, etc.): 2  Moving from lying on back to sitting on the side of the bed?: 3  Moving to and from a bed to a chair (including a wheelchair)?: 2  Need to walk in hospital room?: 1  Climbing 3-5 steps with a railing?: 1  Basic Mobility Total Score: 12       Treatment & Education:  Pt educated on role of PT and POC in acute setting.  Pt educated on proper technique and safety with transfers and bed mobility.  Pt educated on increasing time OOB in chair to tolerance.  Pt educated on "call don't fall".    Patient left up in chair with all lines intact, call button in reach, nurse notified, and  present..    GOALS:   Multidisciplinary Problems       Physical Therapy Goals          Problem: Physical Therapy    Goal Priority Disciplines Outcome Goal Variances Interventions   Physical Therapy Goal     PT, PT/OT      Description: LTG to be met in 14 days: 7/4/24  Pt will complete bed mobility SBA.  Pt will complete scooting EOB JESSIE.  Pt will complete bed>W/C transfer Zulma.  Pt will increase AMPAC score by 2 to progress gross functional mobility.                       Time Tracking:     PT Received On: 06/21/24  PT Start Time: 0950     PT Stop Time: 1015  PT Total Time (min): 25 min     Billable Minutes: Therapeutic Activity 25    Treatment Type: Treatment  PT/PTA: PT     Number of PTA visits since last PT visit: 0     06/21/2024  "

## 2024-06-21 NOTE — PROGRESS NOTES
[facility] Follow up 06/21/2024    Subjective:   Patient overall is feeling better.  Alert and oriented.  Hematuria developed yesterday.      Current Facility-Administered Medications   Medication Dose Route Frequency Provider Last Rate Last Admin    0.9%  NaCl infusion   Intravenous Continuous Ean Hyatt  mL/hr at 06/21/24 0520 New Bag at 06/21/24 0520    acetaminophen tablet 650 mg  650 mg Oral Q8H PRN Ean Hyatt MD   650 mg at 06/20/24 2151    albuterol-ipratropium 2.5 mg-0.5 mg/3 mL nebulizer solution 3 mL  3 mL Nebulization Q6H PRN Ean Hyatt MD        clonazePAM tablet 0.5 mg  0.5 mg Oral QHS Janae Vela, FNP   0.5 mg at 06/20/24 2140    dextrose 10% bolus 125 mL 125 mL  12.5 g Intravenous PRN Ean Hyatt MD        dextrose 10% bolus 250 mL 250 mL  25 g Intravenous PRN Ean Hyatt MD        enoxaparin injection 40 mg  40 mg Subcutaneous Daily Kadeem Roberts MD   40 mg at 06/20/24 1655    fluticasone propionate 50 mcg/actuation nasal spray 100 mcg  2 spray Each Nostril Daily Ean Hyatt MD   100 mcg at 06/20/24 0843    glucagon (human recombinant) injection 1 mg  1 mg Intramuscular PRN Ean Hyatt MD        glucose chewable tablet 16 g  16 g Oral PRN Ean Hyatt MD        glucose chewable tablet 24 g  24 g Oral PRN Ean Hyatt MD        guaiFENesin 12 hr tablet 1,200 mg  1,200 mg Oral BID Ean Hyatt MD   1,200 mg at 06/20/24 2140    haloperidoL tablet 5 mg  5 mg Oral Nightly PRN Ean Hyatt MD   5 mg at 06/20/24 2218    hydrALAZINE injection 10 mg  10 mg Intravenous Q8H PRN Ean Hyatt MD        levoFLOXacin 750 mg/150 mL IVPB 750 mg  750 mg Intravenous Q24H Kadeem Roberts MD   Stopped at 06/20/24 1503    magnesium oxide tablet 400 mg  400 mg Oral BID Janae Vela FNP        melatonin tablet 6 mg  6 mg Oral Nightly PRN Ean Hyatt MD        methocarbamoL tablet 750 mg  750 mg Oral Q8H  PRN Janae Vela FNP        naloxone 0.4 mg/mL injection 0.02 mg  0.02 mg Intravenous PRN Ean Hyatt MD        ondansetron disintegrating tablet 8 mg  8 mg Oral Q8H PRN Ean Hyatt MD        oxybutynin 24 hr tablet 10 mg  10 mg Oral Daily Ean Hyatt MD   10 mg at 24 0843    pantoprazole EC tablet 40 mg  40 mg Oral Daily Ean Hyatt MD   40 mg at 24 0843    paroxetine tablet 40 mg  40 mg Oral QHS Ean Hyatt MD   40 mg at 24 2140    pregabalin capsule 100 mg  100 mg Oral BID Janae Vela FNP        sodium chloride 0.9% flush 10 mL  10 mL Intravenous Q12H PRN Ean Hyatt MD           Objective:     Vitals:    24 0500   BP:    Pulse: 99   Resp:    Temp:      I/O last 3 completed shifts:  In: 2665.1 [P.O.:580; I.V.:1936.2; IV Piggyback:148.9]  Out: 3475 [Urine:3475]  Temp (24hrs), Av.5 °F (36.9 °C), Min:98 °F (36.7 °C), Max:99.8 °F (37.7 °C)      Physical Exam:  Alert and oriented   Respirations even unlabored   Abdomen is obese   I hand irrigated her Leija catheter and got a few clots out.    Labs    Recent Results (from the past 336 hour(s))   Basic Metabolic Panel (BMP)    Collection Time: 24  5:12 AM   Result Value Ref Range    Sodium 129 (L) 136 - 145 mmol/L    Potassium 4.5 3.5 - 5.1 mmol/L    Chloride 102 95 - 110 mmol/L    CO2 19 (L) 23 - 29 mmol/L    BUN 13 8 - 23 mg/dL    Creatinine 0.8 0.5 - 1.4 mg/dL    Calcium 8.0 (L) 8.7 - 10.5 mg/dL    Anion Gap 8 8 - 16 mmol/L   Basic Metabolic Panel (BMP)    Collection Time: 24  4:41 AM   Result Value Ref Range    Sodium 133 (L) 136 - 145 mmol/L    Potassium 4.8 3.5 - 5.1 mmol/L    Chloride 108 95 - 110 mmol/L    CO2 17 (L) 23 - 29 mmol/L    BUN 33 (H) 8 - 23 mg/dL    Creatinine 0.8 0.5 - 1.4 mg/dL    Calcium 8.4 (L) 8.7 - 10.5 mg/dL    Anion Gap 8 8 - 16 mmol/L     Recent Results (from the past 336 hour(s))   CBC with Automated Differential    Collection Time: 24  5:12  AM   Result Value Ref Range    WBC 8.69 3.90 - 12.70 K/uL    Hemoglobin 9.9 (L) 12.0 - 16.0 g/dL    Hematocrit 31.1 (L) 37.0 - 48.5 %    Platelets 352 150 - 450 K/uL   CBC with Automated Differential    Collection Time: 06/20/24  4:42 AM   Result Value Ref Range    WBC 11.83 3.90 - 12.70 K/uL    Hemoglobin 10.4 (L) 12.0 - 16.0 g/dL    Hematocrit 32.4 (L) 37.0 - 48.5 %    Platelets 442 150 - 450 K/uL   CBC auto differential    Collection Time: 06/19/24 10:26 AM   Result Value Ref Range    WBC 18.05 (H) 3.90 - 12.70 K/uL    Hemoglobin 12.2 12.0 - 16.0 g/dL    Hematocrit 36.6 (L) 37.0 - 48.5 %    Platelets 599 (H) 150 - 450 K/uL     Microbiology Results (last 7 days)       Procedure Component Value Units Date/Time    Urine culture [[id number]]  (Abnormal) Collected: 06/19/24 1102    Order Status: Completed Specimen: Urine Updated: 06/20/24 2310     Urine Culture, Routine GRAM NEGATIVE KRISTEL  >100,000 cfu/ml  Identification and susceptibility pending      Narrative:      Specimen Source->Urine    Rapid Organism ID by PCR (from Blood culture) [[id number]]  (Abnormal) Collected: 06/19/24 1025    Order Status: Completed Updated: 06/20/24 1633     Enterococcus faecalis Not Detected     Enterococcus faecium Not Detected     Listeria monocytogenes Not Detected     Staphylococcus spp. Not Detected     Staphylococcus aureus Not Detected     Staphylococcus epidermidis Not Detected     Staphylococcus lugdunensis Not Detected     Streptococcus species Not Detected     Streptococcus agalactiae Not Detected     Streptococcus pneumoniae Not Detected     Streptococcus pyogenes Not Detected     Acinetobacter calcoaceticus/baumannii complex Not Detected     Bacteroides fragilis Detected     Enterobacterales Not Detected     Enterobacter cloacae complex Not Detected     Escherichia coli Not Detected     Klebsiella aerogenes Not Detected     Klebsiella oxytoca Not Detected     Klebsiella pneumoniae group Not Detected     Proteus Not  Detected     Salmonella sp Not Detected     Serratia marcescens Not Detected     Haemophilus influenzae Not Detected     Neisseria meningtidis Not Detected     Pseudomonas aeruginosa Not Detected     Stenotrophomonas maltophilia Not Detected     Candida albicans Not Detected     Candida auris Not Detected     Candida glabrata Not Detected     Candida krusei Not Detected     Candida parapsilosis Not Detected     Candida tropicalis Not Detected     Cryptococcus neoformans/gattii Not Detected     CTX-M (ESBL ) Test Not Applicable     IMP (Carbapenem resistant) Test Not Applicable     KPC resistance gene (Carbapenem resistant) Test Not Applicable     mcr-1  Test Not Applicable     mec A/C  Test Not Applicable     mec A/C and MREJ (MRSA) gene Test Not Applicable     NDM (Carbapenem resistant) Test Not Applicable     OXA-48-like (Carbapenem resistant) Test Not Applicable     van A/B (VRE gene) Test Not Applicable     VIM (Carbapenem resistant) Test Not Applicable    Blood Culture #2 **CANNOT BE ORDERED STAT** [[id number]] Collected: 06/19/24 1024    Order Status: Completed Specimen: Blood from Peripheral, Antecubital, Left Updated: 06/20/24 1512     Blood Culture, Routine Gram stain renee bottle: Gram negative rods      Positive results previously called 06/20/2024  15:12    Blood Culture #1 **CANNOT BE ORDERED STAT** [[id number]] Collected: 06/19/24 1025    Order Status: Completed Specimen: Blood from Peripheral, Antecubital, Right Updated: 06/20/24 1511     Blood Culture, Routine Gram stain renee bottle: Gram negative rods      Results called to and read back by: BLAISE Chavez RN  06/20/2024  15:10                Assessment  Sepsis with Bacteroides secondary to UTI.  Neurogenic bladder  Bilateral hydronephrosis  Acute kidney injury  Gross hematuria/hemorrhagic cystitis  Plan    With development of hematuria we will need to continue monitoring need for continuous bladder irrigation.  In the meantime recommend hand  irrigation with 60 cc cath tip syringe.  Monitor hemoglobin.  Continue antibiotics as hematuria is likely secondary to hemorrhagic cystitis.  We will need to await Bacteroides sensitivities.  We will re-evaluate hydronephrosis with renal ultrasound tomorrow.  Renal function and white blood cell count has improved.

## 2024-06-21 NOTE — ASSESSMENT & PLAN NOTE
This patient does have evidence of infective focus  My overall impression is sepsis.  Source: Urinary Tract  Antibiotics given-   Antibiotics (72h ago, onward)      Start     Stop Route Frequency Ordered    06/20/24 1400  levoFLOXacin 750 mg/150 mL IVPB 750 mg         -- IV Every 24 hours (non-standard times) 06/20/24 1255          Latest lactate reviewed-  Recent Labs   Lab 06/19/24  1026   LACTATE 1.4     Organ dysfunction indicated by Acute kidney injury and Encephalopathy  Fluid challenge Actual Body weight- Patient will receive 30ml/kg actual body weight to calculate fluid bolus for treatment of septic shock.   Post- resuscitation assessment No - Post resuscitation assessment not needed   Will Not start Pressors- Levophed for MAP of 65  Source control achieved by: IV Levaquin  Blood cultures positive for gram negative christy

## 2024-06-21 NOTE — PLAN OF CARE
Pt tolerated session fair with decreased activity tolerance. Pt completed sup>sit with Min A. Pt completed Mod A x 2. Pt completed bed>chair t/f with Max A x 2. Pt with continued poor safety awareness and impulsive behavior. OT recommending moderate intensity at d/c.    Attending Attestation (For Attendings USE Only)...

## 2024-06-22 PROBLEM — E83.42 HYPOMAGNESEMIA: Status: ACTIVE | Noted: 2024-06-22

## 2024-06-22 PROBLEM — R94.31 QT PROLONGATION: Status: ACTIVE | Noted: 2024-06-22

## 2024-06-22 PROBLEM — R78.81 BACTEREMIA: Status: ACTIVE | Noted: 2024-06-22

## 2024-06-22 LAB
ANION GAP SERPL CALC-SCNC: 8 MMOL/L (ref 8–16)
BACTERIA BLD CULT: ABNORMAL
BACTERIA UR CULT: ABNORMAL
BASOPHILS # BLD AUTO: 0.04 K/UL (ref 0–0.2)
BASOPHILS NFR BLD: 0.5 % (ref 0–1.9)
BUN SERPL-MCNC: 8 MG/DL (ref 8–23)
CALCIUM SERPL-MCNC: 8.2 MG/DL (ref 8.7–10.5)
CHLORIDE SERPL-SCNC: 107 MMOL/L (ref 95–110)
CO2 SERPL-SCNC: 19 MMOL/L (ref 23–29)
CREAT SERPL-MCNC: 0.7 MG/DL (ref 0.5–1.4)
DIFFERENTIAL METHOD BLD: ABNORMAL
EOSINOPHIL # BLD AUTO: 0.6 K/UL (ref 0–0.5)
EOSINOPHIL NFR BLD: 7.6 % (ref 0–8)
ERYTHROCYTE [DISTWIDTH] IN BLOOD BY AUTOMATED COUNT: 13.2 % (ref 11.5–14.5)
EST. GFR  (NO RACE VARIABLE): >60 ML/MIN/1.73 M^2
GLUCOSE SERPL-MCNC: 112 MG/DL (ref 70–110)
HCT VFR BLD AUTO: 31.1 % (ref 37–48.5)
HGB BLD-MCNC: 9.9 G/DL (ref 12–16)
IMM GRANULOCYTES # BLD AUTO: 0.13 K/UL (ref 0–0.04)
IMM GRANULOCYTES NFR BLD AUTO: 1.7 % (ref 0–0.5)
LYMPHOCYTES # BLD AUTO: 1.2 K/UL (ref 1–4.8)
LYMPHOCYTES NFR BLD: 15.5 % (ref 18–48)
MAGNESIUM SERPL-MCNC: 1.5 MG/DL (ref 1.6–2.6)
MCH RBC QN AUTO: 28.9 PG (ref 27–31)
MCHC RBC AUTO-ENTMCNC: 31.8 G/DL (ref 32–36)
MCV RBC AUTO: 91 FL (ref 82–98)
MONOCYTES # BLD AUTO: 0.9 K/UL (ref 0.3–1)
MONOCYTES NFR BLD: 12.2 % (ref 4–15)
NEUTROPHILS # BLD AUTO: 4.7 K/UL (ref 1.8–7.7)
NEUTROPHILS NFR BLD: 62.5 % (ref 38–73)
NRBC BLD-RTO: 0 /100 WBC
PLATELET # BLD AUTO: 394 K/UL (ref 150–450)
PMV BLD AUTO: 8.6 FL (ref 9.2–12.9)
POTASSIUM SERPL-SCNC: 3.7 MMOL/L (ref 3.5–5.1)
RBC # BLD AUTO: 3.43 M/UL (ref 4–5.4)
SODIUM SERPL-SCNC: 134 MMOL/L (ref 136–145)
WBC # BLD AUTO: 7.47 K/UL (ref 3.9–12.7)

## 2024-06-22 PROCEDURE — 25000003 PHARM REV CODE 250: Performed by: FAMILY MEDICINE

## 2024-06-22 PROCEDURE — 25000003 PHARM REV CODE 250: Performed by: STUDENT IN AN ORGANIZED HEALTH CARE EDUCATION/TRAINING PROGRAM

## 2024-06-22 PROCEDURE — 99231 SBSQ HOSP IP/OBS SF/LOW 25: CPT | Mod: ,,, | Performed by: UROLOGY

## 2024-06-22 PROCEDURE — 21400001 HC TELEMETRY ROOM

## 2024-06-22 PROCEDURE — 63600175 PHARM REV CODE 636 W HCPCS: Performed by: FAMILY MEDICINE

## 2024-06-22 PROCEDURE — 63600175 PHARM REV CODE 636 W HCPCS: Performed by: SPECIALIST

## 2024-06-22 PROCEDURE — 36415 COLL VENOUS BLD VENIPUNCTURE: CPT | Performed by: FAMILY MEDICINE

## 2024-06-22 PROCEDURE — 80048 BASIC METABOLIC PNL TOTAL CA: CPT | Performed by: STUDENT IN AN ORGANIZED HEALTH CARE EDUCATION/TRAINING PROGRAM

## 2024-06-22 PROCEDURE — 83735 ASSAY OF MAGNESIUM: CPT | Performed by: STUDENT IN AN ORGANIZED HEALTH CARE EDUCATION/TRAINING PROGRAM

## 2024-06-22 PROCEDURE — 25000003 PHARM REV CODE 250: Performed by: NURSE PRACTITIONER

## 2024-06-22 PROCEDURE — 87040 BLOOD CULTURE FOR BACTERIA: CPT | Performed by: FAMILY MEDICINE

## 2024-06-22 PROCEDURE — 36415 COLL VENOUS BLD VENIPUNCTURE: CPT | Performed by: STUDENT IN AN ORGANIZED HEALTH CARE EDUCATION/TRAINING PROGRAM

## 2024-06-22 PROCEDURE — 85025 COMPLETE CBC W/AUTO DIFF WBC: CPT | Performed by: STUDENT IN AN ORGANIZED HEALTH CARE EDUCATION/TRAINING PROGRAM

## 2024-06-22 RX ORDER — LANOLIN ALCOHOL/MO/W.PET/CERES
400 CREAM (GRAM) TOPICAL 2 TIMES DAILY
Status: DISCONTINUED | OUTPATIENT
Start: 2024-06-22 | End: 2024-06-24 | Stop reason: HOSPADM

## 2024-06-22 RX ORDER — MAGNESIUM SULFATE HEPTAHYDRATE 40 MG/ML
2 INJECTION, SOLUTION INTRAVENOUS ONCE
Status: COMPLETED | OUTPATIENT
Start: 2024-06-22 | End: 2024-06-22

## 2024-06-22 RX ADMIN — FLUTICASONE PROPIONATE 100 MCG: 50 SPRAY, METERED NASAL at 08:06

## 2024-06-22 RX ADMIN — LEVOFLOXACIN 750 MG: 750 INJECTION, SOLUTION INTRAVENOUS at 02:06

## 2024-06-22 RX ADMIN — CLONAZEPAM 0.5 MG: 0.5 TABLET ORAL at 09:06

## 2024-06-22 RX ADMIN — MAGNESIUM OXIDE TAB 400 MG (241.3 MG ELEMENTAL MG) 400 MG: 400 (241.3 MG) TAB at 02:06

## 2024-06-22 RX ADMIN — METHOCARBAMOL 750 MG: 750 TABLET ORAL at 02:06

## 2024-06-22 RX ADMIN — ENOXAPARIN SODIUM 40 MG: 40 INJECTION SUBCUTANEOUS at 04:06

## 2024-06-22 RX ADMIN — MAGNESIUM OXIDE TAB 400 MG (241.3 MG ELEMENTAL MG) 400 MG: 400 (241.3 MG) TAB at 09:06

## 2024-06-22 RX ADMIN — ACETAMINOPHEN 650 MG: 325 TABLET ORAL at 08:06

## 2024-06-22 RX ADMIN — GUAIFENESIN 1200 MG: 600 TABLET, EXTENDED RELEASE ORAL at 09:06

## 2024-06-22 RX ADMIN — GUAIFENESIN 1200 MG: 600 TABLET, EXTENDED RELEASE ORAL at 08:06

## 2024-06-22 RX ADMIN — TRAZODONE HYDROCHLORIDE 75 MG: 50 TABLET ORAL at 09:06

## 2024-06-22 RX ADMIN — PREGABALIN 100 MG: 100 CAPSULE ORAL at 08:06

## 2024-06-22 RX ADMIN — HALOPERIDOL 5 MG: 5 TABLET ORAL at 09:06

## 2024-06-22 RX ADMIN — SODIUM CHLORIDE: 9 INJECTION, SOLUTION INTRAVENOUS at 12:06

## 2024-06-22 RX ADMIN — PAROXETINE HYDROCHLORIDE 40 MG: 20 TABLET, FILM COATED ORAL at 09:06

## 2024-06-22 RX ADMIN — PREGABALIN 100 MG: 100 CAPSULE ORAL at 09:06

## 2024-06-22 RX ADMIN — MAGNESIUM OXIDE TAB 400 MG (241.3 MG ELEMENTAL MG) 400 MG: 400 (241.3 MG) TAB at 08:06

## 2024-06-22 RX ADMIN — PANTOPRAZOLE SODIUM 40 MG: 40 TABLET, DELAYED RELEASE ORAL at 08:06

## 2024-06-22 RX ADMIN — MAGNESIUM SULFATE HEPTAHYDRATE 2 G: 40 INJECTION, SOLUTION INTRAVENOUS at 04:06

## 2024-06-22 RX ADMIN — OXYBUTYNIN CHLORIDE 10 MG: 5 TABLET, EXTENDED RELEASE ORAL at 08:06

## 2024-06-22 NOTE — PLAN OF CARE
Pt resting in bed .   Pt awake and alert . Hob elevated .   Antibiotics infusing   Vitals stable .   POC discussed .   IV intact   Safety Measures in place   Chart check complete .   Will  continue to monitor .

## 2024-06-22 NOTE — ASSESSMENT & PLAN NOTE
--ddx: infectious process vs medication induced  --Head CT: unremarkable  --TSH/T4 unremarkable, liver function normal   --holding home antihypertensives and sedating medications  --PT/OT consulted for eval, post acute therapy recs pending  --fall precautions, neuro checks  --admit to telemetry for cardiac monitoring    06/20/24  Appears resolved.  AOO x 3, answers questions appropriately  Monitor    Resolved

## 2024-06-22 NOTE — SUBJECTIVE & OBJECTIVE
Interval History: See hospital course for today      Review of Systems   Constitutional:  Positive for fatigue. Negative for activity change.   Respiratory:  Negative for shortness of breath.    Cardiovascular:  Positive for leg swelling.   Gastrointestinal:  Positive for abdominal pain. Negative for diarrhea, nausea and vomiting.   Genitourinary:  Positive for difficulty urinating.   Neurological:  Positive for weakness.   Psychiatric/Behavioral:  Negative for sleep disturbance.      Objective:     Vital Signs (Most Recent):  Temp: 98.1 °F (36.7 °C) (06/22/24 0738)  Pulse: 110 (06/22/24 0812)  Resp: 16 (06/22/24 0738)  BP: 125/70 (06/22/24 0738)  SpO2: (!) 92 % (06/22/24 0738) Vital Signs (24h Range):  Temp:  [97.5 °F (36.4 °C)-98.6 °F (37 °C)] 98.1 °F (36.7 °C)  Pulse:  [] 110  Resp:  [16-18] 16  SpO2:  [92 %-100 %] 92 %  BP: (123-164)/(58-74) 125/70     Weight: 73.5 kg (162 lb 0.6 oz)  Body mass index is 31.65 kg/m².    Intake/Output Summary (Last 24 hours) at 6/22/2024 1243  Last data filed at 6/22/2024 0221  Gross per 24 hour   Intake 3107.47 ml   Output 4300 ml   Net -1192.53 ml         Physical Exam  Vitals and nursing note reviewed. Exam conducted with a chaperone present (visitor).   Constitutional:       General: She is not in acute distress.     Appearance: She is ill-appearing. She is not toxic-appearing.   HENT:      Head: Normocephalic and atraumatic.   Cardiovascular:      Rate and Rhythm: Normal rate.   Pulmonary:      Effort: Pulmonary effort is normal. No respiratory distress.   Abdominal:      Palpations: Abdomen is soft.      Tenderness: There is abdominal tenderness in the suprapubic area.   Genitourinary:     Comments: nicholson  Musculoskeletal:      Right lower leg: Edema present.      Left lower leg: Edema present.   Skin:     General: Skin is warm.   Neurological:      Motor: Weakness present.             Significant Labs: All pertinent labs within the past 24 hours have been  reviewed.  Blood Culture: positive for bacteroides   CBC:   Recent Labs   Lab 06/21/24  0512 06/22/24  0446   WBC 8.69 7.47   HGB 9.9* 9.9*   HCT 31.1* 31.1*    394     CMP:   Recent Labs   Lab 06/21/24  0512 06/22/24  0446   * 134*   K 4.5 3.7    107   CO2 19* 19*    112*   BUN 13 8   CREATININE 0.8 0.7   CALCIUM 8.0* 8.2*   ANIONGAP 8 8     TSH:   Recent Labs   Lab 06/19/24  1041   TSH 0.706     Urine Culture: positive for ecoli resistant to ampicillin      Significant Imaging: I have reviewed all pertinent imaging results/findings within the past 24 hours.  U/S: I have reviewed all pertinent results/findings within the past 24 hours and my personal findings are:  mild hydronephrosis, right; moderate hydronephrosis left

## 2024-06-22 NOTE — PLAN OF CARE
Problem: Adult Inpatient Plan of Care  Goal: Plan of Care Review  Outcome: Progressing  Goal: Optimal Comfort and Wellbeing  Outcome: Progressing     Problem: Sepsis/Septic Shock  Goal: Optimal Nutrition Intake  Outcome: Progressing     Problem: Skin Injury Risk Increased  Goal: Skin Health and Integrity  Outcome: Progressing     Upon last rounding within the frequent checks, patient denies any needs with  at bedside. Patient is educated that shift change will be coming up and the oncoming shift RN and current RN will proceed with bedside reporting. Safety interventions are in place including: bed in lowest position, side rails activated x2, bed alarm on, and patient is educated on fall preventions. Patient verbalize understanding of notifying the staff for mobility assistance.
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
## 2024-06-22 NOTE — ASSESSMENT & PLAN NOTE
"This patient does have evidence of infective focus  My overall impression is sepsis.  Source: Urinary Tract  Antibiotics given-   Antibiotics (72h ago, onward)      Start     Stop Route Frequency Ordered    06/20/24 1400  levoFLOXacin 750 mg/150 mL IVPB 750 mg         -- IV Every 24 hours (non-standard times) 06/20/24 1255          Latest lactate reviewed-  No results for input(s): "LACTATE", "POCLAC" in the last 72 hours.  Organ dysfunction indicated by Acute kidney injury and Encephalopathy  Fluid challenge Actual Body weight- Patient will receive 30ml/kg actual body weight to calculate fluid bolus for treatment of septic shock.   Post- resuscitation assessment No - Post resuscitation assessment not needed   Will Not start Pressors- Levophed for MAP of 65  Source control achieved by: IV Levaquin  Blood and urine cultures positive for gram negative christy    Due to bacteremia growing bacteroides  Urine culture growing ecoli  Continue intravenous levaquin  Consider ct abdomen pelvis due to abdominal complaints of pain  "

## 2024-06-22 NOTE — ASSESSMENT & PLAN NOTE
--urinalysis results suggestive of infection  --no recent prior culture data available for review  --s/p IV Levaquin in the ER- continue upon admission  --follow cultures  --monitor fever curve- Tylenol PRN    06/20/24  Urine culture pending    6/21/24  Urine culture showing gram negative christy. May need to change abx once final susceptibility results. Continue IV Levaquin for now. Urology following. Hematuria started yesterday. Urology will continue to monitor need for continuous bladder irrigation. In the meantime recommend hand irrigation with 60 cc cath tip syringe. Continue antibiotics as hematuria is likely secondary to hemorrhagic cystitis.     Urine growing ecoli  Continue levaquin  Urology following  Will need to keep nicholson

## 2024-06-22 NOTE — ASSESSMENT & PLAN NOTE
Patient has Abnormal Magnesium: hypomagnesemia. Will continue to monitor electrolytes closely. Will replace the affected electrolytes and repeat labs to be done after interventions completed. The patient's magnesium results have been reviewed and are listed below.  Recent Labs   Lab 06/22/24  0446   MG 1.5*      Replete orally and by intravenous

## 2024-06-22 NOTE — ASSESSMENT & PLAN NOTE
--BP marginal, likely due to infection  --home medications include: lisinopril, lasix  --holding due to BRIAN  --PRN IV hydralazine 10mg Q6H for sBP > 175 mmHg  --Q4HVS    06/21/24  BP and kidney function stable.  Will hold ACEI and Lasix for now.     Consider resuming diuretic for lower extremity edema

## 2024-06-22 NOTE — PROGRESS NOTES
[facility] Medicine  Progress Note    Patient Name: Merle Caro  MRN: [id number]  Patient Class: IP- Inpatient   Admission Date: 6/19/2024  Length of Stay: 3 days  Attending Physician: Mann Keita MD  Primary Care Provider: Marck Major MD        Subjective:     Principal Problem:Sepsis without septic shock        HPI:  Merle Caro is a 68-year-old woman with a past medical history of hypertension, chronic obstructive pulmonary disease, asthma, arthritis, and gastroesophageal reflux disease who presented to the Emergency Department for evaluation of altered mental status, which her  stated began four days prior to arrival. Her  reported that her speech had become increasingly slurred and she had developed a left-sided facial droop. Additionally, she experienced a decreased appetite and increasing weakness over the past month, culminating in her being bedridden for the past four days.    Upon arrival in the ER, the patient was found to be tachycardic. Abnormal lab values included a WBC of 18.05, platelets at 599, creatinine at 1.9, eGFR of 29, and procalcitonin at 0.48. A urinalysis was notable for brown color, cloudy appearance, 2+ protein, 1+ occult blood, 3+ leukocytes, 15 RBCs, 40 WBCs, and many bacteria. The chest X-ray indicated superimposed left-sided edema or infection on a background of interstitial lung disease. A CT head without contrast showed no definite acute intracranial abnormality. Sepsis protocol initiated. Received sepsis bolus fluids and received IV Levaquin x 1 dose. Hospital Medicine called for admission.    Overview/Hospital Course:  Merle Caro is a 68 year old female admitted for Sepsis secondary to UTI. Mentation is back at basline per  at bedside. Blood cultures x 2 showing gram negative rods. Urine culture pending. Bilateral hydronephrosis left greater than right per imaging. Leija catheter inserted. Renal function much improved  post placement. Urology following.     06/21/24  Blood cultures x 2 and urine cultures showing gram negative rods. Currently on IV Levaquin. Once final sensitivities for cultures have resulted, will consult ID. Leukocytosis and BRIAN have resolved. Hematuria yesterday. Urology following -- with development of hematuria, will need to continue monitoring need for continuous bladder irrigation. In the meantime recommend hand irrigation with 60 cc cath tip syringe. Continue antibiotics as hematuria is likely secondary to hemorrhagic cystitis. PT/OT following, recommending skilled facility once medically stable for discharge. Patient adamantly refuses to go to SNF upon discharge. She has opted for home health.   6/22 +bacteremia. Infectious disease consulted. Urology following. Continue intravenous levaquin. Urine culture growing ecoli. Blood culture growing bacteroides.    Interval History: See hospital course for today      Review of Systems   Constitutional:  Positive for fatigue. Negative for activity change.   Respiratory:  Negative for shortness of breath.    Cardiovascular:  Positive for leg swelling.   Gastrointestinal:  Positive for abdominal pain. Negative for diarrhea, nausea and vomiting.   Genitourinary:  Positive for difficulty urinating.   Neurological:  Positive for weakness.   Psychiatric/Behavioral:  Negative for sleep disturbance.      Objective:     Vital Signs (Most Recent):  Temp: 98.1 °F (36.7 °C) (06/22/24 0738)  Pulse: 110 (06/22/24 0812)  Resp: 16 (06/22/24 0738)  BP: 125/70 (06/22/24 0738)  SpO2: (!) 92 % (06/22/24 0738) Vital Signs (24h Range):  Temp:  [97.5 °F (36.4 °C)-98.6 °F (37 °C)] 98.1 °F (36.7 °C)  Pulse:  [] 110  Resp:  [16-18] 16  SpO2:  [92 %-100 %] 92 %  BP: (123-164)/(58-74) 125/70     Weight: 73.5 kg (162 lb 0.6 oz)  Body mass index is 31.65 kg/m².    Intake/Output Summary (Last 24 hours) at 6/22/2024 1243  Last data filed at 6/22/2024 0221  Gross per 24 hour   Intake 3107.47  ml   Output 4300 ml   Net -1192.53 ml         Physical Exam  Vitals and nursing note reviewed. Exam conducted with a chaperone present (visitor).   Constitutional:       General: She is not in acute distress.     Appearance: She is ill-appearing. She is not toxic-appearing.   HENT:      Head: Normocephalic and atraumatic.   Cardiovascular:      Rate and Rhythm: Normal rate.   Pulmonary:      Effort: Pulmonary effort is normal. No respiratory distress.   Abdominal:      Palpations: Abdomen is soft.      Tenderness: There is abdominal tenderness in the suprapubic area.   Genitourinary:     Comments: nicholson  Musculoskeletal:      Right lower leg: Edema present.      Left lower leg: Edema present.   Skin:     General: Skin is warm.   Neurological:      Motor: Weakness present.             Significant Labs: All pertinent labs within the past 24 hours have been reviewed.  Blood Culture: positive for bacteroides   CBC:   Recent Labs   Lab 06/21/24  0512 06/22/24  0446   WBC 8.69 7.47   HGB 9.9* 9.9*   HCT 31.1* 31.1*    394     CMP:   Recent Labs   Lab 06/21/24  0512 06/22/24  0446   * 134*   K 4.5 3.7    107   CO2 19* 19*    112*   BUN 13 8   CREATININE 0.8 0.7   CALCIUM 8.0* 8.2*   ANIONGAP 8 8     TSH:   Recent Labs   Lab 06/19/24  1041   TSH 0.706     Urine Culture: positive for ecoli resistant to ampicillin      Significant Imaging: I have reviewed all pertinent imaging results/findings within the past 24 hours.  U/S: I have reviewed all pertinent results/findings within the past 24 hours and my personal findings are:  mild hydronephrosis, right; moderate hydronephrosis left    Assessment/Plan:      * Sepsis without septic shock d/t UTI  This patient does have evidence of infective focus  My overall impression is sepsis.  Source: Urinary Tract  Antibiotics given-   Antibiotics (72h ago, onward)      Start     Stop Route Frequency Ordered    06/20/24 1400  levoFLOXacin 750 mg/150 mL IVPB 750 mg  "        -- IV Every 24 hours (non-standard times) 06/20/24 1255          Latest lactate reviewed-  No results for input(s): "LACTATE", "POCLAC" in the last 72 hours.  Organ dysfunction indicated by Acute kidney injury and Encephalopathy  Fluid challenge Actual Body weight- Patient will receive 30ml/kg actual body weight to calculate fluid bolus for treatment of septic shock.   Post- resuscitation assessment No - Post resuscitation assessment not needed   Will Not start Pressors- Levophed for MAP of 65  Source control achieved by: IV Levaquin  Blood and urine cultures positive for gram negative christy    Due to bacteremia growing bacteroides  Urine culture growing ecoli  Continue intravenous levaquin  Consider ct abdomen pelvis due to abdominal complaints of pain    Hypomagnesemia  Patient has Abnormal Magnesium: hypomagnesemia. Will continue to monitor electrolytes closely. Will replace the affected electrolytes and repeat labs to be done after interventions completed. The patient's magnesium results have been reviewed and are listed below.  Recent Labs   Lab 06/22/24  0446   MG 1.5*      Replete orally and by intravenous     QT prolongation  On levaquin  Maintain mag >2      Bacteremia  Repeat blood cultures pending  Continue intravenous levaquin  Consider further imaging      Hydronephrosis  Mild moderate right-sided hydronephrosis with right hydroureter. Severe hydronephrosis of the left kidney with moderate mild left hydroureter. Severe bladder wall thickening. Question punctate nonobstructing left renal calculi with versus punctate vascular calcification. No obstructing renal calculi.   Urology following.  Leija catheter placed, BRIAN has improved.  May need to have chronic indwelling catheter.    Encephalopathy, metabolic  --ddx: infectious process vs medication induced  --Head CT: unremarkable  --TSH/T4 unremarkable, liver function normal   --holding home antihypertensives and sedating medications  --PT/OT " consulted for eval, post acute therapy recs pending  --fall precautions, neuro checks  --admit to telemetry for cardiac monitoring    06/20/24  Appears resolved.  AOO x 3, answers questions appropriately  Monitor    Resolved         BRIAN (acute kidney injury)  --Cr bumped from baseline, GFR moderately decreased from previous lab draws  --will hold home lisinopril, lasix for now  --avoid nephrotoxic agents while hospitalized  --renally dose meds  --daily BMP to monitor renal fxn   --US renal ordered to rule out obstructive uropathy    6/20/24  Resolved.  Creatinine now 0.8.     Acute cystitis without hematuria  --urinalysis results suggestive of infection  --no recent prior culture data available for review  --s/p IV Levaquin in the ER- continue upon admission  --follow cultures  --monitor fever curve- Tylenol PRN    06/20/24  Urine culture pending    6/21/24  Urine culture showing gram negative christy. May need to change abx once final susceptibility results. Continue IV Levaquin for now. Urology following. Hematuria started yesterday. Urology will continue to monitor need for continuous bladder irrigation. In the meantime recommend hand irrigation with 60 cc cath tip syringe. Continue antibiotics as hematuria is likely secondary to hemorrhagic cystitis.     Urine growing ecoli  Continue levaquin  Urology following  Will need to keep nicholson      Benzodiazepine dependence  --on klonopin nightly  --will transition to PRN given presentation of encephalopathy    06/20/24  -Resumed Klonopin as encephalopathy has resolved and patient/ request to restart.      Gait difficulty  PT/OT consulted to assess -- recommending SNF. She adamantly refuses as she had a bad experience at a previous SNF. She has opted for home health once medically stable for discharge.      Gastroesophageal reflux disease with esophagitis  --stable  --continue home PPI therapy      Cigarette nicotine dependence with nicotine-induced disorder  Patient  denied smoking when asked,  at bedside didn't correct when patient was questioned    Primary hypertension  --BP marginal, likely due to infection  --home medications include: lisinopril, lasix  --holding due to BRIAN  --PRN IV hydralazine 10mg Q6H for sBP > 175 mmHg  --Q4HVS    06/21/24  BP and kidney function stable.  Will hold ACEI and Lasix for now.     Consider resuming diuretic for lower extremity edema     Asthma with COPD  --stable on room air, not on home O2  --continue home maintenance therapy  --Monitor clinically for respiratory distress, Q4H O2 sats   --Encourage light physical activity to maintain lung function             VTE Risk Mitigation (From admission, onward)           Ordered     enoxaparin injection 40 mg  Daily         06/20/24 1255     IP VTE HIGH RISK PATIENT  Once         06/19/24 1205     Place sequential compression device  Until discontinued         06/19/24 1205                    Discharge Planning   YELENA:      Code Status: Full Code   Is the patient medically ready for discharge?:     Reason for patient still in hospital (select all that apply): Patient new problem, Patient trending condition, Laboratory test, Treatment, Imaging, Consult recommendations, and Pending disposition  Discharge Plan A: Home, Community Services, Home with family                  Mann Keita MD  Department of Hospital Medicine   [facility] - Telemetry ([facility])

## 2024-06-23 PROCEDURE — 25000003 PHARM REV CODE 250: Performed by: STUDENT IN AN ORGANIZED HEALTH CARE EDUCATION/TRAINING PROGRAM

## 2024-06-23 PROCEDURE — 51702 INSERT TEMP BLADDER CATH: CPT

## 2024-06-23 PROCEDURE — 25000003 PHARM REV CODE 250: Performed by: NURSE PRACTITIONER

## 2024-06-23 PROCEDURE — 25000242 PHARM REV CODE 250 ALT 637 W/ HCPCS: Performed by: STUDENT IN AN ORGANIZED HEALTH CARE EDUCATION/TRAINING PROGRAM

## 2024-06-23 PROCEDURE — 21400001 HC TELEMETRY ROOM

## 2024-06-23 PROCEDURE — 25000003 PHARM REV CODE 250: Performed by: FAMILY MEDICINE

## 2024-06-23 PROCEDURE — 99231 SBSQ HOSP IP/OBS SF/LOW 25: CPT | Mod: ,,, | Performed by: UROLOGY

## 2024-06-23 PROCEDURE — 97530 THERAPEUTIC ACTIVITIES: CPT | Mod: CQ

## 2024-06-23 PROCEDURE — 63600175 PHARM REV CODE 636 W HCPCS: Performed by: SPECIALIST

## 2024-06-23 RX ORDER — SODIUM CHLORIDE 9 MG/ML
INJECTION, SOLUTION INTRAVENOUS
Status: DISCONTINUED | OUTPATIENT
Start: 2024-06-23 | End: 2024-06-24 | Stop reason: HOSPADM

## 2024-06-23 RX ADMIN — SODIUM CHLORIDE: 9 INJECTION, SOLUTION INTRAVENOUS at 03:06

## 2024-06-23 RX ADMIN — PREGABALIN 100 MG: 100 CAPSULE ORAL at 10:06

## 2024-06-23 RX ADMIN — GUAIFENESIN 1200 MG: 600 TABLET, EXTENDED RELEASE ORAL at 10:06

## 2024-06-23 RX ADMIN — PANTOPRAZOLE SODIUM 40 MG: 40 TABLET, DELAYED RELEASE ORAL at 10:06

## 2024-06-23 RX ADMIN — ENOXAPARIN SODIUM 40 MG: 40 INJECTION SUBCUTANEOUS at 04:06

## 2024-06-23 RX ADMIN — PREGABALIN 100 MG: 100 CAPSULE ORAL at 09:06

## 2024-06-23 RX ADMIN — LEVOFLOXACIN 750 MG: 750 INJECTION, SOLUTION INTRAVENOUS at 03:06

## 2024-06-23 RX ADMIN — GUAIFENESIN 1200 MG: 600 TABLET, EXTENDED RELEASE ORAL at 09:06

## 2024-06-23 RX ADMIN — CLONAZEPAM 0.5 MG: 0.5 TABLET ORAL at 09:06

## 2024-06-23 RX ADMIN — Medication 6 MG: at 12:06

## 2024-06-23 RX ADMIN — OXYBUTYNIN CHLORIDE 10 MG: 5 TABLET, EXTENDED RELEASE ORAL at 10:06

## 2024-06-23 RX ADMIN — FLUTICASONE PROPIONATE 100 MCG: 50 SPRAY, METERED NASAL at 10:06

## 2024-06-23 RX ADMIN — TRAZODONE HYDROCHLORIDE 75 MG: 50 TABLET ORAL at 09:06

## 2024-06-23 RX ADMIN — MAGNESIUM OXIDE TAB 400 MG (241.3 MG ELEMENTAL MG) 400 MG: 400 (241.3 MG) TAB at 10:06

## 2024-06-23 RX ADMIN — PAROXETINE HYDROCHLORIDE 40 MG: 20 TABLET, FILM COATED ORAL at 09:06

## 2024-06-23 RX ADMIN — HALOPERIDOL 5 MG: 5 TABLET ORAL at 10:06

## 2024-06-23 RX ADMIN — MAGNESIUM OXIDE TAB 400 MG (241.3 MG ELEMENTAL MG) 400 MG: 400 (241.3 MG) TAB at 09:06

## 2024-06-23 NOTE — ASSESSMENT & PLAN NOTE
"Patient has Abnormal Magnesium: hypomagnesemia. Will continue to monitor electrolytes closely. Will replace the affected electrolytes and repeat labs to be done after interventions completed. The patient's magnesium results have been reviewed and are listed below.  No results for input(s): "MG" in the last 24 hours.     Replete orally and by intravenous   "

## 2024-06-23 NOTE — SUBJECTIVE & OBJECTIVE
Interval History: See hospital course for today      Review of Systems   Constitutional:  Positive for activity change (improving) and fatigue. Negative for appetite change and fever.   Respiratory:  Negative for shortness of breath.    Cardiovascular:  Positive for leg swelling.   Gastrointestinal:  Positive for abdominal pain. Negative for nausea and vomiting.   Genitourinary:  Negative for difficulty urinating.   Musculoskeletal:  Positive for arthralgias.   Neurological:  Positive for weakness.   Psychiatric/Behavioral:  Positive for decreased concentration. Negative for agitation, behavioral problems, confusion and dysphoric mood. The patient is not nervous/anxious.      Objective:     Vital Signs (Most Recent):  Temp: 98.1 °F (36.7 °C) (06/23/24 0828)  Pulse: 100 (06/23/24 0828)  Resp: 18 (06/23/24 0828)  BP: 133/68 (06/23/24 0828)  SpO2: 95 % (06/23/24 0828) Vital Signs (24h Range):  Temp:  [97.7 °F (36.5 °C)-98.4 °F (36.9 °C)] 98.1 °F (36.7 °C)  Pulse:  [] 100  Resp:  [17-20] 18  SpO2:  [90 %-96 %] 95 %  BP: (119-142)/(56-68) 133/68     Weight: 74 kg (163 lb 2.3 oz)  Body mass index is 31.86 kg/m².    Intake/Output Summary (Last 24 hours) at 6/23/2024 1025  Last data filed at 6/23/2024 0038  Gross per 24 hour   Intake 2834.25 ml   Output 2900 ml   Net -65.75 ml         Physical Exam  Vitals and nursing note reviewed. Exam conducted with a chaperone present (visitor).   Constitutional:       General: She is not in acute distress.     Appearance: She is obese. She is ill-appearing. She is not toxic-appearing.   HENT:      Head: Normocephalic and atraumatic.      Mouth/Throat:      Dentition: Abnormal dentition.   Cardiovascular:      Rate and Rhythm: Normal rate.   Pulmonary:      Effort: Pulmonary effort is normal. No respiratory distress.   Abdominal:      Palpations: Abdomen is soft.      Tenderness: There is no abdominal tenderness.   Genitourinary:     Comments: nicholson  Musculoskeletal:          General: Deformity (right ring finger) present.      Right lower leg: Edema present.      Left lower leg: Edema present.   Skin:     General: Skin is warm.   Neurological:      Mental Status: She is alert. Mental status is at baseline.      Motor: Weakness present.             Significant Labs: All pertinent labs within the past 24 hours have been reviewed.  Blood Culture:   Recent Labs   Lab 06/22/24  1416   LABBLOO No Growth to date  No Growth to date     CBC:   Recent Labs   Lab 06/22/24  0446   WBC 7.47   HGB 9.9*   HCT 31.1*        CMP:   Recent Labs   Lab 06/22/24  0446   *   K 3.7      CO2 19*   *   BUN 8   CREATININE 0.7   CALCIUM 8.2*   ANIONGAP 8     Urine Culture: growing ecoli      Significant Imaging: I have reviewed all pertinent imaging results/findings within the past 24 hours.  U/S: I have reviewed all pertinent results/findings within the past 24 hours and my personal findings are:  mild hydronephrosis, right; moderate hydronephrosis left

## 2024-06-23 NOTE — ASSESSMENT & PLAN NOTE
"This patient does have evidence of infective focus  My overall impression is sepsis.  Source: Urinary Tract  Antibiotics given-   Antibiotics (72h ago, onward)      Start     Stop Route Frequency Ordered    06/20/24 1400  levoFLOXacin 750 mg/150 mL IVPB 750 mg         -- IV Every 24 hours (non-standard times) 06/20/24 1255          Latest lactate reviewed-  No results for input(s): "LACTATE", "POCLAC" in the last 72 hours.  Organ dysfunction indicated by Acute kidney injury and Encephalopathy  Fluid challenge Actual Body weight- Patient will receive 30ml/kg actual body weight to calculate fluid bolus for treatment of septic shock.   Post- resuscitation assessment No - Post resuscitation assessment not needed   Will Not start Pressors- Levophed for MAP of 65  Source control achieved by: IV Levaquin  Blood and urine cultures positive for gram negative christy    Due to bacteremia growing bacteroides  Urine culture growing ecoli  Continue intravenous levaquin  Consider ct abdomen pelvis due to abdominal complaints of pain  Infectious disease consulted   "

## 2024-06-23 NOTE — PT/OT/SLP PROGRESS
Physical Therapy Treatment    Patient Name:  Merle Caro   MRN:  [id number]    Recommendations:     Discharge Recommendations: Moderate Intensity Therapy  Discharge Equipment Recommendations: to be determined by next level of care  Barriers to discharge: None    Assessment:     Merle Caro is a 68 y.o. female admitted with a medical diagnosis of Sepsis without septic shock.  She presents with the following impairments/functional limitations: weakness, impaired endurance, impaired self care skills, impaired functional mobility, gait instability, impaired balance, decreased coordination, decreased upper extremity function, decreased lower extremity function, decreased safety awareness, pain, decreased ROM.    Pt met lying in bed with spouse present, and agreeable to participate in Physical Therapy on this date. Today's treatment session focused on bed mobility and functional transfers. Overall, pt demonstrates good tolerance to activities. Pt will continue to benefit from skilled Physical Therapy services to maximize function, to enhance safety awareness, and reduce burden of care.    Rehab Prognosis: Good; patient would benefit from acute skilled PT services to address these deficits and reach maximum level of function.    Recent Surgery: * No surgery found *      Plan:     During this hospitalization, patient to be seen 3 x/week to address the identified rehab impairments via gait training, therapeutic activities, therapeutic exercises and progress toward the following goals:    Plan of Care Expires:  07/04/24    Subjective     Chief Complaint: R knee pain  Patient/Family Comments/goals: to return home  Pain/Comfort:  Location - Side 1: Right  Location - Orientation 1: generalized  Location 1: knee  Pain Addressed 1: Distraction, Reposition      Objective:     Communicated with pt's nurse, Janae, prior to session.  Patient found HOB elevated with telemetry, peripheral IV, nicholson catheter upon PT entry to room.      General Precautions: Standard, fall  Orthopedic Precautions: N/A  Braces: N/A  Respiratory Status: Room air     Functional Mobility:  Bed Mobility:     Rolling Right: minimum assistance  For trunk management  Use of bed rail  Scooting to EOB to plant feet on floor: contact guard assistance  Supine to Sit: minimum assistance for trunk righting  Transfers:     Sit to Stand: moderate assistance with rolling walker, 2 trials  Pt cued for weight-shifting, hand placement, and sequencing  Pt with complaints of increased R knee pain upon first attempt.   Bed to Chair: moderate assistance with  rolling walker  using  Step Transfer  Pt cued for weight-shifting, foot placement, and RW management  Increased time required  Balance:   Sitting: CGA   Standing: Mod (A)      AM-PAC 6 CLICK MOBILITY  Turning over in bed (including adjusting bedclothes, sheets and blankets)?: 3  Sitting down on and standing up from a chair with arms (e.g., wheelchair, bedside commode, etc.): 2  Moving from lying on back to sitting on the side of the bed?: 3  Moving to and from a bed to a chair (including a wheelchair)?: 2  Need to walk in hospital room?: 1  Climbing 3-5 steps with a railing?: 1  Basic Mobility Total Score: 12       Treatment & Education:  Pt educated on the role of therapy, the goals of the session, use of AD, proper transfer technique, and safety awareness techniques. Pt demonstrates good understanding.    Patient left up in chair with all lines intact, call button in reach, chair alarm on, and pt's spouse present.    GOALS:   Multidisciplinary Problems       Physical Therapy Goals          Problem: Physical Therapy    Goal Priority Disciplines Outcome Goal Variances Interventions   Physical Therapy Goal     PT, PT/OT      Description: LTG to be met in 14 days: 7/4/24  Pt will complete bed mobility SBA.  Pt will complete scooting EOB JESSIE.  Pt will complete bed>W/C transfer Zulma.  Pt will increase AMPAC score by 2 to progress  gross functional mobility.                       Time Tracking:     PT Received On: 06/23/24  PT Start Time: 0938     PT Stop Time: 0948  PT Total Time (min): 10 min     Billable Minutes: Therapeutic Activity 10    Treatment Type: Treatment  PT/PTA: PTA     Number of PTA visits since last PT visit: 1 06/23/2024
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
## 2024-06-23 NOTE — ASSESSMENT & PLAN NOTE
"Patient has hyponatremia which is controlled,We will aim to correct the sodium by 4-6mEq in 24 hours. We will monitor sodium Daily. 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 We will obtain the following studies: follow up outpatient nephrology . We will treat the hyponatremia with Fluid restriction of:  1.5 liter per day and Removal of offending medications. The patient's sodium results have been reviewed and are listed below.  No results for input(s): "NA" in the last 24 hours.  "

## 2024-06-23 NOTE — ASSESSMENT & PLAN NOTE
--BP marginal, likely due to infection  --home medications include: lisinopril, lasix  --holding due to BRIAN  --PRN IV hydralazine 10mg Q6H for sBP > 175 mmHg  --Q4HVS    06/21/24  BP and kidney function stable.  Will hold ACEI and Lasix for now.     Consider resuming diuretic for lower extremity edema   Cmp pending am

## 2024-06-23 NOTE — PROGRESS NOTES
[facility] Medicine  Progress Note    Patient Name: Merle Caro  MRN: [id number]  Patient Class: IP- Inpatient   Admission Date: 6/19/2024  Length of Stay: 4 days  Attending Physician: Mann Keita MD  Primary Care Provider: Marck Major MD        Subjective:     Principal Problem:Sepsis without septic shock        HPI:  Merle Caro is a 68-year-old woman with a past medical history of hypertension, chronic obstructive pulmonary disease, asthma, arthritis, and gastroesophageal reflux disease who presented to the Emergency Department for evaluation of altered mental status, which her  stated began four days prior to arrival. Her  reported that her speech had become increasingly slurred and she had developed a left-sided facial droop. Additionally, she experienced a decreased appetite and increasing weakness over the past month, culminating in her being bedridden for the past four days.    Upon arrival in the ER, the patient was found to be tachycardic. Abnormal lab values included a WBC of 18.05, platelets at 599, creatinine at 1.9, eGFR of 29, and procalcitonin at 0.48. A urinalysis was notable for brown color, cloudy appearance, 2+ protein, 1+ occult blood, 3+ leukocytes, 15 RBCs, 40 WBCs, and many bacteria. The chest X-ray indicated superimposed left-sided edema or infection on a background of interstitial lung disease. A CT head without contrast showed no definite acute intracranial abnormality. Sepsis protocol initiated. Received sepsis bolus fluids and received IV Levaquin x 1 dose. Hospital Medicine called for admission.    Overview/Hospital Course:  Merle Caro is a 68 year old female admitted for Sepsis secondary to UTI. Mentation is back at basline per  at bedside. Blood cultures x 2 showing gram negative rods. Urine culture pending. Bilateral hydronephrosis left greater than right per imaging. Leija catheter inserted. Renal function much improved  post placement. Urology following.     06/21/24  Blood cultures x 2 and urine cultures showing gram negative rods. Currently on IV Levaquin. Once final sensitivities for cultures have resulted, will consult ID. Leukocytosis and BRIAN have resolved. Hematuria yesterday. Urology following -- with development of hematuria, will need to continue monitoring need for continuous bladder irrigation. In the meantime recommend hand irrigation with 60 cc cath tip syringe. Continue antibiotics as hematuria is likely secondary to hemorrhagic cystitis. PT/OT following, recommending skilled facility once medically stable for discharge. Patient adamantly refuses to go to SNF upon discharge. She has opted for home health.   6/22 +bacteremia. Infectious disease consulted. Urology following. Continue intravenous levaquin. Urine culture growing ecoli. Blood culture growing bacteroides.  6/23 repeat blood culture negative growth to date x 1 day. Complains of abdominal pain with passing flatus.     Interval History: See hospital course for today      Review of Systems   Constitutional:  Positive for activity change (improving) and fatigue. Negative for appetite change and fever.   Respiratory:  Negative for shortness of breath.    Cardiovascular:  Positive for leg swelling.   Gastrointestinal:  Positive for abdominal pain. Negative for nausea and vomiting.   Genitourinary:  Negative for difficulty urinating.   Musculoskeletal:  Positive for arthralgias.   Neurological:  Positive for weakness.   Psychiatric/Behavioral:  Positive for decreased concentration. Negative for agitation, behavioral problems, confusion and dysphoric mood. The patient is not nervous/anxious.      Objective:     Vital Signs (Most Recent):  Temp: 98.1 °F (36.7 °C) (06/23/24 0828)  Pulse: 100 (06/23/24 0828)  Resp: 18 (06/23/24 0828)  BP: 133/68 (06/23/24 0828)  SpO2: 95 % (06/23/24 0828) Vital Signs (24h Range):  Temp:  [97.7 °F (36.5 °C)-98.4 °F (36.9 °C)] 98.1 °F  (36.7 °C)  Pulse:  [] 100  Resp:  [17-20] 18  SpO2:  [90 %-96 %] 95 %  BP: (119-142)/(56-68) 133/68     Weight: 74 kg (163 lb 2.3 oz)  Body mass index is 31.86 kg/m².    Intake/Output Summary (Last 24 hours) at 6/23/2024 1025  Last data filed at 6/23/2024 0038  Gross per 24 hour   Intake 2834.25 ml   Output 2900 ml   Net -65.75 ml         Physical Exam  Vitals and nursing note reviewed. Exam conducted with a chaperone present (visitor).   Constitutional:       General: She is not in acute distress.     Appearance: She is obese. She is ill-appearing. She is not toxic-appearing.   HENT:      Head: Normocephalic and atraumatic.      Mouth/Throat:      Dentition: Abnormal dentition.   Cardiovascular:      Rate and Rhythm: Normal rate.   Pulmonary:      Effort: Pulmonary effort is normal. No respiratory distress.   Abdominal:      Palpations: Abdomen is soft.      Tenderness: There is no abdominal tenderness.   Genitourinary:     Comments: nicholson  Musculoskeletal:         General: Deformity (right ring finger) present.      Right lower leg: Edema present.      Left lower leg: Edema present.   Skin:     General: Skin is warm.   Neurological:      Mental Status: She is alert. Mental status is at baseline.      Motor: Weakness present.             Significant Labs: All pertinent labs within the past 24 hours have been reviewed.  Blood Culture:   Recent Labs   Lab 06/22/24  1416   LABBLOO No Growth to date  No Growth to date     CBC:   Recent Labs   Lab 06/22/24  0446   WBC 7.47   HGB 9.9*   HCT 31.1*        CMP:   Recent Labs   Lab 06/22/24  0446   *   K 3.7      CO2 19*   *   BUN 8   CREATININE 0.7   CALCIUM 8.2*   ANIONGAP 8     Urine Culture: growing ecoli      Significant Imaging: I have reviewed all pertinent imaging results/findings within the past 24 hours.  U/S: I have reviewed all pertinent results/findings within the past 24 hours and my personal findings are:  mild hydronephrosis,  "right; moderate hydronephrosis left    Assessment/Plan:      * Sepsis without septic shock d/t UTI  This patient does have evidence of infective focus  My overall impression is sepsis.  Source: Urinary Tract  Antibiotics given-   Antibiotics (72h ago, onward)      Start     Stop Route Frequency Ordered    06/20/24 1400  levoFLOXacin 750 mg/150 mL IVPB 750 mg         -- IV Every 24 hours (non-standard times) 06/20/24 1255          Latest lactate reviewed-  No results for input(s): "LACTATE", "POCLAC" in the last 72 hours.  Organ dysfunction indicated by Acute kidney injury and Encephalopathy  Fluid challenge Actual Body weight- Patient will receive 30ml/kg actual body weight to calculate fluid bolus for treatment of septic shock.   Post- resuscitation assessment No - Post resuscitation assessment not needed   Will Not start Pressors- Levophed for MAP of 65  Source control achieved by: IV Levaquin  Blood and urine cultures positive for gram negative christy    Due to bacteremia growing bacteroides  Urine culture growing ecoli  Continue intravenous levaquin  Consider ct abdomen pelvis due to abdominal complaints of pain  Infectious disease consulted     Hypomagnesemia  Patient has Abnormal Magnesium: hypomagnesemia. Will continue to monitor electrolytes closely. Will replace the affected electrolytes and repeat labs to be done after interventions completed. The patient's magnesium results have been reviewed and are listed below.  No results for input(s): "MG" in the last 24 hours.     Replete orally and by intravenous     QT prolongation  On levaquin  Maintain mag >2      Bacteremia  Repeat blood cultures pending  Continue intravenous levaquin  Consider further imaging      Hydronephrosis  Mild moderate right-sided hydronephrosis with right hydroureter. Severe hydronephrosis of the left kidney with moderate mild left hydroureter. Severe bladder wall thickening. Question punctate nonobstructing left renal calculi with " versus punctate vascular calcification. No obstructing renal calculi.   Urology following.  Nicholson catheter placed, BRIAN has improved.  May need to have chronic indwelling catheter.    Encephalopathy, metabolic  --ddx: infectious process vs medication induced  --Head CT: unremarkable  --TSH/T4 unremarkable, liver function normal   --holding home antihypertensives and sedating medications  --PT/OT consulted for eval, post acute therapy recs pending  --fall precautions, neuro checks  --admit to telemetry for cardiac monitoring    06/20/24  Appears resolved.  AOO x 3, answers questions appropriately  Monitor    Resolved         BRIAN (acute kidney injury)  --Cr bumped from baseline, GFR moderately decreased from previous lab draws  --will hold home lisinopril, lasix for now  --avoid nephrotoxic agents while hospitalized  --renally dose meds  --daily BMP to monitor renal fxn   --US renal ordered to rule out obstructive uropathy    6/20/24  Resolved.  Creatinine now 0.8.     Acute cystitis without hematuria  --urinalysis results suggestive of infection  --no recent prior culture data available for review  --s/p IV Levaquin in the ER- continue upon admission  --follow cultures  --monitor fever curve- Tylenol PRN    06/20/24  Urine culture pending    6/21/24  Urine culture showing gram negative christy. May need to change abx once final susceptibility results. Continue IV Levaquin for now. Urology following. Hematuria started yesterday. Urology will continue to monitor need for continuous bladder irrigation. In the meantime recommend hand irrigation with 60 cc cath tip syringe. Continue antibiotics as hematuria is likely secondary to hemorrhagic cystitis.     Urine growing ecoli  Continue levaquin  Urology following  Will need to keep nicholson      Benzodiazepine dependence  --on klonopin nightly  --will transition to PRN given presentation of encephalopathy    06/20/24  -Resumed Klonopin as encephalopathy has resolved and  "patient/ request to restart.      Gait difficulty  PT/OT consulted to assess -- recommending SNF. She adamantly refuses as she had a bad experience at a previous SNF. She has opted for home health once medically stable for discharge.      Gastroesophageal reflux disease with esophagitis  --stable  --continue home PPI therapy      Hyponatremia  Patient has hyponatremia which is controlled,We will aim to correct the sodium by 4-6mEq in 24 hours. We will monitor sodium Daily. 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 We will obtain the following studies: follow up outpatient nephrology . We will treat the hyponatremia with Fluid restriction of:  1.5 liter per day and Removal of offending medications. The patient's sodium results have been reviewed and are listed below.  No results for input(s): "NA" in the last 24 hours.    Cigarette nicotine dependence with nicotine-induced disorder  Patient denied smoking when asked,  at bedside didn't correct when patient was questioned    Primary hypertension  --BP marginal, likely due to infection  --home medications include: lisinopril, lasix  --holding due to BRIAN  --PRN IV hydralazine 10mg Q6H for sBP > 175 mmHg  --Q4HVS    06/21/24  BP and kidney function stable.  Will hold ACEI and Lasix for now.     Consider resuming diuretic for lower extremity edema   Cmp pending am    Asthma with COPD  --stable on room air, not on home O2  --continue home maintenance therapy  --Monitor clinically for respiratory distress, Q4H O2 sats   --Encourage light physical activity to maintain lung function             VTE Risk Mitigation (From admission, onward)           Ordered     enoxaparin injection 40 mg  Daily         06/20/24 1255     IP VTE HIGH RISK PATIENT  Once         06/19/24 1205     Place sequential compression device  Until discontinued         06/19/24 1205                    Discharge Planning   YELENA:      Code " Status: Full Code   Is the patient medically ready for discharge?:     Reason for patient still in hospital (select all that apply): Patient trending condition, Laboratory test, Treatment, Consult recommendations, PT / OT recommendations, and Pending disposition  Discharge Plan A: Home, Community Services, Home with family                  Mann Keita MD  Department of Hospital Medicine   [facility] ([facility])

## 2024-06-24 VITALS
DIASTOLIC BLOOD PRESSURE: 67 MMHG | HEART RATE: 84 BPM | BODY MASS INDEX: 31.86 KG/M2 | SYSTOLIC BLOOD PRESSURE: 143 MMHG | TEMPERATURE: 98 F | OXYGEN SATURATION: 97 % | RESPIRATION RATE: 18 BRPM | WEIGHT: 163.13 LBS

## 2024-06-24 DIAGNOSIS — A49.8 BACTERIAL INFECTION DUE TO BACTEROIDES FRAGILIS: Primary | ICD-10-CM

## 2024-06-24 PROBLEM — E83.42 HYPOMAGNESEMIA: Status: RESOLVED | Noted: 2024-06-22 | Resolved: 2024-06-24

## 2024-06-24 PROBLEM — N17.9 AKI (ACUTE KIDNEY INJURY): Status: RESOLVED | Noted: 2024-06-19 | Resolved: 2024-06-24

## 2024-06-24 PROBLEM — G93.41 ENCEPHALOPATHY, METABOLIC: Status: RESOLVED | Noted: 2024-06-19 | Resolved: 2024-06-24

## 2024-06-24 PROBLEM — E87.1 HYPONATREMIA: Status: RESOLVED | Noted: 2017-02-22 | Resolved: 2024-06-24

## 2024-06-24 LAB
ALBUMIN SERPL BCP-MCNC: 2.1 G/DL (ref 3.5–5.2)
ALP SERPL-CCNC: 101 U/L (ref 55–135)
ALT SERPL W/O P-5'-P-CCNC: 9 U/L (ref 10–44)
ANION GAP SERPL CALC-SCNC: 8 MMOL/L (ref 8–16)
AST SERPL-CCNC: 10 U/L (ref 10–40)
BILIRUB SERPL-MCNC: 0.2 MG/DL (ref 0.1–1)
BUN SERPL-MCNC: 10 MG/DL (ref 8–23)
CALCIUM SERPL-MCNC: 8.2 MG/DL (ref 8.7–10.5)
CHLORIDE SERPL-SCNC: 104 MMOL/L (ref 95–110)
CO2 SERPL-SCNC: 25 MMOL/L (ref 23–29)
CREAT SERPL-MCNC: 0.8 MG/DL (ref 0.5–1.4)
ERYTHROCYTE [DISTWIDTH] IN BLOOD BY AUTOMATED COUNT: 13.4 % (ref 11.5–14.5)
EST. GFR  (NO RACE VARIABLE): >60 ML/MIN/1.73 M^2
GLUCOSE SERPL-MCNC: 116 MG/DL (ref 70–110)
HCT VFR BLD AUTO: 31.2 % (ref 37–48.5)
HGB BLD-MCNC: 9.8 G/DL (ref 12–16)
MAGNESIUM SERPL-MCNC: 1.9 MG/DL (ref 1.6–2.6)
MCH RBC QN AUTO: 28.7 PG (ref 27–31)
MCHC RBC AUTO-ENTMCNC: 31.4 G/DL (ref 32–36)
MCV RBC AUTO: 91 FL (ref 82–98)
PLATELET # BLD AUTO: 326 K/UL (ref 150–450)
PMV BLD AUTO: 8.5 FL (ref 9.2–12.9)
POTASSIUM SERPL-SCNC: 4.1 MMOL/L (ref 3.5–5.1)
PROT SERPL-MCNC: 5.1 G/DL (ref 6–8.4)
RBC # BLD AUTO: 3.42 M/UL (ref 4–5.4)
SODIUM SERPL-SCNC: 137 MMOL/L (ref 136–145)
WBC # BLD AUTO: 7.89 K/UL (ref 3.9–12.7)

## 2024-06-24 PROCEDURE — 25000003 PHARM REV CODE 250: Performed by: NURSE PRACTITIONER

## 2024-06-24 PROCEDURE — 83735 ASSAY OF MAGNESIUM: CPT | Performed by: FAMILY MEDICINE

## 2024-06-24 PROCEDURE — 25000003 PHARM REV CODE 250: Performed by: STUDENT IN AN ORGANIZED HEALTH CARE EDUCATION/TRAINING PROGRAM

## 2024-06-24 PROCEDURE — 97530 THERAPEUTIC ACTIVITIES: CPT

## 2024-06-24 PROCEDURE — 99223 1ST HOSP IP/OBS HIGH 75: CPT | Mod: ,,, | Performed by: STUDENT IN AN ORGANIZED HEALTH CARE EDUCATION/TRAINING PROGRAM

## 2024-06-24 PROCEDURE — 85027 COMPLETE CBC AUTOMATED: CPT | Performed by: FAMILY MEDICINE

## 2024-06-24 PROCEDURE — 36415 COLL VENOUS BLD VENIPUNCTURE: CPT | Performed by: FAMILY MEDICINE

## 2024-06-24 PROCEDURE — 25000003 PHARM REV CODE 250: Performed by: FAMILY MEDICINE

## 2024-06-24 PROCEDURE — 80053 COMPREHEN METABOLIC PANEL: CPT | Performed by: FAMILY MEDICINE

## 2024-06-24 RX ORDER — METRONIDAZOLE 500 MG/1
500 TABLET ORAL EVERY 8 HOURS
Qty: 18 TABLET | Refills: 0 | Status: SHIPPED | OUTPATIENT
Start: 2024-06-24 | End: 2024-06-30

## 2024-06-24 RX ORDER — CEFDINIR 300 MG/1
300 CAPSULE ORAL EVERY 12 HOURS
Status: DISCONTINUED | OUTPATIENT
Start: 2024-06-24 | End: 2024-06-24 | Stop reason: HOSPADM

## 2024-06-24 RX ORDER — METRONIDAZOLE 500 MG/1
500 TABLET ORAL EVERY 8 HOURS
Status: DISCONTINUED | OUTPATIENT
Start: 2024-06-24 | End: 2024-06-24 | Stop reason: HOSPADM

## 2024-06-24 RX ORDER — CEFDINIR 300 MG/1
300 CAPSULE ORAL 2 TIMES DAILY
Qty: 20 CAPSULE | Refills: 0 | Status: SHIPPED | OUTPATIENT
Start: 2024-06-24 | End: 2024-07-04

## 2024-06-24 RX ADMIN — PANTOPRAZOLE SODIUM 40 MG: 40 TABLET, DELAYED RELEASE ORAL at 08:06

## 2024-06-24 RX ADMIN — MAGNESIUM OXIDE TAB 400 MG (241.3 MG ELEMENTAL MG) 400 MG: 400 (241.3 MG) TAB at 08:06

## 2024-06-24 RX ADMIN — GUAIFENESIN 1200 MG: 600 TABLET, EXTENDED RELEASE ORAL at 08:06

## 2024-06-24 RX ADMIN — PREGABALIN 100 MG: 100 CAPSULE ORAL at 08:06

## 2024-06-24 RX ADMIN — FLUTICASONE PROPIONATE 100 MCG: 50 SPRAY, METERED NASAL at 08:06

## 2024-06-24 RX ADMIN — OXYBUTYNIN CHLORIDE 10 MG: 5 TABLET, EXTENDED RELEASE ORAL at 08:06

## 2024-06-24 RX ADMIN — CEFDINIR 300 MG: 300 CAPSULE ORAL at 10:06

## 2024-06-24 NOTE — ASSESSMENT & PLAN NOTE
--2 anaerobic bottles on admission with Bacteroides fragilis   --All cases of bacteremia should ideally have source control achieved  --However, not always possible depending on pathogen  --Bacteroides typically a gut pathogen found in polymicrobial infection and is the most common anaerobic bacteremia pathogen   --CT renal stone study reviewed: mild to moderate right-sided hydronephrosis with right hydroureter.  Severe hydronephrosis of the left kidney with moderate mild left hydroureter. Severe bladder wall thickening.  Question punctate nonobstructing left renal calculi with versus punctate vascular calcification.  No obstructing renal calculi. GI tract with no abnormalities.   --Due to mono microbial bacteremia will reach out to GI to set up outpatient colonoscopy   --Follow repeat blood cx for clearance; NGTD  --Continue PO metronidazole 500 mg TID for total of 14 days; stop date 6/5/24   --Will schedule ID clinic follow up   --Above d/w primary team

## 2024-06-24 NOTE — PLAN OF CARE
Pt tolerated session fair. Pt completed sup>sit with SBA. Pt completed sit>stand with mod A x 2. Pt completed bed>chair with Mod A x 2 with RW. Pt required max encouragement for participation in today's session. OT still recommending moderate intensity intervention.

## 2024-06-24 NOTE — HPI
This is a 69 yo F with history of hypertension, chronic obstructive pulmonary disease, asthma, arthritis, and gastroesophageal reflux disease who presented to the ER with confusion per . Says that she has since gone back to her baseline. She was also having weakness that has improved. Leukocytosis to 18K on admission but never spiked a fever. Blood cultures collected on 6/19 grew Bacteroides in both anaerobic bottles. Has cleared on repeat set from 6/22. Unclear source. Patient reports constipation. CXR no evidence of pneumonia. CT renal stone with mild moderate right-sided hydronephrosis with right hydroureter.  Severe hydronephrosis of the left kidney with moderate mild left hydroureter. Severe bladder wall thickening.  Question punctate nonobstructing left renal calculi with versus punctate vascular calcification.  No obstructing renal calculi. Urology recommending Leija. Patient also with E coli in urine. ID consulted for bacteremia.

## 2024-06-24 NOTE — PLAN OF CARE
A243/A243 DIAMOND Bloom RACHELLE Caro is a 68 y.o.female admitted on 6/19/2024 for Sepsis without septic shock   Code Status: Full Code MRN: [id number]   Review of patient's allergies indicates:   Allergen Reactions    Codeine Shortness Of Breath    Keflex [cephalexin] Shortness Of Breath    Shellfish containing products Nausea And Vomiting    Flu vaccine ts 2011-12(18 yr+) Hives    Sucralfate Other (See Comments) and Hives     Past Medical History:   Diagnosis Date    Arthritis     Asthma     COPD (chronic obstructive pulmonary disease)     Depression     GERD (gastroesophageal reflux disease)     Hypertension     Tobacco abuse       PRN meds    0.9% NaCl, , PRN  acetaminophen, 650 mg, Q8H PRN  albuterol-ipratropium, 3 mL, Q6H PRN  dextrose 10%, 12.5 g, PRN  dextrose 10%, 25 g, PRN  glucagon (human recombinant), 1 mg, PRN  glucose, 16 g, PRN  glucose, 24 g, PRN  haloperidoL, 5 mg, Nightly PRN  hydrALAZINE, 10 mg, Q8H PRN  melatonin, 6 mg, Nightly PRN  methocarbamoL, 750 mg, Q8H PRN  naloxone, 0.02 mg, PRN  ondansetron, 8 mg, Q8H PRN  sodium chloride 0.9%, 10 mL, Q12H PRN      Chart check completed. Will continue plan of care.      Orientation: oriented x 4  Yessenia Coma Scale Score: 15     Lead Monitored: Lead II Rhythm: normal sinus rhythm    Cardiac/Telemetry Box Number: [id number]  VTE Required Core Measure: Pharmacological prophylaxis initiated/maintained Last Bowel Movement: 06/22/24  Diet Soft & Bite Sized (IDDSI Level 6) Fluid - 1500mL  Diet Dysphagia Soft  Voiding Characteristics: urethral catheter (bladder)  Juan M Score: 17  Fall Risk Score: 15  Accucheck []   Freq?      Lines/Drains/Airways       Drain  Duration                  Urethral Catheter 06/19/24 1930 4 days              Peripheral Intravenous Line  Duration                  Peripheral IV - Single Lumen 06/23/24 1556 22 G Left Antecubital <1 day

## 2024-06-24 NOTE — ASSESSMENT & PLAN NOTE
U/A with pyuria of 40. Urine culture grew pan S E coli. Leija in place. Recommend 10 day course using PO cefdinir 300 mg BID.

## 2024-06-24 NOTE — PLAN OF CARE
Pt tolerated PT well today and is progressing. CGA for sup>sit; SBA scooting at EOB; modAx2 with STS and bed>chair transfers; Pt refused ambulation away from bed/chair despite education and encouragement. PT d/c rec: moderate intensity therapy
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
## 2024-06-24 NOTE — PLAN OF CARE
A243/A243 DIAMOND Bloom RACHELLE Caro is a 68 y.o.female admitted on 6/19/2024 for Sepsis without septic shock   Code Status: Full Code MRN: [id number]   Review of patient's allergies indicates:   Allergen Reactions    Codeine Shortness Of Breath    Keflex [cephalexin] Shortness Of Breath    Shellfish containing products Nausea And Vomiting    Flu vaccine ts 2011-12(18 yr+) Hives    Sucralfate Other (See Comments) and Hives     Past Medical History:   Diagnosis Date    Arthritis     Asthma     COPD (chronic obstructive pulmonary disease)     Depression     GERD (gastroesophageal reflux disease)     Hypertension     Tobacco abuse       PRN meds    0.9% NaCl, , PRN  acetaminophen, 650 mg, Q8H PRN  albuterol-ipratropium, 3 mL, Q6H PRN  dextrose 10%, 12.5 g, PRN  dextrose 10%, 25 g, PRN  glucagon (human recombinant), 1 mg, PRN  glucose, 16 g, PRN  glucose, 24 g, PRN  haloperidoL, 5 mg, Nightly PRN  hydrALAZINE, 10 mg, Q8H PRN  melatonin, 6 mg, Nightly PRN  methocarbamoL, 750 mg, Q8H PRN  naloxone, 0.02 mg, PRN  ondansetron, 8 mg, Q8H PRN  sodium chloride 0.9%, 10 mL, Q12H PRN      Chart check completed. Will continue plan of care.      Orientation: oriented x 4  Yessenia Coma Scale Score: 15     Lead Monitored: Lead II Rhythm: normal sinus rhythm    Cardiac/Telemetry Box Number: [id number]  VTE Required Core Measure: Pharmacological prophylaxis initiated/maintained Last Bowel Movement: 06/22/24  Diet Soft & Bite Sized (IDDSI Level 6) Fluid - 1500mL  Voiding Characteristics: urethral catheter (bladder)  Juan M Score: 17  Fall Risk Score: 17  Accucheck []   Freq?      Lines/Drains/Airways       Drain  Duration                  Urethral Catheter 06/19/24 1930 4 days              Peripheral Intravenous Line  Duration                  Peripheral IV - Single Lumen 06/23/24 1556 22 G Left Antecubital <1 day

## 2024-06-24 NOTE — PT/OT/SLP PROGRESS
Occupational Therapy   Treatment    Name: Merle Caro  MRN: [id number]  Admitting Diagnosis:  Sepsis without septic shock       Recommendations:     Discharge Recommendations: Moderate Intensity Therapy  Discharge Equipment Recommendations:  to be determined by next level of care  Barriers to discharge:  None    Assessment:     Merle Caro is a 68 y.o. female with a medical diagnosis of Sepsis without septic shock.  She presents with the following performance deficits affecting function are weakness, impaired endurance, impaired cognition, decreased ROM, impaired sensation, decreased coordination, impaired self care skills, decreased upper extremity function, decreased lower extremity function, impaired functional mobility, gait instability, decreased safety awareness, impaired balance.     Rehab Prognosis:  Good; patient would benefit from acute skilled OT services to address these deficits and reach maximum level of function.       Plan:     Patient to be seen 2 x/week to address the above listed problems via self-care/home management, therapeutic activities, therapeutic exercises  Plan of Care Expires: 07/04/24  Plan of Care Reviewed with: patient, spouse    Subjective     Chief Complaint: Pt with no complaints of pain  Patient/Family Comments/goals: Pt reports she is feeling good.   Pain/Comfort:  Pain Rating 1: 0/10    Objective:     Communicated with: her nurse, Janae, prior to session.  Patient found supine with telemetry, peripheral IV, nicholson catheter upon OT entry to room.    General Precautions: Standard, fall    Orthopedic Precautions:N/A  Braces: N/A  Respiratory Status: Room air     Occupational Performance:     Bed Mobility:    Patient completed Supine to Sit with stand by assistance     Functional Mobility/Transfers:  Patient completed Sit <> Stand Transfer with moderate assistance and of 2 persons  with  rolling walker   Patient completed Bed <> Chair Transfer using Step Transfer technique with  moderate assistance and of 2 persons with rolling walker  Functional Mobility: Pt refused ambulation away from bed despite max encouragement and education.     Activities of Daily Living:  Pt refused all ADLs despite max encouragement and education.       [facility] 6 Click ADL: 17    Treatment & Education:  Pt educated on importance of sitting OOB as much as possible to increase upright tolerance. Pt educated on importance of participation in therapy to increase functional independence. Pt refused all activity beyond t/f from bed to chair despite max encouragement. Pt educated on using call button when getting into and OOB to decrease her overall fall risk. Pt agreed to and expressed understanding of the fall prevention education mentioned above.     Patient left up in chair with all lines intact, call button in reach, chair alarm on, and  present    GOALS:   Multidisciplinary Problems       Occupational Therapy Goals          Problem: Occupational Therapy    Goal Priority Disciplines Outcome Interventions   Occupational Therapy Goal     OT, PT/OT Progressing    Description: Goals to be met by: 07/04/24     Patient will increase functional independence with ADLs by performing:    UE Dressing with Stand-by Assistance while EOB.  Sitting at edge of bed x15 minutes with Stand-by Assistance.  Supine to sit with Minimal Assistance and use of bedrail as needed.                         Time Tracking:     OT Date of Treatment: 06/24/24  OT Start Time: 0750  OT Stop Time: 0800  OT Total Time (min): 10 min    Billable Minutes:Therapeutic Activity 10             LIA Rosario  6/24/2024

## 2024-06-24 NOTE — PT/OT/SLP PROGRESS
Physical Therapy Treatment    Patient Name:  Merle Caro   MRN:  [id number]    Recommendations:     Discharge Recommendations: Moderate Intensity Therapy  Discharge Equipment Recommendations: to be determined by next level of care  Barriers to discharge: None    Assessment:     Merle Caro is a 68 y.o. female admitted with a medical diagnosis of Sepsis without septic shock.  She presents with the following impairments/functional limitations: weakness, impaired endurance, impaired self care skills, impaired functional mobility, gait instability, impaired balance, decreased coordination, decreased upper extremity function, decreased lower extremity function, decreased ROM.    Rehab Prognosis: Good; patient would benefit from acute skilled PT services to address these deficits and reach maximum level of function.    Recent Surgery: * No surgery found *      Plan:     During this hospitalization, patient to be seen 3 x/week to address the identified rehab impairments via gait training, therapeutic activities, therapeutic exercises and progress toward the following goals:    Plan of Care Expires:  07/04/24    Subjective     Chief Complaint: none stated  Patient/Family Comments/goals: pt did not want participate and only agreed to sit EOB for breakfast; pt then agreed to transferring to the chair for breakfast after encouragement and education  Pain/Comfort:  Pain Rating 1: 0/10      Objective:     Communicated with nurse Cardoso prior to session.  Patient found supine with telemetry, nicholson catheter, peripheral IV upon PT entry to room.     General Precautions: Standard, fall  Orthopedic Precautions: N/A  Braces: N/A  Respiratory Status: Room air     Functional Mobility:  Bed Mobility:     Scooting: stand by assistance  Supine to Sit: contact guard assistance  Transfers:     Sit to Stand:  moderate assistance and of 2 persons with rolling walker  Bed to Chair: moderate assistance and of 2 persons with  rolling walker   "using  Step Transfer; pt required constant cueing to maintain upright posture and to WB through UE on the RW. Pt required max encouragement to perform transfer  Balance: sitting: SBA; standing: modAx2    Pt refused ambulation away from the bed/chair even with max encouragement and education. Pt agreeable to education on therex.      AM-PAC 6 CLICK MOBILITY  Turning over in bed (including adjusting bedclothes, sheets and blankets)?: 3  Sitting down on and standing up from a chair with arms (e.g., wheelchair, bedside commode, etc.): 2  Moving from lying on back to sitting on the side of the bed?: 3  Moving to and from a bed to a chair (including a wheelchair)?: 2  Need to walk in hospital room?: 1  Climbing 3-5 steps with a railing?: 1  Basic Mobility Total Score: 12       Treatment & Education:  Pt educated on role of PT and POC in acute setting.  Pt educated on importance of completing therex multiple times throughout the day. Pt verbalizes understanding.  Pt educated on proper use and safety with RW.  Pt educated on increasing time OOB in chair to tolerance.  Pt educated on "call don't fall".    Patient left up in chair with all lines intact, call button in reach, chair alarm on, nurse notified, and  present..    GOALS:   Multidisciplinary Problems       Physical Therapy Goals          Problem: Physical Therapy    Goal Priority Disciplines Outcome Goal Variances Interventions   Physical Therapy Goal     PT, PT/OT      Description: LTG to be met in 14 days: 7/4/24  Pt will complete bed mobility SBA.  Pt will complete scooting EOB JESSIE.  Pt will complete bed>W/C transfer Zulma.  Pt will increase AMPAC score by 2 to progress gross functional mobility.                       Time Tracking:     PT Received On: 06/24/24  PT Start Time: 0740     PT Stop Time: 0750  PT Total Time (min): 10 min     Billable Minutes: Therapeutic Activity 10    Treatment Type: Treatment  PT/PTA: PT     Number of PTA visits since last PT " visit: 0     06/24/2024
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
## 2024-06-24 NOTE — NURSING
Ok to d/c per MD and SAUD.   Discharge instructions received and reviewed with pt and family at bedside.  Pt voiced understanding and all questions answered to satisfaction.  Stressed importance to making and keeping all follow up appointments.  Medications sent to bedside and reviewed with pt.  Tele monitor removed and brought to monitor tech.  IV d/c'd with tip intact, pressure dressing applied.  Pt transported to front of hospital via w/c to be discharged home.

## 2024-06-24 NOTE — DISCHARGE SUMMARY
'[facility] Medicine  Discharge Summary      Patient Name: Merle Caro  MRN: [id number]  CARLOS: [id number]  Patient Class: IP- Inpatient  Admission Date: 6/19/2024  Hospital Length of Stay: 5 days  Discharge Date and Time:  06/24/2024 1:15 PM  Attending Physician: Mann Keita MD   Discharging Provider: Mann Keita MD  Primary Care Provider: Marck Major MD    Primary Care Team: Networked reference to record PCT     HPI:   Merle Caro is a 68-year-old woman with a past medical history of hypertension, chronic obstructive pulmonary disease, asthma, arthritis, and gastroesophageal reflux disease who presented to the Emergency Department for evaluation of altered mental status, which her  stated began four days prior to arrival. Her  reported that her speech had become increasingly slurred and she had developed a left-sided facial droop. Additionally, she experienced a decreased appetite and increasing weakness over the past month, culminating in her being bedridden for the past four days.    Upon arrival in the ER, the patient was found to be tachycardic. Abnormal lab values included a WBC of 18.05, platelets at 599, creatinine at 1.9, eGFR of 29, and procalcitonin at 0.48. A urinalysis was notable for brown color, cloudy appearance, 2+ protein, 1+ occult blood, 3+ leukocytes, 15 RBCs, 40 WBCs, and many bacteria. The chest X-ray indicated superimposed left-sided edema or infection on a background of interstitial lung disease. A CT head without contrast showed no definite acute intracranial abnormality. Sepsis protocol initiated. Received sepsis bolus fluids and received IV Levaquin x 1 dose. Hospital Medicine called for admission.    * No surgery found *      Hospital Course:   Merle Caro is a 68 year old female admitted for Sepsis secondary to UTI. Mentation is back at basline per  at bedside. Blood cultures x 2 showing gram negative rods. Urine culture  pending. Bilateral hydronephrosis left greater than right per imaging. Leija catheter inserted. Renal function much improved post placement. Urology following.     06/21/24  Blood cultures x 2 and urine cultures showing gram negative rods. Currently on IV Levaquin. Once final sensitivities for cultures have resulted, will consult ID. Leukocytosis and BRIAN have resolved. Hematuria yesterday. Urology following -- with development of hematuria, will need to continue monitoring need for continuous bladder irrigation. In the meantime recommend hand irrigation with 60 cc cath tip syringe. Continue antibiotics as hematuria is likely secondary to hemorrhagic cystitis. PT/OT following, recommending skilled facility once medically stable for discharge. Patient adamantly refuses to go to SNF upon discharge. She has opted for home health.   6/22 +bacteremia. Infectious disease consulted. Urology following. Continue intravenous levaquin. Urine culture growing ecoli. Blood culture growing bacteroides.  6/23 repeat blood culture negative growth to date x 1 day. Complains of abdominal pain with passing flatus.       6/24  Repeat blood culture negative growth to date x 2 days. After discussing with infectious disease, ok to discharge home on po antibiotic(s) for acute cystitis and bacteremia. Patient has bowel regimen at home consisting of miralax, stool softener and linzess.     No acute distress. No respiratory distress. On room air. Abnormal dentition. Alert. At baseline mentation. Leija.  at bedside    Patient seen and evaluated by me. Patient was determined to be suitable for discharge. Patient deemed stable for discharge to home with homehealth physical/occupational therapy and nurse practitioner to visit home program.         Goals of Care Treatment Preferences:  Code Status: Full Code      Consults:   Consults (From admission, onward)          Status Ordering Provider     Inpatient consult to Infectious Diseases  Once         Provider:  (Not yet assigned)    Acknowledged NANO VEGA     Inpatient consult to Urology  Once        Provider:  Veto Bennett MD    Completed JONNIE KAUFMAN     Inpatient consult to Social Work  Once        Provider:  (Not yet assigned)    Completed JONNIE KAUFMAN            No new Assessment & Plan notes have been filed under this hospital service since the last note was generated.  Service: Hospital Medicine    Final Active Diagnoses:    Diagnosis Date Noted POA    PRINCIPAL PROBLEM:  Sepsis without septic shock d/t UTI [A41.9] 06/19/2024 Yes    Bacteremia [R78.81] 06/22/2024 Yes    QT prolongation [R94.31] 06/22/2024 Yes    Hydronephrosis [N13.30] 06/20/2024 Yes    Acute cystitis without hematuria [N30.00] 06/19/2024 Yes    Benzodiazepine dependence [F13.20] 10/15/2022 Yes     Chronic    Gait difficulty [R26.9] 03/07/2022 Yes    Gastroesophageal reflux disease with esophagitis [K21.00] 02/10/2020 Yes     Chronic    Cigarette nicotine dependence with nicotine-induced disorder [F17.219] 09/16/2016 Yes    Asthma with COPD [J44.89] 10/08/2015 Yes     Chronic    Primary hypertension [I10] 05/20/2013 Yes     Chronic      Problems Resolved During this Admission:    Diagnosis Date Noted Date Resolved POA    Hypomagnesemia [E83.42] 06/22/2024 06/24/2024 No    BRIAN (acute kidney injury) [N17.9] 06/19/2024 06/24/2024 Yes    Encephalopathy, metabolic [G93.41] 06/19/2024 06/24/2024 Yes    Hyponatremia [E87.1] 02/22/2017 06/24/2024 Yes       Discharged Condition: stable    Disposition:     Follow Up:   Contact information for follow-up providers       Marck Major MD. Schedule an appointment as soon as possible for a visit in 3 day(s).    Specialty: Family Medicine  Why: hospital follow up  Contact information:  [address]  [place] [address]  [phone]               Mary Milner MD. Schedule an appointment as soon as possible for a visit in 1 week(s).    Specialty: Urology  Why:  hospital follow up  Contact information:  [address]  [place] [address]  [phone]               Cedrick Huitron DO. Schedule an appointment as soon as possible for a visit in 1 week(s).    Specialty: Infectious Diseases  Why: hospital follow up  Contact information:  [address]  [facility] [address]  [phone]                       Contact information for after-discharge care       Home Medical Care       [facility] .    Service: Home Health Services  Contact information:  [address]  [facility] [address]  [phone]                                 Patient Instructions:      Ambulatory referral/consult to [facility]     Ambulatory referral/consult to Home Health   Standing Status: Future   Referral Priority: Routine Referral Type: Home Health   Referral Reason: Specialty Services Required   Requested Specialty: Home Health Services   Number of Visits Requested: 1     Ambulatory referral/consult to Infectious Disease   Standing Status: Future   Referral Priority: Routine Referral Type: Consultation   Referral Reason: Specialty Services Required   Requested Specialty: Infectious Diseases   Number of Visits Requested: 1     Diet Dysphagia Soft     Activity as tolerated       Significant Diagnostic Studies: Labs: CMP   Recent Labs   Lab 06/24/24  0516      K 4.1      CO2 25   *   BUN 10   CREATININE 0.8   CALCIUM 8.2*   PROT 5.1*   ALBUMIN 2.1*   BILITOT 0.2   ALKPHOS 101   AST 10   ALT 9*   ANIONGAP 8   , CBC   Recent Labs   Lab 06/24/24  0516   WBC 7.89   HGB 9.8*   HCT 31.2*      , and All labs within the past 24 hours have been reviewed  Microbiology: Blood Culture   Lab Results   Component Value Date    LABBLOO No Growth to date 06/22/2024    LABBLOO No Growth to date 06/22/2024    LABBLOO No Growth to date 06/22/2024    LABBLOO No Growth to date 06/22/2024    and Urine  Culture    Lab Results   Component Value Date    LABURIN ESCHERICHIA COLI  >100,000 cfu/ml   (A) 06/19/2024     Radiology: X-Ray: CXR: portable   CT scan:  CT renal stone and CT head wo contrast  Ultrasound: retroperitoneal    Pending Diagnostic Studies:       None           Medications:  Reconciled Home Medications:      Medication List        START taking these medications      cefdinir 300 MG capsule  Commonly known as: OMNICEF  Take 1 capsule (300 mg total) by mouth 2 (two) times daily. for 10 days     metroNIDAZOLE 500 MG tablet  Commonly known as: FLAGYL  Take 1 tablet (500 mg total) by mouth every 8 (eight) hours. for 6 days            CHANGE how you take these medications      folic acid 1 MG tablet  Commonly known as: FOLVITE  Take 1 tablet (1 mg total) by mouth once daily.  What changed: when to take this            CONTINUE taking these medications      albuterol 90 mcg/actuation inhaler  Commonly known as: PROVENTIL/VENTOLIN HFA  Inhale 4 puffs into the lungs every 4 (four) hours as needed for Wheezing. Rescue     celecoxib 200 MG capsule  Commonly known as: CeleBREX  Take 1 capsule (200 mg total) by mouth once daily.     cholecalciferol (vitamin D3) 50 mcg (2,000 unit) Cap capsule  Commonly known as: VITAMIN D3  Take 1 capsule by mouth every evening.     clonazePAM 0.5 MG tablet  Commonly known as: KlonoPIN  Take 0.5 mg by mouth every evening.     cyanocobalamin (vitamin B-12) 1,000 mcg Subl  Place 1,000 mcg under the tongue once daily.     furosemide 20 MG tablet  Commonly known as: LASIX  Take 1 tablet (20 mg total) by mouth daily as needed (for leg swelling).     haloperidoL 5 MG tablet  Commonly known as: HALDOL  Take 5 mg by mouth every evening.     lisinopriL 10 MG tablet  Take 1 tablet (10 mg total) by mouth every evening.     methocarbamoL 750 MG Tab  Commonly known as: ROBAXIN  TAKE 1 TABLET(750 MG) BY MOUTH THREE TIMES DAILY AS NEEDED FOR MUSCLE SPASMS     OXYGEN-AIR DELIVERY SYSTEMS  MISC  Inhale 2 L into the lungs as needed (O2 via Nasal Canula.).     pantoprazole 40 MG tablet  Commonly known as: PROTONIX  Take 1 tablet (40 mg total) by mouth once daily.     paroxetine 40 MG tablet  Commonly known as: PAXIL  Take 40 mg by mouth every evening.     pregabalin 100 MG capsule  Commonly known as: LYRICA  TAKE 1 CAPSULE BY MOUTH TWICE DAILY     traZODone 150 MG tablet  Commonly known as: DESYREL  Take 75 mg by mouth nightly.            STOP taking these medications      MYRBETRIQ 50 mg Tb24  Generic drug: mirabegron     simvastatin 40 MG tablet  Commonly known as: ZOCOR              Indwelling Lines/Drains at time of discharge:   Lines/Drains/Airways       Drain  Duration                  Urethral Catheter 06/19/24 1930 4 days                    Time spent on the discharge of patient: 41 minutes         Mann Keita MD  Department of Hospital Medicine  '[place] - Telemetry ([facility])

## 2024-06-24 NOTE — DISCHARGE INSTRUCTIONS
You have been started on new medication(s) from this hospitalization. Please take as directed and schedule hospital follow up appointments with your primary providers.    Homehealth with physical/occupational therapy order has been ordered. Someone will be in contact.    A nurse practitioner may be contacting you to assess your status post-hospitalization.     A referral has been placed on your behalf for urology.

## 2024-06-24 NOTE — PLAN OF CARE
A243/A243 DIAMOND Bloom RACHELLE Caro is a 68 y.o.female admitted on 6/19/2024 for Sepsis without septic shock   Code Status: Full Code MRN: [id number]   Review of patient's allergies indicates:   Allergen Reactions    Codeine Shortness Of Breath    Keflex [cephalexin] Shortness Of Breath    Shellfish containing products Nausea And Vomiting    Flu vaccine ts 2011-12(18 yr+) Hives    Sucralfate Other (See Comments) and Hives     Past Medical History:   Diagnosis Date    Arthritis     Asthma     COPD (chronic obstructive pulmonary disease)     Depression     GERD (gastroesophageal reflux disease)     Hypertension     Tobacco abuse       PRN meds    0.9% NaCl, , PRN  acetaminophen, 650 mg, Q8H PRN  albuterol-ipratropium, 3 mL, Q6H PRN  dextrose 10%, 12.5 g, PRN  dextrose 10%, 25 g, PRN  glucagon (human recombinant), 1 mg, PRN  glucose, 16 g, PRN  glucose, 24 g, PRN  haloperidoL, 5 mg, Nightly PRN  hydrALAZINE, 10 mg, Q8H PRN  melatonin, 6 mg, Nightly PRN  methocarbamoL, 750 mg, Q8H PRN  naloxone, 0.02 mg, PRN  ondansetron, 8 mg, Q8H PRN  sodium chloride 0.9%, 10 mL, Q12H PRN      Chart check completed. Will continue plan of care.      Orientation: oriented x 4  Yessenia Coma Scale Score: 15     Lead Monitored: Lead II Rhythm: sinus tachycardia    Cardiac/Telemetry Box Number: [id number]  VTE Required Core Measure: Pharmacological prophylaxis initiated/maintained Last Bowel Movement: 06/22/24  Diet Soft & Bite Sized (IDDSI Level 6) Fluid - 1500mL  Voiding Characteristics: urethral catheter (bladder)  Juan M Score: 17  Fall Risk Score: 17  Accucheck []   Freq?      Lines/Drains/Airways       Drain  Duration                  Urethral Catheter 06/19/24 1930 4 days              Peripheral Intravenous Line  Duration                  Peripheral IV - Single Lumen 06/23/24 1556 22 G Left Antecubital <1 day

## 2024-06-24 NOTE — PLAN OF CARE
[facility] - Telemetry (Hospital)  Discharge Final Note    Primary Care Provider: Marck Major MD    Expected Discharge Date: 6/24/2024    Final Discharge Note (most recent)       Final Note - 06/24/24 1130          Final Note    Assessment Type Final Discharge Note     Anticipated Discharge Disposition Home or Self Care     Hospital Resources/Appts/Education Provided Post-Acute resouces added to AVS;Appointments scheduled and added to AVS        Post-Acute Status    Post-Acute Authorization Home Health     Home Health Status Set-up Complete/Auth obtained     Discharge Delays None known at this time                   Pt to discharge home today. SW confirmed family preference for [facility] with plan to transition to outpt therapy once HH completed. PCP scheduled on AVS: Hospital Follow Up with Marce Francis NP  Wednesday Jun 26, 2024 10:00 AM    No additional CM needs.     Important Message from Medicare  Important Message from Medicare regarding Discharge Appeal Rights: Given to patient/caregiver, Explained to patient/caregiver, Signed/date by patient/caregiver     Date IMM was signed: 06/24/24  Time IMM was signed: 0910    After-discharge care                Home Medical Care       [facility]   Service: Home Health Services    [address] [facility] [address]   Phone: [phone]

## 2024-06-25 ENCOUNTER — OFFICE VISIT (OUTPATIENT)
Dept: NEPHROLOGY | Facility: CLINIC | Age: 69
End: 2024-06-25
Payer: MEDICARE

## 2024-06-25 VITALS
HEIGHT: 60 IN | BODY MASS INDEX: 32.02 KG/M2 | WEIGHT: 163.13 LBS | SYSTOLIC BLOOD PRESSURE: 104 MMHG | HEART RATE: 80 BPM | DIASTOLIC BLOOD PRESSURE: 60 MMHG

## 2024-06-25 DIAGNOSIS — N18.2 CKD (CHRONIC KIDNEY DISEASE) STAGE 2, GFR 60-89 ML/MIN: ICD-10-CM

## 2024-06-25 DIAGNOSIS — N17.9 AKI (ACUTE KIDNEY INJURY): Primary | ICD-10-CM

## 2024-06-25 DIAGNOSIS — E87.1 HYPONATREMIA: ICD-10-CM

## 2024-06-25 DIAGNOSIS — I10 PRIMARY HYPERTENSION: ICD-10-CM

## 2024-06-25 PROCEDURE — 99204 OFFICE O/P NEW MOD 45 MIN: CPT | Mod: S$PBB,,, | Performed by: INTERNAL MEDICINE

## 2024-06-25 PROCEDURE — 99214 OFFICE O/P EST MOD 30 MIN: CPT | Mod: PBBFAC | Performed by: INTERNAL MEDICINE

## 2024-06-25 PROCEDURE — 99999 PR PBB SHADOW E&M-EST. PATIENT-LVL IV: CPT | Mod: PBBFAC,,, | Performed by: INTERNAL MEDICINE

## 2024-06-25 NOTE — PROGRESS NOTES
Merle Caro is a 68 y.o. female     HPI:    She was last seen in Nephrology Clinic for 5 years ago.  She was initially evaluated for mild hyponatremia.  She presents to clinic today to reestablish care and follow-up from a recent hospitalization complicated by acute kidney injury.  She was hospitalized several weeks ago secondary to a severe urinary tract infection.  She also had evidence of hydronephrosis secondary to bladder dysfunction.  Her creatinine peaked at around 1.9.  She had a Leija catheter placed which she retains today.  Creatinine has now returned to her usual baseline of 0.8.  On review of her chart for the past several years she has intermittent mild hyponatremia.  She also carries a diagnosis of bipolar disorder and is on several neuroleptic medications to include Paxil and haloperidol.  Most of these agents can contribute to were frankly cause hyponatremia.    Since being discharged she has been doing well.  She has follow-up with Urology a couple of weeks for evaluation of need for further Leija catheter use.    PAST MEDICAL HISTORY:  She  has a past medical history of Arthritis, Asthma, COPD (chronic obstructive pulmonary disease), Depression, GERD (gastroesophageal reflux disease), Hypertension, and Tobacco abuse.    PAST SURGICAL HISTORY:  She  has a past surgical history that includes Hysterectomy; Knee surgery; Tonsillectomy; multiple fracture repairs; Colonoscopy w/ polypectomy (01/05/2011); Magnetic resonance imaging (N/A, 10/14/2022); fusion, spine, posterior approach (N/A, 10/18/2022); and Esophagogastroduodenoscopy (N/A, 10/24/2022).    SOCIAL HISTORY:  She  reports that she has quit smoking. Her smoking use included cigarettes. She started smoking about 45 years ago. She has a 33 pack-year smoking history. She has never used smokeless tobacco. She reports that she does not drink alcohol and does not use drugs.      FAMILY MEDICAL HISTORY:  Her family history includes COPD in her  mother; Cancer in her mother; Hypertension in her mother; Lung cancer in her mother; Ovarian cancer in her sister; Stroke in her mother.    Review of patient's allergies indicates:   Allergen Reactions    Codeine Shortness Of Breath    Keflex [cephalexin] Shortness Of Breath    Shellfish containing products Nausea And Vomiting    Flu vaccine ts 2011-12(18 yr+) Hives    Sucralfate Other (See Comments) and Hives           Prior to Admission medications    Medication Sig Start Date End Date Taking? Authorizing Provider   albuterol (PROVENTIL/VENTOLIN HFA) 90 mcg/actuation inhaler Inhale 4 puffs into the lungs every 4 (four) hours as needed for Wheezing. Rescue 6/21/22 6/25/24 Yes Dixon Rodriguez MD   cefdinir (OMNICEF) 300 MG capsule Take 1 capsule (300 mg total) by mouth 2 (two) times daily. for 10 days 6/24/24 7/4/24 Yes Mann Keita MD   celecoxib (CELEBREX) 200 MG capsule Take 1 capsule (200 mg total) by mouth once daily. 9/12/23  Yes Marck Major MD   cholecalciferol, vitamin D3, (VITAMIN D3) 2,000 unit Cap Take 1 capsule by mouth every evening.   Yes Provider, Historical   clonazepam (KLONOPIN) 0.5 MG tablet Take 0.5 mg by mouth every evening.   Yes Provider, Historical   cyanocobalamin, vitamin B-12, 1,000 mcg Subl Place 1,000 mcg under the tongue once daily. 1/30/24  Yes Marck Major MD   folic acid (FOLVITE) 1 MG tablet Take 1 tablet (1 mg total) by mouth once daily.  Patient taking differently: Take 1 mg by mouth every evening. 1/30/24 1/29/25 Yes Marck Major MD   furosemide (LASIX) 20 MG tablet Take 1 tablet (20 mg total) by mouth daily as needed (for leg swelling). 5/24/23  Yes Mrack Major MD   haloperidol (HALDOL) 5 MG tablet Take 5 mg by mouth every evening.   Yes Provider, Historical   lisinopriL 10 MG tablet Take 1 tablet (10 mg total) by mouth every evening. 1/30/24 1/29/25 Yes Marck Major MD   methocarbamoL (ROBAXIN) 750 MG Tab TAKE 1 TABLET(750 MG) BY MOUTH  THREE TIMES DAILY AS NEEDED FOR MUSCLE SPASMS 6/13/24  Yes Marck Major MD   metroNIDAZOLE (FLAGYL) 500 MG tablet Take 1 tablet (500 mg total) by mouth every 8 (eight) hours. for 6 days 6/24/24 6/30/24 Yes Mann Keita MD   OXYGEN-AIR DELIVERY SYSTEMS MISC Inhale 2 L into the lungs as needed (O2 via Nasal Canula.).   Yes Provider, Historical   pantoprazole (PROTONIX) 40 MG tablet Take 1 tablet (40 mg total) by mouth once daily. 1/30/24 1/29/25 Yes Marck Major MD   paroxetine (PAXIL) 40 MG tablet Take 40 mg by mouth every evening. 4/11/19  Yes Provider, Historical   pregabalin (LYRICA) 100 MG capsule TAKE 1 CAPSULE BY MOUTH TWICE DAILY 6/13/24  Yes Marck Major MD   traZODone (DESYREL) 150 MG tablet Take 75 mg by mouth nightly. 4/12/24  Yes Provider, Historical      Sodium   Date Value Ref Range Status   06/24/2024 137 136 - 145 mmol/L Final   06/22/2024 134 (L) 136 - 145 mmol/L Final   06/21/2024 129 (L) 136 - 145 mmol/L Final     Potassium   Date Value Ref Range Status   06/24/2024 4.1 3.5 - 5.1 mmol/L Final   06/22/2024 3.7 3.5 - 5.1 mmol/L Final   06/21/2024 4.5 3.5 - 5.1 mmol/L Final     Chloride   Date Value Ref Range Status   06/24/2024 104 95 - 110 mmol/L Final   06/22/2024 107 95 - 110 mmol/L Final   06/21/2024 102 95 - 110 mmol/L Final     CO2   Date Value Ref Range Status   06/24/2024 25 23 - 29 mmol/L Final   06/22/2024 19 (L) 23 - 29 mmol/L Final   06/21/2024 19 (L) 23 - 29 mmol/L Final     Glucose   Date Value Ref Range Status   06/24/2024 116 (H) 70 - 110 mg/dL Final   06/22/2024 112 (H) 70 - 110 mg/dL Final   06/21/2024 110 70 - 110 mg/dL Final     BUN   Date Value Ref Range Status   06/24/2024 10 8 - 23 mg/dL Final   06/22/2024 8 8 - 23 mg/dL Final   06/21/2024 13 8 - 23 mg/dL Final     Creatinine   Date Value Ref Range Status   06/24/2024 0.8 0.5 - 1.4 mg/dL Final   06/22/2024 0.7 0.5 - 1.4 mg/dL Final   06/21/2024 0.8 0.5 - 1.4 mg/dL Final     Calcium   Date Value Ref Range  Status   06/24/2024 8.2 (L) 8.7 - 10.5 mg/dL Final   06/22/2024 8.2 (L) 8.7 - 10.5 mg/dL Final   06/21/2024 8.0 (L) 8.7 - 10.5 mg/dL Final     Total Protein   Date Value Ref Range Status   06/24/2024 5.1 (L) 6.0 - 8.4 g/dL Final   06/19/2024 7.6 6.0 - 8.4 g/dL Final   06/03/2024 6.9 6.0 - 8.4 g/dL Final     Albumin   Date Value Ref Range Status   06/24/2024 2.1 (L) 3.5 - 5.2 g/dL Final   06/19/2024 2.9 (L) 3.5 - 5.2 g/dL Final   06/03/2024 3.3 (L) 3.5 - 5.2 g/dL Final     Total Bilirubin   Date Value Ref Range Status   06/24/2024 0.2 0.1 - 1.0 mg/dL Final     Comment:     For infants and newborns, interpretation of results should be based  on gestational age, weight and in agreement with clinical  observations.    Premature Infant recommended reference ranges:  Up to 24 hours.............<8.0 mg/dL  Up to 48 hours............<12.0 mg/dL  3-5 days..................<15.0 mg/dL  6-29 days.................<15.0 mg/dL     06/19/2024 0.6 0.1 - 1.0 mg/dL Final     Comment:     For infants and newborns, interpretation of results should be based  on gestational age, weight and in agreement with clinical  observations.    Premature Infant recommended reference ranges:  Up to 24 hours.............<8.0 mg/dL  Up to 48 hours............<12.0 mg/dL  3-5 days..................<15.0 mg/dL  6-29 days.................<15.0 mg/dL     06/03/2024 0.4 0.1 - 1.0 mg/dL Final     Comment:     For infants and newborns, interpretation of results should be based  on gestational age, weight and in agreement with clinical  observations.    Premature Infant recommended reference ranges:  Up to 24 hours.............<8.0 mg/dL  Up to 48 hours............<12.0 mg/dL  3-5 days..................<15.0 mg/dL  6-29 days.................<15.0 mg/dL       Alkaline Phosphatase   Date Value Ref Range Status   06/24/2024 101 55 - 135 U/L Final   06/19/2024 130 55 - 135 U/L Final   06/03/2024 125 55 - 135 U/L Final     AST   Date Value Ref Range Status    06/24/2024 10 10 - 40 U/L Final   06/19/2024 13 10 - 40 U/L Final   06/03/2024 12 10 - 40 U/L Final     ALT   Date Value Ref Range Status   06/24/2024 9 (L) 10 - 44 U/L Final   06/19/2024 11 10 - 44 U/L Final   06/03/2024 9 (L) 10 - 44 U/L Final     Anion Gap   Date Value Ref Range Status   06/24/2024 8 8 - 16 mmol/L Final   06/22/2024 8 8 - 16 mmol/L Final   06/21/2024 8 8 - 16 mmol/L Final     eGFR if    Date Value Ref Range Status   06/21/2022 >60 >60 mL/min/1.73 m^2 Final   06/20/2021 >60 >60 mL/min/1.73 m^2 Final   05/03/2021 >60.0 >60 mL/min/1.73 m^2 Final     eGFR if non    Date Value Ref Range Status   06/21/2022 >60 >60 mL/min/1.73 m^2 Final     Comment:     Calculation used to obtain the estimated glomerular filtration  rate (eGFR) is the CKD-EPI equation.      06/20/2021 >60 >60 mL/min/1.73 m^2 Final     Comment:     Calculation used to obtain the estimated glomerular filtration  rate (eGFR) is the CKD-EPI equation.      05/03/2021 >60.0 >60 mL/min/1.73 m^2 Final     Comment:     Calculation used to obtain the estimated glomerular filtration  rate (eGFR) is the CKD-EPI equation.        RBC, UA   Date Value Ref Range Status   06/19/2024 15 (H) 0 - 4 /hpf Final   10/15/2022 2 0 - 4 /hpf Final     WBC, UA   Date Value Ref Range Status   06/19/2024 40 (H) 0 - 5 /hpf Final   10/15/2022 6 (H) 0 - 5 /hpf Final     Bacteria   Date Value Ref Range Status   06/19/2024 Many (A) None-Occ /hpf Final   10/15/2022 Many (A) None-Occ /hpf Final     Hyaline Casts, UA   Date Value Ref Range Status   06/19/2024 0 0-1/lpf /lpf Final     Microscopic Comment   Date Value Ref Range Status   06/19/2024 SEE COMMENT  Final     Comment:     Other formed elements not mentioned in the report are not   present in the microscopic examination.      10/15/2022 SEE COMMENT  Final     Comment:     Other formed elements not mentioned in the report are not   present in the microscopic examination.       02/14/2019 SEE COMMENT  Final     Comment:     Other formed elements not mentioned in the report are not   present in the microscopic examination.        Protein, Urine Random   Date Value Ref Range Status   10/26/2018 <7 0 - 15 mg/dL Final     Comment:     The random urine reference ranges provided were established   for 24 hour urine collections.  No reference ranges exist for  random urine specimens.  Correlate clinically.       Creatinine, Urine   Date Value Ref Range Status   06/19/2024 18.4 15.0 - 325.0 mg/dL Final   10/20/2022 20.0 15.0 - 325.0 mg/dL Final   10/26/2018 8.0 (L) 15.0 - 325.0 mg/dL Final     Comment:     The random urine reference ranges provided were established   for 24 hour urine collections.  No reference ranges exist for  random urine specimens.  Correlate clinically.       Prot/Creat Ratio, Urine   Date Value Ref Range Status   10/26/2018 Unable to calculate 0.00 - 0.20 Final         REVIEW OF SYSTEMS:  Patient has no fever, fatigue, visual changes, chest pain, edema, cough, dyspnea, nausea, vomiting, constipation, diarrhea, arthralgias, pruritis, dizziness, weakness, depression, confusion.        PHYSICAL EXAM:   height is 5' (1.524 m) and weight is 74 kg (163 lb 2.3 oz). Her blood pressure is 104/60 and her pulse is 80.   Gen: WDWN female in no apparent distress  Psych: Normal mood and affect  Skin: No rashes or ulcers  Eyes: Normal conjunctiva and lids, PERRLA  ENT: Normal hearing with no oropharyngeal lesions  Neck: No JVD  Chest: Clear with no rales, rhonchi, wheezing with normal effort  CV: Regular with no murmurs, gallops or rubs  Abd: Soft, nontender, no distension, positive bowel sounds  Ext: No cyanosis, clubbing or edema          IMPRESSION AND RECOMMENDATIONS:      1. BRIAN:  Multifactorial, she would urinary tract infection as well as bladder dysfunction with bilateral hydronephrosis.  Creatinine peaked at around 1.9.  Is now returned to her usual baseline.    2. CKD 2: She  reaches CKD 2 classification based on EGFR calculation, which is normal for her age.  Creatinine is 0.8.    3. Hypertension: Blood pressure is well controlled on current regimen.  She has on a RAAS inhibitor.    4. Hyponatremia: Serum sodium has now normalized at 1:37 a.m..  On review of her laboratory studies for the past few years she has had intermittent mild hyponatremia.  As per HPI this is likely due to her psychotropic medications.  Her  relates that she has baseline bipolar disorder and has to be on these medications to stabilize her mood.  Will continue to monitor.

## 2024-06-26 ENCOUNTER — OFFICE VISIT (OUTPATIENT)
Dept: INTERNAL MEDICINE | Facility: CLINIC | Age: 69
End: 2024-06-26
Payer: MEDICARE

## 2024-06-26 ENCOUNTER — HOSPITAL ENCOUNTER (OUTPATIENT)
Dept: RADIOLOGY | Facility: HOSPITAL | Age: 69
Discharge: HOME | End: 2024-06-26
Attending: NURSE PRACTITIONER
Payer: MEDICARE

## 2024-06-26 VITALS
HEART RATE: 90 BPM | OXYGEN SATURATION: 95 % | TEMPERATURE: 98 F | WEIGHT: 177.5 LBS | SYSTOLIC BLOOD PRESSURE: 110 MMHG | DIASTOLIC BLOOD PRESSURE: 70 MMHG | HEIGHT: 60 IN | BODY MASS INDEX: 34.85 KG/M2

## 2024-06-26 DIAGNOSIS — M25.561 CHRONIC PAIN OF BOTH KNEES: ICD-10-CM

## 2024-06-26 DIAGNOSIS — I10 PRIMARY HYPERTENSION: Chronic | ICD-10-CM

## 2024-06-26 DIAGNOSIS — G89.29 CHRONIC PAIN OF BOTH SHOULDERS: ICD-10-CM

## 2024-06-26 DIAGNOSIS — G89.29 CHRONIC PAIN OF BOTH KNEES: ICD-10-CM

## 2024-06-26 DIAGNOSIS — M25.562 CHRONIC PAIN OF BOTH KNEES: ICD-10-CM

## 2024-06-26 DIAGNOSIS — M79.641 BILATERAL HAND PAIN: ICD-10-CM

## 2024-06-26 DIAGNOSIS — M25.512 CHRONIC PAIN OF BOTH SHOULDERS: ICD-10-CM

## 2024-06-26 DIAGNOSIS — Z09 HOSPITAL DISCHARGE FOLLOW-UP: Primary | ICD-10-CM

## 2024-06-26 DIAGNOSIS — M25.511 CHRONIC PAIN OF BOTH SHOULDERS: ICD-10-CM

## 2024-06-26 DIAGNOSIS — M79.642 BILATERAL HAND PAIN: ICD-10-CM

## 2024-06-26 PROCEDURE — 99215 OFFICE O/P EST HI 40 MIN: CPT | Mod: PBBFAC,25 | Performed by: NURSE PRACTITIONER

## 2024-06-26 PROCEDURE — 99999 PR PBB SHADOW E&M-EST. PATIENT-LVL V: CPT | Mod: PBBFAC,,, | Performed by: NURSE PRACTITIONER

## 2024-06-26 PROCEDURE — 73130 X-RAY EXAM OF HAND: CPT | Mod: 26,50,, | Performed by: RADIOLOGY

## 2024-06-26 PROCEDURE — 99214 OFFICE O/P EST MOD 30 MIN: CPT | Mod: S$PBB,,, | Performed by: NURSE PRACTITIONER

## 2024-06-26 PROCEDURE — 73130 X-RAY EXAM OF HAND: CPT | Mod: TC,50
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
## 2024-06-27 LAB
BACTERIA BLD CULT: NORMAL
BACTERIA BLD CULT: NORMAL

## 2024-07-01 ENCOUNTER — PATIENT OUTREACH (OUTPATIENT)
Dept: ADMINISTRATIVE | Facility: CLINIC | Age: 69
End: 2024-07-01
Payer: MEDICARE

## 2024-07-01 ENCOUNTER — PATIENT MESSAGE (OUTPATIENT)
Dept: ADMINISTRATIVE | Facility: CLINIC | Age: 69
End: 2024-07-01
Payer: MEDICARE

## 2024-07-01 ENCOUNTER — TELEPHONE (OUTPATIENT)
Dept: GASTROENTEROLOGY | Facility: CLINIC | Age: 69
End: 2024-07-01
Payer: MEDICARE

## 2024-07-01 NOTE — PROGRESS NOTES
C3 nurse attempted to contact Merle Caro  for a TCC post hospital discharge follow up call. No answer. Left voicemail with callback information. The patient does not have a scheduled HOSFU appointment. Message sent to PCP staff for assistance with scheduling visit with patient TO SCHEDULE HOSP F/U ON OR BEFORE 07/01/2024.

## 2024-07-01 NOTE — TELEPHONE ENCOUNTER
Called pt to schedule a gi appt from referral sent over. Pt did not answer a vm was left to call back and schedule.

## 2024-07-02 ENCOUNTER — INFUSION (OUTPATIENT)
Dept: INFUSION THERAPY | Facility: HOSPITAL | Age: 69
End: 2024-07-02
Attending: INTERNAL MEDICINE
Payer: MEDICARE

## 2024-07-02 VITALS
HEART RATE: 86 BPM | SYSTOLIC BLOOD PRESSURE: 116 MMHG | TEMPERATURE: 98 F | DIASTOLIC BLOOD PRESSURE: 73 MMHG | RESPIRATION RATE: 18 BRPM | OXYGEN SATURATION: 95 %

## 2024-07-02 DIAGNOSIS — D50.8 OTHER IRON DEFICIENCY ANEMIA: Primary | ICD-10-CM

## 2024-07-02 PROCEDURE — 63600175 PHARM REV CODE 636 W HCPCS: Performed by: NURSE PRACTITIONER

## 2024-07-02 PROCEDURE — 25000003 PHARM REV CODE 250: Performed by: NURSE PRACTITIONER

## 2024-07-02 PROCEDURE — 96365 THER/PROPH/DIAG IV INF INIT: CPT

## 2024-07-02 PROCEDURE — 96375 TX/PRO/DX INJ NEW DRUG ADDON: CPT

## 2024-07-02 RX ORDER — METHYLPREDNISOLONE SOD SUCC 125 MG
40 VIAL (EA) INJECTION
Status: COMPLETED | OUTPATIENT
Start: 2024-07-02 | End: 2024-07-02

## 2024-07-02 RX ADMIN — DEXTROSE 510 MG: 50 INJECTION, SOLUTION INTRAVENOUS at 01:07

## 2024-07-02 RX ADMIN — METHYLPREDNISOLONE SODIUM SUCCINATE 40 MG: 125 INJECTION, POWDER, FOR SOLUTION INTRAMUSCULAR; INTRAVENOUS at 12:07

## 2024-07-02 NOTE — PROGRESS NOTES
C3 nurse offered to scheduled [facility] hospital follow-up discharge follow-up call. The patient declined appointment at this time.

## 2024-07-02 NOTE — PLAN OF CARE
Problem: Adult Inpatient Plan of Care  Goal: Plan of Care Review  Outcome: Progressing  Flowsheets (Taken 7/2/2024 1310)  Plan of Care Reviewed With:   patient   family  Goal: Patient-Specific Goal (Individualized)  Outcome: Progressing  Flowsheets (Taken 7/2/2024 1310)  Individualized Care Needs: Patient wants to remain in WC during infusion  Anxieties, Fears or Concerns: Patient was recently released from the hospital  Patient/Family-Specific Goals (Include Timeframe): Patient will tolerate iron infusion without issues     Problem: Anemia  Goal: Anemia Symptom Improvement  Outcome: Progressing     Problem: Fall Injury Risk  Goal: Absence of Fall and Fall-Related Injury  Outcome: Progressing

## 2024-07-02 NOTE — DISCHARGE INSTRUCTIONS
..[facility]  [address] [address]  [address]  [phone] phone     [phone] fax  Hours of Operation: Monday- Friday 8:00am- 5:00pm  After hours phone  [phone]  Hematology / Oncology Physicians on call    Dr. Fritz Pedraza        Nurse Practitioners:    Sherice Delgadillo, JACEY Tovar, JACEY Pedersen, JACEY Pruitt, JACEY Ferraro, JACEY Valencia, PA      Please don't hesitate to call if you have any concerns.    .FALL PREVENTION   Falls often occur due to slipping, tripping or losing your balance. Here are ways to reduce your risk of falling again.   Was there anything that caused your fall that can be fixed, removed or replaced?   Make your home safe by keeping walkways clear of objects you may trip over.   Use non-slip pads under rugs.   Do not walk in poorly lit areas.   Do not stand on chairs or wobbly ladders.   Use caution when reaching overhead or looking upward. This position can cause a loss of balance.   Be sure your shoes fit properly, have non-slip bottoms and are in good condition.   Be cautious when going up and down stairs, curbs, and when walking on uneven sidewalks.   If your balance is poor, consider using a cane or walker.   If your fall was related to alcohol use, stop or limit alcohol intake.   If your fall was related to use of sleeping medicines, talk to your doctor about this. You may need to reduce your dosage at bedtime if you awaken during the night to go to the bathroom.   To reduce the need for nighttime bathroom trips:   Avoid drinking fluids for several hours before going to bed   Empty your bladder before going to bed   Men can keep a urinal at the bedside   © 6974-9124 [employer], [address], [place] [address]. All rights reserved. This information is not intended as a substitute for professional medical care. Always follow your healthcare  professional's instructions.  .WAYS TO HELP PREVENT INFECTION        WASH YOUR HANDS OFTEN DURING THE DAY, ESPECIALLY BEFORE YOU EAT, AFTER USING THE BATHROOM, AND AFTER TOUCHING ANIMALS    STAY AWAY FROM PEOPLE WHO HAVE ILLNESSES YOU CAN CATCH; SUCH AS COLDS, FLU, CHICKEN POX    TRY TO AVOID CROWDS    STAY AWAY FROM CHILDREN WHO RECENTLY HAVE RECEIVED LIVE VIRUS VACCINES    MAINTAIN GOOD MOUTH CARE    DO NOT SQUEEZE OR SCRATCH PIMPLES    CLEAN CUTS & SCRAPES RIGHT AWAY AND DAILY UNTIL HEALED WITH WARM WATER, SOAP & AN ANTISEPTIC    AVOID CONTACT WITH LITTER BOXES, BIRD CAGES, & FISH TANKS    AVOID STANDING WATER, IE., BIRD BATHS, FLOWER POTS/VASES, OR HUMIDIFIERS    WEAR GLOVES WHEN GARDENING OR CLEANING UP AFTER OTHERS, ESPECIALLY BABIES & SMALL CHILDREN    DO NOT EAT RAW FISH, SEAFOOD, MEAT, OR EGGS

## 2024-07-09 ENCOUNTER — INFUSION (OUTPATIENT)
Dept: INFUSION THERAPY | Facility: HOSPITAL | Age: 69
End: 2024-07-09
Attending: INTERNAL MEDICINE
Payer: MEDICARE

## 2024-07-09 ENCOUNTER — PATIENT MESSAGE (OUTPATIENT)
Dept: HEMATOLOGY/ONCOLOGY | Facility: CLINIC | Age: 69
End: 2024-07-09
Payer: MEDICARE

## 2024-07-09 VITALS
HEART RATE: 93 BPM | TEMPERATURE: 98 F | DIASTOLIC BLOOD PRESSURE: 64 MMHG | RESPIRATION RATE: 18 BRPM | OXYGEN SATURATION: 94 % | SYSTOLIC BLOOD PRESSURE: 123 MMHG

## 2024-07-09 DIAGNOSIS — D50.8 OTHER IRON DEFICIENCY ANEMIA: Primary | ICD-10-CM

## 2024-07-09 PROCEDURE — 96375 TX/PRO/DX INJ NEW DRUG ADDON: CPT

## 2024-07-09 PROCEDURE — 96374 THER/PROPH/DIAG INJ IV PUSH: CPT

## 2024-07-09 PROCEDURE — 25000003 PHARM REV CODE 250: Performed by: NURSE PRACTITIONER

## 2024-07-09 PROCEDURE — 63600175 PHARM REV CODE 636 W HCPCS: Mod: JZ,JG | Performed by: NURSE PRACTITIONER

## 2024-07-09 RX ORDER — SODIUM CHLORIDE 0.9 % (FLUSH) 0.9 %
10 SYRINGE (ML) INJECTION
Status: DISCONTINUED | OUTPATIENT
Start: 2024-07-09 | End: 2024-07-09 | Stop reason: HOSPADM

## 2024-07-09 RX ORDER — METHYLPREDNISOLONE SOD SUCC 125 MG
40 VIAL (EA) INJECTION
Status: COMPLETED | OUTPATIENT
Start: 2024-07-09 | End: 2024-07-09

## 2024-07-09 RX ADMIN — METHYLPREDNISOLONE SODIUM SUCCINATE 40 MG: 125 INJECTION, POWDER, FOR SOLUTION INTRAMUSCULAR; INTRAVENOUS at 01:07

## 2024-07-09 RX ADMIN — FERUMOXYTOL 510 MG: 510 INJECTION INTRAVENOUS at 01:07

## 2024-07-09 RX ADMIN — SODIUM CHLORIDE: 9 INJECTION, SOLUTION INTRAVENOUS at 01:07

## 2024-07-09 NOTE — DISCHARGE INSTRUCTIONS
[facility]  [address] [address] [facility]  [address]  [phone] phone     [phone] fax  Hours of Operation: Monday- Friday 8:00am- 5:00pm  After hours phone  [phone]  Hematology / Oncology Physicians on call      JANIS Ojeda Dr., NP Phaon Dunbar, NP Khelsea Conley, FNP    Please call with any concerns regarding your appointment today.

## 2024-07-09 NOTE — PLAN OF CARE
Discussed plan of care with pt. Addressed any and ongoing concerns. Pt denies   Problem: Adult Inpatient Plan of Care  Goal: Plan of Care Review  Outcome: Progressing  Goal: Patient-Specific Goal (Individualized)  Outcome: Progressing  Flowsheets (Taken 7/9/2024 1344)  Individualized Care Needs: Pt remained in WC for infusion, orange juice and lays chips  Anxieties, Fears or Concerns: None  Patient/Family-Specific Goals (Include Timeframe): Will tolerate infusion  Goal: Absence of Hospital-Acquired Illness or Injury  Outcome: Progressing  Intervention: Identify and Manage Fall Risk  Flowsheets (Taken 7/9/2024 1344)  Safety Promotion/Fall Prevention: in recliner, wheels locked  Intervention: Prevent Infection  Flowsheets (Taken 7/9/2024 1344)  Infection Prevention:   equipment surfaces disinfected   hand hygiene promoted   personal protective equipment utilized  Goal: Optimal Comfort and Wellbeing  Outcome: Progressing  Intervention: Provide Person-Centered Care  Flowsheets (Taken 7/9/2024 1344)  Trust Relationship/Rapport:   care explained   questions encouraged   choices provided   reassurance provided   emotional support provided   thoughts/feelings acknowledged   empathic listening provided   questions answered

## 2024-07-10 ENCOUNTER — OFFICE VISIT (OUTPATIENT)
Dept: UROLOGY | Facility: CLINIC | Age: 69
End: 2024-07-10
Payer: MEDICARE

## 2024-07-10 ENCOUNTER — OFFICE VISIT (OUTPATIENT)
Dept: INFECTIOUS DISEASES | Facility: CLINIC | Age: 69
End: 2024-07-10
Payer: MEDICARE

## 2024-07-10 VITALS
BODY MASS INDEX: 33.13 KG/M2 | SYSTOLIC BLOOD PRESSURE: 134 MMHG | DIASTOLIC BLOOD PRESSURE: 83 MMHG | HEART RATE: 90 BPM | HEIGHT: 62 IN | WEIGHT: 180 LBS

## 2024-07-10 VITALS
DIASTOLIC BLOOD PRESSURE: 80 MMHG | HEIGHT: 62 IN | SYSTOLIC BLOOD PRESSURE: 134 MMHG | WEIGHT: 181 LBS | HEART RATE: 82 BPM | BODY MASS INDEX: 33.31 KG/M2

## 2024-07-10 DIAGNOSIS — J44.89 ASTHMA WITH COPD: Chronic | ICD-10-CM

## 2024-07-10 DIAGNOSIS — R78.81 BACTEREMIA: ICD-10-CM

## 2024-07-10 DIAGNOSIS — N13.39 OTHER HYDRONEPHROSIS: ICD-10-CM

## 2024-07-10 DIAGNOSIS — N30.00 ACUTE CYSTITIS WITHOUT HEMATURIA: ICD-10-CM

## 2024-07-10 DIAGNOSIS — N13.39 OTHER HYDRONEPHROSIS: Primary | ICD-10-CM

## 2024-07-10 DIAGNOSIS — I10 PRIMARY HYPERTENSION: Chronic | ICD-10-CM

## 2024-07-10 DIAGNOSIS — A49.8 BACTERIAL INFECTION DUE TO BACTEROIDES FRAGILIS: Primary | ICD-10-CM

## 2024-07-10 DIAGNOSIS — E78.00 PURE HYPERCHOLESTEROLEMIA: Chronic | ICD-10-CM

## 2024-07-10 PROCEDURE — 99999 PR PBB SHADOW E&M-EST. PATIENT-LVL III: CPT | Mod: PBBFAC,,, | Performed by: STUDENT IN AN ORGANIZED HEALTH CARE EDUCATION/TRAINING PROGRAM

## 2024-07-10 PROCEDURE — 99214 OFFICE O/P EST MOD 30 MIN: CPT | Mod: S$PBB,,, | Performed by: STUDENT IN AN ORGANIZED HEALTH CARE EDUCATION/TRAINING PROGRAM

## 2024-07-10 PROCEDURE — 99999 PR PBB SHADOW E&M-EST. PATIENT-LVL IV: CPT | Mod: PBBFAC,,, | Performed by: NURSE PRACTITIONER

## 2024-07-10 PROCEDURE — 99213 OFFICE O/P EST LOW 20 MIN: CPT | Mod: PBBFAC | Performed by: STUDENT IN AN ORGANIZED HEALTH CARE EDUCATION/TRAINING PROGRAM

## 2024-07-10 PROCEDURE — 99214 OFFICE O/P EST MOD 30 MIN: CPT | Mod: S$PBB,,, | Performed by: NURSE PRACTITIONER

## 2024-07-10 PROCEDURE — 99214 OFFICE O/P EST MOD 30 MIN: CPT | Mod: PBBFAC,27 | Performed by: NURSE PRACTITIONER

## 2024-07-10 NOTE — PROGRESS NOTES
Chief Complaint:   Urosepsis  Possible neurogenic bladder    HPI:   Patient is presenting secondary to hospitalization.  Patient was found to be in urinary retention with urosepsis.  Urine culture indicated E coli.  CT renal stone study indicated severe bladder wall thickening with mild-to-moderate right hydronephrosis severe hydronephrosis of left kidney.  Recent renal ultrasound indicated mild hydronephrosis of right kidney moderate hydronephrosis of left kidney findings had improved since CT scan.  Dr. Baig discuss with patient and  the concerned that this indicated neurogenic bladder.  Patient denies fever or gross hematuria.  Consult   Jovanni COATES  06/19/2024  Merle Caro is a 68 y.o. female who presented to the emergency room with altered mental status.  Patient has known history of sacral fracture with cauda equina syndrome.  This was repaired in October of 2022.  Since that time she was lost bowel and bladder function.  She has been managed urologically by my partner in the office.  She has been been able to empty her bladder with 100-150 cc residual.  She was mostly incontinent.  She has been maintained on mirabegron.  She has no history of recurrent UTIs.     Upon presentation she was found to have UTI with hypotension leukocytosis and acute kidney injury.  Evaluation with renal ultrasound showed left greater than right hydronephrosis.  Patient has no prior history of kidney stones.  She was seen with her  at the bedside.     Attempts at placing Leija catheter in the emergency room were unsuccessful.     Allergies:  Codeine, Keflex [cephalexin], Shellfish containing products, Flu vaccine ts 2011-12(18 yr+), and Sucralfate    Medications:  has a current medication list which includes the following prescription(s): albuterol, celecoxib, cholecalciferol (vitamin d3), clonazepam, cyanocobalamin (vitamin b-12), folic acid, furosemide, haloperidol, lisinopril, methocarbamol, oxygen-air delivery  systems, pantoprazole, paroxetine, pregabalin, and trazodone.    Review of Systems:  General: No fever, chills, fatigability, or weight loss.  Skin: No rashes, itching, or changes in color or texture of skin.  Chest: Denies KENT, cyanosis, wheezing, cough, and sputum production.  Abdomen: Appetite fine. No weight loss. Denies diarrhea, abdominal pain, hematemesis, or blood in stool.  Musculoskeletal: No joint stiffness or swelling. Denies back pain.  : As above.  All other review of systems negative.    PMH:   has a past medical history of Arthritis, Asthma, COPD (chronic obstructive pulmonary disease), Depression, GERD (gastroesophageal reflux disease), Hypertension, and Tobacco abuse.    PSH:   has a past surgical history that includes Hysterectomy; Knee surgery; Tonsillectomy; multiple fracture repairs; Colonoscopy w/ polypectomy (01/05/2011); Magnetic resonance imaging (N/A, 10/14/2022); fusion, spine, posterior approach (N/A, 10/18/2022); and Esophagogastroduodenoscopy (N/A, 10/24/2022).    FamHx: family history includes COPD in her mother; Cancer in her mother; Hypertension in her mother; Lung cancer in her mother; Ovarian cancer in her sister; Stroke in her mother.    SocHx:  reports that she has quit smoking. Her smoking use included cigarettes. She started smoking about 45 years ago. She has a 33 pack-year smoking history. She has never used smokeless tobacco. She reports that she does not drink alcohol and does not use drugs.      Physical Exam:  Vitals:    07/10/24 1355   BP: 134/83   Pulse: 90     General: A&Ox3, no apparent distress, no deformities  Neck: No masses, normal thyroid  Lungs: normal inspiration, no use of accessory muscles  Heart: normal pulse, no arrhythmias  Abdomen: Soft, NT, ND, no masses, no hernias, no hepatosplenomegaly  Lymphatic: Neck and groin nodes negative  Skin: The skin is warm and dry. No jaundice.  Ext: No c/c/e.    Labs/Studies:   06/22/2024  EXAMINATION:  US  RETROPERITONEAL COMPLETE     CLINICAL HISTORY:  hydronpehrosis follow uip;     TECHNIQUE:  Ultrasound of the kidneys and urinary bladder was performed including color flow and Doppler evaluation of the kidneys.     COMPARISON:  Renal ultrasound 06/19/2024 and CT renal stone 06/19/2024     FINDINGS:  Right kidney: The right kidney measures 11.5 cm. Resistive index measures 0.78.  No mass. No renal stone. There is mild fullness of the collecting system.  This appears less prominent than on previous study.     Left kidney: The left kidney measures 11.0 cm. No resistive index measures 0.74.  No mass. No renal stone. Moderate hydronephrosis.  This is improved since previous exam.     Bladder is decompressed with a Leija catheter in place.     Impression:     Mild hydronephrosis of the right kidney.  Moderate hydronephrosis of the left kidney.  Findings are improved since previous study.     06/19/2024  EXAMINATION:  CT RENAL STONE STUDY ABD PELVIS WO     CLINICAL HISTORY:  Flank pain, kidney stone suspected;Nephrolithiasis, symptomatic/complicated;     TECHNIQUE:  Low dose axial images, sagittal and coronal reformations were obtained from the lung bases to the pubic symphysis.  Contrast was not administered.     COMPARISON:  None     FINDINGS:  Heart: Coronary artery calcification.     Lung Bases: Mild basilar fibrosis.     Liver: Normal in size and attenuation, with no focal hepatic lesions.     Gallbladder: No calcified gallstones.     Bile Ducts: No evidence of dilated ducts.     Pancreas: No mass or peripancreatic fat stranding.     Spleen: Unremarkable.     Adrenals: Unremarkable.     Kidneys/ Ureters: Mild moderate right-sided hydronephrosis with right hydroureter.  Severe hydronephrosis of the left kidney with moderate mild left hydroureter.  Severe bladder wall thickening.  Question punctate nonobstructing left renal calculi with versus punctate vascular calcification.  no obstructing renal calculi.     Bladder:  Severe bladder wall thickening     Reproductive organs: Unremarkable.     GI Tract/Mesentery: No evidence of bowel obstruction or inflammation.  Moderate amount of stool in the colon.  No secondary signs of appendicitis.     Peritoneal Space: No ascites. No free air.     Retroperitoneum: No significant adenopathy.     Abdominal wall: Unremarkable.     Vasculature: No  aneurysm.  Atherosclerotic changes.     Bones: No acute fracture.  Spinal hardware extending to the iliac region.  Deformity of the pubic symphysis.  Cement in the region of the pedicular screws suspected.  Correlate to surgical history.     Mild motion artifacts     Impression:     Mild moderate right-sided hydronephrosis with right hydroureter.  Severe hydronephrosis of the left kidney with moderate mild left hydroureter. Severe bladder wall thickening.  Question punctate nonobstructing left renal calculi with versus punctate vascular calcification.  No obstructing renal calculi.       Impression/Plan:   1.Bilateral hydronephrosis  Will proceed with repeat renal ultrasound to assess for hydronephrosis resolution.    2. Possible neurogenic bladder  Leija catheter was removed and patient was scheduled with MD to discuss management and needed follow-up.  Patient is aware that she may need to be managed with chronic Leija catheters, MD did discuss reconstructive surgery.
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
## 2024-07-15 ENCOUNTER — HOSPITAL ENCOUNTER (OUTPATIENT)
Dept: RADIOLOGY | Facility: HOSPITAL | Age: 69
Discharge: HOME | End: 2024-07-15
Attending: NURSE PRACTITIONER
Payer: MEDICARE

## 2024-07-15 DIAGNOSIS — N13.39 OTHER HYDRONEPHROSIS: ICD-10-CM

## 2024-07-15 PROCEDURE — 76770 US EXAM ABDO BACK WALL COMP: CPT | Mod: TC

## 2024-07-15 PROCEDURE — 76770 US EXAM ABDO BACK WALL COMP: CPT | Mod: 26,,, | Performed by: STUDENT IN AN ORGANIZED HEALTH CARE EDUCATION/TRAINING PROGRAM

## 2024-07-16 ENCOUNTER — OFFICE VISIT (OUTPATIENT)
Dept: UROLOGY | Facility: CLINIC | Age: 69
End: 2024-07-16
Payer: MEDICARE

## 2024-07-16 DIAGNOSIS — N39.41 URGE INCONTINENCE: Primary | ICD-10-CM

## 2024-07-16 LAB
BILIRUB UR QL STRIP: NEGATIVE
GLUCOSE UR QL STRIP: NEGATIVE
KETONES UR QL STRIP: NEGATIVE
LEUKOCYTE ESTERASE UR QL STRIP: POSITIVE
PH, POC UA: 6.5
POC BLOOD, URINE: POSITIVE
POC NITRATES, URINE: NEGATIVE
PROT UR QL STRIP: NEGATIVE
SP GR UR STRIP: 1.01 (ref 1–1.03)
UROBILINOGEN UR STRIP-ACNC: 0.2 (ref 0.1–1.1)

## 2024-07-16 PROCEDURE — 99999PBSHW POCT URINALYSIS, DIPSTICK, AUTOMATED, W/O SCOPE: Mod: PBBFAC,,,

## 2024-07-16 PROCEDURE — 99999 PR PBB SHADOW E&M-EST. PATIENT-LVL III: CPT | Mod: PBBFAC,,, | Performed by: NURSE PRACTITIONER

## 2024-07-16 PROCEDURE — 99214 OFFICE O/P EST MOD 30 MIN: CPT | Mod: S$PBB,,, | Performed by: NURSE PRACTITIONER

## 2024-07-16 PROCEDURE — 81003 URINALYSIS AUTO W/O SCOPE: CPT | Mod: PBBFAC | Performed by: NURSE PRACTITIONER

## 2024-07-16 PROCEDURE — 99213 OFFICE O/P EST LOW 20 MIN: CPT | Mod: PBBFAC | Performed by: NURSE PRACTITIONER

## 2024-07-16 NOTE — PROGRESS NOTES
Chief Complaint:   Bladder wall thickening  Possible neurogenic bladder  Bilateral hydronephrosis    HPI:   Patient is a 69-year-old female that is presenting secondary to bilateral hydronephrosis.  Was recently hospitalized due to E coli urosepsis.  Leija catheter was removed, last week, PVR is 24 mL.  Urine in clinic indicates trace leukocytes, all other parameters are negative.  Proceeded with a renal ultrasound to assess resolution of hydronephrosis, indicates worsening right-sided hydronephrosis and moderate left-sided hydronephrosis, severe bladder wall thickening.  07/10/2024  Patient is presenting secondary to hospitalization.  Patient was found to be in urinary retention with urosepsis.  Urine culture indicated E coli.  CT renal stone study indicated severe bladder wall thickening with mild-to-moderate right hydronephrosis severe hydronephrosis of left kidney.  Recent renal ultrasound indicated mild hydronephrosis of right kidney moderate hydronephrosis of left kidney findings had improved since CT scan.  Dr. Baig discuss with patient and  the concerned that this indicated neurogenic bladder.  Patient denies fever or gross hematuria.  Consult   Jovanni COATES  06/19/2024  Merle Caro is a 68 y.o. female who presented to the emergency room with altered mental status.  Patient has known history of sacral fracture with cauda equina syndrome.  This was repaired in October of 2022.  Since that time she was lost bowel and bladder function.  She has been managed urologically by my partner in the office.  She has been been able to empty her bladder with 100-150 cc residual.  She was mostly incontinent.  She has been maintained on mirabegron.  She has no history of recurrent UTIs.     Upon presentation she was found to have UTI with hypotension leukocytosis and acute kidney injury.  Evaluation with renal ultrasound showed left greater than right hydronephrosis.  Patient has no prior history of kidney stones.   She was seen with her  at the bedside.     Attempts at placing Leija catheter in the emergency room were unsuccessful.  Allergies:  Codeine, Keflex [cephalexin], Shellfish containing products, Flu vaccine ts 2011-12(18 yr+), and Sucralfate    Medications:  has a current medication list which includes the following prescription(s): albuterol, celecoxib, cholecalciferol (vitamin d3), clonazepam, cyanocobalamin (vitamin b-12), folic acid, furosemide, haloperidol, lisinopril, methocarbamol, oxygen-air delivery systems, pantoprazole, paroxetine, pregabalin, and trazodone.    Review of Systems:  General: No fever, chills, fatigability, or weight loss.  Skin: No rashes, itching, or changes in color or texture of skin.  Chest: Denies KENT, cyanosis, wheezing, cough, and sputum production.  Abdomen: Appetite fine. No weight loss. Denies diarrhea, abdominal pain, hematemesis, or blood in stool.  Musculoskeletal:  Wheelchair-bound  : As above.  All other review of systems negative.    PMH:   has a past medical history of Arthritis, Asthma, COPD (chronic obstructive pulmonary disease), Depression, GERD (gastroesophageal reflux disease), Hypertension, and Tobacco abuse.  Hydronephrosis    PSH:   has a past surgical history that includes Hysterectomy; Knee surgery; Tonsillectomy; multiple fracture repairs; Colonoscopy w/ polypectomy (01/05/2011); Magnetic resonance imaging (N/A, 10/14/2022); fusion, spine, posterior approach (N/A, 10/18/2022); and Esophagogastroduodenoscopy (N/A, 10/24/2022).    FamHx: family history includes COPD in her mother; Cancer in her mother; Hypertension in her mother; Lung cancer in her mother; Ovarian cancer in her sister; Stroke in her mother.    SocHx:  reports that she has quit smoking. Her smoking use included cigarettes. She started smoking about 45 years ago. She has a 33 pack-year smoking history. She has never used smokeless tobacco. She reports that she does not drink alcohol and does  not use drugs.      Physical Exam:  General: A&Ox3, no apparent distress, no deformities  Neck: No masses, normal thyroid  Lungs: normal inspiration, no use of accessory muscles  Heart: normal pulse, no arrhythmias  Abdomen: Soft, NT, ND, no masses, no hernias, no hepatosplenomegaly  Lymphatic: Neck and groin nodes negative  Skin: The skin is warm and dry. No jaundice.    Labs/Studies:   07/15/2024  EXAMINATION:  US RETROPERITONEAL COMPLETE     CLINICAL HISTORY:  Other hydronephrosis     TECHNIQUE:  Ultrasound of the kidneys and urinary bladder was performed including color flow and Doppler evaluation of the kidneys.     COMPARISON:  Retroperitoneal ultrasound 06/22/2024     FINDINGS:  Right kidney: The right kidney measures 13.6 cm. No cortical thinning. Resistive index measures 0.79.  No mass. No renal stone. There is interval worsening of right-sided hydronephrosis, now moderate.     Left kidney: The left kidney measures 10.2 cm. There is cortical thinning. Resistive index measures 0.67.  No mass. No renal stone. There is stable moderate left hydronephrosis.     Urinary bladder is partially distended at the time of scanning.  Trace layering debris present.     Splenic resistive index measures 0.74.     Impression:     Worsening of right hydronephrosis, now moderate.  Stable moderate left hydronephrosis.     Layering debris within the urinary bladder.  Correlate with urinalysis.    Impression/Plan:   Hydronephrosis, bilaterally, with bladder wall thickening  Patient was presented to Dr. Cam for recommendation.  Was scheduled for cystoscopy.

## 2024-07-19 DIAGNOSIS — M25.561 CHRONIC PAIN OF BOTH KNEES: ICD-10-CM

## 2024-07-19 DIAGNOSIS — G89.29 CHRONIC PAIN OF BOTH KNEES: ICD-10-CM

## 2024-07-19 DIAGNOSIS — M25.562 CHRONIC PAIN OF BOTH KNEES: ICD-10-CM

## 2024-07-19 NOTE — TELEPHONE ENCOUNTER
No care due was identified.  [facility] Embedded Care Due Messages. Reference number: [id number].   7/19/2024 9:52:46 AM CDT

## 2024-07-23 RX ORDER — CELECOXIB 200 MG/1
200 CAPSULE ORAL DAILY
Qty: 90 CAPSULE | Refills: 3 | Status: SHIPPED | OUTPATIENT
Start: 2024-07-23 | End: 2025-07-23

## 2024-08-02 ENCOUNTER — LAB VISIT (OUTPATIENT)
Dept: LAB | Facility: HOSPITAL | Age: 69
End: 2024-08-02
Attending: NURSE PRACTITIONER
Payer: MEDICARE

## 2024-08-02 DIAGNOSIS — D50.8 OTHER IRON DEFICIENCY ANEMIA: ICD-10-CM

## 2024-08-02 LAB
ALBUMIN SERPL BCP-MCNC: 2.9 G/DL (ref 3.5–5.2)
ALP SERPL-CCNC: 117 U/L (ref 55–135)
ALT SERPL W/O P-5'-P-CCNC: 7 U/L (ref 10–44)
ANION GAP SERPL CALC-SCNC: 9 MMOL/L (ref 8–16)
AST SERPL-CCNC: 12 U/L (ref 10–40)
BASOPHILS # BLD AUTO: 0.03 K/UL (ref 0–0.2)
BASOPHILS NFR BLD: 0.3 % (ref 0–1.9)
BILIRUB SERPL-MCNC: 0.4 MG/DL (ref 0.1–1)
BUN SERPL-MCNC: 15 MG/DL (ref 8–23)
CALCIUM SERPL-MCNC: 8.9 MG/DL (ref 8.7–10.5)
CHLORIDE SERPL-SCNC: 102 MMOL/L (ref 95–110)
CO2 SERPL-SCNC: 24 MMOL/L (ref 23–29)
CREAT SERPL-MCNC: 1 MG/DL (ref 0.5–1.4)
DIFFERENTIAL METHOD BLD: ABNORMAL
EOSINOPHIL # BLD AUTO: 0.2 K/UL (ref 0–0.5)
EOSINOPHIL NFR BLD: 1.7 % (ref 0–8)
ERYTHROCYTE [DISTWIDTH] IN BLOOD BY AUTOMATED COUNT: 15.6 % (ref 11.5–14.5)
EST. GFR  (NO RACE VARIABLE): >60 ML/MIN/1.73 M^2
FERRITIN SERPL-MCNC: 1149 NG/ML (ref 20–300)
GLUCOSE SERPL-MCNC: 126 MG/DL (ref 70–110)
HCT VFR BLD AUTO: 34.6 % (ref 37–48.5)
HGB BLD-MCNC: 11 G/DL (ref 12–16)
IMM GRANULOCYTES # BLD AUTO: 0.05 K/UL (ref 0–0.04)
IMM GRANULOCYTES NFR BLD AUTO: 0.5 % (ref 0–0.5)
IRON SERPL-MCNC: 23 UG/DL (ref 30–160)
LYMPHOCYTES # BLD AUTO: 0.9 K/UL (ref 1–4.8)
LYMPHOCYTES NFR BLD: 9.1 % (ref 18–48)
MCH RBC QN AUTO: 29.3 PG (ref 27–31)
MCHC RBC AUTO-ENTMCNC: 31.8 G/DL (ref 32–36)
MCV RBC AUTO: 92 FL (ref 82–98)
MONOCYTES # BLD AUTO: 0.8 K/UL (ref 0.3–1)
MONOCYTES NFR BLD: 8.2 % (ref 4–15)
NEUTROPHILS # BLD AUTO: 7.8 K/UL (ref 1.8–7.7)
NEUTROPHILS NFR BLD: 80.2 % (ref 38–73)
NRBC BLD-RTO: 0 /100 WBC
PLATELET # BLD AUTO: 377 K/UL (ref 150–450)
PMV BLD AUTO: 9.4 FL (ref 9.2–12.9)
POTASSIUM SERPL-SCNC: 3.8 MMOL/L (ref 3.5–5.1)
PROT SERPL-MCNC: 6.9 G/DL (ref 6–8.4)
RBC # BLD AUTO: 3.75 M/UL (ref 4–5.4)
SATURATED IRON: 11 % (ref 20–50)
SODIUM SERPL-SCNC: 135 MMOL/L (ref 136–145)
TOTAL IRON BINDING CAPACITY: 218 UG/DL (ref 250–450)
TRANSFERRIN SERPL-MCNC: 147 MG/DL (ref 200–375)
WBC # BLD AUTO: 9.74 K/UL (ref 3.9–12.7)

## 2024-08-02 PROCEDURE — 85025 COMPLETE CBC W/AUTO DIFF WBC: CPT | Performed by: NURSE PRACTITIONER

## 2024-08-02 PROCEDURE — 83540 ASSAY OF IRON: CPT | Performed by: NURSE PRACTITIONER

## 2024-08-02 PROCEDURE — 82728 ASSAY OF FERRITIN: CPT | Performed by: NURSE PRACTITIONER

## 2024-08-02 PROCEDURE — 36415 COLL VENOUS BLD VENIPUNCTURE: CPT | Mod: PO | Performed by: NURSE PRACTITIONER

## 2024-08-02 PROCEDURE — 80053 COMPREHEN METABOLIC PANEL: CPT | Performed by: NURSE PRACTITIONER

## 2024-08-05 ENCOUNTER — OFFICE VISIT (OUTPATIENT)
Dept: HEMATOLOGY/ONCOLOGY | Facility: CLINIC | Age: 69
End: 2024-08-05
Payer: MEDICARE

## 2024-08-05 VITALS
HEIGHT: 62 IN | TEMPERATURE: 98 F | DIASTOLIC BLOOD PRESSURE: 87 MMHG | HEART RATE: 101 BPM | WEIGHT: 187.06 LBS | BODY MASS INDEX: 34.42 KG/M2 | SYSTOLIC BLOOD PRESSURE: 136 MMHG | OXYGEN SATURATION: 92 %

## 2024-08-05 DIAGNOSIS — D50.8 OTHER IRON DEFICIENCY ANEMIA: Primary | ICD-10-CM

## 2024-08-05 DIAGNOSIS — E66.01 SEVERE OBESITY (BMI 35.0-39.9) WITH COMORBIDITY: ICD-10-CM

## 2024-08-05 PROCEDURE — 99213 OFFICE O/P EST LOW 20 MIN: CPT | Mod: PBBFAC | Performed by: NURSE PRACTITIONER

## 2024-08-05 PROCEDURE — 99214 OFFICE O/P EST MOD 30 MIN: CPT | Mod: S$PBB,,, | Performed by: NURSE PRACTITIONER

## 2024-08-05 PROCEDURE — 99999 PR PBB SHADOW E&M-EST. PATIENT-LVL III: CPT | Mod: PBBFAC,,, | Performed by: NURSE PRACTITIONER

## 2024-08-13 ENCOUNTER — OFFICE VISIT (OUTPATIENT)
Dept: INTERNAL MEDICINE | Facility: CLINIC | Age: 69
End: 2024-08-13
Payer: MEDICARE

## 2024-08-13 ENCOUNTER — HOSPITAL ENCOUNTER (OUTPATIENT)
Dept: RADIOLOGY | Facility: HOSPITAL | Age: 69
Discharge: HOME | End: 2024-08-13
Attending: FAMILY MEDICINE
Payer: MEDICARE

## 2024-08-13 VITALS
SYSTOLIC BLOOD PRESSURE: 130 MMHG | HEART RATE: 111 BPM | OXYGEN SATURATION: 88 % | TEMPERATURE: 98 F | DIASTOLIC BLOOD PRESSURE: 60 MMHG | BODY MASS INDEX: 34.29 KG/M2 | HEIGHT: 62 IN | WEIGHT: 186.31 LBS

## 2024-08-13 DIAGNOSIS — J44.89 ASTHMA WITH COPD: Chronic | ICD-10-CM

## 2024-08-13 DIAGNOSIS — M79.671 PAIN OF RIGHT MIDFOOT: ICD-10-CM

## 2024-08-13 DIAGNOSIS — M13.0 POLYARTHRITIS: ICD-10-CM

## 2024-08-13 DIAGNOSIS — M79.671 PAIN OF RIGHT MIDFOOT: Primary | ICD-10-CM

## 2024-08-13 PROCEDURE — G2211 COMPLEX E/M VISIT ADD ON: HCPCS | Mod: S$PBB,,, | Performed by: FAMILY MEDICINE

## 2024-08-13 PROCEDURE — 99214 OFFICE O/P EST MOD 30 MIN: CPT | Mod: PBBFAC,25 | Performed by: FAMILY MEDICINE

## 2024-08-13 PROCEDURE — 73630 X-RAY EXAM OF FOOT: CPT | Mod: TC,RT

## 2024-08-13 PROCEDURE — 99215 OFFICE O/P EST HI 40 MIN: CPT | Mod: S$PBB,,, | Performed by: FAMILY MEDICINE

## 2024-08-13 PROCEDURE — 99999 PR PBB SHADOW E&M-EST. PATIENT-LVL IV: CPT | Mod: PBBFAC,,, | Performed by: FAMILY MEDICINE

## 2024-08-13 PROCEDURE — 73630 X-RAY EXAM OF FOOT: CPT | Mod: 26,RT,, | Performed by: RADIOLOGY

## 2024-08-13 RX ORDER — IPRATROPIUM BROMIDE AND ALBUTEROL SULFATE 2.5; .5 MG/3ML; MG/3ML
3 SOLUTION RESPIRATORY (INHALATION) EVERY 6 HOURS PRN
Qty: 75 ML | Refills: 0 | Status: SHIPPED | OUTPATIENT
Start: 2024-08-13 | End: 2025-08-13

## 2024-08-13 NOTE — PROGRESS NOTES
"Subjective:   Patient ID: Merle Caro is a 69 y.o. female.  Chief Complaint:  Foot Pain    Presents with spouse for evaluation of acute right foot pain   Three day duration   Started from trauma   Midfoot sprain type mechanism with pressure over distal toes   Significant bruising on 1st 2nd and 3rd digit right toe   Has chronic deviation and changes to right toes from arthritis   No significant ankle pain  Midfoot pain   Hurts to bear weight when transfers  Minimal swelling  Possible previous injury to foot, but review of chart shows a right phalangeal problem and left metatarsal problem    After last visit, referral to Rheumatology ordered for polyarthritis type symptoms   Visit as scheduled   Multiple images of joints confirmed arthritis   No previous autoimmune panel on file     Finally, needs new prescription for albuterol nebulizer machine and nebulizer medication    Review of Systems   Respiratory:  Positive for shortness of breath and wheezing.    Cardiovascular:  Negative for leg swelling.   Musculoskeletal:  Positive for arthralgias, gait problem, joint swelling and myalgias.   Skin:  Positive for color change. Negative for rash and wound.   Neurological:  Positive for weakness and numbness.       Objective:   /60 (BP Location: Right arm, Patient Position: Sitting, BP Method: Large (Manual))   Pulse (!) 111   Temp 97.7 °F (36.5 °C) (Tympanic)   Ht 5' 2" (1.575 m)   Wt 84.5 kg (186 lb 4.6 oz)   SpO2 (!) 88%   BMI 34.07 kg/m²     Physical Exam  Vitals and nursing note reviewed.   Constitutional:       Appearance: Normal appearance. She is well-developed and normal weight.   Cardiovascular:      Rate and Rhythm: Normal rate and regular rhythm.      Heart sounds: Normal heart sounds.   Pulmonary:      Effort: Pulmonary effort is normal.      Breath sounds: Wheezing present. No rhonchi or rales.   Musculoskeletal:      Right lower leg: No edema.      Left lower leg: No edema.      Left foot: " Decreased range of motion. Deformity present. No prominent metatarsal heads.   Feet:      Left foot:      Skin integrity: No ulcer, blister, skin breakdown, erythema, warmth, callus, dry skin or fissure.      Comments:   Tenderness midfoot area to palpation   Ecchymosis of for 2nd and 3rd digit  No skin breakdown   Pain significantly increased with plantar flexion of foot  Skin:     Findings: No rash.   Neurological:      General: No focal deficit present.      Mental Status: She is alert. Mental status is at baseline.      Motor: Weakness present.   Psychiatric:         Mood and Affect: Mood and affect normal.       Assessment:       ICD-10-CM ICD-9-CM   1. Pain of right midfoot  M79.671 729.5   2. Polyarthritis  M13.0 716.50   3. Asthma with COPD  J44.89 493.20     Plan:   Pain of right midfoot  -     X-Ray Foot Complete 3 view Right; Future; Expected date: 08/13/2024  Check x-ray   Rule out fracture or dislocation   Based on results will order appropriate walking boot/immobilizer or referral to Orthopedics as indicated     Polyarthritis  -     Sedimentation rate; Future; Expected date: 08/13/2024  -     C-reactive protein; Future; Expected date: 08/13/2024  -     MICHAEL; Future; Expected date: 08/13/2024  -     Rheumatoid factor; Future; Expected date: 08/13/2024  -     Cyclic citrul peptide antibody, IgG; Future; Expected date: 08/13/2024  -     Sjogrens syndrome-A extractable nuclear antibody; Future; Expected date: 08/13/2024  -     Uric Acid; Future; Expected date: 08/13/2024  Check autoimmune panel   Keep visit scheduled with Rheumatology     Asthma with COPD  -     NEBULIZER FOR HOME USE  -     NEBULIZER KIT (SUPPLIES) FOR HOME USE  -     albuterol-ipratropium (DUO-NEB) 2.5 mg-0.5 mg/3 mL nebulizer solution; Take 3 mLs by nebulization every 6 (six) hours as needed for Wheezing or Shortness of Breath. Rescue  Dispense: 75 mL; Refill: 0    40 minutes of total time spent on the encounter, which includes face to  face time and non-face to face time preparing to see the patient (eg, review of tests), Obtaining and/or reviewing separately obtained history, documenting clinical information in the electronic or other health record, independently interpreting results (not separately reported) and communicating results to the patient/family/caregiver, or Care coordination (not separately reported)    Visit today included increased complexity associated with managing the longitudinal care of the patient due to the serious and/or complex managed problem(s) listed above.

## 2024-08-19 NOTE — PT/OT/SLP PROGRESS
Occupational Therapy   Treatment    Name: Merle Caro  MRN: [id number]  Admitting Diagnosis:  Sepsis without septic shock       Recommendations:     Discharge Recommendations: Moderate Intensity Therapy  Discharge Equipment Recommendations:  to be determined by next level of care  Barriers to discharge:  None    Assessment:     Merle Caro is a 68 y.o. female with a medical diagnosis of Sepsis without septic shock.  She presents with the following performance deficits affecting function: weakness, impaired endurance, impaired cognition, impaired sensation, decreased coordination, impaired self care skills, decreased upper extremity function, impaired functional mobility, decreased lower extremity function, gait instability, decreased safety awareness, impaired balance, pain, decreased ROM.     Rehab Prognosis:  Good; patient would benefit from acute skilled OT services to address these deficits and reach maximum level of function.       Plan:     Patient to be seen 2 x/week to address the above listed problems via self-care/home management, therapeutic activities, therapeutic exercises  Plan of Care Expires: 07/04/24  Plan of Care Reviewed with: patient, spouse    Subjective     Chief Complaint: Pt with no complaints of pain.   Patient/Family Comments/goals: none reported  Pain/Comfort:  Pain Rating 1: 0/10    Objective:     Communicated with: her nurse, Gale, prior to session.  Patient found supine with telemetry, peripheral IV, nicholson catheter, bed alarm upon OT entry to room.    General Precautions: Standard, fall    Orthopedic Precautions:N/A  Braces: N/A  Respiratory Status: Room air     Occupational Performance:     Bed Mobility:    Patient completed Scooting/Bridging with minimum assistance  Patient completed Supine to Sit with minimum assistance     Functional Mobility/Transfers:  Patient completed Sit <> Stand Transfer with moderate assistance and of 2 persons  with  no assistive device   Pt impulsive  197060 and resistant to safety cues during t/f.   Patient completed Bed <> Chair Transfer using Step Transfer technique with maximal assistance and of 2 persons with no assistive device  Functional Mobility: Pt unable to complete ambulation away from bed due to decreased activity tolerance.     [facility] 6 Click ADL: 16    Treatment & Education:  Pt with continued poor safety awareness impacting her ability to safely t/f. Pt resistant to safety cues despite continued education regarding her fall risk. Pt encouraged to sit up in chair as much as possible to increase overall activity tolerance. Pt resistant to education on increased sitting tolerance. Pt encouraged to use call button when getting back into bed to call for assistance and reduce her overall fall risk.     Patient left up in chair with all lines intact, call button in reach, chair alarm on, and  present    GOALS:   Multidisciplinary Problems       Occupational Therapy Goals          Problem: Occupational Therapy    Goal Priority Disciplines Outcome Interventions   Occupational Therapy Goal     OT, PT/OT Progressing    Description: Goals to be met by: 07/04/24     Patient will increase functional independence with ADLs by performing:    UE Dressing with Stand-by Assistance while EOB.  Sitting at edge of bed x15 minutes with Stand-by Assistance.  Supine to sit with Minimal Assistance and use of bedrail as needed.                         Time Tracking:     OT Date of Treatment: 06/21/24  OT Start Time: 0955  OT Stop Time: 1010  OT Total Time (min): 15 min    Billable Minutes:Therapeutic Activity 15             LIA Rosario  6/21/2024

## 2024-08-28 ENCOUNTER — DOCUMENT SCAN (OUTPATIENT)
Dept: HOME HEALTH SERVICES | Facility: HOSPITAL | Age: 69
End: 2024-08-28
Payer: MEDICARE

## 2024-08-30 ENCOUNTER — PROCEDURE VISIT (OUTPATIENT)
Dept: UROLOGY | Facility: CLINIC | Age: 69
End: 2024-08-30
Payer: MEDICARE

## 2024-08-30 DIAGNOSIS — R15.9 INCONTINENCE OF FECES, UNSPECIFIED FECAL INCONTINENCE TYPE: ICD-10-CM

## 2024-08-30 DIAGNOSIS — N13.39 OTHER HYDRONEPHROSIS: ICD-10-CM

## 2024-08-30 DIAGNOSIS — N39.41 URGE INCONTINENCE: Primary | ICD-10-CM

## 2024-08-30 RX ORDER — CIPROFLOXACIN 500 MG/1
500 TABLET ORAL
Status: COMPLETED | OUTPATIENT
Start: 2024-08-30 | End: 2024-08-30

## 2024-08-30 RX ADMIN — CIPROFLOXACIN 500 MG: 500 TABLET ORAL at 08:08

## 2024-08-30 NOTE — PROCEDURES
Procedures  Chief Complaint:   Encounter Diagnoses   Name Primary?    Urge incontinence Yes    Other hydronephrosis     Incontinence of feces, unspecified fecal incontinence type          HPI:   8/30/24- here today for cystoscopy.  Patient's  reports incontinence is worse, no pain from retention.  Fecal incontinence has improved.    1/4/23- patient reports as a new patient to me with urge and fecal incontinence.  Fecal incontinence is relatively new, this appears to have exacerbated recently was also recently found to have sacral fracture with cauda equina syndrome.  She is having some improvement on symptoms, Myrbetriq 25 mg does assist, but not perfectly.  No evidence of stress incontinence, dysuria or gross hematuria.    11/16/22- LR- Patient is a 67-year-old female that is presenting with her .  HPI was obtained by .   states that patient is status post Spine fusion and has a h/o Cauda Equina syndrome.  States that patient is unaware when she needs to urinate and has had an increase in urinary incontinence. Reports that she also has fecal incontinence.  PVR was 150 mL, repeat 132 ml.  No history of gross hematuria or renal stones.  Unable to give urine sample.    Allergies:  Codeine, Keflex [cephalexin], Shellfish containing products, Flu vaccine ts 2011-12(18 yr+), and Sucralfate    Medications:  has a current medication list which includes the following prescription(s): albuterol-ipratropium, celecoxib, cholecalciferol (vitamin d3), clonazepam, cyanocobalamin (vitamin b-12), folic acid, furosemide, haloperidol, lisinopril, methocarbamol, oxygen-air delivery systems, pantoprazole, paroxetine, pregabalin, trazodone, and albuterol.    Review of Systems:  General: No fever, chills, fatigability, or weight loss.  Skin: No rashes, itching, or changes in color or texture of skin.  Chest: Denies KENT, cyanosis, wheezing, cough, and sputum production.  Abdomen: Appetite fine. No weight loss.  Denies diarrhea, abdominal pain, hematemesis, or blood in stool.  Musculoskeletal: No joint stiffness or swelling. Denies back pain.  : As above.  All other review of systems negative.    PMH:   has a past medical history of Arthritis, Asthma, COPD (chronic obstructive pulmonary disease), Depression, GERD (gastroesophageal reflux disease), Hypertension, and Tobacco abuse.    PSH:   has a past surgical history that includes Hysterectomy; Knee surgery; Tonsillectomy; multiple fracture repairs; Colonoscopy w/ polypectomy (01/05/2011); Magnetic resonance imaging (N/A, 10/14/2022); fusion, spine, posterior approach (N/A, 10/18/2022); and Esophagogastroduodenoscopy (N/A, 10/24/2022).    FamHx: family history includes COPD in her mother; Cancer in her mother; Hypertension in her mother; Lung cancer in her mother; Ovarian cancer in her sister; Stroke in her mother.    SocHx:  reports that she has quit smoking. Her smoking use included cigarettes. She started smoking about 45 years ago. She has a 33 pack-year smoking history. She has never used smokeless tobacco. She reports that she does not drink alcohol and does not use drugs.      Physical Exam:  Vitals:    01/04/23 1554   BP: 103/69   Pulse: 92     General: A&Ox3, no apparent distress, no deformities  Neck: No masses, normal ROM  Lungs: normal inspiration, no use of accessory muscles  Heart: normal pulse, no arrhythmias  Abdomen: Soft, NT, ND, no masses, no hernias, no hepatosplenomegaly  Skin: The skin is warm and dry. No jaundice.  Ext: No c/c/e.  : 8/24- No pelvic floor prolapse.  Normal introitus, no urethral abnomralities. No Perineal abnormalities.    Labs/Studies:    ml 8/24  PVR 24 mL 7/24  Urine culture E coli 6/24  Cystoscopy normal 8/24  ADRIEL bilateral hydronephrosis 7/24  CT stone protocol bilateral hydronephrosis, bladder wall thickening, spinal hardware and iliac region with deformity of the pubic symphysis 6/24  Creatinine 1.0 8/24    Procedure:  Diagnostic Cystoscopy    Procedure in Detail: After proper consents were obtained, the patient was prepped and draped in normal sterile fashion for diagnostic cystoscopy. The flexible cystoscope was then introduced into the urethra, and advanced into the bladder under direct vision. The urethral mucosa appeared normal, and no strictures were noted. The sphincter appeared to be normal.  The bladder neck was normal. Inspection of the interior of the bladder was then carried out. The trigone was unremarkable, with no mucosal lesions. The ureteral orifices were normal in position and configuration. Systematic inspection of the mucosa of the bladder was then carried out, rotating the cystoscope so that all areas of the left and right lateral walls, the dome of the bladder, and the posterior wall were all visualized. The cystoscope was then advanced further into the bladder, and maximum deflection of the scope was performed so that the bladder neck could be inspected. No mucosal lesions were noted there. The cystoscope was then removed, and the procedure terminated.     Findings: normal cystoscopy      Impression/Plan:       1. Urge incontinence/neurogenic bladder- myrbetriq 50 mg has been stopped after her bout of retention.  Cysto is negative today, she is relatively empty.  I will bring her in for a nurse visit to place a nicholson then immediately perform a lasix renogram.  Pending this study, if there is evidence of obstruction with a Nicholson in, then the renal obstruction is at the level of the UVJ, if no sign of obstruction then either the hydronephrosis is due to bladder outflow obstruction versus chronic hydronephrosis.  Will have her follow up after this resolved, and proceed as appropriate from there.  If obstruction at the UVJ then referral to tertiary facility for possible diversion.  If chronic hydro can monitor her with renal ultrasounds and serial creatinines due to the fact that her renal function is currently  normal.  If evidence of obstruction at the bladder neck, patient may require chronic catheterizations.  History of repaired sacral fracture with cauda equina syndrome 10/22, which is most likely the underlying etiology.    2. Fecal incontinence- relatively stable and improved.    3. Bilateral hydronephrosis- please see above.

## 2024-09-04 ENCOUNTER — CLINICAL SUPPORT (OUTPATIENT)
Dept: UROLOGY | Facility: CLINIC | Age: 69
End: 2024-09-04
Payer: MEDICARE

## 2024-09-04 ENCOUNTER — HOSPITAL ENCOUNTER (OUTPATIENT)
Dept: RADIOLOGY | Facility: HOSPITAL | Age: 69
Discharge: HOME | End: 2024-09-04
Attending: UROLOGY
Payer: MEDICARE

## 2024-09-04 DIAGNOSIS — N39.41 URGE INCONTINENCE: Primary | ICD-10-CM

## 2024-09-04 DIAGNOSIS — N13.39 OTHER HYDRONEPHROSIS: ICD-10-CM

## 2024-09-04 PROCEDURE — 99999 PR PBB SHADOW E&M-EST. PATIENT-LVL II: CPT | Mod: PBBFAC,,,

## 2024-09-04 PROCEDURE — A9562 TC99M MERTIATIDE: HCPCS | Performed by: UROLOGY

## 2024-09-04 PROCEDURE — 99212 OFFICE O/P EST SF 10 MIN: CPT | Mod: PBBFAC

## 2024-09-04 PROCEDURE — 78708 K FLOW/FUNCT IMAGE W/DRUG: CPT | Mod: TC

## 2024-09-04 PROCEDURE — 78708 K FLOW/FUNCT IMAGE W/DRUG: CPT | Mod: 26,,, | Performed by: RADIOLOGY

## 2024-09-04 RX ORDER — BETIATIDE 1 MG/1
10.2 INJECTION, POWDER, LYOPHILIZED, FOR SOLUTION INTRAVENOUS
Status: COMPLETED | OUTPATIENT
Start: 2024-09-04 | End: 2024-09-04

## 2024-09-04 RX ADMIN — BETIATIDE 10.2 MILLICURIE: 1 INJECTION, POWDER, LYOPHILIZED, FOR SOLUTION INTRAVENOUS at 10:09

## 2024-09-04 NOTE — PROGRESS NOTES
Patient here for catheter in because she has a Lasix Renal scan. Pt was draped using a sterile drape. Private area was cleaned with betadine. Using sterile technique, #14 FR nicholson catheter inserted into the urethra and 10 cc balloon inflated. Tubing attached to pt leg. Pt tolerate procedure well.    Loraine Milanes RN

## 2024-09-12 ENCOUNTER — LAB VISIT (OUTPATIENT)
Dept: LAB | Facility: HOSPITAL | Age: 69
End: 2024-09-12
Attending: NURSE PRACTITIONER
Payer: MEDICARE

## 2024-09-12 DIAGNOSIS — D50.8 OTHER IRON DEFICIENCY ANEMIA: ICD-10-CM

## 2024-09-12 LAB
BASOPHILS # BLD AUTO: 0.05 K/UL (ref 0–0.2)
BASOPHILS NFR BLD: 0.6 % (ref 0–1.9)
DIFFERENTIAL METHOD BLD: ABNORMAL
EOSINOPHIL # BLD AUTO: 0.2 K/UL (ref 0–0.5)
EOSINOPHIL NFR BLD: 2.7 % (ref 0–8)
ERYTHROCYTE [DISTWIDTH] IN BLOOD BY AUTOMATED COUNT: 14.4 % (ref 11.5–14.5)
FERRITIN SERPL-MCNC: 1135 NG/ML (ref 20–300)
HCT VFR BLD AUTO: 37.3 % (ref 37–48.5)
HGB BLD-MCNC: 12.3 G/DL (ref 12–16)
IMM GRANULOCYTES # BLD AUTO: 0.07 K/UL (ref 0–0.04)
IMM GRANULOCYTES NFR BLD AUTO: 0.8 % (ref 0–0.5)
IRON SERPL-MCNC: 34 UG/DL (ref 30–160)
LYMPHOCYTES # BLD AUTO: 1 K/UL (ref 1–4.8)
LYMPHOCYTES NFR BLD: 11.5 % (ref 18–48)
MCH RBC QN AUTO: 29.6 PG (ref 27–31)
MCHC RBC AUTO-ENTMCNC: 33 G/DL (ref 32–36)
MCV RBC AUTO: 90 FL (ref 82–98)
MONOCYTES # BLD AUTO: 0.9 K/UL (ref 0.3–1)
MONOCYTES NFR BLD: 9.8 % (ref 4–15)
NEUTROPHILS # BLD AUTO: 6.7 K/UL (ref 1.8–7.7)
NEUTROPHILS NFR BLD: 74.6 % (ref 38–73)
NRBC BLD-RTO: 0 /100 WBC
PLATELET # BLD AUTO: 303 K/UL (ref 150–450)
PMV BLD AUTO: 9.5 FL (ref 9.2–12.9)
RBC # BLD AUTO: 4.16 M/UL (ref 4–5.4)
SATURATED IRON: 13 % (ref 20–50)
TOTAL IRON BINDING CAPACITY: 271 UG/DL (ref 250–450)
TRANSFERRIN SERPL-MCNC: 183 MG/DL (ref 200–375)
WBC # BLD AUTO: 8.96 K/UL (ref 3.9–12.7)

## 2024-09-12 PROCEDURE — 82728 ASSAY OF FERRITIN: CPT | Performed by: NURSE PRACTITIONER

## 2024-09-12 PROCEDURE — 83540 ASSAY OF IRON: CPT | Performed by: NURSE PRACTITIONER

## 2024-09-12 PROCEDURE — 36415 COLL VENOUS BLD VENIPUNCTURE: CPT | Mod: PO | Performed by: NURSE PRACTITIONER

## 2024-09-12 PROCEDURE — 85025 COMPLETE CBC W/AUTO DIFF WBC: CPT | Performed by: NURSE PRACTITIONER

## 2024-09-16 ENCOUNTER — OFFICE VISIT (OUTPATIENT)
Dept: HEMATOLOGY/ONCOLOGY | Facility: CLINIC | Age: 69
End: 2024-09-16
Payer: MEDICARE

## 2024-09-16 VITALS
WEIGHT: 186.31 LBS | BODY MASS INDEX: 34.29 KG/M2 | SYSTOLIC BLOOD PRESSURE: 97 MMHG | OXYGEN SATURATION: 93 % | HEART RATE: 119 BPM | DIASTOLIC BLOOD PRESSURE: 59 MMHG | HEIGHT: 62 IN | TEMPERATURE: 97 F

## 2024-09-16 DIAGNOSIS — D64.9 ANEMIA, UNSPECIFIED TYPE: Primary | ICD-10-CM

## 2024-09-16 DIAGNOSIS — E66.01 SEVERE OBESITY (BMI 35.0-39.9) WITH COMORBIDITY: ICD-10-CM

## 2024-09-16 DIAGNOSIS — D64.9 ANEMIA, UNSPECIFIED TYPE: Primary | Chronic | ICD-10-CM

## 2024-09-16 PROCEDURE — 99213 OFFICE O/P EST LOW 20 MIN: CPT | Mod: S$PBB,,, | Performed by: NURSE PRACTITIONER

## 2024-09-16 PROCEDURE — 99214 OFFICE O/P EST MOD 30 MIN: CPT | Mod: PBBFAC | Performed by: NURSE PRACTITIONER

## 2024-09-16 PROCEDURE — 99999 PR PBB SHADOW E&M-EST. PATIENT-LVL IV: CPT | Mod: PBBFAC,,, | Performed by: NURSE PRACTITIONER
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
## 2024-09-16 NOTE — ASSESSMENT & PLAN NOTE
EGD 10/2022 overall unremarkable. Biopsies obtained positive for acid reflux inflammation but otherwise unremarkable. Colonoscopy declined. Previously treated with IV iron completed 9/2023    Most recent CBC without evidence of anemia. Mild iron deficiency with iron levels essentially unchanged from prior    Continue sublingual B12 1000 mcg daily. Folic acid 1 mg PO daily. Given prior h/o constipation will defer oral iron supplementation. For now will hold off on additional iron supplementation    F/u 3 months with cbc, cmp, iron ,ferritin. Discussed S&S to report sooner

## 2024-09-18 ENCOUNTER — OFFICE VISIT (OUTPATIENT)
Dept: UROLOGY | Facility: CLINIC | Age: 69
End: 2024-09-18
Payer: MEDICARE

## 2024-09-18 VITALS
HEART RATE: 93 BPM | TEMPERATURE: 97 F | HEIGHT: 62 IN | DIASTOLIC BLOOD PRESSURE: 82 MMHG | WEIGHT: 186.31 LBS | SYSTOLIC BLOOD PRESSURE: 137 MMHG | BODY MASS INDEX: 34.29 KG/M2

## 2024-09-18 DIAGNOSIS — R15.9 INCONTINENCE OF FECES, UNSPECIFIED FECAL INCONTINENCE TYPE: ICD-10-CM

## 2024-09-18 DIAGNOSIS — N39.41 URGE INCONTINENCE: Primary | ICD-10-CM

## 2024-09-18 DIAGNOSIS — N13.39 OTHER HYDRONEPHROSIS: ICD-10-CM

## 2024-09-18 PROCEDURE — 99214 OFFICE O/P EST MOD 30 MIN: CPT | Mod: PBBFAC | Performed by: UROLOGY

## 2024-09-18 PROCEDURE — 99999 PR PBB SHADOW E&M-EST. PATIENT-LVL IV: CPT | Mod: PBBFAC,,, | Performed by: UROLOGY

## 2024-09-18 PROCEDURE — 99214 OFFICE O/P EST MOD 30 MIN: CPT | Mod: S$PBB,,, | Performed by: UROLOGY

## 2024-09-18 NOTE — PROGRESS NOTES
Chief Complaint:   Encounter Diagnoses   Name Primary?    Urge incontinence Yes    Other hydronephrosis     Incontinence of feces, unspecified fecal incontinence type        HPI:   9/18/24- patient returns today to discuss imaging, still no need for catheterizations per the patient's report.  Incontinence is unchanged.    1/4/23- patient reports as a new patient to me with urge and fecal incontinence.  Fecal incontinence is relatively new, this appears to have exacerbated recently was also recently found to have sacral fracture with cauda equina syndrome.  She is having some improvement on symptoms, Myrbetriq 25 mg does assist, but not perfectly.  No evidence of stress incontinence, dysuria or gross hematuria.    11/16/22- LR- Patient is a 67-year-old female that is presenting with her .  HPI was obtained by .   states that patient is status post Spine fusion and has a h/o Cauda Equina syndrome.  States that patient is unaware when she needs to urinate and has had an increase in urinary incontinence. Reports that she also has fecal incontinence.  PVR was 150 mL, repeat 132 ml.  No history of gross hematuria or renal stones.  Unable to give urine sample.    Allergies:  Codeine, Keflex [cephalexin], Shellfish containing products, Flu vaccine ts 2011-12(18 yr+), and Sucralfate    Medications:  has a current medication list which includes the following prescription(s): albuterol-ipratropium, celecoxib, cholecalciferol (vitamin d3), clonazepam, cyanocobalamin (vitamin b-12), folic acid, furosemide, haloperidol, lisinopril, methocarbamol, oxygen-air delivery systems, pantoprazole, paroxetine, pregabalin, trazodone, and albuterol.    Review of Systems:  General: No fever, chills, fatigability, or weight loss.  Skin: No rashes, itching, or changes in color or texture of skin.  Chest: Denies KENT, cyanosis, wheezing, cough, and sputum production.  Abdomen: Appetite fine. No weight loss. Denies diarrhea,  abdominal pain, hematemesis, or blood in stool.  Musculoskeletal: No joint stiffness or swelling. Denies back pain.  : As above.  All other review of systems negative.    PMH:   has a past medical history of Arthritis, Asthma, COPD (chronic obstructive pulmonary disease), Depression, GERD (gastroesophageal reflux disease), Hypertension, and Tobacco abuse.    PSH:   has a past surgical history that includes Hysterectomy; Knee surgery; Tonsillectomy; multiple fracture repairs; Colonoscopy w/ polypectomy (01/05/2011); Magnetic resonance imaging (N/A, 10/14/2022); fusion, spine, posterior approach (N/A, 10/18/2022); and Esophagogastroduodenoscopy (N/A, 10/24/2022).    FamHx: family history includes COPD in her mother; Cancer in her mother; Hypertension in her mother; Lung cancer in her mother; Ovarian cancer in her sister; Stroke in her mother.    SocHx:  reports that she has quit smoking. Her smoking use included cigarettes. She started smoking about 45 years ago. She has a 33 pack-year smoking history. She has never used smokeless tobacco. She reports that she does not drink alcohol and does not use drugs.      Physical Exam:  Vitals:    01/04/23 1554   BP: 103/69   Pulse: 92     General: A&Ox3, no apparent distress, no deformities  Neck: No masses, normal ROM  Lungs: normal inspiration, no use of accessory muscles  Heart: normal pulse, no arrhythmias  Abdomen: Soft, NT, ND, no masses, no hernias, no hepatosplenomegaly  Skin: The skin is warm and dry. No jaundice.  Ext: No c/c/e.  : 8/24- No pelvic floor prolapse.  Normal introitus, no urethral abnomralities. No Perineal abnormalities.    Labs/Studies:    ml 8/24  PVR 24 mL 7/24  Urine culture E coli 6/24  Cystoscopy normal 8/24  Lasix renogram 94% right/6% left, severe obstruction left 9/24  ADRIEL bilateral hydronephrosis 7/24  CT stone protocol bilateral hydronephrosis, bladder wall thickening, spinal hardware and iliac region with deformity of the pubic  symphysis 6/24  Creatinine 1.0 8/24    Impression/Plan:      1. Urge incontinence/neurogenic bladder- patient returns today Lasix renogram is highly suspicious for outflow obstruction most likely due to her neurological fracture.  Previously Myrbetriq had caused her to go into acute retention, incontinence is unchanged.  Patient feels like she is emptying well and does not want a catheter, her and her  have agreed that they can not do CIC.  Per renogram left kidney appears to be nonfunctioning right kidney demonstrates no evidence of obstruction despite previous reports of hydronephrosis.  I have again addressed the importance of bladder drainage so as not to limit the function of her solitary right kidney, as acute renal failure could occur in the future.  Therefore further evidence we will perform urodynamics and have her return following this.  At this point would not treat with anticholinergics due to above-stated history.  Previous cystoscopy was negative, she did empty at the last appointment.  Of note history of repaired sacral fracture with cauda equina syndrome 10/22, which is most likely the underlying etiology.    2. Fecal incontinence- relatively stable and improved.    3. Bilateral hydronephrosis- please see above.

## 2024-09-20 ENCOUNTER — OFFICE VISIT (OUTPATIENT)
Dept: GASTROENTEROLOGY | Facility: CLINIC | Age: 69
End: 2024-09-20
Payer: MEDICARE

## 2024-09-20 ENCOUNTER — PATIENT MESSAGE (OUTPATIENT)
Dept: GASTROENTEROLOGY | Facility: CLINIC | Age: 69
End: 2024-09-20

## 2024-09-20 VITALS
HEIGHT: 62 IN | WEIGHT: 183.63 LBS | HEART RATE: 74 BPM | DIASTOLIC BLOOD PRESSURE: 71 MMHG | BODY MASS INDEX: 33.79 KG/M2 | SYSTOLIC BLOOD PRESSURE: 105 MMHG

## 2024-09-20 DIAGNOSIS — A49.8 BACTERIAL INFECTION DUE TO BACTEROIDES FRAGILIS: ICD-10-CM

## 2024-09-20 DIAGNOSIS — A41.9 SEPSIS WITHOUT SEPTIC SHOCK: ICD-10-CM

## 2024-09-20 DIAGNOSIS — K59.04 CHRONIC IDIOPATHIC CONSTIPATION: Chronic | ICD-10-CM

## 2024-09-20 DIAGNOSIS — Z86.010 HISTORY OF COLON POLYPS: Primary | ICD-10-CM

## 2024-09-20 DIAGNOSIS — A49.8 BACTEROIDES FRAGILIS INFECTION: ICD-10-CM

## 2024-09-20 PROCEDURE — 99999 PR PBB SHADOW E&M-EST. PATIENT-LVL V: CPT | Mod: PBBFAC,,, | Performed by: INTERNAL MEDICINE

## 2024-09-20 PROCEDURE — 99215 OFFICE O/P EST HI 40 MIN: CPT | Mod: PBBFAC | Performed by: INTERNAL MEDICINE

## 2024-09-20 NOTE — PATIENT INSTRUCTIONS
09/20/2024    Dear Merle Caro,    We are writing to express our heartfelt gratitude for choosing us as your healthcare provider and entrusting us with your well-being. Your health and satisfaction are our top priorities, and we are truly honored to have the opportunity to serve you.    At [facility], we strive to provide the best possible care and support for our patients. My assessment and recommendations are as follows:    History of colon polyps  Comments:  Last Colonoscopy in 2011    Chronic idiopathic constipation    Sepsis without septic shock d/t UTI    Bacteroides fragilis infection  -     Ambulatory referral/consult to Endo Procedure ; Future; Expected date: 09/20/2024    Bacterial infection due to Bacteroides fragilis  -     Ambulatory referral/consult to Gastroenterology        We genuinely value your feedback and believe that it plays a crucial role in our pursuit of excellence. We kindly request you to take a few minutes of your time to share your experience with us by posting a Google review. Your honest thoughts and opinions can make a significant difference for others seeking healthcare services and will help us better understand how we can further enhance our patient care.    Your kind words and positive reviews will not only motivate our dedicated team but will also inspire confidence in potential patients who are looking for exceptional healthcare services.    Once again, we extend our sincere appreciation for giving us the opportunity to serve you. If there is anything else we can do to improve your experience or assist you further, please do not hesitate to reach out to us.    Thank you for being part of our [facility] family, and we look forward to continuing to provide you with the best care possible.    Thanks for trusting us with your healthcare needs and using [facility]. If you want to ask us a question, you can do so by replying to this message or by calling  "[phone].    Sincerely,    Miguel Damico M.D.    PS: At [facility] we offer virtual visits. Please use your [facility] genna to schedule a virtual visit.     To rate your experience with Dr. Damico please click on the link below:    [email or web address]    To post a review, simply follow these quick steps:  1. Visit [employer] or search for my name on Google.  2. Click on the "Write a review" button on the left-hand side of the page.  3. Rate your experience by choosing the number of stars that reflect your satisfaction.  4. Share your thoughts and insights about your visit - we'd love to hear your feedback!    "
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
## 2024-09-23 ENCOUNTER — HOSPITAL ENCOUNTER (OUTPATIENT)
Dept: PREADMISSION TESTING | Facility: HOSPITAL | Age: 69
Discharge: HOME | End: 2024-09-23
Attending: INTERNAL MEDICINE
Payer: MEDICARE

## 2024-09-23 DIAGNOSIS — A49.8 BACTEROIDES FRAGILIS INFECTION: Primary | ICD-10-CM

## 2024-09-23 PROBLEM — A41.9 SEPSIS WITHOUT SEPTIC SHOCK: Status: RESOLVED | Noted: 2024-06-19 | Resolved: 2024-09-23

## 2024-09-23 RX ORDER — POLYETHYLENE GLYCOL 3350, SODIUM SULFATE ANHYDROUS, SODIUM BICARBONATE, SODIUM CHLORIDE, POTASSIUM CHLORIDE 236; 22.74; 6.74; 5.86; 2.97 G/4L; G/4L; G/4L; G/4L; G/4L
4 POWDER, FOR SOLUTION ORAL ONCE
Qty: 4000 ML | Refills: 0 | Status: SHIPPED | OUTPATIENT
Start: 2024-09-23 | End: 2024-09-23

## 2024-10-04 ENCOUNTER — TELEPHONE (OUTPATIENT)
Dept: UROLOGY | Facility: CLINIC | Age: 69
End: 2024-10-04
Payer: MEDICARE

## 2024-10-09 ENCOUNTER — HOSPITAL ENCOUNTER (OUTPATIENT)
Dept: RADIOLOGY | Facility: HOSPITAL | Age: 69
Discharge: HOME | End: 2024-10-09
Attending: FAMILY MEDICINE
Payer: MEDICARE

## 2024-10-09 ENCOUNTER — OFFICE VISIT (OUTPATIENT)
Dept: INTERNAL MEDICINE | Facility: CLINIC | Age: 69
End: 2024-10-09
Payer: MEDICARE

## 2024-10-09 VITALS
SYSTOLIC BLOOD PRESSURE: 142 MMHG | BODY MASS INDEX: 33.87 KG/M2 | HEART RATE: 92 BPM | OXYGEN SATURATION: 94 % | HEIGHT: 62 IN | TEMPERATURE: 98 F | DIASTOLIC BLOOD PRESSURE: 92 MMHG | WEIGHT: 184.06 LBS

## 2024-10-09 DIAGNOSIS — M54.50 ACUTE BILATERAL LOW BACK PAIN WITHOUT SCIATICA: ICD-10-CM

## 2024-10-09 DIAGNOSIS — G83.4 CAUDA EQUINA SYNDROME: ICD-10-CM

## 2024-10-09 DIAGNOSIS — G83.4 CAUDA EQUINA SYNDROME: Primary | ICD-10-CM

## 2024-10-09 PROCEDURE — 72070 X-RAY EXAM THORAC SPINE 2VWS: CPT | Mod: TC

## 2024-10-09 PROCEDURE — 99215 OFFICE O/P EST HI 40 MIN: CPT | Mod: PBBFAC | Performed by: FAMILY MEDICINE

## 2024-10-09 PROCEDURE — 99999 PR PBB SHADOW E&M-EST. PATIENT-LVL V: CPT | Mod: PBBFAC,,, | Performed by: FAMILY MEDICINE

## 2024-10-09 PROCEDURE — 72110 X-RAY EXAM L-2 SPINE 4/>VWS: CPT | Mod: 26,,, | Performed by: RADIOLOGY

## 2024-10-09 PROCEDURE — 72070 X-RAY EXAM THORAC SPINE 2VWS: CPT | Mod: 26,,, | Performed by: RADIOLOGY

## 2024-10-09 PROCEDURE — 72110 X-RAY EXAM L-2 SPINE 4/>VWS: CPT | Mod: TC
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
## 2024-10-10 ENCOUNTER — PATIENT MESSAGE (OUTPATIENT)
Dept: INTERNAL MEDICINE | Facility: CLINIC | Age: 69
End: 2024-10-10
Payer: MEDICARE

## 2024-10-13 ENCOUNTER — HOSPITAL ENCOUNTER (INPATIENT)
Facility: HOSPITAL | Age: 69
LOS: 5 days | Discharge: HOME HEALTH | DRG: 872 | End: 2024-10-18
Attending: EMERGENCY MEDICINE | Admitting: INTERNAL MEDICINE
Payer: MEDICARE

## 2024-10-13 DIAGNOSIS — N30.00 ACUTE CYSTITIS WITHOUT HEMATURIA: ICD-10-CM

## 2024-10-13 DIAGNOSIS — R07.9 CHEST PAIN: ICD-10-CM

## 2024-10-13 DIAGNOSIS — N12 PYELONEPHRITIS: ICD-10-CM

## 2024-10-13 DIAGNOSIS — E87.1 HYPONATREMIA: ICD-10-CM

## 2024-10-13 DIAGNOSIS — R10.9 BILATERAL FLANK PAIN: ICD-10-CM

## 2024-10-13 DIAGNOSIS — E87.6 HYPOKALEMIA: ICD-10-CM

## 2024-10-13 DIAGNOSIS — A41.9 SEPSIS: Primary | ICD-10-CM

## 2024-10-13 DIAGNOSIS — R00.0 TACHYCARDIA: ICD-10-CM

## 2024-10-13 LAB
ALBUMIN SERPL BCP-MCNC: 3.4 G/DL (ref 3.5–5.2)
ALP SERPL-CCNC: 108 U/L (ref 55–135)
ALT SERPL W/O P-5'-P-CCNC: 8 U/L (ref 10–44)
ANION GAP SERPL CALC-SCNC: 10 MMOL/L (ref 8–16)
AST SERPL-CCNC: 11 U/L (ref 10–40)
BACTERIA #/AREA URNS HPF: ABNORMAL /HPF
BASOPHILS # BLD AUTO: 0.03 K/UL (ref 0–0.2)
BASOPHILS NFR BLD: 0.2 % (ref 0–1.9)
BILIRUB SERPL-MCNC: 0.6 MG/DL (ref 0.1–1)
BILIRUB UR QL STRIP: ABNORMAL
BUN SERPL-MCNC: 22 MG/DL (ref 8–23)
CALCIUM SERPL-MCNC: 9.4 MG/DL (ref 8.7–10.5)
CAOX CRY URNS QL MICRO: ABNORMAL
CHLORIDE SERPL-SCNC: 94 MMOL/L (ref 95–110)
CLARITY UR: ABNORMAL
CO2 SERPL-SCNC: 26 MMOL/L (ref 23–29)
COLOR UR: YELLOW
CREAT SERPL-MCNC: 0.8 MG/DL (ref 0.5–1.4)
DIFFERENTIAL METHOD BLD: ABNORMAL
EOSINOPHIL # BLD AUTO: 0 K/UL (ref 0–0.5)
EOSINOPHIL NFR BLD: 0.1 % (ref 0–8)
ERYTHROCYTE [DISTWIDTH] IN BLOOD BY AUTOMATED COUNT: 14.2 % (ref 11.5–14.5)
EST. GFR  (NO RACE VARIABLE): >60 ML/MIN/1.73 M^2
GLUCOSE SERPL-MCNC: 129 MG/DL (ref 70–110)
GLUCOSE UR QL STRIP: NEGATIVE
HCT VFR BLD AUTO: 36.3 % (ref 37–48.5)
HGB BLD-MCNC: 12.5 G/DL (ref 12–16)
HGB UR QL STRIP: ABNORMAL
HYALINE CASTS #/AREA URNS LPF: 10 /LPF
IMM GRANULOCYTES # BLD AUTO: 0.17 K/UL (ref 0–0.04)
IMM GRANULOCYTES NFR BLD AUTO: 1 % (ref 0–0.5)
KETONES UR QL STRIP: ABNORMAL
LACTATE SERPL-SCNC: 0.8 MMOL/L (ref 0.5–2.2)
LEUKOCYTE ESTERASE UR QL STRIP: ABNORMAL
LYMPHOCYTES # BLD AUTO: 0.8 K/UL (ref 1–4.8)
LYMPHOCYTES NFR BLD: 4.4 % (ref 18–48)
MAGNESIUM SERPL-MCNC: 2 MG/DL (ref 1.6–2.6)
MCH RBC QN AUTO: 29.7 PG (ref 27–31)
MCHC RBC AUTO-ENTMCNC: 34.4 G/DL (ref 32–36)
MCV RBC AUTO: 86 FL (ref 82–98)
MICROSCOPIC COMMENT: ABNORMAL
MONOCYTES # BLD AUTO: 1.4 K/UL (ref 0.3–1)
MONOCYTES NFR BLD: 8.5 % (ref 4–15)
NEUTROPHILS # BLD AUTO: 14.6 K/UL (ref 1.8–7.7)
NEUTROPHILS NFR BLD: 85.8 % (ref 38–73)
NITRITE UR QL STRIP: POSITIVE
NRBC BLD-RTO: 0 /100 WBC
PH UR STRIP: 8 [PH] (ref 5–8)
PLATELET # BLD AUTO: 365 K/UL (ref 150–450)
PMV BLD AUTO: 8.6 FL (ref 9.2–12.9)
POTASSIUM SERPL-SCNC: 3.2 MMOL/L (ref 3.5–5.1)
PROCALCITONIN SERPL IA-MCNC: 0.08 NG/ML
PROT SERPL-MCNC: 7.5 G/DL (ref 6–8.4)
PROT UR QL STRIP: ABNORMAL
RBC # BLD AUTO: 4.21 M/UL (ref 4–5.4)
RBC #/AREA URNS HPF: 28 /HPF (ref 0–4)
SODIUM SERPL-SCNC: 130 MMOL/L (ref 136–145)
SP GR UR STRIP: 1.03 (ref 1–1.03)
URN SPEC COLLECT METH UR: ABNORMAL
UROBILINOGEN UR STRIP-ACNC: ABNORMAL EU/DL
WBC # BLD AUTO: 17.03 K/UL (ref 3.9–12.7)
WBC #/AREA URNS HPF: 58 /HPF (ref 0–5)
WBC CLUMPS URNS QL MICRO: ABNORMAL

## 2024-10-13 PROCEDURE — 80053 COMPREHEN METABOLIC PANEL: CPT | Performed by: EMERGENCY MEDICINE

## 2024-10-13 PROCEDURE — 25000003 PHARM REV CODE 250: Performed by: EMERGENCY MEDICINE

## 2024-10-13 PROCEDURE — 21400001 HC TELEMETRY ROOM

## 2024-10-13 PROCEDURE — 84145 PROCALCITONIN (PCT): CPT | Performed by: EMERGENCY MEDICINE

## 2024-10-13 PROCEDURE — 87086 URINE CULTURE/COLONY COUNT: CPT | Performed by: EMERGENCY MEDICINE

## 2024-10-13 PROCEDURE — 87154 CUL TYP ID BLD PTHGN 6+ TRGT: CPT | Performed by: EMERGENCY MEDICINE

## 2024-10-13 PROCEDURE — 25000003 PHARM REV CODE 250: Performed by: NURSE PRACTITIONER

## 2024-10-13 PROCEDURE — 87040 BLOOD CULTURE FOR BACTERIA: CPT | Performed by: EMERGENCY MEDICINE

## 2024-10-13 PROCEDURE — 93010 ELECTROCARDIOGRAM REPORT: CPT | Mod: ,,, | Performed by: STUDENT IN AN ORGANIZED HEALTH CARE EDUCATION/TRAINING PROGRAM

## 2024-10-13 PROCEDURE — 93005 ELECTROCARDIOGRAM TRACING: CPT

## 2024-10-13 PROCEDURE — 63600175 PHARM REV CODE 636 W HCPCS: Performed by: EMERGENCY MEDICINE

## 2024-10-13 PROCEDURE — 85025 COMPLETE CBC W/AUTO DIFF WBC: CPT | Performed by: EMERGENCY MEDICINE

## 2024-10-13 PROCEDURE — 83605 ASSAY OF LACTIC ACID: CPT | Performed by: EMERGENCY MEDICINE

## 2024-10-13 PROCEDURE — 83735 ASSAY OF MAGNESIUM: CPT | Performed by: EMERGENCY MEDICINE

## 2024-10-13 PROCEDURE — 81000 URINALYSIS NONAUTO W/SCOPE: CPT | Performed by: EMERGENCY MEDICINE

## 2024-10-13 PROCEDURE — 87076 CULTURE ANAEROBE IDENT EACH: CPT | Mod: 59 | Performed by: EMERGENCY MEDICINE

## 2024-10-13 PROCEDURE — 11000001 HC ACUTE MED/SURG PRIVATE ROOM

## 2024-10-13 RX ORDER — CELECOXIB 200 MG/1
1 CAPSULE ORAL DAILY
COMMUNITY
Start: 2023-12-03

## 2024-10-13 RX ORDER — CHOLECALCIFEROL (VITAMIN D3) 50 MCG
1 TABLET ORAL DAILY
COMMUNITY
Start: 2023-12-05

## 2024-10-13 RX ORDER — HYOSCYAMINE SULFATE 0.12 MG/1
0.25 TABLET SUBLINGUAL ONCE
Status: COMPLETED | OUTPATIENT
Start: 2024-10-13 | End: 2024-10-13

## 2024-10-13 RX ORDER — POLYETHYLENE GLYCOL-3350 AND ELECTROLYTES 236; 6.74; 5.86; 2.97; 22.74 G/274.31G; G/274.31G; G/274.31G; G/274.31G; G/274.31G
4000 POWDER, FOR SOLUTION ORAL ONCE
COMMUNITY
Start: 2024-09-23

## 2024-10-13 RX ORDER — PAROXETINE HYDROCHLORIDE 40 MG/1
TABLET, FILM COATED ORAL
COMMUNITY
Start: 2023-12-14

## 2024-10-13 RX ORDER — PREGABALIN 100 MG/1
CAPSULE ORAL
COMMUNITY
Start: 2023-12-14

## 2024-10-13 RX ORDER — ACETAMINOPHEN 325 MG/1
650 TABLET ORAL EVERY 6 HOURS PRN
Status: DISCONTINUED | OUTPATIENT
Start: 2024-10-14 | End: 2024-10-18 | Stop reason: HOSPADM

## 2024-10-13 RX ORDER — TRAZODONE HYDROCHLORIDE 150 MG/1
TABLET ORAL
COMMUNITY
Start: 2023-12-13

## 2024-10-13 RX ORDER — HYDRALAZINE HYDROCHLORIDE 20 MG/ML
10 INJECTION INTRAMUSCULAR; INTRAVENOUS EVERY 6 HOURS PRN
Status: DISCONTINUED | OUTPATIENT
Start: 2024-10-14 | End: 2024-10-18 | Stop reason: HOSPADM

## 2024-10-13 RX ORDER — CLONAZEPAM 0.5 MG/1
TABLET, ORALLY DISINTEGRATING ORAL
COMMUNITY
Start: 2024-01-25

## 2024-10-13 RX ORDER — FOLIC ACID 1 MG/1
1 TABLET ORAL DAILY
COMMUNITY
Start: 2023-11-15

## 2024-10-13 RX ORDER — LISINOPRIL 10 MG/1
TABLET ORAL
COMMUNITY
Start: 2023-11-22

## 2024-10-13 RX ORDER — LEVOFLOXACIN 5 MG/ML
750 INJECTION, SOLUTION INTRAVENOUS
Status: COMPLETED | OUTPATIENT
Start: 2024-10-13 | End: 2024-10-13

## 2024-10-13 RX ORDER — ALBUTEROL SULFATE 90 UG/1
INHALANT RESPIRATORY (INHALATION)
COMMUNITY
Start: 2024-07-17

## 2024-10-13 RX ORDER — SODIUM CHLORIDE 9 MG/ML
INJECTION, SOLUTION INTRAVENOUS CONTINUOUS
Status: DISCONTINUED | OUTPATIENT
Start: 2024-10-14 | End: 2024-10-15

## 2024-10-13 RX ORDER — HALOPERIDOL 5 MG/1
TABLET ORAL
COMMUNITY
Start: 2023-11-15

## 2024-10-13 RX ORDER — ALBUTEROL SULFATE 1.25 MG/3ML
SOLUTION RESPIRATORY (INHALATION)
COMMUNITY
Start: 2024-07-17

## 2024-10-13 RX ORDER — METHOCARBAMOL 750 MG/1
TABLET, FILM COATED ORAL
COMMUNITY
Start: 2023-12-05

## 2024-10-13 RX ORDER — LEVOFLOXACIN 5 MG/ML
750 INJECTION, SOLUTION INTRAVENOUS
Status: DISCONTINUED | OUTPATIENT
Start: 2024-10-14 | End: 2024-10-14

## 2024-10-13 RX ORDER — MECOBALAMIN 1000 MCG
TABLET,CHEWABLE ORAL
COMMUNITY
Start: 2023-11-16

## 2024-10-13 RX ORDER — POTASSIUM CHLORIDE 7.45 MG/ML
10 INJECTION INTRAVENOUS
Status: COMPLETED | OUTPATIENT
Start: 2024-10-13 | End: 2024-10-13

## 2024-10-13 RX ORDER — HYDROMORPHONE HYDROCHLORIDE 1 MG/ML
1 INJECTION, SOLUTION INTRAMUSCULAR; INTRAVENOUS; SUBCUTANEOUS EVERY 4 HOURS PRN
Status: DISCONTINUED | OUTPATIENT
Start: 2024-10-14 | End: 2024-10-18 | Stop reason: HOSPADM

## 2024-10-13 RX ORDER — ONDANSETRON HYDROCHLORIDE 2 MG/ML
4 INJECTION, SOLUTION INTRAVENOUS EVERY 6 HOURS PRN
Status: DISCONTINUED | OUTPATIENT
Start: 2024-10-14 | End: 2024-10-18 | Stop reason: HOSPADM

## 2024-10-13 RX ADMIN — POTASSIUM CHLORIDE 10 MEQ: 7.46 INJECTION, SOLUTION INTRAVENOUS at 09:10

## 2024-10-13 RX ADMIN — HYOSCYAMINE SULFATE 0.25 MG: 0.12 TABLET SUBLINGUAL at 11:10

## 2024-10-13 RX ADMIN — POTASSIUM BICARBONATE 25 MEQ: 978 TABLET, EFFERVESCENT ORAL at 09:10

## 2024-10-13 RX ADMIN — SODIUM CHLORIDE 1000 ML: 9 INJECTION, SOLUTION INTRAVENOUS at 09:10

## 2024-10-13 RX ADMIN — LEVOFLOXACIN 750 MG: 750 INJECTION, SOLUTION INTRAVENOUS at 08:10

## 2024-10-13 RX ADMIN — SODIUM CHLORIDE: 9 INJECTION, SOLUTION INTRAVENOUS at 11:10

## 2024-10-13 NOTE — Clinical Note
Diagnosis: Sepsis [495448]   Reason for IP Medical Treatment  (Clinical interventions that can only be accomplished in the IP setting? ) :: iv abx

## 2024-10-14 DIAGNOSIS — J44.89 ASTHMA WITH COPD: Primary | Chronic | ICD-10-CM

## 2024-10-14 PROBLEM — E87.6 HYPOKALEMIA: Status: ACTIVE | Noted: 2024-10-14

## 2024-10-14 PROBLEM — N12 PYELONEPHRITIS: Status: ACTIVE | Noted: 2024-10-14

## 2024-10-14 PROBLEM — N32.0 BLADDER OUTFLOW OBSTRUCTION: Status: ACTIVE | Noted: 2024-10-14

## 2024-10-14 LAB
ALBUMIN SERPL BCP-MCNC: 2.6 G/DL (ref 3.5–5.2)
ALP SERPL-CCNC: 86 U/L (ref 55–135)
ALT SERPL W/O P-5'-P-CCNC: <5 U/L (ref 10–44)
AMYLASE SERPL-CCNC: 19 U/L (ref 20–110)
ANION GAP SERPL CALC-SCNC: 8 MMOL/L (ref 8–16)
AST SERPL-CCNC: 9 U/L (ref 10–40)
BASOPHILS # BLD AUTO: 0.03 K/UL (ref 0–0.2)
BASOPHILS NFR BLD: 0.3 % (ref 0–1.9)
BILIRUB SERPL-MCNC: 0.6 MG/DL (ref 0.1–1)
BNP SERPL-MCNC: 57 PG/ML (ref 0–99)
BUN SERPL-MCNC: 19 MG/DL (ref 8–23)
CALCIUM SERPL-MCNC: 8.1 MG/DL (ref 8.7–10.5)
CHLORIDE SERPL-SCNC: 98 MMOL/L (ref 95–110)
CO2 SERPL-SCNC: 25 MMOL/L (ref 23–29)
CREAT SERPL-MCNC: 0.7 MG/DL (ref 0.5–1.4)
DIFFERENTIAL METHOD BLD: ABNORMAL
EOSINOPHIL # BLD AUTO: 0.1 K/UL (ref 0–0.5)
EOSINOPHIL NFR BLD: 0.7 % (ref 0–8)
ERYTHROCYTE [DISTWIDTH] IN BLOOD BY AUTOMATED COUNT: 14.1 % (ref 11.5–14.5)
EST. GFR  (NO RACE VARIABLE): >60 ML/MIN/1.73 M^2
GLUCOSE SERPL-MCNC: 100 MG/DL (ref 70–110)
HCT VFR BLD AUTO: 32.6 % (ref 37–48.5)
HGB BLD-MCNC: 10.8 G/DL (ref 12–16)
IMM GRANULOCYTES # BLD AUTO: 0.1 K/UL (ref 0–0.04)
IMM GRANULOCYTES NFR BLD AUTO: 1 % (ref 0–0.5)
LACTATE SERPL-SCNC: 0.5 MMOL/L (ref 0.5–2.2)
LIPASE SERPL-CCNC: 17 U/L (ref 4–60)
LYMPHOCYTES # BLD AUTO: 1.4 K/UL (ref 1–4.8)
LYMPHOCYTES NFR BLD: 13.4 % (ref 18–48)
MAGNESIUM SERPL-MCNC: 1.8 MG/DL (ref 1.6–2.6)
MCH RBC QN AUTO: 29.4 PG (ref 27–31)
MCHC RBC AUTO-ENTMCNC: 33.1 G/DL (ref 32–36)
MCV RBC AUTO: 89 FL (ref 82–98)
MONOCYTES # BLD AUTO: 1.3 K/UL (ref 0.3–1)
MONOCYTES NFR BLD: 12.8 % (ref 4–15)
NEUTROPHILS # BLD AUTO: 7.3 K/UL (ref 1.8–7.7)
NEUTROPHILS NFR BLD: 71.8 % (ref 38–73)
NRBC BLD-RTO: 0 /100 WBC
OHS QRS DURATION: 130 MS
OHS QTC CALCULATION: 495 MS
PLATELET # BLD AUTO: 282 K/UL (ref 150–450)
PMV BLD AUTO: 8.7 FL (ref 9.2–12.9)
POTASSIUM SERPL-SCNC: 3.3 MMOL/L (ref 3.5–5.1)
PROT SERPL-MCNC: 5.9 G/DL (ref 6–8.4)
RBC # BLD AUTO: 3.67 M/UL (ref 4–5.4)
SODIUM SERPL-SCNC: 131 MMOL/L (ref 136–145)
TROPONIN I SERPL DL<=0.01 NG/ML-MCNC: 0.03 NG/ML (ref 0–0.03)
WBC # BLD AUTO: 10.1 K/UL (ref 3.9–12.7)

## 2024-10-14 PROCEDURE — 83605 ASSAY OF LACTIC ACID: CPT | Performed by: NURSE PRACTITIONER

## 2024-10-14 PROCEDURE — 97535 SELF CARE MNGMENT TRAINING: CPT

## 2024-10-14 PROCEDURE — 36415 COLL VENOUS BLD VENIPUNCTURE: CPT | Performed by: NURSE PRACTITIONER

## 2024-10-14 PROCEDURE — 25000003 PHARM REV CODE 250: Performed by: NURSE PRACTITIONER

## 2024-10-14 PROCEDURE — 27000221 HC OXYGEN, UP TO 24 HOURS

## 2024-10-14 PROCEDURE — 25000003 PHARM REV CODE 250: Performed by: INTERNAL MEDICINE

## 2024-10-14 PROCEDURE — 63600175 PHARM REV CODE 636 W HCPCS: Performed by: STUDENT IN AN ORGANIZED HEALTH CARE EDUCATION/TRAINING PROGRAM

## 2024-10-14 PROCEDURE — 99223 1ST HOSP IP/OBS HIGH 75: CPT | Mod: ,,, | Performed by: UROLOGY

## 2024-10-14 PROCEDURE — 94799 UNLISTED PULMONARY SVC/PX: CPT

## 2024-10-14 PROCEDURE — 85025 COMPLETE CBC W/AUTO DIFF WBC: CPT | Performed by: NURSE PRACTITIONER

## 2024-10-14 PROCEDURE — 97166 OT EVAL MOD COMPLEX 45 MIN: CPT

## 2024-10-14 PROCEDURE — 80053 COMPREHEN METABOLIC PANEL: CPT | Performed by: NURSE PRACTITIONER

## 2024-10-14 PROCEDURE — 83690 ASSAY OF LIPASE: CPT | Performed by: NURSE PRACTITIONER

## 2024-10-14 PROCEDURE — 63600175 PHARM REV CODE 636 W HCPCS: Performed by: NURSE PRACTITIONER

## 2024-10-14 PROCEDURE — 83880 ASSAY OF NATRIURETIC PEPTIDE: CPT | Performed by: NURSE PRACTITIONER

## 2024-10-14 PROCEDURE — 84484 ASSAY OF TROPONIN QUANT: CPT | Performed by: NURSE PRACTITIONER

## 2024-10-14 PROCEDURE — 94761 N-INVAS EAR/PLS OXIMETRY MLT: CPT

## 2024-10-14 PROCEDURE — 63600175 PHARM REV CODE 636 W HCPCS: Performed by: INTERNAL MEDICINE

## 2024-10-14 PROCEDURE — 11000001 HC ACUTE MED/SURG PRIVATE ROOM

## 2024-10-14 PROCEDURE — 97530 THERAPEUTIC ACTIVITIES: CPT

## 2024-10-14 PROCEDURE — 25000003 PHARM REV CODE 250: Performed by: STUDENT IN AN ORGANIZED HEALTH CARE EDUCATION/TRAINING PROGRAM

## 2024-10-14 PROCEDURE — 99900035 HC TECH TIME PER 15 MIN (STAT)

## 2024-10-14 PROCEDURE — 83735 ASSAY OF MAGNESIUM: CPT | Performed by: NURSE PRACTITIONER

## 2024-10-14 PROCEDURE — 99223 1ST HOSP IP/OBS HIGH 75: CPT | Mod: ,,, | Performed by: STUDENT IN AN ORGANIZED HEALTH CARE EDUCATION/TRAINING PROGRAM

## 2024-10-14 PROCEDURE — 94799 UNLISTED PULMONARY SVC/PX: CPT | Mod: XB

## 2024-10-14 PROCEDURE — 21400001 HC TELEMETRY ROOM

## 2024-10-14 PROCEDURE — 97162 PT EVAL MOD COMPLEX 30 MIN: CPT

## 2024-10-14 PROCEDURE — 82150 ASSAY OF AMYLASE: CPT | Performed by: NURSE PRACTITIONER

## 2024-10-14 RX ORDER — ENOXAPARIN SODIUM 100 MG/ML
40 INJECTION SUBCUTANEOUS EVERY 24 HOURS
Status: DISCONTINUED | OUTPATIENT
Start: 2024-10-14 | End: 2024-10-18 | Stop reason: HOSPADM

## 2024-10-14 RX ORDER — IPRATROPIUM BROMIDE AND ALBUTEROL SULFATE 2.5; .5 MG/3ML; MG/3ML
3 SOLUTION RESPIRATORY (INHALATION) EVERY 6 HOURS PRN
Status: DISCONTINUED | OUTPATIENT
Start: 2024-10-14 | End: 2024-10-18 | Stop reason: HOSPADM

## 2024-10-14 RX ORDER — MUPIROCIN 20 MG/G
OINTMENT TOPICAL 2 TIMES DAILY
Status: DISCONTINUED | OUTPATIENT
Start: 2024-10-14 | End: 2024-10-18 | Stop reason: HOSPADM

## 2024-10-14 RX ORDER — CLONAZEPAM 0.5 MG/1
0.5 TABLET ORAL 2 TIMES DAILY
Status: DISCONTINUED | OUTPATIENT
Start: 2024-10-14 | End: 2024-10-18 | Stop reason: HOSPADM

## 2024-10-14 RX ORDER — PANTOPRAZOLE SODIUM 40 MG/10ML
40 INJECTION, POWDER, LYOPHILIZED, FOR SOLUTION INTRAVENOUS 2 TIMES DAILY
Status: DISCONTINUED | OUTPATIENT
Start: 2024-10-14 | End: 2024-10-15

## 2024-10-14 RX ORDER — POLYETHYLENE GLYCOL 3350 17 G/17G
17 POWDER, FOR SOLUTION ORAL DAILY PRN
Status: DISCONTINUED | OUTPATIENT
Start: 2024-10-14 | End: 2024-10-18 | Stop reason: HOSPADM

## 2024-10-14 RX ORDER — PREGABALIN 100 MG/1
100 CAPSULE ORAL 2 TIMES DAILY
Status: DISCONTINUED | OUTPATIENT
Start: 2024-10-14 | End: 2024-10-18 | Stop reason: HOSPADM

## 2024-10-14 RX ORDER — GLUCAGON 1 MG
1 KIT INJECTION
Status: DISCONTINUED | OUTPATIENT
Start: 2024-10-14 | End: 2024-10-18 | Stop reason: HOSPADM

## 2024-10-14 RX ORDER — PAROXETINE HYDROCHLORIDE 20 MG/1
40 TABLET, FILM COATED ORAL DAILY
Status: DISCONTINUED | OUTPATIENT
Start: 2024-10-14 | End: 2024-10-18 | Stop reason: HOSPADM

## 2024-10-14 RX ORDER — PROMETHAZINE HYDROCHLORIDE 25 MG/1
25 TABLET ORAL EVERY 6 HOURS PRN
Status: DISCONTINUED | OUTPATIENT
Start: 2024-10-14 | End: 2024-10-18 | Stop reason: HOSPADM

## 2024-10-14 RX ORDER — IPRATROPIUM BROMIDE AND ALBUTEROL SULFATE 2.5; .5 MG/3ML; MG/3ML
3 SOLUTION RESPIRATORY (INHALATION) EVERY 6 HOURS
Status: DISCONTINUED | OUTPATIENT
Start: 2024-10-14 | End: 2024-10-14

## 2024-10-14 RX ORDER — NALOXONE HCL 0.4 MG/ML
0.02 VIAL (ML) INJECTION
Status: DISCONTINUED | OUTPATIENT
Start: 2024-10-14 | End: 2024-10-18 | Stop reason: HOSPADM

## 2024-10-14 RX ORDER — IBUPROFEN 200 MG
24 TABLET ORAL
Status: DISCONTINUED | OUTPATIENT
Start: 2024-10-14 | End: 2024-10-18 | Stop reason: HOSPADM

## 2024-10-14 RX ORDER — IBUPROFEN 200 MG
16 TABLET ORAL
Status: DISCONTINUED | OUTPATIENT
Start: 2024-10-14 | End: 2024-10-18 | Stop reason: HOSPADM

## 2024-10-14 RX ORDER — HYOSCYAMINE SULFATE 0.12 MG/1
0.12 TABLET SUBLINGUAL EVERY 4 HOURS PRN
Status: DISCONTINUED | OUTPATIENT
Start: 2024-10-14 | End: 2024-10-18 | Stop reason: HOSPADM

## 2024-10-14 RX ADMIN — HYDROMORPHONE HYDROCHLORIDE 1 MG: 1 INJECTION, SOLUTION INTRAMUSCULAR; INTRAVENOUS; SUBCUTANEOUS at 11:10

## 2024-10-14 RX ADMIN — PREGABALIN 100 MG: 100 CAPSULE ORAL at 08:10

## 2024-10-14 RX ADMIN — CEFTRIAXONE 2 G: 2 INJECTION, POWDER, FOR SOLUTION INTRAMUSCULAR; INTRAVENOUS at 10:10

## 2024-10-14 RX ADMIN — PREGABALIN 100 MG: 100 CAPSULE ORAL at 09:10

## 2024-10-14 RX ADMIN — HYDROMORPHONE HYDROCHLORIDE 1 MG: 1 INJECTION, SOLUTION INTRAMUSCULAR; INTRAVENOUS; SUBCUTANEOUS at 07:10

## 2024-10-14 RX ADMIN — ENOXAPARIN SODIUM 40 MG: 40 INJECTION SUBCUTANEOUS at 04:10

## 2024-10-14 RX ADMIN — CLONAZEPAM 0.5 MG: 0.5 TABLET ORAL at 09:10

## 2024-10-14 RX ADMIN — MUPIROCIN: 20 OINTMENT TOPICAL at 10:10

## 2024-10-14 RX ADMIN — ACETAMINOPHEN 650 MG: 325 TABLET ORAL at 03:10

## 2024-10-14 RX ADMIN — CLONAZEPAM 0.5 MG: 0.5 TABLET ORAL at 02:10

## 2024-10-14 RX ADMIN — HYDROMORPHONE HYDROCHLORIDE 1 MG: 1 INJECTION, SOLUTION INTRAMUSCULAR; INTRAVENOUS; SUBCUTANEOUS at 12:10

## 2024-10-14 RX ADMIN — CLONAZEPAM 0.5 MG: 0.5 TABLET ORAL at 08:10

## 2024-10-14 RX ADMIN — HYDROMORPHONE HYDROCHLORIDE 1 MG: 1 INJECTION, SOLUTION INTRAMUSCULAR; INTRAVENOUS; SUBCUTANEOUS at 08:10

## 2024-10-14 RX ADMIN — PAROXETINE HYDROCHLORIDE 40 MG: 20 TABLET, FILM COATED ORAL at 09:10

## 2024-10-14 RX ADMIN — SODIUM CHLORIDE: 9 INJECTION, SOLUTION INTRAVENOUS at 02:10

## 2024-10-14 RX ADMIN — SODIUM CHLORIDE: 9 INJECTION, SOLUTION INTRAVENOUS at 10:10

## 2024-10-14 RX ADMIN — PANTOPRAZOLE SODIUM 40 MG: 40 INJECTION, POWDER, FOR SOLUTION INTRAVENOUS at 05:10

## 2024-10-14 RX ADMIN — HYDROMORPHONE HYDROCHLORIDE 1 MG: 1 INJECTION, SOLUTION INTRAMUSCULAR; INTRAVENOUS; SUBCUTANEOUS at 04:10

## 2024-10-14 RX ADMIN — SODIUM CHLORIDE: 9 INJECTION, SOLUTION INTRAVENOUS at 07:10

## 2024-10-14 RX ADMIN — PANTOPRAZOLE SODIUM 40 MG: 40 INJECTION, POWDER, FOR SOLUTION INTRAVENOUS at 08:10

## 2024-10-14 NOTE — CONSULTS
Chief Complaint:  Neurogenic bladder, recurrent UTI    HPI:   10/14/24-  69-year-old female well known to the service who is admitted due to acute UTI.  Patient has known recurrence, most likely due to neurogenic bladder.  Previous Lasix renogram demonstrates drainage from the right kidney when Leija catheter was in place, previously residuals have been slightly elevated but stable, I have been reluctant to pursue Leija catheters up to this juncture.    Allergies:  Codeine, Keflex [cephalexin], Shellfish containing products, Flu vaccine ts 2011-12(18 yr+), and Sucralfate    Medications:  See MAR    Review of Systems:  General: No fever, chills, fatigability, or weight loss.  Skin: No rashes, itching, or changes in color or texture of skin.  Chest: Denies KENT, cyanosis, wheezing, cough, and sputum production.  Abdomen: Appetite fine. No weight loss. Denies diarrhea, abdominal pain, hematemesis, or blood in stool.  Musculoskeletal: No joint stiffness or swelling. Denies back pain.  : As above.  All other review of systems negative.    PMH:   has a past medical history of Anemia, unspecified, Anxiety disorder, unspecified, Arthritis, Asthma, B12 deficiency anemia, Bacillus fragilis infection, Chronic back pain, Chronic constipation, COPD (chronic obstructive pulmonary disease), Depression, GERD (gastroesophageal reflux disease), Hypertension, Insomnia, Lumbar compression fracture, Neurogenic bladder, Osteoporosis, Rheumatoid arthritis, unspecified, Spinal stenosis, Tobacco abuse, Urge incontinence of urine, Urinary outflow obstruction, and Urinary tract infection, E. coli.    PSH:   has a past surgical history that includes Hysterectomy; Knee surgery; Tonsillectomy; multiple fracture repairs; Colonoscopy w/ polypectomy (01/05/2011); Magnetic resonance imaging (N/A, 10/14/2022); fusion, spine, posterior approach (N/A, 10/18/2022); and Esophagogastroduodenoscopy (N/A, 10/24/2022).    FamHx: family history includes COPD  in her mother; Cancer in her mother; Hypertension in her mother; Lung cancer in her mother; Ovarian cancer in her sister; Stroke in her mother.    SocHx:  reports that she has quit smoking. Her smoking use included cigarettes. She started smoking about 46 years ago. She has a 33 pack-year smoking history. She has never used smokeless tobacco. She reports that she does not drink alcohol and does not use drugs.      Physical Exam:  Vitals:    10/14/24 1313   BP:    Pulse: 89   Resp:    Temp:      General: A&Ox3, no apparent distress, no deformities  Neck: No masses, normal ROM  Lungs: normal inspiration, no use of accessory muscles  Heart: normal pulse, no arrhythmias  Abdomen: Soft, NT, ND, right renal colic pain  Skin: The skin is warm and dry. No jaundice.  Ext: No c/c/e.    Labs/Studies:   CBC demonstrates a white count of 10.10, please see the chart.    BMP demonstrates a BUN and creatinine are 19 and 0.7 respectively.  UA 3+ protein, 1+ ketones, trace blood, positive nitrates, 1+ LE.    Cultures are pending.    CT without contrast bilateral hydronephrosis, left atrophic kidney 10/24    Impression/Plan:     1. Recurrent UTI- most likely secondary to neurogenic bladder, urodynamics have been scheduled as an outpatient.  Recommend continued Leija catheterization, follow up as an outpatient for a voiding trial but would favor CIC.  Antibiotic coverage per Infectious Disease.    2. Neurogenic bladder- please see above.      3. Hydronephrosis- nonfunctioning left kidney, hydronephrosis most likely secondary to outflow obstruction based on previous renogram, no stent indicated at this juncture.

## 2024-10-14 NOTE — ASSESSMENT & PLAN NOTE
Hyponatremia is likely due to Dehydration/hypovolemia. The patient's most recent sodium results are listed below.  Recent Labs     10/13/24  1912   *     Plan  - Correct the sodium by 4-6mEq in 24 hours.   - Will treat the hyponatremia with IV fluids as follows: NS  - Monitor sodium Daily.

## 2024-10-14 NOTE — HPI
"This is a 70 yo F known to me with history of hypertension, chronic obstructive pulmonary disease, asthma, arthritis, and gastroesophageal reflux disease who per last ID note "presented to the ER with confusion per . Says that she has since gone back to her baseline. She was also having weakness that has improved. Leukocytosis to 18K on admission but never spiked a fever. Blood cultures collected on 6/19 grew Bacteroides in both anaerobic bottles. Has cleared on repeat set from 6/22. Unclear source. Patient reports constipation. CXR no evidence of pneumonia. CT renal stone with mild moderate right-sided hydronephrosis with right hydroureter.  Severe hydronephrosis of the left kidney with moderate mild left hydroureter. Severe bladder wall thickening.  Question punctate nonobstructing left renal calculi with versus punctate vascular calcification.  No obstructing renal calculi. Urology recommending Leija. Patient also with E coli in urine. ID consulted for bacteremia. Discharged on 14 day course of Flagyl and 10 day course of cefdinir to cover E coli UTI. Blood cx cleared 6/22.    7/10: Here for hospital follow up. Has finished Flagyl and Omnicef. Denies adverse events. No fever. Has not had abdominal pain. Leija still intact. Putting out clear urine. Some itching at insertion site. Still needs to schedule GI appointment. No new concerns. Regaining strength with help of PT."    Now admitted with reported abdominal pain, back pain, flank pain, dysuria, weakness, fatigue, fever, chills, diaphoresis, decreased activity, abdominal distention, constipation, nausea, vomiting, and generalized myalgias. CT renal study reports left-sided severe hydronephrosis and hydroureter. Left renal cortical thinning noted. Right-sided hydronephrosis is also seen. No obstructing renal calculi. U/A with 58 WBC. Blood and urine cultures in process. On empiric Levaquin. ID consulted for "complicated urinary tract infections, followed " "by ID  now with bilateral pyelonephritis."   "
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
## 2024-10-14 NOTE — ED PROVIDER NOTES
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 Admitted  Condition: Fair      ED Follow-up          Scribe Attestation:   Scribe #1: I performed the above scribed service and the documentation accurately describes the services I performed. I attest to the accuracy of the note.  Scribe #2: I performed the above scribed service and the documentation accurately describes the services I performed. I attest to the accuracy of the note.    Attending Attestation:           Physician Attestation for Scribe:  Physician Attestation Statement for Scribe #1: I, Rhea Leon DO, reviewed documentation, as scribed by Aylin Mendes in my presence, and it is both accurate and complete.   Physician Attestation Statement for Scribe #2: I, Janae Burgess DO, reviewed documentation, as scribed by Tina Hadley in my presence, and it is both accurate and complete. I also acknowledge and confirm the content of the note done by Scribe #1.                 Janae Burgess DO  10/13/24 2158    "
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
## 2024-10-14 NOTE — PLAN OF CARE
[facility] - Med Surg  Initial Discharge Assessment       Primary Care Provider: Marck Major MD    Admission Diagnosis: Hypokalemia [E87.6]  Hyponatremia [E87.1]  Pyelonephritis [N12]  Tachycardia [R00.0]  Bilateral flank pain [R10.9]  Sepsis [A41.9]    Admission Date: 10/13/2024  Expected Discharge Date: Per Attending     Transition of Care Barriers: None    Payor: MEDICARE / Plan: MEDICARE PART A & B / Product Type: Government /     Extended Emergency Contact Information  Primary Emergency Contact: Irving Caro  Address: [address] [address] [facility]  Home Phone: [phone]  Mobile Phone: [phone]  Relation: Spouse    Discharge Plan A: Home Health         [id number] - [place], [place] - [address]  [address]  [place] [address]  Phone: [phone] Fax: [phone]    [id number] - [place], [place] - [address] AT SEC OF [address]  [address]  [place] [address]  Phone: [phone] Fax: [phone]      Initial Assessment (most recent)       Adult Discharge Assessment - 10/14/24 0906          Discharge Assessment    Assessment Type Discharge Planning Assessment     Confirmed/corrected address, phone number and insurance Yes     Confirmed Demographics Correct on Facesheet     Source of Information patient;family     Communicated YELENA with patient/caregiver Date not available/Unable to determine     Reason For Admission pyelonephritis     People in Home spouse     Do you expect to return to your current living situation? Yes     Prior to hospitilization cognitive status: Alert/Oriented     Current cognitive status: Alert/Oriented     Walking or Climbing Stairs Difficulty yes     Walking or Climbing Stairs ambulation difficulty, requires equipment     Dressing/Bathing Difficulty yes     Dressing/Bathing bathing difficulty, requires equipment     Home Layout Able to live on 1st floor     Equipment  Currently Used at Home wheelchair;shower chair;oxygen     Readmission within 30 days? No     Patient currently being followed by outpatient case management? No     Do you currently have service(s) that help you manage your care at home? No     Do you take prescription medications? Yes     Do you have prescription coverage? Yes     Coverage Medicare and BCBS     Do you have any problems affording any of your prescribed medications? No     Is the patient taking medications as prescribed? yes     Who is going to help you get home at discharge? spouse     How do you get to doctors appointments? family or friend will provide     Are you on dialysis? No     Do you take coumadin? No     Discharge Plan A Home Health     DME Needed Upon Discharge  none     Discharge Plan discussed with: Patient;Spouse/sig other     Transition of Care Barriers None                   Anticipated DC dispo: Home with HH   Prior Level of Function: Modified Independent   People in home:  Spouse     Comments:  CM met with patient at bedside to introduce role and discuss discharge planning. Spouse will be help at home and can provide transport at time of discharge. CM discharge needs depends on hospital progress. CM will continue following to assist with other needs.

## 2024-10-14 NOTE — PLAN OF CARE
Discussed poc, verbalized understanding  VSS except BP high  [id number]    Cardiac monitoring in use  IVFs restarted  Fall precautions in place. Bed locked in lowest position    Pain managed with prn meds  Denies nausea  Chart check complete
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
## 2024-10-14 NOTE — ASSESSMENT & PLAN NOTE
Bilateral pyelonephritis in patient with complicated urological history  Known to Dr. Cam and has been followed by ID as well, will consult both services  IV Levaquin  Levsin ordered for bladder spasms, PRN dilaudid for severe pain  Follow up urine culture and blood culture reports  Leija catheter placed while in ED

## 2024-10-14 NOTE — ASSESSMENT & PLAN NOTE
Follow up blood and urine cultures  Hydrating with IV fluids  Broad spectrum antibiotic ordered -- Levaquin

## 2024-10-14 NOTE — PLAN OF CARE
See eval for details. Pt displayed deficits with functional mobility/ transfers, deficits with adl's skills also decrease b ue strength/endurance. Recommendation: tbd

## 2024-10-14 NOTE — ASSESSMENT & PLAN NOTE
Patient's most recent potassium results are listed below.   Recent Labs     10/13/24  1912   K 3.2*     Plan  - Replete potassium per protocol  - Monitor potassium Daily  - Magnesium checked and is normal

## 2024-10-14 NOTE — PLAN OF CARE
Discussed poc with pt, pt verbalized understanding  Purposeful rounding every 2hours  VS wnl  Cardiac monitoring in use, pt is MARITZA, tele monitor # [id number]  Blood glucose monitoring   Fall precautions in place, remains injury free  Pain and nausea under control with PRN meds  IVFs NaCL @ 150ml/ hr  Accurate I&Os  Abx given as prescribed  Bed locked at lowest position  Call light within reach  Chart check complete  Will cont with POC    Problem: Adult Inpatient Plan of Care  Goal: Plan of Care Review  Outcome: Progressing  Goal: Patient-Specific Goal (Individualized)  Outcome: Progressing  Goal: Absence of Hospital-Acquired Illness or Injury  Outcome: Progressing  Goal: Optimal Comfort and Wellbeing  Outcome: Progressing  Goal: Readiness for Transition of Care  Outcome: Progressing     Problem: Infection  Goal: Absence of Infection Signs and Symptoms  Outcome: Progressing     Problem: Skin Injury Risk Increased  Goal: Skin Health and Integrity  Outcome: Progressing     Problem: Sepsis/Septic Shock  Goal: Optimal Coping  Outcome: Progressing  Goal: Absence of Bleeding  Outcome: Progressing  Goal: Blood Glucose Level Within Targeted Range  Outcome: Progressing  Goal: Absence of Infection Signs and Symptoms  Outcome: Progressing  Goal: Optimal Nutrition Intake  Outcome: Progressing

## 2024-10-14 NOTE — PLAN OF CARE
PT LOG ROLLED IN BED WITH SBA. PT REFUSING TO SIT UP EOB OR OOB MOBILITY EVEN AFTER IV PAIN MEDS GIVEN.
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
## 2024-10-14 NOTE — NURSING
Received pt from ED. AAOx4. Spouse at bedside helping with admit questions. VSS except BP is extremely high, c/o chronic back pain. Bruises noted on her BUE, sacrum is clear and not red. MD notified of arrival.

## 2024-10-14 NOTE — HPI
Patient is a 69-year-old female with past medical history significant for anxiety, chronic back pain, depression, insomnia, back surgery/spinal fusion,  cauda equina syndrome, lumbar compression fractures, osteoporosis, spinal stenosis, anemia, B12 deficiency, arthritis, asthma/COPD, GERD, hypertension, rheumatoid arthritis, chronic constipation,  chronic urinary urge incontinence, hydronephrosis,  possible bladder outflow obstruction with urinary retention issues requiring Leija catheter at times, bowel incontinence, solitary right kidney as left kidney appears to be nonfunctioning per renogram, and chronic UTI's who presented to ED with complaints of abdominal pain, back  pain, flank pain, dysuria, weakness, fatigue, fever, chills, diaphoresis, decreased activity, abdominal distention, constipation, nausea, vomiting, generalized myalgias.  Initial vital signs in ED with blood pressure 249/123, heart rate 121, temperature 97.9°, 94% SpO2 on room air, respirations 17 per minute.  Lab workup shows WBC 17, left shift noted, sodium 130, potassium 3.2, chloride 94, glucose 129, BUN 22, creatinine 0.8, magnesium 2.0, alk-phos 108, albumin 3.4, total bilirubin 0.6, AST 11, ALT 8, lactic acid 0.8, procalcitonin 0.08, urinalysis with cloudy yellow urine positive nitrites, 1+ leukocyte esterase, 58 WBC, many bacteria, many WBC clumps, 10 hyaline cast, RBC 28.  Urine culture is in progress, blood cultures x2 collected.  Chest x-ray done with stable findings of cardiomegaly and coarse interstitial lung markings.  CT renal stone study of abdomen and pelvis without contrast showed multiple abnormal findings including severe left-sided hydronephrosis and hydroureter, right-sided hydronephrosis, moderate amount of stool in colon, bladder wall thickening identified, no obstructing renal calculi noted, emphysema with subpleural scarring and irregular nodular opacity in posterior left lung base measuring up to 13 mm, and chronic  lumbar and thoracic spinal deformities.  She was given IV Levaquin, 1 L normal saline fluid bolus, IV potassium supplementation and p.o. potassium bicarb while in ED. hospital Medicine team was consulted for admission due to bilateral pyelonephritis.  She is a patient of Dr. Cam and he was consulted for additional recommendations -- for IV antibiotics until culture data available and Leija catheter placement -- he will follow along.

## 2024-10-14 NOTE — ASSESSMENT & PLAN NOTE
--U/A with pyuria 58  --No fever and leukocytosis resolved  --Follow urine cx to tailor final regimen  --CT reports left-sided severe hydronephrosis and hydroureter. Left renal cortical thinning noted. Right-sided hydronephrosis is also seen. No obstructing renal calculi.   --Concern for pyelonephritis  --Appreciate urology  --Recent cultures with no MDRO  --QT prolonged on EKG; will avoid quinolones  --Changes from levofloxacin to IV ceftriaxone pending urine cx  --Follow blood cx  --Anticipate minimum 7 day total antibiotic course depending on clinical progression  --Above d/w primary team.

## 2024-10-15 ENCOUNTER — TELEPHONE (OUTPATIENT)
Dept: INFECTIOUS DISEASES | Facility: CLINIC | Age: 69
End: 2024-10-15
Payer: MEDICARE

## 2024-10-15 LAB
ACINETOBACTER CALCOACETICUS/BAUMANNII COMPLEX: NOT DETECTED
ALBUMIN SERPL BCP-MCNC: 2.6 G/DL (ref 3.5–5.2)
ALP SERPL-CCNC: 88 U/L (ref 55–135)
ALT SERPL W/O P-5'-P-CCNC: 6 U/L (ref 10–44)
ANION GAP SERPL CALC-SCNC: 8 MMOL/L (ref 8–16)
AST SERPL-CCNC: 8 U/L (ref 10–40)
BACTERIA UR CULT: NORMAL
BACTEROIDES FRAGILIS: DETECTED
BASOPHILS # BLD AUTO: 0.03 K/UL (ref 0–0.2)
BASOPHILS NFR BLD: 0.3 % (ref 0–1.9)
BILIRUB SERPL-MCNC: 0.5 MG/DL (ref 0.1–1)
BUN SERPL-MCNC: 12 MG/DL (ref 8–23)
CALCIUM SERPL-MCNC: 8 MG/DL (ref 8.7–10.5)
CANDIDA ALBICANS: NOT DETECTED
CANDIDA AURIS: NOT DETECTED
CANDIDA GLABRATA: NOT DETECTED
CANDIDA KRUSEI: NOT DETECTED
CANDIDA PARAPSILOSIS: NOT DETECTED
CANDIDA TROPICALIS: NOT DETECTED
CHLORIDE SERPL-SCNC: 98 MMOL/L (ref 95–110)
CO2 SERPL-SCNC: 24 MMOL/L (ref 23–29)
CREAT SERPL-MCNC: 0.7 MG/DL (ref 0.5–1.4)
CRYPTOCOCCUS NEOFORMANS/GATTII: NOT DETECTED
CTX-M GENE (ESBL PRODUCER): ABNORMAL
DIFFERENTIAL METHOD BLD: ABNORMAL
ENTEROBACTER CLOACAE COMPLEX: NOT DETECTED
ENTEROBACTERALES: NOT DETECTED
ENTEROCOCCUS FAECALIS: NOT DETECTED
ENTEROCOCCUS FAECIUM: NOT DETECTED
EOSINOPHIL # BLD AUTO: 0.2 K/UL (ref 0–0.5)
EOSINOPHIL NFR BLD: 2.2 % (ref 0–8)
ERYTHROCYTE [DISTWIDTH] IN BLOOD BY AUTOMATED COUNT: 14.2 % (ref 11.5–14.5)
ESCHERICHIA COLI: NOT DETECTED
EST. GFR  (NO RACE VARIABLE): >60 ML/MIN/1.73 M^2
GLUCOSE SERPL-MCNC: 106 MG/DL (ref 70–110)
HAEMOPHILUS INFLUENZAE: NOT DETECTED
HCT VFR BLD AUTO: 35.3 % (ref 37–48.5)
HGB BLD-MCNC: 11.3 G/DL (ref 12–16)
IMM GRANULOCYTES # BLD AUTO: 0.1 K/UL (ref 0–0.04)
IMM GRANULOCYTES NFR BLD AUTO: 0.9 % (ref 0–0.5)
IMP GENE (CARBAPENEM RESISTANT): ABNORMAL
KLEBSIELLA AEROGENES: NOT DETECTED
KLEBSIELLA OXYTOCA: NOT DETECTED
KLEBSIELLA PNEUMONIAE GROUP: NOT DETECTED
KPC RESISTANCE GENE (CARBAPENEM): ABNORMAL
LISTERIA MONOCYTOGENES: NOT DETECTED
LYMPHOCYTES # BLD AUTO: 0.8 K/UL (ref 1–4.8)
LYMPHOCYTES NFR BLD: 7.6 % (ref 18–48)
MCH RBC QN AUTO: 29.4 PG (ref 27–31)
MCHC RBC AUTO-ENTMCNC: 32 G/DL (ref 32–36)
MCR-1: ABNORMAL
MCV RBC AUTO: 92 FL (ref 82–98)
MEC A/C AND MREJ (MRSA): ABNORMAL
MEC A/C: ABNORMAL
MONOCYTES # BLD AUTO: 1 K/UL (ref 0.3–1)
MONOCYTES NFR BLD: 9.5 % (ref 4–15)
NDM GENE (CARBAPENEM RESISTANT): ABNORMAL
NEISSERIA MENINGITIDIS: NOT DETECTED
NEUTROPHILS # BLD AUTO: 8.7 K/UL (ref 1.8–7.7)
NEUTROPHILS NFR BLD: 79.5 % (ref 38–73)
NRBC BLD-RTO: 0 /100 WBC
OXA-48-LIKE (CARBAPENEM RESISTANT): ABNORMAL
PLATELET # BLD AUTO: 295 K/UL (ref 150–450)
PMV BLD AUTO: 8.6 FL (ref 9.2–12.9)
POTASSIUM SERPL-SCNC: 3 MMOL/L (ref 3.5–5.1)
PROT SERPL-MCNC: 5.9 G/DL (ref 6–8.4)
PROTEUS SPECIES: NOT DETECTED
PSEUDOMONAS AERUGINOSA: NOT DETECTED
RBC # BLD AUTO: 3.84 M/UL (ref 4–5.4)
SALMONELLA SP: NOT DETECTED
SERRATIA MARCESCENS: NOT DETECTED
SODIUM SERPL-SCNC: 130 MMOL/L (ref 136–145)
STAPHYLOCOCCUS AUREUS: NOT DETECTED
STAPHYLOCOCCUS EPIDERMIDIS: NOT DETECTED
STAPHYLOCOCCUS LUGDUNESIS: NOT DETECTED
STAPHYLOCOCCUS SPECIES: NOT DETECTED
STENOTROPHOMONAS MALTOPHILIA: NOT DETECTED
STREPTOCOCCUS AGALACTIAE: NOT DETECTED
STREPTOCOCCUS PNEUMONIAE: NOT DETECTED
STREPTOCOCCUS PYOGENES: NOT DETECTED
STREPTOCOCCUS SPECIES: NOT DETECTED
VAN A/B (VRE GENE): ABNORMAL
VIM GENE (CARBAPENEM RESISTANT): ABNORMAL
WBC # BLD AUTO: 10.94 K/UL (ref 3.9–12.7)

## 2024-10-15 PROCEDURE — 21400001 HC TELEMETRY ROOM

## 2024-10-15 PROCEDURE — 99900035 HC TECH TIME PER 15 MIN (STAT)

## 2024-10-15 PROCEDURE — 99233 SBSQ HOSP IP/OBS HIGH 50: CPT | Mod: 95,,, | Performed by: STUDENT IN AN ORGANIZED HEALTH CARE EDUCATION/TRAINING PROGRAM

## 2024-10-15 PROCEDURE — 63600175 PHARM REV CODE 636 W HCPCS: Performed by: INTERNAL MEDICINE

## 2024-10-15 PROCEDURE — 63600175 PHARM REV CODE 636 W HCPCS: Performed by: NURSE PRACTITIONER

## 2024-10-15 PROCEDURE — 25000003 PHARM REV CODE 250: Performed by: NURSE PRACTITIONER

## 2024-10-15 PROCEDURE — 25000003 PHARM REV CODE 250: Performed by: INTERNAL MEDICINE

## 2024-10-15 PROCEDURE — 25000003 PHARM REV CODE 250: Performed by: STUDENT IN AN ORGANIZED HEALTH CARE EDUCATION/TRAINING PROGRAM

## 2024-10-15 PROCEDURE — 36415 COLL VENOUS BLD VENIPUNCTURE: CPT | Performed by: NURSE PRACTITIONER

## 2024-10-15 PROCEDURE — 94761 N-INVAS EAR/PLS OXIMETRY MLT: CPT

## 2024-10-15 PROCEDURE — 80053 COMPREHEN METABOLIC PANEL: CPT | Performed by: NURSE PRACTITIONER

## 2024-10-15 PROCEDURE — 85025 COMPLETE CBC W/AUTO DIFF WBC: CPT | Performed by: NURSE PRACTITIONER

## 2024-10-15 PROCEDURE — 27000221 HC OXYGEN, UP TO 24 HOURS

## 2024-10-15 PROCEDURE — 63600175 PHARM REV CODE 636 W HCPCS: Performed by: STUDENT IN AN ORGANIZED HEALTH CARE EDUCATION/TRAINING PROGRAM

## 2024-10-15 PROCEDURE — 11000001 HC ACUTE MED/SURG PRIVATE ROOM

## 2024-10-15 RX ORDER — POTASSIUM CHLORIDE 20 MEQ/1
40 TABLET, EXTENDED RELEASE ORAL ONCE
Status: COMPLETED | OUTPATIENT
Start: 2024-10-15 | End: 2024-10-15

## 2024-10-15 RX ORDER — PHENAZOPYRIDINE HYDROCHLORIDE 100 MG/1
100 TABLET, FILM COATED ORAL
Status: DISCONTINUED | OUTPATIENT
Start: 2024-10-15 | End: 2024-10-18 | Stop reason: HOSPADM

## 2024-10-15 RX ORDER — HALOPERIDOL 5 MG/1
5 TABLET ORAL NIGHTLY
Status: DISCONTINUED | OUTPATIENT
Start: 2024-10-15 | End: 2024-10-18 | Stop reason: HOSPADM

## 2024-10-15 RX ORDER — CELECOXIB 100 MG/1
200 CAPSULE ORAL DAILY
Status: DISCONTINUED | OUTPATIENT
Start: 2024-10-15 | End: 2024-10-18 | Stop reason: HOSPADM

## 2024-10-15 RX ORDER — PANTOPRAZOLE SODIUM 40 MG/1
40 TABLET, DELAYED RELEASE ORAL 2 TIMES DAILY
Status: DISCONTINUED | OUTPATIENT
Start: 2024-10-15 | End: 2024-10-18 | Stop reason: HOSPADM

## 2024-10-15 RX ORDER — METRONIDAZOLE 500 MG/1
500 TABLET ORAL EVERY 8 HOURS
Status: DISCONTINUED | OUTPATIENT
Start: 2024-10-15 | End: 2024-10-18 | Stop reason: HOSPADM

## 2024-10-15 RX ADMIN — PAROXETINE HYDROCHLORIDE 40 MG: 20 TABLET, FILM COATED ORAL at 09:10

## 2024-10-15 RX ADMIN — CLONAZEPAM 0.5 MG: 0.5 TABLET ORAL at 08:10

## 2024-10-15 RX ADMIN — HYDROMORPHONE HYDROCHLORIDE 1 MG: 1 INJECTION, SOLUTION INTRAMUSCULAR; INTRAVENOUS; SUBCUTANEOUS at 08:10

## 2024-10-15 RX ADMIN — HYDROMORPHONE HYDROCHLORIDE 1 MG: 1 INJECTION, SOLUTION INTRAMUSCULAR; INTRAVENOUS; SUBCUTANEOUS at 02:10

## 2024-10-15 RX ADMIN — CLONAZEPAM 0.5 MG: 0.5 TABLET ORAL at 09:10

## 2024-10-15 RX ADMIN — CEFTRIAXONE 2 G: 2 INJECTION, POWDER, FOR SOLUTION INTRAMUSCULAR; INTRAVENOUS at 11:10

## 2024-10-15 RX ADMIN — CELECOXIB 200 MG: 100 CAPSULE ORAL at 11:10

## 2024-10-15 RX ADMIN — METRONIDAZOLE 500 MG: 500 TABLET ORAL at 10:10

## 2024-10-15 RX ADMIN — PHENAZOPYRIDINE 100 MG: 100 TABLET ORAL at 11:10

## 2024-10-15 RX ADMIN — HYDROMORPHONE HYDROCHLORIDE 1 MG: 1 INJECTION, SOLUTION INTRAMUSCULAR; INTRAVENOUS; SUBCUTANEOUS at 06:10

## 2024-10-15 RX ADMIN — HALOPERIDOL 5 MG: 5 TABLET ORAL at 10:10

## 2024-10-15 RX ADMIN — MUPIROCIN: 20 OINTMENT TOPICAL at 08:10

## 2024-10-15 RX ADMIN — PHENAZOPYRIDINE 100 MG: 100 TABLET ORAL at 05:10

## 2024-10-15 RX ADMIN — PANTOPRAZOLE SODIUM 40 MG: 40 TABLET, DELAYED RELEASE ORAL at 09:10

## 2024-10-15 RX ADMIN — PREGABALIN 100 MG: 100 CAPSULE ORAL at 08:10

## 2024-10-15 RX ADMIN — ENOXAPARIN SODIUM 40 MG: 40 INJECTION SUBCUTANEOUS at 05:10

## 2024-10-15 RX ADMIN — SODIUM CHLORIDE: 9 INJECTION, SOLUTION INTRAVENOUS at 05:10

## 2024-10-15 RX ADMIN — TRAZODONE HYDROCHLORIDE 150 MG: 100 TABLET ORAL at 10:10

## 2024-10-15 RX ADMIN — ACETAMINOPHEN 650 MG: 325 TABLET ORAL at 05:10

## 2024-10-15 RX ADMIN — METRONIDAZOLE 500 MG: 500 TABLET ORAL at 02:10

## 2024-10-15 RX ADMIN — POTASSIUM CHLORIDE 40 MEQ: 1500 TABLET, EXTENDED RELEASE ORAL at 02:10

## 2024-10-15 RX ADMIN — PREGABALIN 100 MG: 100 CAPSULE ORAL at 09:10

## 2024-10-15 RX ADMIN — MUPIROCIN: 20 OINTMENT TOPICAL at 09:10

## 2024-10-15 RX ADMIN — PANTOPRAZOLE SODIUM 40 MG: 40 TABLET, DELAYED RELEASE ORAL at 08:10

## 2024-10-15 NOTE — PLAN OF CARE
Problem: Adult Inpatient Plan of Care  Goal: Plan of Care Review  Outcome: Progressing  Goal: Patient-Specific Goal (Individualized)  Outcome: Progressing  Goal: Absence of Hospital-Acquired Illness or Injury  Outcome: Progressing  Goal: Optimal Comfort and Wellbeing  Outcome: Progressing  Goal: Readiness for Transition of Care  Outcome: Progressing     Problem: Infection  Goal: Absence of Infection Signs and Symptoms  Outcome: Progressing     Problem: Skin Injury Risk Increased  Goal: Skin Health and Integrity  Outcome: Progressing     Problem: Sepsis/Septic Shock  Goal: Optimal Coping  Outcome: Progressing  Goal: Absence of Bleeding  Outcome: Progressing  Goal: Blood Glucose Level Within Targeted Range  Outcome: Progressing  Goal: Absence of Infection Signs and Symptoms  Outcome: Progressing  Goal: Optimal Nutrition Intake  Outcome: Progressing

## 2024-10-15 NOTE — ASSESSMENT & PLAN NOTE
Bilateral pyelonephritis in patient with complicated urological history  IV Rocephin  Levsin ordered for bladder spasms, PRN dilaudid for severe pain  Follow up urine culture and blood culture reports  Continue nicholson

## 2024-10-15 NOTE — SUBJECTIVE & OBJECTIVE
Interval History: f/u bacteremia planned PICC line today but blood culture isolated gram negative christy. Antibiotics adjusted    Review of Systems  Objective:     Vital Signs (Most Recent):  Temp: 98.2 °F (36.8 °C) (10/15/24 1150)  Pulse: 83 (10/15/24 1536)  Resp: 16 (10/15/24 1150)  BP: (!) 145/92 (10/15/24 1150)  SpO2: 99 % (10/15/24 1150) Vital Signs (24h Range):  Temp:  [97.8 °F (36.6 °C)-99 °F (37.2 °C)] 98.2 °F (36.8 °C)  Pulse:  [] 83  Resp:  [16-20] 16  SpO2:  [83 %-99 %] 99 %  BP: (114-175)/(57-95) 145/92     Weight: 93.7 kg (206 lb 9.1 oz)  Body mass index is 37.78 kg/m².    Intake/Output Summary (Last 24 hours) at 10/15/2024 1549  Last data filed at 10/15/2024 1127  Gross per 24 hour   Intake 1839.38 ml   Output 2800 ml   Net -960.62 ml         Physical Exam  HENT:      Head: Normocephalic and atraumatic.   Cardiovascular:      Rate and Rhythm: Normal rate and regular rhythm.      Heart sounds: No murmur heard.  Pulmonary:      Effort: Pulmonary effort is normal. No respiratory distress.      Breath sounds: Normal breath sounds. No wheezing.   Abdominal:      General: Bowel sounds are normal. There is no distension.      Palpations: Abdomen is soft.      Tenderness: There is no abdominal tenderness.   Musculoskeletal:         General: No swelling.   Skin:     General: Skin is warm and dry.   Neurological:      Mental Status: She is alert and oriented to person, place, and time. Mental status is at baseline.             Significant Labs: All pertinent labs within the past 24 hours have been reviewed.  Recent Lab Results         10/15/24  0616        Albumin 2.6       ALP 88       ALT 6       Anion Gap 8       AST 8       Baso # 0.03       Basophil % 0.3       BILIRUBIN TOTAL 0.5  Comment: For infants and newborns, interpretation of results should be based  on gestational age, weight and in agreement with clinical  observations.    Premature Infant recommended reference ranges:  Up to 24  hours.............<8.0 mg/dL  Up to 48 hours............<12.0 mg/dL  3-5 days..................<15.0 mg/dL  6-29 days.................<15.0 mg/dL         BUN 12       Calcium 8.0       Chloride 98       CO2 24       Creatinine 0.7       Differential Method Automated       eGFR >60       Eos # 0.2       Eos % 2.2       Glucose 106       Gran # (ANC) 8.7       Gran % 79.5       Hematocrit 35.3       Hemoglobin 11.3       Immature Grans (Abs) 0.10  Comment: Mild elevation in immature granulocytes is non specific and   can be seen in a variety of conditions including stress response,   acute inflammation, trauma and pregnancy. Correlation with other   laboratory and clinical findings is essential.         Immature Granulocytes 0.9       Lymph # 0.8       Lymph % 7.6       MCH 29.4       MCHC 32.0       MCV 92       Mono # 1.0       Mono % 9.5       MPV 8.6       nRBC 0       Platelet Count 295       Potassium 3.0       PROTEIN TOTAL 5.9       RBC 3.84       RDW 14.2       Sodium 130       WBC 10.94               Significant Imaging: I have reviewed all pertinent imaging results/findings within the past 24 hours.

## 2024-10-15 NOTE — ASSESSMENT & PLAN NOTE
Patient's most recent potassium results are listed below.   Recent Labs     10/13/24  1912 10/14/24  0328 10/15/24  0616   K 3.2* 3.3* 3.0*       Plan  - Replete potassium per protocol  - Monitor potassium Daily

## 2024-10-15 NOTE — PROGRESS NOTES
[facility]  Infectious Disease  Progress Note    Patient Name: Merle Caro  MRN: [id number]  Admission Date: 10/13/2024  Length of Stay: 2 days  Attending Physician: Jason Alexandra MD  Primary Care Provider: Marck Major MD    Isolation Status: No active isolations  Assessment/Plan:      Pulmonary  Asthma with COPD  Continue current medications per primary      Cardiac/Vascular  Primary hypertension  Continue current medications per primary      Renal/  Bladder outflow obstruction  Appreciate urology. Poses risk of recurrent UTI. Plans for CIC.     ID  * Pyelonephritis  --U/A with pyuria 58  --No fever and leukocytosis resolved  --Urine cx has finalized with no growth   --CT reports left-sided severe hydronephrosis and hydroureter. Left renal cortical thinning noted. Right-sided hydronephrosis is also seen. No obstructing renal calculi.   --Concern for pyelonephritis  --Appreciate urology; agree with CIC   --Recent cultures with no MDRO  --QT prolonged on EKG; will avoid quinolones  --Will complete 14 day course of empiric IV ceftriaxone   --Requires drug toxicity monitoring   --Follow blood cx; now reporting GNR; BCID identifies as Bacteroides fragilis   --Will add PO metronidazole 500 mg TID      --Repeat blood cx in AM for clearance   --Will schedule ID clinic follow up   --Above d/w primary team.       Outpatient Antibiotic Therapy Plan:    1) Infection: Pyelonephritis     2) Discharge Antibiotics:    Intravenous antibiotics:  IV ceftriaxone 2 g daily     3) Therapy Duration:  14 days     Estimated end date of IV antibiotics: 10/28/24    4) Outpatient Weekly Labs:    Order the following labs to be drawn on Mondays:   CBC  CMP     5) Fax Lab Results to Infectious Diseases Provider: Dr. Huitron     [facility] Fax Number: [phone]    Bacteremia  --All cases of bacteremia should ideally have source control achieved  --However, not always possible depending on pathogen  --Bacteroides typically  "a gut pathogen found in polymicrobial infection and is the most common anaerobic bacteremia pathogen   --Identified on BCID  --Follow cultures for final speciation/susceptibilities   --Will add PO metronidazole; anticipate 14 day course   --See pyelonephritis   --Will ensure patient has had outpatient colonoscopy     GI  Gastroesophageal reflux disease with esophagitis  Continue current medications per primary        Thank you for your consult. I will follow-up with patient. Please contact us if you have any additional questions.    Cedrick Huitron, DO  Infectious Disease  [facility]    Subjective:     Principal Problem:Pyelonephritis    HPI: This is a 70 yo F known to me with history of hypertension, chronic obstructive pulmonary disease, asthma, arthritis, and gastroesophageal reflux disease who per last ID note "presented to the ER with confusion per . Says that she has since gone back to her baseline. She was also having weakness that has improved. Leukocytosis to 18K on admission but never spiked a fever. Blood cultures collected on 6/19 grew Bacteroides in both anaerobic bottles. Has cleared on repeat set from 6/22. Unclear source. Patient reports constipation. CXR no evidence of pneumonia. CT renal stone with mild moderate right-sided hydronephrosis with right hydroureter.  Severe hydronephrosis of the left kidney with moderate mild left hydroureter. Severe bladder wall thickening.  Question punctate nonobstructing left renal calculi with versus punctate vascular calcification.  No obstructing renal calculi. Urology recommending Leija. Patient also with E coli in urine. ID consulted for bacteremia. Discharged on 14 day course of Flagyl and 10 day course of cefdinir to cover E coli UTI. Blood cx cleared 6/22.    7/10: Here for hospital follow up. Has finished Flagyl and Omnicef. Denies adverse events. No fever. Has not had abdominal pain. Leija still intact. Putting out clear urine. Some itching " "at insertion site. Still needs to schedule GI appointment. No new concerns. Regaining strength with help of PT."    Now admitted with reported abdominal pain, back pain, flank pain, dysuria, weakness, fatigue, fever, chills, diaphoresis, decreased activity, abdominal distention, constipation, nausea, vomiting, and generalized myalgias. CT renal study reports left-sided severe hydronephrosis and hydroureter. Left renal cortical thinning noted. Right-sided hydronephrosis is also seen. No obstructing renal calculi. U/A with 58 WBC. Blood and urine cultures in process. On empiric Levaquin. ID consulted for "complicated urinary tract infections, followed by ID  now with bilateral pyelonephritis."   Interval History: Urine cx no growth. Afebrile with no leukocytosis. CIC planned per urology. Will treat empirically for pyelonephritis using Rocephin.     Review of Systems   Constitutional:  Positive for activity change and appetite change. Negative for chills.   Gastrointestinal:  Negative for abdominal pain.   Genitourinary:  Negative for dysuria.   Neurological:  Positive for weakness.   All other systems reviewed and are negative.    Objective:     Vital Signs (Most Recent):  Temp: 98 °F (36.7 °C) (10/15/24 0712)  Pulse: 98 (10/15/24 0800)  Resp: 16 (10/15/24 0712)  BP: (!) 114/57 (10/15/24 0712)  SpO2: (!) 94 % (10/15/24 0714) Vital Signs (24h Range):  Temp:  [97.8 °F (36.6 °C)-99 °F (37.2 °C)] 98 °F (36.7 °C)  Pulse:  [] 98  Resp:  [16-20] 16  SpO2:  [83 %-99 %] 94 %  BP: (114-175)/(57-95) 114/57     Weight: 93.7 kg (206 lb 9.1 oz)  Body mass index is 37.78 kg/m².    Estimated Creatinine Clearance: 83.1 mL/min (based on SCr of 0.7 mg/dL).     Physical Exam  Constitutional:       General: She is not in acute distress.     Appearance: Normal appearance. She is not ill-appearing.   Cardiovascular:      Rate and Rhythm: Normal rate and regular rhythm.      Pulses: Normal pulses.      Heart sounds: Normal heart " sounds. No murmur heard.     No friction rub. No gallop.   Pulmonary:      Effort: Pulmonary effort is normal. No respiratory distress.      Breath sounds: Normal breath sounds.   Abdominal:      General: Abdomen is flat. Bowel sounds are normal. There is no distension.      Palpations: Abdomen is soft.      Tenderness: There is no abdominal tenderness.   Skin:     General: Skin is warm and dry.   Neurological:      Mental Status: She is alert.          Significant Labs: Blood Culture:   Recent Labs   Lab 06/19/24  1024 06/19/24  1025 06/22/24  1416 10/13/24  1914   LABBLOO Gram stain renee bottle: Gram negative rods  Positive results previously called 06/20/2024  15:12  BACTEROIDES FRAGILIS* Gram stain renee bottle: Gram negative rods  Results called to and read back by: BLAISE Chavez RN  06/20/2024  15:10  BACTEROIDES FRAGILIS* No growth after 5 days.  No growth after 5 days. No Growth to date  No Growth to date  No Growth to date  No Growth to date     CBC:   Recent Labs   Lab 10/13/24  1912 10/14/24  0328 10/15/24  0616   WBC 17.03* 10.10 10.94   HGB 12.5 10.8* 11.3*   HCT 36.3* 32.6* 35.3*    282 295     CMP:   Recent Labs   Lab 10/13/24  1912 10/14/24  0328 10/15/24  0616   * 131* 130*   K 3.2* 3.3* 3.0*   CL 94* 98 98   CO2 26 25 24   * 100 106   BUN 22 19 12   CREATININE 0.8 0.7 0.7   CALCIUM 9.4 8.1* 8.0*   PROT 7.5 5.9* 5.9*   ALBUMIN 3.4* 2.6* 2.6*   BILITOT 0.6 0.6 0.5   ALKPHOS 108 86 88   AST 11 9* 8*   ALT 8* <5* 6*   ANIONGAP 10 8 8     Microbiology Results (last 7 days)       Procedure Component Value Units Date/Time    Blood Culture #2 **CANNOT BE ORDERED STAT** [[id number]] Collected: 10/13/24 1914    Order Status: Completed Specimen: Blood from Peripheral, Hand, Left Updated: 10/15/24 0613     Blood Culture, Routine No Growth to date      No Growth to date    Blood Culture #1 **CANNOT BE ORDERED STAT** [[id number]] Collected: 10/13/24 1914    Order Status: Completed Specimen:  Blood from Peripheral, Antecubital, Left Updated: 10/15/24 0613     Blood Culture, Routine No Growth to date      No Growth to date    Urine culture [[id number]] Collected: 10/13/24 1938    Order Status: Completed Specimen: Urine Updated: 10/15/24 0348     Urine Culture, Routine No significant growth    Narrative:      Specimen Source->Urine          Urine Culture:   Recent Labs   Lab 06/19/24  1102 10/13/24  1938   LABURIN ESCHERICHIA COLI  >100,000 cfu/ml  * No significant growth     Urine Studies:   Recent Labs   Lab 10/13/24  1938   COLORU Yellow   APPEARANCEUA Cloudy*   PHUR 8.0   SPECGRAV 1.030   PROTEINUA 3+*   GLUCUA Negative   KETONESU 1+*   BILIRUBINUA 1+*   OCCULTUA Trace*   NITRITE Positive*   UROBILINOGEN 2.0-3.0*   LEUKOCYTESUR 1+*   RBCUA 28*   WBCUA 58*   BACTERIA Many*   HYALINECASTS 10*     All pertinent labs within the past 24 hours have been reviewed.    Significant Imaging: I have reviewed all pertinent imaging results/findings within the past 24 hours.

## 2024-10-15 NOTE — ASSESSMENT & PLAN NOTE
Hyponatremia is likely due to Dehydration/hypovolemia. The patient's most recent sodium results are listed below.  Recent Labs     10/13/24  1912 10/14/24  0328 10/15/24  0616   * 131* 130*       Plan  -- Monitor sodium Daily.

## 2024-10-15 NOTE — ASSESSMENT & PLAN NOTE
--U/A with pyuria 58  --No fever and leukocytosis resolved  --Urine cx has finalized with no growth   --CT reports left-sided severe hydronephrosis and hydroureter. Left renal cortical thinning noted. Right-sided hydronephrosis is also seen. No obstructing renal calculi.   --Concern for pyelonephritis  --Appreciate urology; agree with CIC   --Recent cultures with no MDRO  --QT prolonged on EKG; will avoid quinolones  --Will complete 14 day course of empiric IV ceftriaxone   --Requires drug toxicity monitoring   --Follow blood cx; NGTD   --Will schedule ID clinic follow up   --Above d/w primary team.      Outpatient Antibiotic Therapy Plan:    1) Infection: Pyelonephritis     2) Discharge Antibiotics:    Intravenous antibiotics:  IV ceftriaxone 2 g daily     3) Therapy Duration:  14 days     Estimated end date of IV antibiotics: 10/28/24    4) Outpatient Weekly Labs:    Order the following labs to be drawn on Mondays:   CBC  CMP     5) Fax Lab Results to Infectious Diseases Provider: Dr. Huitron     [facility] Fax Number: [phone]

## 2024-10-15 NOTE — PLAN OF CARE
Problem: Adult Inpatient Plan of Care  Goal: Plan of Care Review  Outcome: Progressing  Goal: Patient-Specific Goal (Individualized)  Outcome: Progressing  Goal: Absence of Hospital-Acquired Illness or Injury  Outcome: Progressing  Goal: Optimal Comfort and Wellbeing  Outcome: Progressing  Goal: Readiness for Transition of Care  Outcome: Progressing     Problem: Infection  Goal: Absence of Infection Signs and Symptoms  Outcome: Progressing     Problem: Skin Injury Risk Increased  Goal: Skin Health and Integrity  Outcome: Progressing     Problem: Sepsis/Septic Shock  Goal: Optimal Coping  Outcome: Progressing  Goal: Absence of Bleeding  Outcome: Progressing  Goal: Blood Glucose Level Within Targeted Range  Outcome: Progressing  Goal: Absence of Infection Signs and Symptoms  Outcome: Progressing  Goal: Optimal Nutrition Intake  Outcome: Progressing   Discussed poc with pt, pt verbalized understanding    Purposeful rounding every 2hours    VS wnl  Cardiac monitoring in use, pt is NSR, tele monitor # [id number]  Fall precautions in place, remains injury free  Pt denies c/o pain   Pain and nausea under control with PRN meds  Accurate I&Os- nicholson in place with stat lock being removed by patient   Abx given as prescribed  Bed locked at lowest position  Call light within reach    Chart check complete  Will cont with POC

## 2024-10-15 NOTE — PROGRESS NOTES
[facility]  Progress Note    Patient Name: Merle Caro  MRN: [id number]  Patient Class: IP- Inpatient   Admission Date: 10/13/2024  Length of Stay: 2 days  Attending Physician: Jason Alexandra MD  Primary Care Provider: Marck Major MD        Subjective:     Principal Problem:Pyelonephritis        HPI:    Patient is a 69-year-old female with past medical history significant for anxiety, chronic back pain, depression, insomnia, back surgery/spinal fusion,  cauda equina syndrome, lumbar compression fractures, osteoporosis, spinal stenosis, anemia, B12 deficiency, arthritis, asthma/COPD, GERD, hypertension, rheumatoid arthritis, chronic constipation,  chronic urinary urge incontinence, hydronephrosis,  possible bladder outflow obstruction with urinary retention issues requiring Leija catheter at times, bowel incontinence, solitary right kidney as left kidney appears to be nonfunctioning per renogram, and chronic UTI's who presented to ED with complaints of abdominal pain, back  pain, flank pain, dysuria, weakness, fatigue, fever, chills, diaphoresis, decreased activity, abdominal distention, constipation, nausea, vomiting, generalized myalgias.  Initial vital signs in ED with blood pressure 249/123, heart rate 121, temperature 97.9°, 94% SpO2 on room air, respirations 17 per minute.  Lab workup shows WBC 17, left shift noted, sodium 130, potassium 3.2, chloride 94, glucose 129, BUN 22, creatinine 0.8, magnesium 2.0, alk-phos 108, albumin 3.4, total bilirubin 0.6, AST 11, ALT 8, lactic acid 0.8, procalcitonin 0.08, urinalysis with cloudy yellow urine positive nitrites, 1+ leukocyte esterase, 58 WBC, many bacteria, many WBC clumps, 10 hyaline cast, RBC 28.  Urine culture is in progress, blood cultures x2 collected.  Chest x-ray done with stable findings of cardiomegaly and coarse interstitial lung markings.  CT renal stone study of abdomen and pelvis without contrast showed multiple  abnormal findings including severe left-sided hydronephrosis and hydroureter, right-sided hydronephrosis, moderate amount of stool in colon, bladder wall thickening identified, no obstructing renal calculi noted, emphysema with subpleural scarring and irregular nodular opacity in posterior left lung base measuring up to 13 mm, and chronic lumbar and thoracic spinal deformities.  She was given IV Levaquin, 1 L normal saline fluid bolus, IV potassium supplementation and p.o. potassium bicarb while in ED. hospital Medicine team was consulted for admission due to bilateral pyelonephritis.  She is a patient of Dr. Cam and he was consulted for additional recommendations -- for IV antibiotics until culture data available and Leija catheter placement -- he will follow along.    Overview/Hospital Course:  No notes on file    Interval History: f/u bacteremia planned PICC line today but blood culture isolated gram negative christy. Antibiotics adjusted    Review of Systems  Objective:     Vital Signs (Most Recent):  Temp: 98.2 °F (36.8 °C) (10/15/24 1150)  Pulse: 83 (10/15/24 1536)  Resp: 16 (10/15/24 1150)  BP: (!) 145/92 (10/15/24 1150)  SpO2: 99 % (10/15/24 1150) Vital Signs (24h Range):  Temp:  [97.8 °F (36.6 °C)-99 °F (37.2 °C)] 98.2 °F (36.8 °C)  Pulse:  [] 83  Resp:  [16-20] 16  SpO2:  [83 %-99 %] 99 %  BP: (114-175)/(57-95) 145/92     Weight: 93.7 kg (206 lb 9.1 oz)  Body mass index is 37.78 kg/m².    Intake/Output Summary (Last 24 hours) at 10/15/2024 1549  Last data filed at 10/15/2024 1127  Gross per 24 hour   Intake 1839.38 ml   Output 2800 ml   Net -960.62 ml         Physical Exam  HENT:      Head: Normocephalic and atraumatic.   Cardiovascular:      Rate and Rhythm: Normal rate and regular rhythm.      Heart sounds: No murmur heard.  Pulmonary:      Effort: Pulmonary effort is normal. No respiratory distress.      Breath sounds: Normal breath sounds. No wheezing.   Abdominal:      General: Bowel sounds are  normal. There is no distension.      Palpations: Abdomen is soft.      Tenderness: There is no abdominal tenderness.   Musculoskeletal:         General: No swelling.   Skin:     General: Skin is warm and dry.   Neurological:      Mental Status: She is alert and oriented to person, place, and time. Mental status is at baseline.             Significant Labs: All pertinent labs within the past 24 hours have been reviewed.  Recent Lab Results         10/15/24  0616        Albumin 2.6       ALP 88       ALT 6       Anion Gap 8       AST 8       Baso # 0.03       Basophil % 0.3       BILIRUBIN TOTAL 0.5  Comment: For infants and newborns, interpretation of results should be based  on gestational age, weight and in agreement with clinical  observations.    Premature Infant recommended reference ranges:  Up to 24 hours.............<8.0 mg/dL  Up to 48 hours............<12.0 mg/dL  3-5 days..................<15.0 mg/dL  6-29 days.................<15.0 mg/dL         BUN 12       Calcium 8.0       Chloride 98       CO2 24       Creatinine 0.7       Differential Method Automated       eGFR >60       Eos # 0.2       Eos % 2.2       Glucose 106       Gran # (ANC) 8.7       Gran % 79.5       Hematocrit 35.3       Hemoglobin 11.3       Immature Grans (Abs) 0.10  Comment: Mild elevation in immature granulocytes is non specific and   can be seen in a variety of conditions including stress response,   acute inflammation, trauma and pregnancy. Correlation with other   laboratory and clinical findings is essential.         Immature Granulocytes 0.9       Lymph # 0.8       Lymph % 7.6       MCH 29.4       MCHC 32.0       MCV 92       Mono # 1.0       Mono % 9.5       MPV 8.6       nRBC 0       Platelet Count 295       Potassium 3.0       PROTEIN TOTAL 5.9       RBC 3.84       RDW 14.2       Sodium 130       WBC 10.94               Significant Imaging: I have reviewed all pertinent imaging results/findings within the past 24  hours.    Assessment/Plan:      * Pyelonephritis  Bilateral pyelonephritis in patient with complicated urological history  IV Rocephin  Levsin ordered for bladder spasms, PRN dilaudid for severe pain  Follow up urine culture and blood culture reports  Continue nicholson      Hypokalemia  Patient's most recent potassium results are listed below.   Recent Labs     10/13/24  1912 10/14/24  0328 10/15/24  0616   K 3.2* 3.3* 3.0*       Plan  - Replete potassium per protocol  - Monitor potassium Daily      Bladder outflow obstruction  Urology consulted  Nicholson catheter placed per ED      Sepsis without acute organ dysfunction  Continue Rocephin  Flagyl added due to Bacteroides in blood culture    Chronic idiopathic constipation  Has been taking Linzess on PRN basis, reports BM two days ago      Coronary artery disease involving native coronary artery of native heart without angina pectoris  Denies chest pain  Cardiac monitoring ordered  No EKG changes noted  Check troponin level      Osteoporosis with current pathological fracture  Has had multiple fractures, and procedures   Chronic pain -- PRN dilaudid ordered as she is in severe pain      Benzodiazepine dependence  Continue clonazepam BID (chronic home medication)      Gastroesophageal reflux disease with esophagitis  Protonix BID      Hyponatremia  Hyponatremia is likely due to Dehydration/hypovolemia. The patient's most recent sodium results are listed below.  Recent Labs     10/13/24  1912 10/14/24  0328 10/15/24  0616   * 131* 130*       Plan  -- Monitor sodium Daily.       Asthma with COPD  No acute exacerbation  Continue nebulizer treatments        VTE Risk Mitigation (From admission, onward)           Ordered     enoxaparin injection 40 mg  Daily         10/14/24 0357     IP VTE HIGH RISK PATIENT  Once         10/14/24 0357     Place sequential compression device  Until discontinued         10/14/24 0357                    Discharge Planning   YELENA:      Code  Status: Full Code   Is the patient medically ready for discharge?:     Reason for patient still in hospital (select all that apply): Patient trending condition and Treatment  Discharge Plan A: Home Health                  Jason Alexandra MD  Department of Hospital Medicine   [facility]

## 2024-10-15 NOTE — SUBJECTIVE & OBJECTIVE
Interval History: Urine cx no growth. Afebrile with no leukocytosis. CIC planned per urology. Will treat empirically for pyelonephritis using Rocephin.     Review of Systems   Constitutional:  Positive for activity change and appetite change. Negative for chills.   Gastrointestinal:  Negative for abdominal pain.   Genitourinary:  Negative for dysuria.   Neurological:  Positive for weakness.   All other systems reviewed and are negative.    Objective:     Vital Signs (Most Recent):  Temp: 98 °F (36.7 °C) (10/15/24 0712)  Pulse: 98 (10/15/24 0800)  Resp: 16 (10/15/24 0712)  BP: (!) 114/57 (10/15/24 0712)  SpO2: (!) 94 % (10/15/24 0714) Vital Signs (24h Range):  Temp:  [97.8 °F (36.6 °C)-99 °F (37.2 °C)] 98 °F (36.7 °C)  Pulse:  [] 98  Resp:  [16-20] 16  SpO2:  [83 %-99 %] 94 %  BP: (114-175)/(57-95) 114/57     Weight: 93.7 kg (206 lb 9.1 oz)  Body mass index is 37.78 kg/m².    Estimated Creatinine Clearance: 83.1 mL/min (based on SCr of 0.7 mg/dL).     Physical Exam  Constitutional:       General: She is not in acute distress.     Appearance: Normal appearance. She is not ill-appearing.   Cardiovascular:      Rate and Rhythm: Normal rate and regular rhythm.      Pulses: Normal pulses.      Heart sounds: Normal heart sounds. No murmur heard.     No friction rub. No gallop.   Pulmonary:      Effort: Pulmonary effort is normal. No respiratory distress.      Breath sounds: Normal breath sounds.   Abdominal:      General: Abdomen is flat. Bowel sounds are normal. There is no distension.      Palpations: Abdomen is soft.      Tenderness: There is no abdominal tenderness.   Skin:     General: Skin is warm and dry.   Neurological:      Mental Status: She is alert.          Significant Labs: Blood Culture:   Recent Labs   Lab 06/19/24  1024 06/19/24  1025 06/22/24  1416 10/13/24  1914   LABBLOO Gram stain renee bottle: Gram negative rods  Positive results previously called 06/20/2024  15:12  BACTEROIDES FRAGILIS* Gram  stain renee bottle: Gram negative rods  Results called to and read back by: BLAISE Chavez RN  06/20/2024  15:10  BACTEROIDES FRAGILIS* No growth after 5 days.  No growth after 5 days. No Growth to date  No Growth to date  No Growth to date  No Growth to date     CBC:   Recent Labs   Lab 10/13/24  1912 10/14/24  0328 10/15/24  0616   WBC 17.03* 10.10 10.94   HGB 12.5 10.8* 11.3*   HCT 36.3* 32.6* 35.3*    282 295     CMP:   Recent Labs   Lab 10/13/24  1912 10/14/24  0328 10/15/24  0616   * 131* 130*   K 3.2* 3.3* 3.0*   CL 94* 98 98   CO2 26 25 24   * 100 106   BUN 22 19 12   CREATININE 0.8 0.7 0.7   CALCIUM 9.4 8.1* 8.0*   PROT 7.5 5.9* 5.9*   ALBUMIN 3.4* 2.6* 2.6*   BILITOT 0.6 0.6 0.5   ALKPHOS 108 86 88   AST 11 9* 8*   ALT 8* <5* 6*   ANIONGAP 10 8 8     Microbiology Results (last 7 days)       Procedure Component Value Units Date/Time    Blood Culture #2 **CANNOT BE ORDERED STAT** [[id number]] Collected: 10/13/24 1914    Order Status: Completed Specimen: Blood from Peripheral, Hand, Left Updated: 10/15/24 0613     Blood Culture, Routine No Growth to date      No Growth to date    Blood Culture #1 **CANNOT BE ORDERED STAT** [[id number]] Collected: 10/13/24 1914    Order Status: Completed Specimen: Blood from Peripheral, Antecubital, Left Updated: 10/15/24 0613     Blood Culture, Routine No Growth to date      No Growth to date    Urine culture [[id number]] Collected: 10/13/24 1938    Order Status: Completed Specimen: Urine Updated: 10/15/24 0348     Urine Culture, Routine No significant growth    Narrative:      Specimen Source->Urine          Urine Culture:   Recent Labs   Lab 06/19/24  1102 10/13/24  1938   LABURIN ESCHERICHIA COLI  >100,000 cfu/ml  * No significant growth     Urine Studies:   Recent Labs   Lab 10/13/24  1938   COLORU Yellow   APPEARANCEUA Cloudy*   PHUR 8.0   SPECGRAV 1.030   PROTEINUA 3+*   GLUCUA Negative   KETONESU 1+*   BILIRUBINUA 1+*   OCCULTUA Trace*   NITRITE  Positive*   UROBILINOGEN 2.0-3.0*   LEUKOCYTESUR 1+*   RBCUA 28*   WBCUA 58*   BACTERIA Many*   HYALINECASTS 10*     All pertinent labs within the past 24 hours have been reviewed.    Significant Imaging: I have reviewed all pertinent imaging results/findings within the past 24 hours.

## 2024-10-15 NOTE — PROGRESS NOTES
Pharmacist Intervention IV to PO Note    Merle Caro is a 69 y.o. female being treated with IV medication pantoprazole    Patient Data:    Vital Signs (Most Recent):  Temp: 98.2 °F (36.8 °C) (10/14/24 2305)  Pulse: 94 (10/14/24 2305)  Resp: 18 (10/14/24 2305)  BP: (!) 156/72 (10/14/24 2305)  SpO2: 97 % (10/14/24 2305) Vital Signs (72h Range):  Temp:  [97.4 °F (36.3 °C)-98.9 °F (37.2 °C)]   Pulse:  []   Resp:  [17-20]   BP: (142-249)/()   SpO2:  [93 %-99 %]      CBC:  Recent Labs   Lab 10/13/24  1912 10/14/24  0328   WBC 17.03* 10.10   RBC 4.21 3.67*   HGB 12.5 10.8*   HCT 36.3* 32.6*    282   MCV 86 89   MCH 29.7 29.4   MCHC 34.4 33.1     CMP:     Recent Labs   Lab 10/09/24  1017 10/13/24  1912 10/14/24  0328   * 129* 100   CALCIUM 10.3 9.4 8.1*   ALBUMIN 3.9 3.4* 2.6*   PROT 8.6* 7.5 5.9*   * 130* 131*   K 4.2 3.2* 3.3*   CO2 25 26 25   CL 91* 94* 98   BUN 11 22 19   CREATININE 0.9 0.8 0.7   ALKPHOS 124 108 86   ALT 7* 8* <5*   AST 14 11 9*   BILITOT 0.6 0.6 0.6       Dietary Orders:  Diet Orders            Diet Adult Regular: Regular starting at 10/14 0811            Based on the following criteria, this patient qualifies for intravenous to oral conversion:  [x] The patients gastrointestinal tract is functioning (tolerating medications via oral or enteral route for 24 hours and tolerating food or enteral feeds for 24 hours).  [x] The patient is hemodynamically stable for 24 hours (heart rate <100 beats per minute, systolic blood pressure >99 mm Hg, and respiratory rate <20 breaths per minute).  [x] The patient shows clinical improvement (afebrile for at least 24 hours and white blood cell count downtrending or normalized). Additionally, the patient must be non-neutropenic (absolute neutrophil count >500 cells/mm3).  [x] For antimicrobials, the patient has received IV therapy for at least 24 hours.    IV medication (pantoprazole 40 mg BID) will be changed to oral medication  (pantoprazole 40 mg BID)    Pharmacist's Name: Karen Crenshaw  Pharmacist's Extension: [phone]

## 2024-10-16 ENCOUNTER — TELEPHONE (OUTPATIENT)
Dept: UROLOGY | Facility: CLINIC | Age: 69
End: 2024-10-16
Payer: MEDICARE

## 2024-10-16 PROBLEM — A49.8 BACTERIAL INFECTION DUE TO BACTEROIDES FRAGILIS: Status: ACTIVE | Noted: 2024-10-16

## 2024-10-16 LAB
ALBUMIN SERPL BCP-MCNC: 2.4 G/DL (ref 3.5–5.2)
ALP SERPL-CCNC: 77 U/L (ref 55–135)
ALT SERPL W/O P-5'-P-CCNC: 7 U/L (ref 10–44)
ANION GAP SERPL CALC-SCNC: 9 MMOL/L (ref 8–16)
AST SERPL-CCNC: 10 U/L (ref 10–40)
BASOPHILS # BLD AUTO: 0.03 K/UL (ref 0–0.2)
BASOPHILS NFR BLD: 0.3 % (ref 0–1.9)
BILIRUB SERPL-MCNC: 0.5 MG/DL (ref 0.1–1)
BUN SERPL-MCNC: 13 MG/DL (ref 8–23)
CALCIUM SERPL-MCNC: 8.1 MG/DL (ref 8.7–10.5)
CHLORIDE SERPL-SCNC: 98 MMOL/L (ref 95–110)
CO2 SERPL-SCNC: 24 MMOL/L (ref 23–29)
CREAT SERPL-MCNC: 0.7 MG/DL (ref 0.5–1.4)
DIFFERENTIAL METHOD BLD: ABNORMAL
EOSINOPHIL # BLD AUTO: 0.3 K/UL (ref 0–0.5)
EOSINOPHIL NFR BLD: 3.6 % (ref 0–8)
ERYTHROCYTE [DISTWIDTH] IN BLOOD BY AUTOMATED COUNT: 14 % (ref 11.5–14.5)
EST. GFR  (NO RACE VARIABLE): >60 ML/MIN/1.73 M^2
GLUCOSE SERPL-MCNC: 100 MG/DL (ref 70–110)
HCT VFR BLD AUTO: 33.2 % (ref 37–48.5)
HGB BLD-MCNC: 10.7 G/DL (ref 12–16)
IMM GRANULOCYTES # BLD AUTO: 0.11 K/UL (ref 0–0.04)
IMM GRANULOCYTES NFR BLD AUTO: 1.2 % (ref 0–0.5)
LYMPHOCYTES # BLD AUTO: 2 K/UL (ref 1–4.8)
LYMPHOCYTES NFR BLD: 20.7 % (ref 18–48)
MCH RBC QN AUTO: 29.3 PG (ref 27–31)
MCHC RBC AUTO-ENTMCNC: 32.2 G/DL (ref 32–36)
MCV RBC AUTO: 91 FL (ref 82–98)
MONOCYTES # BLD AUTO: 1.1 K/UL (ref 0.3–1)
MONOCYTES NFR BLD: 11.1 % (ref 4–15)
NEUTROPHILS # BLD AUTO: 6 K/UL (ref 1.8–7.7)
NEUTROPHILS NFR BLD: 63.1 % (ref 38–73)
NRBC BLD-RTO: 0 /100 WBC
PLATELET # BLD AUTO: 295 K/UL (ref 150–450)
PMV BLD AUTO: 9 FL (ref 9.2–12.9)
POTASSIUM SERPL-SCNC: 3.4 MMOL/L (ref 3.5–5.1)
PROT SERPL-MCNC: 5.5 G/DL (ref 6–8.4)
RBC # BLD AUTO: 3.65 M/UL (ref 4–5.4)
SODIUM SERPL-SCNC: 131 MMOL/L (ref 136–145)
WBC # BLD AUTO: 9.48 K/UL (ref 3.9–12.7)

## 2024-10-16 PROCEDURE — 63600175 PHARM REV CODE 636 W HCPCS: Performed by: INTERNAL MEDICINE

## 2024-10-16 PROCEDURE — 80053 COMPREHEN METABOLIC PANEL: CPT | Performed by: NURSE PRACTITIONER

## 2024-10-16 PROCEDURE — 36415 COLL VENOUS BLD VENIPUNCTURE: CPT | Performed by: INTERNAL MEDICINE

## 2024-10-16 PROCEDURE — 36415 COLL VENOUS BLD VENIPUNCTURE: CPT | Mod: XB | Performed by: NURSE PRACTITIONER

## 2024-10-16 PROCEDURE — 85025 COMPLETE CBC W/AUTO DIFF WBC: CPT | Performed by: NURSE PRACTITIONER

## 2024-10-16 PROCEDURE — 21400001 HC TELEMETRY ROOM

## 2024-10-16 PROCEDURE — 63600175 PHARM REV CODE 636 W HCPCS: Performed by: NURSE PRACTITIONER

## 2024-10-16 PROCEDURE — 25000003 PHARM REV CODE 250: Performed by: NURSE PRACTITIONER

## 2024-10-16 PROCEDURE — 25000003 PHARM REV CODE 250: Performed by: INTERNAL MEDICINE

## 2024-10-16 PROCEDURE — 99900035 HC TECH TIME PER 15 MIN (STAT)

## 2024-10-16 PROCEDURE — 94799 UNLISTED PULMONARY SVC/PX: CPT

## 2024-10-16 PROCEDURE — 99233 SBSQ HOSP IP/OBS HIGH 50: CPT | Mod: 95,,, | Performed by: STUDENT IN AN ORGANIZED HEALTH CARE EDUCATION/TRAINING PROGRAM

## 2024-10-16 PROCEDURE — 25000003 PHARM REV CODE 250: Performed by: STUDENT IN AN ORGANIZED HEALTH CARE EDUCATION/TRAINING PROGRAM

## 2024-10-16 PROCEDURE — 87040 BLOOD CULTURE FOR BACTERIA: CPT | Performed by: INTERNAL MEDICINE

## 2024-10-16 PROCEDURE — 94761 N-INVAS EAR/PLS OXIMETRY MLT: CPT

## 2024-10-16 PROCEDURE — 27000221 HC OXYGEN, UP TO 24 HOURS

## 2024-10-16 RX ORDER — TRAMADOL HYDROCHLORIDE 50 MG/1
50 TABLET ORAL EVERY 6 HOURS PRN
Status: DISCONTINUED | OUTPATIENT
Start: 2024-10-16 | End: 2024-10-18 | Stop reason: HOSPADM

## 2024-10-16 RX ORDER — CEFTRIAXONE 2 G/1
2 INJECTION, POWDER, FOR SOLUTION INTRAMUSCULAR; INTRAVENOUS
Status: DISCONTINUED | OUTPATIENT
Start: 2024-10-16 | End: 2024-10-18 | Stop reason: HOSPADM

## 2024-10-16 RX ADMIN — CLONAZEPAM 0.5 MG: 0.5 TABLET ORAL at 09:10

## 2024-10-16 RX ADMIN — PHENAZOPYRIDINE 100 MG: 100 TABLET ORAL at 08:10

## 2024-10-16 RX ADMIN — HYDROMORPHONE HYDROCHLORIDE 1 MG: 1 INJECTION, SOLUTION INTRAMUSCULAR; INTRAVENOUS; SUBCUTANEOUS at 12:10

## 2024-10-16 RX ADMIN — HALOPERIDOL 5 MG: 5 TABLET ORAL at 09:10

## 2024-10-16 RX ADMIN — CLONAZEPAM 0.5 MG: 0.5 TABLET ORAL at 08:10

## 2024-10-16 RX ADMIN — CELECOXIB 200 MG: 100 CAPSULE ORAL at 08:10

## 2024-10-16 RX ADMIN — TRAZODONE HYDROCHLORIDE 150 MG: 100 TABLET ORAL at 09:10

## 2024-10-16 RX ADMIN — PAROXETINE HYDROCHLORIDE 40 MG: 20 TABLET, FILM COATED ORAL at 08:10

## 2024-10-16 RX ADMIN — MUPIROCIN: 20 OINTMENT TOPICAL at 09:10

## 2024-10-16 RX ADMIN — ENOXAPARIN SODIUM 40 MG: 40 INJECTION SUBCUTANEOUS at 04:10

## 2024-10-16 RX ADMIN — PHENAZOPYRIDINE 100 MG: 100 TABLET ORAL at 11:10

## 2024-10-16 RX ADMIN — PREGABALIN 100 MG: 100 CAPSULE ORAL at 08:10

## 2024-10-16 RX ADMIN — HYDROMORPHONE HYDROCHLORIDE 1 MG: 1 INJECTION, SOLUTION INTRAMUSCULAR; INTRAVENOUS; SUBCUTANEOUS at 08:10

## 2024-10-16 RX ADMIN — CEFTRIAXONE 2 G: 2 INJECTION, POWDER, FOR SOLUTION INTRAMUSCULAR; INTRAVENOUS at 11:10

## 2024-10-16 RX ADMIN — METRONIDAZOLE 500 MG: 500 TABLET ORAL at 05:10

## 2024-10-16 RX ADMIN — PANTOPRAZOLE SODIUM 40 MG: 40 TABLET, DELAYED RELEASE ORAL at 09:10

## 2024-10-16 RX ADMIN — PHENAZOPYRIDINE 100 MG: 100 TABLET ORAL at 04:10

## 2024-10-16 RX ADMIN — METRONIDAZOLE 500 MG: 500 TABLET ORAL at 10:10

## 2024-10-16 RX ADMIN — METRONIDAZOLE 500 MG: 500 TABLET ORAL at 02:10

## 2024-10-16 RX ADMIN — MUPIROCIN: 20 OINTMENT TOPICAL at 08:10

## 2024-10-16 RX ADMIN — PREGABALIN 100 MG: 100 CAPSULE ORAL at 09:10

## 2024-10-16 RX ADMIN — PANTOPRAZOLE SODIUM 40 MG: 40 TABLET, DELAYED RELEASE ORAL at 08:10

## 2024-10-16 NOTE — SUBJECTIVE & OBJECTIVE
Interval History: f/u bacteremia  still having some lower abdominal pain but it is improving.     Review of Systems  Objective:     Vital Signs (Most Recent):  Temp: 97.5 °F (36.4 °C) (10/16/24 0830)  Pulse: 103 (10/16/24 0934)  Resp: 17 (10/16/24 0830)  BP: (!) 109/53 (recheck after admin of pain meds) (10/16/24 0934)  SpO2: 98 % (10/16/24 0830) Vital Signs (24h Range):  Temp:  [97.5 °F (36.4 °C)-98.4 °F (36.9 °C)] 97.5 °F (36.4 °C)  Pulse:  [] 103  Resp:  [16-19] 17  SpO2:  [92 %-99 %] 98 %  BP: (109-185)/(53-92) 109/53     Weight: 93.7 kg (206 lb 9.1 oz)  Body mass index is 37.78 kg/m².    Intake/Output Summary (Last 24 hours) at 10/16/2024 1049  Last data filed at 10/16/2024 0844  Gross per 24 hour   Intake --   Output 2200 ml   Net -2200 ml         Physical Exam  HENT:      Head: Normocephalic and atraumatic.   Cardiovascular:      Rate and Rhythm: Normal rate and regular rhythm.      Heart sounds: No murmur heard.  Pulmonary:      Effort: Pulmonary effort is normal. No respiratory distress.      Breath sounds: Normal breath sounds. No wheezing.   Abdominal:      General: Bowel sounds are normal. There is no distension.      Palpations: Abdomen is soft.      Tenderness: There is no abdominal tenderness.   Musculoskeletal:         General: No swelling.   Skin:     General: Skin is warm and dry.   Neurological:      Mental Status: She is alert and oriented to person, place, and time. Mental status is at baseline.             Significant Labs: All pertinent labs within the past 24 hours have been reviewed.  Recent Lab Results         10/16/24  0454        Albumin 2.4       ALP 77       ALT 7       Anion Gap 9       AST 10       Baso # 0.03       Basophil % 0.3       BILIRUBIN TOTAL 0.5  Comment: For infants and newborns, interpretation of results should be based  on gestational age, weight and in agreement with clinical  observations.    Premature Infant recommended reference ranges:  Up to 24  hours.............<8.0 mg/dL  Up to 48 hours............<12.0 mg/dL  3-5 days..................<15.0 mg/dL  6-29 days.................<15.0 mg/dL         BUN 13       Calcium 8.1       Chloride 98       CO2 24       Creatinine 0.7       Differential Method Automated       eGFR >60       Eos # 0.3       Eos % 3.6       Glucose 100       Gran # (ANC) 6.0       Gran % 63.1       Hematocrit 33.2       Hemoglobin 10.7       Immature Grans (Abs) 0.11  Comment: Mild elevation in immature granulocytes is non specific and   can be seen in a variety of conditions including stress response,   acute inflammation, trauma and pregnancy. Correlation with other   laboratory and clinical findings is essential.         Immature Granulocytes 1.2       Lymph # 2.0       Lymph % 20.7       MCH 29.3       MCHC 32.2       MCV 91       Mono # 1.1       Mono % 11.1       MPV 9.0       nRBC 0       Platelet Count 295       Potassium 3.4       PROTEIN TOTAL 5.5       RBC 3.65       RDW 14.0       Sodium 131       WBC 9.48               Significant Imaging: I have reviewed all pertinent imaging results/findings within the past 24 hours.

## 2024-10-16 NOTE — ASSESSMENT & PLAN NOTE
Hyponatremia is likely due to Dehydration/hypovolemia. The patient's most recent sodium results are listed below.  Recent Labs     10/14/24  0328 10/15/24  0616 10/16/24  0454   * 130* 131*       Plan  -- Monitor sodium Daily.

## 2024-10-16 NOTE — SUBJECTIVE & OBJECTIVE
Interval History: Blood cx with late update of anaerobic GNR identified as Bacteroides on BCID panel. This is a recurrence of same pathogen isolated in June 2024. Will need colonoscopy if not yet done.     Review of Systems   Constitutional:  Positive for activity change and appetite change. Negative for chills.   Gastrointestinal:  Negative for abdominal pain.   Genitourinary:  Negative for dysuria.   Neurological:  Positive for weakness.   All other systems reviewed and are negative.    Objective:     Vital Signs (Most Recent):  Temp: 98.4 °F (36.9 °C) (10/16/24 0403)  Pulse: 85 (10/16/24 0727)  Resp: 19 (10/16/24 0727)  BP: (!) 171/82 (10/16/24 0403)  SpO2: 98 % (10/16/24 0727) Vital Signs (24h Range):  Temp:  [97.8 °F (36.6 °C)-98.4 °F (36.9 °C)] 98.4 °F (36.9 °C)  Pulse:  [] 85  Resp:  [16-19] 19  SpO2:  [92 %-99 %] 98 %  BP: (124-185)/(68-92) 171/82     Weight: 93.7 kg (206 lb 9.1 oz)  Body mass index is 37.78 kg/m².    Estimated Creatinine Clearance: 83.1 mL/min (based on SCr of 0.7 mg/dL).     Physical Exam  Constitutional:       General: She is not in acute distress.     Appearance: Normal appearance. She is not ill-appearing.   Cardiovascular:      Rate and Rhythm: Normal rate and regular rhythm.      Pulses: Normal pulses.      Heart sounds: Normal heart sounds. No murmur heard.     No friction rub. No gallop.   Pulmonary:      Effort: Pulmonary effort is normal. No respiratory distress.      Breath sounds: Normal breath sounds.   Abdominal:      General: Abdomen is flat. Bowel sounds are normal. There is no distension.      Palpations: Abdomen is soft.      Tenderness: There is no abdominal tenderness.   Skin:     General: Skin is warm and dry.   Neurological:      Mental Status: She is alert.          Significant Labs: Blood Culture:   Recent Labs   Lab 06/19/24  1024 06/19/24  1025 06/22/24  1416 10/13/24  1914   LABBLOO Gram stain renee bottle: Gram negative rods  Positive results previously  called 06/20/2024  15:12  BACTEROIDES FRAGILIS* Gram stain renee bottle: Gram negative rods  Results called to and read back by: BLAISE Chavez RN  06/20/2024  15:10  BACTEROIDES FRAGILIS* No growth after 5 days.  No growth after 5 days. Gram stain renee bottle: Gram negative rods  Positive results previously called 10/15/2024  17:40  Gram stain renee bottle: Gram negative rods  Results called to and read back by:Scott Alexandra RN 10/15/2024  12:16     CBC:   Recent Labs   Lab 10/15/24  0616 10/16/24  0454   WBC 10.94 9.48   HGB 11.3* 10.7*   HCT 35.3* 33.2*    295     CMP:   Recent Labs   Lab 10/15/24  0616 10/16/24  0454   * 131*   K 3.0* 3.4*   CL 98 98   CO2 24 24    100   BUN 12 13   CREATININE 0.7 0.7   CALCIUM 8.0* 8.1*   PROT 5.9* 5.5*   ALBUMIN 2.6* 2.4*   BILITOT 0.5 0.5   ALKPHOS 88 77   AST 8* 10   ALT 6* 7*   ANIONGAP 8 9     Microbiology Results (last 7 days)       Procedure Component Value Units Date/Time    Blood culture [[id number]] Collected: 10/16/24 0841    Order Status: Sent Specimen: Blood from Peripheral, Forearm, Right     Blood culture [[id number]]     Order Status: Sent Specimen: Blood     Blood Culture #1 **CANNOT BE ORDERED STAT** [[id number]] Collected: 10/13/24 1914    Order Status: Completed Specimen: Blood from Peripheral, Antecubital, Left Updated: 10/15/24 1741     Blood Culture, Routine Gram stain renee bottle: Gram negative rods      Positive results previously called 10/15/2024  17:40    Blood culture [[id number]]     Order Status: Canceled Specimen: Blood     Blood culture [[id number]]     Order Status: Canceled Specimen: Blood     Rapid Organism ID by PCR (from Blood culture) [[id number]]  (Abnormal) Collected: 10/13/24 1914    Order Status: Completed Updated: 10/15/24 1259     Enterococcus faecalis Not Detected     Enterococcus faecium Not Detected     Listeria monocytogenes Not Detected     Staphylococcus spp. Not Detected     Staphylococcus aureus Not  Detected     Staphylococcus epidermidis Not Detected     Staphylococcus lugdunensis Not Detected     Streptococcus species Not Detected     Streptococcus agalactiae Not Detected     Streptococcus pneumoniae Not Detected     Streptococcus pyogenes Not Detected     Acinetobacter calcoaceticus/baumannii complex Not Detected     Bacteroides fragilis Detected     Enterobacterales Not Detected     Enterobacter cloacae complex Not Detected     Escherichia coli Not Detected     Klebsiella aerogenes Not Detected     Klebsiella oxytoca Not Detected     Klebsiella pneumoniae group Not Detected     Proteus Not Detected     Salmonella sp Not Detected     Serratia marcescens Not Detected     Haemophilus influenzae Not Detected     Neisseria meningtidis Not Detected     Pseudomonas aeruginosa Not Detected     Stenotrophomonas maltophilia Not Detected     Candida albicans Not Detected     Candida auris Not Detected     Candida glabrata Not Detected     Candida krusei Not Detected     Candida parapsilosis Not Detected     Candida tropicalis Not Detected     Cryptococcus neoformans/gattii Not Detected     CTX-M (ESBL ) Test Not Applicable     IMP (Carbapenem resistant) Test Not Applicable     KPC resistance gene (Carbapenem resistant) Test Not Applicable     mcr-1  Test Not Applicable     mec A/C  Test Not Applicable     mec A/C and MREJ (MRSA) gene Test Not Applicable     NDM (Carbapenem resistant) Test Not Applicable     OXA-48-like (Carbapenem resistant) Test Not Applicable     van A/B (VRE gene) Test Not Applicable     VIM (Carbapenem resistant) Test Not Applicable    Blood Culture #2 **CANNOT BE ORDERED STAT** [[id number]] Collected: 10/13/24 1914    Order Status: Completed Specimen: Blood from Peripheral, Hand, Left Updated: 10/15/24 1217     Blood Culture, Routine Gram stain renee bottle: Gram negative rods      Results called to and read back by:Scott Alexandra RN 10/15/2024  12:16    Urine culture [[id number]]  Collected: 10/13/24 1938    Order Status: Completed Specimen: Urine Updated: 10/15/24 0348     Urine Culture, Routine No significant growth    Narrative:      Specimen Source->Urine    Rapid Organism ID by PCR (from Blood culture) [[id number]] Collected: 10/13/24 1914    Order Status: Canceled           All pertinent labs within the past 24 hours have been reviewed.    Significant Imaging: I have reviewed all pertinent imaging results/findings within the past 24 hours.

## 2024-10-16 NOTE — ASSESSMENT & PLAN NOTE
Bilateral pyelonephritis in patient with complicated urological history  IV Rocephin  Levsin ordered for bladder spasms, PRN dilaudid for severe pain  Continue nicholson

## 2024-10-16 NOTE — PLAN OF CARE
Problem: Adult Inpatient Plan of Care  Goal: Plan of Care Review  Outcome: Progressing  Goal: Patient-Specific Goal (Individualized)  Outcome: Progressing  Goal: Absence of Hospital-Acquired Illness or Injury  Outcome: Progressing  Goal: Optimal Comfort and Wellbeing  Outcome: Progressing  Goal: Readiness for Transition of Care  Outcome: Progressing     Problem: Infection  Goal: Absence of Infection Signs and Symptoms  Outcome: Progressing     Problem: Skin Injury Risk Increased  Goal: Skin Health and Integrity  Outcome: Progressing     Problem: Sepsis/Septic Shock  Goal: Optimal Coping  Outcome: Progressing  Goal: Absence of Bleeding  Outcome: Progressing  Goal: Blood Glucose Level Within Targeted Range  Outcome: Progressing  Goal: Absence of Infection Signs and Symptoms  Outcome: Progressing  Goal: Optimal Nutrition Intake  Outcome: Progressing   Discussed poc with pt, pt verbalized understanding    Purposeful rounding every 2hours    VS wnl  Cardiac monitoring in use  Fall precautions in place, remains injury free  Pt c/o pain   Pain  control with PRN meds  Accurate I&Os- Foleyin place   Meds given as prescribed  Pt on 2L of O2   Pt placed on pillow as wedge   Bed locked at lowest position  Call light within reach    Chart check complete  Will cont with POC

## 2024-10-16 NOTE — ASSESSMENT & PLAN NOTE
--U/A with pyuria 58  --No fever and leukocytosis resolved  --Urine cx has finalized with no growth   --CT reports left-sided severe hydronephrosis and hydroureter. Left renal cortical thinning noted. Right-sided hydronephrosis is also seen. No obstructing renal calculi.   --Concern for pyelonephritis  --Appreciate urology; agree with [facility]   --Recent cultures with no MDRO  --QT prolonged on EKG; will avoid quinolones  --Will complete 14 day course of empiric IV ceftriaxone   --Requires drug toxicity monitoring   --Follow blood cx; now reporting GNR; BCID identifies as Bacteroides fragilis   --Will continue PO metronidazole 500 mg TID      --Repeat blood cx for clearance   --Will schedule ID clinic follow up   --Above d/w primary team.        Outpatient Antibiotic Therapy Plan:     1) Infection: Pyelonephritis      2) Discharge Antibiotics:     Intravenous antibiotics:  IV ceftriaxone 2 g daily      3) Therapy Duration:  14 days      Estimated end date of IV antibiotics: 10/28/24     4) Outpatient Weekly Labs:     Order the following labs to be drawn on Mondays:   CBC  CMP      5) Fax Lab Results to Infectious Diseases Provider: Dr. Huitron      [facility] Fax Number: [phone]

## 2024-10-16 NOTE — ASSESSMENT & PLAN NOTE
Patient's most recent potassium results are listed below.   Recent Labs     10/14/24  0328 10/15/24  0616 10/16/24  0454   K 3.3* 3.0* 3.4*       Plan  - Replete potassium per protocol  - Monitor potassium Daily

## 2024-10-16 NOTE — PROGRESS NOTES
[facility] Medicine  Progress Note    Patient Name: Merle Caro  MRN: [id number]  Patient Class: IP- Inpatient   Admission Date: 10/13/2024  Length of Stay: 3 days  Attending Physician: Jason Alexandra MD  Primary Care Provider: Marck Major MD        Subjective:     Principal Problem:Pyelonephritis        HPI:    Patient is a 69-year-old female with past medical history significant for anxiety, chronic back pain, depression, insomnia, back surgery/spinal fusion,  cauda equina syndrome, lumbar compression fractures, osteoporosis, spinal stenosis, anemia, B12 deficiency, arthritis, asthma/COPD, GERD, hypertension, rheumatoid arthritis, chronic constipation,  chronic urinary urge incontinence, hydronephrosis,  possible bladder outflow obstruction with urinary retention issues requiring Leija catheter at times, bowel incontinence, solitary right kidney as left kidney appears to be nonfunctioning per renogram, and chronic UTI's who presented to ED with complaints of abdominal pain, back  pain, flank pain, dysuria, weakness, fatigue, fever, chills, diaphoresis, decreased activity, abdominal distention, constipation, nausea, vomiting, generalized myalgias.  Initial vital signs in ED with blood pressure 249/123, heart rate 121, temperature 97.9°, 94% SpO2 on room air, respirations 17 per minute.  Lab workup shows WBC 17, left shift noted, sodium 130, potassium 3.2, chloride 94, glucose 129, BUN 22, creatinine 0.8, magnesium 2.0, alk-phos 108, albumin 3.4, total bilirubin 0.6, AST 11, ALT 8, lactic acid 0.8, procalcitonin 0.08, urinalysis with cloudy yellow urine positive nitrites, 1+ leukocyte esterase, 58 WBC, many bacteria, many WBC clumps, 10 hyaline cast, RBC 28.  Urine culture is in progress, blood cultures x2 collected.  Chest x-ray done with stable findings of cardiomegaly and coarse interstitial lung markings.  CT renal stone study of abdomen and pelvis without contrast showed multiple  abnormal findings including severe left-sided hydronephrosis and hydroureter, right-sided hydronephrosis, moderate amount of stool in colon, bladder wall thickening identified, no obstructing renal calculi noted, emphysema with subpleural scarring and irregular nodular opacity in posterior left lung base measuring up to 13 mm, and chronic lumbar and thoracic spinal deformities.  She was given IV Levaquin, 1 L normal saline fluid bolus, IV potassium supplementation and p.o. potassium bicarb while in ED. hospital Medicine team was consulted for admission due to bilateral pyelonephritis.  She is a patient of Dr. Cam and he was consulted for additional recommendations -- for IV antibiotics until culture data available and Leija catheter placement -- he will follow along.    Overview/Hospital Course:  The patient presented with lower abdominal pain and dysuria. She was placed on IV antibiotics. Leija catheter placed due to neurogenic bladder.  ID was consulted. Blood cultures isolated bacteroides, antibiotics adjusted     Interval History: f/u bacteremia  still having some lower abdominal pain but it is improving.     Review of Systems  Objective:     Vital Signs (Most Recent):  Temp: 97.5 °F (36.4 °C) (10/16/24 0830)  Pulse: 103 (10/16/24 0934)  Resp: 17 (10/16/24 0830)  BP: (!) 109/53 (recheck after admin of pain meds) (10/16/24 0934)  SpO2: 98 % (10/16/24 0830) Vital Signs (24h Range):  Temp:  [97.5 °F (36.4 °C)-98.4 °F (36.9 °C)] 97.5 °F (36.4 °C)  Pulse:  [] 103  Resp:  [16-19] 17  SpO2:  [92 %-99 %] 98 %  BP: (109-185)/(53-92) 109/53     Weight: 93.7 kg (206 lb 9.1 oz)  Body mass index is 37.78 kg/m².    Intake/Output Summary (Last 24 hours) at 10/16/2024 1049  Last data filed at 10/16/2024 0844  Gross per 24 hour   Intake --   Output 2200 ml   Net -2200 ml         Physical Exam  HENT:      Head: Normocephalic and atraumatic.   Cardiovascular:      Rate and Rhythm: Normal rate and regular rhythm.       Heart sounds: No murmur heard.  Pulmonary:      Effort: Pulmonary effort is normal. No respiratory distress.      Breath sounds: Normal breath sounds. No wheezing.   Abdominal:      General: Bowel sounds are normal. There is no distension.      Palpations: Abdomen is soft.      Tenderness: There is no abdominal tenderness.   Musculoskeletal:         General: No swelling.   Skin:     General: Skin is warm and dry.   Neurological:      Mental Status: She is alert and oriented to person, place, and time. Mental status is at baseline.             Significant Labs: All pertinent labs within the past 24 hours have been reviewed.  Recent Lab Results         10/16/24  0454        Albumin 2.4       ALP 77       ALT 7       Anion Gap 9       AST 10       Baso # 0.03       Basophil % 0.3       BILIRUBIN TOTAL 0.5  Comment: For infants and newborns, interpretation of results should be based  on gestational age, weight and in agreement with clinical  observations.    Premature Infant recommended reference ranges:  Up to 24 hours.............<8.0 mg/dL  Up to 48 hours............<12.0 mg/dL  3-5 days..................<15.0 mg/dL  6-29 days.................<15.0 mg/dL         BUN 13       Calcium 8.1       Chloride 98       CO2 24       Creatinine 0.7       Differential Method Automated       eGFR >60       Eos # 0.3       Eos % 3.6       Glucose 100       Gran # (ANC) 6.0       Gran % 63.1       Hematocrit 33.2       Hemoglobin 10.7       Immature Grans (Abs) 0.11  Comment: Mild elevation in immature granulocytes is non specific and   can be seen in a variety of conditions including stress response,   acute inflammation, trauma and pregnancy. Correlation with other   laboratory and clinical findings is essential.         Immature Granulocytes 1.2       Lymph # 2.0       Lymph % 20.7       MCH 29.3       MCHC 32.2       MCV 91       Mono # 1.1       Mono % 11.1       MPV 9.0       nRBC 0       Platelet Count 295       Potassium  3.4       PROTEIN TOTAL 5.5       RBC 3.65       RDW 14.0       Sodium 131       WBC 9.48               Significant Imaging: I have reviewed all pertinent imaging results/findings within the past 24 hours.    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   Recent Labs     10/14/24  0328 10/15/24  0616 10/16/24  0454   K 3.3* 3.0* 3.4*       Plan  - Replete potassium per protocol  - Monitor potassium Daily      Bladder outflow obstruction  Appreciate urology  Continue nicholson    Sepsis without acute organ dysfunction  Continue Rocephin  Flagyl added due to Bacteroides in blood culture    Chronic idiopathic constipation  Has been taking Linzess on PRN basis    Coronary artery disease involving native coronary artery of native heart without angina pectoris  Denies chest pain  Cardiac monitoring ordered  No EKG changes noted  Check troponin level      Osteoporosis with current pathological fracture  Has had multiple fractures, and procedures   Chronic pain -- PRN dilaudid ordered as she is in severe pain      Benzodiazepine dependence  Continue clonazepam BID (chronic home medication)      Gastroesophageal reflux disease with esophagitis  Protonix BID      Hyponatremia  Hyponatremia is likely due to Dehydration/hypovolemia. The patient's most recent sodium results are listed below.  Recent Labs     10/14/24  0328 10/15/24  0616 10/16/24  0454   * 130* 131*       Plan  -- Monitor sodium Daily.       Asthma with COPD  No acute exacerbation  Continue nebulizer treatments        VTE Risk Mitigation (From admission, onward)           Ordered     enoxaparin injection 40 mg  Daily         10/14/24  0357     IP VTE HIGH RISK PATIENT  Once         10/14/24 0357     Place sequential compression device  Until discontinued         10/14/24 0357                    Discharge Planning   YELENA:      Code Status: Full Code   Is the patient medically ready for discharge?:     Reason for patient still in hospital (select all that apply): Patient trending condition, Treatment, and Consult recommendations  Discharge Plan A: Home Health                  Jason Alexandra MD  Department of Hospital Medicine   [facility]

## 2024-10-16 NOTE — ASSESSMENT & PLAN NOTE
--All cases of bacteremia should ideally have source control achieved  --However, not always possible depending on pathogen  --Bacteroides typically a gut pathogen found in polymicrobial infection and is the most common anaerobic bacteremia pathogen   --Identified on BCID  --Follow cultures for final speciation/susceptibilities   --Will continue PO metronidazole; anticipate 14 day course   --Follow repeat blood cx for clearance   --See pyelonephritis   --Will ensure patient has had outpatient colonoscopy;  states they had to cancel due to health challenges; will see if it can be rescheduled before end of year; query inpatient colonoscopy as well

## 2024-10-16 NOTE — HOSPITAL COURSE
The patient presented with lower abdominal pain and dysuria. She was placed on IV antibiotics. Leija catheter placed due to neurogenic bladder.  ID was consulted. Blood cultures isolated bacteroides, antibiotics adjusted. Repeat blood cultures negative. ID recommended 2 weeks of IV Rocephin for UTI and 2 weeks of Flagyl for bacteremia. Midline placed and teaching for home IV antibiotics provided. Patient to have outpatient colonoscopy due to Bacteroides bacteremia. Patient seen and examined stable for discharge.

## 2024-10-16 NOTE — TELEPHONE ENCOUNTER
----- Message from Dixon Cam MD sent at 10/16/2024  6:48 AM CDT -----  Needs to come in mid Nov to see me instead of end due to needing nicholson changed

## 2024-10-16 NOTE — PROGRESS NOTES
[facility]  Infectious Disease  Progress Note    Patient Name: Merle Caro  MRN: [id number]  Admission Date: 10/13/2024  Length of Stay: 3 days  Attending Physician: Jason Alexandra MD  Primary Care Provider: Marck Major MD    Isolation Status: No active isolations  Assessment/Plan:      Pulmonary  Asthma with COPD  Continue current medications per primary      Cardiac/Vascular  Primary hypertension  Continue current medications per primary      Renal/  Bladder outflow obstruction  Appreciate urology. Poses risk of recurrent UTI. Plans for [facility].     ID  * Pyelonephritis  --U/A with pyuria 58  --No fever and leukocytosis resolved  --Urine cx has finalized with no growth   --CT reports left-sided severe hydronephrosis and hydroureter. Left renal cortical thinning noted. Right-sided hydronephrosis is also seen. No obstructing renal calculi.   --Concern for pyelonephritis  --Appreciate urology; agree with [facility]   --Recent cultures with no MDRO  --QT prolonged on EKG; will avoid quinolones  --Will complete 14 day course of empiric IV ceftriaxone   --Requires drug toxicity monitoring   --Follow blood cx; now reporting GNR; BCID identifies as Bacteroides fragilis   --Will continue PO metronidazole 500 mg TID      --Repeat blood cx for clearance   --Will schedule ID clinic follow up   --Above d/w primary team.        Outpatient Antibiotic Therapy Plan:     1) Infection: Pyelonephritis      2) Discharge Antibiotics:     Intravenous antibiotics:  IV ceftriaxone 2 g daily      3) Therapy Duration:  14 days      Estimated end date of IV antibiotics: 10/28/24     4) Outpatient Weekly Labs:     Order the following labs to be drawn on Mondays:   CBC  CMP      5) Fax Lab Results to Infectious Diseases Provider: Dr. Huitron      [facility] Fax Number: [phone]    Bacterial infection due to Bacteroides fragilis  --All cases of bacteremia should ideally have source control achieved  --However, not always possible  "depending on pathogen  --Bacteroides typically a gut pathogen found in polymicrobial infection and is the most common anaerobic bacteremia pathogen   --Identified on BCID  --Follow cultures for final speciation/susceptibilities   --Will continue PO metronidazole; anticipate 14 day course   --Follow repeat blood cx for clearance   --See pyelonephritis   --Will ensure patient has had outpatient colonoscopy;  states they had to cancel due to health challenges; will see if it can be rescheduled before end of year; query inpatient colonoscopy as well      GI  Gastroesophageal reflux disease with esophagitis  Continue current medications per primary          Thank you for your consult. I will follow-up with patient. Please contact us if you have any additional questions.    Cedrick Huitron, DO  Infectious Disease  [facility]    Subjective:     Principal Problem:Pyelonephritis    HPI: This is a 70 yo F known to me with history of hypertension, chronic obstructive pulmonary disease, asthma, arthritis, and gastroesophageal reflux disease who per last ID note "presented to the ER with confusion per . Says that she has since gone back to her baseline. She was also having weakness that has improved. Leukocytosis to 18K on admission but never spiked a fever. Blood cultures collected on 6/19 grew Bacteroides in both anaerobic bottles. Has cleared on repeat set from 6/22. Unclear source. Patient reports constipation. CXR no evidence of pneumonia. CT renal stone with mild moderate right-sided hydronephrosis with right hydroureter.  Severe hydronephrosis of the left kidney with moderate mild left hydroureter. Severe bladder wall thickening.  Question punctate nonobstructing left renal calculi with versus punctate vascular calcification.  No obstructing renal calculi. Urology recommending Leija. Patient also with E coli in urine. ID consulted for bacteremia. Discharged on 14 day course of Flagyl and 10 day course " "of cefdinir to cover E coli UTI. Blood cx cleared 6/22.    7/10: Here for hospital follow up. Has finished Flagyl and Omnicef. Denies adverse events. No fever. Has not had abdominal pain. Leija still intact. Putting out clear urine. Some itching at insertion site. Still needs to schedule GI appointment. No new concerns. Regaining strength with help of PT."    Now admitted with reported abdominal pain, back pain, flank pain, dysuria, weakness, fatigue, fever, chills, diaphoresis, decreased activity, abdominal distention, constipation, nausea, vomiting, and generalized myalgias. CT renal study reports left-sided severe hydronephrosis and hydroureter. Left renal cortical thinning noted. Right-sided hydronephrosis is also seen. No obstructing renal calculi. U/A with 58 WBC. Blood and urine cultures in process. On empiric Levaquin. ID consulted for "complicated urinary tract infections, followed by ID  now with bilateral pyelonephritis."   Interval History: Blood cx with late update of anaerobic GNR identified as Bacteroides on BCID panel. This is a recurrence of same pathogen isolated in June 2024. Will need colonoscopy if not yet done.     Review of Systems   Constitutional:  Positive for activity change and appetite change. Negative for chills.   Gastrointestinal:  Negative for abdominal pain.   Genitourinary:  Negative for dysuria.   Neurological:  Positive for weakness.   All other systems reviewed and are negative.    Objective:     Vital Signs (Most Recent):  Temp: 98.4 °F (36.9 °C) (10/16/24 0403)  Pulse: 85 (10/16/24 0727)  Resp: 19 (10/16/24 0727)  BP: (!) 171/82 (10/16/24 0403)  SpO2: 98 % (10/16/24 0727) Vital Signs (24h Range):  Temp:  [97.8 °F (36.6 °C)-98.4 °F (36.9 °C)] 98.4 °F (36.9 °C)  Pulse:  [] 85  Resp:  [16-19] 19  SpO2:  [92 %-99 %] 98 %  BP: (124-185)/(68-92) 171/82     Weight: 93.7 kg (206 lb 9.1 oz)  Body mass index is 37.78 kg/m².    Estimated Creatinine Clearance: 83.1 mL/min (based on " SCr of 0.7 mg/dL).     Physical Exam  Constitutional:       General: She is not in acute distress.     Appearance: Normal appearance. She is not ill-appearing.   Cardiovascular:      Rate and Rhythm: Normal rate and regular rhythm.      Pulses: Normal pulses.      Heart sounds: Normal heart sounds. No murmur heard.     No friction rub. No gallop.   Pulmonary:      Effort: Pulmonary effort is normal. No respiratory distress.      Breath sounds: Normal breath sounds.   Abdominal:      General: Abdomen is flat. Bowel sounds are normal. There is no distension.      Palpations: Abdomen is soft.      Tenderness: There is no abdominal tenderness.   Skin:     General: Skin is warm and dry.   Neurological:      Mental Status: She is alert.          Significant Labs: Blood Culture:   Recent Labs   Lab 06/19/24  1024 06/19/24  1025 06/22/24  1416 10/13/24  1914   LABBLOO Gram stain renee bottle: Gram negative rods  Positive results previously called 06/20/2024  15:12  BACTEROIDES FRAGILIS* Gram stain renee bottle: Gram negative rods  Results called to and read back by: BLAISE Chavez RN  06/20/2024  15:10  BACTEROIDES FRAGILIS* No growth after 5 days.  No growth after 5 days. Gram stain renee bottle: Gram negative rods  Positive results previously called 10/15/2024  17:40  Gram stain renee bottle: Gram negative rods  Results called to and read back by:Scott Alexandra RN 10/15/2024  12:16     CBC:   Recent Labs   Lab 10/15/24  0616 10/16/24  0454   WBC 10.94 9.48   HGB 11.3* 10.7*   HCT 35.3* 33.2*    295     CMP:   Recent Labs   Lab 10/15/24  0616 10/16/24  0454   * 131*   K 3.0* 3.4*   CL 98 98   CO2 24 24    100   BUN 12 13   CREATININE 0.7 0.7   CALCIUM 8.0* 8.1*   PROT 5.9* 5.5*   ALBUMIN 2.6* 2.4*   BILITOT 0.5 0.5   ALKPHOS 88 77   AST 8* 10   ALT 6* 7*   ANIONGAP 8 9     Microbiology Results (last 7 days)       Procedure Component Value Units Date/Time    Blood culture [[id number]] Collected: 10/16/24  0841    Order Status: Sent Specimen: Blood from Peripheral, Forearm, Right     Blood culture [[id number]]     Order Status: Sent Specimen: Blood     Blood Culture #1 **CANNOT BE ORDERED STAT** [[id number]] Collected: 10/13/24 1914    Order Status: Completed Specimen: Blood from Peripheral, Antecubital, Left Updated: 10/15/24 1741     Blood Culture, Routine Gram stain renee bottle: Gram negative rods      Positive results previously called 10/15/2024  17:40    Blood culture [[id number]]     Order Status: Canceled Specimen: Blood     Blood culture [[id number]]     Order Status: Canceled Specimen: Blood     Rapid Organism ID by PCR (from Blood culture) [[id number]]  (Abnormal) Collected: 10/13/24 1914    Order Status: Completed Updated: 10/15/24 1259     Enterococcus faecalis Not Detected     Enterococcus faecium Not Detected     Listeria monocytogenes Not Detected     Staphylococcus spp. Not Detected     Staphylococcus aureus Not Detected     Staphylococcus epidermidis Not Detected     Staphylococcus lugdunensis Not Detected     Streptococcus species Not Detected     Streptococcus agalactiae Not Detected     Streptococcus pneumoniae Not Detected     Streptococcus pyogenes Not Detected     Acinetobacter calcoaceticus/baumannii complex Not Detected     Bacteroides fragilis Detected     Enterobacterales Not Detected     Enterobacter cloacae complex Not Detected     Escherichia coli Not Detected     Klebsiella aerogenes Not Detected     Klebsiella oxytoca Not Detected     Klebsiella pneumoniae group Not Detected     Proteus Not Detected     Salmonella sp Not Detected     Serratia marcescens Not Detected     Haemophilus influenzae Not Detected     Neisseria meningtidis Not Detected     Pseudomonas aeruginosa Not Detected     Stenotrophomonas maltophilia Not Detected     Candida albicans Not Detected     Candida auris Not Detected     Candida glabrata Not Detected     Candida krusei Not Detected     Candida parapsilosis Not  Detected     Candida tropicalis Not Detected     Cryptococcus neoformans/gattii Not Detected     CTX-M (ESBL ) Test Not Applicable     IMP (Carbapenem resistant) Test Not Applicable     KPC resistance gene (Carbapenem resistant) Test Not Applicable     mcr-1  Test Not Applicable     mec A/C  Test Not Applicable     mec A/C and MREJ (MRSA) gene Test Not Applicable     NDM (Carbapenem resistant) Test Not Applicable     OXA-48-like (Carbapenem resistant) Test Not Applicable     van A/B (VRE gene) Test Not Applicable     VIM (Carbapenem resistant) Test Not Applicable    Blood Culture #2 **CANNOT BE ORDERED STAT** [[id number]] Collected: 10/13/24 1914    Order Status: Completed Specimen: Blood from Peripheral, Hand, Left Updated: 10/15/24 1217     Blood Culture, Routine Gram stain renee bottle: Gram negative rods      Results called to and read back by:Scott Alexandra RN 10/15/2024  12:16    Urine culture [[id number]] Collected: 10/13/24 1938    Order Status: Completed Specimen: Urine Updated: 10/15/24 0348     Urine Culture, Routine No significant growth    Narrative:      Specimen Source->Urine    Rapid Organism ID by PCR (from Blood culture) [[id number]] Collected: 10/13/24 1914    Order Status: Canceled           All pertinent labs within the past 24 hours have been reviewed.    Significant Imaging: I have reviewed all pertinent imaging results/findings within the past 24 hours.

## 2024-10-17 LAB
ALBUMIN SERPL BCP-MCNC: 2.4 G/DL (ref 3.5–5.2)
ALP SERPL-CCNC: 84 U/L (ref 55–135)
ALT SERPL W/O P-5'-P-CCNC: 7 U/L (ref 10–44)
ANION GAP SERPL CALC-SCNC: 7 MMOL/L (ref 8–16)
AST SERPL-CCNC: 10 U/L (ref 10–40)
BACTERIA BLD CULT: ABNORMAL
BASOPHILS # BLD AUTO: 0.06 K/UL (ref 0–0.2)
BASOPHILS NFR BLD: 0.7 % (ref 0–1.9)
BILIRUB SERPL-MCNC: 0.4 MG/DL (ref 0.1–1)
BUN SERPL-MCNC: 12 MG/DL (ref 8–23)
CALCIUM SERPL-MCNC: 8.4 MG/DL (ref 8.7–10.5)
CHLORIDE SERPL-SCNC: 98 MMOL/L (ref 95–110)
CO2 SERPL-SCNC: 28 MMOL/L (ref 23–29)
CREAT SERPL-MCNC: 0.8 MG/DL (ref 0.5–1.4)
DIFFERENTIAL METHOD BLD: ABNORMAL
EOSINOPHIL # BLD AUTO: 0.6 K/UL (ref 0–0.5)
EOSINOPHIL NFR BLD: 7 % (ref 0–8)
ERYTHROCYTE [DISTWIDTH] IN BLOOD BY AUTOMATED COUNT: 14.2 % (ref 11.5–14.5)
EST. GFR  (NO RACE VARIABLE): >60 ML/MIN/1.73 M^2
GLUCOSE SERPL-MCNC: 103 MG/DL (ref 70–110)
HCT VFR BLD AUTO: 35.8 % (ref 37–48.5)
HGB BLD-MCNC: 11.4 G/DL (ref 12–16)
IMM GRANULOCYTES # BLD AUTO: 0.22 K/UL (ref 0–0.04)
IMM GRANULOCYTES NFR BLD AUTO: 2.6 % (ref 0–0.5)
LYMPHOCYTES # BLD AUTO: 2 K/UL (ref 1–4.8)
LYMPHOCYTES NFR BLD: 23.1 % (ref 18–48)
MCH RBC QN AUTO: 29.2 PG (ref 27–31)
MCHC RBC AUTO-ENTMCNC: 31.8 G/DL (ref 32–36)
MCV RBC AUTO: 92 FL (ref 82–98)
MONOCYTES # BLD AUTO: 1.1 K/UL (ref 0.3–1)
MONOCYTES NFR BLD: 13 % (ref 4–15)
NEUTROPHILS # BLD AUTO: 4.5 K/UL (ref 1.8–7.7)
NEUTROPHILS NFR BLD: 53.6 % (ref 38–73)
NRBC BLD-RTO: 0 /100 WBC
PLATELET # BLD AUTO: 306 K/UL (ref 150–450)
PMV BLD AUTO: 8.8 FL (ref 9.2–12.9)
POTASSIUM SERPL-SCNC: 3.5 MMOL/L (ref 3.5–5.1)
PROT SERPL-MCNC: 5.7 G/DL (ref 6–8.4)
RBC # BLD AUTO: 3.91 M/UL (ref 4–5.4)
SODIUM SERPL-SCNC: 133 MMOL/L (ref 136–145)
WBC # BLD AUTO: 8.47 K/UL (ref 3.9–12.7)

## 2024-10-17 PROCEDURE — 97530 THERAPEUTIC ACTIVITIES: CPT

## 2024-10-17 PROCEDURE — 25000003 PHARM REV CODE 250: Performed by: INTERNAL MEDICINE

## 2024-10-17 PROCEDURE — 85025 COMPLETE CBC W/AUTO DIFF WBC: CPT | Performed by: NURSE PRACTITIONER

## 2024-10-17 PROCEDURE — 63600175 PHARM REV CODE 636 W HCPCS: Performed by: INTERNAL MEDICINE

## 2024-10-17 PROCEDURE — 99233 SBSQ HOSP IP/OBS HIGH 50: CPT | Mod: 95,,, | Performed by: STUDENT IN AN ORGANIZED HEALTH CARE EDUCATION/TRAINING PROGRAM

## 2024-10-17 PROCEDURE — 36415 COLL VENOUS BLD VENIPUNCTURE: CPT | Performed by: NURSE PRACTITIONER

## 2024-10-17 PROCEDURE — 25000003 PHARM REV CODE 250: Performed by: NURSE PRACTITIONER

## 2024-10-17 PROCEDURE — 25000003 PHARM REV CODE 250: Performed by: STUDENT IN AN ORGANIZED HEALTH CARE EDUCATION/TRAINING PROGRAM

## 2024-10-17 PROCEDURE — 80053 COMPREHEN METABOLIC PANEL: CPT | Performed by: NURSE PRACTITIONER

## 2024-10-17 PROCEDURE — 63600175 PHARM REV CODE 636 W HCPCS: Performed by: NURSE PRACTITIONER

## 2024-10-17 PROCEDURE — 21400001 HC TELEMETRY ROOM

## 2024-10-17 RX ADMIN — PHENAZOPYRIDINE 100 MG: 100 TABLET ORAL at 09:10

## 2024-10-17 RX ADMIN — TRAMADOL HYDROCHLORIDE 50 MG: 50 TABLET, COATED ORAL at 12:10

## 2024-10-17 RX ADMIN — TRAZODONE HYDROCHLORIDE 75 MG: 50 TABLET ORAL at 08:10

## 2024-10-17 RX ADMIN — HYDROMORPHONE HYDROCHLORIDE 1 MG: 1 INJECTION, SOLUTION INTRAMUSCULAR; INTRAVENOUS; SUBCUTANEOUS at 02:10

## 2024-10-17 RX ADMIN — MUPIROCIN: 20 OINTMENT TOPICAL at 10:10

## 2024-10-17 RX ADMIN — CELECOXIB 200 MG: 100 CAPSULE ORAL at 09:10

## 2024-10-17 RX ADMIN — PANTOPRAZOLE SODIUM 40 MG: 40 TABLET, DELAYED RELEASE ORAL at 08:10

## 2024-10-17 RX ADMIN — CLONAZEPAM 0.5 MG: 0.5 TABLET ORAL at 09:10

## 2024-10-17 RX ADMIN — HYDROMORPHONE HYDROCHLORIDE 1 MG: 1 INJECTION, SOLUTION INTRAMUSCULAR; INTRAVENOUS; SUBCUTANEOUS at 10:10

## 2024-10-17 RX ADMIN — METRONIDAZOLE 500 MG: 500 TABLET ORAL at 10:10

## 2024-10-17 RX ADMIN — MUPIROCIN: 20 OINTMENT TOPICAL at 12:10

## 2024-10-17 RX ADMIN — PREGABALIN 100 MG: 100 CAPSULE ORAL at 08:10

## 2024-10-17 RX ADMIN — PAROXETINE HYDROCHLORIDE 40 MG: 20 TABLET, FILM COATED ORAL at 09:10

## 2024-10-17 RX ADMIN — HALOPERIDOL 5 MG: 5 TABLET ORAL at 08:10

## 2024-10-17 RX ADMIN — ENOXAPARIN SODIUM 40 MG: 40 INJECTION SUBCUTANEOUS at 04:10

## 2024-10-17 RX ADMIN — PHENAZOPYRIDINE 100 MG: 100 TABLET ORAL at 12:10

## 2024-10-17 RX ADMIN — CEFTRIAXONE 2 G: 2 INJECTION, POWDER, FOR SOLUTION INTRAMUSCULAR; INTRAVENOUS at 12:10

## 2024-10-17 RX ADMIN — METRONIDAZOLE 500 MG: 500 TABLET ORAL at 05:10

## 2024-10-17 RX ADMIN — ACETAMINOPHEN 650 MG: 325 TABLET ORAL at 08:10

## 2024-10-17 RX ADMIN — PHENAZOPYRIDINE 100 MG: 100 TABLET ORAL at 04:10

## 2024-10-17 RX ADMIN — PREGABALIN 100 MG: 100 CAPSULE ORAL at 09:10

## 2024-10-17 RX ADMIN — CLONAZEPAM 0.5 MG: 0.5 TABLET ORAL at 08:10

## 2024-10-17 RX ADMIN — METRONIDAZOLE 500 MG: 500 TABLET ORAL at 02:10

## 2024-10-17 RX ADMIN — PANTOPRAZOLE SODIUM 40 MG: 40 TABLET, DELAYED RELEASE ORAL at 09:10

## 2024-10-17 NOTE — ASSESSMENT & PLAN NOTE
--U/A with pyuria 58  --Urine cx has finalized with no growth   --CT reports left-sided severe hydronephrosis and hydroureter. Left renal cortical thinning noted. Right-sided hydronephrosis is also seen. No obstructing renal calculi.   --Concern for pyelonephritis  --Appreciate urology; agree with [facility]   --Recent cultures with no MDRO  --QT prolonged on EKG; will avoid quinolones  --Will complete 14 day course of empiric IV ceftriaxone   --Requires drug toxicity monitoring   --Will schedule ID clinic follow up   --Above d/w primary team.        Outpatient Antibiotic Therapy Plan:     1) Infection: Pyelonephritis      2) Discharge Antibiotics:     Intravenous antibiotics:  IV ceftriaxone 2 g daily      3) Therapy Duration:  14 days      Estimated end date of IV antibiotics: 10/28/24     4) Outpatient Weekly Labs:     Order the following labs to be drawn on Mondays:   CBC  CMP      5) Fax Lab Results to Infectious Diseases Provider: Dr. Huitron      [facility] Fax Number: [phone]

## 2024-10-17 NOTE — SUBJECTIVE & OBJECTIVE
Interval History: Patient afebrile with no leukocytosis. Awaiting final ID/susceptibilities of anaerobic GNR. On appropriate antibiotic therapy. Needs outpatient colonoscopy to complete workup.     Review of Systems   Constitutional:  Positive for activity change and appetite change. Negative for chills.   Gastrointestinal:  Negative for abdominal pain.   Genitourinary:  Negative for dysuria.   Neurological:  Positive for weakness.   All other systems reviewed and are negative.    Objective:     Vital Signs (Most Recent):  Temp: 97.6 °F (36.4 °C) (10/17/24 0452)  Pulse: 75 (10/17/24 0452)  Resp: 18 (10/17/24 0551)  BP: (!) 113/56 (10/17/24 0452)  SpO2: 98 % (10/17/24 0452) Vital Signs (24h Range):  Temp:  [97.4 °F (36.3 °C)-98.6 °F (37 °C)] 97.6 °F (36.4 °C)  Pulse:  [] 75  Resp:  [17-19] 18  SpO2:  [96 %-98 %] 98 %  BP: (109-169)/(53-74) 113/56     Weight: 93.7 kg (206 lb 9.1 oz)  Body mass index is 37.78 kg/m².    Estimated Creatinine Clearance: 83.1 mL/min (based on SCr of 0.7 mg/dL).     Physical Exam  Constitutional:       General: She is not in acute distress.     Appearance: Normal appearance. She is not ill-appearing.   Cardiovascular:      Rate and Rhythm: Normal rate and regular rhythm.      Pulses: Normal pulses.      Heart sounds: Normal heart sounds. No murmur heard.     No friction rub. No gallop.   Pulmonary:      Effort: Pulmonary effort is normal. No respiratory distress.      Breath sounds: Normal breath sounds.   Abdominal:      General: Abdomen is flat. Bowel sounds are normal. There is no distension.      Palpations: Abdomen is soft.      Tenderness: There is no abdominal tenderness.   Skin:     General: Skin is warm and dry.   Neurological:      Mental Status: She is alert.          Significant Labs: Blood Culture:   Recent Labs   Lab 06/19/24  1025 06/22/24  1416 10/13/24  1914 10/16/24  0841 10/16/24  0930   LABBLOO Gram stain renee bottle: Gram negative rods  Results called to and read  back by: BLAISE Chavez RN  06/20/2024  15:10  BACTEROIDES FRAGILIS* No growth after 5 days.  No growth after 5 days. Gram stain renee bottle: Gram negative rods  Positive results previously called 10/15/2024  17:40  Gram stain renee bottle: Gram negative rods  Results called to and read back by:Scott Alexandra RN 10/15/2024  12:16 No Growth to date No Growth to date     CBC:   Recent Labs   Lab 10/16/24  0454 10/17/24  0515   WBC 9.48 8.47   HGB 10.7* 11.4*   HCT 33.2* 35.8*    306     CMP:   Recent Labs   Lab 10/16/24  0454   *   K 3.4*   CL 98   CO2 24      BUN 13   CREATININE 0.7   CALCIUM 8.1*   PROT 5.5*   ALBUMIN 2.4*   BILITOT 0.5   ALKPHOS 77   AST 10   ALT 7*   ANIONGAP 9     Microbiology Results (last 7 days)       Procedure Component Value Units Date/Time    Blood culture [[id number]] Collected: 10/16/24 0841    Order Status: Completed Specimen: Blood from Peripheral, Forearm, Right Updated: 10/17/24 0115     Blood Culture, Routine No Growth to date    Blood culture [[id number]] Collected: 10/16/24 0930    Order Status: Completed Specimen: Blood from Peripheral, Hand, Left Updated: 10/17/24 0115     Blood Culture, Routine No Growth to date    Narrative:      Collection has been rescheduled by ANANYA at 10/16/2024 08:43 Reason:   Unable to collect  Collection has been rescheduled by ANANYA at 10/16/2024 08:43 Reason:   Unable to collect    Blood Culture #1 **CANNOT BE ORDERED STAT** [[id number]] Collected: 10/13/24 1914    Order Status: Completed Specimen: Blood from Peripheral, Antecubital, Left Updated: 10/16/24 0937     Blood Culture, Routine Gram stain renee bottle: Gram negative rods      Positive results previously called 10/15/2024  17:40    Blood Culture #2 **CANNOT BE ORDERED STAT** [[id number]] Collected: 10/13/24 1914    Order Status: Completed Specimen: Blood from Peripheral, Hand, Left Updated: 10/16/24 0936     Blood Culture, Routine Gram stain renee bottle: Gram negative rods       Results called to and read back by:Scott Alexandra RN 10/15/2024  12:16    Blood culture [[id number]]     Order Status: Canceled Specimen: Blood     Blood culture [[id number]]     Order Status: Canceled Specimen: Blood     Rapid Organism ID by PCR (from Blood culture) [[id number]]  (Abnormal) Collected: 10/13/24 1914    Order Status: Completed Updated: 10/15/24 1259     Enterococcus faecalis Not Detected     Enterococcus faecium Not Detected     Listeria monocytogenes Not Detected     Staphylococcus spp. Not Detected     Staphylococcus aureus Not Detected     Staphylococcus epidermidis Not Detected     Staphylococcus lugdunensis Not Detected     Streptococcus species Not Detected     Streptococcus agalactiae Not Detected     Streptococcus pneumoniae Not Detected     Streptococcus pyogenes Not Detected     Acinetobacter calcoaceticus/baumannii complex Not Detected     Bacteroides fragilis Detected     Enterobacterales Not Detected     Enterobacter cloacae complex Not Detected     Escherichia coli Not Detected     Klebsiella aerogenes Not Detected     Klebsiella oxytoca Not Detected     Klebsiella pneumoniae group Not Detected     Proteus Not Detected     Salmonella sp Not Detected     Serratia marcescens Not Detected     Haemophilus influenzae Not Detected     Neisseria meningtidis Not Detected     Pseudomonas aeruginosa Not Detected     Stenotrophomonas maltophilia Not Detected     Candida albicans Not Detected     Candida auris Not Detected     Candida glabrata Not Detected     Candida krusei Not Detected     Candida parapsilosis Not Detected     Candida tropicalis Not Detected     Cryptococcus neoformans/gattii Not Detected     CTX-M (ESBL ) Test Not Applicable     IMP (Carbapenem resistant) Test Not Applicable     KPC resistance gene (Carbapenem resistant) Test Not Applicable     mcr-1  Test Not Applicable     mec A/C  Test Not Applicable     mec A/C and MREJ (MRSA) gene Test Not Applicable     NDM  (Carbapenem resistant) Test Not Applicable     OXA-48-like (Carbapenem resistant) Test Not Applicable     van A/B (VRE gene) Test Not Applicable     VIM (Carbapenem resistant) Test Not Applicable    Urine culture [[id number]] Collected: 10/13/24 1938    Order Status: Completed Specimen: Urine Updated: 10/15/24 0348     Urine Culture, Routine No significant growth    Narrative:      Specimen Source->Urine    Rapid Organism ID by PCR (from Blood culture) [[id number]] Collected: 10/13/24 1914    Order Status: Canceled           All pertinent labs within the past 24 hours have been reviewed.    Significant Imaging: None

## 2024-10-17 NOTE — SUBJECTIVE & OBJECTIVE
Interval History: f/u bacteremia  abdominal pain improving. Per  he had reduced her trazodone dose, dose changed to 75 mg    Review of Systems  Objective:     Vital Signs (Most Recent):  Temp: 97.3 °F (36.3 °C) (10/17/24 0845)  Pulse: 105 (10/17/24 0845)  Resp: 19 (10/17/24 0845)  BP: (!) 161/68 (10/17/24 0845)  SpO2: 96 % (10/17/24 0845) Vital Signs (24h Range):  Temp:  [97.3 °F (36.3 °C)-98.6 °F (37 °C)] 97.3 °F (36.3 °C)  Pulse:  [] 105  Resp:  [17-19] 19  SpO2:  [96 %-98 %] 96 %  BP: (113-161)/(56-71) 161/68     Weight: 93.7 kg (206 lb 9.1 oz)  Body mass index is 37.78 kg/m².    Intake/Output Summary (Last 24 hours) at 10/17/2024 1035  Last data filed at 10/17/2024 0557  Gross per 24 hour   Intake --   Output 3275 ml   Net -3275 ml         Physical Exam  HENT:      Head: Normocephalic and atraumatic.   Cardiovascular:      Rate and Rhythm: Normal rate and regular rhythm.      Heart sounds: No murmur heard.  Pulmonary:      Effort: Pulmonary effort is normal. No respiratory distress.      Breath sounds: Normal breath sounds. No wheezing.   Abdominal:      General: Bowel sounds are normal. There is no distension.      Palpations: Abdomen is soft.      Tenderness: There is no abdominal tenderness.   Musculoskeletal:         General: No swelling.   Skin:     General: Skin is warm and dry.   Neurological:      Mental Status: She is alert and oriented to person, place, and time. Mental status is at baseline.             Significant Labs: All pertinent labs within the past 24 hours have been reviewed.  Recent Lab Results         10/17/24  0515        Albumin 2.4       ALP 84       ALT 7       Anion Gap 7       AST 10       Baso # 0.06       Basophil % 0.7       BILIRUBIN TOTAL 0.4  Comment: For infants and newborns, interpretation of results should be based  on gestational age, weight and in agreement with clinical  observations.    Premature Infant recommended reference ranges:  Up to 24  hours.............<8.0 mg/dL  Up to 48 hours............<12.0 mg/dL  3-5 days..................<15.0 mg/dL  6-29 days.................<15.0 mg/dL         BUN 12       Calcium 8.4       Chloride 98       CO2 28       Creatinine 0.8       Differential Method Automated       eGFR >60       Eos # 0.6       Eos % 7.0       Glucose 103       Gran # (ANC) 4.5       Gran % 53.6       Hematocrit 35.8       Hemoglobin 11.4       Immature Grans (Abs) 0.22  Comment: Mild elevation in immature granulocytes is non specific and   can be seen in a variety of conditions including stress response,   acute inflammation, trauma and pregnancy. Correlation with other   laboratory and clinical findings is essential.         Immature Granulocytes 2.6       Lymph # 2.0       Lymph % 23.1       MCH 29.2       MCHC 31.8       MCV 92       Mono # 1.1       Mono % 13.0       MPV 8.8       nRBC 0       Platelet Count 306       Potassium 3.5       PROTEIN TOTAL 5.7       RBC 3.91       RDW 14.2       Sodium 133       WBC 8.47               Significant Imaging: I have reviewed all pertinent imaging results/findings within the past 24 hours.

## 2024-10-17 NOTE — PT/OT/SLP PROGRESS
Occupational Therapy  Treatment    Merle Caro   MRN: [id number]   Admitting Diagnosis: Pyelonephritis    OT Date of Treatment: 10/17/24   OT Start Time: 1015  OT Stop Time: 1038  OT Total Time (min): 23 min    Billable Minutes:  Therapeutic Activity 23 MINUTES    OT/TSERING: OT          General Precautions: Standard, fall  Orthopedic Precautions: N/A  Braces: N/A  Respiratory Status: Room air         Subjective:  Communicated with NURSE AND EPIC CHART REVIEW prior to session.    Pain/Comfort  Pain Rating 1: 0/10  Location - Orientation 1: generalized    Objective:  Patient found with: peripheral IV     Functional Mobility:  Bed Mobility:  SBA WITH SUPINE<>SIT X 2 TRAILS  ROLLING L<>R WITH SBA  MIN A X 2 WITH SCOOTING UP HIGHER IN BED    Transfers:   Max a with sit<>stand with knees slightly bent       Balance:   Static Sit: POOR: Needs MODERATE assist to maintain    Static Stand: POOR: Needs MODERATE assist to maintain  Therapeutic Activities and Exercises:  Educated patient on importance of increased tolerance to upright position and direct impact on CV endurance and strength. Patient encouraged to sit up in chair/ EOB, for a minimum of 2 consecutive hours including for all meals.. Encouraged patient to perform AROM TE to BUE throughout the day within all available planes of motion. Re enforced importance of utilizing call light to meet needs in room and not attempt to get up without staff assistance. Patient verbalized understanding and agreed to comply.           AM-PAC 6 CLICK ADL   How much help from another person does this patient currently need?   1 = Unable, Total/Dependent Assistance  2 = A lot, Maximum/Moderate Assistance  3 = A little, Minimum/Contact Guard/Supervision  4 = None, Modified [place]/Independent    Putting on and taking off regular lower body clothing? : 1  Bathing (including washing, rinsing, drying)?: 1  Toileting, which includes using toilet, bedpan, or urinal? : 1  Putting on and taking  "off regular upper body clothing?: 2  Taking care of personal grooming such as brushing teeth?: 1  Eating meals?: 2  Daily Activity Total Score: 8     AM-PAC Raw Score CMS "G-Code Modifier Level of Impairment Assistance   6 % Total / Unable   7 - 8 CM 80 - 100% Maximal Assist   9-13 CL 60 - 80% Moderate Assist   14 - 19 CK 40 - 60% Moderate Assist   20 - 22 CJ 20 - 40% Minimal Assist   23 CI 1-20% SBA / CGA   24 CH 0% Independent/ Mod I       Patient left supine with all lines intact, call button in reach, bed alarm on, nurse notified, and debility and generalized weakness present    ASSESSMENT:  Merle Caro is a 69 y.o. female with a medical diagnosis of Pyelonephritis and presents with debility and generalized weakness.    Rehab identified problem list/impairments:  weakness, impaired endurance, impaired self care skills, decreased upper extremity function, impaired functional mobility, decreased lower extremity function, gait instability, decreased safety awareness, impaired balance, pain    Rehab potential is good.    Activity tolerance: Good    Discharge recommendations: Low Intensity Therapy (WITH 24 HOUR CARE)   Barriers to discharge:      Equipment recommendations: none    GOALS:   Multidisciplinary Problems       Occupational Therapy Goals          Problem: Occupational Therapy    Goal Priority Disciplines Outcome Interventions   Occupational Therapy Goal     OT, PT/OT Progressing    Description: O.T. goals to be met by 10-28-16  Min a with supine<>sit  Pt weill tolerate 1 set x 10 reps b ue rom exercise  Mod a with le dressing  Mod a with sit<>stand transfer                         Plan:  Patient to be seen 2 x/week to address the above listed problems via therapeutic activities, therapeutic exercises, self-care/home management  Plan of Care expires: 10/31/24  Plan of Care reviewed with: patient         10/17/2024  "

## 2024-10-17 NOTE — PT/OT/SLP PROGRESS
"Physical Therapy  Treatment    Merle Caro   MRN: [id number]   Admitting Diagnosis: Pyelonephritis    PT Received On: 10/17/24  PT Start Time: 1020     PT Stop Time: 1045    PT Total Time (min): 25 min       Billable Minutes:  Therapeutic Activity 25    Treatment Type: Treatment  PT/PTA: PT     Number of PTA visits since last PT visit: 0       General Precautions: Standard, fall  Orthopedic Precautions: N/A  Braces: N/A  Respiratory Status: Room air         Subjective:  Communicated with NURSE AURA AND EPIC CHART REVIEW  prior to session.  PT AGREED TO TRY TX     Pain/Comfort  Pain Rating 1: 10/10  Location 1: back  Pain Rating Post-Intervention 1: 10/10    Objective:   Patient found with: peripheral IV, nicholson catheter, telemetry    Functional Mobility:  PT MET IN RM SUP IN BED. PT AGREED TO TRY TX HOWEVER IN A LOT OF PAIN. PT LOG ROLLED AND SEATED EOB WITH MOD A AND THEN FLUNG SELF BACK ONTO BED D/T INC PAIN. PT EDUCATED ON ROLE OF P.T. AND ON BENEFITS OF TX. PT  CAME TO ASSIST PT BACK TO SIDE OF BED WITH MAX A AND COMPLETED A 1/2 STAND WITH MAX A FROM . PT IN TO MUCH PAIN AND RETURNED IN LEFT SIDELYING IN BED PT REPORTED SHE WAS JUST IN TO MUCH PAIN AND COULDN'T TOLERATE P.T. PT SCOOTED TO HOB WITH TOTAL A AND WEDGE POSITIONED UNDER PT LE FOR COMFORT.       Treatment and Education:  P.T. EDUCATED PT ON GOALS FOR P.T. AND ASKED PT IF SHE WANTED TO CONT WITH P.T. TX. PT REPORTED YES.   PT EDUCATED ON "CALL DON'T FALL", ENCOURAGED TO CALL FOR ASSISTANCE WITH ALL NEEDS FOR OOB MOBILITY.       AM-PAC 6 CLICK MOBILITY  How much help from another person does this patient currently need?   1 = Unable, Total/Dependent Assistance  2 = A lot, Maximum/Moderate Assistance  3 = A little, Minimum/Contact Guard/Supervision  4 = None, Modified [place]/Independent    Turning over in bed (including adjusting bedclothes, sheets and blankets)?: 2  Sitting down on and standing up from a chair with arms (e.g., " wheelchair, bedside commode, etc.): 2  Moving from lying on back to sitting on the side of the bed?: 2  Moving to and from a bed to a chair (including a wheelchair)?: 1  Need to walk in hospital room?: 1  Climbing 3-5 steps with a railing?: 1  Basic Mobility Total Score: 9    AM-PAC Raw Score CMS G-Code Modifier Level of Impairment Assistance   6 % Total / Unable   7 - 9 CM 80 - 100% Maximal Assist   10 - 14 CL 60 - 80% Moderate Assist   15 - 19 CK 40 - 60% Moderate Assist   20 - 22 CJ 20 - 40% Minimal Assist   23 CI 1-20% SBA / CGA   24 CH 0% Independent/ Mod I     Patient left supine with call button in reach.    Assessment:  PT ALFREDA MIN TX HOWEVER PAIN LIMITS FUNC MOBILITY     Rehab identified problem list/impairments: weakness, impaired endurance, gait instability, pain, impaired self care skills, decreased lower extremity function, decreased ROM, impaired balance, impaired functional mobility    Rehab potential is fair.    Activity tolerance: Poor    Discharge recommendations: Low Intensity Therapy      Barriers to discharge:      Equipment recommendations: none     GOALS:   Multidisciplinary Problems       Physical Therapy Goals          Problem: Physical Therapy    Goal Priority Disciplines Outcome Interventions   Physical Therapy Goal     PT, PT/OT Progressing    Description: Ltg: 10/28/24  1. PT WILL COMPLETE BED MOBILITY WITH MIN A  2. PT WILL COMPLETE SQUAT PIVOT T/F TO CHAIR WITH MOD A FOR OOB TOLERANCE  3. PT WILL COMPLETE 20 REPS OF B LE TE FOR STRENGTHENING/ ROM  4. PT WILL INC AMPAC SCORE BY 2 POINTS TO PROGRESS GROSS FUNC MOBILITY.                          PLAN:    Patient to be seen 3 x/week to address the above listed problems via therapeutic activities  Plan of Care expires: 10/28/24  Plan of Care reviewed with: patient, spouse         10/17/2024

## 2024-10-17 NOTE — ASSESSMENT & PLAN NOTE
Hyponatremia is likely due to Dehydration/hypovolemia. The patient's most recent sodium results are listed below.  Recent Labs     10/15/24  0616 10/16/24  0454 10/17/24  0515   * 131* 133*       Plan  -- Monitor sodium Daily.

## 2024-10-17 NOTE — PROGRESS NOTES
[facility] Medicine  Progress Note    Patient Name: Merle Caro  MRN: [id number]  Patient Class: IP- Inpatient   Admission Date: 10/13/2024  Length of Stay: 4 days  Attending Physician: Jason Alexandra MD  Primary Care Provider: Marck Major MD        Subjective:     Principal Problem:Pyelonephritis        HPI:    Patient is a 69-year-old female with past medical history significant for anxiety, chronic back pain, depression, insomnia, back surgery/spinal fusion,  cauda equina syndrome, lumbar compression fractures, osteoporosis, spinal stenosis, anemia, B12 deficiency, arthritis, asthma/COPD, GERD, hypertension, rheumatoid arthritis, chronic constipation,  chronic urinary urge incontinence, hydronephrosis,  possible bladder outflow obstruction with urinary retention issues requiring Leija catheter at times, bowel incontinence, solitary right kidney as left kidney appears to be nonfunctioning per renogram, and chronic UTI's who presented to ED with complaints of abdominal pain, back  pain, flank pain, dysuria, weakness, fatigue, fever, chills, diaphoresis, decreased activity, abdominal distention, constipation, nausea, vomiting, generalized myalgias.  Initial vital signs in ED with blood pressure 249/123, heart rate 121, temperature 97.9°, 94% SpO2 on room air, respirations 17 per minute.  Lab workup shows WBC 17, left shift noted, sodium 130, potassium 3.2, chloride 94, glucose 129, BUN 22, creatinine 0.8, magnesium 2.0, alk-phos 108, albumin 3.4, total bilirubin 0.6, AST 11, ALT 8, lactic acid 0.8, procalcitonin 0.08, urinalysis with cloudy yellow urine positive nitrites, 1+ leukocyte esterase, 58 WBC, many bacteria, many WBC clumps, 10 hyaline cast, RBC 28.  Urine culture is in progress, blood cultures x2 collected.  Chest x-ray done with stable findings of cardiomegaly and coarse interstitial lung markings.  CT renal stone study of abdomen and pelvis without contrast showed multiple  abnormal findings including severe left-sided hydronephrosis and hydroureter, right-sided hydronephrosis, moderate amount of stool in colon, bladder wall thickening identified, no obstructing renal calculi noted, emphysema with subpleural scarring and irregular nodular opacity in posterior left lung base measuring up to 13 mm, and chronic lumbar and thoracic spinal deformities.  She was given IV Levaquin, 1 L normal saline fluid bolus, IV potassium supplementation and p.o. potassium bicarb while in ED. hospital Medicine team was consulted for admission due to bilateral pyelonephritis.  She is a patient of Dr. Cam and he was consulted for additional recommendations -- for IV antibiotics until culture data available and Leija catheter placement -- he will follow along.    Overview/Hospital Course:  The patient presented with lower abdominal pain and dysuria. She was placed on IV antibiotics. Leija catheter placed due to neurogenic bladder.  ID was consulted. Blood cultures isolated bacteroides, antibiotics adjusted     Interval History: f/u bacteremia  abdominal pain improving. Per  he had reduced her trazodone dose, dose changed to 75 mg    Review of Systems  Objective:     Vital Signs (Most Recent):  Temp: 97.3 °F (36.3 °C) (10/17/24 0845)  Pulse: 105 (10/17/24 0845)  Resp: 19 (10/17/24 0845)  BP: (!) 161/68 (10/17/24 0845)  SpO2: 96 % (10/17/24 0845) Vital Signs (24h Range):  Temp:  [97.3 °F (36.3 °C)-98.6 °F (37 °C)] 97.3 °F (36.3 °C)  Pulse:  [] 105  Resp:  [17-19] 19  SpO2:  [96 %-98 %] 96 %  BP: (113-161)/(56-71) 161/68     Weight: 93.7 kg (206 lb 9.1 oz)  Body mass index is 37.78 kg/m².    Intake/Output Summary (Last 24 hours) at 10/17/2024 1035  Last data filed at 10/17/2024 0557  Gross per 24 hour   Intake --   Output 3275 ml   Net -3275 ml         Physical Exam  HENT:      Head: Normocephalic and atraumatic.   Cardiovascular:      Rate and Rhythm: Normal rate and regular rhythm.      Heart  sounds: No murmur heard.  Pulmonary:      Effort: Pulmonary effort is normal. No respiratory distress.      Breath sounds: Normal breath sounds. No wheezing.   Abdominal:      General: Bowel sounds are normal. There is no distension.      Palpations: Abdomen is soft.      Tenderness: There is no abdominal tenderness.   Musculoskeletal:         General: No swelling.   Skin:     General: Skin is warm and dry.   Neurological:      Mental Status: She is alert and oriented to person, place, and time. Mental status is at baseline.             Significant Labs: All pertinent labs within the past 24 hours have been reviewed.  Recent Lab Results         10/17/24  0515        Albumin 2.4       ALP 84       ALT 7       Anion Gap 7       AST 10       Baso # 0.06       Basophil % 0.7       BILIRUBIN TOTAL 0.4  Comment: For infants and newborns, interpretation of results should be based  on gestational age, weight and in agreement with clinical  observations.    Premature Infant recommended reference ranges:  Up to 24 hours.............<8.0 mg/dL  Up to 48 hours............<12.0 mg/dL  3-5 days..................<15.0 mg/dL  6-29 days.................<15.0 mg/dL         BUN 12       Calcium 8.4       Chloride 98       CO2 28       Creatinine 0.8       Differential Method Automated       eGFR >60       Eos # 0.6       Eos % 7.0       Glucose 103       Gran # (ANC) 4.5       Gran % 53.6       Hematocrit 35.8       Hemoglobin 11.4       Immature Grans (Abs) 0.22  Comment: Mild elevation in immature granulocytes is non specific and   can be seen in a variety of conditions including stress response,   acute inflammation, trauma and pregnancy. Correlation with other   laboratory and clinical findings is essential.         Immature Granulocytes 2.6       Lymph # 2.0       Lymph % 23.1       MCH 29.2       MCHC 31.8       MCV 92       Mono # 1.1       Mono % 13.0       MPV 8.8       nRBC 0       Platelet Count 306       Potassium 3.5        PROTEIN TOTAL 5.7       RBC 3.91       RDW 14.2       Sodium 133       WBC 8.47               Significant Imaging: I have reviewed all pertinent imaging results/findings within the past 24 hours.    Assessment/Plan:      * Pyelonephritis  Bilateral pyelonephritis in patient with complicated urological history  IV Rocephin  Levsin ordered for bladder spasms, PRN dilaudid for severe pain  Continue nicholson      Bacterial infection due to Bacteroides fragilis  Continue Flagyl  Repeat blood cultures no growth thus far  Outpatient colonoscopy planned      Hypokalemia  Patient's most recent potassium results are listed below.   Recent Labs     10/15/24  0616 10/16/24  0454 10/17/24  0515   K 3.0* 3.4* 3.5       Plan  - Replete potassium per protocol  - Monitor potassium Daily      Bladder outflow obstruction  Appreciate urology  Continue nicholson    Sepsis without acute organ dysfunction  Continue Rocephin  Flagyl added due to Bacteroides in blood culture    Chronic idiopathic constipation  Has been taking Linzess on PRN basis    Coronary artery disease involving native coronary artery of native heart without angina pectoris  Continue home medications      Osteoporosis with current pathological fracture  Has had multiple fractures, and procedures   Chronic pain -- PRN dilaudid ordered as she is in severe pain      Benzodiazepine dependence  Continue clonazepam BID (chronic home medication)      Gastroesophageal reflux disease with esophagitis  Protonix BID      Hyponatremia  Hyponatremia is likely due to Dehydration/hypovolemia. The patient's most recent sodium results are listed below.  Recent Labs     10/15/24  0616 10/16/24  0454 10/17/24  0515   * 131* 133*       Plan  -- Monitor sodium Daily.       Asthma with COPD  No acute exacerbation  Continue nebulizer treatments        VTE Risk Mitigation (From admission, onward)           Ordered     enoxaparin injection 40 mg  Daily         10/14/24 0357     IP VTE HIGH  RISK PATIENT  Once         10/14/24 0357     Place sequential compression device  Until discontinued         10/14/24 0357                    Discharge Planning   YELENA:      Code Status: Full Code   Is the patient medically ready for discharge?:     Reason for patient still in hospital (select all that apply): Patient trending condition, Treatment, and Consult recommendations  Discharge Plan A: Home Health                  Jason Alexandra MD  Department of Hospital Medicine   [facility]

## 2024-10-17 NOTE — PROGRESS NOTES
[facility]  Infectious Disease  Progress Note    Patient Name: Merle Caro  MRN: [id number]  Admission Date: 10/13/2024  Length of Stay: 4 days  Attending Physician: Jason Alexandra MD  Primary Care Provider: Marck Major MD    Isolation Status: No active isolations  Assessment/Plan:      Pulmonary  Asthma with COPD  Continue current medications per primary      Cardiac/Vascular  Primary hypertension  Continue current medications per primary      Renal/  Bladder outflow obstruction  Appreciate urology. Poses risk of recurrent UTI. Plans for [facility].     ID  * Pyelonephritis  --U/A with pyuria 58  --Urine cx has finalized with no growth   --CT reports left-sided severe hydronephrosis and hydroureter. Left renal cortical thinning noted. Right-sided hydronephrosis is also seen. No obstructing renal calculi.   --Concern for pyelonephritis  --Appreciate urology; agree with [facility]   --Recent cultures with no MDRO  --QT prolonged on EKG; will avoid quinolones  --Will complete 14 day course of empiric IV ceftriaxone   --Requires drug toxicity monitoring   --Will schedule ID clinic follow up   --Above d/w primary team.        Outpatient Antibiotic Therapy Plan:     1) Infection: Pyelonephritis      2) Discharge Antibiotics:     Intravenous antibiotics:  IV ceftriaxone 2 g daily      3) Therapy Duration:  14 days      Estimated end date of IV antibiotics: 10/28/24     4) Outpatient Weekly Labs:     Order the following labs to be drawn on Mondays:   CBC  CMP      5) Fax Lab Results to Infectious Diseases Provider: Dr. Huitron      [facility] Fax Number: [phone]    Bacterial infection due to Bacteroides fragilis  --All cases of bacteremia should ideally have source control achieved  --However, not always possible depending on pathogen  --Bacteroides typically a gut pathogen found in polymicrobial infection and is the most common anaerobic bacteremia pathogen   --Identified on BCID  --Follow cultures for final  "speciation/susceptibilities   --Will continue PO metronidazole; anticipate 14 day course   --Follow repeat blood cx for clearance; NGTD   --See pyelonephritis   --Patient in need of outpatient colonoscopy once antibiotic therapy completed     GI  Gastroesophageal reflux disease with esophagitis  Continue current medications per primary        Thank you for your consult. I will follow-up with patient. Please contact us if you have any additional questions.    Cedrick Huitron, DO  Infectious Disease  [facility]    Subjective:     Principal Problem:Pyelonephritis    HPI: This is a 70 yo F known to me with history of hypertension, chronic obstructive pulmonary disease, asthma, arthritis, and gastroesophageal reflux disease who per last ID note "presented to the ER with confusion per . Says that she has since gone back to her baseline. She was also having weakness that has improved. Leukocytosis to 18K on admission but never spiked a fever. Blood cultures collected on 6/19 grew Bacteroides in both anaerobic bottles. Has cleared on repeat set from 6/22. Unclear source. Patient reports constipation. CXR no evidence of pneumonia. CT renal stone with mild moderate right-sided hydronephrosis with right hydroureter.  Severe hydronephrosis of the left kidney with moderate mild left hydroureter. Severe bladder wall thickening.  Question punctate nonobstructing left renal calculi with versus punctate vascular calcification.  No obstructing renal calculi. Urology recommending Leija. Patient also with E coli in urine. ID consulted for bacteremia. Discharged on 14 day course of Flagyl and 10 day course of cefdinir to cover E coli UTI. Blood cx cleared 6/22.    7/10: Here for hospital follow up. Has finished Flagyl and Omnicef. Denies adverse events. No fever. Has not had abdominal pain. Leija still intact. Putting out clear urine. Some itching at insertion site. Still needs to schedule GI appointment. No new concerns. " "Regaining strength with help of PT."    Now admitted with reported abdominal pain, back pain, flank pain, dysuria, weakness, fatigue, fever, chills, diaphoresis, decreased activity, abdominal distention, constipation, nausea, vomiting, and generalized myalgias. CT renal study reports left-sided severe hydronephrosis and hydroureter. Left renal cortical thinning noted. Right-sided hydronephrosis is also seen. No obstructing renal calculi. U/A with 58 WBC. Blood and urine cultures in process. On empiric Levaquin. ID consulted for "complicated urinary tract infections, followed by ID  now with bilateral pyelonephritis."   Interval History: Patient afebrile with no leukocytosis. Awaiting final ID/susceptibilities of anaerobic GNR. On appropriate antibiotic therapy. Needs outpatient colonoscopy to complete workup.     Review of Systems   Constitutional:  Positive for activity change and appetite change. Negative for chills.   Gastrointestinal:  Negative for abdominal pain.   Genitourinary:  Negative for dysuria.   Neurological:  Positive for weakness.   All other systems reviewed and are negative.    Objective:     Vital Signs (Most Recent):  Temp: 97.6 °F (36.4 °C) (10/17/24 0452)  Pulse: 75 (10/17/24 0452)  Resp: 18 (10/17/24 0551)  BP: (!) 113/56 (10/17/24 0452)  SpO2: 98 % (10/17/24 0452) Vital Signs (24h Range):  Temp:  [97.4 °F (36.3 °C)-98.6 °F (37 °C)] 97.6 °F (36.4 °C)  Pulse:  [] 75  Resp:  [17-19] 18  SpO2:  [96 %-98 %] 98 %  BP: (109-169)/(53-74) 113/56     Weight: 93.7 kg (206 lb 9.1 oz)  Body mass index is 37.78 kg/m².    Estimated Creatinine Clearance: 83.1 mL/min (based on SCr of 0.7 mg/dL).     Physical Exam  Constitutional:       General: She is not in acute distress.     Appearance: Normal appearance. She is not ill-appearing.   Cardiovascular:      Rate and Rhythm: Normal rate and regular rhythm.      Pulses: Normal pulses.      Heart sounds: Normal heart sounds. No murmur heard.     No friction " rub. No gallop.   Pulmonary:      Effort: Pulmonary effort is normal. No respiratory distress.      Breath sounds: Normal breath sounds.   Abdominal:      General: Abdomen is flat. Bowel sounds are normal. There is no distension.      Palpations: Abdomen is soft.      Tenderness: There is no abdominal tenderness.   Skin:     General: Skin is warm and dry.   Neurological:      Mental Status: She is alert.          Significant Labs: Blood Culture:   Recent Labs   Lab 06/19/24  1025 06/22/24  1416 10/13/24  1914 10/16/24  0841 10/16/24  0930   LABBLOO Gram stain renee bottle: Gram negative rods  Results called to and read back by: BLAISE Chavez RN  06/20/2024  15:10  BACTEROIDES FRAGILIS* No growth after 5 days.  No growth after 5 days. Gram stain renee bottle: Gram negative rods  Positive results previously called 10/15/2024  17:40  Gram stain renee bottle: Gram negative rods  Results called to and read back by:Scott Alexandra RN 10/15/2024  12:16 No Growth to date No Growth to date     CBC:   Recent Labs   Lab 10/16/24  0454 10/17/24  0515   WBC 9.48 8.47   HGB 10.7* 11.4*   HCT 33.2* 35.8*    306     CMP:   Recent Labs   Lab 10/16/24  0454   *   K 3.4*   CL 98   CO2 24      BUN 13   CREATININE 0.7   CALCIUM 8.1*   PROT 5.5*   ALBUMIN 2.4*   BILITOT 0.5   ALKPHOS 77   AST 10   ALT 7*   ANIONGAP 9     Microbiology Results (last 7 days)       Procedure Component Value Units Date/Time    Blood culture [[id number]] Collected: 10/16/24 0841    Order Status: Completed Specimen: Blood from Peripheral, Forearm, Right Updated: 10/17/24 0115     Blood Culture, Routine No Growth to date    Blood culture [[id number]] Collected: 10/16/24 0930    Order Status: Completed Specimen: Blood from Peripheral, Hand, Left Updated: 10/17/24 0115     Blood Culture, Routine No Growth to date    Narrative:      Collection has been rescheduled by ANANYA at 10/16/2024 08:43 Reason:   Unable to collect  Collection has been  rescheduled by ANANYA at 10/16/2024 08:43 Reason:   Unable to collect    Blood Culture #1 **CANNOT BE ORDERED STAT** [[id number]] Collected: 10/13/24 1914    Order Status: Completed Specimen: Blood from Peripheral, Antecubital, Left Updated: 10/16/24 0937     Blood Culture, Routine Gram stain renee bottle: Gram negative rods      Positive results previously called 10/15/2024  17:40    Blood Culture #2 **CANNOT BE ORDERED STAT** [[id number]] Collected: 10/13/24 1914    Order Status: Completed Specimen: Blood from Peripheral, Hand, Left Updated: 10/16/24 0936     Blood Culture, Routine Gram stain renee bottle: Gram negative rods      Results called to and read back by:Scott Alexandra RN 10/15/2024  12:16    Blood culture [[id number]]     Order Status: Canceled Specimen: Blood     Blood culture [[id number]]     Order Status: Canceled Specimen: Blood     Rapid Organism ID by PCR (from Blood culture) [[id number]]  (Abnormal) Collected: 10/13/24 1914    Order Status: Completed Updated: 10/15/24 1259     Enterococcus faecalis Not Detected     Enterococcus faecium Not Detected     Listeria monocytogenes Not Detected     Staphylococcus spp. Not Detected     Staphylococcus aureus Not Detected     Staphylococcus epidermidis Not Detected     Staphylococcus lugdunensis Not Detected     Streptococcus species Not Detected     Streptococcus agalactiae Not Detected     Streptococcus pneumoniae Not Detected     Streptococcus pyogenes Not Detected     Acinetobacter calcoaceticus/baumannii complex Not Detected     Bacteroides fragilis Detected     Enterobacterales Not Detected     Enterobacter cloacae complex Not Detected     Escherichia coli Not Detected     Klebsiella aerogenes Not Detected     Klebsiella oxytoca Not Detected     Klebsiella pneumoniae group Not Detected     Proteus Not Detected     Salmonella sp Not Detected     Serratia marcescens Not Detected     Haemophilus influenzae Not Detected     Neisseria meningtidis Not Detected      Pseudomonas aeruginosa Not Detected     Stenotrophomonas maltophilia Not Detected     Candida albicans Not Detected     Candida auris Not Detected     Candida glabrata Not Detected     Candida krusei Not Detected     Candida parapsilosis Not Detected     Candida tropicalis Not Detected     Cryptococcus neoformans/gattii Not Detected     CTX-M (ESBL ) Test Not Applicable     IMP (Carbapenem resistant) Test Not Applicable     KPC resistance gene (Carbapenem resistant) Test Not Applicable     mcr-1  Test Not Applicable     mec A/C  Test Not Applicable     mec A/C and MREJ (MRSA) gene Test Not Applicable     NDM (Carbapenem resistant) Test Not Applicable     OXA-48-like (Carbapenem resistant) Test Not Applicable     van A/B (VRE gene) Test Not Applicable     VIM (Carbapenem resistant) Test Not Applicable    Urine culture [[id number]] Collected: 10/13/24 1938    Order Status: Completed Specimen: Urine Updated: 10/15/24 0348     Urine Culture, Routine No significant growth    Narrative:      Specimen Source->Urine    Rapid Organism ID by PCR (from Blood culture) [[id number]] Collected: 10/13/24 1914    Order Status: Canceled           All pertinent labs within the past 24 hours have been reviewed.    Significant Imaging: None

## 2024-10-17 NOTE — NURSING
"Pt remains free of falls/injury. Safety precautions maintained. Pt moans/yells when the head of bed is raised or lowered.  Pt asked what's wrong she says "she hurts." Pt given pain medicine.  Pt sleeps majority of the day and awakes when I round on her to state her pain.  Pt given pain medicine to help keep pain manageable.  IV abx given.  Pt also refuses her turns.  Regular diet tolerated. VSS. No S/S of distress noted at this time. Bed alarm set, and  is at bedside.  12 hour chart check complete. Will continue to monitor.  "

## 2024-10-17 NOTE — PLAN OF CARE
10/17/24 1302   Rounds   Attendance Provider;;Charge nurse;Physical therapist   Discharge Plan A Home with family   Why the patient remains in the hospital Requires continued medical care   Transition of Care Barriers None       Assessment/Plan:      * Pyelonephritis  Bilateral pyelonephritis in patient with complicated urological history  IV Rocephin  Levsin ordered for bladder spasms, PRN dilaudid for severe pain  Continue nicholson        Bacterial infection due to Bacteroides fragilis  Continue Flagyl  Repeat blood cultures no growth thus far  Outpatient colonoscopy planned        Hypokalemia  Patient's most recent potassium results are listed below.         Recent Labs     10/15/24  0616 10/16/24  0454 10/17/24  0515   K 3.0* 3.4* 3.5         Plan  - Replete potassium per protocol  - Monitor potassium Daily        Bladder outflow obstruction  Appreciate urology  Continue nicholson     Sepsis without acute organ dysfunction  Continue Rocephin  Flagyl added due to Bacteroides in blood culture     Chronic idiopathic constipation  Has been taking Linzess on PRN basis     Coronary artery disease involving native coronary artery of native heart without angina pectoris  Continue home medications        Osteoporosis with current pathological fracture  Has had multiple fractures, and procedures   Chronic pain -- PRN dilaudid ordered as she is in severe pain        Benzodiazepine dependence  Continue clonazepam BID (chronic home medication)        Gastroesophageal reflux disease with esophagitis  Protonix BID        Hyponatremia  Hyponatremia is likely due to Dehydration/hypovolemia. The patient's most recent sodium results are listed below.        Recent Labs     10/15/24  0616 10/16/24  0454 10/17/24  0515   * 131* 133*         Plan  -- Monitor sodium Daily.         Asthma with COPD  No acute exacerbation  Continue nebulizer treatments           VTE Risk Mitigation (From admission, onward)               Ordered       enoxaparin injection 40 mg  Daily         10/14/24 0357       IP VTE HIGH RISK PATIENT  Once         10/14/24 0357       Place sequential compression device  Until discontinued         10/14/24 0357                          Discharge Planning   YELENA:      Code Status: Full Code   Is the patient medically ready for discharge?:     Reason for patient still in hospital (select all that apply): Patient trending condition, Treatment, and Consult recommendations  Discharge Plan A: Home Health

## 2024-10-17 NOTE — ASSESSMENT & PLAN NOTE
--All cases of bacteremia should ideally have source control achieved  --However, not always possible depending on pathogen  --Bacteroides typically a gut pathogen found in polymicrobial infection and is the most common anaerobic bacteremia pathogen   --Identified on BCID  --Follow cultures for final speciation/susceptibilities   --Will continue PO metronidazole; anticipate 14 day course   --Follow repeat blood cx for clearance; NGTD   --See pyelonephritis   --Patient in need of outpatient colonoscopy once antibiotic therapy completed

## 2024-10-18 VITALS
BODY MASS INDEX: 36.84 KG/M2 | WEIGHT: 200.19 LBS | OXYGEN SATURATION: 96 % | DIASTOLIC BLOOD PRESSURE: 62 MMHG | HEIGHT: 62 IN | TEMPERATURE: 98 F | SYSTOLIC BLOOD PRESSURE: 131 MMHG | RESPIRATION RATE: 20 BRPM | HEART RATE: 89 BPM

## 2024-10-18 PROCEDURE — 99233 SBSQ HOSP IP/OBS HIGH 50: CPT | Mod: 95,,, | Performed by: STUDENT IN AN ORGANIZED HEALTH CARE EDUCATION/TRAINING PROGRAM

## 2024-10-18 PROCEDURE — 63600175 PHARM REV CODE 636 W HCPCS: Performed by: INTERNAL MEDICINE

## 2024-10-18 PROCEDURE — 25000003 PHARM REV CODE 250: Performed by: NURSE PRACTITIONER

## 2024-10-18 PROCEDURE — 76937 US GUIDE VASCULAR ACCESS: CPT

## 2024-10-18 PROCEDURE — 25000003 PHARM REV CODE 250: Performed by: INTERNAL MEDICINE

## 2024-10-18 PROCEDURE — 36410 VNPNXR 3YR/> PHY/QHP DX/THER: CPT

## 2024-10-18 PROCEDURE — 25000003 PHARM REV CODE 250: Performed by: STUDENT IN AN ORGANIZED HEALTH CARE EDUCATION/TRAINING PROGRAM

## 2024-10-18 PROCEDURE — C1751 CATH, INF, PER/CENT/MIDLINE: HCPCS

## 2024-10-18 PROCEDURE — 97530 THERAPEUTIC ACTIVITIES: CPT

## 2024-10-18 RX ORDER — TRAMADOL HYDROCHLORIDE 50 MG/1
50 TABLET ORAL EVERY 6 HOURS PRN
Qty: 20 TABLET | Refills: 0 | Status: SHIPPED | OUTPATIENT
Start: 2024-10-18

## 2024-10-18 RX ORDER — METRONIDAZOLE 500 MG/1
500 TABLET ORAL EVERY 8 HOURS
Qty: 42 TABLET | Refills: 0 | Status: SHIPPED | OUTPATIENT
Start: 2024-10-18 | End: 2024-11-01

## 2024-10-18 RX ORDER — CEFTRIAXONE 2 G/1
2 INJECTION, POWDER, FOR SOLUTION INTRAMUSCULAR; INTRAVENOUS DAILY
Start: 2024-10-18

## 2024-10-18 RX ADMIN — METRONIDAZOLE 500 MG: 500 TABLET ORAL at 02:10

## 2024-10-18 RX ADMIN — CEFTRIAXONE 2 G: 2 INJECTION, POWDER, FOR SOLUTION INTRAMUSCULAR; INTRAVENOUS at 11:10

## 2024-10-18 RX ADMIN — PREGABALIN 100 MG: 100 CAPSULE ORAL at 09:10

## 2024-10-18 RX ADMIN — PANTOPRAZOLE SODIUM 40 MG: 40 TABLET, DELAYED RELEASE ORAL at 09:10

## 2024-10-18 RX ADMIN — PHENAZOPYRIDINE 100 MG: 100 TABLET ORAL at 09:10

## 2024-10-18 RX ADMIN — CLONAZEPAM 0.5 MG: 0.5 TABLET ORAL at 09:10

## 2024-10-18 RX ADMIN — PAROXETINE HYDROCHLORIDE 40 MG: 20 TABLET, FILM COATED ORAL at 09:10

## 2024-10-18 RX ADMIN — METRONIDAZOLE 500 MG: 500 TABLET ORAL at 05:10

## 2024-10-18 RX ADMIN — CELECOXIB 200 MG: 100 CAPSULE ORAL at 09:10

## 2024-10-18 RX ADMIN — TRAMADOL HYDROCHLORIDE 50 MG: 50 TABLET, COATED ORAL at 11:10

## 2024-10-18 RX ADMIN — MUPIROCIN: 20 OINTMENT TOPICAL at 09:10

## 2024-10-18 RX ADMIN — PHENAZOPYRIDINE 100 MG: 100 TABLET ORAL at 11:10

## 2024-10-18 NOTE — ASSESSMENT & PLAN NOTE
--U/A with pyuria 58  --Urine cx has finalized with no growth   --CT reports left-sided severe hydronephrosis and hydroureter. Left renal cortical thinning noted. Right-sided hydronephrosis is also seen. No obstructing renal calculi.   --Concern for pyelonephritis  --Appreciate urology; agree with [facility]   --Recent cultures with no MDRO  --QT prolonged on EKG; will avoid quinolones  --Will complete 14 day course of empiric IV ceftriaxone   --Requires drug toxicity monitoring   --Will schedule ID clinic follow up   --Above d/w primary team.        Outpatient Antibiotic Therapy Plan:     1) Infection: Pyelonephritis      2) Discharge Antibiotics:     Intravenous antibiotics:  IV ceftriaxone 2 g daily      3) Therapy Duration:  14 days      Estimated end date of IV antibiotics: 10/28/24     4) Outpatient Weekly Labs:     Order the following labs to be drawn on Mondays:   CBC  CMP      5) Fax Lab Results to Infectious Diseases Provider: Dr. Huitron      [facility] Fax Number: [phone]

## 2024-10-18 NOTE — SUBJECTIVE & OBJECTIVE
Interval History: Repeat blood cx remain sterile. Will plan for 14 day course of Rocephin and PO Flagyl. Need outpatient colonoscopy following treatment. Will see in clinic. Patient HDS.     Review of Systems   Constitutional:  Positive for activity change and appetite change. Negative for chills.   Gastrointestinal:  Negative for abdominal pain.   Genitourinary:  Negative for dysuria.   Musculoskeletal:  Positive for back pain.   Neurological:  Positive for weakness.   All other systems reviewed and are negative.    Objective:     Vital Signs (Most Recent):  Temp: 97.7 °F (36.5 °C) (10/18/24 0755)  Pulse: 92 (10/18/24 0755)  Resp: 19 (10/18/24 0755)  BP: 131/72 (10/18/24 0755)  SpO2: 98 % (10/18/24 0755) Vital Signs (24h Range):  Temp:  [97.5 °F (36.4 °C)-98.3 °F (36.8 °C)] 97.7 °F (36.5 °C)  Pulse:  [] 92  Resp:  [15-20] 19  SpO2:  [93 %-98 %] 98 %  BP: ()/(54-91) 131/72     Weight: 90.8 kg (200 lb 2.8 oz)  Body mass index is 36.61 kg/m².    Estimated Creatinine Clearance: 71.5 mL/min (based on SCr of 0.8 mg/dL).     Physical Exam  Constitutional:       General: She is not in acute distress.     Appearance: Normal appearance. She is not ill-appearing.   Cardiovascular:      Rate and Rhythm: Normal rate and regular rhythm.      Pulses: Normal pulses.      Heart sounds: Normal heart sounds. No murmur heard.     No friction rub. No gallop.   Pulmonary:      Effort: Pulmonary effort is normal. No respiratory distress.      Breath sounds: Normal breath sounds.   Abdominal:      General: Abdomen is flat. Bowel sounds are normal. There is no distension.      Palpations: Abdomen is soft.      Tenderness: There is no abdominal tenderness.   Skin:     General: Skin is warm and dry.   Neurological:      Mental Status: She is alert.          Significant Labs: Blood Culture:   Recent Labs   Lab 06/19/24  1025 06/22/24  1416 10/13/24  1914 10/16/24  0841 10/16/24  0930   LABBLOO Gram stain renee bottle: Gram negative  rods  Results called to and read back by: BLAISE Chavez RN  06/20/2024  15:10  BACTEROIDES FRAGILIS* No growth after 5 days.  No growth after 5 days. Gram stain renee bottle: Gram negative rods  Results called to and read back by:Scott Alexandra RN 10/15/2024  12:16  BACTEROIDES FRAGILIS*  Gram stain renee bottle: Gram negative rods  Positive results previously called 10/15/2024  17:40  BACTEROIDES FRAGILIS* No Growth to date  No Growth to date No Growth to date  No Growth to date     CBC:   Recent Labs   Lab 10/17/24  0515   WBC 8.47   HGB 11.4*   HCT 35.8*        CMP:   Recent Labs   Lab 10/17/24  0515   *   K 3.5   CL 98   CO2 28      BUN 12   CREATININE 0.8   CALCIUM 8.4*   PROT 5.7*   ALBUMIN 2.4*   BILITOT 0.4   ALKPHOS 84   AST 10   ALT 7*   ANIONGAP 7*     Microbiology Results (last 7 days)       Procedure Component Value Units Date/Time    Blood culture [[id number]] Collected: 10/16/24 0841    Order Status: Completed Specimen: Blood from Peripheral, Forearm, Right Updated: 10/17/24 1812     Blood Culture, Routine No Growth to date      No Growth to date    Blood culture [[id number]] Collected: 10/16/24 0930    Order Status: Completed Specimen: Blood from Peripheral, Hand, Left Updated: 10/17/24 1812     Blood Culture, Routine No Growth to date      No Growth to date    Narrative:      Collection has been rescheduled by ANANYA at 10/16/2024 08:43 Reason:   Unable to collect  Collection has been rescheduled by ANANYA at 10/16/2024 08:43 Reason:   Unable to collect    Blood Culture #2 **CANNOT BE ORDERED STAT** [[id number]]  (Abnormal) Collected: 10/13/24 1914    Order Status: Completed Specimen: Blood from Peripheral, Hand, Left Updated: 10/17/24 1600     Blood Culture, Routine Gram stain renee bottle: Gram negative rods      Results called to and read back by:Scott Alexandra RN 10/15/2024  12:16      BACTEROIDES FRAGILIS    Blood Culture #1 **CANNOT BE ORDERED STAT** [[id number]]  (Abnormal)  Collected: 10/13/24 1914    Order Status: Completed Specimen: Blood from Peripheral, Antecubital, Left Updated: 10/17/24 1556     Blood Culture, Routine Gram stain renee bottle: Gram negative rods      Positive results previously called 10/15/2024  17:40      BACTEROIDES FRAGILIS    Blood culture [[id number]]     Order Status: Canceled Specimen: Blood     Blood culture [[id number]]     Order Status: Canceled Specimen: Blood     Rapid Organism ID by PCR (from Blood culture) [[id number]]  (Abnormal) Collected: 10/13/24 1914    Order Status: Completed Updated: 10/15/24 1259     Enterococcus faecalis Not Detected     Enterococcus faecium Not Detected     Listeria monocytogenes Not Detected     Staphylococcus spp. Not Detected     Staphylococcus aureus Not Detected     Staphylococcus epidermidis Not Detected     Staphylococcus lugdunensis Not Detected     Streptococcus species Not Detected     Streptococcus agalactiae Not Detected     Streptococcus pneumoniae Not Detected     Streptococcus pyogenes Not Detected     Acinetobacter calcoaceticus/baumannii complex Not Detected     Bacteroides fragilis Detected     Enterobacterales Not Detected     Enterobacter cloacae complex Not Detected     Escherichia coli Not Detected     Klebsiella aerogenes Not Detected     Klebsiella oxytoca Not Detected     Klebsiella pneumoniae group Not Detected     Proteus Not Detected     Salmonella sp Not Detected     Serratia marcescens Not Detected     Haemophilus influenzae Not Detected     Neisseria meningtidis Not Detected     Pseudomonas aeruginosa Not Detected     Stenotrophomonas maltophilia Not Detected     Candida albicans Not Detected     Candida auris Not Detected     Candida glabrata Not Detected     Candida krusei Not Detected     Candida parapsilosis Not Detected     Candida tropicalis Not Detected     Cryptococcus neoformans/gattii Not Detected     CTX-M (ESBL ) Test Not Applicable     IMP (Carbapenem resistant) Test Not  Applicable     KPC resistance gene (Carbapenem resistant) Test Not Applicable     mcr-1  Test Not Applicable     mec A/C  Test Not Applicable     mec A/C and MREJ (MRSA) gene Test Not Applicable     NDM (Carbapenem resistant) Test Not Applicable     OXA-48-like (Carbapenem resistant) Test Not Applicable     van A/B (VRE gene) Test Not Applicable     VIM (Carbapenem resistant) Test Not Applicable    Urine culture [[id number]] Collected: 10/13/24 1938    Order Status: Completed Specimen: Urine Updated: 10/15/24 0348     Urine Culture, Routine No significant growth    Narrative:      Specimen Source->Urine    Rapid Organism ID by PCR (from Blood culture) [[id number]] Collected: 10/13/24 1914    Order Status: Canceled           All pertinent labs within the past 24 hours have been reviewed.    Significant Imaging: None

## 2024-10-18 NOTE — PLAN OF CARE
[facility]  Discharge Final Note    Primary Care Provider: Marck Major MD    Expected Discharge Date: 10/18/2024    Final Discharge Note (most recent)       Final Note - 10/18/24 1420          Final Note    Assessment Type Final Discharge Note     Anticipated Discharge Disposition Home-Health Care [facility]     Hospital Resources/Appts/Education Provided Appointments scheduled and added to AVS;Post-Acute resouces added to AVS        Post-Acute Status    Post-Acute Authorization Home Health     Home Health Status Set-up Complete/Auth obtained     Discharge Delays None known at this time                   [facility] arranged.     [facility] arranged.     Patient has no d/c needs at this time. Sw to follow up, as needed, for d/c planning purposes.

## 2024-10-18 NOTE — CONSULTS
Swetha spoke with pt's spouse this morning to discuss need for IV abx at home. Per spouse pt has not done this previously; no preference on Infusion company.     Swetha sent referral to Lazara with [facility] for IV abx upon d/c.     Pending IV abx arrangements, insurance and teaching.     1340 IV abx arranged; teaching complete with [facility] and pt/spouse.

## 2024-10-18 NOTE — PROGRESS NOTES
[facility]  Infectious Disease  Progress Note    Patient Name: Merle Caro  MRN: [id number]  Admission Date: 10/13/2024  Length of Stay: 5 days  Attending Physician: Jason Alexandra MD  Primary Care Provider: Marck Major MD    Isolation Status: No active isolations  Assessment/Plan:      Pulmonary  Asthma with COPD  Continue current medications per primary      Renal/  Bladder outflow obstruction  Appreciate urology. Poses risk of recurrent UTI. Plans for [facility].     ID  * Pyelonephritis  --U/A with pyuria 58  --Urine cx has finalized with no growth   --CT reports left-sided severe hydronephrosis and hydroureter. Left renal cortical thinning noted. Right-sided hydronephrosis is also seen. No obstructing renal calculi.   --Concern for pyelonephritis  --Appreciate urology; agree with [facility]   --Recent cultures with no MDRO  --QT prolonged on EKG; will avoid quinolones  --Will complete 14 day course of empiric IV ceftriaxone   --Requires drug toxicity monitoring   --Will schedule ID clinic follow up   --Above d/w primary team.        Outpatient Antibiotic Therapy Plan:     1) Infection: Pyelonephritis      2) Discharge Antibiotics:     Intravenous antibiotics:  IV ceftriaxone 2 g daily      3) Therapy Duration:  14 days      Estimated end date of IV antibiotics: 10/28/24     4) Outpatient Weekly Labs:     Order the following labs to be drawn on Mondays:   CBC  CMP      5) Fax Lab Results to Infectious Diseases Provider: Dr. Huitron      ID Clinic Fax Number: [phone]    Bacterial infection due to Bacteroides fragilis  --All cases of bacteremia should ideally have source control achieved  --However, not always possible depending on pathogen  --Bacteroides typically a gut pathogen found in polymicrobial infection and is the most common anaerobic bacteremia pathogen   --Identified on BCID and culture data   --Will continue PO metronidazole  --Plan for minimum 14 day course; stop date 10/29/24  --Follow  "repeat blood cx for clearance; NGTD   --See pyelonephritis   --Patient in need of outpatient colonoscopy once antibiotic therapy completed     GI  Gastroesophageal reflux disease with esophagitis  Continue current medications per primary        Thank you for your consult. I will sign off. Please contact us if you have any additional questions.    Cedrick Huitron, DO  Infectious Disease  [facility]    Subjective:     Principal Problem:Pyelonephritis    HPI: This is a 68 yo F known to me with history of hypertension, chronic obstructive pulmonary disease, asthma, arthritis, and gastroesophageal reflux disease who per last ID note "presented to the ER with confusion per . Says that she has since gone back to her baseline. She was also having weakness that has improved. Leukocytosis to 18K on admission but never spiked a fever. Blood cultures collected on 6/19 grew Bacteroides in both anaerobic bottles. Has cleared on repeat set from 6/22. Unclear source. Patient reports constipation. CXR no evidence of pneumonia. CT renal stone with mild moderate right-sided hydronephrosis with right hydroureter.  Severe hydronephrosis of the left kidney with moderate mild left hydroureter. Severe bladder wall thickening.  Question punctate nonobstructing left renal calculi with versus punctate vascular calcification.  No obstructing renal calculi. Urology recommending Leija. Patient also with E coli in urine. ID consulted for bacteremia. Discharged on 14 day course of Flagyl and 10 day course of cefdinir to cover E coli UTI. Blood cx cleared 6/22.    7/10: Here for hospital follow up. Has finished Flagyl and Omnicef. Denies adverse events. No fever. Has not had abdominal pain. Leija still intact. Putting out clear urine. Some itching at insertion site. Still needs to schedule GI appointment. No new concerns. Regaining strength with help of PT."    Now admitted with reported abdominal pain, back pain, flank pain, dysuria, " "weakness, fatigue, fever, chills, diaphoresis, decreased activity, abdominal distention, constipation, nausea, vomiting, and generalized myalgias. CT renal study reports left-sided severe hydronephrosis and hydroureter. Left renal cortical thinning noted. Right-sided hydronephrosis is also seen. No obstructing renal calculi. U/A with 58 WBC. Blood and urine cultures in process. On empiric Levaquin. ID consulted for "complicated urinary tract infections, followed by ID  now with bilateral pyelonephritis."   Interval History: Repeat blood cx remain sterile. Will plan for 14 day course of Rocephin and PO Flagyl. Need outpatient colonoscopy following treatment. Will see in clinic. Patient HDS.     Review of Systems   Constitutional:  Positive for activity change and appetite change. Negative for chills.   Gastrointestinal:  Negative for abdominal pain.   Genitourinary:  Negative for dysuria.   Musculoskeletal:  Positive for back pain.   Neurological:  Positive for weakness.   All other systems reviewed and are negative.    Objective:     Vital Signs (Most Recent):  Temp: 97.7 °F (36.5 °C) (10/18/24 0755)  Pulse: 92 (10/18/24 0755)  Resp: 19 (10/18/24 0755)  BP: 131/72 (10/18/24 0755)  SpO2: 98 % (10/18/24 0755) Vital Signs (24h Range):  Temp:  [97.5 °F (36.4 °C)-98.3 °F (36.8 °C)] 97.7 °F (36.5 °C)  Pulse:  [] 92  Resp:  [15-20] 19  SpO2:  [93 %-98 %] 98 %  BP: ()/(54-91) 131/72     Weight: 90.8 kg (200 lb 2.8 oz)  Body mass index is 36.61 kg/m².    Estimated Creatinine Clearance: 71.5 mL/min (based on SCr of 0.8 mg/dL).     Physical Exam  Constitutional:       General: She is not in acute distress.     Appearance: Normal appearance. She is not ill-appearing.   Cardiovascular:      Rate and Rhythm: Normal rate and regular rhythm.      Pulses: Normal pulses.      Heart sounds: Normal heart sounds. No murmur heard.     No friction rub. No gallop.   Pulmonary:      Effort: Pulmonary effort is normal. No " respiratory distress.      Breath sounds: Normal breath sounds.   Abdominal:      General: Abdomen is flat. Bowel sounds are normal. There is no distension.      Palpations: Abdomen is soft.      Tenderness: There is no abdominal tenderness.   Skin:     General: Skin is warm and dry.   Neurological:      Mental Status: She is alert.          Significant Labs: Blood Culture:   Recent Labs   Lab 06/19/24  1025 06/22/24  1416 10/13/24  1914 10/16/24  0841 10/16/24  0930   LABBLOO Gram stain renee bottle: Gram negative rods  Results called to and read back by: BLAISE Chavez RN  06/20/2024  15:10  BACTEROIDES FRAGILIS* No growth after 5 days.  No growth after 5 days. Gram stain renee bottle: Gram negative rods  Results called to and read back by:Scott Alexandra RN 10/15/2024  12:16  BACTEROIDES FRAGILIS*  Gram stain renee bottle: Gram negative rods  Positive results previously called 10/15/2024  17:40  BACTEROIDES FRAGILIS* No Growth to date  No Growth to date No Growth to date  No Growth to date     CBC:   Recent Labs   Lab 10/17/24  0515   WBC 8.47   HGB 11.4*   HCT 35.8*        CMP:   Recent Labs   Lab 10/17/24  0515   *   K 3.5   CL 98   CO2 28      BUN 12   CREATININE 0.8   CALCIUM 8.4*   PROT 5.7*   ALBUMIN 2.4*   BILITOT 0.4   ALKPHOS 84   AST 10   ALT 7*   ANIONGAP 7*     Microbiology Results (last 7 days)       Procedure Component Value Units Date/Time    Blood culture [[id number]] Collected: 10/16/24 0841    Order Status: Completed Specimen: Blood from Peripheral, Forearm, Right Updated: 10/17/24 1812     Blood Culture, Routine No Growth to date      No Growth to date    Blood culture [[id number]] Collected: 10/16/24 0930    Order Status: Completed Specimen: Blood from Peripheral, Hand, Left Updated: 10/17/24 1812     Blood Culture, Routine No Growth to date      No Growth to date    Narrative:      Collection has been rescheduled by ANANYA at 10/16/2024 08:43 Reason:   Unable to  collect  Collection has been rescheduled by ANANAY at 10/16/2024 08:43 Reason:   Unable to collect    Blood Culture #2 **CANNOT BE ORDERED STAT** [[id number]]  (Abnormal) Collected: 10/13/24 1914    Order Status: Completed Specimen: Blood from Peripheral, Hand, Left Updated: 10/17/24 1600     Blood Culture, Routine Gram stain renee bottle: Gram negative rods      Results called to and read back by:Scott Alexandra RN 10/15/2024  12:16      BACTEROIDES FRAGILIS    Blood Culture #1 **CANNOT BE ORDERED STAT** [[id number]]  (Abnormal) Collected: 10/13/24 1914    Order Status: Completed Specimen: Blood from Peripheral, Antecubital, Left Updated: 10/17/24 1556     Blood Culture, Routine Gram stain renee bottle: Gram negative rods      Positive results previously called 10/15/2024  17:40      BACTEROIDES FRAGILIS    Blood culture [[id number]]     Order Status: Canceled Specimen: Blood     Blood culture [[id number]]     Order Status: Canceled Specimen: Blood     Rapid Organism ID by PCR (from Blood culture) [[id number]]  (Abnormal) Collected: 10/13/24 1914    Order Status: Completed Updated: 10/15/24 1259     Enterococcus faecalis Not Detected     Enterococcus faecium Not Detected     Listeria monocytogenes Not Detected     Staphylococcus spp. Not Detected     Staphylococcus aureus Not Detected     Staphylococcus epidermidis Not Detected     Staphylococcus lugdunensis Not Detected     Streptococcus species Not Detected     Streptococcus agalactiae Not Detected     Streptococcus pneumoniae Not Detected     Streptococcus pyogenes Not Detected     Acinetobacter calcoaceticus/baumannii complex Not Detected     Bacteroides fragilis Detected     Enterobacterales Not Detected     Enterobacter cloacae complex Not Detected     Escherichia coli Not Detected     Klebsiella aerogenes Not Detected     Klebsiella oxytoca Not Detected     Klebsiella pneumoniae group Not Detected     Proteus Not Detected     Salmonella sp Not Detected      Serratia marcescens Not Detected     Haemophilus influenzae Not Detected     Neisseria meningtidis Not Detected     Pseudomonas aeruginosa Not Detected     Stenotrophomonas maltophilia Not Detected     Candida albicans Not Detected     Candida auris Not Detected     Candida glabrata Not Detected     Candida krusei Not Detected     Candida parapsilosis Not Detected     Candida tropicalis Not Detected     Cryptococcus neoformans/gattii Not Detected     CTX-M (ESBL ) Test Not Applicable     IMP (Carbapenem resistant) Test Not Applicable     KPC resistance gene (Carbapenem resistant) Test Not Applicable     mcr-1  Test Not Applicable     mec A/C  Test Not Applicable     mec A/C and MREJ (MRSA) gene Test Not Applicable     NDM (Carbapenem resistant) Test Not Applicable     OXA-48-like (Carbapenem resistant) Test Not Applicable     van A/B (VRE gene) Test Not Applicable     VIM (Carbapenem resistant) Test Not Applicable    Urine culture [[id number]] Collected: 10/13/24 1938    Order Status: Completed Specimen: Urine Updated: 10/15/24 0348     Urine Culture, Routine No significant growth    Narrative:      Specimen Source->Urine    Rapid Organism ID by PCR (from Blood culture) [[id number]] Collected: 10/13/24 1914    Order Status: Canceled           All pertinent labs within the past 24 hours have been reviewed.    Significant Imaging: None

## 2024-10-18 NOTE — ASSESSMENT & PLAN NOTE
--All cases of bacteremia should ideally have source control achieved  --However, not always possible depending on pathogen  --Bacteroides typically a gut pathogen found in polymicrobial infection and is the most common anaerobic bacteremia pathogen   --Identified on BCID and culture data   --Will continue PO metronidazole  --Plan for minimum 14 day course; stop date 10/29/24  --Follow repeat blood cx for clearance; NGTD   --See pyelonephritis   --Patient in need of outpatient colonoscopy once antibiotic therapy completed

## 2024-10-18 NOTE — PT/OT/SLP PROGRESS
Occupational Therapy  Treatment    Merle Caro   MRN: [id number]   Admitting Diagnosis: Pyelonephritis    OT Date of Treatment: 10/18/24   OT Start Time: 1030  OT Stop Time: 1045  OT Total Time (min): 15 min    Billable Minutes:  Therapeutic Activity 15 minutes    OT/TSERING: OT          General Precautions: Standard, fall  Orthopedic Precautions: N/A  Braces: N/A           Subjective:  Communicated with  nurse and epic chart review prior to session.  Pain/Comfort  Pain Rating 1: 10/10  Location - Orientation 1: generalized  Location 1: back  Pain Rating Post-Intervention 1: 10/10    Objective:  Patient found with: peripheral IV, nicholson catheter, telemetry       Therapeutic Activities and Exercises:  Pt seen in room supine in bed. Pt  educated on HEP. Pt attempt 1 set x 10 reps b ue shoulder flex, however pt ended on 7 reps. Educated patient on importance of increased tolerance to upright position side of EOB with staff and direct impact on CV endurance and strength. Patient encouraged to sit up in chair/ EOB, for a minimum of 2 consecutive hours including for all meals.. Encouraged patient to perform AROM TE to BUE throughout the day within all available planes of motion. Pt educated on maintenance program vs therapy.  Re enforced importance of utilizing call light to meet needs in room and not attempt to get up without staff assistance. Patient verbalized understanding and agreed to comply.           AM-PAC 6 CLICK ADL   How much help from another person does this patient currently need?   1 = Unable, Total/Dependent Assistance  2 = A lot, Maximum/Moderate Assistance  3 = A little, Minimum/Contact Guard/Supervision  4 = None, Modified [place]/Independent    Putting on and taking off regular lower body clothing? : 1  Bathing (including washing, rinsing, drying)?: 1  Toileting, which includes using toilet, bedpan, or urinal? : 1  Putting on and taking off regular upper body clothing?: 2  Taking care of personal  "grooming such as brushing teeth?: 1  Eating meals?: 2  Daily Activity Total Score: 8     AM-PAC Raw Score CMS "G-Code Modifier Level of Impairment Assistance   6 % Total / Unable   7 - 8 CM 80 - 100% Maximal Assist   9-13 CL 60 - 80% Moderate Assist   14 - 19 CK 40 - 60% Moderate Assist   20 - 22 CJ 20 - 40% Minimal Assist   23 CI 1-20% SBA / CGA   24 CH 0% Independent/ Mod I       Patient left supine with all lines intact    ASSESSMENT:  Merle Caro is a 69 y.o. female with a medical diagnosis of Pyelonephritis and presents with debility and generalized weakness.    Rehab identified problem list/impairments:  weakness, impaired endurance, gait instability, impaired self care skills, decreased safety awareness, impaired balance, impaired functional mobility, decreased coordination, decreased upper extremity function, decreased lower extremity function, pain    Rehab potential is poor.    Activity tolerance: Poor    Discharge recommendations: Low Intensity Therapy   Barriers to discharge:      Equipment recommendations: none    GOALS:   Multidisciplinary Problems       Occupational Therapy Goals          Problem: Occupational Therapy    Goal Priority Disciplines Outcome Interventions   Occupational Therapy Goal     OT, PT/OT Progressing    Description: O.T. goals to be met by 10-28-16  Min a with supine<>sit  Pt weill tolerate 1 set x 10 reps b ue rom exercise  Mod a with le dressing  Mod a with sit<>stand transfer                         Plan:  Patient to be seen 2 x/week to address the above listed problems via therapeutic activities, therapeutic exercises, self-care/home management  Plan of Care expires: 10/31/24  Plan of Care reviewed with: spouse         10/18/2024  "

## 2024-10-21 ENCOUNTER — TELEPHONE (OUTPATIENT)
Dept: PREADMISSION TESTING | Facility: HOSPITAL | Age: 69
End: 2024-10-21
Payer: MEDICARE

## 2024-10-21 ENCOUNTER — LAB VISIT (OUTPATIENT)
Dept: LAB | Facility: HOSPITAL | Age: 69
End: 2024-10-21
Attending: INTERNAL MEDICINE
Payer: MEDICARE

## 2024-10-21 ENCOUNTER — TELEPHONE (OUTPATIENT)
Dept: GASTROENTEROLOGY | Facility: CLINIC | Age: 69
End: 2024-10-21
Payer: MEDICARE

## 2024-10-21 DIAGNOSIS — A49.8 INFECTION DUE TO BACTEROIDES: Primary | ICD-10-CM

## 2024-10-21 DIAGNOSIS — A49.8 BACTERIAL INFECTION DUE TO BACTEROIDES FRAGILIS: ICD-10-CM

## 2024-10-21 LAB
ALBUMIN SERPL BCP-MCNC: 3 G/DL (ref 3.5–5.2)
ALP SERPL-CCNC: 99 U/L (ref 40–150)
ALT SERPL W/O P-5'-P-CCNC: 14 U/L (ref 10–44)
ANION GAP SERPL CALC-SCNC: 12 MMOL/L (ref 8–16)
AST SERPL-CCNC: 18 U/L (ref 10–40)
BACTERIA BLD CULT: NORMAL
BACTERIA BLD CULT: NORMAL
BASOPHILS # BLD AUTO: 0.06 K/UL (ref 0–0.2)
BASOPHILS NFR BLD: 0.5 % (ref 0–1.9)
BILIRUB SERPL-MCNC: 0.3 MG/DL (ref 0.1–1)
BUN SERPL-MCNC: 10 MG/DL (ref 8–23)
CALCIUM SERPL-MCNC: 9 MG/DL (ref 8.7–10.5)
CHLORIDE SERPL-SCNC: 97 MMOL/L (ref 95–110)
CO2 SERPL-SCNC: 27 MMOL/L (ref 23–29)
CREAT SERPL-MCNC: 0.7 MG/DL (ref 0.5–1.4)
CRP SERPL-MCNC: 26.6 MG/L (ref 0–8.2)
DIFFERENTIAL METHOD BLD: ABNORMAL
EOSINOPHIL # BLD AUTO: 0.3 K/UL (ref 0–0.5)
EOSINOPHIL NFR BLD: 2.4 % (ref 0–8)
ERYTHROCYTE [DISTWIDTH] IN BLOOD BY AUTOMATED COUNT: 14.4 % (ref 11.5–14.5)
ERYTHROCYTE [SEDIMENTATION RATE] IN BLOOD BY PHOTOMETRIC METHOD: 73 MM/HR (ref 0–36)
EST. GFR  (NO RACE VARIABLE): >60 ML/MIN/1.73 M^2
GLUCOSE SERPL-MCNC: 82 MG/DL (ref 70–110)
HCT VFR BLD AUTO: 39.4 % (ref 37–48.5)
HGB BLD-MCNC: 13.1 G/DL (ref 12–16)
IMM GRANULOCYTES # BLD AUTO: 0.07 K/UL (ref 0–0.04)
IMM GRANULOCYTES NFR BLD AUTO: 0.6 % (ref 0–0.5)
LYMPHOCYTES # BLD AUTO: 1.1 K/UL (ref 1–4.8)
LYMPHOCYTES NFR BLD: 8.4 % (ref 18–48)
MCH RBC QN AUTO: 29.6 PG (ref 27–31)
MCHC RBC AUTO-ENTMCNC: 33.2 G/DL (ref 32–36)
MCV RBC AUTO: 89 FL (ref 82–98)
MONOCYTES # BLD AUTO: 0.8 K/UL (ref 0.3–1)
MONOCYTES NFR BLD: 6.5 % (ref 4–15)
NEUTROPHILS # BLD AUTO: 10.4 K/UL (ref 1.8–7.7)
NEUTROPHILS NFR BLD: 81.6 % (ref 38–73)
NRBC BLD-RTO: 0 /100 WBC
PLATELET # BLD AUTO: 339 K/UL (ref 150–450)
PMV BLD AUTO: 9.5 FL (ref 9.2–12.9)
POTASSIUM SERPL-SCNC: 3.4 MMOL/L (ref 3.5–5.1)
PROT SERPL-MCNC: 6.6 G/DL (ref 6–8.4)
RBC # BLD AUTO: 4.43 M/UL (ref 4–5.4)
SODIUM SERPL-SCNC: 136 MMOL/L (ref 136–145)
WBC # BLD AUTO: 12.68 K/UL (ref 3.9–12.7)

## 2024-10-21 PROCEDURE — 85652 RBC SED RATE AUTOMATED: CPT | Performed by: STUDENT IN AN ORGANIZED HEALTH CARE EDUCATION/TRAINING PROGRAM

## 2024-10-21 PROCEDURE — 86140 C-REACTIVE PROTEIN: CPT | Performed by: STUDENT IN AN ORGANIZED HEALTH CARE EDUCATION/TRAINING PROGRAM

## 2024-10-21 PROCEDURE — 80053 COMPREHEN METABOLIC PANEL: CPT | Performed by: STUDENT IN AN ORGANIZED HEALTH CARE EDUCATION/TRAINING PROGRAM

## 2024-10-21 PROCEDURE — 85025 COMPLETE CBC W/AUTO DIFF WBC: CPT | Performed by: STUDENT IN AN ORGANIZED HEALTH CARE EDUCATION/TRAINING PROGRAM

## 2024-10-21 NOTE — TELEPHONE ENCOUNTER
----- Message from Miguel Damico MD sent at 10/21/2024 12:16 PM CDT -----  Regarding: RE: Colonoscopy  Good Afternoon!    I have no objection doing the colonoscopy prior to 10/29/2024, so I am not sure why we have to wait. Nonetheless, I am cc:ing the team so they schedule the colonoscopy shortly after finishing the abx.     Thanks for the heads up.     Miguel  ----- Message -----  From: Marck Major MD  Sent: 10/21/2024  12:06 PM CDT  To: Miguel Damico MD; Cedrick Huitron DO  Subject: RE: Colonoscopy                                  Dr Huitron,    Looks like this was ordered today by or for Dr. Damico.    Just needs to be scheduled     Dr. Damico, is there anything else that I need to do to make this happen on or shortly after October 29, 2024?      Thanks   Marck  ----- Message -----  From: Cedrick Huitron DO  Sent: 10/18/2024   1:41 PM CDT  To: Marck Major MD  Subject: Colonoscopy                                      Good afternoon. Patient with second episode of Bacteroides bacteremia. Treating with Flagyl plus on Rocephin for pyelonephritis. Guidelines recommend colonoscopy when monomicrobial Bacteroides in blood. Can you please help facilitate this outpatient asap? GI does not want to do it until she completes antibiotics. That will be on 10/29. Appreciate your help.    Robin

## 2024-10-22 ENCOUNTER — PATIENT OUTREACH (OUTPATIENT)
Dept: ADMINISTRATIVE | Facility: CLINIC | Age: 69
End: 2024-10-22
Payer: MEDICARE

## 2024-10-22 NOTE — DISCHARGE SUMMARY
[facility] Medicine  Discharge Summary      Patient Name: Merle Caro  MRN: [id number]  CARLOS: [id number]  Patient Class: IP- Inpatient  Admission Date: 10/13/2024  Hospital Length of Stay: 5 days  Discharge Date and Time: 10/18/2024  4:31 PM  Attending Physician: No att. providers found   Discharging Provider: Jason Alexandra MD  Primary Care Provider: Marck Major MD    Primary Care Team: Networked reference to record PCT     HPI:     Patient is a 69-year-old female with past medical history significant for anxiety, chronic back pain, depression, insomnia, back surgery/spinal fusion,  cauda equina syndrome, lumbar compression fractures, osteoporosis, spinal stenosis, anemia, B12 deficiency, arthritis, asthma/COPD, GERD, hypertension, rheumatoid arthritis, chronic constipation,  chronic urinary urge incontinence, hydronephrosis,  possible bladder outflow obstruction with urinary retention issues requiring Leija catheter at times, bowel incontinence, solitary right kidney as left kidney appears to be nonfunctioning per renogram, and chronic UTI's who presented to ED with complaints of abdominal pain, back  pain, flank pain, dysuria, weakness, fatigue, fever, chills, diaphoresis, decreased activity, abdominal distention, constipation, nausea, vomiting, generalized myalgias.  Initial vital signs in ED with blood pressure 249/123, heart rate 121, temperature 97.9°, 94% SpO2 on room air, respirations 17 per minute.  Lab workup shows WBC 17, left shift noted, sodium 130, potassium 3.2, chloride 94, glucose 129, BUN 22, creatinine 0.8, magnesium 2.0, alk-phos 108, albumin 3.4, total bilirubin 0.6, AST 11, ALT 8, lactic acid 0.8, procalcitonin 0.08, urinalysis with cloudy yellow urine positive nitrites, 1+ leukocyte esterase, 58 WBC, many bacteria, many WBC clumps, 10 hyaline cast, RBC 28.  Urine culture is in progress, blood cultures x2 collected.  Chest x-ray done with stable findings of  cardiomegaly and coarse interstitial lung markings.  CT renal stone study of abdomen and pelvis without contrast showed multiple abnormal findings including severe left-sided hydronephrosis and hydroureter, right-sided hydronephrosis, moderate amount of stool in colon, bladder wall thickening identified, no obstructing renal calculi noted, emphysema with subpleural scarring and irregular nodular opacity in posterior left lung base measuring up to 13 mm, and chronic lumbar and thoracic spinal deformities.  She was given IV Levaquin, 1 L normal saline fluid bolus, IV potassium supplementation and p.o. potassium bicarb while in ED. hospital Medicine team was consulted for admission due to bilateral pyelonephritis.  She is a patient of Dr. Cam and he was consulted for additional recommendations -- for IV antibiotics until culture data available and Leija catheter placement -- he will follow along.    * No surgery found *      Hospital Course:   The patient presented with lower abdominal pain and dysuria. She was placed on IV antibiotics. Leija catheter placed due to neurogenic bladder.  ID was consulted. Blood cultures isolated bacteroides, antibiotics adjusted. Repeat blood cultures negative. ID recommended 2 weeks of IV Rocephin for UTI and 2 weeks of Flagyl for bacteremia. Midline placed and teaching for home IV antibiotics provided. Patient to have outpatient colonoscopy due to Bacteroides bacteremia. Patient seen and examined stable for discharge.     Goals of Care Treatment Preferences:  Code Status: Full Code      SDOH Screening:  The patient declined to be screened for utility difficulties, food insecurity, transport difficulties, housing insecurity, and interpersonal safety, so no concerns could be identified this admission.     Consults:   Consults (From admission, onward)          Status Ordering Provider     Inpatient consult to Social Work/Case Management  Once        Provider:  (Not yet assigned)     Completed JACKIE SIMENTAL     Inpatient consult to Urology  Once        Provider:  Dixon Cam MD    Completed CIPRIANO JANSEN                Final Active Diagnoses:    Diagnosis Date Noted POA    PRINCIPAL PROBLEM:  Pyelonephritis [N12] 10/14/2024 Yes    Bacterial infection due to Bacteroides fragilis [A49.8] 10/16/2024 Yes    Bladder outflow obstruction [N32.0] 10/14/2024 Yes    Hypokalemia [E87.6] 10/14/2024 Yes    Sepsis without acute organ dysfunction [A41.9] 06/19/2024 Yes    Chronic idiopathic constipation [K59.04] 10/20/2022 Yes     Chronic    Coronary artery disease involving native coronary artery of native heart without angina pectoris [I25.10] 10/17/2022 Yes     Chronic    Osteoporosis with current pathological fracture [M80.00XA] 10/15/2022 Yes    Benzodiazepine dependence [F13.20] 10/15/2022 Yes     Chronic    Gastroesophageal reflux disease with esophagitis [K21.00] 02/10/2020 Yes     Chronic    Hyponatremia [E87.1] 02/22/2017 Yes    Asthma with COPD [J44.89] 10/08/2015 Yes     Chronic    Primary hypertension [I10] 05/20/2013 Yes     Chronic      Problems Resolved During this Admission:       Discharged Condition: stable    Disposition: Home or Self Care    Follow Up:   Contact information for follow-up providers       Marck Major MD Follow up.    Specialty: Family Medicine  Contact information:  [address]  [place] [address] [phone]                       Contact information for after-discharge care       Home Medical Care       [facility] .    Service: Home Health Services  Contact information:  [address] [address] [phone]                                 Patient Instructions:      Ambulatory referral/consult to Home Health   Standing Status: Future   Referral Priority: Routine Referral Type: Home Health   Referral Reason: Specialty Services Required   Requested Specialty: Home Health  Services   Number of Visits Requested: 1     Diet Adult Regular     Activity as tolerated       Significant Diagnostic Studies: Microbiology: Blood Culture   Lab Results   Component Value Date    LABBLOO No growth after 5 days. 10/16/2024     Radiology:   Imaging Results              CT Renal Stone Study ABD Pelvis WO (Final result)  Result time 10/13/24 21:21:49      Final result by Jonathan Crandall MD (10/13/24 21:21:49)                   Impression:      Multiple findings as described above including Left-sided severe hydronephrosis and hydroureter.  Left renal cortical thinning noted.  Right-sided hydronephrosis is also seen.Emphysema with subpleural scarring.  Irregular nodular opacity in the posterior left lung base measuring up to 13 mm. Recommend follow-up.    Moderate compression deformity of the L1 vertebral body new since the prior exam likely acute.    Extensive posterior christy fixation at at L2-L3-L4-L5 with kyphoplasty and pedicular screws. Iliac screws at level of S1 extending to the iliac bone. 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  Moderate chronic compression deformity of T11 and T12    All CT scans   are performed using dose optimization techniques including the following: automated exposure control; adjustment of the mA and/or kV; use of iterative reconstruction technique.  Dose modulation was employed for ALARA by means of: Automated exposure control; adjustment of the mA and/or kV according to patient size (this includes techniques or standardized protocols for targeted exams where dose is matched to indication/reason for exam; i.e. extremities or head); and/or use of iterative reconstructive technique.      Electronically signed by: Jonathan Crandall  Date:    10/13/2024  Time:    21:21               Narrative:    EXAMINATION:  CT RENAL STONE STUDY ABD PELVIS WO    CLINICAL HISTORY:  Flank pain, kidney stone suspected; Unspecified abdominal pain    TECHNIQUE:  Low dose axial images,  sagittal and coronal reformations were obtained from the lung bases to the pubic symphysis.  Contrast was not administered.    COMPARISON:  Prior    FINDINGS:  Heart: Normal in size. No pericardial effusion.    Lung Bases: Emphysema with subpleural scarring.  Irregular nodular opacity in the posterior left lung base measuring up to 13 mm.  Recommend follow-up. Subpleural reticular opacities in the lung bases suggestive of scarring atelectasis.  Subcentimeter nodular opacities in the lung bases.    Liver: Normal in size and attenuation, with no focal hepatic lesions.    Gallbladder: No calcified gallstones.    Bile Ducts: No evidence of dilated ducts.    Pancreas: No mass or peripancreatic fat stranding.    Spleen: Unremarkable.    Adrenals: Unremarkable.    Kidneys/ Ureters: Left-sided severe hydronephrosis and hydroureter.  Left renal cortical thinning noted.  Right-sided hydronephrosis is also seen.  No obstructing renal calculi.    Bladder: Bladder wall thickening is identified.    Reproductive organs: Fluid in the vaginal cuff.    GI Tract/Mesentery: No evidence of bowel obstruction or inflammation.  Moderate amount of stool in the colon.    Peritoneal Space: No ascites. No free air.    Retroperitoneum: No significant adenopathy.    Abdominal wall: Small fat containing umbilical hernia.    Vasculature: No  aneurysm atherosclerotic changes including coronary artery calcification.    Bones: Extensive posterior christy fixation at at L2-L3-L4-L5 with kyphoplasty and pedicular screws. Iliac screws at level of S1 extending to the iliac bone.  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  Moderate compression deformity of the L1 vertebral body new since the prior exam likely acute.  Moderate chronic compression deformity of T11 and T12                                       X-Ray Chest AP Portable (Final result)  Result time 10/13/24 20:44:03      Final result by Jonathan Crandall MD (10/13/24 20:44:03)                    Impression:      Stable exam      Electronically signed by: Jonathan Crandall  Date:    10/13/2024  Time:    20:44               Narrative:    EXAMINATION:  XR CHEST AP PORTABLE    CLINICAL HISTORY:  Tachycardia, unspecified    TECHNIQUE:  Single frontal view of the chest was performed.    COMPARISON:  Prior    FINDINGS:  Stable findings with cardiomegaly and coarse interstitial lung markings.    Bones are intact.                                        Pending Diagnostic Studies:       None           Medications:  Reconciled Home Medications:      Medication List        START taking these medications      cefTRIAXone 2 gram injection  Commonly known as: ROCEPHIN  Inject 2 g into the vein once daily.     metroNIDAZOLE 500 MG tablet  Commonly known as: FLAGYL  Take 1 tablet (500 mg total) by mouth every 8 (eight) hours. for 14 days     traMADoL 50 mg tablet  Commonly known as: ULTRAM  Take 1 tablet (50 mg total) by mouth every 6 (six) hours as needed for Pain.            CONTINUE taking these medications      * albuterol 90 mcg/actuation inhaler  Commonly known as: PROVENTIL/VENTOLIN HFA  Inhale 4 puffs into the lungs every 4 (four) hours as needed for Wheezing. Rescue     * albuterol 1.25 mg/3 mL Nebu  Commonly known as: ACCUNEB  3 mL 2 TIMES DAILY (route: inhalation)     * albuterol 90 mcg/actuation inhaler  Commonly known as: PROVENTIL/VENTOLIN HFA  1 puff EVERY 6 HOURS (route: inhalation)     albuterol-ipratropium 2.5 mg-0.5 mg/3 mL nebulizer solution  Commonly known as: DUO-NEB  Take 3 mLs by nebulization every 6 (six) hours as needed for Wheezing or Shortness of Breath. Rescue     * celecoxib 200 MG capsule  Commonly known as: CeleBREX  Take 1 capsule by mouth once daily.     * celecoxib 200 MG capsule  Commonly known as: CeleBREX  Take 1 capsule (200 mg total) by mouth once daily.     * cholecalciferol (vitamin D3) 50 mcg (2,000 unit) Cap capsule  Commonly known as: VITAMIN D3  Take 1 capsule by mouth every  evening.     * VITAMIN D3 50 mcg (2,000 unit) Tab  Generic drug: cholecalciferol (vitamin D3)  Take 1 capsule by mouth once daily.     * clonazePAM 0.5 MG tablet  Commonly known as: KlonoPIN  Take 0.5 mg by mouth every evening.     * clonazePAM 0.5 MG disintegrating tablet  Commonly known as: KlonoPIN  1 tablet EVERY PM (route: oral)     cyanocobalamin (vitamin B-12) 1,000 mcg Subl  Place 1,000 mcg under the tongue once daily.     * folic acid 1 MG tablet  Commonly known as: FOLVITE  Take 1 tablet by mouth once daily.     * folic acid 1 MG tablet  Commonly known as: FOLVITE  Take 1 tablet (1 mg total) by mouth once daily.     furosemide 20 MG tablet  Commonly known as: LASIX  Take 1 tablet (20 mg total) by mouth daily as needed (for leg swelling).     GAVILYTE-G 236-22.74-6.74 -5.86 gram suspension  Generic drug: polyethylene glycol  Take 4,000 mLs by mouth once.     * haloperidoL 5 MG tablet  Commonly known as: HALDOL  Take 5 mg by mouth every evening.     * haloperidoL 5 MG tablet  Commonly known as: HALDOL  1 tablet EVERY PM (route: oral)     * lisinopriL 10 MG tablet  1 tablet EVERY PM (route: oral)     * lisinopriL 10 MG tablet  Take 1 tablet (10 mg total) by mouth every evening.     mecobalamin (vitamin B12) 1,000 mcg Chew  1 tablet DAILY (route: sublingual)     * methocarbamoL 750 MG Tab  Commonly known as: ROBAXIN  1 tablet 3 TIMES DAILY (route: oral)     * methocarbamoL 750 MG Tab  Commonly known as: ROBAXIN  TAKE 1 TABLET(750 MG) BY MOUTH THREE TIMES DAILY AS NEEDED FOR MUSCLE SPASMS     OXY[place] DELIVERY SYSTEMS [facility] [place]  Inhale 2 L into the lungs as needed (O2 via Nasal Canula.).     * paroxetine 40 MG tablet  Commonly known as: PAXIL  Take 40 mg by mouth every evening.     * paroxetine 40 MG tablet  Commonly known as: PAXIL  1 tablet EVERY PM (route: oral)     * pregabalin 100 MG capsule  Commonly known as: LYRICA  1 capsule 2 TIMES DAILY (route: oral)     * pregabalin 100 MG capsule  Commonly known as:  LYRICA  TAKE 1 CAPSULE BY MOUTH TWICE DAILY     * traZODone 150 MG tablet  Commonly known as: DESYREL  1 tablet EVERY PM (route: oral)     * traZODone 150 MG tablet  Commonly known as: DESYREL  Take 75 mg by mouth nightly.           * This list has 23 medication(s) that are the same as other medications prescribed for you. Read the directions carefully, and ask your doctor or other care provider to review them with you.                ASK your doctor about these medications      pantoprazole 40 MG tablet  Commonly known as: PROTONIX  Take 1 tablet (40 mg total) by mouth once daily.              Indwelling Lines/Drains at time of discharge:   Lines/Drains/Airways       None                   Time spent on the discharge of patient: 31 minutes         Jason Alexandra MD  Department of Hospital Medicine  '[place] [facility]

## 2024-10-23 ENCOUNTER — TELEPHONE (OUTPATIENT)
Dept: INFECTIOUS DISEASES | Facility: CLINIC | Age: 69
End: 2024-10-23
Payer: MEDICARE

## 2024-10-23 NOTE — TELEPHONE ENCOUNTER
Called the pt. Spoke with her . He states she is raul. ABT well. Appt in place for follow up confirmed for 10/31/24. Pt  repeated back and voiced understand.

## 2024-10-28 ENCOUNTER — PATIENT MESSAGE (OUTPATIENT)
Dept: HEMATOLOGY/ONCOLOGY | Facility: CLINIC | Age: 69
End: 2024-10-28
Payer: MEDICARE

## 2024-10-28 ENCOUNTER — LAB VISIT (OUTPATIENT)
Dept: LAB | Facility: HOSPITAL | Age: 69
End: 2024-10-28
Attending: STUDENT IN AN ORGANIZED HEALTH CARE EDUCATION/TRAINING PROGRAM
Payer: MEDICARE

## 2024-10-28 DIAGNOSIS — A04.8 INTESTINAL INFECTION DUE TO BACTEROIDES FRAGILIS: ICD-10-CM

## 2024-10-28 DIAGNOSIS — B96.6 INTESTINAL INFECTION DUE TO BACTEROIDES FRAGILIS: ICD-10-CM

## 2024-10-28 LAB
ALBUMIN SERPL BCP-MCNC: 2.4 G/DL (ref 3.5–5.2)
ALP SERPL-CCNC: 89 U/L (ref 40–150)
ALT SERPL W/O P-5'-P-CCNC: 6 U/L (ref 10–44)
ANION GAP SERPL CALC-SCNC: 11 MMOL/L (ref 8–16)
AST SERPL-CCNC: 12 U/L (ref 10–40)
BILIRUB SERPL-MCNC: 0.3 MG/DL (ref 0.1–1)
BUN SERPL-MCNC: 23 MG/DL (ref 8–23)
CALCIUM SERPL-MCNC: 8.4 MG/DL (ref 8.7–10.5)
CHLORIDE SERPL-SCNC: 92 MMOL/L (ref 95–110)
CO2 SERPL-SCNC: 28 MMOL/L (ref 23–29)
CREAT SERPL-MCNC: 0.9 MG/DL (ref 0.5–1.4)
EST. GFR  (NO RACE VARIABLE): >60 ML/MIN/1.73 M^2
GLUCOSE SERPL-MCNC: 204 MG/DL (ref 70–110)
POTASSIUM SERPL-SCNC: 3.2 MMOL/L (ref 3.5–5.1)
PROT SERPL-MCNC: 5.5 G/DL (ref 6–8.4)
SODIUM SERPL-SCNC: 131 MMOL/L (ref 136–145)

## 2024-10-28 PROCEDURE — 80053 COMPREHEN METABOLIC PANEL: CPT | Performed by: STUDENT IN AN ORGANIZED HEALTH CARE EDUCATION/TRAINING PROGRAM

## 2024-10-28 PROCEDURE — 85025 COMPLETE CBC W/AUTO DIFF WBC: CPT | Performed by: STUDENT IN AN ORGANIZED HEALTH CARE EDUCATION/TRAINING PROGRAM

## 2024-10-29 LAB
BASOPHILS # BLD AUTO: 0.04 K/UL (ref 0–0.2)
BASOPHILS NFR BLD: 0.4 % (ref 0–1.9)
DIFFERENTIAL METHOD BLD: ABNORMAL
EOSINOPHIL # BLD AUTO: 0.3 K/UL (ref 0–0.5)
EOSINOPHIL NFR BLD: 2.6 % (ref 0–8)
ERYTHROCYTE [DISTWIDTH] IN BLOOD BY AUTOMATED COUNT: 15 % (ref 11.5–14.5)
HCT VFR BLD AUTO: 36.9 % (ref 37–48.5)
HGB BLD-MCNC: 11.8 G/DL (ref 12–16)
IMM GRANULOCYTES # BLD AUTO: 0.06 K/UL (ref 0–0.04)
IMM GRANULOCYTES NFR BLD AUTO: 0.6 % (ref 0–0.5)
LYMPHOCYTES # BLD AUTO: 0.8 K/UL (ref 1–4.8)
LYMPHOCYTES NFR BLD: 7.7 % (ref 18–48)
MCH RBC QN AUTO: 29.2 PG (ref 27–31)
MCHC RBC AUTO-ENTMCNC: 32 G/DL (ref 32–36)
MCV RBC AUTO: 91 FL (ref 82–98)
MONOCYTES # BLD AUTO: 0.7 K/UL (ref 0.3–1)
MONOCYTES NFR BLD: 6.6 % (ref 4–15)
NEUTROPHILS # BLD AUTO: 8.8 K/UL (ref 1.8–7.7)
NEUTROPHILS NFR BLD: 82.1 % (ref 38–73)
NRBC BLD-RTO: 0 /100 WBC
PLATELET # BLD AUTO: 310 K/UL (ref 150–450)
PMV BLD AUTO: 10.9 FL (ref 9.2–12.9)
RBC # BLD AUTO: 4.04 M/UL (ref 4–5.4)
WBC # BLD AUTO: 10.68 K/UL (ref 3.9–12.7)

## 2024-10-30 ENCOUNTER — OFFICE VISIT (OUTPATIENT)
Dept: NEPHROLOGY | Facility: CLINIC | Age: 69
End: 2024-10-30
Payer: MEDICARE

## 2024-10-30 VITALS
SYSTOLIC BLOOD PRESSURE: 160 MMHG | HEIGHT: 62 IN | HEART RATE: 88 BPM | BODY MASS INDEX: 36.84 KG/M2 | WEIGHT: 200.19 LBS | DIASTOLIC BLOOD PRESSURE: 90 MMHG

## 2024-10-30 DIAGNOSIS — N18.2 CKD (CHRONIC KIDNEY DISEASE) STAGE 2, GFR 60-89 ML/MIN: Primary | ICD-10-CM

## 2024-10-30 DIAGNOSIS — E87.1 HYPONATREMIA: ICD-10-CM

## 2024-10-30 DIAGNOSIS — I10 PRIMARY HYPERTENSION: ICD-10-CM

## 2024-10-30 PROCEDURE — 99215 OFFICE O/P EST HI 40 MIN: CPT | Mod: S$PBB,,, | Performed by: INTERNAL MEDICINE

## 2024-10-30 PROCEDURE — 99212 OFFICE O/P EST SF 10 MIN: CPT | Mod: PBBFAC | Performed by: INTERNAL MEDICINE

## 2024-10-30 PROCEDURE — 99999 PR PBB SHADOW E&M-EST. PATIENT-LVL II: CPT | Mod: PBBFAC,,, | Performed by: INTERNAL MEDICINE

## 2024-10-31 ENCOUNTER — PATIENT MESSAGE (OUTPATIENT)
Dept: INFECTIOUS DISEASES | Facility: CLINIC | Age: 69
End: 2024-10-31

## 2024-10-31 ENCOUNTER — OFFICE VISIT (OUTPATIENT)
Dept: INFECTIOUS DISEASES | Facility: CLINIC | Age: 69
End: 2024-10-31
Payer: MEDICARE

## 2024-10-31 VITALS
OXYGEN SATURATION: 96 % | HEART RATE: 89 BPM | RESPIRATION RATE: 20 BRPM | DIASTOLIC BLOOD PRESSURE: 82 MMHG | SYSTOLIC BLOOD PRESSURE: 138 MMHG

## 2024-10-31 DIAGNOSIS — E78.00 PURE HYPERCHOLESTEROLEMIA: ICD-10-CM

## 2024-10-31 DIAGNOSIS — N30.00 ACUTE CYSTITIS WITHOUT HEMATURIA: ICD-10-CM

## 2024-10-31 DIAGNOSIS — A49.8 BACTERIAL INFECTION DUE TO BACTEROIDES FRAGILIS: Primary | ICD-10-CM

## 2024-10-31 DIAGNOSIS — N13.39 OTHER HYDRONEPHROSIS: ICD-10-CM

## 2024-10-31 DIAGNOSIS — I10 PRIMARY HYPERTENSION: ICD-10-CM

## 2024-10-31 DIAGNOSIS — J44.89 ASTHMA WITH COPD: ICD-10-CM

## 2024-10-31 DIAGNOSIS — R78.81 BACTEREMIA: ICD-10-CM

## 2024-10-31 PROCEDURE — 99214 OFFICE O/P EST MOD 30 MIN: CPT | Mod: PBBFAC | Performed by: STUDENT IN AN ORGANIZED HEALTH CARE EDUCATION/TRAINING PROGRAM

## 2024-10-31 PROCEDURE — 99999 PR PBB SHADOW E&M-EST. PATIENT-LVL IV: CPT | Mod: PBBFAC,,, | Performed by: STUDENT IN AN ORGANIZED HEALTH CARE EDUCATION/TRAINING PROGRAM
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
## 2024-11-01 ENCOUNTER — TELEPHONE (OUTPATIENT)
Dept: UROLOGY | Facility: CLINIC | Age: 69
End: 2024-11-01
Payer: MEDICARE

## 2024-11-04 ENCOUNTER — ANESTHESIA (OUTPATIENT)
Dept: ENDOSCOPY | Facility: HOSPITAL | Age: 69
End: 2024-11-04
Payer: MEDICARE

## 2024-11-04 ENCOUNTER — PATIENT MESSAGE (OUTPATIENT)
Dept: GASTROENTEROLOGY | Facility: CLINIC | Age: 69
End: 2024-11-04
Payer: MEDICARE

## 2024-11-04 ENCOUNTER — HOSPITAL ENCOUNTER (OUTPATIENT)
Facility: HOSPITAL | Age: 69
Discharge: HOME | End: 2024-11-04
Attending: INTERNAL MEDICINE | Admitting: INTERNAL MEDICINE
Payer: MEDICARE

## 2024-11-04 ENCOUNTER — ANESTHESIA EVENT (OUTPATIENT)
Dept: ENDOSCOPY | Facility: HOSPITAL | Age: 69
End: 2024-11-04
Payer: MEDICARE

## 2024-11-04 VITALS
WEIGHT: 200 LBS | RESPIRATION RATE: 20 BRPM | SYSTOLIC BLOOD PRESSURE: 143 MMHG | HEIGHT: 62 IN | HEART RATE: 104 BPM | OXYGEN SATURATION: 96 % | BODY MASS INDEX: 36.8 KG/M2 | TEMPERATURE: 98 F | DIASTOLIC BLOOD PRESSURE: 82 MMHG

## 2024-11-04 DIAGNOSIS — Z86.0100 PERSONAL HISTORY OF COLONIC POLYPS: ICD-10-CM

## 2024-11-04 DIAGNOSIS — Z86.0100 HISTORY OF COLON POLYPS: ICD-10-CM

## 2024-11-04 DIAGNOSIS — A49.8 INFECTION DUE TO BACTEROIDES: Primary | ICD-10-CM

## 2024-11-04 DIAGNOSIS — K57.30 DIVERTICULOSIS OF LARGE INTESTINE WITHOUT HEMORRHAGE: ICD-10-CM

## 2024-11-04 DIAGNOSIS — D12.6 COLON ADENOMAS: ICD-10-CM

## 2024-11-04 PROCEDURE — 37000009 HC ANESTHESIA EA ADD 15 MINS: Performed by: INTERNAL MEDICINE

## 2024-11-04 PROCEDURE — 88305 TISSUE EXAM BY PATHOLOGIST: CPT | Performed by: STUDENT IN AN ORGANIZED HEALTH CARE EDUCATION/TRAINING PROGRAM

## 2024-11-04 PROCEDURE — 45385 COLONOSCOPY W/LESION REMOVAL: CPT | Performed by: INTERNAL MEDICINE

## 2024-11-04 PROCEDURE — 25000003 PHARM REV CODE 250: Performed by: INTERNAL MEDICINE

## 2024-11-04 PROCEDURE — 45385 COLONOSCOPY W/LESION REMOVAL: CPT | Mod: ,,, | Performed by: INTERNAL MEDICINE

## 2024-11-04 PROCEDURE — 88305 TISSUE EXAM BY PATHOLOGIST: CPT | Mod: 26,,, | Performed by: STUDENT IN AN ORGANIZED HEALTH CARE EDUCATION/TRAINING PROGRAM

## 2024-11-04 PROCEDURE — 63600175 PHARM REV CODE 636 W HCPCS: Performed by: STUDENT IN AN ORGANIZED HEALTH CARE EDUCATION/TRAINING PROGRAM

## 2024-11-04 PROCEDURE — 27201089 HC SNARE, DISP (ANY): Performed by: INTERNAL MEDICINE

## 2024-11-04 PROCEDURE — 37000008 HC ANESTHESIA 1ST 15 MINUTES: Performed by: INTERNAL MEDICINE

## 2024-11-04 RX ORDER — SODIUM CHLORIDE 0.9 % (FLUSH) 0.9 %
2 SYRINGE (ML) INJECTION
Status: DISCONTINUED | OUTPATIENT
Start: 2024-11-04 | End: 2024-11-04 | Stop reason: HOSPADM

## 2024-11-04 RX ORDER — LIDOCAINE HYDROCHLORIDE 10 MG/ML
INJECTION, SOLUTION EPIDURAL; INFILTRATION; INTRACAUDAL; PERINEURAL
Status: DISCONTINUED | OUTPATIENT
Start: 2024-11-04 | End: 2024-11-04

## 2024-11-04 RX ORDER — PROPOFOL 10 MG/ML
VIAL (ML) INTRAVENOUS
Status: DISCONTINUED | OUTPATIENT
Start: 2024-11-04 | End: 2024-11-04

## 2024-11-04 RX ORDER — DEXTROMETHORPHAN/PSEUDOEPHED 2.5-7.5/.8
DROPS ORAL
Status: DISCONTINUED | OUTPATIENT
Start: 2024-11-04 | End: 2024-11-04 | Stop reason: HOSPADM

## 2024-11-04 RX ORDER — PHENYLEPHRINE HYDROCHLORIDE 10 MG/ML
INJECTION INTRAVENOUS
Status: DISCONTINUED | OUTPATIENT
Start: 2024-11-04 | End: 2024-11-04

## 2024-11-04 RX ADMIN — PROPOFOL 50 MG: 10 INJECTION, EMULSION INTRAVENOUS at 01:11

## 2024-11-04 RX ADMIN — LIDOCAINE HYDROCHLORIDE 50 MG: 10 SOLUTION INTRAVENOUS at 01:11

## 2024-11-04 RX ADMIN — PHENYLEPHRINE HYDROCHLORIDE 100 MCG: 10 INJECTION INTRAVENOUS at 01:11

## 2024-11-04 NOTE — [employer]ATION PATIENT INSTRUCTIONS
Discharge Summary/Instructions after an Endoscopic Procedure  Patient Name: Merle Caro  Patient MRN: [id number]  Patient YOB: 1955 Monday, November 4, 2024 Miguel Damico MD  Dear patient,  As a result of recent federal legislation (The Federal Cures Act), you may   receive lab or pathology results from your procedure in your [facility]   account before your physician is able to contact you. Your physician or   their representative will relay the results to you with their   recommendations at their soonest availability.  Thank you,  RESTRICTIONS:  During your procedure today, you received medications for sedation.  These   medications may affect your judgment, balance and coordination.  Therefore,   for 24 hours, you have the following restrictions:   - DO NOT drive a car, operate machinery, make legal/financial decisions,   sign important papers or drink alcohol.    ACTIVITY:  Today: no heavy lifting, straining or running due to procedural   sedation/anesthesia.  The following day: return to full activity including work.  DIET:  Eat and drink normally unless instructed otherwise.     TREATMENT FOR COMMON SIDE EFFECTS:  - Mild abdominal pain, nausea, belching, bloating or excessive gas:  rest,   eat lightly and use a heating pad.  - Sore Throat: treat with throat lozenges and/or gargle with warm salt   water.  - Because air was used during the procedure, expelling large amounts of air   from your rectum or belching is normal.  - If a bowel prep was taken, you may not have a bowel movement for 1-3 days.    This is normal.  SYMPTOMS TO WATCH FOR AND REPORT TO YOUR PHYSICIAN:  1. Abdominal pain or bloating, other than gas cramps.  2. Chest pain.  3. Back pain.  4. Signs of infection such as: chills or fever occurring within 24 hours   after the procedure.  5. Rectal bleeding, which would show as bright red, maroon, or black stools.   (A tablespoon of blood from the rectum is not serious, especially if    hemorrhoids are present.)  6. Vomiting.  7. Weakness or dizziness.  GO DIRECTLY TO THE NEAREST EMERGENCY ROOM IF YOU HAVE ANY OF THE FOLLOWING:      Difficulty breathing              Chills and/or fever over 101 F   Persistent vomiting and/or vomiting blood   Severe abdominal pain   Severe chest pain   Black, tarry stools   Bleeding- more than one tablespoon   Any other symptom or condition that you feel may need urgent attention  Your doctor recommends these additional instructions:  If any biopsies were taken, your doctors clinic will contact you in 1 to 2   weeks with any results.  - The patient will be observed post-procedure, until all discharge criteria   are met.   - Discharge patient to home (ambulatory).   - Patient has a contact number available for emergencies.  The signs and   symptoms of potential delayed complications were discussed with the   patient.  Return to normal activities tomorrow.  Written discharge   instructions were provided to the patient.   - Resume previous diet.   - Continue present medications.   - Await pathology results.   - Repeat colonoscopy in 2 years for surveillance of polyps greater than 1 cm   in size.   - Return to referring physician as previously scheduled.  For questions, problems or results please call your physician Miguel Damico MD at Work:  [phone]  If you have any questions about the above instructions, call the GI   department at [phone] or call the endoscopy unit at [phone]   from 7am until 3 pm.  [facility] [place], EMERGENCY ROOM PHONE NUMBER:   [phone]  IF A COMPLICATION OR EMERGENCY SITUATION ARISES AND YOU ARE UNABLE TO REACH   YOUR PHYSICIAN - GO DIRECTLY TO THE EMERGENCY ROOM.  I have read or have had read to me these discharge instructions for my   procedure and have received a written copy.  I understand these   instructions and will follow-up with my physician if I have any questions.      __________________________________       _____________________________________  Nurse Signature                                          Patient/Designated   Responsible Party Signature  Miguel Damico MD  11/4/2024 1:46:31 PM  This report has been verified and signed electronically.  Dear patient,  As a result of recent federal legislation (The Federal Cures Act), you may   receive lab or pathology results from your procedure in your [facility]   account before your physician is able to contact you. Your physician or   their representative will relay the results to you with their   recommendations at their soonest availability.  Thank you,  [employer]ATION

## 2024-11-04 NOTE — PLAN OF CARE
DR CKOER AT BEDSIDE TO SPEAK TO PT. REGARDING RESULTS.  VSS, NO GI BLEEDING, NO ABD. PAIN, NO N/V. PT. DISCHARGED FROM UNIT.

## 2024-11-04 NOTE — ANESTHESIA POSTPROCEDURE EVALUATION
Anesthesia Post Evaluation    Patient: Merle Caro    Procedure(s) Performed: Procedure(s) (LRB):  COLONOSCOPY (N/A)    Final Anesthesia Type: MAC      Patient location during evaluation: PACU  Patient participation: Yes- Able to Participate  Level of consciousness: awake and alert and oriented  Post-procedure vital signs: reviewed and stable  Pain management: adequate  Airway patency: patent  LUCILA mitigation strategies: Multimodal analgesia and Extubation and recovery carried out in lateral, semiupright, or other nonsupine position  PONV status at discharge: No PONV  Anesthetic complications: no      Cardiovascular status: blood pressure returned to baseline and hemodynamically stable  Respiratory status: unassisted and spontaneous ventilation  Hydration status: euvolemic  Follow-up not needed.  Comments: Report given to PACU RN. Hand Off Tool Used. RN given opportunity to ask questions or clarify concerns. No Concerns verbalized. Pt. Left in stable condition. SV. Vital Signs Return to Near Baseline. No s/s of distress noted.                   No case tracking events are documented in the log.      Pain/Fawad Score: No data recorded

## 2024-11-04 NOTE — ANESTHESIA PREPROCEDURE EVALUATION
11/04/2024  Merle Caro is a 69 y.o., female.      Pre-op Assessment    I have reviewed the Patient Summary Reports.       I have reviewed the Medications.     Review of Systems  Anesthesia Hx:  No problems with previous Anesthesia   History of prior surgery of interest to airway management or planning:  Previous anesthesia: General           Social:  Smoker 33 pack years      Hematology/Oncology:  Hematology Normal                                     EENT/Dental:  EENT/Dental Normal           Cardiovascular:  Exercise tolerance: poor   Hypertension, well controlled   CAD  asymptomatic                  Coronary Artery Disease:                            Hypertension         Pulmonary:   COPD, moderate Asthma mild   Sleep Apnea   Asthma:   Chronic Obstructive Pulmonary Disease (COPD):           Obstructive Sleep Apnea (LUCILA).           Renal/:  Chronic Renal Disease        Kidney Function/Disease             Hepatic/GI:     GERD, well controlled         Gerd          Musculoskeletal:  Arthritis        Arthritis          Neurological:    Neuromuscular Disease,           Arthritis                         Neuromuscular Disease   Endocrine:  Endocrine Normal            Psych:  Psychiatric History                  Physical Exam  General: Well nourished, Cooperative, Alert and Oriented    Airway:  Mallampati: II   Mouth Opening: Normal  TM Distance: Normal  Tongue: Normal  Neck ROM: Normal ROM    Dental:  Intact        Anesthesia Plan  Type of Anesthesia, risks & benefits discussed:    Anesthesia Type: Gen Natural Airway, MAC  Intra-op Monitoring Plan: Standard ASA Monitors  Post Op Pain Control Plan: multimodal analgesia and IV/PO Opioids PRN  Induction:  IV  Informed Consent: Informed consent signed with the Patient representative and all parties understand the risks and agree with anesthesia plan.  All  questions answered. Patient consented to blood products? No  ASA Score: 3    Ready For Surgery From Anesthesia Perspective.     .

## 2024-11-04 NOTE — INTERVAL H&P NOTE
"The patient has been examined and the H&P has been reviewed:    I concur with the findings and no changes have occurred since H&P was written.    Procedure risks, benefits and alternative options discussed and understood by patient/family.    Vitals:    11/04/24 1135   BP: (!) 139/91   BP Location: Left arm   Patient Position: Lying   Pulse: (!) 116   Resp: 20   Temp: 98.4 °F (36.9 °C)   TempSrc: Temporal   SpO2: 95%  Comment: O2 at 2 lpm via n/c   Weight: 90.7 kg (200 lb)   Height: 5' 2" (1.575 m)         Active Hospital Problems    Diagnosis  POA    *Infection due to bacteroides [A49.8]  Yes    History of colon polyps [Z86.0100]  Not Applicable      Resolved Hospital Problems   No resolved problems to display.     "

## 2024-11-04 NOTE — DISCHARGE INSTRUCTIONS
Dear Merle Caro      If you develop fevers, severe abdominal pain, significant bleeding, nausea or vomiting, please contact us or come to our Emergency Department at [facility].  Our  contact number is [phone] available for emergencies or any question that you may have.      You may return to normal activities tomorrow.  Written discharge instructions were provided to you today and have them handy since you may not remember our conversation today.       If you take Aspirin, please resume taking it at your prior dose today. If we obtained biopsies or removed polyps during your procedure, we will contact you within 5 to 6 days with the results.      Thanks for trusting us with your healthcare needs.      Sincerely,     Miguel Damico M.D.        To rate your experience with Dr. Damico, please click on the link below     [email or web address]

## 2024-11-04 NOTE — BRIEF OP NOTE
Endoscopy Discharge Summary      Admit Date: 11/4/2024    Discharge Date and Time:  11/4/2024 1:49 PM    Attending Physician: Miguel Damico MD     Discharge Physician: Miguel Damico MD     Principal Admitting Diagnoses: Infection due to bacteroides         Discharge Diagnosis: The primary encounter diagnosis was Infection due to bacteroides. Diagnoses of History of colon polyps, Personal history of colonic polyps, Diverticulosis of large intestine without hemorrhage, and Colon adenomas were also pertinent to this visit.     Discharged Condition: Good    Indication for Admission: Infection due to bacteroides     Hospital Course: Patient was admitted for an inpatient procedure and tolerated the procedure well with no complications.    Significant Diagnostic Studies:  COLON    Pathology (if any):  Specimen (24h ago, onward)       Start     Ordered    11/04/24 1326  Specimen to Pathology, Surgery Gastrointestinal tract  Once        Comments: Pre-op Diagnosis: Bacteroides fragilis infection [A49.8]Procedure(s):COLONOSCOPY Number of specimens: 1Name of specimens: 1 colon polypectomy x2     References:    Click here for ordering Quick Tip   Question Answer Comment   Procedure Type: Gastrointestinal tract    Release to patient Immediate        11/04/24 1334                    Disposition: Home.    Bleeding: None    No Implants         Follow Up/Patient Instructions:   Current Discharge Medication List        CONTINUE these medications which have NOT CHANGED    Details   cefTRIAXone (ROCEPHIN) 2 gram injection Inject 2 g into the vein once daily.      !! celecoxib (CELEBREX) 200 MG capsule Take 1 capsule (200 mg total) by mouth once daily.  Qty: 90 capsule, Refills: 3    Associated Diagnoses: Chronic pain of both knees      !! celecoxib (CELEBREX) 200 MG capsule Take 1 capsule by mouth once daily.      cholecalciferol, vitamin D3, (VITAMIN D3) 2,000 unit Cap Take 1 capsule by mouth every evening.      cholecalciferol,  vitamin D3, (VITAMIN D3) 50 mcg (2,000 unit) Tab Take 1 capsule by mouth once daily.      cyanocobalamin, vitamin B-12, 1,000 mcg Subl Place 1,000 mcg under the tongue once daily.  Qty: 90 tablet, Refills: 3    Associated Diagnoses: Anemia, unspecified type      !! folic acid (FOLVITE) 1 MG tablet Take 1 tablet (1 mg total) by mouth once daily.  Qty: 90 tablet, Refills: 3    Associated Diagnoses: Anemia, unspecified type      !! folic acid (FOLVITE) 1 MG tablet Take 1 tablet by mouth once daily.      GAVILYTE-G 236-22.74-6.74 -5.86 gram suspension Take 4,000 mLs by mouth once.      mecobalamin, vitamin B12, 1,000 mcg Chew 1 tablet DAILY (route: sublingual)      !! methocarbamoL (ROBAXIN) 750 MG Tab TAKE 1 TABLET(750 MG) BY MOUTH THREE TIMES DAILY AS NEEDED FOR MUSCLE SPASMS  Qty: 90 tablet, Refills: 3    Associated Diagnoses: Cauda equina syndrome      !! methocarbamoL (ROBAXIN) 750 MG Tab 1 tablet 3 TIMES DAILY (route: oral)      OXYGEN-AIR DELIVERY SYSTEMS MISC Inhale 2 L into the lungs as needed (O2 via Nasal Canula.).      pantoprazole (PROTONIX) 40 MG tablet Take 1 tablet (40 mg total) by mouth once daily.  Qty: 90 tablet, Refills: 3    Associated Diagnoses: Gastroesophageal reflux disease with esophagitis without hemorrhage      !! paroxetine (PAXIL) 40 MG tablet Take 40 mg by mouth every evening.  Refills: 2      !! paroxetine (PAXIL) 40 MG tablet 1 tablet EVERY PM (route: oral)      !! pregabalin (LYRICA) 100 MG capsule TAKE 1 CAPSULE BY MOUTH TWICE DAILY  Qty: 180 capsule, Refills: 3    Associated Diagnoses: Cauda equina syndrome      !! pregabalin (LYRICA) 100 MG capsule 1 capsule 2 TIMES DAILY (route: oral)      traMADoL (ULTRAM) 50 mg tablet Take 1 tablet (50 mg total) by mouth every 6 (six) hours as needed for Pain.  Qty: 20 tablet, Refills: 0    Comments: Quantity prescribed more than 7 day supply? No      albuterol (ACCUNEB) 1.25 mg/3 mL Nebu 3 mL 2 TIMES DAILY (route: inhalation)      albuterol  (PROVENTIL/VENTOLIN HFA) 90 mcg/actuation inhaler 1 puff EVERY 6 HOURS (route: inhalation)      albuterol-ipratropium (DUO-NEB) 2.5 mg-0.5 mg/3 mL nebulizer solution Take 3 mLs by nebulization every 6 (six) hours as needed for Wheezing or Shortness of Breath. Rescue  Qty: 75 mL, Refills: 0    Associated Diagnoses: Asthma with COPD      clonazePAM (KLONOPIN) 0.5 MG disintegrating tablet 1 tablet EVERY PM (route: oral)      clonazepam (KLONOPIN) 0.5 MG tablet Take 0.5 mg by mouth every evening.      furosemide (LASIX) 20 MG tablet Take 1 tablet (20 mg total) by mouth daily as needed (for leg swelling).  Qty: 90 tablet, Refills: 0    Associated Diagnoses: Bilateral lower extremity edema      !! haloperidol (HALDOL) 5 MG tablet Take 5 mg by mouth every evening.      !! haloperidoL (HALDOL) 5 MG tablet 1 tablet EVERY PM (route: oral)      !! lisinopriL 10 MG tablet Take 1 tablet (10 mg total) by mouth every evening.  Qty: 90 tablet, Refills: 3    Associated Diagnoses: Primary hypertension      !! lisinopriL 10 MG tablet 1 tablet EVERY PM (route: oral)      !! traZODone (DESYREL) 150 MG tablet Take 75 mg by mouth nightly.      !! traZODone (DESYREL) 150 MG tablet 1 tablet EVERY PM (route: oral)       !! - Potential duplicate medications found. Please discuss with provider.          Discharge Procedure Orders   Diet general     No dressing needed     Call MD for:  temperature >100.4     Call MD for:  persistent nausea and vomiting     Call MD for:  severe uncontrolled pain     Call MD for:  difficulty breathing, headache or visual disturbances     Call MD for:  redness, tenderness, or signs of infection (pain, swelling, redness, odor or green/yellow discharge around incision site)     Call MD for:  hives     Call MD for:  persistent dizziness or light-headedness        Follow-up Information       Marck Major MD Follow up.    Specialty: Family Medicine  Why: As needed  Contact information:  [address]  [facility] [address]  [phone]

## 2024-11-05 LAB
FINAL PATHOLOGIC DIAGNOSIS: NORMAL
GROSS: NORMAL
Lab: NORMAL
MICROSCOPIC EXAM: NORMAL

## 2024-11-05 NOTE — PROGRESS NOTES
"Subject: Follow-up and Recommendations After Your Colonoscopy    Dear Merle Caro      I trust this message finds you well. Firstly, I want to extend my gratitude for choosing [facility] for your recent colonoscopy, which took place recently. It was a pleasure to be able to provide you with our care.    During the colonoscopy, we identified polyps in your colon. I'd like to reassure you that these polyps were completely removed during the procedure, and they were subsequently sent to our pathologist for detailed microscopic examination. The pathology results have revealed that the colon polyps were classified as "adenomas."    I want to assure you that no immediate further action is required at this point. Adenomas are a type of polyp that, while not cancerous, do carry a higher risk of developing into colon cancer over time. It's important to note that adenomatous polyps are the most common type of neoplastic polyps, comprising about two-thirds of all colonic polyps. The majority of colorectal cancers originate from adenomas, but the progression of adenomas to cancer occurs in only a small fraction of cases (5 percent or less). Studies suggest that the transition from adenomas to cancer takes approximately 7 to 10 years, with a higher risk associated with advanced adenomas, also known as tubular adenomas.    Given these findings and to ensure your ongoing health and well-being, I recommend a repeat colonoscopy in 2  years for continued monitoring and close surveillance. The timing of follow-up colonoscopies is determined by factors such as the number, size, and microscopic characteristics of the polyps, as well as any additional risk factors.    If you have any questions or concerns, please don't hesitate to reach out. You can contact me through [facility] or by calling [phone] and asking for the [place] [facility]. I do my best to respond to messages twice a day, usually " within 48 hours. However, if you believe your matter is urgent, please do not hesitate to contact our department or your local .      Thank you once again for choosing [facility]. We look forward to continuing to provide you with exceptional care.    Sincerely,    Miguel Damico M.D.  [facility]  Contact: [phone]

## 2024-11-07 NOTE — ASSESSMENT & PLAN NOTE
On 10/19-10/20, patient developed worsening hypotension, tachycardia to 115, hypothermia to 96F.  Appears more lethargic today, though receiving minimal opioids or muscle relaxers.    - f/u septic workup with lactate, blood cultures, UA, CXR  - low threshold to start broad spectrum antibiotics  - will also include BNP/Troponin with lab draw in case hypoxia worsens and PE is suspected (though suspicion for this is lower at this time)     You were seen in the ED for abdominal pain. Since we do not have new imaging today we cannot definitely say there's no bowel obstruction, infection, or other new process. Monitor your symptoms closely and follow up with Dr. Mathew. Return to the ED with recurrent pain, vomiting, fevers, or other concerning symptoms.

## 2024-11-08 ENCOUNTER — TELEPHONE (OUTPATIENT)
Dept: UROLOGY | Facility: CLINIC | Age: 69
End: 2024-11-08
Payer: MEDICARE

## 2024-11-08 ENCOUNTER — EXTERNAL HOME HEALTH (OUTPATIENT)
Dept: HOME HEALTH SERVICES | Facility: HOSPITAL | Age: 69
End: 2024-11-08
Payer: MEDICARE

## 2024-11-13 ENCOUNTER — OFFICE VISIT (OUTPATIENT)
Dept: UROLOGY | Facility: CLINIC | Age: 69
End: 2024-11-13
Payer: MEDICARE

## 2024-11-13 VITALS
BODY MASS INDEX: 36.79 KG/M2 | DIASTOLIC BLOOD PRESSURE: 81 MMHG | WEIGHT: 199.94 LBS | HEIGHT: 62 IN | HEART RATE: 113 BPM | RESPIRATION RATE: 18 BRPM | SYSTOLIC BLOOD PRESSURE: 141 MMHG

## 2024-11-13 DIAGNOSIS — N39.41 URGE INCONTINENCE: Primary | ICD-10-CM

## 2024-11-13 DIAGNOSIS — R15.9 INCONTINENCE OF FECES, UNSPECIFIED FECAL INCONTINENCE TYPE: ICD-10-CM

## 2024-11-13 DIAGNOSIS — N13.39 OTHER HYDRONEPHROSIS: ICD-10-CM

## 2024-11-13 PROCEDURE — 99999 PR PBB SHADOW E&M-EST. PATIENT-LVL V: CPT | Mod: PBBFAC,,, | Performed by: UROLOGY

## 2024-11-13 PROCEDURE — 99215 OFFICE O/P EST HI 40 MIN: CPT | Mod: PBBFAC | Performed by: UROLOGY

## 2024-11-13 PROCEDURE — 99214 OFFICE O/P EST MOD 30 MIN: CPT | Mod: S$PBB,,, | Performed by: UROLOGY

## 2024-11-13 NOTE — PROGRESS NOTES
Chief Complaint:   Encounter Diagnoses   Name Primary?    Urge incontinence Yes    Other hydronephrosis     Incontinence of feces, unspecified fecal incontinence type        HPI:   11/13/24- here today to discuss CIC, patient is still has a Leija catheter but no apparent infection.  Patient appears to have continue to medically deteriorate.    1/4/23- patient reports as a new patient to me with urge and fecal incontinence.  Fecal incontinence is relatively new, this appears to have exacerbated recently was also recently found to have sacral fracture with cauda equina syndrome.  She is having some improvement on symptoms, Myrbetriq 25 mg does assist, but not perfectly.  No evidence of stress incontinence, dysuria or gross hematuria.    11/16/22- LR- Patient is a 67-year-old female that is presenting with her .  HPI was obtained by .   states that patient is status post Spine fusion and has a h/o Cauda Equina syndrome.  States that patient is unaware when she needs to urinate and has had an increase in urinary incontinence. Reports that she also has fecal incontinence.  PVR was 150 mL, repeat 132 ml.  No history of gross hematuria or renal stones.  Unable to give urine sample.    Allergies:  Codeine, Keflex [cephalexin], Shellfish containing products, Flu vaccine ts 2011-12(18 yr+), and Sucralfate    Medications:  has a current medication list which includes the following prescription(s): albuterol-ipratropium, celecoxib, cholecalciferol (vitamin d3), clonazepam, cyanocobalamin (vitamin b-12), folic acid, furosemide, haloperidol, lisinopril, methocarbamol, oxygen-air delivery systems, pantoprazole, paroxetine, pregabalin, trazodone, and albuterol.    Review of Systems:  General: No fever, chills, fatigability, or weight loss.  Skin: No rashes, itching, or changes in color or texture of skin.  Chest: Denies KENT, cyanosis, wheezing, cough, and sputum production.  Abdomen: Appetite fine. No weight  loss. Denies diarrhea, abdominal pain, hematemesis, or blood in stool.  Musculoskeletal: No joint stiffness or swelling. Denies back pain.  : As above.  All other review of systems negative.    PMH:   has a past medical history of Arthritis, Asthma, COPD (chronic obstructive pulmonary disease), Depression, GERD (gastroesophageal reflux disease), Hypertension, and Tobacco abuse.    PSH:   has a past surgical history that includes Hysterectomy; Knee surgery; Tonsillectomy; multiple fracture repairs; Colonoscopy w/ polypectomy (01/05/2011); Magnetic resonance imaging (N/A, 10/14/2022); fusion, spine, posterior approach (N/A, 10/18/2022); and Esophagogastroduodenoscopy (N/A, 10/24/2022).    FamHx: family history includes COPD in her mother; Cancer in her mother; Hypertension in her mother; Lung cancer in her mother; Ovarian cancer in her sister; Stroke in her mother.    SocHx:  reports that she has quit smoking. Her smoking use included cigarettes. She started smoking about 45 years ago. She has a 33 pack-year smoking history. She has never used smokeless tobacco. She reports that she does not drink alcohol and does not use drugs.      Physical Exam:  Vitals:    01/04/23 1554   BP: 103/69   Pulse: 92     General: A&Ox3, no apparent distress, no deformities  Neck: No masses, normal ROM  Lungs: normal inspiration, no use of accessory muscles  Heart: normal pulse, no arrhythmias  Abdomen: Soft, NT, ND, no masses, no hernias, no hepatosplenomegaly  Skin: The skin is warm and dry. No jaundice.  Ext: No c/c/e.  : 8/24- No pelvic floor prolapse.  Normal introitus, no urethral abnomralities. No Perineal abnormalities.    Labs/Studies:    ml 8/24  PVR 24 mL 7/24  Urine culture negative 10/24  Urine culture E coli 6/24  Cystoscopy normal 8/24  CT stone protocol bilateral hydronephrosis, left atrophic kidney 10/24  Lasix renogram 94% right/6% left, severe obstruction left 9/24  ADRIEL bilateral hydronephrosis 7/24  CT  stone protocol bilateral hydronephrosis, bladder wall thickening, spinal hardware and iliac region with deformity of the pubic symphysis 6/24  Creatinine 0.9 10/24    Impression/Plan:        1. Urge incontinence/neurogenic bladder- based on previous Lasix renogram this was highly suspicious for outflow obstruction most likely due to her neurological fracture.  Previously Myrbetriq had caused her to go into acute retention.  Patient was recently admitted to the hospital for possible sepsis, the thought was a UTI but cultures were negative, a catheter was placed at that juncture.  We discussed CIC but after further discussions with her and her  they are not able to perform this currently.  Patient is still having persistent pain, has an appointment to see neurology.  In regards to the incontinence has actually improved with a Leija catheter, making a little more stable for the patient.  She has an appointment for urodynamics soon, we will follow up after this study.  She has emptied in the past in the office, but based upon these studies it was my opinion that she needs to have continuous drainage with a Leija catheter.  Per previous renogram left kidney appears to be nonfunctioning right kidney demonstrates no evidence of obstruction despite previous reports of hydronephrosis.  Given the fact that she has a solitary right kidney, bladder drainage is paramount for renal function.  Previous cystoscopy was negative.  Of note history of repaired sacral fracture with cauda equina syndrome 10/22, which is most likely the underlying etiology.  Leija catheter was changed today in the office.    2. Fecal incontinence- relatively stable and improved.    3. Bilateral hydronephrosis- please see above.

## 2024-11-19 ENCOUNTER — DOCUMENT SCAN (OUTPATIENT)
Dept: HOME HEALTH SERVICES | Facility: HOSPITAL | Age: 69
End: 2024-11-19
Payer: MEDICARE

## 2024-11-20 ENCOUNTER — TELEPHONE (OUTPATIENT)
Dept: INTERNAL MEDICINE | Facility: CLINIC | Age: 69
End: 2024-11-20
Payer: MEDICARE

## 2024-11-20 DIAGNOSIS — G83.4 CAUDA EQUINA SYNDROME: Primary | Chronic | ICD-10-CM

## 2024-11-20 NOTE — TELEPHONE ENCOUNTER
----- Message from [employer] sent at 11/20/2024 10:00 AM CST -----  Contact: SHANNON OLIVO [[id number]]  .Type:  Patient Requesting Call    Who Called:Ashley, [facility]  Does the patient know what this is regarding? Pain meds for pt, pt has an neurosurgery appt 12/3, but Precious wants to know can you prescribe some pain meds until then.   Would the patient rather a call back or a response via [facility]? call  Best Call Back Number:[phone]  Additional Information:.  [facility]/pharmacy #[id number] - [place], [place] - [address] [address]  [address] [address] [address]  Phone: [phone] Fax: [phone]

## 2024-11-21 ENCOUNTER — TELEPHONE (OUTPATIENT)
Dept: UROLOGY | Facility: CLINIC | Age: 69
End: 2024-11-21
Payer: MEDICARE

## 2024-11-21 RX ORDER — TRAMADOL HYDROCHLORIDE 50 MG/1
50 TABLET ORAL
Qty: 30 TABLET | Refills: 0 | Status: SHIPPED | OUTPATIENT
Start: 2024-11-21

## 2024-11-26 ENCOUNTER — PROCEDURE VISIT (OUTPATIENT)
Dept: UROLOGY | Facility: CLINIC | Age: 69
End: 2024-11-26
Payer: MEDICARE

## 2024-11-26 DIAGNOSIS — N13.39 OTHER HYDRONEPHROSIS: ICD-10-CM

## 2024-11-26 DIAGNOSIS — N39.41 URGE INCONTINENCE: Primary | ICD-10-CM

## 2024-11-26 DIAGNOSIS — R15.9 INCONTINENCE OF FECES, UNSPECIFIED FECAL INCONTINENCE TYPE: ICD-10-CM

## 2024-11-26 PROCEDURE — 99499 UNLISTED E&M SERVICE: CPT | Mod: S$PBB,,, | Performed by: UROLOGY

## 2024-11-26 PROCEDURE — 51784 ANAL/URINARY MUSCLE STUDY: CPT | Mod: PBBFAC

## 2024-11-26 PROCEDURE — 51784 ANAL/URINARY MUSCLE STUDY: CPT | Mod: 26,S$PBB,51, | Performed by: UROLOGY

## 2024-11-26 PROCEDURE — 51728 CYSTOMETROGRAM W/VP: CPT | Mod: 26,S$PBB,, | Performed by: UROLOGY

## 2024-11-26 PROCEDURE — 51797 INTRAABDOMINAL PRESSURE TEST: CPT | Mod: PBBFAC

## 2024-11-26 PROCEDURE — 51797 INTRAABDOMINAL PRESSURE TEST: CPT | Mod: 26,S$PBB,, | Performed by: UROLOGY

## 2024-11-26 PROCEDURE — 51728 CYSTOMETROGRAM W/VP: CPT | Mod: PBBFAC

## 2024-12-02 NOTE — PROCEDURES
Procedures  12/02/2024    Procedure: Urodynamics    HPI: 11/13/24- here today to discuss CIC, patient is still has a Leija catheter but no apparent infection.  Patient appears to have continue to medically deteriorate.    1/4/23- patient reports as a new patient to me with urge and fecal incontinence.  Fecal incontinence is relatively new, this appears to have exacerbated recently was also recently found to have sacral fracture with cauda equina syndrome.  She is having some improvement on symptoms, Myrbetriq 25 mg does assist, but not perfectly.  No evidence of stress incontinence, dysuria or gross hematuria.    11/16/22- LR- Patient is a 67-year-old female that is presenting with her .  HPI was obtained by .   states that patient is status post Spine fusion and has a h/o Cauda Equina syndrome.  States that patient is unaware when she needs to urinate and has had an increase in urinary incontinence. Reports that she also has fecal incontinence.  PVR was 150 mL, repeat 132 ml.  No history of gross hematuria or renal stones.  Unable to give urine sample.    Procedure in Detail: Pt voided and a PVR was measured with straight cath using sterile technique. Urodynamics catheters were placed in bladder and rectum. EMG pads were placed. The patient was positioned on the urinal over the CMG cup. The test was performed for pressure-flow studies of storage and voiding function. Valsalva was performed at intervals to evaluate catheter function and detrusor response/incontinence. PVR was again measured after the patient voided. The catheters were withdrawn and the patient allowed to dress.    Urodynamics:    Filling Phase:  Resting Pressure- 0 cm/H2O  First desire- 53 ml  Strong desire- 302 ml  Capacity- 302 ml  Uninhibited contractions/detrusor instability- multiple, minimal leakage  Valsalva difficult to perform due to patient having detrusor instability.  No significant loss of compliance.    Voiding  Phase:  Max flow rate- 2.1 ml/sec  Voided volume- 3 ml  Residual- 224 ml  Intraabdominal pressure- 39.6 cm/H2O   Peak detrusor pressure- 42 cm/H2O  Peak flow pressure- 23.8 cm/H2O  Normal detrusor pressure shape.  EMG normal.      Impression:  Patient notes detrusor instability, no true loss of compliance.  Patient had normal sensation volume, leakage noted with urge incontinence, Valsalva difficult to ascertain due to condition.  In regards to coordinated bladder contractions though, EMG was normal and she had a normal detrusor shape but could not void with elevated residuals.  Highly suspicious for neurogenic bladder.

## 2024-12-03 ENCOUNTER — OFFICE VISIT (OUTPATIENT)
Dept: NEUROSURGERY | Facility: CLINIC | Age: 69
End: 2024-12-03
Payer: MEDICARE

## 2024-12-03 ENCOUNTER — PATIENT OUTREACH (OUTPATIENT)
Dept: ADMINISTRATIVE | Facility: HOSPITAL | Age: 69
End: 2024-12-03
Payer: MEDICARE

## 2024-12-03 VITALS — BODY MASS INDEX: 36.52 KG/M2 | WEIGHT: 198.44 LBS | HEIGHT: 62 IN

## 2024-12-03 DIAGNOSIS — Z98.1 HISTORY OF LUMBAR FUSION: ICD-10-CM

## 2024-12-03 DIAGNOSIS — M54.6 PAIN IN THORACIC SPINE: ICD-10-CM

## 2024-12-03 DIAGNOSIS — M54.9 DORSALGIA, UNSPECIFIED: ICD-10-CM

## 2024-12-03 DIAGNOSIS — M81.0 OSTEOPOROSIS WITHOUT CURRENT PATHOLOGICAL FRACTURE, UNSPECIFIED OSTEOPOROSIS TYPE: ICD-10-CM

## 2024-12-03 DIAGNOSIS — M54.50 ACUTE BILATERAL LOW BACK PAIN WITHOUT SCIATICA: Primary | ICD-10-CM

## 2024-12-03 DIAGNOSIS — M48.062 LUMBAR STENOSIS WITH NEUROGENIC CLAUDICATION: ICD-10-CM

## 2024-12-03 DIAGNOSIS — M54.16 LUMBAR RADICULOPATHY: ICD-10-CM

## 2024-12-03 DIAGNOSIS — M43.16 SPONDYLOLISTHESIS, LUMBAR REGION: ICD-10-CM

## 2024-12-03 PROCEDURE — 99214 OFFICE O/P EST MOD 30 MIN: CPT | Mod: S$PBB,,, | Performed by: NEUROLOGICAL SURGERY

## 2024-12-03 PROCEDURE — 99999 PR PBB SHADOW E&M-EST. PATIENT-LVL V: CPT | Mod: PBBFAC,,, | Performed by: NEUROLOGICAL SURGERY

## 2024-12-03 PROCEDURE — 99215 OFFICE O/P EST HI 40 MIN: CPT | Mod: PBBFAC | Performed by: NEUROLOGICAL SURGERY

## 2024-12-03 RX ORDER — HYDROCODONE BITARTRATE AND ACETAMINOPHEN 10; 325 MG/1; MG/1
1 TABLET ORAL EVERY 8 HOURS PRN
Qty: 60 TABLET | Refills: 0 | Status: SHIPPED | OUTPATIENT
Start: 2024-12-03

## 2024-12-03 NOTE — PROGRESS NOTES
VBC OUTREACH: per chart review pt is overdue for the following:  spoke to pt  who stated she was asleep, I left a message asking pt return my call.

## 2024-12-03 NOTE — PROGRESS NOTES
Subjective:      Patient ID: Merle Caro is a 69 y.o. female.    Chief Complaint: Lumbar Spine Pain (L-Spine) (Pt reports with LBP that also radiates to buttock; Pt reports little pain in legs but no numbness; Pt has )     Patient here today for evaluation lower back pain with an x-ray of the lumbar spine for my review   He has a history of having a lumbar spinal fusion in 2022 L2 to pelvis for spondyloptosis   History of compression fracture and osteoporosis   Was doing relatively well the past several months however began having severe uncontrolled lower back pain and bilateral lower extremity symptoms without any inciting event   States that it has been difficult for her to stand up secondary to pain   He has been taking Celebrex Robaxin and Lyrica for symptom relief   No recent physical therapy or epidural steroid injections      Review of Systems   Constitutional:  Negative for activity change, appetite change and chills.   HENT:  Negative for hearing loss, sore throat and tinnitus.    Eyes:  Negative for pain, discharge and itching.   Cardiovascular:  Negative for chest pain.   Gastrointestinal:  Negative for abdominal pain.   Endocrine: Negative for cold intolerance and heat intolerance.   Genitourinary:  Negative for difficulty urinating and dysuria.   Musculoskeletal:  Positive for back pain and gait problem.   Allergic/Immunologic: Negative for environmental allergies.   Neurological:  Positive for weakness. Negative for dizziness, tremors, light-headedness and headaches.   Hematological:  Negative for adenopathy.   Psychiatric/Behavioral:  Negative for agitation, behavioral problems and confusion.          Objective:       Neurosurgery Physical Exam  Ortho/SPM Exam  Ortho Exam        Results for orders placed during the hospital encounter of 10/14/22    MRI Lumbar Spine Without Contrast      Grade 1 anterolisthesis at L4-5.  Grade 4 anterolisthesis at S1-2 with jumped S1 facets bilaterally.    Partially  imaged T12 compression fracture.  Chronic compression fracture of L3, with approximately 25% height loss and mild retropulsion.  Subacute compression fracture of the L4 inferior endplate, with approximately 40% height loss, mild retropulsion, and associated bone marrow edema.  Partially imaged comminuted fracture of the sacrum involving the sacral body and both sacral ala.  Fracture lines extend into the S1-2 and S2-3 neural foramina bilaterally, with effacement of epidural fat.  There is associated bone marrow edema suggesting subacute injury.    No marrow replacing lesions.    Multilevel degenerative changes with disc desiccation, height loss, bulging, and facet arthropathy.  There is severe canal stenosis at the S1 level.  Mild canal stenosis elsewhere in the lumbar spine.  There is mild-moderate foraminal stenosis at multiple levels, most advanced at L4-5 and L5-S1 on the left.  Findings are similar to 09/29/2022.    The conus medullaris has a normal appearance and terminates at the L1 level.  Cauda equina nerve roots are unremarkable.    Distended bladder.  Diffuse muscle atrophy.    Impression  Sequences are degraded by patient motion artifact. In addition, the examination was terminated prematurely by the patient so not all sequences were obtained.    Partially imaged comminuted fracture of the sacrum, with grade 4 anterolisthesis at S1-2 resulting in severe canal stenosis.    Chronic and subacute compression fractures in the lumbar spine as described.    Distended bladder, concerning for cauda equina syndrome in the setting of spinal canal stenosis.    This report was flagged in Epic as abnormal.      Electronically signed by: Andrade Colmenares  Date:    10/14/2022  Time:    15:13     Results for orders placed during the hospital encounter of 06/22/21    X-Ray Lumbar Complete Including Flex And Ext    Narrative  EXAMINATION:  XR LUMBAR SPINE 5 VIEW WITH FLEX AND EXT    CLINICAL HISTORY:  lumbar radiculitis;   Spondylosis without myelopathy or radiculopathy, lumbar region    TECHNIQUE:  Five views of the lumbar spine plus flexion extension views were performed.    COMPARISON:  None.    FINDINGS:  Five lumbar vertebrae.    Well-visualized on the lateral view are compression deformities involving T10 through T12.  Age indeterminate and correlation for point tenderness at these levels recommended.    Lumbar vertebrae demonstrate normal height and alignment.  Disc spaces appear adequate all levels.  Facet arthropathy from L3-4 through L5-S1 levels.  Vascular calcifications of the aorta and its branches without aneurysmal dilatation.  No pars defects.  Pedicles and SI joints intact.  No subluxation or instability noted in the lumbar spine on flexion and extension views.    Impression  Compression deformities of uncertain age noted at the T10 through T12 levels.  Correlate with exam and history.    Lumbar spondylosis without acute fracture or subluxation.    Additional findings as above.    This report was flagged in Epic as abnormal.      Electronically signed by: Neville Kaur MD  Date:    06/23/2021  Time:    10:03    Results for orders placed during the hospital encounter of 10/04/23    X-Ray Lumbar Spine AP And Lateral    Narrative  EXAMINATION:  XR LUMBAR SPINE AP AND LATERAL    CLINICAL HISTORY:  Other specified postprocedural states    TECHNIQUE:  AP, lateral and spot images were performed of the lumbar spine.    COMPARISON:  N 04/05/2023 one    FINDINGS:  Posterior spinal fusion with pedicle screws extending from L2 to to the sacrum identified.  The position alignment is satisfactory and unchanged as compared to the previous study.  Compression deformity of the T11, T12 and L3 vertebral body also identified similar to the previous study.    Impression  See above      Electronically signed by: August Calixto MD  Date:    10/04/2023  Time:    13:45         I  reviewed all pertinent imaging regarding this case.  Assessment:      1. Acute bilateral low back pain without sciatica    2. Dorsalgia, unspecified    3. Pain in thoracic spine    4. Lumbar radiculopathy    5. Lumbar stenosis with neurogenic claudication    6. Spondylolisthesis, lumbar region    7. Osteoporosis without current pathological fracture, unspecified osteoporosis type    8. History of lumbar fusion      Plan:     Acute bilateral low back pain without sciatica  -     Ambulatory referral/consult to Neurosurgery  -     HYDROcodone-acetaminophen (NORCO)  mg per tablet; Take 1 tablet by mouth every 8 (eight) hours as needed for Pain.  Dispense: 60 tablet; Refill: 0  -     CT Thoracic Spine Without Contrast; Future; Expected date: 12/03/2024  -     CT Lumbar Spine Without Contrast; Future; Expected date: 12/03/2024  -     MRI Lumbar Spine Without Contrast; Future; Expected date: 12/03/2024    Dorsalgia, unspecified  -     CT Thoracic Spine Without Contrast; Future; Expected date: 12/03/2024  -     CT Lumbar Spine Without Contrast; Future; Expected date: 12/03/2024  -     MRI Lumbar Spine Without Contrast; Future; Expected date: 12/03/2024    Pain in thoracic spine  -     CT Thoracic Spine Without Contrast; Future; Expected date: 12/03/2024  -     CT Lumbar Spine Without Contrast; Future; Expected date: 12/03/2024  -     MRI Lumbar Spine Without Contrast; Future; Expected date: 12/03/2024    Lumbar radiculopathy    Lumbar stenosis with neurogenic claudication    Spondylolisthesis, lumbar region    Osteoporosis without current pathological fracture, unspecified osteoporosis type    History of lumbar fusion     Patient with history of extensive spinal fusion for spondylolisthesis L2-L5 with a history of osteoporosis and nontraumatic compression fractures   Does not have any recent imaging I will order a CT scan of the lumbar spine to evaluate the hardware as well as an MRI to look for any new or ongoing stenosis   Patient's x-ray does not appear to show any hardware  Complication acutely at this time    She will follow up after imaging to discuss findings discuss treatment options warranted    Thank you for the referral   Please call with any questions    Ean Encarnacion MD  Neurosurgery     Disclaimer: This note was prepared using a voice recognition system and is likely to have sound alike errors within the text.

## 2024-12-04 ENCOUNTER — PATIENT OUTREACH (OUTPATIENT)
Dept: ADMINISTRATIVE | Facility: HOSPITAL | Age: 69
End: 2024-12-04
Payer: MEDICARE

## 2024-12-04 ENCOUNTER — TELEPHONE (OUTPATIENT)
Dept: NEUROSURGERY | Facility: CLINIC | Age: 69
End: 2024-12-04
Payer: MEDICARE

## 2024-12-04 ENCOUNTER — OFFICE VISIT (OUTPATIENT)
Dept: UROLOGY | Facility: CLINIC | Age: 69
End: 2024-12-04
Payer: MEDICARE

## 2024-12-04 VITALS
WEIGHT: 198 LBS | BODY MASS INDEX: 36.44 KG/M2 | HEART RATE: 125 BPM | SYSTOLIC BLOOD PRESSURE: 134 MMHG | DIASTOLIC BLOOD PRESSURE: 84 MMHG | HEIGHT: 62 IN

## 2024-12-04 DIAGNOSIS — M54.6 PAIN IN THORACIC SPINE: ICD-10-CM

## 2024-12-04 DIAGNOSIS — M54.50 ACUTE BILATERAL LOW BACK PAIN WITHOUT SCIATICA: Primary | ICD-10-CM

## 2024-12-04 DIAGNOSIS — M54.9 DORSALGIA, UNSPECIFIED: ICD-10-CM

## 2024-12-04 DIAGNOSIS — R15.9 INCONTINENCE OF FECES, UNSPECIFIED FECAL INCONTINENCE TYPE: ICD-10-CM

## 2024-12-04 DIAGNOSIS — N31.9 NEUROGENIC BLADDER: ICD-10-CM

## 2024-12-04 DIAGNOSIS — N39.41 URGE INCONTINENCE: Primary | ICD-10-CM

## 2024-12-04 PROCEDURE — 99214 OFFICE O/P EST MOD 30 MIN: CPT | Mod: PBBFAC | Performed by: UROLOGY

## 2024-12-04 PROCEDURE — 99999 PR PBB SHADOW E&M-EST. PATIENT-LVL IV: CPT | Mod: PBBFAC,,, | Performed by: UROLOGY

## 2024-12-04 PROCEDURE — 99213 OFFICE O/P EST LOW 20 MIN: CPT | Mod: S$PBB,,, | Performed by: UROLOGY

## 2024-12-04 NOTE — PROGRESS NOTES
Chief Complaint:   Encounter Diagnoses   Name Primary?    Urge incontinence Yes    Incontinence of feces, unspecified fecal incontinence type     Neurogenic bladder        HPI:   12/4/24- here today to discuss urodynamic findings, patient has had increased pain in her back.    1/4/23- patient reports as a new patient to me with urge and fecal incontinence.  Fecal incontinence is relatively new, this appears to have exacerbated recently was also recently found to have sacral fracture with cauda equina syndrome.  She is having some improvement on symptoms, Myrbetriq 25 mg does assist, but not perfectly.  No evidence of stress incontinence, dysuria or gross hematuria.    11/16/22- LR- Patient is a 67-year-old female that is presenting with her .  HPI was obtained by .   states that patient is status post Spine fusion and has a h/o Cauda Equina syndrome.  States that patient is unaware when she needs to urinate and has had an increase in urinary incontinence. Reports that she also has fecal incontinence.  PVR was 150 mL, repeat 132 ml.  No history of gross hematuria or renal stones.  Unable to give urine sample.    Allergies:  Codeine, Keflex [cephalexin], Shellfish containing products, Flu vaccine ts 2011-12(18 yr+), and Sucralfate    Medications:  has a current medication list which includes the following prescription(s): albuterol-ipratropium, celecoxib, cholecalciferol (vitamin d3), clonazepam, cyanocobalamin (vitamin b-12), folic acid, furosemide, haloperidol, lisinopril, methocarbamol, oxygen-air delivery systems, pantoprazole, paroxetine, pregabalin, trazodone, and albuterol.    Review of Systems:  General: No fever, chills, fatigability, or weight loss.  Skin: No rashes, itching, or changes in color or texture of skin.  Chest: Denies KENT, cyanosis, wheezing, cough, and sputum production.  Abdomen: Appetite fine. No weight loss. Denies diarrhea, abdominal pain, hematemesis, or blood in  stool.  Musculoskeletal: No joint stiffness or swelling. Denies back pain.  : As above.  All other review of systems negative.    PMH:   has a past medical history of Arthritis, Asthma, COPD (chronic obstructive pulmonary disease), Depression, GERD (gastroesophageal reflux disease), Hypertension, and Tobacco abuse.    PSH:   has a past surgical history that includes Hysterectomy; Knee surgery; Tonsillectomy; multiple fracture repairs; Colonoscopy w/ polypectomy (01/05/2011); Magnetic resonance imaging (N/A, 10/14/2022); fusion, spine, posterior approach (N/A, 10/18/2022); and Esophagogastroduodenoscopy (N/A, 10/24/2022).    FamHx: family history includes COPD in her mother; Cancer in her mother; Hypertension in her mother; Lung cancer in her mother; Ovarian cancer in her sister; Stroke in her mother.    SocHx:  reports that she has quit smoking. Her smoking use included cigarettes. She started smoking about 45 years ago. She has a 33 pack-year smoking history. She has never used smokeless tobacco. She reports that she does not drink alcohol and does not use drugs.      Physical Exam:  Vitals:    01/04/23 1554   BP: 103/69   Pulse: 92     General: A&Ox3, no apparent distress, no deformities  Neck: No masses, normal ROM  Lungs: normal inspiration, no use of accessory muscles  Heart: normal pulse, no arrhythmias  Abdomen: Soft, NT, ND, no masses, no hernias, no hepatosplenomegaly  Skin: The skin is warm and dry. No jaundice.  Ext: No c/c/e.  : 8/24- No pelvic floor prolapse.  Normal introitus, no urethral abnomralities. No Perineal abnormalities.    Labs/Studies:   UDS detrusor pressure identified with normal sensation but could not void, highly suspicious for neurogenic bladder 11/24   ml 8/24  PVR 24 mL 7/24  Urine culture negative 10/24  Urine culture E coli 6/24  Cystoscopy normal 8/24  CT stone protocol bilateral hydronephrosis, left atrophic kidney 10/24  Lasix renogram 94% right/6% left, severe  obstruction left 9/24  ADRIEL bilateral hydronephrosis 7/24  CT stone protocol bilateral hydronephrosis, bladder wall thickening, spinal hardware and iliac region with deformity of the pubic symphysis 6/24  Creatinine 0.9 10/24    Impression/Plan:        1. Urge incontinence/neurogenic bladder- based on urodynamics this is highly suspicious for a neurogenic bladder.  Will continue the Leija catheter for now and have home health change it on a monthly basis.  I will have her return in 4 months.  At this point based on her previous renal issues and recurrent UTIs, patient needs to have the bladder decompressed.  They are unable to perform CIC, in addition this assists with her incontinence.  Of note based on previous Lasix renogram, it was highly suspicious for outflow obstruction most likely due to her neurological fracture.  Previously Myrbetriq had caused her to go into acute retention.  Patient is still having persistent pain, has an appointment to see neurology and neurosurgery.  For historical reference she has emptied in the past in the office, but based upon these studies it was my opinion that she needs to have continuous drainage with a Leija catheter.  Per previous renogram left kidney appears to be nonfunctioning right kidney demonstrates no evidence of obstruction despite previous reports of hydronephrosis.  Given the fact that she has a solitary right kidney, bladder drainage is paramount for renal function.  Previous cystoscopy was negative.  Of note history of repaired sacral fracture with cauda equina syndrome 10/22, which is most likely the underlying etiology.    2. Fecal incontinence- relatively stable and improved.    3. Bilateral hydronephrosis- please see above.

## 2024-12-05 ENCOUNTER — TELEPHONE (OUTPATIENT)
Dept: UROLOGY | Facility: CLINIC | Age: 69
End: 2024-12-05
Payer: MEDICARE

## 2024-12-05 DIAGNOSIS — N39.41 URGE INCONTINENCE: Primary | ICD-10-CM

## 2024-12-05 DIAGNOSIS — N31.9 NEUROGENIC BLADDER: ICD-10-CM

## 2024-12-05 NOTE — TELEPHONE ENCOUNTER
Received call from pts home health nurse; stated pts catheter came out last night and orders were needed for reinsertion; verbal order given.  Orders are needed to change nicholson monthly; routed message to Dr. Cam.

## 2024-12-05 NOTE — TELEPHONE ENCOUNTER
----- Message from Whatser sent at 12/5/2024  1:40 PM CST -----  Contact: nurse Precious  .Type:  Nurse Call    Who Called:nurse Precious  Does the patient know what this is regarding?:hey, I have a nurse on the line for pt mrn [phone]  states pt catheter came out last night but she need a order to put one in. Dr. Dorina Metcalf    Would the patient rather a call back or a response via [facility]? call  Best Call Back Number:[phone]   Additional Information:

## 2024-12-10 ENCOUNTER — TELEPHONE (OUTPATIENT)
Dept: NEUROSURGERY | Facility: CLINIC | Age: 69
End: 2024-12-10
Payer: MEDICARE

## 2024-12-10 DIAGNOSIS — M54.9 DORSALGIA, UNSPECIFIED: ICD-10-CM

## 2024-12-10 DIAGNOSIS — Z78.0 POST-MENOPAUSAL: Primary | ICD-10-CM

## 2024-12-10 NOTE — TELEPHONE ENCOUNTER
Pc to pt spouse.  Appts given as below  Future Appointments   Date Time Provider Department Center   12/12/2024  9:30 AM [facility]   12/13/2024  2:30 PM [facility] CT1 LIMIT 500 LBS [facility] CT SCAN [place]   12/16/2024  7:00 AM [facility] MRI1 [facility] MRI [place]   12/17/2024 12:30 PM ONLC BMD1 ONLH DEXABMD  [facility]   12/17/2024  1:30 PM Carie Pedersen NP [facility] HEM [facility]   2/13/2025  1:15 PM [facility] EOS [facility]   2/13/2025  1:30 PM [facility]-XRAY [facility] X[facility]   2/13/2025  2:30 PM Juan Tate MD [facility] NEUROS[address]   4/11/2025 11:15 AM Dixon Cam MD ON UROLOGY  Medical C   5/15/2025  3:00 PM Robb Heller MD ON RHEU  Medical C     Bone density in br on 12/17    Appt with xrays on 2/13/25 for dr. Randle

## 2024-12-13 ENCOUNTER — HOSPITAL ENCOUNTER (OUTPATIENT)
Dept: RADIOLOGY | Facility: HOSPITAL | Age: 69
Discharge: HOME | End: 2024-12-13
Attending: NEUROLOGICAL SURGERY
Payer: MEDICARE

## 2024-12-13 ENCOUNTER — TELEPHONE (OUTPATIENT)
Dept: PREADMISSION TESTING | Facility: HOSPITAL | Age: 69
End: 2024-12-13
Payer: MEDICARE

## 2024-12-13 ENCOUNTER — DOCUMENTATION ONLY (OUTPATIENT)
Dept: RADIOLOGY | Facility: HOSPITAL | Age: 69
End: 2024-12-13
Payer: MEDICARE

## 2024-12-13 ENCOUNTER — TELEPHONE (OUTPATIENT)
Dept: RADIOLOGY | Facility: HOSPITAL | Age: 69
End: 2024-12-13
Payer: MEDICARE

## 2024-12-13 DIAGNOSIS — M54.50 ACUTE BILATERAL LOW BACK PAIN WITHOUT SCIATICA: ICD-10-CM

## 2024-12-13 DIAGNOSIS — M54.6 PAIN IN THORACIC SPINE: ICD-10-CM

## 2024-12-13 DIAGNOSIS — M54.9 DORSALGIA, UNSPECIFIED: ICD-10-CM

## 2024-12-13 PROCEDURE — 72128 CT CHEST SPINE W/O DYE: CPT | Mod: 26,,, | Performed by: RADIOLOGY

## 2024-12-13 PROCEDURE — 72128 CT CHEST SPINE W/O DYE: CPT | Mod: TC

## 2024-12-13 PROCEDURE — 72131 CT LUMBAR SPINE W/O DYE: CPT | Mod: 26,,, | Performed by: RADIOLOGY

## 2024-12-13 PROCEDURE — 72131 CT LUMBAR SPINE W/O DYE: CPT | Mod: TC

## 2024-12-13 NOTE — TELEPHONE ENCOUNTER
Pre op instructions reviewed with patient's  over telephone, verbalized understanding.    To confirm, Patient's MRI is scheduled on 12/16/24.The MRI department will reach out to you with further instructions specific to having a  MRI. They will call with your arrival time.    *Please report to the [facility] (1st Floor) located off of [facility] (2nd Entrance/Building on the left, in front of the flag pole).  Address: [address] [place] [address] [place]. [address]      INSTRUCTIONS IMPORTANT!!!  !!!NO FOOD after midnight! You may have clear liquids up to 3 hrs before your arrival to the Hospital!!!  Clear liquids include Gatorade, water, soda, black coffee or tea (no milk or creamer), and clear juices.  Clear liquids do NOT include anything with pulp or food particles (Chicken broth, ice cream, yogurt, Jello, etc.)  NOTHING RED OR PURPLE!!!!    >>>MEDICATION INSTRUCTIONS<<<: Morning of Surgery, take small sip of water with ONLY these medications:  Inhaler if needed    - Please bring your O2 with you of morning of your procedure.    If your are taking Ozempic/ Mounjaro/ Wegovy/ Trulicity/ Semaglutide injections or weight loss medication, such as Phentermine,  please notify us immediately as they must be stopped 7 days prior to surgery.    !!!STOP ALL Aspirins, NSAIDS, WEIGHT LOSS INJECTIONS/PILLS, Herbal supplements, & Vitamins 7 DAYS BEFORE SURGERY!!!    ____  Avoid Alcoholic beverages 3 days prior to surgery, as it can thin the blood.  ____  NO Acrylic/fake nails or nail polish worn day of surgery (specifically hand/arm & foot surgeries).  ____  NO powder, lotions, deodorants, oils or cream on body.  ____  Remove all jewelry & piercings & foreign objects before arrival & leave at home.  ____  Remove Dentures, Hearing Aids & Contact Lens prior to surgery.  ____  Bring photo ID and insurance information to hospital (Leave Valuables at Home).  ____  If going home the same day, arrange for a ride  home. You will not be able to drive for 24 hrs if Anesthesia was used.   ____  Wear clean, loose fitting clothing to allow for dressings/ bandages.      Bathing Instructions:    -Shower with anti-bacterial Soap (Hibiclens or Dial) the night before surgery and the morning of.   -Do not use Hibiclens on your face or genitals.   -Apply clean clothes after shower.  -Do not shave your face or body 3 days prior to surgery unless instructed otherwise by your Surgeon.    [facility] Vis[facility]/Ride Policy:  Only 2 adults allowed in pre op/recovery area during your procedure. You MUST HAVE A RIDE HOME from a responsible adult that you know and trust. Medical Transport, Uber or Lyft can ONLY be used if patient has a responsible adult to accompany them during ride home.       *Signs and symptoms of Infection Before or After Surgery:               !!!If you experience any fever, chills, nausea/ vomiting, foul odor/ excessive drainage from surgical site, flu-like symptoms, new wounds or cuts, PLEASE CALL THE SURGEON OFFICE at [phone] or SEND MESSAGE THROUGH [employer] PORTAL!!!     *If you are running late the morning of surgery, please call the Hospital Surgery Dept @ [phone].     *Billing questions:  [phone]     Thank you,  -[facility] Pre Admit Dept.  [phone] Blair   or [phone]  M-F 7:30 am-4:00 pm (Closed Major Holidays)

## 2024-12-13 NOTE — PRE-PROCEDURE INSTRUCTIONS
Pre op instructions reviewed with patient's  over telephone, verbalized understanding.    To confirm, Patient's MRI is scheduled on 12/16/24.The MRI department will reach out to you with further instructions specific to having a  MRI. They will call with your arrival time.    *Please report to the [facility] (1st Floor) located off of [facility] (2nd Entrance/Building on the left, in front of the flag pole).  Address: [address] [place] [address] [place] [address]      INSTRUCTIONS IMPORTANT!!!  !!!NO FOOD after midnight! You may have clear liquids up to 3 hrs before your arrival to the Hospital!!!  Clear liquids include Gatorade, water, soda, black coffee or tea (no milk or creamer), and clear juices.  Clear liquids do NOT include anything with pulp or food particles (Chicken broth, ice cream, yogurt, Jello, etc.)  NOTHING RED OR PURPLE!!!!    >>>MEDICATION INSTRUCTIONS<<<: Morning of Surgery, take small sip of water with ONLY these medications:  Inhaler if needed    - Please bring your O2 with you of morning of your procedure.    If your are taking Ozempic/ Mounjaro/ Wegovy/ Trulicity/ Semaglutide injections or weight loss medication, such as Phentermine,  please notify us immediately as they must be stopped 7 days prior to surgery.    !!!STOP ALL Aspirins, NSAIDS, WEIGHT LOSS INJECTIONS/PILLS, Herbal supplements, & Vitamins 7 DAYS BEFORE SURGERY!!!    ____  Avoid Alcoholic beverages 3 days prior to surgery, as it can thin the blood.  ____  NO Acrylic/fake nails or nail polish worn day of surgery (specifically hand/arm & foot surgeries).  ____  NO powder, lotions, deodorants, oils or cream on body.  ____  Remove all jewelry & piercings & foreign objects before arrival & leave at home.  ____  Remove Dentures, Hearing Aids & Contact Lens prior to surgery.  ____  Bring photo ID and insurance information to hospital (Leave Valuables at Home).  ____  If going home the same day, arrange for a ride  home. You will not be able to drive for 24 hrs if Anesthesia was used.   ____  Wear clean, loose fitting clothing to allow for dressings/ bandages.      Bathing Instructions:    -Shower with anti-bacterial Soap (Hibiclens or Dial) the night before surgery and the morning of.   -Do not use Hibiclens on your face or genitals.   -Apply clean clothes after shower.  -Do not shave your face or body 3 days prior to surgery unless instructed otherwise by your Surgeon.    [facility]/Ride Policy:  Only 2 adults allowed in pre op/recovery area during your procedure. You MUST HAVE A RIDE HOME from a responsible adult that you know and trust. Medical Transport, Uber or Lyft can ONLY be used if patient has a responsible adult to accompany them during ride home.       *Signs and symptoms of Infection Before or After Surgery:               !!!If you experience any fever, chills, nausea/ vomiting, foul odor/ excessive drainage from surgical site, flu-like symptoms, new wounds or cuts, PLEASE CALL THE SURGEON OFFICE at [phone] or SEND MESSAGE THROUGH [employer] PORTAL!!!     *If you are running late the morning of surgery, please call the Hospital Surgery Dept @ [phone].     *Billing questions:  [phone]     Thank you,  -[facility] Pre [facility]mit [facility].  [phone] Blair   or [phone]  M-F 7:30 am-4:00 pm (Closed Major Holidays)

## 2024-12-16 ENCOUNTER — HOSPITAL ENCOUNTER (OUTPATIENT)
Dept: RADIOLOGY | Facility: HOSPITAL | Age: 69
Discharge: HOME | End: 2024-12-16
Attending: NEUROLOGICAL SURGERY
Payer: MEDICARE

## 2024-12-16 ENCOUNTER — TELEPHONE (OUTPATIENT)
Dept: NEUROSURGERY | Facility: CLINIC | Age: 69
End: 2024-12-16
Payer: MEDICARE

## 2024-12-16 ENCOUNTER — ANESTHESIA (OUTPATIENT)
Dept: ANESTHESIOLOGY | Facility: HOSPITAL | Age: 69
End: 2024-12-16
Payer: MEDICARE

## 2024-12-16 ENCOUNTER — ANESTHESIA EVENT (OUTPATIENT)
Dept: ANESTHESIOLOGY | Facility: HOSPITAL | Age: 69
End: 2024-12-16
Payer: MEDICARE

## 2024-12-16 ENCOUNTER — HOSPITAL ENCOUNTER (OUTPATIENT)
Facility: HOSPITAL | Age: 69
Discharge: HOME | End: 2024-12-16
Attending: NEUROLOGICAL SURGERY | Admitting: NEUROLOGICAL SURGERY
Payer: MEDICARE

## 2024-12-16 VITALS
RESPIRATION RATE: 13 BRPM | HEART RATE: 113 BPM | DIASTOLIC BLOOD PRESSURE: 73 MMHG | OXYGEN SATURATION: 96 % | TEMPERATURE: 97 F | SYSTOLIC BLOOD PRESSURE: 125 MMHG

## 2024-12-16 DIAGNOSIS — M54.6 PAIN IN THORACIC SPINE: ICD-10-CM

## 2024-12-16 DIAGNOSIS — M54.9 DORSALGIA, UNSPECIFIED: ICD-10-CM

## 2024-12-16 DIAGNOSIS — M54.50 ACUTE BILATERAL LOW BACK PAIN WITHOUT SCIATICA: ICD-10-CM

## 2024-12-16 PROCEDURE — 37000009 HC ANESTHESIA EA ADD 15 MINS: Performed by: NEUROLOGICAL SURGERY

## 2024-12-16 PROCEDURE — 72148 MRI LUMBAR SPINE W/O DYE: CPT | Mod: 26,,, | Performed by: RADIOLOGY

## 2024-12-16 PROCEDURE — 37000008 HC ANESTHESIA 1ST 15 MINUTES: Performed by: NEUROLOGICAL SURGERY

## 2024-12-16 PROCEDURE — 63600175 PHARM REV CODE 636 W HCPCS: Performed by: NURSE ANESTHETIST, CERTIFIED REGISTERED

## 2024-12-16 PROCEDURE — 71000033 HC RECOVERY, INTIAL HOUR: Performed by: NEUROLOGICAL SURGERY

## 2024-12-16 PROCEDURE — 25000003 PHARM REV CODE 250: Performed by: NURSE ANESTHETIST, CERTIFIED REGISTERED

## 2024-12-16 PROCEDURE — 72148 MRI LUMBAR SPINE W/O DYE: CPT | Mod: TC

## 2024-12-16 RX ORDER — KETOROLAC TROMETHAMINE 30 MG/ML
15 INJECTION, SOLUTION INTRAMUSCULAR; INTRAVENOUS EVERY 8 HOURS PRN
Status: DISCONTINUED | OUTPATIENT
Start: 2024-12-16 | End: 2024-12-16 | Stop reason: HOSPADM

## 2024-12-16 RX ORDER — MIDAZOLAM HYDROCHLORIDE 1 MG/ML
INJECTION, SOLUTION INTRAMUSCULAR; INTRAVENOUS
Status: DISCONTINUED | OUTPATIENT
Start: 2024-12-16 | End: 2024-12-16

## 2024-12-16 RX ORDER — ONDANSETRON HYDROCHLORIDE 2 MG/ML
4 INJECTION, SOLUTION INTRAVENOUS DAILY PRN
Status: DISCONTINUED | OUTPATIENT
Start: 2024-12-16 | End: 2024-12-16 | Stop reason: HOSPADM

## 2024-12-16 RX ORDER — FENTANYL CITRATE 50 UG/ML
INJECTION, SOLUTION INTRAMUSCULAR; INTRAVENOUS
Status: DISCONTINUED | OUTPATIENT
Start: 2024-12-16 | End: 2024-12-16

## 2024-12-16 RX ADMIN — MIDAZOLAM HYDROCHLORIDE 2 MG: 1 INJECTION, SOLUTION INTRAMUSCULAR; INTRAVENOUS at 07:12

## 2024-12-16 RX ADMIN — FENTANYL CITRATE 25 MCG: 50 INJECTION, SOLUTION INTRAMUSCULAR; INTRAVENOUS at 07:12

## 2024-12-16 RX ADMIN — SODIUM CHLORIDE: 9 INJECTION, SOLUTION INTRAVENOUS at 06:12

## 2024-12-16 RX ADMIN — FENTANYL CITRATE 50 MCG: 50 INJECTION, SOLUTION INTRAMUSCULAR; INTRAVENOUS at 07:12
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
## 2024-12-16 NOTE — ANESTHESIA POSTPROCEDURE EVALUATION
Anesthesia Post Evaluation    Patient: Merle Caro    Procedure(s) Performed: Procedure(s) (LRB):  MRI (Magnetic Resonance Imagine) (N/A)    Final Anesthesia Type: MAC      Patient location during evaluation: PACU  Patient participation: Yes- Able to Participate  Level of consciousness: awake and alert  Post-procedure vital signs: reviewed and stable  Pain management: adequate  Airway patency: patent    PONV status at discharge: No PONV  Anesthetic complications: no      Cardiovascular status: blood pressure returned to baseline, hemodynamically stable and stable  Respiratory status: unassisted, room air and spontaneous ventilation  Hydration status: euvolemic  Follow-up not needed.              Vitals Value Taken Time   /73 12/16/24 0745   Temp 36.2 °C (97.1 °F) 12/16/24 0732   Pulse 113 12/16/24 0747   Resp 13 12/16/24 0747   SpO2 96 % 12/16/24 0747   Vitals shown include unfiled device data.      No case tracking events are documented in the log.      Pain/Fawad Score: No data recorded

## 2024-12-16 NOTE — NURSING
Provider Comments  Provider Comments:   Patient did not show for his 10:40am appt today 12/06/17        Signatures   Electronically signed by : TRUDI Mcdaniel; Dec  6 2017  2:50PM EST                       (Author) Pt here for sedation MRI. NPO status, home meds, etc all discussed in pre-op area. Pt brought to MRI department for screening and consent signing. 20 ga IV to left AC started times 1 attempt, no c/o. Will monitor throughout MRI.

## 2024-12-16 NOTE — TRANSFER OF CARE
Anesthesia Transfer of Care Note    Patient: Merle Caro    Procedure(s) Performed: Procedure(s) (LRB):  MRI (Magnetic Resonance Imagine) (N/A)    Patient location: PACU    Anesthesia Type: MAC    Transport from OR: Transported from OR on 2-3 L/min O2 by NC with adequate spontaneous ventilation    Post pain: adequate analgesia    Post assessment: no apparent anesthetic complications and tolerated procedure well    Post vital signs: stable    Level of consciousness: responds to stimulation    Nausea/Vomiting: no nausea/vomiting    Complications: none    Transfer of care protocol was followed      Last vitals: Visit Vitals  BP (!) 177/95 (BP Location: Left arm)   Pulse (!) 130   Temp 36 °C (96.8 °F) (Tympanic)   Resp (!) 22   SpO2 (!) 92%

## 2024-12-17 ENCOUNTER — OFFICE VISIT (OUTPATIENT)
Dept: NEUROSURGERY | Facility: CLINIC | Age: 69
End: 2024-12-17
Payer: MEDICARE

## 2024-12-17 ENCOUNTER — ANESTHESIA EVENT (OUTPATIENT)
Dept: SURGERY | Facility: HOSPITAL | Age: 69
End: 2024-12-17
Payer: MEDICARE

## 2024-12-17 ENCOUNTER — HOSPITAL ENCOUNTER (INPATIENT)
Facility: HOSPITAL | Age: 69
LOS: 6 days | Discharge: HOME | DRG: 448 | End: 2024-12-23
Attending: EMERGENCY MEDICINE | Admitting: EMERGENCY MEDICINE
Payer: MEDICARE

## 2024-12-17 VITALS
HEART RATE: 134 BPM | SYSTOLIC BLOOD PRESSURE: 184 MMHG | HEIGHT: 62 IN | BODY MASS INDEX: 36.1 KG/M2 | WEIGHT: 196.19 LBS | DIASTOLIC BLOOD PRESSURE: 110 MMHG

## 2024-12-17 DIAGNOSIS — M80.80XD OTHER OSTEOPOROSIS WITH CURRENT PATHOLOGICAL FRACTURE WITH ROUTINE HEALING, SUBSEQUENT ENCOUNTER: Primary | ICD-10-CM

## 2024-12-17 DIAGNOSIS — R00.0 TACHYCARDIA: ICD-10-CM

## 2024-12-17 DIAGNOSIS — S32.009D CLOSED FRACTURE OF LUMBAR VERTEBRA WITH ROUTINE HEALING, UNSPECIFIED FRACTURE MORPHOLOGY, UNSPECIFIED LUMBAR VERTEBRAL LEVEL, SUBSEQUENT ENCOUNTER: ICD-10-CM

## 2024-12-17 DIAGNOSIS — S32.001A LUMBAR BURST FRACTURE, CLOSED, INITIAL ENCOUNTER: ICD-10-CM

## 2024-12-17 DIAGNOSIS — M54.50 ACUTE BILATERAL LOW BACK PAIN WITHOUT SCIATICA: ICD-10-CM

## 2024-12-17 DIAGNOSIS — N31.9 NEUROGENIC BLADDER: ICD-10-CM

## 2024-12-17 DIAGNOSIS — R78.81 BACTEREMIA: ICD-10-CM

## 2024-12-17 DIAGNOSIS — R94.31 QT PROLONGATION: ICD-10-CM

## 2024-12-17 DIAGNOSIS — Z01.810 PREOPERATIVE CARDIOVASCULAR EXAMINATION: ICD-10-CM

## 2024-12-17 DIAGNOSIS — S32.001A LUMBAR BURST FRACTURE, CLOSED, INITIAL ENCOUNTER: Primary | ICD-10-CM

## 2024-12-17 LAB
ABO + RH BLD: NORMAL
ALBUMIN SERPL BCP-MCNC: 3.3 G/DL (ref 3.5–5.2)
ALP SERPL-CCNC: 89 U/L (ref 40–150)
ALT SERPL W/O P-5'-P-CCNC: 5 U/L (ref 10–44)
ANION GAP SERPL CALC-SCNC: 10 MMOL/L (ref 8–16)
APTT PPP: 31.9 SEC (ref 21–32)
AST SERPL-CCNC: 12 U/L (ref 10–40)
BASOPHILS # BLD AUTO: 0.05 K/UL (ref 0–0.2)
BASOPHILS NFR BLD: 0.5 % (ref 0–1.9)
BILIRUB SERPL-MCNC: 0.4 MG/DL (ref 0.1–1)
BLD GP AB SCN CELLS X3 SERPL QL: NORMAL
BUN SERPL-MCNC: 12 MG/DL (ref 8–23)
CALCIUM SERPL-MCNC: 9.5 MG/DL (ref 8.7–10.5)
CHLORIDE SERPL-SCNC: 102 MMOL/L (ref 95–110)
CO2 SERPL-SCNC: 26 MMOL/L (ref 23–29)
CREAT SERPL-MCNC: 0.7 MG/DL (ref 0.5–1.4)
CRP SERPL-MCNC: 59.2 MG/L (ref 0–8.2)
DIFFERENTIAL METHOD BLD: ABNORMAL
EOSINOPHIL # BLD AUTO: 0.1 K/UL (ref 0–0.5)
EOSINOPHIL NFR BLD: 0.9 % (ref 0–8)
ERYTHROCYTE [DISTWIDTH] IN BLOOD BY AUTOMATED COUNT: 13.9 % (ref 11.5–14.5)
ERYTHROCYTE [SEDIMENTATION RATE] IN BLOOD BY WESTERGREN METHOD: 120 MM/HR (ref 0–36)
EST. GFR  (NO RACE VARIABLE): >60 ML/MIN/1.73 M^2
GLUCOSE SERPL-MCNC: 107 MG/DL (ref 70–110)
HCT VFR BLD AUTO: 42.5 % (ref 37–48.5)
HGB BLD-MCNC: 13.4 G/DL (ref 12–16)
IMM GRANULOCYTES # BLD AUTO: 0.05 K/UL (ref 0–0.04)
IMM GRANULOCYTES NFR BLD AUTO: 0.5 % (ref 0–0.5)
INR PPP: 1 (ref 0.8–1.2)
LYMPHOCYTES # BLD AUTO: 0.6 K/UL (ref 1–4.8)
LYMPHOCYTES NFR BLD: 5.5 % (ref 18–48)
MCH RBC QN AUTO: 28.8 PG (ref 27–31)
MCHC RBC AUTO-ENTMCNC: 31.5 G/DL (ref 32–36)
MCV RBC AUTO: 91 FL (ref 82–98)
MONOCYTES # BLD AUTO: 0.5 K/UL (ref 0.3–1)
MONOCYTES NFR BLD: 5 % (ref 4–15)
NEUTROPHILS # BLD AUTO: 9.2 K/UL (ref 1.8–7.7)
NEUTROPHILS NFR BLD: 87.6 % (ref 38–73)
NRBC BLD-RTO: 0 /100 WBC
PLATELET # BLD AUTO: 311 K/UL (ref 150–450)
PMV BLD AUTO: 9.5 FL (ref 9.2–12.9)
POTASSIUM SERPL-SCNC: 3.9 MMOL/L (ref 3.5–5.1)
PROT SERPL-MCNC: 7.1 G/DL (ref 6–8.4)
PROTHROMBIN TIME: 11.3 SEC (ref 9–12.5)
RBC # BLD AUTO: 4.66 M/UL (ref 4–5.4)
SODIUM SERPL-SCNC: 138 MMOL/L (ref 136–145)
SPECIMEN OUTDATE: NORMAL
WBC # BLD AUTO: 10.46 K/UL (ref 3.9–12.7)

## 2024-12-17 PROCEDURE — 93010 ELECTROCARDIOGRAM REPORT: CPT | Mod: ,,, | Performed by: INTERNAL MEDICINE

## 2024-12-17 PROCEDURE — 86140 C-REACTIVE PROTEIN: CPT | Performed by: NURSE PRACTITIONER

## 2024-12-17 PROCEDURE — 11000001 HC ACUTE MED/SURG PRIVATE ROOM

## 2024-12-17 PROCEDURE — 80053 COMPREHEN METABOLIC PANEL: CPT | Performed by: EMERGENCY MEDICINE

## 2024-12-17 PROCEDURE — 85025 COMPLETE CBC W/AUTO DIFF WBC: CPT | Performed by: EMERGENCY MEDICINE

## 2024-12-17 PROCEDURE — 85730 THROMBOPLASTIN TIME PARTIAL: CPT | Performed by: EMERGENCY MEDICINE

## 2024-12-17 PROCEDURE — 85610 PROTHROMBIN TIME: CPT | Performed by: EMERGENCY MEDICINE

## 2024-12-17 PROCEDURE — 99999 PR PBB SHADOW E&M-EST. PATIENT-LVL V: CPT | Mod: PBBFAC,,, | Performed by: NEUROLOGICAL SURGERY

## 2024-12-17 PROCEDURE — 25000003 PHARM REV CODE 250: Performed by: NURSE PRACTITIONER

## 2024-12-17 PROCEDURE — 99285 EMERGENCY DEPT VISIT HI MDM: CPT | Mod: 25,27

## 2024-12-17 PROCEDURE — 93005 ELECTROCARDIOGRAM TRACING: CPT

## 2024-12-17 PROCEDURE — 99215 OFFICE O/P EST HI 40 MIN: CPT | Mod: 57,S$PBB,, | Performed by: NEUROLOGICAL SURGERY

## 2024-12-17 PROCEDURE — 96374 THER/PROPH/DIAG INJ IV PUSH: CPT

## 2024-12-17 PROCEDURE — 99215 OFFICE O/P EST HI 40 MIN: CPT | Mod: PBBFAC | Performed by: NEUROLOGICAL SURGERY

## 2024-12-17 PROCEDURE — 86850 RBC ANTIBODY SCREEN: CPT | Performed by: NURSE PRACTITIONER

## 2024-12-17 PROCEDURE — 63600175 PHARM REV CODE 636 W HCPCS: Performed by: NURSE PRACTITIONER

## 2024-12-17 PROCEDURE — 63600175 PHARM REV CODE 636 W HCPCS: Performed by: EMERGENCY MEDICINE

## 2024-12-17 PROCEDURE — 85651 RBC SED RATE NONAUTOMATED: CPT | Performed by: NURSE PRACTITIONER

## 2024-12-17 RX ORDER — FOLIC ACID 1 MG/1
1 TABLET ORAL DAILY
Status: DISCONTINUED | OUTPATIENT
Start: 2024-12-17 | End: 2024-12-23 | Stop reason: HOSPADM

## 2024-12-17 RX ORDER — HALOPERIDOL 5 MG/1
5 TABLET ORAL NIGHTLY
Status: DISCONTINUED | OUTPATIENT
Start: 2024-12-17 | End: 2024-12-23 | Stop reason: HOSPADM

## 2024-12-17 RX ORDER — ONDANSETRON HYDROCHLORIDE 2 MG/ML
4 INJECTION, SOLUTION INTRAVENOUS
Status: COMPLETED | OUTPATIENT
Start: 2024-12-17 | End: 2024-12-17

## 2024-12-17 RX ORDER — SODIUM CHLORIDE 9 MG/ML
INJECTION, SOLUTION INTRAVENOUS CONTINUOUS
Status: DISCONTINUED | OUTPATIENT
Start: 2024-12-18 | End: 2024-12-19

## 2024-12-17 RX ORDER — IPRATROPIUM BROMIDE AND ALBUTEROL SULFATE 2.5; .5 MG/3ML; MG/3ML
3 SOLUTION RESPIRATORY (INHALATION) EVERY 4 HOURS PRN
Status: DISCONTINUED | OUTPATIENT
Start: 2024-12-17 | End: 2024-12-23 | Stop reason: HOSPADM

## 2024-12-17 RX ORDER — LISINOPRIL 10 MG/1
10 TABLET ORAL NIGHTLY
Status: DISCONTINUED | OUTPATIENT
Start: 2024-12-17 | End: 2024-12-23 | Stop reason: HOSPADM

## 2024-12-17 RX ORDER — HYDRALAZINE HYDROCHLORIDE 20 MG/ML
10 INJECTION INTRAMUSCULAR; INTRAVENOUS EVERY 6 HOURS PRN
Status: DISCONTINUED | OUTPATIENT
Start: 2024-12-17 | End: 2024-12-23 | Stop reason: HOSPADM

## 2024-12-17 RX ORDER — ONDANSETRON HYDROCHLORIDE 2 MG/ML
4 INJECTION, SOLUTION INTRAVENOUS EVERY 8 HOURS PRN
Status: DISCONTINUED | OUTPATIENT
Start: 2024-12-17 | End: 2024-12-23 | Stop reason: HOSPADM

## 2024-12-17 RX ORDER — CELECOXIB 100 MG/1
200 CAPSULE ORAL DAILY
Status: DISCONTINUED | OUTPATIENT
Start: 2024-12-17 | End: 2024-12-17

## 2024-12-17 RX ORDER — HYDROCODONE BITARTRATE AND ACETAMINOPHEN 10; 325 MG/1; MG/1
1 TABLET ORAL EVERY 8 HOURS PRN
Status: DISCONTINUED | OUTPATIENT
Start: 2024-12-17 | End: 2024-12-23 | Stop reason: HOSPADM

## 2024-12-17 RX ORDER — CLONAZEPAM 0.5 MG/1
0.5 TABLET ORAL NIGHTLY
Status: DISCONTINUED | OUTPATIENT
Start: 2024-12-17 | End: 2024-12-18

## 2024-12-17 RX ORDER — PAROXETINE HYDROCHLORIDE 20 MG/1
40 TABLET, FILM COATED ORAL NIGHTLY
Status: DISCONTINUED | OUTPATIENT
Start: 2024-12-17 | End: 2024-12-23 | Stop reason: HOSPADM

## 2024-12-17 RX ORDER — HYDROMORPHONE HYDROCHLORIDE 1 MG/ML
0.5 INJECTION, SOLUTION INTRAMUSCULAR; INTRAVENOUS; SUBCUTANEOUS EVERY 4 HOURS PRN
Status: DISCONTINUED | OUTPATIENT
Start: 2024-12-17 | End: 2024-12-18

## 2024-12-17 RX ORDER — SODIUM CHLORIDE 0.9 % (FLUSH) 0.9 %
10 SYRINGE (ML) INJECTION
Status: DISCONTINUED | OUTPATIENT
Start: 2024-12-17 | End: 2024-12-23 | Stop reason: HOSPADM

## 2024-12-17 RX ADMIN — FOLIC ACID 1 MG: 1 TABLET ORAL at 03:12

## 2024-12-17 RX ADMIN — HYDROMORPHONE HYDROCHLORIDE 0.5 MG: 1 INJECTION, SOLUTION INTRAMUSCULAR; INTRAVENOUS; SUBCUTANEOUS at 06:12

## 2024-12-17 RX ADMIN — ONDANSETRON 4 MG: 2 INJECTION INTRAMUSCULAR; INTRAVENOUS at 12:12

## 2024-12-17 RX ADMIN — CLONAZEPAM 0.5 MG: 0.5 TABLET ORAL at 09:12

## 2024-12-17 RX ADMIN — PAROXETINE HYDROCHLORIDE 40 MG: 20 TABLET, FILM COATED ORAL at 09:12

## 2024-12-17 RX ADMIN — TRAZODONE HYDROCHLORIDE 150 MG: 100 TABLET ORAL at 09:12

## 2024-12-17 RX ADMIN — LISINOPRIL 10 MG: 10 TABLET ORAL at 09:12

## 2024-12-17 RX ADMIN — HALOPERIDOL 5 MG: 5 TABLET ORAL at 09:12

## 2024-12-17 NOTE — ASSESSMENT & PLAN NOTE
-She was discharged from [facility] on 10/18/24 for bacteremia. Blood cultures isolated bacteroides, antibiotics adjusted. Repeat blood cultures negative. ID recommended 2 weeks of IV Rocephin for UTI and 2 weeks of Flagyl for bacteremia. She has completed IV abx. Patient to have outpatient colonoscopy due to Bacteroides bacteremia which was done on 11/4/24 which showed moderate diverticulosis. Final pathology -- Tubular adenoma, multiple fragments. Negative for high-grade dysplasia or malignancy   -WBC count currently stable, afebrile

## 2024-12-17 NOTE — ASSESSMENT & PLAN NOTE
Most recent hemoglobin and hematocrit are listed below.  Recent Labs     12/17/24  1235   HGB 13.4   HCT 42.5     Plan  - Monitor serial CBC: Daily  - Transfuse PRBC if patient becomes hemodynamically unstable, symptomatic or H/H drops below 7/21.  - Patient has not received any PRBC transfusions to date  - Patient's anemia is currently stable
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
## 2024-12-17 NOTE — HPI
Merle Caro is a 69 year old female who has  has a past medical history of Anemia, unspecified, Anxiety disorder, unspecified, Arthritis, Asthma, B12 deficiency anemia, Bacillus fragilis infection, Chronic back pain, Chronic constipation, COPD (chronic obstructive pulmonary disease), Depression, GERD (gastroesophageal reflux disease), Hypertension, Insomnia, Lumbar compression fracture, Neurogenic bladder, Osteoporosis, Rheumatoid arthritis, unspecified, Spinal stenosis, Tobacco abuse, Urge incontinence of urine, Urinary outflow obstruction, and Urinary tract infection, E. coli. Who presented to the Emergency Department for back pain. She is scheduled for surgery tomorrow, 12/18/24, with Dr. Encarnacion. She saw him in clinic today. Appears she was in severe pain and unable to get preop labs obtained and it was recommended to come to ED and have HM admit, plan for surgery in AM for thoracic spine fusion for lumbar burst fracture. CBC/CMP stable. Pt admitted for pain management for planned surgery with Dr. Encarnacion.
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
## 2024-12-17 NOTE — ASSESSMENT & PLAN NOTE
Body mass index is 35.89 kg/m². Morbid obesity complicates all aspects of disease management from diagnostic modalities to treatment. Weight loss encouraged and health benefits explained to patient.
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
## 2024-12-17 NOTE — PHARMACY MED REC
"Admission Medication History     The home medication history was taken by Cami Blanc.    You may go to "Admission" then "Reconcile Home Medications" tabs to review and/or act upon these items.     The home medication list has been updated by the Pharmacy department.   Please read ALL comments highlighted in yellow.   Please address this information as you see fit.    Feel free to contact us if you have any questions or require assistance.      The medications listed below were removed from the home medication list. Please reorder if appropriate:  Patient reports no longer taking the following medication(s):  Accuneb 1.25 mg/3 mL nebulizer solution  Proventil/Ventolin HFA 90 mcg inhaler  Duo-Neb 2.5 mg-0.5 mg/3 mL nebulier solution  Rocephin 2 gram injection  Clonazepam 0.5 mg disintegrating tablet  Lasix 20 mg      Medications listed below were obtained from: Patient/family and Analytic software- Irving Lamar (spouse at bedside)      LAST MED REC COMPLETED:   Cami Blanc  [id number]    Current Outpatient Medications on File Prior to Encounter   Medication Sig Dispense Refill Last Dose/Taking    celecoxib (CELEBREX) 200 MG capsule Take 1 capsule (200 mg total) by mouth once daily. 90 capsule 3 Past Month    cholecalciferol, vitamin D3, (VITAMIN D3) 2,000 unit Cap Take 1 capsule by mouth every evening.   12/16/2024    clonazepam (KLONOPIN) 0.5 MG tablet Take 0.5 mg by mouth every evening.   12/16/2024    cyanocobalamin, vitamin B-12, 1,000 mcg Subl Place 1,000 mcg under the tongue once daily. 90 tablet 3 12/16/2024    folic acid (FOLVITE) 1 MG tablet Take 1 tablet (1 mg total) by mouth once daily. 90 tablet 3 12/16/2024    haloperidoL (HALDOL) 5 MG tablet Take 5 mg by mouth once daily.   12/16/2024    HYDROcodone-acetaminophen (NORCO)  mg per tablet Take 1 tablet by mouth every 8 (eight) hours as needed for Pain. 60 tablet 0 12/17/2024 at  8:00 AM    lisinopriL 10 MG tablet Take 10 mg by " mouth every evening.   12/16/2024    paroxetine (PAXIL) 40 MG tablet Take 40 mg by mouth once daily.   12/16/2024    pregabalin (LYRICA) 100 MG capsule Take 100 mg by mouth 2 (two) times daily.   12/16/2024    traZODone (DESYREL) 150 MG tablet Take 150 mg by mouth nightly. Patient takes 1/2 tablet at night   12/16/2024    methocarbamoL (ROBAXIN) 750 MG Tab Take 750 mg by mouth 3 (three) times daily.   Unknown    OXYGEN-AIR DELIVERY SYSTEMS MISC Inhale 2 L into the lungs as needed (O2 via Nasal Canula.).       pantoprazole (PROTONIX) 40 MG tablet Take 1 tablet (40 mg total) by mouth once daily. (Patient taking differently: Take 40 mg by mouth every evening.) 90 tablet 3 Unknown    traMADoL (ULTRAM) 50 mg tablet Take 1 tablet (50 mg total) by mouth every 24 hours as needed for Pain. 30 tablet 0 Unknown                           .

## 2024-12-17 NOTE — ASSESSMENT & PLAN NOTE
-Scheduled for thoracic spine fusion with Dr. Encarnacion on 12/18/24  -Regular diet now  -NPO after MN  -Pain management  -Antiemetics PRN  -Will need to start DVT prophylaxis 12-24 hrs post op

## 2024-12-17 NOTE — ASSESSMENT & PLAN NOTE
Patient's blood pressure range in the last 24 hours was: BP  Min: 131/64  Max: 184/110.The patient's inpatient anti-hypertensive regimen is listed below:  Current Antihypertensives  lisinopriL tablet 10 mg, Nightly, Oral  hydrALAZINE injection 10 mg, Every 6 hours PRN, Intravenous    Plan  - BP is controlled, no changes needed to their regimen  - Hydralazine PRN

## 2024-12-17 NOTE — PROGRESS NOTES
Subjective:      Patient ID: Merle Caro is a 69 y.o. female.    Chief Complaint: Acute bilateral low back pain without sciatica (Pt reports for follow up of LBP that also radiates to buttock; Pt reports symptoms are unchanged since LOV; Pt reports little pain in legs but no numbness; Pt has MRI lumbar performed 12/16/24 for review)    Assessment and plan  Patient with unstable L1 burst fracture with fracture through the pedicle bilaterally  Patient needs urgent stabilization of spine  We will recommend T10 through L3 posterior stabilization to tie into her previous hardware  PMMA reinforce instrumentation  Due to her severe uncontrolled pain as well as her number of medical comorbidities she is asking to be admitted will ask patient to be admitted to the medicine service for preoperative assessment and plan for surgical intervention in the a.m.    The patient was informed of all benefits and potential risk of the operation including but not limited to:  Pain, infection, bleeding, coma, paralysis, death.  Cerebrospinal fluid leak, failure of any instrumentation, the need for additional procedures in the future. No guarantee was given that this procedure would alleviate all of the symptoms.    Consents signed in clinic         Previous history of having an L2 to pelvis stabilization in the past  Multiple compression fractures throughout thoracolumbar spine  Neurogenic bladder with indwelling catheter  History of bacteremia past several months            HPI    Pt here to review MR and CT T/L spine   Denies any new symptoms. States P! Is same as previous visit. Has not gotten worse.  Most P! In posterior hips. P! = 10/10.   Patient has been wheelchair-bound secondary to pain  She has an indwelling urinary catheter for history of neurogenic bladder  She was last seen ER for bacteremia with sepsis        Previous Notes      Patient here today for evaluation lower back pain with an x-ray of the lumbar spine for my  TRANSITIONAL CARE MANAGEMENT - HOSPITAL DISCHARGE FOLLOW-UP    Contacted Ms. Estrada regarding follow-up for weakness and lung mass after hospital discharge. She was discharged from the hospital on 4-6. Review of the After Visit Summary from the recent hospitalization indicates that the patient needs to F/U with PCP and oncology.    She feels that she is doing fairly well at home.   Her diet concern is she says she is eating normally but she is not regaining any of the 7 pounds she lost weeks ago. Overall, the patient is eating well and she is not losing weight anymore, I advised her to give it more time and to keep track of her calorie intake so she can discuss with you at her TCM visit and she agreed.   Ambulation: staying the same  Fever: is not present  Pain: She has been having slight abdominal pain that resolves when she passes flatus and was reassured that is normal.  Activities of Daily Living (global): Self-care   Patient states that she does have sufficient family support. She feels that she is able to ask for assistance when needed.     Additional patient/family concerns: She reports that she has not moved her bowels in 5 days and wanted to know if that was normal after evacuating so much from her collitis? her abdomen is soft and nondistended (slight bloating with gas) and normally takes Collace but has not been taking since her admission, I advised her to go ahead and start taking the collace again as she normally does..    Discharge medications were verified with the patient. She is fully compliant with the medication regimen prescribed at the time of discharge. She reports that she is not experiencing any medication side effects.    Upon discharge, the patient was to receive no additional services.     Patient has an appointment on 4-16 (per her request as she did not want another appointment this week) with Regulo Parker MD. Ms. Estrada was reminded about the importance of keeping this appointment.  review   He has a history of having a lumbar spinal fusion in 2022 L2 to pelvis for spondyloptosis   History of compression fracture and osteoporosis   Was doing relatively well the past several months however began having severe uncontrolled lower back pain and bilateral lower extremity symptoms without any inciting event   States that it has been difficult for her to stand up secondary to pain   He has been taking Celebrex Robaxin and Lyrica for symptom relief   No recent physical therapy or epidural steroid injections      Review of Systems   Constitutional:  Negative for activity change, appetite change and chills.   HENT:  Negative for hearing loss, sore throat and tinnitus.    Eyes:  Negative for pain, discharge and itching.   Cardiovascular:  Negative for chest pain.   Gastrointestinal:  Negative for abdominal pain.   Endocrine: Negative for cold intolerance and heat intolerance.   Genitourinary:  Negative for difficulty urinating and dysuria.   Musculoskeletal:  Positive for back pain and gait problem.   Allergic/Immunologic: Negative for environmental allergies.   Neurological:  Positive for weakness. Negative for dizziness, tremors, light-headedness and headaches.   Hematological:  Negative for adenopathy.   Psychiatric/Behavioral:  Negative for agitation, behavioral problems and confusion.          Objective:       Physical Exam:  Nursing note and vitals reviewed.    Constitutional: She appears well-nourished. She is not diaphoretic. No distress.     Eyes: Pupils are equal, round, and reactive to light. EOM are normal.     Cardiovascular: Normal rate and regular rhythm.     Psych/Behavior: She is alert. She is oriented to person, place, and time. She has a normal mood and affect.     Musculoskeletal:        Back: Range of motion is limited. There is tenderness. Muscle strength is 5/5.        Right Upper Extremities: Muscle strength is 4/5.        Left Upper Extremities: Muscle strength is 4/5.       Right  Lower Extremities: Range of motion is full. There is tenderness. Muscle strength is 5/5. Tone is normal.        Left Lower Extremities: There is tenderness. Muscle strength is 5/5. Tone is normal.     Neurological:        Sensory: There is no sensory deficit in the trunk. There is no sensory deficit in the extremities.        Cranial nerves: Cranial nerve(s) II, III, IV, V, VI, VII, VIII, IX, X, XI and XII are intact.     General    Nursing note and vitals reviewed.  Constitutional: She is oriented to person, place, and time. She appears well-nourished. No distress.   Eyes: EOM are normal. Pupils are equal, round, and reactive to light.   Cardiovascular:  Normal rate and regular rhythm.            Neurological: She is alert and oriented to person, place, and time.   Psychiatric: She has a normal mood and affect.             Ortho Exam        Results for orders placed during the hospital encounter of 10/14/22    MRI Lumbar Spine Without Contrast      Grade 1 anterolisthesis at L4-5.  Grade 4 anterolisthesis at S1-2 with jumped S1 facets bilaterally.    Partially imaged T12 compression fracture.  Chronic compression fracture of L3, with approximately 25% height loss and mild retropulsion.  Subacute compression fracture of the L4 inferior endplate, with approximately 40% height loss, mild retropulsion, and associated bone marrow edema.  Partially imaged comminuted fracture of the sacrum involving the sacral body and both sacral ala.  Fracture lines extend into the S1-2 and S2-3 neural foramina bilaterally, with effacement of epidural fat.  There is associated bone marrow edema suggesting subacute injury.    No marrow replacing lesions.    Multilevel degenerative changes with disc desiccation, height loss, bulging, and facet arthropathy.  There is severe canal stenosis at the S1 level.  Mild canal stenosis elsewhere in the lumbar spine.  There is mild-moderate foraminal stenosis at multiple levels, most advanced at L4-5  and L5-S1 on the left.  Findings are similar to 09/29/2022.    The conus medullaris has a normal appearance and terminates at the L1 level.  Cauda equina nerve roots are unremarkable.    Distended bladder.  Diffuse muscle atrophy.    Impression  Sequences are degraded by patient motion artifact. In addition, the examination was terminated prematurely by the patient so not all sequences were obtained.    Partially imaged comminuted fracture of the sacrum, with grade 4 anterolisthesis at S1-2 resulting in severe canal stenosis.    Chronic and subacute compression fractures in the lumbar spine as described.    Distended bladder, concerning for cauda equina syndrome in the setting of spinal canal stenosis.    This report was flagged in Epic as abnormal.      Electronically signed by: Andrade Colmenares  Date:    10/14/2022  Time:    15:13     Results for orders placed during the hospital encounter of 06/22/21    X-Ray Lumbar Complete Including Flex And Ext    Narrative  EXAMINATION:  XR LUMBAR SPINE 5 VIEW WITH FLEX AND EXT    CLINICAL HISTORY:  lumbar radiculitis;  Spondylosis without myelopathy or radiculopathy, lumbar region    TECHNIQUE:  Five views of the lumbar spine plus flexion extension views were performed.    COMPARISON:  None.    FINDINGS:  Five lumbar vertebrae.    Well-visualized on the lateral view are compression deformities involving T10 through T12.  Age indeterminate and correlation for point tenderness at these levels recommended.    Lumbar vertebrae demonstrate normal height and alignment.  Disc spaces appear adequate all levels.  Facet arthropathy from L3-4 through L5-S1 levels.  Vascular calcifications of the aorta and its branches without aneurysmal dilatation.  No pars defects.  Pedicles and SI joints intact.  No subluxation or instability noted in the lumbar spine on flexion and extension views.    Impression  Compression deformities of uncertain age noted at the T10 through T12 levels.  Correlate  with exam and history.    Lumbar spondylosis without acute fracture or subluxation.    Additional findings as above.    This report was flagged in Epic as abnormal.      Electronically signed by: Neville Kaur MD  Date:    06/23/2021  Time:    10:03    Results for orders placed during the hospital encounter of 10/04/23    X-Ray Lumbar Spine AP And Lateral    Narrative  EXAMINATION:  XR LUMBAR SPINE AP AND LATERAL    CLINICAL HISTORY:  Other specified postprocedural states    TECHNIQUE:  AP, lateral and spot images were performed of the lumbar spine.    COMPARISON:  N 04/05/2023 one    FINDINGS:  Posterior spinal fusion with pedicle screws extending from L2 to to the sacrum identified.  The position alignment is satisfactory and unchanged as compared to the previous study.  Compression deformity of the T11, T12 and L3 vertebral body also identified similar to the previous study.    Impression  See above      Electronically signed by: August Calixto MD  Date:    10/04/2023  Time:    13:45         I  reviewed all pertinent imaging regarding this case.  Assessment:     1. Lumbar burst fracture, closed, initial encounter      Plan:     Lumbar burst fracture, closed, initial encounter  -     Case Request Operating Room: FUSION,SPINE,THORACIC,POSTERIOR APPROACH,USING COMPUTER-ASSISTED NAVIGATION  -     Basic Metabolic Panel; Future; Expected date: 12/17/2024  -     X-Ray Chest 1 View; Future; Expected date: 12/17/2024  -     POCT urine pregnancy  -     Protime-INR; Standing  -     Type & Screen; Future; Expected date: 12/17/2024    Please see above for plan   To ER for medicine admit plan for surgery in AM         Thank you for the referral   Please call with any questions    Ean Encarnacion MD  Neurosurgery     Disclaimer: This note was prepared using a voice recognition system and is likely to have sound alike errors within the text.
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
## 2024-12-18 ENCOUNTER — ANESTHESIA (OUTPATIENT)
Dept: SURGERY | Facility: HOSPITAL | Age: 69
End: 2024-12-18
Payer: MEDICARE

## 2024-12-18 LAB
ALBUMIN SERPL BCP-MCNC: 2.9 G/DL (ref 3.5–5.2)
ALP SERPL-CCNC: 81 U/L (ref 40–150)
ALT SERPL W/O P-5'-P-CCNC: 10 U/L (ref 10–44)
ANION GAP SERPL CALC-SCNC: 10 MMOL/L (ref 8–16)
ANION GAP SERPL CALC-SCNC: 13 MMOL/L (ref 8–16)
AST SERPL-CCNC: 17 U/L (ref 10–40)
BILIRUB SERPL-MCNC: 0.4 MG/DL (ref 0.1–1)
BUN SERPL-MCNC: 9 MG/DL (ref 8–23)
BUN SERPL-MCNC: 9 MG/DL (ref 8–23)
CALCIUM SERPL-MCNC: 8.1 MG/DL (ref 8.7–10.5)
CALCIUM SERPL-MCNC: 8.8 MG/DL (ref 8.7–10.5)
CHLORIDE SERPL-SCNC: 103 MMOL/L (ref 95–110)
CHLORIDE SERPL-SCNC: 103 MMOL/L (ref 95–110)
CO2 SERPL-SCNC: 21 MMOL/L (ref 23–29)
CO2 SERPL-SCNC: 22 MMOL/L (ref 23–29)
CREAT SERPL-MCNC: 0.6 MG/DL (ref 0.5–1.4)
CREAT SERPL-MCNC: 0.7 MG/DL (ref 0.5–1.4)
EST. GFR  (NO RACE VARIABLE): >60 ML/MIN/1.73 M^2
EST. GFR  (NO RACE VARIABLE): >60 ML/MIN/1.73 M^2
GLUCOSE SERPL-MCNC: 134 MG/DL (ref 70–110)
GLUCOSE SERPL-MCNC: 142 MG/DL (ref 70–110)
INR PPP: 1 (ref 0.8–1.2)
POTASSIUM SERPL-SCNC: 4.1 MMOL/L (ref 3.5–5.1)
POTASSIUM SERPL-SCNC: 4.2 MMOL/L (ref 3.5–5.1)
PROT SERPL-MCNC: 6.3 G/DL (ref 6–8.4)
PROTHROMBIN TIME: 11.2 SEC (ref 9–12.5)
SODIUM SERPL-SCNC: 135 MMOL/L (ref 136–145)
SODIUM SERPL-SCNC: 137 MMOL/L (ref 136–145)

## 2024-12-18 PROCEDURE — 87205 SMEAR GRAM STAIN: CPT | Performed by: NEUROLOGICAL SURGERY

## 2024-12-18 PROCEDURE — 63600175 PHARM REV CODE 636 W HCPCS

## 2024-12-18 PROCEDURE — 71000039 HC RECOVERY, EACH ADD'L HOUR: Performed by: NEUROLOGICAL SURGERY

## 2024-12-18 PROCEDURE — 63600175 PHARM REV CODE 636 W HCPCS: Performed by: NURSE PRACTITIONER

## 2024-12-18 PROCEDURE — 25000003 PHARM REV CODE 250: Performed by: NEUROLOGICAL SURGERY

## 2024-12-18 PROCEDURE — 80053 COMPREHEN METABOLIC PANEL: CPT | Performed by: NURSE PRACTITIONER

## 2024-12-18 PROCEDURE — 20930 SP BONE ALGRFT MORSEL ADD-ON: CPT | Mod: ICN,,, | Performed by: NEUROLOGICAL SURGERY

## 2024-12-18 PROCEDURE — 63600175 PHARM REV CODE 636 W HCPCS: Performed by: PHYSICIAN ASSISTANT

## 2024-12-18 PROCEDURE — 36415 COLL VENOUS BLD VENIPUNCTURE: CPT | Performed by: NURSE PRACTITIONER

## 2024-12-18 PROCEDURE — 22842 INSERT SPINE FIXATION DEVICE: CPT | Mod: AS,ICN,, | Performed by: PHYSICIAN ASSISTANT

## 2024-12-18 PROCEDURE — 88305 TISSUE EXAM BY PATHOLOGIST: CPT | Performed by: STUDENT IN AN ORGANIZED HEALTH CARE EDUCATION/TRAINING PROGRAM

## 2024-12-18 PROCEDURE — 22325 TREAT SPINE FRACTURE: CPT | Mod: ICN,,, | Performed by: NEUROLOGICAL SURGERY

## 2024-12-18 PROCEDURE — 61783 SCAN PROC SPINAL: CPT | Mod: ICN,,, | Performed by: NEUROLOGICAL SURGERY

## 2024-12-18 PROCEDURE — 36415 COLL VENOUS BLD VENIPUNCTURE: CPT | Performed by: NEUROLOGICAL SURGERY

## 2024-12-18 PROCEDURE — 22610 ARTHRD PST TQ 1NTRSPC THRC: CPT | Mod: 51,ICN,, | Performed by: NEUROLOGICAL SURGERY

## 2024-12-18 PROCEDURE — 25000003 PHARM REV CODE 250: Performed by: NURSE PRACTITIONER

## 2024-12-18 PROCEDURE — 22614 ARTHRD PST TQ 1NTRSPC EA ADD: CPT | Mod: AS,ICN,, | Performed by: PHYSICIAN ASSISTANT

## 2024-12-18 PROCEDURE — 63600175 PHARM REV CODE 636 W HCPCS: Performed by: STUDENT IN AN ORGANIZED HEALTH CARE EDUCATION/TRAINING PROGRAM

## 2024-12-18 PROCEDURE — 27201423 OPTIME MED/SURG SUP & DEVICES STERILE SUPPLY: Performed by: NEUROLOGICAL SURGERY

## 2024-12-18 PROCEDURE — 22325 TREAT SPINE FRACTURE: CPT | Mod: AS,ICN,, | Performed by: PHYSICIAN ASSISTANT

## 2024-12-18 PROCEDURE — 11000001 HC ACUTE MED/SURG PRIVATE ROOM

## 2024-12-18 PROCEDURE — 36000712 HC OR TIME LEV V 1ST 15 MIN: Performed by: NEUROLOGICAL SURGERY

## 2024-12-18 PROCEDURE — 37000008 HC ANESTHESIA 1ST 15 MINUTES: Performed by: NEUROLOGICAL SURGERY

## 2024-12-18 PROCEDURE — 22610 ARTHRD PST TQ 1NTRSPC THRC: CPT | Mod: AS,51,ICN, | Performed by: PHYSICIAN ASSISTANT

## 2024-12-18 PROCEDURE — C9290 INJ, BUPIVACAINE LIPOSOME: HCPCS | Performed by: NEUROLOGICAL SURGERY

## 2024-12-18 PROCEDURE — 87116 MYCOBACTERIA CULTURE: CPT | Performed by: NEUROLOGICAL SURGERY

## 2024-12-18 PROCEDURE — 25000003 PHARM REV CODE 250

## 2024-12-18 PROCEDURE — 0SG0071 FUSION OF LUMBAR VERTEBRAL JOINT WITH AUTOLOGOUS TISSUE SUBSTITUTE, POSTERIOR APPROACH, POSTERIOR COLUMN, OPEN APPROACH: ICD-10-PCS | Performed by: NEUROLOGICAL SURGERY

## 2024-12-18 PROCEDURE — 22842 INSERT SPINE FIXATION DEVICE: CPT | Mod: ICN,,, | Performed by: NEUROLOGICAL SURGERY

## 2024-12-18 PROCEDURE — 87102 FUNGUS ISOLATION CULTURE: CPT | Performed by: NEUROLOGICAL SURGERY

## 2024-12-18 PROCEDURE — 25000003 PHARM REV CODE 250: Performed by: PHYSICIAN ASSISTANT

## 2024-12-18 PROCEDURE — 01N80ZZ RELEASE THORACIC NERVE, OPEN APPROACH: ICD-10-PCS | Performed by: NEUROLOGICAL SURGERY

## 2024-12-18 PROCEDURE — 00NY0ZZ RELEASE LUMBAR SPINAL CORD, OPEN APPROACH: ICD-10-PCS | Performed by: NEUROLOGICAL SURGERY

## 2024-12-18 PROCEDURE — 36000713 HC OR TIME LEV V EA ADD 15 MIN: Performed by: NEUROLOGICAL SURGERY

## 2024-12-18 PROCEDURE — 00NX0ZZ RELEASE THORACIC SPINAL CORD, OPEN APPROACH: ICD-10-PCS | Performed by: NEUROLOGICAL SURGERY

## 2024-12-18 PROCEDURE — 80048 BASIC METABOLIC PNL TOTAL CA: CPT | Mod: XB | Performed by: PHYSICIAN ASSISTANT

## 2024-12-18 PROCEDURE — 63600175 PHARM REV CODE 636 W HCPCS: Mod: JZ,JG | Performed by: NEUROLOGICAL SURGERY

## 2024-12-18 PROCEDURE — 27800903 OPTIME MED/SURG SUP & DEVICES OTHER IMPLANTS: Performed by: NEUROLOGICAL SURGERY

## 2024-12-18 PROCEDURE — 36415 COLL VENOUS BLD VENIPUNCTURE: CPT | Mod: XB | Performed by: PHYSICIAN ASSISTANT

## 2024-12-18 PROCEDURE — 20936 SP BONE AGRFT LOCAL ADD-ON: CPT | Mod: ICN,,, | Performed by: NEUROLOGICAL SURGERY

## 2024-12-18 PROCEDURE — 87075 CULTR BACTERIA EXCEPT BLOOD: CPT | Performed by: NEUROLOGICAL SURGERY

## 2024-12-18 PROCEDURE — 37000009 HC ANESTHESIA EA ADD 15 MINS: Performed by: NEUROLOGICAL SURGERY

## 2024-12-18 PROCEDURE — 0QS004Z REPOSITION LUMBAR VERTEBRA WITH INTERNAL FIXATION DEVICE, OPEN APPROACH: ICD-10-PCS | Performed by: NEUROLOGICAL SURGERY

## 2024-12-18 PROCEDURE — C1713 ANCHOR/SCREW BN/BN,TIS/BN: HCPCS | Performed by: NEUROLOGICAL SURGERY

## 2024-12-18 PROCEDURE — 63600175 PHARM REV CODE 636 W HCPCS: Performed by: NEUROLOGICAL SURGERY

## 2024-12-18 PROCEDURE — 88311 DECALCIFY TISSUE: CPT | Performed by: STUDENT IN AN ORGANIZED HEALTH CARE EDUCATION/TRAINING PROGRAM

## 2024-12-18 PROCEDURE — 0RGA071 FUSION OF THORACOLUMBAR VERTEBRAL JOINT WITH AUTOLOGOUS TISSUE SUBSTITUTE, POSTERIOR APPROACH, POSTERIOR COLUMN, OPEN APPROACH: ICD-10-PCS | Performed by: NEUROLOGICAL SURGERY

## 2024-12-18 PROCEDURE — 85610 PROTHROMBIN TIME: CPT | Performed by: NEUROLOGICAL SURGERY

## 2024-12-18 PROCEDURE — 87070 CULTURE OTHR SPECIMN AEROBIC: CPT | Performed by: NEUROLOGICAL SURGERY

## 2024-12-18 PROCEDURE — 0RG7071 FUSION OF 2 TO 7 THORACIC VERTEBRAL JOINTS WITH AUTOLOGOUS TISSUE SUBSTITUTE, POSTERIOR APPROACH, POSTERIOR COLUMN, OPEN APPROACH: ICD-10-PCS | Performed by: NEUROLOGICAL SURGERY

## 2024-12-18 PROCEDURE — 8E0WXBG COMPUTER ASSISTED PROCEDURE OF TRUNK REGION, WITH COMPUTERIZED TOMOGRAPHY: ICD-10-PCS | Performed by: NEUROLOGICAL SURGERY

## 2024-12-18 PROCEDURE — 87206 SMEAR FLUORESCENT/ACID STAI: CPT | Performed by: NEUROLOGICAL SURGERY

## 2024-12-18 PROCEDURE — 01NB0ZZ RELEASE LUMBAR NERVE, OPEN APPROACH: ICD-10-PCS | Performed by: NEUROLOGICAL SURGERY

## 2024-12-18 PROCEDURE — 22614 ARTHRD PST TQ 1NTRSPC EA ADD: CPT | Mod: ICN,,, | Performed by: NEUROLOGICAL SURGERY

## 2024-12-18 PROCEDURE — 71000033 HC RECOVERY, INTIAL HOUR: Performed by: NEUROLOGICAL SURGERY

## 2024-12-18 DEVICE — SCREW SET HORIZON STD 1/4-32: Type: IMPLANTABLE DEVICE | Site: SPINE THORACIC | Status: FUNCTIONAL

## 2024-12-18 DEVICE — IMPLANTABLE DEVICE: Type: IMPLANTABLE DEVICE | Site: SPINE THORACIC | Status: FUNCTIONAL

## 2024-12-18 DEVICE — SET SCREW HORIZON SOLERA 5.5-6: Type: IMPLANTABLE DEVICE | Site: SPINE THORACIC | Status: FUNCTIONAL

## 2024-12-18 DEVICE — CEMENT KYPHOPLASTY HI VISC: Type: IMPLANTABLE DEVICE | Site: SPINE THORACIC | Status: FUNCTIONAL

## 2024-12-18 RX ORDER — PROPOFOL 10 MG/ML
VIAL (ML) INTRAVENOUS CONTINUOUS PRN
Status: DISCONTINUED | OUTPATIENT
Start: 2024-12-18 | End: 2024-12-18

## 2024-12-18 RX ORDER — LIDOCAINE HYDROCHLORIDE 20 MG/ML
INJECTION INTRAVENOUS
Status: DISCONTINUED | OUTPATIENT
Start: 2024-12-18 | End: 2024-12-18

## 2024-12-18 RX ORDER — SUCCINYLCHOLINE CHLORIDE 20 MG/ML
INJECTION INTRAMUSCULAR; INTRAVENOUS
Status: DISCONTINUED | OUTPATIENT
Start: 2024-12-18 | End: 2024-12-18

## 2024-12-18 RX ORDER — PHENYLEPHRINE HYDROCHLORIDE 10 MG/ML
INJECTION INTRAVENOUS
Status: DISCONTINUED | OUTPATIENT
Start: 2024-12-18 | End: 2024-12-18

## 2024-12-18 RX ORDER — TOBRAMYCIN 1.2 G/30ML
1.2 INJECTION, POWDER, LYOPHILIZED, FOR SOLUTION INTRAVENOUS ONCE
Status: COMPLETED | OUTPATIENT
Start: 2024-12-18 | End: 2024-12-18

## 2024-12-18 RX ORDER — HYDROMORPHONE HYDROCHLORIDE 1 MG/ML
1 INJECTION, SOLUTION INTRAMUSCULAR; INTRAVENOUS; SUBCUTANEOUS
Status: DISCONTINUED | OUTPATIENT
Start: 2024-12-18 | End: 2024-12-22

## 2024-12-18 RX ORDER — HYDROMORPHONE HYDROCHLORIDE 1 MG/ML
1 INJECTION, SOLUTION INTRAMUSCULAR; INTRAVENOUS; SUBCUTANEOUS
Status: DISCONTINUED | OUTPATIENT
Start: 2024-12-18 | End: 2024-12-18

## 2024-12-18 RX ORDER — DIAZEPAM 5 MG/1
5 TABLET ORAL EVERY 8 HOURS
Status: DISCONTINUED | OUTPATIENT
Start: 2024-12-18 | End: 2024-12-19

## 2024-12-18 RX ORDER — ETOMIDATE 2 MG/ML
INJECTION INTRAVENOUS
Status: DISCONTINUED | OUTPATIENT
Start: 2024-12-18 | End: 2024-12-18

## 2024-12-18 RX ORDER — LIDOCAINE HYDROCHLORIDE AND EPINEPHRINE 10; 10 UG/ML; MG/ML
INJECTION, SOLUTION INFILTRATION; PERINEURAL
Status: DISCONTINUED | OUTPATIENT
Start: 2024-12-18 | End: 2024-12-18 | Stop reason: HOSPADM

## 2024-12-18 RX ORDER — DIPHENHYDRAMINE HCL 25 MG
50 CAPSULE ORAL EVERY 6 HOURS PRN
Status: DISCONTINUED | OUTPATIENT
Start: 2024-12-18 | End: 2024-12-23 | Stop reason: HOSPADM

## 2024-12-18 RX ORDER — MORPHINE SULFATE 2 MG/ML
0.5 INJECTION, SOLUTION INTRAMUSCULAR; INTRAVENOUS
Status: DISCONTINUED | OUTPATIENT
Start: 2024-12-18 | End: 2024-12-18

## 2024-12-18 RX ORDER — DOCUSATE SODIUM 100 MG/1
100 CAPSULE, LIQUID FILLED ORAL DAILY
Status: DISCONTINUED | OUTPATIENT
Start: 2024-12-18 | End: 2024-12-23 | Stop reason: HOSPADM

## 2024-12-18 RX ORDER — BISACODYL 10 MG/1
10 SUPPOSITORY RECTAL DAILY
Status: DISCONTINUED | OUTPATIENT
Start: 2024-12-18 | End: 2024-12-23 | Stop reason: HOSPADM

## 2024-12-18 RX ORDER — DEXAMETHASONE SODIUM PHOSPHATE 4 MG/ML
INJECTION, SOLUTION INTRA-ARTICULAR; INTRALESIONAL; INTRAMUSCULAR; INTRAVENOUS; SOFT TISSUE
Status: DISCONTINUED | OUTPATIENT
Start: 2024-12-18 | End: 2024-12-18

## 2024-12-18 RX ORDER — VANCOMYCIN HYDROCHLORIDE 1 G/20ML
INJECTION, POWDER, LYOPHILIZED, FOR SOLUTION INTRAVENOUS
Status: DISCONTINUED | OUTPATIENT
Start: 2024-12-18 | End: 2024-12-18 | Stop reason: HOSPADM

## 2024-12-18 RX ORDER — MORPHINE SULFATE 2 MG/ML
2 INJECTION, SOLUTION INTRAMUSCULAR; INTRAVENOUS
Status: DISCONTINUED | OUTPATIENT
Start: 2024-12-18 | End: 2024-12-18

## 2024-12-18 RX ORDER — ROCURONIUM BROMIDE 10 MG/ML
INJECTION, SOLUTION INTRAVENOUS
Status: DISCONTINUED | OUTPATIENT
Start: 2024-12-18 | End: 2024-12-18

## 2024-12-18 RX ORDER — BUPIVACAINE HYDROCHLORIDE 2.5 MG/ML
INJECTION, SOLUTION EPIDURAL; INFILTRATION; INTRACAUDAL
Status: DISCONTINUED | OUTPATIENT
Start: 2024-12-18 | End: 2024-12-18 | Stop reason: HOSPADM

## 2024-12-18 RX ORDER — MIDAZOLAM HYDROCHLORIDE 1 MG/ML
INJECTION INTRAMUSCULAR; INTRAVENOUS
Status: DISCONTINUED | OUTPATIENT
Start: 2024-12-18 | End: 2024-12-18

## 2024-12-18 RX ORDER — ONDANSETRON HYDROCHLORIDE 2 MG/ML
INJECTION, SOLUTION INTRAVENOUS
Status: DISCONTINUED | OUTPATIENT
Start: 2024-12-18 | End: 2024-12-18

## 2024-12-18 RX ORDER — KETAMINE HCL IN 0.9 % NACL 50 MG/5 ML
SYRINGE (ML) INTRAVENOUS
Status: DISCONTINUED | OUTPATIENT
Start: 2024-12-18 | End: 2024-12-18

## 2024-12-18 RX ORDER — BUPIVACAINE 13.3 MG/ML
INJECTION, SUSPENSION, LIPOSOMAL INFILTRATION
Status: DISCONTINUED | OUTPATIENT
Start: 2024-12-18 | End: 2024-12-18 | Stop reason: HOSPADM

## 2024-12-18 RX ORDER — MUPIROCIN 20 MG/G
OINTMENT TOPICAL
Status: DISCONTINUED | OUTPATIENT
Start: 2024-12-18 | End: 2024-12-18 | Stop reason: HOSPADM

## 2024-12-18 RX ORDER — PROPOFOL 10 MG/ML
VIAL (ML) INTRAVENOUS
Status: DISCONTINUED | OUTPATIENT
Start: 2024-12-18 | End: 2024-12-18

## 2024-12-18 RX ORDER — PROCHLORPERAZINE EDISYLATE 5 MG/ML
5 INJECTION INTRAMUSCULAR; INTRAVENOUS EVERY 6 HOURS PRN
Status: DISCONTINUED | OUTPATIENT
Start: 2024-12-18 | End: 2024-12-23 | Stop reason: HOSPADM

## 2024-12-18 RX ORDER — METRONIDAZOLE 500 MG/100ML
500 INJECTION, SOLUTION INTRAVENOUS
Status: DISCONTINUED | OUTPATIENT
Start: 2024-12-18 | End: 2024-12-22

## 2024-12-18 RX ADMIN — PHENYLEPHRINE HYDROCHLORIDE 200 MCG: 10 INJECTION INTRAVENOUS at 07:12

## 2024-12-18 RX ADMIN — LISINOPRIL 10 MG: 10 TABLET ORAL at 09:12

## 2024-12-18 RX ADMIN — PHENYLEPHRINE HYDROCHLORIDE 200 MCG: 10 INJECTION INTRAVENOUS at 08:12

## 2024-12-18 RX ADMIN — PROPOFOL 40 MG: 10 INJECTION, EMULSION INTRAVENOUS at 07:12

## 2024-12-18 RX ADMIN — PHENYLEPHRINE HYDROCHLORIDE 200 MCG: 10 INJECTION INTRAVENOUS at 09:12

## 2024-12-18 RX ADMIN — DIAZEPAM 5 MG: 5 TABLET ORAL at 09:12

## 2024-12-18 RX ADMIN — GLYCOPYRROLATE 0.2 MG: 0.2 INJECTION, SOLUTION INTRAMUSCULAR; INTRAVITREAL at 08:12

## 2024-12-18 RX ADMIN — SUCCINYLCHOLINE CHLORIDE 100 MG: 20 INJECTION, SOLUTION INTRAMUSCULAR; INTRAVENOUS; PARENTERAL at 07:12

## 2024-12-18 RX ADMIN — METRONIDAZOLE 500 MG: 5 INJECTION, SOLUTION INTRAVENOUS at 11:12

## 2024-12-18 RX ADMIN — DEXAMETHASONE SODIUM PHOSPHATE 4 MG: 4 INJECTION, SOLUTION INTRA-ARTICULAR; INTRALESIONAL; INTRAMUSCULAR; INTRAVENOUS; SOFT TISSUE at 08:12

## 2024-12-18 RX ADMIN — DIAZEPAM 5 MG: 5 TABLET ORAL at 03:12

## 2024-12-18 RX ADMIN — HYDROCODONE BITARTRATE AND ACETAMINOPHEN 1 TABLET: 10; 325 TABLET ORAL at 03:12

## 2024-12-18 RX ADMIN — PROPOFOL 50 MG: 10 INJECTION, EMULSION INTRAVENOUS at 08:12

## 2024-12-18 RX ADMIN — HYDROMORPHONE HYDROCHLORIDE 0.5 MG: 1 INJECTION, SOLUTION INTRAMUSCULAR; INTRAVENOUS; SUBCUTANEOUS at 02:12

## 2024-12-18 RX ADMIN — HYDROCODONE BITARTRATE AND ACETAMINOPHEN 1 TABLET: 10; 325 TABLET ORAL at 05:12

## 2024-12-18 RX ADMIN — HYDROMORPHONE HYDROCHLORIDE 0.4 MG: 1 INJECTION, SOLUTION INTRAMUSCULAR; INTRAVENOUS; SUBCUTANEOUS at 09:12

## 2024-12-18 RX ADMIN — Medication 10 MG: at 08:12

## 2024-12-18 RX ADMIN — THERA TABS 1 TABLET: TAB at 03:12

## 2024-12-18 RX ADMIN — TRAZODONE HYDROCHLORIDE 150 MG: 100 TABLET ORAL at 09:12

## 2024-12-18 RX ADMIN — SODIUM CHLORIDE: 9 INJECTION, SOLUTION INTRAVENOUS at 05:12

## 2024-12-18 RX ADMIN — ETOMIDATE 10 MG: 2 INJECTION INTRAVENOUS at 07:12

## 2024-12-18 RX ADMIN — VANCOMYCIN HYDROCHLORIDE 1000 MG: 1 INJECTION, POWDER, LYOPHILIZED, FOR SOLUTION INTRAVENOUS at 07:12

## 2024-12-18 RX ADMIN — SODIUM CHLORIDE: 9 INJECTION, SOLUTION INTRAVENOUS at 07:12

## 2024-12-18 RX ADMIN — DOCUSATE SODIUM 100 MG: 100 CAPSULE, LIQUID FILLED ORAL at 03:12

## 2024-12-18 RX ADMIN — ONDANSETRON 4 MG: 2 INJECTION INTRAMUSCULAR; INTRAVENOUS at 06:12

## 2024-12-18 RX ADMIN — SODIUM CHLORIDE: 9 INJECTION, SOLUTION INTRAVENOUS at 03:12

## 2024-12-18 RX ADMIN — ONDANSETRON 4 MG: 2 INJECTION INTRAMUSCULAR; INTRAVENOUS at 11:12

## 2024-12-18 RX ADMIN — LIDOCAINE HYDROCHLORIDE 100 MG: 20 INJECTION INTRAVENOUS at 07:12

## 2024-12-18 RX ADMIN — PHENYLEPHRINE HYDROCHLORIDE 100 MCG: 10 INJECTION INTRAVENOUS at 09:12

## 2024-12-18 RX ADMIN — PROPOFOL 50 MG: 10 INJECTION, EMULSION INTRAVENOUS at 09:12

## 2024-12-18 RX ADMIN — PROPOFOL 50 MCG/KG/MIN: 10 INJECTION, EMULSION INTRAVENOUS at 07:12

## 2024-12-18 RX ADMIN — MIDAZOLAM HYDROCHLORIDE 2 MG: 1 INJECTION, SOLUTION INTRAMUSCULAR; INTRAVENOUS at 07:12

## 2024-12-18 RX ADMIN — HYDROMORPHONE HYDROCHLORIDE 0.2 MG: 1 INJECTION, SOLUTION INTRAMUSCULAR; INTRAVENOUS; SUBCUTANEOUS at 09:12

## 2024-12-18 RX ADMIN — SODIUM CHLORIDE, POTASSIUM CHLORIDE, SODIUM LACTATE AND CALCIUM CHLORIDE: 600; 310; 30; 20 INJECTION, SOLUTION INTRAVENOUS at 07:12

## 2024-12-18 RX ADMIN — PROPOFOL 20 MG: 10 INJECTION, EMULSION INTRAVENOUS at 07:12

## 2024-12-18 RX ADMIN — PHENYLEPHRINE HYDROCHLORIDE 10 MCG/MIN: 10 INJECTION INTRAVENOUS at 08:12

## 2024-12-18 RX ADMIN — HALOPERIDOL 5 MG: 5 TABLET ORAL at 09:12

## 2024-12-18 RX ADMIN — METRONIDAZOLE 500 MG: 5 INJECTION, SOLUTION INTRAVENOUS at 08:12

## 2024-12-18 RX ADMIN — PHENYLEPHRINE HYDROCHLORIDE 100 MCG: 10 INJECTION INTRAVENOUS at 10:12

## 2024-12-18 RX ADMIN — Medication 20 MG: at 07:12

## 2024-12-18 RX ADMIN — SODIUM CHLORIDE, POTASSIUM CHLORIDE, SODIUM LACTATE AND CALCIUM CHLORIDE: 600; 310; 30; 20 INJECTION, SOLUTION INTRAVENOUS at 09:12

## 2024-12-18 RX ADMIN — PAROXETINE HYDROCHLORIDE 40 MG: 20 TABLET, FILM COATED ORAL at 09:12

## 2024-12-18 RX ADMIN — METRONIDAZOLE 500 MG: 5 INJECTION, SOLUTION INTRAVENOUS at 03:12

## 2024-12-18 RX ADMIN — ROCURONIUM BROMIDE 5 MG: 10 SOLUTION INTRAVENOUS at 07:12

## 2024-12-18 RX ADMIN — TOBRAMYCIN SULFATE 1.2 G: 1200 INJECTION, POWDER, FOR SOLUTION INTRAVENOUS at 10:12

## 2024-12-18 RX ADMIN — HYDROMORPHONE HYDROCHLORIDE 0.4 MG: 1 INJECTION, SOLUTION INTRAMUSCULAR; INTRAVENOUS; SUBCUTANEOUS at 08:12

## 2024-12-18 RX ADMIN — HYDROMORPHONE HYDROCHLORIDE 1 MG: 1 INJECTION, SOLUTION INTRAMUSCULAR; INTRAVENOUS; SUBCUTANEOUS at 07:12

## 2024-12-18 RX ADMIN — REMIFENTANIL HYDROCHLORIDE 0.02 MCG/KG/MIN: 1 INJECTION, POWDER, LYOPHILIZED, FOR SOLUTION INTRAVENOUS at 07:12

## 2024-12-18 RX ADMIN — Medication 10 MG: at 09:12

## 2024-12-18 RX ADMIN — SODIUM CHLORIDE: 9 INJECTION, SOLUTION INTRAVENOUS at 12:12

## 2024-12-18 NOTE — ASSESSMENT & PLAN NOTE
-Had thoracic spine fusion with Dr. Encarnacion on 12/18/24, POD 0  -Regular diet now  -Pain management  -Antiemetics PRN  -Will need to start DVT prophylaxis 12-24 hrs post op --> will start in AM

## 2024-12-18 NOTE — SUBJECTIVE & OBJECTIVE
Review of Systems   Constitutional:  Negative for fatigue and fever.   HENT: Negative.     Respiratory:  Negative for chest tightness and shortness of breath.    Cardiovascular:  Negative for chest pain and leg swelling.   Gastrointestinal:  Negative for abdominal pain, constipation, diarrhea and nausea.   Genitourinary:  Negative for difficulty urinating and dysuria.   Musculoskeletal:  Positive for arthralgias, back pain, neck pain and neck stiffness.   Skin:  Negative for rash.   Neurological:  Negative for light-headedness and headaches.   Hematological:  Does not bruise/bleed easily.   Psychiatric/Behavioral:  Negative for agitation and behavioral problems.      Objective:     Vital Signs (Most Recent):  Temp: 97.9 °F (36.6 °C) (12/18/24 1453)  Pulse: (!) 115 (12/18/24 1453)  Resp: 16 (12/18/24 1540)  BP: 134/69 (12/18/24 1453)  SpO2: 97 % (12/18/24 1453) Vital Signs (24h Range):  Temp:  [97.1 °F (36.2 °C)-98.4 °F (36.9 °C)] 97.9 °F (36.6 °C)  Pulse:  [] 115  Resp:  [6-23] 16  SpO2:  [91 %-100 %] 97 %  BP: ()/(52-92) 134/69     Weight: 89 kg (196 lb 3.4 oz)  Body mass index is 35.89 kg/m².    Intake/Output Summary (Last 24 hours) at 12/18/2024 1614  Last data filed at 12/18/2024 1258  Gross per 24 hour   Intake 2418.16 ml   Output 2000 ml   Net 418.16 ml         Physical Exam  Constitutional:       General: She is not in acute distress.     Comments: Somnolent   HENT:      Head: Normocephalic and atraumatic.      Mouth/Throat:      Mouth: Mucous membranes are moist.      Pharynx: Oropharynx is clear.   Eyes:      General: No scleral icterus.  Cardiovascular:      Rate and Rhythm: Normal rate and regular rhythm.      Heart sounds: No murmur heard.     No gallop.   Pulmonary:      Effort: Pulmonary effort is normal.      Breath sounds: Normal breath sounds.   Abdominal:      General: Bowel sounds are normal. There is no distension.      Tenderness: There is no abdominal tenderness.    Musculoskeletal:         General: Tenderness present.      Cervical back: Tenderness present.      Right lower leg: No edema.      Left lower leg: No edema.      Comments: Tender along entire spine and throughout back   Skin:     General: Skin is warm and dry.      Comments: Dressing is CDI   Neurological:      Mental Status: Mental status is at baseline.      Comments: Able to squeeze both hands to command, moving all extremities   Psychiatric:         Mood and Affect: Mood normal.         Behavior: Behavior normal.             Significant Labs: All pertinent labs within the past 24 hours have been reviewed.    Significant Imaging: I have reviewed all pertinent imaging results/findings within the past 24 hours.

## 2024-12-18 NOTE — ANESTHESIA PROCEDURE NOTES
Intubation    Date/Time: 12/18/2024 7:22 AM    Performed by: Demetrice Aranda CRNA  Authorized by: Rachid Dawson MD    Intubation:     Induction:  Intravenous    Intubated:  Postinduction    Mask Ventilation:  Easy mask    Attempts:  1    Attempted By:  CRNA    Method of Intubation:  Video laryngoscopy    Blade:  Busch 3    Laryngeal View Grade: Grade I - full view of cords      Difficult Airway Encountered?: No      Complications:  None    Airway Device:  Oral endotracheal tube    Airway Device Size:  7.5    Style/Cuff Inflation:  Cuffed    Tube secured:  22    Secured at:  The lips    Placement Verified By:  Capnometry    Complicating Factors:  Small mouth, short neck and poor neck/head extension    Findings Post-Intubation:  BS equal bilateral and atraumatic/condition of teeth unchanged

## 2024-12-18 NOTE — NURSING TRANSFER
Nursing Transfer Note      12/18/2024   3:04 PM    Nurse giving handoff:Ester Henao RN  Nurse receiving handoff:Kelly Nation RN    Reason patient is being transferred: returning to inpatient room     Transfer From: PACU    Transfer via bed    Transfer with 3 L NC to O2    Transported by ester henao rn     Transfer Vital Signs:  Blood Pressure:134/69  Heart Rate:115  O2:97  Temperature:97.9  Respirations:17    Order for Tele Monitor? No    Additional Lines: Oxygen and Leija Catheter    Patient belongings transferred with patient: No    Chart send with patient: Yes    Notified: spouse; family present in room

## 2024-12-18 NOTE — H&P (VIEW-ONLY)
Subjective:      Patient ID: Merle Caro is a 69 y.o. female.    Chief Complaint: No chief complaint on file.    Assessment and plan  Patient with unstable L1 burst fracture with fracture through the pedicle bilaterally  Patient needs urgent stabilization of spine  We will recommend T10 through L3 posterior stabilization to tie into her previous hardware  PMMA reinforce instrumentation  Due to her severe uncontrolled pain as well as her number of medical comorbidities she is asking to be admitted will ask patient to be admitted to the medicine service for preoperative assessment and plan for surgical intervention in the a.m.    The patient was informed of all benefits and potential risk of the operation including but not limited to:  Pain, infection, bleeding, coma, paralysis, death.  Cerebrospinal fluid leak, failure of any instrumentation, the need for additional procedures in the future. No guarantee was given that this procedure would alleviate all of the symptoms.    Consents signed in clinic         Previous history of having an L2 to pelvis stabilization in the past  Multiple compression fractures throughout thoracolumbar spine  Neurogenic bladder with indwelling catheter  History of bacteremia past several months            HPI    Pt here to review MR and CT T/L spine   Denies any new symptoms. States P! Is same as previous visit. Has not gotten worse.  Most P! In posterior hips. P! = 10/10.   Patient has been wheelchair-bound secondary to pain  She has an indwelling urinary catheter for history of neurogenic bladder  She was last seen ER for bacteremia with sepsis        Previous Notes      Patient here today for evaluation lower back pain with an x-ray of the lumbar spine for my review   He has a history of having a lumbar spinal fusion in 2022 L2 to pelvis for spondyloptosis   History of compression fracture and osteoporosis   Was doing relatively well the past several months however began having severe  uncontrolled lower back pain and bilateral lower extremity symptoms without any inciting event   States that it has been difficult for her to stand up secondary to pain   He has been taking Celebrex Robaxin and Lyrica for symptom relief   No recent physical therapy or epidural steroid injections      Review of Systems   Constitutional:  Negative for activity change, appetite change and chills.   HENT:  Negative for hearing loss, sore throat and tinnitus.    Eyes:  Negative for pain, discharge and itching.   Cardiovascular:  Negative for chest pain.   Gastrointestinal:  Negative for abdominal pain.   Endocrine: Negative for cold intolerance and heat intolerance.   Genitourinary:  Negative for difficulty urinating and dysuria.   Musculoskeletal:  Positive for back pain and gait problem.   Allergic/Immunologic: Negative for environmental allergies.   Neurological:  Positive for weakness. Negative for dizziness, tremors, light-headedness and headaches.   Hematological:  Negative for adenopathy.   Psychiatric/Behavioral:  Negative for agitation, behavioral problems and confusion.          Objective:       Physical Exam:  Nursing note and vitals reviewed.    Constitutional: She appears well-nourished. She is not diaphoretic. No distress.     Eyes: Pupils are equal, round, and reactive to light. EOM are normal.     Cardiovascular: Normal rate and regular rhythm.     Psych/Behavior: She is alert. She is oriented to person, place, and time. She has a normal mood and affect.     Musculoskeletal:        Back: Range of motion is limited. There is tenderness. Muscle strength is 5/5.        Right Upper Extremities: Muscle strength is 4/5.        Left Upper Extremities: Muscle strength is 4/5.       Right Lower Extremities: Range of motion is full. There is tenderness. Muscle strength is 5/5. Tone is normal.        Left Lower Extremities: There is tenderness. Muscle strength is 5/5. Tone is normal.     Neurological:         Sensory: There is no sensory deficit in the trunk. There is no sensory deficit in the extremities.        Cranial nerves: Cranial nerve(s) II, III, IV, V, VI, VII, VIII, IX, X, XI and XII are intact.     General    Nursing note and vitals reviewed.  Constitutional: She is oriented to person, place, and time. She appears well-nourished. No distress.   Eyes: EOM are normal. Pupils are equal, round, and reactive to light.   Cardiovascular:  Normal rate and regular rhythm.            Neurological: She is alert and oriented to person, place, and time.   Psychiatric: She has a normal mood and affect.             Ortho Exam        Results for orders placed during the hospital encounter of 10/14/22    MRI Lumbar Spine Without Contrast      Grade 1 anterolisthesis at L4-5.  Grade 4 anterolisthesis at S1-2 with jumped S1 facets bilaterally.    Partially imaged T12 compression fracture.  Chronic compression fracture of L3, with approximately 25% height loss and mild retropulsion.  Subacute compression fracture of the L4 inferior endplate, with approximately 40% height loss, mild retropulsion, and associated bone marrow edema.  Partially imaged comminuted fracture of the sacrum involving the sacral body and both sacral ala.  Fracture lines extend into the S1-2 and S2-3 neural foramina bilaterally, with effacement of epidural fat.  There is associated bone marrow edema suggesting subacute injury.    No marrow replacing lesions.    Multilevel degenerative changes with disc desiccation, height loss, bulging, and facet arthropathy.  There is severe canal stenosis at the S1 level.  Mild canal stenosis elsewhere in the lumbar spine.  There is mild-moderate foraminal stenosis at multiple levels, most advanced at L4-5 and L5-S1 on the left.  Findings are similar to 09/29/2022.    The conus medullaris has a normal appearance and terminates at the L1 level.  Cauda equina nerve roots are unremarkable.    Distended bladder.  Diffuse muscle  atrophy.    Impression  Sequences are degraded by patient motion artifact. In addition, the examination was terminated prematurely by the patient so not all sequences were obtained.    Partially imaged comminuted fracture of the sacrum, with grade 4 anterolisthesis at S1-2 resulting in severe canal stenosis.    Chronic and subacute compression fractures in the lumbar spine as described.    Distended bladder, concerning for cauda equina syndrome in the setting of spinal canal stenosis.    This report was flagged in Epic as abnormal.      Electronically signed by: Andrade Colmenares  Date:    10/14/2022  Time:    15:13     Results for orders placed during the hospital encounter of 06/22/21    X-Ray Lumbar Complete Including Flex And Ext    Narrative  EXAMINATION:  XR LUMBAR SPINE 5 VIEW WITH FLEX AND EXT    CLINICAL HISTORY:  lumbar radiculitis;  Spondylosis without myelopathy or radiculopathy, lumbar region    TECHNIQUE:  Five views of the lumbar spine plus flexion extension views were performed.    COMPARISON:  None.    FINDINGS:  Five lumbar vertebrae.    Well-visualized on the lateral view are compression deformities involving T10 through T12.  Age indeterminate and correlation for point tenderness at these levels recommended.    Lumbar vertebrae demonstrate normal height and alignment.  Disc spaces appear adequate all levels.  Facet arthropathy from L3-4 through L5-S1 levels.  Vascular calcifications of the aorta and its branches without aneurysmal dilatation.  No pars defects.  Pedicles and SI joints intact.  No subluxation or instability noted in the lumbar spine on flexion and extension views.    Impression  Compression deformities of uncertain age noted at the T10 through T12 levels.  Correlate with exam and history.    Lumbar spondylosis without acute fracture or subluxation.    Additional findings as above.    This report was flagged in Epic as abnormal.      Electronically signed by: Neville Kaur  MD  Date:    06/23/2021  Time:    10:03    Results for orders placed during the hospital encounter of 10/04/23    X-Ray Lumbar Spine AP And Lateral    Narrative  EXAMINATION:  XR LUMBAR SPINE AP AND LATERAL    CLINICAL HISTORY:  Other specified postprocedural states    TECHNIQUE:  AP, lateral and spot images were performed of the lumbar spine.    COMPARISON:  N 04/05/2023 one    FINDINGS:  Posterior spinal fusion with pedicle screws extending from L2 to to the sacrum identified.  The position alignment is satisfactory and unchanged as compared to the previous study.  Compression deformity of the T11, T12 and L3 vertebral body also identified similar to the previous study.    Impression  See above      Electronically signed by: August Calixto MD  Date:    10/04/2023  Time:    13:45         I  reviewed all pertinent imaging regarding this case.  Assessment:     No diagnosis found.    Plan:     There are no diagnoses linked to this encounter.  Please see above for plan   To ER for medicine admit plan for surgery in AM         Thank you for the referral   Please call with any questions    Ean Encarnacion MD  Neurosurgery     Disclaimer: This note was prepared using a voice recognition system and is likely to have sound alike errors within the text.

## 2024-12-18 NOTE — PLAN OF CARE
Problem: Adult Inpatient Plan of Care  Goal: Plan of Care Review  Outcome: Progressing  Goal: Patient-Specific Goal (Individualized)  Outcome: Progressing  Goal: Absence of Hospital-Acquired Illness or Injury  Outcome: Progressing  Goal: Optimal Comfort and Wellbeing  Outcome: Progressing  Goal: Readiness for Transition of Care  Outcome: Progressing     Problem: Sepsis/Septic Shock  Goal: Optimal Coping  Outcome: Progressing  Goal: Absence of Bleeding  Outcome: Progressing  Goal: Blood Glucose Level Within Targeted Range  Outcome: Progressing  Goal: Absence of Infection Signs and Symptoms  Outcome: Progressing  Goal: Optimal Nutrition Intake  Outcome: Progressing     Problem: Infection  Goal: Absence of Infection Signs and Symptoms  Outcome: Progressing     Problem: Skin Injury Risk Increased  Goal: Skin Health and Integrity  Outcome: Progressing

## 2024-12-18 NOTE — ASSESSMENT & PLAN NOTE
-She was discharged from [facility] on 10/18/24 for bacteremia. Blood cultures isolated bacteroides, antibiotics adjusted. Repeat blood cultures negative. ID recommended 2 weeks of IV Rocephin for UTI and 2 weeks of Flagyl for bacteremia. She has completed IV abx. Patient to have outpatient colonoscopy due to Bacteroides bacteremia which was done on 11/4/24 which showed moderate diverticulosis. Final pathology -- Tubular adenoma, multiple fragments. Negative for high-grade dysplasia or malignancy   -WBC count currently stable, afebrile

## 2024-12-18 NOTE — INTERVAL H&P NOTE
The patient has been examined and the H&P has been reviewed:    I concur with the findings and no changes have occurred since H&P was written.    Surgery risks, benefits and alternative options discussed and understood by patient/family.          Active Hospital Problems    Diagnosis  POA    *Lumbar burst fracture, closed, initial encounter [S32.001A]  Yes    Bacteremia [R78.81]  Yes    Severe obesity (BMI 35.0-39.9) with comorbidity [E66.01]  Yes    Anemia [D64.9]  Yes     Chronic    Asthma with COPD [J44.89]  Yes     Chronic    Primary hypertension [I10]  Yes     Chronic      Resolved Hospital Problems   No resolved problems to display.

## 2024-12-18 NOTE — TRANSFER OF CARE
"Anesthesia Transfer of Care Note    Patient: Merle Caro    Procedure(s) Performed: Procedure(s) (LRB):  FUSION,SPINE,THORACIC,POSTERIOR APPROACH,USING COMPUTER-ASSISTED NAVIGATION (N/A)    Patient location: PACU    Anesthesia Type: general    Transport from OR: Transported from OR on room air with adequate spontaneous ventilation    Post pain: adequate analgesia    Post assessment: no apparent anesthetic complications and tolerated procedure well    Post vital signs: stable    Level of consciousness: responds to stimulation and sedated    Nausea/Vomiting: no nausea/vomiting    Complications: none    Transfer of care protocol was followedComments: Report given to PACU RN at bedside. Hand off tool used. RN given opportunity to ask questions or clarify concerns. No Concerns verbalized. RN was asked if ready to assume care of patient. RN verbally confirmed. Pt. left in stable condition. SV. Vital Signs Return to Near Baseline. No s/s of distress noted.       Last vitals: Visit Vitals  /60 (BP Location: Left arm, Patient Position: Lying)   Pulse 105   Temp 36.6 °C (97.8 °F) (Oral)   Resp 18   Ht 5' 2" (1.575 m)   Wt 89 kg (196 lb 3.4 oz)   SpO2 95%   Breastfeeding No   BMI 35.89 kg/m²     "

## 2024-12-18 NOTE — OP NOTE
[facility] - Surgery ([facility])  Neurosurgery  Operative Note    SUMMARY      Date of Procedure: 12/18/2024     Procedure: Procedure(s) (LRB):  FUSION,SPINE,THORACIC,POSTERIOR APPROACH,USING COMPUTER-ASSISTED NAVIGATION (N/A)   T10-L3 fusion with open reduction of L1 burst fracture  Surgeons and Role:     * Ean Encarnacion MD - Primary  Assistant: Lizzeth Tillman Pa-C  Assisting Surgeon: None    Pre-Operative Diagnosis: Lumbar burst fracture, closed, initial encounter [S32.001A]    Post-Operative Diagnosis: Post-Op Diagnosis Codes:     * Lumbar burst fracture, closed, initial encounter [S32.001A]    Anesthesia: General    Operative Findings (including complications, if any):   L1 burst fracture with segmental instability    Description of Technical Procedures:  Placement of instrumentation bilaterally T10-12 with PMMA reinforced screws  T10-L3 arthrodesis  L1 laminectomy  Open reduction of L1 burst fracture  Use of allograft bone  Use of autograft bone  Use of stereotactic navigation  Use of fluoroscopy  Intraoperative neuro monitoring    Significant Surgical Tasks Conducted by the Assistant(s), if Applicable:   Assistance with exposure and instrumentation    Estimated Blood Loss (EBL): 200 mL           Specimens:   Specimen (24h ago, onward)       Start     Ordered    12/18/24 1026  Specimen to Pathology, Surgery Neurosurgery  Once        Comments: Pre-op Diagnosis: Lumbar burst fracture, closed, initial encounter [S32.001A]Procedure(s):FUSION,SPINE,THORACIC,POSTERIOR APPROACH,USING COMPUTER-ASSISTED NAVIGATION Number of specimens: 1Name of specimens: 1. L1 vertebral body- PERM     References:    Click here for ordering Quick Tip   Question Answer Comment   Procedure Type: Neurosurgery    Specimen Class: Routine/Screening    Release to patient Immediate        12/18/24 1025                     Implants:   Implant Name Type Inv. Item Serial No.  Lot No. LRB No. Used Action   [id number]   [id number]  N/A N/A 1  Implanted   CEMENT KYPHOPLASTY [facility] - [id number]  CEMENT KYPHOPLASTY HI VISC  KYPHON [id number] N/A 1 Implanted              Condition: Good    Disposition: PACU - hemodynamically stable.    Attestation: I was present and scrubbed for the entire procedure.    Patient is a 69-year-old female with a known history of osteoporosis who presented to my clinic with severe back pain unable to ambulate for the past several days.  She has a history of having a L2 to pelvis fusion several years prior.  She had been doing well until several weeks prior she had a fall and began experiencing progressively worse pain.  Workup including MRI and CT scan which showed burst fracture L1 through bilateral pedicles with segmental instability.  After showing the patient the film she was consented for T10-L3 extension of fusion with revision and open reduction for fracture.    Surgical Risks:  The patient was well apprised of all objectives, benefits, risks and potential complications of the procedure, including but not limited to:  Worsening of current status, the possible need for further procedures, the risk of infection, headaches, CSF leak, possible spinal nerve injury resulting in paralysis, infection, injury to major vessels causing hemorrhage, stroke, loss of language function, coma and even death.  No assurance was given whether the symptoms would improve following the procedure.  Informed consent was obtained and secured to the chart after the patient voiced understanding of these risks and decided to proceed with the operation.     Description of procedure  The patient was transferred to the operating room and was given preoperative prophylactic IV antibiotics.  The Anesthesia Service sedated and intubated the patient.  The eyes were taped shut after ointment was applied to prevent corneal abrasion.  A Edd Hugger was placed over the upper body to maintain control of the core body temperature.  Leija catheter was inserted.  The  patient was turned prone on the Scott table.  All pressure points were carefully padded.  The electrophysiology monitoring team inserted needles in their proper locations.     Operative Technique:  The patient was prepped and draped in the standard sterile fashion.  The C-arm fluoroscopy was draped and brought into the operative field and the T10 to L4 levels were identified.  Local anesthetic was infiltrated midline.  The skin was subsequently opened sharply with a #10 blade.  Dissection was carried down superiorly and inferiorly in the midline to expose the supraspinous ligament and the lamina.  The musculature was elevated subperiosteally to expose the facets on the bilaterally with the Bovie electrocautery as well as the Carlin elevator.  Hemostasis was achieved.  Self retraining retractors were then inserted.  The prior instrumentation was dissected out and the pedicle screws at L2 and L3 along with the connecting christy was identified bilaterally     Next the stereotactic reference frame was fastened to the spinous process at the level above the planned procedure.  Sterile drapes were then prior placed around the operative site.  The O-arm navigation system was then brought into the operative field and intraoperative spin was done.  The O-arm was then removed.  Using stereotactic navigation a 5.5 mm tap was used to create the trajectory for the pedicle screws at T10, T11, T12  bilaterally.  A ball-tip probe was used to feel down the pedicle tracts to make sure there was bone all the way around.  Next the lumbar pedicle screws were placed bilaterally using stereotactic navigation at T10, T11, T12 levels.  Once all the screws were in place each screw was stimulated to ensure that greater than 12 Volts require to produce EMG activity in the associated muscle group.    Next the kyphoplasty cement was mixed in the usual fashion and under direct fluoroscopy the pedicle screws were filled with the kyphoplasty cement  through the fenestrations.  All kyphoplasty seem at appeared to remain within the extent of the bone     At this point, a high-speed drill was used to remove the spinous process, lamina, and medial facets. The thecal sac was decompressed and the nerve roots were visualized and the foramen remained appeared free bilaterally.     Next the christy connectors were placed between the prior placed pedicle screws between L3 and 4  A Z christy was measured and cut to size in place through the poly axial screws placed above  Next the inter screw distance was measured and a christy was placed bilaterally into the poly axial screws.  Inner threaded caps were secured to the polyaxial screws to ensure fixation of the rods and the fracture was reduced sequentially at L1.   I secured the inner threaded caps to 80 feet/pounds with the final torquing wrench.  A christy connector was then placed inferiorly    Next using a Carlin elevator the transverse processes bilaterally were exposed and decorticated using my assistance help.  The remaining bone chips from the patient's saved laminectomy bone as well as the Magnifuse allograft bone were placed laterally for the posterolateral fusion     The wound was copiously irrigated with antibiotic saline solution. 1 g of vancomycin powder was placed to the surgical cavity.  The fascia was subsequently closed utilizing 0 Vicryl sutures.  Exparel was injected into the muscle for postoperative pain control.  The subcuticular layer was closed with a 2 0 Vicryl in an inverted fashion.  The skin was then approximated with running nylon.  The incision was dressed in a clean and dry dressing.     All sponge counts, needle counts and instrument counts were correct at the end of the case x 2.  The patient tolerated the procedure well without any complication and was transferred in a stable condition to the recovery room.

## 2024-12-18 NOTE — PLAN OF CARE
SAUD spoke with [facility] regarding pt's Thoracolumbar Brace. The liaison stated he will try to deliver brace when pt transfers to the room. If not today, first thing in the morning.

## 2024-12-18 NOTE — ASSESSMENT & PLAN NOTE
Patient's blood pressure range in the last 24 hours was: BP  Min: 94/52  Max: 168/92.The patient's inpatient anti-hypertensive regimen is listed below:  Current Antihypertensives  lisinopriL tablet 10 mg, Nightly, Oral  hydrALAZINE injection 10 mg, Every 6 hours PRN, Intravenous    Plan  - BP is controlled, no changes needed to their regimen  - Hydralazine PRN

## 2024-12-18 NOTE — HOSPITAL COURSE
12/18: Had NSGY today, now back in room, somnolent and endorsing pain in the back but otherwise doing well. Daughter at bedside. Giving pain medication. TLSO brace on the way.  12/19:  Patient doing about the same as yesterday.  Pain is slightly better controlled.   is at the bedside.  Reportedly she had an episode of confusion/hallucination overnight and early in the morning thinking that people were in the room who were not.  Her mental status is better now  12/20: Slept much better last night after receiving trazodone.  Of note we had given her 150 mg at night when at home she normally takes 75 mg, may need to reduce the dose tonight.  Still having significant pain but it is overall much improved.  No hallucinations.  Was not able to work much with PT yesterday, they will hopefully come back today   12/21:  Mildly diaphoretic, appears a bit worse today.  Checking lactate, VBG, CXR.  Pain control is adequate.  Family would like patient to go home on DC, they are not open to skilled nursing.  Urine with Gram-negative rods, switching vancomycin to ceftriaxone and continuing metronidazole   12/22: Doing better, smiling today, pain is better controlled.  Infectious workup yesterday was negative other than the already known UTI  12/23:  Ready to leave.  Transitioning to ciprofloxacin for the UTI.  Home health getting set up.  Delivering meds to bedside.  Discussed with Neurosurgery

## 2024-12-18 NOTE — ANESTHESIA PROCEDURE NOTES
Arterial    Diagnosis: Hypotension    Patient location during procedure: done in OR  Timeout: 12/18/2024 7:30 AM  Procedure end time: 12/18/2024 7:31 AM    Staffing  Authorizing Provider: Rachid Dawson MD  Performing Provider: Rachid Dawson MD    Staffing  Performed by: Rachid Dawson MD  Authorized by: Rachid Dawson MD    Anesthesiologist was present at the time of the procedure.    Preanesthetic Checklist  Completed: patient identified, IV checked, site marked, risks and benefits discussed, surgical consent, monitors and equipment checked, pre-op evaluation, timeout performed and anesthesia consent givenArterial  Skin Prep: chlorhexidine gluconate  Local Infiltration: none  Orientation: right  Location: radial    Catheter Size: 20 G  Catheter placement by Ultrasound guidance. Heme positive aspiration all ports.   Vessel Caliber: medium, patent, compressibility normal  Vascular Doppler:  not done  Needle advanced into vessel with real time Ultrasound guidance.Insertion Attempts: 1  Assessment  Dressing: secured with tape and tegaderm  Patient: Tolerated well

## 2024-12-18 NOTE — PROGRESS NOTES
[facility] [place]  Progress Note    Patient Name: Merle Caro  MRN: [id number]  Patient Class: IP- Inpatient   Admission Date: 12/17/2024  Length of Stay: 1 days  Attending Physician: August Freire MD  Primary Care Provider: Marck Major MD        Subjective     Principal Problem:Lumbar burst fracture, closed, initial encounter        HPI:  Merle Caro is a 69 year old female who has  has a past medical history of Anemia, unspecified, Anxiety disorder, unspecified, Arthritis, Asthma, B12 deficiency anemia, Bacillus fragilis infection, Chronic back pain, Chronic constipation, COPD (chronic obstructive pulmonary disease), Depression, GERD (gastroesophageal reflux disease), Hypertension, Insomnia, Lumbar compression fracture, Neurogenic bladder, Osteoporosis, Rheumatoid arthritis, unspecified, Spinal stenosis, Tobacco abuse, Urge incontinence of urine, Urinary outflow obstruction, and Urinary tract infection, E. coli. Who presented to the Emergency Department for back pain. She is scheduled for surgery tomorrow, 12/18/24, with Dr. Encarnacion. She saw him in clinic today. Appears she was in severe pain and unable to get preop labs obtained and it was recommended to come to ED and have HM admit, plan for surgery in AM for thoracic spine fusion for lumbar burst fracture. CBC/CMP stable. Pt admitted for pain management for planned surgery with Dr. Encarnacion.     Overview/Hospital Course:  12/18: Had NSGY today, now back in room, somnolent and endorsing pain in the back but otherwise doing well. Daughter at bedside. Giving pain medication. TLSO brace on the way.        Review of Systems   Constitutional:  Negative for fatigue and fever.   HENT: Negative.     Respiratory:  Negative for chest tightness and shortness of breath.    Cardiovascular:  Negative for chest pain and leg swelling.   Gastrointestinal:  Negative for abdominal pain, constipation, diarrhea and nausea.   Genitourinary:  Negative for  difficulty urinating and dysuria.   Musculoskeletal:  Positive for arthralgias, back pain, neck pain and neck stiffness.   Skin:  Negative for rash.   Neurological:  Negative for light-headedness and headaches.   Hematological:  Does not bruise/bleed easily.   Psychiatric/Behavioral:  Negative for agitation and behavioral problems.      Objective:     Vital Signs (Most Recent):  Temp: 97.9 °F (36.6 °C) (12/18/24 1453)  Pulse: (!) 115 (12/18/24 1453)  Resp: 16 (12/18/24 1540)  BP: 134/69 (12/18/24 1453)  SpO2: 97 % (12/18/24 1453) Vital Signs (24h Range):  Temp:  [97.1 °F (36.2 °C)-98.4 °F (36.9 °C)] 97.9 °F (36.6 °C)  Pulse:  [] 115  Resp:  [6-23] 16  SpO2:  [91 %-100 %] 97 %  BP: ()/(52-92) 134/69     Weight: 89 kg (196 lb 3.4 oz)  Body mass index is 35.89 kg/m².    Intake/Output Summary (Last 24 hours) at 12/18/2024 1614  Last data filed at 12/18/2024 1258  Gross per 24 hour   Intake 2418.16 ml   Output 2000 ml   Net 418.16 ml         Physical Exam  Constitutional:       General: She is not in acute distress.     Comments: Somnolent   HENT:      Head: Normocephalic and atraumatic.      Mouth/Throat:      Mouth: Mucous membranes are moist.      Pharynx: Oropharynx is clear.   Eyes:      General: No scleral icterus.  Cardiovascular:      Rate and Rhythm: Normal rate and regular rhythm.      Heart sounds: No murmur heard.     No gallop.   Pulmonary:      Effort: Pulmonary effort is normal.      Breath sounds: Normal breath sounds.   Abdominal:      General: Bowel sounds are normal. There is no distension.      Tenderness: There is no abdominal tenderness.   Musculoskeletal:         General: Tenderness present.      Cervical back: Tenderness present.      Right lower leg: No edema.      Left lower leg: No edema.      Comments: Tender along entire spine and throughout back   Skin:     General: Skin is warm and dry.      Comments: Dressing is CDI   Neurological:      Mental Status: Mental status is at  baseline.      Comments: Able to squeeze both hands to command, moving all extremities   Psychiatric:         Mood and Affect: Mood normal.         Behavior: Behavior normal.             Significant Labs: All pertinent labs within the past 24 hours have been reviewed.    Significant Imaging: I have reviewed all pertinent imaging results/findings within the past 24 hours.    Assessment and Plan     * Lumbar burst fracture, closed, initial encounter  -Had thoracic spine fusion with Dr. Encarnacion on 12/18/24, POD 0  -Regular diet now  -Pain management  -Antiemetics PRN  -Will need to start DVT prophylaxis 12-24 hrs post op --> will start in AM    Bacteremia  -She was discharged from [facility] on 10/18/24 for bacteremia. Blood cultures isolated bacteroides, antibiotics adjusted. Repeat blood cultures negative. ID recommended 2 weeks of IV Rocephin for UTI and 2 weeks of Flagyl for bacteremia. She has completed IV abx. Patient to have outpatient colonoscopy due to Bacteroides bacteremia which was done on 11/4/24 which showed moderate diverticulosis. Final pathology -- Tubular adenoma, multiple fragments. Negative for high-grade dysplasia or malignancy   -WBC count currently stable, afebrile     Severe obesity (BMI 35.0-39.9) with comorbidity  Body mass index is 35.89 kg/m². Morbid obesity complicates all aspects of disease management from diagnostic modalities to treatment. Weight loss encouraged and health benefits explained to patient.         Anemia  Most recent hemoglobin and hematocrit are listed below.  Recent Labs     12/17/24  1235   HGB 13.4   HCT 42.5       Plan  - Monitor serial CBC: Daily  - Transfuse PRBC if patient becomes hemodynamically unstable, symptomatic or H/H drops below 7/21.  - Patient has not received any PRBC transfusions to date  - Patient's anemia is currently stable    Primary hypertension  Patient's blood pressure range in the last 24 hours was: BP  Min: 94/52  Max: 168/92.The patient's inpatient  anti-hypertensive regimen is listed below:  Current Antihypertensives  lisinopriL tablet 10 mg, Nightly, Oral  hydrALAZINE injection 10 mg, Every 6 hours PRN, Intravenous    Plan  - BP is controlled, no changes needed to their regimen  - Hydralazine PRN    Asthma with COPD  -Not currently in exacerbation  -DuoNebs PRN        VTE Risk Mitigation (From admission, onward)           Ordered     Reason for no Mechanical VTE Prophylaxis  Once        Question:  Reasons:  Answer:  Risk of Bleeding    12/18/24 1340     IP VTE HIGH RISK PATIENT  Once         12/17/24 1453     Place sequential compression device  Until discontinued         12/17/24 1453                    Discharge Planning   YELENA:      Code Status: Full Code   Medical Readiness for Discharge Date:   Discharge Plan A: Home Health                August Freire MD  Department of Hospital Medicine   [facility] - Med Surg

## 2024-12-18 NOTE — H&P
Subjective:      Patient ID: Merle Caro is a 69 y.o. female.    Chief Complaint: No chief complaint on file.    Assessment and plan  Patient with unstable L1 burst fracture with fracture through the pedicle bilaterally  Patient needs urgent stabilization of spine  We will recommend T10 through L3 posterior stabilization to tie into her previous hardware  PMMA reinforce instrumentation  Due to her severe uncontrolled pain as well as her number of medical comorbidities she is asking to be admitted will ask patient to be admitted to the medicine service for preoperative assessment and plan for surgical intervention in the a.m.    The patient was informed of all benefits and potential risk of the operation including but not limited to:  Pain, infection, bleeding, coma, paralysis, death.  Cerebrospinal fluid leak, failure of any instrumentation, the need for additional procedures in the future. No guarantee was given that this procedure would alleviate all of the symptoms.    Consents signed in clinic         Previous history of having an L2 to pelvis stabilization in the past  Multiple compression fractures throughout thoracolumbar spine  Neurogenic bladder with indwelling catheter  History of bacteremia past several months            HPI    Pt here to review MR and CT T/L spine   Denies any new symptoms. States P! Is same as previous visit. Has not gotten worse.  Most P! In posterior hips. P! = 10/10.   Patient has been wheelchair-bound secondary to pain  She has an indwelling urinary catheter for history of neurogenic bladder  She was last seen ER for bacteremia with sepsis        Previous Notes      Patient here today for evaluation lower back pain with an x-ray of the lumbar spine for my review   He has a history of having a lumbar spinal fusion in 2022 L2 to pelvis for spondyloptosis   History of compression fracture and osteoporosis   Was doing relatively well the past several months however began having severe  uncontrolled lower back pain and bilateral lower extremity symptoms without any inciting event   States that it has been difficult for her to stand up secondary to pain   He has been taking Celebrex Robaxin and Lyrica for symptom relief   No recent physical therapy or epidural steroid injections      Review of Systems   Constitutional:  Negative for activity change, appetite change and chills.   HENT:  Negative for hearing loss, sore throat and tinnitus.    Eyes:  Negative for pain, discharge and itching.   Cardiovascular:  Negative for chest pain.   Gastrointestinal:  Negative for abdominal pain.   Endocrine: Negative for cold intolerance and heat intolerance.   Genitourinary:  Negative for difficulty urinating and dysuria.   Musculoskeletal:  Positive for back pain and gait problem.   Allergic/Immunologic: Negative for environmental allergies.   Neurological:  Positive for weakness. Negative for dizziness, tremors, light-headedness and headaches.   Hematological:  Negative for adenopathy.   Psychiatric/Behavioral:  Negative for agitation, behavioral problems and confusion.          Objective:       Physical Exam:  Nursing note and vitals reviewed.    Constitutional: She appears well-nourished. She is not diaphoretic. No distress.     Eyes: Pupils are equal, round, and reactive to light. EOM are normal.     Cardiovascular: Normal rate and regular rhythm.     Psych/Behavior: She is alert. She is oriented to person, place, and time. She has a normal mood and affect.     Musculoskeletal:        Back: Range of motion is limited. There is tenderness. Muscle strength is 5/5.        Right Upper Extremities: Muscle strength is 4/5.        Left Upper Extremities: Muscle strength is 4/5.       Right Lower Extremities: Range of motion is full. There is tenderness. Muscle strength is 5/5. Tone is normal.        Left Lower Extremities: There is tenderness. Muscle strength is 5/5. Tone is normal.     Neurological:         Sensory: There is no sensory deficit in the trunk. There is no sensory deficit in the extremities.        Cranial nerves: Cranial nerve(s) II, III, IV, V, VI, VII, VIII, IX, X, XI and XII are intact.     General    Nursing note and vitals reviewed.  Constitutional: She is oriented to person, place, and time. She appears well-nourished. No distress.   Eyes: EOM are normal. Pupils are equal, round, and reactive to light.   Cardiovascular:  Normal rate and regular rhythm.            Neurological: She is alert and oriented to person, place, and time.   Psychiatric: She has a normal mood and affect.             Ortho Exam        Results for orders placed during the hospital encounter of 10/14/22    MRI Lumbar Spine Without Contrast      Grade 1 anterolisthesis at L4-5.  Grade 4 anterolisthesis at S1-2 with jumped S1 facets bilaterally.    Partially imaged T12 compression fracture.  Chronic compression fracture of L3, with approximately 25% height loss and mild retropulsion.  Subacute compression fracture of the L4 inferior endplate, with approximately 40% height loss, mild retropulsion, and associated bone marrow edema.  Partially imaged comminuted fracture of the sacrum involving the sacral body and both sacral ala.  Fracture lines extend into the S1-2 and S2-3 neural foramina bilaterally, with effacement of epidural fat.  There is associated bone marrow edema suggesting subacute injury.    No marrow replacing lesions.    Multilevel degenerative changes with disc desiccation, height loss, bulging, and facet arthropathy.  There is severe canal stenosis at the S1 level.  Mild canal stenosis elsewhere in the lumbar spine.  There is mild-moderate foraminal stenosis at multiple levels, most advanced at L4-5 and L5-S1 on the left.  Findings are similar to 09/29/2022.    The conus medullaris has a normal appearance and terminates at the L1 level.  Cauda equina nerve roots are unremarkable.    Distended bladder.  Diffuse muscle  atrophy.    Impression  Sequences are degraded by patient motion artifact. In addition, the examination was terminated prematurely by the patient so not all sequences were obtained.    Partially imaged comminuted fracture of the sacrum, with grade 4 anterolisthesis at S1-2 resulting in severe canal stenosis.    Chronic and subacute compression fractures in the lumbar spine as described.    Distended bladder, concerning for cauda equina syndrome in the setting of spinal canal stenosis.    This report was flagged in Epic as abnormal.      Electronically signed by: Andrade Colmenares  Date:    10/14/2022  Time:    15:13     Results for orders placed during the hospital encounter of 06/22/21    X-Ray Lumbar Complete Including Flex And Ext    Narrative  EXAMINATION:  XR LUMBAR SPINE 5 VIEW WITH FLEX AND EXT    CLINICAL HISTORY:  lumbar radiculitis;  Spondylosis without myelopathy or radiculopathy, lumbar region    TECHNIQUE:  Five views of the lumbar spine plus flexion extension views were performed.    COMPARISON:  None.    FINDINGS:  Five lumbar vertebrae.    Well-visualized on the lateral view are compression deformities involving T10 through T12.  Age indeterminate and correlation for point tenderness at these levels recommended.    Lumbar vertebrae demonstrate normal height and alignment.  Disc spaces appear adequate all levels.  Facet arthropathy from L3-4 through L5-S1 levels.  Vascular calcifications of the aorta and its branches without aneurysmal dilatation.  No pars defects.  Pedicles and SI joints intact.  No subluxation or instability noted in the lumbar spine on flexion and extension views.    Impression  Compression deformities of uncertain age noted at the T10 through T12 levels.  Correlate with exam and history.    Lumbar spondylosis without acute fracture or subluxation.    Additional findings as above.    This report was flagged in Epic as abnormal.      Electronically signed by: Neville Kaur  MD  Date:    06/23/2021  Time:    10:03    Results for orders placed during the hospital encounter of 10/04/23    X-Ray Lumbar Spine AP And Lateral    Narrative  EXAMINATION:  XR LUMBAR SPINE AP AND LATERAL    CLINICAL HISTORY:  Other specified postprocedural states    TECHNIQUE:  AP, lateral and spot images were performed of the lumbar spine.    COMPARISON:  N 04/05/2023 one    FINDINGS:  Posterior spinal fusion with pedicle screws extending from L2 to to the sacrum identified.  The position alignment is satisfactory and unchanged as compared to the previous study.  Compression deformity of the T11, T12 and L3 vertebral body also identified similar to the previous study.    Impression  See above      Electronically signed by: Aguust Calixto MD  Date:    10/04/2023  Time:    13:45         I  reviewed all pertinent imaging regarding this case.  Assessment:     No diagnosis found.    Plan:     There are no diagnoses linked to this encounter.  Please see above for plan   To ER for medicine admit plan for surgery in AM         Thank you for the referral   Please call with any questions    Ean Encarnacion MD  Neurosurgery     Disclaimer: This note was prepared using a voice recognition system and is likely to have sound alike errors within the text.

## 2024-12-18 NOTE — PLAN OF CARE
Discussed poc with pt, pt verbalized understanding  VS wnl    Patient completed Thoracic Spine fusion on today.   O2 2 lpm via Nasal Cannula.     No Cardiac monitoring.   Fall precautions in place, remains injury free        Accurate I&Os  Bed locked at lowest position  Call light within reach     Chart check complete  Will cont with POC

## 2024-12-18 NOTE — PLAN OF CARE
"Follow up:     Overall doing better   Reports wear off 2 weeks ago     Prior note:   Has migraines most days of the month     Prior note:   Reports daily occipital HA x 4 weeks   Unknown trigger   Some use of alleve   No ER or UC   Sleep - good   Appetite - good       Prior note:   Still has chr migraine daily   Started Reglan 10 mg PRN        NO notes:  2016 fell off a kasia, fell 40 feet and hit her head and back on the way down. Had loss of consciousness for several seconds. Not sure if she had post concussive syndrome.      Neurologist in [place] has been treating headaches up til this point. Has had them since childhood, but they have gotten worse. Got prism glasses for reading from ophthalmologist in [place] in 9/2020 but they have not helped.      Mom with headaches.      10/1/2021 Nicks:    "Patient reports dull headaches through   out the entire day, with episodes of intense headaches mixed inbetween.   During the episodes, pt reports vertical double vision. Pt has to go to   sleep to make headache and diplopia go away. Pt states that her PCP   recommended she take 4 Tylenols but they do not help. Pt states that she   occasionally becomes nauseous with blurred vision during the intense   episodes. Pt states eye pain that comes and go. She also reports nausea,   face numbness, and neck burning during the episodes. Overall no medication   has helped. Patient was in the ER 09/18 with the same complaints. MRI was   performed which was WNL. She reports no changes in vision or symptoms   since the MRI was performed.      Patient recently had an eye exam with [facility] in [place], [place].   She was given prism glasses to use for reading and occasionally for night   driving. Patient reports that the eye doctor told her she had an eye   muscle problem. She does not like the glasses because her vision is   blurred through them. If she takes the glasses off, her vision is clear.   The glasses also give " [facility]  Initial Discharge Assessment       Primary Care Provider: Marck Major MD    Admission Diagnosis: Preoperative cardiovascular examination [Z01.810]  Acute bilateral low back pain without sciatica [M54.50]  Closed fracture of lumbar vertebra with routine healing, unspecified fracture morphology, unspecified lumbar vertebral level, subsequent encounter [S32.009D]    Admission Date: 12/17/2024  Expected Discharge Date: per attending         Payor: MEDICARE / Plan: MEDICARE PART A & B / Product Type: Government /     Extended Emergency Contact Information  Primary Emergency Contact: Irving Caro  Address: [address] [address] [facility] [place]  Home Phone: [phone]  Mobile Phone: [phone]  Relation: Spouse    Discharge Plan A: Home Health         [id number] - [place], [place] - [address]  [id number]  [place] [address]  Phone: [phone] Fax: [phone]    [id number] - [place], [place] - [address] AT SEC OF [facility] [address]  [address]  [place] [address]  Phone: [phone] Fax: [phone]      Initial Assessment (most recent)       Adult Discharge Assessment - 12/18/24 0920          Discharge Assessment    Assessment Type Discharge Planning Assessment     Confirmed/corrected address, phone number and insurance Yes     Confirmed Demographics Correct on Facesheet     Source of Information other (see comments)   spouse    Communicated YELENA with patient/caregiver Date not available/Unable to determine     Reason For Admission lumbar burst fracture     People in Home spouse     Do you expect to return to your current living situation? Yes     Do you have help at home or someone to help you manage your care at home? Yes     Who are your caregiver(s) and their phone number(s)? [facility] nurse     Prior to hospitilization cognitive status: Alert/Oriented     Current cognitive status:  "her a headache/strained eyes. "  ?  Neuro-ophthalmologic examination was notable for excellent visual acuities, full color vision, good convergence, moderate XT at near, fair convergence amplitude. I suspect she is less able to converge during severe headaches. I recommended using her prism glasses for reading only when symptomatic     studying musical theater, sophomore      Headache history:   Age of onset -  childhood   Location - occipital, behind eyes   Nature of pain -  Throbbing   Prodrome - no   Aura -  No   Duration of headache - > 4 hrs   Time to peak intensity - hrs   Pain scale - range of intensity -  9/10  Frequency -  Daily since April   Status Migrainosus history - yes  Time of day of most headaches- anytime      Associated symptoms with the headache:   Meningeal symptoms - photophobia, phonophobia, exercise intolerance +   Nausea/vomitting +   Neck pain +      Cluster headache symptoms: none      Symptoms of increased intracranial pressure:   Whooshing sounds   Visual spots/blotches      Basilar migraine symptoms:none      Headache Triggers:  unkown      Other comorbid conditions:  Anxiety - yes   Motion sickness symptom - no      Treatment history:  Resolution of headache with sleep - yes   Meds tried:  Failed topamax, elavil, cymbalta, lexapro    On OCP monjhly - no change   Ubrelvy - not help   AIMOVIG x 2 - not help   relpax - not help   Propranolol  -n/v   Emgality - mild imprvmnt   BOTOX #1 1/14/22 - helped    BOTOX #2 2/14/22 - helped   BOTOX #3 in July in [place]   BOTOX #4 1/26/23 - helped    BOTOX #5 3/9/23 - helped    BOTOX #6 7/13/23 - helped    BOTOX #7 9/12/23 - helped    BOTOX #8 12/7/23 - helped    BOTOX #9 2/8/24 - helped    BOTOX #10 4/19/24 - helped      Qulipta 60 mg - vomiting     Care Timeline     1649    ketorolac tromethamine 15 mg       metoclopramide HCl 10 mg   1650    0.9 % sodium chloride 1000 mL   1705    Comprehensive metabolic panel        HIV 1/2 Ag/Ab (4th Gen)       " Alert/Oriented     Walking or Climbing Stairs Difficulty no     Dressing/Bathing Difficulty no     Equipment Currently Used at Home wheelchair;walker, rolling;walker, standard     Readmission within 30 days? No     Patient currently being followed by outpatient case management? No     Do you currently have service(s) that help you manage your care at home? No     Do you take prescription medications? Yes     Do you have prescription coverage? Yes     Coverage medicare     Do you have any problems affording any of your prescribed medications? No     Is the patient taking medications as prescribed? yes     Who is going to help you get home at discharge? spouse     How do you get to doctors appointments? family or friend will provide;car, drives self     Are you on dialysis? No     Do you take coumadin? No     Discharge Plan A Home Health                      Pt's spouse stated they have [facility] nurse. They would like [facility] if HH is needed.           Hepatitis C Antibody       CBC auto differential   1706    POCT urine pregnancy   1712    ISTAT PROCEDURE    1802    MRI Brain W WO Contrast   1803    gadobutrol 6 mL   1848    potassium bicarbonate/cit ac 50 mEq   1933    Discharged         Headache risk factors:   H/o TBI  - 2010 [facility] with concussion, 2015 fell off kasia   H/o Meningitis  - no   F/h Aneurysms  - no     Review of Systems   Constitutional: Negative.    HENT: Negative.    Eyes: Negative.    Respiratory: Negative.    Cardiovascular: Negative.    Gastrointestinal: Negative.    Endocrine: Negative.    Genitourinary: Negative.    Musculoskeletal: Negative.    Integumentary:  Negative.   Allergic/Immunologic: Negative.    Neurological: Positive for headaches.   Hematological: Negative.    Psychiatric/Behavioral: Positive for sleep disturbance.          Objective:   Physical Exam  Constitutional:       Appearance: Normal appearance.      Psychiatric:         Mood and Affect: Mood normal.         Behavior: Behavior normal.         Thought Content: Thought content normal.         Judgment: Judgment normal.          Headache Exam:  Optic disc is flat OU   Temples appear symmetric  No V1,V2,V3 distribution allodynia/hyperalgesia adrien   No facial swelling  No gross TMJ abnormalities   No ptosis   No conj injection   No meningismus   No neck stiffness  No C spine tenderness   Cervical facet tenderness on palpation adrien    tender points over trapezium   adrien  supraorbital nerve exit point tenderness  adrien  occipital nerve exit point tenderness  No auriculotemporal nerve tenderness      Limited ROM neck   Assessment:   1, Migraine - chronic        MR Impression:   No acute intracranial abnormality.  No findings to suggest dural venous sinus thrombosis.     Electronically signed by resident: Betty Ruiz  Date:                                            09/18/2021  Time:                                           18:03     Electronically signed by: John Blake  MD  Date:                                            09/18/2021  Time:                                           18:24        MRI FINDINGS:  Alignment: Straightening of the normal cervical lordosis.     Vertebrae: No diffuse marrow signal abnormality to suggest infiltrative process.  No evidence of acute fractures.  Vertebral body heights are well maintained.     Discs: Disc heights are well maintained with normal disc signal.     Cord: Normal contour and signal.     Skull base and craniocervical junction: Normal.     Degenerative findings:   C2-C3: No significant spinal canal stenosis or neural foraminal narrowing.   C3-C4: No significant spinal canal stenosis or neural foraminal narrowing.   C4-C5: No significant spinal canal stenosis or neural foraminal narrowing.   C5-C6: No significant spinal canal stenosis or neural foraminal narrowing.   C6-C7: No significant spinal canal stenosis or neural foraminal narrowing.   C7-T1: No significant spinal canal stenosis or neural foraminal narrowing.   T1-T2: No significant spinal canal stenosis or neural foraminal narrowing.   Paraspinal muscles & soft tissues: Normal.     Impression:   No significant abnormality identified.     Electronically signed by resident: Matthew Preston  Date:                                            10/23/2021  Time:                                           13:03     Electronically signed by: Humphrey Jordan  Date:                                            10/23/2021  Time:                                           14:05      Impression:   Few scattered punctate foci of T2 FLAIR signal hyperintensity supratentorial white matter which are nonspecific and of uncertain clinical significance.  In light of history sequela of demyelination a consideration.   No evidence for acute infarction   Clinical correlation and follow-up as warranted.      Electronically signed by:Kieran Cason DO  Date:                                             06/09/2024  Time:                                           16:40  Plan:   1. Cont BOTOX   Emgality (Nov 2021-)   2, Breakthrough headache - tylenol, reyvow, zofran   Multiple alternative treatment options were offered to the patient  3. Occipital neuralgia - If medical management is ineffective may consider occipital nerve blocks.   4. I urged the patient to keep a headache calender.   MRI brain to r/o MS      Procedures    BOTOX treatment response:   Prior to initiating BOTOX, the patient had   25 migraine days per month on average. This meets criteria for chronic migraine.   After starting BOTOX, the patient experienced a reduction in  15  days per month   After starting BOTOX, the patient experienced a reduction in > 100 hours of migraine symptoms per month   After starting BOTOX, the patient experienced a 50% reduction in burden of migraine days per month   Based on this information, BOTOX is medically necessary for the management of the patient's chronic migraine.     BOTOX Injection intervals:   Patient reports a 'wearing off effect' prior to the subsequent BOTOX injections at 12 weeks. This occurs at week 10  Symptoms of this a 'wearing off effect' include - worsening of migraine headache frequency and intensity   Medications used during the 'wearing off effect' period include advil  Based on this information, it is medically necessary for the patient to receive BOTOX therapy at an interval of every 10 weeks for the management of chronic migraine.    BOTOX was performed as an indicated therapy for intractable chronic migraine headaches given that the patient failed > 3  prophylactic medications    Botulinum Toxin Injection Procedure   Pre-operative diagnosis: Chronic migraine   Post-operative diagnosis: Same   Procedure: Chemical neurolysis   After risks and benefits were explained including bleeding, infection, worsening of pain, damage to the areas being injected, weakness of muscles, loss of muscle  control, dysphagia if injecting the head or neck, facial droop if injecting the facial area, painful injection, allergic or other reaction to the medications being injected, and the failure of the procedure to help the problem, a signed consent was obtained.   The patient was placed in a comfortable area and the sites to be treated were identified.The area to be treated was prepped three times with alcohol and the alcohol allowed to dry. Next, a 30 gauge needle was used to inject the medication in the area to be treated.   Area(s) injected:   Total Botox used: 155 Units   Botox wastage: 45 Units   Injection sites:    muscle bilaterally ( a total of 10 units divided into 2 sites)   Procerus muscle (5 units)   Frontalis muscle bilaterally (a total of 20 units divided into 4 sites)   Temporalis muscle bilaterally (a total of 40 units divided into 8 sites)   Occipitalis muscle bilaterally (a total of 30 units divided into 6 sites)   Cervical paraspinal muscles (a total of 20 units divided into 4 sites)   Trapezius muscle bilaterally (a total of 30 units divided into 6 sites)   Complications: none   RTC for the next Botox injection: 10 weeks

## 2024-12-18 NOTE — NURSING
Pt arrived to unit. VS stable. Heel foam applied to ANGELINA heels. Foam dressing applied near nicholson stat lock to prevent DTI. O2 @ 2L

## 2024-12-18 NOTE — ASSESSMENT & PLAN NOTE
-Had thoracic spine fusion with Dr. Encarnacion on 12/18/24, POD 0  -Regular diet now  -Pain management  -Antiemetics PRN  -Will need to start DVT prophylaxis 12-24 hrs post op --> will start in AM  -NSGY ordered Vanc/Metro for ? Surgical ppx, will clarify

## 2024-12-19 PROBLEM — R41.0 DELIRIUM: Status: ACTIVE | Noted: 2024-12-19

## 2024-12-19 LAB
ACID FAST MOD KINY STN SPEC: NORMAL
ANION GAP SERPL CALC-SCNC: 10 MMOL/L (ref 8–16)
BACTERIA #/AREA URNS HPF: ABNORMAL /HPF
BASOPHILS # BLD AUTO: 0.02 K/UL (ref 0–0.2)
BASOPHILS NFR BLD: 0.2 % (ref 0–1.9)
BILIRUB UR QL STRIP: NEGATIVE
BUN SERPL-MCNC: 7 MG/DL (ref 8–23)
CALCIUM SERPL-MCNC: 8.5 MG/DL (ref 8.7–10.5)
CHLORIDE SERPL-SCNC: 103 MMOL/L (ref 95–110)
CLARITY UR: CLEAR
CO2 SERPL-SCNC: 23 MMOL/L (ref 23–29)
COLOR UR: YELLOW
CREAT SERPL-MCNC: 0.6 MG/DL (ref 0.5–1.4)
DIFFERENTIAL METHOD BLD: ABNORMAL
EOSINOPHIL # BLD AUTO: 0 K/UL (ref 0–0.5)
EOSINOPHIL NFR BLD: 0.1 % (ref 0–8)
ERYTHROCYTE [DISTWIDTH] IN BLOOD BY AUTOMATED COUNT: 13.9 % (ref 11.5–14.5)
EST. GFR  (NO RACE VARIABLE): >60 ML/MIN/1.73 M^2
GLUCOSE SERPL-MCNC: 104 MG/DL (ref 70–110)
GLUCOSE UR QL STRIP: NEGATIVE
GRAM STN SPEC: NORMAL
GRAM STN SPEC: NORMAL
HCT VFR BLD AUTO: 32.2 % (ref 37–48.5)
HGB BLD-MCNC: 10.3 G/DL (ref 12–16)
HGB UR QL STRIP: NEGATIVE
IMM GRANULOCYTES # BLD AUTO: 0.03 K/UL (ref 0–0.04)
IMM GRANULOCYTES NFR BLD AUTO: 0.4 % (ref 0–0.5)
KETONES UR QL STRIP: NEGATIVE
LEUKOCYTE ESTERASE UR QL STRIP: ABNORMAL
LYMPHOCYTES # BLD AUTO: 0.7 K/UL (ref 1–4.8)
LYMPHOCYTES NFR BLD: 9 % (ref 18–48)
MCH RBC QN AUTO: 29.9 PG (ref 27–31)
MCHC RBC AUTO-ENTMCNC: 32 G/DL (ref 32–36)
MCV RBC AUTO: 93 FL (ref 82–98)
MICROSCOPIC COMMENT: ABNORMAL
MONOCYTES # BLD AUTO: 0.8 K/UL (ref 0.3–1)
MONOCYTES NFR BLD: 10 % (ref 4–15)
MYCOBACTERIUM SPEC QL CULT: NORMAL
NEUTROPHILS # BLD AUTO: 6.5 K/UL (ref 1.8–7.7)
NEUTROPHILS NFR BLD: 80.3 % (ref 38–73)
NITRITE UR QL STRIP: NEGATIVE
NRBC BLD-RTO: 0 /100 WBC
PH UR STRIP: 6 [PH] (ref 5–8)
PLATELET # BLD AUTO: 245 K/UL (ref 150–450)
PMV BLD AUTO: 9.5 FL (ref 9.2–12.9)
POTASSIUM SERPL-SCNC: 3.7 MMOL/L (ref 3.5–5.1)
PROT UR QL STRIP: ABNORMAL
RBC # BLD AUTO: 3.45 M/UL (ref 4–5.4)
RBC #/AREA URNS HPF: 5 /HPF (ref 0–4)
SODIUM SERPL-SCNC: 136 MMOL/L (ref 136–145)
SP GR UR STRIP: 1.01 (ref 1–1.03)
URN SPEC COLLECT METH UR: ABNORMAL
UROBILINOGEN UR STRIP-ACNC: NEGATIVE EU/DL
WBC # BLD AUTO: 8.11 K/UL (ref 3.9–12.7)
WBC #/AREA URNS HPF: 71 /HPF (ref 0–5)
WBC CLUMPS URNS QL MICRO: ABNORMAL
YEAST URNS QL MICRO: ABNORMAL

## 2024-12-19 PROCEDURE — 80048 BASIC METABOLIC PNL TOTAL CA: CPT | Performed by: STUDENT IN AN ORGANIZED HEALTH CARE EDUCATION/TRAINING PROGRAM

## 2024-12-19 PROCEDURE — 94799 UNLISTED PULMONARY SVC/PX: CPT

## 2024-12-19 PROCEDURE — 97162 PT EVAL MOD COMPLEX 30 MIN: CPT

## 2024-12-19 PROCEDURE — 87088 URINE BACTERIA CULTURE: CPT | Performed by: PHYSICIAN ASSISTANT

## 2024-12-19 PROCEDURE — 11000001 HC ACUTE MED/SURG PRIVATE ROOM

## 2024-12-19 PROCEDURE — 87186 SC STD MICRODIL/AGAR DIL: CPT | Performed by: PHYSICIAN ASSISTANT

## 2024-12-19 PROCEDURE — 25000003 PHARM REV CODE 250: Performed by: NEUROLOGICAL SURGERY

## 2024-12-19 PROCEDURE — 85025 COMPLETE CBC W/AUTO DIFF WBC: CPT | Performed by: STUDENT IN AN ORGANIZED HEALTH CARE EDUCATION/TRAINING PROGRAM

## 2024-12-19 PROCEDURE — 81000 URINALYSIS NONAUTO W/SCOPE: CPT | Performed by: PHYSICIAN ASSISTANT

## 2024-12-19 PROCEDURE — 36415 COLL VENOUS BLD VENIPUNCTURE: CPT | Performed by: STUDENT IN AN ORGANIZED HEALTH CARE EDUCATION/TRAINING PROGRAM

## 2024-12-19 PROCEDURE — 87086 URINE CULTURE/COLONY COUNT: CPT | Performed by: PHYSICIAN ASSISTANT

## 2024-12-19 PROCEDURE — 63600175 PHARM REV CODE 636 W HCPCS: Mod: JZ,JG | Performed by: NEUROLOGICAL SURGERY

## 2024-12-19 PROCEDURE — 63600175 PHARM REV CODE 636 W HCPCS: Performed by: STUDENT IN AN ORGANIZED HEALTH CARE EDUCATION/TRAINING PROGRAM

## 2024-12-19 PROCEDURE — 25000003 PHARM REV CODE 250: Performed by: PHYSICIAN ASSISTANT

## 2024-12-19 PROCEDURE — 27000221 HC OXYGEN, UP TO 24 HOURS

## 2024-12-19 PROCEDURE — 99024 POSTOP FOLLOW-UP VISIT: CPT | Mod: POP,,, | Performed by: PHYSICIAN ASSISTANT

## 2024-12-19 PROCEDURE — 93010 ELECTROCARDIOGRAM REPORT: CPT | Mod: ,,, | Performed by: INTERNAL MEDICINE

## 2024-12-19 PROCEDURE — 25000003 PHARM REV CODE 250: Performed by: NURSE PRACTITIONER

## 2024-12-19 PROCEDURE — 25000003 PHARM REV CODE 250: Performed by: STUDENT IN AN ORGANIZED HEALTH CARE EDUCATION/TRAINING PROGRAM

## 2024-12-19 PROCEDURE — 99900035 HC TECH TIME PER 15 MIN (STAT)

## 2024-12-19 PROCEDURE — 93005 ELECTROCARDIOGRAM TRACING: CPT

## 2024-12-19 PROCEDURE — 87040 BLOOD CULTURE FOR BACTERIA: CPT | Performed by: STUDENT IN AN ORGANIZED HEALTH CARE EDUCATION/TRAINING PROGRAM

## 2024-12-19 PROCEDURE — 94761 N-INVAS EAR/PLS OXIMETRY MLT: CPT

## 2024-12-19 RX ORDER — POLYETHYLENE GLYCOL 3350 17 G/17G
17 POWDER, FOR SOLUTION ORAL DAILY
Status: DISCONTINUED | OUTPATIENT
Start: 2024-12-19 | End: 2024-12-23 | Stop reason: HOSPADM

## 2024-12-19 RX ORDER — DIAZEPAM 5 MG/1
5 TABLET ORAL ONCE
Status: DISCONTINUED | OUTPATIENT
Start: 2024-12-19 | End: 2024-12-19

## 2024-12-19 RX ORDER — ENOXAPARIN SODIUM 100 MG/ML
40 INJECTION SUBCUTANEOUS DAILY
Status: DISCONTINUED | OUTPATIENT
Start: 2024-12-19 | End: 2024-12-23 | Stop reason: HOSPADM

## 2024-12-19 RX ORDER — CYCLOBENZAPRINE HCL 10 MG
10 TABLET ORAL 3 TIMES DAILY PRN
Status: DISCONTINUED | OUTPATIENT
Start: 2024-12-19 | End: 2024-12-23 | Stop reason: HOSPADM

## 2024-12-19 RX ORDER — MUPIROCIN 20 MG/G
OINTMENT TOPICAL 2 TIMES DAILY
Status: DISCONTINUED | OUTPATIENT
Start: 2024-12-19 | End: 2024-12-23 | Stop reason: HOSPADM

## 2024-12-19 RX ORDER — DOCUSATE SODIUM 100 MG
200 CAPSULE ORAL
Status: DISCONTINUED | OUTPATIENT
Start: 2024-12-19 | End: 2024-12-23 | Stop reason: HOSPADM

## 2024-12-19 RX ORDER — SYRING-NEEDL,DISP,INSUL,0.3 ML 29 G X1/2"
296 SYRINGE, EMPTY DISPOSABLE MISCELLANEOUS ONCE
Status: COMPLETED | OUTPATIENT
Start: 2024-12-19 | End: 2024-12-19

## 2024-12-19 RX ORDER — KETOROLAC TROMETHAMINE 10 MG/1
10 TABLET, FILM COATED ORAL ONCE
Status: COMPLETED | OUTPATIENT
Start: 2024-12-19 | End: 2024-12-19

## 2024-12-19 RX ADMIN — Medication 200 ML: at 09:12

## 2024-12-19 RX ADMIN — Medication 200 ML: at 03:12

## 2024-12-19 RX ADMIN — FOLIC ACID 1 MG: 1 TABLET ORAL at 09:12

## 2024-12-19 RX ADMIN — LISINOPRIL 10 MG: 10 TABLET ORAL at 09:12

## 2024-12-19 RX ADMIN — PAROXETINE HYDROCHLORIDE 40 MG: 20 TABLET, FILM COATED ORAL at 09:12

## 2024-12-19 RX ADMIN — METRONIDAZOLE 500 MG: 5 INJECTION, SOLUTION INTRAVENOUS at 06:12

## 2024-12-19 RX ADMIN — HALOPERIDOL 5 MG: 5 TABLET ORAL at 09:12

## 2024-12-19 RX ADMIN — CYCLOBENZAPRINE HYDROCHLORIDE 10 MG: 10 TABLET, FILM COATED ORAL at 10:12

## 2024-12-19 RX ADMIN — MUPIROCIN: 20 OINTMENT TOPICAL at 09:12

## 2024-12-19 RX ADMIN — THERA TABS 1 TABLET: TAB at 09:12

## 2024-12-19 RX ADMIN — METRONIDAZOLE 500 MG: 5 INJECTION, SOLUTION INTRAVENOUS at 09:12

## 2024-12-19 RX ADMIN — ENOXAPARIN SODIUM 40 MG: 40 INJECTION SUBCUTANEOUS at 09:12

## 2024-12-19 RX ADMIN — VANCOMYCIN HYDROCHLORIDE 2250 MG: 500 INJECTION, POWDER, LYOPHILIZED, FOR SOLUTION INTRAVENOUS at 03:12

## 2024-12-19 RX ADMIN — MAGNESIUM CITRATE 296 ML: 1.75 LIQUID ORAL at 01:12

## 2024-12-19 RX ADMIN — HYDROCODONE BITARTRATE AND ACETAMINOPHEN 1 TABLET: 10; 325 TABLET ORAL at 05:12

## 2024-12-19 RX ADMIN — MUPIROCIN: 20 OINTMENT TOPICAL at 01:12

## 2024-12-19 RX ADMIN — KETOROLAC TROMETHAMINE 10 MG: 10 TABLET, FILM COATED ORAL at 01:12

## 2024-12-19 RX ADMIN — DOCUSATE SODIUM 100 MG: 100 CAPSULE, LIQUID FILLED ORAL at 09:12

## 2024-12-19 RX ADMIN — HYDROCODONE BITARTRATE AND ACETAMINOPHEN 1 TABLET: 10; 325 TABLET ORAL at 09:12

## 2024-12-19 RX ADMIN — POLYETHYLENE GLYCOL 3350 17 G: 17 POWDER, FOR SOLUTION ORAL at 01:12

## 2024-12-19 RX ADMIN — TRAZODONE HYDROCHLORIDE 150 MG: 100 TABLET ORAL at 10:12

## 2024-12-19 NOTE — ASSESSMENT & PLAN NOTE
-She was discharged from [facility] on 10/18/24 for bacteremia. Blood cultures isolated bacteroides, antibiotics adjusted. Repeat blood cultures negative. ID recommended 2 weeks of IV Rocephin for UTI and 2 weeks of Flagyl for bacteremia. She has completed IV abx. Patient to have outpatient colonoscopy due to Bacteroides bacteremia which was done on 11/4/24 which showed moderate diverticulosis. Final pathology -- Tubular adenoma, multiple fragments. Negative for high-grade dysplasia or malignancy   -WBC count currently stable, afebrile

## 2024-12-19 NOTE — ASSESSMENT & PLAN NOTE
Patient has hospital-acquired delirium in the setting of anesthesia, surgery, acute illness, pain  Switching benzodiazepines which were being used as a muscle relaxer to cyclobenzaprine   -checking blood cultures at the recommendations of Neurosurgery   -of note she is on vancomycin and metronidazole for surgical prophylaxis

## 2024-12-19 NOTE — PROGRESS NOTES
Neurosurgery progress note:      Patient is postop day 1 T10 to T12 extension of fusion connection to prior construct.      No acute events overnight patient remains neurologically stable.  She is reporting severe pain surrounding incision.  She denies focal weakness or worsenening symptoms.  Her  is at bedside states that she did not want to take the diazepam.  I explained that from neurosurgical standpoint we prescribe this for muscle relaxation.      Patient is somewhat cooperative on exam however she is tired.        She follows commands and moves all extremities antigravity.           Pertinent positive and negative ROS documented above in HPI, all other systems reviewed and found to be negative.           Physical Exam:  Nursing note and vitals reviewed.     Constitutional: She appears well-nourished. She is not diaphoretic. No distress.      Eyes: Pupils are equal, round, and reactive to light. EOM are normal.      Cardiovascular: Normal rate and regular rhythm.      Psych/Behavior: She is alert. She is oriented to person, place, and time. She has a normal mood and affect.      Musculoskeletal:        Back: Range of motion is limited. There is tenderness. Muscle strength is 5/5.       Right Lower Extremities: Range of motion is full. There is no tenderness. Muscle strength is 5/5. Tone is normal.        Left Lower Extremities: There is no tenderness. Muscle strength is 5/5. Tone is normal.      Neurological:        Sensory: There is no sensory deficit in the trunk. There is no sensory deficit in the extremities.        Cranial nerves: Cranial nerve(s) II, III, IV, V, VI, VII, VIII, IX, X, XI and XII are intact.      General     Nursing note and vitals reviewed.  Constitutional: She is oriented to person, place, and time. She appears well-nourished. No distress.   Eyes: EOM are normal. Pupils are equal, round, and reactive to light.   Cardiovascular:  Normal rate and regular rhythm.             Neurological: She is alert and oriented to person, place, and time.   Psychiatric: She has labile mood and affect flat             Assessment/plan ;      Patient is postop day 1 thoracolumbar fusion.  She neurologically intact.  Pain control will be adjusted.  She continues to have Leija in place we will leave this up to hospital medicine.  Reportedly patient has been delirious overnight.  Blood cultures have been drawn.       Continue Flagyl and vancomycin    Pending results of intra-op cultures    Incision is CDI with nylon        Continue PT/OT    Continue care per Hospital medicine    VTE PPX: lovenox daily scds and teds      Attestation:  Shayna ADHIKARI PA-C have obtained HPI, performed Physical Examination on the above patient, reviewed the pertinent labs, tests, imaging, other relevant data and recorded my findings in this clinic note.        This note was produced using dictation software of voice recognition technology, and some typographical errors may be present.

## 2024-12-19 NOTE — PROGRESS NOTES
Pharmacokinetic Initial Assessment: IV Vancomycin    Assessment/Plan:    Initiate intravenous vancomycin with loading dose of 2250 mg once followed by a maintenance dose of vancomycin 1250mg IV every 12 hours  Desired empiric serum trough concentration is 10 to 15 mcg/mL  Draw vancomycin trough level 60 min prior to fourth dose on 12/21 at approximately 0215  Therapy EOC is planned for 12/23  Pharmacy will continue to follow and monitor vancomycin.      Please contact pharmacy at extension [phone] with any questions regarding this assessment.     Thank you for the consult,   Brianna Mazariegos       Patient brief summary:  Merle Caro is a 69 y.o. female initiated on antimicrobial therapy with IV Vancomycin for treatment of suspected  septic arthritis    Drug Allergies:   Review of patient's allergies indicates:   Allergen Reactions    Codeine Shortness Of Breath    Keflex [cephalexin] Shortness Of Breath    Shellfish containing products Nausea And Vomiting    Flu vaccine ts 2011-12(18 yr+) Hives    Sucralfate Other (See Comments) and Hives       Actual Body Weight:   89 kg    Renal Function:   Estimated Creatinine Clearance: 94.4 mL/min (based on SCr of 0.6 mg/dL).,     Dialysis Method (if applicable):  N/A    CBC (last 72 hours):  Recent Labs   Lab Result Units 12/17/24  1235 12/19/24  0645   WBC K/uL 10.46 8.11   Hemoglobin g/dL 13.4 10.3*   Hematocrit % 42.5 32.2*   Platelets K/uL 311 245   Gran % % 87.6* 80.3*   Lymph % % 5.5* 9.0*   Mono % % 5.0 10.0   Eosinophil % % 0.9 0.1   Basophil % % 0.5 0.2   Differential Method  Automated Automated       Metabolic Panel (last 72 hours):  Recent Labs   Lab Result Units 12/17/24  1235 12/18/24  1436 12/18/24  1710 12/19/24  0645 12/19/24  1537   Sodium mmol/L 138 137 135* 136  --    Potassium mmol/L 3.9 4.2 4.1 3.7  --    Chloride mmol/L 102 103 103 103  --    CO2 mmol/L 26 21* 22* 23  --    Glucose mg/dL 107 142* 134* 104  --    Glucose, UA   --   --   --   --  Negative  "  BUN mg/dL 12 9 9 7*  --    Creatinine mg/dL 0.7 0.7 0.6 0.6  --    Albumin g/dL 3.3* 2.9*  --   --   --    Total Bilirubin mg/dL 0.4 0.4  --   --   --    Alkaline Phosphatase U/L 89 81  --   --   --    AST U/L 12 17  --   --   --    ALT U/L 5* 10  --   --   --        Drug levels (last 3 results):  No results for input(s): "VANCOMYCINRA", "VANCORANDOM", "VANCOMYCINPE", "VANCOPEAK", "VANCOMYCINTR", "VANCOTROUGH" in the last 72 hours.    Microbiologic Results:  Microbiology Results (last 7 days)       Procedure Component Value Units Date/Time    Urine culture [[id number]] Collected: 12/19/24 1537    Order Status: No result Specimen: Urine Updated: 12/19/24 1600    AFB Culture & Smear [[id number]] Collected: 12/18/24 1024    Order Status: Completed Specimen: Wound from Back Updated: 12/19/24 1319     AFB CULTURE STAIN No acid fast bacilli seen.    Narrative:      L1 vertebral body    Blood culture [[id number]] Collected: 12/19/24 1020    Order Status: Sent Specimen: Blood     Blood culture [[id number]] Collected: 12/19/24 1020    Order Status: Sent Specimen: Blood from Peripheral, Wrist, Right     Culture, Anaerobe [[id number]] Collected: 12/18/24 1024    Order Status: Completed Specimen: Wound from Back Updated: 12/19/24 0737     Anaerobic Culture Culture in progress    Narrative:      L1 vertebral body    Aerobic culture [[id number]] Collected: 12/18/24 1024    Order Status: Completed Specimen: Wound from Back Updated: 12/19/24 0728     Aerobic Bacterial Culture No growth    Narrative:      L1 vertebral body    Gram stain [[id number]] Collected: 12/18/24 1024    Order Status: Completed Specimen: Wound from Back Updated: 12/19/24 0215     Gram Stain Result Rare WBC's      No organisms seen    Narrative:      L1 vertebral body    Fungus culture [[id number]] Collected: 12/18/24 1024    Order Status: Sent Specimen: Wound from Back Updated: 12/18/24 1652            "

## 2024-12-19 NOTE — PLAN OF CARE
Patient remains free of falls and injuries during shift. Pain managed with PRN medications. IV fluids infusing as ordered. Call light within reach. Chart check complete. Plan of care ongoing.    Problem: Adult Inpatient Plan of Care  Goal: Plan of Care Review  Outcome: Progressing     Problem: Sepsis/Septic Shock  Goal: Optimal Coping  Outcome: Progressing     Problem: Skin Injury Risk Increased  Goal: Skin Health and Integrity  Outcome: Progressing     Problem: Infection  Goal: Absence of Infection Signs and Symptoms  Outcome: Progressing

## 2024-12-19 NOTE — PT/OT/SLP PROGRESS
Occupational Therapy      Patient Name:  Merle Caro   MRN:  [id number]    Patient not seen today secondary to Pt refused due to pain and spouse reported pt just moved around. Will follow-up  on later date    12/19/2024\  Kylie Leon, OT  [id number]

## 2024-12-19 NOTE — ANESTHESIA POSTPROCEDURE EVALUATION
Anesthesia Post Evaluation    Patient: Merle Caro    Procedure(s) Performed: Procedure(s) (LRB):  FUSION,SPINE,THORACIC,POSTERIOR APPROACH,USING COMPUTER-ASSISTED NAVIGATION (N/A)    Final Anesthesia Type: general      Patient location during evaluation: PACU  Patient participation: Yes- Able to Participate  Level of consciousness: awake and alert and oriented  Post-procedure vital signs: reviewed and stable  Pain management: adequate  Airway patency: patent  LUCILA mitigation strategies: Verification of full reversal of neuromuscular block  PONV status at discharge: No PONV  Anesthetic complications: no      Cardiovascular status: blood pressure returned to baseline and hemodynamically stable  Respiratory status: unassisted  Hydration status: euvolemic  Follow-up not needed.              Vitals Value Taken Time   /57 12/18/24 1959   Temp 37.1 °C (98.7 °F) 12/18/24 1959   Pulse 105 12/18/24 1959   Resp 20 12/18/24 1959   SpO2 97 % 12/18/24 1959         Event Time   Out of Recovery 12/18/2024 14:52:00         Pain/Fawad Score: Pain Rating Prior to Med Admin: 9 (12/18/2024  7:44 PM)  Pain Rating Post Med Admin: 7 (12/18/2024  8:14 PM)  Fawad Score: 8 (12/18/2024  2:45 PM)

## 2024-12-19 NOTE — SUBJECTIVE & OBJECTIVE
Review of Systems   Constitutional:  Negative for fatigue and fever.   HENT: Negative.     Respiratory:  Negative for chest tightness and shortness of breath.    Cardiovascular:  Negative for chest pain and leg swelling.   Gastrointestinal:  Negative for abdominal pain, constipation, diarrhea and nausea.   Genitourinary:  Negative for difficulty urinating and dysuria.   Musculoskeletal:  Positive for arthralgias, back pain, neck pain and neck stiffness.   Skin:  Negative for rash.   Neurological:  Negative for light-headedness and headaches.   Hematological:  Does not bruise/bleed easily.   Psychiatric/Behavioral:  Negative for agitation and behavioral problems.      Objective:     Vital Signs (Most Recent):  Temp: 97.5 °F (36.4 °C) (12/19/24 1213)  Pulse: (!) 126 (12/19/24 1213)  Resp: 16 (12/19/24 1213)  BP: (!) 146/88 (12/19/24 1213)  SpO2: 98 % (12/19/24 1213) Vital Signs (24h Range):  Temp:  [97.5 °F (36.4 °C)-98.7 °F (37.1 °C)] 97.5 °F (36.4 °C)  Pulse:  [108-126] 126  Resp:  [16-20] 16  SpO2:  [81 %-98 %] 98 %  BP: (107-146)/(57-88) 146/88     Weight: 89 kg (196 lb 3.4 oz)  Body mass index is 35.89 kg/m².    Intake/Output Summary (Last 24 hours) at 12/19/2024 1531  Last data filed at 12/19/2024 1520  Gross per 24 hour   Intake 200 ml   Output 2102 ml   Net -1902 ml         Physical Exam  Constitutional:       General: She is not in acute distress.     Comments: Slightly more awake today but still somnolent   HENT:      Head: Normocephalic and atraumatic.      Mouth/Throat:      Mouth: Mucous membranes are moist.      Pharynx: Oropharynx is clear.   Eyes:      General: No scleral icterus.  Cardiovascular:      Rate and Rhythm: Normal rate and regular rhythm.      Heart sounds: No murmur heard.     No gallop.   Pulmonary:      Effort: Pulmonary effort is normal.      Breath sounds: Normal breath sounds.   Abdominal:      General: Bowel sounds are normal. There is no distension.      Tenderness: There is no  abdominal tenderness.   Musculoskeletal:         General: Tenderness present.      Cervical back: Tenderness present.      Right lower leg: No edema.      Left lower leg: No edema.      Comments: Tender along entire spine and throughout back   Skin:     General: Skin is warm and dry.      Comments: Dressing is CDI   Neurological:      Mental Status: Mental status is at baseline.      Comments: Able to squeeze both hands to command, moving all extremities  Oriented to person, place, year   Psychiatric:         Mood and Affect: Mood normal.         Behavior: Behavior normal.             Significant Labs: All pertinent labs within the past 24 hours have been reviewed.    Significant Imaging: I have reviewed all pertinent imaging results/findings within the past 24 hours.

## 2024-12-19 NOTE — ASSESSMENT & PLAN NOTE
-Had thoracic spine fusion with Dr. Encarnacion on 12/18/24, POD 0  -Regular diet now  -Pain management  -Antiemetics PRN  -started DVT prophylaxis with Lovenox 12/19  -of note she is on vancomycin and metronidazole per surgical recommendations for prophylaxis

## 2024-12-19 NOTE — PROGRESS NOTES
[facility] Medicine  Progress Note    Patient Name: Merle Caro  MRN: [id number]  Patient Class: IP- Inpatient   Admission Date: 12/17/2024  Length of Stay: 2 days  Attending Physician: August Freire MD  Primary Care Provider: Marck Major MD        Subjective     Principal Problem:Lumbar burst fracture, closed, initial encounter        HPI:  Merle Caro is a 69 year old female who has  has a past medical history of Anemia, unspecified, Anxiety disorder, unspecified, Arthritis, Asthma, B12 deficiency anemia, Bacillus fragilis infection, Chronic back pain, Chronic constipation, COPD (chronic obstructive pulmonary disease), Depression, GERD (gastroesophageal reflux disease), Hypertension, Insomnia, Lumbar compression fracture, Neurogenic bladder, Osteoporosis, Rheumatoid arthritis, unspecified, Spinal stenosis, Tobacco abuse, Urge incontinence of urine, Urinary outflow obstruction, and Urinary tract infection, E. coli. Who presented to the Emergency Department for back pain. She is scheduled for surgery tomorrow, 12/18/24, with Dr. Encarnacion. She saw him in clinic today. Appears she was in severe pain and unable to get preop labs obtained and it was recommended to come to ED and have HM admit, plan for surgery in AM for thoracic spine fusion for lumbar burst fracture. CBC/CMP stable. Pt admitted for pain management for planned surgery with Dr. Encarnacion.     Overview/Hospital Course:  12/18: Had NSGY today, now back in room, somnolent and endorsing pain in the back but otherwise doing well. Daughter at bedside. Giving pain medication. TLSO brace on the way.  12/19:  Patient doing about the same as yesterday.  Pain is slightly better controlled.   is at the bedside.  Reportedly she had an episode of confusion/hallucination overnight and early in the morning thinking that people were in the room who were not.  Her mental status is better now        Review of Systems   Constitutional:   Negative for fatigue and fever.   HENT: Negative.     Respiratory:  Negative for chest tightness and shortness of breath.    Cardiovascular:  Negative for chest pain and leg swelling.   Gastrointestinal:  Negative for abdominal pain, constipation, diarrhea and nausea.   Genitourinary:  Negative for difficulty urinating and dysuria.   Musculoskeletal:  Positive for arthralgias, back pain, neck pain and neck stiffness.   Skin:  Negative for rash.   Neurological:  Negative for light-headedness and headaches.   Hematological:  Does not bruise/bleed easily.   Psychiatric/Behavioral:  Negative for agitation and behavioral problems.      Objective:     Vital Signs (Most Recent):  Temp: 97.5 °F (36.4 °C) (12/19/24 1213)  Pulse: (!) 126 (12/19/24 1213)  Resp: 16 (12/19/24 1213)  BP: (!) 146/88 (12/19/24 1213)  SpO2: 98 % (12/19/24 1213) Vital Signs (24h Range):  Temp:  [97.5 °F (36.4 °C)-98.7 °F (37.1 °C)] 97.5 °F (36.4 °C)  Pulse:  [108-126] 126  Resp:  [16-20] 16  SpO2:  [81 %-98 %] 98 %  BP: (107-146)/(57-88) 146/88     Weight: 89 kg (196 lb 3.4 oz)  Body mass index is 35.89 kg/m².    Intake/Output Summary (Last 24 hours) at 12/19/2024 1531  Last data filed at 12/19/2024 1520  Gross per 24 hour   Intake 200 ml   Output 2102 ml   Net -1902 ml         Physical Exam  Constitutional:       General: She is not in acute distress.     Comments: Slightly more awake today but still somnolent   HENT:      Head: Normocephalic and atraumatic.      Mouth/Throat:      Mouth: Mucous membranes are moist.      Pharynx: Oropharynx is clear.   Eyes:      General: No scleral icterus.  Cardiovascular:      Rate and Rhythm: Normal rate and regular rhythm.      Heart sounds: No murmur heard.     No gallop.   Pulmonary:      Effort: Pulmonary effort is normal.      Breath sounds: Normal breath sounds.   Abdominal:      General: Bowel sounds are normal. There is no distension.      Tenderness: There is no abdominal tenderness.   Musculoskeletal:          General: Tenderness present.      Cervical back: Tenderness present.      Right lower leg: No edema.      Left lower leg: No edema.      Comments: Tender along entire spine and throughout back   Skin:     General: Skin is warm and dry.      Comments: Dressing is CDI   Neurological:      Mental Status: Mental status is at baseline.      Comments: Able to squeeze both hands to command, moving all extremities  Oriented to person, place, year   Psychiatric:         Mood and Affect: Mood normal.         Behavior: Behavior normal.             Significant Labs: All pertinent labs within the past 24 hours have been reviewed.    Significant Imaging: I have reviewed all pertinent imaging results/findings within the past 24 hours.    Assessment and Plan     * Lumbar burst fracture, closed, initial encounter  -Had thoracic spine fusion with Dr. Encarnacion on 12/18/24, POD 0  -Regular diet now  -Pain management  -Antiemetics PRN  -started DVT prophylaxis with Lovenox 12/19  -of note she is on vancomycin and metronidazole per surgical recommendations for prophylaxis     Delirium  Patient has hospital-acquired delirium in the setting of anesthesia, surgery, acute illness, pain  Switching benzodiazepines which were being used as a muscle relaxer to cyclobenzaprine   -checking blood cultures at the recommendations of Neurosurgery   -of note she is on vancomycin and metronidazole for surgical prophylaxis      Bacteremia  -She was discharged from [facility] on 10/18/24 for bacteremia. Blood cultures isolated bacteroides, antibiotics adjusted. Repeat blood cultures negative. ID recommended 2 weeks of IV Rocephin for UTI and 2 weeks of Flagyl for bacteremia. She has completed IV abx. Patient to have outpatient colonoscopy due to Bacteroides bacteremia which was done on 11/4/24 which showed moderate diverticulosis. Final pathology -- Tubular adenoma, multiple fragments. Negative for high-grade dysplasia or malignancy   -WBC count  currently stable, afebrile     Severe obesity (BMI 35.0-39.9) with comorbidity  Body mass index is 35.89 kg/m². Morbid obesity complicates all aspects of disease management from diagnostic modalities to treatment. Weight loss encouraged and health benefits explained to patient.         Anemia  Most recent hemoglobin and hematocrit are listed below.  Recent Labs     12/17/24  1235 12/19/24  0645   HGB 13.4 10.3*   HCT 42.5 32.2*       Plan  - Monitor serial CBC: Daily  - Transfuse PRBC if patient becomes hemodynamically unstable, symptomatic or H/H drops below 7/21.  - Patient has not received any PRBC transfusions to date  - Patient's anemia is currently stable    Primary hypertension  Patient's blood pressure range in the last 24 hours was: BP  Min: 94/52  Max: 168/92.The patient's inpatient anti-hypertensive regimen is listed below:  Current Antihypertensives  lisinopriL tablet 10 mg, Nightly, Oral  hydrALAZINE injection 10 mg, Every 6 hours PRN, Intravenous    Plan  - BP is controlled, no changes needed to their regimen  - Hydralazine PRN    Asthma with COPD  -Not currently in exacerbation  -DuoNebs PRN        VTE Risk Mitigation (From admission, onward)           Ordered     enoxaparin injection 40 mg  Daily         12/19/24 0729     Reason for no Mechanical VTE Prophylaxis  Once        Question:  Reasons:  Answer:  Risk of Bleeding    12/18/24 1340     IP VTE HIGH RISK PATIENT  Once         12/17/24 1453     Place sequential compression device  Until discontinued         12/17/24 1453                    Discharge Planning   YELENA:      Code Status: Full Code   Medical Readiness for Discharge Date:   Discharge Plan A: Home Health              August Freire MD  Department of Hospital Medicine   [facility]

## 2024-12-19 NOTE — PLAN OF CARE
PT EVAL complete. Required MIN A for rolling, pt refused all other mobility due to pain. Recommendation TBD pending participation and further assessment.
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
## 2024-12-19 NOTE — ASSESSMENT & PLAN NOTE
Most recent hemoglobin and hematocrit are listed below.  Recent Labs     12/17/24  1235 12/19/24  0645   HGB 13.4 10.3*   HCT 42.5 32.2*       Plan  - Monitor serial CBC: Daily  - Transfuse PRBC if patient becomes hemodynamically unstable, symptomatic or H/H drops below 7/21.  - Patient has not received any PRBC transfusions to date  - Patient's anemia is currently stable

## 2024-12-20 PROBLEM — R82.81 PYURIA: Status: ACTIVE | Noted: 2024-12-20

## 2024-12-20 LAB
ANION GAP SERPL CALC-SCNC: 10 MMOL/L (ref 8–16)
BASOPHILS # BLD AUTO: 0.04 K/UL (ref 0–0.2)
BASOPHILS NFR BLD: 0.4 % (ref 0–1.9)
BUN SERPL-MCNC: 9 MG/DL (ref 8–23)
CALCIUM SERPL-MCNC: 8.2 MG/DL (ref 8.7–10.5)
CHLORIDE SERPL-SCNC: 102 MMOL/L (ref 95–110)
CO2 SERPL-SCNC: 25 MMOL/L (ref 23–29)
CREAT SERPL-MCNC: 0.7 MG/DL (ref 0.5–1.4)
DIFFERENTIAL METHOD BLD: ABNORMAL
EOSINOPHIL # BLD AUTO: 0.2 K/UL (ref 0–0.5)
EOSINOPHIL NFR BLD: 1.9 % (ref 0–8)
ERYTHROCYTE [DISTWIDTH] IN BLOOD BY AUTOMATED COUNT: 14.2 % (ref 11.5–14.5)
EST. GFR  (NO RACE VARIABLE): >60 ML/MIN/1.73 M^2
FINAL PATHOLOGIC DIAGNOSIS: NORMAL
GLUCOSE SERPL-MCNC: 85 MG/DL (ref 70–110)
GROSS: NORMAL
HCT VFR BLD AUTO: 31.4 % (ref 37–48.5)
HGB BLD-MCNC: 9.6 G/DL (ref 12–16)
IMM GRANULOCYTES # BLD AUTO: 0.05 K/UL (ref 0–0.04)
IMM GRANULOCYTES NFR BLD AUTO: 0.6 % (ref 0–0.5)
LYMPHOCYTES # BLD AUTO: 1.4 K/UL (ref 1–4.8)
LYMPHOCYTES NFR BLD: 15 % (ref 18–48)
Lab: NORMAL
MCH RBC QN AUTO: 28.5 PG (ref 27–31)
MCHC RBC AUTO-ENTMCNC: 30.6 G/DL (ref 32–36)
MCV RBC AUTO: 93 FL (ref 82–98)
MICROSCOPIC EXAM: NORMAL
MONOCYTES # BLD AUTO: 1.3 K/UL (ref 0.3–1)
MONOCYTES NFR BLD: 14.2 % (ref 4–15)
NEUTROPHILS # BLD AUTO: 6.1 K/UL (ref 1.8–7.7)
NEUTROPHILS NFR BLD: 67.9 % (ref 38–73)
NRBC BLD-RTO: 0 /100 WBC
PLATELET # BLD AUTO: 237 K/UL (ref 150–450)
PMV BLD AUTO: 9.5 FL (ref 9.2–12.9)
POTASSIUM SERPL-SCNC: 3.7 MMOL/L (ref 3.5–5.1)
RBC # BLD AUTO: 3.37 M/UL (ref 4–5.4)
SODIUM SERPL-SCNC: 137 MMOL/L (ref 136–145)
WBC # BLD AUTO: 9 K/UL (ref 3.9–12.7)

## 2024-12-20 PROCEDURE — 94799 UNLISTED PULMONARY SVC/PX: CPT

## 2024-12-20 PROCEDURE — 25000003 PHARM REV CODE 250: Performed by: NURSE PRACTITIONER

## 2024-12-20 PROCEDURE — 97166 OT EVAL MOD COMPLEX 45 MIN: CPT

## 2024-12-20 PROCEDURE — 94761 N-INVAS EAR/PLS OXIMETRY MLT: CPT

## 2024-12-20 PROCEDURE — 99024 POSTOP FOLLOW-UP VISIT: CPT | Mod: POP,,, | Performed by: PHYSICIAN ASSISTANT

## 2024-12-20 PROCEDURE — 85025 COMPLETE CBC W/AUTO DIFF WBC: CPT | Performed by: STUDENT IN AN ORGANIZED HEALTH CARE EDUCATION/TRAINING PROGRAM

## 2024-12-20 PROCEDURE — 97530 THERAPEUTIC ACTIVITIES: CPT

## 2024-12-20 PROCEDURE — 63600175 PHARM REV CODE 636 W HCPCS: Mod: JZ,JG | Performed by: NEUROLOGICAL SURGERY

## 2024-12-20 PROCEDURE — 36415 COLL VENOUS BLD VENIPUNCTURE: CPT | Performed by: STUDENT IN AN ORGANIZED HEALTH CARE EDUCATION/TRAINING PROGRAM

## 2024-12-20 PROCEDURE — 63600175 PHARM REV CODE 636 W HCPCS: Performed by: STUDENT IN AN ORGANIZED HEALTH CARE EDUCATION/TRAINING PROGRAM

## 2024-12-20 PROCEDURE — 11000001 HC ACUTE MED/SURG PRIVATE ROOM

## 2024-12-20 PROCEDURE — 99900035 HC TECH TIME PER 15 MIN (STAT)

## 2024-12-20 PROCEDURE — 27000221 HC OXYGEN, UP TO 24 HOURS

## 2024-12-20 PROCEDURE — 25000003 PHARM REV CODE 250: Performed by: PHYSICIAN ASSISTANT

## 2024-12-20 PROCEDURE — 80048 BASIC METABOLIC PNL TOTAL CA: CPT | Performed by: STUDENT IN AN ORGANIZED HEALTH CARE EDUCATION/TRAINING PROGRAM

## 2024-12-20 PROCEDURE — 25000003 PHARM REV CODE 250: Performed by: STUDENT IN AN ORGANIZED HEALTH CARE EDUCATION/TRAINING PROGRAM

## 2024-12-20 PROCEDURE — 93005 ELECTROCARDIOGRAM TRACING: CPT

## 2024-12-20 PROCEDURE — 93010 ELECTROCARDIOGRAM REPORT: CPT | Mod: ,,, | Performed by: INTERNAL MEDICINE

## 2024-12-20 PROCEDURE — 25000003 PHARM REV CODE 250: Performed by: NEUROLOGICAL SURGERY

## 2024-12-20 RX ORDER — TRAZODONE HYDROCHLORIDE 100 MG/1
100 TABLET ORAL NIGHTLY PRN
Status: DISCONTINUED | OUTPATIENT
Start: 2024-12-20 | End: 2024-12-23 | Stop reason: HOSPADM

## 2024-12-20 RX ADMIN — HYDROMORPHONE HYDROCHLORIDE 1 MG: 1 INJECTION, SOLUTION INTRAMUSCULAR; INTRAVENOUS; SUBCUTANEOUS at 07:12

## 2024-12-20 RX ADMIN — LISINOPRIL 10 MG: 10 TABLET ORAL at 09:12

## 2024-12-20 RX ADMIN — Medication 200 ML: at 09:12

## 2024-12-20 RX ADMIN — CYCLOBENZAPRINE HYDROCHLORIDE 10 MG: 10 TABLET, FILM COATED ORAL at 09:12

## 2024-12-20 RX ADMIN — METRONIDAZOLE 500 MG: 5 INJECTION, SOLUTION INTRAVENOUS at 12:12

## 2024-12-20 RX ADMIN — MUPIROCIN: 20 OINTMENT TOPICAL at 09:12

## 2024-12-20 RX ADMIN — THERA TABS 1 TABLET: TAB at 09:12

## 2024-12-20 RX ADMIN — METRONIDAZOLE 500 MG: 5 INJECTION, SOLUTION INTRAVENOUS at 05:12

## 2024-12-20 RX ADMIN — VANCOMYCIN HYDROCHLORIDE 1250 MG: 1.25 INJECTION, POWDER, LYOPHILIZED, FOR SOLUTION INTRAVENOUS at 04:12

## 2024-12-20 RX ADMIN — PAROXETINE HYDROCHLORIDE 40 MG: 20 TABLET, FILM COATED ORAL at 09:12

## 2024-12-20 RX ADMIN — Medication 200 ML: at 05:12

## 2024-12-20 RX ADMIN — HALOPERIDOL 5 MG: 5 TABLET ORAL at 09:12

## 2024-12-20 RX ADMIN — POLYETHYLENE GLYCOL 3350 17 G: 17 POWDER, FOR SOLUTION ORAL at 09:12

## 2024-12-20 RX ADMIN — Medication 200 ML: at 04:12

## 2024-12-20 RX ADMIN — VANCOMYCIN HYDROCHLORIDE 1250 MG: 1.25 INJECTION, POWDER, LYOPHILIZED, FOR SOLUTION INTRAVENOUS at 03:12

## 2024-12-20 RX ADMIN — BISACODYL 10 MG: 10 SUPPOSITORY RECTAL at 09:12

## 2024-12-20 RX ADMIN — DOCUSATE SODIUM 100 MG: 100 CAPSULE, LIQUID FILLED ORAL at 09:12

## 2024-12-20 RX ADMIN — ENOXAPARIN SODIUM 40 MG: 40 INJECTION SUBCUTANEOUS at 09:12

## 2024-12-20 RX ADMIN — TRAZODONE HYDROCHLORIDE 100 MG: 100 TABLET ORAL at 09:12

## 2024-12-20 RX ADMIN — METRONIDAZOLE 500 MG: 5 INJECTION, SOLUTION INTRAVENOUS at 09:12

## 2024-12-20 RX ADMIN — Medication 200 ML: at 12:12

## 2024-12-20 RX ADMIN — FOLIC ACID 1 MG: 1 TABLET ORAL at 09:12

## 2024-12-20 RX ADMIN — HYDROCODONE BITARTRATE AND ACETAMINOPHEN 1 TABLET: 10; 325 TABLET ORAL at 12:12

## 2024-12-20 NOTE — PLAN OF CARE
Pt's brace was delivered at bedside.  1:58pm SW spoke with pt and son regarding snf placement. Pt's son declined saying snf doesn't care for pt's needs. Pt's son stated pt has someone with her at all times as wel as [place]. Pt has all needed equipment.

## 2024-12-20 NOTE — PLAN OF CARE
OT karla completed. Recommends mod intensity therapy.  Max A for bed mobility. Max A x2 for t/fs with RW.

## 2024-12-20 NOTE — PLAN OF CARE
Discussed plan of care with patient and this patient is able , verbalized understanding.  Patient remains free from injury.  Safety and comfort  precautions maintained this shift.   Call light and personal belongings within reach, bed in low position with bed wheels locked.  No s/s of acute distress.   Purposeful rounding continued this shift.  Pain levels are controlled per MD order. IVF infusing.  Cardiac monitoring in place.  Diet orders continued..  Vital signs continued per order.  Patient mobility status remains independent  Chart and orders review completed.   Patient education about care completed.     Problem: Adult Inpatient Plan of Care  Goal: Plan of Care Review  Outcome: Progressing  Goal: Patient-Specific Goal (Individualized)  Outcome: Progressing  Goal: Absence of Hospital-Acquired Illness or Injury  Outcome: Progressing  Goal: Optimal Comfort and Wellbeing  Outcome: Progressing  Goal: Readiness for Transition of Care  Outcome: Progressing     Problem: Sepsis/Septic Shock  Goal: Optimal Coping  Outcome: Progressing  Goal: Absence of Bleeding  Outcome: Progressing  Goal: Blood Glucose Level Within Targeted Range  Outcome: Progressing  Goal: Absence of Infection Signs and Symptoms  Outcome: Progressing  Goal: Optimal Nutrition Intake  Outcome: Progressing     Problem: Infection  Goal: Absence of Infection Signs and Symptoms  Outcome: Progressing     Problem: Skin Injury Risk Increased  Goal: Skin Health and Integrity  Outcome: Progressing     Problem: Wound  Goal: Optimal Coping  Outcome: Progressing  Goal: Optimal Functional Ability  Outcome: Progressing  Goal: Absence of Infection Signs and Symptoms  Outcome: Progressing  Goal: Improved Oral Intake  Outcome: Progressing  Goal: Optimal Pain Control and Function  Outcome: Progressing  Goal: Skin Health and Integrity  Outcome: Progressing  Goal: Optimal Wound Healing  Outcome: Progressing     Problem: Fall Injury Risk  Goal: Absence of Fall and  Fall-Related Injury  Outcome: Progressing

## 2024-12-20 NOTE — PROGRESS NOTES
Neurosurgery progress note:      Patient is postop day 2 T10 to T12 extension of fusion connection to prior construct.      No acute events overnight patient remains neurologically stable.  Patient is awake and alert she is cooperative on exam.  Reports back pain persistent however more tolerable with current regimen.  She denies focal weakness.    She is eating a little bit throughout the day tolerating p.o. fluid intake    She denies severe back pain or sharp shooting pain down her legs.        She follows commands and moves all extremities antigravity.      Her  is at bedside.     Pertinent positive and negative ROS documented above in HPI, all other systems reviewed and found to be negative.           Physical Exam:  Nursing note and vitals reviewed.     Constitutional: She appears well-nourished. She is not diaphoretic. No distress.      Eyes: Pupils are equal, round, and reactive to light. EOM are normal.      Cardiovascular: Normal rate and regular rhythm.      Psych/Behavior: She is alert. She is oriented to person, place, and time. She has a normal mood and affect.      Musculoskeletal:        Back: Range of motion is limited. There is tenderness. Muscle strength is intact      Right Lower Extremities: Range of motion is full. There is no tenderness. Muscle strength is antigravity. Tone is normal.        Left Lower Extremities: There is no tenderness. Muscle strength is antigravity Tone is normal.      Neurological:        Sensory: There is no sensory deficit in the trunk. There is no sensory deficit in the extremities.        Cranial nerves: Cranial nerve(s) II, III, IV, V, VI, VII, VIII, IX, X, XI and XII are intact.      General     Nursing note and vitals reviewed.  Constitutional: She is oriented to person, place, and time. She appears well-nourished. No distress.   Eyes: EOM are normal. Pupils are equal, round, and reactive to light.   Cardiovascular:  Normal rate and regular rhythm.             Neurological: She is alert and oriented to person, place, and time.   Psychiatric: She has labile mood and affect flat             Assessment/plan ;      Patient is postop day 2 thoracolumbar fusion.  She neurologically intact.  Urine analysis shows positive.  We will defer to hospital medicine service on further antibiotic if indicated.    I reviewed x-rays which show instrumentation in appropriate position.  Incision was evaluated and redressed CDI though hematoma or drainage nylon sutures in place    Continue Flagyl and vancomycin 5 days pending results of culture    Pending results of intra-op cultures    Incision is CDI with nylon        Continue PT/OT    Continue care per Hospital medicine    VTE PPX: lovenox daily scds and teds      Attestation:  IShayna PA-C have obtained HPI, performed Physical Examination on the above patient, reviewed the pertinent labs, tests, imaging, other relevant data and recorded my findings in this clinic note.        This note was produced using dictation software of voice recognition technology, and some typographical errors may be present.

## 2024-12-20 NOTE — PLAN OF CARE
Problem: Adult Inpatient Plan of Care  Goal: Plan of Care Review  Outcome: Progressing  Pt verbalized understanding of plan of care. IVAB continued. LBM 12/15, pt refusing suppository.  PA notified, mg citrate and miralax ordered.   Informed patient of risk of falling. Discussed fall prevention strategies. Bed locked and low.  Call light and personal items within reach. SR up x 2.

## 2024-12-20 NOTE — PROGRESS NOTES
[facility] Medicine  Progress Note    Patient Name: Merle Caro  MRN: [id number]  Patient Class: IP- Inpatient   Admission Date: 12/17/2024  Length of Stay: 3 days  Attending Physician: August Freire MD  Primary Care Provider: Marck Major MD        Subjective     Principal Problem:Lumbar burst fracture, closed, initial encounter        HPI:  Merle Caro is a 69 year old female who has  has a past medical history of Anemia, unspecified, Anxiety disorder, unspecified, Arthritis, Asthma, B12 deficiency anemia, Bacillus fragilis infection, Chronic back pain, Chronic constipation, COPD (chronic obstructive pulmonary disease), Depression, GERD (gastroesophageal reflux disease), Hypertension, Insomnia, Lumbar compression fracture, Neurogenic bladder, Osteoporosis, Rheumatoid arthritis, unspecified, Spinal stenosis, Tobacco abuse, Urge incontinence of urine, Urinary outflow obstruction, and Urinary tract infection, E. coli. Who presented to the Emergency Department for back pain. She is scheduled for surgery tomorrow, 12/18/24, with Dr. Encarnacion. She saw him in clinic today. Appears she was in severe pain and unable to get preop labs obtained and it was recommended to come to ED and have HM admit, plan for surgery in AM for thoracic spine fusion for lumbar burst fracture. CBC/CMP stable. Pt admitted for pain management for planned surgery with Dr. Encarnacion.     Overview/Hospital Course:  12/18: Had NSGY today, now back in room, somnolent and endorsing pain in the back but otherwise doing well. Daughter at bedside. Giving pain medication. TLSO brace on the way.  12/19:  Patient doing about the same as yesterday.  Pain is slightly better controlled.   is at the bedside.  Reportedly she had an episode of confusion/hallucination overnight and early in the morning thinking that people were in the room who were not.  Her mental status is better now  12/20: Slept much better last night after  receiving trazodone.  Of note we had given her 150 mg at night when at home she normally takes 75 mg, may need to reduce the dose tonight.  Still having significant pain but it is overall much improved.  No hallucinations.  Was not able to work much with PT yesterday, they will hopefully come back today        Review of Systems   Constitutional:  Positive for fatigue. Negative for fever.   HENT: Negative.     Respiratory:  Negative for chest tightness and shortness of breath.    Cardiovascular:  Negative for chest pain and leg swelling.   Gastrointestinal:  Negative for abdominal pain, constipation, diarrhea and nausea.   Genitourinary:  Negative for difficulty urinating and dysuria.   Musculoskeletal: Negative.    Skin:  Negative for rash.   Neurological:  Negative for light-headedness and headaches.   Hematological:  Does not bruise/bleed easily.   Psychiatric/Behavioral:  Negative for agitation and behavioral problems.      Objective:     Vital Signs (Most Recent):  Temp: 97.5 °F (36.4 °C) (12/20/24 0832)  Pulse: (!) 118 (12/20/24 0832)  Resp: 18 (12/20/24 1023)  BP: (!) 110/54 (12/20/24 0832)  SpO2: 96 % (12/20/24 1023) Vital Signs (24h Range):  Temp:  [97.3 °F (36.3 °C)-98.4 °F (36.9 °C)] 97.5 °F (36.4 °C)  Pulse:  [101-126] 118  Resp:  [16-18] 18  SpO2:  [95 %-99 %] 96 %  BP: (100-146)/(52-88) 110/54     Weight: 78.5 kg (173 lb 1 oz)  Body mass index is 31.65 kg/m².    Intake/Output Summary (Last 24 hours) at 12/20/2024 1114  Last data filed at 12/20/2024 0931  Gross per 24 hour   Intake 1010 ml   Output 2500 ml   Net -1490 ml         Physical Exam  Constitutional:       General: She is not in acute distress.     Comments: Slightly more awake today but still somnolent   HENT:      Head: Normocephalic and atraumatic.      Mouth/Throat:      Mouth: Mucous membranes are moist.      Pharynx: Oropharynx is clear.   Eyes:      General: No scleral icterus.  Cardiovascular:      Rate and Rhythm: Normal rate and  regular rhythm.      Heart sounds: No murmur heard.     No gallop.   Pulmonary:      Effort: Pulmonary effort is normal.      Breath sounds: Normal breath sounds.   Abdominal:      General: Bowel sounds are normal. There is no distension.      Tenderness: There is no abdominal tenderness.   Musculoskeletal:         General: Tenderness present.      Cervical back: Tenderness present.      Right lower leg: No edema.      Left lower leg: No edema.      Comments: Tender along entire spine and throughout back   Skin:     General: Skin is warm and dry.      Comments: Dressing is CDI   Neurological:      Mental Status: Mental status is at baseline.      Comments: Able to squeeze both hands to command, moving all extremities  Oriented to person, place, year   Psychiatric:         Mood and Affect: Mood normal.         Behavior: Behavior normal.             Significant Labs: All pertinent labs within the past 24 hours have been reviewed.    Significant Imaging: I have reviewed all pertinent imaging results/findings within the past 24 hours.    Assessment and Plan     * Lumbar burst fracture, closed, initial encounter  -Had thoracic spine fusion with Dr. Encarnacion on 12/18/24, POD 0  -Regular diet now  -Pain management  -Antiemetics PRN  -started DVT prophylaxis with Lovenox 12/19  -of note she is on vancomycin and metronidazole per surgical recommendations for prophylaxis     Pyuria  No symptoms that I am aware of and does have yeast in the urine which could cause some pyuria.  I am not convinced she actually has a UTI.  The urine culture does not have a result.  She is on vancomycin and metronidazole postoperatively.  One option would be to switch the vancomycin to ceftriaxone to cover potential UTI although continuing the current management is reasonable      Delirium  Patient has hospital-acquired delirium in the setting of anesthesia, surgery, acute illness, pain  Switched benzodiazepines which were being used as a muscle  relaxer to cyclobenzaprine   Delirium seems to have improved today  -checked blood cultures at the recommendations of Neurosurgery (NGTD)  -of note she is on vancomycin and metronidazole for surgical prophylaxis      Bacteremia  -She was discharged from [facility] on 10/18/24 for bacteremia. Blood cultures isolated bacteroides, antibiotics adjusted. Repeat blood cultures negative. ID recommended 2 weeks of IV Rocephin for UTI and 2 weeks of Flagyl for bacteremia. She has completed IV abx. Patient to have outpatient colonoscopy due to Bacteroides bacteremia which was done on 11/4/24 which showed moderate diverticulosis. Final pathology -- Tubular adenoma, multiple fragments. Negative for high-grade dysplasia or malignancy   -WBC count currently stable, afebrile     Severe obesity (BMI 35.0-39.9) with comorbidity  Body mass index is 35.89 kg/m². Morbid obesity complicates all aspects of disease management from diagnostic modalities to treatment. Weight loss encouraged and health benefits explained to patient.         Anemia  Most recent hemoglobin and hematocrit are listed below.  Recent Labs     12/17/24  1235 12/19/24  0645   HGB 13.4 10.3*   HCT 42.5 32.2*       Plan  - Monitor serial CBC: Daily  - Transfuse PRBC if patient becomes hemodynamically unstable, symptomatic or H/H drops below 7/21.  - Patient has not received any PRBC transfusions to date  - Patient's anemia is currently stable    Primary hypertension  Patient's blood pressure range in the last 24 hours was: BP  Min: 94/52  Max: 168/92.The patient's inpatient anti-hypertensive regimen is listed below:  Current Antihypertensives  lisinopriL tablet 10 mg, Nightly, Oral  hydrALAZINE injection 10 mg, Every 6 hours PRN, Intravenous    Plan  - BP is controlled, no changes needed to their regimen  - Hydralazine PRN    Asthma with COPD  -Not currently in exacerbation  -DuoNebs PRN        VTE Risk Mitigation (From admission, onward)           Ordered     enoxaparin  injection 40 mg  Daily         12/19/24 0729     Reason for no Mechanical VTE Prophylaxis  Once        Question:  Reasons:  Answer:  Risk of Bleeding    12/18/24 1340     IP VTE HIGH RISK PATIENT  Once         12/17/24 1453     Place sequential compression device  Until discontinued         12/17/24 1453                    Discharge Planning   YELENA:      Code Status: Full Code   Medical Readiness for Discharge Date:   Discharge Plan A: Home Health        Plan for her to go to skilled nursing or acute rehab at [facility] she will need to be able to work more with physical therapy which we hope she will be able to do today          August Freire MD  Department of Hospital Medicine   [facility]

## 2024-12-20 NOTE — ASSESSMENT & PLAN NOTE
Patient has hospital-acquired delirium in the setting of anesthesia, surgery, acute illness, pain  Switched benzodiazepines which were being used as a muscle relaxer to cyclobenzaprine   Delirium seems to have improved today  -checked blood cultures at the recommendations of Neurosurgery (NGTD)  -of note she is on vancomycin and metronidazole for surgical prophylaxis

## 2024-12-20 NOTE — PT/OT/SLP PROGRESS
Physical Therapy  Treatment    Merle Caro   MRN: [id number]   Admitting Diagnosis: Lumbar burst fracture, closed, initial encounter       PT Start Time: 1209     PT Stop Time: 1234    PT Total Time (min): 25 min       Billable Minutes:  Therapeutic Activity 25    Treatment Type: Evaluation  PT/PTA: PT     Number of PTA visits since last PT visit: 0       General Precautions: Standard, fall  Orthopedic Precautions: N/A  Braces: N/A  Respiratory Status: Nasal cannula, flow 2 L/min    Spiritual, Cultural Beliefs, Sabianist Practices, Values that Affect Care: no    Subjective:  Communicated with nursing (Martha) and performed chart review via epic system prior to session.  Pt agreeable to PT services with encouragement    Pain/Comfort  Pain Rating 1: 10/10  Pain Addressed 1: Reposition, Distraction, Nurse notified  Pain Rating Post-Intervention 1: 10/10    Objective:   Patient found with: peripheral IV, telemetry, nicholson catheter, oxygen Pt supine in bed with spouse at bedside    Functional Mobility:  Bed Mobility:    Supine to sit max A using log rolling technique   Seated scooting with max A x 2   Pt tolerated sitting EOB x 5-8 mins with mod/min A for sitting balance    Transfers:   Sit<>Stand max A x 2 HHA, twice with cues for anterior weight shifting and hand placement   Stand pivot max A x 2 with HHA     Gait: LSO  donned prior to OOB activity, gait belt donned as well   Facilitated gait activity 2-3 steps with max A x 2 and HHA, demonstrated slow pace, flexed posture, wide JAKE and shuffled steps. Pt unable to fully extend RLE      Balance:   Dynamic Sit: FAIR: Cannot move trunk without losing balance  Dynamic stand: POOR minus: Needs MAX assist during gait       Treatment and Education:  Educated pt on benefits of consistent participation in PT services to meet functional goals. Educated pt on importance of sitting OOB to promote endurance and overall activity tolerance. Educated pt on call don't fall policy and  use of call button to alert nursing staff of needs (including to assist with returning back to bed). Pt expressed understanding.      AM-PAC 6 CLICK MOBILITY  How much help from another person does this patient currently need?   1 = Unable, Total/Dependent Assistance  2 = A lot, Maximum/Moderate Assistance  3 = A little, Minimum/Contact Guard/Supervision  4 = None, Modified [place]/Independent    Turning over in bed (including adjusting bedclothes, sheets and blankets)?: 2  Sitting down on and standing up from a chair with arms (e.g., wheelchair, bedside commode, etc.): 2  Moving from lying on back to sitting on the side of the bed?: 2  Moving to and from a bed to a chair (including a wheelchair)?: 2  Need to walk in hospital room?: 2  Climbing 3-5 steps with a railing?: 1  Basic Mobility Total Score: 11    AM-PAC Raw Score CMS G-Code Modifier Level of Impairment Assistance   6 % Total / Unable   7 - 9 CM 80 - 100% Maximal Assist   10 - 14 CL 60 - 80% Moderate Assist   15 - 19 CK 40 - 60% Moderate Assist   20 - 22 CJ 20 - 40% Minimal Assist   23 CI 1-20% SBA / CGA   24 CH 0% Independent/ Mod I     Patient left up in chair with all lines intact, call button in reach, chair alarm on, nursing notified, and nursing and spouse present.    Assessment:  Merle Caro is a 69 y.o. female with a medical diagnosis of Lumbar burst fracture, closed, initial encounter and presents with deficits in overall mobility and increased risk of falls. Pt was able to tolerate functional tasks with max A and increased time. Reports of dizziness but better able to participate overall today. Pt will benefit from continued PT services in order to progress toward baseline.    Rehab identified problem list/impairments: weakness, impaired endurance, impaired functional mobility, gait instability, impaired balance, decreased lower extremity function, decreased safety awareness, impaired cognition, decreased coordination    Rehab  potential is good.    Activity tolerance: Fair    Discharge recommendations: Moderate Intensity Therapy      Barriers to discharge:      Equipment recommendations:  (TBD)     GOALS:   Multidisciplinary Problems       Physical Therapy Goals          Problem: Physical Therapy    Goal Priority Disciplines Outcome Interventions   Physical Therapy Goal     PT, PT/OT Progressing    Description: Goals to be met by 1/2/25.  1. Pt will complete bed mobility CGA.  2. Pt will complete sit to stand CGA.  3. Pt will transfer bed to chair MIN A using RW.  4. Pt will increase AMPAC score by 2 points to progress functional mobility.                       PLAN:    Patient to be seen 3 x/week to address the above listed problems via gait training, therapeutic activities, therapeutic exercises, neuromuscular re-education  Plan of Care expires: 01/02/25  Plan of Care reviewed with: patient, spouse         12/20/2024

## 2024-12-20 NOTE — SUBJECTIVE & OBJECTIVE
Review of Systems   Constitutional:  Positive for fatigue. Negative for fever.   HENT: Negative.     Respiratory:  Negative for chest tightness and shortness of breath.    Cardiovascular:  Negative for chest pain and leg swelling.   Gastrointestinal:  Negative for abdominal pain, constipation, diarrhea and nausea.   Genitourinary:  Negative for difficulty urinating and dysuria.   Musculoskeletal: Negative.    Skin:  Negative for rash.   Neurological:  Negative for light-headedness and headaches.   Hematological:  Does not bruise/bleed easily.   Psychiatric/Behavioral:  Negative for agitation and behavioral problems.      Objective:     Vital Signs (Most Recent):  Temp: 97.5 °F (36.4 °C) (12/20/24 0832)  Pulse: (!) 118 (12/20/24 0832)  Resp: 18 (12/20/24 1023)  BP: (!) 110/54 (12/20/24 0832)  SpO2: 96 % (12/20/24 1023) Vital Signs (24h Range):  Temp:  [97.3 °F (36.3 °C)-98.4 °F (36.9 °C)] 97.5 °F (36.4 °C)  Pulse:  [101-126] 118  Resp:  [16-18] 18  SpO2:  [95 %-99 %] 96 %  BP: (100-146)/(52-88) 110/54     Weight: 78.5 kg (173 lb 1 oz)  Body mass index is 31.65 kg/m².    Intake/Output Summary (Last 24 hours) at 12/20/2024 1114  Last data filed at 12/20/2024 0931  Gross per 24 hour   Intake 1010 ml   Output 2500 ml   Net -1490 ml         Physical Exam  Constitutional:       General: She is not in acute distress.     Comments: Slightly more awake today but still somnolent   HENT:      Head: Normocephalic and atraumatic.      Mouth/Throat:      Mouth: Mucous membranes are moist.      Pharynx: Oropharynx is clear.   Eyes:      General: No scleral icterus.  Cardiovascular:      Rate and Rhythm: Normal rate and regular rhythm.      Heart sounds: No murmur heard.     No gallop.   Pulmonary:      Effort: Pulmonary effort is normal.      Breath sounds: Normal breath sounds.   Abdominal:      General: Bowel sounds are normal. There is no distension.      Tenderness: There is no abdominal tenderness.   Musculoskeletal:          General: Tenderness present.      Cervical back: Tenderness present.      Right lower leg: No edema.      Left lower leg: No edema.      Comments: Tender along entire spine and throughout back   Skin:     General: Skin is warm and dry.      Comments: Dressing is CDI   Neurological:      Mental Status: Mental status is at baseline.      Comments: Able to squeeze both hands to command, moving all extremities  Oriented to person, place, year   Psychiatric:         Mood and Affect: Mood normal.         Behavior: Behavior normal.             Significant Labs: All pertinent labs within the past 24 hours have been reviewed.    Significant Imaging: I have reviewed all pertinent imaging results/findings within the past 24 hours.

## 2024-12-20 NOTE — CONSULTS
[facility]  Wound Care    Patient Name:  Merle Caro   MRN:  [id number]  Date: 12/20/2024  Diagnosis: Lumbar burst fracture, closed, initial encounter    History:     Past Medical History:   Diagnosis Date    Anemia, unspecified     Anxiety disorder, unspecified     Arthritis     Asthma     B12 deficiency anemia     Bacillus fragilis infection     bloodstreatm infection    Chronic back pain     Chronic constipation     COPD (chronic obstructive pulmonary disease)     Depression     GERD (gastroesophageal reflux disease)     Hypertension     Insomnia     Lumbar compression fracture     Neurogenic bladder     Osteoporosis     Rheumatoid arthritis, unspecified     Spinal stenosis     Tobacco abuse     Urge incontinence of urine     Urinary outflow obstruction     Urinary tract infection, E. coli        Social History     Socioeconomic History    Marital status:     Number of children: 2   Tobacco Use    Smoking status: Former     Average packs/day: 0.8 packs/day for 44.0 years (33.0 ttl pk-yrs)     Types: Cigarettes     Start date: 10/14/1978    Smokeless tobacco: Never    Tobacco comments:     10/14/2022-quit smoking    Substance and Sexual Activity    Alcohol use: Never    Drug use: No    Sexual activity: Not Currently     Partners: Male     Birth control/protection: See Surgical Hx   Social History Narrative    Full time parent,  with 2 children.     Social Drivers of Health     Financial Resource Strain: Patient Declined (12/19/2024)    Overall Financial Resource Strain (CARDIA)     Difficulty of Paying Living Expenses: Patient declined   Food Insecurity: Patient Declined (12/19/2024)    Hunger Vital Sign     Worried About Running Out of Food in the Last Year: Patient declined     Ran Out of Food in the Last Year: Patient declined   Transportation Needs: Patient Declined (12/19/2024)    TRANSPORTATION NEEDS     Transportation : Patient declined   Physical Activity: Sufficiently Active  (1/30/2024)    Exercise Vital Sign     Days of Exercise per Week: 7 days     Minutes of Exercise per Session: 60 min   Stress: Patient Declined (12/19/2024)    [employer] - Occupational Stress Questionnaire     Feeling of Stress : Patient declined   Housing Stability: Patient Declined (12/19/2024)    Housing Stability Vital Sign     Unable to Pay for Housing in the Last Year: Patient declined     Homeless in the Last Year: Patient declined       Precautions:     Allergies as of 12/17/2024 - Reviewed 12/17/2024   Allergen Reaction Noted    Codeine Shortness Of Breath 07/28/2013    Keflex [cephalexin] Shortness Of Breath 07/28/2013    Shellfish containing products Nausea And Vomiting 02/03/2015    Flu vaccine ts 2011-12(18 yr+) Hives 12/18/2020    Sucralfate Other (See Comments) and Hives 08/21/2015       [facility] Assessment Details/Treatment     High Risk Wound Care Screen completed due to documented low Juan M Score.    Chart reviewed.   Patient admitted with unstable L1 burst fracture and is now POD 2 thoracic spine fusion per Dr. Encarnacion. Patient had pain and confusion/hallucination yesterday, but improving.  Patient on Isotour bed - recommend Apply Waffle Mattress Overlay to hospital bed.  Pressure Injury Prevention Orderset initiated.    12/20/2024

## 2024-12-20 NOTE — ASSESSMENT & PLAN NOTE
No symptoms that I am aware of and does have yeast in the urine which could cause some pyuria.  I am not convinced she actually has a UTI.  The urine culture does not have a result.  She is on vancomycin and metronidazole postoperatively.  One option would be to switch the vancomycin to ceftriaxone to cover potential UTI although continuing the current management is reasonable
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
## 2024-12-21 PROBLEM — R00.0 SINUS TACHYCARDIA: Status: ACTIVE | Noted: 2024-12-21

## 2024-12-21 PROBLEM — R61 DIAPHORESIS: Status: ACTIVE | Noted: 2024-12-21

## 2024-12-21 LAB
ABO + RH BLD: NORMAL
ALLENS TEST: ABNORMAL
ANION GAP SERPL CALC-SCNC: 12 MMOL/L (ref 8–16)
BACTERIA SPEC AEROBE CULT: NO GROWTH
BASOPHILS # BLD AUTO: 0.04 K/UL (ref 0–0.2)
BASOPHILS NFR BLD: 0.5 % (ref 0–1.9)
BLD GP AB SCN CELLS X3 SERPL QL: NORMAL
BUN SERPL-MCNC: 11 MG/DL (ref 8–23)
CALCIUM SERPL-MCNC: 8.4 MG/DL (ref 8.7–10.5)
CHLORIDE SERPL-SCNC: 99 MMOL/L (ref 95–110)
CO2 SERPL-SCNC: 23 MMOL/L (ref 23–29)
CREAT SERPL-MCNC: 0.7 MG/DL (ref 0.5–1.4)
DELSYS: ABNORMAL
DIFFERENTIAL METHOD BLD: ABNORMAL
EOSINOPHIL # BLD AUTO: 0.3 K/UL (ref 0–0.5)
EOSINOPHIL NFR BLD: 3.6 % (ref 0–8)
ERYTHROCYTE [DISTWIDTH] IN BLOOD BY AUTOMATED COUNT: 14.2 % (ref 11.5–14.5)
EST. GFR  (NO RACE VARIABLE): >60 ML/MIN/1.73 M^2
FLOW: 3
GLUCOSE SERPL-MCNC: 123 MG/DL (ref 70–110)
HCO3 UR-SCNC: 28 MMOL/L (ref 24–28)
HCT VFR BLD AUTO: 32.6 % (ref 37–48.5)
HGB BLD-MCNC: 10.1 G/DL (ref 12–16)
IMM GRANULOCYTES # BLD AUTO: 0.05 K/UL (ref 0–0.04)
IMM GRANULOCYTES NFR BLD AUTO: 0.6 % (ref 0–0.5)
LACTATE SERPL-SCNC: 0.9 MMOL/L (ref 0.5–2.2)
LYMPHOCYTES # BLD AUTO: 0.8 K/UL (ref 1–4.8)
LYMPHOCYTES NFR BLD: 9.4 % (ref 18–48)
MCH RBC QN AUTO: 28.7 PG (ref 27–31)
MCHC RBC AUTO-ENTMCNC: 31 G/DL (ref 32–36)
MCV RBC AUTO: 93 FL (ref 82–98)
MODE: ABNORMAL
MONOCYTES # BLD AUTO: 0.8 K/UL (ref 0.3–1)
MONOCYTES NFR BLD: 8.9 % (ref 4–15)
NEUTROPHILS # BLD AUTO: 6.8 K/UL (ref 1.8–7.7)
NEUTROPHILS NFR BLD: 77 % (ref 38–73)
NRBC BLD-RTO: 0 /100 WBC
OHS QRS DURATION: 128 MS
OHS QTC CALCULATION: 491 MS
PCO2 BLDA: 44 MMHG (ref 35–45)
PH SMN: 7.41 [PH] (ref 7.35–7.45)
PLATELET # BLD AUTO: 251 K/UL (ref 150–450)
PMV BLD AUTO: 9.3 FL (ref 9.2–12.9)
PO2 BLDA: 74 MMHG (ref 80–100)
POC BE: 3 MMOL/L
POC SATURATED O2: 95 % (ref 95–100)
POTASSIUM SERPL-SCNC: 3.6 MMOL/L (ref 3.5–5.1)
RBC # BLD AUTO: 3.52 M/UL (ref 4–5.4)
SAMPLE: ABNORMAL
SITE: ABNORMAL
SODIUM SERPL-SCNC: 134 MMOL/L (ref 136–145)
SPECIMEN OUTDATE: NORMAL
VANCOMYCIN TROUGH SERPL-MCNC: 40.2 UG/ML (ref 10–22)
WBC # BLD AUTO: 8.79 K/UL (ref 3.9–12.7)

## 2024-12-21 PROCEDURE — 87040 BLOOD CULTURE FOR BACTERIA: CPT | Performed by: STUDENT IN AN ORGANIZED HEALTH CARE EDUCATION/TRAINING PROGRAM

## 2024-12-21 PROCEDURE — 36600 WITHDRAWAL OF ARTERIAL BLOOD: CPT

## 2024-12-21 PROCEDURE — 85025 COMPLETE CBC W/AUTO DIFF WBC: CPT | Performed by: STUDENT IN AN ORGANIZED HEALTH CARE EDUCATION/TRAINING PROGRAM

## 2024-12-21 PROCEDURE — 86901 BLOOD TYPING SEROLOGIC RH(D): CPT | Performed by: NURSE PRACTITIONER

## 2024-12-21 PROCEDURE — 82803 BLOOD GASES ANY COMBINATION: CPT

## 2024-12-21 PROCEDURE — 63600175 PHARM REV CODE 636 W HCPCS: Performed by: STUDENT IN AN ORGANIZED HEALTH CARE EDUCATION/TRAINING PROGRAM

## 2024-12-21 PROCEDURE — 25000003 PHARM REV CODE 250: Performed by: PHYSICIAN ASSISTANT

## 2024-12-21 PROCEDURE — 36415 COLL VENOUS BLD VENIPUNCTURE: CPT | Performed by: STUDENT IN AN ORGANIZED HEALTH CARE EDUCATION/TRAINING PROGRAM

## 2024-12-21 PROCEDURE — 99900035 HC TECH TIME PER 15 MIN (STAT)

## 2024-12-21 PROCEDURE — 94761 N-INVAS EAR/PLS OXIMETRY MLT: CPT

## 2024-12-21 PROCEDURE — 80048 BASIC METABOLIC PNL TOTAL CA: CPT | Performed by: STUDENT IN AN ORGANIZED HEALTH CARE EDUCATION/TRAINING PROGRAM

## 2024-12-21 PROCEDURE — 25000003 PHARM REV CODE 250: Performed by: STUDENT IN AN ORGANIZED HEALTH CARE EDUCATION/TRAINING PROGRAM

## 2024-12-21 PROCEDURE — 25000003 PHARM REV CODE 250: Performed by: NEUROLOGICAL SURGERY

## 2024-12-21 PROCEDURE — 63600175 PHARM REV CODE 636 W HCPCS: Performed by: NURSE PRACTITIONER

## 2024-12-21 PROCEDURE — 83605 ASSAY OF LACTIC ACID: CPT | Performed by: STUDENT IN AN ORGANIZED HEALTH CARE EDUCATION/TRAINING PROGRAM

## 2024-12-21 PROCEDURE — 63600175 PHARM REV CODE 636 W HCPCS: Mod: JZ,JG | Performed by: NEUROLOGICAL SURGERY

## 2024-12-21 PROCEDURE — 11000001 HC ACUTE MED/SURG PRIVATE ROOM

## 2024-12-21 PROCEDURE — 80202 ASSAY OF VANCOMYCIN: CPT | Performed by: STUDENT IN AN ORGANIZED HEALTH CARE EDUCATION/TRAINING PROGRAM

## 2024-12-21 PROCEDURE — 25000003 PHARM REV CODE 250: Performed by: NURSE PRACTITIONER

## 2024-12-21 PROCEDURE — 27000221 HC OXYGEN, UP TO 24 HOURS

## 2024-12-21 RX ORDER — CEFTRIAXONE 1 G/1
1 INJECTION, POWDER, FOR SOLUTION INTRAMUSCULAR; INTRAVENOUS
Status: DISCONTINUED | OUTPATIENT
Start: 2024-12-21 | End: 2024-12-22

## 2024-12-21 RX ADMIN — MUPIROCIN: 20 OINTMENT TOPICAL at 09:12

## 2024-12-21 RX ADMIN — MUPIROCIN: 20 OINTMENT TOPICAL at 08:12

## 2024-12-21 RX ADMIN — Medication 200 ML: at 09:12

## 2024-12-21 RX ADMIN — Medication 200 ML: at 01:12

## 2024-12-21 RX ADMIN — Medication 200 ML: at 12:12

## 2024-12-21 RX ADMIN — FOLIC ACID 1 MG: 1 TABLET ORAL at 09:12

## 2024-12-21 RX ADMIN — LISINOPRIL 10 MG: 10 TABLET ORAL at 08:12

## 2024-12-21 RX ADMIN — TRAZODONE HYDROCHLORIDE 100 MG: 100 TABLET ORAL at 08:12

## 2024-12-21 RX ADMIN — METRONIDAZOLE 500 MG: 5 INJECTION, SOLUTION INTRAVENOUS at 04:12

## 2024-12-21 RX ADMIN — Medication 200 ML: at 08:12

## 2024-12-21 RX ADMIN — BISACODYL 10 MG: 10 SUPPOSITORY RECTAL at 09:12

## 2024-12-21 RX ADMIN — HYDROCODONE BITARTRATE AND ACETAMINOPHEN 1 TABLET: 10; 325 TABLET ORAL at 08:12

## 2024-12-21 RX ADMIN — THERA TABS 1 TABLET: TAB at 09:12

## 2024-12-21 RX ADMIN — METRONIDAZOLE 500 MG: 5 INJECTION, SOLUTION INTRAVENOUS at 01:12

## 2024-12-21 RX ADMIN — POLYETHYLENE GLYCOL 3350 17 G: 17 POWDER, FOR SOLUTION ORAL at 09:12

## 2024-12-21 RX ADMIN — DOCUSATE SODIUM 100 MG: 100 CAPSULE, LIQUID FILLED ORAL at 09:12

## 2024-12-21 RX ADMIN — HALOPERIDOL 5 MG: 5 TABLET ORAL at 08:12

## 2024-12-21 RX ADMIN — HYDROCODONE BITARTRATE AND ACETAMINOPHEN 1 TABLET: 10; 325 TABLET ORAL at 05:12

## 2024-12-21 RX ADMIN — PAROXETINE HYDROCHLORIDE 40 MG: 20 TABLET, FILM COATED ORAL at 08:12

## 2024-12-21 RX ADMIN — ONDANSETRON 4 MG: 2 INJECTION INTRAMUSCULAR; INTRAVENOUS at 09:12

## 2024-12-21 RX ADMIN — METRONIDAZOLE 500 MG: 5 INJECTION, SOLUTION INTRAVENOUS at 09:12

## 2024-12-21 RX ADMIN — SODIUM CHLORIDE 1000 ML: 9 INJECTION, SOLUTION INTRAVENOUS at 02:12

## 2024-12-21 RX ADMIN — CEFTRIAXONE 1 G: 1 INJECTION, POWDER, FOR SOLUTION INTRAMUSCULAR; INTRAVENOUS at 12:12

## 2024-12-21 RX ADMIN — ENOXAPARIN SODIUM 40 MG: 40 INJECTION SUBCUTANEOUS at 09:12

## 2024-12-21 NOTE — ASSESSMENT & PLAN NOTE
Patient has hospital-acquired delirium in the setting of anesthesia, surgery, acute illness, pain  Switched benzodiazepines which were being used as a muscle relaxer to cyclobenzaprine   Delirium seems to have improved  -checked blood cultures at the recommendations of Neurosurgery ([facility])

## 2024-12-21 NOTE — ASSESSMENT & PLAN NOTE
Looks slightly worse today, urine now with Gram-negative rods which we had not technically been covering so giving ceftriaxone.  Also checking a lactate, VBG, CXR.  CXR is now back and is negative, awaiting the other labs.  No leukocytosis, no fever

## 2024-12-21 NOTE — ASSESSMENT & PLAN NOTE
-She was discharged from [facility] on 10/18/24 for bacteremia. Blood cultures isolated bacteroides, antibiotics adjusted. Repeat blood cultures negative. ID recommended 2 weeks of IV Rocephin for UTI and 2 weeks of Flagyl for bacteremia. She has completed IV abx. Patient to have outpatient colonoscopy due to Bacteroides bacteremia which was done on 11/4/24 which showed moderate diverticulosis. Final pathology -- Tubular adenoma, multiple fragments. Negative for high-grade dysplasia or malignancy   -WBC count currently stable, afebrile

## 2024-12-21 NOTE — PLAN OF CARE
Discussed plan of care with patient and this patient was able to , verbalized understanding.  Patient remains free from injury.  Safety and comfort precautions maintained this shift.   Call light and personal belongings within reach, bed in low position with bed wheels locked.  No s/s of acute distress.   Purposeful rounding continued this shift.  Pain levels are controlled per MD order. IVF infusing.  Diet orders continued.  Vital signs continued per order.  Q2 repositioning per staff  Chart and orders review completed.   Patient education about care completed.     Problem: Adult Inpatient Plan of Care  Goal: Plan of Care Review  Outcome: Progressing  Goal: Patient-Specific Goal (Individualized)  Outcome: Progressing  Goal: Absence of Hospital-Acquired Illness or Injury  Outcome: Progressing  Goal: Optimal Comfort and Wellbeing  Outcome: Progressing  Goal: Readiness for Transition of Care  Outcome: Progressing     Problem: Sepsis/Septic Shock  Goal: Optimal Coping  Outcome: Progressing  Goal: Absence of Bleeding  Outcome: Progressing  Goal: Blood Glucose Level Within Targeted Range  Outcome: Progressing  Goal: Absence of Infection Signs and Symptoms  Outcome: Progressing  Goal: Optimal Nutrition Intake  Outcome: Progressing     Problem: Infection  Goal: Absence of Infection Signs and Symptoms  Outcome: Progressing     Problem: Skin Injury Risk Increased  Goal: Skin Health and Integrity  Outcome: Progressing     Problem: Wound  Goal: Optimal Coping  Outcome: Progressing  Goal: Optimal Functional Ability  Outcome: Progressing  Goal: Absence of Infection Signs and Symptoms  Outcome: Progressing  Goal: Improved Oral Intake  Outcome: Progressing  Goal: Optimal Pain Control and Function  Outcome: Progressing  Goal: Skin Health and Integrity  Outcome: Progressing  Goal: Optimal Wound Healing  Outcome: Progressing     Problem: Fall Injury Risk  Goal: Absence of Fall and Fall-Related Injury  Outcome: Progressing

## 2024-12-21 NOTE — ASSESSMENT & PLAN NOTE
Body mass index is 31.65 kg/m². Morbid obesity complicates all aspects of disease management from diagnostic modalities to treatment. Weight loss encouraged and health benefits explained to patient.

## 2024-12-21 NOTE — PROGRESS NOTES
[facility]  Progress Note    Patient Name: Merle Caro  MRN: [id number]  Patient Class: IP- Inpatient   Admission Date: 12/17/2024  Length of Stay: 4 days  Attending Physician: August Freire MD  Primary Care Provider: Marck Major MD        Subjective     Principal Problem:Lumbar burst fracture, closed, initial encounter        HPI:  Merle Caro is a 69 year old female who has  has a past medical history of Anemia, unspecified, Anxiety disorder, unspecified, Arthritis, Asthma, B12 deficiency anemia, Bacillus fragilis infection, Chronic back pain, Chronic constipation, COPD (chronic obstructive pulmonary disease), Depression, GERD (gastroesophageal reflux disease), Hypertension, Insomnia, Lumbar compression fracture, Neurogenic bladder, Osteoporosis, Rheumatoid arthritis, unspecified, Spinal stenosis, Tobacco abuse, Urge incontinence of urine, Urinary outflow obstruction, and Urinary tract infection, E. coli. Who presented to the Emergency Department for back pain. She is scheduled for surgery tomorrow, 12/18/24, with Dr. Encarnacion. She saw him in clinic today. Appears she was in severe pain and unable to get preop labs obtained and it was recommended to come to ED and have HM admit, plan for surgery in AM for thoracic spine fusion for lumbar burst fracture. CBC/CMP stable. Pt admitted for pain management for planned surgery with Dr. Encarnacion.     Overview/Hospital Course:  12/18: Had NSGY today, now back in room, somnolent and endorsing pain in the back but otherwise doing well. Daughter at bedside. Giving pain medication. TLSO brace on the way.  12/19:  Patient doing about the same as yesterday.  Pain is slightly better controlled.   is at the bedside.  Reportedly she had an episode of confusion/hallucination overnight and early in the morning thinking that people were in the room who were not.  Her mental status is better now  12/20: Slept much better last night after  receiving trazodone.  Of note we had given her 150 mg at night when at home she normally takes 75 mg, may need to reduce the dose tonight.  Still having significant pain but it is overall much improved.  No hallucinations.  Was not able to work much with PT yesterday, they will hopefully come back today   12/21:  Mildly diaphoretic, appears a bit worse today.  Checking lactate, VBG, CXR.  Pain control is adequate.  Family would like patient to go home on DC, they are not open to skilled nursing.  Urine with Gram-negative rods, switching vancomycin to ceftriaxone and continuing metronidazole         Review of Systems   Constitutional:  Positive for fatigue. Negative for fever.   HENT: Negative.     Respiratory:  Negative for chest tightness and shortness of breath.    Cardiovascular:  Negative for chest pain and leg swelling.   Gastrointestinal:  Negative for abdominal pain, constipation, diarrhea and nausea.   Genitourinary:  Negative for difficulty urinating and dysuria.   Musculoskeletal:  Positive for back pain.   Skin:  Negative for rash.   Neurological:  Negative for light-headedness and headaches.   Hematological:  Does not bruise/bleed easily.   Psychiatric/Behavioral:  Negative for agitation and behavioral problems.      Objective:     Vital Signs (Most Recent):  Temp: 98.2 °F (36.8 °C) (12/21/24 1233)  Pulse: (!) 121 (12/21/24 1233)  Resp: 16 (12/21/24 1233)  BP: 138/81 (12/21/24 1233)  SpO2: 97 % (12/21/24 1233) Vital Signs (24h Range):  Temp:  [96.8 °F (36 °C)-98.2 °F (36.8 °C)] 98.2 °F (36.8 °C)  Pulse:  [108-130] 121  Resp:  [16-20] 16  SpO2:  [94 %-97 %] 97 %  BP: ()/(51-81) 138/81     Weight: 78.5 kg (173 lb 1 oz)  Body mass index is 31.65 kg/m².    Intake/Output Summary (Last 24 hours) at 12/21/2024 1542  Last data filed at 12/21/2024 1232  Gross per 24 hour   Intake 1165 ml   Output 1800 ml   Net -635 ml         Physical Exam  Constitutional:       General: She is not in acute distress.      Comments: Today she is mildly diaphoretic and mildly somnolent   HENT:      Head: Normocephalic and atraumatic.      Mouth/Throat:      Mouth: Mucous membranes are moist.      Pharynx: Oropharynx is clear.   Eyes:      General: No scleral icterus.  Cardiovascular:      Rate and Rhythm: Normal rate and regular rhythm.      Heart sounds: No murmur heard.     No gallop.   Pulmonary:      Effort: Pulmonary effort is normal.      Breath sounds: Normal breath sounds.   Abdominal:      General: Bowel sounds are normal. There is no distension.      Tenderness: There is no abdominal tenderness.   Musculoskeletal:         General: Tenderness present.      Cervical back: Tenderness present.      Right lower leg: No edema.      Left lower leg: No edema.      Comments: Tender along entire spine and throughout back   Skin:     General: Skin is warm and dry.      Comments: Dressing is CDI   Neurological:      Mental Status: Mental status is at baseline.      Comments: Able to squeeze both hands to command, moving all extremities  Oriented to person, place, year   Psychiatric:         Mood and Affect: Mood normal.         Behavior: Behavior normal.             Significant Labs: All pertinent labs within the past 24 hours have been reviewed.    Significant Imaging: I have reviewed all pertinent imaging results/findings within the past 24 hours.    Assessment and Plan     * Lumbar burst fracture, closed, initial encounter  -Had thoracic spine fusion with Dr. Encarnacion on 12/18/24, POD 0  -Regular diet now  -Pain management  -Antiemetics PRN  -started DVT prophylaxis with Lovenox 12/19  -of note she has been vancomycin and metronidazole per surgical recommendations for prophylaxis, the metronidazole as because of the prior infection with Bacteroides but now that the patient has Gram-negative rods in the urine we are switching the vancomycin to ceftriaxone and continuing the metronidazole    Sinus tachycardia  EKG with a right bundle  branch block   No chest pain   No obvious other causes of the tachycardia other than pain, infection  We will continue to monitor   Currently heart rates approximately 115-120      Diaphoresis  Looks slightly worse today, urine now with Gram-negative rods which we had not technically been covering so giving ceftriaxone.  Also checking a lactate, VBG, CXR.  CXR is now back and is negative, awaiting the other labs.  No leukocytosis, no fever      Pyuria  Urine now growing Gram-negative rods, switching vancomycin to ceftriaxone    Delirium  Patient has hospital-acquired delirium in the setting of anesthesia, surgery, acute illness, pain  Switched benzodiazepines which were being used as a muscle relaxer to cyclobenzaprine   Delirium seems to have improved  -checked blood cultures at the recommendations of Neurosurgery ([facility])      Bacteremia  -She was discharged from [facility] on 10/18/24 for bacteremia. Blood cultures isolated bacteroides, antibiotics adjusted. Repeat blood cultures negative. ID recommended 2 weeks of IV Rocephin for UTI and 2 weeks of Flagyl for bacteremia. She has completed IV abx. Patient to have outpatient colonoscopy due to Bacteroides bacteremia which was done on 11/4/24 which showed moderate diverticulosis. Final pathology -- Tubular adenoma, multiple fragments. Negative for high-grade dysplasia or malignancy   -WBC count currently stable, afebrile     Severe obesity (BMI 35.0-39.9) with comorbidity  Body mass index is 31.65 kg/m². Morbid obesity complicates all aspects of disease management from diagnostic modalities to treatment. Weight loss encouraged and health benefits explained to patient.         Anemia  Most recent hemoglobin and hematocrit are listed below.  Recent Labs     12/19/24  0645 12/20/24  0606 12/21/24  0732   HGB 10.3* 9.6* 10.1*   HCT 32.2* 31.4* 32.6*       Plan  - Monitor serial CBC: Daily  - Transfuse PRBC if patient becomes hemodynamically unstable, symptomatic or H/H drops  below 7/21.  - Patient has not received any PRBC transfusions to date  - Patient's anemia is currently stable    Primary hypertension  Patient's blood pressure range in the last 24 hours was: BP  Min: 84/51  Max: 144/69.The patient's inpatient anti-hypertensive regimen is listed below:  Current Antihypertensives  lisinopriL tablet 10 mg, Nightly, Oral  hydrALAZINE injection 10 mg, Every 6 hours PRN, Intravenous    Plan  - BP is controlled, no changes needed to their regimen  - Hydralazine PRN    Asthma with COPD  -Not currently in exacerbation  -DuoNebs PRN        VTE Risk Mitigation (From admission, onward)           Ordered     enoxaparin injection 40 mg  Daily         12/19/24 0729     Reason for no Mechanical VTE Prophylaxis  Once        Question:  Reasons:  Answer:  Risk of Bleeding    12/18/24 1340     IP VTE HIGH RISK PATIENT  Once         12/17/24 1453     Place sequential compression device  Until discontinued         12/17/24 1453                    Discharge Planning   YELENA:      Code Status: Full Code   Medical Readiness for Discharge Date:   Discharge Plan A: Home Health            August Freire MD  Department of Hospital Medicine   [facility] - Med Surg

## 2024-12-21 NOTE — ASSESSMENT & PLAN NOTE
Patient's blood pressure range in the last 24 hours was: BP  Min: 84/51  Max: 144/69.The patient's inpatient anti-hypertensive regimen is listed below:  Current Antihypertensives  lisinopriL tablet 10 mg, Nightly, Oral  hydrALAZINE injection 10 mg, Every 6 hours PRN, Intravenous    Plan  - BP is controlled, no changes needed to their regimen  - Hydralazine PRN

## 2024-12-21 NOTE — PROGRESS NOTES
Interval History:   No new issues   Incisional pain   No complaints resting in bed   MAEW     + UTI on flagyl and vanc     Incision cdi   Dressing re enforced yest   MAEW     XR post op look good         AP   PT   Abx directed by cultures   Will continue to follow incision   On lovenox     Thank you for the referral   Please call with any questions    Ean Encarnacion MD  Neurosurgery     Disclaimer: This note was prepared using a voice recognition system and is likely to have sound alike errors within the text.      Medications:  Continuous Infusions:  Scheduled Meds:   bisacodyL  10 mg Rectal Daily    docusate sodium  100 mg Oral Daily    electrolytes-dextrose  200 mL Oral Q4H    enoxparin  40 mg Subcutaneous Daily    folic acid  1 mg Oral Daily    haloperidoL  5 mg Oral QHS    lisinopriL  10 mg Oral QHS    metroNIDAZOLE IV (PEDS and ADULTS)  500 mg Intravenous Q8H    multivitamin  1 tablet Oral Daily    mupirocin   Nasal BID    paroxetine  40 mg Oral QHS    polyethylene glycol  17 g Oral Daily     PRN Meds:  Current Facility-Administered Medications:     albuterol-ipratropium, 3 mL, Nebulization, Q4H PRN    cyclobenzaprine, 10 mg, Oral, TID PRN    diphenhydrAMINE, 50 mg, Oral, Q6H PRN    hydrALAZINE, 10 mg, Intravenous, Q6H PRN    HYDROcodone-acetaminophen, 1 tablet, Oral, Q8H PRN    HYDROmorphone, 1 mg, Intravenous, Q3H PRN    ondansetron, 4 mg, Intravenous, Q8H PRN    prochlorperazine, 5 mg, Intravenous, Q6H PRN    sodium chloride 0.9%, 10 mL, Intravenous, PRN    traZODone, 100 mg, Oral, Nightly PRN    Pharmacy to dose Vancomycin consult, , , Once **AND** vancomycin - pharmacy to dose, , Intravenous, pharmacy to manage frequency     Review of Systems  Objective:     Weight: 78.5 kg (173 lb 1 oz)  Body mass index is 31.65 kg/m².  Vital Signs (Most Recent):  Temp: 97.8 °F (36.6 °C) (12/21/24 0736)  Pulse: (!) 116 (12/21/24 0900)  Resp: 18 (12/21/24 0900)  BP: (!) 144/69 (12/21/24 0736)  SpO2: 95 % (12/21/24 0900)  "Vital Signs (24h Range):  Temp:  [96.8 °F (36 °C)-97.8 °F (36.6 °C)] 97.8 °F (36.6 °C)  Pulse:  [108-130] 116  Resp:  [16-20] 18  SpO2:  [94 %-97 %] 95 %  BP: ()/() 144/69                Oxygen Concentration (%):  [28] 28             Urethral Catheter  (Active)   Site Assessment Clean;Intact 12/21/24 0715   Collection Container Standard drainage bag 12/21/24 0715   Securement Method secured to top of thigh w/ adhesive device 12/21/24 0715   Catheter Care Performed yes 12/20/24 1905   Reason for Continuing Urinary Catheterization Chronic Indwelling Urinary Catheter on Admission 12/21/24 0715   CAUTI Prevention Bundle Securement Device in place with 1" slack;Intact seal between catheter & drainage tubing;Drainage bag/urimeter off the floor;Sheeting clip in use;No dependent loops or kinks;Drainage bag/urimeter not overfilled (<2/3 full);Drainage bag/urimeter below bladder 12/21/24 0715   Output (mL) 600 mL 12/21/24 0120          Significant Labs:  Recent Labs   Lab 12/20/24  0606 12/21/24  0732   GLU 85 123*    134*   K 3.7 3.6    99   CO2 25 23   BUN 9 11   CREATININE 0.7 0.7   CALCIUM 8.2* 8.4*     Recent Labs   Lab 12/20/24  0606 12/21/24  0732   WBC 9.00 8.79   HGB 9.6* 10.1*   HCT 31.4* 32.6*    251     No results for input(s): "LABPT", "INR", "APTT" in the last 48 hours.  Microbiology Results (last 7 days)       Procedure Component Value Units Date/Time    Urine culture [[id number]]  (Abnormal) Collected: 12/19/24 1537    Order Status: Completed Specimen: Urine Updated: 12/20/24 2338     Urine Culture, Routine GRAM NEGATIVE KRISTEL  > 100,000 cfu/ml  Identification and susceptibility pending      Narrative:      Specimen Source->Urine    Blood culture [[id number]] Collected: 12/19/24 1020    Order Status: Completed Specimen: Blood from Peripheral, Wrist, Right Updated: 12/20/24 2012     Blood Culture, Routine No Growth to date      No Growth to date    Narrative:      Draw sample # 2 " from separate site    Blood culture [[id number]] Collected: 12/19/24 1020    Order Status: Completed Specimen: Blood Updated: 12/20/24 2012     Blood Culture, Routine No Growth to date      No Growth to date    Narrative:      Draw sample # 2 from separate site    Culture, Anaerobe [[id number]] Collected: 12/18/24 1024    Order Status: Completed Specimen: Wound from Back Updated: 12/20/24 1141     Anaerobic Culture Culture in progress    Narrative:      L1 vertebral body    AFB Culture & Smear [[id number]] Collected: 12/18/24 1024    Order Status: Completed Specimen: Wound from Back Updated: 12/19/24 2127     AFB Culture & Smear Culture in progress     AFB CULTURE STAIN No acid fast bacilli seen.    Narrative:      L1 vertebral body    Aerobic culture [[id number]] Collected: 12/18/24 1024    Order Status: Completed Specimen: Wound from Back Updated: 12/19/24 0728     Aerobic Bacterial Culture No growth    Narrative:      L1 vertebral body    Gram stain [[id number]] Collected: 12/18/24 1024    Order Status: Completed Specimen: Wound from Back Updated: 12/19/24 0215     Gram Stain Result Rare WBC's      No organisms seen    Narrative:      L1 vertebral body    Fungus culture [[id number]] Collected: 12/18/24 1024    Order Status: Sent Specimen: Wound from Back Updated: 12/18/24 1652

## 2024-12-21 NOTE — ASSESSMENT & PLAN NOTE
Most recent hemoglobin and hematocrit are listed below.  Recent Labs     12/19/24  0645 12/20/24  0606 12/21/24  0732   HGB 10.3* 9.6* 10.1*   HCT 32.2* 31.4* 32.6*       Plan  - Monitor serial CBC: Daily  - Transfuse PRBC if patient becomes hemodynamically unstable, symptomatic or H/H drops below 7/21.  - Patient has not received any PRBC transfusions to date  - Patient's anemia is currently stable

## 2024-12-21 NOTE — ASSESSMENT & PLAN NOTE
EKG with a right bundle branch block   No chest pain   No obvious other causes of the tachycardia other than pain, infection  We will continue to monitor   Currently heart rates approximately 115-120

## 2024-12-21 NOTE — PROGRESS NOTES
Therapy with vancomycin complete and/or consult discontinued by provider.  Pharmacy will sign off, please re-consult as needed.    Thank you for allowing us to participate in this patient's care.   Katherine McArdle, Pharm.D. 12/21/2024 11:09 AM

## 2024-12-21 NOTE — PT/OT/SLP PROGRESS
Physical Therapy      Patient Name:  Merle Caro   MRN:  [id number]    Patient not seen today secondary to complaints of nausea upon attempt @0902. Will follow-up for progressive mobility as patient's tolerance allows.

## 2024-12-21 NOTE — PROGRESS NOTES
Pharmacokinetic Assessment Follow Up: IV Vancomycin    Vancomycin serum concentration assessment(s):    The trough level was drawn correctly and can be used to guide therapy at this time. The measurement is above the desired definitive target range of 10 to 15 mcg/mL.    Vancomycin Regimen Plan:    Discontinue the scheduled vancomycin regimen and re-dose when the random level is less than 15 mcg/mL, next level to be drawn at 0600 on 12/22.    Drug levels (last 3 results):  Recent Labs   Lab Result Units 12/21/24  0239   Vancomycin-Trough ug/mL 40.2*       Pharmacy will continue to follow and monitor vancomycin.    Please contact pharmacy at extension [phone] for questions regarding this assessment.    Thank you for the consult,   Karen Crenshaw       Patient brief summary:  Merle Caro is a 69 y.o. female initiated on antimicrobial therapy with IV Vancomycin for treatment of  prophylaxis for bone/joint infection     The patient's current regimen is pulse dosing.    Drug Allergies:   Review of patient's allergies indicates:   Allergen Reactions    Codeine Shortness Of Breath    Keflex [cephalexin] Shortness Of Breath    Shellfish containing products Nausea And Vomiting    Flu vaccine ts 2011-12(18 yr+) Hives    Sucralfate Other (See Comments) and Hives       Actual Body Weight:   78.5 kg    Renal Function:   Estimated Creatinine Clearance: 75.7 mL/min (based on SCr of 0.7 mg/dL).,     Dialysis Method (if applicable):  N/A    CBC (last 72 hours):  Recent Labs   Lab Result Units 12/19/24  0645 12/20/24  0606   WBC K/uL 8.11 9.00   Hemoglobin g/dL 10.3* 9.6*   Hematocrit % 32.2* 31.4*   Platelets K/uL 245 237   Gran % % 80.3* 67.9   Lymph % % 9.0* 15.0*   Mono % % 10.0 14.2   Eosinophil % % 0.1 1.9   Basophil % % 0.2 0.4   Differential Method  Automated Automated       Metabolic Panel (last 72 hours):  Recent Labs   Lab Result Units 12/18/24  1436 12/18/24  1710 12/19/24  0645 12/19/24  1537 12/20/24  0606   Sodium  mmol/L 137 135* 136  --  137   Potassium mmol/L 4.2 4.1 3.7  --  3.7   Chloride mmol/L 103 103 103  --  102   CO2 mmol/L 21* 22* 23  --  25   Glucose mg/dL 142* 134* 104  --  85   Glucose, UA   --   --   --  Negative  --    BUN mg/dL 9 9 7*  --  9   Creatinine mg/dL 0.7 0.6 0.6  --  0.7   Albumin g/dL 2.9*  --   --   --   --    Total Bilirubin mg/dL 0.4  --   --   --   --    Alkaline Phosphatase U/L 81  --   --   --   --    AST U/L 17  --   --   --   --    ALT U/L 10  --   --   --   --        Vancomycin Administrations:  vancomycin given in the last 96 hours                     vancomycin 1,250 mg in 0.9% NaCl 250 mL IVPB (admixture device) (mg) 1,250 mg New Bag 12/20/24 1512     1,250 mg New Bag  0409    vancomycin (VANCOCIN) 2,250 mg in 0.9% NaCl 500 mL IVPB (mg) 2,250 mg New Bag 12/19/24 1523    vancomycin (VANCOCIN) 1,000 mg in 0.9% NaCl 250 mL IVPB (admixture device) (mg) 1,000 mg New Bag 12/18/24 1952    vancomycin injection (g) 2 g Given 12/18/24 0849    vancomycin (VANCOCIN) 1,000 mg in 0.9% NaCl 250 mL IVPB (admixture device) (mg) 1,000 mg New Bag 12/18/24 0758                    Microbiologic Results:  Microbiology Results (last 7 days)       Procedure Component Value Units Date/Time    Urine culture [[id number]]  (Abnormal) Collected: 12/19/24 1537    Order Status: Completed Specimen: Urine Updated: 12/20/24 2338     Urine Culture, Routine GRAM NEGATIVE KRISTEL  > 100,000 cfu/ml  Identification and susceptibility pending      Narrative:      Specimen Source->Urine    Blood culture [[id number]] Collected: 12/19/24 1020    Order Status: Completed Specimen: Blood from Peripheral, Wrist, Right Updated: 12/20/24 2012     Blood Culture, Routine No Growth to date      No Growth to date    Narrative:      Draw sample # 2 from separate site    Blood culture [[id number]] Collected: 12/19/24 1020    Order Status: Completed Specimen: Blood Updated: 12/20/24 2012     Blood Culture, Routine No Growth to date      No Growth  to date    Narrative:      Draw sample # 2 from separate site    Culture, Anaerobe [[id number]] Collected: 12/18/24 1024    Order Status: Completed Specimen: Wound from Back Updated: 12/20/24 1141     Anaerobic Culture Culture in progress    Narrative:      L1 vertebral body    AFB Culture & Smear [[id number]] Collected: 12/18/24 1024    Order Status: Completed Specimen: Wound from Back Updated: 12/19/24 2127     AFB Culture & Smear Culture in progress     AFB CULTURE STAIN No acid fast bacilli seen.    Narrative:      L1 vertebral body    Aerobic culture [[id number]] Collected: 12/18/24 1024    Order Status: Completed Specimen: Wound from Back Updated: 12/19/24 0728     Aerobic Bacterial Culture No growth    Narrative:      L1 vertebral body    Gram stain [[id number]] Collected: 12/18/24 1024    Order Status: Completed Specimen: Wound from Back Updated: 12/19/24 0215     Gram Stain Result Rare WBC's      No organisms seen    Narrative:      L1 vertebral body    Fungus culture [[id number]] Collected: 12/18/24 1024    Order Status: Sent Specimen: Wound from Back Updated: 12/18/24 1652

## 2024-12-21 NOTE — PLAN OF CARE
Discussed poc with pt, pt verbalized understanding    Purposeful rounding every 2hours    VS wnl  Fall precautions in place, remains injury free  Pt denies c/o pain or nausea    Accurate I&Os  Abx given as prescribed  Bed locked at lowest position  Call light within reach    Chart check complete  Will cont with POC

## 2024-12-21 NOTE — ASSESSMENT & PLAN NOTE
-Had thoracic spine fusion with Dr. Encarnacion on 12/18/24, POD 0  -Regular diet now  -Pain management  -Antiemetics PRN  -started DVT prophylaxis with Lovenox 12/19  -of note she has been vancomycin and metronidazole per surgical recommendations for prophylaxis, the metronidazole as because of the prior infection with Bacteroides but now that the patient has Gram-negative rods in the urine we are switching the vancomycin to ceftriaxone and continuing the metronidazole

## 2024-12-21 NOTE — SUBJECTIVE & OBJECTIVE
Review of Systems   Constitutional:  Positive for fatigue. Negative for fever.   HENT: Negative.     Respiratory:  Negative for chest tightness and shortness of breath.    Cardiovascular:  Negative for chest pain and leg swelling.   Gastrointestinal:  Negative for abdominal pain, constipation, diarrhea and nausea.   Genitourinary:  Negative for difficulty urinating and dysuria.   Musculoskeletal:  Positive for back pain.   Skin:  Negative for rash.   Neurological:  Negative for light-headedness and headaches.   Hematological:  Does not bruise/bleed easily.   Psychiatric/Behavioral:  Negative for agitation and behavioral problems.      Objective:     Vital Signs (Most Recent):  Temp: 98.2 °F (36.8 °C) (12/21/24 1233)  Pulse: (!) 121 (12/21/24 1233)  Resp: 16 (12/21/24 1233)  BP: 138/81 (12/21/24 1233)  SpO2: 97 % (12/21/24 1233) Vital Signs (24h Range):  Temp:  [96.8 °F (36 °C)-98.2 °F (36.8 °C)] 98.2 °F (36.8 °C)  Pulse:  [108-130] 121  Resp:  [16-20] 16  SpO2:  [94 %-97 %] 97 %  BP: ()/(51-81) 138/81     Weight: 78.5 kg (173 lb 1 oz)  Body mass index is 31.65 kg/m².    Intake/Output Summary (Last 24 hours) at 12/21/2024 1542  Last data filed at 12/21/2024 1232  Gross per 24 hour   Intake 1165 ml   Output 1800 ml   Net -635 ml         Physical Exam  Constitutional:       General: She is not in acute distress.     Comments: Today she is mildly diaphoretic and mildly somnolent   HENT:      Head: Normocephalic and atraumatic.      Mouth/Throat:      Mouth: Mucous membranes are moist.      Pharynx: Oropharynx is clear.   Eyes:      General: No scleral icterus.  Cardiovascular:      Rate and Rhythm: Normal rate and regular rhythm.      Heart sounds: No murmur heard.     No gallop.   Pulmonary:      Effort: Pulmonary effort is normal.      Breath sounds: Normal breath sounds.   Abdominal:      General: Bowel sounds are normal. There is no distension.      Tenderness: There is no abdominal tenderness.    Musculoskeletal:         General: Tenderness present.      Cervical back: Tenderness present.      Right lower leg: No edema.      Left lower leg: No edema.      Comments: Tender along entire spine and throughout back   Skin:     General: Skin is warm and dry.      Comments: Dressing is CDI   Neurological:      Mental Status: Mental status is at baseline.      Comments: Able to squeeze both hands to command, moving all extremities  Oriented to person, place, year   Psychiatric:         Mood and Affect: Mood normal.         Behavior: Behavior normal.             Significant Labs: All pertinent labs within the past 24 hours have been reviewed.    Significant Imaging: I have reviewed all pertinent imaging results/findings within the past 24 hours.

## 2024-12-22 LAB
ANION GAP SERPL CALC-SCNC: 7 MMOL/L (ref 8–16)
BASOPHILS # BLD AUTO: 0.04 K/UL (ref 0–0.2)
BASOPHILS NFR BLD: 0.5 % (ref 0–1.9)
BUN SERPL-MCNC: 7 MG/DL (ref 8–23)
CALCIUM SERPL-MCNC: 7.9 MG/DL (ref 8.7–10.5)
CHLORIDE SERPL-SCNC: 102 MMOL/L (ref 95–110)
CO2 SERPL-SCNC: 28 MMOL/L (ref 23–29)
CREAT SERPL-MCNC: 0.6 MG/DL (ref 0.5–1.4)
DIFFERENTIAL METHOD BLD: ABNORMAL
EOSINOPHIL # BLD AUTO: 0.4 K/UL (ref 0–0.5)
EOSINOPHIL NFR BLD: 5.4 % (ref 0–8)
ERYTHROCYTE [DISTWIDTH] IN BLOOD BY AUTOMATED COUNT: 14.2 % (ref 11.5–14.5)
EST. GFR  (NO RACE VARIABLE): >60 ML/MIN/1.73 M^2
GLUCOSE SERPL-MCNC: 96 MG/DL (ref 70–110)
HCT VFR BLD AUTO: 27.1 % (ref 37–48.5)
HGB BLD-MCNC: 8.4 G/DL (ref 12–16)
IMM GRANULOCYTES # BLD AUTO: 0.04 K/UL (ref 0–0.04)
IMM GRANULOCYTES NFR BLD AUTO: 0.5 % (ref 0–0.5)
LYMPHOCYTES # BLD AUTO: 0.8 K/UL (ref 1–4.8)
LYMPHOCYTES NFR BLD: 11.2 % (ref 18–48)
MCH RBC QN AUTO: 29.3 PG (ref 27–31)
MCHC RBC AUTO-ENTMCNC: 31 G/DL (ref 32–36)
MCV RBC AUTO: 94 FL (ref 82–98)
MONOCYTES # BLD AUTO: 0.8 K/UL (ref 0.3–1)
MONOCYTES NFR BLD: 10.7 % (ref 4–15)
NEUTROPHILS # BLD AUTO: 5.3 K/UL (ref 1.8–7.7)
NEUTROPHILS NFR BLD: 71.7 % (ref 38–73)
NRBC BLD-RTO: 0 /100 WBC
OHS QRS DURATION: 126 MS
OHS QRS DURATION: 128 MS
OHS QTC CALCULATION: 488 MS
OHS QTC CALCULATION: 500 MS
PLATELET # BLD AUTO: 247 K/UL (ref 150–450)
PMV BLD AUTO: 9.7 FL (ref 9.2–12.9)
POTASSIUM SERPL-SCNC: 3.7 MMOL/L (ref 3.5–5.1)
RBC # BLD AUTO: 2.87 M/UL (ref 4–5.4)
SODIUM SERPL-SCNC: 137 MMOL/L (ref 136–145)
WBC # BLD AUTO: 7.38 K/UL (ref 3.9–12.7)

## 2024-12-22 PROCEDURE — 25000003 PHARM REV CODE 250: Performed by: PHYSICIAN ASSISTANT

## 2024-12-22 PROCEDURE — 25000003 PHARM REV CODE 250: Performed by: NEUROLOGICAL SURGERY

## 2024-12-22 PROCEDURE — 63600175 PHARM REV CODE 636 W HCPCS: Mod: JZ,JG | Performed by: NEUROLOGICAL SURGERY

## 2024-12-22 PROCEDURE — 25000003 PHARM REV CODE 250: Performed by: STUDENT IN AN ORGANIZED HEALTH CARE EDUCATION/TRAINING PROGRAM

## 2024-12-22 PROCEDURE — 80048 BASIC METABOLIC PNL TOTAL CA: CPT | Performed by: STUDENT IN AN ORGANIZED HEALTH CARE EDUCATION/TRAINING PROGRAM

## 2024-12-22 PROCEDURE — 85025 COMPLETE CBC W/AUTO DIFF WBC: CPT | Performed by: STUDENT IN AN ORGANIZED HEALTH CARE EDUCATION/TRAINING PROGRAM

## 2024-12-22 PROCEDURE — 25000003 PHARM REV CODE 250: Performed by: NURSE PRACTITIONER

## 2024-12-22 PROCEDURE — 97530 THERAPEUTIC ACTIVITIES: CPT | Mod: CQ

## 2024-12-22 PROCEDURE — 36415 COLL VENOUS BLD VENIPUNCTURE: CPT | Performed by: STUDENT IN AN ORGANIZED HEALTH CARE EDUCATION/TRAINING PROGRAM

## 2024-12-22 PROCEDURE — 63600175 PHARM REV CODE 636 W HCPCS: Performed by: STUDENT IN AN ORGANIZED HEALTH CARE EDUCATION/TRAINING PROGRAM

## 2024-12-22 PROCEDURE — 11000001 HC ACUTE MED/SURG PRIVATE ROOM

## 2024-12-22 RX ADMIN — ENOXAPARIN SODIUM 40 MG: 40 INJECTION SUBCUTANEOUS at 08:12

## 2024-12-22 RX ADMIN — Medication 200 ML: at 11:12

## 2024-12-22 RX ADMIN — Medication 200 ML: at 12:12

## 2024-12-22 RX ADMIN — Medication 200 ML: at 05:12

## 2024-12-22 RX ADMIN — TRAZODONE HYDROCHLORIDE 100 MG: 100 TABLET ORAL at 09:12

## 2024-12-22 RX ADMIN — FOLIC ACID 1 MG: 1 TABLET ORAL at 08:12

## 2024-12-22 RX ADMIN — THERA TABS 1 TABLET: TAB at 08:12

## 2024-12-22 RX ADMIN — Medication 200 ML: at 08:12

## 2024-12-22 RX ADMIN — MUPIROCIN: 20 OINTMENT TOPICAL at 08:12

## 2024-12-22 RX ADMIN — CEFTRIAXONE 1 G: 1 INJECTION, POWDER, FOR SOLUTION INTRAMUSCULAR; INTRAVENOUS at 11:12

## 2024-12-22 RX ADMIN — MUPIROCIN: 20 OINTMENT TOPICAL at 09:12

## 2024-12-22 RX ADMIN — POLYETHYLENE GLYCOL 3350 17 G: 17 POWDER, FOR SOLUTION ORAL at 08:12

## 2024-12-22 RX ADMIN — HALOPERIDOL 5 MG: 5 TABLET ORAL at 09:12

## 2024-12-22 RX ADMIN — METRONIDAZOLE 500 MG: 5 INJECTION, SOLUTION INTRAVENOUS at 12:12

## 2024-12-22 RX ADMIN — PIPERACILLIN AND TAZOBACTAM 4.5 G: 4; .5 INJECTION, POWDER, LYOPHILIZED, FOR SOLUTION INTRAVENOUS; PARENTERAL at 05:12

## 2024-12-22 RX ADMIN — PAROXETINE HYDROCHLORIDE 40 MG: 20 TABLET, FILM COATED ORAL at 09:12

## 2024-12-22 RX ADMIN — Medication 200 ML: at 09:12

## 2024-12-22 RX ADMIN — HYDROCODONE BITARTRATE AND ACETAMINOPHEN 1 TABLET: 10; 325 TABLET ORAL at 11:12

## 2024-12-22 RX ADMIN — METRONIDAZOLE 500 MG: 5 INJECTION, SOLUTION INTRAVENOUS at 08:12

## 2024-12-22 RX ADMIN — DOCUSATE SODIUM 100 MG: 100 CAPSULE, LIQUID FILLED ORAL at 08:12

## 2024-12-22 RX ADMIN — HYDROCODONE BITARTRATE AND ACETAMINOPHEN 1 TABLET: 10; 325 TABLET ORAL at 09:12

## 2024-12-22 NOTE — PLAN OF CARE
Discussed poc with pt, pt verbalized understanding    Purposeful rounding every 2hours    VS wnl  Fall precautions in place, remains injury free  Pt denies c/o pain or nauseas    Accurate I&Os  Abx given as prescribed  Bed locked at lowest position  Call light within reach    Chart check complete  Will cont with POC

## 2024-12-22 NOTE — ASSESSMENT & PLAN NOTE
Most recent hemoglobin and hematocrit are listed below.  Recent Labs     12/20/24  0606 12/21/24  0732 12/22/24  0708   HGB 9.6* 10.1* 8.4*   HCT 31.4* 32.6* 27.1*       Plan  - Monitor serial CBC: Daily  - Transfuse PRBC if patient becomes hemodynamically unstable, symptomatic or H/H drops below 7/21.  - Patient has not received any PRBC transfusions to date  - Patient's anemia is currently fluctuating from day-to-day

## 2024-12-22 NOTE — PLAN OF CARE
Discussed poc with pt, pt verbalized understanding    Purposeful rounding every 2hours    VS wnl  Fall precautions in place, remains injury free  Pain under control with PRN meds    Accurate I&Os  Abx given as prescribed  Bed locked at lowest position  Call light within reach    Chart check complete  Will cont with POC

## 2024-12-22 NOTE — ASSESSMENT & PLAN NOTE
Giving Zosyn for Pseudomonas fluorescens cystitis   Of note sensitivities tested only to Zosyn and cefepime, has cephalexin allergy  In the past he has received Zosyn and not cefepime so elected to give Zosyn  Of note, she was previously on ceftriaxone before the above cultures resulted  Zosyn started on 12/22

## 2024-12-22 NOTE — ASSESSMENT & PLAN NOTE
-She was discharged from [facility] on 10/18/24 for bacteremia. Blood cultures isolated bacteroides, antibiotics adjusted. Repeat blood cultures negative. ID recommended 2 weeks of IV Rocephin for UTI and 2 weeks of Flagyl for bacteremia. She has completed IV abx. Patient to have outpatient colonoscopy due to Bacteroides bacteremia which was done on 11/4/24 which showed moderate diverticulosis. Final pathology -- Tubular adenoma, multiple fragments. Negative for high-grade dysplasia or malignancy   -WBC count currently stable, afebrile, does have the UTI as below

## 2024-12-22 NOTE — ASSESSMENT & PLAN NOTE
Patient's blood pressure range in the last 24 hours was: BP  Min: 91/57  Max: 158/88.The patient's inpatient anti-hypertensive regimen is listed below:  Current Antihypertensives  lisinopriL tablet 10 mg, Nightly, Oral  hydrALAZINE injection 10 mg, Every 6 hours PRN, Intravenous    Plan  - BP is controlled, no changes needed to their regimen  - Hydralazine PRN

## 2024-12-22 NOTE — PROGRESS NOTES
Pharmacist Renal Dose Adjustment Note    Merle Caro is a 69 y.o. female being treated with the medication zosyn    Patient Data:    Vital Signs (Most Recent):  Temp: 98.4 °F (36.9 °C) (12/22/24 1205)  Pulse: 100 (12/22/24 1554)  Resp: 19 (12/22/24 1554)  BP: (!) 110/56 (12/22/24 1554)  SpO2: 96 % (12/22/24 1554) Vital Signs (72h Range):  Temp:  [96.5 °F (35.8 °C)-98.6 °F (37 °C)]   Pulse:  [100-130]   Resp:  [16-20]   BP: ()/()   SpO2:  [94 %-99 %]      Recent Labs   Lab 12/20/24  0606 12/21/24  0732 12/22/24  0708   CREATININE 0.7 0.7 0.6     Serum creatinine: 0.6 mg/dL 12/22/24 0708  Estimated creatinine clearance: 88.3 mL/min    Medication:zosyn 3.375 g iv q6hrs will be changed to medication:zosyn 4.5 gm iv q8hrs per renal dosing protocol.   Pharmacist's Name: Brianna Mazariegos [facility]  Pharmacist's Extension: [phone]

## 2024-12-22 NOTE — PLAN OF CARE
All medications given, call light and personal items within reach, bed in lowest position, pain management, all questions answered, walkway free of clutter, continue POC, monitor VS, Q2H rounding  Problem: Adult Inpatient Plan of Care  Goal: Plan of Care Review  Outcome: Progressing  Goal: Patient-Specific Goal (Individualized)  Outcome: Progressing  Goal: Absence of Hospital-Acquired Illness or Injury  Outcome: Progressing  Goal: Optimal Comfort and Wellbeing  Outcome: Progressing  Goal: Readiness for Transition of Care  Outcome: Progressing     Problem: Sepsis/Septic Shock  Goal: Optimal Coping  Outcome: Progressing  Goal: Absence of Bleeding  Outcome: Progressing  Goal: Blood Glucose Level Within Targeted Range  Outcome: Progressing  Goal: Absence of Infection Signs and Symptoms  Outcome: Progressing  Goal: Optimal Nutrition Intake  Outcome: Progressing     Problem: Infection  Goal: Absence of Infection Signs and Symptoms  Outcome: Progressing     Problem: Skin Injury Risk Increased  Goal: Skin Health and Integrity  Outcome: Progressing     Problem: Wound  Goal: Optimal Coping  Outcome: Progressing  Goal: Optimal Functional Ability  Outcome: Progressing  Goal: Absence of Infection Signs and Symptoms  Outcome: Progressing  Goal: Improved Oral Intake  Outcome: Progressing  Goal: Optimal Pain Control and Function  Outcome: Progressing  Goal: Skin Health and Integrity  Outcome: Progressing  Goal: Optimal Wound Healing  Outcome: Progressing     Problem: Fall Injury Risk  Goal: Absence of Fall and Fall-Related Injury  Outcome: Progressing

## 2024-12-22 NOTE — ASSESSMENT & PLAN NOTE
-Had thoracic spine fusion with Dr. Encarnacion on 12/18/24  -Regular diet now  -Pain management  -Antiemetics PRN  -started DVT prophylaxis with Lovenox 12/19  -of note she has been vancomycin and metronidazole per surgical recommendations for prophylaxis, the metronidazole as because of the prior infection with Bacteroides, then the patient culture Gram-negative rods in the urine so vancomycin was sutured ceftriaxone, but ultimately these Gram-negative rods turned out to be Pseudomonas fluorescens and so we have switched to Zosyn

## 2024-12-22 NOTE — PROGRESS NOTES
[facility] - Med Surg  Neurosurgery  Progress Note    Subjective:     History of Present Illness: Merle Caro is a 69 year old female who has  has a past medical history of Anemia, unspecified, Anxiety disorder, unspecified, Arthritis, Asthma, B12 deficiency anemia, Bacillus fragilis infection, Chronic back pain, Chronic constipation, COPD (chronic obstructive pulmonary disease), Depression, GERD (gastroesophageal reflux disease), Hypertension, Insomnia, Lumbar compression fracture, Neurogenic bladder, Osteoporosis, Rheumatoid arthritis, unspecified, Spinal stenosis, Tobacco abuse, Urge incontinence of urine, Urinary outflow obstruction, and Urinary tract infection, E. coli. Who presented to the Emergency Department for back pain. She is scheduled for surgery tomorrow, 12/18/24, with Dr. Encarnacion. She saw him in clinic today. Appears she was in severe pain and unable to get preop labs obtained and it was recommended to come to ED and have [facility] admit, plan for surgery in AM for thoracic spine fusion for lumbar burst fracture. CBC/CMP stable. Pt admitted for pain management for planned surgery with Dr. Encarnacion.     Post-Op Info:  Procedure(s) (LRB):  FUSION,SPINE,THORACIC,POSTERIOR APPROACH,USING COMPUTER-ASSISTED NAVIGATION (N/A)   4 Days Post-Op   Subjective & objective note cannot be loaded without a specified hospital service.  No issues overnight  Out of bed sitting in chair with worrisome this morning  Complains postoperative pain    MA EW  Surgical dressing in place    Assessment/Plan:     Assessment & plan notes cannot be loaded without a specified hospital service.  PT OT  Will continue to follow wound  Patient to go home physical therapy on discharge not interested in going to rehab or skilled nursing facility    Ean Encarnacion MD  Neurosurgery  [facility] - Med Surg

## 2024-12-22 NOTE — ASSESSMENT & PLAN NOTE
On 12/21:  Looks slightly worse today, urine now with Gram-negative rods which we had not technically been covering so giving ceftriaxone.  Also checking a lactate, VBG, CXR.  CXR is now back and is negative, awaiting the other labs.  No leukocytosis, no fever  12/22: Yesterday's workup was not concerning.  Urine now with Pseudomonas fluorescens as above.  Switched antibiotics.  Looking better today

## 2024-12-22 NOTE — SUBJECTIVE & OBJECTIVE
Review of Systems   Constitutional:  Positive for fatigue. Negative for fever.   HENT: Negative.     Respiratory:  Negative for chest tightness and shortness of breath.    Cardiovascular:  Negative for chest pain and leg swelling.   Gastrointestinal:  Negative for abdominal pain, constipation, diarrhea and nausea.   Genitourinary:  Negative for difficulty urinating and dysuria.   Musculoskeletal:  Positive for back pain.   Skin:  Negative for rash.   Neurological:  Negative for light-headedness and headaches.   Hematological:  Does not bruise/bleed easily.   Psychiatric/Behavioral:  Negative for agitation and behavioral problems.      Objective:     Vital Signs (Most Recent):  Temp: 98.4 °F (36.9 °C) (12/22/24 1205)  Pulse: 100 (12/22/24 1554)  Resp: 19 (12/22/24 1554)  BP: (!) 110/56 (12/22/24 1554)  SpO2: 96 % (12/22/24 1554) Vital Signs (24h Range):  Temp:  [96.5 °F (35.8 °C)-98.6 °F (37 °C)] 98.4 °F (36.9 °C)  Pulse:  [100-118] 100  Resp:  [16-19] 19  SpO2:  [94 %-98 %] 96 %  BP: ()/(56-88) 110/56     Weight: 78.5 kg (173 lb 1 oz)  Body mass index is 31.65 kg/m².    Intake/Output Summary (Last 24 hours) at 12/22/2024 1602  Last data filed at 12/22/2024 1122  Gross per 24 hour   Intake 2321.36 ml   Output 1750 ml   Net 571.36 ml         Physical Exam  Constitutional:       General: She is not in acute distress.     Comments: Today she is more alert, she is smiling for the 1st time since I met her   HENT:      Head: Normocephalic and atraumatic.      Mouth/Throat:      Mouth: Mucous membranes are moist.      Pharynx: Oropharynx is clear.   Eyes:      General: No scleral icterus.  Cardiovascular:      Rate and Rhythm: Normal rate and regular rhythm.      Heart sounds: No murmur heard.     No gallop.   Pulmonary:      Effort: Pulmonary effort is normal.      Breath sounds: Normal breath sounds.   Abdominal:      General: Bowel sounds are normal. There is no distension.      Tenderness: There is no  abdominal tenderness.   Musculoskeletal:         General: Tenderness present.      Cervical back: Tenderness present.      Right lower leg: No edema.      Left lower leg: No edema.      Comments: Tender along entire spine and throughout back   Skin:     General: Skin is warm and dry.      Comments: Dressing is CDI   Neurological:      Mental Status: Mental status is at baseline.      Comments: Able to squeeze both hands to command, moving all extremities  Oriented to person, place, year   Psychiatric:         Mood and Affect: Mood normal.         Behavior: Behavior normal.             Significant Labs: All pertinent labs within the past 24 hours have been reviewed.    Significant Imaging: I have reviewed all pertinent imaging results/findings within the past 24 hours.

## 2024-12-22 NOTE — ASSESSMENT & PLAN NOTE
Patient has hospital-acquired delirium in the setting of anesthesia, surgery, acute illness, pain  Switched benzodiazepines which were being used as a muscle relaxer to cyclobenzaprine   Delirium seems to have improved  -checked blood cultures at the recommendations of Neurosurgery (NGTD)  -Improving

## 2024-12-22 NOTE — PT/OT/SLP PROGRESS
Physical Therapy  Treatment    Merle Caro   MRN: [id number]   Admitting Diagnosis: Lumbar burst fracture, closed, initial encounter    PT Received On: 12/22/24  PT Start Time: 0700     PT Stop Time: 0725    PT Total Time (min): 25 min       Billable Minutes:  Therapeutic Activity 25    Treatment Type: Treatment  PT/PTA: PTA     Number of PTA visits since last PT visit: 1       General Precautions: Standard, fall  Orthopedic Precautions: spinal precautions  Braces: LSO  Respiratory Status: Nasal cannula, flow 2 L/min    Spiritual, Cultural Beliefs, Yarsanism Practices, Values that Affect Care: no    Subjective:  Communicated with charge nurse Sunshine and completed Epic chart review prior to session. Pt agreed to session with encouragement.          Objective:   Patient found with: nicholson catheter, oxygen, peripheral IV    Rolling Right: Mod A  Supine > Sit: Max A x2 utilizing log rolling technique. LSO donned in sitting position  Scoot towards EOB: Mod A  STS from EOB x2 trials:                    Trial 1: Max A x2 with RW. Cues for proper hand placement. Demonstrates (B) knees flexed. Cues for knee ext, pt unable to achieve. Pt able to tolerate standing for ~30 secs before asking to sit. Cues to control descend to bed.                    Trial 2: Max A x2 with RW. Demonstrate (B) knee flexed. Cues for upright posture pt unable to achieve.   Stand Pivot T/F to BSC: Max A x2 with RW. Quick to fatigue. Cues for pursed lip breathing technique.       Treatment and Education:  Educated pt on benefits of participating in PT services and role of PT in acute care. Educated pt on proper RW use and management for safety. Educated pt on increasing time spent OOB and direct impact on CV endurance. Encouraged pt to sit up in BSC for all meals and for a min 2hr 3x/day. Reviewed HEP and encouraged pt to perform throughout the day to regain strength. Reviewed call don't fall policy and to call for assistance with all OOB needs. Pt  agreed to safety request.      AM-PAC 6 CLICK MOBILITY  How much help from another person does this patient currently need?   1 = Unable, Total/Dependent Assistance  2 = A lot, Maximum/Moderate Assistance  3 = A little, Minimum/Contact Guard/Supervision  4 = None, Modified [place]/Independent    Turning over in bed (including adjusting bedclothes, sheets and blankets)?: 2  Sitting down on and standing up from a chair with arms (e.g., wheelchair, bedside commode, etc.): 2  Moving from lying on back to sitting on the side of the bed?: 2  Moving to and from a bed to a chair (including a wheelchair)?: 2  Need to walk in hospital room?: 2  Climbing 3-5 steps with a railing?: 1  Basic Mobility Total Score: 11    AM-PAC Raw Score CMS G-Code Modifier Level of Impairment Assistance   6 % Total / Unable   7 - 9 CM 80 - 100% Maximal Assist   10 - 14 CL 60 - 80% Moderate Assist   15 - 19 CK 40 - 60% Moderate Assist   20 - 22 CJ 20 - 40% Minimal Assist   23 CI 1-20% SBA / CGA   24 CH 0% Independent/ Mod I     Patient left up in chair with all lines intact, call button in reach, chair alarm on, nurse notified, and family present.    Assessment:  Merle Caro is a 69 y.o. female with a medical diagnosis of Lumbar burst fracture, closed, initial encounter and presents with the following functional limitations. Pt remains unable to progress gait at this time due to weakness and inability to achieve full knee ext. Pt will continue to benefit from skilled PT in order to address the below deficits to reach maximum level of function.    Rehab identified problem list/impairments: weakness, impaired endurance, impaired sensation, impaired self care skills, impaired functional mobility, gait instability, impaired balance, decreased coordination, impaired cognition, decreased upper extremity function, decreased lower extremity function, decreased safety awareness, pain, decreased ROM, impaired fine motor, orthopedic  precautions    Rehab potential is fair.    Activity tolerance: Fair    Discharge recommendations: Moderate Intensity Therapy      Barriers to discharge:      Equipment recommendations: bedside commode     GOALS:   Multidisciplinary Problems       Physical Therapy Goals          Problem: Physical Therapy    Goal Priority Disciplines Outcome Interventions   Physical Therapy Goal     PT, PT/OT Progressing    Description: Goals to be met by 1/2/25.  1. Pt will complete bed mobility CGA.  2. Pt will complete sit to stand CGA.  3. Pt will transfer bed to chair MIN A using RW.  4. Pt will increase AMPAC score by 2 points to progress functional mobility.                       PLAN:    Patient to be seen 3 x/week to address the above listed problems via gait training, therapeutic activities, therapeutic exercises, neuromuscular re-education  Plan of Care expires: 01/02/25  Plan of Care reviewed with: patient, spouse         12/22/2024

## 2024-12-22 NOTE — PROGRESS NOTES
[facility] Medicine  Progress Note    Patient Name: Merle Caro  MRN: [id number]  Patient Class: IP- Inpatient   Admission Date: 12/17/2024  Length of Stay: 5 days  Attending Physician: August Freire MD  Primary Care Provider: Marck Major MD        Subjective     Principal Problem:Lumbar burst fracture, closed, initial encounter        HPI:  Merle Caro is a 69 year old female who has  has a past medical history of Anemia, unspecified, Anxiety disorder, unspecified, Arthritis, Asthma, B12 deficiency anemia, Bacillus fragilis infection, Chronic back pain, Chronic constipation, COPD (chronic obstructive pulmonary disease), Depression, GERD (gastroesophageal reflux disease), Hypertension, Insomnia, Lumbar compression fracture, Neurogenic bladder, Osteoporosis, Rheumatoid arthritis, unspecified, Spinal stenosis, Tobacco abuse, Urge incontinence of urine, Urinary outflow obstruction, and Urinary tract infection, E. coli. Who presented to the Emergency Department for back pain. She is scheduled for surgery tomorrow, 12/18/24, with Dr. Encarnacion. She saw him in clinic today. Appears she was in severe pain and unable to get preop labs obtained and it was recommended to come to ED and have HM admit, plan for surgery in AM for thoracic spine fusion for lumbar burst fracture. CBC/CMP stable. Pt admitted for pain management for planned surgery with Dr. Encarnacion.     Overview/Hospital Course:  12/18: Had NSGY today, now back in room, somnolent and endorsing pain in the back but otherwise doing well. Daughter at bedside. Giving pain medication. TLSO brace on the way.  12/19:  Patient doing about the same as yesterday.  Pain is slightly better controlled.   is at the bedside.  Reportedly she had an episode of confusion/hallucination overnight and early in the morning thinking that people were in the room who were not.  Her mental status is better now  12/20: Slept much better last night after  receiving trazodone.  Of note we had given her 150 mg at night when at home she normally takes 75 mg, may need to reduce the dose tonight.  Still having significant pain but it is overall much improved.  No hallucinations.  Was not able to work much with PT yesterday, they will hopefully come back today   12/21:  Mildly diaphoretic, appears a bit worse today.  Checking lactate, VBG, CXR.  Pain control is adequate.  Family would like patient to go home on DC, they are not open to skilled nursing.  Urine with Gram-negative rods, switching vancomycin to ceftriaxone and continuing metronidazole   12/22: Doing better, smiling today, pain is better controlled.  Infectious workup yesterday was negative other than the already known UTI        Review of Systems   Constitutional:  Positive for fatigue. Negative for fever.   HENT: Negative.     Respiratory:  Negative for chest tightness and shortness of breath.    Cardiovascular:  Negative for chest pain and leg swelling.   Gastrointestinal:  Negative for abdominal pain, constipation, diarrhea and nausea.   Genitourinary:  Negative for difficulty urinating and dysuria.   Musculoskeletal:  Positive for back pain.   Skin:  Negative for rash.   Neurological:  Negative for light-headedness and headaches.   Hematological:  Does not bruise/bleed easily.   Psychiatric/Behavioral:  Negative for agitation and behavioral problems.      Objective:     Vital Signs (Most Recent):  Temp: 98.4 °F (36.9 °C) (12/22/24 1205)  Pulse: 100 (12/22/24 1554)  Resp: 19 (12/22/24 1554)  BP: (!) 110/56 (12/22/24 1554)  SpO2: 96 % (12/22/24 1554) Vital Signs (24h Range):  Temp:  [96.5 °F (35.8 °C)-98.6 °F (37 °C)] 98.4 °F (36.9 °C)  Pulse:  [100-118] 100  Resp:  [16-19] 19  SpO2:  [94 %-98 %] 96 %  BP: ()/(56-88) 110/56     Weight: 78.5 kg (173 lb 1 oz)  Body mass index is 31.65 kg/m².    Intake/Output Summary (Last 24 hours) at 12/22/2024 1602  Last data filed at 12/22/2024 1122  Gross per 24 hour    Intake 2321.36 ml   Output 1750 ml   Net 571.36 ml         Physical Exam  Constitutional:       General: She is not in acute distress.     Comments: Today she is more alert, she is smiling for the 1st time since I met her   HENT:      Head: Normocephalic and atraumatic.      Mouth/Throat:      Mouth: Mucous membranes are moist.      Pharynx: Oropharynx is clear.   Eyes:      General: No scleral icterus.  Cardiovascular:      Rate and Rhythm: Normal rate and regular rhythm.      Heart sounds: No murmur heard.     No gallop.   Pulmonary:      Effort: Pulmonary effort is normal.      Breath sounds: Normal breath sounds.   Abdominal:      General: Bowel sounds are normal. There is no distension.      Tenderness: There is no abdominal tenderness.   Musculoskeletal:         General: Tenderness present.      Cervical back: Tenderness present.      Right lower leg: No edema.      Left lower leg: No edema.      Comments: Tender along entire spine and throughout back   Skin:     General: Skin is warm and dry.      Comments: Dressing is CDI   Neurological:      Mental Status: Mental status is at baseline.      Comments: Able to squeeze both hands to command, moving all extremities  Oriented to person, place, year   Psychiatric:         Mood and Affect: Mood normal.         Behavior: Behavior normal.             Significant Labs: All pertinent labs within the past 24 hours have been reviewed.    Significant Imaging: I have reviewed all pertinent imaging results/findings within the past 24 hours.    Assessment and Plan     * Lumbar burst fracture, closed, initial encounter  -Had thoracic spine fusion with Dr. Encarnacion on 12/18/24  -Regular diet now  -Pain management  -Antiemetics PRN  -started DVT prophylaxis with Lovenox 12/19  -of note she has been vancomycin and metronidazole per surgical recommendations for prophylaxis, the metronidazole as because of the prior infection with Bacteroides, then the patient culture  Gram-negative rods in the urine so vancomycin was sutured ceftriaxone, but ultimately these Gram-negative rods turned out to be Pseudomonas fluorescens and so we have switched to Zosyn    Sinus tachycardia  EKG with a right bundle branch block   No chest pain   No obvious other causes of the tachycardia other than pain, infection  We will continue to monitor   Currently heart rates approximately 115-120      Diaphoresis  On 12/21:  Looks slightly worse today, urine now with Gram-negative rods which we had not technically been covering so giving ceftriaxone.  Also checking a lactate, VBG, CXR.  CXR is now back and is negative, awaiting the other labs.  No leukocytosis, no fever  12/22: Yesterday's workup was not concerning.  Urine now with Pseudomonas fluorescens as above.  Switched antibiotics.  Looking better today      Pyuria  Giving Zosyn for Pseudomonas fluorescens cystitis   Of note sensitivities tested only to Zosyn and cefepime, has cephalexin allergy  In the past he has received Zosyn and not cefepime so elected to give Zosyn  Of note, she was previously on ceftriaxone before the above cultures resulted  Zosyn started on 12/22    Delirium  Patient has hospital-acquired delirium in the setting of anesthesia, surgery, acute illness, pain  Switched benzodiazepines which were being used as a muscle relaxer to cyclobenzaprine   Delirium seems to have improved  -checked blood cultures at the recommendations of Neurosurgery (NGTD)  -Improving      Bacteremia  -She was discharged from [facility] on 10/18/24 for bacteremia. Blood cultures isolated bacteroides, antibiotics adjusted. Repeat blood cultures negative. ID recommended 2 weeks of IV Rocephin for UTI and 2 weeks of Flagyl for bacteremia. She has completed IV abx. Patient to have outpatient colonoscopy due to Bacteroides bacteremia which was done on 11/4/24 which showed moderate diverticulosis. Final pathology -- Tubular adenoma, multiple fragments. Negative for  high-grade dysplasia or malignancy   -WBC count currently stable, afebrile, does have the UTI as below    Severe obesity (BMI 35.0-39.9) with comorbidity  Body mass index is 31.65 kg/m². Morbid obesity complicates all aspects of disease management from diagnostic modalities to treatment. Weight loss encouraged and health benefits explained to patient.         Anemia  Most recent hemoglobin and hematocrit are listed below.  Recent Labs     12/20/24  0606 12/21/24  0732 12/22/24  0708   HGB 9.6* 10.1* 8.4*   HCT 31.4* 32.6* 27.1*       Plan  - Monitor serial CBC: Daily  - Transfuse PRBC if patient becomes hemodynamically unstable, symptomatic or H/H drops below 7/21.  - Patient has not received any PRBC transfusions to date  - Patient's anemia is currently fluctuating from day-to-day    Primary hypertension  Patient's blood pressure range in the last 24 hours was: BP  Min: 91/57  Max: 158/88.The patient's inpatient anti-hypertensive regimen is listed below:  Current Antihypertensives  lisinopriL tablet 10 mg, Nightly, Oral  hydrALAZINE injection 10 mg, Every 6 hours PRN, Intravenous    Plan  - BP is controlled, no changes needed to their regimen  - Hydralazine PRN    Asthma with COPD  -Not currently in exacerbation  -DuoNebs PRN        VTE Risk Mitigation (From admission, onward)           Ordered     enoxaparin injection 40 mg  Daily         12/19/24 0729     Reason for no Mechanical VTE Prophylaxis  Once        Question:  Reasons:  Answer:  Risk of Bleeding    12/18/24 1340     IP VTE HIGH RISK PATIENT  Once         12/17/24 1453     Place sequential compression device  Until discontinued         12/17/24 1453                    Discharge Planning   YELENA:      Code Status: Full Code   Medical Readiness for Discharge Date:   Discharge Plan A: Home Health              August Freire MD  Department of Hospital Medicine   [place] [facility]

## 2024-12-23 VITALS
RESPIRATION RATE: 17 BRPM | DIASTOLIC BLOOD PRESSURE: 53 MMHG | TEMPERATURE: 99 F | HEIGHT: 62 IN | OXYGEN SATURATION: 98 % | BODY MASS INDEX: 31.85 KG/M2 | SYSTOLIC BLOOD PRESSURE: 100 MMHG | WEIGHT: 173.06 LBS | HEART RATE: 100 BPM

## 2024-12-23 PROBLEM — R61 DIAPHORESIS: Status: RESOLVED | Noted: 2024-12-21 | Resolved: 2024-12-23

## 2024-12-23 LAB
ANION GAP SERPL CALC-SCNC: 6 MMOL/L (ref 8–16)
BACTERIA UR CULT: ABNORMAL
BASOPHILS # BLD AUTO: 0.05 K/UL (ref 0–0.2)
BASOPHILS NFR BLD: 0.7 % (ref 0–1.9)
BUN SERPL-MCNC: 7 MG/DL (ref 8–23)
CALCIUM SERPL-MCNC: 8.1 MG/DL (ref 8.7–10.5)
CHLORIDE SERPL-SCNC: 100 MMOL/L (ref 95–110)
CO2 SERPL-SCNC: 29 MMOL/L (ref 23–29)
CREAT SERPL-MCNC: 0.7 MG/DL (ref 0.5–1.4)
DIFFERENTIAL METHOD BLD: ABNORMAL
EOSINOPHIL # BLD AUTO: 0.5 K/UL (ref 0–0.5)
EOSINOPHIL NFR BLD: 6.5 % (ref 0–8)
ERYTHROCYTE [DISTWIDTH] IN BLOOD BY AUTOMATED COUNT: 14.2 % (ref 11.5–14.5)
EST. GFR  (NO RACE VARIABLE): >60 ML/MIN/1.73 M^2
FUNGUS SPEC CULT: NORMAL
GLUCOSE SERPL-MCNC: 99 MG/DL (ref 70–110)
HCT VFR BLD AUTO: 29.5 % (ref 37–48.5)
HGB BLD-MCNC: 9.3 G/DL (ref 12–16)
IMM GRANULOCYTES # BLD AUTO: 0.06 K/UL (ref 0–0.04)
IMM GRANULOCYTES NFR BLD AUTO: 0.9 % (ref 0–0.5)
LYMPHOCYTES # BLD AUTO: 0.8 K/UL (ref 1–4.8)
LYMPHOCYTES NFR BLD: 11.8 % (ref 18–48)
MCH RBC QN AUTO: 29.4 PG (ref 27–31)
MCHC RBC AUTO-ENTMCNC: 31.5 G/DL (ref 32–36)
MCV RBC AUTO: 93 FL (ref 82–98)
MONOCYTES # BLD AUTO: 0.8 K/UL (ref 0.3–1)
MONOCYTES NFR BLD: 11 % (ref 4–15)
NEUTROPHILS # BLD AUTO: 4.8 K/UL (ref 1.8–7.7)
NEUTROPHILS NFR BLD: 69.1 % (ref 38–73)
NRBC BLD-RTO: 0 /100 WBC
PLATELET # BLD AUTO: 306 K/UL (ref 150–450)
PMV BLD AUTO: 9.5 FL (ref 9.2–12.9)
POTASSIUM SERPL-SCNC: 3.4 MMOL/L (ref 3.5–5.1)
RBC # BLD AUTO: 3.16 M/UL (ref 4–5.4)
SODIUM SERPL-SCNC: 135 MMOL/L (ref 136–145)
WBC # BLD AUTO: 6.92 K/UL (ref 3.9–12.7)

## 2024-12-23 PROCEDURE — 97530 THERAPEUTIC ACTIVITIES: CPT

## 2024-12-23 PROCEDURE — 25000003 PHARM REV CODE 250: Performed by: NURSE PRACTITIONER

## 2024-12-23 PROCEDURE — 97535 SELF CARE MNGMENT TRAINING: CPT

## 2024-12-23 PROCEDURE — 25000003 PHARM REV CODE 250: Performed by: PHYSICIAN ASSISTANT

## 2024-12-23 PROCEDURE — 36415 COLL VENOUS BLD VENIPUNCTURE: CPT | Performed by: STUDENT IN AN ORGANIZED HEALTH CARE EDUCATION/TRAINING PROGRAM

## 2024-12-23 PROCEDURE — 80048 BASIC METABOLIC PNL TOTAL CA: CPT | Performed by: STUDENT IN AN ORGANIZED HEALTH CARE EDUCATION/TRAINING PROGRAM

## 2024-12-23 PROCEDURE — 25000003 PHARM REV CODE 250: Performed by: STUDENT IN AN ORGANIZED HEALTH CARE EDUCATION/TRAINING PROGRAM

## 2024-12-23 PROCEDURE — 25000003 PHARM REV CODE 250: Performed by: NEUROLOGICAL SURGERY

## 2024-12-23 PROCEDURE — 63600175 PHARM REV CODE 636 W HCPCS: Performed by: STUDENT IN AN ORGANIZED HEALTH CARE EDUCATION/TRAINING PROGRAM

## 2024-12-23 PROCEDURE — 85025 COMPLETE CBC W/AUTO DIFF WBC: CPT | Performed by: STUDENT IN AN ORGANIZED HEALTH CARE EDUCATION/TRAINING PROGRAM

## 2024-12-23 RX ORDER — CIPROFLOXACIN 750 MG/1
750 TABLET, FILM COATED ORAL 2 TIMES DAILY
Qty: 24 TABLET | Refills: 0 | Status: SHIPPED | OUTPATIENT
Start: 2024-12-23 | End: 2025-01-04

## 2024-12-23 RX ORDER — CYCLOBENZAPRINE HCL 10 MG
5 TABLET ORAL 2 TIMES DAILY PRN
Qty: 5 TABLET | Refills: 0 | Status: SHIPPED | OUTPATIENT
Start: 2024-12-23 | End: 2025-01-02

## 2024-12-23 RX ORDER — ENOXAPARIN SODIUM 100 MG/ML
40 INJECTION SUBCUTANEOUS EVERY 24 HOURS
Qty: 10 ML | Refills: 0 | Status: SHIPPED | OUTPATIENT
Start: 2024-12-23 | End: 2025-01-17

## 2024-12-23 RX ORDER — HYDROCODONE BITARTRATE AND ACETAMINOPHEN 10; 325 MG/1; MG/1
1 TABLET ORAL EVERY 8 HOURS PRN
Qty: 10 TABLET | Refills: 0 | Status: SHIPPED | OUTPATIENT
Start: 2024-12-23 | End: 2024-12-28

## 2024-12-23 RX ADMIN — FOLIC ACID 1 MG: 1 TABLET ORAL at 09:12

## 2024-12-23 RX ADMIN — PIPERACILLIN AND TAZOBACTAM 4.5 G: 4; .5 INJECTION, POWDER, LYOPHILIZED, FOR SOLUTION INTRAVENOUS; PARENTERAL at 12:12

## 2024-12-23 RX ADMIN — PIPERACILLIN AND TAZOBACTAM 4.5 G: 4; .5 INJECTION, POWDER, LYOPHILIZED, FOR SOLUTION INTRAVENOUS; PARENTERAL at 09:12

## 2024-12-23 RX ADMIN — MUPIROCIN: 20 OINTMENT TOPICAL at 09:12

## 2024-12-23 RX ADMIN — Medication 200 ML: at 04:12

## 2024-12-23 RX ADMIN — POLYETHYLENE GLYCOL 3350 17 G: 17 POWDER, FOR SOLUTION ORAL at 09:12

## 2024-12-23 RX ADMIN — THERA TABS 1 TABLET: TAB at 09:12

## 2024-12-23 RX ADMIN — CYCLOBENZAPRINE HYDROCHLORIDE 10 MG: 10 TABLET, FILM COATED ORAL at 12:12

## 2024-12-23 RX ADMIN — DOCUSATE SODIUM 100 MG: 100 CAPSULE, LIQUID FILLED ORAL at 09:12

## 2024-12-23 RX ADMIN — ENOXAPARIN SODIUM 40 MG: 40 INJECTION SUBCUTANEOUS at 09:12

## 2024-12-23 RX ADMIN — Medication 200 ML: at 09:12

## 2024-12-23 RX ADMIN — Medication 200 ML: at 01:12

## 2024-12-23 RX ADMIN — Medication 200 ML: at 12:12

## 2024-12-23 NOTE — PROGRESS NOTES
[place] [facility]  Neurosurgery  Progress Note    Subjective:     History of Present Illness: No notes on file    Post-Op Info:  Procedure(s) (LRB):  FUSION,SPINE,THORACIC,POSTERIOR APPROACH,USING COMPUTER-ASSISTED NAVIGATION (N/A)   5 Days Post-Op   No new subjective & objective note has been filed under this hospital service since the last note was generated.    No new issues  Back pain controlled     + UTI on abx   Labs reviewed   MAEW  Surgical dressing in place    Assessment/Plan:     No notes have been filed under this hospital service.  Service: Neurosurgery  Patient remains stable from surgical standpoint   Her D/C and follow up instructions were placed   Plan to follow up 10-14 days for staple removal   Please call opffice or call  to speak with on call neurosurgery with any concerns or questions       Ean Encarnacion MD  Neurosurgery  [place] [facility]

## 2024-12-23 NOTE — NURSING
"Pt remains free of falls/injury. Safety precautions maintained. Pt denies pain/discomfort. IV abx given.  Pt IV removed per order. VSS. No S/S of distress noted at this time.  Pt  is at bedside for education and states "that he will place his wife in the wheel chair himself because he can do it without hurting her."  Medications delivered to bedside.  Incentive spirometer given to pt.  Pt and  education completed on use of incentive spirometer.     "

## 2024-12-23 NOTE — DISCHARGE SUMMARY
[facility] Medicine  Discharge Summary      Patient Name: Merle Caro  MRN: [id number]  CARLOS: [id number]  Patient Class: IP- Inpatient  Admission Date: 12/17/2024  Hospital Length of Stay: 6 days  Discharge Date and Time:  12/23/2024 3:47 PM  Attending Physician: August Freire MD   Discharging Provider: August Freire MD  Primary Care Provider: Marck Major MD    Primary Care Team: Networked reference to record PCT     HPI:   Merle Caro is a 69 year old female who has  has a past medical history of Anemia, unspecified, Anxiety disorder, unspecified, Arthritis, Asthma, B12 deficiency anemia, Bacillus fragilis infection, Chronic back pain, Chronic constipation, COPD (chronic obstructive pulmonary disease), Depression, GERD (gastroesophageal reflux disease), Hypertension, Insomnia, Lumbar compression fracture, Neurogenic bladder, Osteoporosis, Rheumatoid arthritis, unspecified, Spinal stenosis, Tobacco abuse, Urge incontinence of urine, Urinary outflow obstruction, and Urinary tract infection, E. coli. Who presented to the Emergency Department for back pain. She is scheduled for surgery tomorrow, 12/18/24, with Dr. Encarnacion. She saw him in clinic today. Appears she was in severe pain and unable to get preop labs obtained and it was recommended to come to ED and have HM admit, plan for surgery in AM for thoracic spine fusion for lumbar burst fracture. CBC/CMP stable. Pt admitted for pain management for planned surgery with Dr. Encarnacion.     Procedure(s) (LRB):  FUSION,SPINE,THORACIC,POSTERIOR APPROACH,USING COMPUTER-ASSISTED NAVIGATION (N/A)      Hospital Course:   12/18: Had NSGY today, now back in room, somnolent and endorsing pain in the back but otherwise doing well. Daughter at bedside. Giving pain medication. TLSO brace on the way.  12/19:  Patient doing about the same as yesterday.  Pain is slightly better controlled.   is at the bedside.  Reportedly she had an episode of  confusion/hallucination overnight and early in the morning thinking that people were in the room who were not.  Her mental status is better now  12/20: Slept much better last night after receiving trazodone.  Of note we had given her 150 mg at night when at home she normally takes 75 mg, may need to reduce the dose tonight.  Still having significant pain but it is overall much improved.  No hallucinations.  Was not able to work much with PT yesterday, they will hopefully come back today   12/21:  Mildly diaphoretic, appears a bit worse today.  Checking lactate, VBG, CXR.  Pain control is adequate.  Family would like patient to go home on DC, they are not open to skilled nursing.  Urine with Gram-negative rods, switching vancomycin to ceftriaxone and continuing metronidazole   12/22: Doing better, smiling today, pain is better controlled.  Infectious workup yesterday was negative other than the already known UTI  12/23:  Ready to leave.  Transitioning to ciprofloxacin for the UTI.  Home health getting set up.  Delivering meds to bedside.  Discussed with Neurosurgery     Goals of Care Treatment Preferences:  Code Status: Full Code      SDOH Screening:  The patient declined to be screened for utility difficulties, food insecurity, transport difficulties, housing insecurity, and interpersonal safety, so no concerns could be identified this admission.     Consults:   Consults (From admission, onward)          Status Ordering Provider     Inpatient consult to Social Work  Once        Provider:  (Not yet assigned)    Completed MYRNA HILTON     Inpatient consult to Social Work  Once        Provider:  (Not yet assigned)    Completed MYRNA HILTON            * Lumbar burst fracture, closed, initial encounter  -Had thoracic spine fusion with Dr. Encarnacion on 12/18/24  -Regular diet now  -Pain management  -Antiemetics PRN  -started DVT prophylaxis with Lovenox 12/19, plan for prophylactic dosing for 30 days total  -of  note she has been vancomycin and metronidazole per surgical recommendations for prophylaxis, the metronidazole as because of the prior infection with Bacteroides, then the patient culture Gram-negative rods in the urine so vancomycin was sutured ceftriaxone, but ultimately these Gram-negative rods turned out to be Pseudomonas fluorescens and so we have switched to Zosyn and then on discharge she will take ciprofloxacin for a total of a 14 day course  -follow-up to be arranged by Neurosurgery    Sinus tachycardia  EKG with a right bundle branch block   No chest pain   No obvious other causes of the tachycardia other than pain, infection  We will continue to monitor   Currently heart rates approximately       Pyuria  Giving Zosyn for Pseudomonas fluorescens cystitis   Switching to ciprofloxacin on discharge, discussed with ID      Delirium  Patient has hospital-acquired delirium in the setting of anesthesia, surgery, acute illness, pain  Switched benzodiazepines which were being used as a muscle relaxer to cyclobenzaprine   Delirium has improved        Neurogenic bladder  -followed by Urology   -has indwelling Leija  -planning to keep Leija in until at least March when she has Urology follow-up      Bacteremia  -She was discharged from [facility] on 10/18/24 for bacteremia. Blood cultures isolated bacteroides, antibiotics adjusted. Repeat blood cultures negative. ID recommended 2 weeks of IV Rocephin for UTI and 2 weeks of Flagyl for bacteremia. She has completed IV abx. Patient to have outpatient colonoscopy due to Bacteroides bacteremia which was done on 11/4/24 which showed moderate diverticulosis. Final pathology -- Tubular adenoma, multiple fragments. Negative for high-grade dysplasia or malignancy   -WBC count currently stable, afebrile, does have the UTI as below    Severe obesity (BMI 35.0-39.9) with comorbidity  Body mass index is 31.65 kg/m². Morbid obesity complicates all aspects of disease management  from diagnostic modalities to treatment. Weight loss encouraged and health benefits explained to patient.         Anemia  Most recent hemoglobin and hematocrit are listed below.  Recent Labs     12/21/24  0732 12/22/24  0708 12/23/24  0654   HGB 10.1* 8.4* 9.3*   HCT 32.6* 27.1* 29.5*       Plan  - hemoglobin was stable from approximately 8-10, was monitored daily, did not require blood transfusion, no evidence of active bleeding    Primary hypertension  Patient's blood pressure range in the last 24 hours was: BP  Min: 91/57  Max: 158/88.The patient's inpatient anti-hypertensive regimen is listed below:  Current Antihypertensives  lisinopriL tablet 10 mg, Nightly, Oral  hydrALAZINE injection 10 mg, Every 6 hours PRN, Intravenous    Plan  - BP is controlled, no changes needed to their regimen  - Hydralazine PRN    Asthma with COPD  -Not currently in exacerbation  -DuoNebs PRN        Final Active Diagnoses:    Diagnosis Date Noted POA    PRINCIPAL PROBLEM:  Lumbar burst fracture, closed, initial encounter [S32.001A] 12/17/2024 Yes    Sinus tachycardia [R00.0] 12/21/2024 Yes    Pyuria [R82.81] 12/20/2024 Yes    Delirium [R41.0] 12/19/2024 No    Neurogenic bladder [N31.9] 12/04/2024 Yes    Bacteremia [R78.81] 06/22/2024 Yes    Severe obesity (BMI 35.0-39.9) with comorbidity [E66.01] 06/06/2024 Yes    Anemia [D64.9] 10/15/2022 Yes     Chronic    Asthma with COPD [J44.89] 10/08/2015 Yes     Chronic    Primary hypertension [I10] 05/20/2013 Yes     Chronic      Problems Resolved During this Admission:    Diagnosis Date Noted Date Resolved POA    Diaphoresis [R61] 12/21/2024 12/23/2024 No       Discharged Condition: fair    Disposition: Home or Self Care    Follow Up:   Contact information for follow-up providers       Shayna Tillman PA-C Follow up in 2 week(s).    Specialty: Neurosurgery  Why: For suture removal  Contact information:  [address] [facility]  [place] [address] [phone]                       Contact  information for after-discharge care       Home Medical Care       [facility] [place] .    Service: Home Health Services  Contact information:  [address] [facility] [address]  [facility] [address]  [phone]                                 Patient Instructions:      Ambulatory referral/consult to Outpatient Case Management   Referral Priority: Routine Referral Type: Consultation   Referral Reason: Specialty Services Required   Number of Visits Requested: 1     Ambulatory referral/consult to Home Health   Standing Status: Future   Referral Priority: Routine Referral Type: Home Health   Referral Reason: Specialty Services Required   Requested Specialty: Home Health Services   Number of Visits Requested: 1     Diet Adult Regular     Notify your health care provider if you experience any of the following:  temperature >100.4     Notify your health care provider if you experience any of the following:  persistent nausea and vomiting or diarrhea     Notify your health care provider if you experience any of the following:  redness, tenderness, or signs of infection (pain, swelling, redness, odor or green/yellow discharge around incision site)     Notify your health care provider if you experience any of the following:  severe uncontrolled pain     Notify your health care provider if you experience any of the following:  increased confusion or weakness     Change dressing (specify)   Order Comments: Dressing change:   Change dressing once daily   Clean incision with soap and water daily   Place bacitracin ointment and cover incision after     Change dressing (specify)   Order Comments: Clean wounds daily with soap and water .  Cover with bandage after with bacitracin ointment     Weight bearing restrictions (specify):   Scheduling Instructions: No lifting greater than 10 pounds   Wear brace when out of bed       Significant Diagnostic Studies: Labs: BMP:   Recent Labs   Lab 12/22/24  0716  12/23/24  0654   GLU 96 99    135*   K 3.7 3.4*    100   CO2 28 29   BUN 7* 7*   CREATININE 0.6 0.7   CALCIUM 7.9* 8.1*    and CBC   Recent Labs   Lab 12/22/24  0708 12/23/24  0654   WBC 7.38 6.92   HGB 8.4* 9.3*   HCT 27.1* 29.5*    306       Pending Diagnostic Studies:       None           Medications:  Reconciled Home Medications:      Medication List        START taking these medications      ciprofloxacin HCl 750 MG tablet  Commonly known as: CIPRO  Take 1 tablet (750 mg total) by mouth 2 (two) times daily. for 12 days     cyclobenzaprine 10 MG tablet  Commonly known as: FLEXERIL  Take 0.5 tablets (5 mg total) by mouth 2 (two) times daily as needed for Muscle spasms.     enoxaparin 40 mg/0.4 mL Syrg  Commonly known as: LOVENOX  Inject 0.4 mLs into the skin every 24 hours for 25 days            CHANGE how you take these medications      pantoprazole 40 MG tablet  Commonly known as: PROTONIX  Take 1 tablet (40 mg total) by mouth once daily.  What changed: when to take this            CONTINUE taking these medications      celecoxib 200 MG capsule  Commonly known as: CeleBREX  Take 1 capsule (200 mg total) by mouth once daily.     cholecalciferol (vitamin D3) 50 mcg (2,000 unit) Cap capsule  Commonly known as: VITAMIN D3  Take 1 capsule by mouth every evening.     clonazePAM 0.5 MG tablet  Commonly known as: KlonoPIN  Take 0.5 mg by mouth every evening.     cyanocobalamin (vitamin B-12) 1,000 mcg Subl  Place 1,000 mcg under the tongue once daily.     folic acid 1 MG tablet  Commonly known as: FOLVITE  Take 1 tablet (1 mg total) by mouth once daily.     haloperidoL 5 MG tablet  Commonly known as: HALDOL  Take 5 mg by mouth once daily.     HYDROcodone-acetaminophen  mg per tablet  Commonly known as: NORCO  Take 1 tablet by mouth every 8 (eight) hours as needed for Pain.     lisinopriL 10 MG tablet  Take 10 mg by mouth every evening.     methocarbamoL 750 MG Tab  Commonly known as:  ROBAXIN  Take 750 mg by mouth 3 (three) times daily.     OXYGEN-AIR DELIVERY SYSTEMS MISC  Inhale 2 L into the lungs as needed (O2 via Nasal Canula.).     paroxetine 40 MG tablet  Commonly known as: PAXIL  Take 40 mg by mouth once daily.     pregabalin 100 MG capsule  Commonly known as: LYRICA  Take 100 mg by mouth 2 (two) times daily.     traMADoL 50 mg tablet  Commonly known as: ULTRAM  Take 1 tablet (50 mg total) by mouth every 24 hours as needed for Pain.     traZODone 150 MG tablet  Commonly known as: DESYREL  Take 150 mg by mouth nightly. Patient takes 1/2 tablet at night              Indwelling Lines/Drains at time of discharge:   Lines/Drains/Airways       Drain  Duration                  Urethral Catheter 12/12/24 11 days                    Time spent on the discharge of patient: 75 minutes         August Freire MD  Department of Hospital Medicine  [facility]

## 2024-12-23 NOTE — ASSESSMENT & PLAN NOTE
-Had thoracic spine fusion with Dr. Encarnacion on 12/18/24  -Regular diet now  -Pain management  -Antiemetics PRN  -started DVT prophylaxis with Lovenox 12/19, plan for prophylactic dosing for 30 days total  -of note she has been vancomycin and metronidazole per surgical recommendations for prophylaxis, the metronidazole as because of the prior infection with Bacteroides, then the patient culture Gram-negative rods in the urine so vancomycin was sutured ceftriaxone, but ultimately these Gram-negative rods turned out to be Pseudomonas fluorescens and so we have switched to Zosyn and then on discharge she will take ciprofloxacin for a total of a 14 day course  -follow-up to be arranged by Neurosurgery

## 2024-12-23 NOTE — PT/OT/SLP PROGRESS
Occupational Therapy  Treatment    Merle Caro   MRN: [id number]   Admitting Diagnosis: Lumbar burst fracture, closed, initial encounter    OT Date of Treatment: 12/23/24   OT Start Time: 1101  OT Stop Time: 1124  OT Total Time (min): 23 min    Billable Minutes:  Self Care/Home Management 16 and Therapeutic Activity 8    OT/TSERING: OT     Number of TSERING visits since last OT visit: 0    General Precautions: Standard, fall  Orthopedic Precautions: spinal precautions  Braces: LSO  Respiratory Status:          Subjective:  Communicated with AURA prior to session.       Pain/Comfort  Pain Rating 1: 10/10  Location 1: back  Pain Addressed 1: Reposition, Distraction, Cessation of Activity  Pain Rating Post-Intervention 1: 10/10    Objective:        Functional Mobility:  Bed Mobility: PATIENT REFUSED DUE TO C/O 10/10 BACK PAIN.       Transfers: PATIENT REFUSED DUE TO C/O 10/10 BACK PAIN.        Functional Ambulation: PATIENT REFUSED DUE TO C/O 10/10 BACK PAIN.    Activities of Daily Living:     MOD (A) TO COMB HAIR WITH PATIENT ONLY COMBING (R) SIDE OF HEAD PARTIALLY WITH FREQUENTS REST BREAKS.  PATIENT DID NOT WANT TO ROLL TO (R) TO COMB (L) SIDE OF HEAD.                     Balance:   UNABLE TO PRACTICE DUE TO PATIENT WITH C/O NOT WANTING TO MOVE DUE TO  C/O 10/10 BACK PAIN.    Therapeutic Activities and Exercises:  PATIENT PERFORMED HAIR COMBING WITH MULT REST BREAKS AS FX THERE EX.  PATIENT AND  WERE EDUCATED RE: BENEFIT OF PLACING A PILLOW BETWEEN DISTAL BLE'S TO TAKE STRESS OFF OF THE BACK TO (A) WITH DECREASED PAIN/DISCOMFORT.    AM-PAC 6 CLICK ADL   How much help from another person does this patient currently need?   1 = Unable, Total/Dependent Assistance  2 = A lot, Maximum/Moderate Assistance  3 = A little, Minimum/Contact Guard/Supervision  4 = None, Modified [place]/Independent    Putting on and taking off regular lower body clothing? : 1  Bathing (including washing, rinsing, drying)?: 2  Toileting,  "which includes using toilet, bedpan, or urinal? : 2  Putting on and taking off regular upper body clothing?: 2  Taking care of personal grooming such as brushing teeth?: 2  Eating meals?: 3  Daily Activity Total Score: 12     AM-PAC Raw Score CMS "G-Code Modifier Level of Impairment Assistance   6 % Total / Unable   7 - 8 CM 80 - 100% Maximal Assist   9-13 CL 60 - 80% Moderate Assist   14 - 19 CK 40 - 60% Moderate Assist   20 - 22 CJ 20 - 40% Minimal Assist   23 CI 1-20% SBA / CGA   24 CH 0% Independent/ Mod I       Patient left left sidelying with all lines intact, call button in reach, and  present    ASSESSMENT:  Merle Caro is a 69 y.o. female with a medical diagnosis of Lumbar burst fracture, closed, initial encounter and presents with SELF CARE DEBILITY .    Rehab identified problem list/impairments:  weakness, impaired endurance, impaired self care skills, impaired functional mobility, gait instability, impaired balance, decreased upper extremity function, decreased lower extremity function, decreased safety awareness, pain    Rehab potential is good.    Activity tolerance: Poor    Discharge recommendations: Moderate Intensity Therapy   Barriers to discharge: Barriers to Discharge: Other (Comment) (NEEDS INCREASED PHYSICAL (A))    Equipment recommendations: bedside commode    GOALS:   Multidisciplinary Problems       Occupational Therapy Goals          Problem: Occupational Therapy    Goal Priority Disciplines Outcome Interventions   Occupational Therapy Goal     OT, PT/OT     Description: Goals to be met by: 1/3/25     Patient will increase functional independence with ADLs by performing:    UE Dressing with Minimal Assistance.  Grooming while bedside chair with Stand-by Assistance.  Toileting from bedside commode with Minimal Assistance for hygiene and clothing management.   Toilet transfer to bedside commode with Moderate Assistance with RW.  Upper extremity exercise program x12 reps per " handout, with assistance as needed.                         Plan:  Patient to be seen 2 x/week to address the above listed problems via self-care/home management, therapeutic activities, therapeutic exercises  Plan of Care expires: 01/03/25  Plan of Care reviewed with: patient, spouse         12/23/2024

## 2024-12-23 NOTE — PLAN OF CARE
[facility]  Discharge Final Note    Primary Care Provider: Marck Major MD    Expected Discharge Date: 12/23/2024    Final Discharge Note (most recent)       Final Note - 12/23/24 1542          Final Note    Assessment Type Final Discharge Note     Anticipated Discharge Disposition Home-Health Care [facility]     Hospital Resources/Appts/Education Provided Appointments scheduled and added to AVS;Post-Acute resouces added to AVS        Post-Acute Status    Post-Acute Authorization Home Health     Home Health Status Set-up Complete/Auth obtained     Discharge Delays None known at this time                      Follow-up providers       Shayna Tillman PA-C   Specialty: Neurosurgery    [address]  [place] [address]   Phone: [phone]       Next Steps: Follow up in 2 week(s)    Instructions: For suture removal              After-discharge care                Home Medical Care       *[facility]   Service: Home Health Services    [address]  [facility] [address]   Phone: [phone]                             [facility] arranged.     Patient has no d/c needs at this time. Sw to follow up, as needed, for d/c planning purposes.

## 2024-12-23 NOTE — ASSESSMENT & PLAN NOTE
-She was discharged from [facility] on 10/18/24 for bacteremia. Blood cultures isolated bacteroides, antibiotics adjusted. Repeat blood cultures negative. ID recommended 2 weeks of IV Rocephin for UTI and 2 weeks of Flagyl for bacteremia. She has completed IV abx. Patient to have outpatient colonoscopy due to Bacteroides bacteremia which was done on 11/4/24 which showed moderate diverticulosis. Final pathology -- Tubular adenoma, multiple fragments. Negative for high-grade dysplasia or malignancy   -WBC count currently stable, afebrile, does have the UTI as below

## 2024-12-23 NOTE — ASSESSMENT & PLAN NOTE
Patient has hospital-acquired delirium in the setting of anesthesia, surgery, acute illness, pain  Switched benzodiazepines which were being used as a muscle relaxer to cyclobenzaprine   Delirium has improved

## 2024-12-23 NOTE — PLAN OF CARE
12/23/24 0903   Rounds   Attendance Provider;;Charge nurse;Physical therapist;Occupational therapist   Discharge Plan A Home Health   Why the patient remains in the hospital Requires continued medical care   Transition of Care Barriers None

## 2024-12-23 NOTE — ASSESSMENT & PLAN NOTE
Giving Zosyn for Pseudomonas fluorescens cystitis   Switching to ciprofloxacin on discharge, discussed with ID

## 2024-12-23 NOTE — ASSESSMENT & PLAN NOTE
-followed by Urology   -has indwelling Leija  -planning to keep Leija in until at least March when she has Urology follow-up

## 2024-12-23 NOTE — PLAN OF CARE
All medications given, call light and personal items within reach, bed in lowest position, all questions answered, pain management, oral rehydration, continue POC, monitor VS, Q2H rounding, patient refused to turn in bed, walkway free of clutter  Problem: Adult Inpatient Plan of Care  Goal: Plan of Care Review  Outcome: Progressing  Goal: Patient-Specific Goal (Individualized)  Outcome: Progressing  Goal: Absence of Hospital-Acquired Illness or Injury  Outcome: Progressing  Goal: Optimal Comfort and Wellbeing  Outcome: Progressing  Goal: Readiness for Transition of Care  Outcome: Progressing     Problem: Sepsis/Septic Shock  Goal: Optimal Coping  Outcome: Progressing  Goal: Absence of Bleeding  Outcome: Progressing  Goal: Blood Glucose Level Within Targeted Range  Outcome: Progressing  Goal: Absence of Infection Signs and Symptoms  Outcome: Progressing  Goal: Optimal Nutrition Intake  Outcome: Progressing     Problem: Infection  Goal: Absence of Infection Signs and Symptoms  Outcome: Progressing     Problem: Skin Injury Risk Increased  Goal: Skin Health and Integrity  Outcome: Progressing     Problem: Wound  Goal: Optimal Coping  Outcome: Progressing  Goal: Optimal Functional Ability  Outcome: Progressing  Goal: Absence of Infection Signs and Symptoms  Outcome: Progressing  Goal: Improved Oral Intake  Outcome: Progressing  Goal: Optimal Pain Control and Function  Outcome: Progressing  Goal: Skin Health and Integrity  Outcome: Progressing  Goal: Optimal Wound Healing  Outcome: Progressing     Problem: Fall Injury Risk  Goal: Absence of Fall and Fall-Related Injury  Outcome: Progressing

## 2024-12-23 NOTE — ASSESSMENT & PLAN NOTE
Most recent hemoglobin and hematocrit are listed below.  Recent Labs     12/21/24  0732 12/22/24  0708 12/23/24  0654   HGB 10.1* 8.4* 9.3*   HCT 32.6* 27.1* 29.5*       Plan  - hemoglobin was stable from approximately 8-10, was monitored daily, did not require blood transfusion, no evidence of active bleeding

## 2024-12-23 NOTE — ASSESSMENT & PLAN NOTE
EKG with a right bundle branch block   No chest pain   No obvious other causes of the tachycardia other than pain, infection  We will continue to monitor   Currently heart rates approximately

## 2024-12-23 NOTE — DISCHARGE INSTRUCTIONS
- Please take your medications as prescribed  - If you experience fevers, chills, sweatiness at home we recommend calling her primary care doctor or returning to the hospital and please remember that you are at increased risk of urinary tract infection given the indwelling Leija catheter  - Please get up to the chair daily and use your incentive spirometer at home regularly to help prevent pneumonia

## 2024-12-23 NOTE — PLAN OF CARE
PATIENT REFUSED OOB AX DUE TO C/O BACK PAIN.  PATIENT WAS EDUCATED RE: IMPORTANCE OF MOBILITY VS LYING IN BED FOR A PROLONGED AMOUNT OF TIME.  PATIENT PRACTICED COMBING HER HAIR IN (L) SIDELYING AS A FX EXERCISES.  PATIENT AND  WERE EDUCATED RE: BENEFIT OF PLACING A PILLOW BETWEEN THE LOWER BLE'S TO DECREASE STRESS ON THE BACK WHICH CAN DECREASE BACK PAIN/DISCOMFORT.

## 2024-12-24 ENCOUNTER — OUTPATIENT CASE MANAGEMENT (OUTPATIENT)
Dept: ADMINISTRATIVE | Facility: OTHER | Age: 69
End: 2024-12-24
Payer: MEDICARE

## 2024-12-24 LAB
BACTERIA BLD CULT: NORMAL
BACTERIA BLD CULT: NORMAL

## 2024-12-24 NOTE — PROGRESS NOTES
Outpatient Care Management  Patient Does Not Consent    Patient: Merle Caro  MRN:  [id number]  Date of Service:  12/24/2024  Completed by:  Kenyetta Carson RN    Chief Complaint   Patient presents with    OPCM Enrollment Call    Case Closure       Patient Summary           Consent Received:  Decline  Decline Reason:  Needs met

## 2024-12-26 LAB — BACTERIA BLD CULT: NORMAL

## 2024-12-27 ENCOUNTER — TELEPHONE (OUTPATIENT)
Dept: UROLOGY | Facility: CLINIC | Age: 69
End: 2024-12-27
Payer: MEDICARE

## 2024-12-27 ENCOUNTER — PATIENT OUTREACH (OUTPATIENT)
Dept: ADMINISTRATIVE | Facility: CLINIC | Age: 69
End: 2024-12-27
Payer: MEDICARE

## 2024-12-27 LAB — BACTERIA SPEC ANAEROBE CULT: NORMAL

## 2024-12-27 NOTE — TELEPHONE ENCOUNTER
Returned call to get more clarification there was no answer     ----- Message from Kylie sent at 12/27/2024  9:13 AM CST -----  Contact: Yuliet/[facility]  Yuliet with [facility] is calling to speak with someone regarding care. Reports patient came home with a catheter and request to confirm if needed for home health to change patient's catheter. Reports catheter change will be due on 1/12/25. Please give [facility] a call back at [phone] to assist. If no answer, please leave a detailed voicemail with answer.  Thank you,  GH

## 2024-12-27 NOTE — PROGRESS NOTES
C3 nurse attempted to contact Merle Caro  for a TCC post hospital discharge follow up call. No answer. No voicemail available.The patient does not have a scheduled HOSFU appointment. Message sent to PCP staff for assistance with scheduling visit with patient.

## 2024-12-30 ENCOUNTER — TELEPHONE (OUTPATIENT)
Dept: UROLOGY | Facility: CLINIC | Age: 69
End: 2024-12-30
Payer: MEDICARE

## 2024-12-30 NOTE — TELEPHONE ENCOUNTER
I called Precious back and let her know that I have already faxed the order and went ahead and gave a verbal order for patient. Precious stated that she had to change patient's catheter today due to leakage.      ----- Message from Mirna sent at 12/30/2024  3:22 PM CST -----  Contact: Precious/[facility]  Type: Orders Request  need an order fax over     What orders/ testing are being requested? For pt's monthly cathera changes    Is there a future appointment scheduled for the patient with PCP?    When?    Would you prefer a response via [employer]?    Comments: please fax and also need a verbal order, please call Precious back at [phone] and fax# [phone]

## 2025-01-02 ENCOUNTER — OFFICE VISIT (OUTPATIENT)
Dept: NEUROSURGERY | Facility: CLINIC | Age: 70
End: 2025-01-02
Payer: MEDICARE

## 2025-01-02 VITALS
SYSTOLIC BLOOD PRESSURE: 100 MMHG | HEIGHT: 62 IN | WEIGHT: 171.94 LBS | HEART RATE: 125 BPM | DIASTOLIC BLOOD PRESSURE: 64 MMHG | BODY MASS INDEX: 31.64 KG/M2

## 2025-01-02 DIAGNOSIS — S32.001A LUMBAR BURST FRACTURE, CLOSED, INITIAL ENCOUNTER: Primary | ICD-10-CM

## 2025-01-02 DIAGNOSIS — Z98.1 HISTORY OF LUMBAR FUSION: Primary | ICD-10-CM

## 2025-01-02 DIAGNOSIS — M54.9 DORSALGIA, UNSPECIFIED: ICD-10-CM

## 2025-01-02 PROCEDURE — 99999 PR PBB SHADOW E&M-EST. PATIENT-LVL III: CPT | Mod: PBBFAC,,, | Performed by: NEUROLOGICAL SURGERY

## 2025-01-02 PROCEDURE — 99024 POSTOP FOLLOW-UP VISIT: CPT | Mod: POP,,, | Performed by: NEUROLOGICAL SURGERY

## 2025-01-02 PROCEDURE — 99213 OFFICE O/P EST LOW 20 MIN: CPT | Mod: PBBFAC | Performed by: NEUROLOGICAL SURGERY

## 2025-01-02 NOTE — PROGRESS NOTES
Subjective:      Patient ID: Merle Caro is a 69 y.o. female.    Chief Complaint: Lumbar burst fracture, closed, initial encounter and Post-op Evaluation (Pt reports 2 week s/p posterior lumbar fusion; Pt reports she has a few aches and pains; Pt denies any new symptoms since surgical procedure)    Post op # 1 thoracolumbar extension for L1 burst fracture   Doing well   Pain 1/10   Ambulating at home   Doing home health PT   No radicular symptoms   Still with nicholson catheter in place         Review of Systems   Constitutional:  Negative for activity change, appetite change and chills.   HENT:  Negative for hearing loss, sore throat and tinnitus.    Eyes:  Negative for pain, discharge and itching.   Cardiovascular:  Negative for chest pain.   Gastrointestinal:  Negative for abdominal pain.   Endocrine: Negative for cold intolerance and heat intolerance.   Genitourinary:  Negative for difficulty urinating and dysuria.   Musculoskeletal:  Positive for back pain and gait problem.   Allergic/Immunologic: Negative for environmental allergies.   Neurological:  Positive for weakness. Negative for dizziness, tremors, light-headedness and headaches.   Hematological:  Negative for adenopathy.   Psychiatric/Behavioral:  Negative for agitation, behavioral problems and confusion.          Objective:       Physical Exam:  Nursing note and vitals reviewed.    Constitutional: She appears well-nourished. She is not diaphoretic. No distress.     Eyes: Pupils are equal, round, and reactive to light. EOM are normal.     Cardiovascular: Normal rate and regular rhythm.     Psych/Behavior: She is alert. She is oriented to person, place, and time. She has a normal mood and affect.     Musculoskeletal:        Back: Range of motion is limited. There is tenderness. Muscle strength is 5/5.       Right Lower Extremities: Range of motion is full. There is no tenderness. Muscle strength is 5/5. Tone is normal.        Left Lower Extremities: There  is no tenderness. Muscle strength is 5/5. Tone is normal.     Neurological:        Sensory: There is no sensory deficit in the trunk. There is no sensory deficit in the extremities.        Cranial nerves: Cranial nerve(s) II, III, IV, V, VI, VII, VIII, IX, X, XI and XII are intact.     General    Nursing note and vitals reviewed.  Constitutional: She is oriented to person, place, and time. She appears well-nourished. No distress.   Eyes: EOM are normal. Pupils are equal, round, and reactive to light.   Cardiovascular:  Normal rate and regular rhythm.            Neurological: She is alert and oriented to person, place, and time.   Psychiatric: She has a normal mood and affect.             Ortho Exam    Incision CDI   Dressing in place from   No redness swelling or drainage       Electronically signed by: Jose Manuel Lawrence MD  Date:    12/18/2024  Time:    12:54         I  reviewed all pertinent imaging regarding this case.  Assessment:     1. History of lumbar fusion      Plan:     History of lumbar fusion    Postop #1 T/l fusion extension   Doing well   Follow up in 6 weeks with an XR   Continue  with therapy     Thank you for the referral   Please call with any questions    Ean Encarnacion MD  Neurosurgery     Disclaimer: This note was prepared using a voice recognition system and is likely to have sound alike errors within the text.

## 2025-01-14 DIAGNOSIS — Z00.00 ENCOUNTER FOR MEDICARE ANNUAL WELLNESS EXAM: ICD-10-CM

## 2025-01-17 ENCOUNTER — TELEPHONE (OUTPATIENT)
Dept: NEUROSURGERY | Facility: CLINIC | Age: 70
End: 2025-01-17
Payer: MEDICARE

## 2025-01-17 ENCOUNTER — EXTERNAL HOME HEALTH (OUTPATIENT)
Dept: HOME HEALTH SERVICES | Facility: HOSPITAL | Age: 70
End: 2025-01-17
Payer: MEDICARE

## 2025-01-17 NOTE — TELEPHONE ENCOUNTER
----- Message from Tammy sent at 1/16/2025  2:30 PM CST -----  .Type: Patient Call Back        Who called:   Stacey with [facility]      What is the request in detail:    Called in to discuss when patient sutures from surgery need to come out . Please call back   Can the clinic reply by [facility]?           Would the patient rather a call back or a response via [facility]?   call      Best call back number:  [phone]

## 2025-01-17 NOTE — TELEPHONE ENCOUNTER
----- Message from Tammy sent at 1/16/2025  2:30 PM CST -----  .Type: Patient Call Back        Who called:   Stacey with [facility]      What is the request in detail:    Called in to discuss when patient sutures from surgery need to come out . Please call back   Can the clinic reply by [facility]?           Would the patient rather a call back or a response via [facility]?   call      Best call back number:  [phone]

## 2025-01-21 ENCOUNTER — DOCUMENT SCAN (OUTPATIENT)
Dept: HOME HEALTH SERVICES | Facility: HOSPITAL | Age: 70
End: 2025-01-21
Payer: MEDICARE

## 2025-01-22 ENCOUNTER — DOCUMENT SCAN (OUTPATIENT)
Dept: HOME HEALTH SERVICES | Facility: HOSPITAL | Age: 70
End: 2025-01-22
Payer: MEDICARE

## 2025-01-24 ENCOUNTER — DOCUMENT SCAN (OUTPATIENT)
Dept: HOME HEALTH SERVICES | Facility: HOSPITAL | Age: 70
End: 2025-01-24
Payer: MEDICARE

## 2025-02-05 ENCOUNTER — TELEPHONE (OUTPATIENT)
Dept: INTERNAL MEDICINE | Facility: CLINIC | Age: 70
End: 2025-02-05
Payer: MEDICARE

## 2025-02-05 NOTE — TELEPHONE ENCOUNTER
----- Message from Mirna sent at 2/5/2025 11:31 AM CST -----  Contact: Nita/[facility]  Type: Request a call back    Name of Caller: Nita/[facility]  Best Call Back Number: [phone]  Additional Information:  Nita is calling in rgd to the pt's original PT order has ended and she would like to continue the PT.  Verbal order will be fine

## 2025-02-05 NOTE — TELEPHONE ENCOUNTER
----- Message from Viv sent at 2/5/2025 12:27 PM CST -----  Type:  Patient Returning Call    Who Called:Dana/ [facility]  Who Left Message for Patient:Nurse  Does the patient know what this is regarding?:  Would the patient rather a call back or a response via [facility]? Callback  Best Call Back Number:[phone]  Additional Information: Missed call

## 2025-02-11 ENCOUNTER — EXTERNAL HOME HEALTH (OUTPATIENT)
Dept: HOME HEALTH SERVICES | Facility: HOSPITAL | Age: 70
End: 2025-02-11
Payer: MEDICARE

## 2025-02-13 ENCOUNTER — OFFICE VISIT (OUTPATIENT)
Dept: NEUROSURGERY | Facility: CLINIC | Age: 70
End: 2025-02-13
Payer: MEDICARE

## 2025-02-13 ENCOUNTER — HOSPITAL ENCOUNTER (OUTPATIENT)
Dept: RADIOLOGY | Facility: HOSPITAL | Age: 70
Discharge: HOME | End: 2025-02-13
Attending: NEUROLOGICAL SURGERY
Payer: MEDICARE

## 2025-02-13 VITALS
HEART RATE: 112 BPM | WEIGHT: 171.94 LBS | DIASTOLIC BLOOD PRESSURE: 60 MMHG | BODY MASS INDEX: 31.64 KG/M2 | SYSTOLIC BLOOD PRESSURE: 122 MMHG | HEIGHT: 62 IN

## 2025-02-13 DIAGNOSIS — S32.001A LUMBAR BURST FRACTURE, CLOSED, INITIAL ENCOUNTER: Primary | ICD-10-CM

## 2025-02-13 DIAGNOSIS — S32.001A LUMBAR BURST FRACTURE, CLOSED, INITIAL ENCOUNTER: ICD-10-CM

## 2025-02-13 DIAGNOSIS — Z98.1 S/P LUMBAR FUSION: ICD-10-CM

## 2025-02-13 DIAGNOSIS — M54.9 DORSALGIA, UNSPECIFIED: ICD-10-CM

## 2025-02-13 PROCEDURE — 99999 PR PBB SHADOW E&M-EST. PATIENT-LVL III: CPT | Mod: PBBFAC,,, | Performed by: NEUROLOGICAL SURGERY

## 2025-02-13 PROCEDURE — 72100 X-RAY EXAM L-S SPINE 2/3 VWS: CPT | Mod: TC

## 2025-02-13 PROCEDURE — 99213 OFFICE O/P EST LOW 20 MIN: CPT | Mod: PBBFAC,25 | Performed by: NEUROLOGICAL SURGERY

## 2025-02-13 PROCEDURE — 72100 X-RAY EXAM L-S SPINE 2/3 VWS: CPT | Mod: 26,,, | Performed by: RADIOLOGY

## 2025-02-13 PROCEDURE — 99024 POSTOP FOLLOW-UP VISIT: CPT | Mod: POP,,, | Performed by: NEUROLOGICAL SURGERY

## 2025-02-13 NOTE — PROGRESS NOTES
Subjective:      Patient ID: Merle Caro is a 69 y.o. female.    Chief Complaint: History of lumbar fusion (Pt reports for f/u lumbar spine; Pt is s/p lumbar fx 12/18/24; Pt reports no new symptoms still having pain in buttock; Pt reports occasional back pain; Pt has XR lumbar for review today)    Post op # 2 thoracolumbar extension for L1 burst fracture   Stable symptoms   Ambulating at home   Doing home health PT   No radicular symptoms   Still with nicholson catheter in place   Complaints to pain peroneal       Review of Systems   Constitutional:  Negative for activity change, appetite change and chills.   HENT:  Negative for hearing loss, sore throat and tinnitus.    Eyes:  Negative for pain, discharge and itching.   Cardiovascular:  Negative for chest pain.   Gastrointestinal:  Negative for abdominal pain.   Endocrine: Negative for cold intolerance and heat intolerance.   Genitourinary:  Negative for difficulty urinating and dysuria.   Musculoskeletal:  Positive for back pain and gait problem.   Allergic/Immunologic: Negative for environmental allergies.   Neurological:  Positive for weakness. Negative for dizziness, tremors, light-headedness and headaches.   Hematological:  Negative for adenopathy.   Psychiatric/Behavioral:  Negative for agitation, behavioral problems and confusion.          Objective:       Physical Exam:  Nursing note and vitals reviewed.    Constitutional: She appears well-nourished. She is not diaphoretic. No distress.     Eyes: Pupils are equal, round, and reactive to light. EOM are normal.     Cardiovascular: Normal rate and regular rhythm.     Psych/Behavior: She is alert. She is oriented to person, place, and time. She has a normal mood and affect.     Musculoskeletal:        Back: Range of motion is limited. There is tenderness. Muscle strength is 5/5.       Right Lower Extremities: Range of motion is full. There is no tenderness. Muscle strength is 5/5. Tone is normal.        Left  Lower Extremities: There is no tenderness. Muscle strength is 5/5. Tone is normal.     Neurological:        Sensory: There is no sensory deficit in the trunk. There is no sensory deficit in the extremities.        Cranial nerves: Cranial nerve(s) II, III, IV, V, VI, VII, VIII, IX, X, XI and XII are intact.     General    Nursing note and vitals reviewed.  Constitutional: She is oriented to person, place, and time. She appears well-nourished. No distress.   Eyes: EOM are normal. Pupils are equal, round, and reactive to light.   Cardiovascular:  Normal rate and regular rhythm.            Neurological: She is alert and oriented to person, place, and time.   Psychiatric: She has a normal mood and affect.             Ortho Exam    Incision CDI   Dressing in place from   No redness swelling or drainage       Electronically signed by: Jose Manuel Lawrence MD  Date:    12/18/2024  Time:    12:54         I  reviewed all pertinent imaging regarding this case.  Assessment:     1. Lumbar burst fracture, closed, initial encounter    2. S/P lumbar fusion        Plan:     Lumbar burst fracture, closed, initial encounter    S/P lumbar fusion    Postop # 2.  Doing well XR shows stable instrumentation  Patient DEXA scan is ordered  She has appointment with rheumatology for her various issues  She continues to participate in home health  She has pain to her perineal region  Will follow-up at three-month postop    Thank you for the referral   Please call with any questions    Ean Encarnacion MD  Neurosurgery     Disclaimer: This note was prepared using a voice recognition system and is likely to have sound alike errors within the text.

## 2025-02-18 ENCOUNTER — APPOINTMENT (OUTPATIENT)
Dept: RADIOLOGY | Facility: HOSPITAL | Age: 70
End: 2025-02-18
Attending: NURSE PRACTITIONER
Payer: MEDICARE

## 2025-02-18 ENCOUNTER — TELEPHONE (OUTPATIENT)
Dept: NEUROSURGERY | Facility: CLINIC | Age: 70
End: 2025-02-18
Payer: MEDICARE

## 2025-02-18 DIAGNOSIS — M81.0 OSTEOPOROSIS WITHOUT CURRENT PATHOLOGICAL FRACTURE, UNSPECIFIED OSTEOPOROSIS TYPE: ICD-10-CM

## 2025-02-18 DIAGNOSIS — S32.001A LUMBAR BURST FRACTURE, CLOSED, INITIAL ENCOUNTER: Primary | ICD-10-CM

## 2025-02-18 DIAGNOSIS — Z78.0 POST-MENOPAUSAL: ICD-10-CM

## 2025-02-18 PROCEDURE — 77080 DXA BONE DENSITY AXIAL: CPT | Mod: TC

## 2025-02-18 PROCEDURE — 77080 DXA BONE DENSITY AXIAL: CPT | Mod: 26,,, | Performed by: RADIOLOGY

## 2025-02-21 ENCOUNTER — TELEPHONE (OUTPATIENT)
Dept: RHEUMATOLOGY | Facility: CLINIC | Age: 70
End: 2025-02-21
Payer: MEDICARE

## 2025-02-21 ENCOUNTER — OFFICE VISIT (OUTPATIENT)
Dept: RHEUMATOLOGY | Facility: CLINIC | Age: 70
End: 2025-02-21
Payer: MEDICARE

## 2025-02-21 ENCOUNTER — HOSPITAL ENCOUNTER (OUTPATIENT)
Dept: RADIOLOGY | Facility: HOSPITAL | Age: 70
Discharge: HOME | End: 2025-02-21
Attending: INTERNAL MEDICINE
Payer: MEDICARE

## 2025-02-21 VITALS
BODY MASS INDEX: 31.45 KG/M2 | SYSTOLIC BLOOD PRESSURE: 90 MMHG | HEART RATE: 101 BPM | DIASTOLIC BLOOD PRESSURE: 60 MMHG | HEIGHT: 62 IN

## 2025-02-21 DIAGNOSIS — M05.9 SEROPOSITIVE RHEUMATOID ARTHRITIS: ICD-10-CM

## 2025-02-21 DIAGNOSIS — L40.52 ARTHRITIS MUTILANS: ICD-10-CM

## 2025-02-21 DIAGNOSIS — D84.821 IMMUNOSUPPRESSION DUE TO DRUG THERAPY: ICD-10-CM

## 2025-02-21 DIAGNOSIS — Z51.81 MEDICATION MONITORING ENCOUNTER: ICD-10-CM

## 2025-02-21 DIAGNOSIS — E55.9 VITAMIN D INSUFFICIENCY: ICD-10-CM

## 2025-02-21 DIAGNOSIS — M81.0 AGE-RELATED OSTEOPOROSIS WITHOUT CURRENT PATHOLOGICAL FRACTURE: ICD-10-CM

## 2025-02-21 DIAGNOSIS — Z79.899 IMMUNOSUPPRESSION DUE TO DRUG THERAPY: ICD-10-CM

## 2025-02-21 DIAGNOSIS — J84.112 UIP (USUAL INTERSTITIAL PNEUMONITIS): ICD-10-CM

## 2025-02-21 DIAGNOSIS — J84.9 INTERSTITIAL PULMONARY DISEASE, UNSPECIFIED: ICD-10-CM

## 2025-02-21 DIAGNOSIS — M05.9 SEROPOSITIVE RHEUMATOID ARTHRITIS: Primary | ICD-10-CM

## 2025-02-21 DIAGNOSIS — Z98.890 HX OF SPINAL SURGERY: ICD-10-CM

## 2025-02-21 PROCEDURE — 73130 X-RAY EXAM OF HAND: CPT | Mod: 26,50,, | Performed by: RADIOLOGY

## 2025-02-21 PROCEDURE — 73130 X-RAY EXAM OF HAND: CPT | Mod: TC,50

## 2025-02-21 PROCEDURE — 99215 OFFICE O/P EST HI 40 MIN: CPT | Mod: PBBFAC | Performed by: INTERNAL MEDICINE

## 2025-02-21 RX ORDER — SODIUM CHLORIDE 0.9 % (FLUSH) 0.9 %
10 SYRINGE (ML) INJECTION
OUTPATIENT
Start: 2025-02-21

## 2025-02-21 RX ORDER — HEPARIN 100 UNIT/ML
500 SYRINGE INTRAVENOUS
OUTPATIENT
Start: 2025-02-21

## 2025-02-21 RX ORDER — ACETAMINOPHEN 325 MG/1
325 TABLET ORAL ONCE
OUTPATIENT
Start: 2025-02-21

## 2025-02-21 RX ORDER — DIPHENHYDRAMINE HCL 25 MG
25 CAPSULE ORAL ONCE
OUTPATIENT
Start: 2025-02-21

## 2025-02-21 RX ORDER — PREDNISONE 5 MG/1
5 TABLET ORAL 2 TIMES DAILY PRN
Qty: 60 TABLET | Refills: 3 | Status: SHIPPED | OUTPATIENT
Start: 2025-02-21 | End: 2025-03-23

## 2025-02-21 RX ORDER — ABALOPARATIDE 2000 UG/ML
80 INJECTION, SOLUTION SUBCUTANEOUS DAILY
Qty: 1 EACH | Refills: 9 | Status: SHIPPED | OUTPATIENT
Start: 2025-02-21 | End: 2026-02-21

## 2025-02-21 RX ORDER — DIPHENHYDRAMINE HYDROCHLORIDE 50 MG/ML
25 INJECTION INTRAMUSCULAR; INTRAVENOUS
OUTPATIENT
Start: 2025-02-21

## 2025-02-21 RX ORDER — EPINEPHRINE 0.3 MG/.3ML
0.3 INJECTION SUBCUTANEOUS ONCE AS NEEDED
OUTPATIENT
Start: 2025-02-21

## 2025-02-21 RX ORDER — DIPHENHYDRAMINE HYDROCHLORIDE 50 MG/ML
50 INJECTION INTRAMUSCULAR; INTRAVENOUS ONCE AS NEEDED
OUTPATIENT
Start: 2025-02-21

## 2025-02-21 RX ORDER — ACETAMINOPHEN 325 MG/1
650 TABLET ORAL
OUTPATIENT
Start: 2025-02-21

## 2025-02-21 RX ORDER — MULTIVITAMIN
1 TABLET ORAL 2 TIMES DAILY
Qty: 60 TABLET | Refills: 3 | Status: SHIPPED | OUTPATIENT
Start: 2025-02-21

## 2025-02-21 NOTE — TELEPHONE ENCOUNTER
----- Message from Kirk Bustamante MD sent at 2/21/2025 11:22 AM CST -----  SPRA w/ arthritis mutilans features, UIP pattern ILD, severe OP.  Rx naive.  Recommending Tymlos and Orencia infusions at 750mg, monitor response after 12w.  May proceed with regimen if no post surgical CI (recent spinal surgery) or CI on upcoming labs.
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
## 2025-02-21 NOTE — Clinical Note
SPRA w/ arthritis mutilans features, UIP pattern ILD, severe OP.  Rx naive.  Recommending Tymlos and Orencia infusions at 750mg, monitor response after 12w.  May proceed with regimen if no post surgical CI (recent spinal surgery) or CI on upcoming labs.

## 2025-02-25 ENCOUNTER — PATIENT MESSAGE (OUTPATIENT)
Dept: PULMONOLOGY | Facility: CLINIC | Age: 70
End: 2025-02-25
Payer: MEDICARE

## 2025-02-25 ENCOUNTER — HOSPITAL ENCOUNTER (OUTPATIENT)
Dept: RADIOLOGY | Facility: HOSPITAL | Age: 70
Discharge: HOME | End: 2025-02-25
Attending: INTERNAL MEDICINE
Payer: MEDICARE

## 2025-02-25 DIAGNOSIS — J84.9 INTERSTITIAL PULMONARY DISEASE, UNSPECIFIED: ICD-10-CM

## 2025-02-25 DIAGNOSIS — J84.112 UIP (USUAL INTERSTITIAL PNEUMONITIS): ICD-10-CM

## 2025-02-25 PROCEDURE — 71250 CT THORAX DX C-: CPT | Mod: TC

## 2025-02-25 PROCEDURE — 71250 CT THORAX DX C-: CPT | Mod: 26,,, | Performed by: RADIOLOGY

## 2025-02-26 ENCOUNTER — RESULTS FOLLOW-UP (OUTPATIENT)
Dept: INTERNAL MEDICINE | Facility: CLINIC | Age: 70
End: 2025-02-26

## 2025-03-06 ENCOUNTER — PATIENT MESSAGE (OUTPATIENT)
Dept: ADMINISTRATIVE | Facility: HOSPITAL | Age: 70
End: 2025-03-06
Payer: MEDICARE

## 2025-03-07 ENCOUNTER — DOCUMENT SCAN (OUTPATIENT)
Dept: HOME HEALTH SERVICES | Facility: HOSPITAL | Age: 70
End: 2025-03-07
Payer: MEDICARE

## 2025-03-12 ENCOUNTER — LAB VISIT (OUTPATIENT)
Dept: LAB | Facility: HOSPITAL | Age: 70
End: 2025-03-12
Attending: FAMILY MEDICINE
Payer: MEDICARE

## 2025-03-12 DIAGNOSIS — N39.0 URINARY TRACT INFECTION, SITE NOT SPECIFIED: Primary | ICD-10-CM

## 2025-03-12 DIAGNOSIS — N39.0 URINARY TRACT INFECTION, SITE NOT SPECIFIED: ICD-10-CM

## 2025-03-12 LAB
BACTERIA #/AREA URNS HPF: ABNORMAL /HPF
BILIRUB UR QL STRIP: NEGATIVE
CLARITY UR: ABNORMAL
COLOR UR: YELLOW
GLUCOSE UR QL STRIP: NEGATIVE
HGB UR QL STRIP: ABNORMAL
KETONES UR QL STRIP: NEGATIVE
LEUKOCYTE ESTERASE UR QL STRIP: ABNORMAL
MICROSCOPIC COMMENT: ABNORMAL
NITRITE UR QL STRIP: POSITIVE
PH UR STRIP: 7 [PH] (ref 5–8)
PROT UR QL STRIP: ABNORMAL
RBC #/AREA URNS HPF: 9 /HPF (ref 0–4)
SP GR UR STRIP: 1.01 (ref 1–1.03)
URN SPEC COLLECT METH UR: ABNORMAL
UROBILINOGEN UR STRIP-ACNC: NEGATIVE EU/DL
WBC #/AREA URNS HPF: 37 /HPF (ref 0–5)
WBC CLUMPS URNS QL MICRO: ABNORMAL

## 2025-03-12 PROCEDURE — 87088 URINE BACTERIA CULTURE: CPT | Performed by: FAMILY MEDICINE

## 2025-03-12 PROCEDURE — 87186 SC STD MICRODIL/AGAR DIL: CPT | Performed by: FAMILY MEDICINE

## 2025-03-12 PROCEDURE — 87086 URINE CULTURE/COLONY COUNT: CPT | Performed by: FAMILY MEDICINE

## 2025-03-12 PROCEDURE — 81000 URINALYSIS NONAUTO W/SCOPE: CPT | Performed by: FAMILY MEDICINE

## 2025-03-12 PROCEDURE — 87077 CULTURE AEROBIC IDENTIFY: CPT | Performed by: FAMILY MEDICINE

## 2025-03-13 ENCOUNTER — PATIENT MESSAGE (OUTPATIENT)
Dept: RHEUMATOLOGY | Facility: CLINIC | Age: 70
End: 2025-03-13
Payer: MEDICARE

## 2025-03-15 LAB — BACTERIA UR CULT: ABNORMAL

## 2025-03-19 ENCOUNTER — RESULTS FOLLOW-UP (OUTPATIENT)
Dept: INTERNAL MEDICINE | Facility: CLINIC | Age: 70
End: 2025-03-19

## 2025-03-19 DIAGNOSIS — B95.2 UTI (URINARY TRACT INFECTION) DUE TO ENTEROCOCCUS: Primary | ICD-10-CM

## 2025-03-19 DIAGNOSIS — N39.0 UTI (URINARY TRACT INFECTION) DUE TO ENTEROCOCCUS: Primary | ICD-10-CM

## 2025-03-19 RX ORDER — NITROFURANTOIN 25; 75 MG/1; MG/1
100 CAPSULE ORAL 2 TIMES DAILY
Qty: 20 CAPSULE | Refills: 0 | Status: SHIPPED | OUTPATIENT
Start: 2025-03-19 | End: 2025-03-29

## 2025-03-20 ENCOUNTER — DOCUMENT SCAN (OUTPATIENT)
Dept: HOME HEALTH SERVICES | Facility: HOSPITAL | Age: 70
End: 2025-03-20
Payer: MEDICARE

## 2025-03-27 ENCOUNTER — OFFICE VISIT (OUTPATIENT)
Dept: NEUROSURGERY | Facility: CLINIC | Age: 70
End: 2025-03-27
Payer: MEDICARE

## 2025-03-27 VITALS
BODY MASS INDEX: 31.09 KG/M2 | DIASTOLIC BLOOD PRESSURE: 59 MMHG | WEIGHT: 170 LBS | HEART RATE: 108 BPM | SYSTOLIC BLOOD PRESSURE: 91 MMHG

## 2025-03-27 DIAGNOSIS — Z98.1 S/P LUMBAR FUSION: Primary | ICD-10-CM

## 2025-03-27 PROCEDURE — 99999 PR PBB SHADOW E&M-EST. PATIENT-LVL III: CPT | Mod: PBBFAC,,, | Performed by: PHYSICIAN ASSISTANT

## 2025-03-27 PROCEDURE — 99213 OFFICE O/P EST LOW 20 MIN: CPT | Mod: S$PBB,,, | Performed by: PHYSICIAN ASSISTANT

## 2025-03-27 PROCEDURE — 99213 OFFICE O/P EST LOW 20 MIN: CPT | Mod: PBBFAC | Performed by: PHYSICIAN ASSISTANT

## 2025-03-27 NOTE — PROGRESS NOTES
Patient is 68 yo F who presents for follow up lumbar fusion for burst fracture. She has been suffering from pain for several years due to prior nerve injury causing pain in the rectal area and difficulty with neurogenic bladder. Pt. Has had indwelling catheter since October.     She is in wheel chair today.       Patient is doing therapy at home    Has seen rheumatology for osteoporosis -  started on tymlos sub q and also started her on low dose prednisone which spouse reports affects the effectiveness of her psychiatric meds.      I also suggested cymbalta - pt spouse states he will discuss with pts psychiatrist.     Patient has been prescribed a compounded cream and other creams for her sensitive areas but non are helpful      Patient is moving her legs better and antigravity. Needs assistance with standing.     Has been doing home health therapy    Incision is well healing    Pt reports pain in lower lumbar region mid incision - hurting to left of the incision.      Subjective:      Patient ID: Merle Caro is a 69 y.o. female.    Chief Complaint: No chief complaint on file.    Post op # 2 thoracolumbar extension for L1 burst fracture   Stable symptoms   Ambulating at home   Doing home health PT   No radicular symptoms   Still with nicholson catheter in place   Complaints to pain peroneal       Review of Systems   Constitutional:  Negative for activity change, appetite change and chills.   HENT:  Negative for hearing loss, sore throat and tinnitus.    Eyes:  Negative for pain, discharge and itching.   Cardiovascular:  Negative for chest pain.   Gastrointestinal:  Negative for abdominal pain.   Endocrine: Negative for cold intolerance and heat intolerance.   Genitourinary:  Negative for difficulty urinating and dysuria.   Musculoskeletal:  Positive for back pain and gait problem.   Allergic/Immunologic: Negative for environmental allergies.   Neurological:  Positive for weakness. Negative for dizziness, tremors,  light-headedness and headaches.   Hematological:  Negative for adenopathy.   Psychiatric/Behavioral:  Negative for agitation, behavioral problems and confusion.          Objective:       Physical Exam:  Nursing note and vitals reviewed.    Constitutional: She appears well-nourished. She is not diaphoretic. No distress.     Eyes: Pupils are equal, round, and reactive to light. EOM are normal.     Cardiovascular: Normal rate and regular rhythm.     Psych/Behavior: She is alert. She is oriented to person, place, and time. She has a normal mood and affect.     Musculoskeletal:        Back: Range of motion is limited. There is tenderness. Muscle strength is 5/5.       Right Lower Extremities: Range of motion is full. There is no tenderness. Muscle strength is 5/5. Tone is normal.        Left Lower Extremities: There is no tenderness. Muscle strength is 5/5. Tone is normal.     Neurological:        Sensory: There is no sensory deficit in the trunk. There is no sensory deficit in the extremities.        Cranial nerves: Cranial nerve(s) II, III, IV, V, VI, VII, VIII, IX, X, XI and XII are intact.     General    Nursing note and vitals reviewed.  Constitutional: She is oriented to person, place, and time. She appears well-nourished. No distress.   Eyes: EOM are normal. Pupils are equal, round, and reactive to light.   Cardiovascular:  Normal rate and regular rhythm.            Neurological: She is alert and oriented to person, place, and time.   Psychiatric: She has a normal mood and affect.             Ortho Exam    Incision CDI   Dressing in place from   No redness swelling or drainage       Electronically signed by: Jose Manuel Lawrence MD  Date:    12/18/2024  Time:    12:54         I  reviewed all pertinent imaging regarding this case.  Assessment:     1. S/P lumbar fusion        Plan:     S/P lumbar fusion  -     X-Ray Lumbar Spine AP And Lateral; Future; Expected date: 03/27/2025    Postop  Doing well XR shows stable  instrumentation    She continues to participate in home health  She has pain to her perineal region  Will follow-up at 6 month postop with new x rays    Thank you for the referral   Please call with any questions    Lizzeth Tillman PA-C  Neurosurgery     Disclaimer: This note was prepared using a voice recognition system and is likely to have sound alike errors within the text.

## 2025-03-31 ENCOUNTER — DOCUMENT SCAN (OUTPATIENT)
Dept: HOME HEALTH SERVICES | Facility: HOSPITAL | Age: 70
End: 2025-03-31
Payer: MEDICARE

## 2025-04-01 ENCOUNTER — HOSPITAL ENCOUNTER (OUTPATIENT)
Dept: RADIOLOGY | Facility: HOSPITAL | Age: 70
Discharge: HOME | End: 2025-04-01
Attending: PHYSICIAN ASSISTANT
Payer: MEDICARE

## 2025-04-01 DIAGNOSIS — Z98.1 S/P LUMBAR FUSION: ICD-10-CM

## 2025-04-01 PROCEDURE — 72100 X-RAY EXAM L-S SPINE 2/3 VWS: CPT | Mod: 26,,, | Performed by: RADIOLOGY

## 2025-04-01 PROCEDURE — 72100 X-RAY EXAM L-S SPINE 2/3 VWS: CPT | Mod: TC

## 2025-04-07 ENCOUNTER — PATIENT MESSAGE (OUTPATIENT)
Dept: ADMINISTRATIVE | Facility: OTHER | Age: 70
End: 2025-04-07
Payer: MEDICARE

## 2025-04-08 ENCOUNTER — PATIENT OUTREACH (OUTPATIENT)
Dept: ADMINISTRATIVE | Facility: HOSPITAL | Age: 70
End: 2025-04-08
Payer: MEDICARE

## 2025-04-10 ENCOUNTER — TELEPHONE (OUTPATIENT)
Dept: RHEUMATOLOGY | Facility: CLINIC | Age: 70
End: 2025-04-10
Payer: MEDICARE

## 2025-04-10 NOTE — TELEPHONE ENCOUNTER
Follow up message sent to Dr. Major's staff for status check re: repeat UA and culture. Awaiting response.

## 2025-04-10 NOTE — TELEPHONE ENCOUNTER
----- Message from Pharmacist Theresa sent at 3/28/2025  9:37 AM CDT -----  Pt recent UTI w/ Enterococcus susceptible to Macrobid. Cleared by neurosurgery. Repeat culture due 4/2 to ensure clearance. F/u reminder set.  ----- Message -----  From: Theresa Morton, PharmCHRISTIAN  Sent: 3/28/2025  12:00 AM CDT  To: Theresa Morton PharmD    SPRA w/ arthritis mutilans features, UIP pattern ILD, severe OP.  Rx naive.  Recommending Tymlos and Orencia infusions at 750mg, monitor response after 12w.  May proceed with regimen if no post surgical CI (recent spinal surgery) or CI on upcoming labs.  Check status after neurosurgery appt on 3/27.

## 2025-04-11 ENCOUNTER — OFFICE VISIT (OUTPATIENT)
Dept: UROLOGY | Facility: CLINIC | Age: 70
End: 2025-04-11
Payer: MEDICARE

## 2025-04-11 VITALS
HEART RATE: 54 BPM | WEIGHT: 170 LBS | SYSTOLIC BLOOD PRESSURE: 103 MMHG | HEIGHT: 62 IN | BODY MASS INDEX: 31.28 KG/M2 | DIASTOLIC BLOOD PRESSURE: 67 MMHG

## 2025-04-11 DIAGNOSIS — N13.39 OTHER HYDRONEPHROSIS: ICD-10-CM

## 2025-04-11 DIAGNOSIS — R15.9 INCONTINENCE OF FECES, UNSPECIFIED FECAL INCONTINENCE TYPE: ICD-10-CM

## 2025-04-11 DIAGNOSIS — N39.41 URGE INCONTINENCE: Primary | ICD-10-CM

## 2025-04-11 DIAGNOSIS — N31.9 NEUROGENIC BLADDER: ICD-10-CM

## 2025-04-11 PROCEDURE — 99214 OFFICE O/P EST MOD 30 MIN: CPT | Mod: PBBFAC | Performed by: UROLOGY

## 2025-04-11 PROCEDURE — 99999 PR PBB SHADOW E&M-EST. PATIENT-LVL IV: CPT | Mod: PBBFAC,,, | Performed by: UROLOGY

## 2025-04-11 NOTE — PROGRESS NOTES
Chief Complaint:   Encounter Diagnoses   Name Primary?    Urge incontinence Yes    Neurogenic bladder     Other hydronephrosis     Incontinence of feces, unspecified fecal incontinence type        HPI:   4/11/25- has had to increase catheterization changes to every 2 weeks, due to significant debris.  This is fixed her incontinence though.  Neurosurgery has been managing her pain.    1/4/23- patient reports as a new patient to me with urge and fecal incontinence.  Fecal incontinence is relatively new, this appears to have exacerbated recently was also recently found to have sacral fracture with cauda equina syndrome.  She is having some improvement on symptoms, Myrbetriq 25 mg does assist, but not perfectly.  No evidence of stress incontinence, dysuria or gross hematuria.    11/16/22- LR- Patient is a 67-year-old female that is presenting with her .  HPI was obtained by .   states that patient is status post Spine fusion and has a h/o Cauda Equina syndrome.  States that patient is unaware when she needs to urinate and has had an increase in urinary incontinence. Reports that she also has fecal incontinence.  PVR was 150 mL, repeat 132 ml.  No history of gross hematuria or renal stones.  Unable to give urine sample.    Allergies:  Codeine, Keflex [cephalexin], Shellfish containing products, Flu vaccine ts 2011-12(18 yr+), and Sucralfate    Medications:  has a current medication list which includes the following prescription(s): albuterol-ipratropium, celecoxib, cholecalciferol (vitamin d3), clonazepam, cyanocobalamin (vitamin b-12), folic acid, furosemide, haloperidol, lisinopril, methocarbamol, oxygen-air delivery systems, pantoprazole, paroxetine, pregabalin, trazodone, and albuterol.    Review of Systems:  General: No fever, chills, fatigability, or weight loss.  Skin: No rashes, itching, or changes in color or texture of skin.  Chest: Denies KENT, cyanosis, wheezing, cough, and sputum  production.  Abdomen: Appetite fine. No weight loss. Denies diarrhea, abdominal pain, hematemesis, or blood in stool.  Musculoskeletal: No joint stiffness or swelling. Denies back pain.  : As above.  All other review of systems negative.    PMH:   has a past medical history of Arthritis, Asthma, COPD (chronic obstructive pulmonary disease), Depression, GERD (gastroesophageal reflux disease), Hypertension, and Tobacco abuse.    PSH:   has a past surgical history that includes Hysterectomy; Knee surgery; Tonsillectomy; multiple fracture repairs; Colonoscopy w/ polypectomy (01/05/2011); Magnetic resonance imaging (N/A, 10/14/2022); fusion, spine, posterior approach (N/A, 10/18/2022); and Esophagogastroduodenoscopy (N/A, 10/24/2022).    FamHx: family history includes COPD in her mother; Cancer in her mother; Hypertension in her mother; Lung cancer in her mother; Ovarian cancer in her sister; Stroke in her mother.    SocHx:  reports that she has quit smoking. Her smoking use included cigarettes. She started smoking about 45 years ago. She has a 33 pack-year smoking history. She has never used smokeless tobacco. She reports that she does not drink alcohol and does not use drugs.      Physical Exam:  Vitals:    01/04/23 1554   BP: 103/69   Pulse: 92     General: A&Ox3, no apparent distress, no deformities  Neck: No masses, normal ROM  Lungs: normal inspiration, no use of accessory muscles  Heart: normal pulse, no arrhythmias  Abdomen: Soft, NT, ND, no masses, no hernias, no hepatosplenomegaly  Skin: The skin is warm and dry. No jaundice.  Ext: No c/c/e.  : 8/24- No pelvic floor prolapse.  Normal introitus, no urethral abnomralities. No Perineal abnormalities.    Labs/Studies:   UDS detrusor pressure identified with normal sensation but could not void, highly suspicious for neurogenic bladder 11/24   ml 8/24  PVR 24 mL 7/24  Urine culture negative 10/24  Urine culture E coli 6/24  Cystoscopy normal 8/24  CT stone  protocol bilateral hydronephrosis, left atrophic kidney 10/24  Lasix renogram 94% right/6% left, severe obstruction left 9/24  ADRIEL bilateral hydronephrosis 7/24  CT stone protocol bilateral hydronephrosis, bladder wall thickening, spinal hardware and iliac region with deformity of the pubic symphysis 6/24  Creatinine 0.9 2/25    Impression/Plan:        1. Urge incontinence/neurogenic bladder- home health has had to change her catheter every 2 weeks and we will proceed with ordering this for their assistance.  In addition we will initiate saline irrigations of at least 40 mL on a daily basis, either by them or her .  Either way at this juncture she has elected to pursue chronic catheterizations.  Previous urodynamics were highly suspicious for a neurogenic bladder.  At this point based on her previous renal issues and recurrent UTIs, patient needs to have the bladder decompressed.  They are unable to perform CIC, in addition this assists with her incontinence.  Of note based on previous Lasix renogram, it was highly suspicious for outflow obstruction most likely due to her neurological fracture.  Previously Myrbetriq had caused her to go into acute retention.  Patient is still having persistent pain, but has been managed by neurology and neurosurgery.  For historical reference she has emptied in the past in the office, but based upon these studies it was my opinion that she needs to have continuous drainage with a Leija catheter.  Per previous renogram left kidney appears to be nonfunctioning and her right kidney demonstrated no evidence of obstruction, despite previous reports of hydronephrosis.  Given the fact that she has a solitary right kidney, bladder drainage is paramount for renal function.  Previous cystoscopy was negative.  Of note history of repaired sacral fracture with cauda equina syndrome 10/22, which is most likely the underlying etiology.  See me back in 1 year, unless she needs to be seen  sooner.    2. Fecal incontinence- relatively stable and improved.    3. Bilateral hydronephrosis- please see above.

## 2025-04-17 ENCOUNTER — TELEPHONE (OUTPATIENT)
Dept: RHEUMATOLOGY | Facility: CLINIC | Age: 70
End: 2025-04-17
Payer: MEDICARE

## 2025-04-17 NOTE — TELEPHONE ENCOUNTER
----- Message from Med Assistant [place] sent at 4/15/2025  9:19 AM CDT -----  Good morning. The orders are in Epic patient just have to come in and have it done  ----- Message -----  From: Theresa Morton, PharmD  Sent: 4/15/2025   9:08 AM CDT  To: Kirstin Aranda MA    Is there any way that the repeat urine can be completed so we can clear her to start her IV Orencia? It is an immune suppressing medication used to treat her RA and interstitial lung disease. We need to confirm clearance of infection before we can safely give to her.  ----- Message -----  From: Kirstin Aranda MA  Sent: 4/10/2025   2:37 PM CDT  To: Theresa Morton, PharmD    Good afternoon. Dr Major wanted the patient to repeat the urine 2 weeks from 03/12/2025 but it hasn't been done  ----- Message -----  From: Theresa Morton, PharmD  Sent: 4/10/2025   2:06 PM CDT  To: Pedrito Smith    Hello, Trying to gauge when patient has cleared her UTI as she cannot have her IV Orencia with an active infection. I see Dr. Major's note on 3/19 says: Urine culture with low colony count EnterococcusSensitive to Macrobid Patient not alert Macrobid Treat with Macrobid for 10 days Repeat urinalysis and urine culture in 2 weeksAny idea if the repeat UA and culture has been collected and resulted? I cannot see it on my end nor on Care Everywhere. Thanks so much, Theresa Morton, PharmDAmbulatory Clinical Pharmacist- Rheumatology

## 2025-04-21 ENCOUNTER — PATIENT OUTREACH (OUTPATIENT)
Dept: ADMINISTRATIVE | Facility: HOSPITAL | Age: 70
End: 2025-04-21
Payer: MEDICARE

## 2025-04-21 NOTE — PROGRESS NOTES
Working mammogram report:    Chart searched  Attempted to contact pt regarding overdue mammogram  Pt declined scheduling

## 2025-04-24 NOTE — TELEPHONE ENCOUNTER
Outgoing call to confirm w/ patient whether or not she has cleared her UTI.   Pt unwilling to confirm  and requested I contact her .   Outgoing call to . Pt to have repeat urine collected when they are on-campus for Heme/Onc appt.   Unable to schedule. R/o to staff for assistance.

## 2025-04-25 ENCOUNTER — PATIENT MESSAGE (OUTPATIENT)
Dept: INTERNAL MEDICINE | Facility: CLINIC | Age: 70
End: 2025-04-25
Payer: MEDICARE

## 2025-04-25 DIAGNOSIS — G83.4 CAUDA EQUINA SYNDROME: Primary | ICD-10-CM

## 2025-04-28 RX ORDER — METHOCARBAMOL 750 MG/1
750 TABLET, FILM COATED ORAL 3 TIMES DAILY
Qty: 270 TABLET | Refills: 0 | Status: SHIPPED | OUTPATIENT
Start: 2025-04-28

## 2025-04-28 RX ORDER — LISINOPRIL 10 MG/1
10 TABLET ORAL NIGHTLY
Qty: 90 TABLET | Refills: 3 | Status: SHIPPED | OUTPATIENT
Start: 2025-04-28

## 2025-04-28 RX ORDER — PREGABALIN 100 MG/1
100 CAPSULE ORAL 2 TIMES DAILY
Qty: 180 CAPSULE | Refills: 3 | Status: SHIPPED | OUTPATIENT
Start: 2025-04-28 | End: 2026-04-28

## 2025-04-28 NOTE — TELEPHONE ENCOUNTER
No care due was identified.  [facility] Embedded Care Due Messages. Reference number: [id number].   4/28/2025 12:07:07 PM CDT

## 2025-05-01 ENCOUNTER — EXTERNAL HOME HEALTH (OUTPATIENT)
Dept: HOME HEALTH SERVICES | Facility: HOSPITAL | Age: 70
End: 2025-05-01
Payer: MEDICARE

## 2025-05-08 ENCOUNTER — TELEPHONE (OUTPATIENT)
Dept: RHEUMATOLOGY | Facility: CLINIC | Age: 70
End: 2025-05-08
Payer: MEDICARE

## 2025-05-21 ENCOUNTER — TELEPHONE (OUTPATIENT)
Dept: RHEUMATOLOGY | Facility: CLINIC | Age: 70
End: 2025-05-21
Payer: MEDICARE

## 2025-05-21 NOTE — TELEPHONE ENCOUNTER
"[facility] - OOHIP   "Unfortunately we can not service this patient since the patient has Medicare as primary coverage. She will need to be serviced at the hospital based infusion center."     Will call patient back to discuss options with getting set up with Hospital based Infusion Center.  states [facility] would be preference.   "

## 2025-05-21 NOTE — TELEPHONE ENCOUNTER
----- Message from Pharmacist Theresa sent at 5/20/2025  9:50 AM CDT -----  Orencia 750 mg infusion patient, delayed first by post-op and then by UTI which was treated but never confirmed clearance despite multiple attempts on my part. Ok to proceed per my d/w Dr. Bustamante. She may need home infusion as she is total care per her . I recommend calling the  to arrange.

## 2025-05-21 NOTE — TELEPHONE ENCOUNTER
"Spoke mostly to the patient's , Irving Mercedes, via phone. They were counseled on their upcoming Orencia IV infusions. Discussed medication expectations, administration process, and monitoring parameters. Patient verbalized understanding.  The  expressed that they were open to receiving the infusion via [facility] through [facility] due to mobility issues.   The treatment plan location was changed via properties to "[facility] outpatient [facility] home infusion pharmacy" to transfer the referral. I also contacted [facility] to be on the look out for the referral and [facility] said they would look into it. I will follow up on the referral in a few days.   "

## 2025-05-27 ENCOUNTER — TELEPHONE (OUTPATIENT)
Dept: INFUSION THERAPY | Facility: HOSPITAL | Age: 70
End: 2025-05-27
Payer: MEDICARE

## 2025-05-27 NOTE — TELEPHONE ENCOUNTER
----- Message from Pharmacist Gideon sent at 5/21/2025  5:29 PM CDT -----  Regarding: Scheduling  Please call & schedule patient for Orencia Infusion every 2 weeks (x3 doses), followed by Maintenance dose every 4 weeks. Labs needed are Hep C Antibody, CBC/CMP. Patient prefers [facility] location  ----- Message -----  From: Theresa Morton PharmD  Sent: 5/20/2025   9:51 AM CDT  To: Gideon Post, [employer]    Orencia 750 mg infusion patient, delayed first by post-op and then by UTI which was treated but never confirmed clearance despite multiple attempts on my part. Ok to proceed per my d/w Dr. Bustamante. She may need home infusion as she is total care per her . I recommend calling the  to arrange.

## 2025-05-28 DIAGNOSIS — M05.9 SEROPOSITIVE RHEUMATOID ARTHRITIS: Primary | ICD-10-CM

## 2025-06-05 ENCOUNTER — OFFICE VISIT (OUTPATIENT)
Dept: INTERNAL MEDICINE | Facility: CLINIC | Age: 70
End: 2025-06-05
Payer: MEDICARE

## 2025-06-05 ENCOUNTER — LAB VISIT (OUTPATIENT)
Dept: LAB | Facility: HOSPITAL | Age: 70
End: 2025-06-05
Attending: NURSE PRACTITIONER
Payer: MEDICARE

## 2025-06-05 ENCOUNTER — PATIENT MESSAGE (OUTPATIENT)
Dept: HEMATOLOGY/ONCOLOGY | Facility: CLINIC | Age: 70
End: 2025-06-05
Payer: MEDICARE

## 2025-06-05 VITALS
SYSTOLIC BLOOD PRESSURE: 120 MMHG | WEIGHT: 175 LBS | HEART RATE: 70 BPM | HEIGHT: 62 IN | BODY MASS INDEX: 32.2 KG/M2 | OXYGEN SATURATION: 98 % | DIASTOLIC BLOOD PRESSURE: 62 MMHG

## 2025-06-05 DIAGNOSIS — F31.76 BIPOLAR DISORDER, IN FULL REMISSION, MOST RECENT EPISODE DEPRESSED: ICD-10-CM

## 2025-06-05 DIAGNOSIS — I10 PRIMARY HYPERTENSION: Chronic | ICD-10-CM

## 2025-06-05 DIAGNOSIS — M25.562 CHRONIC PAIN OF BOTH KNEES: ICD-10-CM

## 2025-06-05 DIAGNOSIS — I70.0 AORTIC ATHEROSCLEROSIS: Chronic | ICD-10-CM

## 2025-06-05 DIAGNOSIS — E78.2 MIXED HYPERLIPIDEMIA: ICD-10-CM

## 2025-06-05 DIAGNOSIS — R93.89 ABNORMAL CT SCAN, CHEST: ICD-10-CM

## 2025-06-05 DIAGNOSIS — G89.29 CHRONIC PAIN OF BOTH KNEES: ICD-10-CM

## 2025-06-05 DIAGNOSIS — I25.10 CORONARY ARTERY DISEASE INVOLVING NATIVE CORONARY ARTERY OF NATIVE HEART WITHOUT ANGINA PECTORIS: Chronic | ICD-10-CM

## 2025-06-05 DIAGNOSIS — D64.9 ANEMIA, UNSPECIFIED TYPE: ICD-10-CM

## 2025-06-05 DIAGNOSIS — D47.1 CHRONIC MYELOPROLIFERATIVE DISEASE: Chronic | ICD-10-CM

## 2025-06-05 DIAGNOSIS — E87.1 HYPONATREMIA: ICD-10-CM

## 2025-06-05 DIAGNOSIS — G83.4 CAUDA EQUINA SYNDROME: Chronic | ICD-10-CM

## 2025-06-05 DIAGNOSIS — Z86.39 PERSONAL HISTORY OF OTHER ENDOCRINE, NUTRITIONAL AND METABOLIC DISEASE: ICD-10-CM

## 2025-06-05 DIAGNOSIS — Z00.00 ANNUAL PHYSICAL EXAM: ICD-10-CM

## 2025-06-05 DIAGNOSIS — E66.01 SEVERE OBESITY (BMI 35.0-39.9) WITH COMORBIDITY: ICD-10-CM

## 2025-06-05 DIAGNOSIS — J44.89 ASTHMA WITH COPD: ICD-10-CM

## 2025-06-05 DIAGNOSIS — M05.9 SEROPOSITIVE RHEUMATOID ARTHRITIS: ICD-10-CM

## 2025-06-05 DIAGNOSIS — E87.6 HYPOKALEMIA: ICD-10-CM

## 2025-06-05 DIAGNOSIS — M25.561 CHRONIC PAIN OF BOTH KNEES: ICD-10-CM

## 2025-06-05 DIAGNOSIS — Z00.00 ANNUAL PHYSICAL EXAM: Primary | ICD-10-CM

## 2025-06-05 DIAGNOSIS — K21.00 GASTROESOPHAGEAL REFLUX DISEASE WITH ESOPHAGITIS WITHOUT HEMORRHAGE: ICD-10-CM

## 2025-06-05 DIAGNOSIS — F13.20 BENZODIAZEPINE DEPENDENCE: ICD-10-CM

## 2025-06-05 DIAGNOSIS — D50.8 OTHER IRON DEFICIENCY ANEMIA: Chronic | ICD-10-CM

## 2025-06-05 PROBLEM — A49.8: Status: RESOLVED | Noted: 2024-10-16 | Resolved: 2025-06-05

## 2025-06-05 PROBLEM — R00.0 SINUS TACHYCARDIA: Status: RESOLVED | Noted: 2024-12-21 | Resolved: 2025-06-05

## 2025-06-05 PROBLEM — N30.00 ACUTE CYSTITIS WITHOUT HEMATURIA: Status: RESOLVED | Noted: 2024-06-19 | Resolved: 2025-06-05

## 2025-06-05 PROBLEM — N12 PYELONEPHRITIS: Status: RESOLVED | Noted: 2024-10-14 | Resolved: 2025-06-05

## 2025-06-05 PROBLEM — D50.9 IRON DEFICIENCY ANEMIA: Chronic | Status: ACTIVE | Noted: 2023-01-18

## 2025-06-05 PROBLEM — R78.81 BACTEREMIA: Status: RESOLVED | Noted: 2024-06-22 | Resolved: 2025-06-05

## 2025-06-05 PROBLEM — A41.9 SEPSIS WITHOUT ACUTE ORGAN DYSFUNCTION: Status: RESOLVED | Noted: 2024-06-19 | Resolved: 2025-06-05

## 2025-06-05 PROBLEM — N13.30 HYDRONEPHROSIS: Status: RESOLVED | Noted: 2024-06-20 | Resolved: 2025-06-05

## 2025-06-05 PROBLEM — R82.81 PYURIA: Status: RESOLVED | Noted: 2024-12-20 | Resolved: 2025-06-05

## 2025-06-05 LAB
ABSOLUTE EOSINOPHIL (OHS): 0.22 K/UL
ABSOLUTE MONOCYTE (OHS): 0.61 K/UL (ref 0.3–1)
ABSOLUTE NEUTROPHIL COUNT (OHS): 5.03 K/UL (ref 1.8–7.7)
ALBUMIN SERPL BCP-MCNC: 3.4 G/DL (ref 3.5–5.2)
ALP SERPL-CCNC: 107 UNIT/L (ref 40–150)
ALT SERPL W/O P-5'-P-CCNC: <5 UNIT/L (ref 10–44)
ANION GAP (OHS): 6 MMOL/L (ref 8–16)
AST SERPL-CCNC: 11 UNIT/L (ref 11–45)
BASOPHILS # BLD AUTO: 0.05 K/UL
BASOPHILS NFR BLD AUTO: 0.7 %
BILIRUB SERPL-MCNC: 0.3 MG/DL (ref 0.1–1)
BUN SERPL-MCNC: 22 MG/DL (ref 8–23)
CALCIUM SERPL-MCNC: 8.8 MG/DL (ref 8.7–10.5)
CHLORIDE SERPL-SCNC: 99 MMOL/L (ref 95–110)
CHOLEST SERPL-MCNC: 208 MG/DL (ref 120–199)
CHOLEST/HDLC SERPL: 3.9 {RATIO} (ref 2–5)
CO2 SERPL-SCNC: 27 MMOL/L (ref 23–29)
CREAT SERPL-MCNC: 0.8 MG/DL (ref 0.5–1.4)
EAG (OHS): 103 MG/DL (ref 68–131)
ERYTHROCYTE [DISTWIDTH] IN BLOOD BY AUTOMATED COUNT: 16 % (ref 11.5–14.5)
FERRITIN SERPL-MCNC: 681 NG/ML (ref 20–300)
FOLATE SERPL-MCNC: 3.2 NG/ML (ref 4–24)
GFR SERPLBLD CREATININE-BSD FMLA CKD-EPI: >60 ML/MIN/1.73/M2
GLUCOSE SERPL-MCNC: 97 MG/DL (ref 70–110)
HBA1C MFR BLD: 5.2 % (ref 4–5.6)
HCT VFR BLD AUTO: 35.7 % (ref 37–48.5)
HCV AB SERPL QL IA: NORMAL
HDLC SERPL-MCNC: 53 MG/DL (ref 40–75)
HDLC SERPL: 25.5 % (ref 20–50)
HGB BLD-MCNC: 11.5 GM/DL (ref 12–16)
IMM GRANULOCYTES # BLD AUTO: 0.03 K/UL (ref 0–0.04)
IMM GRANULOCYTES NFR BLD AUTO: 0.4 % (ref 0–0.5)
IRON SATN MFR SERPL: 14 % (ref 20–50)
IRON SERPL-MCNC: 36 UG/DL (ref 30–160)
LDLC SERPL CALC-MCNC: 130.8 MG/DL (ref 63–159)
LYMPHOCYTES # BLD AUTO: 0.97 K/UL (ref 1–4.8)
MCH RBC QN AUTO: 29.4 PG (ref 27–31)
MCHC RBC AUTO-ENTMCNC: 32.2 G/DL (ref 32–36)
MCV RBC AUTO: 91 FL (ref 82–98)
NONHDLC SERPL-MCNC: 155 MG/DL
NUCLEATED RBC (/100WBC) (OHS): 0 /100 WBC
PLATELET # BLD AUTO: 279 K/UL (ref 150–450)
PMV BLD AUTO: 8.7 FL (ref 9.2–12.9)
POTASSIUM SERPL-SCNC: 5.1 MMOL/L (ref 3.5–5.1)
PROT SERPL-MCNC: 7.2 GM/DL (ref 6–8.4)
RBC # BLD AUTO: 3.91 M/UL (ref 4–5.4)
RELATIVE EOSINOPHIL (OHS): 3.2 %
RELATIVE LYMPHOCYTE (OHS): 14 % (ref 18–48)
RELATIVE MONOCYTE (OHS): 8.8 % (ref 4–15)
RELATIVE NEUTROPHIL (OHS): 72.9 % (ref 38–73)
SODIUM SERPL-SCNC: 132 MMOL/L (ref 136–145)
TIBC SERPL-MCNC: 253 UG/DL (ref 250–450)
TRANSFERRIN SERPL-MCNC: 171 MG/DL (ref 200–375)
TRIGL SERPL-MCNC: 121 MG/DL (ref 30–150)
TSH SERPL-ACNC: 0.84 UIU/ML (ref 0.4–4)
VIT B12 SERPL-MCNC: 385 PG/ML (ref 210–950)
WBC # BLD AUTO: 6.91 K/UL (ref 3.9–12.7)

## 2025-06-05 PROCEDURE — 82728 ASSAY OF FERRITIN: CPT

## 2025-06-05 PROCEDURE — 83036 HEMOGLOBIN GLYCOSYLATED A1C: CPT

## 2025-06-05 PROCEDURE — 83540 ASSAY OF IRON: CPT

## 2025-06-05 PROCEDURE — 86803 HEPATITIS C AB TEST: CPT

## 2025-06-05 PROCEDURE — 85025 COMPLETE CBC W/AUTO DIFF WBC: CPT

## 2025-06-05 PROCEDURE — 36415 COLL VENOUS BLD VENIPUNCTURE: CPT

## 2025-06-05 PROCEDURE — 84443 ASSAY THYROID STIM HORMONE: CPT

## 2025-06-05 PROCEDURE — 82607 VITAMIN B-12: CPT

## 2025-06-05 PROCEDURE — 84075 ASSAY ALKALINE PHOSPHATASE: CPT

## 2025-06-05 PROCEDURE — 99999 PR PBB SHADOW E&M-EST. PATIENT-LVL III: CPT | Mod: PBBFAC,,, | Performed by: FAMILY MEDICINE

## 2025-06-05 PROCEDURE — 99213 OFFICE O/P EST LOW 20 MIN: CPT | Mod: PBBFAC | Performed by: FAMILY MEDICINE

## 2025-06-05 PROCEDURE — 80061 LIPID PANEL: CPT

## 2025-06-05 PROCEDURE — 82746 ASSAY OF FOLIC ACID SERUM: CPT

## 2025-06-05 RX ORDER — METHOCARBAMOL 750 MG/1
750 TABLET, FILM COATED ORAL 3 TIMES DAILY
Qty: 270 TABLET | Refills: 3 | Status: SHIPPED | OUTPATIENT
Start: 2025-06-05

## 2025-06-05 RX ORDER — CELECOXIB 200 MG/1
200 CAPSULE ORAL DAILY
Qty: 90 CAPSULE | Refills: 3 | Status: SHIPPED | OUTPATIENT
Start: 2025-06-05 | End: 2026-06-05

## 2025-06-06 ENCOUNTER — RESULTS FOLLOW-UP (OUTPATIENT)
Dept: INTERNAL MEDICINE | Facility: CLINIC | Age: 70
End: 2025-06-06

## 2025-06-06 DIAGNOSIS — E78.2 MIXED HYPERLIPIDEMIA: ICD-10-CM

## 2025-06-06 DIAGNOSIS — I25.10 CORONARY ARTERY DISEASE INVOLVING NATIVE CORONARY ARTERY OF NATIVE HEART WITHOUT ANGINA PECTORIS: Chronic | ICD-10-CM

## 2025-06-06 DIAGNOSIS — D64.9 ANEMIA, UNSPECIFIED TYPE: ICD-10-CM

## 2025-06-06 DIAGNOSIS — E53.8 FOLATE DEFICIENCY: Primary | ICD-10-CM

## 2025-06-06 DIAGNOSIS — I70.0 AORTIC ATHEROSCLEROSIS: Chronic | ICD-10-CM

## 2025-06-06 RX ORDER — ROSUVASTATIN CALCIUM 5 MG/1
5 TABLET, COATED ORAL DAILY
Qty: 90 TABLET | Refills: 3 | Status: SHIPPED | OUTPATIENT
Start: 2025-06-06 | End: 2026-06-06

## 2025-06-06 RX ORDER — FOLIC ACID 1 MG/1
1 TABLET ORAL DAILY
Qty: 90 TABLET | Refills: 3 | Status: SHIPPED | OUTPATIENT
Start: 2025-06-06 | End: 2026-06-06

## 2025-06-09 ENCOUNTER — OFFICE VISIT (OUTPATIENT)
Dept: HEMATOLOGY/ONCOLOGY | Facility: CLINIC | Age: 70
End: 2025-06-09
Payer: MEDICARE

## 2025-06-09 ENCOUNTER — INFUSION (OUTPATIENT)
Dept: INFUSION THERAPY | Facility: HOSPITAL | Age: 70
End: 2025-06-09
Attending: INTERNAL MEDICINE
Payer: MEDICARE

## 2025-06-09 VITALS
HEIGHT: 62 IN | TEMPERATURE: 97 F | WEIGHT: 174.94 LBS | HEART RATE: 79 BPM | BODY MASS INDEX: 32.19 KG/M2 | OXYGEN SATURATION: 98 % | SYSTOLIC BLOOD PRESSURE: 124 MMHG | DIASTOLIC BLOOD PRESSURE: 77 MMHG | RESPIRATION RATE: 18 BRPM | TEMPERATURE: 97 F | WEIGHT: 176 LBS | HEART RATE: 83 BPM | DIASTOLIC BLOOD PRESSURE: 72 MMHG | SYSTOLIC BLOOD PRESSURE: 117 MMHG | BODY MASS INDEX: 32.39 KG/M2 | OXYGEN SATURATION: 96 % | HEIGHT: 62 IN

## 2025-06-09 DIAGNOSIS — D64.9 ANEMIA, UNSPECIFIED TYPE: Primary | Chronic | ICD-10-CM

## 2025-06-09 DIAGNOSIS — M05.9 SEROPOSITIVE RHEUMATOID ARTHRITIS: Primary | ICD-10-CM

## 2025-06-09 PROCEDURE — 96365 THER/PROPH/DIAG IV INF INIT: CPT

## 2025-06-09 PROCEDURE — 25000003 PHARM REV CODE 250: Performed by: INTERNAL MEDICINE

## 2025-06-09 PROCEDURE — 99214 OFFICE O/P EST MOD 30 MIN: CPT | Mod: PBBFAC,25 | Performed by: NURSE PRACTITIONER

## 2025-06-09 PROCEDURE — 99214 OFFICE O/P EST MOD 30 MIN: CPT | Mod: S$PBB,,, | Performed by: NURSE PRACTITIONER

## 2025-06-09 PROCEDURE — 96375 TX/PRO/DX INJ NEW DRUG ADDON: CPT

## 2025-06-09 PROCEDURE — 63600175 PHARM REV CODE 636 W HCPCS: Mod: JZ,TB | Performed by: INTERNAL MEDICINE

## 2025-06-09 PROCEDURE — 99999 PR PBB SHADOW E&M-EST. PATIENT-LVL IV: CPT | Mod: PBBFAC,,, | Performed by: NURSE PRACTITIONER

## 2025-06-09 RX ORDER — DIPHENHYDRAMINE HCL 25 MG
25 CAPSULE ORAL ONCE
OUTPATIENT
Start: 2025-06-23

## 2025-06-09 RX ORDER — SODIUM CHLORIDE 0.9 % (FLUSH) 0.9 %
10 SYRINGE (ML) INJECTION
OUTPATIENT
Start: 2025-06-23

## 2025-06-09 RX ORDER — ACETAMINOPHEN 325 MG/1
325 TABLET ORAL ONCE
OUTPATIENT
Start: 2025-06-23

## 2025-06-09 RX ORDER — DIPHENHYDRAMINE HYDROCHLORIDE 50 MG/ML
50 INJECTION, SOLUTION INTRAMUSCULAR; INTRAVENOUS ONCE AS NEEDED
OUTPATIENT
Start: 2025-06-23

## 2025-06-09 RX ORDER — SODIUM CHLORIDE 0.9 % (FLUSH) 0.9 %
10 SYRINGE (ML) INJECTION
Status: DISCONTINUED | OUTPATIENT
Start: 2025-06-09 | End: 2025-06-09 | Stop reason: HOSPADM

## 2025-06-09 RX ORDER — HEPARIN 100 UNIT/ML
500 SYRINGE INTRAVENOUS
OUTPATIENT
Start: 2025-06-23

## 2025-06-09 RX ORDER — ACETAMINOPHEN 325 MG/1
650 TABLET ORAL
OUTPATIENT
Start: 2025-06-23

## 2025-06-09 RX ORDER — EPINEPHRINE 0.3 MG/.3ML
0.3 INJECTION SUBCUTANEOUS ONCE AS NEEDED
OUTPATIENT
Start: 2025-06-23

## 2025-06-09 RX ORDER — METHYLPREDNISOLONE SOD SUCC 125 MG
40 VIAL (EA) INJECTION ONCE
Status: COMPLETED | OUTPATIENT
Start: 2025-06-09 | End: 2025-06-09

## 2025-06-09 RX ORDER — DIPHENHYDRAMINE HYDROCHLORIDE 50 MG/ML
25 INJECTION, SOLUTION INTRAMUSCULAR; INTRAVENOUS
OUTPATIENT
Start: 2025-06-23

## 2025-06-09 RX ORDER — DIPHENHYDRAMINE HCL 25 MG
25 CAPSULE ORAL ONCE
Status: COMPLETED | OUTPATIENT
Start: 2025-06-09 | End: 2025-06-09

## 2025-06-09 RX ADMIN — METHYLPREDNISOLONE SODIUM SUCCINATE 40 MG: 125 INJECTION, POWDER, FOR SOLUTION INTRAMUSCULAR; INTRAVENOUS at 01:06

## 2025-06-09 RX ADMIN — DIPHENHYDRAMINE HYDROCHLORIDE 25 MG: 25 CAPSULE ORAL at 01:06

## 2025-06-09 RX ADMIN — SODIUM CHLORIDE 750 MG: 9 INJECTION, SOLUTION INTRAVENOUS at 01:06

## 2025-06-09 RX ADMIN — SODIUM CHLORIDE: 9 INJECTION, SOLUTION INTRAVENOUS at 01:06
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
## 2025-06-09 NOTE — PLAN OF CARE
Problem: Adult Inpatient Plan of Care  Goal: Plan of Care Review  Outcome: Progressing  Flowsheets (Taken 6/9/2025 1333)  Plan of Care Reviewed With:   patient   family  Goal: Patient-Specific Goal (Individualized)  Outcome: Progressing  Flowsheets (Taken 6/9/2025 1333)  Individualized Care Needs: warm blanket, pillow orange juice and cheez its  Anxieties, Fears or Concerns: None  Patient/Family-Specific Goals (Include Timeframe): will tolerate Orencia infusion with no adverse affects  Goal: Absence of Hospital-Acquired Illness or Injury  Outcome: Progressing  Intervention: Identify and Manage Fall Risk  Flowsheets (Taken 6/9/2025 1333)  Safety Promotion/Fall Prevention: in recliner, wheels locked  Intervention: Prevent Infection  Flowsheets (Taken 6/9/2025 1333)  Infection Prevention:   equipment surfaces disinfected   hand hygiene promoted   personal protective equipment utilized  Goal: Optimal Comfort and Wellbeing  Outcome: Progressing  Intervention: Provide Person-Centered Care  Flowsheets (Taken 6/9/2025 1333)  Trust Relationship/Rapport:   care explained   questions encouraged   choices provided   reassurance provided   emotional support provided   thoughts/feelings acknowledged   empathic listening provided   questions answered

## 2025-06-09 NOTE — ASSESSMENT & PLAN NOTE
Hg 11.5. persistent mild iron deficiency. PCP labs reveal folate deficiency.     Continue sublingual B12 1000 mcg daily. Folic acid 1 mg PO daily. Given prior h/o constipation will defer oral iron supplementation. For now will hold off on additional iron supplementation. List of iron rich foods from up to date printed for patient. Encouraged iron rich foods.     F/u 3 months with cbc, cmp, iron ,ferritin. Discussed S&S to report sooner     Male

## 2025-06-09 NOTE — DISCHARGE INSTRUCTIONS
[facility]  [address] [address]  [address]  [phone] phone     [phone] fax  Hours of Operation: Monday- Friday 8:00am- 5:00pm  After hours phone  [phone]  Hematology / Oncology Physicians on call      JANIS Ojeda Dr., NP Phaon Dunbar, NP Khelsea Conley, FNP    Please call with any concerns regarding your appointment today.

## 2025-06-12 ENCOUNTER — DOCUMENT SCAN (OUTPATIENT)
Dept: HOME HEALTH SERVICES | Facility: HOSPITAL | Age: 70
End: 2025-06-12
Payer: MEDICARE

## 2025-06-20 ENCOUNTER — LAB VISIT (OUTPATIENT)
Dept: LAB | Facility: HOSPITAL | Age: 70
End: 2025-06-20
Attending: INTERNAL MEDICINE
Payer: MEDICARE

## 2025-06-20 DIAGNOSIS — M05.9 SEROPOSITIVE RHEUMATOID ARTHRITIS: ICD-10-CM

## 2025-06-20 LAB
ALBUMIN SERPL BCP-MCNC: 3.5 G/DL (ref 3.5–5.2)
ALP SERPL-CCNC: 138 UNIT/L (ref 40–150)
ALT SERPL W/O P-5'-P-CCNC: 10 UNIT/L (ref 10–44)
ANION GAP (OHS): 9 MMOL/L (ref 8–16)
AST SERPL-CCNC: 13 UNIT/L (ref 11–45)
BILIRUB SERPL-MCNC: 0.3 MG/DL (ref 0.1–1)
BUN SERPL-MCNC: 19 MG/DL (ref 8–23)
CALCIUM SERPL-MCNC: 8.7 MG/DL (ref 8.7–10.5)
CHLORIDE SERPL-SCNC: 99 MMOL/L (ref 95–110)
CO2 SERPL-SCNC: 26 MMOL/L (ref 23–29)
CREAT SERPL-MCNC: 0.8 MG/DL (ref 0.5–1.4)
CRP SERPL-MCNC: 10 MG/L
ERYTHROCYTE [SEDIMENTATION RATE] IN BLOOD BY PHOTOMETRIC METHOD: 52 MM/HR
GFR SERPLBLD CREATININE-BSD FMLA CKD-EPI: >60 ML/MIN/1.73/M2
GLUCOSE SERPL-MCNC: 97 MG/DL (ref 70–110)
POTASSIUM SERPL-SCNC: 4.6 MMOL/L (ref 3.5–5.1)
PROT SERPL-MCNC: 7.3 GM/DL (ref 6–8.4)
SODIUM SERPL-SCNC: 134 MMOL/L (ref 136–145)

## 2025-06-20 PROCEDURE — 36415 COLL VENOUS BLD VENIPUNCTURE: CPT

## 2025-06-20 PROCEDURE — 85652 RBC SED RATE AUTOMATED: CPT

## 2025-06-20 PROCEDURE — 86140 C-REACTIVE PROTEIN: CPT

## 2025-06-20 PROCEDURE — 82435 ASSAY OF BLOOD CHLORIDE: CPT

## 2025-06-23 ENCOUNTER — INFUSION (OUTPATIENT)
Dept: INFUSION THERAPY | Facility: HOSPITAL | Age: 70
End: 2025-06-23
Attending: NURSE PRACTITIONER
Payer: MEDICARE

## 2025-06-23 VITALS
OXYGEN SATURATION: 94 % | HEART RATE: 86 BPM | HEIGHT: 62 IN | DIASTOLIC BLOOD PRESSURE: 86 MMHG | WEIGHT: 176 LBS | TEMPERATURE: 98 F | SYSTOLIC BLOOD PRESSURE: 147 MMHG | BODY MASS INDEX: 32.39 KG/M2 | RESPIRATION RATE: 18 BRPM

## 2025-06-23 DIAGNOSIS — M05.9 SEROPOSITIVE RHEUMATOID ARTHRITIS: Primary | ICD-10-CM

## 2025-06-23 PROCEDURE — 96365 THER/PROPH/DIAG IV INF INIT: CPT

## 2025-06-23 PROCEDURE — 25000003 PHARM REV CODE 250: Performed by: INTERNAL MEDICINE

## 2025-06-23 PROCEDURE — 63600175 PHARM REV CODE 636 W HCPCS: Mod: JZ,JA,TB | Performed by: INTERNAL MEDICINE

## 2025-06-23 PROCEDURE — 96375 TX/PRO/DX INJ NEW DRUG ADDON: CPT

## 2025-06-23 RX ORDER — HEPARIN 100 UNIT/ML
500 SYRINGE INTRAVENOUS
OUTPATIENT
Start: 2025-07-07

## 2025-06-23 RX ORDER — DIPHENHYDRAMINE HCL 25 MG
25 CAPSULE ORAL ONCE
Status: COMPLETED | OUTPATIENT
Start: 2025-06-23 | End: 2025-06-23

## 2025-06-23 RX ORDER — SODIUM CHLORIDE 0.9 % (FLUSH) 0.9 %
10 SYRINGE (ML) INJECTION
OUTPATIENT
Start: 2025-07-07

## 2025-06-23 RX ORDER — ACETAMINOPHEN 325 MG/1
325 TABLET ORAL ONCE
OUTPATIENT
Start: 2025-07-07

## 2025-06-23 RX ORDER — EPINEPHRINE 0.3 MG/.3ML
0.3 INJECTION SUBCUTANEOUS ONCE AS NEEDED
Status: DISCONTINUED | OUTPATIENT
Start: 2025-06-23 | End: 2025-06-23 | Stop reason: HOSPADM

## 2025-06-23 RX ORDER — ACETAMINOPHEN 325 MG/1
650 TABLET ORAL
OUTPATIENT
Start: 2025-07-07

## 2025-06-23 RX ORDER — METHYLPREDNISOLONE SOD SUCC 125 MG
40 VIAL (EA) INJECTION ONCE
Status: COMPLETED | OUTPATIENT
Start: 2025-06-23 | End: 2025-06-23

## 2025-06-23 RX ORDER — DIPHENHYDRAMINE HCL 25 MG
25 CAPSULE ORAL ONCE
OUTPATIENT
Start: 2025-07-07

## 2025-06-23 RX ORDER — DIPHENHYDRAMINE HYDROCHLORIDE 50 MG/ML
50 INJECTION, SOLUTION INTRAMUSCULAR; INTRAVENOUS ONCE AS NEEDED
OUTPATIENT
Start: 2025-07-07

## 2025-06-23 RX ORDER — DIPHENHYDRAMINE HYDROCHLORIDE 50 MG/ML
50 INJECTION, SOLUTION INTRAMUSCULAR; INTRAVENOUS ONCE AS NEEDED
Status: DISCONTINUED | OUTPATIENT
Start: 2025-06-23 | End: 2025-06-23 | Stop reason: HOSPADM

## 2025-06-23 RX ORDER — DIPHENHYDRAMINE HYDROCHLORIDE 50 MG/ML
25 INJECTION, SOLUTION INTRAMUSCULAR; INTRAVENOUS
OUTPATIENT
Start: 2025-07-07

## 2025-06-23 RX ORDER — ACETAMINOPHEN 325 MG/1
325 TABLET ORAL ONCE
Status: DISCONTINUED | OUTPATIENT
Start: 2025-06-23 | End: 2025-06-23 | Stop reason: HOSPADM

## 2025-06-23 RX ORDER — EPINEPHRINE 0.3 MG/.3ML
0.3 INJECTION SUBCUTANEOUS ONCE AS NEEDED
OUTPATIENT
Start: 2025-07-07

## 2025-06-23 RX ADMIN — DIPHENHYDRAMINE HYDROCHLORIDE 25 MG: 25 CAPSULE ORAL at 08:06

## 2025-06-23 RX ADMIN — METHYLPREDNISOLONE SODIUM SUCCINATE 40 MG: 125 INJECTION, POWDER, FOR SOLUTION INTRAMUSCULAR; INTRAVENOUS at 08:06

## 2025-06-23 RX ADMIN — SODIUM CHLORIDE 750 MG: 9 INJECTION, SOLUTION INTRAVENOUS at 09:06

## 2025-06-23 NOTE — PLAN OF CARE
Problem: Adult Inpatient Plan of Care  Goal: Plan of Care Review  Outcome: Progressing  Flowsheets (Taken 6/23/2025 0854)  Plan of Care Reviewed With:   patient   spouse  Goal: Optimal Comfort and Wellbeing  Outcome: Progressing  Intervention: Provide Person-Centered Care  Flowsheets (Taken 6/23/2025 0854)  Trust Relationship/Rapport:   care explained   choices provided   emotional support provided   empathic listening provided   questions answered   questions encouraged   reassurance provided   thoughts/feelings acknowledged

## 2025-06-25 ENCOUNTER — LAB VISIT (OUTPATIENT)
Dept: LAB | Facility: HOSPITAL | Age: 70
End: 2025-06-25
Attending: INTERNAL MEDICINE
Payer: MEDICARE

## 2025-06-25 ENCOUNTER — OFFICE VISIT (OUTPATIENT)
Dept: PULMONOLOGY | Facility: CLINIC | Age: 70
End: 2025-06-25
Payer: MEDICARE

## 2025-06-25 VITALS
RESPIRATION RATE: 16 BRPM | DIASTOLIC BLOOD PRESSURE: 86 MMHG | BODY MASS INDEX: 32.37 KG/M2 | SYSTOLIC BLOOD PRESSURE: 146 MMHG | HEART RATE: 76 BPM | WEIGHT: 175.94 LBS | HEIGHT: 62 IN | OXYGEN SATURATION: 100 %

## 2025-06-25 DIAGNOSIS — J84.112 UIP (USUAL INTERSTITIAL PNEUMONITIS): ICD-10-CM

## 2025-06-25 DIAGNOSIS — D84.9 IMMUNOCOMPROMISED: ICD-10-CM

## 2025-06-25 DIAGNOSIS — R93.89 ABNORMAL CT SCAN, CHEST: ICD-10-CM

## 2025-06-25 DIAGNOSIS — R79.1 ABNORMAL COAGULATION PROFILE: ICD-10-CM

## 2025-06-25 DIAGNOSIS — M05.9 SEROPOSITIVE RHEUMATOID ARTHRITIS: ICD-10-CM

## 2025-06-25 DIAGNOSIS — R91.1 LEFT LOWER LOBE PULMONARY NODULE: ICD-10-CM

## 2025-06-25 DIAGNOSIS — R91.1 LEFT LOWER LOBE PULMONARY NODULE: Primary | ICD-10-CM

## 2025-06-25 DIAGNOSIS — Z87.891 HISTORY OF SMOKING GREATER THAN 50 PACK YEARS: ICD-10-CM

## 2025-06-25 LAB
INR PPP: 1 (ref 0.8–1.2)
PROTHROMBIN TIME: 11.2 SECONDS (ref 9–12.5)

## 2025-06-25 PROCEDURE — 99204 OFFICE O/P NEW MOD 45 MIN: CPT | Mod: S$PBB,,, | Performed by: INTERNAL MEDICINE

## 2025-06-25 PROCEDURE — 36415 COLL VENOUS BLD VENIPUNCTURE: CPT

## 2025-06-25 PROCEDURE — 85610 PROTHROMBIN TIME: CPT

## 2025-06-25 PROCEDURE — 99999 PR PBB SHADOW E&M-EST. PATIENT-LVL V: CPT | Mod: PBBFAC,,, | Performed by: INTERNAL MEDICINE

## 2025-06-25 PROCEDURE — 99215 OFFICE O/P EST HI 40 MIN: CPT | Mod: PBBFAC | Performed by: INTERNAL MEDICINE
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
## 2025-07-01 ENCOUNTER — OFFICE VISIT (OUTPATIENT)
Dept: NEUROSURGERY | Facility: CLINIC | Age: 70
End: 2025-07-01
Payer: MEDICARE

## 2025-07-01 VITALS
DIASTOLIC BLOOD PRESSURE: 75 MMHG | BODY MASS INDEX: 32.18 KG/M2 | HEART RATE: 82 BPM | SYSTOLIC BLOOD PRESSURE: 142 MMHG | WEIGHT: 175.94 LBS

## 2025-07-01 DIAGNOSIS — M54.9 DORSALGIA, UNSPECIFIED: Primary | ICD-10-CM

## 2025-07-01 DIAGNOSIS — Z98.1 ARTHRODESIS STATUS: ICD-10-CM

## 2025-07-01 PROCEDURE — 99999 PR PBB SHADOW E&M-EST. PATIENT-LVL III: CPT | Mod: PBBFAC,,, | Performed by: PHYSICIAN ASSISTANT

## 2025-07-01 PROCEDURE — 99213 OFFICE O/P EST LOW 20 MIN: CPT | Mod: S$PBB,,, | Performed by: PHYSICIAN ASSISTANT

## 2025-07-01 PROCEDURE — 99213 OFFICE O/P EST LOW 20 MIN: CPT | Mod: PBBFAC | Performed by: PHYSICIAN ASSISTANT

## 2025-07-01 NOTE — PROGRESS NOTES
Many times is a 70-year-old female known to Neurosurgery she is about 1 year postop no new x-rays for me to review today.  I have ordered CT thoracolumbar to eval for fusion process    Presents for follow up right knee pain      Lifts legs antigravity    Has nicholson in place    Getting infusions for Rheumatology    Sees Dr. Cam as she has nicholson- for neurogenic bladder      Taking robaxin TID       provide supplemental HPI.    Denies new or worsening symptoms. Mobilizing using wheel chair.      Patient is 70 yo F who presents for follow up lumbar fusion for burst fracture. She has been suffering from pain for several years due to prior nerve injury causing pain in the rectal area and difficulty with neurogenic bladder. Pt. Has had indwelling catheter since October.     She is in wheel chair today.       Patient is doing therapy at home    Has seen rheumatology for osteoporosis -  started on tymlos sub q and also started her on low dose prednisone which spouse reports affects the effectiveness of her psychiatric meds.      I also suggested cymbalta - pt spouse states he will discuss with pts psychiatrist.     Patient has been prescribed a compounded cream and other creams for her sensitive areas but non are helpful      Patient is moving her legs better and antigravity. Needs assistance with standing.     Has been doing home health therapy    Incision is well healing    Pt reports pain in lower lumbar region mid incision - hurting to left of the incision.      Subjective:      Patient ID: Merle Caro is a 70 y.o. female.    Chief Complaint: No chief complaint on file.    Post op # 2 thoracolumbar extension for L1 burst fracture   Stable symptoms   Ambulating at home   Doing home health PT   No radicular symptoms   Still with nicholson catheter in place   Complaints to pain peroneal       Review of Systems   Constitutional:  Negative for activity change, appetite change and chills.   HENT:  Negative for  hearing loss, sore throat and tinnitus.    Eyes:  Negative for pain, discharge and itching.   Cardiovascular:  Negative for chest pain.   Gastrointestinal:  Negative for abdominal pain.   Endocrine: Negative for cold intolerance and heat intolerance.   Genitourinary:  Negative for difficulty urinating and dysuria.   Musculoskeletal:  Positive for back pain and gait problem.   Allergic/Immunologic: Negative for environmental allergies.   Neurological:  Positive for weakness. Negative for dizziness, tremors, light-headedness and headaches.   Hematological:  Negative for adenopathy.   Psychiatric/Behavioral:  Negative for agitation, behavioral problems and confusion.          Objective:       Physical Exam:  Nursing note and vitals reviewed.    Constitutional: She appears well-nourished. She is not diaphoretic. No distress.     Eyes: Pupils are equal, round, and reactive to light. EOM are normal.     Cardiovascular: Normal rate and regular rhythm.     Psych/Behavior: She is alert. She is oriented to person, place, and time. She has a normal mood and affect.     Musculoskeletal:        Back: Range of motion is limited. There is tenderness. Muscle strength is 5/5.       Right Lower Extremities: Range of motion is full. There is no tenderness. Muscle strength is 5/5. Tone is normal.        Left Lower Extremities: There is no tenderness. Muscle strength is 5/5. Tone is normal.     Neurological:        Sensory: There is no sensory deficit in the trunk. There is no sensory deficit in the extremities.        Cranial nerves: Cranial nerve(s) II, III, IV, V, VI, VII, VIII, IX, X, XI and XII are intact.     General    Nursing note and vitals reviewed.  Constitutional: She is oriented to person, place, and time. She appears well-nourished. No distress.   Eyes: EOM are normal. Pupils are equal, round, and reactive to light.   Cardiovascular:  Normal rate and regular rhythm.            Neurological: She is alert and oriented to  person, place, and time.   Psychiatric: She has a normal mood and affect.             Ortho Exam    Incision CDI   Dressing in place from   No redness swelling or drainage       Electronically signed by: Jose Manuel Lawrence MD  Date:    12/18/2024  Time:    12:54         I  reviewed all pertinent imaging regarding this case.  Assessment:     1. Dorsalgia, unspecified    2. Arthrodesis status        Plan:     Dorsalgia, unspecified  -     CT Lumbar Spine Without Contrast; Future; Expected date: 07/01/2025  -     Ambulatory referral/consult to Orthopedics; Future; Expected date: 07/08/2025    Arthrodesis status  -     CT Thoracic Spine Without Contrast; Future; Expected date: 07/01/2025    Postop  Doing well XR shows stable instrumentation      She has pain to her perineal region  Will follow-up after imaging    Thank you for the referral   Please call with any questions    Lizzeth Tillman PA-C  Neurosurgery     Disclaimer: This note was prepared using a voice recognition system and is likely to have sound alike errors within the text.

## 2025-07-03 ENCOUNTER — TELEPHONE (OUTPATIENT)
Dept: RADIOLOGY | Facility: HOSPITAL | Age: 70
End: 2025-07-03
Payer: MEDICARE

## 2025-07-03 ENCOUNTER — LAB VISIT (OUTPATIENT)
Dept: LAB | Facility: HOSPITAL | Age: 70
End: 2025-07-03
Attending: INTERNAL MEDICINE
Payer: MEDICARE

## 2025-07-03 DIAGNOSIS — J84.112 UIP (USUAL INTERSTITIAL PNEUMONITIS): ICD-10-CM

## 2025-07-03 DIAGNOSIS — M05.9 SEROPOSITIVE RHEUMATOID ARTHRITIS: ICD-10-CM

## 2025-07-03 LAB
ABSOLUTE EOSINOPHIL (OHS): 0.18 K/UL
ABSOLUTE MONOCYTE (OHS): 0.47 K/UL (ref 0.3–1)
ABSOLUTE NEUTROPHIL COUNT (OHS): 3.65 K/UL (ref 1.8–7.7)
ALBUMIN SERPL BCP-MCNC: 3.6 G/DL (ref 3.5–5.2)
ALP SERPL-CCNC: 127 UNIT/L (ref 40–150)
ALT SERPL W/O P-5'-P-CCNC: 10 UNIT/L (ref 10–44)
ANION GAP (OHS): 7 MMOL/L (ref 8–16)
AST SERPL-CCNC: 12 UNIT/L (ref 11–45)
BASOPHILS # BLD AUTO: 0.04 K/UL
BASOPHILS NFR BLD AUTO: 0.7 %
BILIRUB SERPL-MCNC: 0.4 MG/DL (ref 0.1–1)
BUN SERPL-MCNC: 24 MG/DL (ref 8–23)
CALCIUM SERPL-MCNC: 9 MG/DL (ref 8.7–10.5)
CHLORIDE SERPL-SCNC: 97 MMOL/L (ref 95–110)
CO2 SERPL-SCNC: 27 MMOL/L (ref 23–29)
CREAT SERPL-MCNC: 0.8 MG/DL (ref 0.5–1.4)
CRP SERPL-MCNC: 12.8 MG/L
ERYTHROCYTE [DISTWIDTH] IN BLOOD BY AUTOMATED COUNT: 14.5 % (ref 11.5–14.5)
ERYTHROCYTE [SEDIMENTATION RATE] IN BLOOD BY PHOTOMETRIC METHOD: 36 MM/HR
GFR SERPLBLD CREATININE-BSD FMLA CKD-EPI: >60 ML/MIN/1.73/M2
GLUCOSE SERPL-MCNC: 88 MG/DL (ref 70–110)
HCT VFR BLD AUTO: 38.1 % (ref 37–48.5)
HGB BLD-MCNC: 12 GM/DL (ref 12–16)
IMM GRANULOCYTES # BLD AUTO: 0.03 K/UL (ref 0–0.04)
IMM GRANULOCYTES NFR BLD AUTO: 0.6 % (ref 0–0.5)
LYMPHOCYTES # BLD AUTO: 1.01 K/UL (ref 1–4.8)
MCH RBC QN AUTO: 30 PG (ref 27–31)
MCHC RBC AUTO-ENTMCNC: 31.5 G/DL (ref 32–36)
MCV RBC AUTO: 95 FL (ref 82–98)
NUCLEATED RBC (/100WBC) (OHS): 0 /100 WBC
PLATELET # BLD AUTO: 287 K/UL (ref 150–450)
PMV BLD AUTO: 9.1 FL (ref 9.2–12.9)
POTASSIUM SERPL-SCNC: 4.9 MMOL/L (ref 3.5–5.1)
PROT SERPL-MCNC: 7.3 GM/DL (ref 6–8.4)
RBC # BLD AUTO: 4 M/UL (ref 4–5.4)
RELATIVE EOSINOPHIL (OHS): 3.3 %
RELATIVE LYMPHOCYTE (OHS): 18.8 % (ref 18–48)
RELATIVE MONOCYTE (OHS): 8.7 % (ref 4–15)
RELATIVE NEUTROPHIL (OHS): 67.9 % (ref 38–73)
SODIUM SERPL-SCNC: 131 MMOL/L (ref 136–145)
WBC # BLD AUTO: 5.38 K/UL (ref 3.9–12.7)

## 2025-07-03 PROCEDURE — 85025 COMPLETE CBC W/AUTO DIFF WBC: CPT

## 2025-07-03 PROCEDURE — 85652 RBC SED RATE AUTOMATED: CPT

## 2025-07-03 PROCEDURE — 80053 COMPREHEN METABOLIC PANEL: CPT

## 2025-07-03 PROCEDURE — 86140 C-REACTIVE PROTEIN: CPT

## 2025-07-03 PROCEDURE — 36415 COLL VENOUS BLD VENIPUNCTURE: CPT

## 2025-07-03 NOTE — TELEPHONE ENCOUNTER
Interventional Radiology  Scheduled 8:30AM lung biopsy for 7/14/25 w/patient.  Instructed pt. to arrive by 7:15AM to the hospital ([address]/ Entr[address]) off [address] in [place] and check in at Patient Registration located on the first floor.  She must have a ride and NPO after midnight the night before.  Pt. is to take last dose of Celebrex on 7/8/25 and she denies taking other blood thinners, NSAIDs, fish oil, or GLP-1/GIP agonists.  Pt. can take morning medications the day of procedure with a small sip of water.  I gave patient our number [phone] and she verbalized understanding of all instructions.

## 2025-07-07 ENCOUNTER — INFUSION (OUTPATIENT)
Dept: INFUSION THERAPY | Facility: HOSPITAL | Age: 70
End: 2025-07-07
Attending: INTERNAL MEDICINE
Payer: MEDICARE

## 2025-07-07 VITALS
RESPIRATION RATE: 18 BRPM | DIASTOLIC BLOOD PRESSURE: 75 MMHG | SYSTOLIC BLOOD PRESSURE: 133 MMHG | TEMPERATURE: 98 F | HEART RATE: 84 BPM | OXYGEN SATURATION: 96 %

## 2025-07-07 DIAGNOSIS — M05.9 SEROPOSITIVE RHEUMATOID ARTHRITIS: Primary | ICD-10-CM

## 2025-07-07 PROCEDURE — 96365 THER/PROPH/DIAG IV INF INIT: CPT

## 2025-07-07 PROCEDURE — 96375 TX/PRO/DX INJ NEW DRUG ADDON: CPT

## 2025-07-07 PROCEDURE — 25000003 PHARM REV CODE 250: Performed by: INTERNAL MEDICINE

## 2025-07-07 PROCEDURE — 63600175 PHARM REV CODE 636 W HCPCS: Mod: JZ,JA,TB | Performed by: INTERNAL MEDICINE

## 2025-07-07 RX ORDER — DIPHENHYDRAMINE HCL 25 MG
25 CAPSULE ORAL ONCE
OUTPATIENT
Start: 2025-08-04

## 2025-07-07 RX ORDER — DIPHENHYDRAMINE HYDROCHLORIDE 50 MG/ML
50 INJECTION, SOLUTION INTRAMUSCULAR; INTRAVENOUS ONCE AS NEEDED
OUTPATIENT
Start: 2025-08-04

## 2025-07-07 RX ORDER — ACETAMINOPHEN 325 MG/1
650 TABLET ORAL
OUTPATIENT
Start: 2025-08-04

## 2025-07-07 RX ORDER — DIPHENHYDRAMINE HYDROCHLORIDE 50 MG/ML
25 INJECTION, SOLUTION INTRAMUSCULAR; INTRAVENOUS
OUTPATIENT
Start: 2025-08-04

## 2025-07-07 RX ORDER — HEPARIN 100 UNIT/ML
500 SYRINGE INTRAVENOUS
OUTPATIENT
Start: 2025-08-04

## 2025-07-07 RX ORDER — EPINEPHRINE 0.3 MG/.3ML
0.3 INJECTION SUBCUTANEOUS ONCE AS NEEDED
OUTPATIENT
Start: 2025-08-04

## 2025-07-07 RX ORDER — DIPHENHYDRAMINE HCL 25 MG
25 CAPSULE ORAL ONCE
Status: COMPLETED | OUTPATIENT
Start: 2025-07-07 | End: 2025-07-07

## 2025-07-07 RX ORDER — ACETAMINOPHEN 325 MG/1
325 TABLET ORAL ONCE
Status: COMPLETED | OUTPATIENT
Start: 2025-07-07 | End: 2025-07-07

## 2025-07-07 RX ORDER — METHYLPREDNISOLONE SOD SUCC 125 MG
40 VIAL (EA) INJECTION ONCE
Status: COMPLETED | OUTPATIENT
Start: 2025-07-07 | End: 2025-07-07

## 2025-07-07 RX ORDER — SODIUM CHLORIDE 0.9 % (FLUSH) 0.9 %
10 SYRINGE (ML) INJECTION
OUTPATIENT
Start: 2025-08-04

## 2025-07-07 RX ORDER — ACETAMINOPHEN 325 MG/1
325 TABLET ORAL ONCE
OUTPATIENT
Start: 2025-08-04

## 2025-07-07 RX ADMIN — DIPHENHYDRAMINE HYDROCHLORIDE 25 MG: 25 CAPSULE ORAL at 09:07

## 2025-07-07 RX ADMIN — SODIUM CHLORIDE 750 MG: 9 INJECTION, SOLUTION INTRAVENOUS at 09:07

## 2025-07-07 RX ADMIN — ACETAMINOPHEN 325 MG: 325 TABLET ORAL at 09:07

## 2025-07-07 RX ADMIN — METHYLPREDNISOLONE SODIUM SUCCINATE 40 MG: 125 INJECTION, POWDER, FOR SOLUTION INTRAMUSCULAR; INTRAVENOUS at 09:07

## 2025-07-07 NOTE — PLAN OF CARE
Problem: Adult Inpatient Plan of Care  Goal: Plan of Care Review  Outcome: Progressing  Flowsheets (Taken 7/7/2025 0955)  Plan of Care Reviewed With:   patient   spouse  Goal: Optimal Comfort and Wellbeing  Outcome: Progressing  Intervention: Provide Person-Centered Care  Flowsheets (Taken 7/7/2025 0955)  Trust Relationship/Rapport:   care explained   questions encouraged   choices provided   reassurance provided   emotional support provided   thoughts/feelings acknowledged   empathic listening provided   questions answered

## 2025-07-09 ENCOUNTER — CLINICAL SUPPORT (OUTPATIENT)
Dept: PULMONOLOGY | Facility: CLINIC | Age: 70
End: 2025-07-09
Payer: MEDICARE

## 2025-07-09 ENCOUNTER — PATIENT MESSAGE (OUTPATIENT)
Dept: RESEARCH | Facility: HOSPITAL | Age: 70
End: 2025-07-09
Payer: MEDICARE

## 2025-07-09 DIAGNOSIS — J84.112 UIP (USUAL INTERSTITIAL PNEUMONITIS): ICD-10-CM

## 2025-07-09 LAB
BRPFT: ABNORMAL
DLCO ADJ PRE: 10.34 ML/(MIN*MMHG) (ref 14.6–26.07)
DLCO SINGLE BREATH LLN: 14.6
DLCO SINGLE BREATH PRE REF: 48.5 %
DLCO SINGLE BREATH REF: 20.33
DLCOC SBVA LLN: 2.85
DLCOC SBVA PRE REF: 87.6 %
DLCOC SBVA REF: 4.42
DLCOC SINGLE BREATH LLN: 14.6
DLCOC SINGLE BREATH PRE REF: 50.9 %
DLCOC SINGLE BREATH REF: 20.33
DLCOVA LLN: 2.85
DLCOVA PRE REF: 83.5 %
DLCOVA PRE: 3.69 ML/(MIN*MMHG*L) (ref 2.85–5.98)
DLCOVA REF: 4.42
DLVAADJ PRE: 3.87 ML/(MIN*MMHG*L) (ref 2.85–5.98)
ERV LLN: -16449.34
ERV PRE REF: 53.8 %
ERV REF: 0.66
FEF 25 75 CHG: 52.7 %
FEF 25 75 LLN: 0.88
FEF 25 75 POST REF: 65.6 %
FEF 25 75 PRE REF: 42.9 %
FEF 25 75 REF: 1.86
FET100 CHG: 2.2 %
FEV1 CHG: 22.6 %
FEV1 FVC CHG: 4.9 %
FEV1 FVC LLN: 66
FEV1 FVC POST REF: 102.2 %
FEV1 FVC PRE REF: 97.4 %
FEV1 FVC REF: 79
FEV1 LLN: 1.56
FEV1 POST REF: 58.1 %
FEV1 PRE REF: 47.4 %
FEV1 REF: 2.12
FRCPLETH LLN: 1.77
FRCPLETH PREREF: 79.4 %
FRCPLETH REF: 2.6
FVC CHG: 16.9 %
FVC LLN: 2
FVC POST REF: 56.5 %
FVC PRE REF: 48.3 %
FVC REF: 2.71
IVC PRE: 1.55 L (ref 2–3.46)
IVC SINGLE BREATH LLN: 2
IVC SINGLE BREATH PRE REF: 57.2 %
IVC SINGLE BREATH REF: 2.71
MVV LLN: 65
MVV PRE REF: 45.9 %
MVV REF: 77
PEF CHG: 27 %
PEF LLN: 3.88
PEF POST REF: 78.9 %
PEF PRE REF: 62.1 %
PEF REF: 5.49
POST FEF 25 75: 1.22 L/S (ref 0.88–3.24)
POST FET 100: 5.63 SEC
POST FEV1 FVC: 80.35 % (ref 65.6–89.85)
POST FEV1: 1.23 L (ref 1.56–2.66)
POST FVC: 1.53 L (ref 2–3.46)
POST PEF: 4.33 L/S (ref 3.88–7.1)
PRE DLCO: 9.87 ML/(MIN*MMHG) (ref 14.6–26.07)
PRE ERV: 0.35 L (ref -16449.34–16450.66)
PRE FEF 25 75: 0.8 L/S (ref 0.88–3.24)
PRE FET 100: 5.51 SEC
PRE FEV1 FVC: 76.63 % (ref 65.6–89.85)
PRE FEV1: 1 L (ref 1.56–2.66)
PRE FRC PL: 2.06 L (ref 1.77–3.42)
PRE FVC: 1.31 L (ref 2–3.46)
PRE MVV: 35.25 L/MIN (ref 65.3–88.34)
PRE PEF: 3.41 L/S (ref 3.88–7.1)
PRE RV: 1.71 L (ref 1.36–2.51)
PRE TLC: 3.12 L (ref 3.62–5.59)
RAW LLN: 3.06
RAW PRE REF: 374.8 %
RAW PRE: 11.47 CMH2O*S/L (ref 3.06–3.06)
RAW REF: 3.06
RV LLN: 1.36
RV PRE REF: 88 %
RV REF: 1.94
RVTLC LLN: 32
RVTLC PRE REF: 129.9 %
RVTLC PRE: 54.67 % (ref 32.49–51.67)
RVTLC REF: 42
TLC LLN: 3.62
TLC PRE REF: 67.8 %
TLC REF: 4.6
VA PRE: 2.67 L (ref 4.45–4.45)
VA SINGLE BREATH LLN: 4.45
VA SINGLE BREATH PRE REF: 60 %
VA SINGLE BREATH REF: 4.45
VC LLN: 2
VC PRE REF: 52.2 %
VC PRE: 1.42 L (ref 2–3.46)
VC REF: 2.71

## 2025-07-09 PROCEDURE — 94729 DIFFUSING CAPACITY: CPT | Mod: PBBFAC

## 2025-07-09 PROCEDURE — 94726 PLETHYSMOGRAPHY LUNG VOLUMES: CPT | Mod: PBBFAC

## 2025-07-09 PROCEDURE — 94729 DIFFUSING CAPACITY: CPT | Mod: 26,S$PBB,, | Performed by: INTERNAL MEDICINE

## 2025-07-09 PROCEDURE — 94726 PLETHYSMOGRAPHY LUNG VOLUMES: CPT | Mod: 26,S$PBB,, | Performed by: INTERNAL MEDICINE

## 2025-07-09 PROCEDURE — 94060 EVALUATION OF WHEEZING: CPT | Mod: PBBFAC

## 2025-07-09 PROCEDURE — 94060 EVALUATION OF WHEEZING: CPT | Mod: 26,S$PBB,, | Performed by: INTERNAL MEDICINE

## 2025-07-10 ENCOUNTER — RESEARCH ENCOUNTER (OUTPATIENT)
Dept: RESEARCH | Facility: HOSPITAL | Age: 70
End: 2025-07-10
Payer: MEDICARE

## 2025-07-10 ENCOUNTER — PATIENT MESSAGE (OUTPATIENT)
Dept: RESEARCH | Facility: HOSPITAL | Age: 70
End: 2025-07-10
Payer: MEDICARE

## 2025-07-10 NOTE — PROGRESS NOTES
Ms. Caro was called today regarding her participation in (IRB #2015.101 PI: Jaron).     The Verbal Informed Consent was read and discussed by the consenter. The following was discussed:  Types of specimens to be collected - blood 40 ml  All medical information released to researchers will be stripped of identifiers and no patient information will be given to anyone outside of this research project.   Participating in a research study is not the same as getting regular medical care and will not improve the patient's health. The purpose of a research study is to gather information.  Being in this study does not interfere with your regular medical care.  The patient does not have to participate in this study. If they do not join, their care at [facility] will not be affected.    The person granting permission was provided adequate time to ask questions regarding the scope and purpose of the study.    Permission was obtained by telephone.     The above statements were read by the person obtaining permission to the person granting permission and witnessed by Ly Cortes. The witness information was documented on the verbal consent form as well.    This Verbal Informed Consent process was conducted prior to initiation of any study procedures.

## 2025-07-11 ENCOUNTER — HOSPITAL ENCOUNTER (OUTPATIENT)
Dept: RADIOLOGY | Facility: HOSPITAL | Age: 70
Discharge: HOME | End: 2025-07-11
Attending: PHYSICIAN ASSISTANT
Payer: MEDICARE

## 2025-07-11 DIAGNOSIS — M54.9 DORSALGIA, UNSPECIFIED: ICD-10-CM

## 2025-07-11 DIAGNOSIS — Z98.1 ARTHRODESIS STATUS: ICD-10-CM

## 2025-07-11 PROCEDURE — 72128 CT CHEST SPINE W/O DYE: CPT | Mod: TC

## 2025-07-11 PROCEDURE — 72131 CT LUMBAR SPINE W/O DYE: CPT | Mod: TC

## 2025-07-11 PROCEDURE — 72131 CT LUMBAR SPINE W/O DYE: CPT | Mod: 26,,, | Performed by: RADIOLOGY

## 2025-07-11 PROCEDURE — 72128 CT CHEST SPINE W/O DYE: CPT | Mod: 26,,, | Performed by: RADIOLOGY

## 2025-07-13 DIAGNOSIS — J44.89 ASTHMA WITH COPD: Primary | ICD-10-CM

## 2025-07-13 RX ORDER — ALBUTEROL SULFATE 90 UG/1
2 INHALANT RESPIRATORY (INHALATION) EVERY 4 HOURS PRN
Qty: 18 G | Refills: 5 | Status: SHIPPED | OUTPATIENT
Start: 2025-07-13 | End: 2026-07-13

## 2025-07-14 ENCOUNTER — HOSPITAL ENCOUNTER (OUTPATIENT)
Dept: RADIOLOGY | Facility: HOSPITAL | Age: 70
Discharge: HOME | End: 2025-07-14
Attending: INTERNAL MEDICINE
Payer: MEDICARE

## 2025-07-14 ENCOUNTER — RESEARCH ENCOUNTER (OUTPATIENT)
Dept: RESEARCH | Facility: HOSPITAL | Age: 70
End: 2025-07-14
Payer: MEDICARE

## 2025-07-14 DIAGNOSIS — Z00.6 RESEARCH STUDY PATIENT: Primary | ICD-10-CM

## 2025-07-14 DIAGNOSIS — R91.1 LEFT LOWER LOBE PULMONARY NODULE: ICD-10-CM

## 2025-07-14 NOTE — PROGRESS NOTES
Had an extensive discussion with patient regarding the benefits of having the results of the scheduled PET scan, which is on Wednesday, performed.  These will help in guiding the biopsy which can be rescheduled asap.      Patient and  agreed with this plan.

## 2025-07-14 NOTE — PROGRESS NOTES
IRB Protocol # 2015.101  SOMMER Salmeron      (Specimen type:Blood) was retrieved and brought to the [facility] for processing as per specifics of project.

## 2025-07-15 ENCOUNTER — TELEPHONE (OUTPATIENT)
Dept: PAIN MEDICINE | Facility: CLINIC | Age: 70
End: 2025-07-15
Payer: MEDICARE

## 2025-07-15 ENCOUNTER — OFFICE VISIT (OUTPATIENT)
Dept: NEUROSURGERY | Facility: CLINIC | Age: 70
End: 2025-07-15
Payer: MEDICARE

## 2025-07-15 VITALS — SYSTOLIC BLOOD PRESSURE: 97 MMHG | DIASTOLIC BLOOD PRESSURE: 61 MMHG | HEART RATE: 106 BPM

## 2025-07-15 DIAGNOSIS — M54.9 DORSALGIA, UNSPECIFIED: Primary | ICD-10-CM

## 2025-07-15 DIAGNOSIS — Z98.1 S/P LUMBAR FUSION: Primary | ICD-10-CM

## 2025-07-15 PROCEDURE — 99999 PR PBB SHADOW E&M-EST. PATIENT-LVL III: CPT | Mod: PBBFAC,,, | Performed by: PHYSICIAN ASSISTANT

## 2025-07-15 PROCEDURE — 99214 OFFICE O/P EST MOD 30 MIN: CPT | Mod: S$PBB,,, | Performed by: PHYSICIAN ASSISTANT

## 2025-07-15 PROCEDURE — 99213 OFFICE O/P EST LOW 20 MIN: CPT | Mod: PBBFAC | Performed by: PHYSICIAN ASSISTANT

## 2025-07-15 NOTE — H&P (VIEW-ONLY)
Patient presents today for follow up with CT of the thoracic and lumbar spine she continues to have rectal pain she has had full workup with colonoscopy to rule out alternative source overall has been making progress still has Nicholson in place she has been diagnosed with neurogenic bladder since her 1st surgery back in 2022 recently been undergoing and fusions with rheumatology for her osteoporosis and feels that this is helping her symptoms.      She is interested in pain management for potential options  and patient are requesting MRIs to see if there some kind of nerve compression that persists that would be causing her pain/neurogenic bladder.          PREVIOUS HPI:        Merle is a 70-year-old female known to Neurosurgery she is about 1 year postop no new x-rays for me to review today.  I have ordered CT thoracolumbar to eval for fusion process    Presents for follow up right knee pain      Lifts legs antigravity    Has nicholson in place    Getting infusions for Rheumatology    Sees Dr. Cam as she has nicholson- for neurogenic bladder      Taking robaxin TID       provide supplemental HPI.    Denies new or worsening symptoms. Mobilizing using wheel chair.      Patient is 68 yo F who presents for follow up lumbar fusion for burst fracture. She has been suffering from pain for several years due to prior nerve injury causing pain in the rectal area and difficulty with neurogenic bladder. Pt. Has had indwelling catheter since October.     She is in wheel chair today.       Patient is doing therapy at home    Has seen rheumatology for osteoporosis -  started on tymlos sub q and also started her on low dose prednisone which spouse reports affects the effectiveness of her psychiatric meds.      I also suggested cymbalta - pt spouse states he will discuss with pts psychiatrist.     Patient has been prescribed a compounded cream and other creams for her sensitive areas but non are helpful      Patient is  moving her legs better and antigravity. Needs assistance with standing.     Has been doing home health therapy    Incision is well healing    Pt reports pain in lower lumbar region mid incision - hurting to left of the incision.      Subjective:      Patient ID: Merle Caro is a 70 y.o. female.    Chief Complaint: No chief complaint on file.    Post op # 2 thoracolumbar extension for L1 burst fracture   Stable symptoms   Ambulating at home   Doing home health PT   No radicular symptoms   Still with nicholson catheter in place   Complaints to pain peroneal       Review of Systems   Constitutional:  Negative for activity change, appetite change and chills.   HENT:  Negative for hearing loss, sore throat and tinnitus.    Eyes:  Negative for pain, discharge and itching.   Cardiovascular:  Negative for chest pain.   Gastrointestinal:  Negative for abdominal pain.   Endocrine: Negative for cold intolerance and heat intolerance.   Genitourinary:  Negative for difficulty urinating and dysuria.   Musculoskeletal:  Positive for back pain and gait problem.   Allergic/Immunologic: Negative for environmental allergies.   Neurological:  Positive for weakness. Negative for dizziness, tremors, light-headedness and headaches.   Hematological:  Negative for adenopathy.   Psychiatric/Behavioral:  Negative for agitation, behavioral problems and confusion.          Objective:       Physical Exam:  Nursing note and vitals reviewed.    Constitutional: She appears well-nourished. She is not diaphoretic. No distress.     Eyes: Pupils are equal, round, and reactive to light. EOM are normal.     Cardiovascular: Normal rate and regular rhythm.     Psych/Behavior: She is alert. She is oriented to person, place, and time. She has a normal mood and affect.     Musculoskeletal:        Back: Range of motion is limited. There is tenderness. Muscle strength is 5/5.       Right Lower Extremities: Range of motion is full. There is no tenderness. Muscle  strength is 5/5. Tone is normal.        Left Lower Extremities: There is no tenderness. Muscle strength is 5/5. Tone is normal.     Neurological:        Sensory: There is no sensory deficit in the trunk. There is no sensory deficit in the extremities.        Cranial nerves: Cranial nerve(s) II, III, IV, V, VI, VII, VIII, IX, X, XI and XII are intact.     General    Nursing note and vitals reviewed.  Constitutional: She is oriented to person, place, and time. She appears well-nourished. No distress.   Eyes: EOM are normal. Pupils are equal, round, and reactive to light.   Cardiovascular:  Normal rate and regular rhythm.            Neurological: She is alert and oriented to person, place, and time.   Psychiatric: She has a normal mood and affect.             Ortho Exam    Incision CDI   Dressing in place from   No redness swelling or drainage       Electronically signed by: Jose Manuel Lawrence MD  Date:    12/18/2024  Time:    12:54         I  reviewed all pertinent imaging regarding this case.  Assessment:     No diagnosis found.      Plan:     There are no diagnoses linked to this encounter.  Postop  Presents today for review of CT T and L spine. Imaging shows instrumentation in good position.    She has pain to her perineal region will refer to Pain management            MRI L SPINE   Will follow-up after imaging    Thank you for the referral   Please call with any questions    Lizzeth Tillman PA-C  Neurosurgery     Disclaimer: This note was prepared using a voice recognition system and is likely to have sound alike errors within the text.

## 2025-07-16 ENCOUNTER — HOSPITAL ENCOUNTER (OUTPATIENT)
Dept: RADIOLOGY | Facility: HOSPITAL | Age: 70
Discharge: HOME | End: 2025-07-16
Attending: INTERNAL MEDICINE
Payer: MEDICARE

## 2025-07-16 DIAGNOSIS — R91.1 LEFT LOWER LOBE PULMONARY NODULE: ICD-10-CM

## 2025-07-16 PROCEDURE — 78815 PET IMAGE W/CT SKULL-THIGH: CPT | Mod: 26,PS,, | Performed by: STUDENT IN AN ORGANIZED HEALTH CARE EDUCATION/TRAINING PROGRAM

## 2025-07-16 PROCEDURE — A9552 F18 FDG: HCPCS | Performed by: INTERNAL MEDICINE

## 2025-07-16 PROCEDURE — 78815 PET IMAGE W/CT SKULL-THIGH: CPT | Mod: TC

## 2025-07-16 RX ORDER — FLUDEOXYGLUCOSE F18 500 MCI/ML
12.46 INJECTION INTRAVENOUS
Status: COMPLETED | OUTPATIENT
Start: 2025-07-16 | End: 2025-07-16

## 2025-07-16 RX ADMIN — FLUDEOXYGLUCOSE F-18 12.46 MILLICURIE: 500 INJECTION INTRAVENOUS at 08:07

## 2025-07-22 ENCOUNTER — TELEPHONE (OUTPATIENT)
Dept: RADIOLOGY | Facility: HOSPITAL | Age: 70
End: 2025-07-22
Payer: MEDICARE

## 2025-07-25 ENCOUNTER — TELEPHONE (OUTPATIENT)
Dept: RADIOLOGY | Facility: HOSPITAL | Age: 70
End: 2025-07-25
Payer: MEDICARE

## 2025-07-25 NOTE — TELEPHONE ENCOUNTER
Interventional Radiology  Called and spoke with pt.'s , Irving, and confirmed pt.'s appointment Monday, 7/28/25, at 8:30AM.  Pt. is to arrive by 7:15AM to the hospital ([address]/ [address] [place].  He confirmed that he will drive pt. and she will be NPO after midnight the night before.  Explained that morning medication can be taken with a small sip of water.  Pt. took last dose of Celebrex on 7/22/25 and she does not take other medications that need to be stopped prior to this procedure.  Irving verbalized understanding of instructions.

## 2025-07-28 ENCOUNTER — HOSPITAL ENCOUNTER (OUTPATIENT)
Dept: RADIOLOGY | Facility: HOSPITAL | Age: 70
Discharge: HOME | End: 2025-07-28
Attending: INTERNAL MEDICINE
Payer: MEDICARE

## 2025-07-28 ENCOUNTER — HOSPITAL ENCOUNTER (OUTPATIENT)
Dept: RADIOLOGY | Facility: HOSPITAL | Age: 70
Discharge: HOME | End: 2025-07-28
Attending: PHYSICIAN ASSISTANT
Payer: MEDICARE

## 2025-07-28 VITALS
WEIGHT: 180 LBS | RESPIRATION RATE: 18 BRPM | DIASTOLIC BLOOD PRESSURE: 89 MMHG | HEART RATE: 86 BPM | OXYGEN SATURATION: 96 % | HEIGHT: 61 IN | SYSTOLIC BLOOD PRESSURE: 151 MMHG | BODY MASS INDEX: 33.99 KG/M2

## 2025-07-28 PROCEDURE — A4215 STERILE NEEDLE: HCPCS

## 2025-07-28 PROCEDURE — 63600175 PHARM REV CODE 636 W HCPCS: Performed by: RADIOLOGY

## 2025-07-28 PROCEDURE — 71045 X-RAY EXAM CHEST 1 VIEW: CPT | Mod: 26,,, | Performed by: RADIOLOGY

## 2025-07-28 PROCEDURE — 99152 MOD SED SAME PHYS/QHP 5/>YRS: CPT | Mod: ,,, | Performed by: RADIOLOGY

## 2025-07-28 PROCEDURE — 88305 TISSUE EXAM BY PATHOLOGIST: CPT | Mod: TC | Performed by: INTERNAL MEDICINE

## 2025-07-28 PROCEDURE — 71045 X-RAY EXAM CHEST 1 VIEW: CPT | Mod: 26,76,, | Performed by: RADIOLOGY

## 2025-07-28 PROCEDURE — 71045 X-RAY EXAM CHEST 1 VIEW: CPT | Mod: TC

## 2025-07-28 PROCEDURE — 32408 CORE NDL BX LNG/MED PERQ: CPT | Performed by: RADIOLOGY

## 2025-07-28 PROCEDURE — 99152 MOD SED SAME PHYS/QHP 5/>YRS: CPT | Performed by: RADIOLOGY

## 2025-07-28 RX ORDER — FENTANYL CITRATE 50 UG/ML
INJECTION, SOLUTION INTRAMUSCULAR; INTRAVENOUS CODE/TRAUMA/SEDATION MEDICATION
Status: COMPLETED | OUTPATIENT
Start: 2025-07-28 | End: 2025-07-28

## 2025-07-28 RX ORDER — MIDAZOLAM HYDROCHLORIDE 1 MG/ML
INJECTION, SOLUTION INTRAMUSCULAR; INTRAVENOUS CODE/TRAUMA/SEDATION MEDICATION
Status: COMPLETED | OUTPATIENT
Start: 2025-07-28 | End: 2025-07-28

## 2025-07-28 RX ORDER — LIDOCAINE HYDROCHLORIDE 10 MG/ML
INJECTION, SOLUTION INFILTRATION; PERINEURAL CODE/TRAUMA/SEDATION MEDICATION
Status: COMPLETED | OUTPATIENT
Start: 2025-07-28 | End: 2025-07-28

## 2025-07-28 RX ADMIN — LIDOCAINE HYDROCHLORIDE 5 ML: 10 INJECTION, SOLUTION INFILTRATION; PERINEURAL at 08:07

## 2025-07-28 RX ADMIN — MIDAZOLAM HYDROCHLORIDE 0.5 MG: 1 INJECTION, SOLUTION INTRAMUSCULAR; INTRAVENOUS at 08:07

## 2025-07-28 RX ADMIN — FENTANYL CITRATE 25 MCG: 50 INJECTION, SOLUTION INTRAMUSCULAR; INTRAVENOUS at 08:07

## 2025-07-28 RX ADMIN — MIDAZOLAM HYDROCHLORIDE 0.5 MG: 1 INJECTION, SOLUTION INTRAMUSCULAR; INTRAVENOUS at 09:07

## 2025-07-28 RX ADMIN — FENTANYL CITRATE 25 MCG: 50 INJECTION, SOLUTION INTRAMUSCULAR; INTRAVENOUS at 09:07

## 2025-07-28 NOTE — INTERVAL H&P NOTE
The patient has been examined and the H&P has been reviewed:    I concur with the findings and changes have been noted since the H&P was written: LLL lung nodule        There are no hospital problems to display for this patient.

## 2025-07-28 NOTE — DISCHARGE SUMMARY
[facility] (Hospital)  Discharge Note  Short Stay    CT Biopsy Lung w/ guidance      OUTCOME: Patient tolerated treatment/procedure well without complication and is now ready for discharge.    DISPOSITION: Home or Self Care    FINAL DIAGNOSIS:  <principal problem not specified>    FOLLOWUP: In clinic    DISCHARGE INSTRUCTIONS:  No discharge procedures on file.      Clinical Reference Documents Added to Patient Instructions         Document    NEEDLE BIOPSY OF THE LUNG AND PLEURA (ENGLISH)            TIME SPENT ON DISCHARGE: 15 minutes    Pre Op Diagnosis: left lung nodule     Post Op Diagnosis: same     Procedure:  biopsy     Procedure performed by: Kelsie COATES, Shana BRUCE     Written Informed Consent Obtained: Yes     Specimen Removed:  yes     Estimated Blood Loss:  minimal     Findings: Local anesthesia     Sedation:  yes     The patient tolerated the procedure well and there were no complications.      Disposition:  F/U in clinic or with ordering physician    Discharge instructions:  Light activity for 24 hours.  Remove band aid in 24 hours.  No baths (showers are appropriate).      Sterile technique was performed in the posterior left thorax, lidocaine was used as a local anesthetic.  Multiple samples taken percutaneously from the left lung nodule.  Small post procedure pneumothorax. Will f/u with chest x rays.  Pt tolerated the procedure well without immediate complications.  Please see radiologist report for details. F/u with PCP and/or ordering physician.

## 2025-07-28 NOTE — DISCHARGE INSTRUCTIONS
Please return to ER if any of these symptoms occur:  Fever over 101 degrees,  Any purulent drainage from site (pus, yellow or has foul odor), or any redness or swelling to site  Bleeding from the puncture site not controlled, If bleeding occurs at site hold pressure for 5 mins.  If bleeding continues go to ER  Pain not controlled with Aleve or Tylenol,     No driving for 24 hours after procedure due to sedation given during procedure.      Do not submerge in standing water for 2 days after biopsy but you may shower.     May change bandage if it becomes soiled and bandage may be removed in 2 days.     Rest for the next couple of days. Do not lift any thing heavier than a gallon of milk.  Increase activity as tolerated.     Resume home medications and diet     Biopsy results will be with Dr. Mclean in 5-7 days, please follow up with him for results and any other questions or concerns that you may have.

## 2025-07-30 LAB
ESTROGEN SERPL-MCNC: ABNORMAL PG/ML
INSULIN SERPL-ACNC: ABNORMAL U[IU]/ML
LAB AP CLINICAL INFORMATION: ABNORMAL
LAB AP DIAGNOSIS CATEGORY: ABNORMAL
LAB AP GROSS DESCRIPTION: ABNORMAL
LAB AP PERFORMING LOCATION(S): ABNORMAL
LAB AP REPORT FOOTNOTES: ABNORMAL

## 2025-07-31 ENCOUNTER — TELEPHONE (OUTPATIENT)
Dept: ORTHOPEDICS | Facility: CLINIC | Age: 70
End: 2025-07-31
Payer: MEDICARE

## 2025-08-01 DIAGNOSIS — C34.32 ADENOCARCINOMA OF LOWER LOBE OF LEFT LUNG: Primary | ICD-10-CM

## 2025-08-04 ENCOUNTER — INFUSION (OUTPATIENT)
Dept: INFUSION THERAPY | Facility: HOSPITAL | Age: 70
End: 2025-08-04
Attending: INTERNAL MEDICINE
Payer: MEDICARE

## 2025-08-04 ENCOUNTER — TELEPHONE (OUTPATIENT)
Dept: HEMATOLOGY/ONCOLOGY | Facility: CLINIC | Age: 70
End: 2025-08-04
Payer: MEDICARE

## 2025-08-04 VITALS
DIASTOLIC BLOOD PRESSURE: 79 MMHG | RESPIRATION RATE: 18 BRPM | SYSTOLIC BLOOD PRESSURE: 142 MMHG | TEMPERATURE: 98 F | HEART RATE: 86 BPM | OXYGEN SATURATION: 92 %

## 2025-08-04 DIAGNOSIS — C34.90 MALIGNANT NEOPLASM OF UNSPECIFIED PART OF UNSPECIFIED BRONCHUS OR LUNG: Primary | ICD-10-CM

## 2025-08-04 DIAGNOSIS — M05.9 SEROPOSITIVE RHEUMATOID ARTHRITIS: Primary | ICD-10-CM

## 2025-08-04 PROCEDURE — 96365 THER/PROPH/DIAG IV INF INIT: CPT

## 2025-08-04 PROCEDURE — 63600175 PHARM REV CODE 636 W HCPCS: Mod: JZ,JA,TB | Performed by: INTERNAL MEDICINE

## 2025-08-04 PROCEDURE — 25000003 PHARM REV CODE 250: Performed by: INTERNAL MEDICINE

## 2025-08-04 RX ORDER — DIPHENHYDRAMINE HYDROCHLORIDE 50 MG/ML
50 INJECTION, SOLUTION INTRAMUSCULAR; INTRAVENOUS ONCE AS NEEDED
OUTPATIENT
Start: 2025-09-01

## 2025-08-04 RX ORDER — ACETAMINOPHEN 325 MG/1
325 TABLET ORAL ONCE
OUTPATIENT
Start: 2025-09-01

## 2025-08-04 RX ORDER — EPINEPHRINE 0.3 MG/.3ML
0.3 INJECTION SUBCUTANEOUS ONCE AS NEEDED
OUTPATIENT
Start: 2025-09-01

## 2025-08-04 RX ORDER — DIPHENHYDRAMINE HCL 25 MG
25 CAPSULE ORAL ONCE
OUTPATIENT
Start: 2025-09-01

## 2025-08-04 RX ORDER — SODIUM CHLORIDE 0.9 % (FLUSH) 0.9 %
10 SYRINGE (ML) INJECTION
OUTPATIENT
Start: 2025-09-01

## 2025-08-04 RX ORDER — DIPHENHYDRAMINE HCL 25 MG
25 CAPSULE ORAL ONCE
Status: COMPLETED | OUTPATIENT
Start: 2025-08-04 | End: 2025-08-04

## 2025-08-04 RX ORDER — ACETAMINOPHEN 325 MG/1
650 TABLET ORAL
OUTPATIENT
Start: 2025-09-01

## 2025-08-04 RX ORDER — HEPARIN 100 UNIT/ML
500 SYRINGE INTRAVENOUS
OUTPATIENT
Start: 2025-09-01

## 2025-08-04 RX ORDER — SODIUM CHLORIDE 0.9 % (FLUSH) 0.9 %
10 SYRINGE (ML) INJECTION
Status: DISCONTINUED | OUTPATIENT
Start: 2025-08-04 | End: 2025-08-04 | Stop reason: HOSPADM

## 2025-08-04 RX ORDER — DIPHENHYDRAMINE HYDROCHLORIDE 50 MG/ML
25 INJECTION, SOLUTION INTRAMUSCULAR; INTRAVENOUS
OUTPATIENT
Start: 2025-09-01

## 2025-08-04 RX ADMIN — DIPHENHYDRAMINE HYDROCHLORIDE 25 MG: 25 CAPSULE ORAL at 09:08

## 2025-08-04 RX ADMIN — SODIUM CHLORIDE 750 MG: 9 INJECTION, SOLUTION INTRAVENOUS at 09:08

## 2025-08-04 NOTE — PLAN OF CARE
Problem: Adult Inpatient Plan of Care  Goal: Plan of Care Review  Outcome: Progressing  Flowsheets (Taken 8/4/2025 0948)  Plan of Care Reviewed With: patient  Goal: Patient-Specific Goal (Individualized)  Outcome: Progressing  Flowsheets (Taken 8/4/2025 0948)  Individualized Care Needs: Remained in WC for treatment  Anxieties, Fears or Concerns: None  Patient/Family-Specific Goals (Include Timeframe): Will tolerate infusion without adverse reaction  Goal: Absence of Hospital-Acquired Illness or Injury  Outcome: Progressing  Intervention: Identify and Manage Fall Risk  Flowsheets (Taken 8/4/2025 0948)  Safety Promotion/Fall Prevention: in recliner, wheels locked  Intervention: Prevent Infection  Flowsheets (Taken 8/4/2025 0948)  Infection Prevention:   equipment surfaces disinfected   hand hygiene promoted   personal protective equipment utilized  Goal: Optimal Comfort and Wellbeing  Outcome: Progressing  Intervention: Provide Person-Centered Care  Flowsheets (Taken 8/4/2025 0948)  Trust Relationship/Rapport:   care explained   questions encouraged   choices provided   reassurance provided   emotional support provided   thoughts/feelings acknowledged   empathic listening provided   questions answered

## 2025-08-04 NOTE — TELEPHONE ENCOUNTER
Call placed to pt to discuss and schedule NP appt with oncology per Dr. Mclean;  I introduced myself and my roll in pt's care and gave my direct contact information. Offered pt first available appointment of 8/11/25 however, pt prefers [facility], scheduled for 8/12 and pt agreeable. Order for MRI brain placed, will try to add to currently scheduled MRI this week per pt request. Pt denies any further needs/concerns at this time though I encouraged them to reach out at any time.     Oncology Navigation   Intake  Date of Diagnosis: 07/28/25  Cancer Type: Thoracic  Type of Referral: Internal  Date of Referral: 07/31/25  Initial Nurse Navigator Contact: 08/04/25  Referral to Initial Contact Timeline (days): 4  First Appointment Available: 08/11/25  Appointment Date: 08/12/25  First Available Date vs. Scheduled Date (days): 1  Multiple appointments: No     Treatment                              Acuity      Follow Up  No follow-ups on file.

## 2025-08-04 NOTE — DISCHARGE INSTRUCTIONS
[facility]  [id number]  [address]  [phone] phone     [phone] fax  Hours of Operation: Monday- Friday 8:00am- 5:00pm  After hours phone  [phone]  Hematology / Oncology Physicians on call      JANIS Ojeda Dr., NP Phaon Dunbar, NP Khelsea Conley, FNP    Please call with any concerns regarding your appointment today.

## 2025-08-07 ENCOUNTER — LAB VISIT (OUTPATIENT)
Dept: LAB | Facility: HOSPITAL | Age: 70
End: 2025-08-07
Attending: HOSPITALIST
Payer: MEDICARE

## 2025-08-07 DIAGNOSIS — C34.90 MALIGNANT NEOPLASM OF UNSPECIFIED PART OF UNSPECIFIED BRONCHUS OR LUNG: ICD-10-CM

## 2025-08-07 LAB
CREAT SERPL-MCNC: 0.9 MG/DL (ref 0.5–1.4)
GFR SERPLBLD CREATININE-BSD FMLA CKD-EPI: >60 ML/MIN/1.73/M2

## 2025-08-07 PROCEDURE — 82565 ASSAY OF CREATININE: CPT

## 2025-08-07 PROCEDURE — 36415 COLL VENOUS BLD VENIPUNCTURE: CPT | Mod: PO

## 2025-08-08 ENCOUNTER — HOSPITAL ENCOUNTER (OUTPATIENT)
Dept: RADIOLOGY | Facility: HOSPITAL | Age: 70
Discharge: HOME | End: 2025-08-08
Attending: PHYSICIAN ASSISTANT
Payer: MEDICARE

## 2025-08-08 ENCOUNTER — HOSPITAL ENCOUNTER (OUTPATIENT)
Dept: RADIOLOGY | Facility: HOSPITAL | Age: 70
Discharge: HOME | End: 2025-08-08
Attending: HOSPITALIST
Payer: MEDICARE

## 2025-08-08 DIAGNOSIS — C34.90 MALIGNANT NEOPLASM OF UNSPECIFIED PART OF UNSPECIFIED BRONCHUS OR LUNG: ICD-10-CM

## 2025-08-08 DIAGNOSIS — M54.9 DORSALGIA, UNSPECIFIED: ICD-10-CM

## 2025-08-08 PROCEDURE — 72148 MRI LUMBAR SPINE W/O DYE: CPT | Mod: 26,,, | Performed by: RADIOLOGY

## 2025-08-08 PROCEDURE — 72148 MRI LUMBAR SPINE W/O DYE: CPT | Mod: TC,PN

## 2025-08-08 PROCEDURE — 70553 MRI BRAIN STEM W/O & W/DYE: CPT | Mod: 26,,, | Performed by: RADIOLOGY

## 2025-08-08 PROCEDURE — 25500020 PHARM REV CODE 255: Mod: PN | Performed by: HOSPITALIST

## 2025-08-08 PROCEDURE — A9585 GADOBUTROL INJECTION: HCPCS | Mod: PN | Performed by: HOSPITALIST

## 2025-08-08 PROCEDURE — 70553 MRI BRAIN STEM W/O & W/DYE: CPT | Mod: TC,PN

## 2025-08-08 PROCEDURE — 72195 MRI PELVIS W/O DYE: CPT | Mod: 26,,, | Performed by: RADIOLOGY

## 2025-08-08 PROCEDURE — 72195 MRI PELVIS W/O DYE: CPT | Mod: TC,PN

## 2025-08-08 RX ORDER — GADOBUTROL 604.72 MG/ML
8 INJECTION INTRAVENOUS
Status: COMPLETED | OUTPATIENT
Start: 2025-08-08 | End: 2025-08-08

## 2025-08-08 RX ADMIN — GADOBUTROL 8 ML: 604.72 INJECTION INTRAVENOUS at 10:08

## 2025-08-12 ENCOUNTER — LAB VISIT (OUTPATIENT)
Dept: LAB | Facility: HOSPITAL | Age: 70
End: 2025-08-12
Attending: HOSPITALIST
Payer: MEDICARE

## 2025-08-12 ENCOUNTER — OFFICE VISIT (OUTPATIENT)
Dept: HEMATOLOGY/ONCOLOGY | Facility: CLINIC | Age: 70
End: 2025-08-12
Payer: MEDICARE

## 2025-08-12 VITALS
HEART RATE: 111 BPM | HEIGHT: 61 IN | TEMPERATURE: 98 F | SYSTOLIC BLOOD PRESSURE: 156 MMHG | DIASTOLIC BLOOD PRESSURE: 93 MMHG | OXYGEN SATURATION: 94 % | BODY MASS INDEX: 34.01 KG/M2

## 2025-08-12 DIAGNOSIS — C34.32 ADENOCARCINOMA OF LOWER LOBE OF LEFT LUNG: Primary | ICD-10-CM

## 2025-08-12 DIAGNOSIS — C34.32 ADENOCARCINOMA OF LOWER LOBE OF LEFT LUNG: ICD-10-CM

## 2025-08-12 PROCEDURE — 36415 COLL VENOUS BLD VENIPUNCTURE: CPT

## 2025-08-12 PROCEDURE — 99999 PR PBB SHADOW E&M-EST. PATIENT-LVL IV: CPT | Mod: PBBFAC,,, | Performed by: HOSPITALIST

## 2025-08-12 PROCEDURE — 99214 OFFICE O/P EST MOD 30 MIN: CPT | Mod: PBBFAC | Performed by: HOSPITALIST

## 2025-08-13 ENCOUNTER — TELEPHONE (OUTPATIENT)
Dept: HEMATOLOGY/ONCOLOGY | Facility: CLINIC | Age: 70
End: 2025-08-13
Payer: MEDICARE

## 2025-08-14 ENCOUNTER — TELEPHONE (OUTPATIENT)
Dept: RADIATION ONCOLOGY | Facility: CLINIC | Age: 70
End: 2025-08-14
Payer: MEDICARE

## 2025-08-18 ENCOUNTER — PATIENT MESSAGE (OUTPATIENT)
Dept: HEMATOLOGY/ONCOLOGY | Facility: CLINIC | Age: 70
End: 2025-08-18
Payer: MEDICARE

## 2025-08-19 ENCOUNTER — OFFICE VISIT (OUTPATIENT)
Dept: NEUROSURGERY | Facility: CLINIC | Age: 70
End: 2025-08-19
Payer: MEDICARE

## 2025-08-19 VITALS
BODY MASS INDEX: 33.96 KG/M2 | WEIGHT: 179.88 LBS | HEIGHT: 61 IN | SYSTOLIC BLOOD PRESSURE: 131 MMHG | DIASTOLIC BLOOD PRESSURE: 62 MMHG | HEART RATE: 79 BPM

## 2025-08-19 DIAGNOSIS — S32.001A LUMBAR BURST FRACTURE, CLOSED, INITIAL ENCOUNTER: ICD-10-CM

## 2025-08-19 DIAGNOSIS — Z98.1 S/P LUMBAR FUSION: ICD-10-CM

## 2025-08-19 DIAGNOSIS — Z98.1 HISTORY OF LUMBAR FUSION: ICD-10-CM

## 2025-08-19 DIAGNOSIS — M84.48XS SACRAL INSUFFICIENCY FRACTURE, SEQUELA: ICD-10-CM

## 2025-08-19 DIAGNOSIS — M81.0 OSTEOPOROSIS WITHOUT CURRENT PATHOLOGICAL FRACTURE, UNSPECIFIED OSTEOPOROSIS TYPE: Primary | ICD-10-CM

## 2025-08-19 DIAGNOSIS — S32.001A LUMBAR BURST FRACTURE, CLOSED, INITIAL ENCOUNTER: Primary | ICD-10-CM

## 2025-08-19 LAB
DNA RANGE(S) EXAMINED NAR: NORMAL
GENE DIS ANL INTERP-IMP: NORMAL
GENE DIS ASSESSED: NORMAL
REASON FOR STUDY: NORMAL
TEMPUS BLOOD TUMOR MUTATIONAL BURDEN NOTE: NORMAL
TEMPUS GENOMIC NOTE: NORMAL
TEMPUS LCA: NORMAL
TEMPUS MSI NOTE: NORMAL
TEMPUS PORTAL: NORMAL
TEMPUS TREATMENT IMPLICATIONS NOTE: NORMAL

## 2025-08-19 PROCEDURE — 99213 OFFICE O/P EST LOW 20 MIN: CPT | Mod: PBBFAC | Performed by: NEUROLOGICAL SURGERY

## 2025-08-19 PROCEDURE — 99999 PR PBB SHADOW E&M-EST. PATIENT-LVL III: CPT | Mod: PBBFAC,,, | Performed by: NEUROLOGICAL SURGERY

## 2025-08-26 ENCOUNTER — PATIENT MESSAGE (OUTPATIENT)
Dept: PULMONOLOGY | Facility: CLINIC | Age: 70
End: 2025-08-26
Payer: MEDICARE

## 2025-08-26 ENCOUNTER — OFFICE VISIT (OUTPATIENT)
Dept: RADIATION ONCOLOGY | Facility: CLINIC | Age: 70
End: 2025-08-26
Payer: MEDICARE

## 2025-08-26 VITALS
BODY MASS INDEX: 33.99 KG/M2 | SYSTOLIC BLOOD PRESSURE: 123 MMHG | HEART RATE: 88 BPM | DIASTOLIC BLOOD PRESSURE: 81 MMHG | RESPIRATION RATE: 18 BRPM | HEIGHT: 61 IN | OXYGEN SATURATION: 94 % | TEMPERATURE: 98 F

## 2025-08-26 DIAGNOSIS — C34.32 ADENOCARCINOMA OF LOWER LOBE OF LEFT LUNG: Primary | ICD-10-CM

## 2025-08-26 PROCEDURE — 99205 OFFICE O/P NEW HI 60 MIN: CPT | Mod: S$PBB,,, | Performed by: SPECIALIST

## 2025-08-26 PROCEDURE — 99214 OFFICE O/P EST MOD 30 MIN: CPT | Mod: PBBFAC | Performed by: SPECIALIST

## 2025-08-26 PROCEDURE — 99999 PR PBB SHADOW E&M-EST. PATIENT-LVL IV: CPT | Mod: PBBFAC,,, | Performed by: SPECIALIST

## 2025-08-28 ENCOUNTER — HOSPITAL ENCOUNTER (OUTPATIENT)
Dept: RADIATION THERAPY | Facility: HOSPITAL | Age: 70
Discharge: HOME | End: 2025-08-28
Attending: INTERNAL MEDICINE
Payer: MEDICARE

## 2025-08-28 ENCOUNTER — HOSPITAL ENCOUNTER (OUTPATIENT)
Dept: RADIOLOGY | Facility: HOSPITAL | Age: 70
Discharge: HOME | End: 2025-08-28
Attending: INTERNAL MEDICINE
Payer: MEDICARE

## 2025-08-28 PROCEDURE — 77014 HC CT GUIDANCE RADIATION THERAPY FLDS PLACEMENT: CPT | Mod: TC | Performed by: SPECIALIST

## 2025-08-28 PROCEDURE — 77014 PR  CT GUIDANCE PLACEMENT RAD THERAPY FIELDS: CPT | Mod: 26,,, | Performed by: SPECIALIST

## 2025-08-28 PROCEDURE — 77334 RADIATION TREATMENT AID(S): CPT | Mod: TC | Performed by: SPECIALIST

## 2025-08-28 PROCEDURE — 77263 THER RADIOLOGY TX PLNG CPLX: CPT | Mod: ,,, | Performed by: SPECIALIST

## 2025-08-28 PROCEDURE — 77334 RADIATION TREATMENT AID(S): CPT | Mod: 26,,, | Performed by: SPECIALIST

## 2025-09-02 ENCOUNTER — INFUSION (OUTPATIENT)
Dept: INFUSION THERAPY | Facility: HOSPITAL | Age: 70
End: 2025-09-02
Attending: INTERNAL MEDICINE
Payer: MEDICARE

## 2025-09-02 VITALS
WEIGHT: 179.88 LBS | OXYGEN SATURATION: 93 % | RESPIRATION RATE: 18 BRPM | SYSTOLIC BLOOD PRESSURE: 136 MMHG | HEART RATE: 78 BPM | TEMPERATURE: 98 F | DIASTOLIC BLOOD PRESSURE: 80 MMHG | BODY MASS INDEX: 33.99 KG/M2

## 2025-09-02 DIAGNOSIS — M05.9 SEROPOSITIVE RHEUMATOID ARTHRITIS: Primary | ICD-10-CM

## 2025-09-02 PROCEDURE — 96365 THER/PROPH/DIAG IV INF INIT: CPT

## 2025-09-02 PROCEDURE — 25000003 PHARM REV CODE 250: Performed by: INTERNAL MEDICINE

## 2025-09-02 PROCEDURE — 63600175 PHARM REV CODE 636 W HCPCS: Mod: JZ,JA,TB | Performed by: INTERNAL MEDICINE

## 2025-09-02 RX ORDER — DIPHENHYDRAMINE HCL 25 MG
25 CAPSULE ORAL ONCE
OUTPATIENT
Start: 2025-09-02 | End: 2025-09-02

## 2025-09-02 RX ORDER — EPINEPHRINE 0.3 MG/.3ML
0.3 INJECTION SUBCUTANEOUS ONCE AS NEEDED
Status: DISCONTINUED | OUTPATIENT
Start: 2025-09-02 | End: 2025-09-02 | Stop reason: HOSPADM

## 2025-09-02 RX ORDER — ACETAMINOPHEN 325 MG/1
650 TABLET ORAL
OUTPATIENT
Start: 2025-09-02 | End: 2025-09-02

## 2025-09-02 RX ORDER — HEPARIN 100 UNIT/ML
500 SYRINGE INTRAVENOUS
OUTPATIENT
Start: 2025-09-02

## 2025-09-02 RX ORDER — DIPHENHYDRAMINE HYDROCHLORIDE 50 MG/ML
50 INJECTION, SOLUTION INTRAMUSCULAR; INTRAVENOUS ONCE AS NEEDED
OUTPATIENT
Start: 2025-09-02 | End: 2037-01-28

## 2025-09-02 RX ORDER — ACETAMINOPHEN 325 MG/1
325 TABLET ORAL ONCE
OUTPATIENT
Start: 2025-09-02 | End: 2025-09-02

## 2025-09-02 RX ORDER — EPINEPHRINE 0.3 MG/.3ML
0.3 INJECTION SUBCUTANEOUS ONCE AS NEEDED
OUTPATIENT
Start: 2025-09-02 | End: 2037-01-28

## 2025-09-02 RX ORDER — DIPHENHYDRAMINE HYDROCHLORIDE 50 MG/ML
25 INJECTION, SOLUTION INTRAMUSCULAR; INTRAVENOUS
OUTPATIENT
Start: 2025-09-02 | End: 2025-09-02

## 2025-09-02 RX ORDER — SODIUM CHLORIDE 0.9 % (FLUSH) 0.9 %
10 SYRINGE (ML) INJECTION
OUTPATIENT
Start: 2025-09-02

## 2025-09-02 RX ORDER — DIPHENHYDRAMINE HYDROCHLORIDE 50 MG/ML
50 INJECTION, SOLUTION INTRAMUSCULAR; INTRAVENOUS ONCE AS NEEDED
Status: DISCONTINUED | OUTPATIENT
Start: 2025-09-02 | End: 2025-09-02 | Stop reason: HOSPADM

## 2025-09-02 RX ORDER — DIPHENHYDRAMINE HCL 25 MG
25 CAPSULE ORAL ONCE
Status: COMPLETED | OUTPATIENT
Start: 2025-09-02 | End: 2025-09-02

## 2025-09-02 RX ORDER — SODIUM CHLORIDE 0.9 % (FLUSH) 0.9 %
10 SYRINGE (ML) INJECTION
Status: DISCONTINUED | OUTPATIENT
Start: 2025-09-02 | End: 2025-09-02 | Stop reason: HOSPADM

## 2025-09-02 RX ADMIN — SODIUM CHLORIDE 750 MG: 9 INJECTION, SOLUTION INTRAVENOUS at 09:09

## 2025-09-02 RX ADMIN — DIPHENHYDRAMINE HYDROCHLORIDE 25 MG: 25 CAPSULE ORAL at 08:09

## (undated) DEVICE — DRAPE TOP 53X102IN

## (undated) DEVICE — DRESSING AQUACEL SACRAL 9 X 9

## (undated) DEVICE — MARKER SKIN RULER STERILE

## (undated) DEVICE — NDL SPINAL 18GX3.5 SPINOCAN

## (undated) DEVICE — ADHESIVE MASTISOL VIAL 48/BX

## (undated) DEVICE — SYR LUER LOCK STERILE 10ML

## (undated) DEVICE — SUT VICRYL+ 1 CT1 18IN

## (undated) DEVICE — MANIFOLD 4 PORT

## (undated) DEVICE — SUT BONE WAX 2.5 GRMS 12/BX

## (undated) DEVICE — APPLICATOR CHLORAPREP ORN 26ML

## (undated) DEVICE — SYS PRINEO SKIN CLOSURE

## (undated) DEVICE — SEE MEDLINE ITEM 156905

## (undated) DEVICE — TUBE FRAZIER 5MM 2FT SOFT TIP

## (undated) DEVICE — NDL ECLIPSE SAFETY 18GX1-1/2IN

## (undated) DEVICE — Device

## (undated) DEVICE — DRAPE OPMI STERILE

## (undated) DEVICE — NDL SPINAL 20GX3.5 HUB

## (undated) DEVICE — SUTURE STRATAFIX PGA PCL 3-0

## (undated) DEVICE — CLOSURE SKIN STERI STRIP 1/2X4

## (undated) DEVICE — DRAPE THREE-QTR REINF 53X77IN

## (undated) DEVICE — TRAY CATH FOL SIL URIMTR 16FR

## (undated) DEVICE — SYR IRRIGATION BULB STER 60ML

## (undated) DEVICE — CONTAINER SPECIMEN OR STER 4OZ

## (undated) DEVICE — SPONGE GAUZE 16PLY 4X4

## (undated) DEVICE — BLADE MILL BONE MEDIUM

## (undated) DEVICE — TIP YANKAUERS BULB NO VENT

## (undated) DEVICE — DRESSING TRANS 4X4 TEGADERM

## (undated) DEVICE — DRAPE ABDOMINAL TIBURON 14X11

## (undated) DEVICE — PACK BASIC SETUP SC BR

## (undated) DEVICE — CORD BIPOLAR 12 FOOT

## (undated) DEVICE — POUCH INSTRUMENT 2 POCKET

## (undated) DEVICE — BUR LEGEND MIDAS REX 14CM 13MM

## (undated) DEVICE — DRESSING SURGICAL 1/2X1/2

## (undated) DEVICE — ELECTRODE REM PLYHSV RETURN 9

## (undated) DEVICE — DRESSING AQUACEL FOAM 5 X 5

## (undated) DEVICE — KIT CONFIDENCE W/O NEEDLES

## (undated) DEVICE — BUR BONE CUT MICRO TPS 3X3.8MM

## (undated) DEVICE — SUT ETHILON 2-0 BLK PS-2

## (undated) DEVICE — KIT SURGIFLO HEMOSTATIC MATRIX

## (undated) DEVICE — KIT PT CARE FRME POS JACK

## (undated) DEVICE — TRAY CATH 1-LYR URIMTR 16FR

## (undated) DEVICE — NDL 20GX1-1/2IN IB

## (undated) DEVICE — DRAPE MOBILE C-ARM

## (undated) DEVICE — DRAPE C-ARMOR EQUIPMENT COVER

## (undated) DEVICE — SEALER AQUAMANTYS 2.3 BIPOLAR

## (undated) DEVICE — STAPLER SKIN PROXIMATE WIDE

## (undated) DEVICE — DRAPE CORETEMP FLD WRM 56X62IN

## (undated) DEVICE — COVER LIGHT HANDLE 80/CA

## (undated) DEVICE — TAP 8MM SPINAL POWER

## (undated) DEVICE — SUT 2-0 SILK 30IN BLK BRAID

## (undated) DEVICE — TOOL MR8 MATCH HEAD 14CM 3MM

## (undated) DEVICE — CANNULA EXPEDIUM OPN 16GX160MM

## (undated) DEVICE — BLADE EZ CLEAN 2.5IN MODIFIED

## (undated) DEVICE — DRAPE STERI-DRAPE 1000 17X11IN

## (undated) DEVICE — DRAPE SPACE STATION 30X34X72IN

## (undated) DEVICE — BLADE 4IN EDGE INSULATED

## (undated) DEVICE — SYS SURGIPHOR STRL IRRG

## (undated) DEVICE — SPONGE COTTON TRAY 4X4IN

## (undated) DEVICE — WAX BONE STERILE 2.5G

## (undated) DEVICE — SYR ONLY LUER LOCK 20CC

## (undated) DEVICE — NDL HYPO 27G X 1 1/2

## (undated) DEVICE — DRESSING ADH ISLAND 3.6 X 14

## (undated) DEVICE — SUT VICRYL PLUS 0 CT1 18IN

## (undated) DEVICE — GAUZE SPONGE 4X4 12PLY

## (undated) DEVICE — SPONGE LAP 4X18 PREWASHED

## (undated) DEVICE — DRAPE C-ARM ELAS CLIP 42X120IN

## (undated) DEVICE — DRESSING MEPILEX BORDER 4 X 4

## (undated) DEVICE — LOTION DURA PREP REMOVER 40Z

## (undated) DEVICE — DRAPE INVISISHIELD TWL 18X24IN

## (undated) DEVICE — SPHERE NDI PASSIVE

## (undated) DEVICE — SUT VICRYL+ 2-0 SH 18IN

## (undated) DEVICE — SEALANT ADHERUS AUTOSPRY DURAL

## (undated) DEVICE — DRESSING AQUACEL FOAM 3 X 3

## (undated) DEVICE — ALCOHOL 70% ANTISEPTIC ISO 4OZ

## (undated) DEVICE — DRAPE LAP T SHT W/ INSTR PAD

## (undated) DEVICE — TAPE SILK 3IN

## (undated) DEVICE — COVER BACK TBL HD 2-TIER 72IN

## (undated) DEVICE — SUT 0 VICRYL / CT-1

## (undated) DEVICE — GOWN POLY REINF BRTH SLV XL

## (undated) DEVICE — DURAPREP SURG SCRUB 26ML

## (undated) DEVICE — KIT SPINAL PATIENT CARE JACK

## (undated) DEVICE — TUBING MEDI-VAC 20FT .25IN

## (undated) DEVICE — CLOSURE SKIN 1X5 STERI-STRIP

## (undated) DEVICE — DRAPE INCISE IOBAN 2 23X17IN

## (undated) DEVICE — DRESSING MEPORE ADH 3.5X12

## (undated) DEVICE — TOWEL OR DISP STRL BLUE 4/PK

## (undated) DEVICE — BLADE BONE BIOPSY FINE 3.2MM

## (undated) DEVICE — MARKER SKIN STND TIP BLUE BARR

## (undated) DEVICE — SUT VICRYL PLUS 2-0 CT1 18